# Patient Record
Sex: FEMALE | Race: ASIAN | NOT HISPANIC OR LATINO | Employment: UNEMPLOYED | URBAN - METROPOLITAN AREA
[De-identification: names, ages, dates, MRNs, and addresses within clinical notes are randomized per-mention and may not be internally consistent; named-entity substitution may affect disease eponyms.]

---

## 2017-01-08 ENCOUNTER — MEDICAL CORRESPONDENCE (OUTPATIENT)
Dept: HEALTH INFORMATION MANAGEMENT | Facility: CLINIC | Age: 37
End: 2017-01-08

## 2017-04-17 ENCOUNTER — TELEPHONE (OUTPATIENT)
Dept: FAMILY MEDICINE | Facility: CLINIC | Age: 37
End: 2017-04-17

## 2017-04-20 ENCOUNTER — TELEPHONE (OUTPATIENT)
Dept: FAMILY MEDICINE | Facility: CLINIC | Age: 37
End: 2017-04-20

## 2017-04-20 NOTE — TELEPHONE ENCOUNTER
Called pt and completed OB Intake over the phone. Pt is a primigravida.   Age 37.  [place] from [facility] 2017. Hmong speaking and have very limited education back in [facility].   is age 56, already with children from previous marriage. Pt without a significant medical history. Reported this pregnancy is planned and wanted by both.  is supportive.  Pt reports no significant family history however pt also xplained that as they do not have proper medical care in [facility], history and risks is really unknown.  No known genetic history.     Sure LMP 17. [facility]. Nausea and vomiting complicating pregnancy.  Pt was prescribed a limited quantity of an antinausea medication from that visit. Per pt medication is helping. Pt voiced only concern is the well being of her baby as she has nausea and vomiting. Reassured and educated pt with eating tips to manage nausea/vomiting. Emphasized importance of hydration and calling clinic with any concern.    Pt will be seeing Dr. Jenkins for OB Care.     Average Risk Category  No significant risk factors: No    At Risk Category (up to 3)  Teen pregnancy: No  Poor social situation: No  Domestic abuse: No  Financial difficulties: No  Smoker: No  H/O  deliver: No  H/O drug abuse: No  Non-English speaking: Yes  Advanced maternal age: Yes  GDM risks: No  Previous C/S: No  H/O PIH: No  H/O STIs: No  H/O mental health concerns: No  Onset care > 20 weeks: No  Other:      High Risk Category (4 or more At Risk or)  Diabetes/GDM: No  Multiple gestation: No  Chronic hypertension: No  Significant hx of asthma: No  Fetal demise > 20 weeks: No  Positive tox screen: No  Current mental health treatment: No  Other:      Risk: Average Risk   Date determined: 17

## 2017-04-24 ENCOUNTER — OFFICE VISIT (OUTPATIENT)
Dept: FAMILY MEDICINE | Facility: CLINIC | Age: 37
End: 2017-04-24

## 2017-04-24 VITALS
HEIGHT: 60 IN | HEART RATE: 87 BPM | SYSTOLIC BLOOD PRESSURE: 99 MMHG | RESPIRATION RATE: 18 BRPM | BODY MASS INDEX: 23.36 KG/M2 | TEMPERATURE: 97.9 F | OXYGEN SATURATION: 100 % | WEIGHT: 119 LBS | DIASTOLIC BLOOD PRESSURE: 64 MMHG

## 2017-04-24 DIAGNOSIS — O21.9 NAUSEA/VOMITING IN PREGNANCY: ICD-10-CM

## 2017-04-24 DIAGNOSIS — Z34.01 SUPERVISION OF NORMAL FIRST PREGNANCY IN FIRST TRIMESTER: Primary | ICD-10-CM

## 2017-04-24 LAB
ALBUMIN SERPL-MCNC: 2.7 G/DL (ref 3.3–4.6)
ALP SERPL-CCNC: 102 U/L (ref 40–150)
ALT SERPL-CCNC: 25 U/L (ref 0–45)
AST SERPL-CCNC: 24 U/L (ref 0–45)
BILIRUB SERPL-MCNC: 0.4 MG/DL (ref 0.2–1.3)
BILIRUBIN UR: NEGATIVE
BLOOD UR: ABNORMAL
BUN SERPL-MCNC: 7 MG/DL (ref 5–24)
CALCIUM SERPL-MCNC: 9.2 MG/DL (ref 8.5–10.4)
CHLORIDE SERPLBLD-SCNC: 99 MMOL/L (ref 94–109)
CO2 SERPL-SCNC: 26 MMOL/L (ref 20–32)
CREAT SERPL-MCNC: 0.6 MG/DL (ref 0.6–1.3)
EGFR CALCULATED (BLACK REFERENCE): >90 ML/MIN
EGFR CALCULATED (NON BLACK REFERENCE): >90 ML/MIN
ERYTHROCYTE [DISTWIDTH] IN BLOOD BY AUTOMATED COUNT: 16.4 % (ref 11–14.5)
GLUCOSE SERPL-MCNC: 84 MG/DL (ref 60–109)
GLUCOSE URINE: NEGATIVE
HCT VFR BLD AUTO: 34.4 % (ref 35–47)
HGB BLD-MCNC: 10.4 G/DL (ref 12–16)
HIV 1+2 AB+HIV1 P24 AG SERPL QL IA: NEGATIVE
KETONES UR QL: NEGATIVE
LEUKOCYTE ESTERASE UR: NEGATIVE
MCH RBC QN AUTO: 25.5 PG (ref 27–34)
MCHC RBC AUTO-ENTMCNC: 30.2 G/DL (ref 32–36)
MCV RBC AUTO: 84 FL (ref 80–100)
NITRITE UR QL STRIP: NEGATIVE
PH UR STRIP: 6 [PH] (ref 5–7)
PLATELET # BLD AUTO: 401 THOU/UL (ref 140–440)
PMV BLD AUTO: 11.2 FL (ref 8.5–12.5)
POTASSIUM SERPL-SCNC: 3.6 MMOL/L (ref 3.4–5.3)
PROT SERPL-MCNC: 7.8 G/DL (ref 6.6–8.8)
PROTEIN UR: NEGATIVE
RBC # BLD AUTO: 4.08 MILL/UL (ref 3.8–5.4)
SODIUM SERPL-SCNC: 134 MMOL/L (ref 133–144)
SP GR UR STRIP: 1.02
UROBILINOGEN UR STRIP-ACNC: ABNORMAL
WBC # BLD AUTO: 10.9 THOU/UL (ref 4–11)

## 2017-04-24 RX ORDER — ONDANSETRON 4 MG/1
4 TABLET, FILM COATED ORAL EVERY 8 HOURS PRN
Qty: 42 TABLET | Refills: 0 | Status: SHIPPED | OUTPATIENT
Start: 2017-04-24 | End: 2017-06-06

## 2017-04-24 RX ORDER — PRENATAL VIT/IRON FUM/FOLIC AC 27MG-0.8MG
1 TABLET ORAL DAILY
Qty: 100 TABLET | Refills: 3 | Status: SHIPPED | OUTPATIENT
Start: 2017-04-24 | End: 2017-07-03

## 2017-04-24 NOTE — PROGRESS NOTES
"Dain Tejeda is a 37 year old  female who presents to clinic for a new OB visit.  Her last menstrual period was 17 with h/o regular monthly menses. [facility]     Estimated Date of Delivery: 2017 via sure LMP.    She has not had bleeding since her LMP. She has not had any abdominal pain or cramping since her LMP.  Does have breast tenderness, N/V. This was a planned pregnancy.     OTHER CONCERNS: Still having some nausea and vomiting- 4 times daily now. Doesn't think she lost weight. Newly immigrated and the food here is not as appetizing, tries to get three small meals daily but still vomits. Has been taking a medication \"vitamin\" for 14 days. Not currently taking a prenatal vitamin.  [facility] signed.     She recently immigrated from [facility]- adjusting well so far.  for 2 years- 55yo  has children, youngest is 16yo.     Pre Term Labor Risk Assessment  Is the patient's age <18 or >40? No  Patint's BMI is Body mass index is 23.63 kg/(m^2).. Does patient have a BMI < 18.5? No  If previous pregnancy, was delivery within previous 6 months? No  Have you ever delivered a baby prior to 37 weeks gestation? No  Did conception for this pregnancy occur via In Vitro Fertilization? No  Summary: Patient is not high risk for  Labor for  labor.    Patient Active Problem List   Diagnosis     Nausea/vomiting in pregnancy       Obstetric History       T0      TAB0   SAB0   E0   M0   L0       # Outcome Date GA Lbr Dontae/2nd Weight Sex Delivery Anes PTL Lv   1 Current                   History reviewed. No pertinent past medical history.    History reviewed. No pertinent surgical history.    Current Outpatient Prescriptions   Medication Sig Dispense Refill     ondansetron (ZOFRAN) 4 MG tablet Take 1 tablet (4 mg) by mouth every 8 hours as needed for nausea or vomiting 42 tablet 0     Prenatal Vit-Fe Fumarate-FA (PRENATAL MULTIVITAMIN  PLUS " IRON) 27-0.8 MG TABS per tablet Take 1 tablet by mouth daily 100 tablet 3       Do you have a history of any of the following medical conditions?  Condition Yes/No   Hypertension No   Heart disease, mitral valve prolapse, rheumatic fever No   Asthma or another chronic lung disease No   An autoimmune disorder No   Kidney disease No   Frequent urinary tract infections No   Migraine headaches No   Stroke, loss of sensation/function, seizures, or other neuro problem No   Diabetes No   Thyroid problems or have you taken thyroid medication No   Hepatitis, liver disease, jaundice No   Blood clots, phlebitis, pulmonary embolism or varicose veins No   Excessive bleeding after surgery or dental work No   Heavy menstrual periods requiring treatment No   Anemia No   Blood transfusions No        Would you refuse a blood transfusion? No   Breast problems No   Abnormalities of the uterus No   Abnormal pap smear No   Have you been treated for an emotional disturbance? No   Have you been physically, sexually, or emotionally hurt by someone? No   Have you been in a major accident or suffered serious trauma? No   Have you had surgical procedures? No        Have you ever had any complications from anesthesia? No   Have you ever been hospitalized for a nonsurgical reason? No   Notes for positives: pt has limited medical care in the past while in [facility], no medical records. No known genetic conditions.    Prenatal Genetic Screening  Do you have a history of any of the following Yes/No        A metabolic disorder (e.g. Insulin-dependent DM, PKU) No        Recurrent pregnancy loss No     Do you, the baby's father, or anyone in your families have Yes/No        Thalassemia AND MCV <80 No        Hemophilia No        Neural tube defect No        Congenital heart defect No        Sickle cell disease or trait No        Muscular dystrophy No        Cystic fibrosis No        Mental retardation or autism No        Down's syndrome No        Dandre-Sach's  "disease No        Kevin's chorea No        Any other inherited genetic or chromosomal disorder No        A child with birth defects not listed above No     Infection History  At high risk for coming in contact with HIV No   Ever treated for tuberculosis No   Ever received the BCG vaccine for tuberculosis No   Ever had genital herpes (or has your partner) No   Had a rash or viral illness since LMP No   Ever had a sexually transmitted infection No   Ever had chicken pox or the vaccine Yes   Ever had any other serious infectious disease No   Up to date with immunizations Yes   STI History no      PERSONAL/SOCIAL HISTORY  Social History     Social History     Marital status:      Spouse name: Dmitriy Camargo     Number of children: 0     Years of education: 0     Occupational History     None      Social History Main Topics     Smoking status: Never Smoker     Smokeless tobacco: None     Alcohol use No     Drug use: No     Sexual activity: Yes     Partners: Male     Other Topics Concern     None     Social History Narrative     Have you used any tobacco products, alcohol or any other drugs during this pregnancy before or after your knew you were pregnant? no    REVIEW OF SYSTEMS  C: NEGATIVE for fever, chills, change in weight  E: NEGATIVE for vision changes or irritation  ENT: NEGATIVE for ear, mouth and throat problems  R: NEGATIVE for significant cough or SOB  B: NEGATIVE for masses, tenderness or discharge  CV: NEGATIVE for chest pain, palpitations or peripheral edema  GI: NEGATIVE for  abdominal pain, heartburn, or change in bowel habits  : NEGATIVE for unusual urinary or vaginal symptoms.   M: NEGATIVE for significant arthralgias or myalgia  N: NEGATIVE for weakness, dizziness or paresthesias  P: NEGATIVE for changes in mood or affect    PHYSICAL EXAM:  BP 99/64  Pulse 87  Temp 97.9  F (36.6  C) (Oral)  Resp 18  Ht 4' 11.5\" (151.1 cm)  Wt 119 lb (54 kg)  LMP 02/05/2017  SpO2 100%  BMI 23.63 kg/m2 "   GENERAL:  Pleasant pregnant female, alert, well groomed.  SKIN:  Warm and dry, without lesions or rashes  EYES:  PERRLA, EOM intact  MOUTH:  Buccal mucosa pink, moist without lesions.    LUNGS:  Clear to auscultation.  BREAST:  Symmetrical. No masses noted. No skin or nipple changes or axillary nodes.   HEART:  RRR without murmur.  ABDOMEN: Soft without masses , tenderness or organomegaly.  No CVA tenderness. No scars noted.. Fundus palpable at symphysis pubis. .  MUSCULOSKELETAL:  Full range of motion.  EXTREMITIES:  No edema. No significant varicosities.   GENITALIA:  Normal appearing anatomy, no lesions noted.  VAGINA:  Pink, normal rugae and discharge normal and physiologic  CERVIX:  smooth, without discharge and parous os; firm, closed and long on bimanual exam.  UTERUS: Anteverted, nontender 11 weeks in size.  ADNEXA: Without masses or tenderness    ASSESSMENT/PLAN    11w1d    Patient Active Problem List   Diagnosis     Nausea/vomiting in pregnancy       Routine prenatal labs today. CBC, type and screen, rubella, HIV, gonorrhea/chlamydia, RPR, hepatitis B, urinalysis with culture. Pap smear due, done today. Early ultrasound for dating not needed given sure LMP.   Also checking CMP, Hep A nd C status given recent immigrant with n/v.    Referral(s): none    Discussed:  -recommended weight gain of 25-35 lbs. for BMI of Body mass index is 23.63 kg/(m^2)..  -Influenza vaccine not available at this time.  -genetic screening options, including false positive rate of 5% with screening tests and diagnostic options (chorionic villus sampling, amniocentesis), and patient declines at this time..  -safe medications during pregnancy. Will start taking prenatal vitamin daily.   -healthy habits including not using tobacco or alcohol, exercising regularly and maintaining healthy diet  -information given on tips for dealing with nausea, healthy habits, exposures, safety, prenatal appointment schedule, and when to  call the doctor.  -recommendations for breastfeeding.    Will follow up in 4 weeks for routine pre- care.    Cyndie Jenkins MD  [facility] Medicine Resident  Pager# [phone]    Precepted with: Dr. LUCIANO Jenkins

## 2017-04-24 NOTE — PROGRESS NOTES
Preceptor Attestation:  Patient's case reviewed and discussed with Cyndie Jenkins MD Patient seen and discussed with the resident.. I agree with assessment and plan of care.  Supervising Physician:  Cole Jenkins MD  [facility]

## 2017-04-24 NOTE — PATIENT INSTRUCTIONS
[facility] Information     your prescriptions at [facility]: Zofan for nausea (every 8 hours as needed), and Prenatal vitamin (take once a day)    Your resident OB Doctor is Dr. Cyndie Jenkins.    If you have any further concerns or wish to schedule another appointment, please call our office at [phone] during normal business hours (8-5, M-F).     For uncomplicated pregnancies, you will be seeing your doctor once a month until you are 28 weeks, then you will see your doctor twice a month. You will begin weekly visits at 36 weeks until you deliver.     If you have urgent medical questions that cannot wait, you may call [phone] at any time of day. Call your doctor if you experience any bleeding or abdominal cramping early in pregnancy.     If you have a medical emergency, please call [phone].  Tips to Relieve Nausea  Although nausea can occur at any time of the day, it may be worse in the morning. To help prevent nausea:  Eat small, light meals at frequent intervals.  Get up slowly. Eat a few unsalted crackers before you get out of bed.  Drink water with lemon slices or 7-up to soothe your stomach.  Eat an ice pop in your favorite flavor.  Ask your health care provider about taking ginger or vitamin B6 for nausea and vomiting.  Talk with your health care provider if you take vitamins that upset your stomach.  Safe Medications  Take one prenatal vitamin with at least 400 mcg of folic acid daily.  Use acetaminophen (Tylenol) for pain.   Try Maalox or Tums for heartburn. If these are not working, talk to your doctor about trying a different medication.  Diphenhydramine (Benadryl) can also be used safely in pregnancy.  Talk to your doctor about any other medications or vitamins you are taking!  Start Healthy Habits Now  What matters most is protecting your baby from this moment on. If you smoke, drink alcohol, or use drugs, now is the time to stop. If you need help, talk with your health care  provider.  Smoking increases the risk of miscarriage or having a low-birth-weight baby. If you smoke, quit now.  Alcohol and drugs have been linked with miscarriage, birth defects, intellectual disability, and low birth weight. Do not drink alcohol or take drugs.  Continue your current exercise routine, or start one with walking, swimming, and other low impact exercises. Yoga specifically designed for pregnant moms is a great stress and pain reliever. Keep your self well hydrated and avoid overheating with all activity. If bleeding, fluid leakage, or cramping/contractions occur, stop the exercise and call your doctor.   Wear your seatbelt every time you are in the car. Fasten the lap part underneath your growing belly and the shoulder part as you normally would.   Weight Gain  Add an additional 300 to 400 calories a day into your diet.  Ideal weight gain is 25 to 35 pounds.  If your BMI is over 30, your ideal weight gain is 15 pounds.  If your BMI is under 20, your ideal weight gain is 40 pounds.  Talk to your doctor if you are concerned about weight gain.   Keep Your Environment Save  You can still clean house and use scented products. Just take some simple precautions:  Wear gloves when using cleaning fluids.  Open windows to let in fresh air. Use a fan if you paint.  Avoid secondhand smoke.  Don t breathe fumes from nail polish, hair spray, cleansers, or other chemicals.  Work Concerns  The end of the first trimester is a good time to discuss working during pregnancy with your employer. Follow your health care provider s advice if your job requires you to stand for a long time, work with hazardous tools, or even sit at a desk all day. Your workspace, workload, or scheduled hours may need to be adjusted - perhaps you can change body postures more often or take an extra break.  Intimacy  Unless your health care provider tells you to, there is no reason to stop having sex while you re pregnant. You or your partner  may notice changes in desire. Desire may be less in the first trimester, due to nausea and fatigue. In the second trimester, sex may be very enjoyable. The third trimester can be a challenge comfort-wise. Try different positions and see what s best for you both. As always, let your doctor know if you experience any bleeding, leakage of fluids, or cramping/contractions.  Other Pregnancy Concerns  Limit the amount of radiation you are exposed to. Always tell the radiology technologist that you are pregnant if having an xray or CT scan done.   Practice good hand washing to prevent infection.  Avoid cat litter.   Wash all fruits and vegetabes. Always cook meat thoroughly and do not eat raw fish. Do not eat more than 6 to 12 ounces of other fish sources.   Limit caffeine to less than 300 milligrams a days. The average cup of coffee as approximately 120 milligrams of caffeine.      Breastfeeding: a Healthy Option for You and Your Baby    The choice of how you will feed your baby is important. Before your baby s birth, you ll want to learn about the benefits of breastfeeding. [facility] have been designated Baby Friendly; an initiative that was created by the World Health Organization and UNICEF. This helps give you and your baby the best start in feeding their baby.    Why should I breastfeed my baby?    Babies are less likely to develop childhood obesity or diabetes    Babies are less likely to suffer from recurrent ear infections    Babies are less likely to be hospitalized for respiratory conditions    Breast milk is rich in nutrients and antibodies-it is easy to digest    How does it benefit me?    Lowers the risk for diabetes, breast and ovarian cancer and postpartum depression    Moms can lose  baby weight  more quickly    Cost savings - formula can cost well over $1,500 per year    Convenient - no bottles and nipples to sterilize, no measuring and mixing formula    The physical contact with breastfeeding  can make babies feel secure, warm and comforted     What about formula?  While you and your baby are staying with us at [facility], we will support whatever feeding choice you make for your baby.      Some important considerations:      The American Academy of Pediatrics, the World Health Organization, and many more organizations recommend exclusive breastfeeding for 6 months and continued breastfeeding while adding other foods for the first 1-2 years.      Any amount of breastmilk has benefits to both baby and mother.    Giving formula in replacement of breastfeeding can affect mother s milk supply.  If formula is needed, hospital staff will work with you on a plan to help develop your milk supply.    Formula alters the natural growth of good bacteria in the  stomach.     Research has found that first time mothers who offer formula in the hospital have a shorter duration of breastfeeding.    How can I start to prepare?     Start by having a conversation with your medical provider.     Talk with your partner, family and friends.     Attend a prenatal class that includes breastfeeding preparation. Birth and breastfeeding classes are offered by [facility]. Visit [employer] for class information.     After your baby s birth, hospital staff and lactation consultants will help you and your baby get off to a great start with breastfeeding.    Resources:      [facility] clinic and hospital staff will support you throughout your pregnancy, delivery and beyond. Visit [email or web address] for more details.       A free weekly pregnancy and parenting email is offered by [facility]. Emails include week by week information about your pregnancy, baby s development, breastfeeding and beyond.  Sign up at [email or web address].      [facility] [phone].  Consultants are available for assessment of your breastfeeding concerns, support and  education.      [employer] helps mothers worldwide to breastfeed through mother-to-mother support, encouragement, information and education. [employer] promotes breastfeeding as an important element in the healthy development of baby and mother. Monthly classes, support groups and telephone help are offered in your community. To learn more visit li.org.      [facility] Connection: [phone]-[facility] ([phone])

## 2017-04-24 NOTE — MR AVS SNAPSHOT
After Visit Summary   4/24/2017    Dain Tejeda    MRN: [id number]           Patient Information     Date Of Birth          1980        Visit Information        Provider Department      4/24/2017 9:00 AM Cyndie Jenkins MD [facility]        Today's Diagnoses     Supervision of normal first pregnancy in first trimester    -  1    Nausea/vomiting in pregnancy          Care Instructions    [facility] Information     your prescriptions at [facility]: Zofan for nausea (every 8 hours as needed), and Prenatal vitamin (take once a day)    Your resident OB Doctor is Dr. Cyndie Jenkins.    If you have any further concerns or wish to schedule another appointment, please call our office at [phone] during normal business hours (8-5, M-F).     For uncomplicated pregnancies, you will be seeing your doctor once a month until you are 28 weeks, then you will see your doctor twice a month. You will begin weekly visits at 36 weeks until you deliver.     If you have urgent medical questions that cannot wait, you may call [phone] at any time of day. Call your doctor if you experience any bleeding or abdominal cramping early in pregnancy.     If you have a medical emergency, please call [phone].  Tips to Relieve Nausea  Although nausea can occur at any time of the day, it may be worse in the morning. To help prevent nausea:  Eat small, light meals at frequent intervals.  Get up slowly. Eat a few unsalted crackers before you get out of bed.  Drink water with lemon slices or 7-up to soothe your stomach.  Eat an ice pop in your favorite flavor.  Ask your health care provider about taking ginger or vitamin B6 for nausea and vomiting.  Talk with your health care provider if you take vitamins that upset your stomach.  Safe Medications  Take one prenatal vitamin with at least 400 mcg of folic acid daily.  Use acetaminophen (Tylenol) for pain.   Try Maalox or Tums for heartburn. If these  are not working, talk to your doctor about trying a different medication.  Diphenhydramine (Benadryl) can also be used safely in pregnancy.  Talk to your doctor about any other medications or vitamins you are taking!  Start Healthy Habits Now  What matters most is protecting your baby from this moment on. If you smoke, drink alcohol, or use drugs, now is the time to stop. If you need help, talk with your health care provider.  Smoking increases the risk of miscarriage or having a low-birth-weight baby. If you smoke, quit now.  Alcohol and drugs have been linked with miscarriage, birth defects, intellectual disability, and low birth weight. Do not drink alcohol or take drugs.  Continue your current exercise routine, or start one with walking, swimming, and other low impact exercises. Yoga specifically designed for pregnant moms is a great stress and pain reliever. Keep your self well hydrated and avoid overheating with all activity. If bleeding, fluid leakage, or cramping/contractions occur, stop the exercise and call your doctor.   Wear your seatbelt every time you are in the car. Fasten the lap part underneath your growing belly and the shoulder part as you normally would.   Weight Gain  Add an additional 300 to 400 calories a day into your diet.  Ideal weight gain is 25 to 35 pounds.  If your BMI is over 30, your ideal weight gain is 15 pounds.  If your BMI is under 20, your ideal weight gain is 40 pounds.  Talk to your doctor if you are concerned about weight gain.   Keep Your Environment Save  You can still clean house and use scented products. Just take some simple precautions:  Wear gloves when using cleaning fluids.  Open windows to let in fresh air. Use a fan if you paint.  Avoid secondhand smoke.  Don t breathe fumes from nail polish, hair spray, cleansers, or other chemicals.  Work Concerns  The end of the first trimester is a good time to discuss working during pregnancy with your employer. Follow your  health care provider s advice if your job requires you to stand for a long time, work with hazardous tools, or even sit at a desk all day. Your workspace, workload, or scheduled hours may need to be adjusted - perhaps you can change body postures more often or take an extra break.  Intimacy  Unless your health care provider tells you to, there is no reason to stop having sex while you re pregnant. You or your partner may notice changes in desire. Desire may be less in the first trimester, due to nausea and fatigue. In the second trimester, sex may be very enjoyable. The third trimester can be a challenge comfort-wise. Try different positions and see what s best for you both. As always, let your doctor know if you experience any bleeding, leakage of fluids, or cramping/contractions.  Other Pregnancy Concerns  Limit the amount of radiation you are exposed to. Always tell the radiology technologist that you are pregnant if having an xray or CT scan done.   Practice good hand washing to prevent infection.  Avoid cat litter.   Wash all fruits and vegetabes. Always cook meat thoroughly and do not eat raw fish. Do not eat more than 6 to 12 ounces of other fish sources.   Limit caffeine to less than 300 milligrams a days. The average cup of coffee as approximately 120 milligrams of caffeine.      Breastfeeding: a Healthy Option for You and Your Baby    The choice of how you will feed your baby is important. Before your baby s birth, you ll want to learn about the benefits of breastfeeding. [facility] have been designated Baby Friendly; an initiative that was created by the World Health Organization and UNICEF. This helps give you and your baby the best start in feeding their baby.    Why should I breastfeed my baby?    Babies are less likely to develop childhood obesity or diabetes    Babies are less likely to suffer from recurrent ear infections    Babies are less likely to be hospitalized for respiratory  conditions    Breast milk is rich in nutrients and antibodies-it is easy to digest    How does it benefit me?    Lowers the risk for diabetes, breast and ovarian cancer and postpartum depression    Moms can lose  baby weight  more quickly    Cost savings - formula can cost well over $1,500 per year    Convenient - no bottles and nipples to sterilize, no measuring and mixing formula    The physical contact with breastfeeding can make babies feel secure, warm and comforted     What about formula?  While you and your baby are staying with us at [facility], we will support whatever feeding choice you make for your baby.      Some important considerations:      The American Academy of Pediatrics, the World Health Organization, and many more organizations recommend exclusive breastfeeding for 6 months and continued breastfeeding while adding other foods for the first 1-2 years.      Any amount of breastmilk has benefits to both baby and mother.    Giving formula in replacement of breastfeeding can affect mother s milk supply.  If formula is needed, hospital staff will work with you on a plan to help develop your milk supply.    Formula alters the natural growth of good bacteria in the  stomach.     Research has found that first time mothers who offer formula in the hospital have a shorter duration of breastfeeding.    How can I start to prepare?     Start by having a conversation with your medical provider.     Talk with your partner, family and friends.     Attend a prenatal class that includes breastfeeding preparation. Birth and breastfeeding classes are offered by [facility] [employer]. Visit [employer] for class information.     After your baby s birth, hospital staff and lactation consultants will help you and your baby get off to a great start with breastfeeding.    Resources:      [facility] clinic and hospital staff will support you throughout your pregnancy, delivery and beyond.  Visit [email or web address] for more details.       A free weekly pregnancy and parenting email is offered by [facility]. Emails include week by week information about your pregnancy, baby s development, breastfeeding and beyond.  Sign up at [email or web address].      [facility] [phone].  Consultants are available for assessment of your breastfeeding concerns, support and education.      La Leche League helps mothers worldwide to breastfeed through mother-to-mother support, encouragement, information and education. [employer] promotes breastfeeding as an important element in the healthy development of baby and mother. Monthly classes, support groups and telephone help are offered in your community. To learn more visit [facility].      [facility] Care Connection: [phone] ([phone])          Follow-ups after your visit        Who to contact     Please call your clinic at [phone] to:    Ask questions about your health    Make or cancel appointments    Discuss your medicines    Learn about your test results    Speak to your doctor   If you have compliments or concerns about an experience at your clinic, or if you wish to file a complaint, please contact [facility] Patient Relations at [phone] or email us at [email or web address]         Additional Information About Your Visit        hoccerhart Information     PowerFile is an electronic gateway that provides easy, online access to your medical records. With PowerFile, you can request a clinic appointment, read your test results, renew a prescription or communicate with your care team.     To sign up for [employer]iot visit the website at [email or web address]   You will be asked to enter the access code listed below, as well as some personal information. Please follow the directions to create your username and password.     Your access code is: [email or web address]  Expires: 7/23/2017 10:04 AM    "  Your access code will  in 90 days. If you need help or a new code, please contact your [facility] or call [phone] for assistance.        Care EveryWhere ID     This is your Care EveryWhere ID. This could be used by other organizations to access your [facility] medical records  [id number]        Your Vitals Were     Pulse Temperature Respirations Height Last Period Pulse Oximetry    87 97.9  F (36.6  C) (Oral) 18 4' 11.5\" (151.1 cm) 2017 100%    BMI (Body Mass Index)                   23.63 kg/m2            Blood Pressure from Last 3 Encounters:   17 99/64    Weight from Last 3 Encounters:   17 119 lb (54 kg)              We Performed the Following     ABO/Rh Type-HML ([employer])     Antibody Screen ([facility])     CBC w/ Plt. ([facility])     Chlamydia/Gono Amplified ([facility])     Comprehensive Metabolic Panel ([facility]) - results <1hr Protocol     Culture Urine ([facility])     GYN Cytology ([facility])     Hepatitis A Immune Status ([facility])     Hepatitis B Surface Ag ([facility])     Hepatitis C Antibody ([facility])     HIV Ag/Ab Screen Edgar ([facility])     Rubella  IgG ([facility])     Syphilis Screen Edgar ([facility])     Urinalysis([facility])          Today's Medication Changes          These changes are accurate as of: 17 10:04 AM.  If you have any questions, ask your nurse or doctor.               Start taking these medicines.        Dose/Directions    ondansetron 4 MG tablet   Commonly known as:  ZOFRAN   Used for:  Nausea/vomiting in pregnancy, Supervision of normal first pregnancy in first trimester   Started by:  Cyndie Jenkins MD        Dose:  4 mg   Take 1 tablet (4 mg) by mouth every 8 hours as needed for nausea or vomiting   Quantity:  42 tablet   Refills:  0       prenatal multivitamin  plus iron 27-0.8 MG Tabs per tablet   Used for:  Supervision of normal first pregnancy in first trimester   Started by:  " Cyndie Jenkins MD        Dose:  1 tablet   Take 1 tablet by mouth daily   Quantity:  100 tablet   Refills:  3            Where to get your medicines      These medications were sent to [id number] - [place], [place] - [address] AT [facility]  [address], [place] [address]     Phone:  [phone]     ondansetron 4 MG tablet    prenatal multivitamin  plus iron 27-0.8 MG Tabs per tablet                Primary Care Provider Office Phone # Fax #    Cyndie Jenkins [phone]       [facility] [address] [facility] [address]        Thank you!     Thank you for choosing [facility]  for your care. Our goal is always to provide you with excellent care. Hearing back from our patients is one way we can continue to improve our services. Please take a few minutes to complete the written survey that you may receive in the mail after your visit with us. Thank you!             Your Updated Medication List - Protect others around you: Learn how to safely use, store and throw away your medicines at www.disposemymeds.org.          This list is accurate as of: 4/24/17 10:04 AM.  Always use your most recent med list.                   Brand Name Dispense Instructions for use    ondansetron 4 MG tablet    ZOFRAN    42 tablet    Take 1 tablet (4 mg) by mouth every 8 hours as needed for nausea or vomiting       prenatal multivitamin  plus iron 27-0.8 MG Tabs per tablet     100 tablet    Take 1 tablet by mouth daily

## 2017-04-25 LAB
ABO + RH BLD: NORMAL
BLD GP AB SCN SERPL QL: NEGATIVE
C TRACH RRNA CVX QL NAA+PROBE: NEGATIVE
CULTURE: NORMAL
HBV SURFACE AG SERPL QL IA: NEGATIVE
HCV AB SER QL: NEGATIVE
N GONORRHOEA RRNA SPEC QL NAA+PROBE: NEGATIVE
REPEAT ABO/RH TYPING (HML): NORMAL
RPR SER QL: NORMAL
RUBV IGG SERPL QL IA: NORMAL

## 2017-04-25 NOTE — NURSING NOTE
unable to obtain JULES for patient clinic where confirmation of pregnancy was obtained. Patient informed it was a womens clinic and I have attempted to contact 5 local womens care clinic and no facility had patients information by both insurance birthday and patients license birthday. Informed Dr. Jenkins regarding matter.

## 2017-04-26 LAB — HAV IGG SER QL IA: POSITIVE

## 2017-04-28 LAB
INTERPRETATION: ABNORMAL
INTERPRETER: ABNORMAL

## 2017-05-01 ENCOUNTER — RECORDS - HEALTHEAST (OUTPATIENT)
Dept: ADMINISTRATIVE | Facility: OTHER | Age: 37
End: 2017-05-01

## 2017-05-01 LAB
HIGH RISK?: NO
HPV REFLEX?: ABNORMAL
LAB AP ABNORMAL BLEEDING: NO
LAB AP BIRTH CONTROL/HORMONES: ABNORMAL
LAB AP CASE REPORT: ABNORMAL
LAB AP CERVICAL APPEARANCE: NORMAL
LAB AP GYN INTERPRETATION: ABNORMAL
LAB AP MALIGNANT?: ABNORMAL
LAB AP PATIENT STATUS: ABNORMAL
LAB AP PREVIOUS ABNL: NO
LAB AP PREVIOUS NORMAL: ABNORMAL
LAST MENS PERIOD: ABNORMAL
SPECIMEN ADEQUACY:: ABNORMAL

## 2017-05-04 ENCOUNTER — OFFICE VISIT (OUTPATIENT)
Dept: FAMILY MEDICINE | Facility: CLINIC | Age: 37
End: 2017-05-04

## 2017-05-04 VITALS
BODY MASS INDEX: 24.11 KG/M2 | HEIGHT: 59 IN | SYSTOLIC BLOOD PRESSURE: 99 MMHG | WEIGHT: 119.6 LBS | TEMPERATURE: 98.1 F | RESPIRATION RATE: 16 BRPM | OXYGEN SATURATION: 99 % | HEART RATE: 81 BPM | DIASTOLIC BLOOD PRESSURE: 65 MMHG

## 2017-05-04 DIAGNOSIS — O21.9 NAUSEA/VOMITING IN PREGNANCY: ICD-10-CM

## 2017-05-04 DIAGNOSIS — O09.529 SUPERVISION OF HIGH-RISK PREGNANCY OF ELDERLY MULTIGRAVIDA: Primary | ICD-10-CM

## 2017-05-04 DIAGNOSIS — O99.011 ANEMIA OF PREGNANCY IN FIRST TRIMESTER: ICD-10-CM

## 2017-05-04 DIAGNOSIS — Z12.4 SCREENING FOR CERVICAL CANCER: ICD-10-CM

## 2017-05-04 NOTE — MR AVS SNAPSHOT
After Visit Summary   5/4/2017    Dain Tejeda    MRN: [id number]           Patient Information     Date Of Birth          1980        Visit Information        Provider Department      5/4/2017 9:20 AM Cyndie Jenkins MD [facility]        Today's Diagnoses     Nausea/vomiting in pregnancy          Care Instructions    High-iron foods include:    Oatmeal  Cream of wheat or cream of rice  Cereal (Raisin Bran, Grape Nuts)  Meats, especially pork, liver, beef   Prunes, prune juice  Spinach  Almonds  Beans  Peas  Dried peaches  Raisins  Sunflower seeds  Using iron skillet/frying pans      [email or web address]    [facility] Information  Your resident OB Doctor is Dr. Cyndie Jenkins.    If you have any further concerns or wish to schedule another appointment, please call our office at [phone] during normal business hours (8-5, M-F).     For uncomplicated pregnancies, you will be seeing your doctor once a month until you are 28 weeks, then you will see your doctor twice a month. You will begin weekly visits at 36 weeks until you deliver.     If you have urgent medical questions that cannot wait, you may call [phone] at any time of day. Call your doctor if you experience any bleeding or abdominal cramping early in pregnancy.     If you have a medical emergency, please call [phone].    When to call or come in to [facility] ([phone] for Labor & Delivery unit)  If you have any bleeding or leakage of fluids.   If you have uterine cramping that does not resolve with rest and fluids.  If you have a headache not resolved with tylenol, right upper abdominal pain, or sudden onset of swelling.  You know your body best. Never hesitate to call or go to the hospital if something doesn't feel right!  Genetic Screening  Sampling of your blood to screen for genetic abnormalities, like Down's syndrome, in your  baby  Done at 16-18 weeks gestation  Screening test only - further testing, like advanced ultrasound or amniocentesis, would be needed to confirm positive test!  Recommended for mothers over age 35, history of genetic abnormalities,   Talk to your doctor if you have further questions, or are interested in this screening test.   Breastfeeding  Breast feeding is best, for you and baby!   Recommendations are for exclusive breastfeeding for babies first 6 months of life.  Talk to your doctor if you have more questions about breastfeeding your baby.  Other Pregnancy Concerns  Continue to take a prenatal vitamin daily  Maintain an active, healthy lifestyle.   If this is your first baby, you can expect to start feeling movement at 20-24 weeks gestation. If this is your second or more pregnancy, you will generally start feeling movement at 18-22 weeks gestation.           Follow-ups after your visit        Who to contact     Please call your clinic at [phone] to:    Ask questions about your health    Make or cancel appointments    Discuss your medicines    Learn about your test results    Speak to your doctor   If you have compliments or concerns about an experience at your clinic, or if you wish to file a complaint, please contact [facility] Patient Relations at [phone] or email us at [email or web address]         Additional Information About Your Visit        Data Driven Delivery SystemharClearleap Information     TrademarkNowt is an electronic gateway that provides easy, online access to your medical records. With [employer], you can request a clinic appointment, read your test results, renew a prescription or communicate with your care team.     To sign up for Tr[employer]ark[employer]t visit the website at [email or web address]   You will be asked to enter the access code listed below, as well as some personal information. Please follow the directions to create your username and password.     Your access code is:  "[email or web address]  Expires: 2017 10:04 AM     Your access code will  in 90 days. If you need help or a new code, please contact your [facility] or call [phone] for assistance.        Care EveryWhere ID     This is your Care EveryWhere ID. This could be used by other organizations to access your [place] medical records  [id number]        Your Vitals Were     Pulse Temperature Respirations Height Last Period Pulse Oximetry    81 98.1  F (36.7  C) (Oral) 16 4' 11.45\" (151 cm) 2017 99%    BMI (Body Mass Index)                   23.79 kg/m2            Blood Pressure from Last 3 Encounters:   17 99/65   17 99/64    Weight from Last 3 Encounters:   17 119 lb 9.6 oz (54.3 kg)   17 119 lb (54 kg)              Today, you had the following     No orders found for display       Primary Care Provider Office Phone # Fax #    Cyndie Jenkins [phone]       [facility] [address] [place] [address]        Thank you!     Thank you for choosing [facility]  for your care. Our goal is always to provide you with excellent care. Hearing back from our patients is one way we can continue to improve our services. Please take a few minutes to complete the written survey that you may receive in the mail after your visit with us. Thank you!             Your Updated Medication List - Protect others around you: Learn how to safely use, store and throw away your medicines at www.disposemymeds.org.          This list is accurate as of: 17 10:04 AM.  Always use your most recent med list.                   Brand Name Dispense Instructions for use    ondansetron 4 MG tablet    ZOFRAN    42 tablet    Take 1 tablet (4 mg) by mouth every 8 hours as needed for nausea or vomiting       prenatal multivitamin  plus iron 27-0.8 MG Tabs per tablet     100 tablet    Take 1 tablet by mouth daily         "

## 2017-05-04 NOTE — PATIENT INSTRUCTIONS
High-iron foods include:    Oatmeal  Cream of wheat or cream of rice  Cereal (Raisin Bran, Grape Nuts)  Meats, especially pork, liver, beef   Prunes, prune juice  Spinach  Almonds  Beans  Peas  Dried peaches  Raisins  Sunflower seeds  Using iron skillet/frying pans      [email or web address]    [facility] Information  Your resident OB Doctor is Dr. Cyndie Jenkins.    If you have any further concerns or wish to schedule another appointment, please call our office at [phone] during normal business hours (8-5, M-F).     For uncomplicated pregnancies, you will be seeing your doctor once a month until you are 28 weeks, then you will see your doctor twice a month. You will begin weekly visits at 36 weeks until you deliver.     If you have urgent medical questions that cannot wait, you may call [phone] at any time of day. Call your doctor if you experience any bleeding or abdominal cramping early in pregnancy.     If you have a medical emergency, please call 111.    When to call or come in to [facility] ([phone] for Labor & Delivery unit)  If you have any bleeding or leakage of fluids.   If you have uterine cramping that does not resolve with rest and fluids.  If you have a headache not resolved with tylenol, right upper abdominal pain, or sudden onset of swelling.  You know your body best. Never hesitate to call or go to the hospital if something doesn't feel right!  Genetic Screening  Sampling of your blood to screen for genetic abnormalities, like Down's syndrome, in your baby  Done at 16-18 weeks gestation  Screening test only - further testing, like advanced ultrasound or amniocentesis, would be needed to confirm positive test!  Recommended for mothers over age 35, history of genetic abnormalities,   Talk to your doctor if you have further questions, or are interested in this screening test.   Breastfeeding  Breast  feeding is best, for you and baby!   Recommendations are for exclusive breastfeeding for babies first 6 months of life.  Talk to your doctor if you have more questions about breastfeeding your baby.  Other Pregnancy Concerns  Continue to take a prenatal vitamin daily  Maintain an active, healthy lifestyle.   If this is your first baby, you can expect to start feeling movement at 20-24 weeks gestation. If this is your second or more pregnancy, you will generally start feeling movement at 18-22 weeks gestation.

## 2017-05-04 NOTE — PROGRESS NOTES
Preceptor Attestation:  Patient's case reviewed and discussed with Cyndie Jenkins MD Patient seen and discussed with the resident.. I agree with assessment and plan of care.  Supervising Physician:  Shannan Ulloa MD  [facility]

## 2017-05-05 PROBLEM — O99.011 ANEMIA OF PREGNANCY IN FIRST TRIMESTER: Status: ACTIVE | Noted: 2017-05-05

## 2017-05-05 PROBLEM — O09.529 SUPERVISION OF HIGH-RISK PREGNANCY OF ELDERLY MULTIGRAVIDA: Status: ACTIVE | Noted: 2017-05-05

## 2017-05-05 PROBLEM — O09.511 ELDERLY PRIMIGRAVIDA IN FIRST TRIMESTER: Status: ACTIVE | Noted: 2017-05-05

## 2017-05-05 PROBLEM — Z34.01 ENCOUNTER FOR SUPERVISION OF NORMAL FIRST PREGNANCY IN FIRST TRIMESTER: Status: ACTIVE | Noted: 2017-05-05

## 2017-05-05 NOTE — PROGRESS NOTES
Reviewed results. Discussed everything with patient at her f/u OB appt on 5/4/17. See note for plan/details.

## 2017-05-18 ENCOUNTER — OFFICE VISIT (OUTPATIENT)
Dept: FAMILY MEDICINE | Facility: CLINIC | Age: 37
End: 2017-05-18

## 2017-05-18 VITALS
RESPIRATION RATE: 18 BRPM | HEIGHT: 59 IN | HEART RATE: 87 BPM | DIASTOLIC BLOOD PRESSURE: 65 MMHG | OXYGEN SATURATION: 100 % | TEMPERATURE: 98.3 F | SYSTOLIC BLOOD PRESSURE: 99 MMHG | BODY MASS INDEX: 23.83 KG/M2 | WEIGHT: 118.2 LBS

## 2017-05-18 DIAGNOSIS — O21.9 NAUSEA/VOMITING IN PREGNANCY: ICD-10-CM

## 2017-05-18 DIAGNOSIS — O99.011 ANEMIA OF PREGNANCY IN FIRST TRIMESTER: Primary | ICD-10-CM

## 2017-05-18 DIAGNOSIS — O09.529 SUPERVISION OF HIGH-RISK PREGNANCY OF ELDERLY MULTIGRAVIDA: ICD-10-CM

## 2017-05-18 NOTE — MR AVS SNAPSHOT
After Visit Summary   5/18/2017    Dain Tejeda    MRN: [id number]           Patient Information     Date Of Birth          1980        Visit Information        Provider Department      5/18/2017 9:00 AM Cyndie Jenkins MD [facility]        Today's Diagnoses     Anemia of pregnancy in first trimester    -  1    Nausea/vomiting in pregnancy        Supervision of high-risk pregnancy of elderly primigravida          Care Instructions    [facility] Information  Your resident OB Doctor is Dr. Cyndie Jenkins.    High-iron foods include:    Oatmeal  Cream of wheat or cream of rice  Cereal (Raisin Bran, Grape Nuts)  Meats, especially pork, liver, beef   Prunes, prune juice  Spinach  Almonds  Beans  Peas  Dried peaches  Raisins  Sunflower seeds  Using iron skillet/frying pans      [email or web address]      If you have any further concerns or wish to schedule another appointment, please call our office at [phone] during normal business hours (8-5, M-F).     For uncomplicated pregnancies, you will be seeing your doctor once a month until you are 28 weeks, then you will see your doctor twice a month. You will begin weekly visits at 36 weeks until you deliver.     If you have urgent medical questions that cannot wait, you may call [phone] at any time of day. Call your doctor if you experience any bleeding or abdominal cramping early in pregnancy.     If you have a medical emergency, please call [phone].    When to call or come in to [facility] ([phone] for Labor & Delivery unit)  If you have any bleeding or leakage of fluids.   If you have uterine cramping that does not resolve with rest and fluids.  If you have a headache not resolved with tylenol, right upper abdominal pain, or sudden onset of swelling.  You know your body best. Never hesitate to call or go to the hospital if something doesn't  feel right!  Genetic Screening  Sampling of your blood to screen for genetic abnormalities, like Down's syndrome, in your baby  Done at 16-18 weeks gestation  Screening test only - further testing, like advanced ultrasound or amniocentesis, would be needed to confirm positive test!  Recommended for mothers over age 35, history of genetic abnormalities,   Talk to your doctor if you have further questions, or are interested in this screening test.   Breastfeeding  Breast feeding is best, for you and baby!   Recommendations are for exclusive breastfeeding for babies first 6 months of life.  Talk to your doctor if you have more questions about breastfeeding your baby.  Other Pregnancy Concerns  Continue to take a prenatal vitamin daily  Maintain an active, healthy lifestyle.   If this is your first baby, you can expect to start feeling movement at 20-24 weeks gestation. If this is your second or more pregnancy, you will generally start feeling movement at 18-22 weeks gestation.           Follow-ups after your visit        Who to contact     Please call your clinic at [phone] to:    Ask questions about your health    Make or cancel appointments    Discuss your medicines    Learn about your test results    Speak to your doctor   If you have compliments or concerns about an experience at your clinic, or if you wish to file a complaint, please contact [facility] Patient Relations at [phone] or email us at [email or web address]         Additional Information About Your Visit        LottayharHaulerDeals Information     SafeTacMag is an electronic gateway that provides easy, online access to your medical records. With SafeTacMag, you can request a clinic appointment, read your test results, renew a prescription or communicate with your care team.     To sign up for SafeTacMag visit the website at [email or web address]   You will be asked to enter the access code listed below, as well as some  "personal information. Please follow the directions to create your username and password.     Your access code is: [email or web address]  Expires: 2017 10:04 AM     Your access code will  in 90 days. If you need help or a new code, please contact your [facility] or call [phone] for assistance.        Care EveryWhere ID     This is your Care EveryWhere ID. This could be used by other organizations to access your [place] medical records  [id number]        Your Vitals Were     Pulse Temperature Respirations Height Last Period Pulse Oximetry    87 98.3  F (36.8  C) 18 4' 11.25\" (150.5 cm) 2017 100%    BMI (Body Mass Index)                   23.67 kg/m2            Blood Pressure from Last 3 Encounters:   17 99/65   17 99/65   17 99/64    Weight from Last 3 Encounters:   17 118 lb 3.2 oz (53.6 kg)   17 119 lb 9.6 oz (54.3 kg)   17 119 lb (54 kg)              Today, you had the following     No orders found for display       Primary Care Provider Office Phone # Fax #    Cyndie Jenkins [phone]       [facility] [address] [place] [address]        Thank you!     Thank you for choosing [facility]  for your care. Our goal is always to provide you with excellent care. Hearing back from our patients is one way we can continue to improve our services. Please take a few minutes to complete the written survey that you may receive in the mail after your visit with us. Thank you!             Your Updated Medication List - Protect others around you: Learn how to safely use, store and throw away your medicines at www.disposemymeds.org.          This list is accurate as of: 17  9:50 AM.  Always use your most recent med list.                   Brand Name Dispense Instructions for use    ondansetron 4 MG tablet    ZOFRAN    42 tablet    Take 1 tablet (4 mg) by mouth every 8 hours as needed " for nausea or vomiting       prenatal multivitamin  plus iron 27-0.8 MG Tabs per tablet     100 tablet    Take 1 tablet by mouth daily

## 2017-05-18 NOTE — PROGRESS NOTES
"OB Visit    Dain Tejeda is a 37 year old  female who presents to clinic for a follow up OB visit. She is currently 14w4d with an estimated date of delivery of 2017 via sure LMP.   She denies headache, chest pain, shortness of breath, abdominal pain/contractions, vaginal bleeding, or abnormal discharge.     She has not felt baby move.     New concerns today: N/V is much much improved. Still has zofran left. Needing it less frequently- 1 or 2x/day.  Has lost 1 lb from 2 weeks ago. States her appetite is improved in the past week and feels it continues to improve.    Obstetric History       T0      TAB0   SAB0   E0   M0   L0       # Outcome Date GA Lbr Dontae/2nd Weight Sex Delivery Anes PTL Lv   1 Current                   Physical exam:  BP 99/65  Pulse 87  Temp 98.3  F (36.8  C)  Resp 18  Ht 4' 11.25\" (150.5 cm)  Wt 118 lb 3.2 oz (53.6 kg)  LMP 2017  SpO2 100%  BMI 23.67 kg/m2    General: alert, female in no acute distress  Abdomen: gravid uterus appropriate for gestation age above pubic symphysis, non-tender, FHTs 160  Extremities: no edema    Assessment/Plan:    14w4d    Supervision of high-risk pregnancy of elderly primigravida  - Reviewed pre-jesús labs again.  - Reviewed genetic screening options, including false positive rate of 5% with screening tests and diagnostic options (chorionic villus sampling, amniocentesis), and patient declines.  - Continued recommendation for breastfeeding.  - Discussed warning signs to watch for and expectations for second trimester.     Anemia of pregnancy in first trimester  - Discussed continuing high iron foods until weight/nausea improves    Nausea/vomiting in pregnancy  - Ok to continue zofran  - Encouraged frequent small meals, protein intake, fluid intake  - Will see her back in 2 weeks to reassess weight    Follow up in 2 weeks for routine prenatal visit.     Cyndie Jenkins MD  [facility] Medicine Resident  Pager# " [phone]    Precepted with: Cyndie Rivera, DO

## 2017-05-18 NOTE — PATIENT INSTRUCTIONS
[facility] Information  Your resident OB Doctor is Dr. Cyndie Jenkins.    High-iron foods include:    Oatmeal  Cream of wheat or cream of rice  Cereal (Raisin Bran, Grape Nuts)  Meats, especially pork, liver, beef   Prunes, prune juice  Spinach  Almonds  Beans  Peas  Dried peaches  Raisins  Sunflower seeds  Using iron skillet/frying pans      [email or web address]      If you have any further concerns or wish to schedule another appointment, please call our office at [phone] during normal business hours (8-5, M-F).     For uncomplicated pregnancies, you will be seeing your doctor once a month until you are 28 weeks, then you will see your doctor twice a month. You will begin weekly visits at 36 weeks until you deliver.     If you have urgent medical questions that cannot wait, you may call [phone] at any time of day. Call your doctor if you experience any bleeding or abdominal cramping early in pregnancy.     If you have a medical emergency, please call [phone].    When to call or come in to [facility] ([phone] for Labor & Delivery unit)  If you have any bleeding or leakage of fluids.   If you have uterine cramping that does not resolve with rest and fluids.  If you have a headache not resolved with tylenol, right upper abdominal pain, or sudden onset of swelling.  You know your body best. Never hesitate to call or go to the hospital if something doesn't feel right!  Genetic Screening  Sampling of your blood to screen for genetic abnormalities, like Down's syndrome, in your baby  Done at 16-18 weeks gestation  Screening test only - further testing, like advanced ultrasound or amniocentesis, would be needed to confirm positive test!  Recommended for mothers over age 35, history of genetic abnormalities,   Talk to your doctor if you have further questions, or are interested in this screening test.   Breastfeeding  Breast  feeding is best, for you and baby!   Recommendations are for exclusive breastfeeding for babies first 6 months of life.  Talk to your doctor if you have more questions about breastfeeding your baby.  Other Pregnancy Concerns  Continue to take a prenatal vitamin daily  Maintain an active, healthy lifestyle.   If this is your first baby, you can expect to start feeling movement at 20-24 weeks gestation. If this is your second or more pregnancy, you will generally start feeling movement at 18-22 weeks gestation.

## 2017-05-22 NOTE — PROGRESS NOTES
Preceptor Attestation:  Patient's case reviewed and discussed with Cyndie Jenkins MD Patient seen and discussed with the resident.. I agree with assessment and plan of care.  Supervising Physician:  Cyndie Rivera DO  [facility]

## 2017-06-01 ENCOUNTER — TRANSFERRED RECORDS (OUTPATIENT)
Dept: HEALTH INFORMATION MANAGEMENT | Facility: CLINIC | Age: 37
End: 2017-06-01

## 2017-06-06 ENCOUNTER — OFFICE VISIT (OUTPATIENT)
Dept: FAMILY MEDICINE | Facility: CLINIC | Age: 37
End: 2017-06-06

## 2017-06-06 VITALS
WEIGHT: 121.2 LBS | HEART RATE: 90 BPM | SYSTOLIC BLOOD PRESSURE: 99 MMHG | DIASTOLIC BLOOD PRESSURE: 67 MMHG | HEIGHT: 59 IN | BODY MASS INDEX: 24.44 KG/M2 | TEMPERATURE: 98 F | OXYGEN SATURATION: 100 %

## 2017-06-06 DIAGNOSIS — O21.9 NAUSEA/VOMITING IN PREGNANCY: ICD-10-CM

## 2017-06-06 DIAGNOSIS — O09.529 SUPERVISION OF HIGH-RISK PREGNANCY OF ELDERLY MULTIGRAVIDA: Primary | ICD-10-CM

## 2017-06-06 RX ORDER — ONDANSETRON 4 MG/1
4 TABLET, FILM COATED ORAL EVERY 8 HOURS PRN
Qty: 30 TABLET | Refills: 0 | Status: SHIPPED | OUTPATIENT
Start: 2017-06-06 | End: 2017-11-12

## 2017-06-06 RX ORDER — PYRIDOXINE HCL (VITAMIN B6) 25 MG
25 TABLET ORAL 3 TIMES DAILY PRN
Qty: 30 TABLET | Refills: 1 | Status: SHIPPED | OUTPATIENT
Start: 2017-06-06 | End: 2017-11-12

## 2017-06-06 NOTE — MR AVS SNAPSHOT
After Visit Summary   2017    Dain Tejeda    MRN: [id number]           Patient Information     Date Of Birth          1980        Visit Information        Provider Department      2017 9:20 AM Justin Steve MD [facility]        Today's Diagnoses     Supervision of high-risk pregnancy of elderly primigravida    -  1    Nausea/vomiting in pregnancy           Follow-ups after your visit        Follow-up notes from your care team     Return in about 1 week (around 2017).      Your next 10 appointments already scheduled     2017  9:20 AM CDT   RETURN OB with Justin Steve MD   [facility] ([facility])    [address] [place] [address]   [phone]              Who to contact     Please call your clinic at [phone] to:    Ask questions about your health    Make or cancel appointments    Discuss your medicines    Learn about your test results    Speak to your doctor   If you have compliments or concerns about an experience at your clinic, or if you wish to file a complaint, please contact [facility] Patient Relations at [phone] or email us at [email or web address]         Additional Information About Your Visit        MyChart Information     Kareohart is an electronic gateway that provides easy, online access to your medical records. With Graph Story, you can request a clinic appointment, read your test results, renew a prescription or communicate with your care team.     To sign up for Right Skillst visit the website at [email or web address]   You will be asked to enter the access code listed below, as well as some personal information. Please follow the directions to create your username and password.     Your access code is: [email or web address]  Expires: 2017 10:04 AM     Your access code will  in 90 days. If you need help or a new code, please contact your [facility]  "Physicians Clinic or call [phone] for assistance.        Care EveryWhere ID     This is your Care EveryWhere ID. This could be used by other organizations to access your [place] medical records  [id number]        Your Vitals Were     Pulse Temperature Height Last Period Pulse Oximetry BMI (Body Mass Index)    90 98  F (36.7  C) (Oral) 4' 11.25\" (150.5 cm) 02/05/2017 100% 24.27 kg/m2       Blood Pressure from Last 3 Encounters:   06/06/17 99/67   05/18/17 99/65   05/04/17 99/65    Weight from Last 3 Encounters:   06/06/17 121 lb 3.2 oz (55 kg)   05/18/17 118 lb 3.2 oz (53.6 kg)   05/04/17 119 lb 9.6 oz (54.3 kg)              Today, you had the following     No orders found for display         Today's Medication Changes          These changes are accurate as of: 6/6/17 11:59 PM.  If you have any questions, ask your nurse or doctor.               Start taking these medicines.        Dose/Directions    doxylamine 25 MG Tabs tablet   Commonly known as:  UNISOM   Used for:  Nausea/vomiting in pregnancy   Started by:  Justin Steve MD        Dose:  12.5 mg   Take 0.5 tablets (12.5 mg) by mouth 3 times daily as needed For nausea   Quantity:  20 each   Refills:  0       pyridOXINE 25 MG tablet   Commonly known as:  vitamin B-6   Used for:  Nausea/vomiting in pregnancy   Started by:  Justin Steve MD        Dose:  25 mg   Take 1 tablet (25 mg) by mouth 3 times daily as needed   Quantity:  30 tablet   Refills:  1            Where to get your medicines      These medications were sent to [id number] - [place], [place] - [address] [place] [address] AT [facility]  [address], [place] [address]     Phone:  [phone]     doxylamine 25 MG Tabs tablet    ondansetron 4 MG tablet    pyridOXINE 25 MG tablet                Primary Care Provider Office Phone # Fax #    Cyndie Jenkins [phone]       [facility] [address] [place]  [facility] [address]        Thank you!     Thank you for choosing [facility]  for your care. Our goal is always to provide you with excellent care. Hearing back from our patients is one way we can continue to improve our services. Please take a few minutes to complete the written survey that you may receive in the mail after your visit with us. Thank you!             Your Updated Medication List - Protect others around you: Learn how to safely use, store and throw away your medicines at www.disposemymeds.org.          This list is accurate as of: 6/6/17 11:59 PM.  Always use your most recent med list.                   Brand Name Dispense Instructions for use    doxylamine 25 MG Tabs tablet    UNISOM    20 each    Take 0.5 tablets (12.5 mg) by mouth 3 times daily as needed For nausea       ondansetron 4 MG tablet    ZOFRAN    30 tablet    Take 1 tablet (4 mg) by mouth every 8 hours as needed for nausea or vomiting       prenatal multivitamin  plus iron 27-0.8 MG Tabs per tablet     100 tablet    Take 1 tablet by mouth daily       pyridOXINE 25 MG tablet    vitamin B-6    30 tablet    Take 1 tablet (25 mg) by mouth 3 times daily as needed

## 2017-06-06 NOTE — PROGRESS NOTES
"[facility] Progress note: 2017       HPI:       Dain Tejeda is a 37 year old  at 17w2d presenting for:     Nausea and vomiting follow-up:   - had this earlier in the pregnancy but then improved around 14 weeks, but now has been worse for the past 1 week  - went to the ED last week because of this - had normal BMP, CBC, UA; was given normal saline bolus and discharged with promethazine  - she reports she tried promethazine the first 2 days after going to the ED and this did help with her nausea but made her feel very drowsy so she has not taken it since  - earlier in the pregnancy was using zofran to control nausea  - nausea is present throughout most of the day, had 4 episodes of vomiting yesterday after intake  - no abdominal pain, cramping, diarrhea or vaginal bleeding  - has tried small frequent meals  - can keep down fluids  - symptoms worse after eating meat  - weight up 3 lbs from     Pregnancy:   - feeling infant moving for about the past week  - no headache or SOB         PMHX:     Patient Active Problem List   Diagnosis     Nausea/vomiting in pregnancy     Supervision of high-risk pregnancy of elderly primigravida     Screening for cervical cancer- ASCUS, HPV+ 2017     Anemia of pregnancy in first trimester       Current Outpatient Prescriptions   Medication Sig Dispense Refill     ondansetron (ZOFRAN) 4 MG tablet Take 1 tablet (4 mg) by mouth every 8 hours as needed for nausea or vomiting 42 tablet 0     Prenatal Vit-Fe Fumarate-FA (PRENATAL MULTIVITAMIN  PLUS IRON) 27-0.8 MG TABS per tablet Take 1 tablet by mouth daily 100 tablet 3          Allergies   Allergen Reactions     No Known Allergies             Review of Systems:   Complete ROS negative other than above          Physical Exam:     Vitals:    17 0924   BP: 99/67   Pulse: 90   Temp: 98  F (36.7  C)   TempSrc: Oral   SpO2: 100%   Weight: 121 lb 3.2 oz (55 kg)   Height: 4' 11.25\" (150.5 cm)     Body mass index is " 24.27 kg/(m^2).    General: alert, female in no acute distress  Abdomen: gravid uterus appropriate for gestation age slightly below umbilicus, non-tender, FHTs 150s  Extremities: no edema    Assessment and Plan     Nausea/vomiting in pregnancy: initially improved but now worse the last week. No abdominal pain or other associated symptoms. Weight is up 3 lbs from last clinic visit.   - recommend using doxylamine 12.5 mg and pyridoxine 25 mg TID PRN for nausea  - can use Zofran 4 mg QHS PRN for breakthrough nausea  - Encouraged frequent small meals, protein intake, fluid intake    Supervision of high-risk pregnancy of elderly primigravida  - continue prenatal vitamin  - Ultrasound at 20 weeks     Anemia of pregnancy in first trimester  - Discussed continuing high iron foods until weight/nausea improves, plan to eventually start PO iron supplement       Follow up in 1 week to check in on nausea      Justin Steve MD PGY3  [facility] Medicine Residency      Precepted today with: Dr. Dunlap

## 2017-06-06 NOTE — PROGRESS NOTES
Preceptor Attestation:  Patient's case reviewed and discussed with  Patient seen and discussed with the resident..  I agree with written assessment and plan of care.  Supervising Physician:  Jacquelin Dunlap MD  [facility]

## 2017-06-16 ENCOUNTER — OFFICE VISIT (OUTPATIENT)
Dept: FAMILY MEDICINE | Facility: CLINIC | Age: 37
End: 2017-06-16

## 2017-06-16 VITALS
SYSTOLIC BLOOD PRESSURE: 97 MMHG | BODY MASS INDEX: 24.35 KG/M2 | DIASTOLIC BLOOD PRESSURE: 64 MMHG | OXYGEN SATURATION: 100 % | HEART RATE: 95 BPM | WEIGHT: 121.6 LBS | RESPIRATION RATE: 16 BRPM | TEMPERATURE: 97.9 F

## 2017-06-16 DIAGNOSIS — O09.529 SUPERVISION OF HIGH-RISK PREGNANCY OF ELDERLY MULTIGRAVIDA: ICD-10-CM

## 2017-06-16 DIAGNOSIS — O21.9 NAUSEA/VOMITING IN PREGNANCY: Primary | ICD-10-CM

## 2017-06-16 NOTE — MR AVS SNAPSHOT
After Visit Summary   2017    Dain Tejeda    MRN: [id number]           Patient Information     Date Of Birth          1980        Visit Information        Provider Department      2017 10:20 AM Justin Steve MD [facility]        Today's Diagnoses     Nausea/vomiting in pregnancy    -  1    Supervision of high-risk pregnancy of elderly primigravida           Follow-ups after your visit        Follow-up notes from your care team     Return in about 2 weeks (around 2017).      Your next 10 appointments already scheduled     2017  9:00 AM CDT   RETURN OB with Cyndie Jenkins MD   [facility] ([facility])    [address] [place] [address]   [phone]              Who to contact     Please call your clinic at [phone] to:    Ask questions about your health    Make or cancel appointments    Discuss your medicines    Learn about your test results    Speak to your doctor   If you have compliments or concerns about an experience at your clinic, or if you wish to file a complaint, please contact [facility] Patient Relations at [phone] or email us at [email or web address]         Additional Information About Your Visit        MyChart Information     iWitnesshart is an electronic gateway that provides easy, online access to your medical records. With [employer], you can request a clinic appointment, read your test results, renew a prescription or communicate with your care team.     To sign up for [employer] visit the website at [email or web address]   You will be asked to enter the access code listed below, as well as some personal information. Please follow the directions to create your username and password.     Your access code is: [email or web address]  Expires: 2017 10:04 AM     Your access code will  in 90 days. If you need help or a new code, please contact your [facility]  [place] [facility] or call [phone] for assistance.        Care EveryWhere ID     This is your Care EveryWhere ID. This could be used by other organizations to access your [facility] medical records  [id number]        Your Vitals Were     Pulse Temperature Respirations Last Period Pulse Oximetry BMI (Body Mass Index)    95 97.9  F (36.6  C) (Oral) 16 02/05/2017 100% 24.35 kg/m2       Blood Pressure from Last 3 Encounters:   06/16/17 97/64   06/06/17 99/67   05/18/17 99/65    Weight from Last 3 Encounters:   06/16/17 121 lb 9.6 oz (55.2 kg)   06/06/17 121 lb 3.2 oz (55 kg)   05/18/17 118 lb 3.2 oz (53.6 kg)                 Today's Medication Changes          These changes are accurate as of: 6/16/17 11:59 PM.  If you have any questions, ask your nurse or doctor.               Start taking these medicines.        Dose/Directions    ranitidine 75 MG tablet   Commonly known as:  ZANTAC   Started by:  Justin Steve MD        Dose:  75 mg   Take 1 tablet (75 mg) by mouth 2 times daily as needed for heartburn   Quantity:  30 tablet   Refills:  1            Where to get your medicines      These medications were sent to [id number] - [place], [place] - [address] AT [facility] & [address], [place] [address]     Phone:  [phone]     ranitidine 75 MG tablet                Primary Care Provider Office Phone # Fax #    Cyndie Jenkins [phone]       [facility] [address] [place] [address]        Equal Access to Services     DIANA FONTENOT AH: Hadii margaux schuler Sovarun, waaxda luqadaha, qaybta kaalmada adeegyada, yuan calhoun. So [facility] [phone].    ATENCIÓN: Si habla español, tiene a manzano disposición servicios gratuitos de asistencia lingüística. Llame al [phone].    We comply with applicable federal civil rights laws and [place] laws. We do not  discriminate on the basis of race, color, national origin, age, disability sex, sexual orientation or gender identity.            Thank you!     Thank you for choosing [facility]  for your care. Our goal is always to provide you with excellent care. Hearing back from our patients is one way we can continue to improve our services. Please take a few minutes to complete the written survey that you may receive in the mail after your visit with us. Thank you!             Your Updated Medication List - Protect others around you: Learn how to safely use, store and throw away your medicines at www.disposemymeds.org.          This list is accurate as of: 6/16/17 11:59 PM.  Always use your most recent med list.                   Brand Name Dispense Instructions for use Diagnosis    doxylamine 25 MG Tabs tablet    UNISOM    20 each    Take 0.5 tablets (12.5 mg) by mouth 3 times daily as needed For nausea    Nausea/vomiting in pregnancy       ondansetron 4 MG tablet    ZOFRAN    30 tablet    Take 1 tablet (4 mg) by mouth every 8 hours as needed for nausea or vomiting    Nausea/vomiting in pregnancy       prenatal multivitamin  plus iron 27-0.8 MG Tabs per tablet     100 tablet    Take 1 tablet by mouth daily    Supervision of normal first pregnancy in first trimester       pyridOXINE 25 MG tablet    vitamin B-6    30 tablet    Take 1 tablet (25 mg) by mouth 3 times daily as needed    Nausea/vomiting in pregnancy       ranitidine 75 MG tablet    ZANTAC    30 tablet    Take 1 tablet (75 mg) by mouth 2 times daily as needed for heartburn

## 2017-06-16 NOTE — PROGRESS NOTES
"[facility] Progress note: 2017       HPI:       Dain Tejeda is a 37 year old  at 18w5d who presents for OB F/U:     Nausea:  - improved with B6 and doxalamine, but appetite still low, trying to eat small meals throughout the day, drinking fluids frequently   - no recent vomiting  - Zofran makes her sleepy, so not using much  - reports hx of \"stomach ulcer\", has been having abdominal discomfort at night time    Pregnancy:   No abdominal pain or uterine cramping  No vaginal bleeding, abnormal discharge, amniotic leaking  No dyspnea, edema  Fetal movement present           PMHX:     Patient Active Problem List   Diagnosis     Nausea/vomiting in pregnancy     Supervision of high-risk pregnancy of elderly primigravida     Screening for cervical cancer- ASCUS, HPV+ 2017     Anemia of pregnancy in first trimester       Current Outpatient Prescriptions   Medication Sig Dispense Refill     ranitidine (ZANTAC) 75 MG tablet Take 1 tablet (75 mg) by mouth 2 times daily as needed for heartburn 30 tablet 1     ondansetron (ZOFRAN) 4 MG tablet Take 1 tablet (4 mg) by mouth every 8 hours as needed for nausea or vomiting 30 tablet 0     doxylamine (UNISOM) 25 MG TABS tablet Take 0.5 tablets (12.5 mg) by mouth 3 times daily as needed For nausea 20 each 0     pyridOXINE (VITAMIN B-6) 25 MG tablet Take 1 tablet (25 mg) by mouth 3 times daily as needed 30 tablet 1     Prenatal Vit-Fe Fumarate-FA (PRENATAL MULTIVITAMIN  PLUS IRON) 27-0.8 MG TABS per tablet Take 1 tablet by mouth daily 100 tablet 3          Allergies   Allergen Reactions     No Known Allergies             Review of Systems:   Complete ROS negative other than above          Physical Exam:     Vitals:    17 1053   BP: 97/64   BP Location: Right arm   Pulse: 95   Resp: 16   Temp: 97.9  F (36.6  C)   TempSrc: Oral   SpO2: 100%   Weight: 121 lb 9.6 oz (55.2 kg)     Body mass index is 24.35 kg/(m^2).    General: alert, female in no acute " distress  Abdomen: gravid uterus appropriate for gestation age slightly below umbilicus, non-tender, FHTs 150s  Extremities: no edema    Assessment and Plan     Nausea/vomiting in pregnancy: improving with doxylamine/B6. Appetite still low. Weight trending up which is reassuring, but has only gained 2.5 lbs this pregnancy.   - Continue using doxylamine 12.5 mg and pyridoxine 25 mg TID PRN for nausea  - can use Zofran 4 mg QHS PRN for breakthrough nausea  - Encouraged frequent small meals, protein intake, fluid intake  - will have her trial Zantac 75 mg BID PRN to see if underlying GERD could be contributing      Supervision of high-risk pregnancy of elderly primigravida  - continue prenatal vitamin  - fetal survey Ultrasound at 20 weeks      Anemia of pregnancy in first trimester  - Discussed continuing high iron foods until weight/nausea improves, plan to eventually start PO iron supplement    Return in two weeks to see Dr. Jenkins    History and exam performed with assistance from Martha Camargo, MS3.      Justin Steve MD PGY3  [facility] Medicine Residency      Precepted today with: Izabela Gates MD

## 2017-06-29 DIAGNOSIS — O09.529 SUPERVISION OF HIGH-RISK PREGNANCY OF ELDERLY MULTIGRAVIDA: ICD-10-CM

## 2017-06-30 NOTE — PROGRESS NOTES
Preceptor Attestation:  Patient's case reviewed and discussed with Justin Steve MD Patient seen and discussed with the resident.. I agree with assessment and plan of care.  Supervising Physician:  Izabela Gates MD  [facility]

## 2017-07-03 ENCOUNTER — OFFICE VISIT (OUTPATIENT)
Dept: FAMILY MEDICINE | Facility: CLINIC | Age: 37
End: 2017-07-03

## 2017-07-03 VITALS
RESPIRATION RATE: 20 BRPM | HEART RATE: 73 BPM | HEIGHT: 59 IN | TEMPERATURE: 97.8 F | OXYGEN SATURATION: 99 % | SYSTOLIC BLOOD PRESSURE: 99 MMHG | DIASTOLIC BLOOD PRESSURE: 65 MMHG | WEIGHT: 127.38 LBS | BODY MASS INDEX: 25.68 KG/M2

## 2017-07-03 DIAGNOSIS — O21.9 NAUSEA/VOMITING IN PREGNANCY: ICD-10-CM

## 2017-07-03 DIAGNOSIS — O09.529 SUPERVISION OF HIGH-RISK PREGNANCY OF ELDERLY MULTIGRAVIDA: Primary | ICD-10-CM

## 2017-07-03 DIAGNOSIS — O99.011 ANEMIA OF PREGNANCY IN FIRST TRIMESTER: ICD-10-CM

## 2017-07-03 RX ORDER — FERROUS SULFATE 325(65) MG
325 TABLET ORAL 3 TIMES DAILY
Qty: 90 TABLET | Refills: 3 | Status: SHIPPED | OUTPATIENT
Start: 2017-07-03 | End: 2017-07-03

## 2017-07-03 RX ORDER — PRENATAL VIT/IRON FUM/FOLIC AC 27MG-0.8MG
1 TABLET ORAL DAILY
Qty: 100 TABLET | Refills: 3 | Status: SHIPPED | OUTPATIENT
Start: 2017-07-03 | End: 2017-11-12

## 2017-07-03 RX ORDER — FERROUS SULFATE 325(65) MG
325 TABLET ORAL 3 TIMES DAILY
Qty: 90 TABLET | Refills: 3 | Status: SHIPPED | OUTPATIENT
Start: 2017-07-03 | End: 2017-11-12

## 2017-07-03 NOTE — PATIENT INSTRUCTIONS
[facility] Information  Your resident OB Doctor is Dr. Cyndie Jenkins.    If you have any further concerns or wish to schedule another appointment, please call our office at [phone] during normal business hours (8-5, M-F).     For uncomplicated pregnancies, you will be seeing your doctor once a month until you are 28 weeks, then you will see your doctor twice a month. You will begin weekly visits at 36 weeks until you deliver.     If you have urgent medical questions that cannot wait, you may call [phone] at any time of day.     If you have a medical emergency, please call [phone].     When to call or come in to [facility] ([phone] for Labor & Delivery unit)  If you notice a decrease in your baby's movement.   If your begin to experience contractions that are 5 minutes or less apart and lasting for over 45 seconds, or if contractions are becoming more painful.  If you have any bleeding or leakage of fluids.   If you have a headache not resolved with tylenol, right upper abdominal pain, or sudden onset of swelling.  You know your body best. Never hesitate to call or go to the hospital if something doesn't feel right!  Gestational Diabetes Screen  At your next visit, you will be screened for gestational diabetes. You will drink a sugary drink and then have your blood drawn to see how your body responds to a sugar load. This test takes about an hour to complete - please plan your schedule accordingly!  The Benefits of Breastfeeding   Breastfeeding gives your new baby the very best start. It supplies nutrition, comfort, and love. Experts agree: Breastfeeding is the healthiest choice for babies during the first year of life and beyond. It s healthy for Mom, too. Breastfeeding may be challenging at first. But with support, you and your baby can succeed together.   Healthiest for Baby   Breastmilk is the ideal food for babies. It has all the nutrients your little one needs to grow healthy  and strong. Here are some of the many benefits for your baby:   Breastfeeding provides contact that babies love. Frequent skin-to-skin time with Mom is calming and comforting.   Breastmilk is full of antibodies. These are substances that help your baby fight infection. Breastmilk reduces your baby's risk of respiratory illnesses, ear infections, and diarrhea.   Breastfeeding reduces your baby s risk of allergies, colds, and many other diseases.   Breastmilk changes as your baby grows, meeting her changing needs.   Breastmilk is easy for your baby to digest.   Breastmilk contains DHA, a fat that is good for your baby s developing brain and eyes.   Breastmilk decreases the risk of sudden infant death syndrome (SIDS).  Healthiest for Mom   For many women, breastfeeding is an amazing experience. It creates a strong bond between mother and baby. Other benefits for Mom include:   You can feel proud knowing that your baby is growing healthy and strong because of your milk.   Breastmilk is convenient. It s free, clean, and always the right temperature.   Breastfeeding burns calories. This can help you lose pregnancy weight faster.   Breastfeeding releases hormones that contract the uterus. This helps the uterus return to its normal size after childbirth.   Mothers who breastfeed have a decreased risk of ovarian and breast cancers.  There are many people who can help you learn to breastfeed. A lactation consultant is a healthcare provider specially trained to help breastfeeding moms. A lactation consultant will visit you after delivery and is available after your baby is born - if you'd like to meet with lactation prior to delivery, let your doctor know!

## 2017-07-03 NOTE — PROGRESS NOTES
Preceptor Attestation:  Patient's case reviewed and discussed with Cyndie Jenkins MD Patient seen and discussed with the resident.. I agree with assessment and plan of care.  Supervising Physician:  Claudio Rivera MD  [facility]

## 2017-07-03 NOTE — MR AVS SNAPSHOT
After Visit Summary   7/3/2017    Dain Tejeda    MRN: [id number]           Patient Information     Date Of Birth          1980        Visit Information        Provider Department      7/3/2017 9:00 AM Cyndie Jenkins MD [facility]        Today's Diagnoses     Supervision of high-risk pregnancy of elderly primigravida    -  1    Nausea/vomiting in pregnancy        Anemia of pregnancy in first trimester          Care Instructions    [facility] Information  Your resident OB Doctor is Dr. Cyndie Jenkins.    If you have any further concerns or wish to schedule another appointment, please call our office at [phone] during normal business hours (8-5, M-F).     For uncomplicated pregnancies, you will be seeing your doctor once a month until you are 28 weeks, then you will see your doctor twice a month. You will begin weekly visits at 36 weeks until you deliver.     If you have urgent medical questions that cannot wait, you may call [phone] at any time of day.     If you have a medical emergency, please call [phone].     When to call or come in to [facility] ([phone] for Labor & Delivery unit)  If you notice a decrease in your baby's movement.   If your begin to experience contractions that are 5 minutes or less apart and lasting for over 45 seconds, or if contractions are becoming more painful.  If you have any bleeding or leakage of fluids.   If you have a headache not resolved with tylenol, right upper abdominal pain, or sudden onset of swelling.  You know your body best. Never hesitate to call or go to the hospital if something doesn't feel right!  Gestational Diabetes Screen  At your next visit, you will be screened for gestational diabetes. You will drink a sugary drink and then have your blood drawn to see how your body responds to a sugar load. This test takes about an hour to complete - please plan your schedule accordingly!  The Benefits of  Breastfeeding   Breastfeeding gives your new baby the very best start. It supplies nutrition, comfort, and love. Experts agree: Breastfeeding is the healthiest choice for babies during the first year of life and beyond. It s healthy for Mom, too. Breastfeeding may be challenging at first. But with support, you and your baby can succeed together.   Healthiest for Baby   Breastmilk is the ideal food for babies. It has all the nutrients your little one needs to grow healthy and strong. Here are some of the many benefits for your baby:   Breastfeeding provides contact that babies love. Frequent skin-to-skin time with Mom is calming and comforting.   Breastmilk is full of antibodies. These are substances that help your baby fight infection. Breastmilk reduces your baby's risk of respiratory illnesses, ear infections, and diarrhea.   Breastfeeding reduces your baby s risk of allergies, colds, and many other diseases.   Breastmilk changes as your baby grows, meeting her changing needs.   Breastmilk is easy for your baby to digest.   Breastmilk contains DHA, a fat that is good for your baby s developing brain and eyes.   Breastmilk decreases the risk of sudden infant death syndrome (SIDS).  Healthiest for Mom   For many women, breastfeeding is an amazing experience. It creates a strong bond between mother and baby. Other benefits for Mom include:   You can feel proud knowing that your baby is growing healthy and strong because of your milk.   Breastmilk is convenient. It s free, clean, and always the right temperature.   Breastfeeding burns calories. This can help you lose pregnancy weight faster.   Breastfeeding releases hormones that contract the uterus. This helps the uterus return to its normal size after childbirth.   Mothers who breastfeed have a decreased risk of ovarian and breast cancers.  There are many people who can help you learn to breastfeed. A lactation consultant is a healthcare provider specially trained  to help breastfeeding moms. A lactation consultant will visit you after delivery and is available after your baby is born - if you'd like to meet with lactation prior to delivery, let your doctor know!               Follow-ups after your visit        Your next 10 appointments already scheduled     2017 10:20 AM CDT   RETURN OB with Cyndie Jenkins MD   [facility] ([facility])    [address] [place] [address]   [phone]              Who to contact     Please call your clinic at [phone] to:    Ask questions about your health    Make or cancel appointments    Discuss your medicines    Learn about your test results    Speak to your doctor   If you have compliments or concerns about an experience at your clinic, or if you wish to file a complaint, please contact [facility] Patient Relations at [phone] or email us at [email or web address]         Additional Information About Your Visit        b5mediahart Information     Domo is an electronic gateway that provides easy, online access to your medical records. With Domo, you can request a clinic appointment, read your test results, renew a prescription or communicate with your care team.     To sign up for Domo visit the website at [email or web address]   You will be asked to enter the access code listed below, as well as some personal information. Please follow the directions to create your username and password.     Your access code is: [email or web address]  Expires: 2017 10:04 AM     Your access code will  in 90 days. If you need help or a new code, please contact your [facility] or call [phone] for assistance.        Care EveryWhere ID     This is your Care EveryWhere ID. This could be used by other organizations to access your [place] medical records  [id number]        Your Vitals Were     Pulse Temperature Respirations  "Height Last Period Pulse Oximetry    73 97.8  F (36.6  C) (Oral) 20 4' 11\" (149.9 cm) 02/05/2017 99%    BMI (Body Mass Index)                   25.73 kg/m2            Blood Pressure from Last 3 Encounters:   07/03/17 99/65   06/16/17 97/64   06/06/17 99/67    Weight from Last 3 Encounters:   07/03/17 127 lb 6 oz (57.8 kg)   06/16/17 121 lb 9.6 oz (55.2 kg)   06/06/17 121 lb 3.2 oz (55 kg)              Today, you had the following     No orders found for display         Today's Medication Changes          These changes are accurate as of: 7/3/17 10:29 AM.  If you have any questions, ask your nurse or doctor.               Start taking these medicines.        Dose/Directions    ferrous sulfate 325 (65 FE) MG tablet   Commonly known as:  IRON   Used for:  Anemia of pregnancy in first trimester, Supervision of high-risk pregnancy of elderly multigravida   Started by:  Cyndie Jenkins MD        Dose:  325 mg   Take 1 tablet (325 mg) by mouth 3 times daily Take with 1/2 glass of orange juice or with Vitamin C containing foods to increase absorption.   Quantity:  90 tablet   Refills:  3            Where to get your medicines      These medications were sent to [id number] - [place], [place] - [address] AT [facility] & [address], [place] [address]     Phone:  [phone]     ferrous sulfate 325 (65 FE) MG tablet    prenatal multivitamin  plus iron 27-0.8 MG Tabs per tablet                Primary Care Provider Office Phone # Fax #    Cyndie Jenkins [phone]       [facility] [address] [place] [address]        Equal Access to Services     NEAL FONTENOT AH: Elissa Meza, wadeloresda lualex, qaybta kaalmada keaton, yuan calhoun. So [facility] [phone].    ATENCIÓN: Si habla español, tiene a manzano disposición servicios gratuitos de asistencia lingüística. Llame al " [phone].    We comply with applicable federal civil rights laws and [place] laws. We do not discriminate on the basis of race, color, national origin, age, disability sex, sexual orientation or gender identity.            Thank you!     Thank you for choosing [facility]  for your care. Our goal is always to provide you with excellent care. Hearing back from our patients is one way we can continue to improve our services. Please take a few minutes to complete the written survey that you may receive in the mail after your visit with us. Thank you!             Your Updated Medication List - Protect others around you: Learn how to safely use, store and throw away your medicines at www.disposemymeds.org.          This list is accurate as of: 7/3/17 10:29 AM.  Always use your most recent med list.                   Brand Name Dispense Instructions for use Diagnosis    doxylamine 25 MG Tabs tablet    UNISOM    20 each    Take 0.5 tablets (12.5 mg) by mouth 3 times daily as needed For nausea    Nausea/vomiting in pregnancy       ferrous sulfate 325 (65 FE) MG tablet    IRON    90 tablet    Take 1 tablet (325 mg) by mouth 3 times daily Take with 1/2 glass of orange juice or with Vitamin C containing foods to increase absorption.    Anemia of pregnancy in first trimester, Supervision of high-risk pregnancy of elderly multigravida       ondansetron 4 MG tablet    ZOFRAN    30 tablet    Take 1 tablet (4 mg) by mouth every 8 hours as needed for nausea or vomiting    Nausea/vomiting in pregnancy       prenatal multivitamin  plus iron 27-0.8 MG Tabs per tablet     100 tablet    Take 1 tablet by mouth daily        pyridOXINE 25 MG tablet    vitamin B-6    30 tablet    Take 1 tablet (25 mg) by mouth 3 times daily as needed    Nausea/vomiting in pregnancy       ranitidine 75 MG tablet    ZANTAC    30 tablet    Take 1 tablet (75 mg) by mouth 2 times daily as needed for heartburn

## 2017-07-03 NOTE — PROGRESS NOTES
"OB Visit    Dain Tejeda is a 37 year old  female who presents to clinic for a follow up OB visit. She is currently 21w1d with an estimated date of delivery of 2017 via sure LMP.   She denies headache, chest pain, shortness of breath, vaginal bleeding, or abnormal discharge.     She has felt baby move.     She has not felt abdominal contractions.    New concerns today: Feeling MUCH better from N/V standpoint. Not needing any anti-emetic meds for past week. Not using the zantac often. Appetite picked up- more meat. Up several pounds since last visit.    She had her fetal survey on Thursday; I do not have results back yet. Expecting baby girl    Obstetric History       T0      L0     SAB0   TAB0   Ectopic0   Multiple0   Live Births0       # Outcome Date GA Lbr Dontae/2nd Weight Sex Delivery Anes PTL Lv   1 Current                   Physical exam:  BP 99/65  Pulse 73  Temp 97.8  F (36.6  C) (Oral)  Resp 20  Ht 4' 11\" (149.9 cm)  Wt 127 lb 6 oz (57.8 kg)  LMP 2017  SpO2 99%  BMI 25.73 kg/m2    General: alert, female in no acute distress  Abdomen: gravid uterus appropriate for gestation age at 21 cm above pubic symphysis, non-tender, FHTs 156  Extremities: no edema    Assessment/Plan:    21w1d    Supervision of high-risk pregnancy of elderly primigravida  Gaining weight well at this point. N/V has resolved.   - Fetal survey ultrasound done and will discuss results next visit unless urgent.  - Patient is not planning to complete childbirth education classes.   - Discussed pre-term labor signs and symptoms and when to call/come in.   - Continued encouragement of breastfeeding.  - Discussed expectations for gestational diabetes screen to be completed at next visit.     Nausea/vomiting in pregnancy- resolved    Anemia of pregnancy in first trimester  Now that she is no longer nauseated, she will resume iron supplements. We will plan to recheck her CBC at next visit with Glucose testing  - " ferrous sulfate (IRON) 325 (65 FE) MG tablet; Take 1 tablet (325 mg) by mouth 3 times daily Take with 1/2 glass of orange juice or with Vitamin C containing foods to increase absorption.  Dispense: 90 tablet; Refill: 3    Follow up in 4 weeks for routine prenatal visit with glucose testing.     Cyndie Jenkins MD  [facility] Resident  Pager# [phone]      Precepted with: Claudio Rivera MD    Addendum: Adding incident to bill for High Risk OB education by RN:    7/23/2017  Plan Documentation  Service ordered Education by RN to be done in clinic.  topic High Risk OB.   Plan and order should be renewed one year from 7/23/2017 .  Ordered by Cyndie Jenkins.

## 2017-07-31 ENCOUNTER — ALLIED HEALTH/NURSE VISIT (OUTPATIENT)
Dept: FAMILY MEDICINE | Facility: CLINIC | Age: 37
End: 2017-07-31

## 2017-07-31 ENCOUNTER — OFFICE VISIT (OUTPATIENT)
Dept: FAMILY MEDICINE | Facility: CLINIC | Age: 37
End: 2017-07-31

## 2017-07-31 DIAGNOSIS — R73.09 ABNORMAL GLUCOSE TOLERANCE TEST: ICD-10-CM

## 2017-07-31 DIAGNOSIS — D64.89 ANEMIA DUE TO OTHER CAUSE: ICD-10-CM

## 2017-07-31 DIAGNOSIS — O99.011 ANEMIA OF PREGNANCY IN FIRST TRIMESTER: ICD-10-CM

## 2017-07-31 DIAGNOSIS — O09.529 SUPERVISION OF HIGH-RISK PREGNANCY OF ELDERLY MULTIGRAVIDA: Primary | ICD-10-CM

## 2017-07-31 DIAGNOSIS — O09.529 SUPERVISION OF HIGH-RISK PREGNANCY OF ELDERLY MULTIGRAVIDA: ICD-10-CM

## 2017-07-31 DIAGNOSIS — O99.012 ANEMIA OF PREGNANCY IN SECOND TRIMESTER: ICD-10-CM

## 2017-07-31 DIAGNOSIS — O21.9 NAUSEA/VOMITING IN PREGNANCY: ICD-10-CM

## 2017-07-31 LAB
FERRITIN SERPL-MCNC: 8 NG/ML (ref 10–130)
GLU GEST SCREEN 1HR 50G: 151 (ref 0–129)
GLU GEST SCREEN 1HR 50G: 151 (ref 0–129)
HCT VFR BLD AUTO: 31.4 % (ref 35–47)
HEMOGLOBIN: 10 G/DL (ref 11.7–15.7)
MCH RBC QN AUTO: 27.5 PG (ref 26.5–35)
MCHC RBC AUTO-ENTMCNC: 31.8 G/DL (ref 32–36)
MCV RBC AUTO: 86.3 FL (ref 78–100)
PLATELET # BLD AUTO: 303 K/UL (ref 150–450)
RBC # BLD AUTO: 3.64 M/UL (ref 3.8–5.2)
WBC # BLD AUTO: 11.5 K/UL (ref 4–11)

## 2017-07-31 NOTE — MR AVS SNAPSHOT
After Visit Summary   2017    Dain Tejeda    MRN: [id number]           Patient Information     Date Of Birth          1980        Visit Information        Provider Department      2017 12:08 PM Martha Nichole RN [facility]        Today's Diagnoses     Supervision of high-risk pregnancy of elderly multigravida        Anemia of pregnancy in first trimester        Nausea/vomiting in pregnancy           Follow-ups after your visit        Who to contact     Please call your clinic at [phone] to:    Ask questions about your health    Make or cancel appointments    Discuss your medicines    Learn about your test results    Speak to your doctor   If you have compliments or concerns about an experience at your clinic, or if you wish to file a complaint, please contact [facility] Patient Relations at [phone] or email us at [email or web address]         Additional Information About Your Visit        MyChart Information     [employer] is an electronic gateway that provides easy, online access to your medical records. With [employer], you can request a clinic appointment, read your test results, renew a prescription or communicate with your care team.     To sign up for [employer] visit the website at [email or web address]   You will be asked to enter the access code listed below, as well as some personal information. Please follow the directions to create your username and password.     Your access code is: [email or web address]  Expires: 10/29/2017 12:26 PM     Your access code will  in 90 days. If you need help or a new code, please contact your [facility] or call [phone] for assistance.        Care EveryWhere ID     This is your Care EveryWhere ID. This could be used by other organizations to access your [facility] medical records  [id number]        Your Vitals Were     Last Period                   2017             Blood Pressure from Last 3 Encounters:   07/31/17 96/63   07/03/17 99/65   06/16/17 97/64    Weight from Last 3 Encounters:   07/31/17 130 lb (59 kg)   07/03/17 127 lb 6 oz (57.8 kg)   06/16/17 121 lb 9.6 oz (55.2 kg)              We Performed the Following     CARE COORDINATION     PRENATAL HEALTH EDUCATION I        Primary Care Provider Office Phone # Fax #    Cyndie Keara Jenkins [phone]       [facility] [address] [place] [address]        Equal Access to Services     [facility]: Hadii aad ku hadasho Soomaali, waaxda luqadaha, qaybta kaalmada adeegyada, waxay idiin hayaan adeeg miriam martin . So [facility] [phone].    ATENCIÓN: Si habla español, tiene a manzano disposición servicios gratuitos de asistencia lingüística. Llame al [phone].    We comply with applicable federal civil rights laws and [place] laws. We do not discriminate on the basis of race, color, national origin, age, disability sex, sexual orientation or gender identity.            Thank you!     Thank you for choosing [facility]  for your care. Our goal is always to provide you with excellent care. Hearing back from our patients is one way we can continue to improve our services. Please take a few minutes to complete the written survey that you may receive in the mail after your visit with us. Thank you!             Your Updated Medication List - Protect others around you: Learn how to safely use, store and throw away your medicines at www.disposemymeds.org.          This list is accurate as of: 7/31/17 12:26 PM.  Always use your most recent med list.                   Brand Name Dispense Instructions for use Diagnosis    doxylamine 25 MG Tabs tablet    UNISOM    20 each    Take 0.5 tablets (12.5 mg) by mouth 3 times daily as needed For nausea    Nausea/vomiting in pregnancy       ferrous sulfate 325 (65 FE) MG tablet    IRON    90 tablet    Take 1 tablet (325 mg) by mouth 3  times daily Take with 1/2 glass of orange juice or with Vitamin C containing foods to increase absorption.    Anemia of pregnancy in first trimester, Supervision of high-risk pregnancy of elderly multigravida       ondansetron 4 MG tablet    ZOFRAN    30 tablet    Take 1 tablet (4 mg) by mouth every 8 hours as needed for nausea or vomiting    Nausea/vomiting in pregnancy       prenatal multivitamin  plus iron 27-0.8 MG Tabs per tablet     100 tablet    Take 1 tablet by mouth daily        pyridOXINE 25 MG tablet    vitamin B-6    30 tablet    Take 1 tablet (25 mg) by mouth 3 times daily as needed    Nausea/vomiting in pregnancy       ranitidine 75 MG tablet    ZANTAC    30 tablet    Take 1 tablet (75 mg) by mouth 2 times daily as needed for heartburn

## 2017-07-31 NOTE — PROGRESS NOTES
Preceptor Attestation:  Patient's case reviewed and discussed with Cyndie Jenkins MD resident and I evaluated the patient. I agree with written assessment and plan of care.  Supervising Physician:Nick Elaine MD    [facility]

## 2017-07-31 NOTE — PROGRESS NOTES
OB Visit    Dain Tejeda is a 37 year old  female who presents to clinic for a follow up OB visit. She is currently 25w1d with an estimated date of delivery of 2017 via sure LMP c/w normal fetal survey.     She denies headache, chest pain, shortness of breath, vaginal bleeding, or abnormal discharge.     She has felt baby move.   She has not felt abdominal contractions.    New concerns today:  Appetite continues to improve. No further nausea. Has not been taking her PNV recently because she felt her appetite improved. Has not been able to start iron pills due to N/V earlier in pregnancy. Reviewed normal anatomy scan with her today.   GTT today. No famhx of GDM or DM.     Discussed breastfeeding. She has concerns about baby not gaining enough weight on BM.     Obstetric History       T0      L0     SAB0   TAB0   Ectopic0   Multiple0   Live Births0       # Outcome Date GA Lbr Dontae/2nd Weight Sex Delivery Anes PTL Lv   1 Current                   Physical exam:  LMP 2017    General: alert, female in no acute distress  Abdomen: gravid uterus appropriate for gestation age at 24 cm above pubic symphysis, non-tender, FHTs 161  Extremities: no edema    Assessment/Plan:    25w1d    Supervision of high-risk pregnancy of elderly primigravida  Anemia of pregnancy in second trimester  Doing well. Good weight gain.    - Glucose Challenge  1 Hr  ([facility])--> did not pass, BS of 151. Pt will need 3hr GTT asap, order placed. No h/o GDM or DM in her family.  - CBC with Plt ([facility])-- Hb 10.0 (down from 10.4 in first trimester but would expect karly at this time in 2nd trimester). Will have staff call pt to recommend restarting iron supplement now that her appetite has improved and no longer having N/V.    - Ferritin ([facility])-- low at 8  - She plans to restart her PNVs  - TDap next visit  - Discussed normal fetal survey; EFW 15%ile  - Blood type A+, antibody negative. Rhogam not indicated.  - Discussed  signs and symptoms of  labor.   - Continue to encourage breastfeeding; consider lactation specialist visit prenatally?  - High risk OB education by RN today    Follow up asap for lab only visit for 3hr GTT and JESSICA visit in 4 weeks- with Tdap at that time.    Cyndie Jenkins MD  [facility] Medicine Resident  Pager# [phone]    Precepted with: Nick Elaine MD

## 2017-07-31 NOTE — NURSING NOTE
Family Medicine OB Education    I provided the following OB education to Dain Tejeda.    Met with patient in clinic today. Spent about 20 minutes with patient. Given prenatal information packet. Went over numbers to call. Discussed s/s of  labor and what to do. Went over warning s/s of pregnancy. Discussed importance of coming to all prenatal visit, stressing on risk for GDM and high BP. Discussed anemia of pregnancy and consuming food high in iron. Pt stated no concern and no question. Feeling well now and will call if any question or concern. Will continue to monitor and coordinate for patient to come to all her routine prenatal visit.       Name of provider who requested the OB education: Dr. Cyndie Jenkins.  Name of provider on site (faculty or community preceptor) at the time of performing the OB education: Dr. Nick Nichole, RN

## 2017-08-02 VITALS
HEIGHT: 59 IN | OXYGEN SATURATION: 97 % | HEART RATE: 97 BPM | TEMPERATURE: 97.2 F | WEIGHT: 130 LBS | SYSTOLIC BLOOD PRESSURE: 96 MMHG | BODY MASS INDEX: 26.21 KG/M2 | DIASTOLIC BLOOD PRESSURE: 63 MMHG

## 2017-08-02 PROBLEM — O99.012 ANEMIA OF PREGNANCY IN SECOND TRIMESTER: Status: ACTIVE | Noted: 2017-05-05

## 2017-08-02 RX ORDER — FERROUS SULFATE 325(65) MG
325 TABLET ORAL 3 TIMES DAILY
Qty: 90 TABLET | Refills: 3 | Status: SHIPPED | OUTPATIENT
Start: 2017-08-02 | End: 2017-11-12

## 2017-08-03 NOTE — PROGRESS NOTES
Shade Thomas, can you please call pt to inform her that her hemoglobin was still low (at 10 now, from 10.4 3 months ago) and her iron level was very low at 8. I recommend she start iron pills three times a day with meals now that her appetite has improved. I sent this to her pharmacy, and we can recheck labs at her next visit. Also, can you please help her to schedule a lab only visit (if not already done) for the 3hr GTT testing; this should be done as early as possible. Thank you!  Jayesh

## 2017-08-15 ENCOUNTER — TELEPHONE (OUTPATIENT)
Dept: FAMILY MEDICINE | Facility: CLINIC | Age: 37
End: 2017-08-15

## 2017-08-15 NOTE — TELEPHONE ENCOUNTER
No lab appointment for 3hr GTT yet. Called. Stated  scheduled for 8/28/17. Informed her she still need to come in ASAP for lab to do her 3 hr GTT.Emphasized importance of not waiting to complete her glucose test. Pt agreed to schedule and advised on fasting at least 8 hours prior to test. Stated understanding and transferred to .

## 2017-08-17 ENCOUNTER — TELEPHONE (OUTPATIENT)
Dept: FAMILY MEDICINE | Facility: CLINIC | Age: 37
End: 2017-08-17

## 2017-08-17 DIAGNOSIS — R73.09 ABNORMAL GLUCOSE TOLERANCE TEST: ICD-10-CM

## 2017-08-17 LAB
GLU, 1 HOUR, 100 G: 192 MG/DL
GLU, 2 HOUR, 100 G: 184 MG/DL
GLU, 3 HOUR, 100 G: 62 MG/DL
GLUCOSE P FAST SERPL-MCNC: 86 MG/DL

## 2017-08-18 NOTE — TELEPHONE ENCOUNTER
Pt failed her 3 hour GTT and met with patient after lab. Given Gestational Diabetes handbook and education provided on what it is,  its risks and plan for management. Pt have no question. Also demonstrated with patient on how to test blood sugar. Instructed to log fasting and 1-2 hours after meals. To bring log for PCP to review. Pt without question at this time.     Will route to PCP for blood sugar testing supplies orders (for pharmacy to dispense what insurance will cover).  Pt instructed to come back to see nurse if needing assistance with testing instruction.

## 2017-08-21 ENCOUNTER — TELEPHONE (OUTPATIENT)
Dept: FAMILY MEDICINE | Facility: CLINIC | Age: 37
End: 2017-08-21

## 2017-08-21 DIAGNOSIS — O24.419 GESTATIONAL DIABETES MELLITUS (GDM) IN THIRD TRIMESTER, GESTATIONAL DIABETES METHOD OF CONTROL UNSPECIFIED: Primary | ICD-10-CM

## 2017-08-21 NOTE — TELEPHONE ENCOUNTER
Prior Authorization needed on:  8/21/17    Medication:  CONTOUR NEXT TEST STRIPS     SIG: USE TO TEST FOUR TIMES DAILY (pLEASE CJECK FASTING BLOOD SUGAR EVERY MORNING AND ALSO 1-2 HOURS AFTER EATING)    Pharmacy confirmed as   [id number] - [place], [place] - [address] AT [facility] & [address] [place] [address]  Phone: [phone] Fax: [phone]  : Yes    Insurance Name:  BLUE PLUS/ PRIME  Insurance Phone: [phone]  Insurance Patient ID: [id number]    Alternatives Suggested:  NONE PROVIDED.    MD NOTIFIED TO COMPLETE PRIOR AUTH FORM ON COVER MY MEDS.      Martha Camargo August 21, 2017 at 3:32 PM

## 2017-08-28 ENCOUNTER — TELEPHONE (OUTPATIENT)
Dept: FAMILY MEDICINE | Facility: CLINIC | Age: 37
End: 2017-08-28

## 2017-08-28 DIAGNOSIS — O24.419 GESTATIONAL DIABETES MELLITUS (GDM) IN THIRD TRIMESTER, GESTATIONAL DIABETES METHOD OF CONTROL UNSPECIFIED: Primary | ICD-10-CM

## 2017-08-28 NOTE — TELEPHONE ENCOUNTER
Called to f/u due to JESSICA cancellation today. Per patient she received paperwork from the state that she will need to complete for insurance due to wrong birthday  Will have family help with paperwork and reschedule at another time. Offered clinic social work assistance if needed.     Stated she was able to get the blood glucose meter. However no strips.    Called [facility] and was informed that insurance will only cover meter but not strips without prior auth. The meter kit does not come with strips and only with a couple lancets.     Martha Camargo has submitted prior auth.  staff checked insurance and still active at this time.  Called patient again to update and reschedule for JESSICA this week. No answer. Will attempt later.    Will route to FYI PCP for script for more lancets for use when strips are approved.

## 2017-08-28 NOTE — TELEPHONE ENCOUNTER
Called [employer] to request PA as urgent per MD. Per Alessia, [employer] rep, turn around time may differ and may not get a determination within 72 hours.

## 2017-08-29 NOTE — PROGRESS NOTES
Reviewed results on 8/17. Orders placed for GDM supplies, has meet with RN for education. She missed most recent JESSICA appt and has rescheduled for this week to discuss further.

## 2017-08-29 NOTE — TELEPHONE ENCOUNTER
Called patient again today. Informed patient PA being worked on to get her testing strips. Discussed importance of coming to every routine JESSICA visit for close monitoring of GDM. Pt agreed and scheduled for this week.

## 2017-08-29 NOTE — TELEPHONE ENCOUNTER
Was informed that PA got approved by Martha Camargo. Called pharmacy and informed. RX for both lancets and strips ran through and approved for dispense.     Called patient and inform that insurance approved strips and lancets. Advised to  for testing blood glucose as instructed. To log readings for review at visit.

## 2017-08-30 NOTE — TELEPHONE ENCOUNTER
Prior Authorization: Approved    Approved as of: 8/28/17, no other information provided on Cover My Meds.    Martha HERRERA informed of this yesterday. She will call patient and pharmacy.    Martha Cmaargo August 30, 2017 at 9:19 AM

## 2017-09-01 ENCOUNTER — OFFICE VISIT (OUTPATIENT)
Dept: FAMILY MEDICINE | Facility: CLINIC | Age: 37
End: 2017-09-01

## 2017-09-01 VITALS
HEIGHT: 60 IN | SYSTOLIC BLOOD PRESSURE: 111 MMHG | HEART RATE: 85 BPM | TEMPERATURE: 98.8 F | OXYGEN SATURATION: 99 % | WEIGHT: 136.6 LBS | RESPIRATION RATE: 20 BRPM | DIASTOLIC BLOOD PRESSURE: 71 MMHG | BODY MASS INDEX: 26.82 KG/M2

## 2017-09-01 DIAGNOSIS — O99.012 ANEMIA OF PREGNANCY IN SECOND TRIMESTER: ICD-10-CM

## 2017-09-01 DIAGNOSIS — Z23 IMMUNIZATION DUE: ICD-10-CM

## 2017-09-01 DIAGNOSIS — O24.410 DIET CONTROLLED GESTATIONAL DIABETES MELLITUS (GDM) IN THIRD TRIMESTER: ICD-10-CM

## 2017-09-01 DIAGNOSIS — O09.529 SUPERVISION OF HIGH-RISK PREGNANCY OF ELDERLY MULTIGRAVIDA: Primary | ICD-10-CM

## 2017-09-01 NOTE — MR AVS SNAPSHOT
After Visit Summary   2017    Dain Tejeda    MRN: [id number]           Patient Information     Date Of Birth          1980        Visit Information        Provider Department      2017 9:00 AM Cyndie Jenkins MD [facility]        Today's Diagnoses     Immunization due    -  1       Follow-ups after your visit        Your next 10 appointments already scheduled     Sep 11, 2017  4:00 PM CDT   RETURN OB with Cyndie Jenkins MD   [facility] ([facility])    [address] [place] [id number]   [phone]              Who to contact     Please call your clinic at [phone] to:    Ask questions about your health    Make or cancel appointments    Discuss your medicines    Learn about your test results    Speak to your doctor   If you have compliments or concerns about an experience at your clinic, or if you wish to file a complaint, please contact [facility] Patient Relations at [phone] or email us at [email or web address]         Additional Information About Your Visit        MyChart Information     Re[employer] is an electronic gateway that provides easy, online access to your medical records. With Re[employer], you can request a clinic appointment, read your test results, renew a prescription or communicate with your care team.     To sign up for [employer] visit the website at [email or web address]   You will be asked to enter the access code listed below, as well as some personal information. Please follow the directions to create your username and password.     Your access code is: [email or web address]  Expires: 10/29/2017 12:26 PM     Your access code will  in 90 days. If you need help or a new code, please contact your [facility] or call [phone] for assistance.        Care EveryWhere ID     This is your Care EveryWhere ID. This could be used by other organizations to  "access your [facility] medical records  [id number]        Your Vitals Were     Pulse Temperature Respirations Height Last Period Pulse Oximetry    85 98.8  F (37.1  C) 20 4' 11.5\" (151.1 cm) 02/05/2017 99%    BMI (Body Mass Index)                   27.13 kg/m2            Blood Pressure from Last 3 Encounters:   09/01/17 111/71   07/31/17 96/63   07/03/17 99/65    Weight from Last 3 Encounters:   09/01/17 136 lb 9.6 oz (62 kg)   07/31/17 130 lb (59 kg)   07/03/17 127 lb 6 oz (57.8 kg)              We Performed the Following     ADMIN VACCINE, INITIAL     TDAP VACCINE (BOOSTRIX)        Primary Care Provider Office Phone # Fax #    Cyndie Jenkins [phone]       [facility] [address] [place] [address]        Equal Access to Services     NEAL FONTENOT AH: Hadii aad ku hadasho Soomaali, waaxda luqadaha, qaybta kaalmada adeegyada, waxay idiin hayaan wendy kharatonia minorn . So [facility] [phone].    ATENCIÓN: Si stephaniela espleodan, tiene a manzano disposición servicios gratuitos de asistencia lingüística. Llame al [phone].    We comply with applicable federal civil rights laws and [place] laws. We do not discriminate on the basis of race, color, national origin, age, disability sex, sexual orientation or gender identity.            Thank you!     Thank you for choosing [facility]  for your care. Our goal is always to provide you with excellent care. Hearing back from our patients is one way we can continue to improve our services. Please take a few minutes to complete the written survey that you may receive in the mail after your visit with us. Thank you!             Your Updated Medication List - Protect others around you: Learn how to safely use, store and throw away your medicines at www.disposemymeds.org.          This list is accurate as of: 9/1/17 11:59 PM.  Always use your most recent med list.                   Brand Name Dispense Instructions for use " Diagnosis    blood glucose lancets standard    no brand specified    100 each    Use to test blood sugar 4 times daily or as directed.    Gestational diabetes mellitus (GDM) in third trimester, gestational diabetes method of control unspecified       blood glucose monitoring meter device kit    no brand specified    1 kit    Use to test blood sugar 4 times daily or as directed.    Gestational diabetes mellitus (GDM) in third trimester, gestational diabetes method of control unspecified       blood glucose monitoring test strip    no brand specified    100 strip    Use to test blood sugars 4 times daily:Please check fasting blood sugar in the morning and also check blood sugar 1-2 hours after eating.    Gestational diabetes mellitus (GDM) in third trimester, gestational diabetes method of control unspecified       doxylamine 25 MG Tabs tablet    UNISOM    20 each    Take 0.5 tablets (12.5 mg) by mouth 3 times daily as needed For nausea    Nausea/vomiting in pregnancy       * ferrous sulfate 325 (65 FE) MG tablet    IRON    90 tablet    Take 1 tablet (325 mg) by mouth 3 times daily Take with 1/2 glass of orange juice or with Vitamin C containing foods to increase absorption.    Anemia of pregnancy in first trimester, Supervision of high-risk pregnancy of elderly multigravida       * ferrous sulfate 325 (65 FE) MG tablet    IRON    90 tablet    Take 1 tablet (325 mg) by mouth 3 times daily Take with 1/2 glass of orange juice or with Vitamin C foods (tomatoes) to increase absorption.    Anemia of pregnancy in second trimester       ondansetron 4 MG tablet    ZOFRAN    30 tablet    Take 1 tablet (4 mg) by mouth every 8 hours as needed for nausea or vomiting    Nausea/vomiting in pregnancy       prenatal multivitamin plus iron 27-0.8 MG Tabs per tablet     100 tablet    Take 1 tablet by mouth daily        pyridOXINE 25 MG tablet    vitamin B-6    30 tablet    Take 1 tablet (25 mg) by mouth 3 times daily as needed     Nausea/vomiting in pregnancy       ranitidine 75 MG tablet    ZANTAC    30 tablet    Take 1 tablet (75 mg) by mouth 2 times daily as needed for heartburn        * Notice:  This list has 2 medication(s) that are the same as other medications prescribed for you. Read the directions carefully, and ask your doctor or other care provider to review them with you.

## 2017-09-01 NOTE — PROGRESS NOTES
"OB Visit    Dain Tejeda is a 37 year old  female who presents to clinic for a follow up OB visit. She is currently 29w5d with an estimated date of delivery of 2017 via sure LMP c/w fetal survey. Expecting baby girl.       She denies headache, chest pain, shortness of breath, vaginal bleeding, or abnormal discharge.     She has felt baby move.     She has not felt contractions.    New concerns today: Did not pass her 3hr GTT on . No personal h/o GDM. She has family members (2 sisters) with h/o GDM and is aware that it can cause babies to grow bigger. Has meet with RN to discuss GDM education. Eating more protein/meat, adding more veggies. She loves sticky rice, not having daily anymore though due to new dx. Using regular rice now in it's place, 1 cup with every meal.     Just received her glucometer and supplies yesterday. Has checked a few BS, did not bring in meter today. Yesterday she had: 102 fasting, and one number was 219 but checked it right after finishing lunch, and 146 was \"probably before dinner\" because it was 4 hours after lunch. Fasting this AM is 102 again. She understands to check 2hr after meals and AM fasting when I asked her when she should be checking BS, but she was just \"trying it out for the first day\" at the times she did.        Obstetric History       T0      L0     SAB0   TAB0   Ectopic0   Multiple0   Live Births0       # Outcome Date GA Lbr Dontae/2nd Weight Sex Delivery Anes PTL Lv   1 Current                   Physical exam:  /71  Pulse 85  Temp 98.8  F (37.1  C)  Resp 20  Ht 4' 11.5\" (151.1 cm)  Wt 136 lb 9.6 oz (62 kg)  LMP 2017  SpO2 99%  BMI 27.13 kg/m2    General: alert, female in no acute distress  Abdomen: gravid uterus appropriate for gestation age at 28.5 cm above pubic symphysis, non-tender, FHTs 145  Extremities: trace edema    Assessment/Plan:    29w5d    Supervision of pregnancy, elderly, third trimester  - TDAP VACCINE " (BOOSTRIX) given today; we spoke to her  via phone as well  - Patient plans to breastfeed for at least 1 month  - Discussed signs/sx of  labor    Gestational diabetes, third trimester  Pt did not pass 3hr GTT on , and received RN education that day on dietary changes to make until glucometer and testing strips finally were available (required PA). She just received these yesterday and thus limited data on her BS control at this time.   - Discussed diagnosis in further today, emphasized goal for checking BS fasting and 2hr post prandial  - Pt agrees to check fasting and after lunch/dinner each day. She will bring in BS log/glucometer at next visit  - Emphasized dietary goals for maintaining low BS; pt will work on dietary changes this week as she learns to use glucometer  - Educated pt to not share glucometer with her , who has DM2  -  Next visit: review BS log; discuss her goal BS levels & emphasize exercise.      ADA and ACOG glucose targets are:  ?Fasting blood glucose concentration ?95 mg/dL (5.3 mmol/L)  ?One-hour postprandial blood glucose concentration ?140 mg/dL (7.8 mmol/L)  ?Two-hour postprandial glucose concentration ?120 mg/dL (6.7 mmol/L)        Follow up in 1 weeks for f/u GDM and routine prenatal visit, discuss pp contraception, check Hemoglobin     Cyndie Jenkins MD  [facility] Resident  Pager# [phone]    Precepted with: Nick Elaine MD

## 2017-09-04 PROBLEM — O21.9 NAUSEA/VOMITING IN PREGNANCY: Status: RESOLVED | Noted: 2017-04-24 | Resolved: 2017-09-04

## 2017-09-11 ENCOUNTER — TELEPHONE (OUTPATIENT)
Dept: FAMILY MEDICINE | Facility: CLINIC | Age: 37
End: 2017-09-11

## 2017-09-11 NOTE — TELEPHONE ENCOUNTER
I called, left a message for Dain to call clinic back. Would like to assist in scheduling bebe appt for this week, either with Dr Jenkins or another MD. Also following up on home bs readings, need to obtain readings to see how she is doing. Matilde HERRERA

## 2017-09-18 ENCOUNTER — OFFICE VISIT (OUTPATIENT)
Dept: FAMILY MEDICINE | Facility: CLINIC | Age: 37
End: 2017-09-18

## 2017-09-18 VITALS
DIASTOLIC BLOOD PRESSURE: 60 MMHG | HEIGHT: 59 IN | WEIGHT: 140 LBS | TEMPERATURE: 97.4 F | BODY MASS INDEX: 28.22 KG/M2 | HEART RATE: 92 BPM | OXYGEN SATURATION: 100 % | SYSTOLIC BLOOD PRESSURE: 100 MMHG

## 2017-09-18 DIAGNOSIS — O99.013 ANEMIA OF PREGNANCY IN THIRD TRIMESTER: ICD-10-CM

## 2017-09-18 DIAGNOSIS — O09.529 SUPERVISION OF HIGH-RISK PREGNANCY OF ELDERLY MULTIGRAVIDA: Primary | ICD-10-CM

## 2017-09-18 DIAGNOSIS — O24.419 GESTATIONAL DIABETES MELLITUS (GDM) IN THIRD TRIMESTER, GESTATIONAL DIABETES METHOD OF CONTROL UNSPECIFIED: ICD-10-CM

## 2017-09-18 LAB — HEMOGLOBIN: 9.8 G/DL (ref 11.7–15.7)

## 2017-09-18 NOTE — MR AVS SNAPSHOT
After Visit Summary   2017    Dain Tejeda    MRN: [id number]           Patient Information     Date Of Birth          1980        Visit Information        Provider Department      2017 4:20 PM Cyndie Jenkins MD [facility]        Today's Diagnoses     Supervision of high-risk pregnancy of elderly primigravida    -  1    Gestational diabetes mellitus (GDM) in third trimester, gestational diabetes method of control unspecified        Anemia of pregnancy in third trimester           Follow-ups after your visit        Your next 10 appointments already scheduled     Sep 25, 2017  4:20 PM CDT   RETURN OB with Cyndie Jenkins MD   [facility] ([facility])    [address] [place] [address]   [phone]              Who to contact     Please call your clinic at [phone] to:    Ask questions about your health    Make or cancel appointments    Discuss your medicines    Learn about your test results    Speak to your doctor   If you have compliments or concerns about an experience at your clinic, or if you wish to file a complaint, please contact [facility] Patient Relations at [phone] or email us at [email or web address]         Additional Information About Your Visit        MyChart Information     Xolvehart is an electronic gateway that provides easy, online access to your medical records. With TabletKiosk, you can request a clinic appointment, read your test results, renew a prescription or communicate with your care team.     To sign up for streamOncet visit the website at [email or web address]   You will be asked to enter the access code listed below, as well as some personal information. Please follow the directions to create your username and password.     Your access code is: [email or web address]  Expires: 10/29/2017 12:26 PM     Your access code will  in 90 days. If you need help or a new code,  "please contact your [facility] or call [phone] for assistance.        Care EveryWhere ID     This is your Care EveryWhere ID. This could be used by other organizations to access your [facility] medical records  [id number]        Your Vitals Were     Pulse Temperature Height Last Period Pulse Oximetry BMI (Body Mass Index)    92 97.4  F (36.3  C) (Oral) 4' 11.25\" (150.5 cm) 02/05/2017 100% 28.04 kg/m2       Blood Pressure from Last 3 Encounters:   09/18/17 100/60   09/01/17 111/71   07/31/17 96/63    Weight from Last 3 Encounters:   09/18/17 140 lb (63.5 kg)   09/01/17 136 lb 9.6 oz (62 kg)   07/31/17 130 lb (59 kg)              We Performed the Following     Hemoglobin (HGB) ([facility])     Syphilis Screen [place] (RPR/VDRL) ([facility])        Primary Care Provider Office Phone # Fax #    Cyndie Jenkins [phone]       [facility] [address] [place] [address]        Equal Access to Services     DIANA FONTENOT AH: Hadii aad ku hadasho Soomaali, waaxda luqadaha, qaybta kaalmada adeegyada, waxay kermitin haynewn wendy pineda laAmadoaan ah. So [facility] [phone].    ATENCIÓN: Si habla español, tiene a manzano disposición servicios gratuitos de asistencia lingüística. Llame al [phone].    We comply with applicable federal civil rights laws and [place] laws. We do not discriminate on the basis of race, color, national origin, age, disability sex, sexual orientation or gender identity.            Thank you!     Thank you for choosing [facility]  for your care. Our goal is always to provide you with excellent care. Hearing back from our patients is one way we can continue to improve our services. Please take a few minutes to complete the written survey that you may receive in the mail after your visit with us. Thank you!             Your Updated Medication List - Protect others around you: Learn how to safely use, store and throw away your " medicines at www.disposemymeds.org.          This list is accurate as of: 9/18/17 11:59 PM.  Always use your most recent med list.                   Brand Name Dispense Instructions for use Diagnosis    blood glucose lancets standard    no brand specified    100 each    Use to test blood sugar 4 times daily or as directed.    Gestational diabetes mellitus (GDM) in third trimester, gestational diabetes method of control unspecified       blood glucose monitoring meter device kit    no brand specified    1 kit    Use to test blood sugar 4 times daily or as directed.    Gestational diabetes mellitus (GDM) in third trimester, gestational diabetes method of control unspecified       blood glucose monitoring test strip    no brand specified    100 strip    Use to test blood sugars 4 times daily:Please check fasting blood sugar in the morning and also check blood sugar 1-2 hours after eating.    Gestational diabetes mellitus (GDM) in third trimester, gestational diabetes method of control unspecified       doxylamine 25 MG Tabs tablet    UNISOM    20 each    Take 0.5 tablets (12.5 mg) by mouth 3 times daily as needed For nausea    Nausea/vomiting in pregnancy       * ferrous sulfate 325 (65 FE) MG tablet    IRON    90 tablet    Take 1 tablet (325 mg) by mouth 3 times daily Take with 1/2 glass of orange juice or with Vitamin C containing foods to increase absorption.    Anemia of pregnancy in first trimester, Supervision of high-risk pregnancy of elderly multigravida       * ferrous sulfate 325 (65 FE) MG tablet    IRON    90 tablet    Take 1 tablet (325 mg) by mouth 3 times daily Take with 1/2 glass of orange juice or with Vitamin C foods (tomatoes) to increase absorption.    Anemia of pregnancy in second trimester       ondansetron 4 MG tablet    ZOFRAN    30 tablet    Take 1 tablet (4 mg) by mouth every 8 hours as needed for nausea or vomiting    Nausea/vomiting in pregnancy       prenatal multivitamin plus iron 27-0.8  MG Tabs per tablet     100 tablet    Take 1 tablet by mouth daily        pyridOXINE 25 MG tablet    vitamin B-6    30 tablet    Take 1 tablet (25 mg) by mouth 3 times daily as needed    Nausea/vomiting in pregnancy       ranitidine 75 MG tablet    ZANTAC    30 tablet    Take 1 tablet (75 mg) by mouth 2 times daily as needed for heartburn        * Notice:  This list has 2 medication(s) that are the same as other medications prescribed for you. Read the directions carefully, and ask your doctor or other care provider to review them with you.

## 2017-09-18 NOTE — PROGRESS NOTES
"OB Visit    Dain Tejeda is a 37 year old  female who presents to clinic for a follow up OB visit. She is currently 32w1d with an estimated date of delivery of 2017 via sure LMP c/w fetal survey. Expecting baby girl.     She denies headache, chest pain, shortness of breath, vaginal bleeding, or abnormal discharge.     She has felt baby move.     She has not felt contractions.    New concerns today: given recent dx of GDM and education from myself and RN, she has significantly reduced her carbohydrate intake (ian rice). For both lunch/dinner: she eats one handful size of rice with blanched veggies & fish or pork. (breakfast is corn with rice and veggies). She feels hungry.     Brings in BS log: fasting check is at 6am, eats a small amount at breakfast (doesn't check after this), eats lunch at 11-12, post lunch check is ~2hr after (or 1.5hr after), post dinner check is 6pm (also ~2hr post meal). 2 weeks of data, stopped  though today  due to stressful life events. Her nephew committed suicide. She is tearful about this and says they just came from the burial today. She also says that she knows she needs to take care of herself which is why she left the mourning services early for this appointment today.     F---postL--postD  102, 219, 146  89, 208, 105  87, 95, 198  86, 89, 116  85, 148, 135  89, 160, 120  90, 174, 142  90, 174, 140  102, 111, 137  90, 145, 112  85, 133, 147      Obstetric History       T0      L0     SAB0   TAB0   Ectopic0   Multiple0   Live Births0       # Outcome Date GA Lbr Dontae/2nd Weight Sex Delivery Anes PTL Lv   1 Current                   Physical exam:  /60  Pulse 92  Temp 97.4  F (36.3  C) (Oral)  Ht 4' 11.25\" (150.5 cm)  Wt 140 lb (63.5 kg)  LMP 2017  SpO2 100%  BMI 28.04 kg/m2    General: alert, female in no acute distress  Abdomen: gravid uterus appropriate for gestation age at 31 cm above pubic symphysis, non-tender, FHTs 156  Extremities: " "trace edema    Assessment/Plan:    32w1d    Supervision of high-risk pregnancy of elderly primigravida  - Hemoglobin (HGB) ([facility])  - Syphilis Screen [place] (RPR/VDRL) ([facility])    GDM, not controlled. Has trialed ~2 weeks of significant dietary changes; several BS remain above goal though her time post meal check can vary between 1.5-2hr. Lengthy discussion today on diet, exercise and medication management of GDM. We also reviewed again maternal and fetal risks of GDM (gestational hypertensive disorders, risk for c/s, LGA, macrosomia, shoulder dystocia,  hypoglycemia) She strongly prefers one additional week at attempting to reduce carbohydrate intake further before starting medications. She has declined seeing a diabetic educator, primarily due to current family social situation and lack of transportation. Her  has DM and she is familiar with the changes needed to reduce blood sugar levels.  - emphasized tight BS control, and provided her with goal BS levels: Fasting<95, 1hr post prandial <140, 2hr post prandial <120.  - recommended to increase protein content to offset hunger  - she will try to document times of each meal and post meal BS check to improve interpretation of her data, as well as bring a food log   - Would consider insulin ideally, and started the conversation today about what this would entail  - If she does not agree with insulin (0.7-1.0 u/kg/d), could consider glyburide 2.5-20mg in divided doses per day    Per 2013 ACOG guidelines for GDM, there is Level A evidence to use either insulin or oral medications when initiation of medication is indicated:   \"Current evidence from randomized trials and several  observational studies of oral antidiabetic agents show  that maternal glucose levels do not differ substantially  between women treated with insulin versus those treated  with oral agents, and a meta-analysis suggests that there  is no consistent evidence of an increase " "in any acute or  short-term adverse maternal or  outcomes with  the use of glyburide or metformin compared with the use  of insulin (34). Therefore, both can be considered for  glycemic control in women with GDM.\"     Also states glyburide has been shown to require addition of insulin less often than if using metformin.    Mild Anemia of pregnancy. Asymptomatic.  - Hb returned at 9.8.   - Requested that pt increase her iron from 1x to 3x/day as tolerated  - recheck in 4 weeks (~36wk JESSICA appt)    Follow up in 1 weeks for routine prenatal visit to discuss GDM further, pp contraception plans, discuss  monitoring    Cyndie Jenkins MD  [facility] Resident  Pager# [phone]    Precepted with: Charlie Reeves MD          Preceptor Attestation:  I saw and evaluated the patient.  I reviewed the resident physician's history, exam, and treatment plan; and I agree with the documentation by the resident physician.  Supervising Physician:  Charlie Reeves MD    "

## 2017-09-19 LAB — RPR SER QL: NORMAL

## 2017-09-25 ENCOUNTER — OFFICE VISIT (OUTPATIENT)
Dept: FAMILY MEDICINE | Facility: CLINIC | Age: 37
End: 2017-09-25

## 2017-09-25 VITALS
WEIGHT: 141.8 LBS | DIASTOLIC BLOOD PRESSURE: 75 MMHG | SYSTOLIC BLOOD PRESSURE: 106 MMHG | TEMPERATURE: 98 F | BODY MASS INDEX: 27.84 KG/M2 | HEIGHT: 60 IN | HEART RATE: 101 BPM | OXYGEN SATURATION: 98 %

## 2017-09-25 DIAGNOSIS — O24.410 DIET CONTROLLED GESTATIONAL DIABETES MELLITUS (GDM) IN THIRD TRIMESTER: ICD-10-CM

## 2017-09-25 DIAGNOSIS — O09.529 SUPERVISION OF HIGH-RISK PREGNANCY OF ELDERLY MULTIGRAVIDA: Primary | ICD-10-CM

## 2017-09-25 NOTE — MR AVS SNAPSHOT
After Visit Summary   9/25/2017    Dain Tejeda    MRN: [id number]           Patient Information     Date Of Birth          1980        Visit Information        Provider Department      9/25/2017 4:20 PM Cyndie Jenkins MD [facility]        Today's Diagnoses     Supervision of high-risk pregnancy of elderly primigravida    -  1    Diet controlled gestational diabetes mellitus (GDM) in third trimester           Follow-ups after your visit        Your next 10 appointments already scheduled     Oct 04, 2017  4:00 PM CDT   RETURN OB with Cyndie Jenkins MD   [facility] ([facility])    [address] [place] [address]   [phone]              Future tests that were ordered for you today     Open Future Orders        Priority Expected Expires Ordered    [facility] Comprehensive Single Routine  8/2/2018 10/2/2017            Who to contact     Please call your clinic at [phone] to:    Ask questions about your health    Make or cancel appointments    Discuss your medicines    Learn about your test results    Speak to your doctor   If you have compliments or concerns about an experience at your clinic, or if you wish to file a complaint, please contact [facility] Patient Relations at [phone] or email us at [email or web address]         Additional Information About Your Visit        MyChart Information     GoodBelly is an electronic gateway that provides easy, online access to your medical records. With GoodBelly, you can request a clinic appointment, read your test results, renew a prescription or communicate with your care team.     To sign up for Lazy Angelt visit the website at [email or web address]   You will be asked to enter the access code listed below, as well as some personal information. Please follow the directions to create your username and password.     Your access code is: [email or web address]  Expires:  "10/29/2017 12:26 PM     Your access code will  in 90 days. If you need help or a new code, please contact your [facility] or call [phone] for assistance.        Care EveryWhere ID     This is your Care EveryWhere ID. This could be used by other organizations to access your [facility] medical records  [id number]        Your Vitals Were     Pulse Temperature Height Last Period Pulse Oximetry BMI (Body Mass Index)    101 98  F (36.7  C) (Oral) 4' 11.5\" (151.1 cm) 2017 98% 28.16 kg/m2       Blood Pressure from Last 3 Encounters:   17 106/75   17 100/60   17 111/71    Weight from Last 3 Encounters:   17 141 lb 12.8 oz (64.3 kg)   17 140 lb (63.5 kg)   17 136 lb 9.6 oz (62 kg)              Today, you had the following     No orders found for display       Primary Care Provider Office Phone # Fax #    Cyndie Jenkins [phone]       [facility] [address] [place] [address]        Equal Access to Services     NEAL FONTENOT AH: Hadii aad ku hadasho Soomaali, waaxda luqadaha, qaybta kaalmada adeegyada, waxay idiin hayaan angieeg kharatonia la'newn ah. So [facility] [phone].    ATENCIÓN: Si habla español, tiene a manzano disposición servicios gratuitos de asistencia lingüística. Llame al [phone].    We comply with applicable federal civil rights laws and [place] laws. We do not discriminate on the basis of race, color, national origin, age, disability, sex, sexual orientation, or gender identity.            Thank you!     Thank you for choosing [facility]  for your care. Our goal is always to provide you with excellent care. Hearing back from our patients is one way we can continue to improve our services. Please take a few minutes to complete the written survey that you may receive in the mail after your visit with us. Thank you!             Your Updated Medication List - Protect others " around you: Learn how to safely use, store and throw away your medicines at www.disposemymeds.org.          This list is accurate as of: 9/25/17 11:59 PM.  Always use your most recent med list.                   Brand Name Dispense Instructions for use Diagnosis    blood glucose lancets standard    no brand specified    100 each    Use to test blood sugar 4 times daily or as directed.    Gestational diabetes mellitus (GDM) in third trimester, gestational diabetes method of control unspecified       blood glucose monitoring meter device kit    no brand specified    1 kit    Use to test blood sugar 4 times daily or as directed.    Gestational diabetes mellitus (GDM) in third trimester, gestational diabetes method of control unspecified       blood glucose monitoring test strip    no brand specified    100 strip    Use to test blood sugars 4 times daily:Please check fasting blood sugar in the morning and also check blood sugar 1-2 hours after eating.    Gestational diabetes mellitus (GDM) in third trimester, gestational diabetes method of control unspecified       doxylamine 25 MG Tabs tablet    UNISOM    20 each    Take 0.5 tablets (12.5 mg) by mouth 3 times daily as needed For nausea    Nausea/vomiting in pregnancy       * ferrous sulfate 325 (65 FE) MG tablet    IRON    90 tablet    Take 1 tablet (325 mg) by mouth 3 times daily Take with 1/2 glass of orange juice or with Vitamin C containing foods to increase absorption.    Anemia of pregnancy in first trimester, Supervision of high-risk pregnancy of elderly multigravida       * ferrous sulfate 325 (65 FE) MG tablet    IRON    90 tablet    Take 1 tablet (325 mg) by mouth 3 times daily Take with 1/2 glass of orange juice or with Vitamin C foods (tomatoes) to increase absorption.    Anemia of pregnancy in second trimester       ondansetron 4 MG tablet    ZOFRAN    30 tablet    Take 1 tablet (4 mg) by mouth every 8 hours as needed for nausea or vomiting     Nausea/vomiting in pregnancy       prenatal multivitamin plus iron 27-0.8 MG Tabs per tablet     100 tablet    Take 1 tablet by mouth daily        pyridOXINE 25 MG tablet    vitamin B-6    30 tablet    Take 1 tablet (25 mg) by mouth 3 times daily as needed    Nausea/vomiting in pregnancy       ranitidine 75 MG tablet    ZANTAC    30 tablet    Take 1 tablet (75 mg) by mouth 2 times daily as needed for heartburn        * Notice:  This list has 2 medication(s) that are the same as other medications prescribed for you. Read the directions carefully, and ask your doctor or other care provider to review them with you.

## 2017-09-25 NOTE — NURSING NOTE
name: Joby Skelton  Language: Jennyong  Agency: [employer]  Phone number: [phone]    Offer flu vaccine--pt would like to discuss with Md first before getting vaccine.

## 2017-09-25 NOTE — PROGRESS NOTES
"OB Visit    Dain Tejeda is a 37 year old  female who presents to clinic for a follow up OB visit. She is currently 33w1d with an estimated date of delivery of 2017 via sure LMP c/w normal fetal survey. Expecting baby girl.     She denies headache, chest pain, shortness of breath, vaginal bleeding, or abnormal discharge.     She has felt baby move.     She has not felt contractions.    New concerns today:   No concerns today. She was concerned about the gestational diabetes, but was glad that the blood sugars were down compared to last week. We reviewed her BS log in detail today:    6 am morning check is fasting range is 85-90  11 am check is 2 hours post-prandial   8 pm dinner check is 2 hours post-prandial     Diet:  Breakfast 9am - chicken, fish, rice - 1 tablespoon, blanched vegetables  Supper 6 pm - may add bamboo or tofu in addition to morning foods  Usually eats 2 meals a day, some fruit during day      Obstetric History       T0      L0     SAB0   TAB0   Ectopic0   Multiple0   Live Births0       # Outcome Date GA Lbr Dontae/2nd Weight Sex Delivery Anes PTL Lv   1 Current                   Physical exam:  /75  Pulse 101  Temp 98  F (36.7  C) (Oral)  Ht 4' 11.5\" (151.1 cm)  Wt 141 lb 12.8 oz (64.3 kg)  LMP 2017  SpO2 98%  BMI 28.16 kg/m2    General: alert, female in no acute distress  Abdomen: gravid uterus appropriate for gestation age at 31 cm above pubic symphysis, non-tender, FHTs 140s  Extremities: trace edema    Assessment/Plan:    33w1d    Supervision of high-risk pregnancy of elderly primigravida. Fundal height checks these past 3 weeks have been +/-   1-2 cm. With her GDM, reasonable to pursue interval growth u/s at this time.  - US OB Single Follow Up Repeat  - Encouraged breastfeeding    Diet controlled gestational diabetes mellitus (GDM) in third trimester. Very well controlled with dietary changes now.   - Discussed monitoring in pregnancy, " will start with interval growth u/s as above + BPP. Depending on her control will need BPP vs NST interval monitoring.  - Congratulated her on tight BS control at this time  - Continue dietary measures to manage BS, she will continue checking 2hr post prandials as she has been.    Anemia of pregnancy. On iron. Hb in third trimester at 9.8.    Follow up in 2 weeks for routine prenatal care.    Cyndie Jenkins MD  [facility] Medicine Resident  Pager# [phone]    Precepted with: Cole Jenkins MD

## 2017-09-26 ENCOUNTER — TELEPHONE (OUTPATIENT)
Dept: FAMILY MEDICINE | Facility: CLINIC | Age: 37
End: 2017-09-26

## 2017-09-26 NOTE — TELEPHONE ENCOUNTER
Chart reviewed. Need U/S for growth assessment with EFW and U/S for BPP.     Called patient to coordinate scheduling for U/S. Stated does not want to go to [facility] for U/S.    Scheduled for this Thursday at [facility].

## 2017-09-28 DIAGNOSIS — O09.523 AMA (ADVANCED MATERNAL AGE) MULTIGRAVIDA 35+, THIRD TRIMESTER: Primary | ICD-10-CM

## 2017-09-28 DIAGNOSIS — O36.5930 SMALL-FOR-DATES FETUS, THIRD TRIMESTER: ICD-10-CM

## 2017-09-29 DIAGNOSIS — O24.410 DIET CONTROLLED GESTATIONAL DIABETES MELLITUS (GDM) IN THIRD TRIMESTER: ICD-10-CM

## 2017-09-29 DIAGNOSIS — O99.012 ANEMIA OF PREGNANCY IN SECOND TRIMESTER: ICD-10-CM

## 2017-09-29 DIAGNOSIS — O09.529 SUPERVISION OF HIGH-RISK PREGNANCY OF ELDERLY MULTIGRAVIDA: ICD-10-CM

## 2017-09-29 NOTE — PROGRESS NOTES
Pt had growth u/s yesterday which showed EFW of <3%ile. Baby looked good on biophysical profile with 8/8. Recent fundal heights less than dates. Has diet controlled GDM.     Would like to refer to M for evaluation of baby's growth and likely level II ultrasound for further evaluation of baby. I placed this as a future order, but would like to see pt back in clinic on Monday or early next week to discuss results in person and then we can have RN to assist with scheduling the [facility] appt.    Cyndie Jenkins MD

## 2017-09-29 NOTE — Clinical Note
JAKE Thomas  , can you please call pt to schedule JESSICA appt as soon as able to discuss u/s results with me next week? (you can double book anytime, I think she prefers 4:00).

## 2017-10-04 ENCOUNTER — OFFICE VISIT (OUTPATIENT)
Dept: FAMILY MEDICINE | Facility: CLINIC | Age: 37
End: 2017-10-04

## 2017-10-04 VITALS
SYSTOLIC BLOOD PRESSURE: 103 MMHG | HEIGHT: 59 IN | BODY MASS INDEX: 29.03 KG/M2 | HEART RATE: 89 BPM | DIASTOLIC BLOOD PRESSURE: 69 MMHG | OXYGEN SATURATION: 99 % | TEMPERATURE: 97.8 F | WEIGHT: 144 LBS

## 2017-10-04 DIAGNOSIS — O26.843 FUNDAL HEIGHT LOW FOR DATES IN THIRD TRIMESTER: ICD-10-CM

## 2017-10-04 DIAGNOSIS — O24.410 DIET CONTROLLED GESTATIONAL DIABETES MELLITUS (GDM) IN THIRD TRIMESTER: ICD-10-CM

## 2017-10-04 DIAGNOSIS — O09.529 SUPERVISION OF HIGH-RISK PREGNANCY OF ELDERLY MULTIGRAVIDA: Primary | ICD-10-CM

## 2017-10-04 DIAGNOSIS — O99.012 ANEMIA OF PREGNANCY IN SECOND TRIMESTER: ICD-10-CM

## 2017-10-04 NOTE — PATIENT INSTRUCTIONS
[facility] Information  Your resident OB Doctor is Dr. Cyndie Jenkins.  We will refer you to Maternal Fetal Medicine    If you have any further concerns or wish to schedule another appointment, please call our office at [phone] during normal business hours (8-5, M-F).     For uncomplicated pregnancies, you will have weekly visits from now until you deliver.     If you have urgent medical questions that cannot wait, you may call [phone] at any time of day.     If you have a medical emergency, please call 911.     When to call or come in to [facility] ([phone] for Labor & Delivery unit)  If you notice a decrease in your baby's movement.   If your begin to experience contractions that are 5 minutes or less apart and lasting for over 45 seconds, or if contractions are becoming more painful.  If you have any bleeding or leakage of fluids.   If you have a headache not resolved with tylenol, right upper abdominal pain, or sudden onset of swelling.  You know your body best. Never hesitate to call or go to the hospital if something doesn't feel right!    Preparing for the hospital  You re likely feeling anxious as your child s birth approaches. This is normal. To give yourself some peace of mind, pack a bag 3-4 weeks before your due date. Here is a list of things to remember:  Personal care items like toothbrush, hair brush, lip balm, lotion, shampoo, glasses, contacts  Nightgown, bathrobe, slippers  Several pairs of underwear  Comfortable clothes for you to wear home  Camera with new batteries  Cell phone and   Insurance information and any other paperwork needed for your hospital stay  Clothes for baby to wear home  An infant, rear-facing car seat for bringing home your baby (this is required by law)

## 2017-10-04 NOTE — PROGRESS NOTES
"OB Visit    Dain Tejeda is a 37 year old  female who presents to clinic for a follow up OB visit. She is currently 34w3d with an estimated date of delivery of 2017 via sure LMP c/w fetal survey.   She denies headache, chest pain, shortness of breath,  vaginal bleeding, or abnormal discharge.     She has felt baby move.   She denies abdominal pain or contractions.    New concerns today:   Discussd recent BPP/growth u/s showing <3%ile with JIGAR 2 weeks later than current dating by sure LMP/fetal survey. She did have n/v in 1st trimester, but no significant weight loss in pregnancy. Has gained 25lb in total. Good access to food. No h/o infections, no travel before or during pregnancy. 1st pregnancy, AMA as only risk factor for placental insufficiency. No other RFs stand out with her adequate weight gain.   Discussed her BS log: still eating similar amounts as previously, excellent numbers  Fastin-90  LUNCH 2HR post prandial:   DINNER: 2HR post prandial: 100-110    Obstetric History       T0      L0     SAB0   TAB0   Ectopic0   Multiple0   Live Births0       # Outcome Date GA Lbr Dontae/2nd Weight Sex Delivery Anes PTL Lv   1 Current                   Physical exam:  /69  Pulse 89  Temp 97.8  F (36.6  C) (Oral)  Ht 4' 11\" (149.9 cm)  Wt 144 lb (65.3 kg)  LMP 2017  SpO2 99%  BMI 29.08 kg/m2    General: alert, female in no acute distress  Abdomen: gravid uterus appropriate for gestation age at 30 cm above pubic symphysis, non-tender, FHTs 145, Leopold's maneuver reveals cephalic positioning  Extremities: trace edema    Assessment/Plan:    34w3d    1. Supervision of high-risk pregnancy of elderly primigravida  - Reviewed signs/symptoms of labor and when to present to the hospital.    2. Diet controlled gestational diabetes mellitus (GDM) in third trimester  - Doing very well, all numbers are within goal of <120 2hr post prandial and <90 fasting.   - continue good dietary " changes to reduce carbohydrate load, increase protein    3. Fundal height low for dates in third trimester. JIGAR 11/12/17 by sure LMP c/w fetal survey, at which time EFW was 15%ile. Last week 9/28 had BPP for GDM monitoring done with EFW <3%ile. Now measuring 4cm less than dates. (was 2 cm less than dates last week, and before that was fairly consistently measuring 1cm less than dates for every check). She did have n/v in 1st trimester, but no significant weight loss in pregnancy. Has gained 25lb in total and followed her curve nicely. Good access to food. No h/o infections, no travel before or during pregnancy. 1st pregnancy, AMA as only risk factor for placental insufficiency. No other RFs stand out with her adequate weight gain.   - MFM referral placed, will see if they can get her in this week  - if not, will place order for BPP/NST/EFW at [facility]      Follow up in 1 week for routine prenatal visit, sooner if labor symptoms.     Cyndie Jenkins MD  [facility] Family Medicine Resident  Pager# [phone]      Precepted with: Claudio Rivera MD

## 2017-10-05 ENCOUNTER — TELEPHONE (OUTPATIENT)
Dept: FAMILY MEDICINE | Facility: CLINIC | Age: 37
End: 2017-10-05

## 2017-10-05 ENCOUNTER — HOSPITAL ENCOUNTER (OUTPATIENT)
Dept: ULTRASOUND IMAGING | Facility: CLINIC | Age: 37
Discharge: HOME | End: 2017-10-05
Attending: FAMILY MEDICINE | Admitting: FAMILY MEDICINE
Payer: COMMERCIAL

## 2017-10-05 ENCOUNTER — PRE VISIT (OUTPATIENT)
Dept: MATERNAL FETAL MEDICINE | Facility: CLINIC | Age: 37
End: 2017-10-05

## 2017-10-05 ENCOUNTER — OFFICE VISIT (OUTPATIENT)
Dept: MATERNAL FETAL MEDICINE | Facility: CLINIC | Age: 37
End: 2017-10-05
Attending: FAMILY MEDICINE
Payer: COMMERCIAL

## 2017-10-05 DIAGNOSIS — O36.5990 FETAL GROWTH RETARDATION, ANTEPARTUM: Primary | ICD-10-CM

## 2017-10-05 DIAGNOSIS — O99.810 ABNORMAL MATERNAL GLUCOSE TOLERANCE, ANTEPARTUM: ICD-10-CM

## 2017-10-05 DIAGNOSIS — O26.90 PREGNANCY RELATED CONDITION, UNSPECIFIED TRIMESTER: ICD-10-CM

## 2017-10-05 PROCEDURE — 76811 OB US DETAILED SNGL FETUS: CPT

## 2017-10-05 PROCEDURE — 76819 FETAL BIOPHYS PROFIL W/O NST: CPT | Performed by: OBSTETRICS & GYNECOLOGY

## 2017-10-05 NOTE — TELEPHONE ENCOUNTER
Pt called back and informed me she will be going to appointment. Called Kermit and confirmed pt will be going to 2:15 appointment today.

## 2017-10-05 NOTE — TELEPHONE ENCOUNTER
Called [facility] [phone] and spoke to Kermit. Per Kermit they are holding a spot for her at 2:15 today. No need to fax over any patient record or referral.     Called patient to relay information on appointment today at [facility]. No answer. Left message to call me back regarding information for today.

## 2017-10-05 NOTE — PROGRESS NOTES
Please refer to ultrasound report under 'Imaging' Studies of 'Chart Review' tabs.    Ever Peñaloza M.D.

## 2017-10-05 NOTE — MR AVS SNAPSHOT
After Visit Summary   10/5/2017    Dain Tejeda    MRN: [id number]           Patient Information     Date Of Birth          1980        Visit Information        Provider Department      10/5/2017 2:45 PM Ever Peñaloza MD [facility] Maternal Fetal Medicine [facility]        Today's Diagnoses     Fetal growth retardation, antepartum    -  1    Abnormal maternal glucose tolerance, antepartum           Follow-ups after your visit        Your next 10 appointments already scheduled     Oct 26, 2017 10:00 AM CDT   MFM US COMPRE SINGLE F/U with [id number]   [facility] Maternal Fetal Medicine Ultrasound - [facility])    [address]  [facility] [address]   [phone]           Wear comfortable clothes and leave your valuables at home.            Oct 26, 2017 10:30 AM CDT   Radiology MD with UR MIMI TALAVERA   [facility] Maternal Fetal Medicine - [facility])    [address]  [facility] [address]   [phone]           Please arrive at the time given for your first appointment. This visit is used internally to schedule the physician's time during your ultrasound.              Future tests that were ordered for you today     Open Future Orders        Priority Expected Expires Ordered    MFM US Comprehensive Single F/U Routine 10/26/2017 10/5/2018 10/5/2017            Who to contact     If you have questions or need follow up information about today's clinic visit or your schedule please contact [facility] MATERNAL FETAL MEDICINE [facility] directly at [phone].  Normal or non-critical lab and imaging results will be communicated to you by MyChart, letter or phone within 4 business days after the clinic has received the results. If you do not hear from us within 7 days, please contact the clinic through MyChart or phone. If you have a critical or abnormal lab result, we will notify you by phone as soon as  "possible.  Submit refill requests through [employer] or call your pharmacy and they will forward the refill request to us. Please allow 3 business days for your refill to be completed.          Additional Information About Your Visit        "[employer] Information     [employer] lets you send messages to your doctor, view your test results, renew your prescriptions, schedule appointments and more. To sign up, go to [email or web address] . Click on \"Log in\" on the left side of the screen, which will take you to the Welcome page. Then click on \"Sign up Now\" on the right side of the page.     You will be asked to enter the access code listed below, as well as some personal information. Please follow the directions to create your username and password.     Your access code is: [email or web address]  Expires: 10/29/2017 12:26 PM     Your access code will  in 90 days. If you need help or a new code, please call your [facility] clinic or [phone].        Care EveryWhere ID     This is your Care EveryWhere ID. This could be used by other organizations to access your [facility] medical records  [id number]        Your Vitals Were     Last Period                   2017            Blood Pressure from Last 3 Encounters:   10/04/17 103/69   17 106/75   17 100/60    Weight from Last 3 Encounters:   10/04/17 65.3 kg (144 lb)   17 64.3 kg (141 lb 12.8 oz)   17 63.5 kg (140 lb)               Primary Care Provider Office Phone # Fax #    Cyndie Jenkins [phone]       [facility] [address] [place] [address]        Equal Access to Services     [facility] HospitalRENE AH: Hadii margaux Meza, waaxda luqadaha, qaybta kaalmada keaton, yuan calhoun. So [facility] [phone].    ATENCIÓN: Si habla español, tiene a manzano disposición servicios gratuitos de asistencia lingüística. Llame al [phone].    We comply with applicable federal civil " rights laws and [place] laws. We do not discriminate on the basis of race, color, national origin, age, disability, sex, sexual orientation, or gender identity.            Thank you!     Thank you for choosing [facility]  for your care. Our goal is always to provide you with excellent care. Hearing back from our patients is one way we can continue to improve our services. Please take a few minutes to complete the written survey that you may receive in the mail after your visit with us. Thank you!             Your Updated Medication List - Protect others around you: Learn how to safely use, store and throw away your medicines at www.disposemymeds.org.          This list is accurate as of: 10/5/17  4:01 PM.  Always use your most recent med list.                   Brand Name Dispense Instructions for use Diagnosis    blood glucose lancets standard    no brand specified    100 each    Use to test blood sugar 4 times daily or as directed.    Gestational diabetes mellitus (GDM) in third trimester, gestational diabetes method of control unspecified       blood glucose monitoring meter device kit    no brand specified    1 kit    Use to test blood sugar 4 times daily or as directed.    Gestational diabetes mellitus (GDM) in third trimester, gestational diabetes method of control unspecified       blood glucose monitoring test strip    no brand specified    100 strip    Use to test blood sugars 4 times daily:Please check fasting blood sugar in the morning and also check blood sugar 1-2 hours after eating.    Gestational diabetes mellitus (GDM) in third trimester, gestational diabetes method of control unspecified       doxylamine 25 MG Tabs tablet    UNISOM    20 each    Take 0.5 tablets (12.5 mg) by mouth 3 times daily as needed For nausea    Nausea/vomiting in pregnancy       * ferrous sulfate 325 (65 FE) MG tablet    IRON    90 tablet    Take 1 tablet (325 mg) by mouth 3 times daily Take  with 1/2 glass of orange juice or with Vitamin C containing foods to increase absorption.    Anemia of pregnancy in first trimester, Supervision of high-risk pregnancy of elderly multigravida       * ferrous sulfate 325 (65 FE) MG tablet    IRON    90 tablet    Take 1 tablet (325 mg) by mouth 3 times daily Take with 1/2 glass of orange juice or with Vitamin C foods (tomatoes) to increase absorption.    Anemia of pregnancy in second trimester       ondansetron 4 MG tablet    ZOFRAN    30 tablet    Take 1 tablet (4 mg) by mouth every 8 hours as needed for nausea or vomiting    Nausea/vomiting in pregnancy       prenatal multivitamin plus iron 27-0.8 MG Tabs per tablet     100 tablet    Take 1 tablet by mouth daily        pyridOXINE 25 MG tablet    vitamin B-6    30 tablet    Take 1 tablet (25 mg) by mouth 3 times daily as needed    Nausea/vomiting in pregnancy       ranitidine 75 MG tablet    ZANTAC    30 tablet    Take 1 tablet (75 mg) by mouth 2 times daily as needed for heartburn        * Notice:  This list has 2 medication(s) that are the same as other medications prescribed for you. Read the directions carefully, and ask your doctor or other care provider to review them with you.

## 2017-10-05 NOTE — TELEPHONE ENCOUNTER
ISSUE: creatinine = 1.49, elevated    LPN task:  Check if patient feels sick?  Having UTI / URTI symptoms?  Diarrhea? Dehydration?  Started on new medication?  If on diuretics, was it increased?    PLAN:  Instruct to improve hydration and recheck BMP.  If with symptoms, recheck once symptoms are better.           Made contact with patient and discussed today's appointment with MFM. Pt will give me a call back after confirming ride.

## 2017-10-09 NOTE — PROGRESS NOTES
Reviewed results.  Shade Thomas,  Can you please call Dain and assist her with scheduling weekly appointments with me until her pregnancy is done? Her u/s from [facility] was actually reassuring if you want to let her know! Baby is growing appropriately, just on the smaller side (EFW 16%ile, but abdomen and femur are 5%ile). She was last seen 10/4, so ideally if she can see me tomorrow 10/9 or 10/10. Also, she will need a BPP weekly on Thursdays (if this day works for her), starting this week 10/12. If Thania orders them great, otherwise let me know if you need to order :)

## 2017-10-10 ENCOUNTER — TELEPHONE (OUTPATIENT)
Dept: FAMILY MEDICINE | Facility: CLINIC | Age: 37
End: 2017-10-10

## 2017-10-10 NOTE — TELEPHONE ENCOUNTER
Informed patient as below per Dr. Jenkins:   Her u/s from [facility] was actually reassuring if you want to let her know! Baby is growing appropriately, just on the smaller side (EFW 16%ile, but abdomen and femur are 5%ile). She was last seen 10/4, so ideally if she can see me tomorrow 10/9 or 10/10. Also she will need a BPP weekly on Thursdays (if this day works for her), starting this week 10/12.     Pt refused to go to [facility] for U/S. No U/S available this week at [facility]. Pt stated she is okay to wait next week to do BPP stating she feel reassured from last week [facility] U/S. Discussed with her what is the purpose of BPP. Pt instructed to call if any changes in fetal movement. Scheduled for BPP next week and JESSICA this week.

## 2017-10-11 ENCOUNTER — OFFICE VISIT (OUTPATIENT)
Dept: FAMILY MEDICINE | Facility: CLINIC | Age: 37
End: 2017-10-11

## 2017-10-11 ENCOUNTER — TELEPHONE (OUTPATIENT)
Dept: FAMILY MEDICINE | Facility: CLINIC | Age: 37
End: 2017-10-11

## 2017-10-11 VITALS
OXYGEN SATURATION: 98 % | DIASTOLIC BLOOD PRESSURE: 75 MMHG | WEIGHT: 145 LBS | TEMPERATURE: 98 F | HEART RATE: 82 BPM | HEIGHT: 59 IN | RESPIRATION RATE: 16 BRPM | SYSTOLIC BLOOD PRESSURE: 112 MMHG | BODY MASS INDEX: 29.23 KG/M2

## 2017-10-11 DIAGNOSIS — O09.529 SUPERVISION OF HIGH-RISK PREGNANCY OF ELDERLY MULTIGRAVIDA: Primary | ICD-10-CM

## 2017-10-11 DIAGNOSIS — O24.410 DIET CONTROLLED GESTATIONAL DIABETES MELLITUS (GDM) IN THIRD TRIMESTER: ICD-10-CM

## 2017-10-11 DIAGNOSIS — Z23 FLU VACCINE NEED: ICD-10-CM

## 2017-10-11 DIAGNOSIS — O99.012 ANEMIA OF PREGNANCY IN SECOND TRIMESTER: ICD-10-CM

## 2017-10-11 NOTE — Clinical Note
Shade Thomas, pt always leaves after 5pm and difficult to arrange scheduling of visits without an . She asked if you could maybe help her to schedule 5 more weekly JESSICA visits with me. Thanks! (so a 36wk, 37w, 38 wk, 39wk, 40wk check)

## 2017-10-11 NOTE — NURSING NOTE
"DUE FOR:  35 weeks 4 days  Offer flu shot patient agrees  GBS screen???    :  Madelaine Camargo  Language:  Junito  Agency:  [employer]  Phone:  [phone]    Injectable Influenza Immunization Documentation    1.  Has the patient received the information for the injectable influenza vaccine? YES     2. Is the patient 6 months of age or older? YES     3. Does the patient have any of the following contraindications?         Severe allergy to eggs? No     Severe allergic reaction to previous influenza vaccines? No   Severe allergy to latex? No       History of Guillain-[place] syndrome? No     Currently have a temperature greater than 100.4F? No        4.  Severely egg allergic patients should have flu vaccine eligibility assessed by an MD, RN, or pharmacist, and those who received flu vaccine should be observed for 15 min by an MD, RN, Pharmacist, Medical Technician, or member of clinic staff.\": N/A    5. Latex-allergic patients should be given latex-free influenza vaccine N/A. Please reference the Vaccine latex table to determine if your clinic s product is latex-containing.       Vaccination given by Digna John CMA    I administered the flu vaccine to patient Dain Tejeda and she tolerated it well.              "

## 2017-10-11 NOTE — MR AVS SNAPSHOT
After Visit Summary   10/11/2017    Dain Tejeda    MRN: [id number]           Patient Information     Date Of Birth          1980        Visit Information        Provider Department      10/11/2017 4:20 PM Cyndie Jenkins MD [facility]        Today's Diagnoses     Supervision of high-risk pregnancy of elderly primigravida    -  1    Diet controlled gestational diabetes mellitus (GDM) in third trimester        Anemia of pregnancy in second trimester        Flu vaccine need           Follow-ups after your visit        Follow-up notes from your care team     Return in about 1 week (around 10/18/2017) for Lab Work.      Your next 10 appointments already scheduled     Nov 01, 2017 10:00 AM CDT   RETURN OB with Cyndie Jenkins MD   [facility] ([facility])    [address] [place] [address]   [phone]            Nov 03, 2017 11:45 AM CDT   MIMI GARCIA SINGLE with NONAMFMUSPRANAV   [facility])    [address]  [facility] [address] [phone]            Nov 03, 2017 12:15 PM CDT   Radiology MD with NONA LE MD   [facility]    [address]  [facility] [address]   [phone]           Please arrive at the time given for your first appointment. This visit is used internally to schedule the physician's time during your ultrasound.            Nov 07, 2017  4:00 PM CST   RETURN OB with Cyndie Jenkins MD   [facility] ([facility])    [address] [facility] [address]   [phone]            Nov 16, 2017  9:00 AM CST   RETURN OB with Cyndie Jenkins MD   [facility] ([facility])    [address] [place] [address]   [phone]              Who to contact     Please call your clinic at [phone]  "to:    Ask questions about your health    Make or cancel appointments    Discuss your medicines    Learn about your test results    Speak to your doctor   If you have compliments or concerns about an experience at your clinic, or if you wish to file a complaint, please contact [facility] Patient Relations at [phone] or email us at [email or web address]         Additional Information About Your Visit        ConcernTrakharCellca Information     ViFlux is an electronic gateway that provides easy, online access to your medical records. With ViFlux, you can request a clinic appointment, read your test results, renew a prescription or communicate with your care team.     To sign up for ViFlux visit the website at [email or web address]   You will be asked to enter the access code listed below, as well as some personal information. Please follow the directions to create your username and password.     Your access code is: [email or web address]  Expires: 10/29/2017 12:26 PM     Your access code will  in 90 days. If you need help or a new code, please contact your [facility] or call [phone] for assistance.        Care EveryWhere ID     This is your Care EveryWhere ID. This could be used by other organizations to access your [facility] medical records  [id number]        Your Vitals Were     Pulse Temperature Respirations Height Last Period Pulse Oximetry    82 98  F (36.7  C) (Oral) 16 4' 11.25\" (150.5 cm) 2017 98%    BMI (Body Mass Index)                   29.04 kg/m2            Blood Pressure from Last 3 Encounters:   10/18/17 115/79   10/11/17 112/75   10/04/17 103/69    Weight from Last 3 Encounters:   10/18/17 148 lb 6.4 oz (67.3 kg)   10/11/17 145 lb (65.8 kg)   10/04/17 144 lb (65.3 kg)              We Performed the Following     ADMIN VACCINE, INITIAL     Clt. Grp B Strp Screen ([facility])     FLU VAC PRESRV FREE QUAD SPLIT VIR IM, 0.5 mL " [facility]        Primary Care Provider Office Phone # Fax #    Cyndie Keara Jenkins [phone]       [facility] [address] [place] [address]        Equal Access to Services     NEAL FONTENOT : Hadii aad ku hadasho Soomaali, waaxda luqadaha, qaybta kaalmada adeegyada, waxay idiin haynewn adematteo pineda laneftaligertrude calhoun. So [facility] [phone].    ATENCIÓN: Si habla español, tiene a manzano disposición servicios gratuitos de asistencia lingüística. Llame al [phone].    We comply with applicable federal civil rights laws and [place] laws. We do not discriminate on the basis of race, color, national origin, age, disability, sex, sexual orientation, or gender identity.            Thank you!     Thank you for choosing [facility]  for your care. Our goal is always to provide you with excellent care. Hearing back from our patients is one way we can continue to improve our services. Please take a few minutes to complete the written survey that you may receive in the mail after your visit with us. Thank you!             Your Updated Medication List - Protect others around you: Learn how to safely use, store and throw away your medicines at www.disposemymeds.org.          This list is accurate as of: 10/11/17 11:59 PM.  Always use your most recent med list.                   Brand Name Dispense Instructions for use Diagnosis    blood glucose lancets standard    no brand specified    100 each    Use to test blood sugar 4 times daily or as directed.    Gestational diabetes mellitus (GDM) in third trimester, gestational diabetes method of control unspecified       blood glucose monitoring meter device kit    no brand specified    1 kit    Use to test blood sugar 4 times daily or as directed.    Gestational diabetes mellitus (GDM) in third trimester, gestational diabetes method of control unspecified       blood glucose monitoring test strip    no brand specified    100 strip    Use to  test blood sugars 4 times daily:Please check fasting blood sugar in the morning and also check blood sugar 1-2 hours after eating.    Gestational diabetes mellitus (GDM) in third trimester, gestational diabetes method of control unspecified       doxylamine 25 MG Tabs tablet    UNISOM    20 each    Take 0.5 tablets (12.5 mg) by mouth 3 times daily as needed For nausea    Nausea/vomiting in pregnancy       * ferrous sulfate 325 (65 FE) MG tablet    IRON    90 tablet    Take 1 tablet (325 mg) by mouth 3 times daily Take with 1/2 glass of orange juice or with Vitamin C containing foods to increase absorption.    Anemia of pregnancy in first trimester, Supervision of high-risk pregnancy of elderly multigravida       * ferrous sulfate 325 (65 FE) MG tablet    IRON    90 tablet    Take 1 tablet (325 mg) by mouth 3 times daily Take with 1/2 glass of orange juice or with Vitamin C foods (tomatoes) to increase absorption.    Anemia of pregnancy in second trimester       ondansetron 4 MG tablet    ZOFRAN    30 tablet    Take 1 tablet (4 mg) by mouth every 8 hours as needed for nausea or vomiting    Nausea/vomiting in pregnancy       prenatal multivitamin plus iron 27-0.8 MG Tabs per tablet     100 tablet    Take 1 tablet by mouth daily        pyridOXINE 25 MG tablet    vitamin B-6    30 tablet    Take 1 tablet (25 mg) by mouth 3 times daily as needed    Nausea/vomiting in pregnancy       ranitidine 75 MG tablet    ZANTAC    30 tablet    Take 1 tablet (75 mg) by mouth 2 times daily as needed for heartburn        * Notice:  This list has 2 medication(s) that are the same as other medications prescribed for you. Read the directions carefully, and ask your doctor or other care provider to review them with you.

## 2017-10-11 NOTE — PROGRESS NOTES
"OB Visit    Dain Tejeda is a 37 year old  female who presents to clinic for a follow up OB visit. She is currently 35w3d with an estimated date of delivery of 2017 via sure LMP c/w normal fetal survey.     She denies headache, chest pain, shortness of breath,  vaginal bleeding, or abnormal discharge.     She has felt baby move.   She denies abdominal pain or contractions.    New concerns today:  She went to the M appointment to eval for baby's growth. She was told by the specialist baby is growing well- \"just petite\", EFW by their u/s was 15%ile, has a growth u/s in 3 weeks again. BPP here  is scheduled for next week 10/19.    GDM: BS checks  all within goal!  Fasting range 80-90, 2hr post meal lunch and dinner all <111    Obstetric History       T0      L0     SAB0   TAB0   Ectopic0   Multiple0   Live Births0       # Outcome Date GA Lbr Dontae/2nd Weight Sex Delivery Anes PTL Lv   1 Current                   Physical exam:  /75 (BP Location: Right arm, Patient Position: Chair, Cuff Size: Adult Regular)  Pulse 82  Temp 98  F (36.7  C) (Oral)  Resp 16  Ht 4' 11.25\" (150.5 cm)  Wt 145 lb (65.8 kg)  LMP 2017  SpO2 98%  BMI 29.04 kg/m2    General: alert, female in no acute distress  Abdomen: gravid uterus appropriate for gestation age at 32 cm above pubic symphysis, non-tender, FHTs 145, Leopold's maneuver reveals cephalic positioning  Gyn: 0cm/0%/midposition, -3, physiologic discharge  Extremities: trace edema    Assessment/Plan:    35w3d    Supervision of high-risk pregnancy of elderly primigravida. Initial concern for IUGR given size < dates, with EFW at an u/s here at clinic of <3%ile. She was referred to [facility] and u/s there on 10/5 shows normal interval growth with EFW 15%ile with normal BPP at that time as well. She is unable to go a BPP until next week, but we will do these weekly. She does have excellent GDM control with diet only.   - Reviewed expectations for final " month of pregnancy and when to call/come in.  - Group B strep discussed and culture collected.   - Growth u/s scheduled by MFM in 3 weeks to monitor    Diet controlled gestational diabetes mellitus (GDM) in third trimester  Excellent control. Continue with dietary changes.     Anemia of pregnancy in second trimester  Continue iron.     Flu vaccine need  - FLU VAC PRESRV FREE QUAD SPLIT VIR IM, 0.5 mL dosage    Follow up in 1 week for routine prenatal visit. Also BPP next week.    Cyndie Jenkins MD  [facility] Medicine Resident  Pager# [phone]    Precepted with: Nick Elaine MD

## 2017-10-11 NOTE — TELEPHONE ENCOUNTER
Discussed with patient weekly BPP and scheduling conflict. Pt is okay with me assisting for a one way transportation for  to get to clinic. Family will be able to pick her up after visit.     Called Yves Morales [phone] and set up with [employer] [phone] for transportation to get to clinic for U/S on 10/19/17 and 10/26/17 3pm appointment.

## 2017-10-12 LAB
ALLERGIC TO PENICILLIN: NO
GP B STREP AG SPEC QL LA: POSITIVE

## 2017-10-16 ENCOUNTER — TELEPHONE (OUTPATIENT)
Dept: FAMILY MEDICINE | Facility: CLINIC | Age: 37
End: 2017-10-16

## 2017-10-18 ENCOUNTER — OFFICE VISIT (OUTPATIENT)
Dept: FAMILY MEDICINE | Facility: CLINIC | Age: 37
End: 2017-10-18

## 2017-10-18 VITALS
BODY MASS INDEX: 29.13 KG/M2 | HEART RATE: 99 BPM | OXYGEN SATURATION: 98 % | WEIGHT: 148.4 LBS | HEIGHT: 60 IN | DIASTOLIC BLOOD PRESSURE: 79 MMHG | TEMPERATURE: 97.8 F | SYSTOLIC BLOOD PRESSURE: 115 MMHG

## 2017-10-18 DIAGNOSIS — O24.410 DIET CONTROLLED GESTATIONAL DIABETES MELLITUS (GDM) IN THIRD TRIMESTER: ICD-10-CM

## 2017-10-18 DIAGNOSIS — B95.1 POSITIVE GBS TEST: ICD-10-CM

## 2017-10-18 DIAGNOSIS — O09.529 SUPERVISION OF HIGH-RISK PREGNANCY OF ELDERLY MULTIGRAVIDA: Primary | ICD-10-CM

## 2017-10-18 NOTE — PROGRESS NOTES
"OB Visit    Dain Tejeda is a 37 year old  female who presents to clinic for a follow up OB visit. She is currently 36w3d with an estimated date of delivery of 2017 via sure LMP c/w fetal survey.     She denies headache, chest pain, shortness of breath,  vaginal bleeding, or abnormal discharge.     She has felt baby move.   She denies abdominal pain or contractions.    New concerns today: none    GDM: Diet controlled. Brings book again for BS- all within goals! Checks TID (fasting and 2hr post prandial for 2 meals).   80s fasting, 92-95 after lunch, 100-101 after dinner. Consistent meals. No sx of hypoglycemia.     Has appt with MFM on 10/26 next for u/s.       Obstetric History       T0      L0     SAB0   TAB0   Ectopic0   Multiple0   Live Births0       # Outcome Date GA Lbr Dontae/2nd Weight Sex Delivery Anes PTL Lv   1 Current                   Physical exam:  /79  Pulse 99  Temp 97.8  F (36.6  C) (Oral)  Ht 4' 11.75\" (151.8 cm)  Wt 148 lb 6.4 oz (67.3 kg)  LMP 2017  SpO2 98%  BMI 29.23 kg/m2    General: alert, female in no acute distress  Abdomen: gravid uterus appropriate for gestation age at 32 cm above pubic symphysis (though feels larger than last week), non-tender, FHTs 155, Leopold's maneuver reveals cephalic vs possible transverse positioning  Extremities: trace edema    Assessment/Plan:    36w3d    Supervision of high-risk pregnancy of elderly primigravida  Doing well. Has BPP scheduled in clinic tomorrow- can assess position better on u/s a that time. Still with size < dates but not losing ground with recent level II u/s showing adequate interval growth with EFW 15%ile  - Has MFM appt & u/s on 10/26, we will cancel my appt with her on 10/15 and the BPP on 10/26 as these will not be necessary given the specialty appts that week.  - Discussed + GBS result, need for IV abx and 48hr stay recommended post delivery    Diet controlled gestational diabetes mellitus (GDM) " in third trimester  Continues to have excellent control. Anticipate expectant management, and appreciate recs from [facility] regarding timing for induction if necessary.       Cyndie Jenkins MD  [facility] Resident  Pager# [phone]    Precepted with: Rosamaria Bhandari MD

## 2017-10-18 NOTE — MR AVS SNAPSHOT
After Visit Summary   10/18/2017    Dain Tejeda    MRN: [id number]           Patient Information     Date Of Birth          1980        Visit Information        Provider Department      10/18/2017 4:00 PM Cyndie Jenkins MD [facility]        Today's Diagnoses     Supervision of high-risk pregnancy of elderly primigravida    -  1    Diet controlled gestational diabetes mellitus (GDM) in third trimester        Positive GBS test           Follow-ups after your visit        Your next 10 appointments already scheduled     Nov 01, 2017 10:00 AM CDT   RETURN OB with Cyndie Jenkins MD   [facility] ([facility])    [facility] [address]   [phone]            Nov 03, 2017 11:45 AM CDT   MFLINDA PEREZP SINGLE with [id number]   [facility])    [address]  [facility] [address]   [phone]            Nov 03, 2017 12:15 PM CDT   Radiology MD with UR MIMI TALAVERA   [facility]    [address]  [facility] [address]   [phone]           Please arrive at the time given for your first appointment. This visit is used internally to schedule the physician's time during your ultrasound.            Nov 07, 2017  4:00 PM CST   RETURN OB with Cyndie Jenkins MD   [facility])    [address] [facility] [address]   [phone]            Nov 16, 2017  9:00 AM CST   RETURN OB with Cyndie Jenkins MD   [facility] ([facility])    [facility] [address]   [phone]              Who to contact     Please call your clinic at [phone] to:    Ask questions about your health    Make or cancel appointments    Discuss your medicines    Learn about your test results    Speak to your  "doctor   If you have compliments or concerns about an experience at your clinic, or if you wish to file a complaint, please contact [facility] Patient Relations at [phone] or email us at [email or web address]         Additional Information About Your Visit        Lendahart Information     Sleep Solutionst is an electronic gateway that provides easy, online access to your medical records. With Instapio, you can request a clinic appointment, read your test results, renew a prescription or communicate with your care team.     To sign up for Instapio visit the website at [email or web address]   You will be asked to enter the access code listed below, as well as some personal information. Please follow the directions to create your username and password.     Your access code is: [email or web address]  Expires: 2018 11:18 PM     Your access code will  in 90 days. If you need help or a new code, please contact your [facility] or call [phone] for assistance.        Care EveryWhere ID     This is your Care EveryWhere ID. This could be used by other organizations to access your [place] medical records  [id number]        Your Vitals Were     Pulse Temperature Height Last Period Pulse Oximetry BMI (Body Mass Index)    99 97.8  F (36.6  C) (Oral) 4' 11.75\" (151.8 cm) 2017 98% 29.23 kg/m2       Blood Pressure from Last 3 Encounters:   10/18/17 115/79   10/11/17 112/75   10/04/17 103/69    Weight from Last 3 Encounters:   10/18/17 148 lb 6.4 oz (67.3 kg)   10/11/17 145 lb (65.8 kg)   10/04/17 144 lb (65.3 kg)              Today, you had the following     No orders found for display       Primary Care Provider Office Phone # Fax #    Cyndie Jenkins [phone]       [facility] [address] [place] [address]        Equal Access to Services     NEAL FONTENOT AH: violeta Abarca " rena montanoruelchon adamsyuan brown. So [facility] [phone].    ATENCIÓN: Si mariela rodríguez, tiene a manzano disposición servicios gratuitos de asistencia lingüística. Llame al [phone].    We comply with applicable federal civil rights laws and [place] laws. We do not discriminate on the basis of race, color, national origin, age, disability, sex, sexual orientation, or gender identity.            Thank you!     Thank you for choosing [facility]  for your care. Our goal is always to provide you with excellent care. Hearing back from our patients is one way we can continue to improve our services. Please take a few minutes to complete the written survey that you may receive in the mail after your visit with us. Thank you!             Your Updated Medication List - Protect others around you: Learn how to safely use, store and throw away your medicines at www.disposemymeds.org.          This list is accurate as of: 10/18/17 11:59 PM.  Always use your most recent med list.                   Brand Name Dispense Instructions for use Diagnosis    blood glucose lancets standard    no brand specified    100 each    Use to test blood sugar 4 times daily or as directed.    Gestational diabetes mellitus (GDM) in third trimester, gestational diabetes method of control unspecified       blood glucose monitoring meter device kit    no brand specified    1 kit    Use to test blood sugar 4 times daily or as directed.    Gestational diabetes mellitus (GDM) in third trimester, gestational diabetes method of control unspecified       blood glucose monitoring test strip    no brand specified    100 strip    Use to test blood sugars 4 times daily:Please check fasting blood sugar in the morning and also check blood sugar 1-2 hours after eating.    Gestational diabetes mellitus (GDM) in third trimester, gestational diabetes method of control unspecified       doxylamine 25 MG Tabs tablet     UNISOM    20 each    Take 0.5 tablets (12.5 mg) by mouth 3 times daily as needed For nausea    Nausea/vomiting in pregnancy       * ferrous sulfate 325 (65 FE) MG tablet    IRON    90 tablet    Take 1 tablet (325 mg) by mouth 3 times daily Take with 1/2 glass of orange juice or with Vitamin C containing foods to increase absorption.    Anemia of pregnancy in first trimester, Supervision of high-risk pregnancy of elderly multigravida       * ferrous sulfate 325 (65 FE) MG tablet    IRON    90 tablet    Take 1 tablet (325 mg) by mouth 3 times daily Take with 1/2 glass of orange juice or with Vitamin C foods (tomatoes) to increase absorption.    Anemia of pregnancy in second trimester       ondansetron 4 MG tablet    ZOFRAN    30 tablet    Take 1 tablet (4 mg) by mouth every 8 hours as needed for nausea or vomiting    Nausea/vomiting in pregnancy       prenatal multivitamin plus iron 27-0.8 MG Tabs per tablet     100 tablet    Take 1 tablet by mouth daily        pyridOXINE 25 MG tablet    vitamin B-6    30 tablet    Take 1 tablet (25 mg) by mouth 3 times daily as needed    Nausea/vomiting in pregnancy       ranitidine 75 MG tablet    ZANTAC    30 tablet    Take 1 tablet (75 mg) by mouth 2 times daily as needed for heartburn        * Notice:  This list has 2 medication(s) that are the same as other medications prescribed for you. Read the directions carefully, and ask your doctor or other care provider to review them with you.

## 2017-10-19 ENCOUNTER — ALLIED HEALTH/NURSE VISIT (OUTPATIENT)
Dept: FAMILY MEDICINE | Facility: CLINIC | Age: 37
End: 2017-10-19

## 2017-10-19 DIAGNOSIS — O09.513 SUPERVISION OF HIGH-RISK PREGNANCY OF ELDERLY PRIMIGRAVIDA, THIRD TRIMESTER: Primary | ICD-10-CM

## 2017-10-19 DIAGNOSIS — O09.93 SUPERVISION OF HIGH RISK PREGNANCY IN THIRD TRIMESTER: Primary | ICD-10-CM

## 2017-10-19 NOTE — PROCEDURES
Dain's BPP was 6/8, -2 points for lack of tone.     NST performed, reactive.     Total 8/10 with normal MYRANDA. Therefore, reassuring.     Discussed with Dr. Rivera. Will notify Dr. Jenkins.     Christin Garcia, DO

## 2017-10-20 NOTE — PROGRESS NOTES
Reviewed results. BPP 6/8 (0 for fetal tone). Had reactive NST after this, therefore reassuring 8/10. Will see pt as previously discussed at last visit note.

## 2017-10-26 ENCOUNTER — HOSPITAL ENCOUNTER (OUTPATIENT)
Dept: ULTRASOUND IMAGING | Facility: CLINIC | Age: 37
Discharge: HOME | End: 2017-10-26
Attending: OBSTETRICS & GYNECOLOGY | Admitting: OBSTETRICS & GYNECOLOGY
Payer: COMMERCIAL

## 2017-10-26 ENCOUNTER — OFFICE VISIT (OUTPATIENT)
Dept: MATERNAL FETAL MEDICINE | Facility: CLINIC | Age: 37
End: 2017-10-26
Attending: OBSTETRICS & GYNECOLOGY
Payer: COMMERCIAL

## 2017-10-26 DIAGNOSIS — O36.5990 POOR FETAL GROWTH AFFECTING MANAGEMENT OF MOTHER, ANTEPARTUM, SINGLE OR UNSPECIFIED FETUS: Primary | ICD-10-CM

## 2017-10-26 DIAGNOSIS — O99.810 ABNORMAL MATERNAL GLUCOSE TOLERANCE, ANTEPARTUM: ICD-10-CM

## 2017-10-26 PROCEDURE — 76820 UMBILICAL ARTERY ECHO: CPT

## 2017-10-26 PROCEDURE — 76816 OB US FOLLOW-UP PER FETUS: CPT

## 2017-10-26 PROCEDURE — 76819 FETAL BIOPHYS PROFIL W/O NST: CPT

## 2017-10-26 NOTE — PROGRESS NOTES
"Thanks Dr. Bernstein for looking at this. Martha, I have placed orders for the BPP + umbilical artery dopplers weekly x 3 occurences, also with a separate order for NST monitoring. I am concerned these might not be the correct orders because I no longer have access to all the saved \"favorites\" orders and the orders I selected look different from when I have ordered these before. Can you take a look to see if these are visible on your end to help set up the BPP w/ doppler at [facility]? I will see pt on 11/1 to talk about results via  also.   Thanks! JS"

## 2017-10-26 NOTE — MR AVS SNAPSHOT
After Visit Summary   10/26/2017    Dain Tejeda    MRN: [id number]           Patient Information     Date Of Birth          1980        Visit Information        Provider Department      10/26/2017 10:30 AM Jodee Ervin MD [facility] Maternal Fetal Medicine [facility]        Today's Diagnoses     Poor fetal growth affecting management of mother, antepartum, single or unspecified fetus    -  1       Follow-ups after your visit        Your next 10 appointments already scheduled     Nov 01, 2017 10:00 AM CDT   RETURN OB with Cyndie Jenkins MD   [facility] ([facility])    [facility] [address]   [phone]            Nov 03, 2017 11:45 AM CDT   MFM BPP SINGLE with [id number]   [facility] Maternal Fetal Medicine Ultrasound [facility])    [address]  [facility] [address]   [phone]            Nov 03, 2017 12:15 PM CDT   Radiology MD with UR MIMI TALAVERA   [facility] Maternal Fetal Medicine - [facility])    [address]  [facility] [address]   [phone]           Please arrive at the time given for your first appointment. This visit is used internally to schedule the physician's time during your ultrasound.            Nov 07, 2017  4:00 PM CST   RETURN OB with Cyndie Jenkins MD   [facility])    [address] [facility] [address]   [phone]            Nov 16, 2017  9:00 AM CST   RETURN OB with Cyndie Jenkins MD   [facility] ([facility])    [address] [place] [address]   [phone]              Future tests that were ordered for you today     Open Future Orders        Priority Expected Expires Ordered    MFM BPP Single Routine 11/2/2017 8/26/2018 10/26/2017            Who to contact     If you have questions or need follow up information about  "today's clinic visit or your schedule please contact [facility] MATERNAL FETAL MEDICINE [facility] directly at [phone].  Normal or non-critical lab and imaging results will be communicated to you by [employer], letter or phone within 4 business days after the clinic has received the results. If you do not hear from us within 7 days, please contact the clinic through [employer] or phone. If you have a critical or abnormal lab result, we will notify you by phone as soon as possible.  Submit refill requests through Joroto or call your pharmacy and they will forward the refill request to us. Please allow 3 business days for your refill to be completed.          Additional Information About Your Visit        [employer] Information     Joroto lets you send messages to your doctor, view your test results, renew your prescriptions, schedule appointments and more. To sign up, go to [email or web address] . Click on \"Log in\" on the left side of the screen, which will take you to the Welcome page. Then click on \"Sign up Now\" on the right side of the page.     You will be asked to enter the access code listed below, as well as some personal information. Please follow the directions to create your username and password.     Your access code is: [email or web address]  Expires: 10/29/2017 12:26 PM     Your access code will  in 90 days. If you need help or a new code, please call your [facility] clinic or [phone].        Care EveryWhere ID     This is your Care EveryWhere ID. This could be used by other organizations to access your [facility] medical records  [id number]        Your Vitals Were     Last Period                   2017            Blood Pressure from Last 3 Encounters:   10/18/17 115/79   10/11/17 112/75   10/04/17 103/69    Weight from Last 3 Encounters:   10/18/17 67.3 kg (148 lb 6.4 oz)   10/11/17 65.8 kg (145 lb)   10/04/17 65.3 kg (144 lb)               Primary Care Provider Office Phone # Fax #    Cyndie " Keara Jenkins [phone]       [facility] [address] [place] [address]        Equal Access to Services     AIDADIANA CORIE : Hadjalen medina irinao Somoisesali, waaxda luqadaha, qaybta kaalmada adematteoyada, yuan pineda laneftaligertrude calhoun. So [facility] [place] [phone].    ATENCIÓN: Si habla español, tiene a manzano disposición servicios gratuitos de asistencia lingüística. Llame al [phone].    We comply with applicable federal civil rights laws and [place] laws. We do not discriminate on the basis of race, color, national origin, age, disability, sex, sexual orientation, or gender identity.            Thank you!     Thank you for choosing [facility] MATERNAL FETAL MEDIC[facility]  for your care. Our goal is always to provide you with excellent care. Hearing back from our patients is one way we can continue to improve our services. Please take a few minutes to complete the written survey that you may receive in the mail after your visit with us. Thank you!             Your Updated Medication List - Protect others around you: Learn how to safely use, store and throw away your medicines at www.disposemymeds.org.          This list is accurate as of: 10/26/17 11:57 AM.  Always use your most recent med list.                   Brand Name Dispense Instructions for use Diagnosis    blood glucose lancets standard    no brand specified    100 each    Use to test blood sugar 4 times daily or as directed.    Gestational diabetes mellitus (GDM) in third trimester, gestational diabetes method of control unspecified       blood glucose monitoring meter device kit    no brand specified    1 kit    Use to test blood sugar 4 times daily or as directed.    Gestational diabetes mellitus (GDM) in third trimester, gestational diabetes method of control unspecified       blood glucose monitoring test strip    no brand specified    100 strip    Use to test blood sugars 4 times daily:Please check  fasting blood sugar in the morning and also check blood sugar 1-2 hours after eating.    Gestational diabetes mellitus (GDM) in third trimester, gestational diabetes method of control unspecified       doxylamine 25 MG Tabs tablet    UNISOM    20 each    Take 0.5 tablets (12.5 mg) by mouth 3 times daily as needed For nausea    Nausea/vomiting in pregnancy       * ferrous sulfate 325 (65 FE) MG tablet    IRON    90 tablet    Take 1 tablet (325 mg) by mouth 3 times daily Take with 1/2 glass of orange juice or with Vitamin C containing foods to increase absorption.    Anemia of pregnancy in first trimester, Supervision of high-risk pregnancy of elderly multigravida       * ferrous sulfate 325 (65 FE) MG tablet    IRON    90 tablet    Take 1 tablet (325 mg) by mouth 3 times daily Take with 1/2 glass of orange juice or with Vitamin C foods (tomatoes) to increase absorption.    Anemia of pregnancy in second trimester       ondansetron 4 MG tablet    ZOFRAN    30 tablet    Take 1 tablet (4 mg) by mouth every 8 hours as needed for nausea or vomiting    Nausea/vomiting in pregnancy       prenatal multivitamin plus iron 27-0.8 MG Tabs per tablet     100 tablet    Take 1 tablet by mouth daily        pyridOXINE 25 MG tablet    vitamin B-6    30 tablet    Take 1 tablet (25 mg) by mouth 3 times daily as needed    Nausea/vomiting in pregnancy       ranitidine 75 MG tablet    ZANTAC    30 tablet    Take 1 tablet (75 mg) by mouth 2 times daily as needed for heartburn        * Notice:  This list has 2 medication(s) that are the same as other medications prescribed for you. Read the directions carefully, and ask your doctor or other care provider to review them with you.

## 2017-10-26 NOTE — PROGRESS NOTES
Please see full imaging report from ViewPoint program under imaging tab.    IUGR by AC < 5%ile. BPP and UA dopplers within normal limits.     Jodee Ervin MD  Maternal Fetal Medicine

## 2017-10-27 ENCOUNTER — TELEPHONE (OUTPATIENT)
Dept: FAMILY MEDICINE | Facility: CLINIC | Age: 37
End: 2017-10-27

## 2017-10-27 DIAGNOSIS — O36.5930 POOR FETAL GROWTH AFFECTING MANAGEMENT OF MOTHER IN THIRD TRIMESTER, SINGLE OR UNSPECIFIED FETUS: ICD-10-CM

## 2017-10-27 DIAGNOSIS — O09.529 SUPERVISION OF HIGH-RISK PREGNANCY OF ELDERLY MULTIGRAVIDA: Primary | ICD-10-CM

## 2017-10-27 NOTE — TELEPHONE ENCOUNTER
Called Dain and discussed with her about scheduling her U/S at [facility] next week.  Informed her Dr. Jenkins will go over result from [facility] with her at JESSICA visit. In the meantime I would like to help her set up an appointment for next week U/S with [facility]. Clarified with her that if she goes to [facility] next week, she will not need to go to [facility]'s U/S appointment next Friday.  She is okay to go to [facility] on next Wednesday before or after JESSICA visit. She will need assistance with scheduling and transportation scheduling. Informed her I will call her back with details of appointment.     Will route to [facility] Dr. Jenkins. Able to view BPP order with doppler and NST order, however orders appear to be cancelled.

## 2017-10-30 NOTE — PROGRESS NOTES
Preceptor Attestation:  Patient's case reviewed and discussed with Cyndie Jenkins MD.  Patient seen and discussed with the resident.  I agree with assessment and plan of care.  Supervising Physician:  Rosamaria Bhandari MD  [facility]

## 2017-10-31 ENCOUNTER — AMBULATORY - HEALTHEAST (OUTPATIENT)
Dept: SCHEDULING | Facility: CLINIC | Age: 37
End: 2017-10-31

## 2017-10-31 DIAGNOSIS — O09.529 SUPERVISION OF HIGH-RISK PREGNANCY OF ELDERLY MULTIGRAVIDA: ICD-10-CM

## 2017-10-31 DIAGNOSIS — O36.5930 POOR FETAL GROWTH AFFECTING MANAGEMENT OF MOTHER, THIRD TRIMESTER, NOT APPLICABLE OR UNSPECIFIED FETUS: ICD-10-CM

## 2017-10-31 NOTE — TELEPHONE ENCOUNTER
Order faxed to [facility]. Called [facility] and scheduled an appointment at [facility] with a hold and call standing order for weekly: U/S OB BPP with EFW, MYRANDA, NST and arterial doppler. PCP will be paged or clinic can be called.  First appointment is this week on 11/1/17 at [facility] for 1pm. Called [employer] [phone] and scheduled for  from clinic at 12pm using [employer] with phone number [phone]. [facility]  and  requested. Patient informed.     Called patient and informed of scheduled appointment and transportation. Will continue to work with patient to further coordinate next week scheduling.

## 2017-10-31 NOTE — TELEPHONE ENCOUNTER
Next week BPP U/S scheduled with Dustin for November 9th 2017 3:30 with arrival 3:15. Transportation scheduled with [employer] using [employer]. ong  and  requested.   Pt informed.

## 2017-11-01 ENCOUNTER — OFFICE VISIT (OUTPATIENT)
Dept: FAMILY MEDICINE | Facility: CLINIC | Age: 37
End: 2017-11-01

## 2017-11-01 ENCOUNTER — HOSPITAL ENCOUNTER (OUTPATIENT)
Dept: OBGYN | Facility: HOSPITAL | Age: 37
Discharge: HOME | End: 2017-11-01
Attending: FAMILY MEDICINE | Admitting: FAMILY MEDICINE

## 2017-11-01 ENCOUNTER — HOSPITAL ENCOUNTER (OUTPATIENT)
Dept: ULTRASOUND IMAGING | Facility: HOSPITAL | Age: 37
Discharge: HOME | End: 2017-11-01

## 2017-11-01 VITALS
HEIGHT: 59 IN | WEIGHT: 151.4 LBS | TEMPERATURE: 97.9 F | HEART RATE: 80 BPM | OXYGEN SATURATION: 98 % | RESPIRATION RATE: 20 BRPM | SYSTOLIC BLOOD PRESSURE: 104 MMHG | BODY MASS INDEX: 30.52 KG/M2 | DIASTOLIC BLOOD PRESSURE: 72 MMHG

## 2017-11-01 DIAGNOSIS — B95.1 POSITIVE GBS TEST: ICD-10-CM

## 2017-11-01 DIAGNOSIS — O09.529 SUPERVISION OF HIGH-RISK PREGNANCY OF ELDERLY MULTIGRAVIDA: ICD-10-CM

## 2017-11-01 DIAGNOSIS — O24.410 DIET CONTROLLED GESTATIONAL DIABETES MELLITUS (GDM) IN THIRD TRIMESTER: ICD-10-CM

## 2017-11-01 DIAGNOSIS — O09.529 SUPERVISION OF HIGH-RISK PREGNANCY OF ELDERLY MULTIGRAVIDA: Primary | ICD-10-CM

## 2017-11-01 DIAGNOSIS — O36.5930 POOR FETAL GROWTH AFFECTING MANAGEMENT OF MOTHER IN THIRD TRIMESTER, SINGLE OR UNSPECIFIED FETUS: ICD-10-CM

## 2017-11-01 DIAGNOSIS — O99.012 ANEMIA OF PREGNANCY IN SECOND TRIMESTER: ICD-10-CM

## 2017-11-01 DIAGNOSIS — O36.5930 POOR FETAL GROWTH AFFECTING MANAGEMENT OF MOTHER, THIRD TRIMESTER, NOT APPLICABLE OR UNSPECIFIED FETUS: ICD-10-CM

## 2017-11-01 ASSESSMENT — MIFFLIN-ST. JEOR: SCORE: 1245.61

## 2017-11-01 NOTE — PROGRESS NOTES
"OB Visit    Dain Tejeda is a 37 year old  female who presents to clinic for a follow up OB visit. She is currently 38w3d with an estimated date of delivery of 2017 via sure LMP c/w fetal survey.   She denies headache, chest pain, shortness of breath,  vaginal bleeding, or abnormal discharge.     She has felt baby move.   She denies abdominal pain or contractions.    New concerns today:    Now meeting criteria for IUGR based on MF ultrasound recently. She is hoping baby will be ok. Feels baby move around all the time, but that at u/s last week with [facility] baby \"didn't move enough they told me\". Had reassuring BPP however. She doesn't recall a discussion about early induction when meeting with the [facility] doctor last week. She would really prefer to wait until 40 wks/due date. Feels like baby grew a lot this time since last check up.     GDM: Still checking BS as prior- reviewed BS log in detail today, checking 3x/day and all within goal ranges (fasting <90, 2hr post meals <110)    Obstetric History       T0      L0     SAB0   TAB0   Ectopic0   Multiple0   Live Births0       # Outcome Date GA Lbr Dontae/2nd Weight Sex Delivery Anes PTL Lv   1 Current                   Physical exam:  /72  Pulse 80  Temp 97.9  F (36.6  C) (Oral)  Resp 20  Ht 4' 11.25\" (150.5 cm)  Wt 151 lb 6.4 oz (68.7 kg)  LMP 2017  SpO2 98%  BMI 30.32 kg/m2    General: alert, female in no acute distress  Abdomen: gravid uterus appropriate for gestation age at 35 cm above pubic symphysis (visibally does appear to have increased abdominal growth compared to prior exams), non-tender, FHTs 141, Leopold's maneuver reveals cephalic positioning  Gyn: pt declined, will do next week  Extremities: trace edema    Assessment/Plan:    38w3d based on sure LMP of 17    Supervision of high-risk pregnancy of elderly primigravida, meeting criteria for IUGR per MF. Adequate weight gain this pregnancy. Fundal height today is " finally increasing c/w her verbal report (now at 35cm, c/w her BPP u/s today which put her at 35w1d) AC is <5%ile per u/s on 10/26. Recommendation from MFM was possible induction of labor between 39w and 39w6d, though pt strongly declining at this time despite education on risks of fetal death or poor outcomes. She has BPP with NST and umbilical artery dopplers weekly until delivery. Had this done at 1pm today, and per radiologist report to me: Today's BPP was 8/8 and reactive NST; with MYRANDA of 16 and umbilical artery doppler is in normal range at 2.8 (<3 is normal). Measuring globally at 35w1d, within range for her estimated due date based on LMP he feels.Did not appear to meet criteria for IUGR per his report as EFW was 15%ile and the abdominal circumference was measuring at 34w2d, still within 2 standard deviations of the head circumference and other measurements. RN at [facility] informed pt of normal results after speaking with me.   - At this time plan is to f/u in 1 week with me on 11/7 to reassess her growth  - Reviewed signs/symptoms of labor and when to present to the hospital.    Anemia of pregnancy in second trimester. Hb 9.8, taking oral iron.  - recheck in 1 week    Positive GBS test  Will need abx in labor and 48hr stay recommended    Diet controlled gestational diabetes mellitus (GDM) in third trimester  Doing very well, continues with diet control    Follow up in 1 week for routine prenatal visit with repeat CBC, sooner if labor symptoms.     Cyndie Jenkins MD  [facility] Family Medicine Resident  Pager# [phone]      Precepted with: Nick Elaine MD

## 2017-11-01 NOTE — MR AVS SNAPSHOT
After Visit Summary   11/1/2017    Dain Tejeda    MRN: [id number]           Patient Information     Date Of Birth          1980        Visit Information        Provider Department      11/1/2017 10:00 AM Cyndie Jenkins MD [facility]        Today's Diagnoses     Supervision of high-risk pregnancy of elderly primigravida    -  1    Anemia of pregnancy in second trimester        Positive GBS test        Diet controlled gestational diabetes mellitus (GDM) in third trimester        Poor fetal growth affecting management of mother in third trimester, single or unspecified fetus           Follow-ups after your visit        Your next 10 appointments already scheduled     Nov 07, 2017  4:00 PM CST   RETURN OB with Cyndie Jenkins MD   [facility] ([facility])    [address] [place] [address]   [phone]            Nov 16, 2017  9:00 AM CST   RETURN OB with Cyndie Jenkins MD   [facility] ([facility])    [address] [place] [address]   [phone]              Who to contact     Please call your clinic at [phone] to:    Ask questions about your health    Make or cancel appointments    Discuss your medicines    Learn about your test results    Speak to your doctor   If you have compliments or concerns about an experience at your clinic, or if you wish to file a complaint, please contact [facility] Patient Relations at [phone] or email us at [email or web address]         Additional Information About Your Visit        MyChart Information     Anipipot is an electronic gateway that provides easy, online access to your medical records. With [employer] you can request a clinic appointment, read your test results, renew a prescription or communicate with your care team.     To sign up for Anipipot visit the website at [email or web address]   You will be asked to enter  "the access code listed below, as well as some personal information. Please follow the directions to create your username and password.     Your access code is: [email or web address]  Expires: 2018 11:18 PM     Your access code will  in 90 days. If you need help or a new code, please contact your [facility] or call [phone] for assistance.        Care EveryWhere ID     This is your Care EveryWhere ID. This could be used by other organizations to access your [facility] medical records  [id number]        Your Vitals Were     Pulse Temperature Respirations Height Last Period Pulse Oximetry    80 97.9  F (36.6  C) (Oral) 20 4' 11.25\" (150.5 cm) 2017 98%    BMI (Body Mass Index)                   30.32 kg/m2            Blood Pressure from Last 3 Encounters:   17 104/72   10/18/17 115/79   10/11/17 112/75    Weight from Last 3 Encounters:   17 151 lb 6.4 oz (68.7 kg)   10/18/17 148 lb 6.4 oz (67.3 kg)   10/11/17 145 lb (65.8 kg)              We Performed the Following     US OB biophys prof with or without ([facility])        Primary Care Provider Office Phone # Fax #    Cyndie Jenkins [phone]       [facility] [address] [place] [address]        Equal Access to Services     NEAL FONTENOT AH: Hadii margaux ku hadasho Soomaali, waaxda luqadaha, qaybta kaalmada adeegyada, yuan calhoun. So [facility] [phone].    ATENCIÓN: Si habla español, tiene a manzano disposición servicios gratuitos de asistencia lingüística. Llame al [phone].    We comply with applicable federal civil rights laws and [place] laws. We do not discriminate on the basis of race, color, national origin, age, disability, sex, sexual orientation, or gender identity.            Thank you!     Thank you for choosing [facility]  for your care. Our goal is always to provide you with excellent care. Hearing back from our " patients is one way we can continue to improve our services. Please take a few minutes to complete the written survey that you may receive in the mail after your visit with us. Thank you!             Your Updated Medication List - Protect others around you: Learn how to safely use, store and throw away your medicines at www.disposemymeds.org.          This list is accurate as of: 11/1/17 11:59 PM.  Always use your most recent med list.                   Brand Name Dispense Instructions for use Diagnosis    blood glucose lancets standard    no brand specified    100 each    Use to test blood sugar 4 times daily or as directed.    Gestational diabetes mellitus (GDM) in third trimester, gestational diabetes method of control unspecified       blood glucose monitoring meter device kit    no brand specified    1 kit    Use to test blood sugar 4 times daily or as directed.    Gestational diabetes mellitus (GDM) in third trimester, gestational diabetes method of control unspecified       blood glucose monitoring test strip    no brand specified    100 strip    Use to test blood sugars 4 times daily:Please check fasting blood sugar in the morning and also check blood sugar 1-2 hours after eating.    Gestational diabetes mellitus (GDM) in third trimester, gestational diabetes method of control unspecified       doxylamine 25 MG Tabs tablet    UNISOM    20 each    Take 0.5 tablets (12.5 mg) by mouth 3 times daily as needed For nausea    Nausea/vomiting in pregnancy       * ferrous sulfate 325 (65 FE) MG tablet    IRON    90 tablet    Take 1 tablet (325 mg) by mouth 3 times daily Take with 1/2 glass of orange juice or with Vitamin C containing foods to increase absorption.    Anemia of pregnancy in first trimester, Supervision of high-risk pregnancy of elderly multigravida       * ferrous sulfate 325 (65 FE) MG tablet    IRON    90 tablet    Take 1 tablet (325 mg) by mouth 3 times daily Take with 1/2 glass of orange juice or  with Vitamin C foods (tomatoes) to increase absorption.    Anemia of pregnancy in second trimester       ondansetron 4 MG tablet    ZOFRAN    30 tablet    Take 1 tablet (4 mg) by mouth every 8 hours as needed for nausea or vomiting    Nausea/vomiting in pregnancy       prenatal multivitamin plus iron 27-0.8 MG Tabs per tablet     100 tablet    Take 1 tablet by mouth daily        pyridOXINE 25 MG tablet    vitamin B-6    30 tablet    Take 1 tablet (25 mg) by mouth 3 times daily as needed    Nausea/vomiting in pregnancy       ranitidine 75 MG tablet    ZANTAC    30 tablet    Take 1 tablet (75 mg) by mouth 2 times daily as needed for heartburn        * Notice:  This list has 2 medication(s) that are the same as other medications prescribed for you. Read the directions carefully, and ask your doctor or other care provider to review them with you.

## 2017-11-08 ENCOUNTER — OFFICE VISIT (OUTPATIENT)
Dept: FAMILY MEDICINE | Facility: CLINIC | Age: 37
End: 2017-11-08

## 2017-11-08 VITALS
HEIGHT: 59 IN | HEART RATE: 83 BPM | OXYGEN SATURATION: 98 % | WEIGHT: 150 LBS | BODY MASS INDEX: 30.24 KG/M2 | TEMPERATURE: 98.1 F | DIASTOLIC BLOOD PRESSURE: 73 MMHG | SYSTOLIC BLOOD PRESSURE: 109 MMHG

## 2017-11-08 DIAGNOSIS — O24.410 DIET CONTROLLED GESTATIONAL DIABETES MELLITUS (GDM) IN THIRD TRIMESTER: ICD-10-CM

## 2017-11-08 DIAGNOSIS — O36.5930 POOR FETAL GROWTH AFFECTING MANAGEMENT OF MOTHER IN THIRD TRIMESTER, SINGLE OR UNSPECIFIED FETUS: ICD-10-CM

## 2017-11-08 DIAGNOSIS — O99.012 ANEMIA OF PREGNANCY IN SECOND TRIMESTER: ICD-10-CM

## 2017-11-08 DIAGNOSIS — O09.529 SUPERVISION OF HIGH-RISK PREGNANCY OF ELDERLY MULTIGRAVIDA: Primary | ICD-10-CM

## 2017-11-08 DIAGNOSIS — B95.1 POSITIVE GBS TEST: ICD-10-CM

## 2017-11-08 LAB
HCT VFR BLD AUTO: 33.8 % (ref 35–47)
HEMOGLOBIN: 10.3 G/DL (ref 11.7–15.7)
MCH RBC QN AUTO: 23.7 PG (ref 26.5–35)
MCHC RBC AUTO-ENTMCNC: 30.5 G/DL (ref 32–36)
MCV RBC AUTO: 77.9 FL (ref 78–100)
PLATELET # BLD AUTO: 255 K/UL (ref 150–450)
RBC # BLD AUTO: 4.34 M/UL (ref 3.8–5.2)
WBC # BLD AUTO: 11.1 K/UL (ref 4–11)

## 2017-11-08 NOTE — PROGRESS NOTES
"OB Visit    Dain Tejeda is a 37 year old  female who presents to clinic for a follow up OB visit. She is currently 39w3d with an estimated date of delivery of 2017 via sure LMP c/w fetal survey.   She denies headache, chest pain, shortness of breath,  vaginal bleeding, or abnormal discharge.     She has felt baby move.   She denies abdominal pain or contractions.    New concerns today: We reviewed together her BPP result from last Thursday (1 week ago). Verbal report from radiologist on day of the exam indicated no IUGR, but this report was addended after I spoke with radiologist due to my concern that the clinical gestational age documented after my review of the report on Friday was incorrect at initial review. She does still meet criteria for IUGR with EFW of 10%ile globally with AC <5%ile. Normal BPP/NST on . Stable from prior MFM u/s on 10/26.     Says that her  is refusing induction, despite being educated on the risks as Dain tells them.    Says baby moves 5-6 times per day,and moves more at night, that this is how it always has been. Easily gets 10kicks per hour at night.  No contractions.    GDM: BS still beautiful. All within goal ranges as discussed in detail in earlier notes. No sx of hypoglycemia. Fasting ~90, 2hr pp .      Anemia: Not taking prenatals nor iron pills anymore- says it causes her blood sugars to be higher. No LH/dizziness    Obstetric History       T0      L0     SAB0   TAB0   Ectopic0   Multiple0   Live Births0       # Outcome Date GA Lbr Dontae/2nd Weight Sex Delivery Anes PTL Lv   1 Current                   Physical exam:  /73  Pulse 83  Temp 98.1  F (36.7  C) (Oral)  Ht 4' 11.06\" (150 cm)  Wt 150 lb (68 kg)  LMP 2017  SpO2 98%  BMI 30.24 kg/m2    General: alert, female in no acute distress  Abdomen: gravid uterus appropriate for gestation age at 36 cm above pubic symphysis, non-tender, FHTs 143, Leopold's maneuver reveals cephalic " positioning  Gyn: 0cm/0%/-3, physiologic discharge  Extremities: trace edema    Imaging/Labs:   Addendum by Danielito Wong MD on 11/03/2017  3:38 PM  The clinical gestational age has been modified. It is 38 weeks 3 days with   a clinical EDC of 11/12/2017.    This changes the estimated fetal weight percentile to less than 10%,   consistent with IUGR. This combined with the low abdominal circumference   is consistent with an asymmetric IUGR.    Note that the composite gestational age by this ultrasound was modified to   35 weeks 4 days.   Result Impression   CONCLUSION:    1.  Single intrauterine gestation 35 weeks 1 day.  2.  This ultrasound EDC 12/2/2017.  3.  Normal MYRANDA.  4.  Normal 8/8 biophysical profile.   Result Narrative   [facility]  US BIOPHYSICAL PROFILE W EST FETAL WEIGHT UMB ART DOPPLER  11/1/2017 1:36 PM    INDICATION: Supervision of elderly multigravida, unspecified trimester   TECHNIQUE: 2D gray-scale imaging, Doppler interrogation with color flow and spectral waveform analysis of Umbilical artery.   COMPARISON: Clinical gestational age 37 weeks 6 days    FINDINGS: Single intrauterine gestation, cephalic presentation. Placenta is located fundal. Amniotic fluid is a normal 16.4 cm. Maternal ovaries not visualized.    BIOPHYSICAL PROFILE:  2/2 fetal breathing  2/2 fetal movements  2/2 fetal tone  2/2 amniotic fluid     Total biophysical profile 8/8    FETAL ANOMALY SCREEN: Survey of the fetal anatomy is not repeated today.    BIOMETRY:  Biparietal Diameter: 8.9 cm, 36 weeks 1 day  Head Circumference: 32 cm, 36 weeks 1 day  Abdominal Circumference: 30.2 cm, 34 weeks 2 days  Femur Length: 6.9 cm, 35 weeks 4 days    Estimated Fetal Weight: 2560 +/- 374 g  EFW Percentile: 15%    Fetal Heart Rate: 158 bpm  Cervical Length: Obscured    EDC by This US exam: 12/2/2017    Composite Age by This US: 35 weeks 1 day    Umbilical Arterial Doppler: 2.8 (normal is less than 3.5)       Recent Results  "(from the past 168 hour(s))   CBC with Plt ([facility])    Collection Time: 17 10:52 AM   Result Value Ref Range    WBC 11.1 (H) 4.0 - 11.0 K/uL    RBC 4.34 3.80 - 5.20 M/uL    Hemoglobin 10.3 (L) 11.7 - 15.7 g/dL    Hematocrit 33.8 (L) 35.0 - 47.0 %    MCV 77.9 (L) 78.0 - 100.0 fL    MCH 23.7 (L) 26.5 - 35.0 pg    MCHC 30.5 (L) 32.0 - 36.0 g/dL    Platelets 255.0 150.0 - 450.0 K/uL       Assessment/Plan:    39w3d    Supervision of high-risk pregnancy of elderly primigravida    With Asymmetric IUGR    Long discussion today with pt regarding desire for induction of labor prior to her due date, Saturday is 39w6d, latest recommendation per M for induction. Pt is very aware of the risks for poor fetal outcomes and indicates her  is as well, however he strongly adament that she will not be induced early, and maybe even at all. I have spent over an hour again today in discussion to clarify what IOL looks like, that it can be a several day process and waiting a few says post 40wks then puts baby closer to 41wks by time of delivery given she is a G1 at 0cm. Despite all this, she wishes to \"obey\" her  and will come in for monitoring tomorrow at [facility] with BPP/cord doppler/NST and will see me on Monday (she will be 40w1d at that time) and will hopefully have her  there as well. Extensive counseling on fetal movements, fetal kick counts, etc was had. She is aware of signs or sx to prompt urgent evaluation at L&D. She was offered NST today but declined and would like this done tomorrow with BPP due to time constraints.  Addendum: - ~4pm; [facility] radiologist called with results:  BPP, normal MYRANDA, normal interval growth (EFW ~45%, vertex position). RN @ L&D updated pt at my request with these reassuring results and pt will see me on Monday as was her preference at conclusion of visit    Anemia of pregnancy in second trimester  Hb checked today- 10.3, stable and not taking oral iron " currently.     Diet controlled gestational diabetes mellitus (GDM) in third trimester  Excellent control still    Positive GBS test  Will need abx in labor, recommendation for 48hr stay given    Follow up on Monday as long as BPP/cord doppler/NST are reassuring. I    Cyndie Jenkins MD  [facility]  Pager# [phone]      Precepted with: MD Dr. Day Aponte was also updated today.

## 2017-11-08 NOTE — MR AVS SNAPSHOT
After Visit Summary   11/8/2017    Dain Tejeda    MRN: [id number]           Patient Information     Date Of Birth          1980        Visit Information        Provider Department      11/8/2017 10:00 AM Cyndie Jenkins MD [facility]        Today's Diagnoses     Supervision of high-risk pregnancy of elderly primigravida    -  1    Anemia of pregnancy in second trimester        Diet controlled gestational diabetes mellitus (GDM) in third trimester        Positive GBS test        Poor fetal growth affecting management of mother in third trimester, single or unspecified fetus          Care Instructions    BPP w/NST and Cord doppler scheduled to be done at [facility] Radiology:  Thursday, 11/9/2017 at 330pm. [facility] [employer] to  pt at 215pm from [address] [place] [address].  An  has also been scheduled for appt. Patient informed of all information verbally. Matilde RN            Follow-ups after your visit        Who to contact     Please call your clinic at [phone] to:    Ask questions about your health    Make or cancel appointments    Discuss your medicines    Learn about your test results    Speak to your doctor   If you have compliments or concerns about an experience at your clinic, or if you wish to file a complaint, please contact [facility] Patient Relations at [phone] or email us at [email or web address]         Additional Information About Your Visit        MyChart Information     [employer] is an electronic gateway that provides easy, online access to your medical records. With [employer], you can request a clinic appointment, read your test results, renew a prescription or communicate with your care team.     To sign up for [employer] visit the website at [email or web address]   You will be asked to enter the access code listed below, as well as some personal information. Please follow the directions to create your  "username and password.     Your access code is: [email or web address]  Expires: 2018 10:18 PM     Your access code will  in 90 days. If you need help or a new code, please contact your [facility] or call [phone] for assistance.        Care EveryWhere ID     This is your Care EveryWhere ID. This could be used by other organizations to access your [place] medical records  [id number]        Your Vitals Were     Pulse Temperature Height Last Period Pulse Oximetry BMI (Body Mass Index)    83 98.1  F (36.7  C) (Oral) 4' 11.06\" (150 cm) 2017 98% 30.24 kg/m2       Blood Pressure from Last 3 Encounters:   17 129/82   17 109/73   17 104/72    Weight from Last 3 Encounters:   17 147 lb 6.4 oz (66.9 kg)   17 150 lb (68 kg)   17 151 lb 6.4 oz (68.7 kg)              We Performed the Following     CBC with Plt ([facility])        Primary Care Provider Office Phone # Fax #    Cyndie Jenkins [phone]       [facility] [address] [place] [address]        Equal Access to Services     NEAL FONTENOT : Hadii aad ku hadasho Soomaali, waaxda luqadaha, qaybta kaalmada adeegyada, waxay jae hayleobardo calhoun. So [facility] [phone].    ATENCIÓN: Si habla español, tiene a manzano disposición servicios gratuitos de asistencia lingüística. Llame al [phone].    We comply with applicable federal civil rights laws and [place] laws. We do not discriminate on the basis of race, color, national origin, age, disability, sex, sexual orientation, or gender identity.            Thank you!     Thank you for choosing [facility]  for your care. Our goal is always to provide you with excellent care. Hearing back from our patients is one way we can continue to improve our services. Please take a few minutes to complete the written survey that you may receive in the mail after your visit with us. Thank you!   "           Your Updated Medication List - Protect others around you: Learn how to safely use, store and throw away your medicines at www.disposemymeds.org.          This list is accurate as of: 11/8/17 11:59 PM.  Always use your most recent med list.                   Brand Name Dispense Instructions for use Diagnosis    blood glucose lancets standard    no brand specified    100 each    Use to test blood sugar 4 times daily or as directed.    Gestational diabetes mellitus (GDM) in third trimester, gestational diabetes method of control unspecified       blood glucose monitoring meter device kit    no brand specified    1 kit    Use to test blood sugar 4 times daily or as directed.    Gestational diabetes mellitus (GDM) in third trimester, gestational diabetes method of control unspecified       blood glucose monitoring test strip    no brand specified    100 strip    Use to test blood sugars 4 times daily:Please check fasting blood sugar in the morning and also check blood sugar 1-2 hours after eating.    Gestational diabetes mellitus (GDM) in third trimester, gestational diabetes method of control unspecified       doxylamine 25 MG Tabs tablet    UNISOM    20 each    Take 0.5 tablets (12.5 mg) by mouth 3 times daily as needed For nausea    Nausea/vomiting in pregnancy

## 2017-11-08 NOTE — PATIENT INSTRUCTIONS
BPP w/NST and Cord doppler scheduled to be done at [facility] Radiology:  Thursday, 11/9/2017 at 330pm. [facility] [employer] to  pt at 215pm from [address] [facility] [address].  An  has also been scheduled for appt. Patient informed of all information verbally. Matilde HERRERA

## 2017-11-09 ENCOUNTER — HOSPITAL ENCOUNTER (OUTPATIENT)
Dept: ULTRASOUND IMAGING | Facility: HOSPITAL | Age: 37
Discharge: HOME | End: 2017-11-09
Attending: INTERPRETER

## 2017-11-09 ENCOUNTER — HOSPITAL ENCOUNTER (OUTPATIENT)
Dept: OBGYN | Facility: HOSPITAL | Age: 37
Discharge: HOME | End: 2017-11-09
Attending: FAMILY MEDICINE | Admitting: FAMILY MEDICINE

## 2017-11-09 DIAGNOSIS — O09.529 SUPERVISION OF HIGH-RISK PREGNANCY OF ELDERLY MULTIGRAVIDA: ICD-10-CM

## 2017-11-09 DIAGNOSIS — O36.5930 POOR FETAL GROWTH AFFECTING MANAGEMENT OF MOTHER, THIRD TRIMESTER, NOT APPLICABLE OR UNSPECIFIED FETUS: ICD-10-CM

## 2017-11-13 ENCOUNTER — OFFICE VISIT (OUTPATIENT)
Dept: FAMILY MEDICINE | Facility: CLINIC | Age: 37
End: 2017-11-13

## 2017-11-13 VITALS
SYSTOLIC BLOOD PRESSURE: 129 MMHG | TEMPERATURE: 97.6 F | HEART RATE: 85 BPM | OXYGEN SATURATION: 100 % | HEIGHT: 60 IN | BODY MASS INDEX: 28.94 KG/M2 | DIASTOLIC BLOOD PRESSURE: 82 MMHG | WEIGHT: 147.4 LBS

## 2017-11-13 DIAGNOSIS — B95.1 POSITIVE GBS TEST: ICD-10-CM

## 2017-11-13 DIAGNOSIS — O09.529 SUPERVISION OF HIGH-RISK PREGNANCY OF ELDERLY MULTIGRAVIDA: Primary | ICD-10-CM

## 2017-11-13 DIAGNOSIS — O24.410 DIET CONTROLLED GESTATIONAL DIABETES MELLITUS (GDM) IN THIRD TRIMESTER: ICD-10-CM

## 2017-11-13 NOTE — PROGRESS NOTES
"OB Visit    Dain Tejeda is a 37 year old  female who presents to clinic for a follow up OB visit. She is currently 40w1d with an estimated date of delivery of 2017 via sure LMP c/w fetal survey.   She denies headache, chest pain, shortness of breath,  vaginal bleeding, or abnormal discharge.     She has felt baby move.   She denies abdominal pain or contractions.    New concerns today: none. Reviewed her BS log- all within goal range, doing well. No hypoglycemic sx.   Her  is present today and I reviewed ALL the data we had and extended our concerns with IUGR and need for induction last week and certainly the need for more monitoring until delivery. He expressed concern that \"this is just a training facility, why are you getting so much monitoring\", and this was after I explained everything we were doing was at the recommendation of [facility] and is standard of care. He additionally states they are never doing an induction \"because I have 8 kids with another wife before and all of those pregnancies went just fine\".   There was a time when I became concerned that Dain was stuck making the decision at her 's insistence, and she was intermittently tearful being in a place to choose monitoring vs induction. I indicated we would certainly respect their decision to continue the pregnancy without induction (which carries its own risk with an unfavorable cervix).     Obstetric History       T0      L0     SAB0   TAB0   Ectopic0   Multiple0   Live Births0       # Outcome Date GA Lbr Dontae/2nd Weight Sex Delivery Anes PTL Lv   1 Current                   Physical exam:  /82  Pulse 85  Temp 97.6  F (36.4  C) (Oral)  Ht 4' 11.5\" (151.1 cm)  Wt 147 lb 6.4 oz (66.9 kg)  LMP 2017  SpO2 100%  BMI 29.27 kg/m2    General: alert, female in no acute distress  Heart: RRR, no murmurs  Psych: tearful at times, no SI  Abdomen: gravid uterus appropriate for gestation age at 34 cm above pubic " symphysis, non-tender, FHTs 140, Leopold's maneuver reveals cephalic positioning  Gyn: 0cm/0%/-3, no discharge  Extremities: trace edema    Labs reviewed:  Reviewed her BS log. All fasting <92, all 2hr post prandial were <101. These are all within goal range.    Reviewed her normal BPP from Thursday, see careeverywhere.    Procedures:  Non stress test performed in clinic today. Reviewed strip which showed baseline of 135bpm with several nice accelerations and no decels. Frequent fetal movement present. Reactive NST.     Assessment/Plan:    40w1d    Supervision of high-risk pregnancy of elderly primigravida. Reactive NST in clinic today. Reviewed BPP last week, reassuring.   - see HPI above, >50min spent face to face in discussion of risks & benefits of IOL vs continued monitoring. Pt and  wish to proceed with BPP/NST/cord doppler on Thursday and see me next week in clinic if still undelivered.  - GBS positive   - Reviewed signs/symptoms of labor and when to present to the hospital.    Diet controlled gestational diabetes mellitus (GDM) in third trimester  Excellent control.        Follow up in 1 week for routine prenatal visit, sooner if labor symptoms.     Cyndie Jenkins MD  [facility] Medicine Resident  Pager# [phone]      Precepted with: Cole Jenkins MD

## 2017-11-13 NOTE — MR AVS SNAPSHOT
After Visit Summary   11/13/2017    Dain Tejeda    MRN: [id number]           Patient Information     Date Of Birth          1980        Visit Information        Provider Department      11/13/2017 9:40 AM Cyndie Jenkins MD [facility]        Care Instructions    BPP w/NST and Cord doppler scheduled to be done at [facility] Radiology:  Thursday, 11/16/2017 at 3:30pm. [facility] [employer] to  pt at 215pm from [address] [place] [address].  An  has also been scheduled for appt. Patient informed of all information verbally and written. RLee RN          Follow-ups after your visit        Your next 10 appointments already scheduled     Nov 22, 2017  8:20 AM CST   RETURN OB with Cyndie Jenkins MD   [facility] ([facility])    [address] [place] [address]   [phone]              Who to contact     Please call your clinic at [phone] to:    Ask questions about your health    Make or cancel appointments    Discuss your medicines    Learn about your test results    Speak to your doctor   If you have compliments or concerns about an experience at your clinic, or if you wish to file a complaint, please contact [facility] Patient Relations at [phone] or email us at [email or web address]         Additional Information About Your Visit        MyChart Information     [employer] is an electronic gateway that provides easy, online access to your medical records. With [employer], you can request a clinic appointment, read your test results, renew a prescription or communicate with your care team.     To sign up for [employer] visit the website at [email or web address]   You will be asked to enter the access code listed below, as well as some personal information. Please follow the directions to create your username and password.     Your access code is: [email or web address]  Expires: 1/27/2018 10:18 PM     Your  "access code will  in 90 days. If you need help or a new code, please contact your [facility] or call [phone] for assistance.        Care EveryWhere ID     This is your Care EveryWhere ID. This could be used by other organizations to access your [place] medical records  [id number]        Your Vitals Were     Pulse Temperature Height Last Period Pulse Oximetry BMI (Body Mass Index)    85 97.6  F (36.4  C) (Oral) 4' 11.5\" (151.1 cm) 2017 100% 29.27 kg/m2       Blood Pressure from Last 3 Encounters:   17 129/82   17 109/73   17 104/72    Weight from Last 3 Encounters:   17 147 lb 6.4 oz (66.9 kg)   17 150 lb (68 kg)   17 151 lb 6.4 oz (68.7 kg)              Today, you had the following     No orders found for display       Primary Care Provider Office Phone # Fax #    Cyndie Jenkins [phone]       [facility] [address] [place] [address]        Equal Access to Services     NEAL FONTENOT AH: Hadii aad ku hadasho Soomaali, waaxda luqadaha, qaybta kaalmada adeegyada, waxrey dos santosn wendy pineda laneftalin ah. So [facility] [phone].    ATENCIÓN: Si habla español, tiene a manzano disposición servicios gratuitos de asistencia lingüística. Llame al [phone].    We comply with applicable federal civil rights laws and [place] laws. We do not discriminate on the basis of race, color, national origin, age, disability, sex, sexual orientation, or gender identity.            Thank you!     Thank you for choosing [facility]  for your care. Our goal is always to provide you with excellent care. Hearing back from our patients is one way we can continue to improve our services. Please take a few minutes to complete the written survey that you may receive in the mail after your visit with us. Thank you!             Your Updated Medication List - Protect others around you: Learn how to safely " use, store and throw away your medicines at www.disposemymeds.org.          This list is accurate as of: 11/13/17 10:49 AM.  Always use your most recent med list.                   Brand Name Dispense Instructions for use Diagnosis    blood glucose lancets standard    no brand specified    100 each    Use to test blood sugar 4 times daily or as directed.    Gestational diabetes mellitus (GDM) in third trimester, gestational diabetes method of control unspecified       blood glucose monitoring meter device kit    no brand specified    1 kit    Use to test blood sugar 4 times daily or as directed.    Gestational diabetes mellitus (GDM) in third trimester, gestational diabetes method of control unspecified       blood glucose monitoring test strip    no brand specified    100 strip    Use to test blood sugars 4 times daily:Please check fasting blood sugar in the morning and also check blood sugar 1-2 hours after eating.    Gestational diabetes mellitus (GDM) in third trimester, gestational diabetes method of control unspecified       doxylamine 25 MG Tabs tablet    UNISOM    20 each    Take 0.5 tablets (12.5 mg) by mouth 3 times daily as needed For nausea    Nausea/vomiting in pregnancy

## 2017-11-13 NOTE — PATIENT INSTRUCTIONS
BPP w/NST and Cord doppler scheduled to be done at [facility] Radiology:  Thursday, 11/16/2017 at 3:30pm. [facility] [employer] to  pt at 215pm from [address] [facility] [address]. Noted requesting a Hmong  as patient is Non-English speaking.   An  has also been scheduled for Radiology appt. Patient informed of all information verbally, left before could be giving written info.  [employer] phone number- [phone].  Matilde HERRERA    11/16/2017 at 810am called and cancelled [employer] ride as well as BPP appt with [facility] scheduling. Dain valdovinosvered 11/15/2017 around 3am. Matilde HERRERA

## 2017-11-13 NOTE — Clinical Note
Dr. Jenkins, I billed this as a level 5 given the seriousness and high risk nature of the visit, the exam, and the NST & GDM blood sugar log which were data points I reviewed with her. Do you agree?

## 2017-11-13 NOTE — NURSING NOTE
name: Delia Guzman  Language: Jennyong  Agency: [facility] [place]  Phone number: [phone]

## 2017-11-13 NOTE — Clinical Note
Dain Kapoor delivered today (about 3am 11/15, , with 3rd degree lac :/ . Can you help cancel the BPP for Thursday, and we can make her next appt a  appt instead with me. I anticipate she will discharge on Friday for 48hr stay with untx GBS. Thanks!

## 2017-11-17 ENCOUNTER — HOME CARE/HOSPICE - HEALTHEAST (OUTPATIENT)
Dept: ADMINISTRATIVE | Facility: OTHER | Age: 37
End: 2017-11-17

## 2017-11-17 ENCOUNTER — HOME CARE/HOSPICE - HEALTHEAST (OUTPATIENT)
Dept: HOME HEALTH SERVICES | Facility: HOME HEALTH | Age: 37
End: 2017-11-17

## 2017-11-21 ENCOUNTER — CARE COORDINATION (OUTPATIENT)
Dept: FAMILY MEDICINE | Facility: CLINIC | Age: 37
End: 2017-11-21

## 2017-11-21 DIAGNOSIS — O24.410 DIET CONTROLLED GESTATIONAL DIABETES MELLITUS (GDM) IN THIRD TRIMESTER: ICD-10-CM

## 2017-11-21 DIAGNOSIS — B95.1 POSITIVE GBS TEST: ICD-10-CM

## 2017-11-21 DIAGNOSIS — O09.529 SUPERVISION OF HIGH-RISK PREGNANCY OF ELDERLY MULTIGRAVIDA: ICD-10-CM

## 2017-11-21 DIAGNOSIS — O99.012 ANEMIA OF PREGNANCY IN SECOND TRIMESTER: ICD-10-CM

## 2017-11-21 PROBLEM — O24.419 GESTATIONAL DIABETES MELLITUS (GDM) IN THIRD TRIMESTER: Status: RESOLVED | Noted: 2017-09-04 | Resolved: 2017-11-21

## 2017-11-21 NOTE — PROGRESS NOTES
Conditions complicating Pregnancy: High Risk Pregnancy:  AMA, IUGR, GDM. Post dates, normal,spontaneous, vaginal delivery at 40w 3 days on 11/15/2017. Female , birth weight- 5 lbs 3.8oz (low birth weight), length- 19 inches and head- 33cm. Perineal laceration- 3rd degree, EBL 550ml. One and five mins apgar were 8 and 9. Pregnancy episode resolved. Matilde HERRERA

## 2017-11-22 DIAGNOSIS — K59.00 CONSTIPATION, UNSPECIFIED CONSTIPATION TYPE: Primary | ICD-10-CM

## 2017-11-22 NOTE — PROGRESS NOTES
Preceptor Attestation:  Patient's case reviewed and discussed with Cyndie Jenkins MD resident and I evaluated the patient. I agree with written assessment and plan of care.  Supervising Physician:  Gerardo Cotton MD MD  [facility]

## 2018-01-08 ENCOUNTER — OFFICE VISIT (OUTPATIENT)
Dept: FAMILY MEDICINE | Facility: CLINIC | Age: 38
End: 2018-01-08
Payer: COMMERCIAL

## 2018-01-08 VITALS
DIASTOLIC BLOOD PRESSURE: 80 MMHG | HEIGHT: 60 IN | RESPIRATION RATE: 18 BRPM | SYSTOLIC BLOOD PRESSURE: 119 MMHG | HEART RATE: 69 BPM | WEIGHT: 131 LBS | BODY MASS INDEX: 25.72 KG/M2 | OXYGEN SATURATION: 98 % | TEMPERATURE: 97.9 F

## 2018-01-08 DIAGNOSIS — O24.410 DIET CONTROLLED GESTATIONAL DIABETES MELLITUS (GDM), ANTEPARTUM: ICD-10-CM

## 2018-01-08 ASSESSMENT — PATIENT HEALTH QUESTIONNAIRE - PHQ9: SUM OF ALL RESPONSES TO PHQ QUESTIONS 1-9: 0

## 2018-01-08 NOTE — PROGRESS NOTES
Post Partum Visit    HPI:    Dain Tejeda is a 37 year old  female presenting for routine postpartum follow up.  Date of Delivery was 11/15/17 at 40w3d.  Route of delivery was . Complications noted during the pregnancy & delivery include + GBS with inadequate abx ppx, GDM, IUGR followed by maternal fetal medicine, had declined IOL. Complications at the time of delivery include 3rd degree laceration due to precipitous delivery, post partum hemorrhage with 1 unit pRBC & SGA baby.    The patient notes no worrisome bleeding since since the time of delivery. Period resumed already- finished last week.  She has no continued pain.  Patient is not breast feeding.  Patient denies concerns with postpartum blues/depression.  Patient has resumed sexual activity- with no pain and is not interested in contraception at this time.      She has been checking her BS since delivery, but not as often. Fasting <100.     She is not taking the iron for a week because she wants to see what her blood counts look like now.      PHQ9 score of 0.      Other concerns today include 2 days of sore throat, coughing. No fevers. No sick contacts.     ROS:  General: No fevers  Cardiac: No chest pain or palpitations.  Breasts: no redness, warmth, pain  Pulmonary: No shortness of breath or respiratory distress  GI: No abdominal pain.  Normal bowel habits, no incontinence  : No dysuria, hematuria, no incontinence  Extremities: No edema       Allergies   Allergen Reactions     Soybean Allergy Itching and Blisters     Tofu- causes itching and sores in the mouth       Current Outpatient Prescriptions   Medication Sig Dispense Refill     psyllium (NATURAL FIBER LAXATIVE) 58.6 % POWD Take 18 g (1 Tablespoonful) by mouth daily 174 g 1     blood glucose (NO BRAND SPECIFIED) lancets standard Use to test blood sugar 4 times daily or as directed. 100 each 11     blood glucose monitoring (NO BRAND SPECIFIED) meter device kit Use to test blood sugar 4 times  "daily or as directed. 1 kit 0     blood glucose monitoring (NO BRAND SPECIFIED) test strip Use to test blood sugars 4 times daily:Please check fasting blood sugar in the morning and also check blood sugar 1-2 hours after eating. 100 strip 1     doxylamine (UNISOM) 25 MG TABS tablet Take 0.5 tablets (12.5 mg) by mouth 3 times daily as needed For nausea (Patient not taking: Reported on 2017) 20 each 0       Past Medical History:   Diagnosis Date     Nausea/vomiting in pregnancy 2017       Physical Exam  Vitals: /80  Pulse 69  Temp 97.9  F (36.6  C) (Oral)  Resp 18  Ht 4' 11.5\" (151.1 cm)  Wt 131 lb (59.4 kg)  SpO2 98%  BMI 26.02 kg/m2   General: No apparent Distress   HENT: Normocephalic atraumatic. TMs clear. Throat with mild erythema of posterior oropharynx. No LAD. No nasal discharge. No cough noted.    Neck: No thyromegaly or cervical adenopathy.  Cardiovascular: RRR no murmurs, rubs, or gallops  Pulmonary: Clear to auscultation bilaterally.  No rhonchi, crackles, or wheezes.  No distress.  Abdomen: Soft, non-tender.  No rebound or guarding.   GYN: The uterus is normal non-gravid size, nontender, normal external genitalia well healed.   Lower extremity: No significant swelling or erythema.      Assessment/Plan  1) Routine Post Partum Care-  Overall Patient doing well.  Has no acute concerns today.  Post partum depression has not been a concern.  Appears to be transitioning well with new child. Recommended to continue pre-jesús vitamin with iron. All patients questions were answered.      - Labs future orders for this week: CBC, needs 2hr 75g GTT  to f/u GDM (she will do this when she brings her daughter in for visit too), did not have time today  - Of note, after visit I received a form from [facility] indicating her Hb was 9.9 but this was on 17.  - Contraception - declines, discussed condoms and fertile mucus pattern, provided condoms in clinic  - Needs post partum Colposcopy for HPV + and " ASCUS on pap during pregnancy but pt is going to discuss with her  first and then decide. If she declines, will need repeat pap in April 2018 with Cotesting    Cyndie Jenkins MD  [facility] Resident  Pager# [phone]      Precepted with: Rosamaria Bhandari MD

## 2018-01-08 NOTE — MR AVS SNAPSHOT
After Visit Summary   2018    Dain Tejeda    MRN: [id number]           Patient Information     Date Of Birth          1980        Visit Information        Provider Department      2018 8:20 AM Cyndie Jenkins MD [facility]        Today's Diagnoses     Routine postpartum follow-up    -  1    Third-stage postpartum hemorrhage        Diet controlled gestational diabetes mellitus (GDM), antepartum           Follow-ups after your visit        Who to contact     Please call your clinic at [phone] to:    Ask questions about your health    Make or cancel appointments    Discuss your medicines    Learn about your test results    Speak to your doctor   If you have compliments or concerns about an experience at your clinic, or if you wish to file a complaint, please contact [facility] Patient Relations at [phone] or email us at [email or web address]         Additional Information About Your Visit        MyChart Information     [employer] is an electronic gateway that provides easy, online access to your medical records. With [employer], you can request a clinic appointment, read your test results, renew a prescription or communicate with your care team.     To sign up for [employer] visit the website at [email or web address]   You will be asked to enter the access code listed below, as well as some personal information. Please follow the directions to create your username and password.     Your access code is: [email or web address]  Expires: 2018 10:18 PM     Your access code will  in 90 days. If you need help or a new code, please contact your [facility] or call [phone] for assistance.        Care EveryWhere ID     This is your Care EveryWhere ID. This could be used by other organizations to access your [place] medical records  [id number]        Your Vitals Were     Pulse Temperature Respirations  "Height Pulse Oximetry BMI (Body Mass Index)    69 97.9  F (36.6  C) (Oral) 18 4' 11.5\" (151.1 cm) 98% 26.02 kg/m2       Blood Pressure from Last 3 Encounters:   01/08/18 119/80   11/13/17 129/82   11/08/17 109/73    Weight from Last 3 Encounters:   01/08/18 131 lb (59.4 kg)   11/13/17 147 lb 6.4 oz (66.9 kg)   11/08/17 150 lb (68 kg)               Primary Care Provider Office Phone # Fax #    Cyndie Jenkins [phone]       [facility] [address] [place] [address]        Equal Access to Services     NEAL FONTENOT AH: Hadii margaux medina hadasho Soomaali, waaxda luqadaha, qaybta kaalmada adeegyada, waxay idiin hayaan adematteo khphilip martin . So [facility] [phone].    ATENCIÓN: Si habla español, tiene a manzano disposición servicios gratuitos de asistencia lingüística. Llame al [phone].    We comply with applicable federal civil rights laws and [place] laws. We do not discriminate on the basis of race, color, national origin, age, disability, sex, sexual orientation, or gender identity.            Thank you!     Thank you for choosing [facility]  for your care. Our goal is always to provide you with excellent care. Hearing back from our patients is one way we can continue to improve our services. Please take a few minutes to complete the written survey that you may receive in the mail after your visit with us. Thank you!             Your Updated Medication List - Protect others around you: Learn how to safely use, store and throw away your medicines at www.disposemymeds.org.          This list is accurate as of: 1/8/18 11:59 PM.  Always use your most recent med list.                   Brand Name Dispense Instructions for use Diagnosis    blood glucose lancets standard    no brand specified    100 each    Use to test blood sugar 4 times daily or as directed.    Gestational diabetes mellitus (GDM) in third trimester, gestational diabetes method of control unspecified "       blood glucose monitoring meter device kit    no brand specified    1 kit    Use to test blood sugar 4 times daily or as directed.    Gestational diabetes mellitus (GDM) in third trimester, gestational diabetes method of control unspecified       blood glucose monitoring test strip    no brand specified    100 strip    Use to test blood sugars 4 times daily:Please check fasting blood sugar in the morning and also check blood sugar 1-2 hours after eating.    Gestational diabetes mellitus (GDM) in third trimester, gestational diabetes method of control unspecified       doxylamine 25 MG Tabs tablet    UNISOM    20 each    Take 0.5 tablets (12.5 mg) by mouth 3 times daily as needed For nausea    Nausea/vomiting in pregnancy       psyllium 58.6 % Powd    NATURAL FIBER LAXATIVE    174 g    Take 18 g (1 Tablespoonful) by mouth daily    Constipation, unspecified constipation type

## 2018-01-10 DIAGNOSIS — O24.410 DIET CONTROLLED GESTATIONAL DIABETES MELLITUS (GDM), ANTEPARTUM: ICD-10-CM

## 2018-01-10 LAB
GLUCOSE P FAST SERPL-MCNC: 91 MG/DL (ref 51–110)
HCT VFR BLD AUTO: 35.9 % (ref 35–47)
HEMOGLOBIN: 10.8 G/DL (ref 11.7–15.7)
Lab: 101 MG/DL (ref 51–140)
MCH RBC QN AUTO: 25 PG (ref 26.5–35)
MCHC RBC AUTO-ENTMCNC: 30.1 G/DL (ref 32–36)
MCV RBC AUTO: 83.1 FL (ref 78–100)
PLATELET # BLD AUTO: 312 K/UL (ref 150–450)
RBC # BLD AUTO: 4.32 M/UL (ref 3.8–5.2)
WBC # BLD AUTO: 5.9 K/UL (ref 4–11)

## 2018-01-17 NOTE — PROGRESS NOTES
Reviewed results. Discussed results with Dain at her baby's visit with me the day after these labs returned. Normal 2hr GTT and Hb of 10.8.

## 2018-01-24 ENCOUNTER — OFFICE VISIT (OUTPATIENT)
Dept: FAMILY MEDICINE | Facility: CLINIC | Age: 38
End: 2018-01-24
Payer: COMMERCIAL

## 2018-01-24 VITALS
DIASTOLIC BLOOD PRESSURE: 76 MMHG | SYSTOLIC BLOOD PRESSURE: 114 MMHG | TEMPERATURE: 97.9 F | BODY MASS INDEX: 26.33 KG/M2 | OXYGEN SATURATION: 100 % | HEART RATE: 68 BPM | WEIGHT: 132.6 LBS

## 2018-01-24 DIAGNOSIS — R87.610 PAPANICOLAOU SMEAR OF CERVIX WITH ATYPICAL SQUAMOUS CELLS OF UNDETERMINED SIGNIFICANCE (ASC-US): Primary | ICD-10-CM

## 2018-01-24 LAB — HCG UR QL: NEGATIVE

## 2018-01-24 RX ORDER — IBUPROFEN 800 MG/1
800 TABLET, FILM COATED ORAL ONCE
Qty: 1 TABLET | Refills: 0
Start: 2018-01-24 | End: 2018-01-24

## 2018-01-24 NOTE — PROGRESS NOTES
Dain Tejeda is a 37 year old female who presents for initial colposcopy.   Pap smear 9 months ago showed: ASC-US with high risk HPV + on cotesting. No prior paps to this (as recent immigrant).     Past Medical History:   Diagnosis Date     Diet controlled gestational diabetes mellitus (GDM), antepartum 9/4/2017    Attempting diet control first     Nausea/vomiting in pregnancy 4/24/2017     Postpartum hemorrhage 11/16/2017     Third degree laceration of perineum during delivery, postpartum 11/15/2017     Family History   Problem Relation Age of Onset     Gestational Diabetes Sister      Gestational Diabetes Sister      DIABETES No family hx of      Coronary Artery Disease No family hx of      Hypertension No family hx of      Breast Cancer No family hx of      Colon Cancer No family hx of      Prostate Cancer No family hx of      Other Cancer No family hx of        Previous history of abnormal paps?: None prior  History of cryotherapy (freezing)?: : No  History of veneral diseases: : No  Do you desire testing for any of these diseases? : No  History of genital warts:  No  Visible warts now?:  No  Previously treated? If so, how?:  No     Patient's last menstrual period was 01/01/2018 (approximate).  Type of contraception: condoms    History   Smoking Status     Never Smoker   Smokeless Tobacco     Never Used     History of sexual abuse:  No  Allergies as of 01/24/2018 - Michel as Reviewed 01/24/2018   Allergen Reaction Noted     Soybean allergy Itching and Blisters 07/31/2017      Patient's last menstrual period was 01/01/2018 (approximate).    PROCEDURE:  Before the procedure, it was ensured that the patient was educated regarding the nature of her findings to date, the implications of them, and what was to be done. She has been made aware of the role of HPV, the natural history of infection, ways to minimize her future risk, the effect of HPV on the cervix, and treatment options available should they be indicated. The  pathophysiology of the cervix, including a discussion of squamous vs. endometrial cells, and squamous metaplasia have all been reviewed. The details of the colposcopic procedure were reviewed, as well as the risks of missed diagnoses, pain, infection and bleeding. All questions were answered before proceeding, and informed consent was therefore obtained.    Bimanual examination: was not done  Unenhanced examination of the cervix was normal without lesions.    Pap smear OBTAINED.  Endocervical sampling not obtained due to: large area of columnar epithelium c/w post partum changes, see Findings section    Pap repeated?:  Yes with HPV cotesting  SCJ seen?:  yes    Endocervical speculum needed?:  No  ECC done?:  No  Lugol's solution used?:  Yes and normal visualization  Satisfactory examination?:  yes    Vaginal vault: normal to cursory inspection   Urethra normal?:  yes  Labia normal?:  yes  Perineum normal?:  yes  Rectum normal?:  yes    FINDINGS:  Please see images on computer as needed for normal appearing cervix with everted squamocolumnar junction c/w ectropion of the post partum hormonal state    Cervix: no visible lesions  Procedure: no biopsies taken    Procedure summary: Patient tolerated procedure well     Assessment: Normal exam without visible pathology  Ryan index: 0    Plan: Will call pt directly at her cell phone (pt preference) to base further treatment on Pap/HPV cotesting results which were sent today.  No bx or ECC sent due to absence of lesions in pt with ASCUS and notable ectropion.     Cyndie Jenkins MD  Precepted with Dr. Dunlap   Principal Discharge DX:	Toe abrasion, right, initial encounter  Secondary Diagnosis:	Toe pain, right

## 2018-01-24 NOTE — MR AVS SNAPSHOT
After Visit Summary   2018    Dain Tejeda    MRN: [id number]           Patient Information     Date Of Birth          1980        Visit Information        Provider Department      2018 9:40 AM Cyndie Jenkins MD; [facility] PROCEDURE ROOM [facility]        Today's Diagnoses     Papanicolaou smear of cervix with atypical squamous cells of undetermined significance (ASC-US)    -  1       Follow-ups after your visit        Who to contact     Please call your clinic at [phone] to:    Ask questions about your health    Make or cancel appointments    Discuss your medicines    Learn about your test results    Speak to your doctor   If you have compliments or concerns about an experience at your clinic, or if you wish to file a complaint, please contact [facility] Patient Relations at [phone] or email us at [email or web address]         Additional Information About Your Visit        MyChart Information     [employer] is an electronic gateway that provides easy, online access to your medical records. With [employer], you can request a clinic appointment, read your test results, renew a prescription or communicate with your care team.     To sign up for [employer] visit the website at [email or web address]   You will be asked to enter the access code listed below, as well as some personal information. Please follow the directions to create your username and password.     Your access code is: [email or web address]  Expires: 2018  9:11 AM     Your access code will  in 90 days. If you need help or a new code, please contact your [facility] or call [phone] for assistance.        Care EveryWhere ID     This is your Care EveryWhere ID. This could be used by other organizations to access your [place] medical records  [id number]        Your Vitals Were     Pulse Temperature Last Period Pulse Oximetry BMI (Body  Mass Index)       68 97.9  F (36.6  C) (Oral) 01/01/2018 (Approximate) 100% 26.33 kg/m2        Blood Pressure from Last 3 Encounters:   01/24/18 114/76   01/08/18 119/80   11/13/17 129/82    Weight from Last 3 Encounters:   01/24/18 132 lb 9.6 oz (60.1 kg)   01/08/18 131 lb (59.4 kg)   11/13/17 147 lb 6.4 oz (66.9 kg)              We Performed the Following     COLP CERVIX/UPPER VAGINA     GYN Cytology ([facility])     HCG Qualitative Urine (UPT)  ([facility])     HPV [place] PCR ([facility])          Today's Medication Changes          These changes are accurate as of 1/24/18 11:59 PM.  If you have any questions, ask your nurse or doctor.               Start taking these medicines.        Dose/Directions    ibuprofen 800 MG tablet   Commonly known as:  ADVIL/MOTRIN   Used for:  Papanicolaou smear of cervix with atypical squamous cells of undetermined significance (ASC-US)   Started by:  Cyndie Jenkins MD        Dose:  800 mg   Take 1 tablet (800 mg) by mouth once for 1 dose   Quantity:  1 tablet   Refills:  0            Where to get your medicines      Some of these will need a paper prescription and others can be bought over the counter.  Ask your nurse if you have questions.     You don't need a prescription for these medications     ibuprofen 800 MG tablet                Primary Care Provider Office Phone # Fax #    Cyndie Jenkins [phone]       [facility] [address] [place] [address]        Equal Access to Services     DIANA FONTENOT AH: Hadii margaux ku hadasho Soomaali, waaxda luqadaha, qaybta kaalmada adeegyada, yuan idikell calhoun. So [facility] [phone].    ATENCIÓN: Si habla español, tiene a manzano disposición servicios gratuitos de asistencia lingüística. Llame al [phone].    We comply with applicable federal civil rights laws and [place] laws. We do not discriminate on the basis of race, color, national origin, age, disability,  sex, sexual orientation, or gender identity.            Thank you!     Thank you for choosing [facility]  for your care. Our goal is always to provide you with excellent care. Hearing back from our patients is one way we can continue to improve our services. Please take a few minutes to complete the written survey that you may receive in the mail after your visit with us. Thank you!             Your Updated Medication List - Protect others around you: Learn how to safely use, store and throw away your medicines at www.disposemymeds.org.          This list is accurate as of 1/24/18 11:59 PM.  Always use your most recent med list.                   Brand Name Dispense Instructions for use Diagnosis    blood glucose lancets standard    no brand specified    100 each    Use to test blood sugar 4 times daily or as directed.    Gestational diabetes mellitus (GDM) in third trimester, gestational diabetes method of control unspecified       blood glucose monitoring meter device kit    no brand specified    1 kit    Use to test blood sugar 4 times daily or as directed.    Gestational diabetes mellitus (GDM) in third trimester, gestational diabetes method of control unspecified       blood glucose monitoring test strip    no brand specified    100 strip    Use to test blood sugars 4 times daily:Please check fasting blood sugar in the morning and also check blood sugar 1-2 hours after eating.    Gestational diabetes mellitus (GDM) in third trimester, gestational diabetes method of control unspecified       doxylamine 25 MG Tabs tablet    UNISOM    20 each    Take 0.5 tablets (12.5 mg) by mouth 3 times daily as needed For nausea    Nausea/vomiting in pregnancy       ibuprofen 800 MG tablet    ADVIL/MOTRIN    1 tablet    Take 1 tablet (800 mg) by mouth once for 1 dose    Papanicolaou smear of cervix with atypical squamous cells of undetermined significance (ASC-US)       psyllium 58.6 % Powd    NATURAL FIBER LAXATIVE     174 g    Take 18 g (1 Tablespoonful) by mouth daily    Constipation, unspecified constipation type

## 2018-01-24 NOTE — NURSING NOTE
The following medication was given to Dain Ileana    MEDICATION: Ibuprofen 200 mg  ROUTE: PO  SITE: mouth  DOSE: 800 mg  LOT #: [id number]  :  LNK  EXPIRATION DATE:  11/2018  NDC: 5321-0564-19  Dr. Dunlap was on site at time of administration.   [facility]

## 2018-01-25 PROBLEM — O24.410 DIET CONTROLLED GESTATIONAL DIABETES MELLITUS (GDM), ANTEPARTUM: Status: ACTIVE | Noted: 2017-09-04

## 2018-01-25 PROBLEM — O24.410 DIET CONTROLLED GESTATIONAL DIABETES MELLITUS (GDM), ANTEPARTUM: Status: RESOLVED | Noted: 2017-09-04 | Resolved: 2018-01-25

## 2018-01-25 LAB
FINAL DIAGNOSIS: ABNORMAL
HPV 16 DNA: NEGATIVE
HPV 18 DNA: NEGATIVE
HPV SOURCE: ABNORMAL
OTHER HR HPV: POSITIVE
SPECIMEN DESCRIPTION: ABNORMAL

## 2018-01-29 ENCOUNTER — RECORDS - HEALTHEAST (OUTPATIENT)
Dept: ADMINISTRATIVE | Facility: OTHER | Age: 38
End: 2018-01-29

## 2018-01-29 LAB
CYTOLOGY CVX/VAG DOC THIN PREP: NORMAL
HIGH RISK?: NO
HPV REFLEX?: NORMAL
LAB AP ABNORMAL BLEEDING: NO
LAB AP BIRTH CONTROL/HORMONES: NORMAL
LAB AP CASE REPORT: NORMAL
LAB AP CERVICAL APPEARANCE: NORMAL
LAB AP MALIGNANT?: NORMAL
LAB AP PATIENT STATUS: NORMAL
LAB AP PREVIOUS ABNL: NORMAL
LAB AP PREVIOUS NORMAL: NORMAL
LAST MENS PERIOD: NORMAL
SPECIMEN ADEQUACY:: NORMAL

## 2018-01-29 NOTE — PROGRESS NOTES
Preceptor Attestation:  I was present for and supervised entirety of uncomplicated procedure as documented by resident physician.  Please see note for details.  Supervising Physician:  Jacquelin Dunlap MD  [facility]

## 2018-02-01 PROBLEM — Z12.4 SCREENING FOR CERVICAL CANCER: Status: ACTIVE | Noted: 2017-05-05

## 2018-02-01 NOTE — PROGRESS NOTES
"Medardo, can you please call pt (she requested to only contact her at the [phone] cell number to avoid having her  involved- she is concerned about his blood pressure. If you do not reach her, we can have her come in for a visit to discuss results.     \"Dain, your pap smear came back completely normal which is good news. The second test looks at the HPV virus which can cause cervical cancer, and this was still positive (like last year). The recommendation at this point would be to repeat a pap smear with HPV cotesting in 1 year.\"    1. Pap smear result from Jan 24, 2018  is: Normal with + HPV cotesting (other high risk HPV detected)  2. Date next due for routine pap: 1 Year with HPV cotesting  3. Follow up colposcopy is: Not indicated at this time      "

## 2018-06-25 ENCOUNTER — OFFICE VISIT (OUTPATIENT)
Dept: FAMILY MEDICINE | Facility: CLINIC | Age: 38
End: 2018-06-25
Payer: COMMERCIAL

## 2018-06-25 VITALS
SYSTOLIC BLOOD PRESSURE: 109 MMHG | HEIGHT: 59 IN | HEART RATE: 78 BPM | OXYGEN SATURATION: 98 % | DIASTOLIC BLOOD PRESSURE: 73 MMHG | TEMPERATURE: 98.1 F | WEIGHT: 134 LBS | BODY MASS INDEX: 27.01 KG/M2 | RESPIRATION RATE: 16 BRPM

## 2018-06-25 DIAGNOSIS — M79.604 PAIN OF BACK AND RIGHT LOWER EXTREMITY: Primary | ICD-10-CM

## 2018-06-25 DIAGNOSIS — M54.9 PAIN OF BACK AND RIGHT LOWER EXTREMITY: Primary | ICD-10-CM

## 2018-06-25 RX ORDER — IBUPROFEN 800 MG/1
800 TABLET, FILM COATED ORAL EVERY 8 HOURS PRN
Qty: 30 TABLET | Refills: 1 | Status: SHIPPED | OUTPATIENT
Start: 2018-06-25 | End: 2022-05-24

## 2018-06-25 RX ORDER — CYCLOBENZAPRINE HCL 10 MG
10 TABLET ORAL
COMMUNITY
Start: 2018-06-20 | End: 2022-05-24

## 2018-06-25 RX ORDER — ACETAMINOPHEN 500 MG
1000 TABLET ORAL EVERY 6 HOURS PRN
Qty: 100 TABLET | Refills: 0 | Status: SHIPPED | OUTPATIENT
Start: 2018-06-25 | End: 2022-06-20

## 2018-06-25 RX ORDER — LIDOCAINE 50 MG/G
PATCH TOPICAL
COMMUNITY
Start: 2018-06-20 | End: 2022-05-24

## 2018-06-25 NOTE — PROGRESS NOTES
"[facility]        Subjective     HPI:  Dain Tejeda is a 37 year old female who presents for the following concerns:    CC:   Hospital F/U (pt was seen in the er at [facility] on 6/20 for right back spasm. pt complains of still experiencing pain)      Low back and right sided leg pain:    1 week ago describes having head to toe pain on the right side; headache and neck pain. Constant low back pain on the right side and right leg discomfort since that day when all her sx started 1 week ago, the other symptoms of HA and neck pain have resolved. She was helping a relative cooking with heavy pots and some twisting motions and lifting/reaching when this started last week. Went to the ER for this 5 days ago on 6/20 (this was 2 days after pain started up). They diagnosed her with possible muscle spasm and low back pain with possible sciatica on the right side. Has tried the flexeril and lidocaine patch they prescribed. Tylenol 325mg & ibuprofen 200mg as well but taking only 1 tablet of each once or twice daily.     Describes further that she has had mild right sided low back pain since the delivery of her child 9 months ago, but has not brought to our attention formally. She did have low back pain during the pregnancy, less intense compared to now. The current pain is worse- a dull constant achy sensation. Able to ambulate without assistance.     No numbness, tingling or shocklike sensations in her right leg.  No bowel or bladder incontinence.   No weakness of her LE  No saddle anesthesia.  No fevers/chills.   Having regular menses; no issues with vaginal bleeding     ROS: constitutional, cardiovascular, respiratory, GI, , MSK systems reviewed and negative except as noted above.    Social: nonsmoker, no drug use          Objective   /73  Pulse 78  Temp 98.1  F (36.7  C) (Oral)  Resp 16  Ht 4' 11.45\" (151 cm)  Wt 134 lb (60.8 kg)  SpO2 98%  BMI 26.66 kg/m2 Body mass index is 26.66 " kg/(m^2).    Wt Readings from Last 3 Encounters:   06/25/18 134 lb (60.8 kg)   01/24/18 132 lb 9.6 oz (60.1 kg)   01/08/18 131 lb (59.4 kg)       Gen: healthy, alert and no distress  HEENT: No asymmetry, MMM, no erythema of oropharynx.  Neck: there is right sided tightness of the strap muscles, tender to palpation  CV: RRR, no murmurs. 2+ peripheral pulses  Lungs: CTAB, no wheezing or crackles, normal effort  Abd: soft, ND/NT  Back: no CVA tenderness  Extremities: warm, no edema  MSK:  Good range of motion in all major joints. No major deformities noted. No midline spinal tenderness. There is mild tenderness to palpation over the right upper back and right trapezius muscle, as well as at the right SI joint and paraspinal lumbar muscles. Lower extremity strength 5/5 bilaterally and sensation intact with 2+ peripheral pulses. DTRs 2+ and symmetric. Negative straight leg raise bilaterally.   Psych: mood neutral, affect appropriate  Neuro: CN II- XII grossly intact, no focal deficits. Normal strength and tone; mentation appears intact and speech normal    Labs/Imaging this visit:  No results found for this or any previous visit (from the past 100 hour(s)).         Assessment & Plan     37 year old female with:    Pain of right lower back and right lower extremity-   DDx includes muscle spasm or strain during recent endeavors cooking and meal prepping with family last week vs SI joint pain on the right vs slipped vertebral disk. I do not suspect epidural abscess (no fevers, chills, drug use), nor cauda equina syndrome (see HPI), nor fracture (no h/o trauma). Unlikely to be pyelonephritis (no CVA/fevers/urinary sx). No weakness or neurologic deficit to indicate urgent need for imaging. Discussed that her acute on chronic (though first to evaluation was on 6/20/18 for this) low back pain on the right side would most benefit from physical therapy and further assessment of her posture. I suspect she has been compensating for  her right sided low back pain and as such has developed some tendency for muscle fatigue and spasm more easily. Recommended 4 weeks of PT, and can proceed with MRI if no improvement. She agrees with this plan.   - Reviewed proper dosing instructions for tylenol & ibuprofen- changed that to 800mg TID per pt request as that was helpful to her as postpartum period  - Ok to Use flexeril 10 mg every 8 hours as needed for pain and muscle spasms.  - Ok to continue the lidoderm patches   - PHYSICAL THERAPY REFERRAL    Follow up: 1 month after PT  Return to care as needed if symptoms worsen or fail to improve.    Cyndie Jenkins MD    Precepted today with: Lyn Dunlap MD        -------  PMH:  Patient Active Problem List   Diagnosis     Screening for cervical cancer- ASCUS, HPV+ 4/2017      Current Outpatient Prescriptions   Medication     blood glucose (NO BRAND SPECIFIED) lancets standard     blood glucose monitoring (NO BRAND SPECIFIED) meter device kit     blood glucose monitoring (NO BRAND SPECIFIED) test strip     cyclobenzaprine (FLEXERIL) 10 MG tablet     doxylamine (UNISOM) 25 MG TABS tablet     lidocaine (LIDODERM) 5 % Patch     psyllium (NATURAL FIBER LAXATIVE) 58.6 % POWD     No current facility-administered medications for this visit.       ALLERGIES: Soybean allergy    Options for treatment and follow-up care were reviewed with the patient. Dain Tejeda engaged in the decision making process and verbalized understanding of the options discussed and agreed with the final plan.

## 2018-06-25 NOTE — NURSING NOTE
Due to patient being non-English speaking/uses sign language, an  was used for this visit. Only for face-to-face interpretation by an external agency, date and length of interpretation can be found on the scanned worksheet.     name: Va  Agency: [employer]  Language: Junito   Telephone number: [phone]  Type of interpretation: Face-to-face, spoken

## 2018-06-25 NOTE — MR AVS SNAPSHOT
"              After Visit Summary   6/25/2018    Dain Tejeda    MRN: [id number]           Patient Information     Date Of Birth          1980        Visit Information        Provider Department      6/25/2018 11:20 AM Cyndie Jenkins MD [facility]        Today's Diagnoses     Pain of back and right lower extremity    -  1      Care Instructions    Referral for Physical Therapy faxed to [facility]  [phone]  [phone]  Patient will be contacted to schedule appointment          Follow-ups after your visit        Additional Services     PHYSICAL THERAPY REFERRAL       PT/OT REFERRAL  Dain Tjeeda  1980  Phone #: [phone] (home) - please call her with time (she prefers morning appointments)   needed: Yes  Language: Junito    PT/OT  Facility:   [facility], P: [phone], F: [phone]    History: right lower back pain x 1 week    Precautions/Contraindications: None    Imaging Studies: none    Surgical Procedure/Test Results: None    Treatment Goals:   Increase: Flexibility  Decrease: Edema, Pain and Spasm    Prognosis: good    Visits: Up to 12    Evaluate: Evaluate and treat    Plan: Manual Therapy, Flexibility Exercise and Strength Exercise                  Who to contact     Please call your clinic at [phone] to:    Ask questions about your health    Make or cancel appointments    Discuss your medicines    Learn about your test results    Speak to your doctor            Additional Information About Your Visit        Care EveryWhere ID     This is your Care EveryWhere ID. This could be used by other organizations to access your [facility] medical records  [id number]        Your Vitals Were     Pulse Temperature Respirations Height Pulse Oximetry BMI (Body Mass Index)    78 98.1  F (36.7  C) (Oral) 16 4' 11.45\" (151 cm) 98% 26.66 kg/m2       Blood Pressure from Last 3 Encounters:   06/25/18 109/73   01/24/18 114/76   01/08/18 119/80    " Weight from Last 3 Encounters:   06/25/18 134 lb (60.8 kg)   01/24/18 132 lb 9.6 oz (60.1 kg)   01/08/18 131 lb (59.4 kg)              We Performed the Following      : Sign Language or Oral - 53-67 minutes     PHYSICAL THERAPY REFERRAL          Today's Medication Changes          These changes are accurate as of 6/25/18 11:59 PM.  If you have any questions, ask your nurse or doctor.               Start taking these medicines.        Dose/Directions    acetaminophen 500 MG tablet   Commonly known as:  TYLENOL   Used for:  Pain of back and right lower extremity   Started by:  Cyndie Jenkins MD        Dose:  1000 mg   Take 2 tablets (1,000 mg) by mouth every 6 hours as needed for mild pain   Quantity:  100 tablet   Refills:  0       ibuprofen 800 MG tablet   Commonly known as:  ADVIL/MOTRIN   Used for:  Pain of back and right lower extremity   Started by:  Cyndie Jenkins MD        Dose:  800 mg   Take 1 tablet (800 mg) by mouth every 8 hours as needed for moderate pain   Quantity:  30 tablet   Refills:  1            Where to get your medicines      These medications were sent to [id number] - [place], [place] - [address] AT [facility] & [address], [place] [address]     Phone:  [phone]     acetaminophen 500 MG tablet    ibuprofen 800 MG tablet                Primary Care Provider Office Phone # Fax #    Cyndie Jenkins [phone]       [facility] [address] [place] [address]        Equal Access to Services     NEAL FONTENOT AH: Hadii aad ku hadasho Soomaali, waaxda luqadaha, qaybta kaalmada adeegyada, waxay idiin haynewn wendy calhoun. So [facility] [phone].    ATENCIÓN: Si habla español, tiene a manzano disposición servicios gratuitos de asistencia lingüística. Llame al [phone].    We comply with applicable federal civil rights laws and [place] laws. We do not  discriminate on the basis of race, color, national origin, age, disability, sex, sexual orientation, or gender identity.            Thank you!     Thank you for choosing [facility]  for your care. Our goal is always to provide you with excellent care. Hearing back from our patients is one way we can continue to improve our services. Please take a few minutes to complete the written survey that you may receive in the mail after your visit with us. Thank you!             Your Updated Medication List - Protect others around you: Learn how to safely use, store and throw away your medicines at www.disposemymeds.org.          This list is accurate as of 6/25/18 11:59 PM.  Always use your most recent med list.                   Brand Name Dispense Instructions for use Diagnosis    acetaminophen 500 MG tablet    TYLENOL    100 tablet    Take 2 tablets (1,000 mg) by mouth every 6 hours as needed for mild pain    Pain of back and right lower extremity       cyclobenzaprine 10 MG tablet    FLEXERIL     Take 10 mg by mouth    Pain of back and right lower extremity       ibuprofen 800 MG tablet    ADVIL/MOTRIN    30 tablet    Take 1 tablet (800 mg) by mouth every 8 hours as needed for moderate pain    Pain of back and right lower extremity       lidocaine 5 % Patch    LIDODERM     Remove & Discard patch within 12 hours or as directed by MD    Pain of back and right lower extremity       psyllium 58.6 % Powd    NATURAL FIBER LAXATIVE    174 g    Take 18 g (1 Tablespoonful) by mouth daily    Constipation, unspecified constipation type

## 2018-06-26 ENCOUNTER — AMBULATORY - HEALTHEAST (OUTPATIENT)
Dept: ADMINISTRATIVE | Facility: REHABILITATION | Age: 38
End: 2018-06-26

## 2018-06-26 DIAGNOSIS — M79.604 PAIN OF BACK AND RIGHT LOWER EXTREMITY: ICD-10-CM

## 2018-06-26 DIAGNOSIS — M54.9 PAIN OF BACK AND RIGHT LOWER EXTREMITY: ICD-10-CM

## 2018-06-26 NOTE — PATIENT INSTRUCTIONS
Referral for Physical Therapy faxed to [facility]  [phone]  [phone]  Patient will be contacted to schedule appointment

## 2018-06-27 PROBLEM — M79.604 PAIN OF BACK AND RIGHT LOWER EXTREMITY: Status: ACTIVE | Noted: 2018-06-27

## 2018-06-27 PROBLEM — M54.9 PAIN OF BACK AND RIGHT LOWER EXTREMITY: Status: ACTIVE | Noted: 2018-06-27

## 2018-06-28 NOTE — PROGRESS NOTES
.Preceptor Attestation:  Patient's case reviewed and discussed with  Patient seen and discussed with the resident..  I agree with written assessment and plan of care.  Supervising Physician:  Jacquelin Dunlap MD  [facility]

## 2019-01-04 ENCOUNTER — TELEPHONE (OUTPATIENT)
Dept: FAMILY MEDICINE | Facility: CLINIC | Age: 39
End: 2019-01-04

## 2019-02-07 ENCOUNTER — TELEPHONE (OUTPATIENT)
Dept: FAMILY MEDICINE | Facility: CLINIC | Age: 39
End: 2019-02-07

## 2019-02-07 NOTE — TELEPHONE ENCOUNTER
Spoke to pt and she stated that she will not schedule for a f/u pap as of right now. She does not have health insurance. She will call to schedule this appointment when she has been approved for insurance. Letter of f/u pap test is sent as a reminder.  MARGIE Hernandez

## 2020-11-13 ENCOUNTER — NURSE TRIAGE (OUTPATIENT)
Dept: FAMILY MEDICINE | Facility: CLINIC | Age: 40
End: 2020-11-13

## 2020-11-13 NOTE — TELEPHONE ENCOUNTER
An  was used for this call. Patient unable to get a ride to urgent care but stated she will talk to her  and ask if he can leave work. Provided red flags of shortness of breath, cough, dizziness, weakness, palpitations and to call 911 immediately. Patient verbalized understanding    Additional Information    Negative: Severe difficulty breathing (e.g., struggling for each breath, speaks in single words)    Negative: Passed out (i.e., fainted, collapsed and was not responding)    Negative: Chest pain lasting longer than 5 minutes and ANY of the following:* Over 50 years old* Over 30 years old and at least one cardiac risk factor (i.e., high blood pressure, diabetes, high cholesterol, obesity, smoker or strong family history of heart disease)* Pain is crushing, pressure-like, or heavy * Took nitroglycerin and chest pain was not relieved* History of heart disease (i.e., angina, heart attack, bypass surgery, angioplasty, CHF)    Negative: Sounds like a life-threatening emergency to the triager    Negative: Visible sweat on face or sweat dripping down face    Negative: Pain also present in shoulder(s) or arm(s) or jaw    Negative: Difficulty breathing    Negative: Cocaine use within last 3 days    Negative: History of prior 'blood clot' in leg or lungs (i.e., deep vein thrombosis, pulmonary embolism)    Negative: Recent illness requiring prolonged bed rest (i.e., immobilization)    Negative: Hip or leg fracture in past 2 months (e.g, or had cast on leg or ankle)    Negative: Major surgery in the past month    Negative: Recent long-distance travel with prolonged time in car, bus, plane, or train (i.e., within past 2 weeks; 6 or more hours duration)    Negative: Heart beating irregularly or very rapidly    Chest pain lasting longer than 5 minutes     Chest pain comes and goes but lasts longer than five minutes at onset. Stated chest pain is moderate, feels like stabbing in the chest and difficulty  "standing up straight when walking.    Negative: SEVERE chest pain    Answer Assessment - Initial Assessment Questions  1. LOCATION: \"Where does it hurt?\"        Center of chest under rib cage,   2. RADIATION: \"Does the pain go anywhere else?\" (e.g., into neck, jaw, arms, back)      Radiates to back when applying icy hot  3. ONSET: \"When did the chest pain begin?\" (Minutes, hours or days)       Yesterday 11AM  4. PATTERN \"Does the pain come and go, or has it been constant since it started?\"  \"Does it get worse with exertion?\"       Comes and goes, worse today and unable to stand up straight   5. DURATION: \"How long does it last\" (e.g., seconds, minutes, hours)      hours  6. SEVERITY: \"How bad is the pain?\"  (e.g., Scale 1-10; mild, moderate, or severe)  Stabbing pain in center of chest, pain moderate as patient able to do activities but unable to stand up straight when walking from pain.        - MILD (1-3): doesn't interfere with normal activities      - MODERATE (4-7): interferes with normal activities or awakens from sleep     - SEVERE (8-10): excruciating pain, unable to do any normal activities          7. CARDIAC RISK FACTORS: \"Do you have any history of heart problems or risk factors for heart disease?\" (e.g., prior heart attack, angina; high blood pressure, diabetes, being overweight, high cholesterol, smoking, or strong family history of heart disease)      Denies  8. PULMONARY RISK FACTORS: \"Do you have any history of lung disease?\"  (e.g., blood clots in lung, asthma, emphysema, birth control pills)      Denies  9. CAUSE: \"What do you think is causing the chest pain?\"      Depression per patient  10. OTHER SYMPTOMS: \"Do you have any other symptoms?\" (e.g., dizziness, nausea, vomiting, sweating, fever, difficulty breathing, cough)        Denies  11. PREGNANCY: \"Is there any chance you are pregnant?\" \"When was your last menstrual period?\"        Denies    Protocols used: CHEST PAIN-A-OH    "

## 2020-11-13 NOTE — TELEPHONE ENCOUNTER
[facility] phone call message-patient reporting a symptom:     Symptom: Chest pain     Same Day Visit Offered: Yes, accepted and schedule for 11/16/2020    Additional comments: Daughter states patient has been having chest pain for a few days. Appt scheduled for Monday, Daughter would like for nurse to reach out to patient, hmong  will be needed. Patient phone number is [phone]. Please call and advise.     OK to leave message on voice mail? Yes    Primary language: Hmong      needed? Yes    Call taken on November 13, 2020 at 10:09 AM by Yancy Joaquin

## 2020-12-04 ENCOUNTER — TELEPHONE (OUTPATIENT)
Dept: FAMILY MEDICINE | Facility: CLINIC | Age: 40
End: 2020-12-04

## 2021-05-31 VITALS — HEIGHT: 60 IN | WEIGHT: 142 LBS | BODY MASS INDEX: 27.88 KG/M2

## 2021-06-13 NOTE — PROGRESS NOTES
Dr. Jenkins notified of reactive category1 nst and bpp 8/8 with sade 16.4, and fetal umbilical cord doppler wnl. Pt discharged to home

## 2021-07-14 PROBLEM — O21.9 NAUSEA AND VOMITING DURING PREGNANCY: Status: RESOLVED | Noted: 2017-06-01 | Resolved: 2017-11-16

## 2022-05-24 ENCOUNTER — OFFICE VISIT (OUTPATIENT)
Dept: FAMILY MEDICINE | Facility: CLINIC | Age: 42
End: 2022-05-24
Payer: COMMERCIAL

## 2022-05-24 VITALS
DIASTOLIC BLOOD PRESSURE: 77 MMHG | RESPIRATION RATE: 20 BRPM | SYSTOLIC BLOOD PRESSURE: 116 MMHG | BODY MASS INDEX: 25.66 KG/M2 | HEART RATE: 98 BPM | TEMPERATURE: 99.5 F | OXYGEN SATURATION: 99 % | WEIGHT: 129 LBS

## 2022-05-24 DIAGNOSIS — K50.00 TERMINAL ILEITIS WITHOUT COMPLICATION (H): Primary | ICD-10-CM

## 2022-05-24 LAB
C REACTIVE PROTEIN LHE: 6.1 MG/DL (ref 0–0.8)
ERYTHROCYTE [DISTWIDTH] IN BLOOD BY AUTOMATED COUNT: 13 % (ref 10–15)
ERYTHROCYTE [SEDIMENTATION RATE] IN BLOOD BY WESTERGREN METHOD: 90 MM/HR (ref 0–20)
HCT VFR BLD AUTO: 34.3 % (ref 35–47)
HGB BLD-MCNC: 10.8 G/DL (ref 11.7–15.7)
MCH RBC QN AUTO: 27.8 PG (ref 26.5–33)
MCHC RBC AUTO-ENTMCNC: 31.5 G/DL (ref 31.5–36.5)
MCV RBC AUTO: 88 FL (ref 78–100)
PLATELET # BLD AUTO: 380 10E3/UL (ref 150–450)
RBC # BLD AUTO: 3.89 10E6/UL (ref 3.8–5.2)
WBC # BLD AUTO: 9.4 10E3/UL (ref 4–11)

## 2022-05-24 PROCEDURE — 86140 C-REACTIVE PROTEIN: CPT

## 2022-05-24 PROCEDURE — 85027 COMPLETE CBC AUTOMATED: CPT

## 2022-05-24 PROCEDURE — 85652 RBC SED RATE AUTOMATED: CPT

## 2022-05-24 PROCEDURE — T1013 SIGN LANG/ORAL INTERPRETER: HCPCS

## 2022-05-24 PROCEDURE — 36415 COLL VENOUS BLD VENIPUNCTURE: CPT

## 2022-05-24 PROCEDURE — 99204 OFFICE O/P NEW MOD 45 MIN: CPT | Mod: 25

## 2022-05-24 RX ORDER — PREDNISONE 10 MG/1
10 TABLET ORAL DAILY
Qty: 30 TABLET | Refills: 0 | Status: ON HOLD | OUTPATIENT
Start: 2022-05-24 | End: 2022-06-03

## 2022-05-24 RX ORDER — OMEPRAZOLE 40 MG/1
40 CAPSULE, DELAYED RELEASE ORAL DAILY
Qty: 30 CAPSULE | Refills: 3 | Status: SHIPPED | OUTPATIENT
Start: 2022-05-24 | End: 2022-11-22

## 2022-05-24 NOTE — PROGRESS NOTES
Preceptor Attestation:   Patient seen, evaluated and discussed with the resident. I have verified the content of the note, which accurately reflects my assessment of the patient and the plan of care.  Supervising Physician:Izabela Gates MD  [facility]

## 2022-05-24 NOTE — NURSING NOTE
Due to patient being non-English speaking/uses sign language, an  was used for this visit. Only for face-to-face interpretation by an external agency, date and length of interpretation can be found on the scanned worksheet.     name: Corey  Agency: [employer]  Language: Junito   Telephone number:   Type of interpretation: Face-to-face, spoken

## 2022-05-24 NOTE — PROGRESS NOTES
Assessment & Plan     Terminal ileitis without complication (H)  5 weeks of abdominal pain with nausea/vomitting, bloody diarrhea, weight loss.  CT abdomen from urgency room one month ago suggestive of infectious or inflammatory ileitis. Bloody diarrhea with inflammatory changes concerning for inflammatory bowel disease such as Crohn's.  Less likely infectious with prolonged course/ no systemic symptoms.  Low suspicion for appendicitis or cholecystitis with grossly benign abdominal exam.  With ongoing bloody stool, will check a CBC.  Also obtain CRP and sed rate.  We will start on a short prednisone taper and PPI. Referral to GI for further evaluation.  Will likely need endoscopy for definitive diagnosis.  Follow-up precautions provided.  - CBC with platelets  - C-Reactive Protein  - Erythrocyte Sed Rate  - omeprazole (PRILOSEC) 40 MG DR capsule  Dispense: 30 capsule; Refill: 3  - predniSONE (DELTASONE) 10 MG tablet  Dispense: 30 tablet; Refill: 0 (taper)  - Adult Gastro Ref - Consult Only      No follow-ups on file.    Jefe Martin MD  [facility]    Carmel Gautam is a 41 year old who presents for the following health issues  accompanied by her .    HPI     Patient presents with 5 weeks of abdominal pain.  Describes as a swelling sensation in her epigastric region that has now radiated to her lower abdomen.  Rates it 10 out of 10 in severity, however has been feeling a little better over the past few weeks.  Symptoms do not change with food intake.  She is having loose stools every 2-3 hours, mixed with small amount of blood.  Bleeding was more significant early on in the illness.  Associated nausea and vomiting.  Vomit is nonbloody non bilious and occurs following bowel movements.     Patient has been feeling more dizzy and lightheaded recently.  Having difficulty at work.  No falls.  No vision changes.  Also describes feeling weak and losing weight.  She is on  sure how much weight she has lost (10 lbs over the past 5 weeks per chart review). She denies any dysuria, hematuria, urinary frequency or urgency.  No history of abdominal surgery.    She was seen in the urgency room on 4/14.  CT abdomen at that visit showing ileitis.  Also noted to have a white count of 12.4, hemoglobin 13.3.  UA was unremarkable.  It was recommended she follow-up with her PCP the following day for further evaluation, however was unable to due to her work schedule.    Review of Systems   Constitutional, HEENT, cardiovascular, pulmonary, gi and gu systems are negative, except as otherwise noted.      Objective    /77   Pulse 98   Temp 99.5  F (37.5  C) (Oral)   Resp 20   Wt 58.5 kg (129 lb)   SpO2 99%   BMI 25.66 kg/m    Body mass index is 25.66 kg/m .  Physical Exam   GENERAL: healthy, alert, appears uncomfortable   HEENT: Moist mucus membranes.  NECK: no adenopathy, no asymmetry, masses, or scars and thyroid normal to palpation  RESP: lungs clear to auscultation - no rales, rhonchi or wheezes  CV: regular rate and rhythm, normal S1 S2, no S3 or S4, Soft systolic murmur. No click or rub, no peripheral edema  ABDOMEN: soft, non distended. Bowel sounds present. No hepatosplenomegaly. Diffuse mild tenderness. No guarding. No rebound tenderness.   MS: no gross musculoskeletal defects noted, no edema

## 2022-05-24 NOTE — PATIENT INSTRUCTIONS
Prednisone taper   Take 40mg (4 pills) for 3 days 5/24-5/26      Take 30mg (3 pills) for 3 days 5/27- 5/29    Take 20mg (2 pills) for 3 days  5/30 - 6/01  Take 10mg (1 pill) for 3 days  6/02 - 6/04

## 2022-05-27 ENCOUNTER — TELEPHONE (OUTPATIENT)
Dept: GASTROENTEROLOGY | Facility: CLINIC | Age: 42
End: 2022-05-27
Payer: COMMERCIAL

## 2022-05-27 NOTE — TELEPHONE ENCOUNTER
[facility]    Phone Message    May a detailed message be left on voicemail: yes     Reason for Call: Appointment Intake    Referring Provider Name: Izabela Gates MD  Diagnosis and/or Symptoms: Terminal ileitis without complication (H) [K50.00]    Patient is being referred for Terminal ileitis and dx is not in guidelines. Please review for further evaluation. Thanks!     Action Taken: Message routed to:  [facility] GI    Travel Screening: Not Applicable

## 2022-06-01 ENCOUNTER — HOSPITAL ENCOUNTER (OUTPATIENT)
Facility: HOSPITAL | Age: 42
Setting detail: OBSERVATION
Discharge: HOME | End: 2022-06-03
Attending: EMERGENCY MEDICINE | Admitting: FAMILY MEDICINE
Payer: COMMERCIAL

## 2022-06-01 DIAGNOSIS — R19.7 DIARRHEA, UNSPECIFIED TYPE: ICD-10-CM

## 2022-06-01 DIAGNOSIS — N30.01 ACUTE CYSTITIS WITH HEMATURIA: ICD-10-CM

## 2022-06-01 DIAGNOSIS — R10.84 ABDOMINAL PAIN, GENERALIZED: ICD-10-CM

## 2022-06-01 DIAGNOSIS — R11.0 NAUSEA: ICD-10-CM

## 2022-06-01 DIAGNOSIS — R50.9 FEVER, UNSPECIFIED FEVER CAUSE: ICD-10-CM

## 2022-06-01 DIAGNOSIS — R10.84 GENERALIZED ABDOMINAL PAIN: Primary | ICD-10-CM

## 2022-06-01 DIAGNOSIS — K52.9 INFLAMMATION OF SMALL INTESTINE: ICD-10-CM

## 2022-06-01 LAB
ALBUMIN SERPL-MCNC: 2.8 G/DL (ref 3.5–5)
ALBUMIN UR-MCNC: 50 MG/DL
ALP SERPL-CCNC: 100 U/L (ref 45–120)
ALT SERPL W P-5'-P-CCNC: 13 U/L (ref 0–45)
AMORPH CRY #/AREA URNS HPF: ABNORMAL /HPF
ANION GAP SERPL CALCULATED.3IONS-SCNC: 9 MMOL/L (ref 5–18)
APPEARANCE UR: ABNORMAL
AST SERPL W P-5'-P-CCNC: 14 U/L (ref 0–40)
BASOPHILS # BLD AUTO: 0.1 10E3/UL (ref 0–0.2)
BASOPHILS NFR BLD AUTO: 0 %
BILIRUB DIRECT SERPL-MCNC: 0.2 MG/DL
BILIRUB SERPL-MCNC: 0.4 MG/DL (ref 0–1)
BILIRUB UR QL STRIP: NEGATIVE
BUN SERPL-MCNC: 6 MG/DL (ref 8–22)
CALCIUM SERPL-MCNC: 8.6 MG/DL (ref 8.5–10.5)
CHLORIDE BLD-SCNC: 106 MMOL/L (ref 98–107)
CO2 SERPL-SCNC: 25 MMOL/L (ref 22–31)
COLOR UR AUTO: YELLOW
CREAT SERPL-MCNC: 0.66 MG/DL (ref 0.6–1.1)
EOSINOPHIL # BLD AUTO: 0.2 10E3/UL (ref 0–0.7)
EOSINOPHIL NFR BLD AUTO: 2 %
ERYTHROCYTE [DISTWIDTH] IN BLOOD BY AUTOMATED COUNT: 12.9 % (ref 10–15)
GFR SERPL CREATININE-BSD FRML MDRD: >90 ML/MIN/1.73M2
GLUCOSE BLD-MCNC: 104 MG/DL (ref 70–125)
GLUCOSE UR STRIP-MCNC: NEGATIVE MG/DL
HCG UR QL: NEGATIVE
HCT VFR BLD AUTO: 35.9 % (ref 35–47)
HGB BLD-MCNC: 11.2 G/DL (ref 11.7–15.7)
HGB UR QL STRIP: ABNORMAL
IMM GRANULOCYTES # BLD: 0.1 10E3/UL
IMM GRANULOCYTES NFR BLD: 1 %
KETONES UR STRIP-MCNC: NEGATIVE MG/DL
LEUKOCYTE ESTERASE UR QL STRIP: ABNORMAL
LIPASE SERPL-CCNC: 33 U/L (ref 0–52)
LYMPHOCYTES # BLD AUTO: 1.6 10E3/UL (ref 0.8–5.3)
LYMPHOCYTES NFR BLD AUTO: 14 %
MCH RBC QN AUTO: 26.9 PG (ref 26.5–33)
MCHC RBC AUTO-ENTMCNC: 31.2 G/DL (ref 31.5–36.5)
MCV RBC AUTO: 86 FL (ref 78–100)
MONOCYTES # BLD AUTO: 0.6 10E3/UL (ref 0–1.3)
MONOCYTES NFR BLD AUTO: 5 %
MUCOUS THREADS #/AREA URNS LPF: PRESENT /LPF
NEUTROPHILS # BLD AUTO: 8.8 10E3/UL (ref 1.6–8.3)
NEUTROPHILS NFR BLD AUTO: 78 %
NITRATE UR QL: NEGATIVE
NRBC # BLD AUTO: 0 10E3/UL
NRBC BLD AUTO-RTO: 0 /100
PH UR STRIP: 6 [PH] (ref 5–7)
PLATELET # BLD AUTO: 439 10E3/UL (ref 150–450)
POTASSIUM BLD-SCNC: 3.6 MMOL/L (ref 3.5–5)
PROT SERPL-MCNC: 8.1 G/DL (ref 6–8)
RBC # BLD AUTO: 4.16 10E6/UL (ref 3.8–5.2)
RBC URINE: 16 /HPF
SODIUM SERPL-SCNC: 140 MMOL/L (ref 136–145)
SP GR UR STRIP: 1.02 (ref 1–1.03)
SQUAMOUS EPITHELIAL: 46 /HPF
UROBILINOGEN UR STRIP-MCNC: <2 MG/DL
WBC # BLD AUTO: 11.2 10E3/UL (ref 4–11)
WBC CLUMPS #/AREA URNS HPF: PRESENT /HPF
WBC URINE: 71 /HPF

## 2022-06-01 PROCEDURE — 83690 ASSAY OF LIPASE: CPT | Performed by: EMERGENCY MEDICINE

## 2022-06-01 PROCEDURE — 96375 TX/PRO/DX INJ NEW DRUG ADDON: CPT

## 2022-06-01 PROCEDURE — 258N000003 HC RX IP 258 OP 636: Performed by: EMERGENCY MEDICINE

## 2022-06-01 PROCEDURE — 96361 HYDRATE IV INFUSION ADD-ON: CPT

## 2022-06-01 PROCEDURE — 250N000013 HC RX MED GY IP 250 OP 250 PS 637: Performed by: EMERGENCY MEDICINE

## 2022-06-01 PROCEDURE — 82248 BILIRUBIN DIRECT: CPT | Performed by: STUDENT IN AN ORGANIZED HEALTH CARE EDUCATION/TRAINING PROGRAM

## 2022-06-01 PROCEDURE — 87086 URINE CULTURE/COLONY COUNT: CPT | Performed by: EMERGENCY MEDICINE

## 2022-06-01 PROCEDURE — 99285 EMERGENCY DEPT VISIT HI MDM: CPT | Mod: 25

## 2022-06-01 PROCEDURE — 81025 URINE PREGNANCY TEST: CPT | Performed by: EMERGENCY MEDICINE

## 2022-06-01 PROCEDURE — 81001 URINALYSIS AUTO W/SCOPE: CPT | Performed by: EMERGENCY MEDICINE

## 2022-06-01 PROCEDURE — 36415 COLL VENOUS BLD VENIPUNCTURE: CPT | Performed by: STUDENT IN AN ORGANIZED HEALTH CARE EDUCATION/TRAINING PROGRAM

## 2022-06-01 PROCEDURE — 96365 THER/PROPH/DIAG IV INF INIT: CPT

## 2022-06-01 PROCEDURE — 250N000011 HC RX IP 250 OP 636: Performed by: EMERGENCY MEDICINE

## 2022-06-01 PROCEDURE — 82248 BILIRUBIN DIRECT: CPT | Performed by: EMERGENCY MEDICINE

## 2022-06-01 PROCEDURE — 85025 COMPLETE CBC W/AUTO DIFF WBC: CPT | Performed by: STUDENT IN AN ORGANIZED HEALTH CARE EDUCATION/TRAINING PROGRAM

## 2022-06-01 PROCEDURE — 81025 URINE PREGNANCY TEST: CPT | Performed by: STUDENT IN AN ORGANIZED HEALTH CARE EDUCATION/TRAINING PROGRAM

## 2022-06-01 PROCEDURE — C9803 HOPD COVID-19 SPEC COLLECT: HCPCS

## 2022-06-01 PROCEDURE — 83690 ASSAY OF LIPASE: CPT | Performed by: STUDENT IN AN ORGANIZED HEALTH CARE EDUCATION/TRAINING PROGRAM

## 2022-06-01 PROCEDURE — 81001 URINALYSIS AUTO W/SCOPE: CPT | Performed by: STUDENT IN AN ORGANIZED HEALTH CARE EDUCATION/TRAINING PROGRAM

## 2022-06-01 PROCEDURE — 85025 COMPLETE CBC W/AUTO DIFF WBC: CPT | Performed by: EMERGENCY MEDICINE

## 2022-06-01 RX ORDER — CEFTRIAXONE 1 G/1
1 INJECTION, POWDER, FOR SOLUTION INTRAMUSCULAR; INTRAVENOUS ONCE
Status: COMPLETED | OUTPATIENT
Start: 2022-06-01 | End: 2022-06-02

## 2022-06-01 RX ORDER — MORPHINE SULFATE 4 MG/ML
4 INJECTION, SOLUTION INTRAMUSCULAR; INTRAVENOUS
Status: COMPLETED | OUTPATIENT
Start: 2022-06-01 | End: 2022-06-02

## 2022-06-01 RX ORDER — ONDANSETRON 2 MG/ML
4 INJECTION INTRAMUSCULAR; INTRAVENOUS EVERY 30 MIN PRN
Status: COMPLETED | OUTPATIENT
Start: 2022-06-01 | End: 2022-06-02

## 2022-06-01 RX ADMIN — SODIUM CHLORIDE 500 ML: 9 INJECTION, SOLUTION INTRAVENOUS at 22:01

## 2022-06-01 RX ADMIN — SODIUM CHLORIDE 1000 ML: 9 INJECTION, SOLUTION INTRAVENOUS at 23:32

## 2022-06-01 RX ADMIN — ONDANSETRON 4 MG: 2 INJECTION INTRAMUSCULAR; INTRAVENOUS at 21:57

## 2022-06-01 RX ADMIN — CEFTRIAXONE SODIUM 1 G: 1 INJECTION, POWDER, FOR SOLUTION INTRAMUSCULAR; INTRAVENOUS at 23:32

## 2022-06-01 RX ADMIN — ACETAMINOPHEN 650 MG: 325 SOLUTION ORAL at 21:56

## 2022-06-01 ASSESSMENT — ENCOUNTER SYMPTOMS
NAUSEA: 1
FEVER: 1
BACK PAIN: 0
ABDOMINAL PAIN: 1

## 2022-06-01 NOTE — LETTER
WORK/SCHOOL FORM    6/3/2022    Re: Dain Tejeda  1980      To Whom It May Concern:     Dain Tejeda was hospitalized 6/1-6/3/2022. She may return to work without restrictions on 6/4/22. She will require follow up clinic appointments and should be excused for these as well            Kem Dejesus MD, PGY1   [facility]   Pgr: [phone]    6/3/2022 12:32 PM

## 2022-06-02 ENCOUNTER — APPOINTMENT (OUTPATIENT)
Dept: CT IMAGING | Facility: HOSPITAL | Age: 42
End: 2022-06-02
Attending: EMERGENCY MEDICINE
Payer: COMMERCIAL

## 2022-06-02 PROBLEM — N30.01 ACUTE CYSTITIS WITH HEMATURIA: Status: ACTIVE | Noted: 2022-06-02

## 2022-06-02 PROBLEM — R10.84 ABDOMINAL PAIN, GENERALIZED: Status: ACTIVE | Noted: 2022-06-02

## 2022-06-02 PROBLEM — R10.9 ABDOMINAL PAIN: Status: ACTIVE | Noted: 2022-06-02

## 2022-06-02 PROBLEM — K52.9 INFLAMMATION OF SMALL INTESTINE: Status: ACTIVE | Noted: 2022-06-02

## 2022-06-02 LAB
ALBUMIN SERPL-MCNC: 2.3 G/DL (ref 3.5–5)
ALP SERPL-CCNC: 83 U/L (ref 45–120)
ALT SERPL W P-5'-P-CCNC: 11 U/L (ref 0–45)
ANION GAP SERPL CALCULATED.3IONS-SCNC: 8 MMOL/L (ref 5–18)
AST SERPL W P-5'-P-CCNC: 13 U/L (ref 0–40)
BILIRUB SERPL-MCNC: 0.4 MG/DL (ref 0–1)
BUN SERPL-MCNC: 4 MG/DL (ref 8–22)
CALCIUM SERPL-MCNC: 7.7 MG/DL (ref 8.5–10.5)
CHLORIDE BLD-SCNC: 110 MMOL/L (ref 98–107)
CO2 SERPL-SCNC: 22 MMOL/L (ref 22–31)
CREAT SERPL-MCNC: 0.61 MG/DL (ref 0.6–1.1)
ERYTHROCYTE [DISTWIDTH] IN BLOOD BY AUTOMATED COUNT: 13 % (ref 10–15)
GFR SERPL CREATININE-BSD FRML MDRD: >90 ML/MIN/1.73M2
GLUCOSE BLD-MCNC: 95 MG/DL (ref 70–125)
HCT VFR BLD AUTO: 31.2 % (ref 35–47)
HGB BLD-MCNC: 9.9 G/DL (ref 11.7–15.7)
LACTATE SERPL-SCNC: 0.5 MMOL/L (ref 0.7–2)
MCH RBC QN AUTO: 27.6 PG (ref 26.5–33)
MCHC RBC AUTO-ENTMCNC: 31.7 G/DL (ref 31.5–36.5)
MCV RBC AUTO: 87 FL (ref 78–100)
PLATELET # BLD AUTO: 362 10E3/UL (ref 150–450)
POTASSIUM BLD-SCNC: 3.4 MMOL/L (ref 3.5–5)
PROT SERPL-MCNC: 6.8 G/DL (ref 6–8)
RBC # BLD AUTO: 3.59 10E6/UL (ref 3.8–5.2)
SARS-COV-2 RNA RESP QL NAA+PROBE: NEGATIVE
SODIUM SERPL-SCNC: 140 MMOL/L (ref 136–145)
WBC # BLD AUTO: 11.5 10E3/UL (ref 4–11)

## 2022-06-02 PROCEDURE — 83605 ASSAY OF LACTIC ACID: CPT | Performed by: STUDENT IN AN ORGANIZED HEALTH CARE EDUCATION/TRAINING PROGRAM

## 2022-06-02 PROCEDURE — 87493 C DIFF AMPLIFIED PROBE: CPT | Mod: XU | Performed by: STUDENT IN AN ORGANIZED HEALTH CARE EDUCATION/TRAINING PROGRAM

## 2022-06-02 PROCEDURE — 74176 CT ABD & PELVIS W/O CONTRAST: CPT

## 2022-06-02 PROCEDURE — 96361 HYDRATE IV INFUSION ADD-ON: CPT

## 2022-06-02 PROCEDURE — 85027 COMPLETE CBC AUTOMATED: CPT | Performed by: STUDENT IN AN ORGANIZED HEALTH CARE EDUCATION/TRAINING PROGRAM

## 2022-06-02 PROCEDURE — 80053 COMPREHEN METABOLIC PANEL: CPT | Performed by: STUDENT IN AN ORGANIZED HEALTH CARE EDUCATION/TRAINING PROGRAM

## 2022-06-02 PROCEDURE — G0378 HOSPITAL OBSERVATION PER HR: HCPCS

## 2022-06-02 PROCEDURE — 250N000011 HC RX IP 250 OP 636: Performed by: EMERGENCY MEDICINE

## 2022-06-02 PROCEDURE — 258N000003 HC RX IP 258 OP 636: Performed by: STUDENT IN AN ORGANIZED HEALTH CARE EDUCATION/TRAINING PROGRAM

## 2022-06-02 PROCEDURE — 96375 TX/PRO/DX INJ NEW DRUG ADDON: CPT

## 2022-06-02 PROCEDURE — 96376 TX/PRO/DX INJ SAME DRUG ADON: CPT

## 2022-06-02 PROCEDURE — 87635 SARS-COV-2 COVID-19 AMP PRB: CPT | Performed by: STUDENT IN AN ORGANIZED HEALTH CARE EDUCATION/TRAINING PROGRAM

## 2022-06-02 PROCEDURE — 250N000013 HC RX MED GY IP 250 OP 250 PS 637: Performed by: MASSAGE THERAPIST

## 2022-06-02 PROCEDURE — 99223 1ST HOSP IP/OBS HIGH 75: CPT | Mod: AI | Performed by: STUDENT IN AN ORGANIZED HEALTH CARE EDUCATION/TRAINING PROGRAM

## 2022-06-02 PROCEDURE — 250N000011 HC RX IP 250 OP 636: Performed by: STUDENT IN AN ORGANIZED HEALTH CARE EDUCATION/TRAINING PROGRAM

## 2022-06-02 PROCEDURE — 96366 THER/PROPH/DIAG IV INF ADDON: CPT

## 2022-06-02 PROCEDURE — 210N000001 HC R&B IMCU HEART CARE

## 2022-06-02 PROCEDURE — 87506 IADNA-DNA/RNA PROBE TQ 6-11: CPT | Performed by: STUDENT IN AN ORGANIZED HEALTH CARE EDUCATION/TRAINING PROGRAM

## 2022-06-02 PROCEDURE — 36415 COLL VENOUS BLD VENIPUNCTURE: CPT | Performed by: STUDENT IN AN ORGANIZED HEALTH CARE EDUCATION/TRAINING PROGRAM

## 2022-06-02 RX ORDER — ONDANSETRON 2 MG/ML
4 INJECTION INTRAMUSCULAR; INTRAVENOUS EVERY 6 HOURS PRN
Status: DISCONTINUED | OUTPATIENT
Start: 2022-06-02 | End: 2022-06-03 | Stop reason: HOSPADM

## 2022-06-02 RX ORDER — ACETAMINOPHEN 325 MG/1
650 TABLET ORAL EVERY 4 HOURS PRN
Status: DISCONTINUED | OUTPATIENT
Start: 2022-06-02 | End: 2022-06-03 | Stop reason: HOSPADM

## 2022-06-02 RX ORDER — PANTOPRAZOLE SODIUM 40 MG/1
40 TABLET, DELAYED RELEASE ORAL
Status: DISCONTINUED | OUTPATIENT
Start: 2022-06-02 | End: 2022-06-03 | Stop reason: HOSPADM

## 2022-06-02 RX ORDER — LOPERAMIDE HCL 2 MG
2 CAPSULE ORAL 4 TIMES DAILY PRN
Status: DISCONTINUED | OUTPATIENT
Start: 2022-06-02 | End: 2022-06-03 | Stop reason: HOSPADM

## 2022-06-02 RX ORDER — CEFTRIAXONE 1 G/1
1 INJECTION, POWDER, FOR SOLUTION INTRAMUSCULAR; INTRAVENOUS EVERY 24 HOURS
Status: DISCONTINUED | OUTPATIENT
Start: 2022-06-02 | End: 2022-06-03 | Stop reason: HOSPADM

## 2022-06-02 RX ORDER — LIDOCAINE 40 MG/G
CREAM TOPICAL
Status: DISCONTINUED | OUTPATIENT
Start: 2022-06-02 | End: 2022-06-03 | Stop reason: HOSPADM

## 2022-06-02 RX ORDER — MORPHINE SULFATE 2 MG/ML
2 INJECTION, SOLUTION INTRAMUSCULAR; INTRAVENOUS EVERY 4 HOURS PRN
Status: DISCONTINUED | OUTPATIENT
Start: 2022-06-02 | End: 2022-06-03 | Stop reason: HOSPADM

## 2022-06-02 RX ORDER — SODIUM CHLORIDE 9 MG/ML
INJECTION, SOLUTION INTRAVENOUS CONTINUOUS
Status: DISCONTINUED | OUTPATIENT
Start: 2022-06-02 | End: 2022-06-03

## 2022-06-02 RX ADMIN — LOPERAMIDE HYDROCHLORIDE 2 MG: 2 CAPSULE ORAL at 21:28

## 2022-06-02 RX ADMIN — ONDANSETRON 4 MG: 2 INJECTION INTRAMUSCULAR; INTRAVENOUS at 05:15

## 2022-06-02 RX ADMIN — SODIUM CHLORIDE: 9 INJECTION, SOLUTION INTRAVENOUS at 15:17

## 2022-06-02 RX ADMIN — SODIUM CHLORIDE: 9 INJECTION, SOLUTION INTRAVENOUS at 04:43

## 2022-06-02 RX ADMIN — MORPHINE SULFATE 4 MG: 4 INJECTION, SOLUTION INTRAMUSCULAR; INTRAVENOUS at 00:13

## 2022-06-02 RX ADMIN — ONDANSETRON 4 MG: 2 INJECTION INTRAMUSCULAR; INTRAVENOUS at 00:13

## 2022-06-02 RX ADMIN — MORPHINE SULFATE 2 MG: 2 INJECTION, SOLUTION INTRAMUSCULAR; INTRAVENOUS at 05:17

## 2022-06-02 RX ADMIN — CEFTRIAXONE SODIUM 1 G: 1 INJECTION, POWDER, FOR SOLUTION INTRAMUSCULAR; INTRAVENOUS at 21:10

## 2022-06-02 RX ADMIN — LOPERAMIDE HYDROCHLORIDE 2 MG: 2 CAPSULE ORAL at 22:38

## 2022-06-02 RX ADMIN — PANTOPRAZOLE SODIUM 40 MG: 40 TABLET, DELAYED RELEASE ORAL at 19:08

## 2022-06-02 RX ADMIN — ONDANSETRON 4 MG: 2 INJECTION INTRAMUSCULAR; INTRAVENOUS at 05:16

## 2022-06-02 ASSESSMENT — ACTIVITIES OF DAILY LIVING (ADL)
ADLS_ACUITY_SCORE: 35
DIFFICULTY_COMMUNICATING: NO
FALL_HISTORY_WITHIN_LAST_SIX_MONTHS: NO
TOILETING: 0-->INDEPENDENT
DIFFICULTY_EATING/SWALLOWING: NO
ADLS_ACUITY_SCORE: 35
TOILETING: 0-->INDEPENDENT
DRESSING/BATHING_DIFFICULTY: NO
ADLS_ACUITY_SCORE: 35
CONCENTRATING,_REMEMBERING_OR_MAKING_DECISIONS_DIFFICULTY: NO
HEARING_DIFFICULTY_OR_DEAF: NO
ADLS_ACUITY_SCORE: 35
DOING_ERRANDS_INDEPENDENTLY_DIFFICULTY: NO
WEAR_GLASSES_OR_BLIND: NO
WALKING_OR_CLIMBING_STAIRS_DIFFICULTY: NO
ADLS_ACUITY_SCORE: 35
TOILETING_ISSUES: YES;NO
DEPENDENT_IADLS:: INDEPENDENT
ADLS_ACUITY_SCORE: 35
CHANGE_IN_FUNCTIONAL_STATUS_SINCE_ONSET_OF_CURRENT_ILLNESS/INJURY: NO
ADLS_ACUITY_SCORE: 35

## 2022-06-02 NOTE — H&P
[facility]    History and Physical - Hospitalist Service       Date of Admission:  6/1/2022    Assessment & Plan      Dain Tejeda is a 41 year old female admitted on 6/1/2022. She has no pertinent PMH history and is admitted for abdominal pain.    Abdominal pain  N/V  Bloody diarrhea  Reports roughly 6 weeks of the above symptoms.  Roughly 10 stools per day, with bright red blood mixed with watery stool.  No family history of IBD, no sick contacts, no fevers/chills. CT abdomen from 4/14 demonstrating terminal ileitis with surrounding mesenteric lymphadenopathy.  Saw PCP, Dr. Martin on 5/24, found to have elevated CRP to 6.1, ESR to 90.  Sent with a prednisone taper, which she thinks slightly improved her symptoms on day 1, though gave her some chest tightness.  Since then, she does not feel like she has been able to digest it before she vomits.  Was referred to GI, but they are booking out to July.  On admission, vitally well aside from fever to 101.3.  Diffuse moderate TTP.  Bowel sounds diminished but normal pitch.  Passing gas.  Labs unremarkable.  CT on admission demonstrating twisting in the mesentery which is new compared to CT on 4/14; could represent an internal hernia, per radiologist.  Pain much improved with morphine in the ER; does not seem to fit with her symptoms.  Suspect IBD given constellation of symptoms; likely Crohn's, given CT findings.  -Lactic acid to rule out incarcerated hernia  -Consult GI  -N.p.o. in case procedure in a.m.  -Zofran, morphine, Tylenol    UTI  Reports increased urinary frequency since ER visit in mid April.  No dysuria or subjective fevers/chills, though was febrile to 101.3 on admission.  No CVA tenderness.  UA concerning for infection, with leukocyte esterase, WBC clumps, and small amount of blood, though many squames present so likely a contaminated sample.  WBC slightly elevated to 11.2.  No CT evidence of pyelo.  Received 1 dose of ceftriaxone  in the ER.  -Continue ceftriaxone 1 g every 24 hours    Elevated gamma gap  Unclear origin.  Admission total protein 8.1, albumin 2.8, making gamma gap 5.3.  Hep B and C, as well as HIV negative in 2017.  Likely no utility in working up for multiple myeloma or MGUS in the absence of typical CRAB lab findings.  -Monitor    Incidental findings  4.5 cm stable, benign-appearing left adnexal cyst identified on CT 4/15 and 6/1.  No focal adnexal TTP on exam.  -Radiologist recommends considering follow-up ultrasound in 10 to 12 weeks          Diet: NPO for Medical/Clinical Reasons Except for: No Exceptions    DVT Prophylaxis: Pneumatic Compression Devices  Chambers Catheter: Not present  Fluids: NS @ 100mL/hr  Central Lines: None  Cardiac Monitoring: None  Code Status:   full    Clinically Significant Risk Factors Present on Admission             # Hypoalbuminemia: Albumin = 2.8 g/dL (Ref range: 3.5 - 5.0 g/dL) on admission, will monitor as appropriate      # Overweight: Estimated body mass index is 25.19 kg/m  as calculated from the following:    Height as of this encounter: 1.524 m (5').    Weight as of this encounter: 58.5 kg (129 lb).      Disposition Plan   Expected Discharge: 06/05/2022   Anticipated discharge location:  Awaiting care coordination huddle  Delays:            The patient's care was discussed with the Attending Physician, Dr. Tamayo.    Steve Fish MD  Hospitalist Service  [facility]  Securely message with the Vocera Web Console (learn more here)  Text page via [facility] Paging/Directory       ______________________________________________________________________    Chief Complaint   Abdominal pain    History is obtained from the patient    History of Present Illness   Dain Tejeda is a 41 year old female who has no significant past medical history and is admitted for abdominal pain, nausea, vomiting, bloody diarrhea.  States this is been going on for about 6 weeks.  Was seen in  the ER here on 4/14 and had CT imaging demonstrating terminal ileitis.  Saw PCP, Dr. Martin on 5/24 and was referred to GI, though her appointment is not scheduled until July.  She states that she was feeling so much pain in her belly in the last 2 days that she knew she could not wait that long and had to come in.    States she is going to the bathroom about 10 times per day, and describes the stool as loose watery with bright red blood mixed in.  States she is getting up in the middle the night roughly every 2 hours to go to the bathroom.    No fevers, chills at home.  No cough, shortness of breath.    Does not think she is ever been tested for hep C, hep B, HIV    Reports increased urinary frequency since ER visit 4/14, though denies dysuria.  Feels like she is going to pee every 10 minutes.    Review of Systems    The 10 point Review of Systems is negative other than noted in the HPI or here.     Past Medical History    I have reviewed this patient's medical history and updated it with pertinent information if needed.   Past Medical History:   Diagnosis Date     Diet controlled gestational diabetes mellitus (GDM), antepartum 9/4/2017    Attempting diet control first     Nausea/vomiting in pregnancy 4/24/2017     Postpartum hemorrhage 11/16/2017     Third degree laceration of perineum during delivery, postpartum 11/15/2017        Past Surgical History   I have reviewed this patient's surgical history and updated it with pertinent information if needed.  No past surgical history on file.     Social History   I have reviewed this patient's social history and updated it with pertinent information if needed. Dain Tejeda  reports that she has never smoked. She has never used smokeless tobacco. She reports that she does not drink alcohol and does not use drugs.    Family History   I have reviewed this patient's family history and updated it with pertinent information if needed.  Family History   Problem Relation Age of  Onset     Gestational Diabetes Sister      Gestational Diabetes Sister      Diabetes No family hx of      Coronary Artery Disease No family hx of      Hypertension No family hx of      Breast Cancer No family hx of      Colon Cancer No family hx of      Prostate Cancer No family hx of      Other Cancer No family hx of        Prior to Admission Medications   Prior to Admission Medications   Prescriptions Last Dose Informant Patient Reported? Taking?   acetaminophen (TYLENOL) 500 MG tablet   No No   Sig: Take 2 tablets (1,000 mg) by mouth every 6 hours as needed for mild pain   omeprazole (PRILOSEC) 40 MG DR capsule   No No   Sig: Take 1 capsule (40 mg) by mouth daily   predniSONE (DELTASONE) 10 MG tablet   No No   Sig: Take 1 tablet (10 mg) by mouth daily Take 40mg (4 pills) for 3 days 5/24-5/26    Take 30mg (3 pills) for 3 days 5/27- 5/29  Take 20mg (2 pills) for 3 days  5/30 - 6/01 Take 10mg (1 pill) for 3 days  6/02 - 6/04      Facility-Administered Medications: None     Allergies   Allergies   Allergen Reactions     Soybean Allergy Itching and Blisters     Tofu- causes itching and sores in the mouth       Physical Exam   Vital Signs: Temp: 99.1  F (37.3  C) Temp src: Oral BP: 108/69 Pulse: 98   Resp: 14 SpO2: 99 % O2 Device: None (Room air)    Weight: 129 lbs 0 oz    Constitutional: awake, alert, cooperative, no apparent distress, and appears stated age  Eyes: Lids and lashes normal, pupils equal, round and reactive to light, extra ocular muscles intact, sclera clear, conjunctiva normal  Respiratory: No increased work of breathing, good air exchange, clear to auscultation bilaterally, no crackles or wheezing  Cardiovascular: Normal apical impulse, regular rate and rhythm, normal S1 and S2, no S3 or S4, and no murmur noted  GI: No scars, normal bowel sounds, soft, non-distended, no masses palpated, no hepatosplenomegally.  Moderate diffuse TTP throughout, nothing focal.  No adnexal tenderness bilaterally.  No  rebound or guarding.  Skin: normal skin color, texture, turgor  Musculoskeletal: There is no redness, warmth, or swelling of the joints.  Full range of motion noted.  Motor strength is 5 out of 5 all extremities bilaterally.  Tone is normal.  Neurologic: Awake, alert, oriented to name, place and time.  Cranial nerves II-XII are grossly intact.    Data   Data reviewed today: I reviewed all medications, new labs and imaging results over the last 24 hours. I personally reviewed no images or EKG's today.    Recent Results (from the past 24 hour(s))   CT Abdomen Pelvis w/o Contrast    Narrative    EXAM: CT ABDOMEN PELVIS W/O CONTRAST  LOCATION: [facility]  DATE/TIME: 6/2/2022 12:24 AM    INDICATION: Abdominal pain, acute, nonlocalized.  COMPARISON: 4/14/2022.  TECHNIQUE: CT scan of the abdomen and pelvis was performed without IV contrast. Multiplanar reformats were obtained. Dose reduction techniques were used.  CONTRAST: None.    FINDINGS:   LOWER CHEST: Dependent atelectasis in both lower lungs.    HEPATOBILIARY: Small benign calcification in the right hepatic lobe should be of doubtful significance. Liver otherwise negative. No calcified gallstones or biliary dilatation.    PANCREAS: Normal.    SPLEEN: Normal.    ADRENAL GLANDS: Normal.    KIDNEYS/BLADDER: Normal.    BOWEL: Bowel is normal in caliber with no evidence for obstruction. There is some twisting of the mesentery of the lower abdomen which has a different configuration than on the prior study. A redundant sigmoid colon also takes a different course than on   the prior study. Findings could be seen with an internal hernia, however, this is of uncertain significance in the absence of any evidence for bowel obstruction. Clinical correlation. Negative for appendicitis. Moderate amount of stool in the colon.    LYMPH NODES: Normal.    VASCULATURE: Unremarkable.    PELVIC ORGANS: Nonspecific 4.5 cm left adnexal cyst.    MUSCULOSKELETAL:  Normal.      Impression    IMPRESSION:   1.  There is some twisting seen in the mesentery of the lower abdomen which represents a change compared with the prior study. A redundant sigmoid colon also takes a different course than on the previous exam. Findings could be seen with a process such   as internal hernia and could account for the patient's symptoms. However, there is no evidence for bowel obstruction and therefore these findings are of uncertain clinical significance. If the patient develops obstructive-type symptoms or if symptoms   worsen or progress, repeat imaging with oral contrast may be helpful. Alternatively, follow-up small bowel follow-through or CT enterography could be considered in further evaluation as well.    2.  Nonspecific 4.5 cm left adnexal cyst is not significantly changed compared to 4/14/2022. In a menstruating female, this would most often be physiologic in nature but given its persistence, consider follow-up ultrasound in 10-12 weeks for   reevaluation. Follow up sooner if symptoms indicate.

## 2022-06-02 NOTE — PHARMACY-ADMISSION MEDICATION HISTORY
Pharmacy Note - Admission Medication History    Pertinent Provider Information: None     ______________________________________________________________________    Prior To Admission (PTA) med list completed and updated in EMR.       PTA Med List   Medication Sig Last Dose     acetaminophen (TYLENOL) 500 MG tablet Take 2 tablets (1,000 mg) by mouth every 6 hours as needed for mild pain  at PRN     omeprazole (PRILOSEC) 40 MG DR capsule Take 1 capsule (40 mg) by mouth daily Past Week at Unknown time     predniSONE (DELTASONE) 10 MG tablet Take 1 tablet (10 mg) by mouth daily Take 40mg (4 pills) for 3 days 5/24-5/26    Take 30mg (3 pills) for 3 days 5/27- 5/29  Take 20mg (2 pills) for 3 days  5/30 - 6/01 Take 10mg (1 pill) for 3 days  6/02 - 6/04 Past Week at Unknown time       Information source(s): [facility]  Method of interview communication: in-person    Summary of Changes to PTA Med List  New: N/A  Discontinued: N/A  Changed: N/A    Patient was asked about OTC/herbal products specifically.  PTA med list reflects this.    In the past week, patient estimated taking medication this percent of the time:  Unable to assess    Allergies were reviewed, assessed, and updated with the patient.      Patient does not use any multi-dose medications prior to admission.    The information provided in this note is only as accurate as the sources available at the time of the update(s).    Thank you for the opportunity to participate in the care of this patient.    Divina Pacheco [facility]  6/2/2022 8:38 AM

## 2022-06-02 NOTE — ED NOTES
Patient reports she has not had a BM since yesterday.  States she thought he needed to have one earlier but did not produce anything when she went to the bathroom.

## 2022-06-02 NOTE — UTILIZATION REVIEW
Admission Status; Secondary Review Determination   Under the authority of the Utilization Management Committee, the utilization review process indicated a secondary review on Dain Tejeda. The review outcome is based on review of the medical records, discussions with staff, and applying clinical experience noted on the date of the review.   (x) Inpatient Status Appropriate - This patient's medical care is consistent with medical management for inpatient care and reasonable inpatient medical practice.     RATIONALE FOR DETERMINATION   41 yr old female with no prior hx presented with abdominal pain with N/V and reports of bloody diarrhea to the ED on 6/1/22.  In April had elevated CRP and ESR and was to be on prednisone.  In ED CT with twisting in mesentery which is new from April as well as temp 101.3 and UTI.  Unclear if CT findings significant.  Reports 6-10 episodes of diarrhea a day and stool studies in progress.  Replacing electroltyes and trending HGB which has dropped since admission but also was given IVF.  WBC mild elevated despite IV abx given for UTI.   Still needing IV antiemetics and IV pain medications.  GI following.  Might need surgery evaluation pending progress with abdominal pain.    At the time of admission with the information available to the attending physician more than 2 nights Hospital complex care was anticipated, based on patient risk of adverse outcome if treated as outpatient and complex care required. Inpatient admission is appropriate based on the Medicare guidelines.   The information on this document is developed by the utilization review team in order for the business office to ensure compliance. This only denotes the appropriateness of proper admission status and does not reflect the quality of care rendered.   The definitions of Inpatient Status and Observation Status used in making the determination above are those provided in the CMS Coverage Manual, Chapter 1 and Chapter 6, section  70.4.   Sincerely,   Ene Dexter MD  Utilization Review  Physician Advisor  [facility]

## 2022-06-02 NOTE — CONSULTS
[facility]    Dain Tejeda   [address] [place] [place] [address]  41 year old female  Admission Date/Time: 6/1/2022 11:17 PM    Primary Care Provider:  Jefe Martin    Requesting Physician: Bryant Tamayo MD      CHIEF COMPLAINT:   ABD pain ,rectal bleeding  REASON FOR THE CONSULT:  Abd pain, rectal bleeding    Hmong  used for interview    HPI:     This is a nice 42 yo woman who presents for evaluation of above  She indicates that she has been ill for 6 weeks.  She can note up to 10 stools per day.  On CT in April she had terminal ileum inflammation with lymph nodes.  As she had elevated CRP and ESR, she was given prednisone.  She tells me this made her nauseated and that she really didn't take this.      Here she has been noted to have a fever.  She claims lower abd pain.  She has been noted to have a UTI.      CT shows that there was a ? Of twisting of the mesentery and sigmoid in a different position from April  Also adrenal cyst    She tells me that she has lost about 9 lbs over the two months.  She denies nausea or vomiting.  She denies black stools  She has noted fresh blood.      She denies use of NSAIDs, ill people at home,     Pain is present but has improved   She would rate her pain 8/10 on admit, now 2-3/10    At this time, she denies heartburn.  She does get some nausea, early satiety.  She denies constipation.      CT as above  Lactic acid normal        REVIEW OF SYSTEMS: 13 point review of systems is negative except that noted above.    PAST MEDICAL HISTORY:   Past Medical History:   Diagnosis Date     Diet controlled gestational diabetes mellitus (GDM), antepartum 9/4/2017    Attempting diet control first     Nausea/vomiting in pregnancy 4/24/2017     Postpartum hemorrhage 11/16/2017     Third degree laceration of perineum during delivery, postpartum 11/15/2017       PAST SURGICAL HISTORY: No past surgical history on file.    FAMILY HISTORY:   Family  History   Problem Relation Age of Onset     Gestational Diabetes Sister      Gestational Diabetes Sister      Diabetes No family hx of      Coronary Artery Disease No family hx of      Hypertension No family hx of      Breast Cancer No family hx of      Colon Cancer No family hx of      Prostate Cancer No family hx of      Other Cancer No family hx of        SOCIAL HISTORY:   Social History     Tobacco Use     Smoking status: Never Smoker     Smokeless tobacco: Never Used   Substance Use Topics     Alcohol use: No        MEDS:  (Not in a hospital admission)      ALLERGIES/SENSITIVITIES: Soybean allergy    MEDICATIONS:  Current Outpatient Medications   Medication Sig Dispense Refill     acetaminophen (TYLENOL) 500 MG tablet Take 2 tablets (1,000 mg) by mouth every 6 hours as needed for mild pain 100 tablet 0     omeprazole (PRILOSEC) 40 MG DR capsule Take 1 capsule (40 mg) by mouth daily 30 capsule 3     predniSONE (DELTASONE) 10 MG tablet Take 1 tablet (10 mg) by mouth daily Take 40mg (4 pills) for 3 days 5/24-5/26    Take 30mg (3 pills) for 3 days 5/27- 5/29  Take 20mg (2 pills) for 3 days  5/30 - 6/01 Take 10mg (1 pill) for 3 days  6/02 - 6/04 30 tablet 0       PHYSICAL EXAM:  Temp:  [98  F (36.7  C)-101.3  F (38.5  C)] 98.8  F (37.1  C)  Pulse:  [68-98] 86  Resp:  [14-19] 16  BP: (108-140)/(69-86) 119/75  SpO2:  [97 %-100 %] 97 %  Body mass index is 25.19 kg/m .  GEN: Well developed, well nourished 41 year old in no acute distress.  HEENT: sclera anicteric, moist mucous membranes.   LYMPH: No cervical lymphadenopathy  PULM: Nonlabored breathing. Breath sounds equal.   CARDIO: Regular rate  GI: Non-distended. Soft.  Mildly tender to palpation.  No guarding. No rebound tenderness.  EXT: warm, no lower extremity edema  NEURO: Alert. No focal defects.   PSYCH: Mental status appropriate, mood and affect normal.    SKIN: No rashes  MSK: No joint abnormalities    LABORATORY DATA:  CBC RESULTS:   Recent Labs   Lab Test  06/02/22  0442   WBC 11.5*   RBC 3.59*   HGB 9.9*   HCT 31.2*   MCV 87   MCH 27.6   MCHC 31.7   RDW 13.0           CMP Results:   Recent Labs   Lab Test 06/02/22 0442      POTASSIUM 3.4*   CHLORIDE 110*   CO2 22   ANIONGAP 8   GLC 95   BUN 4*   CR 0.61   BILITOTAL 0.4   ALKPHOS 83   ALT 11   AST 13        INR Results: No results for input(s): INR in the last 75546 hours.       RELEVANT IMAGING:    As above    ASSESSMENT:     Abd pain  Rectal bleed  Abnormal CT    Etiology remains unclear  Initial CT might indicate Crohns disease.  Received some steroid but not much    Has had nausea and the CT scan looks like she could have had a torsion of the sigmoid or bowel  Lactate normal.  But if significant acute pain, would need surgical eval    She clinically looks ok now, and does have a UTI with elevation of her WBC  Infectious colitis is still very possible, ian with the hx of lymphadenopathy.  Also current CT really doesn't comment on terminal ileum    She does have anemia. But there is not active bleed at present      PLAN:    Will await stool studies,  I would add routine as well   Continue to treat UTI  Advance diet as able  Will schedule outpt colon as able  If significant pain, worsening n, v  Consider eval of the mesenteric twisting    PPI   Will follow     Approximately 45 minutes of total time was spent providing patient care, including patient evaluation, reviewing documentation/test results and .           Rosa Lenz MD  Thank you for the opportunity to participate in the care of this patient.   Please feel free to call me with any questions or concerns.  Phone number [phone].            CC: [facility], Jefe Martin

## 2022-06-02 NOTE — ED NOTES
"Patient is tolerating clear liquids without worsening symptoms; rates pain 1/10 and states she is \"feeling better\" and \"getting hungry now\".  "

## 2022-06-02 NOTE — CONSULTS
"Care Management Initial Consult    General Information  Assessment completed with: Patient, Dain  Type of CM/SW Visit: Initial Assessment    Primary Care Provider verified and updated as needed: Yes   Readmission within the last 30 days: no previous admission in last 30 days      Reason for Consult: discharge planning  Advance Care Planning: Advance Care Planning Reviewed: no concerns identified          Communication Assessment  Patient's communication style: spoken language (non-English) (speaks Hmong)                   Living Environment:   People in home: spouse, child(sisi), dependent     Current living Arrangements: house      Able to return to prior arrangements: yes       Family/Social Support:  Care provided by: self  Provides care for: child(sisi)  Marital Status:     Dmitriy       Description of Support System: Supportive, Involved    Support Assessment: Adequate family and caregiver support, Adequate social supports, Patient communicates needs well met    Current Resources:   Patient receiving home care services: No     Community Resources: None  Equipment currently used at home: glucometer (\"checks sugars during pregnancy\")  Supplies currently used at home:      Employment/Financial:  Employment Status:          Financial Concerns:     Referral to Financial Worker: No       Lifestyle & Psychosocial Needs:  Social Determinants of Health     Tobacco Use: Low Risk      Smoking Tobacco Use: Never Smoker     Smokeless Tobacco Use: Never Used   Alcohol Use: Not on file   Financial Resource Strain: Not on file   Food Insecurity: Not on file   Transportation Needs: Not on file   Physical Activity: Not on file   Stress: Not on file   Social Connections: Not on file   Intimate Partner Violence: Not on file   Depression: Not on file   Housing Stability: Not on file       Functional Status:  Prior to admission patient needed assistance:   Dependent ADLs:: Independent, Ambulation-no assistive " device  Dependent IADLs:: Independent  Assesssment of Functional Status: At functional baseline    Mental Health Status:          Chemical Dependency Status:                Values/Beliefs:  Spiritual, Cultural Beliefs, Anabaptism Practices, Values that affect care:                 Additional Information:  Dain lives in a house with her  and children.     She is independent with ADLs at baseline.    Likely no discharge needs at this time.    Family to transport at discharge.    Alayna Parkinson RN

## 2022-06-02 NOTE — ED NOTES
Patient walked to restroom, states she had a small BM, that was soft,but formed. Pt states she forgot to collect sample.

## 2022-06-02 NOTE — PROGRESS NOTES
[facility]    Progress Note - Hospitalist Service       Date of Admission:  6/1/2022    Assessment & Plan            Dain Tejeda is a 41 year old female admitted on 6/1/2022. She has no pertinent PMH history and is admitted for abdominal pain.     Abdominal pain  N/V  Bloody diarrhea  Reports roughly 6 weeks of 10 stools per day, with bright red blood mixed with watery stool.  No family history of IBD, no sick contacts, no fevers/chills. CT abdomen from 4/14 demonstrating terminal ileitis with surrounding mesenteric lymphadenopathy.  Elevated CRP to 6.1, ESR to 90 in clinic and started on a prednisone taper.  However, she has been vomiting this up. Was referred to GI, but they are booking out to July.  Febrile on admission to 101.3.  Labs unremarkable. CT on admission demonstrating twisting in the mesentery which is new compared to CT on 4/14; could represent an internal hernia, per radiologist.  Pain much improved with morphine in the ER;  so have lower suspicion for ischemic bowel.  Given duration of symptoms also have low suspicion for infection.. Suspect IBD given constellation of symptoms; likely Crohn's, given CT findings.  -Consult GI, appreciate recs  -Stool panel  -Clears, ADAT or per GI pending possible procedures  -Zofran, morphine, Tylenol     UTI  Reports increased urinary frequency since ER visit in mid April.  No dysuria or subjective fevers/chills, though was febrile to 101.3 on admission.  No CVA tenderness.  UA concerning for infection, with leukocyte esterase, WBC clumps, and small amount of blood, though many squames present so likely a contaminated sample.  WBC slightly elevated to 11.2.  No CT evidence of pyelo.  Received 1 dose of ceftriaxone in the ER.  -Continue ceftriaxone 1 g every 24 hours     Elevated gamma gap  Unclear origin.  Admission total protein 8.1, albumin 2.8, making gamma gap 5.3.  Hep B and C, as well as HIV negative in 2017.  Likely no utility in  working up for multiple myeloma or MGUS in the absence of typical CRAB lab findings.  -Monitor     Incidental findings  4.5 cm stable, benign-appearing left adnexal cyst identified on CT 4/15 and 6/1.  No focal adnexal TTP on exam.  -Radiologist recommends considering follow-up ultrasound in 10 to 12 weeks        Diet: NPO for Medical/Clinical Reasons Except for: No Exceptions    DVT Prophylaxis: Low Risk/Ambulatory with no VTE prophylaxis indicated  Chambers Catheter: Not present  Fluids:  ml/hr  Central Lines: None  Cardiac Monitoring: None  Code Status: Full Code      Disposition Plan   Expected Discharge: 06/05/2022     Anticipated discharge location:  Awaiting care coordination huddle  Delays: TBD       The patient's care was discussed with the Attending Physician, Dr. Tamayo and Patient.    Kem Dejesus MD  Hospitalist Service  [facility]  Securely message with the Vocera Web Console (learn more here)  Text page via [facility] Paging/Directory         Clinically Significant Risk Factors Present on Admission             # Hypoalbuminemia: Albumin = 2.3 g/dL (Ref range: 3.5 - 5.0 g/dL) on admission, will monitor as appropriate      # Overweight: Estimated body mass index is 25.19 kg/m  as calculated from the following:    Height as of this encounter: 1.524 m (5').    Weight as of this encounter: 58.5 kg (129 lb).      ______________________________________________________________________    Interval History   Reports pain is much better this morning, 3/10.  Nausea is also improved from when she arrived.  Has not had a bowel movement since yesterday morning.    Data reviewed today: I reviewed all medications, new labs and imaging results over the last 24 hours. I personally reviewed the abdominal CT image(s) showing Results as above.    Physical Exam   Vital Signs: Temp: 98  F (36.7  C) Temp src: Oral BP: 116/70 Pulse: 85   Resp: 14 SpO2: 100 % O2 Device: None (Room air)    Weight:  129 lbs 0 oz  EXAM  General: Alert, no acute distress  Head: Nontraumatic   Eyes:  EOM intact   Oropharynx: moist oral mucus membranes  Pulm: CTA BL, no tachypnea, speaking in full sentences  CV: RRR, no murmur  Abd: soft, nondistended, mild tenderness in periumbilical and suprapubic areas.  Ext: Warm, well perfused  Skin: No rash on limited skin exam  Neuro: CN II-XII intact, moves all 4 extremities  Psych: Mood appropriate to visit content, full affect, rational thought content and process      Data   Recent Labs   Lab 06/02/22  0442 06/01/22 2050   WBC 11.5* 11.2*   HGB 9.9* 11.2*   MCV 87 86    439    140   POTASSIUM 3.4* 3.6   CHLORIDE 110* 106   CO2 22 25   BUN 4* 6*   CR 0.61 0.66   ANIONGAP 8 9   ALEX 7.7* 8.6   GLC 95 104   ALBUMIN 2.3* 2.8*   PROTTOTAL 6.8 8.1*   BILITOTAL 0.4 0.4   ALKPHOS 83 100   ALT 11 13   AST 13 14   LIPASE  --  33

## 2022-06-02 NOTE — ED PROVIDER NOTES
NAME: Dain Tejeda  AGE: 41 year old female  YOB: 1980  MRN: [id number]  EVALUATION DATE & TIME: 2022 11:17 PM    PCP: Jefe Martin    ED PROVIDER: Jefe Pantoja M.D.      Chief Complaint   Patient presents with     Abdominal Pain     FINAL IMPRESSION:  1. Fever, unspecified fever cause    2. Abdominal pain, generalized    3. Acute cystitis with hematuria    4. Inflammation of small intestine      MEDICAL DECISION MAKIN:19 PM I met with the patient, obtained history, performed an initial exam, and discussed options and plan for diagnostics and treatment here in the ED.   1:08 AM I went to update the patient on results.   Pertinent Labs & Imaging studies reviewed. (See chart for details)       Patient was clinically assessed and consented to treatment. After assessment, medical decision making and workup were discussed with the patient. The patient was agreeable to plan for testing, workup, and treatment.    Dain Tejeda is a 41 year old female who presents with nominal pain, nausea and vomiting.   Differential diagnosis includes but not limited to pyelonephritis, urinary tract infection, kidney stone, enteritis, ileitis.  Patient with abdominal pain and nausea and vomiting.  Patient has had ongoing abdominal pain for some time and does have prior work-up including findings of small bowel inflammation and enteritis/ileitis findings on CT scan.  Patient had referred to GI for this chronic inflammation and likely contributing to her chronic abdominal pain however now with 2 days of nausea vomiting and worsening pain.  Patient's findings from triage labs showed likely urinary tract infection and possibly pyelonephritis given the fever.  Fever was controlled with Tylenol, IV fluids were given, antiemetics.  Patient was also given small dose of pain medication and further IV fluids.  She was started on ceftriaxone for treatment of Ketan however given the possibility of infected kidney stone as  she does have some hematuria we will check CT scan.  As well will be able to reevaluate the small bowel inflammation in comparison to prior.  CT scan returned showing abnormal rotation of bowel with possible internal hernia but no signs of obstruction.  Pains are concerning as patient's previous CT showed inflammation which could be consistent with Crohn's and lesions however these could contribute to partial small bowel obstruction or this rotation and possible internal hernia especially if adhesions have developed.  Given the continued abdominal pain with this abnormal finding would recommend admission for further evaluation and also possible surgical consultation if this needs to be corrected for concern of alternating obstruction/not obstruction.  Patient comfortable with plan and discussed with resident service for admission.    0 minutes of critical care time    MEDICATIONS GIVEN IN THE EMERGENCY:  Medications   ondansetron (ZOFRAN) injection 4 mg (4 mg Intravenous Given 6/2/22 0013)   lidocaine 1 % 0.1-1 mL (has no administration in time range)   lidocaine (LMX4) cream (has no administration in time range)   sodium chloride (PF) 0.9% PF flush 3 mL (has no administration in time range)   sodium chloride (PF) 0.9% PF flush 3 mL (has no administration in time range)   melatonin tablet 1 mg (has no administration in time range)   sodium chloride 0.9% infusion ( Intravenous New Bag 6/2/22 1713)   acetaminophen (TYLENOL) tablet 650 mg (has no administration in time range)   morphine (PF) injection 2 mg (has no administration in time range)   ondansetron (ZOFRAN) injection 4 mg (has no administration in time range)   cefTRIAXone (ROCEPHIN) 1 g vial to attach to  mL bag for ADULTS or NS 50 mL bag for PEDS (has no administration in time range)   acetaminophen (TYLENOL) solution 650 mg (650 mg Oral Given 6/1/22 2156)   0.9% sodium chloride BOLUS (0 mLs Intravenous Stopped 6/1/22 2250)   cefTRIAXone (ROCEPHIN) 1 g  vial to attach to  mL bag for ADULTS or NS 50 mL bag for PEDS (0 g Intravenous Stopped 6/2/22 0047)   0.9% sodium chloride BOLUS (0 mLs Intravenous Stopped 6/2/22 0047)   morphine (PF) injection 4 mg (4 mg Intravenous Given 6/2/22 0013)       NEW PRESCRIPTIONS STARTED AT TODAY'S ER VISIT:  New Prescriptions    No medications on file          =================================================================    HPI    Patient information was obtained from: Patient    Use of : N/A       Dain Tejeda is a 41 year old female who presents with abdominal pain.    The patient reports two months of diffuse abdominal pain, worse the last two days. The last two days she reports nausea and then a fever today.  She reportedly went to urgent care in the past for the abdominal pain, and she is reporting the CT was negative.  She denies any back pain or other complaints at this time.     Per chart review, the patient was seen at Urgent Care on 4/14/22 for abdominal pain.  She had a CT scan done which showed:   1. No appendicitis. 2. Wall thickening and mucosal hyperenhancement involving the terminal ileum. This suggests either an infectious or inflammatory ileitis. 3. Moderate amount of stool within the ascending and transverse colon. 4. Mild mesenteric lymphadenopathy, likely reactive. 5. 4.3 cm benign-appearing left adnexal cyst. This is benign in appearance and no further follow-up is necessary.    She was then seen at her PCP's office on 5/24/22 for continuing pain.  She was also having bloody stools at that time.  She was started on Prilosec and short taper of Prednisone.      REVIEW OF SYSTEMS   Review of Systems   Constitutional: Positive for fever.   Gastrointestinal: Positive for abdominal pain (diffuse) and nausea.   Musculoskeletal: Negative for back pain.   All other systems reviewed and are negative.    PAST MEDICAL HISTORY:  Past Medical History:   Diagnosis Date     Diet controlled gestational diabetes  mellitus (GDM), antepartum 9/4/2017    Attempting diet control first     Nausea/vomiting in pregnancy 4/24/2017     Postpartum hemorrhage 11/16/2017     Third degree laceration of perineum during delivery, postpartum 11/15/2017       PAST SURGICAL HISTORY:  No past surgical history on file.    CURRENT MEDICATIONS:      Current Facility-Administered Medications:      acetaminophen (TYLENOL) tablet 650 mg, 650 mg, Oral, Q4H PRN, Steve Fish MD     cefTRIAXone (ROCEPHIN) 1 g vial to attach to  mL bag for ADULTS or NS 50 mL bag for PEDS, 1 g, Intravenous, Q24H, Steve Fish MD     lidocaine (LMX4) cream, , Topical, Q1H PRN, Steve Fish MD     lidocaine 1 % 0.1-1 mL, 0.1-1 mL, Other, Q1H PRN, Steve Fish MD     melatonin tablet 1 mg, 1 mg, Oral, At Bedtime PRN, Steve Fish MD     morphine (PF) injection 2 mg, 2 mg, Intravenous, Q4H PRN, Steve Fish MD     ondansetron (ZOFRAN) injection 4 mg, 4 mg, Intravenous, Q6H PRN, Steve Fish MD     ondansetron (ZOFRAN) injection 4 mg, 4 mg, Intravenous, Q30 Min PRN, Steve Fish MD, 4 mg at 06/02/22 0013     sodium chloride (PF) 0.9% PF flush 3 mL, 3 mL, Intracatheter, Q8H, Steve Fish MD     sodium chloride (PF) 0.9% PF flush 3 mL, 3 mL, Intracatheter, q1 min prn, Steve Fish MD     sodium chloride 0.9% infusion, , Intravenous, Continuous, Steve Fish MD, Last Rate: 100 mL/hr at 06/02/22 0443, New Bag at 06/02/22 0443    Current Outpatient Medications:      acetaminophen (TYLENOL) 500 MG tablet, Take 2 tablets (1,000 mg) by mouth every 6 hours as needed for mild pain, Disp: 100 tablet, Rfl: 0     omeprazole (PRILOSEC) 40 MG DR capsule, Take 1 capsule (40 mg) by mouth daily, Disp: 30 capsule, Rfl: 3     predniSONE (DELTASONE) 10 MG tablet, Take 1 tablet (10 mg) by mouth daily Take 40mg (4 pills) for 3 days 5/24-5/26    Take 30mg (3 pills) for 3 days 5/27- 5/29  Take 20mg (2 pills)  for 3 days  5/30 - 6/01 Take 10mg (1 pill) for 3 days  6/02 - 6/04, Disp: 30 tablet, Rfl: 0    ALLERGIES:  Allergies   Allergen Reactions     Soybean Allergy Itching and Blisters     Tofu- causes itching and sores in the mouth       FAMILY HISTORY:  Family History   Problem Relation Age of Onset     Gestational Diabetes Sister      Gestational Diabetes Sister      Diabetes No family hx of      Coronary Artery Disease No family hx of      Hypertension No family hx of      Breast Cancer No family hx of      Colon Cancer No family hx of      Prostate Cancer No family hx of      Other Cancer No family hx of        SOCIAL HISTORY:   Social History     Socioeconomic History     Marital status:      Spouse name: Dmitriy Camargo     Number of children: 0     Years of education: 0   Occupational History     Occupation: None   Tobacco Use     Smoking status: Never Smoker     Smokeless tobacco: Never Used   Substance and Sexual Activity     Alcohol use: No     Drug use: No     Sexual activity: Yes     Partners: Male   Social History Narrative    Born in [facility], arrived to [facility] 02/2017. No education.        PHYSICAL EXAM:    Vitals: /69   Pulse 98   Temp 99.1  F (37.3  C) (Oral)   Resp 14   Ht 1.524 m (5')   Wt 58.5 kg (129 lb)   SpO2 99%   BMI 25.19 kg/m     Physical Exam  Vitals and nursing note reviewed.   Constitutional:       General: She is not in acute distress.     Appearance: She is well-developed and normal weight. She is not ill-appearing or toxic-appearing.   HENT:      Head: Normocephalic.   Eyes:      General: No scleral icterus.  Cardiovascular:      Rate and Rhythm: Normal rate and regular rhythm.      Heart sounds: Normal heart sounds.   Pulmonary:      Effort: Pulmonary effort is normal. No respiratory distress.      Breath sounds: Normal breath sounds.   Abdominal:      General: Abdomen is flat.      Palpations: Abdomen is soft.      Tenderness: There is abdominal tenderness in the periumbilical  area. There is no right CVA tenderness, left CVA tenderness, guarding or rebound.      Hernia: No hernia is present.   Skin:     General: Skin is warm and dry.      Coloration: Skin is not jaundiced.   Neurological:      General: No focal deficit present.      Mental Status: She is alert.   Psychiatric:         Behavior: Behavior normal.           LAB:  All pertinent labs reviewed and interpreted.  Labs Ordered and Resulted from Time of ED Arrival to Time of ED Departure   BASIC METABOLIC PANEL - Abnormal       Result Value    Sodium 140      Potassium 3.6      Chloride 106      Carbon Dioxide (CO2) 25      Anion Gap 9      Urea Nitrogen 6 (*)     Creatinine 0.66      Calcium 8.6      Glucose 104      GFR Estimate >90     HEPATIC FUNCTION PANEL - Abnormal    Bilirubin Total 0.4      Bilirubin Direct 0.2      Protein Total 8.1 (*)     Albumin 2.8 (*)     Alkaline Phosphatase 100      AST 14      ALT 13     ROUTINE UA WITH MICROSCOPIC REFLEX TO CULTURE - Abnormal    Color Urine Yellow      Appearance Urine Cloudy (*)     Glucose Urine Negative      Bilirubin Urine Negative      Ketones Urine Negative      Specific Gravity Urine 1.023      Blood Urine 0.2 mg/dL (*)     pH Urine 6.0      Protein Albumin Urine 50  (*)     Urobilinogen Urine <2.0      Nitrite Urine Negative      Leukocyte Esterase Urine 500 Shilpa/uL (*)     WBC Clumps Urine Present (*)     Mucus Urine Present (*)     Amorphous Crystals Urine Few (*)     RBC Urine 16 (*)     WBC Urine 71 (*)     Squamous Epithelials Urine 46 (*)    CBC WITH PLATELETS AND DIFFERENTIAL - Abnormal    WBC Count 11.2 (*)     RBC Count 4.16      Hemoglobin 11.2 (*)     Hematocrit 35.9      MCV 86      MCH 26.9      MCHC 31.2 (*)     RDW 12.9      Platelet Count 439      % Neutrophils 78      % Lymphocytes 14      % Monocytes 5      % Eosinophils 2      % Basophils 0      % Immature Granulocytes 1      NRBCs per 100 WBC 0      Absolute Neutrophils 8.8 (*)     Absolute Lymphocytes  1.6      Absolute Monocytes 0.6      Absolute Eosinophils 0.2      Absolute Basophils 0.1      Absolute Immature Granulocytes 0.1      Absolute NRBCs 0.0     LIPASE - Normal    Lipase 33     HCG QUALITATIVE URINE - Normal    hCG Urine Qualitative Negative     LACTIC ACID WHOLE BLOOD   COVID-19 VIRUS (CORONAVIRUS) BY PCR   COMPREHENSIVE METABOLIC PANEL   CBC WITH PLATELETS   URINE CULTURE       RADIOLOGY:  CT Abdomen Pelvis w/o Contrast   Final Result   IMPRESSION:    1.  There is some twisting seen in the mesentery of the lower abdomen which represents a change compared with the prior study. A redundant sigmoid colon also takes a different course than on the previous exam. Findings could be seen with a process such    as internal hernia and could account for the patient's symptoms. However, there is no evidence for bowel obstruction and therefore these findings are of uncertain clinical significance. If the patient develops obstructive-type symptoms or if symptoms    worsen or progress, repeat imaging with oral contrast may be helpful. Alternatively, follow-up small bowel follow-through or CT enterography could be considered in further evaluation as well.      2.  Nonspecific 4.5 cm left adnexal cyst is not significantly changed compared to 4/14/2022. In a menstruating female, this would most often be physiologic in nature but given its persistence, consider follow-up ultrasound in 10-12 weeks for    reevaluation. Follow up sooner if symptoms indicate.           PROCEDURES:   Procedures       I, Contreras Lopez, am serving as a scribe to document services personally performed by Dr. Jefe Pantoja  based on my observation and the provider's statements to me. I, Jefe Pantoja MD attest that Contreras Lopez is acting in a scribe capacity, has observed my performance of the services and has documented them in accordance with my direction.      Jefe Pantoja M.D.  Emergency Medicine  [facility]  [facility] EMERGENCY DEPARTMENT  [facility] [facility] [address]  [phone]  Dept: [phone]      Jefe Pantoja MD  06/02/22 0177

## 2022-06-03 ENCOUNTER — APPOINTMENT (OUTPATIENT)
Dept: INTERPRETER SERVICES | Facility: CLINIC | Age: 42
End: 2022-06-03
Payer: COMMERCIAL

## 2022-06-03 VITALS
RESPIRATION RATE: 16 BRPM | TEMPERATURE: 98.1 F | HEIGHT: 60 IN | WEIGHT: 125.7 LBS | HEART RATE: 68 BPM | OXYGEN SATURATION: 98 % | SYSTOLIC BLOOD PRESSURE: 108 MMHG | DIASTOLIC BLOOD PRESSURE: 69 MMHG | BODY MASS INDEX: 24.68 KG/M2

## 2022-06-03 LAB
ALBUMIN SERPL-MCNC: 2.3 G/DL (ref 3.5–5)
ALP SERPL-CCNC: 90 U/L (ref 45–120)
ALT SERPL W P-5'-P-CCNC: 10 U/L (ref 0–45)
ANION GAP SERPL CALCULATED.3IONS-SCNC: 8 MMOL/L (ref 5–18)
AST SERPL W P-5'-P-CCNC: 11 U/L (ref 0–40)
BACTERIA UR CULT: NORMAL
BILIRUB SERPL-MCNC: 0.5 MG/DL (ref 0–1)
BUN SERPL-MCNC: 3 MG/DL (ref 8–22)
C COLI+JEJUNI+LARI FUSA STL QL NAA+PROBE: NOT DETECTED
C DIFF TOX B STL QL: NEGATIVE
CALCIUM SERPL-MCNC: 8.1 MG/DL (ref 8.5–10.5)
CHLORIDE BLD-SCNC: 107 MMOL/L (ref 98–107)
CO2 SERPL-SCNC: 23 MMOL/L (ref 22–31)
CREAT SERPL-MCNC: 0.61 MG/DL (ref 0.6–1.1)
EC STX1 GENE STL QL NAA+PROBE: NOT DETECTED
EC STX2 GENE STL QL NAA+PROBE: NOT DETECTED
GFR SERPL CREATININE-BSD FRML MDRD: >90 ML/MIN/1.73M2
GLUCOSE BLD-MCNC: 86 MG/DL (ref 70–125)
HOLD SPECIMEN: NORMAL
NOROV GI+II ORF1-ORF2 JNC STL QL NAA+PR: NOT DETECTED
POTASSIUM BLD-SCNC: 3.2 MMOL/L (ref 3.5–5)
POTASSIUM BLD-SCNC: 3.3 MMOL/L (ref 3.5–5)
POTASSIUM BLD-SCNC: 3.5 MMOL/L (ref 3.5–5)
PROT SERPL-MCNC: 6.8 G/DL (ref 6–8)
RVA NSP5 STL QL NAA+PROBE: NOT DETECTED
SALMONELLA SP RPOD STL QL NAA+PROBE: NOT DETECTED
SHIGELLA SP+EIEC IPAH STL QL NAA+PROBE: NOT DETECTED
SODIUM SERPL-SCNC: 138 MMOL/L (ref 136–145)
V CHOL+PARA RFBL+TRKH+TNAA STL QL NAA+PR: NOT DETECTED
Y ENTERO RECN STL QL NAA+PROBE: NOT DETECTED

## 2022-06-03 PROCEDURE — 36415 COLL VENOUS BLD VENIPUNCTURE: CPT | Performed by: FAMILY MEDICINE

## 2022-06-03 PROCEDURE — 250N000013 HC RX MED GY IP 250 OP 250 PS 637: Performed by: STUDENT IN AN ORGANIZED HEALTH CARE EDUCATION/TRAINING PROGRAM

## 2022-06-03 PROCEDURE — 99217 PR OBSERVATION CARE DISCHARGE: CPT | Mod: GC | Performed by: MASSAGE THERAPIST

## 2022-06-03 PROCEDURE — 96361 HYDRATE IV INFUSION ADD-ON: CPT

## 2022-06-03 PROCEDURE — 250N000013 HC RX MED GY IP 250 OP 250 PS 637: Performed by: MASSAGE THERAPIST

## 2022-06-03 PROCEDURE — 36415 COLL VENOUS BLD VENIPUNCTURE: CPT | Performed by: STUDENT IN AN ORGANIZED HEALTH CARE EDUCATION/TRAINING PROGRAM

## 2022-06-03 PROCEDURE — G0378 HOSPITAL OBSERVATION PER HR: HCPCS

## 2022-06-03 PROCEDURE — 84132 ASSAY OF SERUM POTASSIUM: CPT | Mod: 91 | Performed by: FAMILY MEDICINE

## 2022-06-03 PROCEDURE — 250N000011 HC RX IP 250 OP 636: Performed by: STUDENT IN AN ORGANIZED HEALTH CARE EDUCATION/TRAINING PROGRAM

## 2022-06-03 PROCEDURE — 96376 TX/PRO/DX INJ SAME DRUG ADON: CPT

## 2022-06-03 PROCEDURE — 80053 COMPREHEN METABOLIC PANEL: CPT | Performed by: STUDENT IN AN ORGANIZED HEALTH CARE EDUCATION/TRAINING PROGRAM

## 2022-06-03 PROCEDURE — 84132 ASSAY OF SERUM POTASSIUM: CPT | Performed by: STUDENT IN AN ORGANIZED HEALTH CARE EDUCATION/TRAINING PROGRAM

## 2022-06-03 PROCEDURE — 258N000003 HC RX IP 258 OP 636: Performed by: STUDENT IN AN ORGANIZED HEALTH CARE EDUCATION/TRAINING PROGRAM

## 2022-06-03 RX ORDER — ONDANSETRON 4 MG/1
4 TABLET, FILM COATED ORAL EVERY 8 HOURS PRN
Qty: 20 TABLET | Refills: 0 | Status: SHIPPED | OUTPATIENT
Start: 2022-06-03 | End: 2022-06-13

## 2022-06-03 RX ORDER — POTASSIUM CHLORIDE 1500 MG/1
20 TABLET, EXTENDED RELEASE ORAL ONCE
Status: COMPLETED | OUTPATIENT
Start: 2022-06-03 | End: 2022-06-03

## 2022-06-03 RX ORDER — LOPERAMIDE HCL 2 MG
2 CAPSULE ORAL 4 TIMES DAILY PRN
Qty: 60 CAPSULE | Refills: 0 | Status: SHIPPED | OUTPATIENT
Start: 2022-06-03 | End: 2022-06-13

## 2022-06-03 RX ORDER — LOPERAMIDE HCL 2 MG
2 CAPSULE ORAL 4 TIMES DAILY PRN
Qty: 60 CAPSULE | Refills: 0 | Status: SHIPPED | OUTPATIENT
Start: 2022-06-03 | End: 2022-06-03

## 2022-06-03 RX ORDER — ONDANSETRON 4 MG/1
4 TABLET, FILM COATED ORAL EVERY 8 HOURS PRN
Qty: 20 TABLET | Refills: 0 | Status: SHIPPED | OUTPATIENT
Start: 2022-06-03 | End: 2022-06-03

## 2022-06-03 RX ORDER — OXYCODONE HYDROCHLORIDE 5 MG/1
5 TABLET ORAL EVERY 6 HOURS PRN
Qty: 6 TABLET | Refills: 0 | Status: SHIPPED | OUTPATIENT
Start: 2022-06-03 | End: 2022-06-06

## 2022-06-03 RX ADMIN — MORPHINE SULFATE 2 MG: 2 INJECTION, SOLUTION INTRAMUSCULAR; INTRAVENOUS at 06:12

## 2022-06-03 RX ADMIN — LOPERAMIDE HYDROCHLORIDE 2 MG: 2 CAPSULE ORAL at 06:07

## 2022-06-03 RX ADMIN — POTASSIUM CHLORIDE 20 MEQ: 1500 TABLET, EXTENDED RELEASE ORAL at 06:07

## 2022-06-03 RX ADMIN — PANTOPRAZOLE SODIUM 40 MG: 40 TABLET, DELAYED RELEASE ORAL at 06:08

## 2022-06-03 RX ADMIN — ACETAMINOPHEN 650 MG: 325 TABLET ORAL at 04:16

## 2022-06-03 RX ADMIN — SODIUM CHLORIDE: 9 INJECTION, SOLUTION INTRAVENOUS at 08:58

## 2022-06-03 RX ADMIN — ONDANSETRON 4 MG: 2 INJECTION INTRAMUSCULAR; INTRAVENOUS at 06:06

## 2022-06-03 ASSESSMENT — ACTIVITIES OF DAILY LIVING (ADL)
ADLS_ACUITY_SCORE: 18

## 2022-06-03 NOTE — SUMMARY OF CARE
No BM overnight. IV fluids running. Pt independent in room. Pt able to keep water and meds down. IV morphine for pain. Imodium given x1.

## 2022-06-03 NOTE — PLAN OF CARE
Goal Outcome Evaluation:    Plan of Care Reviewed With: patient        Problem: Pain Acute  Goal: Acceptable Pain Control and Functional Ability  Outcome: Ongoing, Progressing   Pt. Denied pain and nausea. Pt. Wanted to eat food that her  is bringing in this evening for dinner.   Pt. Had a couple bites of food that her  brought in and had to go to the bathroom. Pt. Stated that she had a large watery bowel movement.     Bety Barrios RN

## 2022-06-03 NOTE — PROGRESS NOTES
GI PROGRESS NOTE  6/3/2022  Dain Tejeda  1980  /-50    Subjective:   Patient denies any current abdominal pain, vomiting, nausea today. Had soft stool yesterday evening and none today, denies any further hematochezia or diarrhea. Denies melena.     Tolerating a regular diet.     Visit completed with [facility] interpretor through telephone modality.     Objective:     Blood pressure 108/69, pulse 68, temperature 98.1  F (36.7  C), temperature source Oral, resp. rate 16, height 1.524 m (5'), weight 57 kg (125 lb 11.2 oz), SpO2 98 %, unknown if currently breastfeeding.     Body mass index is 24.55 kg/m .  Gen: NAD  Cardio: RRR  GI: Non-distended, BS positive, soft, non-tender    Laboratory:  BMP  Recent Labs   Lab 06/03/22  1010 06/03/22  0551 06/03/22 0443 06/02/22 0442 06/01/22 2050   NA  --   --  138 140 140   POTASSIUM 3.5 3.2* 3.3* 3.4* 3.6   CHLORIDE  --   --  107 110* 106   ALEX  --   --  8.1* 7.7* 8.6   CO2  --   --  23 22 25   BUN  --   --  3* 4* 6*   CR  --   --  0.61 0.61 0.66   GLC  --   --  86 95 104     CBC  Recent Labs   Lab 06/02/22 0442 06/01/22 2050   WBC 11.5* 11.2*   RBC 3.59* 4.16   HGB 9.9* 11.2*   HCT 31.2* 35.9   MCV 87 86   MCH 27.6 26.9   MCHC 31.7 31.2*   RDW 13.0 12.9    439     INRNo lab results found in last 7 days.   LFTs  Recent Labs   Lab Test 06/03/22 0443 06/02/22 0442 06/01/22 2102 06/01/22 2050   ALBUMIN 2.3* 2.3*  --  2.8*   BILITOTAL 0.5 0.4  --  0.4   ALT 10 11  --  13   AST 11 13  --  14   PROTEIN  --   --  50 *  --    LIPASE  --   --   --  33     Imaging:  EXAM: CT ABDOMEN PELVIS W/O CONTRAST  LOCATION: [facility]  DATE/TIME: 6/2/2022 12:24 AM     INDICATION: Abdominal pain, acute, nonlocalized.  COMPARISON: 4/14/2022.  TECHNIQUE: CT scan of the abdomen and pelvis was performed without IV contrast. Multiplanar reformats were obtained. Dose reduction techniques were used.  CONTRAST: None.     FINDINGS:   LOWER CHEST: Dependent  atelectasis in both lower lungs.     HEPATOBILIARY: Small benign calcification in the right hepatic lobe should be of doubtful significance. Liver otherwise negative. No calcified gallstones or biliary dilatation.     PANCREAS: Normal.     SPLEEN: Normal.     ADRENAL GLANDS: Normal.     KIDNEYS/BLADDER: Normal.     BOWEL: Bowel is normal in caliber with no evidence for obstruction. There is some twisting of the mesentery of the lower abdomen which has a different configuration than on the prior study. A redundant sigmoid colon also takes a different course than on   the prior study. Findings could be seen with an internal hernia, however, this is of uncertain significance in the absence of any evidence for bowel obstruction. Clinical correlation. Negative for appendicitis. Moderate amount of stool in the colon.     LYMPH NODES: Normal.     VASCULATURE: Unremarkable.     PELVIC ORGANS: Nonspecific 4.5 cm left adnexal cyst.     MUSCULOSKELETAL: Normal.                                                                    IMPRESSION:   1.  There is some twisting seen in the mesentery of the lower abdomen which represents a change compared with the prior study. A redundant sigmoid colon also takes a different course than on the previous exam. Findings could be seen with a process such as internal hernia and could account for the patient's symptoms. However, there is no evidence for bowel obstruction and therefore these findings are of uncertain clinical significance. If the patient develops obstructive-type symptoms or if symptoms worsen or progress, repeat imaging with oral contrast may be helpful. Alternatively, follow-up small bowel follow-through or CT enterography could be considered in further evaluation as well.   2.  Nonspecific 4.5 cm left adnexal cyst is not significantly changed compared to 4/14/2022. In a menstruating female, this would most often be physiologic in nature but given its persistence, consider  follow-up ultrasound in 10-12 weeks for reevaluation. Follow up sooner if symptoms indicate.     Assessment:   1. Abdominal pain  2. Rectal Bleeding  3. Abnormal CT study  41 yr old female with no pertinent PMH presented on 6/1/22 to the ER with six weeks of abdominal pain, rectal bleeding, and loose stools. On CT in April she had terminal ileum inflammation with lymph nodes also had elevated CRP and ESR, she was given prednisone but she only took for one day and then stopped. CT on admit indicated possible twisting of the mesentery and sigmoid in a different position from April and adrenal cyst, no obstruction noted.    - No overt signs of bleeding today  - Pain improved and tolerating a regular diet with no difficulty.   - Cdiff negative  - Other stool studies still pending  - Has UTI infection, currently being treated     Plan:   1. Outpatient follow up in GI clinic and oupatient Colonoscopy  2. Present back to the ER with alarm symptoms/sereve pain   3. Stool studies still pending but patient aware  4. GI will sign off at this time, please call with any questions and concerns.     Discussed with Dr. Delfin Cohen, CNP  Thank you for the opportunity to participate in the care of this patient.   Please feel free to call me with any questions or concerns.  Phone number [phone].

## 2022-06-03 NOTE — PROGRESS NOTES
Care Management Follow Up    Length of Stay (days): 1    Expected Discharge Date: 06/05/2022       Concerns to be Addressed:   Workup for abdominal pain in progress:  IV fluids at 100 ml/hour; IV analgesia; GI consult; Rocephin IV.  Patient plan of care discussed at interdisciplinary rounds: Yes    Anticipated Discharge Disposition:  Discharge goal is home.     Anticipated Discharge Services:  None  Anticipated Discharge DME:  None    Patient/family educated on Medicare website which has current facility and service quality ratings:  NA  Education Provided on the Discharge Plan:  Per team  Patient/Family in Agreement with the Plan:  yes    Referrals Placed by CM/SW:  None  Private pay costs discussed: Not applicable     Additional Information:  Patient is  and independent with activities of daily living at baseline.  She lives in a house with her spouse and children. No care management needs identified at this time but CM will continue to monitor progression of care, review team recommendations and provide discharge planning assist as needed.      Kirstin Guillen RN

## 2022-06-03 NOTE — DISCHARGE SUMMARY
[facility]  Discharge Summary - Medicine & Pediatrics       Date of Admission:  6/1/2022  Date of Discharge:  6/3/2022  Discharging Provider: Dr. Rosenstein  Discharge Service: Hospitalist Service    Discharge Diagnoses   Abdominal pain  Bloody diarrhea  Abnormal CT    Follow-ups Needed After Discharge   Follow-up Appointments     Follow-up and recommended labs and tests       Follow up with primary care provider, Jefe Martin, within 7 days   for hospital follow- up.  No follow up labs or test are needed.  Follow up with GI for colonoscopy and further workup             Unresulted Labs Ordered in the Past 30 Days of this Admission     Date and Time Order Name Status Description    6/2/2022  8:15 AM Enteric Bacteria and Virus Panel by MARILYNN Stool In process       These results will be followed up by [facility]    Discharge Disposition   Discharged to home  Condition at discharge: Stable    Hospital Course   Dain Tejeda was admitted on 6/1/2022 for abdominal pain, bloody diarrhea.  The following problems were addressed during her hospitalization:       Abdominal pain  N/V  Bloody diarrhea  Reports roughly 6 weeks of 10 stools per day, with bright red blood mixed with watery stool. CT abdomen from 4/14 demonstrating terminal ileitis with surrounding mesenteric lymphadenopathy.    Repeat CT 6/1/2022 with redundant sigmoid colon, distended mesentery, possible internal hernia.  Unclear etiology infectious versus IBD versus ischemia.  GI was consulted and plan for outpatient colonoscopy.  Symptoms improved with IVF, Zofran, Imodium, she can continue these until GI follow-up.  Stool panel pending     UTI  Reports increased urinary frequency since ER visit in mid April.  No dysuria or subjective fevers/chills, though was febrile to 101.3 on admission.  No CVA tenderness.  UA concerning for infection, with leukocyte esterase, WBC clumps.  However, UC only with mixed urogenital zulma.  Did receive  ceftriaxone while admitted, but will stop antibiotics on discharge.     Elevated gamma gap  Unclear origin.  Admission total protein 8.1, albumin 2.8, making gamma gap 5.3.  Hep B and C, as well as HIV negative in 2017.  Likely no utility in working up for multiple myeloma or MGUS in the absence of typical CRAB lab findings.     Incidental findings  4.5 cm stable, benign-appearing left adnexal cyst identified on CT 4/15 and 6/1.  No focal adnexal TTP on exam.  -Radiologist recommends considering follow-up ultrasound in 10 to 12 weeks    Consultations This Hospital Stay   GASTROENTEROLOGY IP CONSULT  CARE MANAGEMENT / SOCIAL WORK IP CONSULT    Code Status   Full Code       The patient was discussed with Dr. Rosenstein Nirmal Lumpkin, MD  [address] [address]  Phone: [phone]  Fax: [phone]  ______________________________________________________________________    Physical Exam   Vital Signs: Temp: 98.1  F (36.7  C) Temp src: Oral BP: 108/69 Pulse: 68   Resp: 16 SpO2: 98 % O2 Device: None (Room air)    Weight: 125 lbs 11.2 oz  GENERAL: healthy, alert and no distress, eating food that family brought in  RESP: CTA, speaking in full sentences, normal work of breathing   CV: RRR, extremities well perfused  ABDOMEN: soft, nontender  MS: no gross musculoskeletal defects noted, no edema  PSYCH: mentation appears normal, affect normal/bright        Primary Care Physician   Jefe Martin    Discharge Orders      Reason for your hospital stay    You were hospitalized for belly pain and diarrhea.  We were concerned for an infection and are still waiting on some stool tests for infection.  We are also worried about a more chronic inflammatory bowel disease or a twisting in your bowels.  The GI doctors are going to schedule a colonoscopy after you leave the hospital to get a final diagnosis.     Follow-up and recommended labs and tests      Follow up with primary care provider, Jefe Martin, within 7 days for hospital follow- up.  No follow up labs or test are needed.  Follow up with GI for colonoscopy and further workup     Activity    Your activity upon discharge: activity as tolerated     Diet    Follow this diet upon discharge: Orders Placed This Encounter      Regular Diet Adult       Significant Results and Procedures   Most Recent 3 CBC's:Recent Labs   Lab Test 06/02/22 0442 06/01/22 2050 05/24/22  1620   WBC 11.5* 11.2* 9.4   HGB 9.9* 11.2* 10.8*   MCV 87 86 88    439 380     Most Recent 3 BMP's:Recent Labs   Lab Test 06/03/22  1010 06/03/22  0551 06/03/22 0443 06/02/22 0442 06/01/22 2050   NA  --   --  138 140 140   POTASSIUM 3.5 3.2* 3.3* 3.4* 3.6   CHLORIDE  --   --  107 110* 106   CO2  --   --  23 22 25   BUN  --   --  3* 4* 6*   CR  --   --  0.61 0.61 0.66   ANIONGAP  --   --  8 8 9   ALEX  --   --  8.1* 7.7* 8.6   GLC  --   --  86 95 104     Most Recent 2 LFT's:Recent Labs   Lab Test 06/03/22 0443 06/02/22 0442   AST 11 13   ALT 10 11   ALKPHOS 90 83   BILITOTAL 0.5 0.4       Discharge Medications   Current Discharge Medication List      START taking these medications    Details   loperamide (IMODIUM) 2 MG capsule Take 1 capsule (2 mg) by mouth 4 times daily as needed for diarrhea  Qty: 60 capsule, Refills: 0    Associated Diagnoses: Diarrhea, unspecified type      ondansetron (ZOFRAN) 4 MG tablet Take 1 tablet (4 mg) by mouth every 8 hours as needed for nausea  Qty: 20 tablet, Refills: 0    Associated Diagnoses: Nausea      oxyCODONE (ROXICODONE) 5 MG tablet Take 1 tablet (5 mg) by mouth every 6 hours as needed for pain  Qty: 6 tablet, Refills: 0    Associated Diagnoses: Generalized abdominal pain         CONTINUE these medications which have NOT CHANGED    Details   acetaminophen (TYLENOL) 500 MG tablet Take 2 tablets (1,000 mg) by mouth every 6 hours as needed for mild pain  Qty: 100 tablet, Refills: 0    Associated  Diagnoses: Pain of back and right lower extremity      omeprazole (PRILOSEC) 40 MG DR capsule Take 1 capsule (40 mg) by mouth daily  Qty: 30 capsule, Refills: 3    Associated Diagnoses: Terminal ileitis without complication (H)         STOP taking these medications       predniSONE (DELTASONE) 10 MG tablet Comments:   Reason for Stopping:             Allergies   Allergies   Allergen Reactions     Soybean Allergy Itching and Blisters     Tofu- causes itching and sores in the mouth

## 2022-06-03 NOTE — PROGRESS NOTES
Pt brought up from ED at 2250, settled in bed, VSS and gave report to night shift nurse. Abdominal pain 3/10, refused any intervention.

## 2022-06-06 ENCOUNTER — PATIENT OUTREACH (OUTPATIENT)
Dept: FAMILY MEDICINE | Facility: CLINIC | Age: 42
End: 2022-06-06
Payer: COMMERCIAL

## 2022-06-06 NOTE — PROGRESS NOTES
Clinic Care Coordination Contact    Clinic Care Coordination Contact  OUTREACH    Referral Information:            Chief Complaint   Patient presents with     Clinic Care Coordination - Post Hospital     TCM-Hospital Follow up        Universal Utilization:      Utilization    Hospital Admissions  1             ED Visits  1             No Show Count (past year)  0                Current as of: 6/3/2022  4:39 PM              Clinical Concerns:  Current Medical Concerns:  NA    Current Behavioral Concerns: NA    Education Provided to patient: NA      Health Maintenance Reviewed:    Clinical Pathway:     Medication Management:  Medication review status:        Functional Status:       Living Situation:       Lifestyle & Psychosocial Needs:    Social Determinants of Health     Tobacco Use: Low Risk      Smoking Tobacco Use: Never Smoker     Smokeless Tobacco Use: Never Used   Alcohol Use: Not on file   Financial Resource Strain: Not on file   Food Insecurity: Not on file   Transportation Needs: Not on file   Physical Activity: Not on file   Stress: Not on file   Social Connections: Not on file   Intimate Partner Violence: Not on file   Depression: Not on file   Housing Stability: Not on file            The pt was recently in the hospital, I called to check up on the pt and help the pt setup a hospital follow up. The pt was at [facility] for abdominal pain. I called the pt, but he did not answer, and his vm was not setup.                Resources and Interventions:  Current Resources:                                        Goals:       Patient/Caregiver understanding:            Plan:

## 2022-06-07 ENCOUNTER — PATIENT OUTREACH (OUTPATIENT)
Dept: FAMILY MEDICINE | Facility: CLINIC | Age: 42
End: 2022-06-07
Payer: COMMERCIAL

## 2022-06-07 NOTE — PROGRESS NOTES
Clinic Care Coordination Contact    Clinic Care Coordination Contact  OUTREACH    Referral Information:            Chief Complaint   Patient presents with     Clinic Care Coordination - Post Hospital     TCM-Hospital Follow up        Universal Utilization:      Utilization    Hospital Admissions  1             ED Visits  1             No Show Count (past year)  0                Current as of: 6/6/2022  3:05 PM              Clinical Concerns:  Current Medical Concerns:  NA    Current Behavioral Concerns: NA    Education Provided to patient: NA      Health Maintenance Reviewed:    Clinical Pathway:     Medication Management:  Medication review status:        Functional Status:       Living Situation:       Lifestyle & Psychosocial Needs:    Social Determinants of Health     Tobacco Use: Low Risk      Smoking Tobacco Use: Never Smoker     Smokeless Tobacco Use: Never Used   Alcohol Use: Not on file   Financial Resource Strain: Not on file   Food Insecurity: Not on file   Transportation Needs: Not on file   Physical Activity: Not on file   Stress: Not on file   Social Connections: Not on file   Intimate Partner Violence: Not on file   Depression: Not on file   Housing Stability: Not on file             The pt was recently in the hospital, I called to check up on the pt, and help the pt setup a hospital follow up. The pt was at [facility] for abdominal pain. I called the pt, but there was no answer, and her vm was not setup.             Resources and Interventions:  Current Resources:                                        Goals:       Patient/Caregiver understanding:            Plan:

## 2022-06-08 ENCOUNTER — PATIENT OUTREACH (OUTPATIENT)
Dept: FAMILY MEDICINE | Facility: CLINIC | Age: 42
End: 2022-06-08
Payer: COMMERCIAL

## 2022-06-08 ASSESSMENT — ACTIVITIES OF DAILY LIVING (ADL): DEPENDENT_IADLS:: INDEPENDENT

## 2022-06-08 NOTE — TELEPHONE ENCOUNTER
Patient chart will be reviewed by GI clinic management. Appointment urgency will be determined based on chart review.  Patient will be contacted if an appointment change is necessary after chart review

## 2022-06-08 NOTE — PROGRESS NOTES
Assessment & Plan     Hospital discharge follow-up  Abdominal Pain, Generalized  Admitted at [facility] 6/1-6/3 with abdominal pain, nausea, vomiting, hematochezia. Work-up showing redundant sigmoid colon with possible internal hernia, believed to be etiology of symptoms.  GI evaluated, plan for outpatient colonoscopy. Symptoms improving, still with loose stools/nausea. Unable to pickup discharge medications, will resend. Follow-up precautions provided.   - loperamide (IMODIUM A-D) 2 MG tablet  Dispense: 60 tablet; Refill: 0  - ondansetron (ZOFRAN ODT) 4 MG ODT tab  Dispense: 30 tablet; Refill: 0  - Follow-up with GI on July 18th.   - Note for work provided    Adnexal cyst  Noted on CT abdomen. Recommended follow-up US in 10-12 weeks.  - US Transvaginal Non OB      No follow-ups on file.    Jefe Martin MD  [facility] [place] Subjective Kao is a 41 year old who presents for the following health issues  accompanied by her .          HPI     Post Discharge Outreach 6/8/2022   Admission Date 6/1/2022   Reason for Admission abdominal pain   Discharge Date 6/3/2022   Discharge Diagnosis abdominal   How are you doing now that you are home? Pt is doing ok   How are your symptoms? (Red Flag symptoms escalate to triage hotline per guidelines) Improved   Do you feel your condition is stable enough to be safe at home until your provider visit? Yes   Does the patient have their discharge instructions?  Yes   Does the patient have questions regarding their discharge instructions?  No   Were you started on any new medications or were there changes to any of your previous medications?  Yes   Does the patient have all of their medications? Yes   Do you have questions regarding any of your medications?  No   Do you have all of your needed medical supplies or equipment (DME)?  (i.e. oxygen tank, CPAP, cane, etc.) No - What equipment or supplies are needed?   Discharge follow-up appointment  scheduled within 14 calendar days?  Yes   Discharge Follow Up Appointment Date 6/9/2022   Discharge Follow Up Appointment Scheduled with? Primary Care Provider     Hospital Follow-up Visit:    Hospital/Nursing Home/IP Rehab Facility: [facility]  Date of Admission: 6/1/2022  Date of Discharge: 6/3/2022  Reason(s) for Admission: Abdominal pain/hematochezia      Was your hospitalization related to COVID-19? No   Problems taking medications regularly:  Was unable to pick-up medications (issue with paperwork at pharmacy)   Medication changes since discharge: None  Problems adhering to non-medication therapy:  Has not been taking prescribed medications    Summary of hospitalization:    Dain Tejeda is a 41-year-old female admitted to [facility] on 6/1/2022 for abdominal pain, hematochezia.    Presented with 6 weeks of recurrent loose stools, mixed with bright red blood.  CT abdomen on 4/14/2022 showing terminal ileitis with surrounding mesenteric lymphadenopathy.  Repeat CT in the hospital showing redundant sigmoid colon with possible internal hernia.  GI consulted, plan for outpatient colonoscopy.  Stool panel was negative.      Diagnostic Tests/Treatments reviewed.  Follow up needed: 4.5 cm stable benign-appearing left adnexal cyst noted on CT.  Recommended follow-up ultrasound in 10 to 12 weeks    Other Healthcare Providers Involved in Patient s Care:         Specialist appointment - GI in July 18th.     Update since discharge: Has been doing well since discharge. Vomiting has since resolved. Having loose stools 3-4 times daily. Non bloody.  Limited abdominal pain. Tolerating oral intake    Post Discharge Medication Reconciliation: discharge medications reconciled, continue medications without change.  Plan of care communicated with patient            Review of Systems   Constitutional, HEENT, cardiovascular, pulmonary, gi and gu systems are negative, except as otherwise noted.      Objective   "  /74   Pulse 90   Temp 98.3  F (36.8  C)   Resp 20   Ht 1.5 m (4' 11.06\")   Wt 55.8 kg (123 lb)   SpO2 98%   BMI 24.80 kg/m    Body mass index is 24.8 kg/m .  Physical Exam   GENERAL: healthy, alert and no distress  RESP: lungs clear to auscultation - no rales, rhonchi or wheezes  CV: regular rate and rhythm, normal S1 S2, no S3 or S4, no murmur, click or rub, no peripheral edema and peripheral pulses strong  ABDOMEN: soft, mild diffuse tenderness. no hepatosplenomegaly, no masses and bowel sounds normal  MS: no gross musculoskeletal defects noted, no edema        "

## 2022-06-08 NOTE — PROGRESS NOTES
Clinic Care Coordination Contact  [facility]: Post-Discharge Note  SITUATION                                                      Admission:    Admission Date: 06/01/22   Reason for Admission: abdominal pain  Discharge:   Discharge Date: 06/03/22  Discharge Diagnosis: abdominal    BACKGROUND                                                      Per hospital discharge summary and inpatient provider notes.      ASSESSMENT      Enrollment  Primary Care Care Coordination Status: Declined    Discharge Assessment  How are you doing now that you are home?: Pt is doing ok  How are your symptoms? (Red Flag symptoms escalate to triage hotline per guidelines): Improved  Do you feel your condition is stable enough to be safe at home until your provider visit?: Yes  Does the patient have their discharge instructions? : Yes  Does the patient have questions regarding their discharge instructions? : No  Were you started on any new medications or were there changes to any of your previous medications? : Yes  Does the patient have all of their medications?: Yes  Do you have questions regarding any of your medications? : No  Do you have all of your needed medical supplies or equipment (DME)?  (i.e. oxygen tank, CPAP, cane, etc.): No - What equipment or supplies are needed?  Discharge follow-up appointment scheduled within 14 calendar days? : Yes  Discharge Follow Up Appointment Date: 06/09/22  Discharge Follow Up Appointment Scheduled with?: Primary Care Provider                  PLAN                                                      Outpatient Plan:      Future Appointments   Date Time Provider Department Center   6/9/2022  3:40 PM Jefe Martin MD [facility] [place]         For any urgent concerns, please contact our 24 hour clinic line:   [facility]: [phone]       JANA Burgos

## 2022-06-09 ENCOUNTER — OFFICE VISIT (OUTPATIENT)
Dept: FAMILY MEDICINE | Facility: CLINIC | Age: 42
End: 2022-06-09

## 2022-06-09 VITALS
HEIGHT: 59 IN | SYSTOLIC BLOOD PRESSURE: 114 MMHG | RESPIRATION RATE: 20 BRPM | DIASTOLIC BLOOD PRESSURE: 74 MMHG | TEMPERATURE: 98.3 F | WEIGHT: 123 LBS | BODY MASS INDEX: 24.8 KG/M2 | HEART RATE: 90 BPM | OXYGEN SATURATION: 98 %

## 2022-06-09 DIAGNOSIS — R10.84 GENERALIZED ABDOMINAL PAIN: ICD-10-CM

## 2022-06-09 DIAGNOSIS — N94.9 ADNEXAL CYST: ICD-10-CM

## 2022-06-09 DIAGNOSIS — Z09 HOSPITAL DISCHARGE FOLLOW-UP: Primary | ICD-10-CM

## 2022-06-09 PROCEDURE — 99214 OFFICE O/P EST MOD 30 MIN: CPT | Mod: GC

## 2022-06-09 RX ORDER — LOPERAMIDE HYDROCHLORIDE 2 MG/1
2 TABLET ORAL 4 TIMES DAILY PRN
Qty: 60 TABLET | Refills: 0 | Status: SHIPPED | OUTPATIENT
Start: 2022-06-09 | End: 2022-06-20

## 2022-06-09 RX ORDER — ONDANSETRON 4 MG/1
4 TABLET, ORALLY DISINTEGRATING ORAL EVERY 8 HOURS PRN
Qty: 30 TABLET | Refills: 0 | Status: SHIPPED | OUTPATIENT
Start: 2022-06-09 | End: 2022-06-20

## 2022-06-09 NOTE — NURSING NOTE
Due to patient being non-English speaking/uses sign language, an  was used for this visit. Only for face-to-face interpretation by an external agency, date and length of interpretation can be found on the scanned worksheet.     name: Jenn Pereira  Agency: [employer]  Language: Junito   Telephone number: [phone]  Type of interpretation: Face-to-face, spoken

## 2022-06-09 NOTE — PROGRESS NOTES
Preceptor Attestation:   Patient seen, evaluated and discussed with the resident. I have verified the content of the note, which accurately reflects my assessment of the patient and the plan of care.    Supervising Physician:Eri Salinas MD    [facility]

## 2022-06-09 NOTE — LETTER
[facility] [place] [address] [place] [address]  Phone: [phone]  Fax: [phone]    Ariane 10, 2022        Dain Tejeda  [address]  [facility] [address]          To whom it may concern:    RE: Dain Tejeda    Patient was seen and treated at our clinic on 6/9//2022 and missed work 6/6/2022-6/10/2022 due to medical illness.    Please contact me for questions or concerns.      Sincerely,        Jefe Martin MD

## 2022-06-17 NOTE — ADDENDUM NOTE
Addended by: MONICA DUMONT on: 6/17/2022 11:01 AM     Modules accepted: Mindi    
I will SWITCH the dose or number of times a day I take the medications listed below when I get home from the hospital:  None

## 2022-06-20 ENCOUNTER — OFFICE VISIT (OUTPATIENT)
Dept: FAMILY MEDICINE | Facility: CLINIC | Age: 42
End: 2022-06-20
Payer: COMMERCIAL

## 2022-06-20 VITALS
RESPIRATION RATE: 24 BRPM | BODY MASS INDEX: 23.99 KG/M2 | DIASTOLIC BLOOD PRESSURE: 62 MMHG | OXYGEN SATURATION: 98 % | TEMPERATURE: 98.4 F | HEART RATE: 80 BPM | SYSTOLIC BLOOD PRESSURE: 95 MMHG | WEIGHT: 119 LBS | HEIGHT: 59 IN

## 2022-06-20 DIAGNOSIS — F33.1 MODERATE RECURRENT MAJOR DEPRESSION (H): ICD-10-CM

## 2022-06-20 DIAGNOSIS — R10.84 GENERALIZED ABDOMINAL PAIN: Primary | ICD-10-CM

## 2022-06-20 PROCEDURE — T1013 SIGN LANG/ORAL INTERPRETER: HCPCS | Performed by: STUDENT IN AN ORGANIZED HEALTH CARE EDUCATION/TRAINING PROGRAM

## 2022-06-20 PROCEDURE — 99214 OFFICE O/P EST MOD 30 MIN: CPT | Mod: GC | Performed by: STUDENT IN AN ORGANIZED HEALTH CARE EDUCATION/TRAINING PROGRAM

## 2022-06-20 RX ORDER — ACETAMINOPHEN 500 MG
1000 TABLET ORAL EVERY 6 HOURS PRN
Qty: 100 TABLET | Refills: 0 | Status: ON HOLD | OUTPATIENT
Start: 2022-06-20 | End: 2022-11-14

## 2022-06-20 RX ORDER — LOPERAMIDE HYDROCHLORIDE 2 MG/1
2 TABLET ORAL 4 TIMES DAILY PRN
Qty: 60 TABLET | Refills: 0 | Status: SHIPPED | OUTPATIENT
Start: 2022-06-20 | End: 2022-11-22

## 2022-06-20 RX ORDER — ONDANSETRON 4 MG/1
4 TABLET, ORALLY DISINTEGRATING ORAL EVERY 8 HOURS PRN
Qty: 30 TABLET | Refills: 0 | Status: SHIPPED | OUTPATIENT
Start: 2022-06-20 | End: 2022-11-22

## 2022-06-20 RX ORDER — FAMOTIDINE 40 MG/1
40 TABLET, FILM COATED ORAL DAILY
Qty: 60 TABLET | Refills: 0 | Status: SHIPPED | OUTPATIENT
Start: 2022-06-20 | End: 2022-08-19

## 2022-06-20 RX ORDER — CALCIUM CARBONATE 500 MG/1
1 TABLET, CHEWABLE ORAL 2 TIMES DAILY
Qty: 90 TABLET | Refills: 1 | Status: SHIPPED | OUTPATIENT
Start: 2022-06-20 | End: 2022-11-21

## 2022-06-20 ASSESSMENT — PATIENT HEALTH QUESTIONNAIRE - PHQ9: SUM OF ALL RESPONSES TO PHQ QUESTIONS 1-9: 15

## 2022-06-20 NOTE — NURSING NOTE
Due to patient being non-English speaking/uses sign language, an  was used for this visit. Only for face-to-face interpretation by an external agency, date and length of interpretation can be found on the scanned worksheet.     name: Deloris Pereira  Agency: [employer]  Language: Junito   Telephone number:   Type of interpretation: Face-to-face, spoken

## 2022-06-20 NOTE — COMMUNITY RESOURCES LIST (ENGLISH)
06/20/2022   [facility]  Hanna Skelton  For questions about this resource list or additional care needs, please contact your primary care clinic or care manager.  Phone: [phone]   Email: N/A   Address: [address] [place] [address]   Hours: N/A        Hotlines and Helplines       Hotline - Crisis help  1  [facility] - Adult Protection - 24-Hour Crisis Response Distance: 7.23 miles      COVID-19 Status: Phone/Virtual   [address] [place] [address]  Language: English  Hours: Mon - Sun Open 24 Hours   Phone: (268) 390-4878 Email: MemberConnection.services@[facility]. Website: https://www.[facility]/HealthFamily/AdultProtection/Pages/default.aspx     2  [facility] Crisis Response Unit - Suicide and Crisis Response Hotline Distance: 7.26 miles      COVID-19 Status: Phone/Virtual   [address] [facility] [address] [place] [address]  Language: English  Hours: Mon - Sun Open 24 Hours   Phone: (675) 340-6107 Email: minnie@[facility] Website: https://www.[facility]/HealthFamily/HandlingEmergencies/Help          Mental Health       Individual counseling  3  [facility] Distance: 1.63 miles      COVID-19 Status: Regular Operations, COVID-19 Status: Phone/Virtual   [address] [place] [address]  Language: English, Citizen of [place]  Hours: Mon - Fri 8:00 AM - 4:30 PM  Fees: Insurance, Self Pay, Sliding Fee   Phone: (804) 842-7418 Email: info@[employer] Website: http://www.[employer].org/location/[facility]/     4  [facility] Distance: 4.39 miles      COVID-19 Status: Service Unavailable   [address] [place] [address]  Language: English  Hours: Mon - Fri 8:00 AM - 5:00 PM Appt. Only  Fees: Insurance, Self Pay   Phone: (702) 828-5841 Email: info@[address] Website:  http://www.mentalhealthinc.com/[facility]-family-clinics-[facility]/     Mental health crisis care  5  [employer] - Crisis Stabilization Services ([facility]) Distance: 5.89 miles      COVID-19 Status: Regular Operations   [address] [place] [address]  Language: English, Romanian  Hours: Mon - Sun Open 24 Hours  Fees: Insurance   Phone: (374) 578-6455 Email: info@[employer] Website: https://[employer].org/services-programs/residential-services/ofelia-kody-house/     6  [facility] [employer] - 24-Hour Mental Health Practitioner Distance: 8.65 miles      COVID-19 Status: Regular Operations, COVID-19 Status: Phone/Virtual   [address] [place] [address]  Language: English, Hmong  Hours: Mon - Fri 8:00 AM - 4:30 PM  Fees: Insurance, Self Pay   Phone: (516) 952-6276 Email: info@[employer] Website: http://www.[employer]/     Mental health support group  7  [facility] Distance: 6.88 miles      COVID-19 Status: Phone/Virtual   [address] [place] [address]  Language: English  Hours: Mon - Thu 7:00 AM - 8:00 PM , Fri 7:00 AM - 5:00 PM  Fees: Insurance, Self Pay   Phone: (472) 405-5450 Email: ericka@[employer] Website: https://www.[employer]/our-locations/[place]/[facility]/     8  [facility] Distance: 9.91 miles      COVID-19 Status: Phone/Virtual   [address] [address] [place] [address]  Language: English  Hours: Mon - Fri 9:00 AM - 5:00 PM Appt. Only  Fees: Free   Phone: (588) 620-6050 Email: patricia@[email or web address].org Website: http://www.[employer].org/          Important Numbers & Websites       Emergency Services   911  City Services   311  Poison Control   (580) 768-3101  Suicide Prevention Lifeline   (222) 446-9776 (TALK)  Child Abuse Hotline   (409) 172-1691 (4-A-Child)  Sexual Assault Hotline   (540) 444-7673 (HOPE)  [facility]  Safeline   (636) 159-8961 (RUNAWAY)  All-Options Talkline   (166) 661-3550  Substance Abuse Referral   (519) 552-5227 (HELP)

## 2022-06-22 DIAGNOSIS — K62.89 RECTAL PAIN: Primary | ICD-10-CM

## 2022-06-22 PROBLEM — O24.419 GESTATIONAL DIABETES MELLITUS (GDM) IN THIRD TRIMESTER: Status: ACTIVE | Noted: 2017-09-04

## 2022-06-22 PROBLEM — F33.1 MODERATE RECURRENT MAJOR DEPRESSION (H): Status: ACTIVE | Noted: 2022-06-22

## 2022-06-22 RX ORDER — FOLIC ACID, .BETA.-CAROTENE, ASCORBIC ACID, CHOLECALCIFEROL, .ALPHA.-TOCOPHEROL ACETATE, DL-, THIAMINE MONONITRATE, RIBOFLAVIN, NIACINAMIDE, PYRIDOXINE HYDROCHLORIDE, CYANOCOBALAMIN, CALCIUM PANTOTHENATE, CALCIUM CARBONATE, FERROUS FUMARATE, AND ZINC OXIDE 1; 1000; 100; 400; 30; 3; 3; 15; 20; 12; 7; 200; 29; 20 MG/1; [IU]/1; MG/1; [IU]/1; [IU]/1; MG/1; MG/1; MG/1; MG/1; UG/1; MG/1; MG/1; MG/1; MG/1
1 TABLET, CHEWABLE ORAL
COMMUNITY
End: 2022-11-22

## 2022-06-22 NOTE — PROGRESS NOTES
"06/22/22    Saw patient a few days ago   Request for work letter and \"rectum medicine\"     Provided work letter as requested   Sent staff message to follow up, appreciate     She did not want a rectal exam the other day  Hard to know what the issue is in that area but she does have a known hemorrhoid   Will provide preparation H and suggest rectal exam at follow up     1. Rectal pain  - phenylephrine-cocoa butter (PREPARATION H) 0.25-88.44 % suppository; Place 1 suppository rectally as needed for hemorrhoids or itching  Dispense: 12 suppository; Refill: 0    Mode Matos DO   [facility] Medicine Resident   Pager#[phone]          " normal... Well appearing, awake, alert, oriented to person, place, time/situation and in no apparent distress.

## 2022-06-22 NOTE — LETTER
RETURN TO WORK/SCHOOL FORM    6/20/2022    Re: Dain Tejeda  1980      To Whom It May Concern:     Dain Tejeda was seen in clinic today.   She is having bad abdominal pain.    We are sending tests to see what is causing it    Also treating her with medications    She needs to rest at this time. Is unfit for her current work duties.    I feel it is medically indicated for her to rest away from work from 6/20-6/24/22.    I recommend she be seen next week 6/27 or after to re-evaluate progress and follow up on testing.     Restrictions: unable to work      Emilia Matos, DO  6/22/2022 5:33 PM

## 2022-06-22 NOTE — PATIENT INSTRUCTIONS
Below are resources in case you experience an increase in symptoms or feel you are in crisis:     Mental Health Crisis Lines    [facility]:     Adult Crisis Line ([phone])    Children's Crisis Line(505-045-7985)  [facility]:  Crisis Connection  ([phone])    Hmong Crisis Line   ([phone])       [facility] Multilingual Crisis Line  ([phone])        Urgent Care  ([phone])   [address] [place] [address]     Provides mental health crisis assessments and Rule 25 assessments   Walk-in appointments available        Walk-in Counseling - [facility]   [facility]     ([phone])   [facility]  ([phone])       Acute Psychiatric Emergency / Crisis  If you are having an acute psychiatric emergency, please call 911 or have someone drive you directly to a hospital for further evaluation.    [facility]  [phone]    [facility]   [phone]       Other Resources    Crisis Housing [facility]:  [facility]      ([phone])    Poison Control  ([phone])

## 2022-06-23 ENCOUNTER — LAB (OUTPATIENT)
Dept: LAB | Facility: CLINIC | Age: 42
End: 2022-06-23

## 2022-06-23 ENCOUNTER — ANCILLARY PROCEDURE (OUTPATIENT)
Dept: ULTRASOUND IMAGING | Facility: CLINIC | Age: 42
End: 2022-06-23
Attending: STUDENT IN AN ORGANIZED HEALTH CARE EDUCATION/TRAINING PROGRAM
Payer: COMMERCIAL

## 2022-06-23 DIAGNOSIS — R10.84 GENERALIZED ABDOMINAL PAIN: ICD-10-CM

## 2022-06-23 LAB
ALBUMIN SERPL-MCNC: 2.3 G/DL (ref 3.5–5)
ALP SERPL-CCNC: 131 U/L (ref 45–120)
ALT SERPL W P-5'-P-CCNC: 21 U/L (ref 0–45)
ANION GAP SERPL CALCULATED.3IONS-SCNC: 10 MMOL/L (ref 5–18)
AST SERPL W P-5'-P-CCNC: 20 U/L (ref 0–40)
BILIRUB SERPL-MCNC: 0.3 MG/DL (ref 0–1)
BUN SERPL-MCNC: 6 MG/DL (ref 8–22)
CALCIUM SERPL-MCNC: 8.6 MG/DL (ref 8.5–10.5)
CHLORIDE BLD-SCNC: 104 MMOL/L (ref 98–107)
CO2 SERPL-SCNC: 25 MMOL/L (ref 22–31)
CREAT SERPL-MCNC: 0.72 MG/DL (ref 0.6–1.1)
GFR SERPL CREATININE-BSD FRML MDRD: >90 ML/MIN/1.73M2
GLUCOSE BLD-MCNC: 90 MG/DL (ref 70–125)
POTASSIUM BLD-SCNC: 3.6 MMOL/L (ref 3.5–5)
PROT SERPL-MCNC: 7.4 G/DL (ref 6–8)
SODIUM SERPL-SCNC: 139 MMOL/L (ref 136–145)

## 2022-06-23 PROCEDURE — 76705 ECHO EXAM OF ABDOMEN: CPT | Mod: TC | Performed by: RADIOLOGY

## 2022-06-23 PROCEDURE — 36415 COLL VENOUS BLD VENIPUNCTURE: CPT

## 2022-06-23 PROCEDURE — 80053 COMPREHEN METABOLIC PANEL: CPT

## 2022-06-23 NOTE — NURSING NOTE
Due to patient being non-English speaking/uses sign language, an  was used for this visit. Only for face-to-face interpretation by an external agency, date and length of interpretation can be found on the scanned worksheet.     name: Madelaine Camargo  Agency: [employer]  Language: Hmong   Telephone number: .  Type of interpretation: Face-to-face, spoken

## 2022-06-28 ENCOUNTER — VIRTUAL VISIT (OUTPATIENT)
Dept: FAMILY MEDICINE | Facility: CLINIC | Age: 42
End: 2022-06-28
Payer: COMMERCIAL

## 2022-06-28 DIAGNOSIS — R11.2 INTRACTABLE VOMITING WITH NAUSEA, UNSPECIFIED VOMITING TYPE: Primary | ICD-10-CM

## 2022-06-28 DIAGNOSIS — R10.84 GENERALIZED ABDOMINAL PAIN: ICD-10-CM

## 2022-06-28 PROCEDURE — 99443 PR PHYSICIAN TELEPHONE EVALUATION 21-30 MIN: CPT | Mod: GC | Performed by: STUDENT IN AN ORGANIZED HEALTH CARE EDUCATION/TRAINING PROGRAM

## 2022-06-28 PROCEDURE — 87338 HPYLORI STOOL AG IA: CPT

## 2022-06-28 RX ORDER — PROCHLORPERAZINE MALEATE 10 MG
10 TABLET ORAL EVERY 6 HOURS PRN
Qty: 230 TABLET | Refills: 0 | Status: SHIPPED | OUTPATIENT
Start: 2022-06-28 | End: 2022-11-22

## 2022-06-28 NOTE — NURSING NOTE
Due to patient being non-English speaking/uses sign language, an  was used for this visit. Only for face-to-face interpretation by an external agency, date and length of interpretation can be found on the scanned worksheet.     name: Yadira Chairez  Agency: [employer]  Language: Junito   Telephone number: [phone]  Type of interpretation: Telephone, spoken

## 2022-06-28 NOTE — PROGRESS NOTES
Preceptor Attestation:   Patient seen, evaluated and discussed with the resident. I have verified the content of the note, which accurately reflects my assessment of the patient and the plan of care.  Supervising Physician:Oralia Cristina MD  [facility]

## 2022-06-28 NOTE — LETTER
RETURN TO WORK/SCHOOL FORM    6/28/2022    Re: Dain Tejeda  1980      To Whom It May Concern:     Dain Tejeda was seen in clinic today.    She continues to feel unwell despite medicines.    She is not ready to return to work at this time.    We have a new plan in place and will re-evaluate progress / next steps the week of July 4th.     Restrictions: unable to work    Emilia Matos DO  6/28/2022 5:14 PM

## 2022-06-28 NOTE — PROGRESS NOTES
Dain is a 41 year old who is being evaluated via a billable telephone visit.       How would you like to obtain your AVS? Mail a copy    Assessment & Plan     Phone call duration: 29 minutes    (R11.2) Intractable vomiting with nausea, unspecified vomiting type  (primary encounter diagnosis)  Comment: Sx ongoing. Etiology likely related to below. Functionally, she is struggling at home with frequent vomiting despite oral meds (and low PO intake). Concern for considerable GI loss and possible dehydration; need to rule out obstruction. I feel she would warrant either in-person clinic evaluation tomorrow vs ED evaluation and possible IV fluids. Her desire is to avoid the ED / hospital if possible.   Plan:   Recommended ED presentation    Given desire to avoid, recommend she call tomorrow for in-person appointment   Continue anti-emetics    Already prescribed Zofran    Adding: prochlorperazine (COMPAZINE) 10 MG tablet  Push at least fluids to extent possible   Soft / tolerable foods as tolerated -- avoid if triggering vomiting   Did write another work note     (R10.84) Generalized abdominal pain  Comment: Uncontrolled. Similar considerations to last visit. H pylori now pending. Concern for degree of GI luminal narrowing / obstruction, given prior CT report. Prior workup reassuring for lower chance of hepatobiliary process / obstruction; could continue workup as able. Further considering: malabsorption vs gastroparesis-type-picture. Considering functional conditions such as IBS, but would like to rule out other possible etiologies first.   - Counseling / meds as abve   - Would like to continue step-wise workup as able. Considerations:    Repeat CT abd as recommended (given prior CT read and ongoing sx)    Screen for malabsorption? ttg / IgA    If getting blood, TSH    Consider fecal calprotectin    ?HIDA scan    ?gastric emptying study   - Do encourage GI follow up as scheduled     *Regarding complexity of MDM:  Telephone  "visit 21-30 min ([id number])   If not, recommend [id number] for exacerbation of current sx plus med management     Emilia Matos DO  [facility]    Precepted with Dr. 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 Mostly staying in bed. Stools are the same.   Diet: \"can't eat\" -- Throwing up \"every time she eats\". 10 minutes after eating. Looks like the food she ate. No bilious component.   Substances (tobacco, alcohol, etc): none    Meds make any difference? Just started them today   Any new sx?   Bloating no    Dysphagia / odynophagia? no    Has lost 10 lbs in ~1 month -- thinks its related to not eating much    Vaginal sx (discharge, bleeding) no    Mild lightheadedness but no presyncope or syncope    Fever or chills - no      Did she get her stool test done? Submitted today   Status of GI referral? Set up for July 18th   Family hx of colon cancer? no   Requesting work note: Yes     From my visit 1 week ago   Rectal / abdominal pain   Since hospital: getting worse   Was improved 1.5 weeks ago but now worse   Constant   Abdomen - midline diffuse   When it's very bad feels it in her rectum   Description: pressure   Intensity: 7-8/10   Exacerbating factors: food (ian fatty foods)   Remitting factors:               Short period of time the meds help    Symptoms associated/denied (as below):       Fever - no   Chills - no   Nausea - constant   Vomiting - once or twice per day. No hematemesis.    Stools               Pain with this               Feeling some urge               [place] stool chart 7 - watery               Green               Blood - there used to be. But not now               Blisters around the rectum                           " Cant sit on it   Urinary symptoms -  No   No flank pain      (R10.84) Generalized abdominal pain  Comment: Sx now bordering on chronic. Description today more consistent with epigastric / RUQ type abdominal pain (may differ from more recent descriptions). Highest considerations: dyspepsia (H pyrlori?) vs biliary colic (cholelithiasis vs other). Had previously been thought sx were secondary to redundant sigmoid colon vs internal hernia - still considerations. Depression could be confounding the diagnosis as well. Non-surgical abdomen on exam today and no sign of needing higher level of care.   Plan:   - Would still follow up with GI as scheduled   - US ABDOMEN LIMITED; Future  - Helicobacter pylori Antigen Stool; Future              Suggest waiting until 1-2 weeks post-PPI to send this in   - Comprehensive metabolic panel; Future  - Discussed she would like refill of meds               Will trial tums/H2 rather than PPI to facilitate H pylori screen   - ondansetron (ZOFRAN ODT) 4 MG ODT tab; Take 1 tablet (4 mg) by mouth every 8 hours as needed for nausea  Dispense: 30 tablet; Refill: 0  - loperamide (IMODIUM A-D) 2 MG tablet; Take 1 tablet (2 mg) by mouth 4 times daily as needed for diarrhea  Dispense: 60 tablet; Refill: 0              Recent stool panel negative               Doubt bacterial dysentery   - acetaminophen (TYLENOL) 500 MG tablet; Take 2 tablets (1,000 mg) by mouth every 6 hours as needed for mild pain  Dispense: 100 tablet; Refill: 0  - famotidine (PEPCID) 40 MG tablet; Take 1 tablet (40 mg) by mouth daily  Dispense: 60 tablet; Refill: 0  - calcium carbonate (TUMS) 500 MG chewable tablet; Take 1 tablet (500 mg) by mouth 2 times daily  Dispense: 90 tablet; Refill: 1  - Discussed reasons to call clinic vs present to ED     Addendum 6/24   Called to discuss with interpretor   She is feeling about the same -- has not picked up the meds yet   Discussed reassuring results  CMP and RUQ US -- nothing overt to  suggest alternate management   She will drop off her stool sample for H pylori next week   If pain is not improving in 1-2 weeks (Despite meds), recommend reassessment in clinic   Could consider HIDA scan   She requests CT scan -- would advise caution for radiation dose (with unclear utility)   Rest as in original A/P    Review of Systems   Constitutional, HEENT, cardiovascular, pulmonary, GI, , musculoskeletal, neuro, skin, endocrine and psych systems are negative, except as otherwise noted.      Objective           Vitals:  No vitals were obtained today due to virtual visit.    Physical Exam   Does not sound to be in distress over the phone     Wt Readings from Last 4 Encounters:   06/20/22 54 kg (119 lb)   06/09/22 55.8 kg (123 lb)   06/02/22 57 kg (125 lb 11.2 oz)   05/24/22 58.5 kg (129 lb)       PSYCH: Alert and oriented times 3; coherent speech, normal   rate and volume, able to articulate logical thoughts, able   to abstract reason, no tangential thoughts, no hallucinations   or delusions  Her affect is normal  RESP: No cough, no audible wheezing, able to talk in full sentences  Remainder of exam unable to be completed due to telephone visits    Lab on 06/23/2022   Component Date Value Ref Range Status     Sodium 06/23/2022 139  136 - 145 mmol/L Final     Potassium 06/23/2022 3.6  3.5 - 5.0 mmol/L Final     Chloride 06/23/2022 104  98 - 107 mmol/L Final     Carbon Dioxide (CO2) 06/23/2022 25  22 - 31 mmol/L Final     Anion Gap 06/23/2022 10  5 - 18 mmol/L Final     Urea Nitrogen 06/23/2022 6 (A) 8 - 22 mg/dL Final     Creatinine 06/23/2022 0.72  0.60 - 1.10 mg/dL Final     Calcium 06/23/2022 8.6  8.5 - 10.5 mg/dL Final     Glucose 06/23/2022 90  70 - 125 mg/dL Final     Alkaline Phosphatase 06/23/2022 131 (A) 45 - 120 U/L Final     AST 06/23/2022 20  0 - 40 U/L Final     ALT 06/23/2022 21  0 - 45 U/L Final     Protein Total 06/23/2022 7.4  6.0 - 8.0 g/dL Final     Albumin 06/23/2022 2.3 (A) 3.5 - 5.0 g/dL  Final     Bilirubin Total 06/23/2022 0.3  0.0 - 1.0 mg/dL Final     GFR Estimate 06/23/2022 >90  >60 mL/min/1.73m2 Final    Effective December 21, 2021 eGFRcr in adults is calculated using the 2021 CKD-EPI creatinine equation which includes age and gender (Radha et al., NEJM, DOI: 10.1056/INRCay9206344)     EXAM: US ABDOMEN LIMITED  LOCATION: [facility]  DATE/TIME: 6/23/2022 11:28 AM     INDICATION:  Generalized abdominal pain  COMPARISON: CT 06/02/2022  TECHNIQUE: Limited abdominal ultrasound.     FINDINGS:     GALLBLADDER: Normal. No gallstones, wall thickening, or pericholecystic fluid. Negative sonographic Cardona's sign.     BILE DUCTS: No biliary dilatation. The common duct measures 5 mm.     LIVER: Normal parenchyma with smooth contour. No focal mass.     RIGHT KIDNEY: No hydronephrosis.     PANCREAS: The visualized portions are normal.     No ascites.                                                                      IMPRESSION:  1.  Normal limited abdominal ultrasound.    Phone call duration: 29 minutes    .  ..

## 2022-06-28 NOTE — PROGRESS NOTES
Preceptor Attestation:   Patient evaluated and discussed with the resident. I have verified the content of the note, which accurately reflects my assessment of the patient and the plan of care.  Supervising Physician:Oralia Cristina MD  [facility]

## 2022-06-29 ENCOUNTER — TELEPHONE (OUTPATIENT)
Dept: FAMILY MEDICINE | Facility: CLINIC | Age: 42
End: 2022-06-29

## 2022-06-29 LAB — H PYLORI AG STL QL IA: NEGATIVE

## 2022-06-29 NOTE — TELEPHONE ENCOUNTER
Attempt make to reach patient to see if she want to  letter, rather have this writer mail it or fax it to her employer. No  and pt's voice mailbox is not set up yet, unable to leave message. Will try again later. Hanna Skelton, CMA

## 2022-07-07 NOTE — PROGRESS NOTES
Assessment & Plan   41-year-old female with recent hospitalization for abdominal pain presents to clinic with continued abdominal pain, loose stools, and nausea.    Generalized abdominal pain  Uncontrolled abdominal pain, worsening over the past week with persistent and frequent loose stools. Concerned for bowel obstruction given prior CT findings. Hepatobiliary process vs inflammatory bowel syndrome also on the differential. Concerned for electrolyte/kidney abnormalities with poor oral intake/diarrhea.  Presentation warrants a higher level of care.  Recommended presenting to emergency department for urgent evaluation. She is hesitant to do this today. Discussed risks of delaying care, including permanent damage to internal organs.  She will plan to visit ED in the next couple of days. Has GI appointment on July 18th, recommended against delaying care until that time.    - Strongly recommended ED visit  - Continue Zofran/compazine   - Continue loperamide   - Encourage oral intake  - Further ED follow-up precautions provided  - Clinic follow next week      No follow-ups on file.    Jefe Martin MD  [facility] [place] Subjective Kao is a 41 year old accompanied by her , presenting for the following health issues:      HPI     Abdominal pain  Recent admission for abdominal pain.  Imaging noted mesenteric twisting and redundant sigmoid colon with possible internal hernia, thought to be etiology of symptoms.  Has had continued abdominal pain since that time.  Recently negative H. Pylori. Abdominal US on 6/23 unremarkable.  Has GI follow-up on July 18.      Telephone visit on 6/28 for continued vomiting.  Compazine was added to Zofran.  Also on loperamide.  Was recommended she be evaluated in the ED.    Since that visit, decreased nausea and vomiting, however abdominal pain has worsened.  Pain is constant.  Describes it as a cramping sensation originates in the epigastric  "region with radiating throughout her abdomen.  Symptoms are exacerbated intermittently.  Not linked to food.  Describes the pain as \"labor pain\".  She gets dizzy and lightheaded following these episodes.  Approximately 20 episodes per day.    Having 8-10 loose stools per day.  Previously mixed with blood, however only having drops of blood in the toilet following bowel movement.  No melena or overt hematochezia.  Has been using Preparation H for suspected hemorrhoids with good result.     Has lost approximately 20 pounds over the past month.  Is able to tolerate small snacks and fluid intake without vomiting.    Review of Systems   Constitutional, HEENT, cardiovascular, pulmonary      Objective    BP 95/64   Pulse 64   Temp 97.9  F (36.6  C) (Oral)   Resp 20   Ht 1.49 m (4' 10.66\")   Wt 51.3 kg (113 lb)   SpO2 99%   BMI 23.09 kg/m    Body mass index is 23.09 kg/m .  Physical Exam   General Appearance: Uncomfortable.   Respiratory: Comfortable respiratory effort.  Cardiovascular: Regular rate and rhythm.  No murmurs rubs or gallops.  GI: Nondistended.  Bowel sounds present.  Soft.  Diffuse tenderness to palpation.  No guarding or rebound tenderness.        "

## 2022-07-08 ENCOUNTER — OFFICE VISIT (OUTPATIENT)
Dept: FAMILY MEDICINE | Facility: CLINIC | Age: 42
End: 2022-07-08
Payer: COMMERCIAL

## 2022-07-08 VITALS
WEIGHT: 113 LBS | SYSTOLIC BLOOD PRESSURE: 95 MMHG | RESPIRATION RATE: 20 BRPM | HEART RATE: 64 BPM | OXYGEN SATURATION: 99 % | BODY MASS INDEX: 22.78 KG/M2 | DIASTOLIC BLOOD PRESSURE: 64 MMHG | HEIGHT: 59 IN | TEMPERATURE: 97.9 F

## 2022-07-08 DIAGNOSIS — R10.84 GENERALIZED ABDOMINAL PAIN: Primary | ICD-10-CM

## 2022-07-08 PROCEDURE — 99215 OFFICE O/P EST HI 40 MIN: CPT | Mod: GC

## 2022-07-08 NOTE — PROGRESS NOTES
Due to patient being non-English speaking/uses sign language, an  was used for this visit. Only for face-to-face interpretation by an external agency, date and length of interpretation can be found on the scanned worksheet.     name: Live  Agency: [employer]  Language: Junito   Telephone number: [phone]  Type of interpretation: Face-to-face, spoken

## 2022-07-08 NOTE — PATIENT INSTRUCTIONS
Please go to the emergency department as soon as possible. You do not need an appointment to go to the emergency room.     We are concerned that your pain may be something serious. We would like you to have a scan of your stomach as soon as possible. With your weight loss and diarrhea, we are concerned you are very dehydrated.

## 2022-07-11 NOTE — PROGRESS NOTES
Preceptor Attestation:  Patient's case reviewed and discussed with the resident, Jefe Martin MD, and I personally evaluated the patient. I agree with written assessment and plan of care.    Supervising Physician:  Carito Thronton MD   [facility]

## 2022-07-12 ENCOUNTER — OFFICE VISIT (OUTPATIENT)
Dept: FAMILY MEDICINE | Facility: CLINIC | Age: 42
End: 2022-07-12
Payer: COMMERCIAL

## 2022-07-12 VITALS
HEIGHT: 59 IN | OXYGEN SATURATION: 99 % | DIASTOLIC BLOOD PRESSURE: 63 MMHG | WEIGHT: 112.12 LBS | TEMPERATURE: 98.3 F | SYSTOLIC BLOOD PRESSURE: 94 MMHG | RESPIRATION RATE: 16 BRPM | BODY MASS INDEX: 22.6 KG/M2 | HEART RATE: 92 BPM

## 2022-07-12 DIAGNOSIS — R19.7 DIARRHEA, UNSPECIFIED TYPE: Primary | ICD-10-CM

## 2022-07-12 DIAGNOSIS — R10.84 GENERALIZED ABDOMINAL PAIN: ICD-10-CM

## 2022-07-12 DIAGNOSIS — N30.00 ACUTE CYSTITIS WITHOUT HEMATURIA: ICD-10-CM

## 2022-07-12 PROCEDURE — 99214 OFFICE O/P EST MOD 30 MIN: CPT | Mod: 25

## 2022-07-12 PROCEDURE — 87493 C DIFF AMPLIFIED PROBE: CPT

## 2022-07-12 ASSESSMENT — ANXIETY QUESTIONNAIRES
GAD7 TOTAL SCORE: 1
6. BECOMING EASILY ANNOYED OR IRRITABLE: NOT AT ALL
5. BEING SO RESTLESS THAT IT IS HARD TO SIT STILL: NOT AT ALL
3. WORRYING TOO MUCH ABOUT DIFFERENT THINGS: NOT AT ALL
1. FEELING NERVOUS, ANXIOUS, OR ON EDGE: SEVERAL DAYS
GAD7 TOTAL SCORE: 1
2. NOT BEING ABLE TO STOP OR CONTROL WORRYING: NOT AT ALL
IF YOU CHECKED OFF ANY PROBLEMS ON THIS QUESTIONNAIRE, HOW DIFFICULT HAVE THESE PROBLEMS MADE IT FOR YOU TO DO YOUR WORK, TAKE CARE OF THINGS AT HOME, OR GET ALONG WITH OTHER PEOPLE: NOT DIFFICULT AT ALL
7. FEELING AFRAID AS IF SOMETHING AWFUL MIGHT HAPPEN: NOT AT ALL

## 2022-07-12 ASSESSMENT — PATIENT HEALTH QUESTIONNAIRE - PHQ9
SUM OF ALL RESPONSES TO PHQ QUESTIONS 1-9: 10
5. POOR APPETITE OR OVEREATING: NOT AT ALL

## 2022-07-12 NOTE — NURSING NOTE
.Due to patient being non-English speaking/uses sign language, an  was used for this visit. Only for face-to-face interpretation by an external agency, date and length of interpretation can be found on the scanned worksheet.     name: Deloris Pereira  Agency: [employer]  Language: Junito   Telephone number:   Type of interpretation: Face-to-face, spoken

## 2022-07-12 NOTE — LETTER
July 20, 2022      Dain Ileana  [address] [facility] [address]        Dear ,    We are writing to inform you of your test results.    The results from your stool test are back.  The test was negative for a bacterial infection called C. Diff. There was also no evidence of a parasitic infection either.  It is unlikely that the infections we were testing you for are causing your diarrhea.       Please follow-up at your previously scheduled appointment with the GI specialists next week.       Resulted Orders   Clostridium difficile toxin B PCR   Result Value Ref Range    C Difficile Toxin B by PCR Negative Negative      Comment:      A negative result does not exclude actual disease due to C. difficile and may be due to improper collection, handling and storage of the specimen or the number of organisms in the specimen is below the detection limit of the assay.    Narrative    The TASS Xpert C. difficile Assay, performed on the [employer]  Instrument Systems, is a qualitative in vitro diagnostic test for rapid detection of toxin B gene sequences from unformed (liquid or soft) stool specimens collected from patients suspected of having Clostridioides difficile infection (CDI). The test utilizes automated real-time polymerase chain reaction (PCR) to detect toxin gene sequences associated with toxin producing C. difficile. The Xpert C. difficile Assay is intended as an aid in the diagnosis of CDI.   Routine parasitology exam   Result Value Ref Range    OVA AND PARASITE EXAM Negative Negative      Comment:      A single negative specimen does not rule out parasitic infection.    WBC'S, O&P 4+ PMNs     Narrative    Cryptosporidium, Cyclospora and Microsporidia are not readily detected by this method.       If you have any questions or concerns, please call the clinic at the number listed above.       Sincerely,      Carito Thornton MD

## 2022-07-12 NOTE — PATIENT INSTRUCTIONS
Referral for :     Gastroenterology    LOCATION/PLACE/Provider :    [facility] [address] [place], [place]  DATE & TIME :    Wednesday, July 20th at 10:10 am   PHONE :     [phone]  FAX :    [phone]

## 2022-07-12 NOTE — COMMUNITY RESOURCES LIST (ENGLISH)
07/12/2022   [facility] - Outpatient Clinics  N/A  For questions about this resource list or additional care needs, please contact your primary care clinic or care manager.  Phone: [phone]   Email: N/A   Address: [address] [place] [address]   Hours: N/A        Hotlines and Helplines       Hotline - Crisis help  1  [facility] - Adult Protection - 24-Hour Crisis Response Distance: 7.23 miles      COVID-19 Status: Phone/Virtual   [address] [place] [address]  Language: English  Hours: Mon - Sun Open 24 Hours   Phone: (640) 788-9321 Email: Tapdaq.services@[facility]. Website: https://www.[facility]/HealthFamily/AdultProtection/Pages/default.aspx     2  [facility] Crisis Response Unit - Suicide and Crisis Response Hotline Distance: 7.26 miles      COVID-19 Status: Phone/Virtual   [address] [facility] [address] [place] [address]  Language: English  Hours: Mon - Sun Open 24 Hours   Phone: (443) 260-2003 Email: minnie@[facility] Website: https://www.[facility]/HealthFamily/HandlingEmergencies/Help          Mental Health       Individual counseling  3  [facility] Distance: 1.63 miles      COVID-19 Status: Regular Operations, COVID-19 Status: Phone/Virtual   [address] [place] [address]  Language: English, Greenlandic  Hours: Mon - Fri 8:00 AM - 4:30 PM  Fees: Insurance, Self Pay, Sliding Fee   Phone: (642) 753-8353 Email: info@[employer].org Website: http://www.[employer] [facility].org/location/[facility]/     4  [facility] Distance: 4.39 miles      COVID-19 Status: Service Unavailable   [address] [place] [address]  Language: English  Hours: Mon - Fri 8:00 AM - 5:00 PM Appt. Only  Fees: Insurance, Self Pay   Phone: (252) 726-2293 Email: info@[employer].com Website:  http://www.mentalhealthinc.com/[facility]-family-clinics-[facility]/     Mental health crisis care  5  [employer] - Crisis Stabilization Services ([facility]) Distance: 5.89 miles      COVID-19 Status: Regular Operations   [address] [place] [address]  Language: English, Malawian  Hours: Mon - Sun Open 24 Hours  Fees: Insurance   Phone: (404) 996-5320 Email: info@[employer] Website: https://[employer].org/services-programs/residential-services/ofelia-kody-house/     6  [employer] - 24-Hour Mental Health Practitioner Distance: 8.65 miles      COVID-19 Status: Regular Operations, COVID-19 Status: Phone/Virtual   [address] [place] [address]  Language: English, Hmong  Hours: Mon - Fri 8:00 AM - 4:30 PM  Fees: Insurance, Self Pay   Phone: (752) 640-7206 Email: info@[employer] Website: http://www.[employer]/     Mental health support group  7  [facility] Distance: 6.88 miles      COVID-19 Status: Phone/Virtual   [address] [place] [address]  Language: English  Hours: Mon - Thu 7:00 AM - 8:00 PM , Fri 7:00 AM - 5:00 PM  Fees: Insurance, Self Pay   Phone: (311) 291-8099 Email: ericka@[employer] Website: https://www.[employer]/our-locations/[place]/[facility]/     8  [facility] Distance: 9.91 miles      COVID-19 Status: Phone/Virtual   [address] [address] [place] [address]  Language: English  Hours: Mon - Fri 9:00 AM - 5:00 PM Appt. Only  Fees: Free   Phone: (830) 975-2718 Email: patricia@[facility].org Website: http://www.[employer].org/          Important Numbers & Websites       Emergency Services   911  City Services   311  Poison Control   (137) 103-4862  Suicide Prevention Lifeline   (395) 579-3685 (TALK)  Child Abuse Hotline   (330) 410-5107 (4-A-Child)  Sexual Assault Hotline   (449) 675-9276 (HOPE)  [facility]  Safeline   (518) 464-5329 (RUNAWAY)  All-Options Talkline   (605) 189-8278  Substance Abuse Referral   (894) 276-7543 (HELP)

## 2022-07-12 NOTE — PROGRESS NOTES
Assessment & Plan   41-year-old with recent hospitalization for abdominal pain.  Presents with continued abdominal pain and loose stools.    Diarrhea, unspecified type  Generalized abdominal pain  Unclear etiology of the diarrhea.  Abdominal pain has slightly improved.  Previously evaluated for enteric infections, which was negative.  With persistent diarrhea, would like to retest a stool sample to rule out infectious colitis. Inflammatory bowel disease also on the differential with possible ileitis previously on imaging from 4/14/2022.  CT abdomen from 7/11/2022 is reassuring.  No evidence of obstruction or other acute intra-abdominal process.  Lab work was also relatively unremarkable.  Recent negative H. pylori test.  Does have GI follow-up on 7/20/2022, provided information regarding this appointment in AVS.  Encouraged oral intake as tolerated.  Follow-up precautions provided.  - Clostridium difficile toxin B PCR  - Routine parasitology exam  - GI appointment 7/20  - Follow-up 2 weeks    - Continue antiemetics and loperamide    Acute cystitis  UA from 7/11/2022 with nitrates, leukocyte esterase, and bacteria.  Is having some dysuria.  No hematuria.  No fever no chills.  Hesitant to trial antibiotic with ongoing abdominal pain and diarrhea.  Encouraged fluid intake.  Follow-up precautions provided.    No follow-ups on file.    Jefe Martin MD  [facility]    Carmel Gautam is a 41 year old accompanied by her , presenting for the following health issues:  RECHECK (Follow up abdominal pian, pain still same, 8/10, if sitting 2/10, imaging done urgent care yesterday in [place])      HPI   Continues to have up to 10 loose stools per day.  Associated with food.  Is having a small amount of blood with bowel movements.  Has worsening pain in the left lower quadrant following a bowel movement.  Denies any vomiting, occasional nausea.  No fever no chills.  Only  "able to tolerate small snacks.  Continues to lose weight.  Does note occasional dizziness when standing.  Taking loperamide with minimal improvement.    Did visit to urgent care on 7/11/2022.  Work-up there was reassuring.  CT abdomen without any acute findings.  CBC and BMP were relatively stable.  Lipase was negative.  UA was suggestive of infection.  Was asymptomatic at that time. Today notes some dysuria.  No hematuria or urinary frequency.  She is hesitant to start an antibiotic with      From prior visit:  Recent admission for abdominal pain.  Imaging noted mesenteric twisting and redundant sigmoid colon with possible internal hernia, thought to be etiology of symptoms.  Has had continued abdominal pain since that time.  Recently negative H. Pylori. Abdominal US on 6/23 unremarkable.    Review of Systems   Constitutional, HEENT, cardiovascular, pulmonary, gi and gu systems are negative, except as otherwise noted.      Objective    BP 94/63   Pulse 92   Temp 98.3  F (36.8  C) (Oral)   Resp 16   Ht 1.498 m (4' 10.98\")   Wt 50.9 kg (112 lb 1.9 oz)   SpO2 99%   BMI 22.66 kg/m    Body mass index is 22.66 kg/m .  Physical Exam   GENERAL: Uncomfortable.  RESP: lungs clear to auscultation - no rales, rhonchi or wheezes  CV: regular rate and rhythm, normal S1 S2, no S3 or S4, no murmur, click or rub, no peripheral edema and peripheral pulses strong  ABDOMEN: Nondistended.  Soft.  Bowel sounds present.  Diffuse tenderness to palpation.  No rebound or guarding.  Negative Cardona sign.  No costovertebral angle tenderness  MS: no gross musculoskeletal defects noted, no edema    "

## 2022-07-14 LAB — C DIFF TOX B STL QL: NEGATIVE

## 2022-07-14 PROCEDURE — 87177 OVA AND PARASITES SMEARS: CPT

## 2022-07-14 PROCEDURE — 87209 SMEAR COMPLEX STAIN: CPT

## 2022-07-15 LAB
O+P STL MICRO: NEGATIVE
TRI STN SPEC: NORMAL

## 2022-07-18 NOTE — PROGRESS NOTES
Preceptor Attestation:  Patient's case reviewed and discussed with the resident, Jefe Martin MD, and I personally evaluated the patient. I agree with written assessment and plan of care.    Supervising Physician:  Carito Thornton MD   [facility]

## 2022-07-20 ENCOUNTER — TRANSFERRED RECORDS (OUTPATIENT)
Dept: HEALTH INFORMATION MANAGEMENT | Facility: CLINIC | Age: 42
End: 2022-07-20

## 2022-08-18 ENCOUNTER — ANCILLARY PROCEDURE (OUTPATIENT)
Dept: ULTRASOUND IMAGING | Facility: CLINIC | Age: 42
End: 2022-08-18
Attending: FAMILY MEDICINE
Payer: COMMERCIAL

## 2022-08-18 DIAGNOSIS — N94.9 ADNEXAL CYST: ICD-10-CM

## 2022-08-18 PROCEDURE — 76856 US EXAM PELVIC COMPLETE: CPT | Mod: TC | Performed by: RADIOLOGY

## 2022-08-18 PROCEDURE — 76830 TRANSVAGINAL US NON-OB: CPT | Mod: TC | Performed by: RADIOLOGY

## 2022-08-18 NOTE — NURSING NOTE
Due to patient being non-English speaking/uses sign language, an  was used for this visit. Only for face-to-face interpretation by an external agency, date and length of interpretation can be found on the scanned worksheet.     name: Rachel Leiva  Agency: [employer]  Language: Hmong   Telephone number: .  Type of interpretation: Face-to-face, spoken

## 2022-08-19 DIAGNOSIS — R10.84 GENERALIZED ABDOMINAL PAIN: ICD-10-CM

## 2022-08-21 RX ORDER — FAMOTIDINE 40 MG/1
40 TABLET, FILM COATED ORAL DAILY
Qty: 60 TABLET | Refills: 0 | Status: SHIPPED | OUTPATIENT
Start: 2022-08-21 | End: 2022-11-21

## 2022-09-06 ENCOUNTER — TRANSFERRED RECORDS (OUTPATIENT)
Dept: HEALTH INFORMATION MANAGEMENT | Facility: CLINIC | Age: 42
End: 2022-09-06

## 2022-10-13 NOTE — MR AVS SNAPSHOT
After Visit Summary   2017    Dain Tejeda    MRN: [id number]           Patient Information     Date Of Birth          1980        Visit Information        Provider Department      2017 10:20 AM Cyndie Jenkins MD [facility]        Today's Diagnoses     Supervision of high-risk pregnancy of elderly primigravida    -  1    Anemia due to other cause        Anemia of pregnancy in second trimester        Abnormal glucose tolerance test           Follow-ups after your visit        Who to contact     Please call your clinic at [phone] to:    Ask questions about your health    Make or cancel appointments    Discuss your medicines    Learn about your test results    Speak to your doctor   If you have compliments or concerns about an experience at your clinic, or if you wish to file a complaint, please contact [facility] Patient Relations at [phone] or email us at [email or web address]         Additional Information About Your Visit        MyChart Information     All in One [employer] is an electronic gateway that provides easy, online access to your medical records. With [employer], you can request a clinic appointment, read your test results, renew a prescription or communicate with your care team.     To sign up for All in One [employer] visit the website at [email or web address]   You will be asked to enter the access code listed below, as well as some personal information. Please follow the directions to create your username and password.     Your access code is: [email or web address]  Expires: 10/29/2017 12:26 PM     Your access code will  in 90 days. If you need help or a new code, please contact your [facility] or call [phone] for assistance.        Care EveryWhere ID     This is your Care EveryWhere ID. This could be used by other organizations to access your [facility] medical records  [id number]        Your Vitals  "Were     Pulse Temperature Height Last Period Pulse Oximetry BMI (Body Mass Index)    97 97.2  F (36.2  C) (Oral) 4' 11.45\" (151 cm) 02/05/2017 97% 25.86 kg/m2       Blood Pressure from Last 3 Encounters:   07/31/17 96/63   07/03/17 99/65   06/16/17 97/64    Weight from Last 3 Encounters:   07/31/17 130 lb (59 kg)   07/03/17 127 lb 6 oz (57.8 kg)   06/16/17 121 lb 9.6 oz (55.2 kg)              We Performed the Following     CBC with Plt ([facility])     Ferritin ([facility])     Glucose Challenge  1 Hr  ([facility])          Today's Medication Changes          These changes are accurate as of: 7/31/17 11:59 PM.  If you have any questions, ask your nurse or doctor.               These medicines have changed or have updated prescriptions.        Dose/Directions    * ferrous sulfate 325 (65 FE) MG tablet   Commonly known as:  IRON   This may have changed:  Another medication with the same name was added. Make sure you understand how and when to take each.   Used for:  Anemia of pregnancy in first trimester, Supervision of high-risk pregnancy of elderly multigravida   Changed by:  Cyndie Jenkins MD        Dose:  325 mg   Take 1 tablet (325 mg) by mouth 3 times daily Take with 1/2 glass of orange juice or with Vitamin C containing foods to increase absorption.   Quantity:  90 tablet   Refills:  3       * ferrous sulfate 325 (65 FE) MG tablet   Commonly known as:  IRON   This may have changed:  You were already taking a medication with the same name, and this prescription was added. Make sure you understand how and when to take each.   Used for:  Anemia of pregnancy in second trimester   Changed by:  Cyndie Jenkins MD        Dose:  325 mg   Take 1 tablet (325 mg) by mouth 3 times daily Take with 1/2 glass of orange juice or with Vitamin C foods (tomatoes) to increase absorption.   Quantity:  90 tablet   Refills:  3       * Notice:  This list has 2 medication(s) that are the same as other medications " prescribed for you. Read the directions carefully, and ask your doctor or other care provider to review them with you.         Where to get your medicines      These medications were sent to [id number] - [place], [place] - [address] AT [facility] & [facility]  [address], [place] [address]     Phone:  [phone]     ferrous sulfate 325 (65 FE) MG tablet                Primary Care Provider Office Phone # Fax #    Cyndie Jenkins [phone]       [facility] [address] [facility] [address]        Equal Access to Services     [facility] : Hadii aad ku hadasho Soomaali, waaxda luqadaha, qaybta kaalmada adeegyada, waxay kermitin haynewn wendy martin . So [facility] [phone].    ATENCIÓN: Si habla español, tiene a manzano disposición servicios gratuitos de asistencia lingüística. [facility] [phone].    We comply with applicable federal civil rights laws and [place] laws. We do not discriminate on the basis of race, color, national origin, age, disability sex, sexual orientation or gender identity.            Thank you!     Thank you for choosing [facility]  for your care. Our goal is always to provide you with excellent care. Hearing back from our patients is one way we can continue to improve our services. Please take a few minutes to complete the written survey that you may receive in the mail after your visit with us. Thank you!             Your Updated Medication List - Protect others around you: Learn how to safely use, store and throw away your medicines at www.disposemymeds.org.          This list is accurate as of: 7/31/17 11:59 PM.  Always use your most recent med list.                   Brand Name Dispense Instructions for use Diagnosis    doxylamine 25 MG Tabs tablet    UNISOM    20 each    Take 0.5 tablets (12.5 mg) by mouth 3 times daily as needed For nausea    Nausea/vomiting in pregnancy        * ferrous sulfate 325 (65 FE) MG tablet    IRON    90 tablet    Take 1 tablet (325 mg) by mouth 3 times daily Take with 1/2 glass of orange juice or with Vitamin C containing foods to increase absorption.    Anemia of pregnancy in first trimester, Supervision of high-risk pregnancy of elderly multigravida       * ferrous sulfate 325 (65 FE) MG tablet    IRON    90 tablet    Take 1 tablet (325 mg) by mouth 3 times daily Take with 1/2 glass of orange juice or with Vitamin C foods (tomatoes) to increase absorption.    Anemia of pregnancy in second trimester       ondansetron 4 MG tablet    ZOFRAN    30 tablet    Take 1 tablet (4 mg) by mouth every 8 hours as needed for nausea or vomiting    Nausea/vomiting in pregnancy       prenatal multivitamin  plus iron 27-0.8 MG Tabs per tablet     100 tablet    Take 1 tablet by mouth daily        pyridOXINE 25 MG tablet    vitamin B-6    30 tablet    Take 1 tablet (25 mg) by mouth 3 times daily as needed    Nausea/vomiting in pregnancy       ranitidine 75 MG tablet    ZANTAC    30 tablet    Take 1 tablet (75 mg) by mouth 2 times daily as needed for heartburn        * Notice:  This list has 2 medication(s) that are the same as other medications prescribed for you. Read the directions carefully, and ask your doctor or other care provider to review them with you.       Propranolol Counseling:  I discussed with the patient the risks of propranolol including but not limited to low heart rate, low blood pressure, low blood sugar, restlessness and increased cold sensitivity. They should call the office if they experience any of these side effects.

## 2022-10-29 ENCOUNTER — HOSPITAL ENCOUNTER (INPATIENT)
Facility: HOSPITAL | Age: 42
LOS: 16 days | Discharge: HOME HEALTH | End: 2022-11-14
Attending: EMERGENCY MEDICINE | Admitting: FAMILY MEDICINE
Payer: COMMERCIAL

## 2022-10-29 ENCOUNTER — APPOINTMENT (OUTPATIENT)
Dept: CT IMAGING | Facility: HOSPITAL | Age: 42
End: 2022-10-29
Attending: EMERGENCY MEDICINE
Payer: COMMERCIAL

## 2022-10-29 DIAGNOSIS — K52.9 CHRONIC DIARRHEA: Primary | ICD-10-CM

## 2022-10-29 DIAGNOSIS — K65.1 INTRA-ABDOMINAL ABSCESS (H): ICD-10-CM

## 2022-10-29 DIAGNOSIS — K50.918 CROHN'S DISEASE WITH OTHER COMPLICATION, UNSPECIFIED GASTROINTESTINAL TRACT LOCATION (H): ICD-10-CM

## 2022-10-29 DIAGNOSIS — K52.9 COLITIS: ICD-10-CM

## 2022-10-29 DIAGNOSIS — E87.6 HYPOKALEMIA: ICD-10-CM

## 2022-10-29 LAB
ALBUMIN SERPL BCG-MCNC: 2.5 G/DL (ref 3.5–5.2)
ALBUMIN UR-MCNC: 20 MG/DL
ALP SERPL-CCNC: 127 U/L (ref 35–104)
ALT SERPL W P-5'-P-CCNC: 8 U/L (ref 10–35)
ANION GAP SERPL CALCULATED.3IONS-SCNC: 12 MMOL/L (ref 7–15)
APPEARANCE UR: ABNORMAL
AST SERPL W P-5'-P-CCNC: 14 U/L (ref 10–35)
BACTERIA #/AREA URNS HPF: ABNORMAL /HPF
BASOPHILS # BLD AUTO: 0 10E3/UL (ref 0–0.2)
BASOPHILS NFR BLD AUTO: 0 %
BILIRUB SERPL-MCNC: 0.4 MG/DL
BILIRUB UR QL STRIP: NEGATIVE
BUN SERPL-MCNC: 7.2 MG/DL (ref 6–20)
CALCIUM SERPL-MCNC: 7.8 MG/DL (ref 8.6–10)
CHLORIDE SERPL-SCNC: 98 MMOL/L (ref 98–107)
COLOR UR AUTO: YELLOW
CREAT SERPL-MCNC: 0.59 MG/DL (ref 0.51–0.95)
DEPRECATED HCO3 PLAS-SCNC: 26 MMOL/L (ref 22–29)
EOSINOPHIL # BLD AUTO: 0.1 10E3/UL (ref 0–0.7)
EOSINOPHIL NFR BLD AUTO: 1 %
ERYTHROCYTE [DISTWIDTH] IN BLOOD BY AUTOMATED COUNT: 13.3 % (ref 10–15)
GFR SERPL CREATININE-BSD FRML MDRD: >90 ML/MIN/1.73M2
GLUCOSE SERPL-MCNC: 102 MG/DL (ref 70–99)
GLUCOSE UR STRIP-MCNC: NEGATIVE MG/DL
HCG UR QL: NEGATIVE
HCT VFR BLD AUTO: 32.5 % (ref 35–47)
HGB BLD-MCNC: 10.3 G/DL (ref 11.7–15.7)
HGB UR QL STRIP: ABNORMAL
IMM GRANULOCYTES # BLD: 0 10E3/UL
IMM GRANULOCYTES NFR BLD: 0 %
KETONES UR STRIP-MCNC: NEGATIVE MG/DL
LEUKOCYTE ESTERASE UR QL STRIP: ABNORMAL
LIPASE SERPL-CCNC: 17 U/L (ref 13–60)
LYMPHOCYTES # BLD AUTO: 2.3 10E3/UL (ref 0.8–5.3)
LYMPHOCYTES NFR BLD AUTO: 23 %
MAGNESIUM SERPL-MCNC: 1.9 MG/DL (ref 1.7–2.3)
MCH RBC QN AUTO: 26.9 PG (ref 26.5–33)
MCHC RBC AUTO-ENTMCNC: 31.7 G/DL (ref 31.5–36.5)
MCV RBC AUTO: 85 FL (ref 78–100)
MONOCYTES # BLD AUTO: 0.9 10E3/UL (ref 0–1.3)
MONOCYTES NFR BLD AUTO: 9 %
MUCOUS THREADS #/AREA URNS LPF: PRESENT /LPF
NEUTROPHILS # BLD AUTO: 6.5 10E3/UL (ref 1.6–8.3)
NEUTROPHILS NFR BLD AUTO: 67 %
NITRATE UR QL: NEGATIVE
NRBC # BLD AUTO: 0 10E3/UL
NRBC BLD AUTO-RTO: 0 /100
PH UR STRIP: 6.5 [PH] (ref 5–7)
PLATELET # BLD AUTO: 600 10E3/UL (ref 150–450)
POTASSIUM SERPL-SCNC: 2.9 MMOL/L (ref 3.4–5.3)
PROT SERPL-MCNC: 7.4 G/DL (ref 6.4–8.3)
RBC # BLD AUTO: 3.83 10E6/UL (ref 3.8–5.2)
RBC URINE: 4 /HPF
SODIUM SERPL-SCNC: 136 MMOL/L (ref 136–145)
SP GR UR STRIP: 1.02 (ref 1–1.03)
SQUAMOUS EPITHELIAL: 21 /HPF
UROBILINOGEN UR STRIP-MCNC: <2 MG/DL
WBC # BLD AUTO: 9.9 10E3/UL (ref 4–11)
WBC URINE: 0 /HPF

## 2022-10-29 PROCEDURE — 96361 HYDRATE IV INFUSION ADD-ON: CPT

## 2022-10-29 PROCEDURE — 250N000011 HC RX IP 250 OP 636: Performed by: STUDENT IN AN ORGANIZED HEALTH CARE EDUCATION/TRAINING PROGRAM

## 2022-10-29 PROCEDURE — 83605 ASSAY OF LACTIC ACID: CPT | Performed by: EMERGENCY MEDICINE

## 2022-10-29 PROCEDURE — 85025 COMPLETE CBC W/AUTO DIFF WBC: CPT | Performed by: EMERGENCY MEDICINE

## 2022-10-29 PROCEDURE — 83735 ASSAY OF MAGNESIUM: CPT | Performed by: EMERGENCY MEDICINE

## 2022-10-29 PROCEDURE — 250N000011 HC RX IP 250 OP 636: Performed by: EMERGENCY MEDICINE

## 2022-10-29 PROCEDURE — 80053 COMPREHEN METABOLIC PANEL: CPT | Performed by: EMERGENCY MEDICINE

## 2022-10-29 PROCEDURE — 80053 COMPREHEN METABOLIC PANEL: CPT | Performed by: STUDENT IN AN ORGANIZED HEALTH CARE EDUCATION/TRAINING PROGRAM

## 2022-10-29 PROCEDURE — C9803 HOPD COVID-19 SPEC COLLECT: HCPCS

## 2022-10-29 PROCEDURE — 96376 TX/PRO/DX INJ SAME DRUG ADON: CPT

## 2022-10-29 PROCEDURE — 87086 URINE CULTURE/COLONY COUNT: CPT | Performed by: EMERGENCY MEDICINE

## 2022-10-29 PROCEDURE — 258N000003 HC RX IP 258 OP 636: Performed by: EMERGENCY MEDICINE

## 2022-10-29 PROCEDURE — 36415 COLL VENOUS BLD VENIPUNCTURE: CPT | Performed by: EMERGENCY MEDICINE

## 2022-10-29 PROCEDURE — 250N000013 HC RX MED GY IP 250 OP 250 PS 637: Performed by: EMERGENCY MEDICINE

## 2022-10-29 PROCEDURE — 120N000001 HC R&B MED SURG/OB

## 2022-10-29 PROCEDURE — 82040 ASSAY OF SERUM ALBUMIN: CPT | Performed by: STUDENT IN AN ORGANIZED HEALTH CARE EDUCATION/TRAINING PROGRAM

## 2022-10-29 PROCEDURE — 96365 THER/PROPH/DIAG IV INF INIT: CPT

## 2022-10-29 PROCEDURE — U0005 INFEC AGEN DETEC AMPLI PROBE: HCPCS | Performed by: EMERGENCY MEDICINE

## 2022-10-29 PROCEDURE — 81025 URINE PREGNANCY TEST: CPT | Performed by: EMERGENCY MEDICINE

## 2022-10-29 PROCEDURE — 85025 COMPLETE CBC W/AUTO DIFF WBC: CPT | Performed by: STUDENT IN AN ORGANIZED HEALTH CARE EDUCATION/TRAINING PROGRAM

## 2022-10-29 PROCEDURE — 83690 ASSAY OF LIPASE: CPT | Performed by: EMERGENCY MEDICINE

## 2022-10-29 PROCEDURE — 74177 CT ABD & PELVIS W/CONTRAST: CPT

## 2022-10-29 PROCEDURE — 99285 EMERGENCY DEPT VISIT HI MDM: CPT | Mod: CS,25

## 2022-10-29 PROCEDURE — 81001 URINALYSIS AUTO W/SCOPE: CPT | Performed by: EMERGENCY MEDICINE

## 2022-10-29 PROCEDURE — 36415 COLL VENOUS BLD VENIPUNCTURE: CPT | Performed by: STUDENT IN AN ORGANIZED HEALTH CARE EDUCATION/TRAINING PROGRAM

## 2022-10-29 PROCEDURE — 96375 TX/PRO/DX INJ NEW DRUG ADDON: CPT

## 2022-10-29 RX ORDER — PIPERACILLIN SODIUM, TAZOBACTAM SODIUM 3; .375 G/15ML; G/15ML
3.38 INJECTION, POWDER, LYOPHILIZED, FOR SOLUTION INTRAVENOUS ONCE
Status: COMPLETED | OUTPATIENT
Start: 2022-10-29 | End: 2022-10-30

## 2022-10-29 RX ORDER — ONDANSETRON 2 MG/ML
4 INJECTION INTRAMUSCULAR; INTRAVENOUS ONCE
Status: COMPLETED | OUTPATIENT
Start: 2022-10-29 | End: 2022-10-29

## 2022-10-29 RX ORDER — IOPAMIDOL 755 MG/ML
100 INJECTION, SOLUTION INTRAVASCULAR ONCE
Status: COMPLETED | OUTPATIENT
Start: 2022-10-29 | End: 2022-10-29

## 2022-10-29 RX ORDER — POTASSIUM CHLORIDE 1500 MG/1
40 TABLET, EXTENDED RELEASE ORAL ONCE
Status: COMPLETED | OUTPATIENT
Start: 2022-10-29 | End: 2022-10-29

## 2022-10-29 RX ORDER — SODIUM CHLORIDE 9 MG/ML
INJECTION, SOLUTION INTRAVENOUS ONCE
Status: COMPLETED | OUTPATIENT
Start: 2022-10-29 | End: 2022-10-30

## 2022-10-29 RX ORDER — MORPHINE SULFATE 2 MG/ML
2 INJECTION, SOLUTION INTRAMUSCULAR; INTRAVENOUS ONCE
Status: COMPLETED | OUTPATIENT
Start: 2022-10-29 | End: 2022-10-29

## 2022-10-29 RX ADMIN — SODIUM CHLORIDE 1000 ML: 9 INJECTION, SOLUTION INTRAVENOUS at 20:56

## 2022-10-29 RX ADMIN — MORPHINE SULFATE 2 MG: 2 INJECTION, SOLUTION INTRAMUSCULAR; INTRAVENOUS at 22:35

## 2022-10-29 RX ADMIN — POTASSIUM CHLORIDE 40 MEQ: 20 TABLET, EXTENDED RELEASE ORAL at 22:33

## 2022-10-29 RX ADMIN — IOPAMIDOL 100 ML: 755 INJECTION, SOLUTION INTRAVENOUS at 22:25

## 2022-10-29 RX ADMIN — PIPERACILLIN AND TAZOBACTAM 3.38 G: 3; .375 INJECTION, POWDER, LYOPHILIZED, FOR SOLUTION INTRAVENOUS at 23:56

## 2022-10-29 RX ADMIN — ONDANSETRON 4 MG: 2 INJECTION INTRAMUSCULAR; INTRAVENOUS at 20:28

## 2022-10-29 RX ADMIN — ONDANSETRON 4 MG: 2 INJECTION INTRAMUSCULAR; INTRAVENOUS at 22:33

## 2022-10-29 RX ADMIN — SODIUM CHLORIDE: 9 INJECTION, SOLUTION INTRAVENOUS at 22:44

## 2022-10-29 ASSESSMENT — ACTIVITIES OF DAILY LIVING (ADL): ADLS_ACUITY_SCORE: 35

## 2022-10-30 PROBLEM — K90.0 CELIAC DISEASE: Status: ACTIVE | Noted: 2022-10-30

## 2022-10-30 PROBLEM — R19.7 DIARRHEA: Status: ACTIVE | Noted: 2022-10-30

## 2022-10-30 PROBLEM — K52.9 CHRONIC DIARRHEA: Status: ACTIVE | Noted: 2022-10-30

## 2022-10-30 PROBLEM — O24.419 GESTATIONAL DIABETES MELLITUS (GDM) IN THIRD TRIMESTER: Status: RESOLVED | Noted: 2017-09-04 | Resolved: 2022-10-30

## 2022-10-30 PROBLEM — K29.50 CHRONIC GASTRITIS: Status: ACTIVE | Noted: 2022-09-08

## 2022-10-30 PROBLEM — N30.01 ACUTE CYSTITIS WITH HEMATURIA: Status: RESOLVED | Noted: 2022-06-02 | Resolved: 2022-10-30

## 2022-10-30 LAB
ANION GAP SERPL CALCULATED.3IONS-SCNC: 11 MMOL/L (ref 7–15)
BUN SERPL-MCNC: 7.8 MG/DL (ref 6–20)
CALCIUM SERPL-MCNC: 7.6 MG/DL (ref 8.6–10)
CHLORIDE SERPL-SCNC: 103 MMOL/L (ref 98–107)
CREAT SERPL-MCNC: 0.5 MG/DL (ref 0.51–0.95)
DEPRECATED HCO3 PLAS-SCNC: 26 MMOL/L (ref 22–29)
ERYTHROCYTE [DISTWIDTH] IN BLOOD BY AUTOMATED COUNT: 13.4 % (ref 10–15)
GFR SERPL CREATININE-BSD FRML MDRD: >90 ML/MIN/1.73M2
GLUCOSE SERPL-MCNC: 95 MG/DL (ref 70–99)
HCT VFR BLD AUTO: 29.7 % (ref 35–47)
HGB BLD-MCNC: 9 G/DL (ref 11.7–15.7)
LACTATE SERPL-SCNC: 0.5 MMOL/L (ref 0.7–2)
MCH RBC QN AUTO: 26.9 PG (ref 26.5–33)
MCHC RBC AUTO-ENTMCNC: 30.3 G/DL (ref 31.5–36.5)
MCV RBC AUTO: 89 FL (ref 78–100)
PLATELET # BLD AUTO: 429 10E3/UL (ref 150–450)
POTASSIUM SERPL-SCNC: 3.9 MMOL/L (ref 3.4–5.3)
RBC # BLD AUTO: 3.35 10E6/UL (ref 3.8–5.2)
SARS-COV-2 RNA RESP QL NAA+PROBE: NEGATIVE
SODIUM SERPL-SCNC: 140 MMOL/L (ref 136–145)
WBC # BLD AUTO: 7.5 10E3/UL (ref 4–11)

## 2022-10-30 PROCEDURE — 87493 C DIFF AMPLIFIED PROBE: CPT | Performed by: EMERGENCY MEDICINE

## 2022-10-30 PROCEDURE — 36415 COLL VENOUS BLD VENIPUNCTURE: CPT

## 2022-10-30 PROCEDURE — 96361 HYDRATE IV INFUSION ADD-ON: CPT

## 2022-10-30 PROCEDURE — 250N000011 HC RX IP 250 OP 636

## 2022-10-30 PROCEDURE — 86364 TISS TRNSGLTMNASE EA IG CLAS: CPT | Performed by: INTERNAL MEDICINE

## 2022-10-30 PROCEDURE — 87506 IADNA-DNA/RNA PROBE TQ 6-11: CPT | Performed by: EMERGENCY MEDICINE

## 2022-10-30 PROCEDURE — 85027 COMPLETE CBC AUTOMATED: CPT

## 2022-10-30 PROCEDURE — 250N000013 HC RX MED GY IP 250 OP 250 PS 637: Performed by: INTERNAL MEDICINE

## 2022-10-30 PROCEDURE — 96376 TX/PRO/DX INJ SAME DRUG ADON: CPT

## 2022-10-30 PROCEDURE — 99223 1ST HOSP IP/OBS HIGH 75: CPT | Mod: AI

## 2022-10-30 PROCEDURE — 99222 1ST HOSP IP/OBS MODERATE 55: CPT | Performed by: SPECIALIST

## 2022-10-30 PROCEDURE — 120N000001 HC R&B MED SURG/OB

## 2022-10-30 PROCEDURE — 250N000013 HC RX MED GY IP 250 OP 250 PS 637

## 2022-10-30 PROCEDURE — 258N000003 HC RX IP 258 OP 636: Performed by: SPECIALIST

## 2022-10-30 PROCEDURE — 80048 BASIC METABOLIC PNL TOTAL CA: CPT

## 2022-10-30 PROCEDURE — 96375 TX/PRO/DX INJ NEW DRUG ADDON: CPT

## 2022-10-30 PROCEDURE — 82784 ASSAY IGA/IGD/IGG/IGM EACH: CPT | Performed by: INTERNAL MEDICINE

## 2022-10-30 PROCEDURE — 250N000011 HC RX IP 250 OP 636: Performed by: FAMILY MEDICINE

## 2022-10-30 RX ORDER — ENOXAPARIN SODIUM 100 MG/ML
40 INJECTION SUBCUTANEOUS AT BEDTIME
Status: DISCONTINUED | OUTPATIENT
Start: 2022-10-30 | End: 2022-11-06

## 2022-10-30 RX ORDER — PROCHLORPERAZINE MALEATE 10 MG
10 TABLET ORAL EVERY 6 HOURS PRN
Status: DISCONTINUED | OUTPATIENT
Start: 2022-10-30 | End: 2022-11-14 | Stop reason: HOSPADM

## 2022-10-30 RX ORDER — LIDOCAINE 40 MG/G
CREAM TOPICAL
Status: DISCONTINUED | OUTPATIENT
Start: 2022-10-30 | End: 2022-11-14 | Stop reason: HOSPADM

## 2022-10-30 RX ORDER — POLYETHYLENE GLYCOL 3350, SODIUM SULFATE ANHYDROUS, SODIUM BICARBONATE, SODIUM CHLORIDE, POTASSIUM CHLORIDE 236; 22.74; 6.74; 5.86; 2.97 G/4L; G/4L; G/4L; G/4L; G/4L
4000 POWDER, FOR SOLUTION ORAL ONCE
Status: COMPLETED | OUTPATIENT
Start: 2022-10-30 | End: 2022-10-30

## 2022-10-30 RX ORDER — PIPERACILLIN SODIUM, TAZOBACTAM SODIUM 3; .375 G/15ML; G/15ML
3.38 INJECTION, POWDER, LYOPHILIZED, FOR SOLUTION INTRAVENOUS EVERY 8 HOURS
Status: DISCONTINUED | OUTPATIENT
Start: 2022-10-30 | End: 2022-11-01

## 2022-10-30 RX ORDER — ACETAMINOPHEN 650 MG/1
650 SUPPOSITORY RECTAL EVERY 6 HOURS PRN
Status: DISCONTINUED | OUTPATIENT
Start: 2022-10-30 | End: 2022-11-02

## 2022-10-30 RX ORDER — ONDANSETRON 4 MG/1
4 TABLET, ORALLY DISINTEGRATING ORAL EVERY 6 HOURS PRN
Status: DISCONTINUED | OUTPATIENT
Start: 2022-10-30 | End: 2022-11-14 | Stop reason: HOSPADM

## 2022-10-30 RX ORDER — PROCHLORPERAZINE 25 MG
25 SUPPOSITORY, RECTAL RECTAL EVERY 12 HOURS PRN
Status: DISCONTINUED | OUTPATIENT
Start: 2022-10-30 | End: 2022-11-14 | Stop reason: HOSPADM

## 2022-10-30 RX ORDER — FAMOTIDINE 20 MG/1
40 TABLET, FILM COATED ORAL DAILY
Status: DISCONTINUED | OUTPATIENT
Start: 2022-10-31 | End: 2022-10-30

## 2022-10-30 RX ORDER — FAMOTIDINE 20 MG/1
20 TABLET, FILM COATED ORAL 2 TIMES DAILY
Status: DISCONTINUED | OUTPATIENT
Start: 2022-10-30 | End: 2022-10-31

## 2022-10-30 RX ORDER — ENOXAPARIN SODIUM 100 MG/ML
30 INJECTION SUBCUTANEOUS AT BEDTIME
Status: DISCONTINUED | OUTPATIENT
Start: 2022-10-30 | End: 2022-10-30

## 2022-10-30 RX ORDER — PIPERACILLIN SODIUM, TAZOBACTAM SODIUM 3; .375 G/15ML; G/15ML
3.38 INJECTION, POWDER, LYOPHILIZED, FOR SOLUTION INTRAVENOUS EVERY 6 HOURS
Status: DISCONTINUED | OUTPATIENT
Start: 2022-10-30 | End: 2022-10-30 | Stop reason: DRUGHIGH

## 2022-10-30 RX ORDER — ACETAMINOPHEN 325 MG/1
650 TABLET ORAL EVERY 6 HOURS PRN
Status: DISCONTINUED | OUTPATIENT
Start: 2022-10-30 | End: 2022-11-02

## 2022-10-30 RX ORDER — SODIUM CHLORIDE 9 MG/ML
INJECTION, SOLUTION INTRAVENOUS CONTINUOUS
Status: DISCONTINUED | OUTPATIENT
Start: 2022-10-30 | End: 2022-11-08

## 2022-10-30 RX ORDER — ONDANSETRON 2 MG/ML
4 INJECTION INTRAMUSCULAR; INTRAVENOUS EVERY 6 HOURS PRN
Status: DISCONTINUED | OUTPATIENT
Start: 2022-10-30 | End: 2022-11-14 | Stop reason: HOSPADM

## 2022-10-30 RX ORDER — PANTOPRAZOLE SODIUM 20 MG/1
40 TABLET, DELAYED RELEASE ORAL 2 TIMES DAILY
Status: DISCONTINUED | OUTPATIENT
Start: 2022-10-31 | End: 2022-11-14 | Stop reason: HOSPADM

## 2022-10-30 RX ORDER — OXYCODONE HYDROCHLORIDE 5 MG/1
5 TABLET ORAL EVERY 4 HOURS PRN
Status: DISCONTINUED | OUTPATIENT
Start: 2022-10-30 | End: 2022-11-04

## 2022-10-30 RX ADMIN — OXYCODONE HYDROCHLORIDE 5 MG: 5 TABLET ORAL at 08:49

## 2022-10-30 RX ADMIN — OXYCODONE HYDROCHLORIDE 5 MG: 5 TABLET ORAL at 16:43

## 2022-10-30 RX ADMIN — PIPERACILLIN AND TAZOBACTAM 3.38 G: 3; .375 INJECTION, POWDER, LYOPHILIZED, FOR SOLUTION INTRAVENOUS at 22:11

## 2022-10-30 RX ADMIN — OXYCODONE HYDROCHLORIDE 5 MG: 5 TABLET ORAL at 22:24

## 2022-10-30 RX ADMIN — ENOXAPARIN SODIUM 40 MG: 40 INJECTION SUBCUTANEOUS at 22:13

## 2022-10-30 RX ADMIN — FAMOTIDINE 20 MG: 20 TABLET ORAL at 08:49

## 2022-10-30 RX ADMIN — SODIUM CHLORIDE: 9 INJECTION, SOLUTION INTRAVENOUS at 16:21

## 2022-10-30 RX ADMIN — PROCHLORPERAZINE EDISYLATE 10 MG: 5 INJECTION INTRAMUSCULAR; INTRAVENOUS at 23:41

## 2022-10-30 RX ADMIN — ENOXAPARIN SODIUM 30 MG: 30 INJECTION SUBCUTANEOUS at 02:00

## 2022-10-30 RX ADMIN — ACETAMINOPHEN 650 MG: 325 TABLET, FILM COATED ORAL at 08:49

## 2022-10-30 RX ADMIN — PIPERACILLIN AND TAZOBACTAM 3.38 G: 3; .375 INJECTION, POWDER, LYOPHILIZED, FOR SOLUTION INTRAVENOUS at 06:11

## 2022-10-30 RX ADMIN — POLYETHYLENE GLYCOL 3350, SODIUM SULFATE ANHYDROUS, SODIUM BICARBONATE, SODIUM CHLORIDE, POTASSIUM CHLORIDE 4000 ML: 236; 22.74; 6.74; 5.86; 2.97 POWDER, FOR SOLUTION ORAL at 20:16

## 2022-10-30 RX ADMIN — PIPERACILLIN AND TAZOBACTAM 3.38 G: 3; .375 INJECTION, POWDER, LYOPHILIZED, FOR SOLUTION INTRAVENOUS at 15:23

## 2022-10-30 RX ADMIN — ONDANSETRON 4 MG: 2 INJECTION INTRAMUSCULAR; INTRAVENOUS at 22:23

## 2022-10-30 ASSESSMENT — ACTIVITIES OF DAILY LIVING (ADL)
ADLS_ACUITY_SCORE: 35
ADLS_ACUITY_SCORE: 35
CHANGE_IN_FUNCTIONAL_STATUS_SINCE_ONSET_OF_CURRENT_ILLNESS/INJURY: NO
ADLS_ACUITY_SCORE: 35
WEAR_GLASSES_OR_BLIND: NO
HEARING_DIFFICULTY_OR_DEAF: NO
WALKING_OR_CLIMBING_STAIRS_DIFFICULTY: NO
CONCENTRATING,_REMEMBERING_OR_MAKING_DECISIONS_DIFFICULTY: NO
DOING_ERRANDS_INDEPENDENTLY_DIFFICULTY: NO
ADLS_ACUITY_SCORE: 35
DIFFICULTY_COMMUNICATING: NO
ADLS_ACUITY_SCORE: 35
ADLS_ACUITY_SCORE: 18
ADLS_ACUITY_SCORE: 35
TOILETING_ISSUES: NO
DRESSING/BATHING_DIFFICULTY: NO
ADLS_ACUITY_SCORE: 35
ADLS_ACUITY_SCORE: 35
DIFFICULTY_EATING/SWALLOWING: NO
ADLS_ACUITY_SCORE: 35
FALL_HISTORY_WITHIN_LAST_SIX_MONTHS: NO

## 2022-10-30 ASSESSMENT — ENCOUNTER SYMPTOMS
CARDIOVASCULAR NEGATIVE: 1
FEVER: 0
EYES NEGATIVE: 1
MUSCULOSKELETAL NEGATIVE: 1
NEUROLOGICAL NEGATIVE: 1
DIARRHEA: 1
RESPIRATORY NEGATIVE: 1
CHILLS: 0
WEIGHT LOSS: 1
ABDOMINAL PAIN: 1
BLOOD IN STOOL: 1

## 2022-10-30 NOTE — H&P
[facility]    History and Physical - Hospitalist Service       Date of Admission:  10/29/2022    Assessment & Plan      Dain Tejeda is a 42 year old female w/ PMH of chronic gastritis, inflammation of small intestine, and celiac disease admitted on 10/29/2022. Here w/ diffuse colitis w/ severe involvement of the mid and distal descending colon.    Abdominal pain  Chronic diarrhea  Gastric biopsy suggestive of Celiac disease  CT confirmed diffuse colitis with severe involvement of the mid and distal descending colon  Pt has been having abdominal pain and bloody diarrhea for some time now, w/ extensive outpatient workup. Has had negative enteric infectious workup, possible ileitis on imaging 4/14/2022 w/ interval improvement seen on CT 7/11/22. Saw GI outpatient 7/22/22 and noted to have elevated inflammatory markers and abnormal CT findings concerning for IBD. EGD was performed w/ biopsy suggestive of Celiac disease and negative for H pylori, though no colonoscopy has been done at this point. CT abdomen pelvis 10/29 showing diffuse colitis with severe involvement of the mid and distal descending colon. Although there is no phoenix free air, the colitis is complicated by the presence of multiple small, intramural abscesses. Lipase and hepatic panel w/o significant abnormality. Gen surgery recommending initial management w/ abx on curbside consult as any surgical procedure would likely entail significant colectomy and colostomy placement in an otherwise healthy 42 year old. She did receive her first dose of Zosyn in the ED.  - Cont IV Zosyn  - PO Pepcid BID  - Oxyocodone 5 mg q4 h PRN for pain  - PRN Zofran and Compazine ordered  - GI consult placed, appreciate their recs    Hypokalemia  Hypomagnesemia  - Replete per nursing protocol    Abnormal UA  UA on admission positive for leuk esterase, protein, small amount of blood, and few bacteria seen. Also w/ moderate squamous cells noted. Pt is afebrile,  w/o leukocytosis, and not complaining of urinary symptoms so unclear if this truly represents a UTI.  - Cont IV abx per above  - UC pending    Thrombocythemia  PLTs have been around the upper limits of normal during previous evaluation, found to be 600k on admission. Likely elevated 2/2 acute phase reactant and significant GI tract inflammation.  - Monitor    Med Rec has not been completed at this time and thus PTA meds have not been ordered.       Diet: Combination Diet Regular Diet Adult  DVT Prophylaxis: Enoxaparin (Lovenox) SQ  Chambers Catheter: Not present  Central Lines: None  Cardiac Monitoring: None  Code Status: Full Code    Clinically Significant Risk Factors Present on Admission        # Hypokalemia: Lowest K = 2.9 mmol/L (Ref range: 3.4-5.3) in last 2 days, will replace as needed       # Hypoalbuminemia: Lowest albumin = 2.5 g/dL (Ref range: 3.5-5.2) at 10/29/2022  8:27 PM, will monitor as appropriate                 Disposition Plan      Expected Discharge Date: 10/31/2022                The patient's care was discussed with the Attending Physician, Dr. Bernstein.    Andrey Reyes MD  Hospitalist Service  [facility]  Securely message with the [employer] Physical Web Console (learn more here)  Text page via [employer] Paging/Directory   ______________________________________________________________________    Chief Complaint   Abdominal pain    History is obtained from the patient    History of Present Illness   Dain Tejeda is a 42 year old female w/ PMH of chronic gastritis, inflammation of small intestine, and celiac disease who presents w/ 1 week of abdominal pain. She has been experiencing ongoing abdominal pain w/ chronic diarrhea for many months now that has been worked up in the outpatient setting. She states that she has been having 5-10 loose, sometimes bloody, BMs a day for several months now. Has been seen by GI outpatient and had an EGD performed w/ biopsies negative for H pylori and  findings suggesting a diagnosis of Celiac disease. No Colonoscopy has been done up until this time. She has now been experiencing approximately 1 week of worsened diffuse abdominal pain and loose watery stools. At this time she is denying fevers, chills, cough, chest pain, shortness of breath, or other new symptoms outside of her GI concerns.     Review of Systems    The 10 point Review of Systems is negative other than noted in the HPI or here.     Past Medical History    I have reviewed this patient's medical history and updated it with pertinent information if needed.   Past Medical History:   Diagnosis Date     Diet controlled gestational diabetes mellitus (GDM), antepartum 9/4/2017    Attempting diet control first     Nausea/vomiting in pregnancy 4/24/2017     Postpartum hemorrhage 11/16/2017     Third degree laceration of perineum during delivery, postpartum 11/15/2017     Past Surgical History   I have reviewed this patient's surgical history and updated it with pertinent information if needed.  History reviewed. No pertinent surgical history.    Social History   I have reviewed this patient's social history and updated it with pertinent information if needed.  Social History     Tobacco Use     Smoking status: Never     Smokeless tobacco: Never   Substance Use Topics     Alcohol use: No     Drug use: No     Family History   I have reviewed this patient's family history and updated it with pertinent information if needed.  Family History   Problem Relation Age of Onset     Gestational Diabetes Sister      Gestational Diabetes Sister      Diabetes No family hx of      Coronary Artery Disease No family hx of      Hypertension No family hx of      Breast Cancer No family hx of      Colon Cancer No family hx of      Prostate Cancer No family hx of      Other Cancer No family hx of      Prior to Admission Medications   Prior to Admission Medications   Prescriptions Last Dose Informant Patient Reported? Taking?    Prenatal Vit-Fe Fumarate-FA (PRENATAL 19) 29-1 MG CHEW   Yes No   Sig: Take 1 tablet by mouth   Patient not taking: Reported on 7/12/2022   acetaminophen (TYLENOL) 500 MG tablet   No No   Sig: Take 2 tablets (1,000 mg) by mouth every 6 hours as needed for mild pain   calcium carbonate (TUMS) 500 MG chewable tablet   No No   Sig: Take 1 tablet (500 mg) by mouth 2 times daily   famotidine (PEPCID) 40 MG tablet   No No   Sig: Take 1 tablet (40 mg) by mouth daily   loperamide (IMODIUM A-D) 2 MG tablet   No No   Sig: Take 1 tablet (2 mg) by mouth 4 times daily as needed for diarrhea   omeprazole (PRILOSEC) 40 MG DR capsule   No No   Sig: Take 1 capsule (40 mg) by mouth daily   ondansetron (ZOFRAN ODT) 4 MG ODT tab   No No   Sig: Take 1 tablet (4 mg) by mouth every 8 hours as needed for nausea   phenylephrine-cocoa butter (PREPARATION H) 0.25-88.44 % suppository   No No   Sig: Place 1 suppository rectally as needed for hemorrhoids or itching   prochlorperazine (COMPAZINE) 10 MG tablet   No No   Sig: Take 1 tablet (10 mg) by mouth every 6 hours as needed for nausea or vomiting      Facility-Administered Medications: None     Allergies   Allergies   Allergen Reactions     Soybean Allergy Itching and Blisters     Tofu- causes itching and sores in the mouth     Physical Exam   Vital Signs: Temp: 97.3  F (36.3  C) Temp src: Temporal BP: 107/73 Pulse: 96   Resp: 25 SpO2: 96 %      Weight: 0 lbs 0 oz     Constitutional: awake, alert, cooperative, no apparent distress, and appears stated age  Eyes: Lids and lashes normal, pupils equal, sclera clear, conjunctiva normal  ENT: Normocephalic, without obvious abnormality, oral pharynx with moist mucous membranes, tonsils without erythema or exudates, gums normal and good dentition.  Respiratory: No increased work of breathing, good air exchange, clear to auscultation bilaterally, no crackles or wheezing  Cardiovascular: Normal apical impulse, regular rate and rhythm, normal S1 and  S2, no S3 or S4, and no murmur noted  GI: No scars, normal bowel sounds, soft, non-distended, mildly TTP diffusely, no masses palpated, no hepatosplenomegally  Musculoskeletal: There is no redness, warmth, or swelling of the joints.  Full range of motion noted.    Neurologic: Awake, alert, oriented to name, place and time.  Neuropsychiatric: General: normal, calm and normal eye contact  Level of consciousness: alert / normal  Affect: normal    Data   Data reviewed today: I reviewed all medications, new labs and imaging results over the last 24 hours. I pe rsonally reviewed the abdominal CT image(s) showing diffuse colitis w/ intramural abscesses.    Recent Labs   Lab 10/29/22  2027   WBC 9.9   HGB 10.3*   MCV 85   *      POTASSIUM 2.9*   CHLORIDE 98   CO2 26   BUN 7.2   CR 0.59   ANIONGAP 12   ALEX 7.8*   *   ALBUMIN 2.5*   PROTTOTAL 7.4   BILITOTAL 0.4   ALKPHOS 127*   ALT 8*   AST 14   LIPASE 17     Recent Results (from the past 24 hour(s))   CT Abdomen Pelvis w Contrast    Narrative    EXAM: CT ABDOMEN PELVIS W CONTRAST  LOCATION: [facility]  DATE/TIME: 10/29/2022 10:27 PM    INDICATION: abd pain assoc with N V D  COMPARISON: Ultrasound of the pelvis from 08/18/2022. CT abdomen pelvis from 07/11/2022  TECHNIQUE: CT scan of the abdomen and pelvis was performed following injection of IV contrast. Multiplanar reformats were obtained. Dose reduction techniques were used.  CONTRAST: Fywiqw180 100ml    FINDINGS:   LOWER CHEST: Normal.    HEPATOBILIARY: Focal fat along the falciform ligament, liver otherwise normal. No calcified gallstones. No biliary ductal dilatation.    PANCREAS: Normal.    SPLEEN: Normal.    ADRENAL GLANDS: Normal.    KIDNEYS/BLADDER: Normal.    BOWEL: Normal caliber small bowel. There is wall thickening of the large bowel from the proximal transverse colon through the mid sigmoid colon. Wall thickening is most severe in the mid and distal descending  colon, where there are multiple intramural   fluid and gas collections consistent with abscess. Anteriorly there is a 1.5 cm collection on image 98 of series 2. Posteriorly there is a 1.3 cm collection on image 106. More inferiorly there is a thin crescentic collection posteriorly measuring 1.7 cm   in length. There is extensive pericolonic edema, but no free air.    LYMPH NODES: Multiple presumably reactive pericolonic lymph nodes.    VASCULATURE: Unremarkable.    PELVIC ORGANS: Cyst in the left pelvis measures 5 cm in length, likely corresponding to the paraovarian cyst seen on the recent pelvic ultrasound.    MUSCULOSKELETAL: Transitional lumbosacral anatomy, normal variant      Impression    IMPRESSION:   1.  Findings consistent with a fairly diffuse colitis with severe involvement of the mid and distal descending colon. Although there is no phoenix free air, the colitis is complicated by the presence of multiple small, intramural abscesses as described   above.

## 2022-10-30 NOTE — ED TRIAGE NOTES
Pt to triage via SPF d/t severe low ABD pain for the past week. Per EMS report, Pt was seen for ovarian cyst a month ago and for kidney stones 2mo ago. . VSS. Pt reports unable to keep any intake down d/t N/V/D. Has been taking tylenol with a little relief. Last dose 1200 today     Triage Assessment     Row Name 10/29/22 2016       Triage Assessment (Adult)    Airway WDL WDL

## 2022-10-30 NOTE — PROGRESS NOTES
Surgery:  I briefly saw the patient but unfortunately they were unable to get an  even through [facility].  I have reviewed her labs and her CT scan.  I will see her later today but this may be much later.  Until then keep her n.p.o. and continue IV antibiotics.  Based on what I see so far I do not think surgery is imminently needed but do not let her eat until we have made that decision.

## 2022-10-30 NOTE — ED PROVIDER NOTES
Emergency Department Encounter     Evaluation Date & Time:   10/29/2022  9:05 PM    CHIEF COMPLAINT:  Abdominal Pain      Triage Note:  Pt to triage via SPF d/t severe low ABD pain for the past week. Per EMS report, Pt was seen for ovarian cyst a month ago and for kidney stones 2mo ago. . VSS. Pt reports unable to keep any intake down d/t N/V/D. Has been taking tylenol with a little relief. Last dose 1200 today     Triage Assessment     Row Name 10/29/22 2016       Triage Assessment (Adult)    Airway WDL WDL                    Impression and Plan       FINAL IMPRESSION:    ICD-10-CM    1. Colitis  K52.9       2. Intra-abdominal abscess (H)  K65.1     multiple small, associated with colitis      3. Hypokalemia  E87.6             ED COURSE & MEDICAL DECISION MAKIN:25 PM I met with the patient, obtained history, performed an initial exam, and discussed options and plan for diagnostics and treatment here in the ED. PPE worn: surgical mask, gloves     42 year old female, history of chronic gastritis and celiac disease, who presents for evaluation of generalized cramping abdominal pain x one week associated with nausea / vomiting and diarrhea. No fevers.     On exam, abdomen soft with moderate to severe tenderness to palpation, most prominently to the left mid-abdomen and LLQ; no peritoneal signs.    Patient placed on cardiac monitor, IV access established, blood sent for labs and IV fluids initiated.    Labs remarkable for no leukocytosis (WBC 9.9) with mild anemia (Hb 10.3) and thrombocytosis (platelet count 600 - consider acute phase reactant).  She has hypokalemia (K+ 2.9 - replaced orally with normal renal function); likely secondary to GI losses from vomiting and diarrhea.  No laboratory evidence of hepatitis, elevated bilirubin or pancreatitis.  UA contaminated without definitive infection.    CT abdomen / pelvis performed and consistent with a fairly diffuse colitis with severe involvement of the mid  and distal descending colon. No phoenix free air, however the colitis is complicated by the presence of multiple small, intramural abscesses.    Lactate not elevated (0.5).    Patient given IV Zosyn and general surgery consulted.  Surgery recommends IV antibiotics and close monitoring.    Patient updated with assistance from the [facility]  and was admitted to the resident service.  Asymptomatic COVID test negative.  Patient hemodynamically stable throughout ED course.      At the conclusion of the encounter I discussed the results of all the tests and the disposition. The questions were answered. The patient acknowledged understanding and was agreeable with the care plan.      MEDICATIONS GIVEN IN THE EMERGENCY DEPARTMENT:  Medications   lidocaine 1 % 0.1-1 mL (has no administration in time range)   lidocaine (LMX4) cream (has no administration in time range)   sodium chloride (PF) 0.9% PF flush 3 mL (3 mLs Intracatheter Given 10/30/22 0849)   sodium chloride (PF) 0.9% PF flush 3 mL (has no administration in time range)   enoxaparin ANTICOAGULANT (LOVENOX) injection 30 mg (30 mg Subcutaneous Given 10/30/22 0200)   acetaminophen (TYLENOL) tablet 650 mg (650 mg Oral Given 10/30/22 0849)     Or   acetaminophen (TYLENOL) Suppository 650 mg ( Rectal See Alternative 10/30/22 0849)   oxyCODONE (ROXICODONE) tablet 5 mg (5 mg Oral Given 10/30/22 0849)   ondansetron (ZOFRAN ODT) ODT tab 4 mg (has no administration in time range)     Or   ondansetron (ZOFRAN) injection 4 mg (has no administration in time range)   prochlorperazine (COMPAZINE) injection 10 mg (has no administration in time range)     Or   prochlorperazine (COMPAZINE) tablet 10 mg (has no administration in time range)     Or   prochlorperazine (COMPAZINE) suppository 25 mg (has no administration in time range)   famotidine (PEPCID) tablet 20 mg (20 mg Oral Given 10/30/22 0849)   piperacillin-tazobactam (ZOSYN) 3.375 g vial to attach to  mL bag (3.375 g  Intravenous Given 10/30/22 0611)   ondansetron (ZOFRAN) injection 4 mg (4 mg Intravenous Given 10/29/22 2028)   0.9% sodium chloride BOLUS (0 mLs Intravenous Stopped 10/29/22 2336)   potassium chloride ER (KLOR-CON M) CR tablet 40 mEq (40 mEq Oral Given 10/29/22 2233)   sodium chloride 0.9% infusion (0 mLs Intravenous Stopped 10/30/22 0433)   morphine (PF) injection 2 mg (2 mg Intravenous Given 10/29/22 2235)   ondansetron (ZOFRAN) injection 4 mg (4 mg Intravenous Given 10/29/22 2233)   iopamidol (ISOVUE-370) solution 100 mL (100 mLs Intravenous Given 10/29/22 2225)   piperacillin-tazobactam (ZOSYN) 3.375 g vial to attach to  mL bag (0 g Intravenous Stopped 10/30/22 0030)       NEW PRESCRIPTIONS STARTED AT TODAY'S ED VISIT:  New Prescriptions    No medications on file       HPI     HPI     Secondary to language barrier, history was obtained with assistance from the professional [employer]  via the language line on [employer]    Dain Tejeda is a 42 year old female, history of chronic gastritis and celiac disease, who presents to this ED by ambulance for evaluation of abdominal pain.    Patient reports onset of abdominal pain ~one week ago, which has been constant and worsening since. The pain is generalized in location. The pain is squeezing and cramping in nature. She denies any provoking factors. She endorses associated nausea with 1-2 episodes of vomiting daily and 3-4 episodes of diarrhea daily. She has been unable to tolerate po as when she takes anything by mouth it causes diarrhea. She denies fevers.     No past abdominal surgeries.     The patient does not take daily medications and denies any chronic illnesses. The patient states that she only takes Tylenol PRN for pain.     She has otherwise been in her normal state of health and denies chest pain, shortness of breath, URI symptoms or other concerns.     REVIEW OF SYSTEMS:  All other systems reviewed and are negative.      Medical History     Past  Medical History:   Diagnosis Date     Diet controlled gestational diabetes mellitus (GDM), antepartum 9/4/2017     Nausea/vomiting in pregnancy 4/24/2017     Postpartum hemorrhage 11/16/2017     Third degree laceration of perineum during delivery, postpartum 11/15/2017       History reviewed. No pertinent surgical history.    Family History   Problem Relation Age of Onset     Gestational Diabetes Sister      Gestational Diabetes Sister      Diabetes No family hx of      Coronary Artery Disease No family hx of      Hypertension No family hx of      Breast Cancer No family hx of      Colon Cancer No family hx of      Prostate Cancer No family hx of      Other Cancer No family hx of        Social History     Tobacco Use     Smoking status: Never     Smokeless tobacco: Never   Substance Use Topics     Alcohol use: No     Drug use: No       acetaminophen (TYLENOL) 500 MG tablet  calcium carbonate (TUMS) 500 MG chewable tablet  famotidine (PEPCID) 40 MG tablet  loperamide (IMODIUM A-D) 2 MG tablet  omeprazole (PRILOSEC) 40 MG DR capsule  ondansetron (ZOFRAN ODT) 4 MG ODT tab  prochlorperazine (COMPAZINE) 10 MG tablet  phenylephrine-cocoa butter (PREPARATION H) 0.25-88.44 % suppository  Prenatal Vit-Fe Fumarate-FA (PRENATAL 19) 29-1 MG CHEW        Physical Exam     First Vitals:  Patient Vitals for the past 24 hrs:   BP Temp Temp src Pulse Resp SpO2   10/30/22 0839 104/68 -- -- 91 -- 97 %   10/30/22 0500 -- -- -- 87 23 97 %   10/30/22 0400 94/67 98.1  F (36.7  C) Oral 85 23 98 %   10/29/22 2303 107/73 -- -- 96 25 96 %   10/29/22 2248 107/74 -- -- 96 25 97 %   10/29/22 2233 106/73 -- -- 100 -- 98 %   10/29/22 2218 112/76 -- -- 98 30 97 %   10/29/22 2203 103/73 -- -- 101 25 99 %   10/29/22 2148 105/74 -- -- 98 29 99 %   10/29/22 2133 102/70 -- -- 100 27 99 %   10/29/22 2115 109/69 -- -- 103 29 100 %   10/29/22 2014 113/78 97.3  F (36.3  C) Temporal 113 18 98 %       PHYSICAL EXAM:   Physical Exam    GENERAL: Awake, alert.   In mild acute distress.   HEENT: Normocephalic, atraumatic. Pupils equal, round and reactive. Conjunctiva normal.  NECK: No stridor.  PULMONARY: Symmetrical breath sounds without distress.  Lungs clear to auscultation bilaterally without wheezes, rhonchi or rales.  CARDIO: Tachycardic rate with regular rhythm. No significant murmur, rub or gallop.  Radial pulses strong and symmetrical.  ABDOMINAL: Abdomen soft, non-distended with moderate to severe tenderness to palpation, most prominently to the left mid-abdomen and LLQ; no rebound tenderness or guarding.    EXTREMITIES: No lower extremity swelling or edema.      NEURO: Alert and oriented to person, place and time.  Cranial nerves grossly intact.  No focal motor deficit.  PSYCH: Normal mood and affect.  SKIN: No rashes.     Results     LAB:  All pertinent labs reviewed and interpreted  Labs Ordered and Resulted from Time of ED Arrival to Time of ED Departure   ROUTINE UA WITH MICROSCOPIC REFLEX TO CULTURE - Abnormal       Result Value    Color Urine Yellow      Appearance Urine Turbid (*)     Glucose Urine Negative      Bilirubin Urine Negative      Ketones Urine Negative      Specific Gravity Urine 1.016      Blood Urine 0.03 mg/dL (*)     pH Urine 6.5      Protein Albumin Urine 20 (*)     Urobilinogen Urine <2.0      Nitrite Urine Negative      Leukocyte Esterase Urine 500 Shilpa/uL (*)     Bacteria Urine Few (*)     Mucus Urine Present (*)     RBC Urine 4 (*)     WBC Urine 0      Squamous Epithelials Urine 21 (*)    COMPREHENSIVE METABOLIC PANEL - Abnormal    Sodium 136      Potassium 2.9 (*)     Chloride 98      Carbon Dioxide (CO2) 26      Anion Gap 12      Urea Nitrogen 7.2      Creatinine 0.59      Calcium 7.8 (*)     Glucose 102 (*)     Alkaline Phosphatase 127 (*)     AST 14      ALT 8 (*)     Protein Total 7.4      Albumin 2.5 (*)     Bilirubin Total 0.4      GFR Estimate >90     CBC WITH PLATELETS AND DIFFERENTIAL - Abnormal    WBC Count 9.9      RBC Count  3.83      Hemoglobin 10.3 (*)     Hematocrit 32.5 (*)     MCV 85      MCH 26.9      MCHC 31.7      RDW 13.3      Platelet Count 600 (*)     % Neutrophils 67      % Lymphocytes 23      % Monocytes 9      % Eosinophils 1      % Basophils 0      % Immature Granulocytes 0      NRBCs per 100 WBC 0      Absolute Neutrophils 6.5      Absolute Lymphocytes 2.3      Absolute Monocytes 0.9      Absolute Eosinophils 0.1      Absolute Basophils 0.0      Absolute Immature Granulocytes 0.0      Absolute NRBCs 0.0     LACTIC ACID WHOLE BLOOD - Abnormal    Lactic Acid 0.5 (*)    LIPASE - Normal    Lipase 17     HCG QUALITATIVE URINE - Normal    hCG Urine Qualitative Negative     MAGNESIUM - Normal    Magnesium 1.9     COVID-19 VIRUS (CORONAVIRUS) BY PCR - Normal    SARS CoV2 PCR Negative     URINE CULTURE   ENTERIC BACTERIA AND VIRUS PANEL BY MARILYNN STOOL   C. DIFFICILE TOXIN B PCR WITH REFLEX TO C. DIFFICILE ANTIGEN AND TOXINS A/B EIA       RADIOLOGY:  CT Abdomen Pelvis w Contrast   Final Result   IMPRESSION:    1.  Findings consistent with a fairly diffuse colitis with severe involvement of the mid and distal descending colon. Although there is no phoenix free air, the colitis is complicated by the presence of multiple small, intramural abscesses as described    above.          I, Michael Pereira, am serving as a scribe to document services personally performed by Janine Harper MD based on my observation and the provider's statements to me. I, Janine Harper MD attest that Michael Pereira is acting in a scribe capacity, has observed my performance of the services and has documented them in accordance with my direction.    Janine Harper MD  Emergency Medicine  [facility] EMERGENCY DEPARTMENT         Janine Harper MD  10/30/22 0977

## 2022-10-30 NOTE — CONSULTS
Consultation - Surgery  Dain Tejeda,  1980, MRN [id number]    Admitting Dx: Hypokalemia [E87.6]    PCP: Jefe Martin, [phone]   Code status:  Full Code       Extended Emergency Contact Information  Primary Emergency Contact: STEPHON DORAN  Address: [address] [place]           [place] [address] [place]  Home Phone: [phone]  Relation: Spouse  Preferred language: Hmong   needed? Yes       Assessment and Plan   Impression:  Severe colitis with multifocal small intramural abscesses.  This is very concerning for inflammatory bowel disease, most notably Crohn's.  Her symptoms have been going on for about 7 months off and on.  At times there has been blood in her stool.  She was actually here in  with pain and at that point was scheduled to have an outpatient colonoscopy but its not clear to me if that was done.  The patient describes having an ultrasound done but does not describe a colonoscopy to me.  All of these abscesses are tiny and should resolve with IV antibiotics and right now clinically she does not appear toxic.  So I think it is worth a trial of IV antibiotics but I think she needs to be seen by GI again to consider colonoscopy again.  I am very concerned that she may have Crohn's disease.  If she ends up in surgery, she will end up needing an extended hemicolectomy if not a subtotal colectomy.      Plan:  IV hydration.  Keep n.p.o. until seen by GI.  Continue IV antibiotics.  GI consult.           Chief Complaint Hypokalemia       HPI    We have been requested by Dr. Allen to evaluate Dain Tejeda for abdominal pain associated with intra-abdominal abscesses.  This is a 42 year old year old female with a 7-month history of on and off abdominal pain associated with diarrhea.  At times there has been blood in the stool.  She is actually been in the hospital once with similar symptoms about 4 months ago.  It sounds like she was supposed to be set up for colonoscopy as an  outpatient but its not clear to me that that ever happened.  The patient states that she is already feeling better now after night of IV hydration and IV antibiotics.       Medical History  Patient Active Problem List   Diagnosis     Screening for cervical cancer- ASCUS, HPV+ 4/2017     Gestational diabetes mellitus (GDM) in third trimester     Pain of back and right lower extremity     Abdominal pain, generalized     Abdominal pain     Inflammation of small intestine     Acute cystitis with hematuria     Moderate recurrent major depression (H)     Hypokalemia     Colitis     Intra-abdominal abscess (H)       [unfilled] Surgical History  History reviewed. No pertinent surgical history.     Social History  Social History     Tobacco Use     Smoking status: Never     Smokeless tobacco: Never   Substance Use Topics     Alcohol use: No     Drug use: No       Allergies  Allergies   Allergen Reactions     Soybean Allergy Itching and Blisters     Tofu- causes itching and sores in the mouth    Family History  Reviewed, and family history includes Gestational Diabetes in her sister and sister.  The Family history is not pertinent to the patients chief complaint. Psychosocial Needs  Social History     Social History Narrative    [place] arrived to [facility] 02/2017. No education.      Additional psychosocial needs reviewed per nursing assessment.     Prior to Admission Medications   Current Outpatient Medications   Medication Instructions     acetaminophen (TYLENOL) 1,000 mg, Oral, EVERY 6 HOURS PRN     calcium carbonate (TUMS) 500 mg, Oral, 2 TIMES DAILY     famotidine (PEPCID) 40 mg, Oral, DAILY     loperamide (IMODIUM A-D) 2 mg, Oral, 4 TIMES DAILY PRN     omeprazole (PRILOSEC) 40 mg, Oral, DAILY     ondansetron (ZOFRAN ODT) 4 mg, Oral, EVERY 8 HOURS PRN     phenylephrine-cocoa butter (PREPARATION H) 0.25-88.44 % suppository 1 suppository, Rectal, PRN     Prenatal Vit-Fe Fumarate-FA (PRENATAL 19) 29-1 MG CHEW 1 tablet      prochlorperazine (COMPAZINE) 10 mg, Oral, EVERY 6 HOURS PRN           Review of Systems:  Pertinent items are noted in HPI. Physical Exam:  Temp:  [97.3  F (36.3  C)-98.1  F (36.7  C)] 98.1  F (36.7  C)  Pulse:  [] 91  Resp:  [18-30] 23  BP: ()/(67-78) 104/68  SpO2:  [96 %-100 %] 97 %    General appearance: alert, appears stated age, cooperative and no distress  Eyes: No scleral icterus  Lungs: Breathing comfortably.  No shortness of breath  Heart: regular rate and rhythm  Abdomen: Abdomen is soft, no apparent tenderness.  She has no guarding or rebound.  She is a little bit tympanitic with tapping but no peritoneal signs at all.  Extremities: extremities, peripheral pulses and reflexes normal  Pulses: 2+ and symmetric  Skin: Skin color, texture, turgor normal. No rashes or lesions  Neurologic: Grossly normal       Pertinent Labs  Lab Results: personally reviewed.   Lab Results   Component Value Date     10/29/2022    .0 04/24/2017    CO2 26 10/29/2022    CO2 25 06/23/2022    CO2 26.0 04/24/2017    BUN 7.2 10/29/2022    BUN 6 06/23/2022    BUN 7.0 04/24/2017     Lab Results   Component Value Date    WBC 9.9 10/29/2022    HGB 10.3 10/29/2022    HGB 10.8 01/10/2018    HCT 32.5 10/29/2022    HCT 35.9 01/10/2018    MCV 85 10/29/2022    MCV 83.1 01/10/2018     10/29/2022        Pertinent Radiology  Radiology Results: Personally reviewed image/s and Personally reviewed impression/s  EKG Results: not reviewed.

## 2022-10-30 NOTE — PROGRESS NOTES
[facility]    Progress Note - Hospitalist Service       Date of Admission:  10/29/2022    Assessment & Plan   Dain Tejeda is a 42 year old female w/ PMH of chronic gastritis, inflammation of small intestine, and celiac disease admitted on 10/29/2022. Here w/ diffuse colitis w/ severe involvement of the mid and distal descending colon c/f IBD. She remains hospitalized for IV antibiotics, continued monitoring, and evaluation by GI.     Severe colitis w/ multifocal small intramural abscesses c/f Crohn's  Pt has been having abdominal pain and bloody diarrhea for some time now, w/ extensive outpatient workup. Has had negative enteric infectious workup, possible ileitis on imaging 4/14/2022 w/ interval improvement seen on CT 7/11/22. Saw GI outpatient 7/22/22 and noted to have elevated inflammatory markers and abnormal CT findings concerning for IBD. EGD was performed w/ biopsy suggestive of celiac disease and negative for H pylori, though no colonoscopy has been done at this point. CT abdomen pelvis 10/29 showing diffuse colitis with severe involvement of the mid and distal descending colon. Although there is no phoenix free air, the colitis is complicated by the presence of multiple small, intramural abscesses.   - GI consult placed, to see today  - General surgery consult   - continue IV abx   - keep NPO for now (until GI sees)   - agree with GI consult   - if ends up needing surgery, would need extended hemicolectomy or subtotal colectomy   - Cont IV Zosyn  - PO Pepcid BID  - Oxyocodone 5 mg q4 h PRN for pain  - PRN Zofran and Compazine ordered    Hypokalemia  Hypomagnesemia  - Replete per nursing protocol     Abnormal UA  UA on admission positive for leuk esterase, protein, small amount of blood, and few bacteria seen. Also w/ moderate squamous cells noted. Pt is afebrile, w/o leukocytosis, and not complaining of urinary symptoms so unclear if this truly represents a UTI.  - Cont IV abx per  above  - UC pending     Thrombocythemia  PLTs have been around the upper limits of normal during previous evaluation, found to be 600k on admission. Likely elevated 2/2 acute phase reactant and significant GI tract inflammation.  - Monitor     Diet: Combination Diet Regular Diet Adult    DVT Prophylaxis: Enoxaparin (Lovenox) SQ  Chambers Catheter: Not present  Fluids: 75ml/hr NS  Central Lines: None  Cardiac Monitoring: None  Code Status: Full Code      Disposition Plan      Expected Discharge Date: 10/31/2022                The patient's care was discussed with the Attending Physician, Dr. Bernstein.    Jackeline Hooper MD  Hospitalist Service  [facility]  Securely message with the Vocera Web Console (learn more here)  Text page via [employer] Paging/Directory         Clinically Significant Risk Factors Present on Admission        # Hypokalemia: Lowest K = 2.9 mmol/L (Ref range: 3.4-5.3) in last 2 days, will replace as needed       # Hypoalbuminemia: Lowest albumin = 2.5 g/dL (Ref range: 3.5-5.2) at 10/29/2022  8:27 PM, will monitor as appropriate                 ______________________________________________________________________    Interval History   Patient states that she has mild abdominal pain. Her biggest concern is that she is hungry and would like to eat. She has had a bowel movement this afternoon and reports loose stools, non-bloody. She understands that she is to see both general surgery and GI before she can eat.    Data reviewed today: I reviewed all medications, new labs and imaging results over the last 24 hours.    Physical Exam   Vital Signs: Temp: 98.1  F (36.7  C) Temp src: Oral BP: 104/68 Pulse: 91   Resp: 23 SpO2: 97 %      Weight: 0 lbs 0 oz  Gen: Pleasant. No distress.    HEENT: MMM.   Neck: No overt asymmetry.   CV: Appears well-perfused. RRR. No murmur.   Resp: Breathing comfortably on room air. Lungs clear to auscultation bilaterally without wheeze or crackle.   Abd:  Non-distended, no overt tenderness to palpation, no rebound or guarding, bowel sounds present  Ext: No edema or overt asymmetry/deformity.   Skin: No overt rash on easily visualized skin.   Neuro: Non-focal.   Psych: Calm.     Data   Recent Labs   Lab 10/29/22  2027   WBC 9.9   HGB 10.3*   MCV 85   *      POTASSIUM 2.9*   CHLORIDE 98   CO2 26   BUN 7.2   CR 0.59   ANIONGAP 12   ALEX 7.8*   *   ALBUMIN 2.5*   PROTTOTAL 7.4   BILITOTAL 0.4   ALKPHOS 127*   ALT 8*   AST 14   LIPASE 17     Recent Results (from the past 24 hour(s))   CT Abdomen Pelvis w Contrast    Narrative    EXAM: CT ABDOMEN PELVIS W CONTRAST  LOCATION: [facility]  DATE/TIME: 10/29/2022 10:27 PM    INDICATION: abd pain assoc with N V D  COMPARISON: Ultrasound of the pelvis from 08/18/2022. CT abdomen pelvis from 07/11/2022  TECHNIQUE: CT scan of the abdomen and pelvis was performed following injection of IV contrast. Multiplanar reformats were obtained. Dose reduction techniques were used.  CONTRAST: Voicot080 100ml    FINDINGS:   LOWER CHEST: Normal.    HEPATOBILIARY: Focal fat along the falciform ligament, liver otherwise normal. No calcified gallstones. No biliary ductal dilatation.    PANCREAS: Normal.    SPLEEN: Normal.    ADRENAL GLANDS: Normal.    KIDNEYS/BLADDER: Normal.    BOWEL: Normal caliber small bowel. There is wall thickening of the large bowel from the proximal transverse colon through the mid sigmoid colon. Wall thickening is most severe in the mid and distal descending colon, where there are multiple intramural   fluid and gas collections consistent with abscess. Anteriorly there is a 1.5 cm collection on image 98 of series 2. Posteriorly there is a 1.3 cm collection on image 106. More inferiorly there is a thin crescentic collection posteriorly measuring 1.7 cm   in length. There is extensive pericolonic edema, but no free air.    LYMPH NODES: Multiple presumably reactive pericolonic lymph  nodes.    VASCULATURE: Unremarkable.    PELVIC ORGANS: Cyst in the left pelvis measures 5 cm in length, likely corresponding to the paraovarian cyst seen on the recent pelvic ultrasound.    MUSCULOSKELETAL: Transitional lumbosacral anatomy, normal variant      Impression    IMPRESSION:   1.  Findings consistent with a fairly diffuse colitis with severe involvement of the mid and distal descending colon. Although there is no phoenix free air, the colitis is complicated by the presence of multiple small, intramural abscesses as described   above.

## 2022-10-30 NOTE — PHARMACY-ADMISSION MEDICATION HISTORY
Pharmacy Note - Admission Medication History    Pertinent Provider Information: Patient has inconsistent fill history. It is difficult to assess compliance.      ______________________________________________________________________    Prior To Admission (PTA) med list completed and updated in EMR.       PTA Med List   Medication Sig Last Dose     acetaminophen (TYLENOL) 500 MG tablet Take 2 tablets (1,000 mg) by mouth every 6 hours as needed for mild pain Unknown     calcium carbonate (TUMS) 500 MG chewable tablet Take 1 tablet (500 mg) by mouth 2 times daily Past Week     famotidine (PEPCID) 40 MG tablet Take 1 tablet (40 mg) by mouth daily Past Week     loperamide (IMODIUM A-D) 2 MG tablet Take 1 tablet (2 mg) by mouth 4 times daily as needed for diarrhea Unknown     omeprazole (PRILOSEC) 40 MG DR capsule Take 1 capsule (40 mg) by mouth daily Past Month     ondansetron (ZOFRAN ODT) 4 MG ODT tab Take 1 tablet (4 mg) by mouth every 8 hours as needed for nausea Past Month     prochlorperazine (COMPAZINE) 10 MG tablet Take 1 tablet (10 mg) by mouth every 6 hours as needed for nausea or vomiting Unknown       Information source(s): Patient and CareEverywhere/SureScripts  Method of interview communication: in-person    Summary of Changes to PTA Med List  New: none  Discontinued: Preparation H suppository, Prenatal vitamin  Changed: none    Patient was asked about OTC/herbal products specifically.  PTA med list reflects this.    In the past week, patient estimated taking medication this percent of the time:  50-90% due to running out and illness.    Allergies were reviewed, assessed, and updated with the patient.      Patient did not bring any medications to the hospital and can't retrieve from home. No multi-dose medications are available for use during hospital stay.     The information provided in this note is only as accurate as the sources available at the time of the update(s).    Thank you for the opportunity to  participate in the care of this patient.    JACOB ROSEN [facility]  10/30/2022 8:24 AM

## 2022-10-30 NOTE — CONSULTS
[facility] DIGESTIVE HEALTH CONSULTATION    Dain Tejeda   [address] [facility] [address]  42 year old female    Admission Date/Time: 10/29/2022  9:05 PM    Primary Care Provider:  Jefe Martin    Requesting Physician: Birdie Bernstein MD    ASSESSMENT AND RECOMMENDATIONS:   Dain Tejeda is a 42 year old female with:  1. Acute on chronic abdominal pain.   2. Severe colitis on abdominal imaging; complicated by the presence of multiple small, intramural abscesses.  3. Celiac disease, still on regular diet.     Recommendations:  Obtain stool testing for infection: enteric panel and C Diff.   Check TTG IgA and Total IgA.  Clear liquid diet until [place].  Prep patient for colonoscopy for tomorrow, may hold off if infectious work up is positive.  Counseled about risks of colonoscopy including sedation related risks, bleeding and perforation.   Consult dietician in regards to discuss gluten free diet.   Needs to follow up in the celiac disease clinic (we will contact patient).       Approximately 45 minutes of total time was spent providing patient care including patient evaluation, reviewing documentation/test results, and .            Tariq Silverio MD  Thank you for the opportunity to participate in the care of this patient.   Please feel free to call me with any questions or concerns.  Phone number [phone] or if after 5PM [phone].         CHIEF COMPLAINT:   Abdominal pain    REASON FOR THE CONSULT:  colitis    HPI:   Nurse helped with translation and translation service for Hmong language was not available in the hospital nor through language line services.    Dain Tejeda is a 42 year old female Hmong speaking female who presents with abdominal pain and rectal bleeding. She was diagnosed with celiac disease on 9/6/2022 based on duodenal biopsies that showed intra-epithelial lymphocytosis and villous blunting. Patient is not aware of this diagnosis as she reports that she did not receive results and  "when she called to ask of EGD results she was told that every thing was \"normal\". Per chart review, it was attempted to contact patient twice over the phone with [employer]  but was not able to reach her and a letter was sent out to her.     Patient reports generalized abdominal pain that started in April along with hematochezia, however, this became significantly more severe over the past week. No nausea or vomiting. No fevers, chills or night sweats. Reports that she lost about 25 pounds since April.     CT scan of the abdomen and pelvis 10/29/2022:  Findings consistent with a fairly diffuse colitis with severe involvement of the mid and distal descending colon. Although there is no phoenix free air, the colitis is complicated by the presence of multiple small, intramural abscesses as described  Above.    Stool testing for infection has not been collected yet. Patient did have one BM today.     REVIEW OF SYSTEMS:  Review of Systems   Constitutional: Positive for malaise/fatigue and weight loss. Negative for chills and fever.   HENT: Negative.    Eyes: Negative.    Respiratory: Negative.    Cardiovascular: Negative.    Gastrointestinal: Positive for abdominal pain, blood in stool and diarrhea.   Genitourinary: Negative.    Musculoskeletal: Negative.    Skin: Negative.    Neurological: Negative.    All other systems reviewed and are negative.      PAST MEDICAL HISTORY:  Past Medical History:   Diagnosis Date     Diet controlled gestational diabetes mellitus (GDM), antepartum 9/4/2017    Attempting diet control first     Gestational diabetes mellitus (GDM) in third trimester 9/4/2017    Attempting diet control first  Formatting of this note might be different from the original. Overview:  Attempting diet control first     Nausea/vomiting in pregnancy 4/24/2017     Postpartum hemorrhage 11/16/2017     Third degree laceration of perineum during delivery, postpartum 11/15/2017       PAST SURGICAL HISTORY:  History " reviewed. No pertinent surgical history.    FAMILY HISTORY:  Family History   Problem Relation Age of Onset     Gestational Diabetes Sister      Gestational Diabetes Sister      Diabetes No family hx of      Coronary Artery Disease No family hx of      Hypertension No family hx of      Breast Cancer No family hx of      Colon Cancer No family hx of      Prostate Cancer No family hx of      Other Cancer No family hx of        SOCIAL HISTORY:  Social History     Tobacco Use     Smoking status: Never     Smokeless tobacco: Never   Substance Use Topics     Alcohol use: No       ALLERGIES/SENSITIVITIES:  Soybean allergy    MEDICATIONS:  Current Outpatient Medications   Medication Sig Dispense Refill     acetaminophen (TYLENOL) 500 MG tablet Take 2 tablets (1,000 mg) by mouth every 6 hours as needed for mild pain 100 tablet 0     calcium carbonate (TUMS) 500 MG chewable tablet Take 1 tablet (500 mg) by mouth 2 times daily 90 tablet 1     famotidine (PEPCID) 40 MG tablet Take 1 tablet (40 mg) by mouth daily 60 tablet 0     loperamide (IMODIUM A-D) 2 MG tablet Take 1 tablet (2 mg) by mouth 4 times daily as needed for diarrhea 60 tablet 0     omeprazole (PRILOSEC) 40 MG DR capsule Take 1 capsule (40 mg) by mouth daily 30 capsule 3     ondansetron (ZOFRAN ODT) 4 MG ODT tab Take 1 tablet (4 mg) by mouth every 8 hours as needed for nausea 30 tablet 0     prochlorperazine (COMPAZINE) 10 MG tablet Take 1 tablet (10 mg) by mouth every 6 hours as needed for nausea or vomiting 230 tablet 0     phenylephrine-cocoa butter (PREPARATION H) 0.25-88.44 % suppository Place 1 suppository rectally as needed for hemorrhoids or itching (Patient not taking: Reported on 10/30/2022) 12 suppository 0     Prenatal Vit-Fe Fumarate-FA (PRENATAL 19) 29-1 MG CHEW Take 1 tablet by mouth (Patient not taking: Reported on 7/12/2022)           PHYSICAL EXAM:  /72   Pulse 69   Temp 98.1  F (36.7  C) (Oral)   Resp 23   SpO2 99%   There is no height  or weight on file to calculate BMI.  General: A&O, NAD, non-toxic appearing  Eyes: no icterus or conjunctivitis  ENT: oropharynx clear without ulcerations  Neck/Thyroid: supple, no masses  Pulmonary: CTA B  Cardiovascular: RR, S1, S2  Gastrointestinal: Soft, NTTP, no masses  Skin: no jaundice/petechiae/rashes  Lymph nodes: No cervical or supraclavicular lymphadenopathy  Extremities: No edema      LABORATORY DATA:  CBC:  Recent Labs   Lab Test 10/29/22  2027   WBC 9.9   RBC 3.83   HGB 10.3*   HCT 32.5*   MCV 85   MCH 26.9   MCHC 31.7   RDW 13.3   *        BMP:  Recent Labs   Lab 10/30/22  1707 10/29/22  2027    136   POTASSIUM 3.9 2.9*   CHLORIDE 103 98   CO2 26 26   GLC 95 102*   CR 0.50* 0.59   BUN 7.8 7.2       INR:  No results for input(s): INR in the last 20542 hours.    Liver and Pancreas panel:  Recent Labs   Lab 10/29/22  2027   AST 14   ALT 8*   ALKPHOS 127*   BILITOTAL 0.4   LIPASE 17         IMAGING:    CT Abdomen Pelvis w Contrast    Result Date: 10/29/2022  EXAM: CT ABDOMEN PELVIS W CONTRAST LOCATION: [facility] DATE/TIME: 10/29/2022 10:27 PM INDICATION: abd pain assoc with N V D COMPARISON: Ultrasound of the pelvis from 08/18/2022. CT abdomen pelvis from 07/11/2022 TECHNIQUE: CT scan of the abdomen and pelvis was performed following injection of IV contrast. Multiplanar reformats were obtained. Dose reduction techniques were used. CONTRAST: [id number] 100ml FINDINGS: LOWER CHEST: Normal. HEPATOBILIARY: Focal fat along the falciform ligament, liver otherwise normal. No calcified gallstones. No biliary ductal dilatation. PANCREAS: Normal. SPLEEN: Normal. ADRENAL GLANDS: Normal. KIDNEYS/BLADDER: Normal. BOWEL: Normal caliber small bowel. There is wall thickening of the large bowel from the proximal transverse colon through the mid sigmoid colon. Wall thickening is most severe in the mid and distal descending colon, where there are multiple intramural fluid and gas  collections consistent with abscess. Anteriorly there is a 1.5 cm collection on image 98 of series 2. Posteriorly there is a 1.3 cm collection on image 106. More inferiorly there is a thin crescentic collection posteriorly measuring 1.7 cm in length. There is extensive pericolonic edema, but no free air. LYMPH NODES: Multiple presumably reactive pericolonic lymph nodes. VASCULATURE: Unremarkable. PELVIC ORGANS: Cyst in the left pelvis measures 5 cm in length, likely corresponding to the paraovarian cyst seen on the recent pelvic ultrasound. MUSCULOSKELETAL: Transitional lumbosacral anatomy, normal variant     IMPRESSION: 1.  Findings consistent with a fairly diffuse colitis with severe involvement of the mid and distal descending colon. Although there is no phoenix free air, the colitis is complicated by the presence of multiple small, intramural abscesses as described above.      CC: [facility], Jefe Martin

## 2022-10-31 LAB
ANION GAP SERPL CALCULATED.3IONS-SCNC: 15 MMOL/L (ref 7–15)
BACTERIA UR CULT: ABNORMAL
BACTERIA UR CULT: ABNORMAL
BUN SERPL-MCNC: 5.2 MG/DL (ref 6–20)
C COLI+JEJUNI+LARI FUSA STL QL NAA+PROBE: NOT DETECTED
C DIFF TOX B STL QL: NEGATIVE
CALCIUM SERPL-MCNC: 7.2 MG/DL (ref 8.6–10)
CHLORIDE SERPL-SCNC: 101 MMOL/L (ref 98–107)
COLONOSCOPY: NORMAL
CREAT SERPL-MCNC: 0.53 MG/DL (ref 0.51–0.95)
DEPRECATED HCO3 PLAS-SCNC: 22 MMOL/L (ref 22–29)
EC STX1 GENE STL QL NAA+PROBE: NOT DETECTED
EC STX2 GENE STL QL NAA+PROBE: NOT DETECTED
ERYTHROCYTE [DISTWIDTH] IN BLOOD BY AUTOMATED COUNT: 13.2 % (ref 10–15)
GFR SERPL CREATININE-BSD FRML MDRD: >90 ML/MIN/1.73M2
GLUCOSE SERPL-MCNC: 70 MG/DL (ref 70–99)
HCT VFR BLD AUTO: 27.6 % (ref 35–47)
HGB BLD-MCNC: 8.5 G/DL (ref 11.7–15.7)
IGA SERPL-MCNC: 459 MG/DL (ref 84–499)
MAGNESIUM SERPL-MCNC: 1.8 MG/DL (ref 1.7–2.3)
MCH RBC QN AUTO: 27 PG (ref 26.5–33)
MCHC RBC AUTO-ENTMCNC: 30.8 G/DL (ref 31.5–36.5)
MCV RBC AUTO: 88 FL (ref 78–100)
NOROV GI+II ORF1-ORF2 JNC STL QL NAA+PR: NOT DETECTED
PLATELET # BLD AUTO: 458 10E3/UL (ref 150–450)
POTASSIUM SERPL-SCNC: 3.1 MMOL/L (ref 3.4–5.3)
POTASSIUM SERPL-SCNC: 4.1 MMOL/L (ref 3.4–5.3)
RBC # BLD AUTO: 3.15 10E6/UL (ref 3.8–5.2)
RVA NSP5 STL QL NAA+PROBE: NOT DETECTED
SALMONELLA SP RPOD STL QL NAA+PROBE: NOT DETECTED
SHIGELLA SP+EIEC IPAH STL QL NAA+PROBE: NOT DETECTED
SODIUM SERPL-SCNC: 138 MMOL/L (ref 136–145)
TTG IGA SER-ACNC: 1.9 U/ML
V CHOL+PARA RFBL+TRKH+TNAA STL QL NAA+PR: NOT DETECTED
WBC # BLD AUTO: 7 10E3/UL (ref 4–11)
Y ENTERO RECN STL QL NAA+PROBE: NOT DETECTED

## 2022-10-31 PROCEDURE — G0500 MOD SEDAT ENDO SERVICE >5YRS: HCPCS | Performed by: INTERNAL MEDICINE

## 2022-10-31 PROCEDURE — 88305 TISSUE EXAM BY PATHOLOGIST: CPT | Mod: TC | Performed by: INTERNAL MEDICINE

## 2022-10-31 PROCEDURE — 99231 SBSQ HOSP IP/OBS SF/LOW 25: CPT | Performed by: PHYSICIAN ASSISTANT

## 2022-10-31 PROCEDURE — 36415 COLL VENOUS BLD VENIPUNCTURE: CPT

## 2022-10-31 PROCEDURE — 250N000011 HC RX IP 250 OP 636

## 2022-10-31 PROCEDURE — 250N000013 HC RX MED GY IP 250 OP 250 PS 637

## 2022-10-31 PROCEDURE — 99233 SBSQ HOSP IP/OBS HIGH 50: CPT | Mod: GC

## 2022-10-31 PROCEDURE — 84132 ASSAY OF SERUM POTASSIUM: CPT

## 2022-10-31 PROCEDURE — 258N000003 HC RX IP 258 OP 636: Performed by: SPECIALIST

## 2022-10-31 PROCEDURE — 45380 COLONOSCOPY AND BIOPSY: CPT | Performed by: INTERNAL MEDICINE

## 2022-10-31 PROCEDURE — 83735 ASSAY OF MAGNESIUM: CPT | Performed by: SPECIALIST

## 2022-10-31 PROCEDURE — 250N000013 HC RX MED GY IP 250 OP 250 PS 637: Performed by: FAMILY MEDICINE

## 2022-10-31 PROCEDURE — 999N000127 HC STATISTIC PERIPHERAL IV START W US GUIDANCE

## 2022-10-31 PROCEDURE — 0DBM8ZX EXCISION OF DESCENDING COLON, VIA NATURAL OR ARTIFICIAL OPENING ENDOSCOPIC, DIAGNOSTIC: ICD-10-PCS | Performed by: INTERNAL MEDICINE

## 2022-10-31 PROCEDURE — 250N000011 HC RX IP 250 OP 636: Performed by: FAMILY MEDICINE

## 2022-10-31 PROCEDURE — 250N000011 HC RX IP 250 OP 636: Performed by: INTERNAL MEDICINE

## 2022-10-31 PROCEDURE — 82310 ASSAY OF CALCIUM: CPT

## 2022-10-31 PROCEDURE — 85027 COMPLETE CBC AUTOMATED: CPT

## 2022-10-31 PROCEDURE — 0DBP8ZX EXCISION OF RECTUM, VIA NATURAL OR ARTIFICIAL OPENING ENDOSCOPIC, DIAGNOSTIC: ICD-10-PCS | Performed by: INTERNAL MEDICINE

## 2022-10-31 PROCEDURE — 120N000001 HC R&B MED SURG/OB

## 2022-10-31 RX ORDER — NALOXONE HYDROCHLORIDE 0.4 MG/ML
0.4 INJECTION, SOLUTION INTRAMUSCULAR; INTRAVENOUS; SUBCUTANEOUS
Status: DISCONTINUED | OUTPATIENT
Start: 2022-10-31 | End: 2022-11-14 | Stop reason: HOSPADM

## 2022-10-31 RX ORDER — FLUMAZENIL 0.1 MG/ML
0.2 INJECTION, SOLUTION INTRAVENOUS
Status: DISCONTINUED | OUTPATIENT
Start: 2022-10-31 | End: 2022-10-31 | Stop reason: HOSPADM

## 2022-10-31 RX ORDER — MULTIVITAMIN,THERAPEUTIC
1 TABLET ORAL DAILY
Status: DISCONTINUED | OUTPATIENT
Start: 2022-10-31 | End: 2022-11-14 | Stop reason: HOSPADM

## 2022-10-31 RX ORDER — DIPHENHYDRAMINE HYDROCHLORIDE 50 MG/ML
25-50 INJECTION INTRAMUSCULAR; INTRAVENOUS
Status: DISCONTINUED | OUTPATIENT
Start: 2022-10-31 | End: 2022-10-31 | Stop reason: HOSPADM

## 2022-10-31 RX ORDER — NALOXONE HYDROCHLORIDE 0.4 MG/ML
0.2 INJECTION, SOLUTION INTRAMUSCULAR; INTRAVENOUS; SUBCUTANEOUS
Status: DISCONTINUED | OUTPATIENT
Start: 2022-10-31 | End: 2022-11-14 | Stop reason: HOSPADM

## 2022-10-31 RX ORDER — NALOXONE HYDROCHLORIDE 0.4 MG/ML
0.4 INJECTION, SOLUTION INTRAMUSCULAR; INTRAVENOUS; SUBCUTANEOUS
Status: DISCONTINUED | OUTPATIENT
Start: 2022-10-31 | End: 2022-10-31 | Stop reason: HOSPADM

## 2022-10-31 RX ORDER — EPINEPHRINE 1 MG/ML
0.1 INJECTION, SOLUTION INTRAMUSCULAR; SUBCUTANEOUS
Status: DISCONTINUED | OUTPATIENT
Start: 2022-10-31 | End: 2022-10-31 | Stop reason: HOSPADM

## 2022-10-31 RX ORDER — FENTANYL CITRATE 50 UG/ML
INJECTION, SOLUTION INTRAMUSCULAR; INTRAVENOUS PRN
Status: DISCONTINUED | OUTPATIENT
Start: 2022-10-31 | End: 2022-10-31 | Stop reason: HOSPADM

## 2022-10-31 RX ORDER — NALOXONE HYDROCHLORIDE 0.4 MG/ML
0.2 INJECTION, SOLUTION INTRAMUSCULAR; INTRAVENOUS; SUBCUTANEOUS
Status: DISCONTINUED | OUTPATIENT
Start: 2022-10-31 | End: 2022-10-31 | Stop reason: HOSPADM

## 2022-10-31 RX ORDER — SIMETHICONE 40MG/0.6ML
133 SUSPENSION, DROPS(FINAL DOSAGE FORM)(ML) ORAL
Status: DISCONTINUED | OUTPATIENT
Start: 2022-10-31 | End: 2022-10-31 | Stop reason: HOSPADM

## 2022-10-31 RX ORDER — FLUMAZENIL 0.1 MG/ML
0.2 INJECTION, SOLUTION INTRAVENOUS
Status: ACTIVE | OUTPATIENT
Start: 2022-10-31 | End: 2022-10-31

## 2022-10-31 RX ORDER — ATROPINE SULFATE 0.1 MG/ML
1 INJECTION INTRAVENOUS
Status: DISCONTINUED | OUTPATIENT
Start: 2022-10-31 | End: 2022-10-31 | Stop reason: HOSPADM

## 2022-10-31 RX ORDER — POTASSIUM CHLORIDE 7.45 MG/ML
10 INJECTION INTRAVENOUS
Status: DISCONTINUED | OUTPATIENT
Start: 2022-10-31 | End: 2022-10-31

## 2022-10-31 RX ORDER — POTASSIUM CHLORIDE 1500 MG/1
40 TABLET, EXTENDED RELEASE ORAL ONCE
Status: COMPLETED | OUTPATIENT
Start: 2022-10-31 | End: 2022-10-31

## 2022-10-31 RX ORDER — FENTANYL CITRATE 50 UG/ML
50-100 INJECTION, SOLUTION INTRAMUSCULAR; INTRAVENOUS EVERY 5 MIN PRN
Status: DISCONTINUED | OUTPATIENT
Start: 2022-10-31 | End: 2022-10-31 | Stop reason: HOSPADM

## 2022-10-31 RX ADMIN — THERA TABS 1 TABLET: TAB at 17:14

## 2022-10-31 RX ADMIN — OXYCODONE HYDROCHLORIDE 5 MG: 5 TABLET ORAL at 15:57

## 2022-10-31 RX ADMIN — PIPERACILLIN AND TAZOBACTAM 3.38 G: 3; .375 INJECTION, POWDER, LYOPHILIZED, FOR SOLUTION INTRAVENOUS at 05:15

## 2022-10-31 RX ADMIN — PANTOPRAZOLE SODIUM 40 MG: 20 TABLET, DELAYED RELEASE ORAL at 21:32

## 2022-10-31 RX ADMIN — POTASSIUM CHLORIDE 10 MEQ: 7.46 INJECTION, SOLUTION INTRAVENOUS at 09:20

## 2022-10-31 RX ADMIN — POTASSIUM CHLORIDE 40 MEQ: 1500 TABLET, EXTENDED RELEASE ORAL at 15:05

## 2022-10-31 RX ADMIN — OXYCODONE HYDROCHLORIDE 5 MG: 5 TABLET ORAL at 04:38

## 2022-10-31 RX ADMIN — PIPERACILLIN AND TAZOBACTAM 3.38 G: 3; .375 INJECTION, POWDER, LYOPHILIZED, FOR SOLUTION INTRAVENOUS at 21:32

## 2022-10-31 RX ADMIN — PIPERACILLIN AND TAZOBACTAM 3.38 G: 3; .375 INJECTION, POWDER, LYOPHILIZED, FOR SOLUTION INTRAVENOUS at 17:11

## 2022-10-31 RX ADMIN — ENOXAPARIN SODIUM 40 MG: 40 INJECTION SUBCUTANEOUS at 21:31

## 2022-10-31 RX ADMIN — SODIUM CHLORIDE: 9 INJECTION, SOLUTION INTRAVENOUS at 05:15

## 2022-10-31 ASSESSMENT — ACTIVITIES OF DAILY LIVING (ADL)
ADLS_ACUITY_SCORE: 18
ADLS_ACUITY_SCORE: 20
ADLS_ACUITY_SCORE: 18
ADLS_ACUITY_SCORE: 18
ADLS_ACUITY_SCORE: 20
ADLS_ACUITY_SCORE: 18

## 2022-10-31 NOTE — CONSULTS
"CLINICAL NUTRITION SERVICES - ASSESSMENT NOTE     Nutrition Prescription    RECOMMENDATIONS FOR MDs/PROVIDERS TO ORDER:  None    Malnutrition Status:    Severe in acute illness    Recommendations already ordered by Registered Dietitian (RD):  Mvi with minerals  Thiamine 100 mg daily  Trial Ensure clear and Gel plus daily     Future/Additional Recommendations:  Adjust supplements pending intake, tolerance, acceptance, weight  Continue with diet ed counseling and support     REASON FOR ASSESSMENT  Dain Tejeda is a/an 42 year old female assessed by the dietitian for Admission Nutrition Risk Screen for positive with 24-33 lb weight loss and eating poorly d/t decreased appetite and RN Consult - Celiac diet ed    Pt presents with severe colitis  Hx chronic gastritis, celiac disease (new dx - pt had not been informed)    NUTRITION HISTORY  Pt with abdominal pain since April with severe worsening past week.     Pt reports eating WNL at home up until 1 week PTA d/t N/V      CURRENT NUTRITION ORDERS  Diet: NPO since admit  Intake/Tolerance: Only intake documented is golytely     Receiving NS ivf at 75 ml/hr    LABS  Labs reviewed  K+ 3.1 (L), decreased    MEDICATIONS  Medications reviewed  K+ replacement today, iv abx    ANTHROPOMETRICS  Height: 149.8 cm (4' 10.98\")  Most Recent Weight: 50.8 kg (111 lb 14.4 oz)  10/30 - current weight up from 3 months ago  IBW: 44.3 kg  BMI: Normal BMI  Weight History:   Wt Readings from Last 10 Encounters:   10/30/22  07/20/22 50.8 kg (111 lb 14.4 oz)  49.3 kg (108 lb) - office   07/12/22 50.9 kg (112 lb 1.9 oz)   07/08/22 51.3 kg (113 lb)   06/20/22 54 kg (119 lb)   06/09/22 55.8 kg (123 lb)   06/02/22 57 kg (125 lb 11.2 oz)   05/24/22 58.5 kg (129 lb)   06/25/18 60.8 kg (134 lb)   16.2% weight loss x  2 months    Dosing Weight: 50.8 kg    ASSESSED NUTRITION NEEDS  Estimated Energy Needs: 1987-9483 kcals/day (25 - 30 kcals/kg)  Justification: Maintenance  Estimated Protein Needs: 61-76 grams " protein/day (1.2 - 1.5 grams of pro/kg)  Justification: Repletion  Estimated Fluid Needs: 2140-0453 mL/day (1 mL/kcal)   Justification: Maintenance    PHYSICAL FINDINGS  See malnutrition section below.    MALNUTRITION:  % Weight Loss:  > 7.5% in 3 months (severe malnutrition)  % Intake:  </= 50% for >/= 5 days (severe malnutrition)  Subcutaneous Fat Loss:  None observed  Muscle Loss:  None observed  Fluid Retention:  None noted    Malnutrition Diagnosis: Severe malnutrition  In Context of:  Acute illness or injury    NUTRITION DIAGNOSIS  Malnutrition related to severe colitis, celiac disease as evidenced by intake < 50% x 1 week, 16.2% weight loss in 2 months      INTERVENTIONS  Implementation  -Nutrition Education: Provided education on Gluten Free diet with pt and  via . Of note many  foods do not have adequate nutrition ingredient labeling.    Pt and  with good understanding and acceptance. Celiac Disease Nutrition therapy and Celiac disease nutrition label reading tips provided in english. Pt reports her children speak english and can translate it.   Also advised pt to eat low fiber during colits flare  -Will add Ensure clear and gel plus daily   - mvi with minerals, thiamine 100 mg    Goals  Meet nutrition needs  Normalize bowels     Monitoring/Evaluation  Progress toward goals will be monitored and evaluated per protocol.

## 2022-10-31 NOTE — PLAN OF CARE
Problem: Plan of Care - These are the overarching goals to be used throughout the patient stay.    Goal: Plan of Care Review  Description: The Plan of Care Review/Shift note should be completed every shift.  The Outcome Evaluation is a brief statement about your assessment that the patient is improving, declining, or no change.  This information will be displayed automatically on your shift note.  Outcome: Progressing   Goal Outcome Evaluation:         Pt just arrived to the floor before 2200.  Pt drinking go-lytely prep for possible colonoscopy tomorrow.  Passing brown flecks of stool still.  Emesis x1.  Pt given oxycodone and zofran.  Cyndie Giraldo, RN

## 2022-10-31 NOTE — PROGRESS NOTES
General Surgery Progress Note:    Hospital Day # 2    ASSESSMENT:     Dain Tejeda is a 42 year old female with severe colitis and multifocal small intramural abscesses concerning for Crohn's.  Currently having improvement of pain and is afebrile.  PLAN:   -Colonoscopy per GI.  -Diet per GI  -Currently no surgical intervention planned but we will continue to follow along.      SUBJECTIVE:   Dain Tejeda is feeling better.  Overnight she did have increase in pain but now she is feeling much better since she received pain medications.  She is having multiple watery stools from GoLytely.  Afebrile.    Patient Vitals for the past 24 hrs:   BP Temp Temp src Pulse Resp SpO2 Weight   10/31/22 0819 97/65 98.4  F (36.9  C) Oral 89 18 97 % --   10/31/22 0427 101/68 98.6  F (37  C) Oral 97 18 97 % --   10/30/22 2338 111/71 98  F (36.7  C) Oral 89 16 100 % --   10/30/22 2147 123/80 98.1  F (36.7  C) Oral 86 16 100 % 50.8 kg (111 lb 14.4 oz)   10/30/22 1933 -- -- -- -- -- -- 49.3 kg (108 lb 11.2 oz)   10/30/22 1526 106/72 -- -- 69 -- 99 % --       Physical Exam:  General: NAD, pleasant    ABD: Soft and nontender currently      Admission on 10/29/2022   Component Date Value     Color Urine 10/29/2022 Yellow      Appearance Urine 10/29/2022 Turbid (A)      Glucose Urine 10/29/2022 Negative      Bilirubin Urine 10/29/2022 Negative      Ketones Urine 10/29/2022 Negative      Specific Gravity Urine 10/29/2022 1.016      Blood Urine 10/29/2022 0.03 mg/dL (A)      pH Urine 10/29/2022 6.5      Protein Albumin Urine 10/29/2022 20 (A)      Urobilinogen Urine 10/29/2022 <2.0      Nitrite Urine 10/29/2022 Negative      Leukocyte Esterase Urine 10/29/2022 500 Shilpa/uL (A)      Bacteria Urine 10/29/2022 Few (A)      Mucus Urine 10/29/2022 Present (A)      RBC Urine 10/29/2022 4 (H)      WBC Urine 10/29/2022 0      Squamous Epithelials Uri* 10/29/2022 21 (H)      Sodium 10/29/2022 136      Potassium 10/29/2022 2.9 (L)      Chloride 10/29/2022 98       Carbon Dioxide (CO2) 10/29/2022 26      Anion Gap 10/29/2022 12      Urea Nitrogen 10/29/2022 7.2      Creatinine 10/29/2022 0.59      Calcium 10/29/2022 7.8 (L)      Glucose 10/29/2022 102 (H)      Alkaline Phosphatase 10/29/2022 127 (H)      AST 10/29/2022 14      ALT 10/29/2022 8 (L)      Protein Total 10/29/2022 7.4      Albumin 10/29/2022 2.5 (L)      Bilirubin Total 10/29/2022 0.4      GFR Estimate 10/29/2022 >90      WBC Count 10/29/2022 9.9      RBC Count 10/29/2022 3.83      Hemoglobin 10/29/2022 10.3 (L)      Hematocrit 10/29/2022 32.5 (L)      MCV 10/29/2022 85      MCH 10/29/2022 26.9      MCHC 10/29/2022 31.7      RDW 10/29/2022 13.3      Platelet Count 10/29/2022 600 (H)      % Neutrophils 10/29/2022 67      % Lymphocytes 10/29/2022 23      % Monocytes 10/29/2022 9      % Eosinophils 10/29/2022 1      % Basophils 10/29/2022 0      % Immature Granulocytes 10/29/2022 0      NRBCs per 100 WBC 10/29/2022 0      Absolute Neutrophils 10/29/2022 6.5      Absolute Lymphocytes 10/29/2022 2.3      Absolute Monocytes 10/29/2022 0.9      Absolute Eosinophils 10/29/2022 0.1      Absolute Basophils 10/29/2022 0.0      Absolute Immature Granul* 10/29/2022 0.0      Absolute NRBCs 10/29/2022 0.0      Lipase 10/29/2022 17      hCG Urine Qualitative 10/29/2022 Negative      Magnesium 10/29/2022 1.9      Culture 10/29/2022 Culture in progress      Culture 10/29/2022 50,000-100,000 CFU/mL Lactose fermenting gram negative bacilli (A)      Culture 10/29/2022 <10,000 CFU/mL Urogenital zulma      Lactic Acid 10/29/2022 0.5 (L)      C Difficile Toxin B by P* 10/30/2022 Negative      SARS CoV2 PCR 10/29/2022 Negative      WBC Count 10/30/2022 7.5      RBC Count 10/30/2022 3.35 (L)      Hemoglobin 10/30/2022 9.0 (L)      Hematocrit 10/30/2022 29.7 (L)      MCV 10/30/2022 89      MCH 10/30/2022 26.9      MCHC 10/30/2022 30.3 (L)      RDW 10/30/2022 13.4      Platelet Count 10/30/2022 429      Sodium 10/30/2022 140      Potassium  10/30/2022 3.9      Chloride 10/30/2022 103      Carbon Dioxide (CO2) 10/30/2022 26      Anion Gap 10/30/2022 11      Urea Nitrogen 10/30/2022 7.8      Creatinine 10/30/2022 0.50 (L)      Calcium 10/30/2022 7.6 (L)      Glucose 10/30/2022 95      GFR Estimate 10/30/2022 >90      Sodium 10/31/2022 138      Potassium 10/31/2022 3.1 (L)      Chloride 10/31/2022 101      Carbon Dioxide (CO2) 10/31/2022 22      Anion Gap 10/31/2022 15      Urea Nitrogen 10/31/2022 5.2 (L)      Creatinine 10/31/2022 0.53      Calcium 10/31/2022 7.2 (L)      Glucose 10/31/2022 70      GFR Estimate 10/31/2022 >90      WBC Count 10/31/2022 7.0      RBC Count 10/31/2022 3.15 (L)      Hemoglobin 10/31/2022 8.5 (L)      Hematocrit 10/31/2022 27.6 (L)      MCV 10/31/2022 88      MCH 10/31/2022 27.0      MCHC 10/31/2022 30.8 (L)      RDW 10/31/2022 13.2      Platelet Count 10/31/2022 458 (H)      Magnesium 10/31/2022 1.8         JOSE E Blanco  [facility]  [address] [place] [address]  Phone- [phone]  Fax- [phone]

## 2022-10-31 NOTE — PROGRESS NOTES
10/29/2022  9:05 PM    Pre-procedure Note    Reason for procedure: abnormal CT    History and Physical Reviewed: Reviewed, no changes.    Pre-sedation assessment:    General: alert, appears stated age and cooperative  Airway: normal  Heart: S1, S2 normal, no murmur, click, rub or gallop, regular rate and rhythm, chest is clear without rales or wheezing, no pedal edema, no JVD, no hepatosplenomegaly  Lungs: clear to auscultation bilaterally    Sedation Plan based on assessment: Moderate    Mallampati score: Class II (visualization of the soft palate, fauces, and uvula)          ASA Classification: ASA 2 - Patient with mild systemic disease with no functional limitations    Impression: Patient deemed adequate candidate for conscious sedation    Risks, benefits and alternatives were discussed with the patient and informed consent was obtained.    Plan: colonoscopy                                                    Luis Miguel Traore MD  Thank you for the opportunity to participate in the care of this patient.   Please feel free to call me with any questions or concerns.  Phone number [phone].

## 2022-10-31 NOTE — PLAN OF CARE
Problem: Pain Acute  Goal: Optimal Pain Control and Function  Intervention: Prevent or Manage Pain  Recent Flowsheet Documentation  Taken 10/30/2022 7682 by Cornelius Dickey, RN  Medication Review/Management: medications reviewed     Gave oxycodone x1 for abd pain. Nausea resolved after compazine. Completed go-lytely and had several yellow watery stool. Continues on zosyn, afebrile. Pt NPO. K and Mg protocol, recheck labs ordered for 0600.

## 2022-10-31 NOTE — PROGRESS NOTES
[facility]    Progress Note - Hospitalist Service       Date of Admission:  10/29/2022    Assessment & Plan   Dain Tejeda is a 42 year old female w/ PMH of chronic gastritis, inflammation of small intestine, and celiac disease admitted on 10/29/2022. Here w/ diffuse colitis w/ severe involvement of the mid and distal descending colon c/f IBD. She remains hospitalized for IV antibiotics, continued monitoring, and evaluation by GI.     Severe colitis w/ multifocal small intramural abscesses c/f Crohn's  Pt has been having abdominal pain and bloody diarrhea for some time now, w/ extensive outpatient workup. Has had negative enteric infectious workup, possible ileitis on imaging 4/14/2022 w/ interval improvement seen on CT 7/11/22. Saw GI outpatient 7/22/22 and noted to have elevated inflammatory markers and abnormal CT findings concerning for IBD. EGD was performed w/ biopsy suggestive of celiac disease and negative for H pylori. CT abdomen pelvis 10/29 showing diffuse colitis with severe involvement of the mid and distal descending colon. Although there is no [place] free air, the colitis is complicated by the presence of multiple small, intramural abscesses.   - GI consult placed   - Colonoscopy today 10/31  - General surgery consult   - continue IV abx   - if ends up needing surgery, would need extended hemicolectomy or subtotal colectomy   - Cont IV Zosyn  - PO Pepcid BID  - Oxyocodone 5 mg q4 h PRN for pain  - PRN Zofran and Compazine ordered    Hypokalemia  Hypomagnesemia  - Replete per nursing protocol     Abnormal UA  UA on admission positive for leuk esterase, protein, small amount of blood, and few bacteria seen. Also w/ moderate squamous cells noted. Pt is afebrile, w/o leukocytosis, and not complaining of urinary symptoms so unclear if this truly represents a UTI.  - Cont IV abx per above  - UC pending     Thrombocythemia  PLTs have been around the upper limits of normal during  previous evaluation, found to be 600k on admission. Likely elevated 2/2 acute phase reactant and significant GI tract inflammation.  - Monitor     Diet: NPO per Anesthesia Guidelines for Procedure/Surgery Except for: Meds    DVT Prophylaxis: Enoxaparin (Lovenox) SQ  Chambers Catheter: Not present  Fluids: 75ml/hr NS  Central Lines: None  Cardiac Monitoring: None  Code Status: Full Code      Disposition Plan      Expected Discharge Date: 11/01/2022    Discharge Delays: IV Medication - consider oral or Home Infusion  Procedure Pending (enter procedure & time in comments)  Destination: home with family  Discharge Comments: GI recs: colonoscopy        The patient's care was discussed with the Attending physician, Dr Tamayo.    John Kelley  Hospitalist Service  [facility]  Securely message with the Vocera Web Console (learn more here)  Text page via [employer] Paging/Directory     I was present with the medical student who participated in the service and in the documentation of this note. I have verified the history and personally performed the physical exam and medical decision making, and have verified the content of the note, which accurately reflects my assessment of the patient and the plan of care    - Andrey Reyes MD    Clinically Significant Risk Factors        # Hypokalemia: Lowest K = 2.9 mmol/L (Ref range: 3.4-5.3) in last 2 days, will replace as needed       # Hypoalbuminemia: Lowest albumin = 2.5 g/dL (Ref range: 3.5-5.2) at 10/29/2022  8:27 PM, will monitor as appropriate                 _______________________________________________________    Interval History   Pt still having some diffuse abdominal pain, no other acute concerns. Going for colonoscopy this AM.    Data reviewed today: I reviewed all medications, new labs and imaging results over the last 24 hours.    Physical Exam   Vital Signs: Temp: 98.1  F (36.7  C) Temp src: Oral BP: 103/71 Pulse: 83   Resp: 16 SpO2: 99 %  O2 Device: None (Room air)    Weight: 111 lbs 14.4 oz  Gen: Pleasant. No distress.    HEENT: MMM.   Neck: No overt asymmetry.   CV: Appears well-perfused. RRR. No murmur.   Resp: Breathing comfortably on room air. Lungs clear to auscultation bilaterally without wheeze or crackle.   Abd: Non-distended, no overt tenderness to palpation, no rebound or guarding, bowel sounds present  Ext: No edema or overt asymmetry/deformity.   Skin: No overt rash on easily visualized skin.   Neuro: Non-focal.   Psych: Calm.     Data   Recent Labs   Lab 10/31/22  0637 10/30/22  1707 10/29/22  2027   WBC 7.0 7.5 9.9   HGB 8.5* 9.0* 10.3*   MCV 88 89 85   * 429 600*    140 136   POTASSIUM 3.1* 3.9 2.9*   CHLORIDE 101 103 98   CO2 22 26 26   BUN 5.2* 7.8 7.2   CR 0.53 0.50* 0.59   ANIONGAP 15 11 12   ALEX 7.2* 7.6* 7.8*   GLC 70 95 102*   ALBUMIN  --   --  2.5*   PROTTOTAL  --   --  7.4   BILITOTAL  --   --  0.4   ALKPHOS  --   --  127*   ALT  --   --  8*   AST  --   --  14   LIPASE  --   --  17     No results found for this or any previous visit (from the past 24 hour(s)).

## 2022-11-01 ENCOUNTER — HOME INFUSION (PRE-WILLOW HOME INFUSION) (OUTPATIENT)
Dept: PHARMACY | Facility: CLINIC | Age: 42
End: 2022-11-01

## 2022-11-01 LAB
CREAT SERPL-MCNC: 0.63 MG/DL (ref 0.51–0.95)
ERYTHROCYTE [DISTWIDTH] IN BLOOD BY AUTOMATED COUNT: 13.5 % (ref 10–15)
GFR SERPL CREATININE-BSD FRML MDRD: >90 ML/MIN/1.73M2
HCT VFR BLD AUTO: 28 % (ref 35–47)
HGB BLD-MCNC: 8.6 G/DL (ref 11.7–15.7)
MAGNESIUM SERPL-MCNC: 1.8 MG/DL (ref 1.7–2.3)
MCH RBC QN AUTO: 26.6 PG (ref 26.5–33)
MCHC RBC AUTO-ENTMCNC: 30.7 G/DL (ref 31.5–36.5)
MCV RBC AUTO: 87 FL (ref 78–100)
PLATELET # BLD AUTO: 441 10E3/UL (ref 150–450)
POTASSIUM SERPL-SCNC: 2.8 MMOL/L (ref 3.4–5.3)
POTASSIUM SERPL-SCNC: 3.8 MMOL/L (ref 3.4–5.3)
RBC # BLD AUTO: 3.23 10E6/UL (ref 3.8–5.2)
WBC # BLD AUTO: 5.7 10E3/UL (ref 4–11)

## 2022-11-01 PROCEDURE — 250N000011 HC RX IP 250 OP 636

## 2022-11-01 PROCEDURE — 99231 SBSQ HOSP IP/OBS SF/LOW 25: CPT | Performed by: PHYSICIAN ASSISTANT

## 2022-11-01 PROCEDURE — 83735 ASSAY OF MAGNESIUM: CPT

## 2022-11-01 PROCEDURE — 85027 COMPLETE CBC AUTOMATED: CPT

## 2022-11-01 PROCEDURE — 250N000013 HC RX MED GY IP 250 OP 250 PS 637: Performed by: FAMILY MEDICINE

## 2022-11-01 PROCEDURE — 99223 1ST HOSP IP/OBS HIGH 75: CPT | Performed by: INTERNAL MEDICINE

## 2022-11-01 PROCEDURE — 86481 TB AG RESPONSE T-CELL SUSP: CPT | Performed by: INTERNAL MEDICINE

## 2022-11-01 PROCEDURE — 99233 SBSQ HOSP IP/OBS HIGH 50: CPT | Mod: GC

## 2022-11-01 PROCEDURE — 86803 HEPATITIS C AB TEST: CPT | Performed by: INTERNAL MEDICINE

## 2022-11-01 PROCEDURE — 86231 EMA EACH IG CLASS: CPT | Performed by: INTERNAL MEDICINE

## 2022-11-01 PROCEDURE — 87329 GIARDIA AG IA: CPT | Performed by: INTERNAL MEDICINE

## 2022-11-01 PROCEDURE — 36415 COLL VENOUS BLD VENIPUNCTURE: CPT | Performed by: STUDENT IN AN ORGANIZED HEALTH CARE EDUCATION/TRAINING PROGRAM

## 2022-11-01 PROCEDURE — 250N000013 HC RX MED GY IP 250 OP 250 PS 637

## 2022-11-01 PROCEDURE — 86258 DGP ANTIBODY EACH IG CLASS: CPT | Performed by: INTERNAL MEDICINE

## 2022-11-01 PROCEDURE — 250N000011 HC RX IP 250 OP 636: Performed by: INTERNAL MEDICINE

## 2022-11-01 PROCEDURE — 250N000011 HC RX IP 250 OP 636: Performed by: FAMILY MEDICINE

## 2022-11-01 PROCEDURE — 87040 BLOOD CULTURE FOR BACTERIA: CPT | Performed by: STUDENT IN AN ORGANIZED HEALTH CARE EDUCATION/TRAINING PROGRAM

## 2022-11-01 PROCEDURE — 258N000003 HC RX IP 258 OP 636: Performed by: SPECIALIST

## 2022-11-01 PROCEDURE — 87328 CRYPTOSPORIDIUM AG IA: CPT | Performed by: INTERNAL MEDICINE

## 2022-11-01 PROCEDURE — 82657 ENZYME CELL ACTIVITY: CPT | Performed by: INTERNAL MEDICINE

## 2022-11-01 PROCEDURE — 82784 ASSAY IGA/IGD/IGG/IGM EACH: CPT | Performed by: INTERNAL MEDICINE

## 2022-11-01 PROCEDURE — 82565 ASSAY OF CREATININE: CPT

## 2022-11-01 PROCEDURE — 120N000001 HC R&B MED SURG/OB

## 2022-11-01 PROCEDURE — 86708 HEPATITIS A ANTIBODY: CPT | Performed by: INTERNAL MEDICINE

## 2022-11-01 PROCEDURE — 84132 ASSAY OF SERUM POTASSIUM: CPT | Performed by: PHYSICIAN ASSISTANT

## 2022-11-01 PROCEDURE — 86706 HEP B SURFACE ANTIBODY: CPT | Performed by: INTERNAL MEDICINE

## 2022-11-01 PROCEDURE — 83516 IMMUNOASSAY NONANTIBODY: CPT | Performed by: INTERNAL MEDICINE

## 2022-11-01 PROCEDURE — 84132 ASSAY OF SERUM POTASSIUM: CPT | Performed by: INTERNAL MEDICINE

## 2022-11-01 PROCEDURE — 87340 HEPATITIS B SURFACE AG IA: CPT | Performed by: INTERNAL MEDICINE

## 2022-11-01 PROCEDURE — 36415 COLL VENOUS BLD VENIPUNCTURE: CPT | Performed by: INTERNAL MEDICINE

## 2022-11-01 PROCEDURE — 87209 SMEAR COMPLEX STAIN: CPT | Performed by: PHYSICIAN ASSISTANT

## 2022-11-01 PROCEDURE — 87389 HIV-1 AG W/HIV-1&-2 AB AG IA: CPT | Performed by: INTERNAL MEDICINE

## 2022-11-01 RX ORDER — METRONIDAZOLE 500 MG/100ML
500 INJECTION, SOLUTION INTRAVENOUS EVERY 12 HOURS
Status: DISCONTINUED | OUTPATIENT
Start: 2022-11-01 | End: 2022-11-08

## 2022-11-01 RX ORDER — LEVOFLOXACIN 5 MG/ML
500 INJECTION, SOLUTION INTRAVENOUS EVERY 24 HOURS
Status: DISCONTINUED | OUTPATIENT
Start: 2022-11-01 | End: 2022-11-08

## 2022-11-01 RX ORDER — POTASSIUM CHLORIDE 1500 MG/1
40 TABLET, EXTENDED RELEASE ORAL ONCE
Status: COMPLETED | OUTPATIENT
Start: 2022-11-01 | End: 2022-11-01

## 2022-11-01 RX ORDER — POTASSIUM CHLORIDE 1500 MG/1
20 TABLET, EXTENDED RELEASE ORAL ONCE
Status: COMPLETED | OUTPATIENT
Start: 2022-11-01 | End: 2022-11-01

## 2022-11-01 RX ADMIN — PIPERACILLIN AND TAZOBACTAM 3.38 G: 3; .375 INJECTION, POWDER, LYOPHILIZED, FOR SOLUTION INTRAVENOUS at 05:23

## 2022-11-01 RX ADMIN — POTASSIUM CHLORIDE 20 MEQ: 1500 TABLET, EXTENDED RELEASE ORAL at 11:56

## 2022-11-01 RX ADMIN — OXYCODONE HYDROCHLORIDE 5 MG: 5 TABLET ORAL at 17:51

## 2022-11-01 RX ADMIN — PANTOPRAZOLE SODIUM 40 MG: 20 TABLET, DELAYED RELEASE ORAL at 19:11

## 2022-11-01 RX ADMIN — THERA TABS 1 TABLET: TAB at 09:51

## 2022-11-01 RX ADMIN — ACETAMINOPHEN 650 MG: 325 TABLET, FILM COATED ORAL at 02:43

## 2022-11-01 RX ADMIN — PANTOPRAZOLE SODIUM 40 MG: 20 TABLET, DELAYED RELEASE ORAL at 09:51

## 2022-11-01 RX ADMIN — ACETAMINOPHEN 650 MG: 325 TABLET, FILM COATED ORAL at 11:56

## 2022-11-01 RX ADMIN — ONDANSETRON 4 MG: 2 INJECTION INTRAMUSCULAR; INTRAVENOUS at 00:07

## 2022-11-01 RX ADMIN — ENOXAPARIN SODIUM 40 MG: 40 INJECTION SUBCUTANEOUS at 22:00

## 2022-11-01 RX ADMIN — LEVOFLOXACIN 500 MG: 5 INJECTION, SOLUTION INTRAVENOUS at 17:56

## 2022-11-01 RX ADMIN — POTASSIUM CHLORIDE 40 MEQ: 1500 TABLET, EXTENDED RELEASE ORAL at 09:51

## 2022-11-01 RX ADMIN — OXYCODONE HYDROCHLORIDE 5 MG: 5 TABLET ORAL at 00:31

## 2022-11-01 RX ADMIN — METRONIDAZOLE 500 MG: 500 INJECTION, SOLUTION INTRAVENOUS at 19:10

## 2022-11-01 RX ADMIN — SODIUM CHLORIDE: 9 INJECTION, SOLUTION INTRAVENOUS at 05:23

## 2022-11-01 RX ADMIN — PIPERACILLIN AND TAZOBACTAM 3.38 G: 3; .375 INJECTION, POWDER, LYOPHILIZED, FOR SOLUTION INTRAVENOUS at 15:07

## 2022-11-01 ASSESSMENT — ACTIVITIES OF DAILY LIVING (ADL)
ADLS_ACUITY_SCORE: 20

## 2022-11-01 NOTE — PROGRESS NOTES
Call from [facility], pt has 100% coverage from insurance for IV antibiotics.    NATHALIE Kaur

## 2022-11-01 NOTE — SIGNIFICANT EVENT
Significant Event Note    Time of event: 2:33 AM November 1, 2022    Description of event:  Notified of fever. On Zosyn.     Plan:  Nursing staff to give tylenol, will obtain blood cultures x2.    Discussed with: bedside nurse    Christin Santoyo MD

## 2022-11-01 NOTE — PROGRESS NOTES
[facility]    Referral received  from VENESSA Carmona, for IV antibiotics.    Benefits verified. Pt has 100% coverage for IV antibiotics under her [employer] P[employer] plan.    Should pt require home IV antibiotics, writer will speak with pt/family to review benefits, home infusion and to offer choice of agency/home infusion provider.    Thank you for the referral.    Babita Martinez RN, BSN  [facility] Liaison  [phone] (Mon through Fri, 8:00 am-5:00 pm)  [phone] (Office)

## 2022-11-01 NOTE — PLAN OF CARE
"  Problem: Plan of Care - These are the overarching goals to be used throughout the patient stay.    Goal: Plan of Care Review  Description: The Plan of Care Review/Shift note should be completed every shift.  The Outcome Evaluation is a brief statement about your assessment that the patient is improving, declining, or no change.  This information will be displayed automatically on your shift note.  Outcome: Progressing  Goal: Patient-Specific Goal (Individualized)  Description: You can add care plan individualizations to a care plan. Examples of Individualization might be:  \"Parent requests to be called daily at 9am for status\", \"I have a hard time hearing out of my right ear\", or \"Do not touch me to wake me up as it startles me\".  Outcome: Progressing  Goal: Absence of Hospital-Acquired Illness or Injury  Outcome: Progressing  Intervention: Identify and Manage Fall Risk  Recent Flowsheet Documentation  Taken 11/1/2022 0400 by Talia Palmer RN  Safety Promotion/Fall Prevention:   nonskid shoes/slippers when out of bed   clutter free environment maintained  Taken 11/1/2022 0000 by Talia Palmer RN  Safety Promotion/Fall Prevention:   nonskid shoes/slippers when out of bed   clutter free environment maintained  Taken 10/31/2022 2000 by Talia Palmer RN  Safety Promotion/Fall Prevention:   nonskid shoes/slippers when out of bed   clutter free environment maintained  Intervention: Prevent Skin Injury  Recent Flowsheet Documentation  Taken 11/1/2022 0400 by Talia Palmer RN  Body Position: position changed independently  Taken 11/1/2022 0000 by Talia Palmer RN  Body Position: position changed independently  Taken 10/31/2022 2000 by Talia Palmer RN  Body Position: position changed independently  Intervention: Prevent and Manage VTE (Venous Thromboembolism) Risk  Recent Flowsheet Documentation  Taken 11/1/2022 0400 by Talia Palmer RN  VTE Prevention/Management: patient refused intervention  Taken " 11/1/2022 0000 by Talia Palmer RN  VTE Prevention/Management: patient refused intervention  Taken 10/31/2022 2000 by Talia Palmer RN  VTE Prevention/Management: patient refused intervention  Goal: Optimal Comfort and Wellbeing  Outcome: Progressing  Intervention: Monitor Pain and Promote Comfort  Recent Flowsheet Documentation  Taken 11/1/2022 0031 by Talia Palmer RN  Pain Management Interventions: medication (see MAR)  Goal: Readiness for Transition of Care  Outcome: Progressing   Goal Outcome Evaluation:

## 2022-11-01 NOTE — PROGRESS NOTES
[facility]    Progress Note - Hospitalist Service       Date of Admission:  10/29/2022    Assessment & Plan   Dain Tejeda is a 42 year old female w/ PMH of chronic gastritis, inflammation of small intestine, and celiac disease admitted on 10/29/2022. Here w/ diffuse colitis w/ severe involvement of the mid and distal descending colon c/f IBD. She remains hospitalized for IV antibiotics, continued monitoring, and evaluation by GI.     Severe colitis w/ multifocal small intramural abscesses c/f Crohn's  Pt has been having abdominal pain and bloody diarrhea for some time now, w/ extensive outpatient workup. Has had negative enteric infectious workup, possible ileitis on imaging 4/14/2022 w/ interval improvement seen on CT 7/11/22. Saw GI outpatient 7/22/22 and noted to have elevated inflammatory markers and abnormal CT findings concerning for IBD. EGD was performed w/ biopsy suggestive of celiac disease and negative for H pylori. Ttg obtained during this hospitalization returned negative. CT abdomen pelvis 10/29 showing diffuse colitis with severe involvement of the mid and distal descending colon. Although there is no phoenix free air, the colitis is complicated by the presence of multiple small, intramural abscesses. New fever this AM 11/1, likely related to ongoing abdominal process, also may be secondary to bacterial translocation from colonoscopy yesterday. Given patient's history as a [place] refugee, also considering colonic TB. [place] immig[place] from [facility] in [place], tested negative for TB upon leaving the country and upon arrival to [place]. No exposure to TB at home or work. No hx of immunosuppression, IV drug use, or incarceration.  - GI consult placed   - Colonoscopy 10/31 with atypical findings, biopsies pending   - General surgery consult   - continue IV abx   - if ends up needing surgery, would need extended hemicolectomy or subtotal colectomy   - Nutrition consult placed   >  Dietician did meet w/ the pt 10/31 and educated on gluten free diet give unclear diagnosis of possible Celiac disease.  - Cont IV Zosyn  - PO Pepcid BID  - Oxyocodone 5 mg q4 h PRN for pain  - PRN Zofran and Compazine ordered  - Tylenol PRN for fevers    Hypokalemia  Hypomagnesemia  - Replete per nursing protocol     Abnormal UA  UA on admission positive for leuk esterase, protein, small amount of blood, and few bacteria seen. Also w/ moderate squamous cells noted. Pt is afebrile, w/o leukocytosis, and not complaining of urinary symptoms so unclear if this truly represents a UTI.  - Cont IV abx per above  - UC pending     Thrombocythemia  PLTs have been around the upper limits of normal during previous evaluation, found to be 600k on admission. Likely elevated 2/2 acute phase reactant and significant GI tract inflammation.  - Monitor     Diet: Gluten Free Diet  Snacks/Supplements Adult: Ensure Clear; Between Meals  Snacks/Supplements Adult: Gelatein Plus; Between Meals    DVT Prophylaxis: Enoxaparin (Lovenox) SQ  Chambers Catheter: Not present  Fluids: 75ml/hr NS  Central Lines: None  Cardiac Monitoring: None  Code Status: Full Code      Disposition Plan      Expected Discharge Date: 11/02/2022    Discharge Delays: IV Medication - consider oral or Home Infusion  Procedure Pending (enter procedure & time in comments)  Destination: home with family  Discharge Comments: GI recs: colonoscopy  fever over night on 11/1        The patient's care was discussed with the Attending physician, Dr Tamayo.    John Kelley MS4    Hospitalist Service  [facility]  Securely message with the Vocera Web Console (learn more here)  Text page via [employer] Paging/Directory     I was present with the medical student who participated in the service and in the documentation of this note. I have verified the history and personally performed the physical exam and medical decision making, and have verified the content  of the note, which accurately reflects my assessment of the patient and the plan of care    - Andrey Reyes MD    Clinically Significant Risk Factors        # Hypokalemia: Lowest K = 2.8 mmol/L (Ref range: 3.4-5.3) in last 2 days, will replace as needed       # Hypoalbuminemia: Lowest albumin = 2.5 g/dL (Ref range: 3.5-5.2) at 10/29/2022  8:27 PM, will monitor as appropriate            # Severe Malnutrition: based on nutrition assessment, PRESENT ON ADMISSION     _______________________________________________________    Interval History   Pt still having some diffuse abdominal pain. She reports she could tell she had a fever this morning and the tylenol helped. She does think she's had a fever frequently over the last few months, especially in the evenings/at night. She met with the dietitian yesterday and it was helpful. She's not sure she'll remember all of the information but they printed her some information and her daughter who speaks/reads English will be able to help her with it.     Additional history - came [place] from [facility] in 2017, has not returned since then. Tested negative for TB when she left and arrived here. Has never been in [facility]. No household contacts with TB.     Data reviewed today: I reviewed all medications, new labs and imaging results over the last 24 hours.    Physical Exam   Vital Signs: Temp: 97.6  F (36.4  C) Temp src: Oral BP: 102/71 Pulse: 87   Resp: 18 SpO2: 99 % O2 Device: None (Room air) Oxygen Delivery: 2 LPM  Weight: 111 lbs 14.4 oz  Gen: Pleasant. No distress.    HEENT: MMM.   Neck: No overt asymmetry.   CV: Appears well-perfused. RRR. No murmur.   Resp: Breathing comfortably on room air. Lungs clear to auscultation bilaterally without wheeze or crackle.   Abd: Non-distended, some diffuse tenderness to palpation, no rebound or guarding, bowel sounds present  Ext: No edema or overt asymmetry/deformity.   Skin: No overt rash on easily visualized skin.   Neuro: Non-focal.    Psych: Calm.     Data   Recent Labs   Lab 11/01/22  0434 10/31/22  1916 10/31/22  0637 10/30/22  1707 10/29/22  2027   WBC  --   --  7.0 7.5 9.9   HGB  --   --  8.5* 9.0* 10.3*   MCV  --   --  88 89 85   PLT  --   --  458* 429 600*   NA  --   --  138 140 136   POTASSIUM 2.8* 4.1 3.1* 3.9 2.9*   CHLORIDE  --   --  101 103 98   CO2  --   --  22 26 26   BUN  --   --  5.2* 7.8 7.2   CR  --   --  0.53 0.50* 0.59   ANIONGAP  --   --  15 11 12   ALEX  --   --  7.2* 7.6* 7.8*   GLC  --   --  70 95 102*   ALBUMIN  --   --   --   --  2.5*   PROTTOTAL  --   --   --   --  7.4   BILITOTAL  --   --   --   --  0.4   ALKPHOS  --   --   --   --  127*   ALT  --   --   --   --  8*   AST  --   --   --   --  14   LIPASE  --   --   --   --  17     No results found for this or any previous visit (from the past 24 hour(s)).

## 2022-11-01 NOTE — PROGRESS NOTES
"General Surgery Progress Note:    Hospital Day # 3    ASSESSMENT:     Dain Tejeda is a 42 year old female  with severe colitis and multifocal small intramural abscesses concerning for Crohn's.    Fever overnight and currently afebrile without any tachycardia.  No elevated white count or increase in abdominal pain but continues to have pain.    PLAN:   -Diet per GI  -Follow-up and cares per HMS and GI  -No surgical indication at this time and patient would have to become significantly more ill or decompensated before surgery is recommended as if she would need surgery she would probably need a total colectomy for subtotal colectomy.  Due to the fact that she is  we will continue to follow along the patient.      SUBJECTIVE:   Dain Tejeda is having abdominal pain.  She states about the same as yesterday.  She did have a fever last night of 103.  She also had tachycardia at 122.   in room was able to interpret for me.  Was having a difficult time connecting to  over the phone.    Patient Vitals for the past 24 hrs:   BP Temp Temp src Pulse Resp SpO2 Height   11/01/22 0843 102/71 97.6  F (36.4  C) Oral 87 18 99 % --   11/01/22 0504 92/57 97.5  F (36.4  C) Oral 80 18 99 % --   11/01/22 0122 113/64 (!) 103  F (39.4  C) Oral 117 18 94 % --   11/01/22 0043 111/72 (!) 102.6  F (39.2  C) Oral (!) 122 18 -- --   11/01/22 0012 116/67 100.2  F (37.9  C) Oral 118 18 97 % --   10/31/22 2351 111/64 (!) 103.1  F (39.5  C) Oral 119 18 97 % --   10/31/22 1639 113/74 98.5  F (36.9  C) Oral 94 18 99 % --   10/31/22 1430 113/78 97.8  F (36.6  C) Oral 91 16 95 % --   10/31/22 1359 -- -- -- -- -- -- 1.498 m (4' 10.98\")   10/31/22 1333 99/65 -- -- 95 16 96 % --   10/31/22 1233 101/68 97.5  F (36.4  C) Oral 84 16 -- --   10/31/22 1157 103/67 -- -- 87 16 97 % --   10/31/22 1120 -- 98.7  F (37.1  C) Oral 86 16 98 % --   10/31/22 1105 -- -- -- -- 16 -- --   10/31/22 1100 -- -- -- -- 16 -- --   10/31/22 1046 106/72 -- -- 85 " 16 100 % --   10/31/22 1004 103/71 98.1  F (36.7  C) Oral 83 16 99 % --       Physical Exam:  General: NAD, pleasant    ABD: Soft tenderness mid periumbilical.  No rebound peritoneal signs present.  Some guarding due to pain.      Admission on 10/29/2022   Component Date Value     Color Urine 10/29/2022 Yellow      Appearance Urine 10/29/2022 Turbid (A)      Glucose Urine 10/29/2022 Negative      Bilirubin Urine 10/29/2022 Negative      Ketones Urine 10/29/2022 Negative      Specific Gravity Urine 10/29/2022 1.016      Blood Urine 10/29/2022 0.03 mg/dL (A)      pH Urine 10/29/2022 6.5      Protein Albumin Urine 10/29/2022 20 (A)      Urobilinogen Urine 10/29/2022 <2.0      Nitrite Urine 10/29/2022 Negative      Leukocyte Esterase Urine 10/29/2022 500 Shilpa/uL (A)      Bacteria Urine 10/29/2022 Few (A)      Mucus Urine 10/29/2022 Present (A)      RBC Urine 10/29/2022 4 (H)      WBC Urine 10/29/2022 0      Squamous Epithelials Uri* 10/29/2022 21 (H)      Sodium 10/29/2022 136      Potassium 10/29/2022 2.9 (L)      Chloride 10/29/2022 98      Carbon Dioxide (CO2) 10/29/2022 26      Anion Gap 10/29/2022 12      Urea Nitrogen 10/29/2022 7.2      Creatinine 10/29/2022 0.59      Calcium 10/29/2022 7.8 (L)      Glucose 10/29/2022 102 (H)      Alkaline Phosphatase 10/29/2022 127 (H)      AST 10/29/2022 14      ALT 10/29/2022 8 (L)      Protein Total 10/29/2022 7.4      Albumin 10/29/2022 2.5 (L)      Bilirubin Total 10/29/2022 0.4      GFR Estimate 10/29/2022 >90      WBC Count 10/29/2022 9.9      RBC Count 10/29/2022 3.83      Hemoglobin 10/29/2022 10.3 (L)      Hematocrit 10/29/2022 32.5 (L)      MCV 10/29/2022 85      MCH 10/29/2022 26.9      MCHC 10/29/2022 31.7      RDW 10/29/2022 13.3      Platelet Count 10/29/2022 600 (H)      % Neutrophils 10/29/2022 67      % Lymphocytes 10/29/2022 23      % Monocytes 10/29/2022 9      % Eosinophils 10/29/2022 1      % Basophils 10/29/2022 0      % Immature Granulocytes 10/29/2022 0       NRBCs per 100 WBC 10/29/2022 0      Absolute Neutrophils 10/29/2022 6.5      Absolute Lymphocytes 10/29/2022 2.3      Absolute Monocytes 10/29/2022 0.9      Absolute Eosinophils 10/29/2022 0.1      Absolute Basophils 10/29/2022 0.0      Absolute Immature Granul* 10/29/2022 0.0      Absolute NRBCs 10/29/2022 0.0      Lipase 10/29/2022 17      hCG Urine Qualitative 10/29/2022 Negative      Magnesium 10/29/2022 1.9      Culture 10/29/2022 50,000-100,000 CFU/mL Klebsiella pneumoniae (A)      Culture 10/29/2022 <10,000 CFU/mL Urogenital zulma      Campylobacter group 10/30/2022 Not Detected      Salmonella species 10/30/2022 Not Detected      Shigella species 10/30/2022 Not Detected      Vibrio group 10/30/2022 Not Detected      Rotavirus 10/30/2022 Not Detected      Shiga toxin 1 gene 10/30/2022 Not Detected      Shiga toxin 2 gene 10/30/2022 Not Detected      Norovirus I and II 10/30/2022 Not Detected      Yersinia enterocolitica 10/30/2022 Not Detected      Lactic Acid 10/29/2022 0.5 (L)      C Difficile Toxin B by P* 10/30/2022 Negative      SARS CoV2 PCR 10/29/2022 Negative      WBC Count 10/30/2022 7.5      RBC Count 10/30/2022 3.35 (L)      Hemoglobin 10/30/2022 9.0 (L)      Hematocrit 10/30/2022 29.7 (L)      MCV 10/30/2022 89      MCH 10/30/2022 26.9      MCHC 10/30/2022 30.3 (L)      RDW 10/30/2022 13.4      Platelet Count 10/30/2022 429      Sodium 10/30/2022 140      Potassium 10/30/2022 3.9      Chloride 10/30/2022 103      Carbon Dioxide (CO2) 10/30/2022 26      Anion Gap 10/30/2022 11      Urea Nitrogen 10/30/2022 7.8      Creatinine 10/30/2022 0.50 (L)      Calcium 10/30/2022 7.6 (L)      Glucose 10/30/2022 95      GFR Estimate 10/30/2022 >90      Tissue Transglutaminase * 10/30/2022 1.9      Immunoglobulin A 10/30/2022 459      Sodium 10/31/2022 138      Potassium 10/31/2022 3.1 (L)      Chloride 10/31/2022 101      Carbon Dioxide (CO2) 10/31/2022 22      Anion Gap 10/31/2022 15      Urea Nitrogen  10/31/2022 5.2 (L)      Creatinine 10/31/2022 0.53      Calcium 10/31/2022 7.2 (L)      Glucose 10/31/2022 70      GFR Estimate 10/31/2022 >90      WBC Count 10/31/2022 7.0      RBC Count 10/31/2022 3.15 (L)      Hemoglobin 10/31/2022 8.5 (L)      Hematocrit 10/31/2022 27.6 (L)      MCV 10/31/2022 88      MCH 10/31/2022 27.0      MCHC 10/31/2022 30.8 (L)      RDW 10/31/2022 13.2      Platelet Count 10/31/2022 458 (H)      Magnesium 10/31/2022 1.8      COLONOSCOPY 10/31/2022                      Value:[facility]  [address] [place] [address]  _______________________________________________________________________________  Patient Name: Dain Ileana                 Procedure Date: 10/31/2022 10:47 AM  MRN: [id number]                       Account Number: [id number]  YOB: 1980               Admit Type: Inpatient  Age: 42                               Room: [place] [address]  Note Status: Finalized                Attending MD: CLAUDE RODRIGUEZ MD  Instrument Name: Pediatric Colonoscope 617   _______________________________________________________________________________     Procedure:             Colonoscopy  Indications:           Clinically significant diarrhea of unexplained origin,                          Abnormal CT of the GI tract  Providers:             CLAUDE RODRIGUEZ MD  Referring MD:            Medicines:             Midazolam 2 mg IV, Fentanyl 100 micrograms IV  Complications:         No immediate complications.  __________________________                          _____________________________________________________  Procedure:             Pre-Anesthesia Assessment:                         - This assessment was completed prior to the                          administration of sedation.                         After obtaining informed consent, the colonoscope was                          passed under direct vision. Throughout the procedure,                          the  patient's blood pressure, pulse, and oxygen                          saturations were monitored continuously. The pediatric                          colonoscope was introduced through the anus and                          advanced to the descending colon. The colonoscopy was                          somewhat difficult due to bowel stenosis. The patient                          tolerated the procedure. The quality of the bowel                          preparation was excellent. The colonoscopy was aborted                          due to the difficulty of the procedure. The                                                    colonoscopy was somewhat difficult due to bowel                          stenosis. The patient tolerated the procedure well.                          The quality of the bowel preparation was excellent.                                                                                   Findings:       A polypoid lesion was found in the descending colon. The lesion was        sessile. No bleeding was present. This was biopsied with a cold forceps.       Ulcerated mucosa were present in the descending colon.       A localized area of erythematous mucosa was found in the distal rectum.        Biopsies were taken with a cold forceps for histology.                                                                                   Moderate Sedation:       Moderate (conscious) sedation was administered by the endoscopy nurse        and supervised by the endoscopist. The patient's oxygen saturation,        heart rate, blood pressure and response to care were monitored.  Impression                          :            - The procedure was aborted within descending.                         - Descending colon with narrowed area with diffuse                          polyposis (look like pseudopolyps) and areas of                          purlulent discharge; however, surrounding mucosa does                           not appear inflamed (no edema or erythema). Polypoid                          lesions and purulent areas biopsied                         - Normal sigmoid                         - Distal rectum with erythematous mucosa and localized                          purulent drainage. Biopsied.                         - External anal appeared normal                         - Unable to examine proximal colon or terminal ileum  Recommendation:        - Return patient to hospital [facility].                         - Advance diet as tolerated. Should be gluten free                         - Already started on antibiotics                         - Await stool study report                         -                           Await path report                         - May start steroids soon                                                                                     Claude Traore MD  ___________________  CLAUDE TRAORE MD  10/31/2022 11:31:43 AM  I was physically present for the entire viewing portion of the exam.  __________________________  Signature of teaching physician  Kristofer/Sandra TRAORE MD  Number of Addenda: 0    Note Initiated On: 10/31/2022 10:47 AM  Scope In: 10:53:40 AM  Scope Out: 11:05:41 AM       Potassium 10/31/2022 4.1      Potassium 11/01/2022 2.8 (L)      Magnesium 11/01/2022 1.8         [facility]  [address] [place] [address]  Phone- [phone]  Fax- [phone]

## 2022-11-01 NOTE — CONSULTS
[facility]    Infectious Disease Consultation     Date of Admission:  10/29/2022  Date of Consult (When I saw the patient): 11/01/22    Assessment & Plan   Dain Tejeda is a 42 year old female who was admitted on 10/29/2022.     Impression:    1. Severe colitis with multifocal focal small intramural abscesses.  2. 7 months of symptoms of abdominal pain, nausea, diarrhea, occasional blood in stools  3. Colitis- Colonoscopy performed and biopsy in process Ulcerated mucosa were present in the descending colon. A localized area of erythematous mucosa was found in the distal rectum  4. Celiac disease was diagnosed on 9/6/2022 based on duodenal biopsy that showed intraepithelial lymphocytosis and villous bluntingOriginally from [place]  5. Previous work-up for infectious disease has been negative.  C. difficile negative, enteric stool panel negative in the past, ova and parasites negative in the past  6. Several month history of weight loss nausea vomiting diarrhea and fatigue  7. Fever spike to 103 on 10/31/2022, total white count is normal    Recommendations    1. Awaiting results of colonic biopsy  2. Empiric levofloxacin and metronidazole  3. Additional work-up added, HIV screen, Cryptosporidium/Giardia, Whipple's, QFT  4. Discussed with patient and patient's family members with patient's permission.  5. Antibiotic plan to be determined by final biopsy results and definitive diagnosis.  Anticipate antibiotics for 7 to 10 days depending on biopsy results  6. General surgery, GI notes reviewed  7. Thank you for consulting infectious disease.      Karen Rosales MD  [facility]  [phone]        Reason for Consult   Reason for consult: I was asked to evaluate this patient for ?infectious colitis,     Primary Care Physician   Jefe Martin    Chief Complaint   Worsening abdominal pain    History is obtained from the patient and medical records    History of Present  Nancy Tejeda is a 42 year old female who presents with about 7-month history of intermittent abdominal pain, weight loss, nausea, diarrhea, with work-up done as an outpatient setting including EGD with biopsies.  There was diagnosis of celiac disease.  Colonoscopy has not been done at this time  Patient is originally from [place] has been in the [place] for about 6 years.  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  Patient stated that symptoms started about 6 7 months ago when at work the only option for food was pizza.  Patient started taking food from home however the symptoms of abdominal pain, nausea vomiting and diarrhea did not improve.  No other recent travel history except visited [place] over a year ago.  No unpasteurized milk or any other new food exposures that the patient can recall  Patient's relatives are in in the room.  Patient has lost weight, and GI is following closely, colonoscopy has been performed and biopsy results are pending  General surgery saw the patient this hospitalization due to findings of intramural abscesses, and antibiotics have been started empirically.  Patient has been on Zosyn    Past Medical History   I have reviewed this patient's medical history and updated it with pertinent information if needed.   Past Medical History:   Diagnosis Date     Diet controlled gestational diabetes mellitus (GDM), antepartum 9/4/2017    Attempting diet control first     Gestational diabetes mellitus (GDM) in third trimester 9/4/2017    Attempting diet control first  Formatting of this note might be different from the original. Overview:  Attempting diet control first     Nausea/vomiting in pregnancy 4/24/2017     Postpartum hemorrhage 11/16/2017     Third degree laceration of perineum during delivery, postpartum 11/15/2017       Past Surgical History   I have reviewed this patient's surgical history and updated it with pertinent information if needed.  Past Surgical History:    Procedure Laterality Date     COLONOSCOPY N/A 10/31/2022    Procedure: COLONOSCOPY with biopsies;  Surgeon: Luis Miguel Traore MD;  Location: [facility]       Prior to Admission Medications   Prior to Admission Medications   Prescriptions Last Dose Informant Patient Reported? Taking?   Prenatal Vit-Fe Fumarate-FA (PRENATAL 19) 29-1 MG CHEW   Yes No   Sig: Take 1 tablet by mouth   Patient not taking: Reported on 7/12/2022   acetaminophen (TYLENOL) 500 MG tablet Unknown  No Yes   Sig: Take 2 tablets (1,000 mg) by mouth every 6 hours as needed for mild pain   calcium carbonate (TUMS) 500 MG chewable tablet Past Week  No Yes   Sig: Take 1 tablet (500 mg) by mouth 2 times daily   famotidine (PEPCID) 40 MG tablet Past Week  No Yes   Sig: Take 1 tablet (40 mg) by mouth daily   loperamide (IMODIUM A-D) 2 MG tablet Unknown  No Yes   Sig: Take 1 tablet (2 mg) by mouth 4 times daily as needed for diarrhea   omeprazole (PRILOSEC) 40 MG DR capsule Past Month  No Yes   Sig: Take 1 capsule (40 mg) by mouth daily   ondansetron (ZOFRAN ODT) 4 MG ODT tab Past Month  No Yes   Sig: Take 1 tablet (4 mg) by mouth every 8 hours as needed for nausea   phenylephrine-cocoa butter (PREPARATION H) 0.25-88.44 % suppository Not Taking  No No   Sig: Place 1 suppository rectally as needed for hemorrhoids or itching   Patient not taking: Reported on 10/30/2022   prochlorperazine (COMPAZINE) 10 MG tablet Unknown  No Yes   Sig: Take 1 tablet (10 mg) by mouth every 6 hours as needed for nausea or vomiting      Facility-Administered Medications: None     Allergies   Allergies   Allergen Reactions     Soybean Allergy Itching and Blisters     Tofu- causes itching and sores in the mouth       Immunization History   Immunization History   Administered Date(s) Administered     Influenza Vaccine IM > 6 months Valent IIV4 (Alfuria,Fluzone) 10/11/2017     TDAP Vaccine (Boostrix) 09/01/2017       Social History   I have reviewed this patient's social history  and updated it with pertinent information if needed. Dain Tejeda  reports that she has never smoked. She has never used smokeless tobacco. She reports that she does not drink alcohol and does not use drugs.    Family History   I have reviewed this patient's family history and updated it with pertinent information if needed.   Family History   Problem Relation Age of Onset     Gestational Diabetes Sister      Gestational Diabetes Sister      Diabetes No family hx of      Coronary Artery Disease No family hx of      Hypertension No family hx of      Breast Cancer No family hx of      Colon Cancer No family hx of      Prostate Cancer No family hx of      Other Cancer No family hx of    Mother passed away at age 53 of cancer which patient thinks was uterine  Father passed away in his 50s, etiology not known    Review of Systems   The 10 point Review of Systems is negative other than noted in the HPI or here.     Physical Exam   Temp: 99.4  F (37.4  C) Temp src: Oral BP: 116/75 Pulse: 104   Resp: 20 SpO2: 97 % O2 Device: None (Room air)    Vital Signs with Ranges  Temp:  [97.5  F (36.4  C)-103.1  F (39.5  C)] 99.4  F (37.4  C)  Pulse:  [] 104  Resp:  [18-20] 20  BP: ()/(57-75) 116/75  SpO2:  [94 %-99 %] 97 %  111 lbs 14.4 oz  Body mass index is 22.62 kg/m .    GENERAL APPEARANCE:  awake  EYES: Eyes grossly normal to inspection,  and conjunctivae and sclerae normal  HENT:moist mucosa  NECK: supple  RESP:no rales, rhonchi or wheezes  CV: no edema  LYMPHATICS: normal ant/post cervical and supraclavicular nodes  ABDOMEN: tenderness  MS: extremities normal- no gross deformities noted  SKIN: no suspicious lesions or rashes  NEURO: AOx 3      Data    Reviewed results    Lab Results   Component Value Date    WBC 5.7 11/01/2022    WBC 7.0 10/31/2022    WBC 7.5 10/30/2022    WBC 9.9 10/29/2022    WBC 11.5 (H) 06/02/2022    HGB 8.6 (L) 11/01/2022    HGB 8.5 (L) 10/31/2022    HGB 9.0 (L) 10/30/2022    HGB 10.3 (L) 10/29/2022     HGB 9.9 (L) 06/02/2022    HCT 28.0 (L) 11/01/2022    HCT 27.6 (L) 10/31/2022    HCT 29.7 (L) 10/30/2022    HCT 32.5 (L) 10/29/2022    HCT 31.2 (L) 06/02/2022     11/01/2022     (H) 10/31/2022     10/30/2022     (H) 10/29/2022     06/02/2022     10/31/2022     10/30/2022     10/29/2022     06/23/2022     06/03/2022    POTASSIUM 3.8 11/01/2022    POTASSIUM 2.8 (L) 11/01/2022    POTASSIUM 4.1 10/31/2022    POTASSIUM 3.1 (L) 10/31/2022    POTASSIUM 3.9 10/30/2022    CHLORIDE 101 10/31/2022    CHLORIDE 103 10/30/2022    CHLORIDE 98 10/29/2022    CHLORIDE 104 06/23/2022    CHLORIDE 107 06/03/2022    CO2 22 10/31/2022    CO2 26 10/30/2022    CO2 26 10/29/2022    CO2 25 06/23/2022    CO2 23 06/03/2022    BUN 5.2 (L) 10/31/2022    BUN 7.8 10/30/2022    BUN 7.2 10/29/2022    BUN 6 (L) 06/23/2022    BUN 3 (L) 06/03/2022    CR 0.53 10/31/2022    CR 0.50 (L) 10/30/2022    CR 0.59 10/29/2022    CR 0.72 06/23/2022    CR 0.61 06/03/2022    GLC 70 10/31/2022    GLC 95 10/30/2022     (H) 10/29/2022    GLC 90 06/23/2022    GLC 86 06/03/2022    SED 90 (H) 05/24/2022    AST 14 10/29/2022    AST 20 06/23/2022    AST 11 06/03/2022    AST 13 06/02/2022    AST 14 06/01/2022    ALT 8 (L) 10/29/2022    ALT 21 06/23/2022    ALT 10 06/03/2022    ALT 11 06/02/2022    ALT 13 06/01/2022    ALKPHOS 127 (H) 10/29/2022    ALKPHOS 131 (H) 06/23/2022    ALKPHOS 90 06/03/2022    ALKPHOS 83 06/02/2022    ALKPHOS 100 06/01/2022    BILITOTAL 0.4 10/29/2022    BILITOTAL 0.3 06/23/2022    BILITOTAL 0.5 06/03/2022    BILITOTAL 0.4 06/02/2022    BILITOTAL 0.4 06/01/2022     No results for input(s): CULT in the last 168 hours.  No lab results found.    Invalid input(s): UC      10/29/2022 2038 10/31/2022 2243 Urine Culture [[id number]]    (Abnormal)   Urine, Midstream    Final result Component Value   Culture 50,000-100,000 CFU/mL Klebsiella pneumoniae Abnormal     <10,000 CFU/mL  Urogenital zulma       Susceptibility     Klebsiella pneumoniae     KENISHA     Ampicillin >=32 ug/mL Resistant 1     Ampicillin/ Sulbactam 4 ug/mL Susceptible     Cefazolin <=4 ug/mL Susceptible 2     Cefepime <=1 ug/mL Susceptible     Cefoxitin <=4 ug/mL Susceptible     Ceftazidime <=1 ug/mL Susceptible     Ceftriaxone <=1 ug/mL Susceptible     Ciprofloxacin <=0.25 ug/mL Susceptible     Gentamicin <=1 ug/mL Susceptible     Levofloxacin <=0.12 ug/mL Susceptible     Nitrofurantoin 64 ug/mL Intermediate     Piperacillin/Tazobactam <=4 ug/mL Susceptible     Tobramycin <=1 ug/mL Susceptible     Trimethoprim/Sulfamethoxazole >16/304 ug/mL Resistant              1 Intrinsically Resistant   2 Cefazolin KENISHA breakpoints are for the treatment of uncomplicated urinary tract infections. For the treatment of systemic infections, please contact the laboratory for additional testing.           11/01/2022 0434 11/01/2022 0449 Blood Culture Peripheral Blood [[id number]]   Peripheral Blood    In process Component Value   No component results             10/30/2022 1848 10/31/2022 1713 Enteric Bacteria and Virus Panel by MARILYNN Stool [[id number]]    Stool from Per Rectum    Final result Component Value   Campylobacter group Not Detected   Salmonella species Not Detected   Shigella species Not Detected   Vibrio group Not Detected   Rotavirus Not Detected   Shiga toxin 1 gene Not Detected   Shiga toxin 2 gene Not Detected   Norovirus I and II Not Detected   Yersinia enterocolitica Not Detected          10/30/2022 1847 10/31/2022 0047 C. difficile Toxin B PCR with reflex to C. difficile Antigen and Toxins A/B EIA [[id number]]    Stool from Per Rectum    Final result Component Value   C Difficile Toxin B by PCR Negative   A negative result does not exclude actual disease due to C. difficile and may be due to improper collection, handling and storage of the specimen or the number of organisms         RADIOLOGY:    Reviewed    CT Abdomen  Pelvis w Contrast    Result Date: 10/29/2022  EXAM: CT ABDOMEN PELVIS W CONTRAST LOCATION: [facility] DATE/TIME: 10/29/2022 10:27 PM INDICATION: abd pain assoc with N V D COMPARISON: Ultrasound of the pelvis from 08/18/2022. CT abdomen pelvis from 07/11/2022 TECHNIQUE: CT scan of the abdomen and pelvis was performed following injection of IV contrast. Multiplanar reformats were obtained. Dose reduction techniques were used. CONTRAST: [id number] 100ml FINDINGS: LOWER CHEST: Normal. HEPATOBILIARY: Focal fat along the falciform ligament, liver otherwise normal. No calcified gallstones. No biliary ductal dilatation. PANCREAS: Normal. SPLEEN: Normal. ADRENAL GLANDS: Normal. KIDNEYS/BLADDER: Normal. BOWEL: Normal caliber small bowel. There is wall thickening of the large bowel from the proximal transverse colon through the mid sigmoid colon. Wall thickening is most severe in the mid and distal descending colon, where there are multiple intramural fluid and gas collections consistent with abscess. Anteriorly there is a 1.5 cm collection on image 98 of series 2. Posteriorly there is a 1.3 cm collection on image 106. More inferiorly there is a thin crescentic collection posteriorly measuring 1.7 cm in length. There is extensive pericolonic edema, but no free air. LYMPH NODES: Multiple presumably reactive pericolonic lymph nodes. VASCULATURE: Unremarkable. PELVIC ORGANS: Cyst in the left pelvis measures 5 cm in length, likely corresponding to the paraovarian cyst seen on the recent pelvic ultrasound. MUSCULOSKELETAL: Transitional lumbosacral anatomy, normal variant     IMPRESSION: 1.  Findings consistent with a fairly diffuse colitis with severe involvement of the mid and distal descending colon. Although there is no [place] free air, the colitis is complicated by the presence of multiple small, intramural abscesses as described above.

## 2022-11-01 NOTE — PLAN OF CARE
Problem: Plan of Care - These are the overarching goals to be used throughout the patient stay.    Goal: Plan of Care Review  Description: The Plan of Care Review/Shift note should be completed every shift.  The Outcome Evaluation is a brief statement about your assessment that the patient is improving, declining, or no change.  This information will be displayed automatically on your shift note.  Outcome: Progressing  Goal: Absence of Hospital-Acquired Illness or Injury  Outcome: Progressing  Intervention: Identify and Manage Fall Risk  Recent Flowsheet Documentation  Taken 11/1/2022 0956 by Precious Serna RN  Safety Promotion/Fall Prevention:    room organization consistent    safety round/check completed  Intervention: Prevent and Manage VTE (Venous Thromboembolism) Risk  Recent Flowsheet Documentation  Taken 11/1/2022 0956 by Precious Serna RN  VTE Prevention/Management: patient refused intervention  Goal: Optimal Comfort and Wellbeing  Outcome: Progressing     Problem: Pain Acute  Goal: Optimal Pain Control and Function  Intervention: Prevent or Manage Pain  Recent Flowsheet Documentation  Taken 11/1/2022 0956 by Precious Serna RN  Medication Review/Management: medications reviewed   Goal Outcome Evaluation:

## 2022-11-01 NOTE — PROGRESS NOTES
Therapy: IV abx  Insurance: [facility] PMAP    100% coverage for IV abx     In reference to admission on 10/29/22 to check IV abx coverage    Please contact Intake with any questions, [phone] or In Basket pool, [facility] ([phone]).

## 2022-11-01 NOTE — PLAN OF CARE
Problem: Plan of Care - These are the overarching goals to be used throughout the patient stay.    Goal: Plan of Care Review  Description: The Plan of Care Review/Shift note should be completed every shift.  The Outcome Evaluation is a brief statement about your assessment that the patient is improving, declining, or no change.  This information will be displayed automatically on your shift note.  Outcome: Progressing   Goal Outcome Evaluation:       Pt NPO this am for colonoscopy.  Colonoscopy performed and was told that pt could resume a regular gluten free diet. I was informed by the RN taking care of the pt in IR that they found a lot of inflammation and took biopsies. Pt returned to the floor around 1300 and VS'S throughout the rest of this shift.    Pt's  was in and brought her food, porridge. Pt ate a few bites, but that is all she ate this shift. Encouraged PO intake.  Called dietician to see the pt and educate her on a gluten free diet and food choices with this.    Pt is Hmong speaking and  was used for all education and assessments this shift.    Pt c/o pain once this shift and rated her pain 6/10 and was given 5 mg PO Oxycodone. She stated her pain went down to a 1/10 within several hours of getting the pain med.    Mg prot-1.8-recheck am  K prot- 3.1 attempted to give IV bumps, but the IV infiltrated and pt stated that she felt some burning, it was stopped immediately and was noted to be leaking. PO bump was given of 40 meq since pt was now able to take PO and recheck of this at 1916 was 4.1, will recheck in the am.    Attempted to replace the PIV x 2 without success and PICC team was called in to do this.They were successful and a new PIV was placed in the left ant forearm. Zosyn was started as soon as IV access was established.    Pt is independent to the bathroom and used the bathroom x 3 to void    Will cont to monitor this pt and alert the MD of any status changes.

## 2022-11-01 NOTE — PROGRESS NOTES
"  GASTROENTEROLOGY PROGRESS NOTE     SUBJECTIVE   The patient complains of generalized abdominal pain-- does not worsen with eating. Reports passing three stools today. Not bloody.   Tmax 102F yesterday evening. Blood cultures drawn. On Zosyn.      OBJECTIVE     Vitals Blood pressure 116/75, pulse 104, temperature 99.4  F (37.4  C), temperature source Oral, resp. rate 20, height 1.498 m (4' 10.98\"), weight 50.8 kg (111 lb 14.4 oz), SpO2 97 %, unknown if currently breastfeeding.          Physical Exam   General: awake, alert, responds appropriately    Cardiovascular: S1S2, no edema    Chest: lungs are clear     Abdomen: +bs, soft, mild generalized tenderenss    Neurologic: grossly intact        LABORATORY    ELECTROLYTE PANEL   Recent Labs   Lab 11/01/22  0434 10/31/22  1916 10/31/22  0637 10/30/22  1707 10/29/22  2027   NA  --   --  138 140 136   POTASSIUM 2.8* 4.1 3.1* 3.9 2.9*   CHLORIDE  --   --  101 103 98   CO2  --   --  22 26 26   GLC  --   --  70 95 102*   CR  --   --  0.53 0.50* 0.59   BUN  --   --  5.2* 7.8 7.2      HEMATOLOGY PANEL   Recent Labs   Lab 10/31/22  0637 10/30/22  1707 10/29/22  2027   HGB 8.5* 9.0* 10.3*   MCV 88 89 85   WBC 7.0 7.5 9.9   * 429 600*      LIVER AND PANCREAS PANEL   Recent Labs   Lab 10/29/22  2027   AST 14   ALT 8*   ALKPHOS 127*   BILITOTAL 0.4   LIPASE 17   Stool for enteric pathogens negative  Stool for c difficile negative  IMAGING STUDIES    EXAM: CT ABDOMEN PELVIS W CONTRAST  LOCATION: [facility]  DATE/TIME: 10/29/2022 10:27 PM     INDICATION: abd pain assoc with N V D  COMPARISON: Ultrasound of the pelvis from 08/18/2022. CT abdomen pelvis from 07/11/2022  TECHNIQUE: CT scan of the abdomen and pelvis was performed following injection of IV contrast. Multiplanar reformats were obtained. Dose reduction techniques were used.  CONTRAST: [id number] 100ml     FINDINGS:   LOWER CHEST: Normal.     HEPATOBILIARY: Focal fat along the falciform " ligament, liver otherwise normal. No calcified gallstones. No biliary ductal dilatation.     PANCREAS: Normal.     SPLEEN: Normal.     ADRENAL GLANDS: Normal.     KIDNEYS/BLADDER: Normal.     BOWEL: Normal caliber small bowel. There is wall thickening of the large bowel from the proximal transverse colon through the mid sigmoid colon. Wall thickening is most severe in the mid and distal descending colon, where there are multiple intramural   fluid and gas collections consistent with abscess. Anteriorly there is a 1.5 cm collection on image 98 of series 2. Posteriorly there is a 1.3 cm collection on image 106. More inferiorly there is a thin crescentic collection posteriorly measuring 1.7 cm   in length. There is extensive pericolonic edema, but no free air.     LYMPH NODES: Multiple presumably reactive pericolonic lymph nodes.     VASCULATURE: Unremarkable.     PELVIC ORGANS: Cyst in the left pelvis measures 5 cm in length, likely corresponding to the paraovarian cyst seen on the recent pelvic ultrasound.     MUSCULOSKELETAL: Transitional lumbosacral anatomy, normal variant                                                                      IMPRESSION:   1.  Findings consistent with a fairly diffuse colitis with severe involvement of the mid and distal descending colon. Although there is no phoenix free air, the colitis is complicated by the presence of multiple small, intramural abscesses as described   above.    I have reviewed the current diagnostic and laboratory tests.            Colonoscopy 10/31/22  Findings:        A polypoid lesion was found in the descending colon. The lesion was        sessile. No bleeding was present. This was biopsied with a cold forceps.        Ulcerated mucosa were present in the descending colon.        A localized area of erythematous mucosa was found in the distal rectum.        Biopsies were taken with a cold forceps for histology.                                                                                      Moderate Sedation:        Moderate (conscious) sedation was administered by the endoscopy nurse        and supervised by the endoscopist. The patient's oxygen saturation,        heart rate, blood pressure and response to care were monitored.   Impression:            - The procedure was aborted within descending.                          - Descending colon with narrowed area with diffuse                          polyposis (look like pseudopolyps) and areas of                          purlulent discharge; however, surrounding mucosa does                          not appear inflamed (no edema or erythema). Polypoid                          lesions and purulent areas biopsied                          - Normal sigmoid                          - Distal rectum with erythematous mucosa and localized                          purulent drainage. Biopsied.                          - External anal appeared normal                          - Unable to examine proximal colon or terminal ileum   Recommendation:        - Return patient to hospital [facility].                          - Advance diet as tolerated. Should be gluten free                          - Already started on antibiotics                          - Await stool study report                          - Await path report                          - May start steroids soon                                                                                       Claude Traore MD   ___________________   CLAUDE TRAORE MD     EGD 9/6/22  Endoscopically unremarkable    A: DUODENUM, BIOPSY:           1. Duodenal mucosa with changes suspicious for celiac disease, characterized by:               a. Increased intraepithelial lymphocytes               b. Villous atrophy and crypt hyperplasia (Marsh 3a)           2. See comment      B: STOMACH, BIOPSY:           1. Mild non-specific chronic inflammation (see comment)               a. Sampling: Antrum and  body               b. Distribution: Antrum and body           2. Negative for atrophic gastritis           3. Negative for Helicobacter (immunohistochemistry  negative)       IMPRESSION   Colitis-- This 41 yo female presented with acute on chronic abdominal pain and CT abdomen and pelvis with severe colitis. Stool for c difficile and enteric pathogens negative. Colonoscopy 10/31/22 with narrowed area with diffuse polyposis and areas of purulent discharge of unclear etiology--?possible Crohn's disease, but must also consider TB, CMV. Will add on stool for ova and parasites. Await path  Fever- Infectious eval underway. Agree w/ empiric antibiotics  Abnormal duodenal biopsy 9/2022 with villous atrophy and crypt hyperplasia (Marsh 3a). TTG IgA is not elevated. EGD findings are fairly nonspecific but could be related to medications/NSAIDs or even possibly IBD.          PLAN   Add on stool for ova and parasites X3  Colon path pending-- Called path dept and requested acid fast stain and stain for CMV.   Follow blood cultures. Empiric Zosyn for now.   Pain control. Supportive cares. Soft low fiber diet.  TPMT, Quantiferon and hepatities serologies have been ordered  I will consult infectious disease--?infectious colitis        Perla Fofana PA-C  Thank you for the opportunity to participate in the care of this patient.   Please feel free to call me with any questions or concerns.  Phone number [phone].

## 2022-11-02 ENCOUNTER — APPOINTMENT (OUTPATIENT)
Dept: CT IMAGING | Facility: HOSPITAL | Age: 42
End: 2022-11-02
Attending: INTERNAL MEDICINE
Payer: COMMERCIAL

## 2022-11-02 LAB
C PARVUM AG STL QL IA: NEGATIVE
CMV DNA SPEC NAA+PROBE-ACNC: NOT DETECTED IU/ML
ERYTHROCYTE [DISTWIDTH] IN BLOOD BY AUTOMATED COUNT: 13.8 % (ref 10–15)
G LAMBLIA AG STL QL IA: NEGATIVE
HAV IGG SER QL IA: REACTIVE
HBV SURFACE AB SERPL IA-ACNC: 0.61 M[IU]/ML
HBV SURFACE AB SERPL IA-ACNC: NONREACTIVE M[IU]/ML
HBV SURFACE AG SERPL QL IA: NONREACTIVE
HCT VFR BLD AUTO: 31 % (ref 35–47)
HCV AB SERPL QL IA: NONREACTIVE
HGB BLD-MCNC: 9.6 G/DL (ref 11.7–15.7)
HIV 1+2 AB+HIV1 P24 AG SERPL QL IA: NONREACTIVE
IGA SERPL-MCNC: 448 MG/DL (ref 84–499)
MAGNESIUM SERPL-MCNC: 1.9 MG/DL (ref 1.7–2.3)
MCH RBC QN AUTO: 27.6 PG (ref 26.5–33)
MCHC RBC AUTO-ENTMCNC: 31 G/DL (ref 31.5–36.5)
MCV RBC AUTO: 89 FL (ref 78–100)
O+P STL MICRO: NEGATIVE
PATH REPORT.ADDENDUM SPEC: NORMAL
PATH REPORT.COMMENTS IMP SPEC: NORMAL
PATH REPORT.FINAL DX SPEC: NORMAL
PATH REPORT.GROSS SPEC: NORMAL
PATH REPORT.MICROSCOPIC SPEC OTHER STN: NORMAL
PATH REPORT.RELEVANT HX SPEC: NORMAL
PHOTO IMAGE: NORMAL
PLATELET # BLD AUTO: 415 10E3/UL (ref 150–450)
PLATELET # BLD AUTO: 415 10E3/UL (ref 150–450)
POTASSIUM SERPL-SCNC: 3.9 MMOL/L (ref 3.4–5.3)
RBC # BLD AUTO: 3.48 10E6/UL (ref 3.8–5.2)
TRI STN SPEC: NORMAL
WBC # BLD AUTO: 7 10E3/UL (ref 4–11)

## 2022-11-02 PROCEDURE — 250N000011 HC RX IP 250 OP 636: Performed by: FAMILY MEDICINE

## 2022-11-02 PROCEDURE — 85049 AUTOMATED PLATELET COUNT: CPT

## 2022-11-02 PROCEDURE — 250N000011 HC RX IP 250 OP 636: Performed by: INTERNAL MEDICINE

## 2022-11-02 PROCEDURE — 85027 COMPLETE CBC AUTOMATED: CPT | Performed by: PHYSICIAN ASSISTANT

## 2022-11-02 PROCEDURE — 83735 ASSAY OF MAGNESIUM: CPT | Performed by: FAMILY MEDICINE

## 2022-11-02 PROCEDURE — 88312 SPECIAL STAINS GROUP 1: CPT | Mod: 26 | Performed by: PATHOLOGY

## 2022-11-02 PROCEDURE — 250N000013 HC RX MED GY IP 250 OP 250 PS 637

## 2022-11-02 PROCEDURE — 36415 COLL VENOUS BLD VENIPUNCTURE: CPT | Performed by: INTERNAL MEDICINE

## 2022-11-02 PROCEDURE — 250N000013 HC RX MED GY IP 250 OP 250 PS 637: Performed by: FAMILY MEDICINE

## 2022-11-02 PROCEDURE — 99233 SBSQ HOSP IP/OBS HIGH 50: CPT | Performed by: INTERNAL MEDICINE

## 2022-11-02 PROCEDURE — 250N000013 HC RX MED GY IP 250 OP 250 PS 637: Performed by: INTERNAL MEDICINE

## 2022-11-02 PROCEDURE — 84132 ASSAY OF SERUM POTASSIUM: CPT

## 2022-11-02 PROCEDURE — 120N000001 HC R&B MED SURG/OB

## 2022-11-02 PROCEDURE — 258N000003 HC RX IP 258 OP 636: Performed by: SPECIALIST

## 2022-11-02 PROCEDURE — 87116 MYCOBACTERIA CULTURE: CPT | Performed by: INTERNAL MEDICINE

## 2022-11-02 PROCEDURE — 88305 TISSUE EXAM BY PATHOLOGIST: CPT | Mod: 26 | Performed by: PATHOLOGY

## 2022-11-02 PROCEDURE — 87798 DETECT AGENT NOS DNA AMP: CPT | Performed by: INTERNAL MEDICINE

## 2022-11-02 PROCEDURE — 71250 CT THORAX DX C-: CPT

## 2022-11-02 PROCEDURE — 88342 IMHCHEM/IMCYTCHM 1ST ANTB: CPT | Mod: 26 | Performed by: PATHOLOGY

## 2022-11-02 PROCEDURE — 99233 SBSQ HOSP IP/OBS HIGH 50: CPT | Mod: GC

## 2022-11-02 RX ORDER — ISONIAZID 100 MG/1
300 TABLET ORAL DAILY
Status: DISCONTINUED | OUTPATIENT
Start: 2022-11-02 | End: 2022-11-14 | Stop reason: HOSPADM

## 2022-11-02 RX ORDER — PYRIDOXINE HCL (VITAMIN B6) 25 MG
50 TABLET ORAL DAILY
Status: DISCONTINUED | OUTPATIENT
Start: 2022-11-02 | End: 2022-11-14 | Stop reason: HOSPADM

## 2022-11-02 RX ORDER — ACETAMINOPHEN 650 MG/1
650 SUPPOSITORY RECTAL EVERY 6 HOURS
Status: DISCONTINUED | OUTPATIENT
Start: 2022-11-02 | End: 2022-11-14 | Stop reason: HOSPADM

## 2022-11-02 RX ORDER — MEROPENEM 1 G/1
1 INJECTION, POWDER, FOR SOLUTION INTRAVENOUS EVERY 8 HOURS
Status: DISCONTINUED | OUTPATIENT
Start: 2022-11-02 | End: 2022-11-08

## 2022-11-02 RX ORDER — FLUCONAZOLE 2 MG/ML
200 INJECTION, SOLUTION INTRAVENOUS EVERY 24 HOURS
Status: DISCONTINUED | OUTPATIENT
Start: 2022-11-02 | End: 2022-11-02

## 2022-11-02 RX ORDER — SODIUM CHLORIDE FOR INHALATION 3 %
3 VIAL, NEBULIZER (ML) INHALATION EVERY 12 HOURS PRN
Status: DISCONTINUED | OUTPATIENT
Start: 2022-11-02 | End: 2022-11-14 | Stop reason: HOSPADM

## 2022-11-02 RX ORDER — RIFAMPIN 300 MG/1
600 CAPSULE ORAL DAILY
Status: DISCONTINUED | OUTPATIENT
Start: 2022-11-02 | End: 2022-11-14 | Stop reason: HOSPADM

## 2022-11-02 RX ORDER — ACETAMINOPHEN 325 MG/1
650 TABLET ORAL EVERY 6 HOURS
Status: DISCONTINUED | OUTPATIENT
Start: 2022-11-02 | End: 2022-11-14 | Stop reason: HOSPADM

## 2022-11-02 RX ORDER — PYRAZINAMIDE TABLET 500 MG/1
1000 TABLET ORAL DAILY
Status: DISCONTINUED | OUTPATIENT
Start: 2022-11-02 | End: 2022-11-14 | Stop reason: HOSPADM

## 2022-11-02 RX ORDER — ETHAMBUTOL HYDROCHLORIDE 400 MG/1
800 TABLET, FILM COATED ORAL DAILY
Status: DISCONTINUED | OUTPATIENT
Start: 2022-11-02 | End: 2022-11-14 | Stop reason: HOSPADM

## 2022-11-02 RX ADMIN — MEROPENEM 1 G: 1 INJECTION INTRAVENOUS at 16:47

## 2022-11-02 RX ADMIN — FLUCONAZOLE 200 MG: 2 INJECTION, SOLUTION INTRAVENOUS at 11:04

## 2022-11-02 RX ADMIN — RIFAMPIN 600 MG: 300 CAPSULE ORAL at 18:22

## 2022-11-02 RX ADMIN — ACETAMINOPHEN 650 MG: 325 TABLET, FILM COATED ORAL at 11:04

## 2022-11-02 RX ADMIN — OXYCODONE HYDROCHLORIDE 5 MG: 5 TABLET ORAL at 22:32

## 2022-11-02 RX ADMIN — Medication 50 MG: at 18:22

## 2022-11-02 RX ADMIN — MEROPENEM 1 G: 1 INJECTION INTRAVENOUS at 10:17

## 2022-11-02 RX ADMIN — ETHAMBUTOL HYDROCHLORIDE 800 MG: 400 TABLET, FILM COATED ORAL at 18:22

## 2022-11-02 RX ADMIN — ACETAMINOPHEN 650 MG: 325 TABLET, FILM COATED ORAL at 00:30

## 2022-11-02 RX ADMIN — ISONIAZID 300 MG: 100 TABLET ORAL at 18:22

## 2022-11-02 RX ADMIN — OXYCODONE HYDROCHLORIDE 5 MG: 5 TABLET ORAL at 08:48

## 2022-11-02 RX ADMIN — METRONIDAZOLE 500 MG: 500 INJECTION, SOLUTION INTRAVENOUS at 19:54

## 2022-11-02 RX ADMIN — SODIUM CHLORIDE: 9 INJECTION, SOLUTION INTRAVENOUS at 22:30

## 2022-11-02 RX ADMIN — OXYCODONE HYDROCHLORIDE 5 MG: 5 TABLET ORAL at 14:32

## 2022-11-02 RX ADMIN — ACETAMINOPHEN 650 MG: 325 TABLET, FILM COATED ORAL at 22:28

## 2022-11-02 RX ADMIN — LEVOFLOXACIN 500 MG: 5 INJECTION, SOLUTION INTRAVENOUS at 18:22

## 2022-11-02 RX ADMIN — PYRAZINAMIDE 1000 MG: 500 TABLET ORAL at 18:22

## 2022-11-02 RX ADMIN — OXYCODONE HYDROCHLORIDE 5 MG: 5 TABLET ORAL at 00:30

## 2022-11-02 RX ADMIN — THERA TABS 1 TABLET: TAB at 08:49

## 2022-11-02 RX ADMIN — OXYCODONE HYDROCHLORIDE 5 MG: 5 TABLET ORAL at 18:32

## 2022-11-02 RX ADMIN — ACETAMINOPHEN 650 MG: 325 TABLET, FILM COATED ORAL at 16:46

## 2022-11-02 RX ADMIN — METRONIDAZOLE 500 MG: 500 INJECTION, SOLUTION INTRAVENOUS at 06:41

## 2022-11-02 RX ADMIN — ENOXAPARIN SODIUM 40 MG: 40 INJECTION SUBCUTANEOUS at 22:28

## 2022-11-02 RX ADMIN — PANTOPRAZOLE SODIUM 40 MG: 20 TABLET, DELAYED RELEASE ORAL at 08:48

## 2022-11-02 RX ADMIN — SODIUM CHLORIDE: 9 INJECTION, SOLUTION INTRAVENOUS at 02:56

## 2022-11-02 RX ADMIN — PANTOPRAZOLE SODIUM 40 MG: 20 TABLET, DELAYED RELEASE ORAL at 19:54

## 2022-11-02 ASSESSMENT — ACTIVITIES OF DAILY LIVING (ADL)
ADLS_ACUITY_SCORE: 21
ADLS_ACUITY_SCORE: 20

## 2022-11-02 NOTE — PROGRESS NOTES
"  GASTROENTEROLOGY PROGRESS NOTE     SUBJECTIVE   The patient continues to have abdominal cramping but improved with apap. Passed three loose green nonbloody stools this AM. Tmax of 103 yesterday.      OBJECTIVE     Vitals Blood pressure 97/61, pulse 109, temperature (!) 100.5  F (38.1  C), temperature source Oral, resp. rate 18, height 1.498 m (4' 10.98\"), weight 50.8 kg (111 lb 14.4 oz), SpO2 99 %, unknown if currently breastfeeding.          Physical Exam   General: awake, alert, responds appropriately via     Cardiovascular: S1S2    Chest: lungs are clear     Abdomen: +bs, soft, mild gen tenderness    Neurologic: grossly intact        LABORATORY    ELECTROLYTE PANEL   Recent Labs   Lab 11/02/22  0737 11/01/22  1619 11/01/22  0434 10/31/22  1916 10/31/22  0637 10/30/22  1707 10/29/22  2027   NA  --   --   --   --  138 140 136   POTASSIUM 3.9 3.8 2.8*   < > 3.1* 3.9 2.9*   CHLORIDE  --   --   --   --  101 103 98   CO2  --   --   --   --  22 26 26   GLC  --   --   --   --  70 95 102*   CR  --  0.63  --   --  0.53 0.50* 0.59   BUN  --   --   --   --  5.2* 7.8 7.2    < > = values in this interval not displayed.      HEMATOLOGY PANEL   Recent Labs   Lab 11/02/22  0737 11/01/22  1619 10/31/22  0637   HGB 9.6* 8.6* 8.5*   MCV 89 87 88   WBC 7.0 5.7 7.0     415 441 458*      LIVER AND PANCREAS PANEL   Recent Labs   Lab 10/29/22  2027   AST 14   ALT 8*   ALKPHOS 127*   BILITOTAL 0.4   LIPASE 17   C difficile and enteric pathogens negative  Crypto and giardia negative  O and P X3 PENDING  HIV, Quantiferon, hep serologies, cmv    IMAGING STUDIES      EXAM: CT ABDOMEN PELVIS W CONTRAST  LOCATION: [facility]  DATE/TIME: 10/29/2022 10:27 PM     INDICATION: abd pain assoc with N V D  COMPARISON: Ultrasound of the pelvis from 08/18/2022. CT abdomen pelvis from 07/11/2022  TECHNIQUE: CT scan of the abdomen and pelvis was performed following injection of IV contrast. Multiplanar " reformats were obtained. Dose reduction techniques were used.  CONTRAST: [id number] 100ml     FINDINGS:   LOWER CHEST: Normal.     HEPATOBILIARY: Focal fat along the falciform ligament, liver otherwise normal. No calcified gallstones. No biliary ductal dilatation.     PANCREAS: Normal.     SPLEEN: Normal.     ADRENAL GLANDS: Normal.     KIDNEYS/BLADDER: Normal.     BOWEL: Normal caliber small bowel. There is wall thickening of the large bowel from the proximal transverse colon through the mid sigmoid colon. Wall thickening is most severe in the mid and distal descending colon, where there are multiple intramural   fluid and gas collections consistent with abscess. Anteriorly there is a 1.5 cm collection on image 98 of series 2. Posteriorly there is a 1.3 cm collection on image 106. More inferiorly there is a thin crescentic collection posteriorly measuring 1.7 cm   in length. There is extensive pericolonic edema, but no free air.     LYMPH NODES: Multiple presumably reactive pericolonic lymph nodes.     VASCULATURE: Unremarkable.     PELVIC ORGANS: Cyst in the left pelvis measures 5 cm in length, likely corresponding to the paraovarian cyst seen on the recent pelvic ultrasound.     MUSCULOSKELETAL: Transitional lumbosacral anatomy, normal variant                                                                      IMPRESSION:   1.  Findings consistent with a fairly diffuse colitis with severe involvement of the mid and distal descending colon. Although there is no phoenix free air, the colitis is complicated by the presence of multiple small, intramural abscesses as described   above.  I have reviewed the current diagnostic and laboratory tests.              EGD 9/6/22  Endoscopically unremarkable     A: DUODENUM, BIOPSY:           1. Duodenal mucosa with changes suspicious for celiac disease, characterized by:               a. Increased intraepithelial lymphocytes               b. Villous atrophy and crypt hyperplasia  (Garcia 3a)           2. See comment       B: STOMACH, BIOPSY:           1. Mild non-specific chronic inflammation (see comment)               a. Sampling: Antrum and body               b. Distribution: Antrum and body           2. Negative for atrophic gastritis           3. Negative for Helicobacter (immunohistochemistry  negative)         Colonoscopy 10/31/22  Findings:        A polypoid lesion was found in the descending colon. The lesion was        sessile. No bleeding was present. This was biopsied with a cold forceps.        Ulcerated mucosa were present in the descending colon.        A localized area of erythematous mucosa was found in the distal rectum.        Biopsies were taken with a cold forceps for histology.                                                                                     Moderate Sedation:        Moderate (conscious) sedation was administered by the endoscopy nurse        and supervised by the endoscopist. The patient's oxygen saturation,        heart rate, blood pressure and response to care were monitored.   Impression:            - The procedure was aborted within descending.                          - Descending colon with narrowed area with diffuse                          polyposis (look like pseudopolyps) and areas of                          purlulent discharge; however, surrounding mucosa does                          not appear inflamed (no edema or erythema). Polypoid                          lesions and purulent areas biopsied                          - Normal sigmoid                          - Distal rectum with erythematous mucosa and localized                          purulent drainage. Biopsied.                          - External anal appeared normal                          - Unable to examine proximal colon or terminal ileum   Recommendation:        - Return patient to hospital [facility].                          - Advance diet as tolerated. Should be gluten free                           - Already started on antibiotics                          - Await stool study report                          - Await path report                          - May start steroids soon                                                                                       Luis Miguel Traore MD   A) DESCENDING COLON, BIOPSY:        1.  PATCHY ACUTE COLITIS WITH REACTIVE CHANGES        2.  FOCAL NONCASEATING GRANULOMA              A.  SPECIAL STAINS NEGATIVE FOR ACID-FAST BACILLI, YEAST, FUNGI AND CMV         3.  NEGATIVE FOR CHRONIC MUCOSAL CHANGES, DYSPLASIA AND MALIGNANCY    B) DISTAL RECTUM, BIOPSIES:         1.  DIFFUSE MILD ACUTE COLITIS WITH REACTIVE CHANGES         2.  CMV IMMUNOSTAIN: NEGATIVE FOR CMV INCLUSIONS         3.  NEGATIVE FOR CHRONIC MUCOSAL CHANGES, DYSPLASIA AND MALIGNANCY  IMPRESSION   Colitis-- This 41 yo female presented with acute on chronic abdominal pain and CT abdomen and pelvis with severe colitis. Stool for c difficile, crypto, giardia and enteric pathogens negative. O and P pending. Colonoscopy 10/31/22 with narrowed area with diffuse polyposis and areas of purulent discharge of unclear etiology--biopsies favor infectious etiology with patchy acute colitis of unclear etiology. Meanwhile, the patient is still having fevers (Tmax 103 11/1/22) despite Fluconazole, Levaquin, and Flagyl as recommended by infectious disease.     Abnormal duodenal biopsy 9/2022 with villous atrophy and crypt hyperplasia (Marsh 3a). TTG IgA is not elevated. EGD findings are fairly nonspecific but could be related to medications/NSAIDs or even possibly Crohn's         PLAN   Appreciate input from infectious disease! Continue antibiotics/antifungal as recommended by Dr Calabrese (Fluconazole, Lvq, Flagyl).  Will ask [facility] path to review colonoscopy path for second opinion.  Follow stool ova and parasite, quantiferon, hiv, CMV.   Supportive cares. Hopefully stool testing and additional path review will  further guide treatment.     ADDENDUM: Requested additional review by [facility] pathology. Input re: colonoscopy biopsies pending. Further review of gastric biopsies from 9/6/22 show granulomas raising question of Crohn's, but will still need to rule out infection.        Perla Fofana PA-C  Thank you for the opportunity to participate in the care of this patient.   Please feel free to call me with any questions or concerns.  Phone number [phone].

## 2022-11-02 NOTE — PROGRESS NOTES
[facility]    Infectious Disease Progress Note    Date of Service : 11/02/2022     Assessment & Plan   Dain Tejeda is a 42 year old female who was admitted on 10/29/2022.     ASSESSMENT:  1. Clinical presentation mimicking Crohn's disease.  History of celiac disease.  6 months history of diarrhea weight loss abdominal pain  2. Non caseating granulomas may be seen in inflammatory bowel disease  3. 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  Less likely though cannot be ruled out at this point  4. Klebsiella UTI- on effective antibiotic  5. Biopsy results below  6. Over 6 months history of weight loss, now with fevers.  Diarrhea abdominal pain  Colonoscopy report:  Descending colon with narrowed area with diffuse polyposis (look like pseudopolyps) and areas of purlulent discharge; however, surrounding mucosa does not appear inflamed (no edema or erythema). Polypoid  lesions and purulent areas biopsied     - Normal sigmoid     - Distal rectum with erythematous mucosa and localized purulent drainage. Biopsied.   DESCENDING COLON, BIOPSY:        1.  PATCHY ACUTE COLITIS WITH REACTIVE CHANGES        2.  FOCAL NONCASEATING GRANULOMA              A.  SPECIAL STAINS NEGATIVE FOR ACID-FAST BACILLI, YEAST, FUNGI AND CMV         3.  NEGATIVE FOR CHRONIC MUCOSAL CHANGES, DYSPLASIA AND MALIGNANCY    B) DISTAL RECTUM, BIOPSIES:         1.  DIFFUSE MILD ACUTE COLITIS WITH REACTIVE CHANGES         2.  CMV IMMUNOSTAIN: NEGATIVE FOR CMV INCLUSIONS         3.  NEGATIVE FOR CHRONIC MUCOSAL CHANGES, DYSPLASIA AND MALIGNANCY      Literature Review    Tuberculous colitis  Tuberculous colitis - PMC (nih.gov)      Colon Tuberculosis: Endoscopic Features and Prospective Endoscopic Follow-Up After Anti-Tuberculosis Treatment Clin Transl Gastroenterol. 2012 Oct; 3(10): e24. Published online 2012 Oct 11. doi: 10.1038/ctg.2012.19      RECOMMENDATIONS:    1. Antibiotics continue broad-spectrum  antibiotics: Meropenem levofloxacin, metronidazole  2. AFB blood, AFB stool, AFB sputum.  Ordered  3. Empiric TB treatment based in case reports of TB colitis, and concern by GI  4. Follow culture results   5. Focus/de-escalate antibiotics based on final culture results  6. Monitor CBC, CMP, check CD4 count  7. If TB work-up is negative, would treat for inflammatory bowel disease, defer to GI  8. Regional infectious disease work-up as ordered previously in process  9. Discussed with patient   10. Discussed with nurse and charge nurse regarding airborne infection isolation room while waiting for test results  11. ID will follow  12. Patient updated with the help of  over Jose Rosales MD  [facility]  [phone]    Total Time Spent 45 minutes with >50% of the time spent in counseling, education and care coordination.     Interval History   Ongoing fever, abdominal pain  Patient stated that she has not ever had contact with known tuberculosis case.  Updated patient regarding GI concerns next    Physical Exam   Temp: 98  F (36.7  C) Temp src: Oral BP: 112/74 Pulse: 99   Resp: 18 SpO2: 99 % O2 Device: None (Room air)    Vitals:    10/30/22 1933 10/30/22 2147   Weight: 49.3 kg (108 lb 11.2 oz) 50.8 kg (111 lb 14.4 oz)     Vital Signs with Ranges  Temp:  [97.4  F (36.3  C)-103.2  F (39.6  C)] 98  F (36.7  C)  Pulse:  [] 99  Resp:  [18-20] 18  BP: (111-121)/(65-77) 112/74  SpO2:  [96 %-99 %] 99 %    Constitutional: Awake, no apparent distress  Lungs: normal breathing pattern, no crackles or wheezing  Cardiovascular: Regular rate and rhythm  Abdomen: Tenderness  Skin: warm  Neuro: deconditioned  Psych: able to answer questions    Medications     sodium chloride 75 mL/hr at 11/02/22 0256       enoxaparin ANTICOAGULANT  40 mg Subcutaneous At Bedtime     levofloxacin  500 mg Intravenous Q24H     metroNIDAZOLE  500 mg Intravenous Q12H     multivitamin, therapeutic  1  tablet Oral Daily     pantoprazole  40 mg Oral BID     sodium chloride (PF)  3 mL Intracatheter Q8H       Data   All microbiology laboratory data reviewed.  Recent Labs   Lab Test 11/02/22  0737 11/01/22  1619 10/31/22  0637 10/30/22  1707   WBC  --  5.7 7.0 7.5   HGB  --  8.6* 8.5* 9.0*   HCT  --  28.0* 27.6* 29.7*   MCV  --  87 88 89    441 458* 429     Recent Labs   Lab Test 11/01/22  1619 10/31/22  0637 10/30/22  1707   CR 0.63 0.53 0.50*     Recent Labs   Lab Test 05/24/22  1620   SED 90*     MICRO:  11/01/2022 2133 11/02/2022 1532 Routine parasitology exam [[id number]]    Stool from Per Rectum    Final result Component Value   OVA AND PARASITE EXAM Negative   A single negative specimen does not rule out parasitic infection.   WBC'S, O&P 4+ PMNs          11/01/2022 2133 11/02/2022 1123 Cryptosporidium and Giardia antigens [[id number]]   Stool from Per Rectum    Final result Component Value   Cryptosporidium parvum antigen Negative   Giardia lamblia antigen Negative          11/01/2022 1619 11/02/2022 0932 Blood Culture Arm, Right [[id number]]   Blood from Arm, Right    Preliminary result Component Value   Culture No growth after 12 hours P             11/01/2022 1619 11/02/2022 1343 CMV Quantitative, PCR [[id number]]    Blood from Arm, Right    Final result Component Value Units   CMV DNA IU/mL Not Detected IU/mL          11/01/2022 1619 11/01/2022 1644 Quantiferon TB Gold Plus Grey Tube [[id number]]   Blood from Arm, Right    In process Component Value   No component results          11/01/2022 1619 11/01/2022 1644 Quantiferon TB Gold Plus Green Tube [[id number]]   Blood from Arm, Right    In process Component Value   No component results          11/01/2022 1619 11/01/2022 1644 Quantiferon TB Gold Plus Yellow Tube [[id number]]   Blood from Arm, Right    In process Component Value   No component results          11/01/2022 1619 11/01/2022 1644 Quantiferon TB Gold Plus Purple Tube  [[id number]]   Blood from Arm, Right    In process Component Value   No component results          11/01/2022 0434 11/02/2022 0932 Blood Culture Peripheral Blood [[id number]]    Peripheral Blood    Preliminary result Component Value   Culture No growth after 1 day P             10/30/2022 1848 10/31/2022 1713 Enteric Bacteria and Virus Panel by MARILYNN Stool [[id number]]    Stool from Per Rectum    Final result Component Value   Campylobacter group Not Detected   Salmonella species Not Detected   Shigella species Not Detected   Vibrio group Not Detected   Rotavirus Not Detected   Shiga toxin 1 gene Not Detected   Shiga toxin 2 gene Not Detected   Norovirus I and II Not Detected   Yersinia enterocolitica Not Detected          10/30/2022 1847 10/31/2022 0047 C. difficile Toxin B PCR with reflex to C. difficile Antigen and Toxins A/B EIA [[id number]]    Stool from Per Rectum    Final result Component Value   C Difficile Toxin B by PCR Negative   A negative result does not exclude actual disease due to C. difficile and may be due to improper collection, handling and storage of the specimen or the number of organisms in the specimen is below the detection limit of the assay.          10/29/2022 2344 10/30/2022 0049 Asymptomatic COVID-19 Virus (Coronavirus) by PCR Nasopharyngeal [[id number]]    Swab from Nasopharyngeal    Final result Component Value   SARS CoV2 PCR Negative   NEGATIVE: SARS-CoV-2 (COVID-19) RNA not detected, presumed negative.          10/29/2022 2038 10/31/2022 2243 Urine Culture [[id number]]    (Abnormal)   Urine, Midstream    Final result Component Value   Culture 50,000-100,000 CFU/mL Klebsiella pneumoniae Abnormal     <10,000 CFU/mL Urogenital zulma       Susceptibility     Klebsiella pneumoniae     KENISHA     Ampicillin >=32 ug/mL Resistant 1     Ampicillin/ Sulbactam 4 ug/mL Susceptible     Cefazolin <=4 ug/mL Susceptible 2     Cefepime <=1 ug/mL Susceptible     Cefoxitin <=4 ug/mL  Susceptible     Ceftazidime <=1 ug/mL Susceptible     Ceftriaxone <=1 ug/mL Susceptible     Ciprofloxacin <=0.25 ug/mL Susceptible     Gentamicin <=1 ug/mL Susceptible     Levofloxacin <=0.12 ug/mL Susceptible     Nitrofurantoin 64 ug/mL Intermediate     Piperacillin/Tazobactam <=4 ug/mL Susceptible     Tobramycin <=1 ug/mL Susceptible     Trimethoprim/Sulfamethoxazole >16/304 ug/mL Resistant              1 Intrinsically Resistant   2 Cefazolin KENISHA breakpoints are for the treatment of uncomplicated urinary tract infections. For the treatment of systemic infections, please contact the laboratory for additional testing.             RADIOLOGY:  Reviewed  CT Abdomen Pelvis w Contrast    Result Date: 10/29/2022  EXAM: CT ABDOMEN PELVIS W CONTRAST LOCATION: [facility] DATE/TIME: 10/29/2022 10:27 PM INDICATION: abd pain assoc with N V D COMPARISON: Ultrasound of the pelvis from 08/18/2022. CT abdomen pelvis from 07/11/2022 TECHNIQUE: CT scan of the abdomen and pelvis was performed following injection of IV contrast. Multiplanar reformats were obtained. Dose reduction techniques were used. CONTRAST: Ubfltd140 100ml FINDINGS: LOWER CHEST: Normal. HEPATOBILIARY: Focal fat along the falciform ligament, liver otherwise normal. No calcified gallstones. No biliary ductal dilatation. PANCREAS: Normal. SPLEEN: Normal. ADRENAL GLANDS: Normal. KIDNEYS/BLADDER: Normal. BOWEL: Normal caliber small bowel. There is wall thickening of the large bowel from the proximal transverse colon through the mid sigmoid colon. Wall thickening is most severe in the mid and distal descending colon, where there are multiple intramural fluid and gas collections consistent with abscess. Anteriorly there is a 1.5 cm collection on image 98 of series 2. Posteriorly there is a 1.3 cm collection on image 106. More inferiorly there is a thin crescentic collection posteriorly measuring 1.7 cm in length. There is extensive pericolonic  edema, but no free air. LYMPH NODES: Multiple presumably reactive pericolonic lymph nodes. VASCULATURE: Unremarkable. PELVIC ORGANS: Cyst in the left pelvis measures 5 cm in length, likely corresponding to the paraovarian cyst seen on the recent pelvic ultrasound. MUSCULOSKELETAL: Transitional lumbosacral anatomy, normal variant     IMPRESSION: 1.  Findings consistent with a fairly diffuse colitis with severe involvement of the mid and distal descending colon. Although there is no phoenix free air, the colitis is complicated by the presence of multiple small, intramural abscesses as described above.

## 2022-11-02 NOTE — PLAN OF CARE
Problem: Pain Acute  Goal: Optimal Pain Control and Function  Outcome: Progressing     Problem: Infection  Goal: Absence of Infection Signs and Symptoms  Outcome: Progressing   Goal Outcome Evaluation:       Pt spiked a fever at midnight 103.0, 103.2. Given prn Tylenol, rechecked at 0245 temp found to be 98.0. Also, given prn Oxycodone 5 mg for abdominal pain rated 5/10 at 0030. Later at 4 am T 97.4. MD updated about overnight fever spikes. On iv abxs. On IVF normal saline 75 ml/hr.

## 2022-11-02 NOTE — PLAN OF CARE
Problem: Plan of Care - These are the overarching goals to be used throughout the patient stay.    Goal: Absence of Hospital-Acquired Illness or Injury  Outcome: Progressing  Intervention: Identify and Manage Fall Risk  Recent Flowsheet Documentation  Taken 11/1/2022 1700 by Rina Noe RN  Safety Promotion/Fall Prevention:   safety round/check completed   room organization consistent   nonskid shoes/slippers when out of bed   lighting adjusted  Intervention: Prevent Skin Injury  Recent Flowsheet Documentation  Taken 11/1/2022 1751 by Rina Noe RN  Body Position: position changed independently  Goal: Optimal Comfort and Wellbeing  Outcome: Progressing  Intervention: Monitor Pain and Promote Comfort  Recent Flowsheet Documentation  Taken 11/1/2022 1849 by Rina Noe RN  Pain Management Interventions: declines  Taken 11/1/2022 1751 by Rina Noe RN  Pain Management Interventions:   medication (see MAR)   rest  Goal: Readiness for Transition of Care  Outcome: Progressing     Problem: Pain Acute  Goal: Optimal Pain Control and Function  Outcome: Progressing  Intervention: Develop Pain Management Plan  Recent Flowsheet Documentation  Taken 11/1/2022 1849 by Rina Noe RN  Pain Management Interventions: declines  Taken 11/1/2022 1751 by Rina Noe RN  Pain Management Interventions:   medication (see MAR)   rest   Goal Outcome Evaluation:

## 2022-11-02 NOTE — PROGRESS NOTES
[facility]    Progress Note - Hospitalist Service       Date of Admission:  10/29/2022    Assessment & Plan   Dain Tejeda is a 42 year old female w/ PMH of chronic gastritis, inflammation of small intestine, and celiac disease admitted on 10/29/2022. Here w/ diffuse colitis w/ severe involvement of the mid and distal descending colon c/f IBD. She remains hospitalized for IV antibiotics, continued monitoring, and evaluation by GI.     Severe colitis w/ multifocal small intramural abscesses c/f Crohn's  Pt has been having abdominal pain and bloody diarrhea for some time now, w/ extensive outpatient workup. Has had negative enteric infectious workup, possible ileitis on imaging 4/14/2022 w/ interval improvement seen on CT 7/11/22. Saw GI outpatient 7/22/22 and noted to have elevated inflammatory markers and abnormal CT findings concerning for IBD. EGD was performed w/ biopsy suggestive of celiac disease and negative for H pylori. Ttg obtained during this hospitalization returned negative. CT abdomen pelvis 10/29 showing diffuse colitis with severe involvement of the mid and distal descending colon. Although there is no phoenix free air, the colitis is complicated by the presence of multiple small, intramural abscesses. Colonoscopy 10/31 with atypical findings, biopsy consistent with colitis with reactive changes. Given patient's history as a [place] refugee, also considering colonic TB. [place] immig[place] to [place] from [facility] in 2017, tested negative for TB upon leaving the country and upon arrival to [place]. No exposure to TB at home or work. No hx of immunosuppression, IV drug use, or incarceration.  - GI consult placed   >  Asking [facility] to review colonoscopy path for second opinion, follow stool O+P, quantiferon, HIV, CMV testing.  - General surgery consult   > continue IV abx   > if ends up needing surgery, would need extended hemicolectomy or subtotal colectomy   - Nutrition consult placed   >  Dietician did meet w/ the pt 10/31 and educated on gluten free diet give unclear diagnosis of possible Celiac disease.  - ID consult   - Add empiric levofloxacin and metronidazole, continue Zosyn    - Added additional labs: HIV, giardia/crypto, whipple   - PO Pepcid BID  - Oxyocodone 5 mg q4 h PRN for pain  - Scheduled Tylenol for pain   - PRN Zofran and Compazine ordered    Hypokalemia  Hypomagnesemia  - Replete per nursing protocol     Abnormal UA  UA on admission positive for leuk esterase, protein, small amount of blood, and few bacteria seen. Also w/ moderate squamous cells noted. Pt is afebrile, w/o leukocytosis, and not complaining of urinary symptoms so unclear if this truly represents a UTI.  - Cont IV abx per above  - UC grew Klebsiella      Thrombocythemia  PLTs have been around the upper limits of normal during previous evaluation, found to be 600k on admission. Likely elevated 2/2 acute phase reactant and significant GI tract inflammation.  - Monitor     Diet: Gluten Free Diet  Snacks/Supplements Adult: Ensure Clear; Between Meals  Snacks/Supplements Adult: Gelatein Plus; Between Meals    DVT Prophylaxis: Enoxaparin (Lovenox) SQ  Chambers Catheter: Not present  Fluids: 75ml/hr NS  Central Lines: None  Cardiac Monitoring: None  Code Status: Full Code      Disposition Plan      Expected Discharge Date: 11/03/2022    Discharge Delays: IV Medication - consider oral or Home Infusion  Procedure Pending (enter procedure & time in comments)  Destination: home with family  Discharge Comments: GI recs: colonoscopy  fever over night on 11/1        The patient's care was discussed with the Attending physician, Dr Tamayo.    John Kelley, MS4    I was present with the medical student who participated in the service and in the documentation of this note. I have verified the history and personally performed the physical exam and medical decision making, and have verified the content of the note, which accurately  reflects my assessment of the patient and the plan of care    - Andrey Reyes MD  Hospitalist Service  [facility]  Securely message with the [employer] Web Console (learn more here)  Text page via [employer] Paging/Directory     Clinically Significant Risk Factors        # Hypokalemia: Lowest K = 2.8 mmol/L (Ref range: 3.4-5.3) in last 2 days, will replace as needed       # Hypoalbuminemia: Lowest albumin = 2.5 g/dL (Ref range: 3.5-5.2) at 10/29/2022  8:27 PM, will monitor as appropriate            # Severe Malnutrition: based on nutrition assessment, PRESENT ON ADMISSION     _______________________________________________________    Interval History   Feels about the same today. Notes she frequently has abdominal pain which the medication helps with, but wondering if something could be scheduled to prevent pain. Also not sleeping well. Doesn't need us to call any family because they work long hours and probably won't be available, but her aunt and uncle do come visit her sometimes.     Data reviewed today: I reviewed all medications, new labs and imaging results over the last 24 hours.    Physical Exam   Vital Signs: Temp: 98  F (36.7  C) Temp src: Oral BP: 112/74 Pulse: 99   Resp: 18 SpO2: 99 % O2 Device: None (Room air)    Weight: 111 lbs 14.4 oz  Gen: Pleasant. No distress.    HEENT: MMM.   Neck: No overt asymmetry.   CV: Appears well-perfused. RRR. No murmur.   Resp: Breathing comfortably on room air. Lungs clear to auscultation bilaterally without wheeze or crackle.   Abd: Non-distended, some diffuse tenderness to palpation, no rebound or guarding, bowel sounds present  Ext: No edema or overt asymmetry/deformity.   Skin: No overt rash on easily visualized skin.   Neuro: Non-focal.   Psych: Calm.     Data   Recent Labs   Lab 11/02/22  0737 11/01/22  1619 11/01/22  0434 10/31/22  1916 10/31/22  0637 10/30/22  1707 10/29/22  2027   WBC  --  5.7  --   --  7.0 7.5 9.9   HGB  --  8.6*  --   --   8.5* 9.0* 10.3*   MCV  --  87  --   --  88 89 85    441  --   --  458* 429 600*   NA  --   --   --   --  138 140 136   POTASSIUM 3.9 3.8 2.8*   < > 3.1* 3.9 2.9*   CHLORIDE  --   --   --   --  101 103 98   CO2  --   --   --   --  22 26 26   BUN  --   --   --   --  5.2* 7.8 7.2   CR  --  0.63  --   --  0.53 0.50* 0.59   ANIONGAP  --   --   --   --  15 11 12   ALEX  --   --   --   --  7.2* 7.6* 7.8*   GLC  --   --   --   --  70 95 102*   ALBUMIN  --   --   --   --   --   --  2.5*   PROTTOTAL  --   --   --   --   --   --  7.4   BILITOTAL  --   --   --   --   --   --  0.4   ALKPHOS  --   --   --   --   --   --  127*   ALT  --   --   --   --   --   --  8*   AST  --   --   --   --   --   --  14   LIPASE  --   --   --   --   --   --  17    < > = values in this interval not displayed.     No results found for this or any previous visit (from the past 24 hour(s)).

## 2022-11-02 NOTE — PLAN OF CARE
"  Problem: Plan of Care - These are the overarching goals to be used throughout the patient stay.    Description: The Care Plan Review/Shift Note, Individualized Goals, Hospital-Acquired Illness or Injury, Comfort and Wellbeing, and Transition Planning are the \"Overarching Goals\" and should be updated throughout the hospitalization.  Please hover over the (i) for specific information on each goal topic.  Goal: Plan of Care Review  Description: The Plan of Care Review/Shift note should be completed every shift.  The Outcome Evaluation is a brief statement about your assessment that the patient is improving, declining, or no change.  This information will be displayed automatically on your shift note.  Outcome: Progressing  Flowsheets (Taken 11/2/2022 1333)  Outcome Evaluation: Intake remains less than baseline but eating more than she had been, per pt. Mostly eating rice porridge from home. Orders breakfast from the kitchen wtih good intake. She likes the ensure clear but the gel plus is too sweet. she is willing to continue taking the clear. K+ 3.9, improved. Ongoign abdomina pain but improving.  Plan of Care Reviewed With: patient  Overall Patient Progress: improving     Problem: Malnutrition  Goal: Improved Nutritional Intake  Outcome: Progressing   Goal Outcome Evaluation:      Plan of Care Reviewed With: patient    Overall Patient Progress: improvingOverall Patient Progress: improving    Outcome Evaluation: Intake remains less than baseline but eating more than she had been, per pt. Mostly eating rice porridge from home. Orders breakfast from the kitchen wtih good intake. She likes the ensure clear but the gel plus is too sweet. she is willing to continue taking the clear. K+ 3.9, improved. Ongoign abdomina pain but improving.      "

## 2022-11-02 NOTE — PLAN OF CARE
Problem: Plan of Care - These are the overarching goals to be used throughout the patient stay.    Goal: Absence of Hospital-Acquired Illness or Injury  Intervention: Identify and Manage Fall Risk  Recent Flowsheet Documentation  Taken 11/2/2022 0813 by Christine Shipley  Safety Promotion/Fall Prevention:   safety round/check completed   room organization consistent   lighting adjusted  Intervention: Prevent Skin Injury  Recent Flowsheet Documentation  Taken 11/2/2022 0813 by Christine Shipley  Body Position: position changed independently     Problem: Pain Acute  Goal: Optimal Pain Control and Function  Intervention: Prevent or Manage Pain  Recent Flowsheet Documentation  Taken 11/2/2022 0813 by Christine Shipley  Medication Review/Management: medications reviewed   Goal Outcome Evaluation:       Pt can ambulate independently however stated feeling dizzy upon standing. Monitor pt for dizziness and encourage to slowly sit up and stand when ambulating. Pt's pain was at a 5 at the beginning of the shift and had chills. Pt has a fever that has been monitored and brought down with acetaminophen. Pt's pain controlled with oxycodone. Pt is gluten free and needs assistance with ordering meals.

## 2022-11-02 NOTE — PROGRESS NOTES
General Surgery Progress Note:    Hospital Day # 4    ASSESSMENT:     Dain Tejeda is a 42 year old female  with severe colitis and multifocal small intramural abscesses, maybe infectious in nature    PLAN:   -Diet per GI  -Follow-up and cares per HMS and GI  -Having fevers now, but exam seems unchanged, no surgical indication at this time  -Appreciate GI and ID recs      SUBJECTIVE:   Dain Tejeda is having abdominal pain, about the same, spiking fevers still    Patient Vitals for the past 24 hrs:   BP Temp Temp src Pulse Resp SpO2   11/02/22 1100 -- (!) 102.2  F (39  C) Oral -- -- --   11/02/22 0836 112/74 98  F (36.7  C) Oral 99 18 99 %   11/02/22 0427 -- 97.4  F (36.3  C) Oral -- -- --   11/02/22 0247 -- 98  F (36.7  C) Oral -- -- --   11/02/22 0026 -- (!) 103.2  F (39.6  C) Oral -- -- --   11/01/22 2354 111/65 (!) 103  F (39.4  C) Oral 112 18 98 %   11/01/22 1523 121/77 98.2  F (36.8  C) Oral 95 18 96 %   11/01/22 1134 116/75 99.4  F (37.4  C) Oral 104 20 97 %       Physical Exam:  General: NAD, pleasant    ABD: Soft, nondistended, mild generalized ttp, no rebound or guarding       Admission on 10/29/2022   Component Date Value     Color Urine 10/29/2022 Yellow      Appearance Urine 10/29/2022 Turbid (A)      Glucose Urine 10/29/2022 Negative      Bilirubin Urine 10/29/2022 Negative      Ketones Urine 10/29/2022 Negative      Specific Gravity Urine 10/29/2022 1.016      Blood Urine 10/29/2022 0.03 mg/dL (A)      pH Urine 10/29/2022 6.5      Protein Albumin Urine 10/29/2022 20 (A)      Urobilinogen Urine 10/29/2022 <2.0      Nitrite Urine 10/29/2022 Negative      Leukocyte Esterase Urine 10/29/2022 500 Shilpa/uL (A)      Bacteria Urine 10/29/2022 Few (A)      Mucus Urine 10/29/2022 Present (A)      RBC Urine 10/29/2022 4 (H)      WBC Urine 10/29/2022 0      Squamous Epithelials Uri* 10/29/2022 21 (H)      Sodium 10/29/2022 136      Potassium 10/29/2022 2.9 (L)      Chloride 10/29/2022 98      Carbon Dioxide (CO2) 10/29/2022  26      Anion Gap 10/29/2022 12      Urea Nitrogen 10/29/2022 7.2      Creatinine 10/29/2022 0.59      Calcium 10/29/2022 7.8 (L)      Glucose 10/29/2022 102 (H)      Alkaline Phosphatase 10/29/2022 127 (H)      AST 10/29/2022 14      ALT 10/29/2022 8 (L)      Protein Total 10/29/2022 7.4      Albumin 10/29/2022 2.5 (L)      Bilirubin Total 10/29/2022 0.4      GFR Estimate 10/29/2022 >90      WBC Count 10/29/2022 9.9      RBC Count 10/29/2022 3.83      Hemoglobin 10/29/2022 10.3 (L)      Hematocrit 10/29/2022 32.5 (L)      MCV 10/29/2022 85      MCH 10/29/2022 26.9      MCHC 10/29/2022 31.7      RDW 10/29/2022 13.3      Platelet Count 10/29/2022 600 (H)      % Neutrophils 10/29/2022 67      % Lymphocytes 10/29/2022 23      % Monocytes 10/29/2022 9      % Eosinophils 10/29/2022 1      % Basophils 10/29/2022 0      % Immature Granulocytes 10/29/2022 0      NRBCs per 100 WBC 10/29/2022 0      Absolute Neutrophils 10/29/2022 6.5      Absolute Lymphocytes 10/29/2022 2.3      Absolute Monocytes 10/29/2022 0.9      Absolute Eosinophils 10/29/2022 0.1      Absolute Basophils 10/29/2022 0.0      Absolute Immature Granul* 10/29/2022 0.0      Absolute NRBCs 10/29/2022 0.0      Lipase 10/29/2022 17      hCG Urine Qualitative 10/29/2022 Negative      Magnesium 10/29/2022 1.9      Culture 10/29/2022 50,000-100,000 CFU/mL Klebsiella pneumoniae (A)      Culture 10/29/2022 <10,000 CFU/mL Urogenital zulma      Campylobacter group 10/30/2022 Not Detected      Salmonella species 10/30/2022 Not Detected      Shigella species 10/30/2022 Not Detected      Vibrio group 10/30/2022 Not Detected      Rotavirus 10/30/2022 Not Detected      Shiga toxin 1 gene 10/30/2022 Not Detected      Shiga toxin 2 gene 10/30/2022 Not Detected      Norovirus I and II 10/30/2022 Not Detected      Yersinia enterocolitica 10/30/2022 Not Detected      Lactic Acid 10/29/2022 0.5 (L)      C Difficile Toxin B by P* 10/30/2022 Negative      SARS CoV2 PCR 10/29/2022  Negative      WBC Count 10/30/2022 7.5      RBC Count 10/30/2022 3.35 (L)      Hemoglobin 10/30/2022 9.0 (L)      Hematocrit 10/30/2022 29.7 (L)      MCV 10/30/2022 89      MCH 10/30/2022 26.9      MCHC 10/30/2022 30.3 (L)      RDW 10/30/2022 13.4      Platelet Count 10/30/2022 429      Sodium 10/30/2022 140      Potassium 10/30/2022 3.9      Chloride 10/30/2022 103      Carbon Dioxide (CO2) 10/30/2022 26      Anion Gap 10/30/2022 11      Urea Nitrogen 10/30/2022 7.8      Creatinine 10/30/2022 0.50 (L)      Calcium 10/30/2022 7.6 (L)      Glucose 10/30/2022 95      GFR Estimate 10/30/2022 >90      Tissue Transglutaminase * 10/30/2022 1.9      Immunoglobulin A 10/30/2022 459      Sodium 10/31/2022 138      Potassium 10/31/2022 3.1 (L)      Chloride 10/31/2022 101      Carbon Dioxide (CO2) 10/31/2022 22      Anion Gap 10/31/2022 15      Urea Nitrogen 10/31/2022 5.2 (L)      Creatinine 10/31/2022 0.53      Calcium 10/31/2022 7.2 (L)      Glucose 10/31/2022 70      GFR Estimate 10/31/2022 >90      WBC Count 10/31/2022 7.0      RBC Count 10/31/2022 3.15 (L)      Hemoglobin 10/31/2022 8.5 (L)      Hematocrit 10/31/2022 27.6 (L)      MCV 10/31/2022 88      MCH 10/31/2022 27.0      MCHC 10/31/2022 30.8 (L)      RDW 10/31/2022 13.2      Platelet Count 10/31/2022 458 (H)      Magnesium 10/31/2022 1.8      COLONOSCOPY 10/31/2022                      Value:[facility]  [address] [place] [address]  _______________________________________________________________________________  Patient Name: Dain Ileana                 Procedure Date: 10/31/2022 10:47 AM  MRN: [id number]                       Account Number: [id number]  YOB: 1980               Admit Type: Inpatient  Age: 42                               Room: [place] [address]  Note Status: Finalized                Attending MD: CLAUDE RODRIGUEZ MD  Instrument Name: Pediatric Colonoscope 617    _______________________________________________________________________________     Procedure:             Colonoscopy  Indications:           Clinically significant diarrhea of unexplained origin,                          Abnormal CT of the GI tract  Providers:             CLAUDE RODRIGUEZ MD  Referring MD:            Medicines:             Midazolam 2 mg IV, Fentanyl 100 micrograms IV  Complications:         No immediate complications.  __________________________                          _____________________________________________________  Procedure:             Pre-Anesthesia Assessment:                         - This assessment was completed prior to the                          administration of sedation.                         After obtaining informed consent, the colonoscope was                          passed under direct vision. Throughout the procedure,                          the patient's blood pressure, pulse, and oxygen                          saturations were monitored continuously. The pediatric                          colonoscope was introduced through the anus and                          advanced to the descending colon. The colonoscopy was                          somewhat difficult due to bowel stenosis. The patient                          tolerated the procedure. The quality of the bowel                          preparation was excellent. The colonoscopy was aborted                          due to the difficulty of the procedure. The                                                    colonoscopy was somewhat difficult due to bowel                          stenosis. The patient tolerated the procedure well.                          The quality of the bowel preparation was excellent.                                                                                   Findings:       A polypoid lesion was found in the descending colon. The lesion was        sessile. No bleeding was present.  This was biopsied with a cold forceps.       Ulcerated mucosa were present in the descending colon.       A localized area of erythematous mucosa was found in the distal rectum.        Biopsies were taken with a cold forceps for histology.                                                                                   Moderate Sedation:       Moderate (conscious) sedation was administered by the endoscopy nurse        and supervised by the endoscopist. The patient's oxygen saturation,        heart rate, blood pressure and response to care were monitored.  Impression                          :            - The procedure was aborted within descending.                         - Descending colon with narrowed area with diffuse                          polyposis (look like pseudopolyps) and areas of                          purlulent discharge; however, surrounding mucosa does                          not appear inflamed (no edema or erythema). Polypoid                          lesions and purulent areas biopsied                         - Normal sigmoid                         - Distal rectum with erythematous mucosa and localized                          purulent drainage. Biopsied.                         - External anal appeared normal                         - Unable to examine proximal colon or terminal ileum  Recommendation:        - Return patient to hospital [facility].                         - Advance diet as tolerated. Should be gluten free                         - Already started on antibiotics                         - Await stool study report                         -                           Await path report                         - May start steroids soon                                                                                     Claude Traore MD  ___________________  CLAUDE TRAORE MD  10/31/2022 11:31:43 AM  I was physically present for the entire viewing portion of the  exam.  __________________________  Signature of teaching physician  B4c/Sandra RODRIGUEZ MD  Number of Addenda: 0    Note Initiated On: 10/31/2022 10:47 AM  Scope In: 10:53:40 AM  Scope Out: 11:05:41 AM       Potassium 10/31/2022 4.1      Potassium 11/01/2022 2.8 (L)      Magnesium 11/01/2022 1.8

## 2022-11-03 LAB
CD3 CELLS # BLD: 930 CELLS/UL (ref 603–2990)
CD3 CELLS NFR BLD: 67 % (ref 49–84)
CD3+CD4+ CELLS # BLD: 462 CELLS/UL (ref 441–2156)
CD3+CD4+ CELLS NFR BLD: 34 % (ref 28–63)
CD3+CD4+ CELLS/CD3+CD8+ CLL BLD: 1.05 % (ref 1.4–2.6)
CD3+CD8+ CELLS # BLD: 441 CELLS/UL (ref 125–1312)
CD3+CD8+ CELLS NFR BLD: 32 % (ref 10–40)
GAMMA INTERFERON BACKGROUND BLD IA-ACNC: 1.34 IU/ML
M TB IFN-G BLD-IMP: ABNORMAL
M TB IFN-G CD4+ BCKGRND COR BLD-ACNC: 0.32 IU/ML
MAGNESIUM SERPL-MCNC: 1.7 MG/DL (ref 1.7–2.3)
MITOGEN IGNF BCKGRD COR BLD-ACNC: 0.04 IU/ML
MITOGEN IGNF BCKGRD COR BLD-ACNC: 0.21 IU/ML
POTASSIUM SERPL-SCNC: 3.6 MMOL/L (ref 3.4–5.3)
QUANTIFERON MITOGEN: 1.66 IU/ML
QUANTIFERON NIL TUBE: 1.34 IU/ML
QUANTIFERON TB1 TUBE: 1.55 IU/ML
QUANTIFERON TB2 TUBE: 1.38
T CELL COMMENT: ABNORMAL

## 2022-11-03 PROCEDURE — 87449 NOS EACH ORGANISM AG IA: CPT | Performed by: INTERNAL MEDICINE

## 2022-11-03 PROCEDURE — 86698 HISTOPLASMA ANTIBODY: CPT | Performed by: INTERNAL MEDICINE

## 2022-11-03 PROCEDURE — 250N000011 HC RX IP 250 OP 636

## 2022-11-03 PROCEDURE — 86622 BRUCELLA ANTIBODY: CPT | Performed by: INTERNAL MEDICINE

## 2022-11-03 PROCEDURE — 36415 COLL VENOUS BLD VENIPUNCTURE: CPT | Performed by: INTERNAL MEDICINE

## 2022-11-03 PROCEDURE — 99233 SBSQ HOSP IP/OBS HIGH 50: CPT | Mod: GC

## 2022-11-03 PROCEDURE — 84132 ASSAY OF SERUM POTASSIUM: CPT | Performed by: FAMILY MEDICINE

## 2022-11-03 PROCEDURE — 250N000013 HC RX MED GY IP 250 OP 250 PS 637

## 2022-11-03 PROCEDURE — 87116 MYCOBACTERIA CULTURE: CPT | Performed by: INTERNAL MEDICINE

## 2022-11-03 PROCEDURE — 86612 BLASTOMYCES ANTIBODY: CPT | Performed by: INTERNAL MEDICINE

## 2022-11-03 PROCEDURE — 99231 SBSQ HOSP IP/OBS SF/LOW 25: CPT | Performed by: PHYSICIAN ASSISTANT

## 2022-11-03 PROCEDURE — 87206 SMEAR FLUORESCENT/ACID STAI: CPT | Performed by: INTERNAL MEDICINE

## 2022-11-03 PROCEDURE — 120N000001 HC R&B MED SURG/OB

## 2022-11-03 PROCEDURE — 86611 BARTONELLA ANTIBODY: CPT | Performed by: INTERNAL MEDICINE

## 2022-11-03 PROCEDURE — 99233 SBSQ HOSP IP/OBS HIGH 50: CPT | Performed by: INTERNAL MEDICINE

## 2022-11-03 PROCEDURE — 87385 HISTOPLASMA CAPSUL AG IA: CPT | Performed by: INTERNAL MEDICINE

## 2022-11-03 PROCEDURE — 83735 ASSAY OF MAGNESIUM: CPT | Performed by: FAMILY MEDICINE

## 2022-11-03 PROCEDURE — 86360 T CELL ABSOLUTE COUNT/RATIO: CPT | Performed by: INTERNAL MEDICINE

## 2022-11-03 PROCEDURE — 258N000003 HC RX IP 258 OP 636: Performed by: SPECIALIST

## 2022-11-03 PROCEDURE — 250N000011 HC RX IP 250 OP 636: Performed by: INTERNAL MEDICINE

## 2022-11-03 PROCEDURE — 250N000013 HC RX MED GY IP 250 OP 250 PS 637: Performed by: INTERNAL MEDICINE

## 2022-11-03 PROCEDURE — 250N000013 HC RX MED GY IP 250 OP 250 PS 637: Performed by: FAMILY MEDICINE

## 2022-11-03 RX ORDER — ONDANSETRON 4 MG/1
4 TABLET, FILM COATED ORAL DAILY PRN
Status: DISCONTINUED | OUTPATIENT
Start: 2022-11-03 | End: 2022-11-04

## 2022-11-03 RX ADMIN — RIFAMPIN 600 MG: 300 CAPSULE ORAL at 10:15

## 2022-11-03 RX ADMIN — METRONIDAZOLE 500 MG: 500 INJECTION, SOLUTION INTRAVENOUS at 18:40

## 2022-11-03 RX ADMIN — ONDANSETRON 4 MG: 2 INJECTION INTRAMUSCULAR; INTRAVENOUS at 17:13

## 2022-11-03 RX ADMIN — MEROPENEM 1 G: 1 INJECTION INTRAVENOUS at 08:45

## 2022-11-03 RX ADMIN — ETHAMBUTOL HYDROCHLORIDE 800 MG: 400 TABLET, FILM COATED ORAL at 10:17

## 2022-11-03 RX ADMIN — ONDANSETRON 4 MG: 2 INJECTION INTRAMUSCULAR; INTRAVENOUS at 22:37

## 2022-11-03 RX ADMIN — SODIUM CHLORIDE: 9 INJECTION, SOLUTION INTRAVENOUS at 13:34

## 2022-11-03 RX ADMIN — ACETAMINOPHEN 650 MG: 325 TABLET, FILM COATED ORAL at 17:08

## 2022-11-03 RX ADMIN — THERA TABS 1 TABLET: TAB at 08:44

## 2022-11-03 RX ADMIN — MEROPENEM 1 G: 1 INJECTION INTRAVENOUS at 17:10

## 2022-11-03 RX ADMIN — ISONIAZID 300 MG: 100 TABLET ORAL at 10:15

## 2022-11-03 RX ADMIN — MEROPENEM 1 G: 1 INJECTION INTRAVENOUS at 01:30

## 2022-11-03 RX ADMIN — PANTOPRAZOLE SODIUM 40 MG: 20 TABLET, DELAYED RELEASE ORAL at 08:44

## 2022-11-03 RX ADMIN — LEVOFLOXACIN 500 MG: 5 INJECTION, SOLUTION INTRAVENOUS at 17:10

## 2022-11-03 RX ADMIN — ACETAMINOPHEN 650 MG: 325 TABLET, FILM COATED ORAL at 11:29

## 2022-11-03 RX ADMIN — PYRAZINAMIDE 1000 MG: 500 TABLET ORAL at 10:16

## 2022-11-03 RX ADMIN — METRONIDAZOLE 500 MG: 500 INJECTION, SOLUTION INTRAVENOUS at 06:36

## 2022-11-03 RX ADMIN — OXYCODONE HYDROCHLORIDE 5 MG: 5 TABLET ORAL at 17:34

## 2022-11-03 RX ADMIN — Medication 50 MG: at 10:16

## 2022-11-03 RX ADMIN — PANTOPRAZOLE SODIUM 40 MG: 20 TABLET, DELAYED RELEASE ORAL at 19:48

## 2022-11-03 RX ADMIN — OXYCODONE HYDROCHLORIDE 5 MG: 5 TABLET ORAL at 08:44

## 2022-11-03 ASSESSMENT — ACTIVITIES OF DAILY LIVING (ADL)
ADLS_ACUITY_SCORE: 21

## 2022-11-03 NOTE — PLAN OF CARE
Problem: Plan of Care - These are the overarching goals to be used throughout the patient stay.    Goal: Optimal Comfort and Wellbeing  Outcome: Progressing  Intervention: Monitor Pain and Promote Comfort  Recent Flowsheet Documentation  Taken 11/3/2022 8029 by Jewels Zavaleta RN  Pain Management Interventions: medication (see MAR)   Goal Outcome Evaluation:             Pt rating abdominal pain around a 2 now. Gave PRN pain meds this am.   Up independently in room.     Mg 1.7 and K 3.6. Both rechecks in am.      KAYLEE TALAVERA saw pt and ordered PRN Zofran prior to be meds.    Also she clarified the AFB rectum lab. It's a stool sample to be sent when pt has a BM. Pt is aware of this and a cup is in the room.

## 2022-11-03 NOTE — PLAN OF CARE
Pain continues, PRN oxy given x2. Monitoring temp, scheduled tylenol given. Per ID, placed pt on airborne precautions for colonic TB. Labs drawn and nursing staff to collect samples as well. Educated pt on isolation precautions, new medications, and the room change for negative airflow. Cont IVF running and IV abx infusing. Pt up in room independently. Able to make needs known. Discharge plan TBD.

## 2022-11-03 NOTE — PLAN OF CARE
Problem: Pain Acute  Goal: Optimal Pain Control and Function  Intervention: Develop Pain Management Plan  Recent Flowsheet Documentation  Taken 11/3/2022 0030 by Cielo Jha RN  Pain Management Interventions:    declines    emotional support    pillow support provided    repositioned    rest   Goal Outcome Evaluation:    Patient alert, oriented x 3. Co-operative with cares. Reported abdominal pain rating 3-5/10, administered Tylenol scheduled, reported relief. Denied nausea/vomiting/coughing. Slept well during the night. Sent urine sample for acid fast bacilli test. Will continue to monitor the patient.

## 2022-11-03 NOTE — PROGRESS NOTES
[facility]    Infectious Disease Progress Note    Date of Service : 11/03/2022     Assessment & Plan   Dain Tejeda is a 42 year old female who was admitted on 10/29/2022.     ASSESSMENT:  1. Clinical presentation mimicking Crohn's disease.  History of celiac disease.  6 months history of diarrhea weight loss abdominal pain- active issue  2. Non caseating granulomas may be seen in inflammatory bowel disease.   3. 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  Less likely though cannot be ruled out at this point/ Patient had negative AFB stain on colon and rectal lesion biopsy  4. QFT indeterminate- repeat test ordered. CT chest no radiographic evidence of tuberculosis, active or healed  5. Severe nausea and vomiting with RIPE (Rifampin, INH, PZA and Ethambutol)  6. Klebsiella UTI- on effective antibiotic  7. Biopsy results below  8. Over 6 months history of weight loss, now with fevers.  Diarrhea abdominal pain  Colonoscopy report:  Descending colon with narrowed area with diffuse polyposis (look like pseudopolyps) and areas of purlulent discharge; however, surrounding mucosa does not appear inflamed (no edema or erythema). Polypoid  lesions and purulent areas biopsied     - Normal sigmoid     - Distal rectum with erythematous mucosa and localized purulent drainage. Biopsied.   DESCENDING COLON, BIOPSY:        1.  PATCHY ACUTE COLITIS WITH REACTIVE CHANGES        2.  FOCAL NONCASEATING GRANULOMA              A.  SPECIAL STAINS NEGATIVE FOR ACID-FAST BACILLI, YEAST, FUNGI AND CMV         3.  NEGATIVE FOR CHRONIC MUCOSAL CHANGES, DYSPLASIA AND MALIGNANCY    B) DISTAL RECTUM, BIOPSIES:         1.  DIFFUSE MILD ACUTE COLITIS WITH REACTIVE CHANGES         2.  CMV IMMUNOSTAIN: NEGATIVE FOR CMV INCLUSIONS         3.  NEGATIVE FOR CHRONIC MUCOSAL CHANGES, DYSPLASIA AND MALIGNANCY      Literature Review    Tuberculous colitis  Tuberculous colitis - PMC (nih.gov)      Colon  Tuberculosis: Endoscopic Features and Prospective Endoscopic Follow-Up After Anti-Tuberculosis Treatment Clin Transl Gastroenterol. 2012 Oct; 3(10): e24. Published online 2012 Oct 11. doi: 10.1038/ctg.2012.19      RECOMMENDATIONS:    1. Antibiotics continue broad-spectrum antibiotics: Meropenem levofloxacin, metronidazole  2. AFB blood, AFB stool, AFB sputum.  Ordered  3. Empiric TB treatment based in case reports of TB colitis, and concern by GI. Due to severe nausea, vomiting, holding Ethambutol and PZA  4. Zofran premed before INH/Rifampin  5. Follow culture results   6. Focus/de-escalate antibiotics based on final culture results  7. Monitor CBC, CMP, check CD4 count  8. If TB work-up is negative, would treat for inflammatory bowel disease, defer to GI  9. Regional infectious disease work-up as ordered previously in process  10. Discussed with patient   11. Discussed with nurse and charge nurse regarding airborne infection isolation room while waiting for test results  12. ID will follow  13. Patient updated with the help of  over ipad  14. Discussed with nurse at bedside.  15. GI input appreciated      Karen Rosales MD  [facility]  [phone]    Total Time Spent 35 minutes with >50% of the time spent in counseling, education and care coordination. Discussed with nurse and patient. Interpretor on ipad      Interval History   Fever better, thougabdominal pain  Nausea and vomiting with TB meds      Patient stated that she has not ever had contact with known tuberculosis case.  Updated patient regarding GI concerns next    Physical Exam   Temp: 97.9  F (36.6  C) Temp src: Oral BP: 111/75 Pulse: 90   Resp: 18 SpO2: 100 % O2 Device: None (Room air)    Vitals:    10/30/22 1933 10/30/22 2147   Weight: 49.3 kg (108 lb 11.2 oz) 50.8 kg (111 lb 14.4 oz)     Vital Signs with Ranges  Temp:  [97.7  F (36.5  C)-102.2  F (39  C)] 97.9  F (36.6  C)  Pulse:  [] 90  Resp:  [18-20]  18  BP: ()/(61-75) 111/75  SpO2:  [96 %-100 %] 100 %    Constitutional: moderate distress, nauseated  Lungs: normal breathing pattern, no crackles or wheezing  Cardiovascular: Regular rate and rhythm  Abdomen: Tenderness  Skin: warm  Neuro: deconditioned  Psych: able to answer questions    Medications     sodium chloride 75 mL/hr at 11/02/22 2230       acetaminophen  650 mg Oral Q6H    Or     acetaminophen  650 mg Rectal Q6H     enoxaparin ANTICOAGULANT  40 mg Subcutaneous At Bedtime     ethambutol  800 mg Oral Daily     isoniazid  300 mg Oral Daily     levofloxacin  500 mg Intravenous Q24H     meropenem  1 g Intravenous Q8H     metroNIDAZOLE  500 mg Intravenous Q12H     multivitamin, therapeutic  1 tablet Oral Daily     pantoprazole  40 mg Oral BID     pyrazinamide  1,000 mg Oral Daily     pyridOXINE  50 mg Oral Daily     rifampin  600 mg Oral Daily     sodium chloride (PF)  3 mL Intracatheter Q8H       Data   All microbiology laboratory data reviewed.  Recent Labs   Lab Test 11/02/22  0737 11/01/22  1619 10/31/22  0637   WBC 7.0 5.7 7.0   HGB 9.6* 8.6* 8.5*   HCT 31.0* 28.0* 27.6*   MCV 89 87 88     415 441 458*     Recent Labs   Lab Test 11/01/22  1619 10/31/22  0637 10/30/22  1707   CR 0.63 0.53 0.50*     Recent Labs   Lab Test 05/24/22  1620   SED 90*     MICRO:  11/01/2022 2133 11/02/2022 1532 Routine parasitology exam [[id number]]    Stool from Per Rectum    Final result Component Value   OVA AND PARASITE EXAM Negative   A single negative specimen does not rule out parasitic infection.   WBC'S, O&P 4+ PMNs          11/01/2022 2133 11/02/2022 1123 Cryptosporidium and Giardia antigens [[id number]]   Stool from Per Rectum    Final result Component Value   Cryptosporidium parvum antigen Negative   Giardia lamblia antigen Negative          11/01/2022 1619 11/02/2022 0932 Blood Culture Arm, Right [[id number]]   Blood from Arm, Right    Preliminary result Component Value   Culture No growth after  12 hours P             11/01/2022 1619 11/02/2022 1343 CMV Quantitative, PCR [[id number]]    Blood from Arm, Right    Final result Component Value Units   CMV DNA IU/mL Not Detected IU/mL          11/01/2022 1619 11/01/2022 1644 Quantiferon TB Gold Plus Grey Tube [[id number]]   Blood from Arm, Right    In process Component Value   No component results          11/01/2022 1619 11/01/2022 1644 Quantiferon TB Gold Plus Green Tube [[id number]]   Blood from Arm, Right    In process Component Value   No component results          11/01/2022 1619 11/01/2022 1644 Quantiferon TB Gold Plus Yellow Tube [[id number]]   Blood from Arm, Right    In process Component Value   No component results          11/01/2022 1619 11/01/2022 1644 Quantiferon TB Gold Plus Purple Tube [[id number]]   Blood from Arm, Right    In process Component Value   No component results          11/01/2022 0434 11/02/2022 0932 Blood Culture Peripheral Blood [[id number]]    Peripheral Blood    Preliminary result Component Value   Culture No growth after 1 day P             10/30/2022 1848 10/31/2022 1713 Enteric Bacteria and Virus Panel by MARILYNN Stool [[id number]]    Stool from Per Rectum    Final result Component Value   Campylobacter group Not Detected   Salmonella species Not Detected   Shigella species Not Detected   Vibrio group Not Detected   Rotavirus Not Detected   Shiga toxin 1 gene Not Detected   Shiga toxin 2 gene Not Detected   Norovirus I and II Not Detected   Yersinia enterocolitica Not Detected          10/30/2022 1847 10/31/2022 0047 C. difficile Toxin B PCR with reflex to C. difficile Antigen and Toxins A/B EIA [[id number]]    Stool from Per Rectum    Final result Component Value   C Difficile Toxin B by PCR Negative   A negative result does not exclude actual disease due to C. difficile and may be due to improper collection, handling and storage of the specimen or the number of organisms in the specimen is below the detection  limit of the assay.          10/29/2022 2344 10/30/2022 0049 Asymptomatic COVID-19 Virus (Coronavirus) by PCR Nasopharyngeal [[id number]]    Swab from Nasopharyngeal    Final result Component Value   SARS CoV2 PCR Negative   NEGATIVE: SARS-CoV-2 (COVID-19) RNA not detected, presumed negative.          10/29/2022 2038 10/31/2022 2243 Urine Culture [[id number]]    (Abnormal)   Urine, Midstream    Final result Component Value   Culture 50,000-100,000 CFU/mL Klebsiella pneumoniae Abnormal     <10,000 CFU/mL Urogenital zulma       Susceptibility     Klebsiella pneumoniae     KENISHA     Ampicillin >=32 ug/mL Resistant 1     Ampicillin/ Sulbactam 4 ug/mL Susceptible     Cefazolin <=4 ug/mL Susceptible 2     Cefepime <=1 ug/mL Susceptible     Cefoxitin <=4 ug/mL Susceptible     Ceftazidime <=1 ug/mL Susceptible     Ceftriaxone <=1 ug/mL Susceptible     Ciprofloxacin <=0.25 ug/mL Susceptible     Gentamicin <=1 ug/mL Susceptible     Levofloxacin <=0.12 ug/mL Susceptible     Nitrofurantoin 64 ug/mL Intermediate     Piperacillin/Tazobactam <=4 ug/mL Susceptible     Tobramycin <=1 ug/mL Susceptible     Trimethoprim/Sulfamethoxazole >16/304 ug/mL Resistant              1 Intrinsically Resistant   2 Cefazolin KENISHA breakpoints are for the treatment of uncomplicated urinary tract infections. For the treatment of systemic infections, please contact the laboratory for additional testing.             RADIOLOGY:  Reviewed  CT Abdomen Pelvis w Contrast    Result Date: 10/29/2022  EXAM: CT ABDOMEN PELVIS W CONTRAST LOCATION: [facility] DATE/TIME: 10/29/2022 10:27 PM INDICATION: abd pain assoc with N V D COMPARISON: Ultrasound of the pelvis from 08/18/2022. CT abdomen pelvis from 07/11/2022 TECHNIQUE: CT scan of the abdomen and pelvis was performed following injection of IV contrast. Multiplanar reformats were obtained. Dose reduction techniques were used. CONTRAST: [id number] 100ml FINDINGS: LOWER CHEST: Normal.  HEPATOBILIARY: Focal fat along the falciform ligament, liver otherwise normal. No calcified gallstones. No biliary ductal dilatation. PANCREAS: Normal. SPLEEN: Normal. ADRENAL GLANDS: Normal. KIDNEYS/BLADDER: Normal. BOWEL: Normal caliber small bowel. There is wall thickening of the large bowel from the proximal transverse colon through the mid sigmoid colon. Wall thickening is most severe in the mid and distal descending colon, where there are multiple intramural fluid and gas collections consistent with abscess. Anteriorly there is a 1.5 cm collection on image 98 of series 2. Posteriorly there is a 1.3 cm collection on image 106. More inferiorly there is a thin crescentic collection posteriorly measuring 1.7 cm in length. There is extensive pericolonic edema, but no free air. LYMPH NODES: Multiple presumably reactive pericolonic lymph nodes. VASCULATURE: Unremarkable. PELVIC ORGANS: Cyst in the left pelvis measures 5 cm in length, likely corresponding to the paraovarian cyst seen on the recent pelvic ultrasound. MUSCULOSKELETAL: Transitional lumbosacral anatomy, normal variant     IMPRESSION: 1.  Findings consistent with a fairly diffuse colitis with severe involvement of the mid and distal descending colon. Although there is no [place] free air, the colitis is complicated by the presence of multiple small, intramural abscesses as described above.

## 2022-11-03 NOTE — PROGRESS NOTES
Care Management Follow Up    Length of Stay (days): 5    Expected Discharge Date: 11/03/2022       Concerns to be Addressed:   In isolation to rule out TB. IV Levaquin, IV Meropenem, IV Flagyl (ID following). GI following. Awaiting results of colonic biopsy.   Patient plan of care discussed at interdisciplinary rounds: Yes    Anticipated Discharge Disposition:  Discharge goal is home pending response to treatment, medical needs and mobility closer to discharge.      Anticipated Discharge Services:  Possible home infusion.  Anticipated Discharge DME:  To be determined.     Patient/family educated on Medicare website which has current facility and service quality ratings:  Yes  Education Provided on the Discharge Plan:  Per team  Patient/Family in Agreement with the Plan:  yes    Referrals Placed by CM/SW:  [facility]  Private pay costs discussed: Not applicable at this time.     Additional Information:  Patient is  and independent at baseline, as children at home. Per [facility] CM notes, patient has 100% coverage for home infusion if needed. [facility] liaison is following along.     Kirstin Guillen RN

## 2022-11-03 NOTE — PROGRESS NOTES
Patient has been slowly improving and after seeing her yesterday with improvement of pain and soft abdomen and fevers are trending down.  At this time we will peripherally follow.  Please call us if there is any change in her exam or any questions or concerns  Spent 15 min reviewing chart, labs and vitals.     Minna STYLES  [facility]  [address] [place] [id number]  Phone- [phone]  Fax- [phone]

## 2022-11-03 NOTE — PROGRESS NOTES
[facility]    Progress Note - Hospitalist Service       Date of Admission:  10/29/2022    Assessment & Plan   Dain Tejeda is a 42 year old female w/ PMH of chronic gastritis, inflammation of small intestine, and celiac disease admitted on 10/29/2022. Here w/ diffuse colitis w/ severe involvement of the mid and distal descending colon c/f IBD. She remains hospitalized for IV antibiotics, continued monitoring, and evaluation by GI.     Severe colitis w/ multifocal small intramural abscesses c/f Crohn's  Pt has been having abdominal pain and bloody diarrhea for some time now, w/ extensive outpatient workup. Has had negative enteric infectious workup, possible ileitis on imaging 4/14/2022 w/ interval improvement seen on CT 7/11/22. Saw GI outpatient 7/22/22 and noted to have elevated inflammatory markers and abnormal CT findings concerning for IBD. EGD was performed w/ biopsy suggestive of celiac disease and negative for H pylori. Ttg obtained during this hospitalization returned negative. CT abdomen pelvis 10/29 showing diffuse colitis with severe involvement of the mid and distal descending colon. Although there is no phoenix free air, the colitis is complicated by the presence of multiple small, intramural abscesses. Colonoscopy 10/31 with atypical findings, biopsy consistent with colitis with reactive changes. Given patient's history as a [place] refugee, also considering colonic TB. Pt [place] immig[place] to [place] from [facility] in 2017, tested negative for TB upon leaving the country and upon arrival to [place]. No exposure to TB at home or work. No hx of immunosuppression, IV drug use, or incarceration. Currently on airborne isolation for c/f TB, pending results of broad infectious workup per ID's rec at this time.  - ID consult   - Continue levofloxacin and metronidazole, Meropenem   - Pending lab studies: HIV, giardia/crypto, whipple, celiac panel, quantiferon gold, AFB smear from rectal mucosa,  histoplasmosis, blasto, brucella,    - AFB smears from blood, sputum, stool today   - Now on empiric RIPE therapy for possible Colonic TB  - GI consult placed   >  Asking [facility] to review colonoscopy path for second opinion, follow stool O+P, quantiferon, HIV, CMV testing.  - General surgery consult   > continue IV abx   > if ends up needing surgery, would need extended hemicolectomy or subtotal colectomy   - Nutrition consult placed   > Dietician did meet w/ the pt 10/31 and educated on gluten free diet give unclear diagnosis of possible Celiac disease.  - PO Pepcid BID  - Oxyocodone 5 mg q4 h PRN for pain  - Scheduled Tylenol for pain   - PRN Zofran and Compazine ordered    Hypokalemia  Hypomagnesemia  - Replete per nursing protocol     Abnormal UA  UA on admission positive for leuk esterase, protein, small amount of blood, and few bacteria seen. Also w/ moderate squamous cells noted. Pt is afebrile, w/o leukocytosis, and not complaining of urinary symptoms so unclear if this truly represents a UTI.  - Cont IV abx per above  - UC grew Klebsiella      Thrombocythemia  PLTs have been around the upper limits of normal during previous evaluation, found to be 600k on admission. Likely elevated 2/2 acute phase reactant and significant GI tract inflammation.  - Monitor     Diet: Gluten Free Diet  Snacks/Supplements Adult: Ensure Clear; Between Meals    DVT Prophylaxis: Enoxaparin (Lovenox) SQ  Chambers Catheter: Not present  Fluids: 75ml/hr NS  Central Lines: None  Cardiac Monitoring: None  Code Status: Full Code      Disposition Plan     Expected Discharge Date: 11/03/2022    Discharge Delays: IV Medication - consider oral or Home Infusion  Procedure Pending (enter procedure & time in comments)  Destination: home with family  Discharge Comments: GI recs: colonoscopy  fever over night on 11/1  IV ABX        The patient's care was discussed with the Attending physician, Dr Tamayo.    Andrey Reyes MD  Hospitalist  [facility]  Securely message with the [employer] Web Console (learn more here)  Text page via [employer] Paging/Directory     Clinically Significant Risk Factors              # Hypoalbuminemia: Lowest albumin = 2.5 g/dL (Ref range: 3.5-5.2) at 10/29/2022  8:27 PM, will monitor as appropriate            # Severe Malnutrition: based on nutrition assessment      _______________________________________________________    Interval History   Pt feeling slightly better this AM, fevers have not returned overnight.    Data reviewed today: I reviewed all medications, new labs and imaging results over the last 24 hours.    Physical Exam   Vital Signs: Temp: 97.9  F (36.6  C) Temp src: Oral BP: 111/75 Pulse: 90   Resp: 18 SpO2: 100 % O2 Device: None (Room air)    Weight: 111 lbs 14.4 oz  Gen: Pleasant. No distress.    HEENT: MMM.   Neck: No overt asymmetry.   CV: Appears well-perfused. RRR. No murmur.   Resp: Breathing comfortably on room air. Lungs clear to auscultation bilaterally without wheeze or crackle.   Abd: Non-distended, some diffuse tenderness to palpation, no rebound or guarding, bowel sounds present  Ext: No edema or overt asymmetry/deformity.   Skin: No overt rash on easily visualized skin.   Neuro: Non-focal.   Psych: Calm.     Data   Recent Labs   Lab 11/03/22  0733 11/02/22  0737 11/01/22  1619 10/31/22  1916 10/31/22  0637 10/30/22  1707 10/29/22  2027   WBC  --  7.0 5.7  --  7.0 7.5 9.9   HGB  --  9.6* 8.6*  --  8.5* 9.0* 10.3*   MCV  --  89 87  --  88 89 85   PLT  --  415  415 441  --  458* 429 600*   NA  --   --   --   --  138 140 136   POTASSIUM 3.6 3.9 3.8   < > 3.1* 3.9 2.9*   CHLORIDE  --   --   --   --  101 103 98   CO2  --   --   --   --  22 26 26   BUN  --   --   --   --  5.2* 7.8 7.2   CR  --   --  0.63  --  0.53 0.50* 0.59   ANIONGAP  --   --   --   --  15 11 12   ALEX  --   --   --   --  7.2* 7.6* 7.8*   GLC  --   --   --   --  70 95 102*   ALBUMIN  --   --   --   --   --    --  2.5*   PROTTOTAL  --   --   --   --   --   --  7.4   BILITOTAL  --   --   --   --   --   --  0.4   ALKPHOS  --   --   --   --   --   --  127*   ALT  --   --   --   --   --   --  8*   AST  --   --   --   --   --   --  14   LIPASE  --   --   --   --   --   --  17    < > = values in this interval not displayed.     Recent Results (from the past 24 hour(s))   CT Chest w/o Contrast    Narrative    EXAM: CT CHEST W/O CONTRAST  LOCATION: [facility]  DATE/TIME: 11/2/2022 5:29 PM    INDICATION: Pulmonary nodule evaluation; Fever; No known automatically detected potential contraindications to iodinated contrast  COMPARISON: CT abdomen/pelvis 10/29/2022.  TECHNIQUE: CT chest without IV contrast. Multiplanar reformats were obtained. Dose reduction techniques were used.  CONTRAST: None.    FINDINGS:   LUNGS AND PLEURA: Lungs are clear. No pleural effusion. Minimal bibasilar hypoventilatory changes. Triangular nodule abutting the minor fissure on the right, likely an intrapulmonary lymph node, no specific follow-up recommended.    MEDIASTINUM/AXILLAE: No lymphadenopathy. No thoracic aortic aneurysm.    CORONARY ARTERY CALCIFICATION: None.    UPPER ABDOMEN: Findings of colitis with possible interval decrease in surrounding inflammatory stranding since prior abdominal CT.    MUSCULOSKELETAL: No acute bony abnormality.      Impression    IMPRESSION:   1.  No evidence of acute abnormality in the chest. No radiographic evidence of tuberculosis, active or healed.

## 2022-11-03 NOTE — PROGRESS NOTES
"  GASTROENTEROLOGY PROGRESS NOTE     SUBJECTIVE   The patient reports feeling about the \"same.\" Passed 3 very loose stools thus far today. Stool is not bloody. Abd pain persists, but improves with pain medicine.   Now on airborne precautions and on empiric treatment for TB.      OBJECTIVE     Vitals Blood pressure 111/75, pulse 90, temperature 97.9  F (36.6  C), temperature source Oral, resp. rate 18, height 1.498 m (4' 10.98\"), weight 50.8 kg (111 lb 14.4 oz), SpO2 100 %, unknown if currently breastfeeding.          Physical Exam   General: awake, alert, responds appropriately via language line     Cardiovascular: S1S2    Chest: lungs are clear     Abdomen: +bs, soft, mild generalized tenderness    Neurologic: grossly intact        LABORATORY    ELECTROLYTE PANEL   Recent Labs   Lab 11/03/22 0733 11/02/22 0737 11/01/22  1619 10/31/22  1916 10/31/22  0637 10/30/22  1707 10/29/22  2027   NA  --   --   --   --  138 140 136   POTASSIUM 3.6 3.9 3.8   < > 3.1* 3.9 2.9*   CHLORIDE  --   --   --   --  101 103 98   CO2  --   --   --   --  22 26 26   GLC  --   --   --   --  70 95 102*   CR  --   --  0.63  --  0.53 0.50* 0.59   BUN  --   --   --   --  5.2* 7.8 7.2    < > = values in this interval not displayed.      HEMATOLOGY PANEL   Recent Labs   Lab 11/02/22  0737 11/01/22  1619 10/31/22  0637   HGB 9.6* 8.6* 8.5*   MCV 89 87 88   WBC 7.0 5.7 7.0     415 441 458*      LIVER AND PANCREAS PANEL   Recent Labs   Lab 10/29/22  2027   AST 14   ALT 8*   ALKPHOS 127*   BILITOTAL 0.4   LIPASE 17   C difficile and enteric pathogens negative  Crypto and giardia negative  O and P X3, one negative others PENDING  HIV negative, Quantiferon Indeterminate 11/1/22**,   hep A IgG reactive, HCV negative, Nonreactive hep BsAg and HepBsAb  cmv negative      IMAGING STUDIES    EXAM: CT ABDOMEN PELVIS W CONTRAST  LOCATION: [facility]  DATE/TIME: 10/29/2022 10:27 PM     INDICATION: abd pain assoc with " N V D  COMPARISON: Ultrasound of the pelvis from 08/18/2022. CT abdomen pelvis from 07/11/2022  TECHNIQUE: CT scan of the abdomen and pelvis was performed following injection of IV contrast. Multiplanar reformats were obtained. Dose reduction techniques were used.  CONTRAST: Fruzly125 100ml     FINDINGS:   LOWER CHEST: Normal.     HEPATOBILIARY: Focal fat along the falciform ligament, liver otherwise normal. No calcified gallstones. No biliary ductal dilatation.     PANCREAS: Normal.     SPLEEN: Normal.     ADRENAL GLANDS: Normal.     KIDNEYS/BLADDER: Normal.     BOWEL: Normal caliber small bowel. There is wall thickening of the large bowel from the proximal transverse colon through the mid sigmoid colon. Wall thickening is most severe in the mid and distal descending colon, where there are multiple intramural   fluid and gas collections consistent with abscess. Anteriorly there is a 1.5 cm collection on image 98 of series 2. Posteriorly there is a 1.3 cm collection on image 106. More inferiorly there is a thin crescentic collection posteriorly measuring 1.7 cm   in length. There is extensive pericolonic edema, but no free air.     LYMPH NODES: Multiple presumably reactive pericolonic lymph nodes.     VASCULATURE: Unremarkable.     PELVIC ORGANS: Cyst in the left pelvis measures 5 cm in length, likely corresponding to the paraovarian cyst seen on the recent pelvic ultrasound.     MUSCULOSKELETAL: Transitional lumbosacral anatomy, normal variant                                                                      IMPRESSION:   1.  Findings consistent with a fairly diffuse colitis with severe involvement of the mid and distal descending colon. Although there is no phoenix free air, the colitis is complicated by the presence of multiple small, intramural abscesses as described   above.    I have reviewed the current diagnostic and laboratory tests.              EGD 9/6/22  Endoscopically unremarkable     A: DUODENUM,  BIOPSY:           1. Duodenal mucosa with changes suspicious for celiac disease, characterized by:               a. Increased intraepithelial lymphocytes               b. Villous atrophy and crypt hyperplasia (Marsh 3a)           2. See comment       B: STOMACH, BIOPSY:           1. Mild non-specific chronic inflammation (see comment)               a. Sampling: Antrum and body               b. Distribution: Antrum and body           2. Negative for atrophic gastritis           3. Negative for Helicobacter (immunohistochemistry  negative)   [facility] path did additional review of gastric biopsy based on current clinical information. Gastric biopsies do show granulomas raising question of Crohn's.         Colonoscopy 10/31/22  Findings:        A polypoid lesion was found in the descending colon. The lesion was        sessile. No bleeding was present. This was biopsied with a cold forceps.        Ulcerated mucosa were present in the descending colon.        A localized area of erythematous mucosa was found in the distal rectum.        Biopsies were taken with a cold forceps for histology.                                                                                     Moderate Sedation:        Moderate (conscious) sedation was administered by the endoscopy nurse        and supervised by the endoscopist. The patient's oxygen saturation,        heart rate, blood pressure and response to care were monitored.   Impression:            - The procedure was aborted within descending.                          - Descending colon with narrowed area with diffuse                          polyposis (look like pseudopolyps) and areas of                          purlulent discharge; however, surrounding mucosa does                          not appear inflamed (no edema or erythema). Polypoid                          lesions and purulent areas biopsied                          - Normal sigmoid                          - Distal rectum  with erythematous mucosa and localized                          purulent drainage. Biopsied.                          - External anal appeared normal                          - Unable to examine proximal colon or terminal ileum   Recommendation:        - Return patient to hospital [facility].                          - Advance diet as tolerated. Should be gluten free                          - Already started on antibiotics                          - Await stool study report                          - Await path report                          - May start steroids soon                                                                                       Luis Miguel Traore MD   A) DESCENDING COLON, BIOPSY:        1.  PATCHY ACUTE COLITIS WITH REACTIVE CHANGES        2.  FOCAL NONCASEATING GRANULOMA              A.  SPECIAL STAINS NEGATIVE FOR ACID-FAST BACILLI, YEAST, FUNGI AND CMV         3.  NEGATIVE FOR CHRONIC MUCOSAL CHANGES, DYSPLASIA AND MALIGNANCY    B) DISTAL RECTUM, BIOPSIES:         1.  DIFFUSE MILD ACUTE COLITIS WITH REACTIVE CHANGES         2.  CMV IMMUNOSTAIN: NEGATIVE FOR CMV INCLUSIONS         3.  NEGATIVE FOR CHRONIC MUCOSAL CHANGES, DYSPLASIA AND MALIGNANCY        IMPRESSION   Colitis-- This 43 yo female presented with acute on chronic abdominal pain and CT abdomen and pelvis with severe colitis. Stool for c difficile, crypto, giardia and enteric pathogens negative. O and P pending. Colonoscopy 10/31/22 with narrowed area with diffuse polyposis and areas of purulent discharge of unclear etiology--biopsies favor infectious etiology with patchy acute colitis of unclear etiology (awaiting [facility] path review). No fever X24 hours-- Rifampin and Isoniazid started 11/2/22 as well as on Meropenem, LVQ, Flagyl, infectious disease.      Abnormal duodenal biopsy 9/2022 with villous atrophy and crypt hyperplasia (Marsh 3a). TTG IgA is not elevated. EGD findings are fairly nonspecific but could be related to  medications/NSAIDs or even possibly Crohn's. [facility] path did additional review of gastric biopsy based on current clinical information. Gastric biopsies do show granulomas raising question of Crohn's.         PLAN   Appreciate input from infectious disease. Continue current treatment and await results of many pending tests. Initial quantiferon is indeterminate*  Will await input from second opinion path review from [facility] path re: colonoscopy biopsies.   Supportive cares. Hopefully stool testing/infectious eval and additional path review will further guide treatment.         Perla Fofana PA-C  Thank you for the opportunity to participate in the care of this patient.   Please feel free to call me with any questions or concerns.  Phone number [phone].

## 2022-11-04 LAB
GLIADIN IGA SER-ACNC: 4 U/ML
GLIADIN IGG SER-ACNC: 1.1 U/ML
HOLD SPECIMEN: NORMAL
IGA SERPL-MCNC: 448 MG/DL (ref 84–499)
MAGNESIUM SERPL-MCNC: 1.8 MG/DL (ref 1.7–2.3)
POTASSIUM SERPL-SCNC: 2.4 MMOL/L (ref 3.4–5.3)
POTASSIUM SERPL-SCNC: 3.3 MMOL/L (ref 3.4–5.3)
TTG IGA SER-ACNC: 1.3 U/ML
TTG IGG SER-ACNC: 1.2 U/ML
WBC # BLD AUTO: 7.6 10E3/UL (ref 4–11)

## 2022-11-04 PROCEDURE — 36415 COLL VENOUS BLD VENIPUNCTURE: CPT | Performed by: INTERNAL MEDICINE

## 2022-11-04 PROCEDURE — 84132 ASSAY OF SERUM POTASSIUM: CPT

## 2022-11-04 PROCEDURE — 250N000011 HC RX IP 250 OP 636: Performed by: FAMILY MEDICINE

## 2022-11-04 PROCEDURE — 36415 COLL VENOUS BLD VENIPUNCTURE: CPT

## 2022-11-04 PROCEDURE — 250N000013 HC RX MED GY IP 250 OP 250 PS 637

## 2022-11-04 PROCEDURE — 250N000011 HC RX IP 250 OP 636: Performed by: INTERNAL MEDICINE

## 2022-11-04 PROCEDURE — 120N000001 HC R&B MED SURG/OB

## 2022-11-04 PROCEDURE — 99233 SBSQ HOSP IP/OBS HIGH 50: CPT | Mod: GC

## 2022-11-04 PROCEDURE — 258N000003 HC RX IP 258 OP 636: Performed by: SPECIALIST

## 2022-11-04 PROCEDURE — 85048 AUTOMATED LEUKOCYTE COUNT: CPT | Performed by: PHYSICIAN ASSISTANT

## 2022-11-04 PROCEDURE — 99233 SBSQ HOSP IP/OBS HIGH 50: CPT | Performed by: INTERNAL MEDICINE

## 2022-11-04 PROCEDURE — 250N000013 HC RX MED GY IP 250 OP 250 PS 637: Performed by: INTERNAL MEDICINE

## 2022-11-04 PROCEDURE — 250N000013 HC RX MED GY IP 250 OP 250 PS 637: Performed by: FAMILY MEDICINE

## 2022-11-04 PROCEDURE — 83735 ASSAY OF MAGNESIUM: CPT

## 2022-11-04 PROCEDURE — 86481 TB AG RESPONSE T-CELL SUSP: CPT | Performed by: INTERNAL MEDICINE

## 2022-11-04 PROCEDURE — 250N000011 HC RX IP 250 OP 636

## 2022-11-04 RX ORDER — POTASSIUM CHLORIDE 1500 MG/1
20 TABLET, EXTENDED RELEASE ORAL ONCE
Status: COMPLETED | OUTPATIENT
Start: 2022-11-04 | End: 2022-11-04

## 2022-11-04 RX ORDER — POTASSIUM CHLORIDE 1500 MG/1
40 TABLET, EXTENDED RELEASE ORAL ONCE
Status: COMPLETED | OUTPATIENT
Start: 2022-11-04 | End: 2022-11-04

## 2022-11-04 RX ORDER — OXYCODONE HYDROCHLORIDE 5 MG/1
5 TABLET ORAL
Status: DISCONTINUED | OUTPATIENT
Start: 2022-11-04 | End: 2022-11-11

## 2022-11-04 RX ADMIN — ONDANSETRON 4 MG: 2 INJECTION INTRAMUSCULAR; INTRAVENOUS at 17:05

## 2022-11-04 RX ADMIN — PROCHLORPERAZINE EDISYLATE 10 MG: 5 INJECTION INTRAMUSCULAR; INTRAVENOUS at 01:35

## 2022-11-04 RX ADMIN — Medication 50 MG: at 08:51

## 2022-11-04 RX ADMIN — PROCHLORPERAZINE EDISYLATE 10 MG: 5 INJECTION INTRAMUSCULAR; INTRAVENOUS at 10:07

## 2022-11-04 RX ADMIN — PANTOPRAZOLE SODIUM 40 MG: 20 TABLET, DELAYED RELEASE ORAL at 08:55

## 2022-11-04 RX ADMIN — POTASSIUM CHLORIDE 40 MEQ: 1500 TABLET, EXTENDED RELEASE ORAL at 12:51

## 2022-11-04 RX ADMIN — ACETAMINOPHEN 650 MG: 325 TABLET, FILM COATED ORAL at 17:07

## 2022-11-04 RX ADMIN — OXYCODONE HYDROCHLORIDE 5 MG: 5 TABLET ORAL at 17:13

## 2022-11-04 RX ADMIN — ENOXAPARIN SODIUM 40 MG: 40 INJECTION SUBCUTANEOUS at 21:32

## 2022-11-04 RX ADMIN — THERA TABS 1 TABLET: TAB at 08:55

## 2022-11-04 RX ADMIN — RIFAMPIN 600 MG: 300 CAPSULE ORAL at 08:56

## 2022-11-04 RX ADMIN — ONDANSETRON 4 MG: 2 INJECTION INTRAMUSCULAR; INTRAVENOUS at 08:54

## 2022-11-04 RX ADMIN — SODIUM CHLORIDE: 9 INJECTION, SOLUTION INTRAVENOUS at 05:48

## 2022-11-04 RX ADMIN — METRONIDAZOLE 500 MG: 500 INJECTION, SOLUTION INTRAVENOUS at 19:24

## 2022-11-04 RX ADMIN — POTASSIUM CHLORIDE 20 MEQ: 1500 TABLET, EXTENDED RELEASE ORAL at 14:01

## 2022-11-04 RX ADMIN — MEROPENEM 1 G: 1 INJECTION INTRAVENOUS at 17:08

## 2022-11-04 RX ADMIN — ISONIAZID 300 MG: 100 TABLET ORAL at 08:56

## 2022-11-04 RX ADMIN — OXYCODONE HYDROCHLORIDE 5 MG: 5 TABLET ORAL at 03:00

## 2022-11-04 RX ADMIN — MEROPENEM 1 G: 1 INJECTION INTRAVENOUS at 01:21

## 2022-11-04 RX ADMIN — ACETAMINOPHEN 650 MG: 325 TABLET, FILM COATED ORAL at 22:43

## 2022-11-04 RX ADMIN — ACETAMINOPHEN 650 MG: 325 TABLET, FILM COATED ORAL at 12:51

## 2022-11-04 RX ADMIN — OXYCODONE HYDROCHLORIDE 5 MG: 5 TABLET ORAL at 06:53

## 2022-11-04 RX ADMIN — LEVOFLOXACIN 500 MG: 5 INJECTION, SOLUTION INTRAVENOUS at 17:08

## 2022-11-04 RX ADMIN — PANTOPRAZOLE SODIUM 40 MG: 20 TABLET, DELAYED RELEASE ORAL at 21:31

## 2022-11-04 RX ADMIN — MEROPENEM 1 G: 1 INJECTION INTRAVENOUS at 09:05

## 2022-11-04 RX ADMIN — METRONIDAZOLE 500 MG: 500 INJECTION, SOLUTION INTRAVENOUS at 06:53

## 2022-11-04 ASSESSMENT — ACTIVITIES OF DAILY LIVING (ADL)
ADLS_ACUITY_SCORE: 21

## 2022-11-04 NOTE — PLAN OF CARE
Problem: Pain Acute  Goal: Optimal Pain Control and Function  Outcome: Progressing  Intervention: Develop Pain Management Plan  Recent Flowsheet Documentation  Taken 11/4/2022 0130 by Christine Kamara RN  Pain Management Interventions: rest     Problem: Nausea and Vomiting  Goal: Nausea and Vomiting Relief  Outcome: Not Progressing   Goal Outcome Evaluation:     Pt vomiting on first assessment. Via language line pt reports zofran was ineffective for her nausea. Compazine given. Attempted two more times to contact  this shift, but language line was unable to connect writer with one. With the little bit of English the pt does speak and hand gestures pt was able and writer were able to communicate that compazine was effective. Then oxycodone was given for abd and back pain. Pt refused tylenol.

## 2022-11-04 NOTE — PLAN OF CARE
Goal Outcome Evaluation:    Problem: Pain Acute  Goal: Optimal Pain Control and Function  Outcome: Progressing  Intervention: Develop Pain Management Plan  Recent Flowsheet Documentation  Taken 11/4/2022 1251 by Jewels Zavaleta RN  Pain Management Interventions: rest  Taken 11/4/2022 0800 by Jewels Zavaleta RN  Pain Management Interventions: rest  Intervention: Prevent or Manage Pain  Recent Flowsheet Documentation  Taken 11/4/2022 0800 by Jewels Zavaleta RN  Medication Review/Management: medications reviewed        Pt reports abdominal pain today around 2-4 range. Declined pain meds. Zofran and compazine given for nausea.     Mg and K protocol. K was critical at 2.4. MD was updated. K oral was given. Recheck this evening.    Pt is up independently.

## 2022-11-04 NOTE — PROGRESS NOTES
Care Management Follow Up    Length of Stay (days): 6    Expected Discharge Date: 11/06/2022    Concerns to be Addressed:   In isolation to rule out TB. IV Levaquin, IV Meropenem, IV Flagyl (ID following). GI following. Await input from second opinion path review from [facility] path re: colonoscopy biopsies. Nausea and vomiting requiring IV antiemetics.   Patient plan of care discussed at interdisciplinary rounds: Yes    Anticipated Discharge Disposition:  Discharge goal is home pending response to treatment, medical needs and mobility closer to discharge.      Anticipated Discharge Services:  Possible home infusion.  Anticipated Discharge DME:  To be determined.     Patient/family educated on Medicare website which has current facility and service quality ratings:  Yes  Education Provided on the Discharge Plan:  Per team  Patient/Family in Agreement with the Plan:  yes    Referrals Placed by CM/SW:  [facility]  Private pay costs discussed: Not applicable at this time.     Additional Information:  Patient is  and independent at baseline, as children at home. She would need a [employer]  for discharge planning and education. Patient has 100% coverage for home infusion if needed. [facility] liaison is following along.     Kirstin Guillen RN

## 2022-11-04 NOTE — PLAN OF CARE
Goal Outcome Evaluation:      Problem: Pain Acute  Goal: Optimal Pain Control and Function  Outcome: Progressing  Intervention: Prevent or Manage Pain  Recent Flowsheet Documentation  Taken 11/3/2022 1948 by Radha Marroquin RN  Medication Review/Management: medications reviewed     Problem: Infection  Goal: Absence of Infection Signs and Symptoms  Outcome: Progressing  Intervention: Prevent or Manage Infection  Recent Flowsheet Documentation  Taken 11/3/2022 1948 by Radha Marroquin RN  Isolation Precautions: airborne precautions maintained       Pt remains A&Ox4, complains of feeling very tired. Uses call light appropriately. Up to bathroom independently. Pt called about 2240 and was vomiting when staff entered room, PRN zofran given.     Radha Marroquin RN

## 2022-11-04 NOTE — PROGRESS NOTES
"  GASTROENTEROLOGY PROGRESS NOTE     SUBJECTIVE   The patient reports feeling about the same-- intermittent sweats and chills. Passed three stools thus far today and reports stool is becoming more formed (not bloody). Received 2 oxycodone 5mg since 3AM for generalized abdominal pain.  Tmax 99.7F (last fever late 11/2/22 100.9F)     OBJECTIVE     Vitals Blood pressure 115/82, pulse 119, temperature 99.7  F (37.6  C), temperature source Oral, resp. rate 18, height 1.498 m (4' 10.98\"), weight 50.8 kg (111 lb 14.4 oz), SpO2 100 %, unknown if currently breastfeeding.          Physical Exam   General: awake, alert, responds appropriately via language line     Cardiovascular: S1S2, no edema    Chest: lungs are clear     Abdomen: +bs, soft, mild distention, mild generalized tenderness to deep palpation.     Neurologic: grossly intact        LABORATORY    ELECTROLYTE PANEL   Recent Labs   Lab 11/04/22  1046 11/03/22  0733 11/02/22  0737 11/01/22  1619 10/31/22  1916 10/31/22  0637 10/30/22  1707 10/29/22  2027   NA  --   --   --   --   --  138 140 136   POTASSIUM 2.4* 3.6 3.9 3.8   < > 3.1* 3.9 2.9*   CHLORIDE  --   --   --   --   --  101 103 98   CO2  --   --   --   --   --  22 26 26   GLC  --   --   --   --   --  70 95 102*   CR  --   --   --  0.63  --  0.53 0.50* 0.59   BUN  --   --   --   --   --  5.2* 7.8 7.2    < > = values in this interval not displayed.      HEMATOLOGY PANEL   Recent Labs   Lab 11/02/22  0737 11/01/22  1619 10/31/22  0637   HGB 9.6* 8.6* 8.5*   MCV 89 87 88   WBC 7.0 5.7 7.0     415 441 458*      LIVER AND PANCREAS PANEL   Recent Labs   Lab 10/29/22  2027   AST 14   ALT 8*   ALKPHOS 127*   BILITOTAL 0.4   LIPASE 17     C difficile and enteric pathogens negative  Crypto and giardia negative  O and P X3, one negative others PENDING  HIV negative, Quantiferon Indeterminate 11/1/22**,   hep A IgG reactive, HCV negative, Nonreactive hep BsAg and HepBsAb  cmv negative       IMAGING " STUDIES    EXAM: CT ABDOMEN PELVIS W CONTRAST  LOCATION: [facility]  DATE/TIME: 10/29/2022 10:27 PM     INDICATION: abd pain assoc with N V D  COMPARISON: Ultrasound of the pelvis from 08/18/2022. CT abdomen pelvis from 07/11/2022  TECHNIQUE: CT scan of the abdomen and pelvis was performed following injection of IV contrast. Multiplanar reformats were obtained. Dose reduction techniques were used.  CONTRAST: [id number] 100ml     FINDINGS:   LOWER CHEST: Normal.     HEPATOBILIARY: Focal fat along the falciform ligament, liver otherwise normal. No calcified gallstones. No biliary ductal dilatation.     PANCREAS: Normal.     SPLEEN: Normal.     ADRENAL GLANDS: Normal.     KIDNEYS/BLADDER: Normal.     BOWEL: Normal caliber small bowel. There is wall thickening of the large bowel from the proximal transverse colon through the mid sigmoid colon. Wall thickening is most severe in the mid and distal descending colon, where there are multiple intramural   fluid and gas collections consistent with abscess. Anteriorly there is a 1.5 cm collection on image 98 of series 2. Posteriorly there is a 1.3 cm collection on image 106. More inferiorly there is a thin crescentic collection posteriorly measuring 1.7 cm   in length. There is extensive pericolonic edema, but no free air.     LYMPH NODES: Multiple presumably reactive pericolonic lymph nodes.     VASCULATURE: Unremarkable.     PELVIC ORGANS: Cyst in the left pelvis measures 5 cm in length, likely corresponding to the paraovarian cyst seen on the recent pelvic ultrasound.     MUSCULOSKELETAL: Transitional lumbosacral anatomy, normal variant                                                                      IMPRESSION:   1.  Findings consistent with a fairly diffuse colitis with severe involvement of the mid and distal descending colon. Although there is no phoenix free air, the colitis is complicated by the presence of multiple small, intramural  abscesses as described   above.    I have reviewed the current diagnostic and laboratory tests.            EGD 9/6/22  Endoscopically unremarkable     A: DUODENUM, BIOPSY:           1. Duodenal mucosa with changes suspicious for celiac disease, characterized by:               a. Increased intraepithelial lymphocytes               b. Villous atrophy and crypt hyperplasia (Marsh 3a)           2. See comment       B: STOMACH, BIOPSY:           1. Mild non-specific chronic inflammation (see comment)               a. Sampling: Antrum and body               b. Distribution: Antrum and body           2. Negative for atrophic gastritis           3. Negative for Helicobacter (immunohistochemistry  negative)   [facility] path did additional review of gastric biopsy based on current clinical information. Gastric biopsies do show granulomas raising question of Crohn's.         Colonoscopy 10/31/22  Findings:        A polypoid lesion was found in the descending colon. The lesion was        sessile. No bleeding was present. This was biopsied with a cold forceps.        Ulcerated mucosa were present in the descending colon.        A localized area of erythematous mucosa was found in the distal rectum.        Biopsies were taken with a cold forceps for histology.                                                                                     Moderate Sedation:        Moderate (conscious) sedation was administered by the endoscopy nurse        and supervised by the endoscopist. The patient's oxygen saturation,        heart rate, blood pressure and response to care were monitored.   Impression:            - The procedure was aborted within descending.                          - Descending colon with narrowed area with diffuse                          polyposis (look like pseudopolyps) and areas of                          purlulent discharge; however, surrounding mucosa does                          not appear inflamed (no edema or  erythema). Polypoid                          lesions and purulent areas biopsied                          - Normal sigmoid                          - Distal rectum with erythematous mucosa and localized                          purulent drainage. Biopsied.                          - External anal appeared normal                          - Unable to examine proximal colon or terminal ileum   Recommendation:        - Return patient to hospital [facility].                          - Advance diet as tolerated. Should be gluten free                          - Already started on antibiotics                          - Await stool study report                          - Await path report                          - May start steroids soon                                                                                       Luis Miguel Traore MD   A) DESCENDING COLON, BIOPSY:        1.  PATCHY ACUTE COLITIS WITH REACTIVE CHANGES        2.  FOCAL NONCASEATING GRANULOMA              A.  SPECIAL STAINS NEGATIVE FOR ACID-FAST BACILLI, YEAST, FUNGI AND CMV         3.  NEGATIVE FOR CHRONIC MUCOSAL CHANGES, DYSPLASIA AND MALIGNANCY    B) DISTAL RECTUM, BIOPSIES:         1.  DIFFUSE MILD ACUTE COLITIS WITH REACTIVE CHANGES         2.  CMV IMMUNOSTAIN: NEGATIVE FOR CMV INCLUSIONS         3.  NEGATIVE FOR CHRONIC MUCOSAL CHANGES, DYSPLASIA AND MALIGNANCY      **[facility] path review has been requested and is pending    IMPRESSION   Colitis-- This 43 yo female presented with acute on chronic abdominal pain and CT abdomen and pelvis with severe colitis. Stool for c difficile, crypto, giardia and enteric pathogens negative. O and P pending. Colonoscopy 10/31/22 with narrowed area with diffuse polyposis and areas of purulent discharge of unclear etiology--biopsies favor infectious etiology with patchy acute colitis of unclear etiology (awaiting [facility] path review). No fever X24 hours-- empiric TB treatment started 11/2/22 as well as on Meropenem, LVQ,  Flagyl, per infectious disease.      Abnormal duodenal biopsy 9/2022 with villous atrophy and crypt hyperplasia (Marsh 3a). TTG IgA is not elevated. EGD findings are fairly nonspecific but could be related to medications/NSAIDs or even possibly Crohn's. [facility] path did additional review of gastric biopsy based on current clinical information. Gastric biopsies do show granulomas raising question of Crohn's.         PLAN   Appreciate input from infectious disease. Continue current treatment and await results of many pending tests. Initial quantiferon is indeterminate* repeat pending.    Will await input from second opinion path review from [facility] path re: colonoscopy biopsies.   Supportive cares. Hopefully stool testing/infectious eval and additional path review will further guide treatment.   Addendum: Discussed with the primary team and Dr Traore. I suspect a lot of the patient's current symptoms are related to TB meds. I understand TB meds have been put on hold by ID due to symptoms (nausea, vomiting etc.). On the other hand, the patient seems to be improving with absence of fever for nearly 36 hours and describes stool as more formed. Will hold off on starting steroids for now. Need to make sure infectious eval is complete prior to starting steroids.   JOSE E Vance PA-C  Thank you for the opportunity to participate in the care of this patient.   Please feel free to call me with any questions or concerns.  Phone number [phone].

## 2022-11-04 NOTE — PROGRESS NOTES
Faculty Supervision of Residents    I have examined Dain Tejeda, : 1980, on 22 and the medical care has been evaluated and discussed with the resident.   I agree with the medical care provided, confirm the findings after personally reviewing the images and labs, and agree with the plan documented in the note by Dr. Andrey Reyes.    Td aPdilla III, MD, FAAFP  22 8:49 AM         [facility]    Progress Note - Hospitalist Service       Date of Admission:  10/29/2022    Assessment & Plan   Dain Tejeda is a 42 year old female w/ PMH of chronic gastritis, inflammation of small intestine, and celiac disease admitted on 10/29/2022. Here w/ diffuse colitis w/ severe involvement of the mid and distal descending colon c/f IBD. She remains hospitalized for IV antibiotics, continued monitoring, and evaluation by GI.     Severe colitis w/ multifocal small intramural abscesses c/f Crohn's  Pt has been having abdominal pain and bloody diarrhea for some time now, w/ extensive outpatient workup. Has had negative enteric infectious workup, possible ileitis on imaging 2022 w/ interval improvement seen on CT 22. Saw GI outpatient 22 and noted to have elevated inflammatory markers and abnormal CT findings concerning for IBD. EGD was performed w/ biopsy suggestive of celiac disease and negative for H pylori. Ttg obtained during this hospitalization returned negative. CT abdomen pelvis 10/29 showing diffuse colitis with severe involvement of the mid and distal descending colon. Although there is no phoenix free air, the colitis is complicated by the presence of multiple small, intramural abscesses. Colonoscopy 10/31 with atypical findings, biopsy consistent with colitis with reactive changes. Given patient's history as a [place] refugee, also considering colonic TB. [place] reportedly immig[place] to [place] from [place] in [place], tested negative for TB upon leaving the country and upon arrival to [place].  No exposure to TB at home or work. No hx of immunosuppression, IV drug use, or incarceration. Currently on airborne isolation for c/f TB, pending results of broad infectious workup per ID's rec at this time. No infectious source of symptoms yet identified, quantiferon indeterminate, negative giardia/crypto   - ID consult   - Continue levofloxacin and metronidazole, Meropenem   - Pending lab studies: HIV, whipple, celiac panel, quantiferon gold, AFB smear from rectal mucosa, histoplasmosis, blasto, brucella   - AFB smears from blood, sputum, stool   - RIPE therapy for possible Colonic TB discontinued due to severe nausea and vomiting  - GI consult placed   >  Asking [facility] to review colonoscopy path for second opinion, follow stool O+P, quantiferon, HIV, CMV testing.   > If infectious workup is negative, will likely start immune modulators for IBD   > Consider starting steroids empirically, await opinion from GI, OK from ID perspective  - General surgery consult   > continue IV abx   > if ends up needing surgery, would need extended hemicolectomy or subtotal colectomy   - Nutrition consult placed   > Dietician did meet w/ the pt 10/31 and educated on gluten free diet give unclear diagnosis of possible Celiac disease.  - PO Pepcid BID  - Oxyocodone 5 mg q4 h PRN for pain  - Scheduled Tylenol for pain   - PRN Zofran and Compazine ordered    Hypokalemia  Hypomagnesemia  - Replete per nursing protocol     Abnormal UA  UA on admission positive for leuk esterase, protein, small amount of blood, and few bacteria seen. Also w/ moderate squamous cells noted. Pt is afebrile, w/o leukocytosis, and not complaining of urinary symptoms so unclear if this truly represents a UTI.  - Cont IV abx per above  - UC grew Klebsiella      Thrombocythemia  PLTs have been around the upper limits of normal during previous evaluation, found to be 600k on admission. Likely elevated 2/2 acute phase reactant and significant GI tract inflammation.  -  Monitor     Diet: Gluten Free Diet  Snacks/Supplements Adult: Ensure Clear; Between Meals    DVT Prophylaxis: Enoxaparin (Lovenox) SQ  Chambers Catheter: Not present  Fluids: 75ml/hr NS  Central Lines: None  Cardiac Monitoring: None  Code Status: Full Code      Disposition Plan      Expected Discharge Date: 11/06/2022    Discharge Delays: IV Medication - consider oral or Home Infusion  Procedure Pending (enter procedure & time in comments)  Destination: home with family  Discharge Comments: GI recs: colonoscopy  fever over night on 11/1  IV ABX        The patient's care was discussed with the Attending physician, Dr Padilla.    Andrey Reyes MD  Hospitalist Service  [facility]  Securely message with the Vocera Web Console (learn more here)  Text page via [employer] Paging/Directory     Clinically Significant Risk Factors              # Hypoalbuminemia: Lowest albumin = 2.5 g/dL (Ref range: 3.5-5.2) at 10/29/2022  8:27 PM, will monitor as appropriate            # Severe Malnutrition: based on nutrition assessment      _______________________________________________________    Interval History   RN notes reviewed. Patient has been vomiting from RIPE therapy and also feels pain is poorly controlled. Did not sleep at all last night due to vomiting, is trying to sleep now.     Data reviewed today: I reviewed all medications, new labs and imaging results over the last 24 hours.    Physical Exam   Vital Signs: Temp: 99.7  F (37.6  C) Temp src: Oral BP: 115/82 Pulse: 119   Resp: 18 SpO2: 100 % O2 Device: None (Room air)    Weight: 111 lbs 14.4 oz  Gen: Pleasant but sleepy  HEENT: MMM.   Neck: No overt asymmetry.   CV: Appears well-perfused. RRR. No murmur.   Resp: Breathing comfortably on room air. Lungs clear to auscultation bilaterally without wheeze or crackle.   Abd: Non-distended, some diffuse tenderness to palpation, no rebound or guarding, bowel sounds present  Ext: No edema or overt  asymmetry/deformity.   Skin: No overt rash on easily visualized skin.   Neuro: Non-focal.   Psych: Calm.     Data   Recent Labs   Lab 11/03/22  0733 11/02/22  0737 11/01/22  1619 10/31/22  1916 10/31/22  0637 10/30/22  1707 10/29/22  2027   WBC  --  7.0 5.7  --  7.0 7.5 9.9   HGB  --  9.6* 8.6*  --  8.5* 9.0* 10.3*   MCV  --  89 87  --  88 89 85   PLT  --  415  415 441  --  458* 429 600*   NA  --   --   --   --  138 140 136   POTASSIUM 3.6 3.9 3.8   < > 3.1* 3.9 2.9*   CHLORIDE  --   --   --   --  101 103 98   CO2  --   --   --   --  22 26 26   BUN  --   --   --   --  5.2* 7.8 7.2   CR  --   --  0.63  --  0.53 0.50* 0.59   ANIONGAP  --   --   --   --  15 11 12   ALEX  --   --   --   --  7.2* 7.6* 7.8*   GLC  --   --   --   --  70 95 102*   ALBUMIN  --   --   --   --   --   --  2.5*   PROTTOTAL  --   --   --   --   --   --  7.4   BILITOTAL  --   --   --   --   --   --  0.4   ALKPHOS  --   --   --   --   --   --  127*   ALT  --   --   --   --   --   --  8*   AST  --   --   --   --   --   --  14   LIPASE  --   --   --   --   --   --  17    < > = values in this interval not displayed.     No results found for this or any previous visit (from the past 24 hour(s)).

## 2022-11-04 NOTE — PROGRESS NOTES
[facility]    Infectious Disease Progress Note    Date of Service : 11/04/2022     Assessment & Plan   Dain Tejeda is a 42 year old female who was admitted on 10/29/2022.     ASSESSMENT:  1. Clinical presentation mimicking Crohn's disease.  History of celiac disease.  6 months history of diarrhea weight loss abdominal pain- active issue  2. Non caseating granulomas may be seen in inflammatory bowel disease.   3. 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  Less likely though cannot be ruled out at this point/ Patient had negative AFB stain on colon and rectal lesion biopsy  4. QFT indeterminate- repeat test ordered. CT chest no radiographic evidence of tuberculosis, active or healed  5. Severe nausea and vomiting with RIPE (Rifampin, INH, PZA and Ethambutol)--held   6. Klebsiella UTI- on effective antibiotic  7. Biopsy results below  8. Over 6 months history of weight loss, now with fevers.  Diarrhea abdominal pain  Colonoscopy report:  Descending colon with narrowed area with diffuse polyposis (look like pseudopolyps) and areas of purlulent discharge; however, surrounding mucosa does not appear inflamed (no edema or erythema). Polypoid  lesions and purulent areas biopsied     - Normal sigmoid     - Distal rectum with erythematous mucosa and localized purulent drainage. Biopsied.   DESCENDING COLON, BIOPSY:        1.  PATCHY ACUTE COLITIS WITH REACTIVE CHANGES        2.  FOCAL NONCASEATING GRANULOMA              A.  SPECIAL STAINS NEGATIVE FOR ACID-FAST BACILLI, YEAST, FUNGI AND CMV         3.  NEGATIVE FOR CHRONIC MUCOSAL CHANGES, DYSPLASIA AND MALIGNANCY    B) DISTAL RECTUM, BIOPSIES:         1.  DIFFUSE MILD ACUTE COLITIS WITH REACTIVE CHANGES         2.  CMV IMMUNOSTAIN: NEGATIVE FOR CMV INCLUSIONS         3.  NEGATIVE FOR CHRONIC MUCOSAL CHANGES, DYSPLASIA AND MALIGNANCY      Literature Review    Tuberculous colitis  Tuberculous colitis - PMC (nih.gov)      Colon  Tuberculosis: Endoscopic Features and Prospective Endoscopic Follow-Up After Anti-Tuberculosis Treatment Clin Transl Gastroenterol. 2012 Oct; 3(10): e24. Published online 2012 Oct 11. doi: 10.1038/ctg.2012.19      RECOMMENDATIONS:    1. Antibiotics continue broad-spectrum antibiotics: Meropenem levofloxacin, metronidazole  2. AFB blood, AFB stool, AFB sputum.  Ordered and in process  3. Empiric TB treatment based in case reports of TB colitis, and concern by GI. Started on 11/2/2022. Due to severe nausea, vomiting, holding Ethambutol, PZA, INH and Rifmapin  4. Zofran   5. Follow culture results   6. Focus/de-escalate antibiotics based on final culture results  7. Monitor CBC, CMP, check CD4 count  8. If TB work-up is negative, would treat for inflammatory bowel disease, defer to GI  9. Regional infectious disease work-up as ordered previously in process  10. Discussed with patient   11. Discussed with nurse and charge nurse regarding airborne infection isolation room while waiting for test results  12. ID will follow  13. Patient updated with the help of  over phone   14. Discussed with primary team.   15. ID will follow      Karen Rosales MD  [facility]  [phone]    Total Time Spent 40 minutes with >50% of the time spent in counseling, education and care coordination. Discussed with nurse and patient. Interpretor over phone. Discussed with primary team       Interval History   Fever better, thougabdominal pain  Nausea and vomiting with TB meds      Patient stated that she has not ever had contact with known tuberculosis case.  Updated patient regarding GI concerns next    Physical Exam   Temp: 99.7  F (37.6  C) Temp src: Oral BP: 115/82 Pulse: 119   Resp: 18 SpO2: 100 % O2 Device: None (Room air)    Vitals:    10/30/22 1933 10/30/22 2147   Weight: 49.3 kg (108 lb 11.2 oz) 50.8 kg (111 lb 14.4 oz)     Vital Signs with Ranges  Temp:  [98  F (36.7  C)-99.7  F (37.6  C)] 99.7   F (37.6  C)  Pulse:  [] 119  Resp:  [18] 18  BP: (105-115)/(59-82) 115/82  SpO2:  [98 %-100 %] 100 %    Constitutional: moderate distress, nauseated  Lungs: normal breathing pattern, no crackles or wheezing  Cardiovascular: Regular rate and rhythm  Abdomen: Tenderness  Skin: warm  Neuro: deconditioned  Psych: able to answer questions    Medications     sodium chloride 75 mL/hr at 11/04/22 0548       acetaminophen  650 mg Oral Q6H    Or     acetaminophen  650 mg Rectal Q6H     enoxaparin ANTICOAGULANT  40 mg Subcutaneous At Bedtime     [Held by provider] ethambutol  800 mg Oral Daily     isoniazid  300 mg Oral Daily     levofloxacin  500 mg Intravenous Q24H     meropenem  1 g Intravenous Q8H     metroNIDAZOLE  500 mg Intravenous Q12H     multivitamin, therapeutic  1 tablet Oral Daily     pantoprazole  40 mg Oral BID     potassium chloride  20 mEq Oral Once     [Held by provider] pyrazinamide  1,000 mg Oral Daily     pyridOXINE  50 mg Oral Daily     rifampin  600 mg Oral Daily     sodium chloride (PF)  3 mL Intracatheter Q8H       Data   All microbiology laboratory data reviewed.  Recent Labs   Lab Test 11/02/22  0737 11/01/22  1619 10/31/22  0637   WBC 7.0 5.7 7.0   HGB 9.6* 8.6* 8.5*   HCT 31.0* 28.0* 27.6*   MCV 89 87 88     415 441 458*     Recent Labs   Lab Test 11/01/22  1619 10/31/22  0637 10/30/22  1707   CR 0.63 0.53 0.50*     Recent Labs   Lab Test 05/24/22  1620   SED 90*     MICRO:  11/01/2022 2133 11/02/2022 1532 Routine parasitology exam [[id number]]    Stool from Per Rectum    Final result Component Value   OVA AND PARASITE EXAM Negative   A single negative specimen does not rule out parasitic infection.   WBC'S, O&P 4+ PMNs          11/01/2022 2133 11/02/2022 1123 Cryptosporidium and Giardia antigens [[id number]]   Stool from Per Rectum    Final result Component Value   Cryptosporidium parvum antigen Negative   Giardia lamblia antigen Negative          11/01/2022 1619 11/02/2022 0932  Blood Culture Arm, Right [[id number]]   Blood from Arm, Right    Preliminary result Component Value   Culture No growth after 12 hours P             11/01/2022 1619 11/02/2022 1343 CMV Quantitative, PCR [[id number]]    Blood from Arm, Right    Final result Component Value Units   CMV DNA IU/mL Not Detected IU/mL          11/01/2022 1619 11/01/2022 1644 Quantiferon TB Gold Plus Grey Tube [[id number]]   Blood from Arm, Right    In process Component Value   No component results          11/01/2022 1619 11/01/2022 1644 Quantiferon TB Gold Plus Green Tube [[id number]]   Blood from Arm, Right    In process Component Value   No component results          11/01/2022 1619 11/01/2022 1644 Quantiferon TB Gold Plus Yellow Tube [[id number]]   Blood from Arm, Right    In process Component Value   No component results          11/01/2022 1619 11/01/2022 1644 Quantiferon TB Gold Plus Purple Tube [[id number]]   Blood from Arm, Right    In process Component Value   No component results          11/01/2022 0434 11/02/2022 0932 Blood Culture Peripheral Blood [[id number]]    Peripheral Blood    Preliminary result Component Value   Culture No growth after 1 day P             10/30/2022 1848 10/31/2022 1713 Enteric Bacteria and Virus Panel by MARILYNN Stool [[id number]]    Stool from Per Rectum    Final result Component Value   Campylobacter group Not Detected   Salmonella species Not Detected   Shigella species Not Detected   Vibrio group Not Detected   Rotavirus Not Detected   Shiga toxin 1 gene Not Detected   Shiga toxin 2 gene Not Detected   Norovirus I and II Not Detected   Yersinia enterocolitica Not Detected          10/30/2022 1847 10/31/2022 0047 C. difficile Toxin B PCR with reflex to C. difficile Antigen and Toxins A/B EIA [[id number]]    Stool from Per Rectum    Final result Component Value   C Difficile Toxin B by PCR Negative   A negative result does not exclude actual disease due to C. difficile and may be due  to improper collection, handling and storage of the specimen or the number of organisms in the specimen is below the detection limit of the assay.          10/29/2022 2344 10/30/2022 0049 Asymptomatic COVID-19 Virus (Coronavirus) by PCR Nasopharyngeal [[id number]]    Swab from Nasopharyngeal    Final result Component Value   SARS CoV2 PCR Negative   NEGATIVE: SARS-CoV-2 (COVID-19) RNA not detected, presumed negative.          10/29/2022 2038 10/31/2022 2243 Urine Culture [[id number]]    (Abnormal)   Urine, Midstream    Final result Component Value   Culture 50,000-100,000 CFU/mL Klebsiella pneumoniae Abnormal     <10,000 CFU/mL Urogenital zulma       Susceptibility     Klebsiella pneumoniae     KENISHA     Ampicillin >=32 ug/mL Resistant 1     Ampicillin/ Sulbactam 4 ug/mL Susceptible     Cefazolin <=4 ug/mL Susceptible 2     Cefepime <=1 ug/mL Susceptible     Cefoxitin <=4 ug/mL Susceptible     Ceftazidime <=1 ug/mL Susceptible     Ceftriaxone <=1 ug/mL Susceptible     Ciprofloxacin <=0.25 ug/mL Susceptible     Gentamicin <=1 ug/mL Susceptible     Levofloxacin <=0.12 ug/mL Susceptible     Nitrofurantoin 64 ug/mL Intermediate     Piperacillin/Tazobactam <=4 ug/mL Susceptible     Tobramycin <=1 ug/mL Susceptible     Trimethoprim/Sulfamethoxazole >16/304 ug/mL Resistant              1 Intrinsically Resistant   2 Cefazolin KENISHA breakpoints are for the treatment of uncomplicated urinary tract infections. For the treatment of systemic infections, please contact the laboratory for additional testing.             RADIOLOGY:  Reviewed  CT Abdomen Pelvis w Contrast    Result Date: 10/29/2022  EXAM: CT ABDOMEN PELVIS W CONTRAST LOCATION: [facility] DATE/TIME: 10/29/2022 10:27 PM INDICATION: abd pain assoc with N V D COMPARISON: Ultrasound of the pelvis from 08/18/2022. CT abdomen pelvis from 07/11/2022 TECHNIQUE: CT scan of the abdomen and pelvis was performed following injection of IV contrast.  Multiplanar reformats were obtained. Dose reduction techniques were used. CONTRAST: [id number] 100ml FINDINGS: LOWER CHEST: Normal. HEPATOBILIARY: Focal fat along the falciform ligament, liver otherwise normal. No calcified gallstones. No biliary ductal dilatation. PANCREAS: Normal. SPLEEN: Normal. ADRENAL GLANDS: Normal. KIDNEYS/BLADDER: Normal. BOWEL: Normal caliber small bowel. There is wall thickening of the large bowel from the proximal transverse colon through the mid sigmoid colon. Wall thickening is most severe in the mid and distal descending colon, where there are multiple intramural fluid and gas collections consistent with abscess. Anteriorly there is a 1.5 cm collection on image 98 of series 2. Posteriorly there is a 1.3 cm collection on image 106. More inferiorly there is a thin crescentic collection posteriorly measuring 1.7 cm in length. There is extensive pericolonic edema, but no free air. LYMPH NODES: Multiple presumably reactive pericolonic lymph nodes. VASCULATURE: Unremarkable. PELVIC ORGANS: Cyst in the left pelvis measures 5 cm in length, likely corresponding to the paraovarian cyst seen on the recent pelvic ultrasound. MUSCULOSKELETAL: Transitional lumbosacral anatomy, normal variant     IMPRESSION: 1.  Findings consistent with a fairly diffuse colitis with severe involvement of the mid and distal descending colon. Although there is no phoenix free air, the colitis is complicated by the presence of multiple small, intramural abscesses as described above.    Total Time Spent 35 minutes with >50% of the time spent in counseling, education and care coordination.

## 2022-11-05 ENCOUNTER — APPOINTMENT (OUTPATIENT)
Dept: CT IMAGING | Facility: HOSPITAL | Age: 42
End: 2022-11-05
Payer: COMMERCIAL

## 2022-11-05 LAB
ALBUMIN SERPL BCG-MCNC: 1.5 G/DL (ref 3.5–5.2)
ALP SERPL-CCNC: 144 U/L (ref 35–104)
ALT SERPL W P-5'-P-CCNC: 10 U/L (ref 10–35)
ANION GAP SERPL CALCULATED.3IONS-SCNC: 5 MMOL/L (ref 7–15)
AST SERPL W P-5'-P-CCNC: 20 U/L (ref 10–35)
BASOPHILS # BLD AUTO: 0 10E3/UL (ref 0–0.2)
BASOPHILS NFR BLD AUTO: 0 %
BILIRUB SERPL-MCNC: 0.4 MG/DL
BUN SERPL-MCNC: 4.8 MG/DL (ref 6–20)
CALCIUM SERPL-MCNC: 7.3 MG/DL (ref 8.6–10)
CHLORIDE SERPL-SCNC: 102 MMOL/L (ref 98–107)
CREAT SERPL-MCNC: 0.58 MG/DL (ref 0.51–0.95)
DEPRECATED HCO3 PLAS-SCNC: 28 MMOL/L (ref 22–29)
ENDOMYSIUM IGA TITR SER IF: NORMAL {TITER}
EOSINOPHIL # BLD AUTO: 0 10E3/UL (ref 0–0.7)
EOSINOPHIL NFR BLD AUTO: 0 %
ERYTHROCYTE [DISTWIDTH] IN BLOOD BY AUTOMATED COUNT: 14.3 % (ref 10–15)
ERYTHROCYTE [DISTWIDTH] IN BLOOD BY AUTOMATED COUNT: 14.3 % (ref 10–15)
GFR SERPL CREATININE-BSD FRML MDRD: >90 ML/MIN/1.73M2
GLUCOSE SERPL-MCNC: 141 MG/DL (ref 70–99)
HCT VFR BLD AUTO: 27.3 % (ref 35–47)
HCT VFR BLD AUTO: 28.9 % (ref 35–47)
HGB BLD-MCNC: 8.5 G/DL (ref 11.7–15.7)
HGB BLD-MCNC: 8.9 G/DL (ref 11.7–15.7)
IMM GRANULOCYTES # BLD: 0 10E3/UL
IMM GRANULOCYTES NFR BLD: 1 %
LACTATE SERPL-SCNC: 2 MMOL/L (ref 0.7–2)
LYMPHOCYTES # BLD AUTO: 1.2 10E3/UL (ref 0.8–5.3)
LYMPHOCYTES NFR BLD AUTO: 17 %
MAGNESIUM SERPL-MCNC: 1.7 MG/DL (ref 1.7–2.3)
MCH RBC QN AUTO: 26.5 PG (ref 26.5–33)
MCH RBC QN AUTO: 26.8 PG (ref 26.5–33)
MCHC RBC AUTO-ENTMCNC: 30.8 G/DL (ref 31.5–36.5)
MCHC RBC AUTO-ENTMCNC: 31.1 G/DL (ref 31.5–36.5)
MCV RBC AUTO: 86 FL (ref 78–100)
MCV RBC AUTO: 86 FL (ref 78–100)
MONOCYTES # BLD AUTO: 0.5 10E3/UL (ref 0–1.3)
MONOCYTES NFR BLD AUTO: 7 %
NEUTROPHILS # BLD AUTO: 5.6 10E3/UL (ref 1.6–8.3)
NEUTROPHILS NFR BLD AUTO: 75 %
NRBC # BLD AUTO: 0 10E3/UL
NRBC BLD AUTO-RTO: 0 /100
PLATELET # BLD AUTO: 388 10E3/UL (ref 150–450)
PLATELET # BLD AUTO: 429 10E3/UL (ref 150–450)
POTASSIUM SERPL-SCNC: 3.2 MMOL/L (ref 3.4–5.3)
POTASSIUM SERPL-SCNC: 3.4 MMOL/L (ref 3.4–5.3)
PROT SERPL-MCNC: 5.6 G/DL (ref 6.4–8.3)
RBC # BLD AUTO: 3.17 10E6/UL (ref 3.8–5.2)
RBC # BLD AUTO: 3.36 10E6/UL (ref 3.8–5.2)
SODIUM SERPL-SCNC: 135 MMOL/L (ref 136–145)
TPMT BLD-CCNC: 28.1 U/ML
WBC # BLD AUTO: 5.8 10E3/UL (ref 4–11)
WBC # BLD AUTO: 7.4 10E3/UL (ref 4–11)

## 2022-11-05 PROCEDURE — 36415 COLL VENOUS BLD VENIPUNCTURE: CPT

## 2022-11-05 PROCEDURE — 83605 ASSAY OF LACTIC ACID: CPT

## 2022-11-05 PROCEDURE — 250N000013 HC RX MED GY IP 250 OP 250 PS 637

## 2022-11-05 PROCEDURE — 250N000011 HC RX IP 250 OP 636

## 2022-11-05 PROCEDURE — 74177 CT ABD & PELVIS W/CONTRAST: CPT

## 2022-11-05 PROCEDURE — 250N000011 HC RX IP 250 OP 636: Performed by: INTERNAL MEDICINE

## 2022-11-05 PROCEDURE — 99233 SBSQ HOSP IP/OBS HIGH 50: CPT | Mod: GC

## 2022-11-05 PROCEDURE — 84132 ASSAY OF SERUM POTASSIUM: CPT

## 2022-11-05 PROCEDURE — 85025 COMPLETE CBC W/AUTO DIFF WBC: CPT | Performed by: INTERNAL MEDICINE

## 2022-11-05 PROCEDURE — 36415 COLL VENOUS BLD VENIPUNCTURE: CPT | Performed by: INTERNAL MEDICINE

## 2022-11-05 PROCEDURE — 83735 ASSAY OF MAGNESIUM: CPT

## 2022-11-05 PROCEDURE — 85027 COMPLETE CBC AUTOMATED: CPT

## 2022-11-05 PROCEDURE — 250N000013 HC RX MED GY IP 250 OP 250 PS 637: Performed by: FAMILY MEDICINE

## 2022-11-05 PROCEDURE — 258N000003 HC RX IP 258 OP 636

## 2022-11-05 PROCEDURE — 120N000001 HC R&B MED SURG/OB

## 2022-11-05 PROCEDURE — 99207 PR NO CHARGE LOS: CPT | Performed by: INTERNAL MEDICINE

## 2022-11-05 PROCEDURE — 80053 COMPREHEN METABOLIC PANEL: CPT | Performed by: INTERNAL MEDICINE

## 2022-11-05 RX ORDER — AMOXICILLIN 250 MG
1 CAPSULE ORAL
Status: DISCONTINUED | OUTPATIENT
Start: 2022-11-05 | End: 2022-11-14 | Stop reason: HOSPADM

## 2022-11-05 RX ORDER — POTASSIUM CHLORIDE 1500 MG/1
40 TABLET, EXTENDED RELEASE ORAL ONCE
Status: COMPLETED | OUTPATIENT
Start: 2022-11-05 | End: 2022-11-05

## 2022-11-05 RX ORDER — IOPAMIDOL 755 MG/ML
100 INJECTION, SOLUTION INTRAVASCULAR ONCE
Status: COMPLETED | OUTPATIENT
Start: 2022-11-05 | End: 2022-11-05

## 2022-11-05 RX ADMIN — OXYCODONE HYDROCHLORIDE 5 MG: 5 TABLET ORAL at 09:11

## 2022-11-05 RX ADMIN — OXYCODONE HYDROCHLORIDE 5 MG: 5 TABLET ORAL at 02:39

## 2022-11-05 RX ADMIN — OXYCODONE HYDROCHLORIDE 5 MG: 5 TABLET ORAL at 18:49

## 2022-11-05 RX ADMIN — OXYCODONE HYDROCHLORIDE 5 MG: 5 TABLET ORAL at 14:02

## 2022-11-05 RX ADMIN — PROCHLORPERAZINE EDISYLATE 10 MG: 5 INJECTION INTRAMUSCULAR; INTRAVENOUS at 17:31

## 2022-11-05 RX ADMIN — PANTOPRAZOLE SODIUM 40 MG: 20 TABLET, DELAYED RELEASE ORAL at 20:44

## 2022-11-05 RX ADMIN — ACETAMINOPHEN 650 MG: 325 TABLET, FILM COATED ORAL at 06:26

## 2022-11-05 RX ADMIN — POTASSIUM CHLORIDE 40 MEQ: 1500 TABLET, EXTENDED RELEASE ORAL at 18:49

## 2022-11-05 RX ADMIN — MEROPENEM 1 G: 1 INJECTION INTRAVENOUS at 02:40

## 2022-11-05 RX ADMIN — METRONIDAZOLE 500 MG: 500 INJECTION, SOLUTION INTRAVENOUS at 18:49

## 2022-11-05 RX ADMIN — ACETAMINOPHEN 650 MG: 325 TABLET, FILM COATED ORAL at 10:40

## 2022-11-05 RX ADMIN — THERA TABS 1 TABLET: TAB at 09:11

## 2022-11-05 RX ADMIN — ACETAMINOPHEN 650 MG: 325 TABLET, FILM COATED ORAL at 16:20

## 2022-11-05 RX ADMIN — POTASSIUM CHLORIDE 40 MEQ: 1500 TABLET, EXTENDED RELEASE ORAL at 10:41

## 2022-11-05 RX ADMIN — SODIUM CHLORIDE 1000 ML: 9 INJECTION, SOLUTION INTRAVENOUS at 20:43

## 2022-11-05 RX ADMIN — ONDANSETRON 4 MG: 2 INJECTION INTRAMUSCULAR; INTRAVENOUS at 16:21

## 2022-11-05 RX ADMIN — PROCHLORPERAZINE EDISYLATE 10 MG: 5 INJECTION INTRAMUSCULAR; INTRAVENOUS at 10:58

## 2022-11-05 RX ADMIN — ONDANSETRON 4 MG: 2 INJECTION INTRAMUSCULAR; INTRAVENOUS at 09:10

## 2022-11-05 RX ADMIN — METRONIDAZOLE 500 MG: 500 INJECTION, SOLUTION INTRAVENOUS at 06:27

## 2022-11-05 RX ADMIN — MEROPENEM 1 G: 1 INJECTION INTRAVENOUS at 16:31

## 2022-11-05 RX ADMIN — PANTOPRAZOLE SODIUM 40 MG: 20 TABLET, DELAYED RELEASE ORAL at 09:11

## 2022-11-05 RX ADMIN — IOPAMIDOL 100 ML: 755 INJECTION, SOLUTION INTRAVENOUS at 20:20

## 2022-11-05 RX ADMIN — LEVOFLOXACIN 500 MG: 5 INJECTION, SOLUTION INTRAVENOUS at 17:31

## 2022-11-05 RX ADMIN — MEROPENEM 1 G: 1 INJECTION INTRAVENOUS at 09:11

## 2022-11-05 ASSESSMENT — ACTIVITIES OF DAILY LIVING (ADL)
ADLS_ACUITY_SCORE: 21
ADLS_ACUITY_SCORE: 20
ADLS_ACUITY_SCORE: 21
ADLS_ACUITY_SCORE: 21
ADLS_ACUITY_SCORE: 20
ADLS_ACUITY_SCORE: 21
ADLS_ACUITY_SCORE: 20

## 2022-11-05 NOTE — PROGRESS NOTES
[facility] Infectious Disease  Defervesced,  No new cx,  Continue current plans.    Allen Mcginnis M.D.

## 2022-11-05 NOTE — PROGRESS NOTES
[facility]    Progress Note - Hospitalist Service       Date of Admission:  10/29/2022    Assessment & Plan   Dain Tejeda is a 42 year old female w/ PMH of chronic gastritis, inflammation of small intestine, and celiac disease admitted on 10/29/2022. Here w/ diffuse colitis w/ severe involvement of the mid and distal descending colon c/f IBD. She remains hospitalized for IV antibiotics, continued monitoring, and evaluation by GI.     Severe colitis w/ multifocal small intramural abscesses c/f Crohn's  Pt has been having abdominal pain and bloody diarrhea for some time now, w/ extensive outpatient workup. Has had negative enteric infectious workup, possible ileitis on imaging 4/14/2022 w/ interval improvement seen on CT 7/11/22. Saw GI outpatient 7/22/22 and noted to have elevated inflammatory markers and abnormal CT findings concerning for IBD. EGD was performed w/ biopsy suggestive of celiac disease and negative for H pylori. Ttg obtained during this hospitalization returned negative. CT abdomen pelvis 10/29 showing diffuse colitis with severe involvement of the mid and distal descending colon. Although there is no phoenix free air, the colitis is complicated by the presence of multiple small, intramural abscesses. Colonoscopy 10/31 with atypical findings, biopsy consistent with colitis with reactive changes. Given patient's history as a [place] refugee, also considering colonic TB. [place] immig[place] from [facility] in [place], tested negative for TB upon leaving the country and upon arrival to [place]. No exposure to TB at home or work. No hx of immunosuppression, IV drug use, or incarceration. Currently on airborne isolation for c/f TB, pending results of broad infectious workup per ID's rec at this time. No infectious source of symptoms yet identified, quantiferon indeterminate, negative giardia/crypto   - ID consult              - Continue levofloxacin and metronidazole, Meropenem               - Pending lab studies: HIV, whipple, celiac panel, quantiferon gold, AFB smear from rectal mucosa, histoplasmosis, blasto, brucella              - AFB smears from blood, sputum, stool              - RIPE therapy held due to GI side effects   - GI consult placed              >  Asking [facility] to review colonoscopy path for second opinion, follow stool O+P, quantiferon, HIV, CMV testing.              > If infectious workup is negative, will likely start immune modulators for IBD  - General surgery consult              > continue IV abx              > if ends up needing surgery, would need extended hemicolectomy or subtotal colectomy   - Nutrition consult placed              > Dietician did meet w/ the pt 10/31 and educated on gluten free diet give unclear diagnosis of possible Celiac disease.  - PO Pepcid BID  - Oxyocodone 5 mg q4 h PRN for pain  - Scheduled Tylenol for pain   - PRN Zofran and Compazine ordered     Hypokalemia  Hypomagnesemia, resolved   Potassium remains low (3.2) today.   - Replete per nursing protocol     Abnormal UA  UA on admission positive for leuk esterase, protein, small amount of blood, and few bacteria seen. Also w/ moderate squamous cells noted. Pt is afebrile, w/o leukocytosis, and not complaining of urinary symptoms so unclear if this truly represents a UTI.  - Cont IV abx per above  - UC grew Klebsiella      Thrombocythemia  PLTs have been around the upper limits of normal during previous evaluation, found to be 600k on admission. Likely elevated 2/2 acute phase reactant and significant GI tract inflammation. Has been stable at the upper limit of normal.   - Monitor        Diet: Gluten Free Diet  Snacks/Supplements Adult: Ensure Clear; Between Meals    DVT Prophylaxis: Enoxaparin (Lovenox) SQ  Chambers Catheter: Not present  Fluids: 75mL/hr NS  Central Lines: None  Cardiac Monitoring: None  Code Status: Full Code      Disposition Plan      Expected Discharge Date: 11/06/2022    Discharge  Delays: IV Medication - consider oral or Home Infusion  Procedure Pending (enter procedure & time in comments)  Destination: home with family  Discharge Comments: Or 11/7; In isolation to rule out TB. IV Levaquin, IV Meropenem, IV Flagyl (ID following). GI following. Await input from second opinion path review from [facility] path re: colonoscopy biopsies. Nausea and vomiting requiring IV antiemetics.        The patient's care was discussed with Dr Randy Martin MD  Hospitalist Service  [facility]  Securely message with the Vocera Web Console (learn more here)  Text page via [employer] Paging/Directory         Clinically Significant Risk Factors        # Hypokalemia: Lowest K = 2.4 mmol/L (Ref range: 3.4-5.3) in last 2 days, will replace as needed       # Hypoalbuminemia: Lowest albumin = 2.5 g/dL (Ref range: 3.5-5.2) at 10/29/2022  8:27 PM, will monitor as appropriate           # Severe Malnutrition: based on nutrition assessment        ______________________________________________________________________    Interval History   No acute events overnight.  Has been used oxycodone for pain.  Has been tolerating more oral intake.  Family brought some food from home.  No nausea or vomiting.  Does endorse some abdominal discomfort still.  No diarrhea, constipation, melena, hematochezia.    Data reviewed today: I reviewed all medications, new labs and imaging results over the last 24 hours.     Physical Exam   Vital Signs: Temp: 98.3  F (36.8  C) Temp src: Oral BP: 108/69 Pulse: 110   Resp: 18 SpO2: 98 % O2 Device: None (Room air)    Weight: 111 lbs 14.4 oz  General Appearance: Comfortable.  No acute distress.  Respiratory: Comfortable respiratory effort.  Clear to auscultation bilaterally.  No crackles or wheezing  Cardiovascular: Regular rate and rhythm.  No murmurs rubs or gallops  GI: Nondistended.  Bowel sounds present.  Fuhs mild tenderness.  No guarding.  Skin: No visible rashes or  bruising   Neuro: Neutral affect.  Fluent speech.        Data

## 2022-11-05 NOTE — PLAN OF CARE
Problem: Pain Acute  Goal: Optimal Pain Control and Function  Intervention: Develop Pain Management Plan  Recent Flowsheet Documentation  Taken 11/5/2022 0134 by Diego Caraballo RN  Pain Management Interventions: medication (see MAR)  Taken 11/4/2022 2115 by Diego Caraballo RN  Pain Management Interventions: medication (see MAR)  Intervention: Prevent or Manage Pain  Recent Flowsheet Documentation  Taken 11/5/2022 0139 by Diego Caraballo RN  Medication Review/Management: medications reviewed  Taken 11/4/2022 2000 by Diego Caraballo RN  Medication Review/Management: medications reviewed     Problem: Infection  Goal: Absence of Infection Signs and Symptoms  Intervention: Prevent or Manage Infection  Recent Flowsheet Documentation  Taken 11/5/2022 0139 by Diego Caraballo RN  Isolation Precautions: airborne precautions maintained  Taken 11/4/2022 2000 by Diego Caraballo RN  Isolation Precautions: airborne precautions maintained   Goal Outcome Evaluation:       Patient appears alert and oriented, able to communicate with staff via . Her K level showed noticeable improvement. Patient continues on IV abx. No adverse reaction noted in this shift. Afebrile. Patient requested and received prn oxycodone 5 mg at 0239. Pt slept well. Patient continues on airborne precaution due current Positive TB. Pt denies S/Sx of TB symptoms. continues on IVF (0.9% sodium chloride at 75 ml/hr). LS clear in all field. Bowel sounds present and active x4 quadrants.

## 2022-11-05 NOTE — PLAN OF CARE
Problem: Nausea and Vomiting  Goal: Nausea and Vomiting Relief  Outcome: Progressing   Goal Outcome Evaluation:                    Pt c/o nausea and had an emesis. Zofran was given then awhile later then Compazine was given.  On mag and K protocol. Mag recheck in am. K dose was given, recheck done.    Family here, supportive.   NS running at 75cc/hr.

## 2022-11-06 ENCOUNTER — ANESTHESIA EVENT (OUTPATIENT)
Dept: SURGERY | Facility: HOSPITAL | Age: 42
End: 2022-11-06
Payer: COMMERCIAL

## 2022-11-06 ENCOUNTER — ANESTHESIA (OUTPATIENT)
Dept: SURGERY | Facility: HOSPITAL | Age: 42
End: 2022-11-06
Payer: COMMERCIAL

## 2022-11-06 ENCOUNTER — APPOINTMENT (OUTPATIENT)
Dept: RADIOLOGY | Facility: HOSPITAL | Age: 42
End: 2022-11-06
Payer: COMMERCIAL

## 2022-11-06 LAB
B HENSELAE IGG TITR SER IF: NORMAL {TITER}
B HENSELAE IGM TITR SER IF: NORMAL {TITER}
BACTERIA BLD CULT: NO GROWTH
BACTERIA BLD CULT: NO GROWTH
BRUCELLA AB TITR SER AGGL: NORMAL {TITER}
CREAT SERPL-MCNC: 0.34 MG/DL (ref 0.51–0.95)
GAMMA INTERFERON BACKGROUND BLD IA-ACNC: 0.57 IU/ML
GFR SERPL CREATININE-BSD FRML MDRD: >90 ML/MIN/1.73M2
GLIADIN IGA SER-ACNC: 4.7 U/ML
GLIADIN IGG SER-ACNC: 0.6 U/ML
GLUCOSE BLDC GLUCOMTR-MCNC: 130 MG/DL (ref 70–99)
M TB IFN-G BLD-IMP: ABNORMAL
M TB IFN-G CD4+ BCKGRND COR BLD-ACNC: 0.1 IU/ML
MAGNESIUM SERPL-MCNC: 1.7 MG/DL (ref 1.7–2.3)
MITOGEN IGNF BCKGRD COR BLD-ACNC: 0.01 IU/ML
MITOGEN IGNF BCKGRD COR BLD-ACNC: 0.02 IU/ML
POTASSIUM SERPL-SCNC: 3.6 MMOL/L (ref 3.4–5.3)
QUANTIFERON MITOGEN: 0.67 IU/ML
QUANTIFERON NIL TUBE: 0.57 IU/ML
QUANTIFERON TB1 TUBE: 0.58 IU/ML
QUANTIFERON TB2 TUBE: 0.59
SARS-COV-2 RNA RESP QL NAA+PROBE: NEGATIVE
T WHIPPLEI DNA SPEC QL NAA+PROBE: NOT DETECTED

## 2022-11-06 PROCEDURE — 87070 CULTURE OTHR SPECIMN AEROBIC: CPT | Performed by: SURGERY

## 2022-11-06 PROCEDURE — 710N000010 HC RECOVERY PHASE 1, LEVEL 2, PER MIN: Performed by: SURGERY

## 2022-11-06 PROCEDURE — 0DTM0ZZ RESECTION OF DESCENDING COLON, OPEN APPROACH: ICD-10-PCS | Performed by: SURGERY

## 2022-11-06 PROCEDURE — 258N000003 HC RX IP 258 OP 636: Performed by: NURSE ANESTHETIST, CERTIFIED REGISTERED

## 2022-11-06 PROCEDURE — 250N000011 HC RX IP 250 OP 636

## 2022-11-06 PROCEDURE — 999N000141 HC STATISTIC PRE-PROCEDURE NURSING ASSESSMENT: Performed by: SURGERY

## 2022-11-06 PROCEDURE — 250N000011 HC RX IP 250 OP 636: Performed by: ANESTHESIOLOGY

## 2022-11-06 PROCEDURE — 0D1L0Z4 BYPASS TRANSVERSE COLON TO CUTANEOUS, OPEN APPROACH: ICD-10-PCS | Performed by: SURGERY

## 2022-11-06 PROCEDURE — 44143 PARTIAL REMOVAL OF COLON: CPT | Performed by: SURGERY

## 2022-11-06 PROCEDURE — 74018 RADEX ABDOMEN 1 VIEW: CPT

## 2022-11-06 PROCEDURE — 250N000009 HC RX 250: Performed by: NURSE ANESTHETIST, CERTIFIED REGISTERED

## 2022-11-06 PROCEDURE — 87075 CULTR BACTERIA EXCEPT BLOOD: CPT | Performed by: SURGERY

## 2022-11-06 PROCEDURE — 82565 ASSAY OF CREATININE: CPT | Performed by: SURGERY

## 2022-11-06 PROCEDURE — 83735 ASSAY OF MAGNESIUM: CPT

## 2022-11-06 PROCEDURE — 99231 SBSQ HOSP IP/OBS SF/LOW 25: CPT | Mod: 57 | Performed by: SURGERY

## 2022-11-06 PROCEDURE — 0DBN0ZZ EXCISION OF SIGMOID COLON, OPEN APPROACH: ICD-10-PCS | Performed by: SURGERY

## 2022-11-06 PROCEDURE — 44139 MOBILIZATION OF COLON: CPT | Mod: 51 | Performed by: SURGERY

## 2022-11-06 PROCEDURE — 120N000001 HC R&B MED SURG/OB

## 2022-11-06 PROCEDURE — U0005 INFEC AGEN DETEC AMPLI PROBE: HCPCS | Performed by: STUDENT IN AN ORGANIZED HEALTH CARE EDUCATION/TRAINING PROGRAM

## 2022-11-06 PROCEDURE — 258N000003 HC RX IP 258 OP 636: Performed by: ANESTHESIOLOGY

## 2022-11-06 PROCEDURE — 250N000011 HC RX IP 250 OP 636: Performed by: SURGERY

## 2022-11-06 PROCEDURE — 250N000011 HC RX IP 250 OP 636: Performed by: NURSE ANESTHETIST, CERTIFIED REGISTERED

## 2022-11-06 PROCEDURE — 370N000017 HC ANESTHESIA TECHNICAL FEE, PER MIN: Performed by: SURGERY

## 2022-11-06 PROCEDURE — 99233 SBSQ HOSP IP/OBS HIGH 50: CPT | Mod: GC | Performed by: STUDENT IN AN ORGANIZED HEALTH CARE EDUCATION/TRAINING PROGRAM

## 2022-11-06 PROCEDURE — C9113 INJ PANTOPRAZOLE SODIUM, VIA: HCPCS

## 2022-11-06 PROCEDURE — 360N000077 HC SURGERY LEVEL 4, PER MIN: Performed by: SURGERY

## 2022-11-06 PROCEDURE — 250N000011 HC RX IP 250 OP 636: Performed by: INTERNAL MEDICINE

## 2022-11-06 PROCEDURE — 87798 DETECT AGENT NOS DNA AMP: CPT | Performed by: SURGERY

## 2022-11-06 PROCEDURE — 87206 SMEAR FLUORESCENT/ACID STAI: CPT | Performed by: SURGERY

## 2022-11-06 PROCEDURE — 250N000009 HC RX 250: Performed by: SURGERY

## 2022-11-06 PROCEDURE — 88307 TISSUE EXAM BY PATHOLOGIST: CPT | Mod: TC | Performed by: SURGERY

## 2022-11-06 PROCEDURE — 250N000011 HC RX IP 250 OP 636: Performed by: STUDENT IN AN ORGANIZED HEALTH CARE EDUCATION/TRAINING PROGRAM

## 2022-11-06 PROCEDURE — 36415 COLL VENOUS BLD VENIPUNCTURE: CPT | Performed by: SURGERY

## 2022-11-06 PROCEDURE — 258N000003 HC RX IP 258 OP 636

## 2022-11-06 PROCEDURE — 99233 SBSQ HOSP IP/OBS HIGH 50: CPT | Performed by: INTERNAL MEDICINE

## 2022-11-06 PROCEDURE — 272N000001 HC OR GENERAL SUPPLY STERILE: Performed by: SURGERY

## 2022-11-06 PROCEDURE — 36415 COLL VENOUS BLD VENIPUNCTURE: CPT

## 2022-11-06 RX ORDER — HYDROMORPHONE HYDROCHLORIDE 1 MG/ML
0.5 INJECTION, SOLUTION INTRAMUSCULAR; INTRAVENOUS; SUBCUTANEOUS EVERY 4 HOURS PRN
Status: DISCONTINUED | OUTPATIENT
Start: 2022-11-06 | End: 2022-11-06

## 2022-11-06 RX ORDER — SODIUM CHLORIDE, SODIUM LACTATE, POTASSIUM CHLORIDE, CALCIUM CHLORIDE 600; 310; 30; 20 MG/100ML; MG/100ML; MG/100ML; MG/100ML
INJECTION, SOLUTION INTRAVENOUS CONTINUOUS
Status: DISCONTINUED | OUTPATIENT
Start: 2022-11-06 | End: 2022-11-08

## 2022-11-06 RX ORDER — PROPOFOL 10 MG/ML
INJECTION, EMULSION INTRAVENOUS PRN
Status: DISCONTINUED | OUTPATIENT
Start: 2022-11-06 | End: 2022-11-06

## 2022-11-06 RX ORDER — OXYCODONE HYDROCHLORIDE 5 MG/1
5 TABLET ORAL EVERY 4 HOURS PRN
Status: DISCONTINUED | OUTPATIENT
Start: 2022-11-06 | End: 2022-11-14 | Stop reason: HOSPADM

## 2022-11-06 RX ORDER — ONDANSETRON 2 MG/ML
4 INJECTION INTRAMUSCULAR; INTRAVENOUS EVERY 30 MIN PRN
Status: DISCONTINUED | OUTPATIENT
Start: 2022-11-06 | End: 2022-11-06 | Stop reason: CLARIF

## 2022-11-06 RX ORDER — KETAMINE HYDROCHLORIDE 10 MG/ML
INJECTION INTRAMUSCULAR; INTRAVENOUS PRN
Status: DISCONTINUED | OUTPATIENT
Start: 2022-11-06 | End: 2022-11-06

## 2022-11-06 RX ORDER — AMOXICILLIN 250 MG
1 CAPSULE ORAL 2 TIMES DAILY
Status: DISCONTINUED | OUTPATIENT
Start: 2022-11-06 | End: 2022-11-14 | Stop reason: HOSPADM

## 2022-11-06 RX ORDER — OXYCODONE HYDROCHLORIDE 5 MG/1
5 TABLET ORAL EVERY 4 HOURS PRN
Status: DISCONTINUED | OUTPATIENT
Start: 2022-11-06 | End: 2022-11-06 | Stop reason: CLARIF

## 2022-11-06 RX ORDER — LIDOCAINE HYDROCHLORIDE 10 MG/ML
INJECTION, SOLUTION INFILTRATION; PERINEURAL PRN
Status: DISCONTINUED | OUTPATIENT
Start: 2022-11-06 | End: 2022-11-06

## 2022-11-06 RX ORDER — ONDANSETRON 2 MG/ML
4 INJECTION INTRAMUSCULAR; INTRAVENOUS EVERY 6 HOURS PRN
Status: DISCONTINUED | OUTPATIENT
Start: 2022-11-06 | End: 2022-11-06

## 2022-11-06 RX ORDER — HYDROMORPHONE HCL IN WATER/PF 6 MG/30 ML
.2-.4 PATIENT CONTROLLED ANALGESIA SYRINGE INTRAVENOUS EVERY 4 HOURS PRN
Status: DISCONTINUED | OUTPATIENT
Start: 2022-11-06 | End: 2022-11-06

## 2022-11-06 RX ORDER — HYDROMORPHONE HCL IN WATER/PF 6 MG/30 ML
0.4 PATIENT CONTROLLED ANALGESIA SYRINGE INTRAVENOUS
Status: DISCONTINUED | OUTPATIENT
Start: 2022-11-06 | End: 2022-11-06

## 2022-11-06 RX ORDER — ONDANSETRON 2 MG/ML
INJECTION INTRAMUSCULAR; INTRAVENOUS PRN
Status: DISCONTINUED | OUTPATIENT
Start: 2022-11-06 | End: 2022-11-06

## 2022-11-06 RX ORDER — HYDROMORPHONE HCL IN WATER/PF 6 MG/30 ML
0.2 PATIENT CONTROLLED ANALGESIA SYRINGE INTRAVENOUS
Status: DISCONTINUED | OUTPATIENT
Start: 2022-11-06 | End: 2022-11-06

## 2022-11-06 RX ORDER — ACETAMINOPHEN 325 MG/1
975 TABLET ORAL EVERY 8 HOURS
Status: DISCONTINUED | OUTPATIENT
Start: 2022-11-06 | End: 2022-11-06

## 2022-11-06 RX ORDER — HYDROMORPHONE HCL IN WATER/PF 6 MG/30 ML
.2-.5 PATIENT CONTROLLED ANALGESIA SYRINGE INTRAVENOUS EVERY 4 HOURS PRN
Status: DISCONTINUED | OUTPATIENT
Start: 2022-11-06 | End: 2022-11-06

## 2022-11-06 RX ORDER — LIDOCAINE 40 MG/G
CREAM TOPICAL
Status: DISCONTINUED | OUTPATIENT
Start: 2022-11-06 | End: 2022-11-14 | Stop reason: HOSPADM

## 2022-11-06 RX ORDER — POLYETHYLENE GLYCOL 3350 17 G/17G
17 POWDER, FOR SOLUTION ORAL DAILY
Status: DISCONTINUED | OUTPATIENT
Start: 2022-11-07 | End: 2022-11-14 | Stop reason: HOSPADM

## 2022-11-06 RX ORDER — PROPOFOL 10 MG/ML
INJECTION, EMULSION INTRAVENOUS CONTINUOUS PRN
Status: DISCONTINUED | OUTPATIENT
Start: 2022-11-06 | End: 2022-11-06

## 2022-11-06 RX ORDER — MAGNESIUM HYDROXIDE 1200 MG/15ML
LIQUID ORAL PRN
Status: DISCONTINUED | OUTPATIENT
Start: 2022-11-06 | End: 2022-11-06 | Stop reason: HOSPADM

## 2022-11-06 RX ORDER — BISACODYL 10 MG
10 SUPPOSITORY, RECTAL RECTAL DAILY PRN
Status: DISCONTINUED | OUTPATIENT
Start: 2022-11-06 | End: 2022-11-14 | Stop reason: HOSPADM

## 2022-11-06 RX ORDER — FENTANYL CITRATE 50 UG/ML
25 INJECTION, SOLUTION INTRAMUSCULAR; INTRAVENOUS EVERY 5 MIN PRN
Status: DISCONTINUED | OUTPATIENT
Start: 2022-11-06 | End: 2022-11-06 | Stop reason: HOSPADM

## 2022-11-06 RX ORDER — HYDROMORPHONE HYDROCHLORIDE 1 MG/ML
0.2 INJECTION, SOLUTION INTRAMUSCULAR; INTRAVENOUS; SUBCUTANEOUS EVERY 5 MIN PRN
Status: DISCONTINUED | OUTPATIENT
Start: 2022-11-06 | End: 2022-11-06 | Stop reason: HOSPADM

## 2022-11-06 RX ORDER — ONDANSETRON 4 MG/1
4 TABLET, ORALLY DISINTEGRATING ORAL EVERY 30 MIN PRN
Status: DISCONTINUED | OUTPATIENT
Start: 2022-11-06 | End: 2022-11-06 | Stop reason: CLARIF

## 2022-11-06 RX ORDER — DEXAMETHASONE SODIUM PHOSPHATE 10 MG/ML
INJECTION, SOLUTION INTRAMUSCULAR; INTRAVENOUS PRN
Status: DISCONTINUED | OUTPATIENT
Start: 2022-11-06 | End: 2022-11-06

## 2022-11-06 RX ORDER — CEFAZOLIN SODIUM/WATER 2 G/20 ML
2 SYRINGE (ML) INTRAVENOUS SEE ADMIN INSTRUCTIONS
Status: DISCONTINUED | OUTPATIENT
Start: 2022-11-06 | End: 2022-11-06 | Stop reason: HOSPADM

## 2022-11-06 RX ORDER — FENTANYL CITRATE 50 UG/ML
25 INJECTION, SOLUTION INTRAMUSCULAR; INTRAVENOUS
Status: DISCONTINUED | OUTPATIENT
Start: 2022-11-06 | End: 2022-11-06 | Stop reason: CLARIF

## 2022-11-06 RX ORDER — SODIUM CHLORIDE, SODIUM LACTATE, POTASSIUM CHLORIDE, CALCIUM CHLORIDE 600; 310; 30; 20 MG/100ML; MG/100ML; MG/100ML; MG/100ML
INJECTION, SOLUTION INTRAVENOUS CONTINUOUS
Status: DISCONTINUED | OUTPATIENT
Start: 2022-11-06 | End: 2022-11-06 | Stop reason: HOSPADM

## 2022-11-06 RX ORDER — HYDROMORPHONE HYDROCHLORIDE 1 MG/ML
.5-1 INJECTION, SOLUTION INTRAMUSCULAR; INTRAVENOUS; SUBCUTANEOUS
Status: DISCONTINUED | OUTPATIENT
Start: 2022-11-06 | End: 2022-11-11

## 2022-11-06 RX ORDER — OXYCODONE HYDROCHLORIDE 5 MG/1
10 TABLET ORAL EVERY 4 HOURS PRN
Status: DISCONTINUED | OUTPATIENT
Start: 2022-11-06 | End: 2022-11-14 | Stop reason: HOSPADM

## 2022-11-06 RX ORDER — MAGNESIUM SULFATE 4 G/50ML
4 INJECTION INTRAVENOUS ONCE
Status: DISCONTINUED | OUTPATIENT
Start: 2022-11-06 | End: 2022-11-06 | Stop reason: HOSPADM

## 2022-11-06 RX ORDER — MEPERIDINE HYDROCHLORIDE 25 MG/ML
12.5 INJECTION INTRAMUSCULAR; INTRAVENOUS; SUBCUTANEOUS
Status: DISCONTINUED | OUTPATIENT
Start: 2022-11-06 | End: 2022-11-06 | Stop reason: CLARIF

## 2022-11-06 RX ORDER — FLUCONAZOLE 2 MG/ML
400 INJECTION, SOLUTION INTRAVENOUS EVERY 24 HOURS
Status: DISCONTINUED | OUTPATIENT
Start: 2022-11-06 | End: 2022-11-08

## 2022-11-06 RX ORDER — LIDOCAINE 40 MG/G
CREAM TOPICAL
Status: DISCONTINUED | OUTPATIENT
Start: 2022-11-06 | End: 2022-11-06 | Stop reason: HOSPADM

## 2022-11-06 RX ORDER — ENOXAPARIN SODIUM 100 MG/ML
40 INJECTION SUBCUTANEOUS EVERY 24 HOURS
Status: DISCONTINUED | OUTPATIENT
Start: 2022-11-07 | End: 2022-11-14 | Stop reason: HOSPADM

## 2022-11-06 RX ORDER — CEFAZOLIN SODIUM/WATER 2 G/20 ML
2 SYRINGE (ML) INTRAVENOUS
Status: DISCONTINUED | OUTPATIENT
Start: 2022-11-06 | End: 2022-11-06 | Stop reason: HOSPADM

## 2022-11-06 RX ORDER — ACETAMINOPHEN 325 MG/1
650 TABLET ORAL EVERY 4 HOURS PRN
Status: DISCONTINUED | OUTPATIENT
Start: 2022-11-09 | End: 2022-11-14 | Stop reason: HOSPADM

## 2022-11-06 RX ORDER — PROCHLORPERAZINE MALEATE 10 MG
10 TABLET ORAL EVERY 6 HOURS PRN
Status: DISCONTINUED | OUTPATIENT
Start: 2022-11-06 | End: 2022-11-06

## 2022-11-06 RX ORDER — GABAPENTIN 100 MG/1
100 CAPSULE ORAL 3 TIMES DAILY
Status: DISCONTINUED | OUTPATIENT
Start: 2022-11-06 | End: 2022-11-14 | Stop reason: HOSPADM

## 2022-11-06 RX ORDER — BUPIVACAINE HYDROCHLORIDE 2.5 MG/ML
INJECTION, SOLUTION EPIDURAL; INFILTRATION; INTRACAUDAL
Status: COMPLETED | OUTPATIENT
Start: 2022-11-06 | End: 2022-11-06

## 2022-11-06 RX ORDER — ONDANSETRON 4 MG/1
4 TABLET, ORALLY DISINTEGRATING ORAL EVERY 6 HOURS PRN
Status: DISCONTINUED | OUTPATIENT
Start: 2022-11-06 | End: 2022-11-06

## 2022-11-06 RX ORDER — FENTANYL CITRATE 50 UG/ML
INJECTION, SOLUTION INTRAMUSCULAR; INTRAVENOUS PRN
Status: DISCONTINUED | OUTPATIENT
Start: 2022-11-06 | End: 2022-11-06

## 2022-11-06 RX ADMIN — MIDAZOLAM 2 MG: 1 INJECTION INTRAMUSCULAR; INTRAVENOUS at 15:37

## 2022-11-06 RX ADMIN — SODIUM CHLORIDE, POTASSIUM CHLORIDE, SODIUM LACTATE AND CALCIUM CHLORIDE: 600; 310; 30; 20 INJECTION, SOLUTION INTRAVENOUS at 20:06

## 2022-11-06 RX ADMIN — ONDANSETRON 4 MG: 2 INJECTION INTRAMUSCULAR; INTRAVENOUS at 15:38

## 2022-11-06 RX ADMIN — HYDROMORPHONE HYDROCHLORIDE 0.5 MG: 1 INJECTION, SOLUTION INTRAMUSCULAR; INTRAVENOUS; SUBCUTANEOUS at 17:45

## 2022-11-06 RX ADMIN — ROCURONIUM BROMIDE 10 MG: 50 INJECTION, SOLUTION INTRAVENOUS at 16:16

## 2022-11-06 RX ADMIN — METRONIDAZOLE 500 MG: 500 INJECTION, SOLUTION INTRAVENOUS at 06:11

## 2022-11-06 RX ADMIN — FLUCONAZOLE IN SODIUM CHLORIDE 400 MG: 2 INJECTION, SOLUTION INTRAVENOUS at 14:17

## 2022-11-06 RX ADMIN — KETAMINE HYDROCHLORIDE 25 MG: 10 INJECTION, SOLUTION INTRAMUSCULAR; INTRAVENOUS at 15:41

## 2022-11-06 RX ADMIN — KETAMINE HYDROCHLORIDE 25 MG: 10 INJECTION, SOLUTION INTRAMUSCULAR; INTRAVENOUS at 16:02

## 2022-11-06 RX ADMIN — PANTOPRAZOLE SODIUM 40 MG: 40 INJECTION, POWDER, FOR SOLUTION INTRAVENOUS at 08:35

## 2022-11-06 RX ADMIN — FENTANYL CITRATE 25 MCG: 50 INJECTION, SOLUTION INTRAMUSCULAR; INTRAVENOUS at 19:11

## 2022-11-06 RX ADMIN — HYDROMORPHONE HYDROCHLORIDE 0.4 MG: 0.2 INJECTION, SOLUTION INTRAMUSCULAR; INTRAVENOUS; SUBCUTANEOUS at 20:25

## 2022-11-06 RX ADMIN — ONDANSETRON 4 MG: 2 INJECTION INTRAMUSCULAR; INTRAVENOUS at 00:39

## 2022-11-06 RX ADMIN — PROCHLORPERAZINE EDISYLATE 10 MG: 5 INJECTION INTRAMUSCULAR; INTRAVENOUS at 13:38

## 2022-11-06 RX ADMIN — PROPOFOL 80 MG: 10 INJECTION, EMULSION INTRAVENOUS at 15:41

## 2022-11-06 RX ADMIN — PHENYLEPHRINE HYDROCHLORIDE 50 MCG: 10 INJECTION INTRAVENOUS at 17:06

## 2022-11-06 RX ADMIN — BUPIVACAINE HYDROCHLORIDE 30 ML: 2.5 INJECTION, SOLUTION EPIDURAL; INFILTRATION; INTRACAUDAL at 15:45

## 2022-11-06 RX ADMIN — SODIUM CHLORIDE, POTASSIUM CHLORIDE, SODIUM LACTATE AND CALCIUM CHLORIDE: 600; 310; 30; 20 INJECTION, SOLUTION INTRAVENOUS at 17:27

## 2022-11-06 RX ADMIN — HYDROMORPHONE HYDROCHLORIDE 0.5 MG: 1 INJECTION, SOLUTION INTRAMUSCULAR; INTRAVENOUS; SUBCUTANEOUS at 17:38

## 2022-11-06 RX ADMIN — HYDROMORPHONE HYDROCHLORIDE 0.5 MG: 1 INJECTION, SOLUTION INTRAMUSCULAR; INTRAVENOUS; SUBCUTANEOUS at 10:07

## 2022-11-06 RX ADMIN — DEXAMETHASONE SODIUM PHOSPHATE 10 MG: 10 INJECTION, SOLUTION INTRAMUSCULAR; INTRAVENOUS at 15:43

## 2022-11-06 RX ADMIN — ROCURONIUM BROMIDE 50 MG: 50 INJECTION, SOLUTION INTRAVENOUS at 15:41

## 2022-11-06 RX ADMIN — PHENYLEPHRINE HYDROCHLORIDE 50 MCG: 10 INJECTION INTRAVENOUS at 17:11

## 2022-11-06 RX ADMIN — FENTANYL CITRATE 25 MCG: 50 INJECTION, SOLUTION INTRAMUSCULAR; INTRAVENOUS at 18:46

## 2022-11-06 RX ADMIN — MEROPENEM 1 G: 1 INJECTION INTRAVENOUS at 08:35

## 2022-11-06 RX ADMIN — FENTANYL CITRATE 75 MCG: 50 INJECTION, SOLUTION INTRAMUSCULAR; INTRAVENOUS at 16:02

## 2022-11-06 RX ADMIN — HYDROMORPHONE HYDROCHLORIDE 0.5 MG: 1 INJECTION, SOLUTION INTRAMUSCULAR; INTRAVENOUS; SUBCUTANEOUS at 22:32

## 2022-11-06 RX ADMIN — PROPOFOL 200 MCG/KG/MIN: 10 INJECTION, EMULSION INTRAVENOUS at 15:41

## 2022-11-06 RX ADMIN — SODIUM CHLORIDE, POTASSIUM CHLORIDE, SODIUM LACTATE AND CALCIUM CHLORIDE: 600; 310; 30; 20 INJECTION, SOLUTION INTRAVENOUS at 15:28

## 2022-11-06 RX ADMIN — PANTOPRAZOLE SODIUM 40 MG: 40 INJECTION, POWDER, FOR SOLUTION INTRAVENOUS at 22:12

## 2022-11-06 RX ADMIN — PROCHLORPERAZINE EDISYLATE 10 MG: 5 INJECTION INTRAMUSCULAR; INTRAVENOUS at 03:56

## 2022-11-06 RX ADMIN — ROCURONIUM BROMIDE 10 MG: 50 INJECTION, SOLUTION INTRAVENOUS at 16:39

## 2022-11-06 RX ADMIN — ONDANSETRON 4 MG: 2 INJECTION INTRAMUSCULAR; INTRAVENOUS at 08:35

## 2022-11-06 RX ADMIN — SUGAMMADEX 110 MG: 100 INJECTION, SOLUTION INTRAVENOUS at 18:09

## 2022-11-06 RX ADMIN — ROCURONIUM BROMIDE 10 MG: 50 INJECTION, SOLUTION INTRAVENOUS at 17:10

## 2022-11-06 RX ADMIN — FENTANYL CITRATE 25 MCG: 50 INJECTION, SOLUTION INTRAMUSCULAR; INTRAVENOUS at 18:58

## 2022-11-06 RX ADMIN — FENTANYL CITRATE 25 MCG: 50 INJECTION, SOLUTION INTRAMUSCULAR; INTRAVENOUS at 15:41

## 2022-11-06 RX ADMIN — LIDOCAINE HYDROCHLORIDE 30 MG: 10 INJECTION, SOLUTION INFILTRATION; PERINEURAL at 15:41

## 2022-11-06 RX ADMIN — MEROPENEM 1 G: 1 INJECTION INTRAVENOUS at 00:33

## 2022-11-06 RX ADMIN — HYDROMORPHONE HYDROCHLORIDE 0.5 MG: 1 INJECTION, SOLUTION INTRAMUSCULAR; INTRAVENOUS; SUBCUTANEOUS at 06:10

## 2022-11-06 RX ADMIN — SODIUM CHLORIDE: 9 INJECTION, SOLUTION INTRAVENOUS at 08:33

## 2022-11-06 ASSESSMENT — ACTIVITIES OF DAILY LIVING (ADL)
ADLS_ACUITY_SCORE: 24
ADLS_ACUITY_SCORE: 20
ADLS_ACUITY_SCORE: 20
ADLS_ACUITY_SCORE: 24
ADLS_ACUITY_SCORE: 20
ADLS_ACUITY_SCORE: 20
ADLS_ACUITY_SCORE: 24
ADLS_ACUITY_SCORE: 24
ADLS_ACUITY_SCORE: 20
ADLS_ACUITY_SCORE: 24
ADLS_ACUITY_SCORE: 20
ADLS_ACUITY_SCORE: 24

## 2022-11-06 NOTE — SIGNIFICANT EVENT
"Significant Event Note    Time of event: 7:23 PM November 5, 2022    Description of event:  Notified of new fever, worsening nausea vomiting.    Briefly, Dain is a 42-year-old admitted with severe colitis with multifocal small intramural abscesses.  Etiology unclear at this point, being treated for infectious source.    Worsening nausea and vomiting this evening.  Now with worsening abdominal pain.  Describes it as sharp pain in her abdomen radiating to her back.  Started suddenly about 2 hours ago.  Has been having pain like this in the past, however not this severe.    /78   Pulse (!) 138   Temp (!) 101  F (38.3  C) (Oral)   Resp 30   Ht 1.498 m (4' 10.98\")   Wt 50.8 kg (111 lb 14.4 oz)   SpO2 97%   BMI 22.62 kg/m    Exam  General -appears uncomfortable.  In pain.  HEENT.  Extraocular muscle intact.  Lungs -slightly tachypneic.  Good air movement bilaterally.  No crackles or wheezing.  Heart -tachycardic.  Regular rhythm.  Abdomen -distended.  Minimal bowel sounds.  Diffuse tenderness to palpation with some guarding, worse in the right lower quadrant.    Assessment plan.  Worsening abdominal pain with new fever tachycardia.  With severe colitis and multiple intramural abscesses noted on prior imaging, concern for new acute intra-abdominal process.  Will check a CBC and lactate.  We will also get stat repeat abdominal imaging.  -CT abdomen pelvis with contrast.  -1 L bolus normal saline  -CBC lactate  -Current antibiotic regimen.      8:28 PM  CBC showing normal WBC.  Hemoglobin 8.5, down from 8.9 earlier today.  Lactic acid within normal is.    CT abdomen completed, results pending.        9:38 PM  CT showing new high grade stricture/obstruction at the level of the descending colon near previously described intramural abscesses. New mesenteric edema.  Patient feeling a little better, fever resolved. Tachycardia improving.    Discussed with General Surgery, will place NG tube tonight for symptomatic " management. Likely plan for surgical intervention in the morning, or earlier if symptoms worsening or there is evidence of hemodynamic instability.   - NG tube placement   - NPO  - Maintenance fluid   - Hold anjana Martin MD PGY-2  [facility]

## 2022-11-06 NOTE — ANESTHESIA PROCEDURE NOTES
"TAP Procedure Note    Pre-Procedure   Staff -        Anesthesiologist:  Jessie Nice MD       Performed By: anesthesiologist       Location: OR       Procedure Start/Stop Times: 11/6/2022 3:45 PM and 11/6/2022 3:48 PM       Pre-Anesthestic Checklist: patient identified, IV checked, site marked, risks and benefits discussed, informed consent, monitors and equipment checked, pre-op evaluation, at physician/surgeon's request and post-op pain management  Timeout:       Correct Patient: Yes        Correct Procedure: Yes        Correct Site: Yes        Correct Position: Yes        Correct Laterality: Yes        Site Marked: Yes  Procedure Documentation  Procedure: TAP       Laterality: bilateral       Patient Position: supine       Patient Prep/Sterile Barriers: sterile gloves, mask, x2       Skin prep: Chloraprep       Needle Type: insulated       Needle Gauge: 20.        Needle Length (Inches): 4        Ultrasound guided       1. Ultrasound was used to identify targeted nerve, plexus, vascular marker, or fascial plane and place a needle adjacent to it in real-time.       2. Ultrasound was used to visualize the spread of anesthetic in close proximity to the above referenced structure.       3. A permanent image is entered into the patient's record.    Assessment/Narrative         The placement was negative for: blood aspirated and site bleeding       Bolus given via needle..        Secured via.        Insertion/Infusion Method: Single Shot       Complications: none    Medication(s) Administered   Bupivacaine 0.25% PF (Infiltration) - Infiltration   30 mL - 11/6/2022 3:45:00 PM  Medication Administration Time: 11/6/2022 3:45 PM      FOR [facility] [place] [place]:   Pain Team Contact information: please page the Pain Team Via [employer]. Search \"Pain\". During daytime hours, please page the attending first. At night please page the resident first.    "

## 2022-11-06 NOTE — PLAN OF CARE
Goal Outcome Evaluation:    Problem: Plan of Care - These are the overarching goals to be used throughout the patient stay.    Goal: Optimal Comfort and Wellbeing  11/5/2022 2249 by Jewels Zavaleta RN  Outcome: Progressing  11/5/2022 1835 by Jewels Zavaleta RN  Outcome: Progressing  11/5/2022 1814 by Jewels Zavaleta RN  Outcome: Progressing  Intervention: Monitor Pain and Promote Comfort  Recent Flowsheet Documentation  Taken 11/5/2022 0911 by Jewels Zavaleta RN  Pain Management Interventions: medication (see MAR)     Pt c/o nausea despite giving Zofran and Compazine. Pt also c/o of abdominal pain, gave Oxycodone for this. Called HO. He ordered a CT, labs and a fluid bolus.    CT showed a blockage. Pt to have a NG placed and possible surgery in the am. Pt to be NPO.    Pt up independently in the room. NS running at 100 ml/hr currently.

## 2022-11-06 NOTE — ANESTHESIA PREPROCEDURE EVALUATION
Anesthesia Pre-Procedure Evaluation    Patient: Dain Tejeda   MRN: [id number] : 1980        Procedure : Procedure(s):  LAPAROTOMY          Past Medical History:   Diagnosis Date     Diet controlled gestational diabetes mellitus (GDM), antepartum 2017    Attempting diet control first     Gestational diabetes mellitus (GDM) in third trimester 2017    Attempting diet control first  Formatting of this note might be different from the original. Overview:  Attempting diet control first     Nausea/vomiting in pregnancy 2017     Postpartum hemorrhage 2017     Third degree laceration of perineum during delivery, postpartum 11/15/2017      Past Surgical History:   Procedure Laterality Date     COLONOSCOPY N/A 10/31/2022    Procedure: COLONOSCOPY with biopsies;  Surgeon: Luis Miguel Traore MD;  Location: [facility]      Allergies   Allergen Reactions     Soybean Allergy Itching and Blisters     Tofu- causes itching and sores in the mouth      Social History     Tobacco Use     Smoking status: Never     Smokeless tobacco: Never   Substance Use Topics     Alcohol use: No      Wt Readings from Last 1 Encounters:   22 53.3 kg (117 lb 6.4 oz)        Anesthesia Evaluation   Pt has not had prior anesthetic         ROS/MED HX  ENT/Pulmonary: Comment: R/o Tb: airborne precautions   (-) sleep apnea   Neurologic:  - neg neurologic ROS     Cardiovascular:  - neg cardiovascular ROS  (-) hypertension, CAD and dyslipidemia   METS/Exercise Tolerance:     Hematologic:  - neg hematologic  ROS     Musculoskeletal:  - neg musculoskeletal ROS     GI/Hepatic: Comment: IBD/Celiac  Bowel obstruction   (-) GERD and esophageal disease   Renal/Genitourinary:  - neg Renal ROS     Endo:  - neg endo ROS     Psychiatric/Substance Use:  - neg psychiatric ROS     Infectious Disease:  - neg infectious disease ROS     Malignancy:       Other:            Physical Exam    Airway  airway exam normal      Mallampati: II    Neck ROM: full    Mouth opening: > 3 cm    Respiratory Devices and Support         Dental     Comment: Poor dentition      B=Bridge, C=Chipped, L=Loose, M=Missing    Cardiovascular   cardiovascular exam normal       Rhythm and rate: regular and normal     Pulmonary   pulmonary exam normal        breath sounds clear to auscultation           OUTSIDE LABS:  CBC:   Lab Results   Component Value Date    WBC 5.8 11/05/2022    WBC 7.4 11/05/2022    HGB 8.5 (L) 11/05/2022    HGB 8.9 (L) 11/05/2022    HCT 27.3 (L) 11/05/2022    HCT 28.9 (L) 11/05/2022     11/05/2022     11/05/2022     BMP:   Lab Results   Component Value Date     (L) 11/05/2022     10/31/2022    POTASSIUM 3.6 11/05/2022    POTASSIUM 3.4 11/05/2022    CHLORIDE 102 11/05/2022    CHLORIDE 101 10/31/2022    CO2 28 11/05/2022    CO2 22 10/31/2022    BUN 4.8 (L) 11/05/2022    BUN 5.2 (L) 10/31/2022    CR 0.58 11/05/2022    CR 0.63 11/01/2022     (H) 11/05/2022    GLC 70 10/31/2022     COAGS: No results found for: PTT, INR, FIBR  POC:   Lab Results   Component Value Date    HCG Negative 10/29/2022     HEPATIC:   Lab Results   Component Value Date    ALBUMIN 1.5 (L) 11/05/2022    PROTTOTAL 5.6 (L) 11/05/2022    ALT 10 11/05/2022    AST 20 11/05/2022    ALKPHOS 144 (H) 11/05/2022    BILITOTAL 0.4 11/05/2022     OTHER:   Lab Results   Component Value Date    LACT 2.0 11/05/2022    ALEX 7.3 (L) 11/05/2022    MAG 1.7 11/06/2022    LIPASE 17 10/29/2022    CRP 6.1 (H) 05/24/2022    SED 90 (H) 05/24/2022       Anesthesia Plan    ASA Status:  3   NPO Status:  NPO Appropriate    Anesthesia Type: General.     - Airway: ETT   Induction: Intravenous, RSI.   Maintenance: TIVA.        Consents    Anesthesia Plan(s) and associated risks, benefits, and realistic alternatives discussed. Questions answered and patient/representative(s) expressed understanding.    - Discussed:     - Discussed with:  Patient      - Extended Intubation/Ventilatory Support Discussed: No.       - Patient is DNR/DNI Status: No    Use of blood products discussed: No .     Postoperative Care    Pain management: IV analgesics, Oral pain medications, Peripheral nerve block (Single Shot).   PONV prophylaxis: Ondansetron (or other 5HT-3), Dexamethasone or Solumedrol     Comments:    Other Comments:   RSI + GA  Airborne precautions  TIVA  Mg2+  Consented for TAP blocks  Risk of aspiration discussed at length including prolonged mechanical ventilation, pneumonia, and rarely death.     Interview occurred via language line .     Patient history and physical exam reviewed. NPO status appropriate. Focused physical and history performed, pre-op evaluation updated. RBA discussed re: anesthesia and patients questions answered.             Jessie Nice MD

## 2022-11-06 NOTE — PROGRESS NOTES
[facility]    Progress Note - Hospitalist Service       Date of Admission:  10/29/2022    Assessment & Plan   Dain Tejeda is a 42 year old female w/ PMH of chronic gastritis, inflammation of small intestine, and celiac disease admitted on 10/29/2022. Here w/ diffuse colitis w/ severe involvement of the mid and distal descending colon c/f IBD. She remains hospitalized for IV antibiotics, now plan for ex lap and probably colectomy.    Severe colitis w/ multifocal small intramural abscesses c/f Crohn's  Colonic stricture  Has had negative enteric infectious workup, possible ileitis on imaging 4/14/2022 w/ interval improvement seen on CT 7/11/22. Saw GI outpatient 7/22/22 and noted to have elevated inflammatory markers and abnormal CT findings concerning for IBD. EGD was performed w/ biopsy suggestive of celiac disease and negative for H pylori. Ttg obtained during this hospitalization returned negative.     CT abdomen pelvis 10/29 showing diffuse colitis with severe involvement of the mid and distal descending colon. Although there is no phoenix free air, the colitis is complicated by the presence of multiple small, intramural abscesses.     Colonoscopy 10/31 with atypical findings, biopsy consistent with colitis with reactive changes. Given patient's history as a [place] refugee, also considering colonic TB. Pt reportedly immig[place] to [place] from [facility] in 2017, tested negative for TB upon leaving the country and upon arrival to [place]. No exposure to TB at home or work. No hx of immunosuppression, IV drug use, or incarceration. Currently on airborne isolation for c/f TB, pending results of broad infectious workup per ID's rec at this time. No infectious source of symptoms yet identified, quantiferon indeterminate, negative giardia/crypto     Now with stricture on CT noted 11/5, plan for ex lap 11/6.    - ID consult              - Continue levofloxacin and metronidazole, Meropenem              - Pending  lab studies: HIV, whipple, celiac panel, quantiferon gold, AFB smear from rectal mucosa, histoplasmosis, blasto, brucella              - AFB smears from blood, sputum, stool              - RIPE therapy held due to GI side effects   - GI consult placed              >  Asking [facility] to review colonoscopy path for second opinion, follow stool O+P, quantiferon, HIV, CMV testing.              > If infectious workup is negative, will likely start immune modulators for IBD  - General surgery consult              > continue IV abx              >planning for hemicolectomy or subtotal colectomy today  - Nutrition consult placed              > Dietician did meet w/ the pt 10/31 and educated on gluten free diet give unclear diagnosis of possible Celiac disease.  - PO Pepcid BID  - Oxyocodone 5 mg q4 h PRN for pain  - Scheduled Tylenol for pain   - PRN Zofran and Compazine ordered     Hypokalemia, resolved  Hypomagnesemia, resolved   - Replete per nursing protocol     Abnormal UA  - Cont IV abx per above  - UC grew Klebsiella      Thrombocythemia  PLTs have been around the upper limits of normal during previous evaluation, found to be 600k on admission. Likely elevated 2/2 acute phase reactant and significant GI tract inflammation. Has been stable at the upper limit of normal.   - Monitor         Diet: Snacks/Supplements Adult: Ensure Clear; Between Meals  NPO for Medical/Clinical Reasons Except for: Ice Chips    DVT Prophylaxis: Enoxaparin (Lovenox) subcutaneous held due to procedure today, post op will resume for DVT ppx  Chambers Catheter: Not present  Fluids: 100 mL/hr NS  Central Lines: None  Cardiac Monitoring: None  Code Status: Full Code      Disposition Plan      Expected Discharge Date: 11/07/2022    Discharge Delays: IV Medication - consider oral or Home Infusion  Procedure Pending (enter procedure & time in comments)  Destination: home with family  Discharge Comments: Or 11/7; In isolation to rule out TB. IV Levaquin, IV  Meropenem, IV Flagyl (ID following). GI following. Await input from second opinion path review from [facility] path re: colonoscopy biopsies. Nausea and vomiting requiring IV antiemetics.       Discharge will be delayed by ex lap today.     The patient's care was discussed with the Attending Physician, Dr. Padilla.    Christin Santoyo MD  Hospitalist Service  [facility]  Securely message with the Vocera Web Console (learn more here)  Text page via [employer] Paging/Directory       Clinically Significant Risk Factors        # Hypokalemia: Lowest K = 3.2 mmol/L (Ref range: 3.4-5.3) in last 2 days, will replace as needed  # Hyponatremia: Lowest Na = 135 mmol/L (Ref range: 136-145) in last 2 days, will monitor as appropriate      # Hypoalbuminemia: Lowest albumin = 1.5 g/dL (Ref range: 3.5-5.2) at 11/5/2022  6:47 AM, will monitor as appropriate           # Severe Malnutrition: based on nutrition assessment        ______________________________________________________________________    Interval History   Overnight, worsening abdominal pain, CT findings of stricture. Plan for ex lap today. NG was placed last evening, has been NPO with mIVF. Resting fairly uncomfortably today, NG in place. Family at bedside, awaiting further discussion with surgery about plan.    Data reviewed today: I reviewed all medications, new labs and imaging results over the last 24 hours. I personally reviewed     Physical Exam   Vital Signs: Temp: 98.6  F (37  C) Temp src: Oral BP: (!) 117/90 Pulse: (!) 121   Resp: 20 SpO2: 97 % O2 Device: None (Room air)    Weight: 117 lbs 6.4 oz  General Appearance:  Resting in bed, mildly uncomfortably, NG in place..  No acute distress.  Respiratory: Comfortable respiratory effort.  Clear to auscultation bilaterally.  No crackles or wheezing  Cardiovascular: Regular rate and rhythm.  No murmurs rubs or gallops  GI: Nondistended.  Bowel sounds present.  mild tenderness diffusely.  No guarding. NG  in place.  Skin: No visible rashes or bruising   Neuro: Neutral affect.  Fluent speech.         Data    Recent Results (from the past 24 hour(s))   Potassium    Collection Time: 11/05/22  3:28 PM   Result Value Ref Range    Potassium 3.4 3.4 - 5.3 mmol/L   CBC with platelets    Collection Time: 11/05/22  7:37 PM   Result Value Ref Range    WBC Count 5.8 4.0 - 11.0 10e3/uL    RBC Count 3.17 (L) 3.80 - 5.20 10e6/uL    Hemoglobin 8.5 (L) 11.7 - 15.7 g/dL    Hematocrit 27.3 (L) 35.0 - 47.0 %    MCV 86 78 - 100 fL    MCH 26.8 26.5 - 33.0 pg    MCHC 31.1 (L) 31.5 - 36.5 g/dL    RDW 14.3 10.0 - 15.0 %    Platelet Count 388 150 - 450 10e3/uL   Lactic acid whole blood    Collection Time: 11/05/22  7:37 PM   Result Value Ref Range    Lactic Acid 2.0 0.7 - 2.0 mmol/L   Potassium    Collection Time: 11/05/22 11:46 PM   Result Value Ref Range    Potassium 3.6 3.4 - 5.3 mmol/L   Magnesium    Collection Time: 11/06/22  7:05 AM   Result Value Ref Range    Magnesium 1.7 1.7 - 2.3 mg/dL   Asymptomatic COVID-19 Virus (Coronavirus) by PCR Nasopharyngeal    Collection Time: 11/06/22 10:34 AM    Specimen: Nasopharyngeal; Swab   Result Value Ref Range    SARS CoV2 PCR Negative Negative         Recent Results (from the past 24 hour(s))   CT Abdomen Pelvis w Contrast    Narrative    EXAM: CT ABDOMEN PELVIS W CONTRAST  LOCATION: [facility]  DATE/TIME: 11/5/2022 8:13 PM    INDICATION: New onset severe abdominal pain, fever, tachycardia. Admitted with diffuse colitis  COMPARISON: CT 10/29/2022.  TECHNIQUE: CT scan of the abdomen and pelvis was performed following injection of IV contrast. Multiplanar reformats were obtained. Dose reduction techniques were used.  CONTRAST: isovue 370  100ml    FINDINGS:   LOWER CHEST: Small bilateral pleural effusions and bibasilar atelectasis.    HEPATOBILIARY: Significant gallbladder distention without wall thickening. No evidence of biliary obstruction.    PANCREAS:  Normal.    SPLEEN: Normal.    ADRENAL GLANDS: Normal.    KIDNEYS/BLADDER: No hydronephrosis. Urinary bladder is unremarkable.    BOWEL: Findings of diffuse colitis again noted with new colonic stricture/obstruction at the level of the descending colon in the area of previously described intramural abscesses. There is significant mesenteric edema without evidence of perforation or   drainable fluid collection.    LYMPH NODES: Stable size of subcentimeter mesenteric lymph nodes, likely reactive to adjacent inflammation.    VASCULATURE: Unremarkable.    PELVIC ORGANS: Unchanged size of left paraovarian cyst. Fluid around the right adnexa is likely related to peritoneal fluid described above.    MUSCULOSKELETAL: No acute bony abnormality. Sequela of injections in the anterior abdominal wall.        Impression    IMPRESSION:   1.  Persistent findings of diffuse colitis with new high-grade stricture/obstruction at the level of the descending colon near previously described intramural abscesses. New mesenteric edema with small volume ascites. No evidence of phoenix perforation or   drainable fluid collection.  2.  Distended gallbladder without additional CT evidence of acute cholecystitis.  3.  Small bilateral pleural effusions.   XR Abdomen Port 1 View    Narrative    EXAM: XR ABDOMEN PORT 1 VIEW  LOCATION: [facility]  DATE/TIME: 11/6/2022 1:04 AM    INDICATION: NGT check   placement verification  COMPARISON: None.      Impression    IMPRESSION: Nasogastric tube terminates over the stomach. Side-port is at the level of the GE junction and advancement by 2 cm is suggested. Dilated bowel loops in the mid abdomen are redemonstrated.

## 2022-11-06 NOTE — ANESTHESIA PROCEDURE NOTES
Airway       Patient location during procedure: OR       Procedure Start/Stop Times: 11/6/2022 3:43 PM  Staff -        CRNA: Alayna Santiago APRN CRNA       Performed By: CRNA  Consent for Airway        Urgency: elective  Indications and Patient Condition       Indications for airway management: pilo-procedural       Induction type:RSI       Mask difficulty assessment: 0 - not attempted    Final Airway Details       Final airway type: endotracheal airway       Successful airway: ETT - single  Endotracheal Airway Details        ETT size (mm): 7.0       Cuffed: yes       Successful intubation technique: direct laryngoscopy       DL Blade Type: Sue 2       Grade View of Cords: 1       Adjucts: stylet       Position: Right       Measured from: lips       Secured at (cm): 22       Bite block used: None    Post intubation assessment        Placement verified by: capnometry, equal breath sounds and chest rise        Number of attempts at approach: 1       Number of other approaches attempted: 0       Secured with: silk tape       Ease of procedure: easy       Dentition: Intact       Dental guard used and removed. Dental Guard Type: Proguard Red.    Medication(s) Administered   Medication Administration Time: 11/6/2022 3:43 PM

## 2022-11-06 NOTE — PROGRESS NOTES
[facility]    Infectious Disease Progress Note    Date of Service : 11/06/2022         Assessment & Plan   Dain Tejeda is a 42 year old female who was admitted on 10/29/2022.     ASSESSMENT:  1. Clinical presentation mimicking Crohn's disease.  History of celiac disease.  6 months history of diarrhea weight loss abdominal pain- active issue  2. Non caseating granulomas may be seen in inflammatory bowel disease.   3. 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  Less likely though cannot be ruled out at this point/ Patient had negative AFB stain on colon and rectal lesion biopsy  4. QFT indeterminate- repeat test ordered. CT chest no radiographic evidence of tuberculosis, active or healed  5. Severe nausea and vomiting with RIPE (Rifampin, INH, PZA and Ethambutol)--held   6. Klebsiella UTI- on effective antibiotic  7. Biopsy results below  8. Over 6 months history of weight loss, now with fevers.  Diarrhea abdominal pain  Colonoscopy report:  Descending colon with narrowed area with diffuse polyposis (look like pseudopolyps) and areas of purlulent discharge; however, surrounding mucosa does not appear inflamed (no edema or erythema). Polypoid  lesions and purulent areas biopsied     - Normal sigmoid     - Distal rectum with erythematous mucosa and localized purulent drainage. Biopsied.   DESCENDING COLON, BIOPSY:        1.  PATCHY ACUTE COLITIS WITH REACTIVE CHANGES        2.  FOCAL NONCASEATING GRANULOMA              A.  SPECIAL STAINS NEGATIVE FOR ACID-FAST BACILLI, YEAST, FUNGI AND CMV         3.  NEGATIVE FOR CHRONIC MUCOSAL CHANGES, DYSPLASIA AND MALIGNANCY    B) DISTAL RECTUM, BIOPSIES:         1.  DIFFUSE MILD ACUTE COLITIS WITH REACTIVE CHANGES         2.  CMV IMMUNOSTAIN: NEGATIVE FOR CMV INCLUSIONS         3.  NEGATIVE FOR CHRONIC MUCOSAL CHANGES, DYSPLASIA AND MALIGNANCY    RECOMMENDATIONS:  Add fluconazole    See prior workup dr Rosales     1. Antibiotics continue  broad-spectrum antibiotics: Meropenem levofloxacin, metronidazole  2. AFB blood, AFB stool, AFB sputum.  Ordered and in process  3. Off empirical rx for tb  4. Zofran   5. Follow culture results   6. Focus/de-escalate antibiotics based on final culture results  7. Monitor CBC, CMP, check CD4 count  8. If TB work-up is negative, would treat for inflammatory bowel disease, defer to GI  9. Regional infectious disease work-up as ordered previously in process          Interval History   Fever better, thougabdominal pain      Dr Rosales  Patient stated that she has not ever had contact with known tuberculosis case.  Updated patient regarding GI concerns next    Physical Exam   Temp: 98.6  F (37  C) Temp src: Oral BP: (!) 117/90 Pulse: (!) 121   Resp: 20 SpO2: 97 % O2 Device: None (Room air)    Vitals:    10/30/22 1933 10/30/22 2147 11/06/22 0832   Weight: 49.3 kg (108 lb 11.2 oz) 50.8 kg (111 lb 14.4 oz) 53.3 kg (117 lb 6.4 oz)     Vital Signs with Ranges  Temp:  [98.3  F (36.8  C)-101  F (38.3  C)] 98.6  F (37  C)  Pulse:  [114-138] 121  Resp:  [18-30] 20  BP: (104-130)/(56-90) 117/90  SpO2:  [96 %-98 %] 97 %    Constitutional: moderate distress, nauseated  Lungs: normal breathing pattern, no crackles or wheezing  Cardiovascular: Regular rate and rhythm  Abdomen: Tenderness  Skin: warm  Neuro: deconditioned  Psych: able to answer questions    Medications     sodium chloride 100 mL/hr at 11/06/22 0833       acetaminophen  650 mg Oral Q6H    Or     acetaminophen  650 mg Rectal Q6H     [Held by provider] enoxaparin ANTICOAGULANT  40 mg Subcutaneous At Bedtime     [Held by provider] ethambutol  800 mg Oral Daily     fluconazole  400 mg Intravenous Q24H     [Held by provider] isoniazid  300 mg Oral Daily     levofloxacin  500 mg Intravenous Q24H     meropenem  1 g Intravenous Q8H     metroNIDAZOLE  500 mg Intravenous Q12H     multivitamin, therapeutic  1 tablet Oral Daily     [Held by provider] pantoprazole  40 mg Oral BID      pantoprazole  40 mg Intravenous BID     [Held by provider] pyrazinamide  1,000 mg Oral Daily     [Held by provider] pyridOXINE  50 mg Oral Daily     [Held by provider] rifampin  600 mg Oral Daily     sodium chloride (PF)  3 mL Intracatheter Q8H       Data   All microbiology laboratory data reviewed.  Recent Labs   Lab Test 11/05/22  1937 11/05/22  0647 11/04/22  1046 11/02/22  0737   WBC 5.8 7.4 7.6 7.0   HGB 8.5* 8.9*  --  9.6*   HCT 27.3* 28.9*  --  31.0*   MCV 86 86  --  89    429  --  415  415     Recent Labs   Lab Test 11/05/22  0647 11/01/22  1619 10/31/22  0637   CR 0.58 0.63 0.53     Recent Labs   Lab Test 05/24/22  1620   SED 90*     MICRO:  11/01/2022 2133 11/02/2022 1532 Routine parasitology exam [[id number]]    Stool from Per Rectum    Final result Component Value   OVA AND PARASITE EXAM Negative   A single negative specimen does not rule out parasitic infection.   WBC'S, O&P 4+ PMNs          11/01/2022 2133 11/02/2022 1123 Cryptosporidium and Giardia antigens [[id number]]   Stool from Per Rectum    Final result Component Value   Cryptosporidium parvum antigen Negative   Giardia lamblia antigen Negative          11/01/2022 1619 11/02/2022 0932 Blood Culture Arm, Right [[id number]]   Blood from Arm, Right    Preliminary result Component Value   Culture No growth after 12 hours P             11/01/2022 1619 11/02/2022 1343 CMV Quantitative, PCR [[id number]]    Blood from Arm, Right    Final result Component Value Units   CMV DNA IU/mL Not Detected IU/mL          11/01/2022 1619 11/01/2022 1644 Quantiferon TB Gold Plus Grey Tube [[id number]]   Blood from Arm, Right    In process Component Value   No component results          11/01/2022 1619 11/01/2022 1644 Quantiferon TB Gold Plus Green Tube [[id number]]   Blood from Arm, Right    In process Component Value   No component results          11/01/2022 1619 11/01/2022 1644 Quantiferon TB Gold Plus Yellow Tube [[id number]]   Blood from  Arm, Right    In process Component Value   No component results          11/01/2022 1619 11/01/2022 1644 Quantiferon TB Gold Plus Purple Tube [[id number]]   Blood from Arm, Right    In process Component Value   No component results          11/01/2022 0434 11/02/2022 0932 Blood Culture Peripheral Blood [[id number]]    Peripheral Blood    Preliminary result Component Value   Culture No growth after 1 day P             10/30/2022 1848 10/31/2022 1713 Enteric Bacteria and Virus Panel by MARILYNN Stool [[id number]]    Stool from Per Rectum    Final result Component Value   Campylobacter group Not Detected   Salmonella species Not Detected   Shigella species Not Detected   Vibrio group Not Detected   Rotavirus Not Detected   Shiga toxin 1 gene Not Detected   Shiga toxin 2 gene Not Detected   Norovirus I and II Not Detected   Yersinia enterocolitica Not Detected          10/30/2022 1847 10/31/2022 0047 C. difficile Toxin B PCR with reflex to C. difficile Antigen and Toxins A/B EIA [[id number]]    Stool from Per Rectum    Final result Component Value   C Difficile Toxin B by PCR Negative   A negative result does not exclude actual disease due to C. difficile and may be due to improper collection, handling and storage of the specimen or the number of organisms in the specimen is below the detection limit of the assay.          10/29/2022 2344 10/30/2022 0049 Asymptomatic COVID-19 Virus (Coronavirus) by PCR Nasopharyngeal [[id number]]    Swab from Nasopharyngeal    Final result Component Value   SARS CoV2 PCR Negative   NEGATIVE: SARS-CoV-2 (COVID-19) RNA not detected, presumed negative.          10/29/2022 2038 10/31/2022 2243 Urine Culture [[id number]]    (Abnormal)   Urine, Midstream    Final result Component Value   Culture 50,000-100,000 CFU/mL Klebsiella pneumoniae Abnormal     <10,000 CFU/mL Urogenital zulma       Susceptibility     Klebsiella pneumoniae     KENISHA     Ampicillin >=32 ug/mL Resistant 1      Ampicillin/ Sulbactam 4 ug/mL Susceptible     Cefazolin <=4 ug/mL Susceptible 2     Cefepime <=1 ug/mL Susceptible     Cefoxitin <=4 ug/mL Susceptible     Ceftazidime <=1 ug/mL Susceptible     Ceftriaxone <=1 ug/mL Susceptible     Ciprofloxacin <=0.25 ug/mL Susceptible     Gentamicin <=1 ug/mL Susceptible     Levofloxacin <=0.12 ug/mL Susceptible     Nitrofurantoin 64 ug/mL Intermediate     Piperacillin/Tazobactam <=4 ug/mL Susceptible     Tobramycin <=1 ug/mL Susceptible     Trimethoprim/Sulfamethoxazole >16/304 ug/mL Resistant              1 Intrinsically Resistant   2 Cefazolin KENISHA breakpoints are for the treatment of uncomplicated urinary tract infections. For the treatment of systemic infections, please contact the laboratory for additional testing.             RADIOLOGY:  Reviewed  CT Abdomen Pelvis w Contrast    Result Date: 10/29/2022  EXAM: CT ABDOMEN PELVIS W CONTRAST LOCATION: [facility] DATE/TIME: 10/29/2022 10:27 PM INDICATION: abd pain assoc with N V D COMPARISON: Ultrasound of the pelvis from 08/18/2022. CT abdomen pelvis from 07/11/2022 TECHNIQUE: CT scan of the abdomen and pelvis was performed following injection of IV contrast. Multiplanar reformats were obtained. Dose reduction techniques were used. CONTRAST: Qocvos753 100ml FINDINGS: LOWER CHEST: Normal. HEPATOBILIARY: Focal fat along the falciform ligament, liver otherwise normal. No calcified gallstones. No biliary ductal dilatation. PANCREAS: Normal. SPLEEN: Normal. ADRENAL GLANDS: Normal. KIDNEYS/BLADDER: Normal. BOWEL: Normal caliber small bowel. There is wall thickening of the large bowel from the proximal transverse colon through the mid sigmoid colon. Wall thickening is most severe in the mid and distal descending colon, where there are multiple intramural fluid and gas collections consistent with abscess. Anteriorly there is a 1.5 cm collection on image 98 of series 2. Posteriorly there is a 1.3 cm collection on  image 106. More inferiorly there is a thin crescentic collection posteriorly measuring 1.7 cm in length. There is extensive pericolonic edema, but no free air. LYMPH NODES: Multiple presumably reactive pericolonic lymph nodes. VASCULATURE: Unremarkable. PELVIC ORGANS: Cyst in the left pelvis measures 5 cm in length, likely corresponding to the paraovarian cyst seen on the recent pelvic ultrasound. MUSCULOSKELETAL: Transitional lumbosacral anatomy, normal variant     IMPRESSION: 1.  Findings consistent with a fairly diffuse colitis with severe involvement of the mid and distal descending colon. Although there is no phoenix free air, the colitis is complicated by the presence of multiple small, intramural abscesses as described above.    Total Time Spent 35 minutes with >50% of the time spent in counseling, education and care coordination.

## 2022-11-06 NOTE — PLAN OF CARE
Goal Outcome Evaluation:    Pt is A/O x4. C/O pain in throat and mouth after NG insertion. IV dilaudid given. Pt also c/o nausea and vomiting. PRN antiemetics given with little relief. Vomit was clear in color. NG originally placed at 47 cm, chest x-ray suggested advancement 2-3 cm, landmark now at 50, house notified with no repeat chest x-ray. Pt states they really want the NG tube out, nurse educated the importance of the tube and why it is necessary, pt was accepting. Pt has been NPO since midnight.  mL/hr. Last potassium 3.6, AM recheck with mag. Phalen following. Potential surgery today. Will continue to monitor.

## 2022-11-06 NOTE — PROGRESS NOTES
[facility] Progress Note    Assessment and recommendations:  42-year-old woman with colitis who is now developed colonic obstruction being treated by nasogastric decompression.     Pathology review still in process from colonoscopy 10/31.  We do not have a definitive diagnosis at this point for acute colitis with noncaseating granulomas.  Hesitant to use steroids due to possibility of infection and question of efficacy in setting of complete obstruction with dilated cecum.  Appreciate surgical management as planned.                                               Wilton Garcia MD  Thank you for the opportunity to participate in the care of this patient.   Please feel free to call me with any questions or concerns.  Phone number [phone] or if after 5PM [phone].              ---      Subjective:  Nausea and vomiting overnight.  Imaging suggestive of complete obstruction with upstream dilation of the colon.  NG placed to low intermittent suction although patient does not feel much better.  Still having abdominal pain and nausea.  Fever up to 101.  Four-point ROS otherwise negative.    History obtained with the help of professional Lingt  over the language line.    Objective:  Vital signs in last 24 hours  Temp:  [98.3  F (36.8  C)-101  F (38.3  C)] 98.6  F (37  C)  Pulse:  [114-138] 121  Resp:  [18-30] 20  BP: (104-130)/(56-90) 117/90  SpO2:  [96 %-98 %] 97 % O2 Device: None (Room air)      Gen: Appears ill  Eyes: No icterus  Pulmonary: Nonlabored  Cardiovascular: Tachycardic  Gastrointestinal: Distended, tympanic, tender to palpation diffusely  Extrem: PPI, no c/c/e      LABORATORY    ELECTROLYTE PANEL   Recent Labs   Lab 11/05/22  2346 11/05/22  1528 11/05/22  0647 11/02/22  0737 11/01/22  1619 10/31/22  1916 10/31/22  0637 10/30/22  1707   NA  --   --  135*  --   --   --  138 140   POTASSIUM 3.6 3.4 3.2*   < > 3.8   < > 3.1* 3.9   CHLORIDE  --   --  102  --   --   --  101 103   CO2   --   --  28  --   --   --  22 26   GLC  --   --  141*  --   --   --  70 95   CR  --   --  0.58  --  0.63  --  0.53 0.50*   BUN  --   --  4.8*  --   --   --  5.2* 7.8    < > = values in this interval not displayed.      HEMATOLOGY PANEL   Recent Labs   Lab 11/05/22  1937 11/05/22  0647 11/04/22  1046 11/02/22  0737   HGB 8.5* 8.9*  --  9.6*   MCV 86 86  --  89   WBC 5.8 7.4 7.6 7.0    429  --  415  415      LIVER AND PANCREAS PANEL   Recent Labs   Lab 11/05/22  0647   AST 20   ALT 10   ALKPHOS 144*   BILITOTAL 0.4     IMAGING STUDIES    IMPRESSION:   1.  Persistent findings of diffuse colitis with new high-grade stricture/obstruction at the level of the descending colon near previously described intramural abscesses. New mesenteric edema with small volume ascites. No evidence of phoenix perforation or   drainable fluid collection.  2.  Distended gallbladder without additional CT evidence of acute cholecystitis.  3.  Small bilateral pleural effusions.    I have reviewed the current diagnostic and laboratory tests.

## 2022-11-06 NOTE — PROGRESS NOTES
General Surgery Progress Note:    Hospital Day # 8    ASSESSMENT:   1. Colitis    2. Intra-abdominal abscess (H)    3. Hypokalemia        Dain Tejeda is a 42 year old female with colonic obstruction. Assessment limited due to no  available once again. One will need to be available when patient speaks to surgeon before going to the OR.    PLAN:   To OR this afternoon  Pavel SALCIDO D.O. have seen and evaluated Dain Tejeda today as part of a shared APRN/PA visit. I reviewed the Advance Practice Practitioner's history and physical exam as well as the diagnosis and plan.     My key exam findings include continued abdominal distention with nausea and mild discomfort.  CT scan demonstrates worsening inflammatory reaction of an unclear etiology with obstruction of the descending colon  Abdomen mildly distended with mild tenderness palpation    Key management decisions made by me and carried out under my direction include:  CT images reviewed  Colonoscopy report reviewed  Biopsies reviewed  At this point it does not appear as though things are improving despite nonoperative management strategies.  It does appear as though there is a complete obstruction with a dilated cecum which is enlarging.  If this reaches its perforation point this could prove disastrous.  Given the equivocal results of the entire work-up to this point the plan will be for laparotomy with resection of the involved area with diverting colostomy.  I had extensive discussion with the patient and she is agreeable to proceed.    I spent 20 minutes face-to-face and/or coordinating care. Over 50% of our time on the unit was spent counseling the patient and/or coordinating care regarding plans moving forward in terms of surgery..    Pavel Carlos DO [facility]  [phone]        SUBJECTIVE:   Dain Tejeda is comfortable and in no acute distress. Patient indicates pain in her throat but no other complaints at this time.     Patient Vitals  Goal Outcome Evaluation:  Plan of Care Reviewed With: patient  Progress: improving  Outcome Summary: vss. no complaints of pain. pt's oxygen remains on room air. pt resting in between care. will continue to monitor.   for the past 24 hrs:   BP Temp Temp src Pulse Resp SpO2 Weight   11/06/22 0832 -- -- -- -- -- -- 53.3 kg (117 lb 6.4 oz)   11/06/22 0759 130/87 99.4  F (37.4  C) Oral (!) 125 20 98 % --   11/06/22 0356 122/85 100.4  F (38  C) Oral (!) 128 18 96 % --   11/05/22 2328 128/84 98.3  F (36.8  C) Oral 117 18 98 % --   11/05/22 2052 104/56 98.4  F (36.9  C) Oral 114 18 97 % --   11/05/22 1857 117/78 (!) 101  F (38.3  C) Oral (!) 138 30 97 % --       Physical Exam:  General: NAD, pleasant  CV:RRR  LUNGS:CTA bilaterally  ABD: soft, mild to moderate distention; nontender  EXT:no CCE    No results displayed because visit has over 200 results.           HEATHER Hess CNP

## 2022-11-06 NOTE — PLAN OF CARE
Goal Outcome Evaluation:    Problem: Plan of Care - These are the overarching goals to be used throughout the patient stay.    Goal: Optimal Comfort and Wellbeing  Outcome: Progressing  Intervention: Monitor Pain and Promote Comfort  Recent Flowsheet Documentation  Taken 11/6/2022 1300 by Jewels Zavaleta, RN  Pain Management Interventions: rest  Taken 11/6/2022 1007 by Jewels Zavaleta, RN  Pain Management Interventions: medication (see MAR)   Pt given Dilaudid IV X 1 for throat/abdominal pain. Rated it a 7 and a 6 on reassessment.    Zofran and compazine also utilized for nausea. Pt has a NG to LIS.     NS running at 100cc/hr. Pt now getting Diflucan IV, new order from ID.    Pt to go to surgery around 1430 with Dr. Carlos.

## 2022-11-07 LAB
ANION GAP SERPL CALCULATED.3IONS-SCNC: 6 MMOL/L (ref 7–15)
ASPERGILLUS AB TITR SER CF: ABNORMAL {TITER}
B DERMAT AB SER QL ID: NOT DETECTED
B DERMAT AB SER-ACNC: 0.5 IV
BUN SERPL-MCNC: 3.9 MG/DL (ref 6–20)
CALCIUM SERPL-MCNC: 7.6 MG/DL (ref 8.6–10)
CHLORIDE SERPL-SCNC: 101 MMOL/L (ref 98–107)
COCCIDIOIDES AB TITR SER CF: ABNORMAL {TITER}
CREAT SERPL-MCNC: 0.42 MG/DL (ref 0.51–0.95)
DEPRECATED HCO3 PLAS-SCNC: 28 MMOL/L (ref 22–29)
ERYTHROCYTE [DISTWIDTH] IN BLOOD BY AUTOMATED COUNT: 14.5 % (ref 10–15)
GFR SERPL CREATININE-BSD FRML MDRD: >90 ML/MIN/1.73M2
GLUCOSE BLDC GLUCOMTR-MCNC: 86 MG/DL (ref 70–99)
GLUCOSE BLDC GLUCOMTR-MCNC: 90 MG/DL (ref 70–99)
GLUCOSE SERPL-MCNC: 97 MG/DL (ref 70–99)
H CAPSUL AG SER IA-ACNC: NOT DETECTED
H CAPSUL AG SER QL IA: NOT DETECTED
H CAPSUL MYC AB TITR SER CF: ABNORMAL {TITER}
H CAPSUL YST AB TITR SER CF: ABNORMAL {TITER}
HCT VFR BLD AUTO: 28 % (ref 35–47)
HGB BLD-MCNC: 8.7 G/DL (ref 11.7–15.7)
HOLD SPECIMEN: NORMAL
MAGNESIUM SERPL-MCNC: 2.4 MG/DL (ref 1.7–2.3)
MCH RBC QN AUTO: 26.5 PG (ref 26.5–33)
MCHC RBC AUTO-ENTMCNC: 31.1 G/DL (ref 31.5–36.5)
MCV RBC AUTO: 85 FL (ref 78–100)
PLATELET # BLD AUTO: 472 10E3/UL (ref 150–450)
POTASSIUM SERPL-SCNC: 4.4 MMOL/L (ref 3.4–5.3)
RBC # BLD AUTO: 3.28 10E6/UL (ref 3.8–5.2)
SCANNED LAB RESULT: NORMAL
SODIUM SERPL-SCNC: 135 MMOL/L (ref 136–145)
WBC # BLD AUTO: 13.3 10E3/UL (ref 4–11)

## 2022-11-07 PROCEDURE — 250N000011 HC RX IP 250 OP 636: Performed by: INTERNAL MEDICINE

## 2022-11-07 PROCEDURE — 99233 SBSQ HOSP IP/OBS HIGH 50: CPT

## 2022-11-07 PROCEDURE — 99233 SBSQ HOSP IP/OBS HIGH 50: CPT | Mod: GC

## 2022-11-07 PROCEDURE — 120N000001 HC R&B MED SURG/OB

## 2022-11-07 PROCEDURE — 250N000011 HC RX IP 250 OP 636: Performed by: SURGERY

## 2022-11-07 PROCEDURE — 250N000013 HC RX MED GY IP 250 OP 250 PS 637

## 2022-11-07 PROCEDURE — 250N000013 HC RX MED GY IP 250 OP 250 PS 637: Performed by: SURGERY

## 2022-11-07 PROCEDURE — 250N000011 HC RX IP 250 OP 636

## 2022-11-07 PROCEDURE — 36415 COLL VENOUS BLD VENIPUNCTURE: CPT | Performed by: STUDENT IN AN ORGANIZED HEALTH CARE EDUCATION/TRAINING PROGRAM

## 2022-11-07 PROCEDURE — C9113 INJ PANTOPRAZOLE SODIUM, VIA: HCPCS

## 2022-11-07 PROCEDURE — 250N000011 HC RX IP 250 OP 636: Performed by: STUDENT IN AN ORGANIZED HEALTH CARE EDUCATION/TRAINING PROGRAM

## 2022-11-07 PROCEDURE — 83735 ASSAY OF MAGNESIUM: CPT | Performed by: STUDENT IN AN ORGANIZED HEALTH CARE EDUCATION/TRAINING PROGRAM

## 2022-11-07 PROCEDURE — 85027 COMPLETE CBC AUTOMATED: CPT | Performed by: STUDENT IN AN ORGANIZED HEALTH CARE EDUCATION/TRAINING PROGRAM

## 2022-11-07 PROCEDURE — 250N000013 HC RX MED GY IP 250 OP 250 PS 637: Performed by: FAMILY MEDICINE

## 2022-11-07 PROCEDURE — 82310 ASSAY OF CALCIUM: CPT | Performed by: STUDENT IN AN ORGANIZED HEALTH CARE EDUCATION/TRAINING PROGRAM

## 2022-11-07 PROCEDURE — 258N000003 HC RX IP 258 OP 636: Performed by: ANESTHESIOLOGY

## 2022-11-07 RX ORDER — SODIUM CHLORIDE FOR INHALATION 3 %
3 VIAL, NEBULIZER (ML) INHALATION DAILY
Status: COMPLETED | OUTPATIENT
Start: 2022-11-08 | End: 2022-11-08

## 2022-11-07 RX ADMIN — HYDROMORPHONE HYDROCHLORIDE 0.5 MG: 1 INJECTION, SOLUTION INTRAMUSCULAR; INTRAVENOUS; SUBCUTANEOUS at 00:46

## 2022-11-07 RX ADMIN — HYDROMORPHONE HYDROCHLORIDE 0.5 MG: 1 INJECTION, SOLUTION INTRAMUSCULAR; INTRAVENOUS; SUBCUTANEOUS at 13:41

## 2022-11-07 RX ADMIN — HYDROMORPHONE HYDROCHLORIDE 0.5 MG: 1 INJECTION, SOLUTION INTRAMUSCULAR; INTRAVENOUS; SUBCUTANEOUS at 20:40

## 2022-11-07 RX ADMIN — ACETAMINOPHEN 650 MG: 325 TABLET, FILM COATED ORAL at 16:45

## 2022-11-07 RX ADMIN — LEVOFLOXACIN 500 MG: 5 INJECTION, SOLUTION INTRAVENOUS at 18:15

## 2022-11-07 RX ADMIN — GABAPENTIN 100 MG: 100 CAPSULE ORAL at 13:41

## 2022-11-07 RX ADMIN — PANTOPRAZOLE SODIUM 40 MG: 40 INJECTION, POWDER, FOR SOLUTION INTRAVENOUS at 08:47

## 2022-11-07 RX ADMIN — MEROPENEM 1 G: 1 INJECTION INTRAVENOUS at 17:08

## 2022-11-07 RX ADMIN — MEROPENEM 1 G: 1 INJECTION INTRAVENOUS at 00:47

## 2022-11-07 RX ADMIN — FLUCONAZOLE IN SODIUM CHLORIDE 400 MG: 2 INJECTION, SOLUTION INTRAVENOUS at 13:41

## 2022-11-07 RX ADMIN — GABAPENTIN 100 MG: 100 CAPSULE ORAL at 08:47

## 2022-11-07 RX ADMIN — PANTOPRAZOLE SODIUM 40 MG: 40 INJECTION, POWDER, FOR SOLUTION INTRAVENOUS at 20:40

## 2022-11-07 RX ADMIN — GABAPENTIN 100 MG: 100 CAPSULE ORAL at 20:38

## 2022-11-07 RX ADMIN — HYDROMORPHONE HYDROCHLORIDE 0.5 MG: 1 INJECTION, SOLUTION INTRAMUSCULAR; INTRAVENOUS; SUBCUTANEOUS at 02:45

## 2022-11-07 RX ADMIN — ENOXAPARIN SODIUM 40 MG: 40 INJECTION SUBCUTANEOUS at 10:50

## 2022-11-07 RX ADMIN — METRONIDAZOLE 500 MG: 500 INJECTION, SOLUTION INTRAVENOUS at 06:41

## 2022-11-07 RX ADMIN — SODIUM CHLORIDE, POTASSIUM CHLORIDE, SODIUM LACTATE AND CALCIUM CHLORIDE: 600; 310; 30; 20 INJECTION, SOLUTION INTRAVENOUS at 05:12

## 2022-11-07 RX ADMIN — HYDROMORPHONE HYDROCHLORIDE 0.5 MG: 1 INJECTION, SOLUTION INTRAMUSCULAR; INTRAVENOUS; SUBCUTANEOUS at 07:48

## 2022-11-07 RX ADMIN — METRONIDAZOLE 500 MG: 500 INJECTION, SOLUTION INTRAVENOUS at 19:21

## 2022-11-07 RX ADMIN — THERA TABS 1 TABLET: TAB at 08:47

## 2022-11-07 RX ADMIN — HYDROMORPHONE HYDROCHLORIDE 1 MG: 1 INJECTION, SOLUTION INTRAMUSCULAR; INTRAVENOUS; SUBCUTANEOUS at 04:50

## 2022-11-07 RX ADMIN — HYDROMORPHONE HYDROCHLORIDE 0.5 MG: 1 INJECTION, SOLUTION INTRAMUSCULAR; INTRAVENOUS; SUBCUTANEOUS at 10:50

## 2022-11-07 RX ADMIN — MEROPENEM 1 G: 1 INJECTION INTRAVENOUS at 08:47

## 2022-11-07 RX ADMIN — HYDROMORPHONE HYDROCHLORIDE 0.5 MG: 1 INJECTION, SOLUTION INTRAMUSCULAR; INTRAVENOUS; SUBCUTANEOUS at 16:46

## 2022-11-07 RX ADMIN — POLYETHYLENE GLYCOL 3350 17 G: 17 POWDER, FOR SOLUTION ORAL at 08:47

## 2022-11-07 RX ADMIN — ACETAMINOPHEN 650 MG: 325 TABLET, FILM COATED ORAL at 10:50

## 2022-11-07 RX ADMIN — SENNOSIDES AND DOCUSATE SODIUM 1 TABLET: 50; 8.6 TABLET ORAL at 20:38

## 2022-11-07 RX ADMIN — SENNOSIDES AND DOCUSATE SODIUM 1 TABLET: 50; 8.6 TABLET ORAL at 08:47

## 2022-11-07 RX ADMIN — SODIUM CHLORIDE, POTASSIUM CHLORIDE, SODIUM LACTATE AND CALCIUM CHLORIDE: 600; 310; 30; 20 INJECTION, SOLUTION INTRAVENOUS at 15:20

## 2022-11-07 ASSESSMENT — ACTIVITIES OF DAILY LIVING (ADL)
ADLS_ACUITY_SCORE: 20
ADLS_ACUITY_SCORE: 20
ADLS_ACUITY_SCORE: 26
ADLS_ACUITY_SCORE: 20
ADLS_ACUITY_SCORE: 26

## 2022-11-07 NOTE — PROGRESS NOTES
Care Management Follow Up    Length of Stay (days): 9    Expected Discharge Date: 11/11/2022    Concerns to be Addressed:   In isolation to rule out TB. IV Levaquin, IV Meropenem, IV Flagyl (ID following). IV fluid support. Care post laparotomy with resection of obstructing descending colon inflammatory mass, due to Intra-abdominal abscess, on 11/6/2022.   Patient plan of care discussed at interdisciplinary rounds: Yes  Follow up from rounds/notes:   Per surgery, discontinue NG, clear liquids as tolerated.     Anticipated Discharge Disposition:  Discharge goal is home pending response to treatment, medical needs and mobility closer to discharge.      Anticipated Discharge Services:  Possible home infusion.  Anticipated Discharge DME:  To be determined.     Patient/family educated on Medicare website which has current facility and service quality ratings:  Yes  Education Provided on the Discharge Plan:  Per team  Patient/Family in Agreement with the Plan:  yes    Referrals Placed by CM/SW:  [facility]  Private pay costs discussed: Not applicable at this time.     Additional Information:  Patient is  and independent at baseline, as children at home. She would need a [facility]  for discharge planning and education. Patient has 100% coverage for home infusion if needed. [facility] liaison is following along.     Kirstin Guillen RN

## 2022-11-07 NOTE — ANESTHESIA CARE TRANSFER NOTE
Patient: Dain Tejeda    Procedure: Procedure(s):  EXPLORATORY LAPAROTOMY SPLENIC FLEXURE MOBILIZATION  CREATION, COLOSTOMY  RESECTION OF DESCENDING COLON       Diagnosis: Intra-abdominal abscess (H) [K65.1]  Colitis [K52.9]  Diagnosis Additional Information: No value filed.    Anesthesia Type:   General     Note:    Oropharynx: oropharynx clear of all foreign objects  Level of Consciousness: drowsy  Oxygen Supplementation: face mask  Level of Supplemental Oxygen (L/min / FiO2): 6  Independent Airway: airway patency satisfactory and stable  Dentition: dentition unchanged  Vital Signs Stable: post-procedure vital signs reviewed and stable  Report to RN Given: handoff report given  Patient transferred to: PACU    Handoff Report: Identifed the Patient, Identified the Reponsible Provider, Reviewed the pertinent medical history, Discussed the surgical course, Reviewed Intra-OP anesthesia mangement and issues during anesthesia, Set expectations for post-procedure period and Allowed opportunity for questions and acknowledgement of understanding      Vitals:  Vitals Value Taken Time   /66 11/06/22 1822   Temp 36.7  C (98.1  F) 11/06/22 1822   Pulse 118 11/06/22 1822   Resp 18 11/06/22 1822   SpO2 100 % 11/06/22 1822       Electronically Signed By: HEATHER Bocanegra CRNA  November 6, 2022  6:23 PM

## 2022-11-07 NOTE — PLAN OF CARE
Goal Outcome Evaluation:    Pt is A/O x4. Pt pain is 6-7/10 in abdomen, PRN dilaudid given, available q 2hrs. NG is patent, marked at 48, drainage is brown. Chambers is patent and draining clear, kimberly urine. DOUGLAS drain is draining serosanguineous, output 70 mL. Colostomy had small semi-formed stool with serosanguineous drainage. Bowel sounds are audible but hypoactive all 4 quadrants. K and mag protocol, recheck this AM. Blood sugar 86. Will continue to monitor.

## 2022-11-07 NOTE — PLAN OF CARE
"  Problem: Plan of Care - These are the overarching goals to be used throughout the patient stay.    Description: The Care Plan Review/Shift Note, Individualized Goals, Hospital-Acquired Illness or Injury, Comfort and Wellbeing, and Transition Planning are the \"Overarching Goals\" and should be updated throughout the hospitalization.  Please hover over the (i) for specific information on each goal topic.  Goal: Plan of Care Review  Description: The Plan of Care Review/Shift note should be completed every shift.  The Outcome Evaluation is a brief statement about your assessment that the patient is improving, declining, or no change.  This information will be displayed automatically on your shift note.  Outcome: Progressing  Flowsheets (Taken 11/7/2022 1016)  Outcome Evaluation: PT was NPO yesterday d/t colon resection, new colostomy. Clear liquid diet ordered by but has not been released. To be released this am by RN. Last intake 11/5 100% of supper. Mainly eating food from home with improving intake prior  Overall Patient Progress: improving     Problem: Malnutrition  Goal: Improved Nutritional Intake  Outcome: Progressing   Goal Outcome Evaluation:           Overall Patient Progress: improvingOverall Patient Progress: improving    Outcome Evaluation: PT was NPO yesterday d/t colon resection, new colostomy. Clear liquid diet ordered by but has not been released. To be released this am by RN. Last intake 11/5 100% of supper. Mainly eating food from home with improving intake prior      "

## 2022-11-07 NOTE — PROGRESS NOTES
Dain Tejeda is s/p laparotomy with resection of obstructing descending colon inflammatory mass.  She is doing relatively well, pain controlled, laying in bed        Vitals:    11/06/22 2027 11/06/22 2110 11/06/22 2232 11/07/22 0237   BP: 115/75 114/77 120/74 118/85   BP Location: Right arm Right arm Right arm Right arm   Patient Position:       Cuff Size:       Pulse: 115 114 117 99   Resp: 14 16 14 16   Temp: 98.5  F (36.9  C) 97.8  F (36.6  C) 97.9  F (36.6  C) 97.9  F (36.6  C)   TempSrc: Oral Oral Oral Oral   SpO2: 97% 97% 98% 98%   Weight:       Height:           PHYSICAL EXAM:  GEN: No acute distress, comfortable  ABD: Stoma patent with stool in bag, dressings intact  EXT:No cyanosis, edema or obvious abnormalities        Recent Labs   Lab 11/07/22  0713   WBC 13.3*   HGB 8.7*   HCT 28.0*   *       Recent Labs   Lab 11/05/22  0647   *   CO2 28   BUN 4.8*   ALBUMIN 1.5*   ALKPHOS 144*   ALT 10   AST 20         A/P:  Dain Tejeda is s/p laparotomy with descending colon resection and diverting colostomy  -DC NG tube  -Continue clear liquid diet  -Have encouraged patient to sit in the chair today  -Pain control  -Await pathology results  -Antibiotics per infectious disease    Chandana Carlos DO FACS  [phone]  [facility] Department of Surgery

## 2022-11-07 NOTE — PLAN OF CARE
Goal Outcome Evaluation:       Patient's pain is managed with IV Dilaudid every 2-3 hours.  NG and Chambers removed.  No void yet.  Tolerating small amounts of clear liquids.  DOUGLAS drain intact, dressing changed.  Abd incision is covered with an abd pad with old drainage.  Hmong  needed.

## 2022-11-07 NOTE — OP NOTE
Name:  Dain Tejeda  PCP:  Jefe Martin  Procedure Date:  10/29/2022 - 11/6/2022      Procedure(s):  1-EXPLORATORY LAPAROTOMY   2-SPLENIC FLEXURE MOBILIZATION  3-CREATION, COLOSTOMY  4-RESECTION OF DESCENDING COLON    Pre-Procedure Diagnosis:  Intra-abdominal abscess (H) [K65.1]  Colitis [K52.9]     Post-Procedure Diagnosis:    Descending colonic obstruction    Surgeon(s):  Chandana Carlos DO    Circulator: Kristin Munoz RN; Alexa Blanco, RN  Scrub Person: Jewels Del Toro; Nuha Zaragoza    Anesthesia Type:  GET      Findings:  4 cm segment of significantly inflamed and strictured descending colon anterior to the renal pelvis  -Ascites    Operative Report:    Patient taken the operating room placed in a supine position.  Endotracheal intubation was performed.  A Chambers catheter was placed and antibiotics were given prior skin incision.  The abdomen was prepped and draped sterile fashion once a tap block was performed by the anesthesia team.  I made a generous midline incision and gained entry into the abdomen with sharp dissection electrocautery.  Once I was in the abdomen I immediately encountered several 100 cc of clear appearing ascites.  This was removed with suction irrigation.  A portion of this was sent off the field as specimen.  Once the ascites was removed I then evaluated the bowel.  The small bowel appeared grossly normal.  There was evidence of a small serosal injury which was oversewn with 3-0 silk sutures.  I then evaluated the area of concern which was the descending colon.  There was firm indurated segment approximately 4 to 5 cm in length in the descending colon just anterior to the kidney that was the clear site of the obstruction.  There was no evidence of any other abnormalities in the distal descending colon and sigmoid colon.  The proximal colon appeared significantly dilated and boggy indicative of significant edema and obstruction.  I proceeded to medially mobilized the  descending colon.  This proved fairly difficult given the fact that the ureter was adherent to the inflammatory changes of the descending colon.  At this point after using electrocautery to try and medially mobilize the colon I realize that there was high risk for ureteral injury.  I then elected to transect the colon just distal to the obstruction with a 75 mm blue load BRENDA stapler.  I then came across some of the colonic mesentery with the LigaSure sealing device.  I then turned my attention proximally and performed a splenic flexure mobilization by taking down the proximal peritoneal reflection of the descending colon around the splenic flexure to the transverse colon.  I entered the lesser sac of the stomach and  the gastrocolic ligament with the LigaSure sealing device as well.  Eventually I had my splenic flexure completely mobilized.  I then transected the distal transverse colon at the site of minimal edema.  The distal part of the transverse colon as well as a splenic flexure were significantly edematous and boggy.  The colon was transected at this point with a 75 mm blue load BRENDA stapler.  I then continue with my dissection from a top-down fashion taken down the colonic mesentery.  The fourth portion of the duodenum was evaluated and preserved during this dissection.  Eventually I encountered the portion of the significant fibrosis/inflammation.  This was almost adherent to the renal pelvis but I was able to carefully dissect this off of the kidney preserving the ureter at the same time.  Eventually I had the mass removed from surrounding adhesions and all its mesenteric attachments.  The colon was then liberated out of the abdomen and the distal staple line was sutured with a silk suture to beny distal margin.  This was sent off the field as specimen.    I then irrigated the abdomen with warm normal saline.  The remaining abdomen appeared grossly normal.  I then elected to place a 15 Tima drain  into the pelvis via a right lower quadrant incision.  This was sutured to the skin with a drain stitch.  I then made a circular incision in the left abdominal wall and transected the fascia in a cruciate fashion with Bovie electrocautery.  I then brought the transected portion of the transverse colon out through this hole for the future colostomy.  I tacked to the staple line of the proximal sigmoid colon with 0 Prolene suture for future reanastomosis.  Next I closed the abdominal wall with a 0 looped PDS suture in a running fashion.  Skin edges were then reapproximated staples over a blue vessel loop.  The wound was then protected and I turned my attention to the colostomy.  I matured the colostomy in standard fashion using 3-0 Vicryl sutures in an interrupted fashion.  Once the colostomy was fashioned the wound was then covered with dry sterile dressing and the colostomy appliance was placed over the colostomy.  All lap counts and needle counts were correct at the end of the procedure.  The patient tolerated the procedure well.  She was subsequently extubated sent to the PACU to undergo recovery.    Estimated Blood Loss:   100cc    Specimens:    ID Type Source Tests Collected by Time Destination   1 : DESCENDING SIGMOID COLON- silk beny is distal Tissue Large Intestine, Colon, Descending/Sigmoid SURGICAL PATHOLOGY EXAM Chandana Carlos,  11/6/2022  5:22 PM    A : PERITONEAL FLUID FOR TB, AFB, CULTURES Peritoneal Fluid Peritoneum AFB CULTURE AND STAIN NON BLOOD, ANAEROBIC BACTERIAL CULTURE ROUTINE, AEROBIC BACTERIAL CULTURE ROUTINE, MTB COMPLEX AND RESISTANCE Chandana Carlos, DO 11/6/2022  4:13 PM           Drains:   Closed/Suction Drain 1 Anterior RLQ Bulb 15 Croatian (Active)       Open Drain Medial;Superior Abdomen  (Active)       NG/OG/NJ Tube Nasogastric (Active)   Site Description WDL 11/06/22 1200   Status Suction-low intermittent 11/06/22 1200   Drainage Appearance Other (comment) 11/06/22 1462    Placement Confirmation [place] unchanged 11/06/22 1200   [place] (cm marking) at nare/mouth 50 cm 11/06/22 0215   Output (ml) 0 ml 11/06/22 0023       Colostomy (Active)       Urethral Catheter 11/06/22 Double-lumen;Latex 16 fr (Active)       Complications:    None    Chandana Carlos DO

## 2022-11-07 NOTE — PROGRESS NOTES
[facility]    Progress Note - Hospitalist Service       Date of Admission:  10/29/2022    Assessment & Plan   Dain Tejeda is a 42 year old female w/ PMH of chronic gastritis, inflammation of small intestine, and celiac disease admitted on 10/29/2022. Here w/ diffuse colitis w/ severe involvement of the mid and distal descending colon c/f IBD vs infectious process. Taken for partial colectomy of descending colon w/ colostomy 11/6.    Severe colitis w/ multifocal small intramural abscesses c/f Crohn's  Colonic stricture   Has had negative enteric infectious workup, possible ileitis on imaging 4/14/2022 w/ interval improvement seen on CT 7/11/22. Saw GI outpatient 7/22/22 and noted to have elevated inflammatory markers and abnormal CT findings concerning for IBD. EGD was performed w/ biopsy suggestive of celiac disease and negative for H pylori. Ttg obtained during this hospitalization returned negative.    CT abdomen pelvis 10/29 showing diffuse colitis with severe involvement of the mid and distal descending colon. Although there is no [place] free air, the colitis is complicated by the presence of multiple small, intramural abscesses.    Colonoscopy 10/31 with atypical findings, biopsy consistent with colitis with reactive changes. Given patient's history as a [place] refugee, also considering colonic TB. Pt reportedly immigrated to [place] from [place] in [place], tested negative for TB upon leaving the country and upon arrival to [place]. No exposure to TB at home or work. No hx of immunosuppression, IV drug use, or incarceration. Currently on airborne isolation for c/f TB, pending results of broad infectious workup per ID's rec at this time. No infectious source of symptoms yet identified, quantiferon indeterminate, negative giardia/crypto.   Now with stricture on CT noted 11/5, partial colectomy of descending colon w/ colostomy set up performed 11/6 by gen surg.   - ID consult              - Continue  levofloxacin and metronidazole, Meropenem              - Pending lab studies: AFB smear from rectal mucosa, histoplasmosis, blasto, brucella              - AFB smears from blood, sputum, stool              - RIPE therapy held due to GI side effects   - GI consult placed              >  Asking [facility] to review colonoscopy path for second opinion, follow stool O+P, quantiferon, HIV, CMV testing.              > If infectious workup is negative, will likely start immune modulators for IBD  - General surgery consult              > continue IV abx              > Clear liquid diet today  - Nutrition consult placed              > Dietician did meet w/ the pt 10/31 and educated on gluten free diet give unclear diagnosis of possible Celiac disease.  - PO Pepcid BID  - Oxyocodone 5 mg q3 h PRN for pain  - Scheduled Tylenol for pain   - PRN Zofran and Compazine ordered     Hypokalemia, resolved  Hypomagnesemia, resolved   - Replete per nursing protocol     Abnormal UA  - Cont IV abx per above  - UC grew Klebsiella      Thrombocythemia  PLTs have been around the upper limits of normal during previous evaluation, found to be 600k on admission. Likely elevated 2/2 acute phase reactant and significant GI tract inflammation. Has been stable at the upper limit of normal.   - Monitor         Diet: Snacks/Supplements Adult: Ensure Clear; Between Meals  Clear Liquid Diet    DVT Prophylaxis: Enoxaparin (Lovenox) subcutaneous held due to procedure today, post op will resume for DVT ppx  Chambers Catheter: Not present  Fluids: 100 mL/hr NS  Central Lines: None  Cardiac Monitoring: None  Code Status: Full Code      Disposition Plan      Expected Discharge Date: 11/11/2022    Discharge Delays: IV Medication - consider oral or Home Infusion  Procedure Pending (enter procedure & time in comments)  Destination: home with family  Discharge Comments: Post surgery 11/6.       Discharge will be delayed by ex lap today.     The patient's care was  discussed with the attending physician Dr Tamayo.    Andrey Reyes MD  Hospitalist Service  [facility]  Securely message with the [employer] Web Console (learn more here)  Text page via [employer] Paging/Directory     Clinically Significant Risk Factors         # Hyponatremia: Lowest Na = 135 mmol/L (Ref range: 136-145) in last 2 days, will monitor as appropriate      # Hypoalbuminemia: Lowest albumin = 1.5 g/dL (Ref range: 3.5-5.2) at 11/5/2022  6:47 AM, will monitor as appropriate           # Severe Malnutrition: based on nutrition assessment      ______________________________________________________________________    Interval History   Pt still with moderate pain towards the end of intervals between opioid doses, asking for more freq or stronger pain meds. Otherwise doing well. Has many questions about yesterday's surgery and plan moving forward w/ colostomy bag.     Data reviewed today: I reviewed all medications, new labs and imaging results over the last 24 hours. I personally reviewed     Physical Exam   Vital Signs: Temp: 98.1  F (36.7  C) Temp src: Oral BP: 120/80 Pulse: 110   Resp: 17 SpO2: 97 % O2 Device: None (Room air) Oxygen Delivery: 6 LPM  Weight: 117 lbs 6.4 oz  General Appearance:  Resting in bed, mildly uncomfortably.  No acute distress.  Respiratory: Comfortable respiratory effort.  Clear to auscultation bilaterally.  No crackles or wheezing  Cardiovascular: Regular rate and rhythm.  No murmurs rubs or gallops  GI: Nondistended.  Bowel sounds present. mild tenderness diffusely. No guarding. Colostomy bag in place in LUQ, draining dark brown stool streaks of blood, no redness or purulence surrounding this site.   Skin: No visible rashes or bruising   Neuro: Neutral affect.  Fluent speech.       Data    Recent Results (from the past 24 hour(s))   Creatinine    Collection Time: 11/06/22  8:23 PM   Result Value Ref Range    Creatinine 0.34 (L) 0.51 - 0.95 mg/dL    GFR  "Estimate >90 >60 mL/min/1.73m2   Glucose by meter    Collection Time: 11/06/22  9:18 PM   Result Value Ref Range    GLUCOSE BY METER POCT 130 (H) 70 - 99 mg/dL   Glucose by meter    Collection Time: 11/07/22  6:46 AM   Result Value Ref Range    GLUCOSE BY METER POCT 86 70 - 99 mg/dL   CBC with platelets    Collection Time: 11/07/22  7:13 AM   Result Value Ref Range    WBC Count 13.3 (H) 4.0 - 11.0 10e3/uL    RBC Count 3.28 (L) 3.80 - 5.20 10e6/uL    Hemoglobin 8.7 (L) 11.7 - 15.7 g/dL    Hematocrit 28.0 (L) 35.0 - 47.0 %    MCV 85 78 - 100 fL    MCH 26.5 26.5 - 33.0 pg    MCHC 31.1 (L) 31.5 - 36.5 g/dL    RDW 14.5 10.0 - 15.0 %    Platelet Count 472 (H) 150 - 450 10e3/uL   Basic metabolic panel    Collection Time: 11/07/22  7:13 AM   Result Value Ref Range    Sodium 135 (L) 136 - 145 mmol/L    Potassium 4.4 3.4 - 5.3 mmol/L    Chloride 101 98 - 107 mmol/L    Carbon Dioxide (CO2) 28 22 - 29 mmol/L    Anion Gap 6 (L) 7 - 15 mmol/L    Urea Nitrogen 3.9 (L) 6.0 - 20.0 mg/dL    Creatinine 0.42 (L) 0.51 - 0.95 mg/dL    Calcium 7.6 (L) 8.6 - 10.0 mg/dL    Glucose 97 70 - 99 mg/dL    GFR Estimate >90 >60 mL/min/1.73m2   Magnesium    Collection Time: 11/07/22  7:13 AM   Result Value Ref Range    Magnesium 2.4 (H) 1.7 - 2.3 mg/dL   Extra Red Top Tube    Collection Time: 11/07/22  7:13 AM   Result Value Ref Range    Hold Specimen JIC    Glucose by meter    Collection Time: 11/07/22  9:39 AM   Result Value Ref Range    GLUCOSE BY METER POCT 90 70 - 99 mg/dL     Recent Results (from the past 24 hour(s))   POC US Guided Vascular Access    Narrative    Ultrasound was performed as guidance to an anesthesia procedure.  Click   \"PACS images\" hyperlink below to view any stored images.  For specific   procedure details, view procedure note authored by anesthesia.   POC US Guidance Needle Placement    Narrative    Ultrasound was performed as guidance to an anesthesia procedure.  Click   \"PACS images\" hyperlink below to view any stored " "images.  For specific   procedure details, view procedure note authored by anesthesia.   POC US Guided Vascular Access    Narrative    Ultrasound was performed as guidance to an anesthesia procedure.  Click   \"PACS images\" hyperlink below to view any stored images.  For specific   procedure details, view procedure note authored by anesthesia.     "

## 2022-11-07 NOTE — PROGRESS NOTES
"CLINICAL NUTRITION SERVICES - REASSESSMENT NOTE     Nutrition Prescription    RECOMMENDATIONS FOR MDs/PROVIDERS TO ORDER:  None    Malnutrition Status:    Severe in acute illness 10/31    Recommendations already ordered by Registered Dietitian (RD):  RN is releasing clear liquid diet order now as pt is alert    Future/Additional Recommendations:  Adjust supplements pending intake, tolerance, acceptance, weight  Continue with diet ed counseling and support  New colostomy diet needed pending airborne isolation, discharge date and pt condition     EVALUATION OF PROGRESS TOWARDS GOALS    CURRENT NUTRITION ORDERS  Diet: No current diet order  ADAT: Clear liquids with condition to be released when alert ordered. Currently unreleased    Intake/Tolerance: Last known p.o intake 11/5 supper at 100%.   RN reports she gave pt Clear liquids from unit now    Receiving NS ivf at 100 ml/hr    NEW FINDINGS  -Colon resection yesterday with formation of colostomy d/t colon obstruction at site of abscess  - colon pathology pending  -NG for decompression removed today    LABS  Labs reviewed  Na 135 (L), decreased  Mag 2.4  (H), increased  WBC increased    MEDICATIONS  Medications reviewed  iv abx, iv antifungal, mvi with minerals, iv protonix, miralax daily, pericoalce bid  B6, TB meds on hold    ANTHROPOMETRICS  Height: 149.8 cm (4' 10.98\")  Most Recent Weight: 53.2 kg (117 lb 6.4 oz) 11/6, up 6 lb from week prior  50.8 kg (111 lb 14.4 oz)  10/30  IBW: 44.3 kg  BMI: Normal BMI  Weight History:   Wt Readings from Last 10 Encounters:   10/30/22  07/20/22 50.8 kg (111 lb 14.4 oz)  49.3 kg (108 lb) - office   07/12/22 50.9 kg (112 lb 1.9 oz)   07/08/22 51.3 kg (113 lb)   06/20/22 54 kg (119 lb)   06/09/22 55.8 kg (123 lb)   06/02/22 57 kg (125 lb 11.2 oz)   05/24/22 58.5 kg (129 lb)   06/25/18 60.8 kg (134 lb)   16.2% weight loss x  2 months    Dosing Weight: 50.8 kg    ASSESSED NUTRITION NEEDS  Estimated Energy Needs: 9657-3499 kcals/day " (25 - 30 kcals/kg)  Justification: Maintenance  Estimated Protein Needs: 61-76 grams protein/day (1.2 - 1.5 grams of pro/kg)  Justification: Repletion  Estimated Fluid Needs: 5529-8477 mL/day (1 mL/kcal)   Justification: Maintenance    PHYSICAL FINDINGS  See malnutrition section below.    MALNUTRITION: 10/31  % Weight Loss:  > 7.5% in 3 months (severe malnutrition)  % Intake:  </= 50% for >/= 5 days (severe malnutrition)  Subcutaneous Fat Loss:  None observed  Muscle Loss:  None observed  Fluid Retention:  None noted    Malnutrition Diagnosis: Severe malnutrition  In Context of:  Acute illness or injury    NUTRITION DIAGNOSIS  Malnutrition related to severe colitis, celiac disease, colonic obstruction as evidenced by intake < 50% x 1 week, 16.2% weight loss in 2 months  -was improving. Now set back    INTERVENTIONS  Implementation  Requested RN release Clear liquid diet order as pt is alert    Goals  Meet nutrition needs- not progressing d/t new colonic obstruction  Normalize bowels- not progressing     Monitoring/Evaluation  Progress toward goals will be monitored and evaluated per protocol.

## 2022-11-07 NOTE — ANESTHESIA POSTPROCEDURE EVALUATION
Patient: Dain Tejeda    Procedure: Procedure(s):  EXPLORATORY LAPAROTOMY SPLENIC FLEXURE MOBILIZATION  CREATION, COLOSTOMY  RESECTION OF DESCENDING COLON       Anesthesia Type:  General    Note:  Disposition: Inpatient   Postop Pain Control: Uneventful            Sign Out: Well controlled pain   PONV: No   Neuro/Psych: Uneventful            Sign Out: Acceptable/Baseline neuro status   Airway/Respiratory: Uneventful            Sign Out: Acceptable/Baseline resp. status   CV/Hemodynamics: Uneventful            Sign Out: Acceptable CV status; No obvious hypovolemia; No obvious fluid overload   Other NRE:    DID A NON-ROUTINE EVENT OCCUR? No           Last vitals:  Vitals Value Taken Time   /85 11/06/22 1930   Temp 36.4  C (97.6  F) 11/06/22 1915   Pulse 120 11/06/22 1930   Resp 14 11/06/22 1930   SpO2 96 % 11/06/22 1930       Electronically Signed By: Kenney Chávez MD  November 6, 2022  8:00 PM

## 2022-11-07 NOTE — PLAN OF CARE
Problem: Pain Acute  Goal: Optimal Pain Control and Function  Outcome: Progressing   Goal Outcome Evaluation:                      Pt arrived to unit at 2000. Pt drowsy but answers questions appropriately.   LSC.  NO BS noted. Held PO HS meds. At end of shift, very faint and hypo BS heard in RUQ.   Colostomy has small bloody output. Stoma pink. DOUGLAS moderate serosang. NJ has small output. Ice chips offered.   Chambers patent. Adequate clear yellow output.   SCD's applied. Pt did not stand and dangle at bedside.

## 2022-11-07 NOTE — PROGRESS NOTES
[facility]    Infectious Disease Progress Note    Date of Service : 11/07/2022     Assessment & Plan   Dain Tejeda is a 42 year old female who was admitted on 10/29/2022.     ASSESSMENT:  1. Clinical presentation mimicking Crohn's disease.  History of celiac disease.  6 months history of diarrhea weight loss abdominal pain- active issue  2. Non caseating granulomas may be seen in inflammatory bowel disease.   3. 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  Less likely though cannot be ruled out at this point/ Patient had negative AFB stain on colon and rectal lesion biopsy  4. QFT indeterminate- repeat test also indeterminate. CT chest no radiographic evidence of tuberculosis, active or healed  5. Severe nausea and vomiting with RIPE (Rifampin, INH, PZA and Ethambutol)--held   6. Klebsiella UTI- on effective antibiotic  7. S/p 11/6/22 Procedure(s):  1-EXPLORATORY LAPAROTOMY   2-SPLENIC FLEXURE MOBILIZATION  3-CREATION, COLOSTOMY  4-RESECTION OF DESCENDING COLON  7. Biopsy results below  8. Over 6 months history of weight loss, now with fevers.  Diarrhea abdominal pain  Colonoscopy report:  Descending colon with narrowed area with diffuse polyposis (look like pseudopolyps) and areas of purlulent discharge; however, surrounding mucosa does not appear inflamed (no edema or erythema). Polypoid  lesions and purulent areas biopsied     - Normal sigmoid     - Distal rectum with erythematous mucosa and localized purulent drainage. Biopsied.   DESCENDING COLON, BIOPSY:        1.  PATCHY ACUTE COLITIS WITH REACTIVE CHANGES        2.  FOCAL NONCASEATING GRANULOMA              A.  SPECIAL STAINS NEGATIVE FOR ACID-FAST BACILLI, YEAST, FUNGI AND CMV         3.  NEGATIVE FOR CHRONIC MUCOSAL CHANGES, DYSPLASIA AND MALIGNANCY    B) DISTAL RECTUM, BIOPSIES:         1.  DIFFUSE MILD ACUTE COLITIS WITH REACTIVE CHANGES         2.  CMV IMMUNOSTAIN: NEGATIVE FOR CMV INCLUSIONS         3.   NEGATIVE FOR CHRONIC MUCOSAL CHANGES, DYSPLASIA AND MALIGNANCY      Literature Review    Tuberculous colitis  Tuberculous colitis - PMC (nih.gov)      Colon Tuberculosis: Endoscopic Features and Prospective Endoscopic Follow-Up After Anti-Tuberculosis Treatment Clin Transl Gastroenterol. 2012 Oct; 3(10): e24. Published online 2012 Oct 11. doi: 10.1038/ctg.2012.19      RECOMMENDATIONS:    1. Antibiotics continue broad-spectrum antibiotics: Meropenem levofloxacin, metronidazole  2. AFB blood, AFB stool, AFB sputum.  Ordered and in process--try to induce with hypertonic saline.   3. Empiric TB treatment based in case reports of TB colitis, and concern by GI. Started on 11/2/2022. Due to severe nausea, vomiting, holding Ethambutol, PZA, INH and Rifmapin  4. Zofran   5. Follow culture results   6. Focus/de-escalate antibiotics based on final culture results  7. Monitor CBC, CMP, check CD4 count  8. If TB work-up is negative, would treat for inflammatory bowel disease, defer to GI  9. Regional infectious disease work-up as ordered previously in process  10. Discussed with patient   11. Discussed with nurse and charge nurse regarding airborne infection isolation room while waiting for test results  12. ID will follow  13. Patient updated with the help of  over phone   14. Discussed with primary team.   15. ID will follow      FRANCISCO ROLAND MD  [facility]  [phone]    Total Time Spent 40 minutes with >50% of the time spent in counseling, education and care coordination. Discussed with nurse and patient. Interpretor over phone. Discussed with primary team       Interval History   Complains of difficulty passing urine.     Patient stated that she has not ever had contact with known tuberculosis case.  Updated patient regarding GI concerns next    Physical Exam   Temp: 98.1  F (36.7  C) Temp src: Oral BP: 120/80 Pulse: 110   Resp: 17 SpO2: 97 % O2 Device: None (Room air) Oxygen  Delivery: 6 LPM  Vitals:    10/30/22 1933 10/30/22 2147 11/06/22 0832   Weight: 49.3 kg (108 lb 11.2 oz) 50.8 kg (111 lb 14.4 oz) 53.3 kg (117 lb 6.4 oz)     Vital Signs with Ranges  Temp:  [97.6  F (36.4  C)-98.5  F (36.9  C)] 98.1  F (36.7  C)  Pulse:  [] 110  Resp:  [14-18] 17  BP: (109-127)/(66-85) 120/80  SpO2:  [96 %-100 %] 97 %    Constitutional: moderate distress, nauseated  Lungs: normal breathing pattern, no crackles or wheezing  Cardiovascular: Regular rate and rhythm  Abdomen: less Tenderness--new colostomy  Skin: warm  Neuro: deconditioned  Psych: able to answer questions    Medications     lactated ringers 100 mL/hr at 11/07/22 0512     sodium chloride 100 mL/hr at 11/06/22 0833       acetaminophen  650 mg Oral Q6H    Or     acetaminophen  650 mg Rectal Q6H     enoxaparin ANTICOAGULANT  40 mg Subcutaneous Q24H     [Held by provider] ethambutol  800 mg Oral Daily     fluconazole  400 mg Intravenous Q24H     gabapentin  100 mg Oral TID     [Held by provider] isoniazid  300 mg Oral Daily     levofloxacin  500 mg Intravenous Q24H     meropenem  1 g Intravenous Q8H     metroNIDAZOLE  500 mg Intravenous Q12H     multivitamin, therapeutic  1 tablet Oral Daily     [Held by provider] pantoprazole  40 mg Oral BID     pantoprazole  40 mg Intravenous BID     polyethylene glycol  17 g Oral Daily     [Held by provider] pyrazinamide  1,000 mg Oral Daily     [Held by provider] pyridOXINE  50 mg Oral Daily     [Held by provider] rifampin  600 mg Oral Daily     senna-docusate  1 tablet Oral BID     [START ON 11/8/2022] sodium chloride  3 mL Nebulization Daily     sodium chloride (PF)  3 mL Intracatheter Q8H     sodium chloride (PF)  3 mL Intracatheter Q8H       Data   All microbiology laboratory data reviewed.  Recent Labs   Lab Test 11/07/22 0713 11/05/22 1937 11/05/22  0647   WBC 13.3* 5.8 7.4   HGB 8.7* 8.5* 8.9*   HCT 28.0* 27.3* 28.9*   MCV 85 86 86   * 388 429     Recent Labs   Lab Test  11/07/22  0713 11/06/22 2023 11/05/22  0647   CR 0.42* 0.34* 0.58     Recent Labs   Lab Test 05/24/22  1620   SED 90*     MICRO:  11/01/2022 2133 11/02/2022 1532 Routine parasitology exam [[id number]]    Stool from Per Rectum    Final result Component Value   OVA AND PARASITE EXAM Negative   A single negative specimen does not rule out parasitic infection.   WBC'S, O&P 4+ PMNs          11/01/2022 2133 11/02/2022 1123 Cryptosporidium and Giardia antigens [[id number]]   Stool from Per Rectum    Final result Component Value   Cryptosporidium parvum antigen Negative   Giardia lamblia antigen Negative          11/01/2022 1619 11/02/2022 0932 Blood Culture Arm, Right [[id number]]   Blood from Arm, Right    Preliminary result Component Value   Culture No growth after 12 hours P             11/01/2022 1619 11/02/2022 1343 CMV Quantitative, PCR [[id number]]    Blood from Arm, Right    Final result Component Value Units   CMV DNA IU/mL Not Detected IU/mL          11/01/2022 1619 11/01/2022 1644 Quantiferon TB Gold Plus Grey Tube [[id number]]   Blood from Arm, Right    In process Component Value   No component results          11/01/2022 1619 11/01/2022 1644 Quantiferon TB Gold Plus Green Tube [[id number]]   Blood from Arm, Right    In process Component Value   No component results          11/01/2022 1619 11/01/2022 1644 Quantiferon TB Gold Plus Yellow Tube [[id number]]   Blood from Arm, Right    In process Component Value   No component results          11/01/2022 1619 11/01/2022 1644 Quantiferon TB Gold Plus Purple Tube [[id number]]   Blood from Arm, Right    In process Component Value   No component results          11/01/2022 0434 11/02/2022 0932 Blood Culture Peripheral Blood [[id number]]    Peripheral Blood    Preliminary result Component Value   Culture No growth after 1 day P             10/30/2022 1848 10/31/2022 1713 Enteric Bacteria and Virus Panel by MARILYNN Stool [[id number]]    Stool from Per  Rectum    Final result Component Value   Campylobacter group Not Detected   Salmonella species Not Detected   Shigella species Not Detected   Vibrio group Not Detected   Rotavirus Not Detected   Shiga toxin 1 gene Not Detected   Shiga toxin 2 gene Not Detected   Norovirus I and II Not Detected   Yersinia enterocolitica Not Detected          10/30/2022 1847 10/31/2022 0047 C. difficile Toxin B PCR with reflex to C. difficile Antigen and Toxins A/B EIA [[id number]]    Stool from Per Rectum    Final result Component Value   C Difficile Toxin B by PCR Negative   A negative result does not exclude actual disease due to C. difficile and may be due to improper collection, handling and storage of the specimen or the number of organisms in the specimen is below the detection limit of the assay.          10/29/2022 2344 10/30/2022 0049 Asymptomatic COVID-19 Virus (Coronavirus) by PCR Nasopharyngeal [[id number]]    Swab from Nasopharyngeal    Final result Component Value   SARS CoV2 PCR Negative   NEGATIVE: SARS-CoV-2 (COVID-19) RNA not detected, presumed negative.          10/29/2022 2038 10/31/2022 2243 Urine Culture [[id number]]    (Abnormal)   Urine, Midstream    Final result Component Value   Culture 50,000-100,000 CFU/mL Klebsiella pneumoniae Abnormal     <10,000 CFU/mL Urogenital zulma       Susceptibility     Klebsiella pneumoniae     KENISHA     Ampicillin >=32 ug/mL Resistant 1     Ampicillin/ Sulbactam 4 ug/mL Susceptible     Cefazolin <=4 ug/mL Susceptible 2     Cefepime <=1 ug/mL Susceptible     Cefoxitin <=4 ug/mL Susceptible     Ceftazidime <=1 ug/mL Susceptible     Ceftriaxone <=1 ug/mL Susceptible     Ciprofloxacin <=0.25 ug/mL Susceptible     Gentamicin <=1 ug/mL Susceptible     Levofloxacin <=0.12 ug/mL Susceptible     Nitrofurantoin 64 ug/mL Intermediate     Piperacillin/Tazobactam <=4 ug/mL Susceptible     Tobramycin <=1 ug/mL Susceptible     Trimethoprim/Sulfamethoxazole >16/304 ug/mL Resistant               1 Intrinsically Resistant   2 Cefazolin KENISHA breakpoints are for the treatment of uncomplicated urinary tract infections. For the treatment of systemic infections, please contact the laboratory for additional testing.             RADIOLOGY:  Reviewed  XR Abdomen Port 1 View    Result Date: 11/6/2022  EXAM: XR ABDOMEN PORT 1 VIEW LOCATION: [facility] DATE/TIME: 11/6/2022 1:04 AM INDICATION: NGT check   placement verification COMPARISON: None.     IMPRESSION: Nasogastric tube terminates over the stomach. Side-port is at the level of the GE junction and advancement by 2 cm is suggested. Dilated bowel loops in the mid abdomen are redemonstrated.    CT Abdomen Pelvis w Contrast    Result Date: 11/5/2022  EXAM: CT ABDOMEN PELVIS W CONTRAST LOCATION: [facility] DATE/TIME: 11/5/2022 8:13 PM INDICATION: New onset severe abdominal pain, fever, tachycardia. Admitted with diffuse colitis COMPARISON: CT 10/29/2022. TECHNIQUE: CT scan of the abdomen and pelvis was performed following injection of IV contrast. Multiplanar reformats were obtained. Dose reduction techniques were used. CONTRAST: isovue 370  100ml FINDINGS: LOWER CHEST: Small bilateral pleural effusions and bibasilar atelectasis. HEPATOBILIARY: Significant gallbladder distention without wall thickening. No evidence of biliary obstruction. PANCREAS: Normal. SPLEEN: Normal. ADRENAL GLANDS: Normal. KIDNEYS/BLADDER: No hydronephrosis. Urinary bladder is unremarkable. BOWEL: Findings of diffuse colitis again noted with new colonic stricture/obstruction at the level of the descending colon in the area of previously described intramural abscesses. There is significant mesenteric edema without evidence of perforation or drainable fluid collection. LYMPH NODES: Stable size of subcentimeter mesenteric lymph nodes, likely reactive to adjacent inflammation. VASCULATURE: Unremarkable. PELVIC ORGANS: Unchanged size of left  paraovarian cyst. Fluid around the right adnexa is likely related to peritoneal fluid described above. MUSCULOSKELETAL: No acute bony abnormality. Sequela of injections in the anterior abdominal wall.     IMPRESSION: 1.  Persistent findings of diffuse colitis with new high-grade stricture/obstruction at the level of the descending colon near previously described intramural abscesses. New mesenteric edema with small volume ascites. No evidence of phoenix perforation or drainable fluid collection. 2.  Distended gallbladder without additional CT evidence of acute cholecystitis. 3.  Small bilateral pleural effusions.    CT Abdomen Pelvis w Contrast    Result Date: 10/29/2022  EXAM: CT ABDOMEN PELVIS W CONTRAST LOCATION: [facility] DATE/TIME: 10/29/2022 10:27 PM INDICATION: abd pain assoc with N V D COMPARISON: Ultrasound of the pelvis from 08/18/2022. CT abdomen pelvis from 07/11/2022 TECHNIQUE: CT scan of the abdomen and pelvis was performed following injection of IV contrast. Multiplanar reformats were obtained. Dose reduction techniques were used. CONTRAST: [id number] 100ml FINDINGS: LOWER CHEST: Normal. HEPATOBILIARY: Focal fat along the falciform ligament, liver otherwise normal. No calcified gallstones. No biliary ductal dilatation. PANCREAS: Normal. SPLEEN: Normal. ADRENAL GLANDS: Normal. KIDNEYS/BLADDER: Normal. BOWEL: Normal caliber small bowel. There is wall thickening of the large bowel from the proximal transverse colon through the mid sigmoid colon. Wall thickening is most severe in the mid and distal descending colon, where there are multiple intramural fluid and gas collections consistent with abscess. Anteriorly there is a 1.5 cm collection on image 98 of series 2. Posteriorly there is a 1.3 cm collection on image 106. More inferiorly there is a thin crescentic collection posteriorly measuring 1.7 cm in length. There is extensive pericolonic edema, but no free air. LYMPH NODES:  "Multiple presumably reactive pericolonic lymph nodes. VASCULATURE: Unremarkable. PELVIC ORGANS: Cyst in the left pelvis measures 5 cm in length, likely corresponding to the paraovarian cyst seen on the recent pelvic ultrasound. MUSCULOSKELETAL: Transitional lumbosacral anatomy, normal variant     IMPRESSION: 1.  Findings consistent with a fairly diffuse colitis with severe involvement of the mid and distal descending colon. Although there is no phoenix free air, the colitis is complicated by the presence of multiple small, intramural abscesses as described above.    CT Chest w/o Contrast    Result Date: 11/2/2022  EXAM: CT CHEST W/O CONTRAST LOCATION: [facility] DATE/TIME: 11/2/2022 5:29 PM INDICATION: Pulmonary nodule evaluation; Fever; No known automatically detected potential contraindications to iodinated contrast COMPARISON: CT abdomen/pelvis 10/29/2022. TECHNIQUE: CT chest without IV contrast. Multiplanar reformats were obtained. Dose reduction techniques were used. CONTRAST: None. FINDINGS: LUNGS AND PLEURA: Lungs are clear. No pleural effusion. Minimal bibasilar hypoventilatory changes. Triangular nodule abutting the minor fissure on the right, likely an intrapulmonary lymph node, no specific follow-up recommended. MEDIASTINUM/AXILLAE: No lymphadenopathy. No thoracic aortic aneurysm. CORONARY ARTERY CALCIFICATION: None. UPPER ABDOMEN: Findings of colitis with possible interval decrease in surrounding inflammatory stranding since prior abdominal CT. MUSCULOSKELETAL: No acute bony abnormality.     IMPRESSION: 1.  No evidence of acute abnormality in the chest. No radiographic evidence of tuberculosis, active or healed.     POC US Guidance Needle Placement    Result Date: 11/6/2022  Ultrasound was performed as guidance to an anesthesia procedure.  Click \"PACS images\" hyperlink below to view any stored images.  For specific procedure details, view procedure note authored by " "anesthesia.    POC US Guided Vascular Access    Result Date: 11/6/2022  Ultrasound was performed as guidance to an anesthesia procedure.  Click \"PACS images\" hyperlink below to view any stored images.  For specific procedure details, view procedure note authored by anesthesia.    POC US Guided Vascular Access    Result Date: 11/6/2022  Ultrasound was performed as guidance to an anesthesia procedure.  Click \"PACS images\" hyperlink below to view any stored images.  For specific procedure details, view procedure note authored by anesthesia.      "

## 2022-11-08 LAB
ANION GAP SERPL CALCULATED.3IONS-SCNC: 4 MMOL/L (ref 7–15)
BUN SERPL-MCNC: 5.1 MG/DL (ref 6–20)
CALCIUM SERPL-MCNC: 7.4 MG/DL (ref 8.6–10)
CHLORIDE SERPL-SCNC: 101 MMOL/L (ref 98–107)
CREAT SERPL-MCNC: 0.41 MG/DL (ref 0.51–0.95)
DEPRECATED HCO3 PLAS-SCNC: 31 MMOL/L (ref 22–29)
ERYTHROCYTE [DISTWIDTH] IN BLOOD BY AUTOMATED COUNT: 14.5 % (ref 10–15)
GFR SERPL CREATININE-BSD FRML MDRD: >90 ML/MIN/1.73M2
GLUCOSE BLDC GLUCOMTR-MCNC: 83 MG/DL (ref 70–99)
GLUCOSE SERPL-MCNC: 89 MG/DL (ref 70–99)
HCT VFR BLD AUTO: 26.1 % (ref 35–47)
HGB BLD-MCNC: 8.1 G/DL (ref 11.7–15.7)
MAGNESIUM SERPL-MCNC: 2 MG/DL (ref 1.7–2.3)
MCH RBC QN AUTO: 26.6 PG (ref 26.5–33)
MCHC RBC AUTO-ENTMCNC: 31 G/DL (ref 31.5–36.5)
MCV RBC AUTO: 86 FL (ref 78–100)
PLATELET # BLD AUTO: 438 10E3/UL (ref 150–450)
POTASSIUM SERPL-SCNC: 3.7 MMOL/L (ref 3.4–5.3)
RBC # BLD AUTO: 3.05 10E6/UL (ref 3.8–5.2)
SCANNED LAB RESULT: NORMAL
SCANNED LAB RESULT: NORMAL
SODIUM SERPL-SCNC: 136 MMOL/L (ref 136–145)
WBC # BLD AUTO: 6 10E3/UL (ref 4–11)

## 2022-11-08 PROCEDURE — 250N000013 HC RX MED GY IP 250 OP 250 PS 637

## 2022-11-08 PROCEDURE — 250N000011 HC RX IP 250 OP 636: Performed by: STUDENT IN AN ORGANIZED HEALTH CARE EDUCATION/TRAINING PROGRAM

## 2022-11-08 PROCEDURE — 250N000011 HC RX IP 250 OP 636: Performed by: INTERNAL MEDICINE

## 2022-11-08 PROCEDURE — 250N000011 HC RX IP 250 OP 636

## 2022-11-08 PROCEDURE — 99233 SBSQ HOSP IP/OBS HIGH 50: CPT | Mod: GC | Performed by: STUDENT IN AN ORGANIZED HEALTH CARE EDUCATION/TRAINING PROGRAM

## 2022-11-08 PROCEDURE — 258N000003 HC RX IP 258 OP 636: Performed by: ANESTHESIOLOGY

## 2022-11-08 PROCEDURE — 250N000013 HC RX MED GY IP 250 OP 250 PS 637: Performed by: FAMILY MEDICINE

## 2022-11-08 PROCEDURE — 250N000011 HC RX IP 250 OP 636: Performed by: SURGERY

## 2022-11-08 PROCEDURE — C9113 INJ PANTOPRAZOLE SODIUM, VIA: HCPCS

## 2022-11-08 PROCEDURE — 94640 AIRWAY INHALATION TREATMENT: CPT

## 2022-11-08 PROCEDURE — 120N000001 HC R&B MED SURG/OB

## 2022-11-08 PROCEDURE — 80048 BASIC METABOLIC PNL TOTAL CA: CPT | Performed by: FAMILY MEDICINE

## 2022-11-08 PROCEDURE — 83735 ASSAY OF MAGNESIUM: CPT | Performed by: STUDENT IN AN ORGANIZED HEALTH CARE EDUCATION/TRAINING PROGRAM

## 2022-11-08 PROCEDURE — 85027 COMPLETE CBC AUTOMATED: CPT | Performed by: STUDENT IN AN ORGANIZED HEALTH CARE EDUCATION/TRAINING PROGRAM

## 2022-11-08 PROCEDURE — 36415 COLL VENOUS BLD VENIPUNCTURE: CPT | Performed by: STUDENT IN AN ORGANIZED HEALTH CARE EDUCATION/TRAINING PROGRAM

## 2022-11-08 PROCEDURE — 250N000013 HC RX MED GY IP 250 OP 250 PS 637: Performed by: SURGERY

## 2022-11-08 PROCEDURE — 99232 SBSQ HOSP IP/OBS MODERATE 35: CPT

## 2022-11-08 PROCEDURE — 250N000009 HC RX 250

## 2022-11-08 PROCEDURE — 999N000157 HC STATISTIC RCP TIME EA 10 MIN

## 2022-11-08 RX ORDER — FLUCONAZOLE 40 MG/ML
200 POWDER, FOR SUSPENSION ORAL DAILY
Status: DISCONTINUED | OUTPATIENT
Start: 2022-11-08 | End: 2022-11-08 | Stop reason: RX

## 2022-11-08 RX ORDER — AMOXICILLIN AND CLAVULANATE POTASSIUM 400; 57 MG/5ML; MG/5ML
875 POWDER, FOR SUSPENSION ORAL 2 TIMES DAILY
Status: DISCONTINUED | OUTPATIENT
Start: 2022-11-08 | End: 2022-11-14 | Stop reason: HOSPADM

## 2022-11-08 RX ORDER — FLUCONAZOLE 100 MG/1
200 TABLET ORAL DAILY
Status: COMPLETED | OUTPATIENT
Start: 2022-11-08 | End: 2022-11-14

## 2022-11-08 RX ADMIN — ENOXAPARIN SODIUM 40 MG: 40 INJECTION SUBCUTANEOUS at 11:42

## 2022-11-08 RX ADMIN — THERA TABS 1 TABLET: TAB at 08:17

## 2022-11-08 RX ADMIN — SENNOSIDES AND DOCUSATE SODIUM 1 TABLET: 50; 8.6 TABLET ORAL at 08:17

## 2022-11-08 RX ADMIN — PANTOPRAZOLE SODIUM 40 MG: 40 INJECTION, POWDER, FOR SOLUTION INTRAVENOUS at 21:23

## 2022-11-08 RX ADMIN — OXYCODONE HYDROCHLORIDE 5 MG: 5 TABLET ORAL at 21:22

## 2022-11-08 RX ADMIN — GABAPENTIN 100 MG: 100 CAPSULE ORAL at 20:51

## 2022-11-08 RX ADMIN — ACETAMINOPHEN 650 MG: 325 TABLET, FILM COATED ORAL at 11:42

## 2022-11-08 RX ADMIN — SENNOSIDES AND DOCUSATE SODIUM 1 TABLET: 50; 8.6 TABLET ORAL at 20:51

## 2022-11-08 RX ADMIN — ACETAMINOPHEN 650 MG: 325 TABLET, FILM COATED ORAL at 17:25

## 2022-11-08 RX ADMIN — METRONIDAZOLE 500 MG: 500 INJECTION, SOLUTION INTRAVENOUS at 06:44

## 2022-11-08 RX ADMIN — HYDROMORPHONE HYDROCHLORIDE 1 MG: 1 INJECTION, SOLUTION INTRAMUSCULAR; INTRAVENOUS; SUBCUTANEOUS at 02:36

## 2022-11-08 RX ADMIN — MEROPENEM 1 G: 1 INJECTION INTRAVENOUS at 01:43

## 2022-11-08 RX ADMIN — GABAPENTIN 100 MG: 100 CAPSULE ORAL at 15:26

## 2022-11-08 RX ADMIN — HYDROMORPHONE HYDROCHLORIDE 1 MG: 1 INJECTION, SOLUTION INTRAMUSCULAR; INTRAVENOUS; SUBCUTANEOUS at 05:31

## 2022-11-08 RX ADMIN — ACETAMINOPHEN 650 MG: 325 TABLET, FILM COATED ORAL at 23:49

## 2022-11-08 RX ADMIN — SODIUM CHLORIDE SOLN NEBU 3% 3 ML: 3 NEBU SOLN at 08:18

## 2022-11-08 RX ADMIN — SODIUM CHLORIDE SOLN NEBU 3% 3 ML: 3 NEBU SOLN at 06:14

## 2022-11-08 RX ADMIN — HYDROMORPHONE HYDROCHLORIDE 0.5 MG: 1 INJECTION, SOLUTION INTRAMUSCULAR; INTRAVENOUS; SUBCUTANEOUS at 10:50

## 2022-11-08 RX ADMIN — HYDROMORPHONE HYDROCHLORIDE 0.5 MG: 1 INJECTION, SOLUTION INTRAMUSCULAR; INTRAVENOUS; SUBCUTANEOUS at 15:27

## 2022-11-08 RX ADMIN — HYDROMORPHONE HYDROCHLORIDE 0.5 MG: 1 INJECTION, SOLUTION INTRAMUSCULAR; INTRAVENOUS; SUBCUTANEOUS at 08:17

## 2022-11-08 RX ADMIN — MEROPENEM 1 G: 1 INJECTION INTRAVENOUS at 10:50

## 2022-11-08 RX ADMIN — ACETAMINOPHEN 650 MG: 325 TABLET, FILM COATED ORAL at 05:34

## 2022-11-08 RX ADMIN — GABAPENTIN 100 MG: 100 CAPSULE ORAL at 08:17

## 2022-11-08 RX ADMIN — FLUCONAZOLE 200 MG: 100 TABLET ORAL at 16:04

## 2022-11-08 RX ADMIN — SODIUM CHLORIDE, POTASSIUM CHLORIDE, SODIUM LACTATE AND CALCIUM CHLORIDE: 600; 310; 30; 20 INJECTION, SOLUTION INTRAVENOUS at 00:25

## 2022-11-08 RX ADMIN — PANTOPRAZOLE SODIUM 40 MG: 40 INJECTION, POWDER, FOR SOLUTION INTRAVENOUS at 08:16

## 2022-11-08 RX ADMIN — ACETAMINOPHEN 650 MG: 325 TABLET, FILM COATED ORAL at 00:23

## 2022-11-08 RX ADMIN — HYDROMORPHONE HYDROCHLORIDE 0.5 MG: 1 INJECTION, SOLUTION INTRAMUSCULAR; INTRAVENOUS; SUBCUTANEOUS at 00:24

## 2022-11-08 RX ADMIN — POLYETHYLENE GLYCOL 3350 17 G: 17 POWDER, FOR SOLUTION ORAL at 08:17

## 2022-11-08 RX ADMIN — AMOXICILLIN AND CLAVULANATE POTASSIUM 875 MG: 400; 57 POWDER, FOR SUSPENSION ORAL at 20:51

## 2022-11-08 ASSESSMENT — ACTIVITIES OF DAILY LIVING (ADL)
ADLS_ACUITY_SCORE: 22
ADLS_ACUITY_SCORE: 26
ADLS_ACUITY_SCORE: 22
ADLS_ACUITY_SCORE: 26
ADLS_ACUITY_SCORE: 26
ADLS_ACUITY_SCORE: 20
ADLS_ACUITY_SCORE: 20
ADLS_ACUITY_SCORE: 22

## 2022-11-08 NOTE — PLAN OF CARE
Problem: Plan of Care - These are the overarching goals to be used throughout the patient stay.    Goal: Optimal Comfort and Wellbeing  Outcome: Progressing  Pt up to chair. IV antibiotics given per order. 50cc serosanguinous drainage from DOUGLAS. Colostomy emptied at beginning of shift.      Problem: Pain Acute  Goal: Optimal Pain Control and Function  Outcome: Progressing  Reporting pain 7/10 in abdomen, PRN dilaudid given, effective per pt. Scheduled tylenol given.      Goal Outcome Evaluation:

## 2022-11-08 NOTE — PROGRESS NOTES
[facility] Progress Note     IMPRESSION:  Dain Tejeda is a 42 year old female w/ PMH of chronic gastritis, inflammation of small intestine, and celiac disease admitted on 10/29/2022. Here w/ diffuse colitis w/ severe involvement of the mid and distal descending colon c/f IBD vs infectious process. Taken for partial colectomy of descending colon w/ colostomy 11/6.    1. Colitis w/ multifocal small intramural abscesses  2. Colonic stricture  - CT abdomen pelvis 10/29 showing diffuse colitis with severe involvement of the mid and distal descending colon. Although there is no phoenix free air, the colitis is complicated by the presence of multiple small, intramural abscesses. CT abd/pelvis 11/5 noted colon stricture.  - Colonoscopy 10/31 with atypical findings, biopsy consistent with colitis with reactive changes. Negative AFB stain on colon and rectal lesion biopsy. CMV negative.   - Given patient's history as a [place] refugee, also considering colonic TB. ID consulted with broad infectious workup. No infectious source of symptoms yet identified, quantiferon indeterminate, XR chest negative. Negative giardia/crypto.  - Partial colectomy of descending colon w/ colostomy performed 11/6. - Surgical pathology pending.  - On abx: levofloxacin and metronidazole, Meropenem  - Consider treating for IBD if infectious workup is negative. Currently holding steroids given infectious workup.    RECOMMENDATIONS:  - Await pathology and ID workup  - Supportive cares  - GI will continue to follow     Approximately 15 minutes of total time was spent providing patient care, including patient evaluation, reviewing documentation/test results, and     Contreras Pereira PA-C  [facility]  [phone]  ________________________________________________________________________      SUBJECTIVE:    Pt feels ok, no new abdominal pains, nausea, or vomiting. Tolerating diet. States she had difficulty urinating and saw scant  "blood in her urine.      (Dmitriy) and patient are somewhat frustrated not knowing what her diagnosis is but understand the medical team is waiting on diagnostic labs and pathology to get a better idea of her condition.      OBJECTIVE:  /83 (BP Location: Right arm)   Pulse 102   Temp 98.1  F (36.7  C) (Oral)   Resp 18   Ht 1.498 m (4' 10.98\")   Wt 53.3 kg (117 lb 6.4 oz)   SpO2 96%   BMI 23.73 kg/m    Temp (24hrs), Av.1  F (36.7  C), Min:98  F (36.7  C), Max:98.1  F (36.7  C)    Patient Vitals for the past 72 hrs:   Weight   22 0832 53.3 kg (117 lb 6.4 oz)       Intake/Output Summary (Last 24 hours) at 2022 0810  Last data filed at 2022 0630  Gross per 24 hour   Intake 1344 ml   Output 1920 ml   Net -576 ml        PHYSICAL EXAM  GEN: Alert, oriented x3, communicative and in NAD.    LYMPH: No LAD noted.  HRT: RRR  LUNGS: CTA  ABD:  ND, +BS, Mild pain to palpation, no rebound, no HSM.  SKIN: No rash, jaundice or spider angiomata      LABS:  I have reviewed the patient's new clinical lab results:     Recent Labs   Lab Test 22  0701 22  0713 22  1937   WBC 6.0 13.3* 5.8   HGB 8.1* 8.7* 8.5*   MCV 86 85 86    472* 388     Recent Labs   Lab Test 22  0701 22  0713 22  2023 22  2346 22  1528 22  0647   POTASSIUM 3.7 4.4  --  3.6   < > 3.2*   CHLORIDE 101 101  --   --   --  102   CO2 31* 28  --   --   --  28   BUN 5.1* 3.9*  --   --   --  4.8*   CR 0.41* 0.42* 0.34*  --   --  0.58   ANIONGAP 4* 6*  --   --   --  5*    < > = values in this interval not displayed.     Recent Labs   Lab Test 22  0647 10/29/22  2038 10/29/22  2027 06/23/22  1019 22  0442 22   ALBUMIN 1.5*  --  2.5* 2.3*   < >  --  2.8*   BILITOTAL 0.4  --  0.4 0.3   < >  --  0.4   ALT 10  --  8* 21   < >  --  13   AST 20  --  14 20   < >  --  14   PROTEIN  --  20*  --   --   --  50*  --    LIPASE  --   --  17  --   --   --  33 "    < > = values in this interval not displayed.         IMAGING:  reviewed

## 2022-11-08 NOTE — PLAN OF CARE
Goal Outcome Evaluation:    Pt did not sleep well. Slightly tachycardic otherwise vitals WNL.     Endorsing increased RLQ and generalized abdominal pain overnight up to a 10/10 IV dilaudid given Q 2-3 hrs with little effect. Scheduled tylenol.     DOUGALS drain did not appear to be holding suction when seen at midnight- placed to suction and within the hour was filled 100 ml serosanguinous output. Pt ambulated to bathroom and when returned to bed already had another 60 ml in drain. Total output for nights was 445 ml. Did speak with resident, Dr. Marie at 0230 about increased abdominal pain and increased DOUGLAS output. As ostomy still having output, VSS, and DOUGLAS drain output continues to be serosanguinous resident says she will communicate this to day team for direction.     - Final three hours of shift output did slow back down.     Denies nausea. Tolerating clears. IVF and IV abx infusing.     Bloodsugar POD #2 83.     Surgery and GI following.

## 2022-11-08 NOTE — PROGRESS NOTES
Attempted to collect AFB sample. Patient unable to expectorate even after nebulizer given. Sputum cup left in room. Instructed patient to spit in cup if coughs up sputum.

## 2022-11-08 NOTE — PROGRESS NOTES
[facility]    Progress Note - Hospitalist Service       Date of Admission:  10/29/2022    Assessment & Plan   Dain Tejeda is a 42 year old female w/ PMH of chronic gastritis, inflammation of small intestine, and celiac disease admitted on 10/29/2022. Here w/ diffuse colitis w/ severe involvement of the mid and distal descending colon c/f IBD vs infectious process. Taken for partial colectomy of descending colon w/ colostomy 11/6.    Severe colitis w/ multifocal small intramural abscesses   Colonic stricture s/p partial colectomy 11/6   *Has had negative enteric infectious workup, possible ileitis on imaging 4/14/2022 w/ interval improvement seen on CT 7/11/22. Saw GI outpatient 7/22/22 and noted to have elevated inflammatory markers and abnormal CT findings concerning for IBD. EGD was performed w/ biopsy suggestive of celiac disease and negative for H pylori. Ttg obtained during this hospitalization returned negative.   *CT abdomen pelvis 10/29 showing diffuse colitis with severe involvement of the mid and distal descending colon. Although there is no phoenix free air, the colitis is complicated by the presence of multiple small, intramural abscesses.   *Colonoscopy 10/31 with atypical findings, biopsy consistent with colitis with reactive changes. Given patient's history as a [place] refugee, also considering colonic TB. [place] immig[place] to [place] from [facility] in 2017, tested negative for TB upon leaving the country and upon arrival to [place]. No exposure to TB at home or work. No hx of immunosuppression, IV drug use, or incarceration. Currently on airborne isolation for c/f TB, pending results of broad infectious workup per ID's rec at this time. No infectious source of symptoms yet identified, quantiferon indeterminate, negative giardia/crypto.  *Now with stricture on CT noted 11/5, partial colectomy of descending colon w/ colostomy set up performed 11/6 by gen surg.   PLAN:   - ID consult,  appreciate               - Continue IV levofloxacin and metronidazole, Meropenem    Ultimate plan pending results of labs               - Follow lab studies: AFB smear from rectal mucosa, histoplasmosis, blasto, brucella              - Follow AFB smears from blood, sputum, stool              - RIPE therapy held due to GI side effects   - GI consult placed, appreciate               >  Asking [facility] to review colonoscopy path for second opinion, follow stool O+P, quantiferon, HIV, CMV testing.              > If infectious workup is negative, will likely start immune modulators for IBD  - General surgery consult, appreciate               > continue IV abx              > Clear liquid diet today  - Nutrition consult placed              > Dietician did meet w/ the pt 10/31 and educated on gluten free diet give unclear diagnosis of possible Celiac disease.  - PO Pepcid BID  - Oxyocodone 5 mg q3 h PRN for pain  - Scheduled Tylenol for pain   - PRN Zofran and Compazine ordered     Hypokalemia, resolved  Hypomagnesemia, resolved   - Replete per nursing protocol     Abnormal UA  - Cont IV abx per above  - UC grew Klebsiella      Thrombocytosis, resolved   PLTs have been around the upper limits of normal during previous evaluation, found to be 600k on admission. Likely elevated 2/2 acute phase reactant and significant GI tract inflammation. Has been stable at the upper limit of normal -- improved to nml range 11/8.   - Monitor    Leukocytosis, resolved   WBC 13 --> 6 on 11/8.  -Follow CBC          Diet: Snacks/Supplements Adult: Ensure Clear; Between Meals  Regular Diet Adult    DVT Prophylaxis: Enoxaparin (Lovenox) subcutaneous held due to procedure today, post op will resume for DVT ppx  Chambers Catheter: Not present  Fluids: 100 mL/hr NS  Central Lines: None  Cardiac Monitoring: None  Code Status: Full Code      Disposition Plan     Expected Discharge Date: 11/11/2022    Discharge Delays: IV Medication - consider oral or Home  "Infusion  Procedure Pending (enter procedure & time in comments)  Destination: home with family  Discharge Comments: Post surgery 11/6.  R/O TB pending       Discharge will be delayed by ex lap today.     The patient's care was discussed with the attending physician Dr Tamayo.    Emilia Matos MD  Hospitalist Service  [facility]  Securely message with the Vocera Web Console (learn more here)  Text page via [employer] Paging/Directory     Clinically Significant Risk Factors         # Hyponatremia: Lowest Na = 135 mmol/L (Ref range: 136-145) in last 2 days, will monitor as appropriate      # Hypoalbuminemia: Lowest albumin = 1.5 g/dL (Ref range: 3.5-5.2) at 11/5/2022  6:47 AM, will monitor as appropriate           # Severe Malnutrition: based on nutrition assessment      ______________________________________________________________________    Interval History   Staff notes reviewed. No acute events.   Reviewed following documentation:  \"DOUGLAS drain did not appear to be holding suction when seen at midnight- placed to suction and within the hour was filled 100 ml serosanguinous output. Pt ambulated to bathroom and when returned to bed already had another 60 ml in drain. Total output for nights was 445 ml. Did speak with resident, Dr. Marie at 0230 about increased abdominal pain and increased DOUGLAS output. As ostomy still having output, VSS, and DOUGLAS drain output continues to be serosanguinous resident says she will communicate this to day team for direction.\"     Feeling okay this morning.   Ongoing abdominal pain but improving   No nausea   Didn't sleep well      Data reviewed today: I reviewed all medications, new labs and imaging results over the last 24 hours. I personally reviewed     Physical Exam   Vital Signs: Temp: 98.4  F (36.9  C) Temp src: Oral BP: 119/86 Pulse: 100   Resp: 18 SpO2: 98 % O2 Device: None (Room air)    Weight: 117 lbs 6.4 oz    Gen: Pleasant. No distress.  Sitting " upright in the chair.   HEENT: MMM.   Neck: No overt asymmetry.   CV: Appears well-perfused.   Resp: Breathing comfortably on room air.   Abd: Non-distended. Dressings in place. Colostomy bag in place. DOUGLAS drain with serosanguinous fluid.   Ext: No edema or overt asymmetry/deformity.   Neuro: Non-focal.   Psych: Calm.      Data    Recent Results (from the past 24 hour(s))   Glucose by meter    Collection Time: 11/08/22  5:36 AM   Result Value Ref Range    GLUCOSE BY METER POCT 83 70 - 99 mg/dL   CBC with platelets    Collection Time: 11/08/22  7:01 AM   Result Value Ref Range    WBC Count 6.0 4.0 - 11.0 10e3/uL    RBC Count 3.05 (L) 3.80 - 5.20 10e6/uL    Hemoglobin 8.1 (L) 11.7 - 15.7 g/dL    Hematocrit 26.1 (L) 35.0 - 47.0 %    MCV 86 78 - 100 fL    MCH 26.6 26.5 - 33.0 pg    MCHC 31.0 (L) 31.5 - 36.5 g/dL    RDW 14.5 10.0 - 15.0 %    Platelet Count 438 150 - 450 10e3/uL   Magnesium    Collection Time: 11/08/22  7:01 AM   Result Value Ref Range    Magnesium 2.0 1.7 - 2.3 mg/dL   Basic metabolic panel    Collection Time: 11/08/22  7:01 AM   Result Value Ref Range    Sodium 136 136 - 145 mmol/L    Potassium 3.7 3.4 - 5.3 mmol/L    Chloride 101 98 - 107 mmol/L    Carbon Dioxide (CO2) 31 (H) 22 - 29 mmol/L    Anion Gap 4 (L) 7 - 15 mmol/L    Urea Nitrogen 5.1 (L) 6.0 - 20.0 mg/dL    Creatinine 0.41 (L) 0.51 - 0.95 mg/dL    Calcium 7.4 (L) 8.6 - 10.0 mg/dL    Glucose 89 70 - 99 mg/dL    GFR Estimate >90 >60 mL/min/1.73m2     No results found for this or any previous visit (from the past 24 hour(s)).

## 2022-11-08 NOTE — PROGRESS NOTES
"Care Management Follow Up    Length of Stay (days): 10    Expected Discharge Date: 11/11/2022    Concerns to be Addressed:   In isolation to rule out TB. IV Levaquin, IV Meropenem, IV Flagyl (ID following). Care post laparotomy with resection of obstructing descending colon inflammatory mass, due to Intra-abdominal abscess, on 11/6/2022. DOUGLAS drain in place.   Patient plan of care discussed at interdisciplinary rounds: Yes  Follow up from rounds/notes:   Per ID notes of 11/7/22, \"AFB blood, AFB stool, AFB sputum.  Ordered and in process--try to induce with hypertonic saline.\" Per ID notes of 11/8/22, \"De-escalate antibiotics to po augmentin suspension x 7 days\"    Anticipated Discharge Disposition:  Discharge goal is home pending response to treatment, medical needs and mobility closer to discharge.      Anticipated Discharge Services:  Possible home infusion.  Anticipated Discharge DME:  To be determined.     Patient/family educated on Medicare website which has current facility and service quality ratings:  Yes  Education Provided on the Discharge Plan:  Per team  Patient/Family in Agreement with the Plan:  yes    Referrals Placed by CM/SW:  [facility]  Private pay costs discussed: Not applicable at this time.     Additional Information:  Patient is  and independent at baseline, as children at home. She would need a [employer]  for discharge planning and education. Patient has 100% coverage for home infusion if needed. [facility] liaison is following along.   1:23 PM: Note ID recommends a change to oral antibiotics at this time.     Kirstin Guillen RN      "

## 2022-11-08 NOTE — PLAN OF CARE
Goal Outcome Evaluation:       Patient's pain is managed with IV Dilaudid.  Antibiotics changed to oral.  DOUGLAS draining serosanguinous drainage.  Colostomy putting out good amounts.  See flowsheet.  SBA, call light within reach.

## 2022-11-08 NOTE — PROGRESS NOTES
[facility]    Infectious Disease Progress Note    Date of Service : 11/08/2022     Assessment & Plan   Dain Tejeda is a 42 year old female who was admitted on 10/29/2022.     ASSESSMENT:  1. Clinical presentation mimicking Crohn's disease.  History of celiac disease.  6 months history of diarrhea weight loss abdominal pain- active issue  2. Non caseating granulomas may be seen in inflammatory bowel disease.   3. 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  Less likely though cannot be ruled out at this point/ Patient had negative AFB stain on colon and rectal lesion biopsy  4. QFT indeterminate- repeat test also indeterminate. CT chest no radiographic evidence of tuberculosis, active or healed  5. Severe nausea and vomiting with RIPE (Rifampin, INH, PZA and Ethambutol)--held   6. Klebsiella UTI- on effective antibiotic  7. S/p 11/6/22 Procedure(s):  1-EXPLORATORY LAPAROTOMY   2-SPLENIC FLEXURE MOBILIZATION  3-CREATION, COLOSTOMY  4-RESECTION OF DESCENDING COLON  7. Biopsy results below  8. Over 6 months history of weight loss, now with fevers.  Diarrhea abdominal pain  Colonoscopy report:  Descending colon with narrowed area with diffuse polyposis (look like pseudopolyps) and areas of purlulent discharge; however, surrounding mucosa does not appear inflamed (no edema or erythema). Polypoid  lesions and purulent areas biopsied     - Normal sigmoid     - Distal rectum with erythematous mucosa and localized purulent drainage. Biopsied.   DESCENDING COLON, BIOPSY:        1.  PATCHY ACUTE COLITIS WITH REACTIVE CHANGES        2.  FOCAL NONCASEATING GRANULOMA              A.  SPECIAL STAINS NEGATIVE FOR ACID-FAST BACILLI, YEAST, FUNGI AND CMV         3.  NEGATIVE FOR CHRONIC MUCOSAL CHANGES, DYSPLASIA AND MALIGNANCY    B) DISTAL RECTUM, BIOPSIES:         1.  DIFFUSE MILD ACUTE COLITIS WITH REACTIVE CHANGES         2.  CMV IMMUNOSTAIN: NEGATIVE FOR CMV INCLUSIONS         3.   NEGATIVE FOR CHRONIC MUCOSAL CHANGES, DYSPLASIA AND MALIGNANCY      Literature Review    Tuberculous colitis  Tuberculous colitis - [facility] (nih.gov)      Colon Tuberculosis: Endoscopic Features and Prospective Endoscopic Follow-Up After Anti-Tuberculosis Treatment Clin Transl Gastroenterol. 2012 Oct; 3(10): e24. Published online 2012 Oct 11. doi: 10.1038/ctg.2012.19      RECOMMENDATIONS:    1. Deescalate antibiotics to po augmentin suspension x 7 days  2. AFB blood, AFB stool, AFB sputum.  Ordered and in process--try to induce with hypertonic saline.   3. Empiric TB treatment based in case reports of TB colitis, and concern by GI. Started on 11/2/2022. Due to severe nausea, vomiting, holding Ethambutol, PZA, INH and Rifmapin  4. Zofran   5. Follow culture results   6. Focus/de-escalate antibiotics based on final culture results  7. Monitor CBC, CMP, check CD4 count  8. If TB work-up is negative, would treat for inflammatory bowel disease, defer to GI  9. Regional infectious disease work-up as ordered previously in process  10. Discussed with patient   11. Discussed with nurse and charge nurse regarding airborne infection isolation room while waiting for test results  12. ID will follow  13. Patient updated with the help of  over phone   14. Discussed with primary team.   15. ID will follow      FRANCISCO ROLAND MD  [facility]  [phone]      Interval History    eating well.  Complains of weakness and urinary retention.    Physical Exam   Temp: 98.4  F (36.9  C) Temp src: Oral BP: 119/86 Pulse: 100   Resp: 18 SpO2: 98 % O2 Device: None (Room air)    Vitals:    10/30/22 1933 10/30/22 2147 11/06/22 0832   Weight: 49.3 kg (108 lb 11.2 oz) 50.8 kg (111 lb 14.4 oz) 53.3 kg (117 lb 6.4 oz)     Vital Signs with Ranges  Temp:  [98  F (36.7  C)-98.4  F (36.9  C)] 98.4  F (36.9  C)  Pulse:  [] 100  Resp:  [16-18] 18  BP: (112-119)/(75-86) 119/86  SpO2:  [96 %-98 %] 98  %    Constitutional: appears comfortable, sitting in chair.   HEENT:  Eomi, no icterus  Lungs: normal breathing pattern, no crackles or wheezing  Cardiovascular: Regular rate and rhythm  Abdomen: mild Tenderness--new colostomy  Skin: warm  Neuro: deconditioned  Psych: able to answer questions    Medications       acetaminophen  650 mg Oral Q6H    Or     acetaminophen  650 mg Rectal Q6H     enoxaparin ANTICOAGULANT  40 mg Subcutaneous Q24H     [Held by provider] ethambutol  800 mg Oral Daily     fluconazole  400 mg Intravenous Q24H     gabapentin  100 mg Oral TID     [Held by provider] isoniazid  300 mg Oral Daily     levofloxacin  500 mg Intravenous Q24H     meropenem  1 g Intravenous Q8H     metroNIDAZOLE  500 mg Intravenous Q12H     multivitamin, therapeutic  1 tablet Oral Daily     [Held by provider] pantoprazole  40 mg Oral BID     pantoprazole  40 mg Intravenous BID     polyethylene glycol  17 g Oral Daily     [Held by provider] pyrazinamide  1,000 mg Oral Daily     [Held by provider] pyridOXINE  50 mg Oral Daily     [Held by provider] rifampin  600 mg Oral Daily     senna-docusate  1 tablet Oral BID     sodium chloride (PF)  3 mL Intracatheter Q8H     sodium chloride (PF)  3 mL Intracatheter Q8H       Data   All microbiology laboratory data reviewed.  Recent Labs   Lab Test 11/08/22  0701 11/07/22  0713 11/05/22  1937   WBC 6.0 13.3* 5.8   HGB 8.1* 8.7* 8.5*   HCT 26.1* 28.0* 27.3*   MCV 86 85 86    472* 388     Recent Labs   Lab Test 11/08/22  0701 11/07/22  0713 11/06/22 2023   CR 0.41* 0.42* 0.34*     Recent Labs   Lab Test 05/24/22  1620   SED 90*     MICRO:  11/01/2022 2133 11/02/2022 1532 Routine parasitology exam [[id number]]    Stool from Per Rectum    Final result Component Value   OVA AND PARASITE EXAM Negative   A single negative specimen does not rule out parasitic infection.   WBC'S, O&P 4+ PMNs          11/01/2022 2133 11/02/2022 1123 Cryptosporidium and Giardia antigens [[id number]]    Stool from Per Rectum    Final result Component Value   Cryptosporidium parvum antigen Negative   Giardia lamblia antigen Negative          11/01/2022 1619 11/02/2022 0932 Blood Culture Arm, Right [[id number]]   Blood from Arm, Right    Preliminary result Component Value   Culture No growth after 12 hours P             11/01/2022 1619 11/02/2022 1343 CMV Quantitative, PCR [[id number]]    Blood from Arm, Right    Final result Component Value Units   CMV DNA IU/mL Not Detected IU/mL          11/01/2022 1619 11/01/2022 1644 Quantiferon TB Gold Plus Grey Tube [[id number]]   Blood from Arm, Right    In process Component Value   No component results          11/01/2022 1619 11/01/2022 1644 Quantiferon TB Gold Plus Green Tube [[id number]]   Blood from Arm, Right    In process Component Value   No component results          11/01/2022 1619 11/01/2022 1644 Quantiferon TB Gold Plus Yellow Tube [[id number]]   Blood from Arm, Right    In process Component Value   No component results          11/01/2022 1619 11/01/2022 1644 Quantiferon TB Gold Plus Purple Tube [[id number]]   Blood from Arm, Right    In process Component Value   No component results          11/01/2022 0434 11/02/2022 0932 Blood Culture Peripheral Blood [[id number]]    Peripheral Blood    Preliminary result Component Value   Culture No growth after 1 day P             10/30/2022 1848 10/31/2022 1713 Enteric Bacteria and Virus Panel by MARILYNN Stool [[id number]]    Stool from Per Rectum    Final result Component Value   Campylobacter group Not Detected   Salmonella species Not Detected   Shigella species Not Detected   Vibrio group Not Detected   Rotavirus Not Detected   Shiga toxin 1 gene Not Detected   Shiga toxin 2 gene Not Detected   Norovirus I and II Not Detected   Yersinia enterocolitica Not Detected          10/30/2022 1847 10/31/2022 0047 C. difficile Toxin B PCR with reflex to C. difficile Antigen and Toxins A/B EIA [[id number]]    Stool  from Per Rectum    Final result Component Value   C Difficile Toxin B by PCR Negative   A negative result does not exclude actual disease due to C. difficile and may be due to improper collection, handling and storage of the specimen or the number of organisms in the specimen is below the detection limit of the assay.          10/29/2022 2344 10/30/2022 0049 Asymptomatic COVID-19 Virus (Coronavirus) by PCR Nasopharyngeal [[id number]]    Swab from Nasopharyngeal    Final result Component Value   SARS CoV2 PCR Negative   NEGATIVE: SARS-CoV-2 (COVID-19) RNA not detected, presumed negative.          10/29/2022 2038 10/31/2022 2243 Urine Culture [[id number]]    (Abnormal)   Urine, Midstream    Final result Component Value   Culture 50,000-100,000 CFU/mL Klebsiella pneumoniae Abnormal     <10,000 CFU/mL Urogenital zulma       Susceptibility     Klebsiella pneumoniae     KENISHA     Ampicillin >=32 ug/mL Resistant 1     Ampicillin/ Sulbactam 4 ug/mL Susceptible     Cefazolin <=4 ug/mL Susceptible 2     Cefepime <=1 ug/mL Susceptible     Cefoxitin <=4 ug/mL Susceptible     Ceftazidime <=1 ug/mL Susceptible     Ceftriaxone <=1 ug/mL Susceptible     Ciprofloxacin <=0.25 ug/mL Susceptible     Gentamicin <=1 ug/mL Susceptible     Levofloxacin <=0.12 ug/mL Susceptible     Nitrofurantoin 64 ug/mL Intermediate     Piperacillin/Tazobactam <=4 ug/mL Susceptible     Tobramycin <=1 ug/mL Susceptible     Trimethoprim/Sulfamethoxazole >16/304 ug/mL Resistant              1 Intrinsically Resistant   2 Cefazolin KENISHA breakpoints are for the treatment of uncomplicated urinary tract infections. For the treatment of systemic infections, please contact the laboratory for additional testing.             RADIOLOGY:  Reviewed  XR Abdomen Port 1 View    Result Date: 11/6/2022  EXAM: XR ABDOMEN PORT 1 VIEW LOCATION: [facility] DATE/TIME: 11/6/2022 1:04 AM INDICATION: NGT check   placement verification COMPARISON: None.      IMPRESSION: Nasogastric tube terminates over the stomach. Side-port is at the level of the GE junction and advancement by 2 cm is suggested. Dilated bowel loops in the mid abdomen are redemonstrated.    CT Abdomen Pelvis w Contrast    Result Date: 11/5/2022  EXAM: CT ABDOMEN PELVIS W CONTRAST LOCATION: [facility] DATE/TIME: 11/5/2022 8:13 PM INDICATION: New onset severe abdominal pain, fever, tachycardia. Admitted with diffuse colitis COMPARISON: CT 10/29/2022. TECHNIQUE: CT scan of the abdomen and pelvis was performed following injection of IV contrast. Multiplanar reformats were obtained. Dose reduction techniques were used. CONTRAST: isovue 370  100ml FINDINGS: LOWER CHEST: Small bilateral pleural effusions and bibasilar atelectasis. HEPATOBILIARY: Significant gallbladder distention without wall thickening. No evidence of biliary obstruction. PANCREAS: Normal. SPLEEN: Normal. ADRENAL GLANDS: Normal. KIDNEYS/BLADDER: No hydronephrosis. Urinary bladder is unremarkable. BOWEL: Findings of diffuse colitis again noted with new colonic stricture/obstruction at the level of the descending colon in the area of previously described intramural abscesses. There is significant mesenteric edema without evidence of perforation or drainable fluid collection. LYMPH NODES: Stable size of subcentimeter mesenteric lymph nodes, likely reactive to adjacent inflammation. VASCULATURE: Unremarkable. PELVIC ORGANS: Unchanged size of left paraovarian cyst. Fluid around the right adnexa is likely related to peritoneal fluid described above. MUSCULOSKELETAL: No acute bony abnormality. Sequela of injections in the anterior abdominal wall.     IMPRESSION: 1.  Persistent findings of diffuse colitis with new high-grade stricture/obstruction at the level of the descending colon near previously described intramural abscesses. New mesenteric edema with small volume ascites. No evidence of phoenix perforation or drainable  fluid collection. 2.  Distended gallbladder without additional CT evidence of acute cholecystitis. 3.  Small bilateral pleural effusions.    CT Abdomen Pelvis w Contrast    Result Date: 10/29/2022  EXAM: CT ABDOMEN PELVIS W CONTRAST LOCATION: [facility] DATE/TIME: 10/29/2022 10:27 PM INDICATION: abd pain assoc with N V D COMPARISON: Ultrasound of the pelvis from 08/18/2022. CT abdomen pelvis from 07/11/2022 TECHNIQUE: CT scan of the abdomen and pelvis was performed following injection of IV contrast. Multiplanar reformats were obtained. Dose reduction techniques were used. CONTRAST: [id number] 100ml FINDINGS: LOWER CHEST: Normal. HEPATOBILIARY: Focal fat along the falciform ligament, liver otherwise normal. No calcified gallstones. No biliary ductal dilatation. PANCREAS: Normal. SPLEEN: Normal. ADRENAL GLANDS: Normal. KIDNEYS/BLADDER: Normal. BOWEL: Normal caliber small bowel. There is wall thickening of the large bowel from the proximal transverse colon through the mid sigmoid colon. Wall thickening is most severe in the mid and distal descending colon, where there are multiple intramural fluid and gas collections consistent with abscess. Anteriorly there is a 1.5 cm collection on image 98 of series 2. Posteriorly there is a 1.3 cm collection on image 106. More inferiorly there is a thin crescentic collection posteriorly measuring 1.7 cm in length. There is extensive pericolonic edema, but no free air. LYMPH NODES: Multiple presumably reactive pericolonic lymph nodes. VASCULATURE: Unremarkable. PELVIC ORGANS: Cyst in the left pelvis measures 5 cm in length, likely corresponding to the paraovarian cyst seen on the recent pelvic ultrasound. MUSCULOSKELETAL: Transitional lumbosacral anatomy, normal variant     IMPRESSION: 1.  Findings consistent with a fairly diffuse colitis with severe involvement of the mid and distal descending colon. Although there is no phoenix free air, the colitis is  "complicated by the presence of multiple small, intramural abscesses as described above.    CT Chest w/o Contrast    Result Date: 11/2/2022  EXAM: CT CHEST W/O CONTRAST LOCATION: [facility] DATE/TIME: 11/2/2022 5:29 PM INDICATION: Pulmonary nodule evaluation; Fever; No known automatically detected potential contraindications to iodinated contrast COMPARISON: CT abdomen/pelvis 10/29/2022. TECHNIQUE: CT chest without IV contrast. Multiplanar reformats were obtained. Dose reduction techniques were used. CONTRAST: None. FINDINGS: LUNGS AND PLEURA: Lungs are clear. No pleural effusion. Minimal bibasilar hypoventilatory changes. Triangular nodule abutting the minor fissure on the right, likely an intrapulmonary lymph node, no specific follow-up recommended. MEDIASTINUM/AXILLAE: No lymphadenopathy. No thoracic aortic aneurysm. CORONARY ARTERY CALCIFICATION: None. UPPER ABDOMEN: Findings of colitis with possible interval decrease in surrounding inflammatory stranding since prior abdominal CT. MUSCULOSKELETAL: No acute bony abnormality.     IMPRESSION: 1.  No evidence of acute abnormality in the chest. No radiographic evidence of tuberculosis, active or healed.     POC US Guidance Needle Placement    Result Date: 11/6/2022  Ultrasound was performed as guidance to an anesthesia procedure.  Click \"PACS images\" hyperlink below to view any stored images.  For specific procedure details, view procedure note authored by anesthesia.    POC US Guided Vascular Access    Result Date: 11/6/2022  Ultrasound was performed as guidance to an anesthesia procedure.  Click \"PACS images\" hyperlink below to view any stored images.  For specific procedure details, view procedure note authored by anesthesia.    POC US Guided Vascular Access    Result Date: 11/6/2022  Ultrasound was performed as guidance to an anesthesia procedure.  Click \"PACS images\" hyperlink below to view any stored images.  For specific procedure " details, view procedure note authored by anesthesia.

## 2022-11-08 NOTE — PROGRESS NOTES
Dain Tejeda is slowly improving, hungry, pain stable        Vitals:    11/07/22 0237 11/07/22 0947 11/07/22 1533 11/08/22 0013   BP: 118/85 120/80 118/75 112/83   BP Location: Right arm Left arm Right arm Right arm   Pulse: 99 110 89 102   Resp: 16 17 16 18   Temp: 97.9  F (36.6  C) 98.1  F (36.7  C) 98  F (36.7  C) 98.1  F (36.7  C)   TempSrc: Oral Oral Oral Oral   SpO2: 98% 97% 98% 96%   Weight:       Height:           PHYSICAL EXAM:  GEN: No acute distress, comfortable  ABD:soft, stoma pink and patent with stool in bag, alejandrina drain with serosanguinous output  EXT:No cyanosis, edema or obvious abnormalities        Recent Labs   Lab 11/08/22  0701   WBC 6.0   HGB 8.1*   HCT 26.1*          Recent Labs   Lab 11/08/22  0701 11/07/22  0713 11/05/22  0647      < > 135*   CO2 31*   < > 28   BUN 5.1*   < > 4.8*   ALBUMIN  --   --  1.5*   ALKPHOS  --   --  144*   ALT  --   --  10   AST  --   --  20    < > = values in this interval not displayed.         A/P:  Dain Ileana is s/p descending colon resection  -regular diet  -heplock IV  Ambulate in room  -pain control  -alejandrina to remain in  -pathology pending    Chandana Carlos DO  [facility]  [phone]  [facility] Department of Surgery

## 2022-11-08 NOTE — PROVIDER NOTIFICATION
MD notified about patient's report of being unable to void despite sitting on toilet. Bladder scan showed 379 ml. Patient wants to wait before doing straight cath per orders. MD also notified about patient's  concern regarding BLE swelling, 2+. Pt helped in chair; legs elevated. Lung sounds clear; denies coughing.

## 2022-11-09 LAB
ANNOTATION COMMENT IMP: NOT DETECTED
DEPRECATED M TB RPOB XXX QL NAA+PROBE: NORMAL
ERYTHROCYTE [DISTWIDTH] IN BLOOD BY AUTOMATED COUNT: 14.6 % (ref 10–15)
HCT VFR BLD AUTO: 25.1 % (ref 35–47)
HGB BLD-MCNC: 7.5 G/DL (ref 11.7–15.7)
M TB DNA SPEC QL NAA+PROBE: NOT DETECTED
MAGNESIUM SERPL-MCNC: 1.9 MG/DL (ref 1.7–2.3)
MCH RBC QN AUTO: 26.3 PG (ref 26.5–33)
MCHC RBC AUTO-ENTMCNC: 29.9 G/DL (ref 31.5–36.5)
MCV RBC AUTO: 88 FL (ref 78–100)
PATH REPORT.COMMENTS IMP SPEC: NORMAL
PATH REPORT.COMMENTS IMP SPEC: NORMAL
PATH REPORT.FINAL DX SPEC: NORMAL
PATH REPORT.GROSS SPEC: NORMAL
PATH REPORT.MICROSCOPIC SPEC OTHER STN: NORMAL
PATH REPORT.RELEVANT HX SPEC: NORMAL
PHOTO IMAGE: NORMAL
PLATELET # BLD AUTO: 525 10E3/UL (ref 150–450)
POTASSIUM SERPL-SCNC: 3.4 MMOL/L (ref 3.4–5.3)
POTASSIUM SERPL-SCNC: 4.2 MMOL/L (ref 3.4–5.3)
RBC # BLD AUTO: 2.85 10E6/UL (ref 3.8–5.2)
WBC # BLD AUTO: 6 10E3/UL (ref 4–11)

## 2022-11-09 PROCEDURE — 250N000013 HC RX MED GY IP 250 OP 250 PS 637: Performed by: SURGERY

## 2022-11-09 PROCEDURE — 250N000013 HC RX MED GY IP 250 OP 250 PS 637: Performed by: STUDENT IN AN ORGANIZED HEALTH CARE EDUCATION/TRAINING PROGRAM

## 2022-11-09 PROCEDURE — 250N000013 HC RX MED GY IP 250 OP 250 PS 637

## 2022-11-09 PROCEDURE — 99233 SBSQ HOSP IP/OBS HIGH 50: CPT | Mod: GC | Performed by: STUDENT IN AN ORGANIZED HEALTH CARE EDUCATION/TRAINING PROGRAM

## 2022-11-09 PROCEDURE — 250N000013 HC RX MED GY IP 250 OP 250 PS 637: Performed by: FAMILY MEDICINE

## 2022-11-09 PROCEDURE — 250N000011 HC RX IP 250 OP 636: Performed by: STUDENT IN AN ORGANIZED HEALTH CARE EDUCATION/TRAINING PROGRAM

## 2022-11-09 PROCEDURE — 83735 ASSAY OF MAGNESIUM: CPT | Performed by: STUDENT IN AN ORGANIZED HEALTH CARE EDUCATION/TRAINING PROGRAM

## 2022-11-09 PROCEDURE — 85027 COMPLETE CBC AUTOMATED: CPT | Performed by: STUDENT IN AN ORGANIZED HEALTH CARE EDUCATION/TRAINING PROGRAM

## 2022-11-09 PROCEDURE — 36415 COLL VENOUS BLD VENIPUNCTURE: CPT | Performed by: STUDENT IN AN ORGANIZED HEALTH CARE EDUCATION/TRAINING PROGRAM

## 2022-11-09 PROCEDURE — 88307 TISSUE EXAM BY PATHOLOGIST: CPT | Mod: 26 | Performed by: PATHOLOGY

## 2022-11-09 PROCEDURE — 84132 ASSAY OF SERUM POTASSIUM: CPT | Performed by: STUDENT IN AN ORGANIZED HEALTH CARE EDUCATION/TRAINING PROGRAM

## 2022-11-09 PROCEDURE — 82728 ASSAY OF FERRITIN: CPT | Performed by: STUDENT IN AN ORGANIZED HEALTH CARE EDUCATION/TRAINING PROGRAM

## 2022-11-09 PROCEDURE — 120N000001 HC R&B MED SURG/OB

## 2022-11-09 PROCEDURE — 250N000011 HC RX IP 250 OP 636: Performed by: SURGERY

## 2022-11-09 RX ORDER — POTASSIUM CHLORIDE 1500 MG/1
40 TABLET, EXTENDED RELEASE ORAL ONCE
Status: COMPLETED | OUTPATIENT
Start: 2022-11-09 | End: 2022-11-09

## 2022-11-09 RX ADMIN — AMOXICILLIN AND CLAVULANATE POTASSIUM 875 MG: 400; 57 POWDER, FOR SUSPENSION ORAL at 20:34

## 2022-11-09 RX ADMIN — PANTOPRAZOLE SODIUM 40 MG: 20 TABLET, DELAYED RELEASE ORAL at 20:35

## 2022-11-09 RX ADMIN — OXYCODONE HYDROCHLORIDE 5 MG: 5 TABLET ORAL at 16:46

## 2022-11-09 RX ADMIN — ACETAMINOPHEN 650 MG: 325 TABLET, FILM COATED ORAL at 14:34

## 2022-11-09 RX ADMIN — AMOXICILLIN AND CLAVULANATE POTASSIUM 875 MG: 400; 57 POWDER, FOR SUSPENSION ORAL at 10:30

## 2022-11-09 RX ADMIN — POTASSIUM CHLORIDE 40 MEQ: 1500 TABLET, EXTENDED RELEASE ORAL at 10:20

## 2022-11-09 RX ADMIN — GABAPENTIN 100 MG: 100 CAPSULE ORAL at 14:34

## 2022-11-09 RX ADMIN — ACETAMINOPHEN 650 MG: 325 TABLET, FILM COATED ORAL at 06:36

## 2022-11-09 RX ADMIN — FLUCONAZOLE 200 MG: 100 TABLET ORAL at 10:20

## 2022-11-09 RX ADMIN — GABAPENTIN 100 MG: 100 CAPSULE ORAL at 20:35

## 2022-11-09 RX ADMIN — HYDROMORPHONE HYDROCHLORIDE 1 MG: 1 INJECTION, SOLUTION INTRAMUSCULAR; INTRAVENOUS; SUBCUTANEOUS at 10:30

## 2022-11-09 RX ADMIN — THERA TABS 1 TABLET: TAB at 10:19

## 2022-11-09 RX ADMIN — ENOXAPARIN SODIUM 40 MG: 40 INJECTION SUBCUTANEOUS at 14:34

## 2022-11-09 RX ADMIN — ACETAMINOPHEN 650 MG: 325 TABLET, FILM COATED ORAL at 17:51

## 2022-11-09 RX ADMIN — HYDROMORPHONE HYDROCHLORIDE 1 MG: 1 INJECTION, SOLUTION INTRAMUSCULAR; INTRAVENOUS; SUBCUTANEOUS at 02:29

## 2022-11-09 RX ADMIN — PANTOPRAZOLE SODIUM 40 MG: 20 TABLET, DELAYED RELEASE ORAL at 10:12

## 2022-11-09 RX ADMIN — SENNOSIDES AND DOCUSATE SODIUM 1 TABLET: 50; 8.6 TABLET ORAL at 20:35

## 2022-11-09 RX ADMIN — HYDROMORPHONE HYDROCHLORIDE 0.5 MG: 1 INJECTION, SOLUTION INTRAMUSCULAR; INTRAVENOUS; SUBCUTANEOUS at 17:51

## 2022-11-09 RX ADMIN — OXYCODONE HYDROCHLORIDE 5 MG: 5 TABLET ORAL at 20:48

## 2022-11-09 RX ADMIN — HYDROMORPHONE HYDROCHLORIDE 1 MG: 1 INJECTION, SOLUTION INTRAMUSCULAR; INTRAVENOUS; SUBCUTANEOUS at 06:37

## 2022-11-09 RX ADMIN — GABAPENTIN 100 MG: 100 CAPSULE ORAL at 10:19

## 2022-11-09 ASSESSMENT — ACTIVITIES OF DAILY LIVING (ADL)
ADLS_ACUITY_SCORE: 23
ADLS_ACUITY_SCORE: 25
ADLS_ACUITY_SCORE: 24
ADLS_ACUITY_SCORE: 23
ADLS_ACUITY_SCORE: 26
ADLS_ACUITY_SCORE: 24
ADLS_ACUITY_SCORE: 25
ADLS_ACUITY_SCORE: 24
ADLS_ACUITY_SCORE: 26
ADLS_ACUITY_SCORE: 25
ADLS_ACUITY_SCORE: 25
ADLS_ACUITY_SCORE: 24

## 2022-11-09 NOTE — PLAN OF CARE
"  Problem: Pain Acute  Goal: Optimal Pain Control and Function  Outcome: Progressing     Patient c/o pain abdominal. Medication given, and effective.    Problem: Nausea and Vomiting  Goal: Nausea and Vomiting Relief  Outcome: Progressing    Denied nausea.     Problem: Plan of Care - These are the overarching goals to be used throughout the patient stay.    Goal: Optimal Comfort and Wellbeing  Outcome: Progressing    /63 (BP Location: Right arm)   Pulse 84   Temp 97.8  F (36.6  C) (Oral)   Resp 18   Ht 1.498 m (4' 10.98\")   Wt 53.3 kg (117 lb 6.4 oz)   SpO2 98%   BMI 23.73 kg/m       Patient up with assist of 1. Mg and K+ protocols. DOUGLAS drain intact with serosanguinous. Colostomy and stoma education given.      Incision CDI, BRENNAN with staples     "

## 2022-11-09 NOTE — PROGRESS NOTES
[facility]    Progress Note - Hospitalist Service       Date of Admission:  10/29/2022    Assessment & Plan   Dain Tejeda is a 42 year old female w/ PMH of chronic gastritis, inflammation of small intestine, and celiac disease admitted on 10/29/2022. Here w/ diffuse colitis w/ severe involvement of the mid and distal descending colon c/f IBD vs infectious process. Taken for partial colectomy of descending colon w/ colostomy 11/6.    Severe colitis w/ multifocal small intramural abscesses   Colonic stricture s/p partial colectomy 11/6   *Has had negative enteric infectious workup, possible ileitis on imaging 4/14/2022 w/ interval improvement seen on CT 7/11/22. Saw GI outpatient 7/22/22 and noted to have elevated inflammatory markers and abnormal CT findings concerning for IBD. EGD was performed w/ biopsy suggestive of celiac disease and negative for H pylori. Ttg obtained during this hospitalization returned negative.   *CT abdomen pelvis 10/29 showing diffuse colitis with severe involvement of the mid and distal descending colon. Although there is no phoenix free air, the colitis is complicated by the presence of multiple small, intramural abscesses.   *Colonoscopy 10/31 with atypical findings, biopsy consistent with colitis with reactive changes. Given patient's history as a [place] refugee, also considering colonic TB. [place] from [place] in [place], tested negative for TB upon leaving the country and upon arrival to [place]. No exposure to TB at home or work. No hx of immunosuppression, IV drug use, or incarceration. Currently on airborne isolation for c/f TB, pending results of broad infectious workup per ID's rec at this time. No infectious source of symptoms yet identified, quantiferon indeterminate, negative giardia/crypto.  *Now with stricture on CT noted 11/5, partial colectomy of descending colon w/ colostomy set up performed 11/6 by gen surg.   PLAN:   - ID consult,  appreciate               - IV abx discontinued    - Started PO Augmentin               - Follow lab studies: AFB smear from rectal mucosa, histoplasmosis, blasto, brucella              - Follow AFB smears from blood, sputum, stool              - RIPE therapy held due to GI side effects   - GI consult placed, appreciate               >  Asking [facility] to review colonoscopy path for second opinion, follow stool O+P, quantiferon, HIV, CMV testing.              > If infectious workup is negative, will likely start immune modulators for IBD  - General surgery consult, appreciate               > continue IV abx              > Clear liquid diet today  - Nutrition consult placed              > Dietician did meet w/ the pt 10/31 and educated on gluten free diet give unclear diagnosis of possible Celiac disease.  - PO Pepcid BID  - Oxyocodone 5 mg q3 h PRN for pain  - Scheduled Tylenol for pain   - PRN Zofran and Compazine ordered    Normocytic anemia, more pronounced    Hgb in 7s as of 11/9. Had been in 8's prior. Unclear etiology. Has prior low ferritin several years ago. ddx remains broad.   -Ferritin   -Trend CBC  -Transfuse Hgb <7    Consented for transfusion in event she drops     Thrombocytosis, recurrent   PLTs have been around the upper limits of normal during previous evaluation, found to be 600k on admission. Likely elevated 2/2 acute phase reactant and significant GI tract inflammation. Has been stable at the upper limit of normal -- improved to nml range 11/8.   - Monitor     Hypokalemia, resolved  Hypomagnesemia, resolved   - Replete per nursing protocol     Abnormal UA  - Cont IV abx per above  - UC grew Klebsiella     Leukocytosis, resolved   WBC 13 --> 6 on 11/8.  -Follow CBC          Diet: Snacks/Supplements Adult: Ensure Clear; Between Meals  Gluten Free Diet    DVT Prophylaxis: Enoxaparin (Lovenox) subcutaneous held due to procedure today, post op will resume for DVT ppx  Chambers Catheter: Not present  Fluids:  100 mL/hr NS  Central Lines: None  Cardiac Monitoring: None  Code Status: Full Code      Disposition Plan     Expected Discharge Date: 11/11/2022    Discharge Delays: IV Medication - consider oral or Home Infusion  Procedure Pending (enter procedure & time in comments)  Destination: home with family  Discharge Comments: Post surgery 11/6.  R/O TB pending  Per ID, Deescalate antibiotics to po augmentin suspension x 7 days            The patient's care was discussed with the attending physician Dr Roni Matos MD  Hospitalist Service  [facility]  Securely message with the Vocera Web Console (learn more here)  Text page via [employer] Paging/Directory     Clinically Significant Risk Factors              # Hypoalbuminemia: Lowest albumin = 1.5 g/dL (Ref range: 3.5-5.2) at 11/5/2022  6:47 AM, will monitor as appropriate           # Severe Malnutrition: based on nutrition assessment      ______________________________________________________________________    Interval History    Staff notes reviewed. No acute events.     Feeling okay this morning   A little fatigued   Abdominal pain continues -- was 10/10 overnight. Better this morning   No nausea nor vomiting     Wondering about the path results     Data reviewed today: I reviewed all medications, new labs and imaging results over the last 24 hours. I personally reviewed     Physical Exam   Vital Signs: Temp: 97.8  F (36.6  C) Temp src: Oral BP: 101/63 Pulse: 84   Resp: 18 SpO2: 98 % O2 Device: None (Room air)    Weight: 117 lbs 6.4 oz    Gen:  Pleasant. Sitting upright in chair.   HEENT: MMM  Neck: no overt asymmetry   CV: appears well perfused   Resp: breathing comfortably on room air   Abd: non distended. Dressing in place. Staples intact. Colostomy bag in place with normal appearin stool. DOUGLAS drain with serosanguinous fluid.   Ext: no edema   Neuro: non focal   Psych: calm      Data    Recent Results (from the past 24 hour(s))    CBC with platelets    Collection Time: 11/09/22  7:47 AM   Result Value Ref Range    WBC Count 6.0 4.0 - 11.0 10e3/uL    RBC Count 2.85 (L) 3.80 - 5.20 10e6/uL    Hemoglobin 7.5 (L) 11.7 - 15.7 g/dL    Hematocrit 25.1 (L) 35.0 - 47.0 %    MCV 88 78 - 100 fL    MCH 26.3 (L) 26.5 - 33.0 pg    MCHC 29.9 (L) 31.5 - 36.5 g/dL    RDW 14.6 10.0 - 15.0 %    Platelet Count 525 (H) 150 - 450 10e3/uL   Potassium    Collection Time: 11/09/22  7:47 AM   Result Value Ref Range    Potassium 3.4 3.4 - 5.3 mmol/L   Magnesium    Collection Time: 11/09/22  7:47 AM   Result Value Ref Range    Magnesium 1.9 1.7 - 2.3 mg/dL     No results found for this or any previous visit (from the past 24 hour(s)).

## 2022-11-09 NOTE — PLAN OF CARE
"  Problem: Plan of Care - These are the overarching goals to be used throughout the patient stay.    Description: The Care Plan Review/Shift Note, Individualized Goals, Hospital-Acquired Illness or Injury, Comfort and Wellbeing, and Transition Planning are the \"Overarching Goals\" and should be updated throughout the hospitalization.  Please hover over the (i) for specific information on each goal topic.  Goal: Plan of Care Review  Description: The Plan of Care Review/Shift note should be completed every shift.  The Outcome Evaluation is a brief statement about your assessment that the patient is improving, declining, or no change.  This information will be displayed automatically on your shift note.  Outcome: Progressing  Flowsheets (Taken 11/9/2022 4046)  Outcome Evaluation: Unable to get ahold of pt on room phone or mobile phone. Intake is fair, 10-50% of patient meals/home food.  Overall Patient Progress: improving     Problem: Malnutrition  Goal: Improved Nutritional Intake  Outcome: Progressing   Goal Outcome Evaluation:           Overall Patient Progress: improvingOverall Patient Progress: improving    Outcome Evaluation: Unable to get ahold of pt on room phone or mobile phone. Intake is fair, 10-50% of patient meals/home food.  Pt is tolerating regular diet post colostomy. Per GI pathology consistent with Crohn's. Awaiting TB path.     Ongoing intermittent abd pain  Receiving Ensure clear bid  DOUGLAS Op 322 ml yesterday. 615 ml day prior  Colostomy 750 ml loose op yesterday. 1250 ml day prior    Provided nutrition therapy for colostomy handout in english for Nsg to bring to pt. Children will need to translate for her.       "

## 2022-11-09 NOTE — PROGRESS NOTES
Dain Tejeda is doing ok, pain roughly the same, tolerating diet        Vitals:    11/08/22 1000 11/08/22 1532 11/08/22 2354 11/09/22 0800   BP: 119/86 124/88 138/72 101/63   BP Location: Right arm Right arm Right arm Right arm   Patient Position:  Sitting Semi-Rudd's    Cuff Size:  Adult Regular Adult Regular    Pulse: 100 99 102 84   Resp: 18 19 18 18   Temp: 98.4  F (36.9  C) 98  F (36.7  C) 98.3  F (36.8  C) 97.8  F (36.6  C)   TempSrc: Oral Oral Oral Oral   SpO2: 98% 97% 98% 98%   Weight:       Height:           PHYSICAL EXAM:  GEN: No acute distress, comfortable, in chair  ABD:soft, stoma pink and patent with stool in bag, alejandrina drain with serous output  EXT:No cyanosis, edema or obvious abnormalities        Recent Labs   Lab 11/09/22  0747   WBC 6.0   HGB 7.5*   HCT 25.1*   *       Recent Labs   Lab 11/08/22  0701 11/07/22  0713 11/05/22  0647      < > 135*   CO2 31*   < > 28   BUN 5.1*   < > 4.8*   ALBUMIN  --   --  1.5*   ALKPHOS  --   --  144*   ALT  --   --  10   AST  --   --  20    < > = values in this interval not displayed.         A/P:  Dain Tejeda is s/p descending colon resection    -ADAT  -Activity as able  -pain control  -continue ALEJANDRINA  -Pathology pending    Jovany Dinh PA-C  [facility]  Surgery [address]  [address] [place] [id number]?  Office: [phone]

## 2022-11-09 NOTE — PLAN OF CARE
Problem: Plan of Care - These are the overarching goals to be used throughout the patient stay.    Goal: Optimal Comfort and Wellbeing  Outcome: Progressing  Intervention: Monitor Pain and Promote Comfort  Recent Flowsheet Documentation  Taken 11/8/2022 2122 by Elyse Mccray RN  Pain Management Interventions:   medication (see MAR)   distraction   repositioned   rest  Taken 11/8/2022 1911 by Elyse Mccray RN  Pain Management Interventions:   emotional support   rest  Taken 11/8/2022 1725 by Elyse Mccray RN  Pain Management Interventions:   medication (see MAR)   emotional support     Problem: Pain Acute  Goal: Optimal Pain Control and Function  Outcome: Progressing  Intervention: Develop Pain Management Plan  Recent Flowsheet Documentation  Taken 11/8/2022 2122 by Elyse Mccray RN  Pain Management Interventions:   medication (see MAR)   distraction   repositioned   rest  Taken 11/8/2022 1911 by Elyse Mccray RN  Pain Management Interventions:   emotional support   rest  Taken 11/8/2022 1725 by Elyse Mccray RN  Pain Management Interventions:   medication (see MAR)   emotional support  Intervention: Prevent or Manage Pain  Recent Flowsheet Documentation  Taken 11/8/2022 1602 by Elyse Mccray RN  Medication Review/Management: medications reviewed     Problem: Nausea and Vomiting  Goal: Nausea and Vomiting Relief  Outcome: Progressing     Problem: Malnutrition  Goal: Improved Nutritional Intake  Outcome: Not Progressing   Goal Outcome Evaluation:       Patient rating abdominal pain 5-7/10. PRN and scheduled pain medications given with relief reported. Patient denied nausea. Voided x 2 after reporting not being able to void. Colostomy with good output. DOUGLAS drain with 82 mL serosanguineous output. Prefers to be up in chair. Assist of 1 to the bathroom.

## 2022-11-09 NOTE — PLAN OF CARE
Goal Outcome Evaluation:    Slept better tonight. VSS- mildly tachycardic which has been baseline for pt.     Endorses abdominal pain 10/10 twice overnight- managed with scheduled tylenol and PRN IV dilaudid x2. Overall reports pain slightly improving.     Up to bathroom x3 overnight with no issues voiding. 250 ml brown output emptied from ostomy. 90 ml serosanguinous output from DOUGLAS.     SCD's in place. Pt preferred to sleep in chair overnight. Bowel sounds active. BLE swollen- 2+ edema feet, trace edema legs and ankles.

## 2022-11-09 NOTE — PROGRESS NOTES
Care Management Follow Up    Length of Stay (days): 11    Expected Discharge Date: 11/11/2022    Concerns to be Addressed:   In isolation to rule out TB. Care post laparotomy with resection of obstructing descending colon inflammatory mass, due to Intra-abdominal abscess, on 11/6/2022. DOUGLAS drain in place. IV analgesia.   Patient plan of care discussed at interdisciplinary rounds: Yes    Anticipated Discharge Disposition:  Discharge goal is home pending response to treatment, medical needs and mobility closer to discharge.      Anticipated Discharge Services:  None anticipated.   Anticipated Discharge DME:  To be determined.     Patient/family educated on Medicare website which has current facility and service quality ratings:  Yes  Education Provided on the Discharge Plan:  Per team  Patient/Family in Agreement with the Plan:  yes    Referrals Placed by CM/SW:  [facility]  Private pay costs discussed: Not applicable at this time.     Additional Information:  Patient is  and independent at baseline, as children at home. She would need a [employer]  for discharge planning and education. Patient has 100% coverage for home infusion if needed. [facility] liaison is following along. Note ID recommended a change to oral antibiotics at this time.     Kirstin Guillen RN

## 2022-11-09 NOTE — PROGRESS NOTES
[facility] - Digestive [facility] Progress Note     IMPRESSION:  Dain Tejeda is a 42 year old female w/ PMH of chronic gastritis, inflammation of small intestine, and celiac disease admitted on 10/29/2022. Here w/ diffuse colitis w/ severe involvement of the mid and distal descending colon c/f IBD vs infectious process. Taken for partial colectomy of descending colon w/ colostomy 11/6.    1. Colitis w/ multifocal small intramural abscesses  2. Colonic stricture  - CT abdomen pelvis 10/29 showing diffuse colitis with severe involvement of the mid and distal descending colon. Although there is no phoenix free air, the colitis is complicated by the presence of multiple small, intramural abscesses. CT abd/pelvis 11/5 noted colon stricture.  - Colonoscopy 10/31 with atypical findings, biopsy consistent with colitis with reactive changes. Negative AFB stain on colon and rectal lesion biopsy. CMV negative.   - Given patient's history as a [place] refugee, also considering colonic TB. ID consulted with broad infectious workup. No infectious source of symptoms yet identified, quantiferon indeterminate, XR chest negative. Negative giardia/crypto.  - Partial colectomy of descending colon w/ colostomy performed 11/6. - Surgical pathology concerning for Crohn's disease (see full path report below). TPMT normal. Hep B ag negative, Hep B ab nonreactive (not immune).   - On abx: levofloxacin and metronidazole, Meropenem      RECOMMENDATIONS:  - Surgical pathology consistent with Crohn's disease. Pt will need to meet with IBD clinic to discuss ongoing management. Treatment plan may be complicated with indeterminate TB status; await AFB cultures and MTB complex.  [facility] will help coordinate IBD follow up.     - It does not appear she is immune to hepatitis B, she may need the Hep B vaccination series.    - Await ID workup; Hold steroids for now given ongoing ID workup.    - Continue with supportive cares for pain control.       Approximately 15 minutes  "of total time was spent providing patient care, including patient evaluation, reviewing documentation/test results, and     Contreras Pereira PA-C  [facility]  [phone]  ________________________________________________________________________      SUBJECTIVE:    Some increased discomfort at surgical sites but otherwise feels well. No new complaints.      OBJECTIVE:  /63 (BP Location: Right arm)   Pulse 84   Temp 97.8  F (36.6  C) (Oral)   Resp 18   Ht 1.498 m (4' 10.98\")   Wt 53.3 kg (117 lb 6.4 oz)   SpO2 98%   BMI 23.73 kg/m    Temp (24hrs), Av.1  F (36.7  C), Min:98  F (36.7  C), Max:98.1  F (36.7  C)    No data found.    Intake/Output Summary (Last 24 hours) at 2022 0810  Last data filed at 2022 0630  Gross per 24 hour   Intake 1344 ml   Output 1920 ml   Net -576 ml        PHYSICAL EXAM  GEN: Alert, oriented x3, communicative and in NAD.    LYMPH: No LAD noted.  HRT: RRR  LUNGS: CTA  ABD:  ND, +BS, Mild pain to palpation, no rebound, no HSM.  SKIN: No rash, jaundice or spider angiomata      LABS:  I have reviewed the patient's new clinical lab results:     Recent Labs   Lab Test 22  0747 22  0701 22  0713   WBC 6.0 6.0 13.3*   HGB 7.5* 8.1* 8.7*   MCV 88 86 85   * 438 472*     Recent Labs   Lab Test 22  0747 22  0701 22  0713 22  1528 22  0647   POTASSIUM 3.4 3.7 4.4  --    < > 3.2*   CHLORIDE  --  101 101  --   --  102   CO2  --  31* 28  --   --  28   BUN  --  5.1* 3.9*  --   --  4.8*   CR  --  0.41* 0.42* 0.34*  --  0.58   ANIONGAP  --  4* 6*  --   --  5*    < > = values in this interval not displayed.     Recent Labs   Lab Test 22  0647 10/29/22  2038 10/29/22  2027 06/23/22  1019 22  0442 22   ALBUMIN 1.5*  --  2.5* 2.3*   < >  --  2.8*   BILITOTAL 0.4  --  0.4 0.3   < >  --  0.4   ALT 10  --  8* 21   < >  --  13   AST 20  --  14 20   < >  --  14 "   PROTEIN  --  20*  --   --   --  50*  --    LIPASE  --   --  17  --   --   --  33    < > = values in this interval not displayed.         IMAGING:  reviewed    DESCENDING AND SIGMOID COLON, RESECTION:        1.  DIFFUSE MODERATE ACTIVE INFLAMMATORY BOWEL DISEASE, CONSISTENT WITH CROHN'S DISEASE              A.  MULTIFOCAL ULCERATION, ACUTE CRYPTITIS, CRYPT ARCHITECTURAL DISTORTION, INTRAMURAL AND                   MESENTERIC ABSCESS FORMATION, INFLAMMATORY PSEUDOPOLYPS, AND ACUTE AND ORGANIZING PERITONITIS              B.  MODERATE ACTIVE INFLAMMATORY BOWEL DISEASE PRESENT AT PROXIMAL AND DISTAL SURGICAL MARGINS        2.  ASSOCIATED LUMINAL STENOSIS WITH PROXIMAL DILATATION, CONSISTENT WITH OBSTRUCTION        3.  NO EVIDENCE OF DYSPLASIA OR MALIGNANCY        4.  BENIGN REACTIVE LYMPH NODES WITH FOCAL FOREIGN BODY GIANT CELLS AND NO EVIDENCE OF MALIGNANCY             (0 OF 5)

## 2022-11-10 LAB
ERYTHROCYTE [DISTWIDTH] IN BLOOD BY AUTOMATED COUNT: 14.8 % (ref 10–15)
FERRITIN SERPL-MCNC: 207 NG/ML (ref 6–175)
HCT VFR BLD AUTO: 26.1 % (ref 35–47)
HGB BLD-MCNC: 8 G/DL (ref 11.7–15.7)
MAGNESIUM SERPL-MCNC: 1.9 MG/DL (ref 1.7–2.3)
MCH RBC QN AUTO: 26.6 PG (ref 26.5–33)
MCHC RBC AUTO-ENTMCNC: 30.7 G/DL (ref 31.5–36.5)
MCV RBC AUTO: 87 FL (ref 78–100)
PLATELET # BLD AUTO: 558 10E3/UL (ref 150–450)
POTASSIUM SERPL-SCNC: 4.2 MMOL/L (ref 3.4–5.3)
RBC # BLD AUTO: 3.01 10E6/UL (ref 3.8–5.2)
WBC # BLD AUTO: 7.6 10E3/UL (ref 4–11)

## 2022-11-10 PROCEDURE — 83735 ASSAY OF MAGNESIUM: CPT | Performed by: STUDENT IN AN ORGANIZED HEALTH CARE EDUCATION/TRAINING PROGRAM

## 2022-11-10 PROCEDURE — 36415 COLL VENOUS BLD VENIPUNCTURE: CPT | Performed by: STUDENT IN AN ORGANIZED HEALTH CARE EDUCATION/TRAINING PROGRAM

## 2022-11-10 PROCEDURE — 99233 SBSQ HOSP IP/OBS HIGH 50: CPT | Mod: GC | Performed by: STUDENT IN AN ORGANIZED HEALTH CARE EDUCATION/TRAINING PROGRAM

## 2022-11-10 PROCEDURE — 250N000013 HC RX MED GY IP 250 OP 250 PS 637: Performed by: SURGERY

## 2022-11-10 PROCEDURE — 250N000013 HC RX MED GY IP 250 OP 250 PS 637: Performed by: FAMILY MEDICINE

## 2022-11-10 PROCEDURE — 250N000013 HC RX MED GY IP 250 OP 250 PS 637

## 2022-11-10 PROCEDURE — 250N000011 HC RX IP 250 OP 636: Performed by: STUDENT IN AN ORGANIZED HEALTH CARE EDUCATION/TRAINING PROGRAM

## 2022-11-10 PROCEDURE — 85027 COMPLETE CBC AUTOMATED: CPT | Performed by: STUDENT IN AN ORGANIZED HEALTH CARE EDUCATION/TRAINING PROGRAM

## 2022-11-10 PROCEDURE — 120N000001 HC R&B MED SURG/OB

## 2022-11-10 PROCEDURE — 250N000011 HC RX IP 250 OP 636: Performed by: SURGERY

## 2022-11-10 PROCEDURE — 84132 ASSAY OF SERUM POTASSIUM: CPT | Performed by: STUDENT IN AN ORGANIZED HEALTH CARE EDUCATION/TRAINING PROGRAM

## 2022-11-10 PROCEDURE — 99232 SBSQ HOSP IP/OBS MODERATE 35: CPT

## 2022-11-10 RX ADMIN — SENNOSIDES AND DOCUSATE SODIUM 1 TABLET: 50; 8.6 TABLET ORAL at 10:39

## 2022-11-10 RX ADMIN — PANTOPRAZOLE SODIUM 40 MG: 20 TABLET, DELAYED RELEASE ORAL at 10:39

## 2022-11-10 RX ADMIN — PANTOPRAZOLE SODIUM 40 MG: 20 TABLET, DELAYED RELEASE ORAL at 20:47

## 2022-11-10 RX ADMIN — ACETAMINOPHEN 650 MG: 325 TABLET, FILM COATED ORAL at 06:31

## 2022-11-10 RX ADMIN — GABAPENTIN 100 MG: 100 CAPSULE ORAL at 10:39

## 2022-11-10 RX ADMIN — ACETAMINOPHEN 650 MG: 325 TABLET, FILM COATED ORAL at 00:48

## 2022-11-10 RX ADMIN — HYDROMORPHONE HYDROCHLORIDE 1 MG: 1 INJECTION, SOLUTION INTRAMUSCULAR; INTRAVENOUS; SUBCUTANEOUS at 23:48

## 2022-11-10 RX ADMIN — AMOXICILLIN AND CLAVULANATE POTASSIUM 875 MG: 400; 57 POWDER, FOR SUSPENSION ORAL at 20:47

## 2022-11-10 RX ADMIN — POLYETHYLENE GLYCOL 3350 17 G: 17 POWDER, FOR SOLUTION ORAL at 10:43

## 2022-11-10 RX ADMIN — OXYCODONE HYDROCHLORIDE 5 MG: 5 TABLET ORAL at 04:38

## 2022-11-10 RX ADMIN — GABAPENTIN 100 MG: 100 CAPSULE ORAL at 20:50

## 2022-11-10 RX ADMIN — HYDROMORPHONE HYDROCHLORIDE 0.5 MG: 1 INJECTION, SOLUTION INTRAMUSCULAR; INTRAVENOUS; SUBCUTANEOUS at 06:48

## 2022-11-10 RX ADMIN — ACETAMINOPHEN 650 MG: 325 TABLET, FILM COATED ORAL at 23:48

## 2022-11-10 RX ADMIN — ACETAMINOPHEN 650 MG: 325 TABLET, FILM COATED ORAL at 18:05

## 2022-11-10 RX ADMIN — ENOXAPARIN SODIUM 40 MG: 40 INJECTION SUBCUTANEOUS at 14:12

## 2022-11-10 RX ADMIN — HYDROMORPHONE HYDROCHLORIDE 1 MG: 1 INJECTION, SOLUTION INTRAMUSCULAR; INTRAVENOUS; SUBCUTANEOUS at 18:05

## 2022-11-10 RX ADMIN — GABAPENTIN 100 MG: 100 CAPSULE ORAL at 14:12

## 2022-11-10 RX ADMIN — ACETAMINOPHEN 650 MG: 325 TABLET, FILM COATED ORAL at 14:11

## 2022-11-10 RX ADMIN — THERA TABS 1 TABLET: TAB at 10:39

## 2022-11-10 RX ADMIN — HYDROMORPHONE HYDROCHLORIDE 1 MG: 1 INJECTION, SOLUTION INTRAMUSCULAR; INTRAVENOUS; SUBCUTANEOUS at 14:11

## 2022-11-10 RX ADMIN — HYDROMORPHONE HYDROCHLORIDE 1 MG: 1 INJECTION, SOLUTION INTRAMUSCULAR; INTRAVENOUS; SUBCUTANEOUS at 10:37

## 2022-11-10 RX ADMIN — FLUCONAZOLE 200 MG: 100 TABLET ORAL at 10:38

## 2022-11-10 RX ADMIN — AMOXICILLIN AND CLAVULANATE POTASSIUM 875 MG: 400; 57 POWDER, FOR SUSPENSION ORAL at 10:40

## 2022-11-10 ASSESSMENT — ACTIVITIES OF DAILY LIVING (ADL)
ADLS_ACUITY_SCORE: 23
ADLS_ACUITY_SCORE: 26
ADLS_ACUITY_SCORE: 23
ADLS_ACUITY_SCORE: 26
ADLS_ACUITY_SCORE: 26
ADLS_ACUITY_SCORE: 23
ADLS_ACUITY_SCORE: 23

## 2022-11-10 NOTE — PROGRESS NOTES
[facility]    Infectious Disease Progress Note    Date of Service : 11/10/2022     Assessment & Plan   Dain Tejeda is a 42 year old female who was admitted on 10/29/2022.     ASSESSMENT:  1. Clinical presentation mimicking Crohn's disease.  History of celiac disease.  6 months history of diarrhea weight loss abdominal pain- active issue  2. Non caseating granulomas may be seen in inflammatory bowel disease.   3. 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  Less likely though cannot be ruled out at this point/ Patient had negative AFB stain on colon and rectal lesion biopsy.  Bowel bx pos for Crohns disease.  4. QFT indeterminate- repeat test also indeterminate. CT chest no radiographic evidence of tuberculosis, active or healed.   5. Severe nausea and vomiting with RIPE (Rifampin, INH, PZA and Ethambutol)--held   6. Klebsiella UTI- on effective antibiotic  7. S/p 11/6/22 Procedure(s):  1-EXPLORATORY LAPAROTOMY   2-SPLENIC FLEXURE MOBILIZATION  3-CREATION, COLOSTOMY  4-RESECTION OF DESCENDING COLON  7. Biopsy results below  8. Over 6 months history of weight loss, now with fevers.  Diarrhea abdominal pain  Colonoscopy report:  Descending colon with narrowed area with diffuse polyposis (look like pseudopolyps) and areas of purlulent discharge; however, surrounding mucosa does not appear inflamed (no edema or erythema). Polypoid  lesions and purulent areas biopsied     - Normal sigmoid     - Distal rectum with erythematous mucosa and localized purulent drainage. Biopsied.   DESCENDING COLON, BIOPSY:        1.  PATCHY ACUTE COLITIS WITH REACTIVE CHANGES        2.  FOCAL NONCASEATING GRANULOMA              A.  SPECIAL STAINS NEGATIVE FOR ACID-FAST BACILLI, YEAST, FUNGI AND CMV         3.  NEGATIVE FOR CHRONIC MUCOSAL CHANGES, DYSPLASIA AND MALIGNANCY    B) DISTAL RECTUM, BIOPSIES:         1.  DIFFUSE MILD ACUTE COLITIS WITH REACTIVE CHANGES         2.  CMV IMMUNOSTAIN: NEGATIVE  FOR CMV INCLUSIONS         3.  NEGATIVE FOR CHRONIC MUCOSAL CHANGES, DYSPLASIA AND MALIGNANCY      Literature Review    Tuberculous colitis  Tuberculous colitis - [facility] (nih.gov)      Colon Tuberculosis: Endoscopic Features and Prospective Endoscopic Follow-Up After Anti-Tuberculosis Treatment Clin Transl Gastroenterol. 2012 Oct; 3(10): e24. Published online 2012 Oct 11. doi: 10.1038/ctg.2012.19      RECOMMENDATIONS:    1. Deescalate antibiotics to po augmentin suspension x 7 days  2. AFB blood, AFB stool, AFB sputum.  Ordered and in process--try to induce with hypertonic saline.     With dx of Crohns, and multiple negative AFB smears, and negative CT chest for active disease, it is reasonable to discontinue airborne precautions.    I cannot rule out extrapulmonary TB with findings of granulomas and Indeterminate QFT.    FRANCISCO ROLAND MD  [facility]  [phone]      Interval History    eating ok. Complains of pain at colostomy.    Physical Exam   Temp: 97.8  F (36.6  C) Temp src: Oral BP: 110/74 Pulse: 78   Resp: 16 SpO2: 97 % O2 Device: None (Room air)    Vitals:    10/30/22 1933 10/30/22 2147 11/06/22 0832   Weight: 49.3 kg (108 lb 11.2 oz) 50.8 kg (111 lb 14.4 oz) 53.3 kg (117 lb 6.4 oz)     Vital Signs with Ranges  Temp:  [97.8  F (36.6  C)-98.3  F (36.8  C)] 97.8  F (36.6  C)  Pulse:  [78-95] 78  Resp:  [16] 16  BP: (110-112)/(74-80) 110/74  SpO2:  [97 %-99 %] 97 %    Constitutional: appears comfortable, sitting in chair.   HEENT:  Eomi, no icterus  Lungs: normal breathing pattern, no crackles or wheezing  Cardiovascular: Regular rate and rhythm  Abdomen: mild Tenderness--she indicates near colostomy  Skin: warm  Neuro: deconditioned  Psych: able to answer questions    Medications       acetaminophen  650 mg Oral Q6H    Or     acetaminophen  650 mg Rectal Q6H     amoxicillin-clavulanate  875 mg Oral BID     enoxaparin ANTICOAGULANT  40 mg Subcutaneous Q24H     [Held by provider]  ethambutol  800 mg Oral Daily     fluconazole  200 mg Oral Daily     gabapentin  100 mg Oral TID     [Held by provider] isoniazid  300 mg Oral Daily     multivitamin, therapeutic  1 tablet Oral Daily     pantoprazole  40 mg Oral BID     polyethylene glycol  17 g Oral Daily     [Held by provider] pyrazinamide  1,000 mg Oral Daily     [Held by provider] pyridOXINE  50 mg Oral Daily     [Held by provider] rifampin  600 mg Oral Daily     senna-docusate  1 tablet Oral BID     sodium chloride (PF)  3 mL Intracatheter Q8H     sodium chloride (PF)  3 mL Intracatheter Q8H       Data   All microbiology laboratory data reviewed.  Recent Labs   Lab Test 11/10/22  0659 11/09/22  0747 11/08/22  0701   WBC 7.6 6.0 6.0   HGB 8.0* 7.5* 8.1*   HCT 26.1* 25.1* 26.1*   MCV 87 88 86   * 525* 438     Recent Labs   Lab Test 11/08/22  0701 11/07/22  0713 11/06/22 2023   CR 0.41* 0.42* 0.34*     Recent Labs   Lab Test 05/24/22  1620   SED 90*     MICRO:  11/01/2022 2133 11/02/2022 1532 Routine parasitology exam [[id number]]    Stool from Per Rectum    Final result Component Value   OVA AND PARASITE EXAM Negative   A single negative specimen does not rule out parasitic infection.   WBC'S, O&P 4+ PMNs          11/01/2022 2133 11/02/2022 1123 Cryptosporidium and Giardia antigens [[id number]]   Stool from Per Rectum    Final result Component Value   Cryptosporidium parvum antigen Negative   Giardia lamblia antigen Negative          11/01/2022 1619 11/02/2022 0932 Blood Culture Arm, Right [[id number]]   Blood from Arm, Right    Preliminary result Component Value   Culture No growth after 12 hours P             11/01/2022 1619 11/02/2022 1343 CMV Quantitative, PCR [[id number]]    Blood from Arm, Right    Final result Component Value Units   CMV DNA IU/mL Not Detected IU/mL          11/01/2022 1619 11/01/2022 1644 Quantiferon TB Gold Plus Grey Tube [[id number]]   Blood from Arm, Right    In process Component Value   No component  results          11/01/2022 1619 11/01/2022 1644 Quantiferon TB Gold Plus Green Tube [[id number]]   Blood from Arm, Right    In process Component Value   No component results          11/01/2022 1619 11/01/2022 1644 Quantiferon TB Gold Plus Yellow Tube [[id number]]   Blood from Arm, Right    In process Component Value   No component results          11/01/2022 1619 11/01/2022 1644 Quantiferon TB Gold Plus Purple Tube [[id number]]   Blood from Arm, Right    In process Component Value   No component results          11/01/2022 0434 11/02/2022 0932 Blood Culture Peripheral Blood [[id number]]    Peripheral Blood    Preliminary result Component Value   Culture No growth after 1 day P             10/30/2022 1848 10/31/2022 1713 Enteric Bacteria and Virus Panel by MARILYNN Stool [[id number]]    Stool from Per Rectum    Final result Component Value   Campylobacter group Not Detected   Salmonella species Not Detected   Shigella species Not Detected   Vibrio group Not Detected   Rotavirus Not Detected   Shiga toxin 1 gene Not Detected   Shiga toxin 2 gene Not Detected   Norovirus I and II Not Detected   Yersinia enterocolitica Not Detected          10/30/2022 1847 10/31/2022 0047 C. difficile Toxin B PCR with reflex to C. difficile Antigen and Toxins A/B EIA [[id number]]    Stool from Per Rectum    Final result Component Value   C Difficile Toxin B by PCR Negative   A negative result does not exclude actual disease due to C. difficile and may be due to improper collection, handling and storage of the specimen or the number of organisms in the specimen is below the detection limit of the assay.          10/29/2022 2344 10/30/2022 0049 Asymptomatic COVID-19 Virus (Coronavirus) by PCR Nasopharyngeal [[id number]]    Swab from Nasopharyngeal    Final result Component Value   SARS CoV2 PCR Negative   NEGATIVE: SARS-CoV-2 (COVID-19) RNA not detected, presumed negative.          10/29/2022 2038 10/31/2022 2243 Urine Culture  [[id number]]    (Abnormal)   Urine, Midstream    Final result Component Value   Culture 50,000-100,000 CFU/mL Klebsiella pneumoniae Abnormal     <10,000 CFU/mL Urogenital zulma       Susceptibility     Klebsiella pneumoniae     KENISHA     Ampicillin >=32 ug/mL Resistant 1     Ampicillin/ Sulbactam 4 ug/mL Susceptible     Cefazolin <=4 ug/mL Susceptible 2     Cefepime <=1 ug/mL Susceptible     Cefoxitin <=4 ug/mL Susceptible     Ceftazidime <=1 ug/mL Susceptible     Ceftriaxone <=1 ug/mL Susceptible     Ciprofloxacin <=0.25 ug/mL Susceptible     Gentamicin <=1 ug/mL Susceptible     Levofloxacin <=0.12 ug/mL Susceptible     Nitrofurantoin 64 ug/mL Intermediate     Piperacillin/Tazobactam <=4 ug/mL Susceptible     Tobramycin <=1 ug/mL Susceptible     Trimethoprim/Sulfamethoxazole >16/304 ug/mL Resistant              1 Intrinsically Resistant   2 Cefazolin KENISHA breakpoints are for the treatment of uncomplicated urinary tract infections. For the treatment of systemic infections, please contact the laboratory for additional testing.             RADIOLOGY:  Reviewed  XR Abdomen Port 1 View    Result Date: 11/6/2022  EXAM: XR ABDOMEN PORT 1 VIEW LOCATION: [facility] DATE/TIME: 11/6/2022 1:04 AM INDICATION: NGT check   placement verification COMPARISON: None.     IMPRESSION: Nasogastric tube terminates over the stomach. Side-port is at the level of the GE junction and advancement by 2 cm is suggested. Dilated bowel loops in the mid abdomen are redemonstrated.    CT Abdomen Pelvis w Contrast    Result Date: 11/5/2022  EXAM: CT ABDOMEN PELVIS W CONTRAST LOCATION: [facility] DATE/TIME: 11/5/2022 8:13 PM INDICATION: New onset severe abdominal pain, fever, tachycardia. Admitted with diffuse colitis COMPARISON: CT 10/29/2022. TECHNIQUE: CT scan of the abdomen and pelvis was performed following injection of IV contrast. Multiplanar reformats were obtained. Dose reduction techniques  were used. CONTRAST: isovue 370  100ml FINDINGS: LOWER CHEST: Small bilateral pleural effusions and bibasilar atelectasis. HEPATOBILIARY: Significant gallbladder distention without wall thickening. No evidence of biliary obstruction. PANCREAS: Normal. SPLEEN: Normal. ADRENAL GLANDS: Normal. KIDNEYS/BLADDER: No hydronephrosis. Urinary bladder is unremarkable. BOWEL: Findings of diffuse colitis again noted with new colonic stricture/obstruction at the level of the descending colon in the area of previously described intramural abscesses. There is significant mesenteric edema without evidence of perforation or drainable fluid collection. LYMPH NODES: Stable size of subcentimeter mesenteric lymph nodes, likely reactive to adjacent inflammation. VASCULATURE: Unremarkable. PELVIC ORGANS: Unchanged size of left paraovarian cyst. Fluid around the right adnexa is likely related to peritoneal fluid described above. MUSCULOSKELETAL: No acute bony abnormality. Sequela of injections in the anterior abdominal wall.     IMPRESSION: 1.  Persistent findings of diffuse colitis with new high-grade stricture/obstruction at the level of the descending colon near previously described intramural abscesses. New mesenteric edema with small volume ascites. No evidence of phoenix perforation or drainable fluid collection. 2.  Distended gallbladder without additional CT evidence of acute cholecystitis. 3.  Small bilateral pleural effusions.    CT Abdomen Pelvis w Contrast    Result Date: 10/29/2022  EXAM: CT ABDOMEN PELVIS W CONTRAST LOCATION: [facility] DATE/TIME: 10/29/2022 10:27 PM INDICATION: abd pain assoc with N V D COMPARISON: Ultrasound of the pelvis from 08/18/2022. CT abdomen pelvis from 07/11/2022 TECHNIQUE: CT scan of the abdomen and pelvis was performed following injection of IV contrast. Multiplanar reformats were obtained. Dose reduction techniques were used. CONTRAST: [id number] 100ml FINDINGS: LOWER  CHEST: Normal. HEPATOBILIARY: Focal fat along the falciform ligament, liver otherwise normal. No calcified gallstones. No biliary ductal dilatation. PANCREAS: Normal. SPLEEN: Normal. ADRENAL GLANDS: Normal. KIDNEYS/BLADDER: Normal. BOWEL: Normal caliber small bowel. There is wall thickening of the large bowel from the proximal transverse colon through the mid sigmoid colon. Wall thickening is most severe in the mid and distal descending colon, where there are multiple intramural fluid and gas collections consistent with abscess. Anteriorly there is a 1.5 cm collection on image 98 of series 2. Posteriorly there is a 1.3 cm collection on image 106. More inferiorly there is a thin crescentic collection posteriorly measuring 1.7 cm in length. There is extensive pericolonic edema, but no free air. LYMPH NODES: Multiple presumably reactive pericolonic lymph nodes. VASCULATURE: Unremarkable. PELVIC ORGANS: Cyst in the left pelvis measures 5 cm in length, likely corresponding to the paraovarian cyst seen on the recent pelvic ultrasound. MUSCULOSKELETAL: Transitional lumbosacral anatomy, normal variant     IMPRESSION: 1.  Findings consistent with a fairly diffuse colitis with severe involvement of the mid and distal descending colon. Although there is no phoenix free air, the colitis is complicated by the presence of multiple small, intramural abscesses as described above.    CT Chest w/o Contrast    Result Date: 11/2/2022  EXAM: CT CHEST W/O CONTRAST LOCATION: [facility] DATE/TIME: 11/2/2022 5:29 PM INDICATION: Pulmonary nodule evaluation; Fever; No known automatically detected potential contraindications to iodinated contrast COMPARISON: CT abdomen/pelvis 10/29/2022. TECHNIQUE: CT chest without IV contrast. Multiplanar reformats were obtained. Dose reduction techniques were used. CONTRAST: None. FINDINGS: LUNGS AND PLEURA: Lungs are clear. No pleural effusion. Minimal bibasilar hypoventilatory  "changes. Triangular nodule abutting the minor fissure on the right, likely an intrapulmonary lymph node, no specific follow-up recommended. MEDIASTINUM/AXILLAE: No lymphadenopathy. No thoracic aortic aneurysm. CORONARY ARTERY CALCIFICATION: None. UPPER ABDOMEN: Findings of colitis with possible interval decrease in surrounding inflammatory stranding since prior abdominal CT. MUSCULOSKELETAL: No acute bony abnormality.     IMPRESSION: 1.  No evidence of acute abnormality in the chest. No radiographic evidence of tuberculosis, active or healed.     POC US Guidance Needle Placement    Result Date: 11/6/2022  Ultrasound was performed as guidance to an anesthesia procedure.  Click \"PACS images\" hyperlink below to view any stored images.  For specific procedure details, view procedure note authored by anesthesia.    POC US Guided Vascular Access    Result Date: 11/6/2022  Ultrasound was performed as guidance to an anesthesia procedure.  Click \"PACS images\" hyperlink below to view any stored images.  For specific procedure details, view procedure note authored by anesthesia.    POC US Guided Vascular Access    Result Date: 11/6/2022  Ultrasound was performed as guidance to an anesthesia procedure.  Click \"PACS images\" hyperlink below to view any stored images.  For specific procedure details, view procedure note authored by anesthesia.      "

## 2022-11-10 NOTE — PLAN OF CARE
Problem: Pain Acute  Goal: Optimal Pain Control and Function  Outcome: Progressing     Problem: Infection  Goal: Absence of Infection Signs and Symptoms  Outcome: Progressing   Goal Outcome Evaluation:      Plan of Care Reviewed With: patient    Overall Patient Progress: improvingOverall Patient Progress: improving    Outcome Evaluation: Pt reclined up in chair during the shift. Up with assist of 1. DOUGLAS drain and colostomy intact. Rates abdominal pain at 6 to 7/10. prn oxycodone and scheduled tylenol given for pain. On oral antibiotics. iv dilaudid also given once per request.Up with assist of 1 to the bathroom. Patient got up x3 during the shift. More pain with movement.

## 2022-11-10 NOTE — PROGRESS NOTES
[facility]    Progress Note - Hospitalist Service       Date of Admission:  10/29/2022    Assessment & Plan   Dain Tejeda is a 42 year old female w/ PMH of chronic gastritis, inflammation of small intestine, and celiac disease admitted on 10/29/2022. Here w/ diffuse colitis w/ severe involvement of the mid and distal descending colon c/f IBD vs infectious process. Taken for partial colectomy of descending colon w/ colostomy 11/6.    Crohns - with severe colitis   Colonic stricture s/p partial colectomy 11/6   *Has had negative enteric infectious workup, possible ileitis on imaging 4/14/2022 w/ interval improvement seen on CT 7/11/22. Saw GI outpatient 7/22/22 and noted to have elevated inflammatory markers and abnormal CT findings concerning for IBD. EGD was performed w/ biopsy suggestive of celiac disease and negative for H pylori. Ttg obtained during this hospitalization returned negative.   *CT abdomen pelvis 10/29 showing diffuse colitis with severe involvement of the mid and distal descending colon. Although there is no phoenix free air, the colitis is complicated by the presence of multiple small, intramural abscesses.   *Colonoscopy 10/31 with atypical findings, biopsy consistent with colitis with reactive changes. Given patient's history as a [place] refugee, also considering colonic TB. Pt reportedly immigrated to [place] from [place] in [place], tested negative for TB upon leaving the country and upon arrival to [place]. No exposure to TB at home or work. No hx of immunosuppression, IV drug use, or incarceration. Currently on airborne isolation for c/f TB, pending results of broad infectious workup per ID's rec at this time. No infectious source of symptoms yet identified, quantiferon indeterminate, negative giardia/crypto.  *Now with stricture on CT noted 11/5, partial colectomy of descending colon w/ colostomy set up performed 11/6 by gen surg.   *Path returns showing evidence of Crohn's as of  11/10.   PLAN:   - ID consult, appreciate               - On PO Augmentin -- x 7 days per ID                - Follow lab studies: AFB smear from rectal mucosa, histoplasmosis, blasto, brucella              - Follow AFB smears from blood, sputum, stool               - RIPE therapy held due to GI side effects   - GI consult placed, appreciate            - Given Crohns, likely to initiate immunomodulator's    - GI holding on meds for now given indeterminate TB result   - General surgery consult, appreciate    - Doing reasonably well from a post op standpoint   - Nutrition consult placed              > Dietician did meet w/ the pt 10/31 and educated on gluten free diet give unclear diagnosis of possible Celiac disease.  - PO Pepcid BID  - Oxyocodone 5 mg q3 h PRN for pain  - Scheduled Tylenol for pain   - PRN Zofran and Compazine ordered    Normocytic anemia, stable   Hgb in 7s as of 11/9. Had been in 8's prior. Unclear etiology. Has prior low ferritin several years ago. ddx remains broad.   -Ferritin   -Trend CBC  -Transfuse Hgb <7    Consented for transfusion in event she drops     Thrombocytosis, recurrent   PLTs have been around the upper limits of normal during previous evaluation, found to be 600k on admission. Likely elevated 2/2 acute phase reactant and significant GI tract inflammation. Has been stable at the upper limit of normal -- improved to nml range 11/8.   - Monitor     Hypokalemia, resolved  Hypomagnesemia, resolved   - Replete per nursing protocol     Abnormal UA  - Cont IV abx per above  - UC grew Klebsiella     Leukocytosis, resolved   WBC 13 --> 6 on 11/8.  -Follow CBC     Severe malnutrition   Based on nutrition assessment  -Encourage PO          Diet: Snacks/Supplements Adult: Ensure Clear; Between Meals  Gluten Free Diet    DVT Prophylaxis: Enoxaparin (Lovenox) subcutaneous held due to procedure today, post op will resume for DVT ppx  Chambers Catheter: Not present  Fluids: 100 mL/hr NS  Central  Lines: None  Cardiac Monitoring: None  Code Status: Full Code      Disposition Plan     Expected Discharge Date: 11/11/2022    Discharge Delays: IV Medication - consider oral or Home Infusion  Destination: home with family  Discharge Comments: Post surgery 11/6.  R/O TB pending  Per ID, Deescalate antibiotics to po augmentin suspension x 7 days            The patient's care was discussed with the attending physician Dr Roni Matos MD  Hospitalist Service  [facility]  Securely message with the Vocera Web Console (learn more here)  Text page via [employer] Paging/Directory     Clinically Significant Risk Factors              # Hypoalbuminemia: Lowest albumin = 1.5 g/dL (Ref range: 3.5-5.2) at 11/5/2022  6:47 AM, will monitor as appropriate           # Severe Malnutrition: based on nutrition assessment      ______________________________________________________________________    Interval History    Staff notes reviewed. No acute events.     Feeling fine.   Still with abdominal pain. Somewhat improved at 6-7/10, down from 10/10.   No vomiting.     Data reviewed today: I reviewed all medications, new labs and imaging results over the last 24 hours. I personally reviewed     Physical Exam   Vital Signs: Temp: 97.8  F (36.6  C) Temp src: Oral BP: 110/74 Pulse: 78   Resp: 16 SpO2: 97 % O2 Device: None (Room air)    Weight: 117 lbs 6.4 oz    Gen:  Pleasant.   HEENT: MMM  Neck: no JVD   CV: appears well perfused    Resp: breathing comfortably on room air   Abd: non distended. Staples intact. Colostomy bag in place with stool; no overt blood. DOUGLAS drain with serosanguinous fluid.     Ext: no edema  Neuro: non focal   Psych: calm      Data    Recent Results (from the past 24 hour(s))   Potassium    Collection Time: 11/09/22  2:18 PM   Result Value Ref Range    Potassium 4.2 3.4 - 5.3 mmol/L   CBC with platelets    Collection Time: 11/10/22  6:59 AM   Result Value Ref Range    WBC Count  7.6 4.0 - 11.0 10e3/uL    RBC Count 3.01 (L) 3.80 - 5.20 10e6/uL    Hemoglobin 8.0 (L) 11.7 - 15.7 g/dL    Hematocrit 26.1 (L) 35.0 - 47.0 %    MCV 87 78 - 100 fL    MCH 26.6 26.5 - 33.0 pg    MCHC 30.7 (L) 31.5 - 36.5 g/dL    RDW 14.8 10.0 - 15.0 %    Platelet Count 558 (H) 150 - 450 10e3/uL   Magnesium    Collection Time: 11/10/22  6:59 AM   Result Value Ref Range    Magnesium 1.9 1.7 - 2.3 mg/dL     No results found for this or any previous visit (from the past 24 hour(s)).

## 2022-11-10 NOTE — PROGRESS NOTES
Dain Tejeda is stable, path back as crohn's.  Eating. Walking in room, remains in isolation        Vitals:    11/09/22 0800 11/09/22 1630 11/10/22 0045 11/10/22 0850   BP: 101/63 112/80 111/74 110/74   BP Location: Right arm Left arm Left arm Left arm   Patient Position:  Sitting Semi-Rudd's    Cuff Size:   Adult Regular    Pulse: 84 95 84 78   Resp: 18 16 16 16   Temp: 97.8  F (36.6  C) 98.3  F (36.8  C) 97.9  F (36.6  C) 97.8  F (36.6  C)   TempSrc: Oral Oral Oral Oral   SpO2: 98% 99% 99% 97%   Weight:       Height:           PHYSICAL EXAM:  GEN: No acute distress, comfortable  ABD:soft, stoma pink and patent, alejandrina drain with clear serous fluid  EXT:No cyanosis, edema or obvious abnormalities        Recent Labs   Lab 11/10/22  0659   WBC 7.6   HGB 8.0*   HCT 26.1*   *       Recent Labs   Lab 11/08/22  0701 11/07/22  0713 11/05/22  0647      < > 135*   CO2 31*   < > 28   BUN 5.1*   < > 4.8*   ALBUMIN  --   --  1.5*   ALKPHOS  --   --  144*   ALT  --   --  10   AST  --   --  20    < > = values in this interval not displayed.         A/P:  Dain Tejeda is s/p descending colon resection for Crohn's obstruction  -IBD to be managed by GI  -regular diet  -alejandrina to be removed prior to discharge  -ID to manage isolation precautions at this point.     Chandana Carlos DO  FACS  [phone]  [facility] Department of Surgery

## 2022-11-10 NOTE — PROGRESS NOTES
[facility] Progress Note     IMPRESSION:  Dain Tejeda is a 42 year old female w/ PMH of chronic gastritis, inflammation of small intestine, and celiac disease admitted on 10/29/2022. Here w/ diffuse colitis w/ severe involvement of the mid and distal descending colon c/f IBD vs infectious process. Taken for partial colectomy of descending colon w/ colostomy 11/6.    1. Colitis w/ multifocal small intramural abscesses  2. Colonic stricture  - CT abdomen pelvis 10/29 showing diffuse colitis with severe involvement of the mid and distal descending colon. Although there is no [place] free air, the colitis is complicated by the presence of multiple small, intramural abscesses. CT abd/pelvis 11/5 noted colon stricture.  - Colonoscopy 10/31 with atypical findings, biopsy consistent with colitis with reactive changes. Negative AFB stain on colon and rectal lesion biopsy. CMV negative.   - Given patient's history as a [place] refugee, also considering colonic TB. ID consulted with broad infectious workup. No infectious source of symptoms yet identified, quantiferon indeterminate but MTB complex negative. XR chest negative. Negative giardia/crypto. AFB preliminary cultures are negative.   - Partial colectomy of descending colon w/ colostomy performed 11/6. - Surgical pathology concerning for Crohn's disease (see full path report below). TPMT normal. Hep B ag negative, Hep B ab nonreactive (not immune).         RECOMMENDATIONS:  - Surgical pathology consistent with Crohn's disease. Pt will need to meet with IBD clinic to discuss ongoing management.  [facility] will help coordinate IBD follow up.     - It does not appear she is immune to hepatitis B, she may need the Hep B vaccination series.    - Await ID workup; Hold steroids for now given ongoing ID workup.   Addendum: Reviewed CT and colonoscopy images with Dr. Garcia, only mild inflammation in the remaining colon. Likely can get by without additional steroid therapy at  "this time. Eventually will need to consider maintenance therapy for IBD pending infectious workup. Should have a repeat colonoscopy in 6+ months to reassess colon.    - Continue with supportive cares for pain control.       Approximately 15 minutes of total time was spent providing patient care, including patient evaluation, reviewing documentation/test results, and     Contreras Pereira PA-C  [facility]  [phone]  ________________________________________________________________________      SUBJECTIVE:    Continues with pains at surgical sites. Improves with pain medications. No fevers.   Anxious about her condition, worries about her child at home.      OBJECTIVE:  /74 (BP Location: Left arm)   Pulse 78   Temp 97.8  F (36.6  C) (Oral)   Resp 16   Ht 1.498 m (4' 10.98\")   Wt 53.3 kg (117 lb 6.4 oz)   SpO2 97%   BMI 23.73 kg/m    Temp (24hrs), Av.1  F (36.7  C), Min:98  F (36.7  C), Max:98.1  F (36.7  C)    No data found.    Intake/Output Summary (Last 24 hours) at 2022 0810  Last data filed at 2022 0630  Gross per 24 hour   Intake 1344 ml   Output 1920 ml   Net -576 ml        PHYSICAL EXAM  GEN: Alert, oriented x3, communicative and in NAD.    LYMPH: No LAD noted.  HRT: RRR  LUNGS: CTA  ABD:  ND, +BS, Mild pain to palpation, no rebound, no HSM.  SKIN: No rash, jaundice or spider angiomata      LABS:  I have reviewed the patient's new clinical lab results:     Recent Labs   Lab Test 11/10/22  0659 22  0747 22  0701   WBC 7.6 6.0 6.0   HGB 8.0* 7.5* 8.1*   MCV 87 88 86   * 525* 438     Recent Labs   Lab Test 22  1418 22  0747 22  0701 22  0713 22  1528 2247   POTASSIUM 4.2 3.4 3.7 4.4  --    < > 3.2*   CHLORIDE  --   --  101 101  --   --  102   CO2  --   --  31* 28  --   --  28   BUN  --   --  5.1* 3.9*  --   --  4.8*   CR  --   --  0.41* 0.42* 0.34*  --  0.58   ANIONGAP  --   --  4* 6*  --   " --  5*    < > = values in this interval not displayed.     Recent Labs   Lab Test 11/05/22  0647 10/29/22  2038 10/29/22  2027 06/23/22  1019 06/02/22  0442 06/01/22 2102 06/01/22 2050   ALBUMIN 1.5*  --  2.5* 2.3*   < >  --  2.8*   BILITOTAL 0.4  --  0.4 0.3   < >  --  0.4   ALT 10  --  8* 21   < >  --  13   AST 20  --  14 20   < >  --  14   PROTEIN  --  20*  --   --   --  50*  --    LIPASE  --   --  17  --   --   --  33    < > = values in this interval not displayed.         IMAGING:  reviewed    DESCENDING AND SIGMOID COLON, RESECTION:        1.  DIFFUSE MODERATE ACTIVE INFLAMMATORY BOWEL DISEASE, CONSISTENT WITH CROHN'S DISEASE              A.  MULTIFOCAL ULCERATION, ACUTE CRYPTITIS, CRYPT ARCHITECTURAL DISTORTION, INTRAMURAL AND                   MESENTERIC ABSCESS FORMATION, INFLAMMATORY PSEUDOPOLYPS, AND ACUTE AND ORGANIZING PERITONITIS              B.  MODERATE ACTIVE INFLAMMATORY BOWEL DISEASE PRESENT AT PROXIMAL AND DISTAL SURGICAL MARGINS        2.  ASSOCIATED LUMINAL STENOSIS WITH PROXIMAL DILATATION, CONSISTENT WITH OBSTRUCTION        3.  NO EVIDENCE OF DYSPLASIA OR MALIGNANCY        4.  BENIGN REACTIVE LYMPH NODES WITH FOCAL FOREIGN BODY GIANT CELLS AND NO EVIDENCE OF MALIGNANCY             (0 OF 5)

## 2022-11-10 NOTE — PLAN OF CARE
Problem: Pain Acute  Goal: Optimal Pain Control and Function  Outcome: Not Progressing  Intervention: Develop Pain Management Plan  Recent Flowsheet Documentation  Taken 11/9/2022 1646 by Elyse Mccray, RN  Pain Management Interventions:   medication (see MAR)   cold applied   Goal Outcome Evaluation:       Patient rating abdominal pain 5-8/10. PRN pain medications given; pain at 4-5/10 upon reassessment. Colostomy with 100 mL watery output. DOUGLAS drain with 70 mL serosanguineous output. Voiding well. Very poor appetite noted and not drinking little of the supplements. Up to the toilet with assist of 1.

## 2022-11-10 NOTE — PLAN OF CARE
"  Problem: Plan of Care - These are the overarching goals to be used throughout the patient stay.    Goal: Patient-Specific Goal (Individualized)  Description: You can add care plan individualizations to a care plan. Examples of Individualization might be:  \"Parent requests to be called daily at 9am for status\", \"I have a hard time hearing out of my right ear\", or \"Do not touch me to wake me up as it startles me\".  Outcome: Progressing     Problem: Plan of Care - These are the overarching goals to be used throughout the patient stay.    Goal: Optimal Comfort and Wellbeing  Outcome: Progressing     Problem: Pain Acute  Goal: Optimal Pain Control and Function  Outcome: Progressing     Problem: Infection  Goal: Absence of Infection Signs and Symptoms  Outcome: Progressing    A/O x4. VSS. PRN IV Dilaudid given x2 abdominal pain which was effective per pt. Colostomy output 150 ml and DOUGLAS output 55 ml serosanguinous in color. Poor appetite. Used bathroom x2 with staff assist.                             "

## 2022-11-11 LAB
ANION GAP SERPL CALCULATED.3IONS-SCNC: 4 MMOL/L (ref 7–15)
BACTERIA PRT CULT: NO GROWTH
BUN SERPL-MCNC: 5.5 MG/DL (ref 6–20)
CALCIUM SERPL-MCNC: 7.7 MG/DL (ref 8.6–10)
CHLORIDE SERPL-SCNC: 99 MMOL/L (ref 98–107)
CREAT SERPL-MCNC: 0.38 MG/DL (ref 0.51–0.95)
DEPRECATED HCO3 PLAS-SCNC: 32 MMOL/L (ref 22–29)
ERYTHROCYTE [DISTWIDTH] IN BLOOD BY AUTOMATED COUNT: 14.6 % (ref 10–15)
GFR SERPL CREATININE-BSD FRML MDRD: >90 ML/MIN/1.73M2
GLUCOSE SERPL-MCNC: 99 MG/DL (ref 70–99)
HCT VFR BLD AUTO: 23.7 % (ref 35–47)
HGB BLD-MCNC: 7.3 G/DL (ref 11.7–15.7)
MAGNESIUM SERPL-MCNC: 1.8 MG/DL (ref 1.7–2.3)
MCH RBC QN AUTO: 26.9 PG (ref 26.5–33)
MCHC RBC AUTO-ENTMCNC: 30.8 G/DL (ref 31.5–36.5)
MCV RBC AUTO: 88 FL (ref 78–100)
PLATELET # BLD AUTO: 537 10E3/UL (ref 150–450)
POTASSIUM SERPL-SCNC: 3.9 MMOL/L (ref 3.4–5.3)
RBC # BLD AUTO: 2.71 10E6/UL (ref 3.8–5.2)
SODIUM SERPL-SCNC: 135 MMOL/L (ref 136–145)
WBC # BLD AUTO: 6.9 10E3/UL (ref 4–11)

## 2022-11-11 PROCEDURE — 87449 NOS EACH ORGANISM AG IA: CPT

## 2022-11-11 PROCEDURE — 250N000011 HC RX IP 250 OP 636: Performed by: SURGERY

## 2022-11-11 PROCEDURE — 250N000013 HC RX MED GY IP 250 OP 250 PS 637: Performed by: STUDENT IN AN ORGANIZED HEALTH CARE EDUCATION/TRAINING PROGRAM

## 2022-11-11 PROCEDURE — 80048 BASIC METABOLIC PNL TOTAL CA: CPT | Performed by: STUDENT IN AN ORGANIZED HEALTH CARE EDUCATION/TRAINING PROGRAM

## 2022-11-11 PROCEDURE — 250N000011 HC RX IP 250 OP 636: Performed by: STUDENT IN AN ORGANIZED HEALTH CARE EDUCATION/TRAINING PROGRAM

## 2022-11-11 PROCEDURE — 85027 COMPLETE CBC AUTOMATED: CPT | Performed by: STUDENT IN AN ORGANIZED HEALTH CARE EDUCATION/TRAINING PROGRAM

## 2022-11-11 PROCEDURE — 99233 SBSQ HOSP IP/OBS HIGH 50: CPT | Mod: GC | Performed by: STUDENT IN AN ORGANIZED HEALTH CARE EDUCATION/TRAINING PROGRAM

## 2022-11-11 PROCEDURE — 250N000013 HC RX MED GY IP 250 OP 250 PS 637: Performed by: SURGERY

## 2022-11-11 PROCEDURE — 250N000013 HC RX MED GY IP 250 OP 250 PS 637

## 2022-11-11 PROCEDURE — 250N000013 HC RX MED GY IP 250 OP 250 PS 637: Performed by: FAMILY MEDICINE

## 2022-11-11 PROCEDURE — 83735 ASSAY OF MAGNESIUM: CPT | Performed by: STUDENT IN AN ORGANIZED HEALTH CARE EDUCATION/TRAINING PROGRAM

## 2022-11-11 PROCEDURE — 120N000001 HC R&B MED SURG/OB

## 2022-11-11 PROCEDURE — 36415 COLL VENOUS BLD VENIPUNCTURE: CPT | Performed by: STUDENT IN AN ORGANIZED HEALTH CARE EDUCATION/TRAINING PROGRAM

## 2022-11-11 PROCEDURE — 99207 PR NO CHARGE LOS: CPT

## 2022-11-11 RX ORDER — HYDROMORPHONE HYDROCHLORIDE 1 MG/ML
.5-1 INJECTION, SOLUTION INTRAMUSCULAR; INTRAVENOUS; SUBCUTANEOUS EVERY 6 HOURS PRN
Status: DISCONTINUED | OUTPATIENT
Start: 2022-11-11 | End: 2022-11-12

## 2022-11-11 RX ADMIN — PANTOPRAZOLE SODIUM 40 MG: 20 TABLET, DELAYED RELEASE ORAL at 08:32

## 2022-11-11 RX ADMIN — ACETAMINOPHEN 650 MG: 325 TABLET, FILM COATED ORAL at 06:18

## 2022-11-11 RX ADMIN — AMOXICILLIN AND CLAVULANATE POTASSIUM 875 MG: 400; 57 POWDER, FOR SUSPENSION ORAL at 20:05

## 2022-11-11 RX ADMIN — OXYCODONE HYDROCHLORIDE 10 MG: 5 TABLET ORAL at 20:04

## 2022-11-11 RX ADMIN — ACETAMINOPHEN 650 MG: 325 TABLET, FILM COATED ORAL at 18:57

## 2022-11-11 RX ADMIN — AMOXICILLIN AND CLAVULANATE POTASSIUM 875 MG: 400; 57 POWDER, FOR SUSPENSION ORAL at 08:31

## 2022-11-11 RX ADMIN — HYDROMORPHONE HYDROCHLORIDE 0.5 MG: 1 INJECTION, SOLUTION INTRAMUSCULAR; INTRAVENOUS; SUBCUTANEOUS at 04:20

## 2022-11-11 RX ADMIN — PANTOPRAZOLE SODIUM 40 MG: 20 TABLET, DELAYED RELEASE ORAL at 20:04

## 2022-11-11 RX ADMIN — GABAPENTIN 100 MG: 100 CAPSULE ORAL at 20:05

## 2022-11-11 RX ADMIN — HYDROMORPHONE HYDROCHLORIDE 1 MG: 1 INJECTION, SOLUTION INTRAMUSCULAR; INTRAVENOUS; SUBCUTANEOUS at 06:23

## 2022-11-11 RX ADMIN — HYDROMORPHONE HYDROCHLORIDE 1 MG: 1 INJECTION, SOLUTION INTRAMUSCULAR; INTRAVENOUS; SUBCUTANEOUS at 22:10

## 2022-11-11 RX ADMIN — SENNOSIDES AND DOCUSATE SODIUM 1 TABLET: 50; 8.6 TABLET ORAL at 20:04

## 2022-11-11 RX ADMIN — GABAPENTIN 100 MG: 100 CAPSULE ORAL at 14:08

## 2022-11-11 RX ADMIN — OXYCODONE HYDROCHLORIDE 5 MG: 5 TABLET ORAL at 10:40

## 2022-11-11 RX ADMIN — FLUCONAZOLE 200 MG: 100 TABLET ORAL at 08:32

## 2022-11-11 RX ADMIN — POLYETHYLENE GLYCOL 3350 17 G: 17 POWDER, FOR SOLUTION ORAL at 08:31

## 2022-11-11 RX ADMIN — OXYCODONE HYDROCHLORIDE 10 MG: 5 TABLET ORAL at 15:51

## 2022-11-11 RX ADMIN — GABAPENTIN 100 MG: 100 CAPSULE ORAL at 08:32

## 2022-11-11 RX ADMIN — ENOXAPARIN SODIUM 40 MG: 40 INJECTION SUBCUTANEOUS at 12:01

## 2022-11-11 RX ADMIN — THERA TABS 1 TABLET: TAB at 08:32

## 2022-11-11 RX ADMIN — ACETAMINOPHEN 650 MG: 325 TABLET, FILM COATED ORAL at 12:01

## 2022-11-11 RX ADMIN — SENNOSIDES AND DOCUSATE SODIUM 1 TABLET: 50; 8.6 TABLET ORAL at 08:32

## 2022-11-11 ASSESSMENT — ACTIVITIES OF DAILY LIVING (ADL)
ADLS_ACUITY_SCORE: 23
ADLS_ACUITY_SCORE: 26
ADLS_ACUITY_SCORE: 23
ADLS_ACUITY_SCORE: 26
ADLS_ACUITY_SCORE: 23

## 2022-11-11 NOTE — PROGRESS NOTES
ASSESSMENT:  1. Colitis    2. Intra-abdominal abscess (H)    3. Hypokalemia        Dain Tejeda is a 42 year old female who is s/p resection of descending colon due to Crohn's stricture and significant inflammation and small intramural abscesses on 11/6/2022 with Dr. Carlos.  Postop day #5 complaining of pain around the stoma and clinically she looks good    PLAN:  -Continue daily MiraLAX and senna.  I would not recommend adding another stool softener.  You could encourage her to drink some prune juice daily if she would like something on top of the 2 stool softener she is already on  -Appreciate GI input and cares  -Regular diet as tolerated  -Discharge when medically ready and at this time she looks good from a surgical standpoint to be discharged.  -We will make sure our discharge recommendations are in place and follow-up appointment is in place.  -Appreciate ID input as well.  -DOUGLAS can be removed and orders done    SUBJECTIVE:   She is mainly concerned about pain along the left lower abdomen around her stoma.  She states that it hurts if she does not take medications for pain as well as right before having a bowel movement she feels that things hurt more.  Her stools have been soft but she is hoping that she can have something in liquid to help her have softer stools.  She currently is on MiraLAX and senna daily.      Patient Vitals for the past 24 hrs:   BP Temp Temp src Pulse Resp SpO2   11/11/22 0759 110/72 98  F (36.7  C) Oral 78 16 97 %   11/11/22 0033 113/77 98  F (36.7  C) Oral 86 18 99 %   11/10/22 1543 108/72 98  F (36.7  C) Oral 89 18 97 %         PHYSICAL EXAM:  GEN: No acute distress, comfortable    ABD: Soft tenderness left lower quadrant without rebound or peritoneal signs present.  Her wound looks good without any stigma of infection and vessel loop in place.  The stoma is nice and pink and has thicker liquid stool in her ostomy bag with lots of gas  Drains: Serosanguineous  Output by Drain (mL)  11/09/22 0700 - 11/09/22 1459 11/09/22 1500 - 11/09/22 2259 11/09/22 2300 - 11/10/22 0659 11/10/22 0700 - 11/10/22 1459 11/10/22 1500 - 11/10/22 2259 11/10/22 2300 - 11/11/22 0659 11/11/22 0700 - 11/11/22 1007   Closed/Suction Drain 1 Anterior RLQ Bulb 15 Somali 90 70 110 55 45 60    Open Drain Medial;Superior Abdomen              EXT:No cyanosis, edema or obvious abnormalities    11/10 0700 - 11/11 0659  In: 520 [P.O.:520]  Out: 510 [Drains:160]    No results displayed because visit has over 200 results.   Recent Results (from the past 24 hour(s))   CBC with platelets    Collection Time: 11/11/22  7:43 AM   Result Value Ref Range    WBC Count 6.9 4.0 - 11.0 10e3/uL    RBC Count 2.71 (L) 3.80 - 5.20 10e6/uL    Hemoglobin 7.3 (L) 11.7 - 15.7 g/dL    Hematocrit 23.7 (L) 35.0 - 47.0 %    MCV 88 78 - 100 fL    MCH 26.9 26.5 - 33.0 pg    MCHC 30.8 (L) 31.5 - 36.5 g/dL    RDW 14.6 10.0 - 15.0 %    Platelet Count 537 (H) 150 - 450 10e3/uL   Magnesium    Collection Time: 11/11/22  7:43 AM   Result Value Ref Range    Magnesium 1.8 1.7 - 2.3 mg/dL   Basic metabolic panel    Collection Time: 11/11/22  7:43 AM   Result Value Ref Range    Sodium 135 (L) 136 - 145 mmol/L    Potassium 3.9 3.4 - 5.3 mmol/L    Chloride 99 98 - 107 mmol/L    Carbon Dioxide (CO2) 32 (H) 22 - 29 mmol/L    Anion Gap 4 (L) 7 - 15 mmol/L    Urea Nitrogen 5.5 (L) 6.0 - 20.0 mg/dL    Creatinine 0.38 (L) 0.51 - 0.95 mg/dL    Calcium 7.7 (L) 8.6 - 10.0 mg/dL    Glucose 99 70 - 99 mg/dL    GFR Estimate >90 >60 mL/min/1.73m2           DESCENDING AND SIGMOID COLON, RESECTION:        1.  DIFFUSE MODERATE ACTIVE INFLAMMATORY BOWEL DISEASE, CONSISTENT WITH CROHN'S DISEASE              A.  MULTIFOCAL ULCERATION, ACUTE CRYPTITIS, CRYPT ARCHITECTURAL DISTORTION, INTRAMURAL AND                   MESENTERIC ABSCESS FORMATION, INFLAMMATORY PSEUDOPOLYPS, AND ACUTE AND ORGANIZING PERITONITIS              B.  MODERATE ACTIVE INFLAMMATORY BOWEL DISEASE PRESENT AT PROXIMAL  AND DISTAL SURGICAL MARGINS        2.  ASSOCIATED LUMINAL STENOSIS WITH PROXIMAL DILATATION, CONSISTENT WITH OBSTRUCTION        3.  NO EVIDENCE OF DYSPLASIA OR MALIGNANCY        4.  BENIGN REACTIVE LYMPH NODES WITH FOCAL FOREIGN BODY GIANT CELLS AND NO EVIDENCE OF MALIGNANCY             (0 OF 5)            Minna Salazar [facility]  [address] [place] [id number]  Phone- [phone]  Fax- [phone]

## 2022-11-11 NOTE — PLAN OF CARE
Problem: Plan of Care - These are the overarching goals to be used throughout the patient stay.    Goal: Absence of Hospital-Acquired Illness or Injury  Intervention: Identify and Manage Fall Risk  Recent Flowsheet Documentation  Taken 11/10/2022 1543 by Contreras Nathan RN  Safety Promotion/Fall Prevention:    nonskid shoes/slippers when out of bed    patient and family education    safety round/check completed    clutter free environment maintained    fall prevention program maintained    activity supervised    assistive device/personal items within reach    bed alarm on    chair alarm on     Problem: Pain Acute  Goal: Optimal Pain Control and Function  Outcome: Progressing  Intervention: Prevent or Manage Pain  Recent Flowsheet Documentation  Taken 11/10/2022 1543 by Contreras Nathan RN  Medication Review/Management: medications reviewed   Goal Outcome Evaluation:         Pain medications given per MAR and were effective at controlling pain. Fall precautions are in place.

## 2022-11-11 NOTE — PROGRESS NOTES
"CLINICAL NUTRITION SERVICES - REASSESSMENT NOTE     Nutrition Prescription    RECOMMENDATIONS FOR MDs/PROVIDERS TO ORDER:  None    Malnutrition Status:    Severe in acute illness 10/31    Recommendations already ordered by Registered Dietitian (RD):  Discontinue sending ensure clear but encouraged pt to continue taking from stock in room    Future/Additional Recommendations:  Adjust supplements pending intake, tolerance, acceptance, weight  Continue with diet ed counseling and support       EVALUATION OF PROGRESS TOWARDS GOALS    CURRENT NUTRITION ORDERS  Diet: Gluten Free  Supplement: Ensure clear bid  Intake/Tolerance: Intake being documented as \"poor\" by nsg, however pt reports she is eating food from home and eating baseline amounts.   She is able to take ensure clear only a little at time d/t burning sensation in her stomach when she takes it. 6-7 unopened ensure clear in her room  Pt is not ordering any food from the kitchen    Pt c/o abdominal pain around stoma site and some abdominal pain when she eats but feels it is becoming more  Manageable  Provided handouts and discussed nutrition therapy for colostomy and Crohns. Both in english - put in her folder to go home with her where her children can translate for her. Discussed likely with some GI inflammation d/t recent colostomy and should improve. Encouraged intake of ensure clear to optimize recovery  Adequacy of intake difficult to determine d/t eating food from home    NEW FINDINGS  - TB colitis ruled out  - PT to follow up with GI clinic for management of new dx Crohns  - colostomy -350 ml watery, decreased  - DOUGLAS -270 ml/day    LABS  Labs reviewed  Na 135 (L), decreased  WBC increased    MEDICATIONS  Medications reviewed  Oral abx, mvi with minerals, protonix, miralax daily, pericolace bid    ANTHROPOMETRICS  Height: 149.8 cm (4' 10.98\")  Most Recent Weight: 51.1 kg (112 lb 11.2 oz) 11/11, down 5 lb from 4 days prior. Had been " increasing  53.2 kg (117 lb 6.4 oz) 11/6  50.8 kg (111 lb 14.4 oz)  10/30  IBW: 44.3 kg  BMI: Normal BMI  Weight History:   Wt Readings from Last 10 Encounters:   10/30/22  07/20/22 50.8 kg (111 lb 14.4 oz)  49.3 kg (108 lb) - office   07/12/22 50.9 kg (112 lb 1.9 oz)   07/08/22 51.3 kg (113 lb)   06/20/22 54 kg (119 lb)   06/09/22 55.8 kg (123 lb)   06/02/22 57 kg (125 lb 11.2 oz)   05/24/22 58.5 kg (129 lb)   06/25/18 60.8 kg (134 lb)   16.2% weight loss x  2 months    Dosing Weight: 50.8 kg    ASSESSED NUTRITION NEEDS  Estimated Energy Needs: 2070-4934 kcals/day (25 - 30 kcals/kg)  Justification: Maintenance  Estimated Protein Needs: 61-76 grams protein/day (1.2 - 1.5 grams of pro/kg)  Justification: Repletion  Estimated Fluid Needs: 4200-0468 mL/day (1 mL/kcal)   Justification: Maintenance    PHYSICAL FINDINGS  See malnutrition section below.    MALNUTRITION: 10/31  % Weight Loss:  > 7.5% in 3 months (severe malnutrition)  % Intake:  </= 50% for >/= 5 days (severe malnutrition)  Subcutaneous Fat Loss:  None observed  Muscle Loss:  None observed  Fluid Retention:  None noted    Malnutrition Diagnosis: Severe malnutrition  In Context of:  Acute illness or injury    NUTRITION DIAGNOSIS  Malnutrition related to severe colitis, celiac disease, colonic obstruction as evidenced by intake < 50% x 1 week, 16.2% weight loss in 2 months  -improving    INTERVENTIONS  Implementation  Stop sending ensure clear d/t stock in room but encouraged her to continue taking when able    Goals  Meet nutrition needs-subjectively progressing per pt report  Normalize bowels-progressing     Monitoring/Evaluation  Progress toward goals will be monitored and evaluated per protocol.

## 2022-11-11 NOTE — PLAN OF CARE
"  Problem: Plan of Care - These are the overarching goals to be used throughout the patient stay.    Goal: Plan of Care Review  Description: The Plan of Care Review/Shift note should be completed every shift.  The Outcome Evaluation is a brief statement about your assessment that the patient is improving, declining, or no change.  This information will be displayed automatically on your shift note.  Outcome: Progressing     Problem: Plan of Care - These are the overarching goals to be used throughout the patient stay.    Goal: Patient-Specific Goal (Individualized)  Description: You can add care plan individualizations to a care plan. Examples of Individualization might be:  \"Parent requests to be called daily at 9am for status\", \"I have a hard time hearing out of my right ear\", or \"Do not touch me to wake me up as it startles me\".  Outcome: Progressing     Problem: Plan of Care - These are the overarching goals to be used throughout the patient stay.    Goal: Absence of Hospital-Acquired Illness or Injury  Intervention: Identify and Manage Fall Risk  Recent Flowsheet Documentation  Taken 11/11/2022 1022 by Cecil Ruvalcaba RN  Safety Promotion/Fall Prevention:   activity supervised   assistive device/personal items within reach   nonskid shoes/slippers when out of bed   mobility aid in reach     Problem: Plan of Care - These are the overarching goals to be used throughout the patient stay.    Goal: Optimal Comfort and Wellbeing  Outcome: Progressing  Intervention: Monitor Pain and Promote Comfort  Recent Flowsheet Documentation  Taken 11/11/2022 1040 by Cecil Ruvalcaba, RN  Pain Management Interventions: medication (see MAR)     Problem: Plan of Care - These are the overarching goals to be used throughout the patient stay.    Goal: Absence of Hospital-Acquired Illness or Injury  Intervention: Prevent Skin Injury  Recent Flowsheet Documentation  Taken 11/11/2022 1022 by Cecil Ruvalcaba, RN  Body Position: position changed " independently     Problem: Pain Acute  Goal: Optimal Pain Control and Function  Outcome: Progressing  Intervention: Develop Pain Management Plan  Recent Flowsheet Documentation  Taken 11/11/2022 1040 by Cecil Ruvalcaba RN  Pain Management Interventions: medication (see MAR)     Problem: Infection  Goal: Absence of Infection Signs and Symptoms  Outcome: Progressing     Problem: Malnutrition  Goal: Improved Nutritional Intake  Outcome: Progressing

## 2022-11-11 NOTE — PROGRESS NOTES
[facility] Progress Note     IMPRESSION:  Dain Tejeda is a 42 year old female w/ PMH of chronic gastritis, inflammation of small intestine, and celiac disease admitted on 10/29/2022. Here w/ diffuse colitis w/ severe involvement of the mid and distal descending colon c/f IBD vs infectious process. Taken for partial colectomy of descending colon w/ colostomy 11/6.    1. Colitis w/ multifocal small intramural abscesses  2. Colonic stricture  - CT abdomen pelvis 10/29 showing diffuse colitis with severe involvement of the mid and distal descending colon. Although there is no [place] free air, the colitis is complicated by the presence of multiple small, intramural abscesses. CT abd/pelvis 11/5 noted colon stricture.  - Colonoscopy 10/31 with atypical findings, biopsy consistent with colitis with reactive changes. Negative AFB stain on colon and rectal lesion biopsy. CMV negative.   - Given patient's history as a [place] refugee, also considering colonic TB. ID consulted with broad infectious workup. No infectious source of symptoms yet identified, quantiferon indeterminate but MTB complex negative. XR/CT chest negative. Negative giardia/crypto. AFB blood, stool, and sputum pending - preliminary cultures are negative.   - Partial colectomy of descending colon w/ colostomy performed 11/6. - Surgical pathology concerning for Crohn's disease (see full path report below). TPMT normal. Hep B ag negative, Hep B ab nonreactive (not immune).         RECOMMENDATIONS:  - Follow up in IBD clinic. [facility] will help coordinate this.     - It does not appear she is immune to hepatitis B, she may need the Hep B vaccination series.    - No plans for steroids at this time.     - Abx per ID    - Diet: Gluten-free given possible celiac disease (endoscopic findings of Garcia 3a)    **Of note, patient and  have a hard time with grasping her diagnosis and why she needed surgery in the first place. They asked me to contact her uncle,  "Apple Lo, to relay our findings and plans moving forward. He lives in [place] and plans to visit the patient next week. After discussion, Apple seemed to understand the above plan and will help talk with the pt and family to explain further.     - Disposition: Clear for discharge from GI standpoint but will likely require a few more days for pain management from recent colectomy. We will get her scheduled in IBD clinic to discuss Crohn's treatment further. No acute treatments necessary at this time.     GI will sign off at this time. Thank you for consulting us on this pleasant patient. Please call if questions arise or the patient's status changes.         Approximately 25 minutes of total time was spent providing patient care, including patient evaluation, reviewing documentation/test results, and     Contreras Pereira PA-C  [facility]  [phone]  ________________________________________________________________________      SUBJECTIVE:    Continues with pains at surgical sites and ostomy site especially when passing stools. Tolerating diet but is cautious due to the pains. No nausea or vomiting. No bloody output.      OBJECTIVE:  /72 (BP Location: Left arm)   Pulse 78   Temp 98  F (36.7  C) (Oral)   Resp 16   Ht 1.498 m (4' 10.98\")   Wt 53.3 kg (117 lb 6.4 oz)   SpO2 97%   BMI 23.73 kg/m    Temp (24hrs), Av.1  F (36.7  C), Min:98  F (36.7  C), Max:98.1  F (36.7  C)    No data found.    Intake/Output Summary (Last 24 hours) at 2022 0810  Last data filed at 2022 0630  Gross per 24 hour   Intake 1344 ml   Output 1920 ml   Net -576 ml        PHYSICAL EXAM  GEN: Alert, oriented x3, communicative and in NAD.    LYMPH: No LAD noted.  HRT: RRR  LUNGS: CTA  ABD:  ND, +BS, Mild pain to palpation, no rebound, no HSM.  SKIN: No rash, jaundice or spider angiomata      LABS:  I have reviewed the patient's new clinical lab results:     Recent Labs   Lab Test 22  0743 " 11/10/22  0659 11/09/22  0747   WBC 6.9 7.6 6.0   HGB 7.3* 8.0* 7.5*   MCV 88 87 88   * 558* 525*     Recent Labs   Lab Test 11/11/22  0743 11/10/22  0659 11/09/22  1418 11/09/22  0747 11/08/22  0701 11/07/22  0713   POTASSIUM 3.9 4.2 4.2   < > 3.7 4.4   CHLORIDE 99  --   --   --  101 101   CO2 32*  --   --   --  31* 28   BUN 5.5*  --   --   --  5.1* 3.9*   CR 0.38*  --   --   --  0.41* 0.42*   ANIONGAP 4*  --   --   --  4* 6*    < > = values in this interval not displayed.     Recent Labs   Lab Test 11/05/22  0647 10/29/22  2038 10/29/22  2027 06/23/22  1019 06/02/22  0442 06/01/22 2102 06/01/22 2050   ALBUMIN 1.5*  --  2.5* 2.3*   < >  --  2.8*   BILITOTAL 0.4  --  0.4 0.3   < >  --  0.4   ALT 10  --  8* 21   < >  --  13   AST 20  --  14 20   < >  --  14   PROTEIN  --  20*  --   --   --  50*  --    LIPASE  --   --  17  --   --   --  33    < > = values in this interval not displayed.         IMAGING:  reviewed    DESCENDING AND SIGMOID COLON, RESECTION:        1.  DIFFUSE MODERATE ACTIVE INFLAMMATORY BOWEL DISEASE, CONSISTENT WITH CROHN'S DISEASE              A.  MULTIFOCAL ULCERATION, ACUTE CRYPTITIS, CRYPT ARCHITECTURAL DISTORTION, INTRAMURAL AND                   MESENTERIC ABSCESS FORMATION, INFLAMMATORY PSEUDOPOLYPS, AND ACUTE AND ORGANIZING PERITONITIS              B.  MODERATE ACTIVE INFLAMMATORY BOWEL DISEASE PRESENT AT PROXIMAL AND DISTAL SURGICAL MARGINS        2.  ASSOCIATED LUMINAL STENOSIS WITH PROXIMAL DILATATION, CONSISTENT WITH OBSTRUCTION        3.  NO EVIDENCE OF DYSPLASIA OR MALIGNANCY        4.  BENIGN REACTIVE LYMPH NODES WITH FOCAL FOREIGN BODY GIANT CELLS AND NO EVIDENCE OF MALIGNANCY             (0 OF 5)

## 2022-11-11 NOTE — PROGRESS NOTES
Care Management Discharge Planning Assist    Length of Stay (days): 13    Expected Discharge Date: 11/13/2022 Pending pain control.    Concerns to be Addressed:   In isolation to rule out TB. Care post laparotomy with resection of obstructing descending colon inflammatory mass, due to Intra-abdominal abscess, on 11/6/2022. DOUGLAS drain in place. IV analgesia.   Patient plan of care discussed at interdisciplinary rounds: Yes    Anticipated Discharge Disposition:  Discharge goal is home pending response to treatment, medical needs and mobility closer to discharge.      Anticipated Discharge Services:  None anticipated.   Anticipated Discharge DME:  To be determined.     Patient/family educated on Medicare website which has current facility and service quality ratings:  Yes  Education Provided on the Discharge Plan:  Per team  Patient/Family in Agreement with the Plan:  yes    Referrals Placed by CM/SW:  [facility]  Private pay costs discussed: Not applicable at this time.     Additional Information:  Patient is  and independent at baseline, as children at home. She would need a [employer]  for discharge planning and education. Patient has 100% coverage for home infusion if needed. [facility] liaison is following along. Note ID recommended a change to oral antibiotics at this time.     Kirstin Guillen RN

## 2022-11-11 NOTE — PROGRESS NOTES
Infectious Disease Chart Check    Afebrile. WBC normal.    Note cocci titer is indeterminate.  LIkely not reflecting infection.  Could repeat in a month. Will add  Cocci antigens from urine and blood, but would not delay discharge, or start treatment, pending these results, which can take a while to come back.     We'll sign off.  Please call if questions or problems.     Bob Cunha MD

## 2022-11-11 NOTE — PROGRESS NOTES
[facility]    Progress Note - Hospitalist Service       Date of Admission:  10/29/2022    Assessment & Plan   Dain Tejeda is a 42 year old female w/ PMH of chronic gastritis, inflammation of small intestine, and celiac disease admitted on 10/29/2022. Here w/ diffuse colitis w/ severe involvement of the mid and distal descending colon c/f IBD vs infectious process. Taken for partial colectomy of descending colon w/ colostomy 11/6.    Crohns - with severe colitis   Colonic stricture s/p partial colectomy 11/6   *Has had negative enteric infectious workup, possible ileitis on imaging 4/14/2022 w/ interval improvement seen on CT 7/11/22. Saw GI outpatient 7/22/22 and noted to have elevated inflammatory markers and abnormal CT findings concerning for IBD. EGD was performed w/ biopsy suggestive of celiac disease and negative for H pylori. Ttg obtained during this hospitalization returned negative.   *CT abdomen pelvis 10/29 showing diffuse colitis with severe involvement of the mid and distal descending colon. Although there is no phoenix free air, the colitis is complicated by the presence of multiple small, intramural abscesses.   *Colonoscopy 10/31 with atypical findings, biopsy consistent with colitis with reactive changes. Given patient's history as a [place] refugee, also considering colonic TB. Pt [place] immigrated to [place] from [place] in [place], tested negative for TB upon leaving the country and upon arrival to [place]. No exposure to TB at home or work. No hx of immunosuppression, IV drug use, or incarceration. Currently on airborne isolation for c/f TB, pending results of broad infectious workup per ID's rec at this time. No infectious source of symptoms yet identified, quantiferon indeterminate, negative giardia/crypto.  *Now with stricture on CT noted 11/5, partial colectomy of descending colon w/ colostomy set up performed 11/6 by gen surg.   *Path returns showing evidence of Crohn's as of  11/10.   *Continues on IV pain control -- needs to wean in order to be medically ready to return home   PLAN:   - ID consult, appreciate               - On PO Augmentin -- x 7 days per ID (end date 11/15)               - Follow lab studies: AFB smear from rectal mucosa, histoplasmosis, blasto, brucella              - Follow AFB smears from blood, sputum, stool               - RIPE therapy held due to GI side effects   - GI consult placed, appreciate            - Given Crohns, likely to initiate immunomodulator's    - GI holding on meds for now given indeterminate TB result   - General surgery consult, appreciate    - Doing reasonably well from a post op standpoint    - Gen surg signed off 11/11   - Nutrition consult placed              > Dietician did meet w/ the pt 10/31 and educated on gluten free diet give unclear diagnosis of possible Celiac disease.  - Pain control:   Overall goal: Transition from IV to oral    Ice/heat   Scheduled tylenol    Gabapentin    Oxy -- for moderate and severe    A few IV doses for breakthrough beyond above   - PRN Zofran and Compazine ordered    Normocytic anemia, stable   Hgb in 7s as of 11/9. Had been in 8's prior. Unclear etiology. Has prior low ferritin several years ago. ddx remains broad.   -Ferritin   -Trend CBC  -Transfuse Hgb <7    Consented for transfusion in event she drops     Thrombocytosis, recurrent   PLTs have been around the upper limits of normal during previous evaluation, found to be 600k on admission. Likely elevated 2/2 acute phase reactant and significant GI tract inflammation. Has been stable at the upper limit of normal -- improved to nml range 11/8.   - Monitor     Hypokalemia, resolved  Hypomagnesemia, resolved   - Replete per nursing protocol     Abnormal UA  - Cont IV abx per above  - UC grew Klebsiella     Leukocytosis, resolved   WBC 13 --> 6 on 11/8.  -Follow CBC     Severe malnutrition   Based on nutrition assessment  -Encourage PO          Diet:  Gluten Free Diet  Room Service    DVT Prophylaxis: Enoxaparin (Lovenox) subcutaneous held due to procedure today, post op will resume for DVT ppx  Chambers Catheter: Not present  Fluids: 100 mL/hr NS  Central Lines: None  Cardiac Monitoring: None  Code Status: Full Code      Disposition Plan     Expected Discharge Date: 11/13/2022    Discharge Delays: IV Medication - consider oral or Home Infusion  Destination: home with family  Discharge Comments: Post surgery 11/6.  R/O TB pending  Per ID, Deescalate antibiotics to po augmentin suspension x 7 days            The patient's care was discussed with the attending physician Dr Rosenstein Torbjorn Morkeberg, MD  Hospitalist Service  [facility]  Securely message with the Vocera Web Console (learn more here)  Text page via [employer] Paging/Directory     Clinically Significant Risk Factors         # Hyponatremia: Lowest Na = 135 mmol/L (Ref range: 136-145) in last 2 days, will monitor as appropriate      # Hypoalbuminemia: Lowest albumin = 1.5 g/dL (Ref range: 3.5-5.2) at 11/5/2022  6:47 AM, will monitor as appropriate           # Severe Malnutrition: based on nutrition assessment      ______________________________________________________________________    Interval History    Staff notes reviewed. No acute events.     Feeling okay  Pain continues -- continuing to use IV pain control   Several specialists signing off       Data reviewed today: I reviewed all medications, new labs and imaging results over the last 24 hours. I personally reviewed     Physical Exam   Vital Signs: Temp: 98  F (36.7  C) Temp src: Oral BP: 110/72 Pulse: 78   Resp: 16 SpO2: 97 % O2 Device: None (Room air)    Weight: 112 lbs 11.2 oz    Gen:  Pleasant.  HEENT: MMM  Neck: no JVD   CV: appears well perfused    Resp: breathing comfortably on room air   Abd: non distended. Staples intact. Colostomy bag in place with stool; no overt blood. DOUGLAS drain with serosanguinous fluid.      Ext: no edema  Neuro: non focal   Psych: calm     Data    Recent Results (from the past 24 hour(s))   CBC with platelets    Collection Time: 11/11/22  7:43 AM   Result Value Ref Range    WBC Count 6.9 4.0 - 11.0 10e3/uL    RBC Count 2.71 (L) 3.80 - 5.20 10e6/uL    Hemoglobin 7.3 (L) 11.7 - 15.7 g/dL    Hematocrit 23.7 (L) 35.0 - 47.0 %    MCV 88 78 - 100 fL    MCH 26.9 26.5 - 33.0 pg    MCHC 30.8 (L) 31.5 - 36.5 g/dL    RDW 14.6 10.0 - 15.0 %    Platelet Count 537 (H) 150 - 450 10e3/uL   Magnesium    Collection Time: 11/11/22  7:43 AM   Result Value Ref Range    Magnesium 1.8 1.7 - 2.3 mg/dL   Basic metabolic panel    Collection Time: 11/11/22  7:43 AM   Result Value Ref Range    Sodium 135 (L) 136 - 145 mmol/L    Potassium 3.9 3.4 - 5.3 mmol/L    Chloride 99 98 - 107 mmol/L    Carbon Dioxide (CO2) 32 (H) 22 - 29 mmol/L    Anion Gap 4 (L) 7 - 15 mmol/L    Urea Nitrogen 5.5 (L) 6.0 - 20.0 mg/dL    Creatinine 0.38 (L) 0.51 - 0.95 mg/dL    Calcium 7.7 (L) 8.6 - 10.0 mg/dL    Glucose 99 70 - 99 mg/dL    GFR Estimate >90 >60 mL/min/1.73m2     No results found for this or any previous visit (from the past 24 hour(s)).

## 2022-11-12 LAB
ERYTHROCYTE [DISTWIDTH] IN BLOOD BY AUTOMATED COUNT: 14.6 % (ref 10–15)
HCT VFR BLD AUTO: 27 % (ref 35–47)
HGB BLD-MCNC: 8.1 G/DL (ref 11.7–15.7)
MAGNESIUM SERPL-MCNC: 1.9 MG/DL (ref 1.7–2.3)
MCH RBC QN AUTO: 26.7 PG (ref 26.5–33)
MCHC RBC AUTO-ENTMCNC: 30 G/DL (ref 31.5–36.5)
MCV RBC AUTO: 89 FL (ref 78–100)
PLATELET # BLD AUTO: 531 10E3/UL (ref 150–450)
POTASSIUM SERPL-SCNC: 3.7 MMOL/L (ref 3.4–5.3)
RBC # BLD AUTO: 3.03 10E6/UL (ref 3.8–5.2)
WBC # BLD AUTO: 6.2 10E3/UL (ref 4–11)

## 2022-11-12 PROCEDURE — 250N000011 HC RX IP 250 OP 636: Performed by: STUDENT IN AN ORGANIZED HEALTH CARE EDUCATION/TRAINING PROGRAM

## 2022-11-12 PROCEDURE — 250N000013 HC RX MED GY IP 250 OP 250 PS 637

## 2022-11-12 PROCEDURE — 250N000013 HC RX MED GY IP 250 OP 250 PS 637: Performed by: FAMILY MEDICINE

## 2022-11-12 PROCEDURE — 120N000001 HC R&B MED SURG/OB

## 2022-11-12 PROCEDURE — 250N000013 HC RX MED GY IP 250 OP 250 PS 637: Performed by: SURGERY

## 2022-11-12 PROCEDURE — 83735 ASSAY OF MAGNESIUM: CPT | Performed by: STUDENT IN AN ORGANIZED HEALTH CARE EDUCATION/TRAINING PROGRAM

## 2022-11-12 PROCEDURE — 36415 COLL VENOUS BLD VENIPUNCTURE: CPT | Performed by: STUDENT IN AN ORGANIZED HEALTH CARE EDUCATION/TRAINING PROGRAM

## 2022-11-12 PROCEDURE — 250N000011 HC RX IP 250 OP 636: Performed by: SURGERY

## 2022-11-12 PROCEDURE — 85027 COMPLETE CBC AUTOMATED: CPT | Performed by: STUDENT IN AN ORGANIZED HEALTH CARE EDUCATION/TRAINING PROGRAM

## 2022-11-12 PROCEDURE — 250N000013 HC RX MED GY IP 250 OP 250 PS 637: Performed by: STUDENT IN AN ORGANIZED HEALTH CARE EDUCATION/TRAINING PROGRAM

## 2022-11-12 PROCEDURE — 84132 ASSAY OF SERUM POTASSIUM: CPT | Performed by: STUDENT IN AN ORGANIZED HEALTH CARE EDUCATION/TRAINING PROGRAM

## 2022-11-12 PROCEDURE — 87449 NOS EACH ORGANISM AG IA: CPT

## 2022-11-12 PROCEDURE — 99233 SBSQ HOSP IP/OBS HIGH 50: CPT | Mod: GC | Performed by: STUDENT IN AN ORGANIZED HEALTH CARE EDUCATION/TRAINING PROGRAM

## 2022-11-12 RX ORDER — HYDROMORPHONE HYDROCHLORIDE 1 MG/ML
.5-1 INJECTION, SOLUTION INTRAMUSCULAR; INTRAVENOUS; SUBCUTANEOUS 2 TIMES DAILY PRN
Status: DISCONTINUED | OUTPATIENT
Start: 2022-11-12 | End: 2022-11-13

## 2022-11-12 RX ADMIN — AMOXICILLIN AND CLAVULANATE POTASSIUM 875 MG: 400; 57 POWDER, FOR SUSPENSION ORAL at 20:06

## 2022-11-12 RX ADMIN — GABAPENTIN 100 MG: 100 CAPSULE ORAL at 13:00

## 2022-11-12 RX ADMIN — ACETAMINOPHEN 650 MG: 325 TABLET, FILM COATED ORAL at 12:58

## 2022-11-12 RX ADMIN — ACETAMINOPHEN 650 MG: 325 TABLET, FILM COATED ORAL at 00:18

## 2022-11-12 RX ADMIN — HYDROMORPHONE HYDROCHLORIDE 1 MG: 1 INJECTION, SOLUTION INTRAMUSCULAR; INTRAVENOUS; SUBCUTANEOUS at 03:47

## 2022-11-12 RX ADMIN — OXYCODONE HYDROCHLORIDE 10 MG: 5 TABLET ORAL at 20:06

## 2022-11-12 RX ADMIN — OXYCODONE HYDROCHLORIDE 10 MG: 5 TABLET ORAL at 00:19

## 2022-11-12 RX ADMIN — OXYCODONE HYDROCHLORIDE 10 MG: 5 TABLET ORAL at 15:40

## 2022-11-12 RX ADMIN — ACETAMINOPHEN 650 MG: 325 TABLET, FILM COATED ORAL at 06:21

## 2022-11-12 RX ADMIN — PANTOPRAZOLE SODIUM 40 MG: 20 TABLET, DELAYED RELEASE ORAL at 09:43

## 2022-11-12 RX ADMIN — OXYCODONE HYDROCHLORIDE 10 MG: 5 TABLET ORAL at 09:59

## 2022-11-12 RX ADMIN — SENNOSIDES AND DOCUSATE SODIUM 1 TABLET: 50; 8.6 TABLET ORAL at 09:44

## 2022-11-12 RX ADMIN — ACETAMINOPHEN 650 MG: 325 TABLET, FILM COATED ORAL at 17:30

## 2022-11-12 RX ADMIN — PANTOPRAZOLE SODIUM 40 MG: 20 TABLET, DELAYED RELEASE ORAL at 20:07

## 2022-11-12 RX ADMIN — GABAPENTIN 100 MG: 100 CAPSULE ORAL at 20:06

## 2022-11-12 RX ADMIN — ENOXAPARIN SODIUM 40 MG: 40 INJECTION SUBCUTANEOUS at 12:58

## 2022-11-12 RX ADMIN — SENNOSIDES AND DOCUSATE SODIUM 1 TABLET: 50; 8.6 TABLET ORAL at 20:06

## 2022-11-12 RX ADMIN — GABAPENTIN 100 MG: 100 CAPSULE ORAL at 09:43

## 2022-11-12 RX ADMIN — AMOXICILLIN AND CLAVULANATE POTASSIUM 875 MG: 400; 57 POWDER, FOR SUSPENSION ORAL at 09:59

## 2022-11-12 RX ADMIN — FLUCONAZOLE 200 MG: 100 TABLET ORAL at 09:43

## 2022-11-12 RX ADMIN — POLYETHYLENE GLYCOL 3350 17 G: 17 POWDER, FOR SOLUTION ORAL at 09:44

## 2022-11-12 RX ADMIN — THERA TABS 1 TABLET: TAB at 09:44

## 2022-11-12 ASSESSMENT — ACTIVITIES OF DAILY LIVING (ADL)
ADLS_ACUITY_SCORE: 23
ADLS_ACUITY_SCORE: 23
ADLS_ACUITY_SCORE: 22
ADLS_ACUITY_SCORE: 23

## 2022-11-12 NOTE — PLAN OF CARE
Problem: Plan of Care - These are the overarching goals to be used throughout the patient stay.    Goal: Optimal Comfort and Wellbeing  Intervention: Monitor Pain and Promote Comfort  Recent Flowsheet Documentation  Taken 11/11/2022 2210 by Cornelius Dickey RN  Pain Management Interventions: medication (see MAR)  Taken 11/11/2022 2004 by Cornelius Dickey RN  Pain Management Interventions: medication (see MAR)  Taken 11/11/2022 1857 by Cornelius Dickey RN  Pain Management Interventions: medication (see MAR)  Taken 11/11/2022 1551 by Cornelius Dickey RN  Pain Management Interventions: medication (see MAR)     Gave po oxycodone x2 & IV dilaudid x1, with relief. Colostomy intact. Dressing to old DOUGLAS site, clean, dry and intact. Incision dry and intact. Left message for MD if needing to change ostomy bag and for WOC to see.  Pt will need teaching regarding care for ostomy at discharge?     Ambulated in hallway x1. Need only assist to get out of and into chair, once on feet, steady. Voiding.  Urine sample sent to lab.    K and Mg protocol, labs ordered already for AM.

## 2022-11-12 NOTE — PROGRESS NOTES
General Surgery Progress Note:    Hospital Day # 14    ASSESSMENT:   1. Chronic diarrhea    2. Colitis    3. Intra-abdominal abscess (H)    4. Hypokalemia        Dain Tejeda is a 42 year old female status post laparotomy resection of descending colon secondary to an inflammation from Crohn's disease and end colostomy.    The patient is doing well from the surgical standpoint at this time.  She still complains some mild pain from the colostomy site.  Otherwise the patient is tolerating diet and having output from the colostomy.    PLAN:   -Patient can be discharged when medically stable.  -She is to follow-up with the general surgery team in clinic for removal of the staples.  -Continue diet as tolerated  -Thank you for allowing us to participate in the patient medical care.      SUBJECTIVE:   Dain Tejeda seen on a.m. rounds.  Patient states that she is doing well.  She has some mild pain around the colostomy site when there is stool output.  Aside from that patient has no nausea no vomiting.  She has been tolerating her p.o. diet.  Patient has no further complaints at this time.    Patient Vitals for the past 24 hrs:   BP Temp Temp src Pulse Resp SpO2 Weight   11/12/22 0713 115/74 97.8  F (36.6  C) Oral 89 16 99 % --   11/12/22 0024 107/74 97.8  F (36.6  C) Oral 85 16 100 % --   11/11/22 1555 122/81 98.3  F (36.8  C) Oral 88 20 99 % --   11/11/22 1040 -- -- -- -- -- -- 51.1 kg (112 lb 11.2 oz)       Physical Exam:  General: NAD, pleasant  CV:RRR  LUNGS:Normal respiratory effort, no accessory muscle use  ABD: Soft, nondistended.  Surgical incision is well approximated with staples in position.  The colostomy site has stool output and gas output.  Appropriate tenderness to palpation.  EXT:no CCE    No results displayed because visit has over 200 results.           DO Miguel Wilkerson DO  General Surgeon  [facility]  Surgery [facility] - [address]  [address]  [place]  [address]?  Office: [phone]  Employed by [employer]  Pager: [phone]

## 2022-11-12 NOTE — PROGRESS NOTES
[facility]    Progress Note - Hospitalist Service       Date of Admission:  10/29/2022    Assessment & Plan   Dain Tejeda is a 42 year old female w/ PMH of chronic gastritis, inflammation of small intestine, and celiac disease admitted on 10/29/2022. Here w/ diffuse colitis w/ severe involvement of the mid and distal descending colon c/f IBD vs infectious process. Taken for partial colectomy of descending colon w/ colostomy 11/6.    Crohns - with severe colitis   Colonic stricture s/p partial colectomy 11/6   *Has had negative enteric infectious workup, possible ileitis on imaging 4/14/2022 w/ interval improvement seen on CT 7/11/22. Saw GI outpatient 7/22/22 and noted to have elevated inflammatory markers and abnormal CT findings concerning for IBD. EGD was performed w/ biopsy suggestive of celiac disease and negative for H pylori. Ttg obtained during this hospitalization returned negative.   *CT abdomen pelvis 10/29 showing diffuse colitis with severe involvement of the mid and distal descending colon. Although there is no phoenix free air, the colitis is complicated by the presence of multiple small, intramural abscesses.   *Colonoscopy 10/31 with atypical findings, biopsy consistent with colitis with reactive changes. Given patient's history as a [place] refugee, also considering colonic TB. [place] to [place] from [place], tested negative for TB upon leaving the country and upon arrival to [place]. No exposure to TB at home or work. No hx of immunosuppression, IV drug use, or incarceration. Currently on airborne isolation for c/f TB, pending results of broad infectious workup per ID's rec at this time. No infectious source of symptoms yet identified, quantiferon indeterminate, negative giardia/crypto.  *Now with stricture on CT noted 11/5, partial colectomy of descending colon w/ colostomy set up performed 11/6 by gen surg.   *Path returns showing evidence of Crohn's as of  11/10.   *Continues on IV pain control -- needs to wean in order to be medically ready to return home   PLAN:   - ID consult -- signed off 11/11              - On PO Augmentin -- x 7 days per ID (end date 11/15)               - Follow lab studies: AFB smear from rectal mucosa, histoplasmosis, blasto, brucella              - Follow AFB smears from blood, sputum, stool               - RIPE therapy held due to GI side effects   - GI consult placed -- signed off 11/11            - Given Crohns, likely to initiate immunomodulator's    - GI holding on meds for now given indeterminate TB result   - General surgery consult -- signed off 11/11    - Doing reasonably well from a post op standpoint    - WOC consult   - Nutrition consult placed              > Dietician did meet w/ the pt 10/31 and educated on gluten free diet give unclear diagnosis of possible Celiac disease.  - Pain control:   Overall goal: Transition from IV to oral    Re-emphasized goal to patient as of 11/12 -- she is on board with transitioning slowly    Ice/heat   Scheduled tylenol    Gabapentin    Oxy -- for moderate and severe    Decreased freq IV dil -- only for breakthrough   - PRN Zofran and Compazine ordered    Normocytic anemia, stable   Hgb in 7s as of 11/9. Stable in 8s since then. Unclear etiology. Has prior low ferritin several years ago. Ferritin high this admission in 200s -- could be in setting of ACD vs acutely high with inflammation.   -Trend CBC -- decrease frequency of draws   -Transfuse Hgb <7    Consented for transfusion in event she drops     Thrombocytosis, persistent   PLTs have been around the upper limits of normal during previous evaluation, found to be 600k on admission. Likely elevated 2/2 acute phase reactant and significant GI tract inflammation. Has been stable at the upper limit of normal -- improved to nml range 11/8.   -Follow CBC -- decrease frequency of draws      Hypokalemia, resolved  Hypomagnesemia, resolved   -  Replete per nursing protocol     Abnormal UA  - Cont IV abx per above  - UC grew Klebsiella     Leukocytosis, resolved   WBC 13 --> 6 on 11/8.  -Follow CBC -- decrease frequency of draws     Severe malnutrition   Based on nutrition assessment  -Encourage PO          Diet: Gluten Free Diet  Room Service    DVT Prophylaxis: Enoxaparin (Lovenox) subcutaneous held due to procedure today, post op will resume for DVT ppx  Chambers Catheter: Not present  Fluids: 100 mL/hr NS  Central Lines: None  Cardiac Monitoring: None  Code Status: Full Code      Disposition Plan     Expected Discharge Date: 11/13/2022    Discharge Delays: IV Medication - consider oral or Home Infusion  Destination: home with family  Discharge Comments: Post surgery 11/6.  Per ID, Deescalate antibiotics to po augmentin suspension x 7 days  Pain control            The patient's care was discussed with the attending physician Dr Rosenstein Torbjorn Morkeberg, MD  Hospitalist Service  [facility]  Securely message with the Vocera Web Console (learn more here)  Text page via [employer] Paging/Directory     Clinically Significant Risk Factors         # Hyponatremia: Lowest Na = 135 mmol/L (Ref range: 136-145) in last 2 days, will monitor as appropriate      # Hypoalbuminemia: Lowest albumin = 1.5 g/dL (Ref range: 3.5-5.2) at 11/5/2022  6:47 AM, will monitor as appropriate           # Severe Malnutrition: based on nutrition assessment      ______________________________________________________________________    Interval History    Staff notes reviewed. No acute events. 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 As long as she gets her pain meds, she is okay.   Tolerating diet. Lots of foods from home.   No nausea nor vomiting.   Stool in the ostomy.     Data reviewed today: I reviewed all medications, new labs and imaging results over the last 24 hours. I personally reviewed     Physical Exam    Vital Signs: Temp: 97.8  F (36.6  C) Temp src: Oral BP: 115/74 Pulse: 89   Resp: 16 SpO2: 99 % O2 Device: None (Room air)    Weight: 112 lbs 11.2 oz    Gen:  Pleasant   HEENT: MMM  Neck: no JVD    CV: appears well perfused   Resp: breathing comfortably on room air   Abd: non distended. Staples intact. No surrounding erythema nor drainage. Ostomy bag in place with stool. No overt blood.   Ext: no edema   Neuro: non focal   Psych: calm      Data    Recent Results (from the past 24 hour(s))   CBC with platelets    Collection Time: 11/12/22  6:55 AM   Result Value Ref Range    WBC Count 6.2 4.0 - 11.0 10e3/uL    RBC Count 3.03 (L) 3.80 - 5.20 10e6/uL    Hemoglobin 8.1 (L) 11.7 - 15.7 g/dL    Hematocrit 27.0 (L) 35.0 - 47.0 %    MCV 89 78 - 100 fL    MCH 26.7 26.5 - 33.0 pg    MCHC 30.0 (L) 31.5 - 36.5 g/dL    RDW 14.6 10.0 - 15.0 %    Platelet Count 531 (H) 150 - 450 10e3/uL   Potassium    Collection Time: 11/12/22  6:55 AM   Result Value Ref Range    Potassium 3.7 3.4 - 5.3 mmol/L   Magnesium    Collection Time: 11/12/22  6:55 AM   Result Value Ref Range    Magnesium 1.9 1.7 - 2.3 mg/dL     No results found for this or any previous visit (from the past 24 hour(s)).

## 2022-11-12 NOTE — PLAN OF CARE
Problem: Plan of Care - These are the overarching goals to be used throughout the patient stay.    Goal: Absence of Hospital-Acquired Illness or Injury  Outcome: Progressing  Intervention: Identify and Manage Fall Risk  Recent Flowsheet Documentation  Taken 11/12/2022 1000 by Jackelyn Downing, RN  Safety Promotion/Fall Prevention:   activity supervised   assistive device/personal items within reach   clutter free environment maintained   fall prevention program maintained   nonskid shoes/slippers when out of bed   safety round/check completed   chair alarm on  Intervention: Prevent Skin Injury  Recent Flowsheet Documentation  Taken 11/12/2022 1000 by Jackelyn Downing, RN  Body Position: (up in chair) legs elevated   Goal Outcome Evaluation:    URINE: clear yellow, 700+ mL this shift.  BM: scant output this shift, gas released from pouch.  PAIN: Oxycodone 10mg and scheduled meds given.  AMBULATION: pt strongly encouraged to ambulate this am. She has not done so as of now. When she walks she is bent forward. I encouraged her to stand more upright every time she walks to the bathroom.  APPETITE: pt has a poor appetite

## 2022-11-12 NOTE — PLAN OF CARE
Problem: Plan of Care - These are the overarching goals to be used throughout the patient stay.    Goal: Optimal Comfort and Wellbeing  Outcome: Progressing     Problem: Pain Acute  Goal: Optimal Pain Control and Function  Outcome: Progressing     Problem: Infection  Goal: Absence of Infection Signs and Symptoms  Outcome: Progressing   Goal Outcome Evaluation:      Pt received 1 dose each of oxycodone and dilaudid this shift. Up to BR with assist of 1, voiding without difficulty. Small amount of brown loose stool output from colostomy.

## 2022-11-12 NOTE — PROGRESS NOTES
Pt states, via , that she has not had her flu vaccination this season and that she gets it every year and has never had a reaction except fever. She states that she wants to get this vaccination.

## 2022-11-13 LAB
ANION GAP SERPL CALCULATED.3IONS-SCNC: 4 MMOL/L (ref 7–15)
BACTERIA PRT CULT: NORMAL
BUN SERPL-MCNC: 5 MG/DL (ref 6–20)
CALCIUM SERPL-MCNC: 8.2 MG/DL (ref 8.6–10)
CHLORIDE SERPL-SCNC: 102 MMOL/L (ref 98–107)
CREAT SERPL-MCNC: 0.44 MG/DL (ref 0.51–0.95)
DEPRECATED HCO3 PLAS-SCNC: 32 MMOL/L (ref 22–29)
ERYTHROCYTE [DISTWIDTH] IN BLOOD BY AUTOMATED COUNT: 14.7 % (ref 10–15)
GFR SERPL CREATININE-BSD FRML MDRD: >90 ML/MIN/1.73M2
GLUCOSE SERPL-MCNC: 95 MG/DL (ref 70–99)
HCT VFR BLD AUTO: 25.6 % (ref 35–47)
HGB BLD-MCNC: 7.8 G/DL (ref 11.7–15.7)
MAGNESIUM SERPL-MCNC: 1.7 MG/DL (ref 1.7–2.3)
MCH RBC QN AUTO: 26.7 PG (ref 26.5–33)
MCHC RBC AUTO-ENTMCNC: 30.5 G/DL (ref 31.5–36.5)
MCV RBC AUTO: 88 FL (ref 78–100)
PLATELET # BLD AUTO: 634 10E3/UL (ref 150–450)
POTASSIUM SERPL-SCNC: 4.1 MMOL/L (ref 3.4–5.3)
RBC # BLD AUTO: 2.92 10E6/UL (ref 3.8–5.2)
SODIUM SERPL-SCNC: 138 MMOL/L (ref 136–145)
WBC # BLD AUTO: 6.9 10E3/UL (ref 4–11)

## 2022-11-13 PROCEDURE — 85027 COMPLETE CBC AUTOMATED: CPT | Performed by: STUDENT IN AN ORGANIZED HEALTH CARE EDUCATION/TRAINING PROGRAM

## 2022-11-13 PROCEDURE — 120N000001 HC R&B MED SURG/OB

## 2022-11-13 PROCEDURE — 36415 COLL VENOUS BLD VENIPUNCTURE: CPT | Performed by: STUDENT IN AN ORGANIZED HEALTH CARE EDUCATION/TRAINING PROGRAM

## 2022-11-13 PROCEDURE — 80048 BASIC METABOLIC PNL TOTAL CA: CPT | Performed by: STUDENT IN AN ORGANIZED HEALTH CARE EDUCATION/TRAINING PROGRAM

## 2022-11-13 PROCEDURE — 250N000013 HC RX MED GY IP 250 OP 250 PS 637: Performed by: STUDENT IN AN ORGANIZED HEALTH CARE EDUCATION/TRAINING PROGRAM

## 2022-11-13 PROCEDURE — G0008 ADMIN INFLUENZA VIRUS VAC: HCPCS | Performed by: STUDENT IN AN ORGANIZED HEALTH CARE EDUCATION/TRAINING PROGRAM

## 2022-11-13 PROCEDURE — 250N000013 HC RX MED GY IP 250 OP 250 PS 637

## 2022-11-13 PROCEDURE — 90686 IIV4 VACC NO PRSV 0.5 ML IM: CPT | Performed by: STUDENT IN AN ORGANIZED HEALTH CARE EDUCATION/TRAINING PROGRAM

## 2022-11-13 PROCEDURE — 99232 SBSQ HOSP IP/OBS MODERATE 35: CPT | Mod: GC | Performed by: STUDENT IN AN ORGANIZED HEALTH CARE EDUCATION/TRAINING PROGRAM

## 2022-11-13 PROCEDURE — 250N000013 HC RX MED GY IP 250 OP 250 PS 637: Performed by: FAMILY MEDICINE

## 2022-11-13 PROCEDURE — 250N000011 HC RX IP 250 OP 636: Performed by: STUDENT IN AN ORGANIZED HEALTH CARE EDUCATION/TRAINING PROGRAM

## 2022-11-13 PROCEDURE — 250N000013 HC RX MED GY IP 250 OP 250 PS 637: Performed by: SURGERY

## 2022-11-13 PROCEDURE — 83735 ASSAY OF MAGNESIUM: CPT | Performed by: FAMILY MEDICINE

## 2022-11-13 PROCEDURE — 250N000011 HC RX IP 250 OP 636: Performed by: SURGERY

## 2022-11-13 RX ADMIN — SENNOSIDES AND DOCUSATE SODIUM 1 TABLET: 50; 8.6 TABLET ORAL at 20:38

## 2022-11-13 RX ADMIN — GABAPENTIN 100 MG: 100 CAPSULE ORAL at 08:43

## 2022-11-13 RX ADMIN — POLYETHYLENE GLYCOL 3350 17 G: 17 POWDER, FOR SOLUTION ORAL at 08:42

## 2022-11-13 RX ADMIN — PANTOPRAZOLE SODIUM 40 MG: 20 TABLET, DELAYED RELEASE ORAL at 20:38

## 2022-11-13 RX ADMIN — OXYCODONE HYDROCHLORIDE 10 MG: 5 TABLET ORAL at 00:38

## 2022-11-13 RX ADMIN — PANTOPRAZOLE SODIUM 40 MG: 20 TABLET, DELAYED RELEASE ORAL at 08:43

## 2022-11-13 RX ADMIN — ACETAMINOPHEN 650 MG: 325 TABLET, FILM COATED ORAL at 05:52

## 2022-11-13 RX ADMIN — GABAPENTIN 100 MG: 100 CAPSULE ORAL at 20:38

## 2022-11-13 RX ADMIN — GABAPENTIN 100 MG: 100 CAPSULE ORAL at 14:58

## 2022-11-13 RX ADMIN — FLUCONAZOLE 200 MG: 100 TABLET ORAL at 08:43

## 2022-11-13 RX ADMIN — AMOXICILLIN AND CLAVULANATE POTASSIUM 875 MG: 400; 57 POWDER, FOR SUSPENSION ORAL at 20:38

## 2022-11-13 RX ADMIN — AMOXICILLIN AND CLAVULANATE POTASSIUM 875 MG: 400; 57 POWDER, FOR SUSPENSION ORAL at 10:36

## 2022-11-13 RX ADMIN — OXYCODONE HYDROCHLORIDE 5 MG: 5 TABLET ORAL at 09:55

## 2022-11-13 RX ADMIN — SENNOSIDES AND DOCUSATE SODIUM 1 TABLET: 50; 8.6 TABLET ORAL at 08:43

## 2022-11-13 RX ADMIN — OXYCODONE HYDROCHLORIDE 10 MG: 5 TABLET ORAL at 16:57

## 2022-11-13 RX ADMIN — ACETAMINOPHEN 650 MG: 325 TABLET, FILM COATED ORAL at 00:38

## 2022-11-13 RX ADMIN — ACETAMINOPHEN 650 MG: 325 TABLET, FILM COATED ORAL at 11:48

## 2022-11-13 RX ADMIN — ENOXAPARIN SODIUM 40 MG: 40 INJECTION SUBCUTANEOUS at 11:48

## 2022-11-13 RX ADMIN — INFLUENZA A VIRUS A/VICTORIA/2570/2019 IVR-215 (H1N1) ANTIGEN (FORMALDEHYDE INACTIVATED), INFLUENZA A VIRUS A/DARWIN/9/2021 SAN-010 (H3N2) ANTIGEN (FORMALDEHYDE INACTIVATED), INFLUENZA B VIRUS B/PHUKET/3073/2013 ANTIGEN (FORMALDEHYDE INACTIVATED), AND INFLUENZA B VIRUS B/MICHIGAN/01/2021 ANTIGEN (FORMALDEHYDE INACTIVATED) 0.5 ML: 15; 15; 15; 15 INJECTION, SUSPENSION INTRAMUSCULAR at 11:50

## 2022-11-13 RX ADMIN — ACETAMINOPHEN 650 MG: 325 TABLET, FILM COATED ORAL at 18:45

## 2022-11-13 RX ADMIN — THERA TABS 1 TABLET: TAB at 08:43

## 2022-11-13 RX ADMIN — OXYCODONE HYDROCHLORIDE 10 MG: 5 TABLET ORAL at 04:36

## 2022-11-13 ASSESSMENT — ACTIVITIES OF DAILY LIVING (ADL)
ADLS_ACUITY_SCORE: 20

## 2022-11-13 NOTE — PLAN OF CARE
Problem: Plan of Care - These are the overarching goals to be used throughout the patient stay.    Goal: Optimal Comfort and Wellbeing  Intervention: Monitor Pain and Promote Comfort  Recent Flowsheet Documentation  Taken 11/12/2022 2006 by Cornelius Dickey RN  Pain Management Interventions: medication (see MAR)  Taken 11/12/2022 1730 by Cornelius Dickey RN  Pain Management Interventions: medication (see MAR)  Taken 11/12/2022 1540 by Cornelius Dickey RN  Pain Management Interventions:   medication (see MAR)   ambulation/increased activity   Given oxycodone x2, effective. Ambulated in hallway x1 up to cafeteria and back to room. Steady on feet once assisted from chair. Ostomy intact. Has been eating food from home, stool soft/loose today. Encourage drinking more fluids. Incision dry and intact. K and Mg protocol, labs already ordered for tomorrow AM.

## 2022-11-13 NOTE — PROGRESS NOTES
General Surgery Progress Note:    Hospital Day # 15    ASSESSMENT:   1. Chronic diarrhea    2. Colitis    3. Intra-abdominal abscess (H)    4. Hypokalemia        Dain Tejeda is a 42 year old female status post laparotomy resection of descending colon secondary to an inflammation from Crohn's disease and end colostomy.    PLAN:   -Patient can be discharged when medically stable.  -She is to follow-up with the general surgery team in clinic for removal of the staples.  -Continue diet as tolerated  -activity as tolerated.  -Thank you for allowing us to participate in the patient medical care.      SUBJECTIVE:   Dain Tejeda seen on a.m. rounds.  States that the abdominal pain is improved from yesterday.  She also states that she continues to have stool output and gas output from the colostomy.  Patient has no nausea or vomiting.  She is tolerating p.o. diet.  No further complaints at this time.    Patient Vitals for the past 24 hrs:   BP Temp Temp src Pulse Resp SpO2 Weight   11/13/22 0755 103/70 97.8  F (36.6  C) Oral 75 16 97 % --   11/13/22 0019 122/80 98.5  F (36.9  C) Oral 96 16 99 % --   11/12/22 2006 -- -- -- -- -- -- 50.1 kg (110 lb 8 oz)   11/12/22 1520 113/74 99.1  F (37.3  C) Oral 92 14 99 % --       Physical Exam:  General: NAD, pleasant  CV:RRR  LUNGS:Normal respiratory effort, no accessory muscle use  ABD: Soft, nondistended.  Colostomy with stool output and gas output.  Incisions well approximated.  EXT:no CCE    No results displayed because visit has over 200 results.           DO Miguel Wilkerson DO  General Surgeon  [facility]  Surgery [facility] - [address] [address]?  Office: [phone]  Employed by - [facility] [employer] [facility]  Pager: [phone]

## 2022-11-13 NOTE — PLAN OF CARE
Problem: Pain Acute  Goal: Optimal Pain Control and Function  Outcome: Progressing  Intervention: Develop Pain Management Plan  Recent Flowsheet Documentation  Taken 11/13/2022 0552 by Lelia Zavala RN  Pain Management Interventions:   medication (see MAR)   emotional support  Taken 11/13/2022 0038 by Lelia Zavala RN  Pain Management Interventions:   medication (see MAR)   emotional support   repositioned   rest  Intervention: Prevent or Manage Pain  Recent Flowsheet Documentation  Taken 11/13/2022 0038 by Lelia Zavala RN  Medication Review/Management: medications reviewed   Goal Outcome Evaluation:      Pain controlled with  scheduled tylenol and PRN oxycodone given twice. Pt sleeping in her recliner, voiding without difficulty, colostomy putting out a moderate amount of soft loose stool.

## 2022-11-13 NOTE — PROGRESS NOTES
[facility]    Progress Note - Hospitalist Service       Date of Admission:  10/29/2022    Assessment & Plan   Dain Tejeda is a 42 year old female w/ PMH of chronic gastritis, inflammation of small intestine, and celiac disease admitted on 10/29/2022. Here w/ diffuse colitis w/ severe involvement of the mid and distal descending colon c/f IBD vs infectious process. Taken for partial colectomy of descending colon w/ colostomy 11/6.    Crohns - with severe colitis   Colonic stricture s/p partial colectomy 11/6   *Has had negative enteric infectious workup, possible ileitis on imaging 4/14/2022 w/ interval improvement seen on CT 7/11/22. Saw GI outpatient 7/22/22 and noted to have elevated inflammatory markers and abnormal CT findings concerning for IBD. EGD was performed w/ biopsy suggestive of celiac disease and negative for H pylori. Ttg obtained during this hospitalization returned negative.   *CT abdomen pelvis 10/29 showing diffuse colitis with severe involvement of the mid and distal descending colon. Although there is no [place] free air, the colitis is complicated by the presence of multiple small, intramural abscesses.   *Colonoscopy 10/31 with atypical findings, biopsy consistent with colitis with reactive changes. Given patient's history as a [place] refugee, also considering colonic TB. Pt reportedly immigrated to [place] from [place] in [place], tested negative for TB upon leaving the country and upon arrival to [place]. No exposure to TB at home or work. No hx of immunosuppression, IV drug use, or incarceration. Currently on airborne isolation for c/f TB, pending results of broad infectious workup per ID's rec at this time. No infectious source of symptoms yet identified, quantiferon indeterminate, negative giardia/crypto.  *Now with stricture on CT noted 11/5, partial colectomy of descending colon w/ colostomy set up performed 11/6 by gen surg.   *Path returns showing evidence of Crohn's as of  11/10.   *Continues on IV pain control -- needs to wean in order to be medically ready to return home -- as of 11/13, she is weaning well   PLAN:   - ID consult -- signed off 11/11              - On PO Augmentin -- x 7 days per ID (end date 11/15)               - Follow lab studies: AFB smear from rectal mucosa, histoplasmosis, blasto, brucella              - Follow AFB smears from blood, sputum, stool               - RIPE therapy held due to GI side effects   - GI consult placed -- signed off 11/11            - Given Crohns, likely to initiate immunomodulator's    - GI holding on meds for now given indeterminate TB result   - General surgery consult -- signed off 11/11    - Doing reasonably well from a post op standpoint    - WOC consult   - Nutrition consult placed              > Dietician did meet w/ the pt 10/31 and educated on gluten free diet give unclear diagnosis of possible Celiac disease.  - Pain control:   Overall goal: Transition from IV to oral    Re-emphasized goal to patient as of 11/12 -- she is on board with transitioning slowly    Ice/heat   Scheduled tylenol    Gabapentin    Oxy -- for moderate and severe    DISCONTINUED IV dilaudid -- would minimize to extent possible    - PRN Zofran and Compazine ordered    Normocytic anemia, stable   Hgb in 7s as of 11/9. Stable in 8s since then. Unclear etiology. Has prior low ferritin several years ago. Ferritin high this admission in 200s -- could be in setting of ACD vs acutely high with inflammation.   -Trend CBC -- decrease frequency of draws   -Transfuse Hgb <7    Consented for transfusion in event she drops     Thrombocytosis, persistent   PLTs have been around the upper limits of normal during previous evaluation, found to be 600k on admission. Likely elevated 2/2 acute phase reactant and significant GI tract inflammation. Has been stable at the upper limit of normal -- improved to nml range 11/8.   -Follow CBC -- decrease frequency of draws       Hypokalemia, resolved  Hypomagnesemia, resolved   - Replete per nursing protocol     Abnormal UA  - Cont IV abx per above  - UC grew Klebsiella     Leukocytosis, resolved   WBC 13 --> 6 on 11/8.  -Follow CBC -- decrease frequency of draws     Severe malnutrition   Based on nutrition assessment  -Encourage PO          Diet: Gluten Free Diet  Room Service    DVT Prophylaxis: Enoxaparin (Lovenox) subcutaneous held due to procedure today, post op will resume for DVT ppx  Chambers Catheter: Not present  Fluids: 100 mL/hr NS  Central Lines: None  Cardiac Monitoring: None  Code Status: Full Code      Disposition Plan      Expected Discharge Date: 11/14/2022    Discharge Delays: IV Medication - consider oral or Home Infusion  Destination: home with family  Discharge Comments: Post surgery 11/6.  Per ID, Deescalate antibiotics to po augmentin suspension x 7 days  Pain control  WOC needs to see            The patient's care was discussed with the attending physician Dr Rosenstein Torbjorn Morkeberg, MD  Hospitalist Service  [facility]  Securely message with the Vocera Web Console (learn more here)  Text page via [employer] Paging/Directory     Clinically Significant Risk Factors              # Hypoalbuminemia: Lowest albumin = 1.5 g/dL (Ref range: 3.5-5.2) at 11/5/2022  6:47 AM, will monitor as appropriate           # Severe Malnutrition: based on nutrition assessment      ______________________________________________________________________    Interval History    Staff notes reviewed. No acute events. Transitioned well from IV to almost exclusively oral pain control.     [facility] [facility] [place] employed     Feeling good.   She is up and walking more. Went to caf last night. Up around the room today.   Eating some but appetite is low     Data reviewed today: I reviewed all medications, new labs and imaging results over the last 24 hours. I personally reviewed     Physical Exam   Vital Signs: Temp: 97.8  F  (36.6  C) Temp src: Oral BP: 103/70 Pulse: 75   Resp: 16 SpO2: 97 % O2 Device: None (Room air)    Weight: 110 lbs 8 oz    Gen:  Pleasant. Ambulating about the room.   HEENT: MMM  Neck: no JVD     CV: appears well perfused   Resp: breathing comfortably on room air   Abd: non-distended. Staples intact and ostomy in place without surrounding erythema nor drainage. No strong TTP.   Ext: no edema   Neuro: non focal   Psych: calm      Data    Recent Results (from the past 24 hour(s))   Magnesium    Collection Time: 11/13/22  6:55 AM   Result Value Ref Range    Magnesium 1.7 1.7 - 2.3 mg/dL   Basic metabolic panel    Collection Time: 11/13/22  6:55 AM   Result Value Ref Range    Sodium 138 136 - 145 mmol/L    Potassium 4.1 3.4 - 5.3 mmol/L    Chloride 102 98 - 107 mmol/L    Carbon Dioxide (CO2) 32 (H) 22 - 29 mmol/L    Anion Gap 4 (L) 7 - 15 mmol/L    Urea Nitrogen 5.0 (L) 6.0 - 20.0 mg/dL    Creatinine 0.44 (L) 0.51 - 0.95 mg/dL    Calcium 8.2 (L) 8.6 - 10.0 mg/dL    Glucose 95 70 - 99 mg/dL    GFR Estimate >90 >60 mL/min/1.73m2   CBC with platelets    Collection Time: 11/13/22  6:55 AM   Result Value Ref Range    WBC Count 6.9 4.0 - 11.0 10e3/uL    RBC Count 2.92 (L) 3.80 - 5.20 10e6/uL    Hemoglobin 7.8 (L) 11.7 - 15.7 g/dL    Hematocrit 25.6 (L) 35.0 - 47.0 %    MCV 88 78 - 100 fL    MCH 26.7 26.5 - 33.0 pg    MCHC 30.5 (L) 31.5 - 36.5 g/dL    RDW 14.7 10.0 - 15.0 %    Platelet Count 634 (H) 150 - 450 10e3/uL     No results found for this or any previous visit (from the past 24 hour(s)).

## 2022-11-14 VITALS
OXYGEN SATURATION: 99 % | DIASTOLIC BLOOD PRESSURE: 75 MMHG | HEART RATE: 78 BPM | RESPIRATION RATE: 16 BRPM | TEMPERATURE: 98.2 F | BODY MASS INDEX: 21.83 KG/M2 | SYSTOLIC BLOOD PRESSURE: 108 MMHG | HEIGHT: 59 IN | WEIGHT: 108.3 LBS

## 2022-11-14 LAB
CREAT SERPL-MCNC: 0.43 MG/DL (ref 0.51–0.95)
GFR SERPL CREATININE-BSD FRML MDRD: >90 ML/MIN/1.73M2
MAGNESIUM SERPL-MCNC: 1.9 MG/DL (ref 1.7–2.3)
PLATELET # BLD AUTO: 550 10E3/UL (ref 150–450)
POTASSIUM SERPL-SCNC: 3.8 MMOL/L (ref 3.4–5.3)

## 2022-11-14 PROCEDURE — 250N000013 HC RX MED GY IP 250 OP 250 PS 637: Performed by: STUDENT IN AN ORGANIZED HEALTH CARE EDUCATION/TRAINING PROGRAM

## 2022-11-14 PROCEDURE — 250N000013 HC RX MED GY IP 250 OP 250 PS 637

## 2022-11-14 PROCEDURE — 83735 ASSAY OF MAGNESIUM: CPT | Performed by: STUDENT IN AN ORGANIZED HEALTH CARE EDUCATION/TRAINING PROGRAM

## 2022-11-14 PROCEDURE — 250N000011 HC RX IP 250 OP 636: Performed by: SURGERY

## 2022-11-14 PROCEDURE — 250N000013 HC RX MED GY IP 250 OP 250 PS 637: Performed by: SURGERY

## 2022-11-14 PROCEDURE — 99238 HOSP IP/OBS DSCHRG MGMT 30/<: CPT | Mod: GC

## 2022-11-14 PROCEDURE — 250N000013 HC RX MED GY IP 250 OP 250 PS 637: Performed by: FAMILY MEDICINE

## 2022-11-14 PROCEDURE — 82565 ASSAY OF CREATININE: CPT

## 2022-11-14 PROCEDURE — 36415 COLL VENOUS BLD VENIPUNCTURE: CPT

## 2022-11-14 PROCEDURE — 85049 AUTOMATED PLATELET COUNT: CPT

## 2022-11-14 PROCEDURE — 84132 ASSAY OF SERUM POTASSIUM: CPT | Performed by: STUDENT IN AN ORGANIZED HEALTH CARE EDUCATION/TRAINING PROGRAM

## 2022-11-14 PROCEDURE — G0463 HOSPITAL OUTPT CLINIC VISIT: HCPCS

## 2022-11-14 RX ORDER — GABAPENTIN 100 MG/1
100 CAPSULE ORAL 3 TIMES DAILY
Qty: 30 CAPSULE | Refills: 0 | Status: SHIPPED | OUTPATIENT
Start: 2022-11-14 | End: 2022-11-22

## 2022-11-14 RX ORDER — OXYCODONE HYDROCHLORIDE 10 MG/1
10 TABLET ORAL EVERY 4 HOURS PRN
Refills: 0 | Status: CANCELLED | OUTPATIENT
Start: 2022-11-14

## 2022-11-14 RX ORDER — AMOXICILLIN AND CLAVULANATE POTASSIUM 400; 57 MG/5ML; MG/5ML
875 POWDER, FOR SUSPENSION ORAL 2 TIMES DAILY
Qty: 50 ML | Refills: 0 | Status: SHIPPED | OUTPATIENT
Start: 2022-11-14 | End: 2022-11-22

## 2022-11-14 RX ORDER — OXYCODONE HYDROCHLORIDE 5 MG/1
5 TABLET ORAL EVERY 4 HOURS PRN
Qty: 20 TABLET | Refills: 0 | Status: SHIPPED | OUTPATIENT
Start: 2022-11-14 | End: 2022-11-23

## 2022-11-14 RX ORDER — ACETAMINOPHEN 325 MG/1
650 TABLET ORAL EVERY 6 HOURS
Qty: 60 TABLET | Refills: 0 | Status: SHIPPED | OUTPATIENT
Start: 2022-11-14 | End: 2022-11-22

## 2022-11-14 RX ORDER — POLYETHYLENE GLYCOL 3350 17 G/17G
17 POWDER, FOR SOLUTION ORAL DAILY
Qty: 510 G | Refills: 0 | Status: SHIPPED | OUTPATIENT
Start: 2022-11-14 | End: 2022-11-23

## 2022-11-14 RX ADMIN — OXYCODONE HYDROCHLORIDE 10 MG: 5 TABLET ORAL at 08:36

## 2022-11-14 RX ADMIN — GABAPENTIN 100 MG: 100 CAPSULE ORAL at 13:24

## 2022-11-14 RX ADMIN — ACETAMINOPHEN 650 MG: 325 TABLET, FILM COATED ORAL at 11:17

## 2022-11-14 RX ADMIN — ACETAMINOPHEN 650 MG: 325 TABLET, FILM COATED ORAL at 00:07

## 2022-11-14 RX ADMIN — AMOXICILLIN AND CLAVULANATE POTASSIUM 875 MG: 400; 57 POWDER, FOR SUSPENSION ORAL at 09:03

## 2022-11-14 RX ADMIN — OXYCODONE HYDROCHLORIDE 10 MG: 5 TABLET ORAL at 17:56

## 2022-11-14 RX ADMIN — SENNOSIDES AND DOCUSATE SODIUM 1 TABLET: 50; 8.6 TABLET ORAL at 08:36

## 2022-11-14 RX ADMIN — OXYCODONE HYDROCHLORIDE 10 MG: 5 TABLET ORAL at 04:27

## 2022-11-14 RX ADMIN — OXYCODONE HYDROCHLORIDE 5 MG: 5 TABLET ORAL at 13:28

## 2022-11-14 RX ADMIN — PANTOPRAZOLE SODIUM 40 MG: 20 TABLET, DELAYED RELEASE ORAL at 08:36

## 2022-11-14 RX ADMIN — GABAPENTIN 100 MG: 100 CAPSULE ORAL at 08:36

## 2022-11-14 RX ADMIN — POLYETHYLENE GLYCOL 3350 17 G: 17 POWDER, FOR SOLUTION ORAL at 08:37

## 2022-11-14 RX ADMIN — THERA TABS 1 TABLET: TAB at 08:37

## 2022-11-14 RX ADMIN — ACETAMINOPHEN 650 MG: 325 TABLET, FILM COATED ORAL at 06:21

## 2022-11-14 RX ADMIN — OXYCODONE HYDROCHLORIDE 10 MG: 5 TABLET ORAL at 00:07

## 2022-11-14 RX ADMIN — ENOXAPARIN SODIUM 40 MG: 40 INJECTION SUBCUTANEOUS at 11:31

## 2022-11-14 RX ADMIN — FLUCONAZOLE 200 MG: 100 TABLET ORAL at 08:36

## 2022-11-14 RX ADMIN — ACETAMINOPHEN 650 MG: 325 TABLET, FILM COATED ORAL at 17:56

## 2022-11-14 ASSESSMENT — ACTIVITIES OF DAILY LIVING (ADL)
ADLS_ACUITY_SCORE: 20

## 2022-11-14 NOTE — PLAN OF CARE
Problem: Pain Acute  Goal: Optimal Pain Control and Function  Outcome: Progressing  Intervention: Develop Pain Management Plan  Recent Flowsheet Documentation  Taken 11/14/2022 0621 by Lelia Zavala RN  Pain Management Interventions:   medication (see MAR)   emotional support  Taken 11/14/2022 0427 by Lelia Zavala RN  Pain Management Interventions:   medication (see MAR)   care clustered  Taken 11/14/2022 0007 by Lelia Zavala RN  Pain Management Interventions:   medication (see MAR)   emotional support   heat applied   pillow support provided   repositioned   rest  Intervention: Prevent or Manage Pain  Recent Flowsheet Documentation  Taken 11/14/2022 0007 by Lelia Zavala RN  Medication Review/Management: medications reviewed     Problem: Malnutrition  Goal: Improved Nutritional Intake  Outcome: Progressing   Goal Outcome Evaluation:     Pt had significant pain overnight, rating it 9/10. Received scheduled tylenoland PRN oxycodone,  with her pain decreasing to 4-5/10 which she stated was tolerable. Assist of 1 up to bathroom x2, emptied her own colostomy bag, 150ml soft, mushy brown stool. WOC to see pt today re: ostomy. Abdominal incision CDI, open to air. K/Mg protocols to be rechecked this AM.

## 2022-11-14 NOTE — PLAN OF CARE
Problem: Plan of Care - These are the overarching goals to be used throughout the patient stay.    Goal: Optimal Comfort and Wellbeing  Intervention: Monitor Pain and Promote Comfort  Recent Flowsheet Documentation  Taken 11/13/2022 1657 by Cornelius Dickey RN  Pain Management Interventions: medication (see MAR)     Gave oxycodone 10mg x1 for abd pain of 7/10. Pt sleeping on reassessment.   Offered ambulating this evening, but pt declined stating she feels weaker this evening than usual. Just had influenza vaccine this AM.       Problem: Malnutrition  Goal: Improved Nutritional Intake  Outcome: Progressing     Pt eating food from home, reports eating 50% of what was brought. Pt emptied her ostomy by herself, reports moderate amount of soft stool. Encourage pt to drink more fluids to help soften stool. Took BM meds this evening.

## 2022-11-14 NOTE — PROGRESS NOTES
Care Management Discharge Note    Discharge Date: 11/15/2022       Discharge Disposition:  Home    Discharge Services:  Skilled nursing    Discharge DME:  None    Discharge Transportation: family or friend will provide    Private pay costs discussed: Not applicable    Education Provided on the Discharge Plan:    Persons Notified of Discharge Plans: MD, RN, SW, patient  Patient/Family in Agreement with the Plan:      Handoff Referral Completed: Yes    Additional Information:  Patient will discharge home today, family to transport. Patient accepted with [facility] for RN.         Yenni De

## 2022-11-14 NOTE — PLAN OF CARE
Goal Outcome Evaluation:      Problem: Plan of Care - These are the overarching goals to be used throughout the patient stay.    Goal: Absence of Hospital-Acquired Illness or Injury  11/14/2022 1707 by Jewels Zavaleta RN  Outcome: Progressing  11/14/2022 1410 by Jewels Zavaleta RN  Outcome: Progressing  Intervention: Identify and Manage Fall Risk  Recent Flowsheet Documentation  Taken 11/14/2022 1558 by Jewels Zavaleta RN  Safety Promotion/Fall Prevention: activity supervised  Taken 11/14/2022 0800 by Jewels Zavaleta RN  Safety Promotion/Fall Prevention: activity supervised  Intervention: Prevent Skin Injury  Recent Flowsheet Documentation  Taken 11/14/2022 1558 by Jewels Zavaleta RN  Body Position: position changed independently  Taken 11/14/2022 0800 by Jewels Zavaleta RN  Body Position: position changed independently  Intervention: Prevent and Manage VTE (Venous Thromboembolism) Risk  Recent Flowsheet Documentation  Taken 11/14/2022 1558 by Jewels Zavaleta RN  VTE Prevention/Management: SCDs (sequential compression devices) off  Taken 11/14/2022 0800 by Jewels Zavaleta RN  VTE Prevention/Management: SCDs (sequential compression devices) off   Pt to discontinue to home this evening. Will have home care tomorrow to assist with colostomy.   Family here and will go over discharge paperwork with .  Up with SBA. Pt is emptying colostomy independently.  Writer made a Follow-up appt with Dr. Martin for 11/22 at 2 PM.

## 2022-11-14 NOTE — DISCHARGE INSTRUCTIONS
[facility]   WO Nurse Discharge Instructions for New Ileostomy or New Colostomy      When you get home    - Upon arrival home, call the WO Nurses to schedule an outpatient follow up appoint in a few weeks after discharge from hospital.  The appointment is to assess pouching plan, organize supply ordering, etc.   Call the WO office at   [facility]     [phone]    - Supplies- Upon discharge from the hospital you will be sent home with ostomy supplies.      If you have been set up for home care visits the home care nurse will help you coordinate ordering supplies.    If you have not been set up for home care, then make sure to make a follow up appointment with the WOCN nurse to reassess your abdomen and ostomy for changes and determine the correct plan.  At that time ordering supplies will be discussed.       Pouching    1. Change appliance 2x / wk and as needed for any leakage.  Notify the [facility] Nurse if needing to change pouch more than daily or if skin is itchy.   2. Empty pouch when no more than 1/3 to half full (solid, liquid, gas)  3. May shower with pouch on or off, removing pouch/ barrier down to the skin is ok. Just note that you cannot control output so there may occasionally be clean up if you choose to shower / bathe without a pouch on.     The pouching procedure:   1.  Use the  Pouch Change Instruction  sheet to gather and organize supplies  2.  Trace old pattern onto new pouch and cut out new opening on new barrier.  3.  Remove old pouch, observe for any leakage  4.  Clean skin with water and paper towel   5.  Apply Ring around stoma  6.   Apply prepared barrier to the clean skin and press into place.  7.  Hold hand on pouch/ over stoma to warm pouch into place for 2-5 minutes      Your Pouching Plan / Supplies                      NOTE:  Once your supply company has been determined call your supply company with supply issues    POUCH / BARRIER   BRAND of  ostomy supplies:  [employer]  Pouch: 8531   Rin     Follow up    1. Physician - follow instructions from MD for follow ups with them  Contact your physician if you have the following signs or symptoms:  Pain in your incision that is not relieved by a short rest or ordered pain medications  Chills or temperature of 101 or higher  Redness or swelling in your incision    2. WOC Nurses (ostomy nurses)        When you get home make an outpatient appointment with the WOCN nurses to assure your pouching plan is still a good plan, etc  [facility] [phone]     Please call your surgeon, Dr. Carlos, at [phone] to schedule a follow-up appointment in one weekregarding follow-up discussions and or/visits relating to surgery.  You may also call if you have any questions or concerns regarding her hospital stay and any other surgical issue you may have.     ACTIVITIES:  No heavy lifting over 20pounds or strenuous activities for a total of 6 weeks      WOUND CARE:  May shower.  Please change your ostomy bag as instructed.        Home care services have been arranged for the patient.  Home Care Agency: [facility]  Home Care Phone Number: [phone]   Services: Skilled Nursing  Instructions: [facility] will come out to home and begin services on 22.

## 2022-11-14 NOTE — CONSULTS
"[facility]  WOC Nurse Inpatient Assessment     Consulted for: Colostomy    Patient History (according to provider note(s):          Areas Assessed:      INTAKE  Type of Stoma: Permanent v. Temporary Colostomy  Anticipated date of takedown: Unknown at this time    Diagnosis Pertinent to Stoma: Crohn's Disease   Type of Surgery: colon resection   Surgery Date: 11/6/22  Surgeon:Breanna     Hospital: [facility]    Purpose of this visit: First pouch change/teaching session (no WOC consult until 11/11/22)    Pertinent Information: Nursing has been teaching patient to empty her own pouch.    Present for Teaching Session: Patient   Present: Yes - Hmong via phone      Current Equipment:    Product # Brand Description   Pouch [address] [place] 1 pc CTF flat   Ring/Paste [address] [employer]             Pouch Change Frequency: 2x/week  (every 3 - 4 days)  Provider of Care: Emptying: Self Pouch Change: Learning - open to home care at discharge    ASSESSMENT  Stoma Size: Oval 1 5/8\" x 1 7/8\" inches  Protrusion: 0.5 cm  Stoma Appearance: Pink and Edematous  Mucocutaneous Juncture: Intact   Peristomal Skin: Intact  Abdominal Assessment: Surgical Incision (location: Midline) Approximated and Staples    INSTRUCTIONS GIVEN  WOC Role, Activity, Bathing: Ostomy supplies are waterproof, Clothing, Diet, Exercise, Fluids: Drink 6-8 glasses of fluids daily , Home Care: recommend patient have a few times to help with home set up and Pouching Products: Showed pouching system     PLAN  Continue same Pouching System      Treatment Plan:   Current Equipment:    Product # Brand Description   Pouch [address] [place] 1 pc CTF flat   Ring/Paste [address] [place] [employer]             Cut pouch to opening to 1 3/4\" x 2\" oval prior to placement. Pattern is in box of new pouches in patient's room.       DATA:     Current support surface: Standard  Foam mattress  Containment of urine/stool: Continent of bladder and " Colostomy pouch  BMI: Body mass index is 21.89 kg/m .   Active diet order: Orders Placed This Encounter      Gluten Free Diet     Output: I/O last 3 completed shifts:  In: 240 [P.O.:240]  Out: 3650 [Urine:3350; Stool:300]     Labs: Recent Labs   Lab 11/13/22  0655   HGB 7.8*   WBC 6.9     Pressure injury risk assessment:   Sensory Perception: 4-->no impairment  Moisture: 4-->rarely moist  Activity: 3-->walks occasionally  Mobility: 3-->slightly limited  Nutrition: 3-->adequate  Friction and Shear: 3-->no apparent problem  Saravanan Score: 20    Milly Babcock RN CWOCN

## 2022-11-14 NOTE — DISCHARGE SUMMARY
[facility]  Discharge Summary - Medicine & Pediatrics       Date of Admission:  10/29/2022  Date of Discharge:  11/14/2022  Discharging Provider: Dr. Sandhu  Discharge Service: Hospitalist Service    Discharge Diagnoses   Chronic diarrhea   Colitis   Intra-abdominal abscess (H)  Hypokalemia  Crohn's disease with other complication, unspecified gastrointestinal tract location (H)    Follow-ups Needed After Discharge   Please follow up AFB smear from rectal mucosa. Also recommend CBC and BMP at hospital follow-up appointment. Patient needs follow-up with [facility], which they should be helping coordinate. Also requires follow-up with surgery next week for staple removal.     Unresulted Labs Ordered in the Past 30 Days of this Admission     Date and Time Order Name Status Description    11/12/2022 12:01 AM Coccidioides Agn Quant EIA Blood In process     11/11/2022  3:26 PM Coccidioides Agn Quant EIA Non Blood In process     11/6/2022  4:22 PM Acid-Fast Bacilli Culture and Stain In process     11/3/2022 12:02 AM Acid-fast Bacilli (AFB) Blood Culture Preliminary     11/3/2022 12:02 AM Acid-Fast Bacilli Culture and Stain with AFB Stain In process     11/2/2022  4:35 PM Acid-Fast Bacilli Culture and Stain In process     11/2/2022  4:35 PM Acid-fast Bacilli (AFB) Blood Culture Preliminary       The results will be followed up ideally by PCP or facility rounder.  If not, [facility]    Discharge Disposition   Discharged to home  Condition at discharge: Stable    Hospital Course   Dain Tejeda is a 42 year old female w/ PMH of chronic gastritis, inflammation of small intestine, and celiac disease admitted on 10/29/2022. Admitted w/ diffuse colitis w/ severe involvement of the mid and distal descending colon with concern for IBD vs infectious process.      Severe colitis 2/2 to Crohn s   Colonic stricture s/p partial colectomy 11/6   -Presented with ongoing abdominal, chronic diarrhea x  months  -GI consulted early in course   -PTA had negative enteric infectious workup, possible ileitis on imaging 4/14/2022 w/ interval improvement seen on CT 7/11/22. Saw GI outpatient 7/22/22 and noted to have elevated inflammatory markers and abnormal CT findings concerning for IBD. EGD was performed w/ biopsy suggestive of celiac disease and negative for H pylori.   -Ttg obtained during this hospitalization returned negative.   -CT abdomen pelvis 10/29 showing diffuse colitis with severe involvement of the mid and distal descending colon. Although there is no phoenix free air, the colitis is complicated by the presence of multiple small, intramural abscesses.   -Colonoscopy 10/31 with atypical findings, biopsy consistent with colitis with reactive changes. Given patient's history as a [place] refugee, also considering colonic TB. Pt reportedly immig[place] to [place] from [facility] in [place], tested negative for TB upon leaving the country and upon arrival to [place]. No exposure to TB at home or work. No hx of immunosuppression, IV drug use, or incarceration.   -ID consulted and placed on airborne isolation precautions with consideration of TB  -Broad infectious workup done including quantiferon (indeterminate), MTB complex negative, CMV negative, negative giardia/crypto, among others.  -Was empirically treated for TB with RIPE therapy -- did not tolerate due to severe nausea/vomiting. Therapy stopped.  -Also place on over 1 week course of IV abx including IV levaquin, metronidazole, meropenem  -Clinically worsened with severe pain, tachycardia, new fever on 11/5  -Then surgery consulted and had partial colectomy of descending colon w/ colostomy performed 11/6 by gen surg   -Path from that surgery returned showing evidence of Crohn's as of 11/10  -GI noted Crohn s but did not start steroid / immunotherapy, given: pending TB result (did not want to worsen TB if there),   -When intra-op cultures did not reveal significant infection, IV  abx discontinued  -Abx de-escalated to 7 day course of Augmentin   -Continued to require IV pain control x several days following this -- prolonging her stay  -Eventually able to wean to all oral pain control and was stable, ready for discharge   PLAN FOLLOWING DISCHARGE:  -Follow up AFB smear from rectal mucosa (will determine if TB tx ultimately needed / if safe to start steroids)  -Follow up in IBD clinic. [facility] will help coordinate this   -It does not appear she is immune to hepatitis B, she may need the Hep B vaccination series  -No plans for steroids at this time  -Finish Course Augmentin (end date 11/15)   -Diet: Gluten-free given possible celiac disease (endoscopic findings of Garcia 3a)  -Follow up with ID / gen sure as requested by their services   -Pain control:              Ice/heat              Scheduled tylenol               Gabapentin               Oxy as prescribed -- wean as able, caution high doses as outpatient. Discharged with three days worth, patient understands that she needs to wean from oral pain meds and only take for severe pain.    PRN Zofran and Compazine      Normocytic anemia   Hgb in 7s as of 11/9. Stable in 8s since then. Unclear etiology. Has prior low ferritin several years ago. Ferritin high this admission in 200s -- could be in setting of ACD vs acutely high with inflammation.   - Repeat CBC at [facility]     Thrombocytosis   PLTs have been around the upper limits of normal during previous evaluation, found to be 600k on admission. Likely elevated 2/2 acute phase reactant and significant GI tract inflammation. Had been stable near the upper limit of normal.  - Follow CBC      Hypokalemia  Hypomagnesemia    - Repleted per nursing protocol     Abnormal UA  -UC grew Klebsiella   -S/p abx course as above     Leukocytosis, resolved   WBC 13 --> 6 on 11/8 with abx.      Severe malnutrition   Based on nutrition assessment  -Encourage PO   -Diet as above     Consultations This Hospital Stay  "  GASTROENTEROLOGY IP CONSULT  NUTRITION SERVICES ADULT IP CONSULT  INFECTIOUS DISEASES IP CONSULT  WOUND OSTOMY CONTINENCE NURSE  IP CONSULT    Code Status   Full Code     The patient was discussed with Dr. Roni Sandhu MD  [address] [address]  Phone: [phone]  Fax: [phone]  ________________________________________________________  Physical Exam   Vital Signs: Temp: 98.2  F (36.8  C) Temp src: Oral BP: 108/75 Pulse: 78   Resp: 16 SpO2: 99 % O2 Device: None (Room air)    Weight: 108 lbs 4.8 oz  Gen:  Pleasant. Ambulating about the room.   HEENT: MMM  Neck: no JVD     CV: appears well perfused   Resp: breathing comfortably on room air   Abd: non-distended. Staples intact and ostomy in place without surrounding erythema nor drainage. No strong TTP.   Ext: no edema   Neuro: non focal   Psych: calm       Primary Care Physician   Jefe Martin    Discharge Orders      Coccidioides antibody    Parallel testing is preferred and convalescent specimens must be received within 30 days from receipt of the acute specimens. Michel specimens as \"acute\" or \"convalescent.\"     Home Care Referral      Reason for your hospital stay    You came in with belly pain and diarrhea  You had abscess and other findings on your colonoscopy   You developed a narrowing of your colon that required surgery  The tissue from your surgery confirmed that you have Crohn's disease  We do not know if you have tuberculosis in your belly at this point   You had pain following your surgery which gradually improved     Follow-up and recommended labs and tests     Follow up at [facility] within 7 days  Follow up with [facility] as directed by them in their inflammatory bowel disease clinic   Follow up with surgery within a few weeks to remove your staples   Follow up with infectious disease specialist as requested by them     Activity    Your activity upon discharge: activity " as tolerated -- with assist of family     Monitor and record    Nothing to monitor     When to contact your care team    Call your primary doctor if you have any of the following: fever, unmanageable.     Wound care and dressings    Instructions to care for your wound at home: as directed. Will place referral for wound care nursing to help with ostomy at home.     Discharge Instructions     Reason for your hospital stay    Patient was admitted to the hospital with chronic diarrhea and was diagnosed with Crohn's disease. She had a colectomy during this hospital stay. She will follow with GI and surgery after her hospital stay for Crohn's and colectomy management.     Follow-up and recommended labs and tests     Follow up with primary care provider, Jefe Martin, within a week, for hospital follow- up. The following labs/tests are recommended: CBC and BMP. Patient has TB labs pending. Patient should follow up in GI clinic for her Crohn's; [facility] should help facilitate this follow-up. Patient also requires follow-up with surgery team for staple removal.     Activity    Your activity upon discharge: activity as tolerated     Diet    Follow this diet upon discharge: Orders Placed This Encounter      Room Service      Gluten Free Diet     Diet    Follow this diet upon discharge: Orders Placed This Encounter - Gluten Free Diet       Significant Results and Procedures   Most Recent 3 CBC's:  Recent Labs   Lab Test 11/14/22  0640 11/13/22  0655 11/12/22  0655 11/11/22  0743   WBC  --  6.9 6.2 6.9   HGB  --  7.8* 8.1* 7.3*   MCV  --  88 89 88   * 634* 531* 537*     Most Recent 3 BMP's:  Recent Labs   Lab Test 11/14/22  0640 11/13/22  0655 11/12/22  0655 11/11/22  0743 11/09/22  0747 11/08/22  0701   NA  --  138  --  135*  --  136   POTASSIUM 3.8 4.1 3.7 3.9   < > 3.7   CHLORIDE  --  102  --  99  --  101   CO2  --  32*  --  32*  --  31*   BUN  --  5.0*  --  5.5*  --  5.1*   CR 0.43* 0.44*  --  0.38*  --  0.41*    ANIONGAP  --  4*  --  4*  --  4*   ALEX  --  8.2*  --  7.7*  --  7.4*   GLC  --  95  --  99  --  89    < > = values in this interval not displayed.     Most Recent 2 LFT's:  Recent Labs   Lab Test 11/05/22  0647 10/29/22  2027   AST 20 14   ALT 10 8*   ALKPHOS 144* 127*   BILITOTAL 0.4 0.4     Most Recent 3 Hemoglobins:  Recent Labs   Lab Test 11/13/22  0655 11/12/22  0655 11/11/22  0743   HGB 7.8* 8.1* 7.3*   ,   Results for orders placed or performed during the hospital encounter of 10/29/22   CT Abdomen Pelvis w Contrast    Narrative    EXAM: CT ABDOMEN PELVIS W CONTRAST  LOCATION: [facility]  DATE/TIME: 10/29/2022 10:27 PM    INDICATION: abd pain assoc with N V D  COMPARISON: Ultrasound of the pelvis from 08/18/2022. CT abdomen pelvis from 07/11/2022  TECHNIQUE: CT scan of the abdomen and pelvis was performed following injection of IV contrast. Multiplanar reformats were obtained. Dose reduction techniques were used.  CONTRAST: [id number] 100ml    FINDINGS:   LOWER CHEST: Normal.    HEPATOBILIARY: Focal fat along the falciform ligament, liver otherwise normal. No calcified gallstones. No biliary ductal dilatation.    PANCREAS: Normal.    SPLEEN: Normal.    ADRENAL GLANDS: Normal.    KIDNEYS/BLADDER: Normal.    BOWEL: Normal caliber small bowel. There is wall thickening of the large bowel from the proximal transverse colon through the mid sigmoid colon. Wall thickening is most severe in the mid and distal descending colon, where there are multiple intramural   fluid and gas collections consistent with abscess. Anteriorly there is a 1.5 cm collection on image 98 of series 2. Posteriorly there is a 1.3 cm collection on image 106. More inferiorly there is a thin crescentic collection posteriorly measuring 1.7 cm   in length. There is extensive pericolonic edema, but no free air.    LYMPH NODES: Multiple presumably reactive pericolonic lymph nodes.    VASCULATURE: Unremarkable.    PELVIC  ORGANS: Cyst in the left pelvis measures 5 cm in length, likely corresponding to the paraovarian cyst seen on the recent pelvic ultrasound.    MUSCULOSKELETAL: Transitional lumbosacral anatomy, normal variant      Impression    IMPRESSION:   1.  Findings consistent with a fairly diffuse colitis with severe involvement of the mid and distal descending colon. Although there is no phoenix free air, the colitis is complicated by the presence of multiple small, intramural abscesses as described   above.   CT Chest w/o Contrast    Narrative    EXAM: CT CHEST W/O CONTRAST  LOCATION: [facility]  DATE/TIME: 11/2/2022 5:29 PM    INDICATION: Pulmonary nodule evaluation; Fever; No known automatically detected potential contraindications to iodinated contrast  COMPARISON: CT abdomen/pelvis 10/29/2022.  TECHNIQUE: CT chest without IV contrast. Multiplanar reformats were obtained. Dose reduction techniques were used.  CONTRAST: None.    FINDINGS:   LUNGS AND PLEURA: Lungs are clear. No pleural effusion. Minimal bibasilar hypoventilatory changes. Triangular nodule abutting the minor fissure on the right, likely an intrapulmonary lymph node, no specific follow-up recommended.    MEDIASTINUM/AXILLAE: No lymphadenopathy. No thoracic aortic aneurysm.    CORONARY ARTERY CALCIFICATION: None.    UPPER ABDOMEN: Findings of colitis with possible interval decrease in surrounding inflammatory stranding since prior abdominal CT.    MUSCULOSKELETAL: No acute bony abnormality.      Impression    IMPRESSION:   1.  No evidence of acute abnormality in the chest. No radiographic evidence of tuberculosis, active or healed.     CT Abdomen Pelvis w Contrast    Narrative    EXAM: CT ABDOMEN PELVIS W CONTRAST  LOCATION: [facility]  DATE/TIME: 11/5/2022 8:13 PM    INDICATION: New onset severe abdominal pain, fever, tachycardia. Admitted with diffuse colitis  COMPARISON: CT 10/29/2022.  TECHNIQUE: CT scan of  the abdomen and pelvis was performed following injection of IV contrast. Multiplanar reformats were obtained. Dose reduction techniques were used.  CONTRAST: isovue 370  100ml    FINDINGS:   LOWER CHEST: Small bilateral pleural effusions and bibasilar atelectasis.    HEPATOBILIARY: Significant gallbladder distention without wall thickening. No evidence of biliary obstruction.    PANCREAS: Normal.    SPLEEN: Normal.    ADRENAL GLANDS: Normal.    KIDNEYS/BLADDER: No hydronephrosis. Urinary bladder is unremarkable.    BOWEL: Findings of diffuse colitis again noted with new colonic stricture/obstruction at the level of the descending colon in the area of previously described intramural abscesses. There is significant mesenteric edema without evidence of perforation or   drainable fluid collection.    LYMPH NODES: Stable size of subcentimeter mesenteric lymph nodes, likely reactive to adjacent inflammation.    VASCULATURE: Unremarkable.    PELVIC ORGANS: Unchanged size of left paraovarian cyst. Fluid around the right adnexa is likely related to peritoneal fluid described above.    MUSCULOSKELETAL: No acute bony abnormality. Sequela of injections in the anterior abdominal wall.        Impression    IMPRESSION:   1.  Persistent findings of diffuse colitis with new high-grade stricture/obstruction at the level of the descending colon near previously described intramural abscesses. New mesenteric edema with small volume ascites. No evidence of phoenix perforation or   drainable fluid collection.  2.  Distended gallbladder without additional CT evidence of acute cholecystitis.  3.  Small bilateral pleural effusions.   XR Abdomen Port 1 View    Narrative    EXAM: XR ABDOMEN PORT 1 VIEW  LOCATION: [facility]  DATE/TIME: 11/6/2022 1:04 AM    INDICATION: NGT check   placement verification  COMPARISON: None.      Impression    IMPRESSION: Nasogastric tube terminates over the stomach. Side-port is at the  "level of the GE junction and advancement by 2 cm is suggested. Dilated bowel loops in the mid abdomen are redemonstrated.   POC US Guided Vascular Access    Narrative    Ultrasound was performed as guidance to an anesthesia procedure.  Click   \"PACS images\" hyperlink below to view any stored images.  For specific   procedure details, view procedure note authored by anesthesia.   POC US Guidance Needle Placement    Narrative    Ultrasound was performed as guidance to an anesthesia procedure.  Click   \"PACS images\" hyperlink below to view any stored images.  For specific   procedure details, view procedure note authored by anesthesia.   POC US Guided Vascular Access    Narrative    Ultrasound was performed as guidance to an anesthesia procedure.  Click   \"PACS images\" hyperlink below to view any stored images.  For specific   procedure details, view procedure note authored by anesthesia.       Discharge Medications      Current Discharge Medication List      START taking these medications    Details   amoxicillin-clavulanate (AUGMENTIN) 400-57 MG/5ML suspension Take 10.9 mLs (875 mg) by mouth 2 times daily Take one dose on 11/14, and two doses on 11/15 to finish antibiotic course.  Qty: 50 mL, Refills: 0    Associated Diagnoses: Crohn's disease with other complication, unspecified gastrointestinal tract location (H)      gabapentin (NEURONTIN) 100 MG capsule Take 1 capsule (100 mg) by mouth 3 times daily  Qty: 30 capsule, Refills: 0    Associated Diagnoses: Crohn's disease with other complication, unspecified gastrointestinal tract location (H)      oxyCODONE (ROXICODONE) 5 MG tablet Take 1 tablet (5 mg) by mouth every 4 hours as needed for moderate to severe pain  Qty: 20 tablet, Refills: 0    Associated Diagnoses: Crohn's disease with other complication, unspecified gastrointestinal tract location (H)      polyethylene glycol (MIRALAX) 17 GM/Dose powder Take 17 g by mouth daily  Qty: 510 g, Refills: 0    Associated " Diagnoses: Crohn's disease with other complication, unspecified gastrointestinal tract location (H)         CONTINUE these medications which have CHANGED    Details   acetaminophen (TYLENOL) 325 MG tablet Take 2 tablets (650 mg) by mouth every 6 hours  Qty: 60 tablet, Refills: 0    Associated Diagnoses: Crohn's disease with other complication, unspecified gastrointestinal tract location (H)         CONTINUE these medications which have NOT CHANGED    Details   calcium carbonate (TUMS) 500 MG chewable tablet Take 1 tablet (500 mg) by mouth 2 times daily  Qty: 90 tablet, Refills: 1    Associated Diagnoses: Generalized abdominal pain      famotidine (PEPCID) 40 MG tablet Take 1 tablet (40 mg) by mouth daily  Qty: 60 tablet, Refills: 0    Associated Diagnoses: Generalized abdominal pain      loperamide (IMODIUM A-D) 2 MG tablet Take 1 tablet (2 mg) by mouth 4 times daily as needed for diarrhea  Qty: 60 tablet, Refills: 0    Associated Diagnoses: Generalized abdominal pain      omeprazole (PRILOSEC) 40 MG DR capsule Take 1 capsule (40 mg) by mouth daily  Qty: 30 capsule, Refills: 3    Associated Diagnoses: Terminal ileitis without complication (H)      ondansetron (ZOFRAN ODT) 4 MG ODT tab Take 1 tablet (4 mg) by mouth every 8 hours as needed for nausea  Qty: 30 tablet, Refills: 0    Associated Diagnoses: Generalized abdominal pain      prochlorperazine (COMPAZINE) 10 MG tablet Take 1 tablet (10 mg) by mouth every 6 hours as needed for nausea or vomiting  Qty: 230 tablet, Refills: 0    Associated Diagnoses: Intractable vomiting with nausea, unspecified vomiting type      phenylephrine-cocoa butter (PREPARATION H) 0.25-88.44 % suppository Place 1 suppository rectally as needed for hemorrhoids or itching  Qty: 12 suppository, Refills: 0    Associated Diagnoses: Rectal pain      Prenatal Vit-Fe Fumarate-FA (PRENATAL 19) 29-1 MG CHEW Take 1 tablet by mouth           Allergies   Allergies   Allergen Reactions     Soybean  Allergy Itching and Blisters     Tofu- causes itching and sores in the mouth

## 2022-11-14 NOTE — PLAN OF CARE
Problem: Plan of Care - These are the overarching goals to be used throughout the patient stay.    Goal: Plan of Care Review  Description: The Plan of Care Review/Shift note should be completed every shift.  The Outcome Evaluation is a brief statement about your assessment that the patient is improving, declining, or no change.  This information will be displayed automatically on your shift note.  Outcome: Progressing   Goal Outcome Evaluation:               Pt to discontinue home with colostomy bag. JUANIS came by today and did teaching. She recommended home care to help pt get familiar with bag. Notified MD to order this for MD.  Pt tolerating PO pain meds. Oxycodone PRN along with Tylenol for abdominal pain.

## 2022-11-14 NOTE — PROGRESS NOTES
Surgery note:  Patient to discharge today. Will follow up in surgery clinic next week.     Pavel Carlos DO [facility]  [phone]

## 2022-11-15 ENCOUNTER — PATIENT OUTREACH (OUTPATIENT)
Dept: CARE COORDINATION | Facility: CLINIC | Age: 42
End: 2022-11-15

## 2022-11-15 ASSESSMENT — ACTIVITIES OF DAILY LIVING (ADL): DEPENDENT_IADLS:: INDEPENDENT

## 2022-11-15 NOTE — PROGRESS NOTES
Clinic Care Coordination Contact  [facility]: Post-Discharge Note  SITUATION                                                      Admission:    Admission Date: 10/29/22   Reason for Admission: Chronic diarrhea   Colitis   Intra-abdominal abscess (H)  Hypokalemia  Crohn's disease with other complication, unspecified gastrointestinal tract location (H)  Discharge:   Discharge Date: 11/14/22  Discharge Diagnosis: Chronic diarrhea   Colitis   Intra-abdominal abscess (H)  Hypokalemia  Crohn's disease with other complication, unspecified gastrointestinal tract location (H)    BACKGROUND                                                      Per hospital discharge summary and inpatient provider notes.      ASSESSMENT           Discharge Assessment  How are you doing now that you are home?: Pt is doing better  How are your symptoms? (Red Flag symptoms escalate to triage hotline per guidelines): Improved  Do you feel your condition is stable enough to be safe at home until your provider visit?: Yes  Does the patient have their discharge instructions? : Yes  Does the patient have questions regarding their discharge instructions? : No  Were you started on any new medications or were there changes to any of your previous medications? : Yes  Does the patient have all of their medications?: Yes  Do you have questions regarding any of your medications? : No  Do you have all of your needed medical supplies or equipment (DME)?  (i.e. oxygen tank, CPAP, cane, etc.): No - What equipment or supplies are needed?  Discharge follow-up appointment scheduled within 14 calendar days? : Yes  Discharge Follow Up Appointment Date: 11/22/22  Discharge Follow Up Appointment Scheduled with?: Primary Care Provider                  PLAN                                                      Outpatient Plan:      Future Appointments   Date Time Provider Department Center   11/22/2022  2:00 PM Jefe Martin MD [facility]   11/23/2022   1:15 PM Day, JOSE E Leonardo [facility]         For any urgent concerns, please contact our 24 hour clinic line:   [facility]: [phone]       CHASITY BurgosW

## 2022-11-15 NOTE — PROGRESS NOTES
[facility]    Background: Transitional Care Management program identified per system criteria and reviewed by [facility] [place] team for possible outreach.    Assessment: Upon chart review, CCR Team member will not proceed with patient outreach related to this episode of Transitional Care Management program due to reason below:    Patient has active communication with a nurse, provider or care team for reason of post-hospital follow up plan.  Outreach call by [facility] team not indicated to minimize duplicative efforts.     Plan: Transitional Care Management episode addressed appropriately per reason noted above.      HIRA Arreola  [facility]    *Connected Care Resource Team does NOT follow patient ongoing. Referrals are identified based on internal discharge reports and the outreach is to ensure patient has an understanding of their discharge instructions.

## 2022-11-16 ENCOUNTER — TELEPHONE (OUTPATIENT)
Dept: FAMILY MEDICINE | Facility: CLINIC | Age: 42
End: 2022-11-16

## 2022-11-16 ENCOUNTER — MEDICAL CORRESPONDENCE (OUTPATIENT)
Dept: HEALTH INFORMATION MANAGEMENT | Facility: CLINIC | Age: 42
End: 2022-11-16

## 2022-11-16 DIAGNOSIS — R10.84 GENERALIZED ABDOMINAL PAIN: ICD-10-CM

## 2022-11-16 LAB
SCANNED LAB RESULT: NORMAL
SCANNED LAB RESULT: NORMAL

## 2022-11-16 NOTE — TELEPHONE ENCOUNTER
[facility] phone call message- general phone call:    Reason for call:     Complete first home visit. Will be doing it 2 X for 9 weeks for Colpostenotomy care.     Caller advise patient missing medication on medication list. Would like to know if PCP can send medication to patient .      caluition, cardonate 500 MG   Pepcid 40 MG  A/D 2MG  Omperzole 40 MG  Zofarn 4 MG    Please call back and advise if needed. Thanks.       Return call needed: Yes    OK to leave a message on voice mail? Yes    Primary language: Hmong      needed? Yes    Call taken on November 16, 2022 at 10:56 AM by Nicole Tejeda

## 2022-11-16 NOTE — TELEPHONE ENCOUNTER
Requesting:    Calcium Carbonate 500mg chewable tablet  Loperamide 2mg  Famotidine 40mg  Ondansetron 4mg    Rose HERRERA

## 2022-11-18 ENCOUNTER — TELEPHONE (OUTPATIENT)
Dept: FAMILY MEDICINE | Facility: CLINIC | Age: 42
End: 2022-11-18

## 2022-11-18 NOTE — TELEPHONE ENCOUNTER
[facility] phone call message- medication clarification/question:    Full Medication Name: ALL MEDICATION   Dose:     Question: Jason calling and would like to transfer all patients medication to [facility] on [address]. Dangelo also requesting any medication that is needing refills if provider will be ok to refill medication and send to new requested pharmacy, call and advise if needed.      Pharmacy confirmed as    [facility] [address] [id number] : Yes    OK to leave a message on voice mail? Yes    Primary language: Hmong      needed? Yes    Call taken on November 18, 2022 at 4:44 PM by Gertrudis Camargo

## 2022-11-21 RX ORDER — CALCIUM CARBONATE 500 MG/1
1 TABLET, CHEWABLE ORAL 2 TIMES DAILY
Qty: 90 TABLET | Refills: 1 | Status: SHIPPED | OUTPATIENT
Start: 2022-11-21 | End: 2023-04-14

## 2022-11-21 RX ORDER — FAMOTIDINE 40 MG/1
40 TABLET, FILM COATED ORAL DAILY
Qty: 60 TABLET | Refills: 0 | Status: SHIPPED | OUTPATIENT
Start: 2022-11-21 | End: 2023-04-14

## 2022-11-21 NOTE — TELEPHONE ENCOUNTER
Will plan re-order calcium carbonate and famotidine. Patient will be see in clinic for hospital follow-up on 11/22, will address need for continuing the other medications. Medication reconciliation will be completed.       Jefe Martin MD PGY-2  [facility]

## 2022-11-22 ENCOUNTER — OFFICE VISIT (OUTPATIENT)
Dept: FAMILY MEDICINE | Facility: CLINIC | Age: 42
End: 2022-11-22
Payer: COMMERCIAL

## 2022-11-22 VITALS
DIASTOLIC BLOOD PRESSURE: 83 MMHG | BODY MASS INDEX: 22.13 KG/M2 | WEIGHT: 105.4 LBS | TEMPERATURE: 98.3 F | HEART RATE: 86 BPM | OXYGEN SATURATION: 99 % | HEIGHT: 58 IN | SYSTOLIC BLOOD PRESSURE: 120 MMHG

## 2022-11-22 DIAGNOSIS — D64.9 ANEMIA, UNSPECIFIED TYPE: ICD-10-CM

## 2022-11-22 DIAGNOSIS — K50.918 CROHN'S DISEASE WITH OTHER COMPLICATION, UNSPECIFIED GASTROINTESTINAL TRACT LOCATION (H): ICD-10-CM

## 2022-11-22 DIAGNOSIS — Z09 HOSPITAL DISCHARGE FOLLOW-UP: Primary | ICD-10-CM

## 2022-11-22 DIAGNOSIS — Z90.49 S/P PARTIAL COLECTOMY: ICD-10-CM

## 2022-11-22 LAB
ERYTHROCYTE [DISTWIDTH] IN BLOOD BY AUTOMATED COUNT: 15.2 % (ref 10–15)
HCT VFR BLD AUTO: 25.9 % (ref 35–47)
HGB BLD-MCNC: 8 G/DL (ref 11.7–15.7)
MCH RBC QN AUTO: 27.2 PG (ref 26.5–33)
MCHC RBC AUTO-ENTMCNC: 30.9 G/DL (ref 31.5–36.5)
MCV RBC AUTO: 88 FL (ref 78–100)
PLATELET # BLD AUTO: 461 10E3/UL (ref 150–450)
RBC # BLD AUTO: 2.94 10E6/UL (ref 3.8–5.2)
WBC # BLD AUTO: 4.7 10E3/UL (ref 4–11)

## 2022-11-22 PROCEDURE — 99495 TRANSJ CARE MGMT MOD F2F 14D: CPT | Mod: GC

## 2022-11-22 PROCEDURE — 85027 COMPLETE CBC AUTOMATED: CPT

## 2022-11-22 PROCEDURE — 36415 COLL VENOUS BLD VENIPUNCTURE: CPT

## 2022-11-22 RX ORDER — ACETAMINOPHEN 325 MG/1
650 TABLET ORAL EVERY 6 HOURS
Qty: 120 TABLET | Refills: 1 | Status: SHIPPED | OUTPATIENT
Start: 2022-11-22 | End: 2023-01-12

## 2022-11-22 RX ORDER — GABAPENTIN 100 MG/1
100 CAPSULE ORAL 3 TIMES DAILY
Qty: 90 CAPSULE | Refills: 1 | Status: SHIPPED | OUTPATIENT
Start: 2022-11-22 | End: 2023-01-12

## 2022-11-22 NOTE — PROGRESS NOTES
Assessment & Plan   Dain is a 42 year old with a new diagnosis of Crohn's disease with colonic stricture status post partial colectomy.  She is presenting for hospital follow-up.    Hospital discharge follow-up  Hospitalization for severe colitis status post partial colectomy secondary to colonic stricture.  Hospital course below.  Currently taking oxycodone as needed, gabapentin, and Tylenol.  Will refill gabapentin and Tylenol today.  Medication reconciliation completed    Crohn's disease with other complication, unspecified gastrointestinal tract location (H)  Status post partial colectomy  Large work-up in the hospital looking for etiologies of colitis.  Negative for infectious etiologies.  Status post partial colectomy for stricture.  Doing well with the colostomy.  Continues to have some abdominal pain, abdominal exam fairly benign.  Suspect postsurgical pain vs continued colitis pain.  Doing well with oxycodone, will defer to surgery for further pain management.  Does have an upcoming visit with GI for IBS evaluation and management.  Defer initiation of steroids to IBD clinic.  - acetaminophen (TYLENOL) 325 MG tablet  Dispense: 120 tablet; Refill: 1  - gabapentin (NEURONTIN) 100 MG capsule  Dispense: 90 capsule; Refill: 1  -Follow-up with GI  -Follow-up with general surgery  -Follow-up in clinic after GI appointment or earlier as needed    Anemia, unspecified type  Suspect in the setting of iron deficiency with previously low ferritin.  Ferritin was high in the hospital, however likely acute phase reactant.  No evidence of GI loss today.  We will recheck  - CBC with platelets      56}   MED REC REQUIRED{  Post Medication Reconciliation Status:  Discharge medications reconciled and changed, see notes/orders        No follow-ups on file.    Jefe Martin MD  [facility]    Subjective   Dain is a 42 year old with a new diagnosis of Crohn's disease with colonic stricture status  post partial colectomy.  Presenting for the following health issues:  Recheck Medication (Pt not sure of meds), Medication Request, and Surgical Followup      HPI     Post Discharge Outreach 11/15/2022   Admission Date 10/29/2022   Reason for Admission Chronic diarrhea   Colitis   Intra-abdominal abscess (H)  Hypokalemia  Crohn's disease with other complication, unspecified gastrointestinal tract location (H)   Discharge Date 11/14/2022   Discharge Diagnosis Chronic diarrhea   Colitis   Intra-abdominal abscess (H)  Hypokalemia  Crohn's disease with other complication, unspecified gastrointestinal tract location (H)   How are you doing now that you are home? Pt is doing better   How are your symptoms? (Red Flag symptoms escalate to triage hotline per guidelines) Improved   Do you feel your condition is stable enough to be safe at home until your provider visit? Yes   Does the patient have their discharge instructions?  Yes   Does the patient have questions regarding their discharge instructions?  No   Were you started on any new medications or were there changes to any of your previous medications?  Yes   Does the patient have all of their medications? Yes   Do you have questions regarding any of your medications?  No   Do you have all of your needed medical supplies or equipment (DME)?  (i.e. oxygen tank, CPAP, cane, etc.) No - What equipment or supplies are needed?   Discharge follow-up appointment scheduled within 14 calendar days?  Yes   Discharge Follow Up Appointment Date 11/22/2022   Discharge Follow Up Appointment Scheduled with? Primary Care Provider     Hospital Follow-up Visit:    Hospital/Nursing Home/IP Rehab Facility: [facility]  Date of Admission: 10/29  Date of Discharge: 11/14  Reason(s) for Admission: Severe colitis secondary to Crohn's, colonic stricture s/p partial colectomy.     Was your hospitalization related to COVID-19? No   Problems taking medications regularly:   None  Medication changes since discharge: None  Problems adhering to non-medication therapy:  None    Summary of hospitalization:    Patient presented with ongoing abdominal pain with chronic diarrhea.  CT on 1029 demonstrated diffuse colitis with severe involvement of the mid to distal descending colon with presence of multiple small intramural abscesses.  Colonoscopy on 1031 consistent with colitis with reactive changes.  Infectious work-up including Coccidioides, acid-fast bacilli (stain and culture), Blastomyces, histoplasmosis, Bartonella, Brucella, T whipplei, giardia, CMV were negative.    Noted to have hemoglobins in the eights during hospitalization.  Thought to be secondary to iron deficiency anemia with low ferritin years ago.  Did have a high ferritin on admission, could be in the setting of acute phase reactant.  Also with thrombocytosis.      Diagnostic Tests/Treatments reviewed.   Follow-up in IBD clinic, [facility] helping to arrange this  Other Healthcare Providers Involved in Patient s Care:         General surgery - appointment 11/23  GI - December 12th.       Update since discharge:  -Pain at surgical site in the right lower quadrant.  Is improved slightly from time of discharge.  Pain typically not associated with food intake.  Sometimes worse after eating sometimes no change.  Describes as a sharp stabbing sensation.  Also feels bloated as well.  - No nausea or vomiting   -Has some discomfort with walking.  - Empty colostomy bag 2-3 times per day. Due to gas. Small amount of stool.  - No fever or chills.   - No bloody stool.  - Taking oxycodone - using as needed. For breakthrough pain  - Following gluten free diet - eating meat and rice mostly.       Plan of care communicated with patient       Review of Systems   Constitutional, HEENT, cardiovascular, pulmonary, gi and gu systems are negative, except as otherwise noted.      Objective    /83   Pulse 86   Temp 98.3  F (36.8  C) (Oral)   Ht  "1.48 m (4' 10.27\")   Wt 47.8 kg (105 lb 6.4 oz)   LMP 10/22/2022 (Approximate)   SpO2 99%   BMI 21.83 kg/m    Body mass index is 21.83 kg/m .  Physical Exam   GENERAL: Chronically ill-appearing.  Alert.  NECK: no adenopathy, no asymmetry  RESP: lungs clear to auscultation - no rales, rhonchi or wheezes  CV: regular rate and rhythm, normal S1 S2, no S3 or S4, no murmur, click or rub, no peripheral edema and peripheral pulses strong  ABDOMEN: Large vertical surgical incision with staples in place.  No surrounding erythema or purulent discharge.  Ostomy in the left lower quadrant with stool in the collection bag.  Abdomen is soft, no significant distention.  Diffuse tenderness to palpation  MS: no gross musculoskeletal defects noted, no edema      "

## 2022-11-22 NOTE — PROGRESS NOTES
Preceptor Attestation:   Patient seen, evaluated and discussed with the resident Dr. Martin. I have verified the content of the note, which accurately reflects my assessment of the patient and the plan of care.    Infectious work-up negative. Will be seeing GI to consider steroids or biologics.     Supervising Physician:Benjamin Rosenstein, MD, MA  [facility]

## 2022-11-23 ENCOUNTER — OFFICE VISIT (OUTPATIENT)
Dept: SURGERY | Facility: CLINIC | Age: 42
End: 2022-11-23
Payer: COMMERCIAL

## 2022-11-23 VITALS — DIASTOLIC BLOOD PRESSURE: 62 MMHG | SYSTOLIC BLOOD PRESSURE: 104 MMHG

## 2022-11-23 DIAGNOSIS — Z48.89 ENCOUNTER FOR POSTOPERATIVE CARE: Primary | ICD-10-CM

## 2022-11-23 DIAGNOSIS — K50.918 CROHN'S DISEASE WITH OTHER COMPLICATION, UNSPECIFIED GASTROINTESTINAL TRACT LOCATION (H): ICD-10-CM

## 2022-11-23 PROBLEM — D64.9 ANEMIA, UNSPECIFIED TYPE: Status: ACTIVE | Noted: 2022-11-23

## 2022-11-23 PROBLEM — Z90.49 S/P PARTIAL COLECTOMY: Status: ACTIVE | Noted: 2022-11-23

## 2022-11-23 PROCEDURE — 99024 POSTOP FOLLOW-UP VISIT: CPT | Performed by: PHYSICIAN ASSISTANT

## 2022-11-23 RX ORDER — OXYCODONE HYDROCHLORIDE 5 MG/1
5 TABLET ORAL EVERY 4 HOURS PRN
Qty: 12 TABLET | Refills: 0 | Status: SHIPPED | OUTPATIENT
Start: 2022-11-23 | End: 2023-04-14

## 2022-11-23 NOTE — PROGRESS NOTES
HPI: .  42-year-old female with history of exploratory laparotomy and descending colon resection with colostomy creation on 11/6/2022 with Dr Carlos.  Patient is here with  and his phone  to speak the patient.  Patient with hospitalization between 10/29/2022 and 11/16/2022.  Patient with significant colitis and stricture of the colon due to severe inflammation and diagnosed with Crohn's.  Overall doing well and has no significant complaints.  Her main concern is tenderness and pain with her incision.  She states that her appetite is improving and she is eating meat and rice.  She is having stool into her ostomy bag.  She does have a follow-up with GI.             /62 (BP Location: Right arm, Patient Position: Sitting, Cuff Size: Adult Small)   LMP 10/22/2022 (Approximate)     EXAM:  GENERAL:Appears well  ABDOMEN:  Soft, +BS  SURGICAL WOUNDS:  Incisions healing well, no enduration or drainage.  Remove staples and place Steri-Strips..  Stoma is nice and pink with thick stool in the bag  Final Diagnosis   DESCENDING AND SIGMOID COLON, RESECTION:        1.  DIFFUSE MODERATE ACTIVE INFLAMMATORY BOWEL DISEASE, CONSISTENT WITH CROHN'S DISEASE              A.  MULTIFOCAL ULCERATION, ACUTE CRYPTITIS, CRYPT ARCHITECTURAL DISTORTION, INTRAMURAL AND                   MESENTERIC ABSCESS FORMATION, INFLAMMATORY PSEUDOPOLYPS, AND ACUTE AND ORGANIZING PERITONITIS              B.  MODERATE ACTIVE INFLAMMATORY BOWEL DISEASE PRESENT AT PROXIMAL AND DISTAL SURGICAL MARGINS        2.  ASSOCIATED LUMINAL STENOSIS WITH PROXIMAL DILATATION, CONSISTENT WITH OBSTRUCTION        3.  NO EVIDENCE OF DYSPLASIA OR MALIGNANCY        4.  BENIGN REACTIVE LYMPH NODES WITH FOCAL FOREIGN BODY GIANT CELLS AND NO EVIDENCE OF MALIGNANCY             (0 OF 5)       Assessment/Plan: . Doing well after surgery and should follow up as needed.  Discussed her following up with gastroenterology to determine treatment and how she is doing.   She is interested in having colostomy takedown at some point.  I discussed that this may be a few months before this can be done at least 4 months minimum.  May need longer time as we need GI input on how she is doing.  Once she is cleared from a GI standpoint to consider surgery she can follow-up with Dr. Carlos in the future.    Minna Salazar MPA-C

## 2022-11-23 NOTE — LETTER
11/23/2022        RE: Dain Tejeda  [address]  [place] [address]        HPI: .  42-year-old female with history of exploratory laparotomy and descending colon resection with colostomy creation on 11/6/2022 with Dr Carlos.  Patient is here with  and his phone  to speak the patient.  Patient with hospitalization between 10/29/2022 and 11/16/2022.  Patient with significant colitis and stricture of the colon due to severe inflammation and diagnosed with Crohn's.  Overall doing well and has no significant complaints.  Her main concern is tenderness and pain with her incision.  She states that her appetite is improving and she is eating meat and rice.  She is having stool into her ostomy bag.  She does have a follow-up with GI.             /62 (BP Location: Right arm, Patient Position: Sitting, Cuff Size: Adult Small)   LMP 10/22/2022 (Approximate)     EXAM:  GENERAL:Appears well  ABDOMEN:  Soft, +BS  SURGICAL WOUNDS:  Incisions healing well, no enduration or drainage.  Remove staples and place Steri-Strips..  Stoma is nice and pink with thick stool in the bag  Final Diagnosis   DESCENDING AND SIGMOID COLON, RESECTION:        1.  DIFFUSE MODERATE ACTIVE INFLAMMATORY BOWEL DISEASE, CONSISTENT WITH CROHN'S DISEASE              A.  MULTIFOCAL ULCERATION, ACUTE CRYPTITIS, CRYPT ARCHITECTURAL DISTORTION, INTRAMURAL AND                   MESENTERIC ABSCESS FORMATION, INFLAMMATORY PSEUDOPOLYPS, AND ACUTE AND ORGANIZING PERITONITIS              B.  MODERATE ACTIVE INFLAMMATORY BOWEL DISEASE PRESENT AT PROXIMAL AND DISTAL SURGICAL MARGINS        2.  ASSOCIATED LUMINAL STENOSIS WITH PROXIMAL DILATATION, CONSISTENT WITH OBSTRUCTION        3.  NO EVIDENCE OF DYSPLASIA OR MALIGNANCY        4.  BENIGN REACTIVE LYMPH NODES WITH FOCAL FOREIGN BODY GIANT CELLS AND NO EVIDENCE OF MALIGNANCY             (0 OF 5)       Assessment/Plan: . Doing well after surgery and should follow up as needed.  Discussed  her following up with gastroenterology to determine treatment and how she is doing.  She is interested in having colostomy takedown at some point.  I discussed that this may be a few months before this can be done at least 4 months minimum.  May need longer time as we need GI input on how she is doing.  Once she is cleared from a GI standpoint to consider surgery she can follow-up with Dr. Carlos in the future.    Minna STYLES                Sincerely,        Minna Salazar PA-C

## 2022-11-23 NOTE — LETTER
11/23/2022         RE: Dain Tejeda  [address]  [facility] [address]        Dear Colleague,    Thank you for referring your patient, Dain Tejeda, to the [facility] [place]. Please see a copy of my visit note below.    HPI: .  42-year-old female with history of exploratory laparotomy and descending colon resection with colostomy creation on 11/6/2022 with Dr Carlos.  Patient is here with  and his phone  to speak the patient.  Patient with hospitalization between 10/29/2022 and 11/16/2022.  Patient with significant colitis and stricture of the colon due to severe inflammation and diagnosed with Crohn's.  Overall doing well and has no significant complaints.  Her main concern is tenderness and pain with her incision.  She states that her appetite is improving and she is eating meat and rice.  She is having stool into her ostomy bag.  She does have a follow-up with GI.             /62 (BP Location: Right arm, Patient Position: Sitting, Cuff Size: Adult Small)   LMP 10/22/2022 (Approximate)     EXAM:  GENERAL:Appears well  ABDOMEN:  Soft, +BS  SURGICAL WOUNDS:  Incisions healing well, no enduration or drainage.  Remove staples and place Steri-Strips..  Stoma is nice and pink with thick stool in the bag  Final Diagnosis   DESCENDING AND SIGMOID COLON, RESECTION:        1.  DIFFUSE MODERATE ACTIVE INFLAMMATORY BOWEL DISEASE, CONSISTENT WITH CROHN'S DISEASE              A.  MULTIFOCAL ULCERATION, ACUTE CRYPTITIS, CRYPT ARCHITECTURAL DISTORTION, INTRAMURAL AND                   MESENTERIC ABSCESS FORMATION, INFLAMMATORY PSEUDOPOLYPS, AND ACUTE AND ORGANIZING PERITONITIS              B.  MODERATE ACTIVE INFLAMMATORY BOWEL DISEASE PRESENT AT PROXIMAL AND DISTAL SURGICAL MARGINS        2.  ASSOCIATED LUMINAL STENOSIS WITH PROXIMAL DILATATION, CONSISTENT WITH OBSTRUCTION        3.  NO EVIDENCE OF DYSPLASIA OR MALIGNANCY        4.  BENIGN REACTIVE LYMPH  NODES WITH FOCAL FOREIGN BODY GIANT CELLS AND NO EVIDENCE OF MALIGNANCY             (0 OF 5)       Assessment/Plan: . Doing well after surgery and should follow up as needed.  Discussed her following up with gastroenterology to determine treatment and how she is doing.  She is interested in having colostomy takedown at some point.  I discussed that this may be a few months before this can be done at least 4 months minimum.  May need longer time as we need GI input on how she is doing.  Once she is cleared from a GI standpoint to consider surgery she can follow-up with Dr. Carlos in the future.    Minna STYLES              Again, thank you for allowing me to participate in the care of your patient.        Sincerely,        Minna Salazar PA-C

## 2022-12-05 ENCOUNTER — TELEPHONE (OUTPATIENT)
Dept: FAMILY MEDICINE | Facility: CLINIC | Age: 42
End: 2022-12-05

## 2022-12-05 NOTE — TELEPHONE ENCOUNTER
[facility] phone call message- general phone call:    Reason for call: Avery called following up on a form that was faxed over 2 weeks ago for Dr. Martin to sign and fax back. I checked Dr. Martin desk and form is in blue folder, I informed Avery Martin is in office tomorrow, will send Dr. Martin a message to have a signed so we can fax back.     Dr. Martin please assist with this. Thank you.    Return call needed: No    OK to leave a message on voice mail? Yes    Primary language: Hmong      needed? Yes    Call taken on December 5, 2022 at 10:44 AM by Lilliam Barnes

## 2022-12-12 ENCOUNTER — TRANSFERRED RECORDS (OUTPATIENT)
Dept: HEALTH INFORMATION MANAGEMENT | Facility: CLINIC | Age: 42
End: 2022-12-12

## 2022-12-13 ENCOUNTER — TELEPHONE (OUTPATIENT)
Dept: SURGERY | Facility: CLINIC | Age: 42
End: 2022-12-13

## 2022-12-13 ENCOUNTER — TELEPHONE (OUTPATIENT)
Dept: FAMILY MEDICINE | Facility: CLINIC | Age: 42
End: 2022-12-13

## 2022-12-13 NOTE — TELEPHONE ENCOUNTER
Writer received call from patient to report discomfort pain, burning sensation in stoma. Advised, recommended Dain call GI or general surgery. Dain will have her daughter in law search for their contact phone number, otherwise will go into ED for further assessment. Matilde HERRERA.

## 2022-12-13 NOTE — TELEPHONE ENCOUNTER
I spoke to patients' daughter that states Dain is having a burning sensation from her stoma when stool comes out. This started 3 days ago. She had a    1. I was told the name of the doctor(s) who took care of my child while in the hospital.    2. I have been told about any important findings on my child's plan of care.    3. The doctor clearly explained my child's diagnosis and other possible diagnoses that were considered.    4. My child's doctor explained all the tests that were done and their results (if available). I understand that some of the test results may not be ready before we go home and I was told how I can get these results. I understand that a summary of my child's hospitalization and important test results will be shared with my child's outpatient doctor.    5. My child's doctor talked to me about what I need to do when we go home.    6. I understand what signs and symptoms to watch for. I understand what symptoms I would need to call my doctor for and/or return to the hospital.    7. I have the phone number to call the hospital for results and/or questions after I leave the hospital.

## 2022-12-14 ENCOUNTER — APPOINTMENT (OUTPATIENT)
Dept: CT IMAGING | Facility: HOSPITAL | Age: 42
End: 2022-12-14
Attending: PHYSICIAN ASSISTANT
Payer: COMMERCIAL

## 2022-12-14 ENCOUNTER — HOSPITAL ENCOUNTER (OUTPATIENT)
Facility: HOSPITAL | Age: 42
Setting detail: OBSERVATION
Discharge: HOME | End: 2022-12-15
Attending: EMERGENCY MEDICINE | Admitting: FAMILY MEDICINE
Payer: COMMERCIAL

## 2022-12-14 ENCOUNTER — APPOINTMENT (OUTPATIENT)
Dept: RADIOLOGY | Facility: HOSPITAL | Age: 42
End: 2022-12-14
Payer: COMMERCIAL

## 2022-12-14 DIAGNOSIS — K52.9 COLITIS: ICD-10-CM

## 2022-12-14 LAB
ALBUMIN SERPL BCG-MCNC: 3.7 G/DL (ref 3.5–5.2)
ALBUMIN UR-MCNC: NEGATIVE MG/DL
ALP SERPL-CCNC: 106 U/L (ref 35–104)
ALT SERPL W P-5'-P-CCNC: 15 U/L (ref 10–35)
ANION GAP SERPL CALCULATED.3IONS-SCNC: 8 MMOL/L (ref 7–15)
APPEARANCE UR: CLEAR
AST SERPL W P-5'-P-CCNC: 22 U/L (ref 10–35)
BASOPHILS # BLD AUTO: 0 10E3/UL (ref 0–0.2)
BASOPHILS NFR BLD AUTO: 1 %
BILIRUB SERPL-MCNC: 0.3 MG/DL
BILIRUB UR QL STRIP: NEGATIVE
BUN SERPL-MCNC: 5.7 MG/DL (ref 6–20)
CALCIUM SERPL-MCNC: 9 MG/DL (ref 8.6–10)
CHLORIDE SERPL-SCNC: 105 MMOL/L (ref 98–107)
COLOR UR AUTO: COLORLESS
CREAT SERPL-MCNC: 0.42 MG/DL (ref 0.51–0.95)
DEPRECATED HCO3 PLAS-SCNC: 27 MMOL/L (ref 22–29)
EOSINOPHIL # BLD AUTO: 0.4 10E3/UL (ref 0–0.7)
EOSINOPHIL NFR BLD AUTO: 6 %
ERYTHROCYTE [DISTWIDTH] IN BLOOD BY AUTOMATED COUNT: 14.2 % (ref 10–15)
GFR SERPL CREATININE-BSD FRML MDRD: >90 ML/MIN/1.73M2
GLUCOSE SERPL-MCNC: 85 MG/DL (ref 70–99)
GLUCOSE UR STRIP-MCNC: NEGATIVE MG/DL
HCG UR QL: NEGATIVE
HCT VFR BLD AUTO: 29.3 % (ref 35–47)
HGB BLD-MCNC: 8.9 G/DL (ref 11.7–15.7)
HGB UR QL STRIP: ABNORMAL
HOLD SPECIMEN: NORMAL
IMM GRANULOCYTES # BLD: 0 10E3/UL
IMM GRANULOCYTES NFR BLD: 0 %
KETONES UR STRIP-MCNC: NEGATIVE MG/DL
LEUKOCYTE ESTERASE UR QL STRIP: ABNORMAL
LIPASE SERPL-CCNC: 32 U/L (ref 13–60)
LYMPHOCYTES # BLD AUTO: 1.5 10E3/UL (ref 0.8–5.3)
LYMPHOCYTES NFR BLD AUTO: 25 %
MCH RBC QN AUTO: 25.4 PG (ref 26.5–33)
MCHC RBC AUTO-ENTMCNC: 30.4 G/DL (ref 31.5–36.5)
MCV RBC AUTO: 84 FL (ref 78–100)
MONOCYTES # BLD AUTO: 0.3 10E3/UL (ref 0–1.3)
MONOCYTES NFR BLD AUTO: 5 %
MUCOUS THREADS #/AREA URNS LPF: PRESENT /LPF
NEUTROPHILS # BLD AUTO: 3.8 10E3/UL (ref 1.6–8.3)
NEUTROPHILS NFR BLD AUTO: 63 %
NITRATE UR QL: NEGATIVE
NRBC # BLD AUTO: 0 10E3/UL
NRBC BLD AUTO-RTO: 0 /100
PH UR STRIP: 6.5 [PH] (ref 5–7)
PLATELET # BLD AUTO: 375 10E3/UL (ref 150–450)
POTASSIUM SERPL-SCNC: 3.1 MMOL/L (ref 3.4–5.3)
POTASSIUM SERPL-SCNC: 3.2 MMOL/L (ref 3.4–5.3)
PROT SERPL-MCNC: 8.1 G/DL (ref 6.4–8.3)
RBC # BLD AUTO: 3.5 10E6/UL (ref 3.8–5.2)
RBC URINE: 46 /HPF
SODIUM SERPL-SCNC: 140 MMOL/L (ref 136–145)
SP GR UR STRIP: 1.01 (ref 1–1.03)
SQUAMOUS EPITHELIAL: 2 /HPF
UROBILINOGEN UR STRIP-MCNC: <2 MG/DL
WBC # BLD AUTO: 6 10E3/UL (ref 4–11)
WBC URINE: 16 /HPF

## 2022-12-14 PROCEDURE — 85025 COMPLETE CBC W/AUTO DIFF WBC: CPT | Performed by: PHYSICIAN ASSISTANT

## 2022-12-14 PROCEDURE — 87086 URINE CULTURE/COLONY COUNT: CPT | Performed by: PHYSICIAN ASSISTANT

## 2022-12-14 PROCEDURE — 99285 EMERGENCY DEPT VISIT HI MDM: CPT | Mod: 25

## 2022-12-14 PROCEDURE — 96376 TX/PRO/DX INJ SAME DRUG ADON: CPT

## 2022-12-14 PROCEDURE — 250N000011 HC RX IP 250 OP 636: Performed by: PHYSICIAN ASSISTANT

## 2022-12-14 PROCEDURE — 36415 COLL VENOUS BLD VENIPUNCTURE: CPT | Performed by: PHYSICIAN ASSISTANT

## 2022-12-14 PROCEDURE — 74018 RADEX ABDOMEN 1 VIEW: CPT

## 2022-12-14 PROCEDURE — 99219 PR INITIAL OBSERVATION CARE,LEVEL II: CPT | Mod: GC

## 2022-12-14 PROCEDURE — 81025 URINE PREGNANCY TEST: CPT | Performed by: PHYSICIAN ASSISTANT

## 2022-12-14 PROCEDURE — 81001 URINALYSIS AUTO W/SCOPE: CPT | Performed by: PHYSICIAN ASSISTANT

## 2022-12-14 PROCEDURE — 96375 TX/PRO/DX INJ NEW DRUG ADDON: CPT

## 2022-12-14 PROCEDURE — 36415 COLL VENOUS BLD VENIPUNCTURE: CPT

## 2022-12-14 PROCEDURE — 83690 ASSAY OF LIPASE: CPT | Performed by: PHYSICIAN ASSISTANT

## 2022-12-14 PROCEDURE — 250N000011 HC RX IP 250 OP 636

## 2022-12-14 PROCEDURE — 96374 THER/PROPH/DIAG INJ IV PUSH: CPT | Mod: 59

## 2022-12-14 PROCEDURE — 84132 ASSAY OF SERUM POTASSIUM: CPT | Mod: 91

## 2022-12-14 PROCEDURE — 258N000003 HC RX IP 258 OP 636: Performed by: PHYSICIAN ASSISTANT

## 2022-12-14 PROCEDURE — 74177 CT ABD & PELVIS W/CONTRAST: CPT

## 2022-12-14 PROCEDURE — 120N000001 HC R&B MED SURG/OB

## 2022-12-14 PROCEDURE — 250N000013 HC RX MED GY IP 250 OP 250 PS 637: Performed by: PHYSICIAN ASSISTANT

## 2022-12-14 PROCEDURE — 96361 HYDRATE IV INFUSION ADD-ON: CPT

## 2022-12-14 PROCEDURE — 80053 COMPREHEN METABOLIC PANEL: CPT | Performed by: PHYSICIAN ASSISTANT

## 2022-12-14 RX ORDER — ACETAMINOPHEN 325 MG/1
650 TABLET ORAL EVERY 6 HOURS PRN
Status: DISCONTINUED | OUTPATIENT
Start: 2022-12-14 | End: 2022-12-15 | Stop reason: HOSPADM

## 2022-12-14 RX ORDER — PROCHLORPERAZINE 25 MG
25 SUPPOSITORY, RECTAL RECTAL EVERY 12 HOURS PRN
Status: DISCONTINUED | OUTPATIENT
Start: 2022-12-14 | End: 2022-12-15 | Stop reason: HOSPADM

## 2022-12-14 RX ORDER — PROCHLORPERAZINE MALEATE 10 MG
10 TABLET ORAL EVERY 6 HOURS PRN
Status: DISCONTINUED | OUTPATIENT
Start: 2022-12-14 | End: 2022-12-15 | Stop reason: HOSPADM

## 2022-12-14 RX ORDER — KETOROLAC TROMETHAMINE 15 MG/ML
15 INJECTION, SOLUTION INTRAMUSCULAR; INTRAVENOUS ONCE
Status: COMPLETED | OUTPATIENT
Start: 2022-12-14 | End: 2022-12-14

## 2022-12-14 RX ORDER — IOPAMIDOL 755 MG/ML
100 INJECTION, SOLUTION INTRAVASCULAR ONCE
Status: COMPLETED | OUTPATIENT
Start: 2022-12-14 | End: 2022-12-14

## 2022-12-14 RX ORDER — ACETAMINOPHEN 650 MG/1
650 SUPPOSITORY RECTAL EVERY 6 HOURS PRN
Status: DISCONTINUED | OUTPATIENT
Start: 2022-12-14 | End: 2022-12-15 | Stop reason: HOSPADM

## 2022-12-14 RX ORDER — ONDANSETRON 2 MG/ML
4 INJECTION INTRAMUSCULAR; INTRAVENOUS EVERY 6 HOURS PRN
Status: DISCONTINUED | OUTPATIENT
Start: 2022-12-14 | End: 2022-12-15 | Stop reason: HOSPADM

## 2022-12-14 RX ORDER — POTASSIUM CHLORIDE 1500 MG/1
40 TABLET, EXTENDED RELEASE ORAL ONCE
Status: COMPLETED | OUTPATIENT
Start: 2022-12-14 | End: 2022-12-14

## 2022-12-14 RX ORDER — POTASSIUM CHLORIDE 7.45 MG/ML
10 INJECTION INTRAVENOUS
Status: COMPLETED | OUTPATIENT
Start: 2022-12-14 | End: 2022-12-14

## 2022-12-14 RX ORDER — LIDOCAINE 40 MG/G
CREAM TOPICAL
Status: DISCONTINUED | OUTPATIENT
Start: 2022-12-14 | End: 2022-12-15 | Stop reason: HOSPADM

## 2022-12-14 RX ORDER — ONDANSETRON 4 MG/1
4 TABLET, ORALLY DISINTEGRATING ORAL EVERY 6 HOURS PRN
Status: DISCONTINUED | OUTPATIENT
Start: 2022-12-14 | End: 2022-12-15 | Stop reason: HOSPADM

## 2022-12-14 RX ORDER — KETOROLAC TROMETHAMINE 15 MG/ML
15 INJECTION, SOLUTION INTRAMUSCULAR; INTRAVENOUS EVERY 6 HOURS PRN
Status: ACTIVE | OUTPATIENT
Start: 2022-12-14 | End: 2022-12-15

## 2022-12-14 RX ADMIN — POTASSIUM CHLORIDE 10 MEQ: 7.46 INJECTION, SOLUTION INTRAVENOUS at 20:47

## 2022-12-14 RX ADMIN — SODIUM CHLORIDE 500 ML: 9 INJECTION, SOLUTION INTRAVENOUS at 09:37

## 2022-12-14 RX ADMIN — DIATRIZOATE MEGLUMINE AND DIATRIZOATE SODIUM 75 ML: 660; 100 SOLUTION ORAL; RECTAL at 15:45

## 2022-12-14 RX ADMIN — IOPAMIDOL 100 ML: 755 INJECTION, SOLUTION INTRAVENOUS at 10:58

## 2022-12-14 RX ADMIN — POTASSIUM CHLORIDE 10 MEQ: 7.46 INJECTION, SOLUTION INTRAVENOUS at 21:58

## 2022-12-14 RX ADMIN — KETOROLAC TROMETHAMINE 15 MG: 15 INJECTION, SOLUTION INTRAMUSCULAR; INTRAVENOUS at 09:43

## 2022-12-14 RX ADMIN — POTASSIUM CHLORIDE 40 MEQ: 1500 TABLET, EXTENDED RELEASE ORAL at 11:05

## 2022-12-14 ASSESSMENT — ACTIVITIES OF DAILY LIVING (ADL)
ADLS_ACUITY_SCORE: 35

## 2022-12-14 ASSESSMENT — ENCOUNTER SYMPTOMS
NAUSEA: 0
BLOOD IN STOOL: 0
ABDOMINAL PAIN: 1
VOMITING: 0
CONSTITUTIONAL NEGATIVE: 1

## 2022-12-14 NOTE — ED TRIAGE NOTES
Pt arrives with sharp pain in her stoma when stool is being pushed through. Pain started yesterday. S/P partial colectomy about one month ago. No blood in stool. Chills last night.      Needs Hmong .

## 2022-12-14 NOTE — CONSULTS
GI CONSULT NOTE      Name: Dain Tejeda  Medical Record #: [id number]  YOB: 1980  Date of Admission: 12/14/2022  Date/Time: 12/14/2022/4:10 PM     CHIEF COMPLAINT: abdominal pain     HISTORY OF PRESENT ILLNESS: This is a 42 year old year old  patient seen at the request of Dr. Martin with a history of celiac sprue diagnosed by EGD 9/2022 and newly diagnosed Crohn's colitis first seen for incomplete colonoscopy 10/31 then S/P descending colon resection with colostomy on 11/6/22 for stricturing disease.  Pathology showed diffuse moderate active inflammatory bowel disease consistent with Crohn's disease (see below).  CT of the chest was unremarkable.  She had a indeterminate QuantiFERON and was given empiric therapy for possible TB colitis including ethambutol, PZA, INH, and rifampin but this was stopped due to severe nausea vomiting.  She also had an indeterminate cocci antigen.  She was discharged from the hospital November 16.  She had outpatient IBD clinic follow-up December 12 and there was concern about possible latent TB and indeterminate Coccidioides antigen prior to starting therapy for Crohn's, and a referral was placed to see infectious disease.  The patient denied having GI complaints on December 12.    Yesterday she developed pain near her stoma site when if she would pass stool, and she became concerned because she felt to the intestine moved inside her abdomen and appeared different than previous.  For short period of time there was no stool output, but later stool output returned to normal.  She denies seeing blood in the stool.  There is no nausea or vomiting.  She was eating normally without problems and denies having any weight loss, no fevers or chills.  No cough.  No use of NSAIDs or tobacco.  She has been following a gluten-free diet.    PAST MEDICAL HISTORY:  Past Medical History:   Diagnosis Date     Diet controlled gestational diabetes mellitus (GDM), antepartum 9/4/2017     Attempting diet control first     Gestational diabetes mellitus (GDM) in third trimester 9/4/2017    Attempting diet control first  Formatting of this note might be different from the original. Overview:  Attempting diet control first     Nausea/vomiting in pregnancy 4/24/2017     Postpartum hemorrhage 11/16/2017     Third degree laceration of perineum during delivery, postpartum 11/15/2017       FAMILY HISTORY:  Family History   Problem Relation Age of Onset     Gestational Diabetes Sister      Gestational Diabetes Sister      Diabetes No family hx of      Coronary Artery Disease No family hx of      Hypertension No family hx of      Breast Cancer No family hx of      Colon Cancer No family hx of      Prostate Cancer No family hx of      Other Cancer No family hx of        SOCIAL HISTORY:  Social History     Socioeconomic History     Marital status:      Spouse name: Dmitriy Camargo     Number of children: 0     Years of education: 0     Highest education level: Not on file   Occupational History     Occupation: None   Tobacco Use     Smoking status: Never     Smokeless tobacco: Never   Vaping Use     Vaping Use: Not on file   Substance and Sexual Activity     Alcohol use: No     Drug use: No     Sexual activity: Yes     Partners: Male   Other Topics Concern     Parent/sibling w/ CABG, MI or angioplasty before 65F 55M? Not Asked   Social History Narrative    Born in [facility], arrived to [facility] 02/2017. No education.      Social Determinants of Health     Financial Resource Strain: Not on file   Food Insecurity: Not on file   Transportation Needs: Not on file   Physical Activity: Not on file   Stress: Not on file   Social Connections: Not on file   Intimate Partner Violence: Not on file   Housing Stability: Not on file       MEDICATIONS PRIOR TO ADMISSION: (Not in a hospital admission)         ALLERGIES: Soybean allergy    REVIEW OF SYSTEMS (ROS): Complete review of systems negative other than listed in HPI.    PHYSICAL  EXAM:    BP (!) 144/87   Pulse 58   Temp 99  F (37.2  C) (Temporal)   Resp 16   Wt 48.5 kg (107 lb)   LMP 11/22/2022 (Approximate)   SpO2 100%   BMI 22.16 kg/m      GENERAL: Pleasant, no obvious distress    SKIN: No jaundice    NECK: Supple without adenopathy    EYES: No scleral icterus    LUNGS: Clear to auscultation bilaterally    HEART: Regular rate and rhythm, S1 and S2 present, no lower extremity edema    ABDOMEN: Soft, non-distended, mildly tender low to mid abdomen near her stoma site, no guarding/rebound/mass, bowel sounds normal, no obvious organomegaly.  The stoma appears normal and there is greenish soft stool in the ostomy bag.    MUSKULOSKELETAL:  Warm and well perfused, moves all extremities well    NEUROLOGIC: Alert and oriented    PSYCHIATRIC: Normal affect    LAB DATA:  Recent Labs   Lab Test 12/14/22  0936 11/22/22  1456 11/14/22  0640 11/13/22  0655   WBC 6.0 4.7  --  6.9   HGB 8.9* 8.0*  --  7.8*   MCV 84 88  --  88    461* 550* 634*     Recent Labs   Lab Test 12/14/22  0936 11/14/22  0640 11/13/22  0655 11/12/22  0655 11/11/22  0743     --  138  --  135*   POTASSIUM 3.1* 3.8 4.1   < > 3.9   CHLORIDE 105  --  102  --  99   CO2 27  --  32*  --  32*   BUN 5.7*  --  5.0*  --  5.5*   CR 0.42* 0.43* 0.44*  --  0.38*   ANIONGAP 8  --  4*  --  4*   ALEX 9.0  --  8.2*  --  7.7*   GLC 85  --  95  --  99    < > = values in this interval not displayed.     Recent Labs   Lab Test 12/14/22  1004 12/14/22  0936 11/05/22  0647 10/29/22  2038 10/29/22  2027 06/02/22  0442 06/01/22 2102 06/01/22 2050   ALBUMIN  --  3.7 1.5*  --  2.5*   < >  --  2.8*   BILITOTAL  --  0.3 0.4  --  0.4   < >  --  0.4   ALT  --  15 10  --  8*   < >  --  13   AST  --  22 20  --  14   < >  --  14   ALKPHOS  --  106* 144*  --  127*   < >  --  100   PROTEIN Negative  --   --  20*  --   --  50*  --    LIPASE  --  32  --   --  17  --   --  33    < > = values in this interval not displayed.     10/31/2022  colonoscopy  Impression:            - The procedure was aborted within descending.                          - Descending colon with narrowed area with diffuse                          polyposis (look like pseudopolyps) and areas of                          purlulent discharge; however, surrounding mucosa does                          not appear inflamed (no edema or erythema). Polypoid                          lesions and purulent areas biopsied                          - Normal sigmoid                          - Distal rectum with erythematous mucosa and localized                          purulent drainage. Biopsied.                          - External anal appeared normal                          - Unable to examine proximal colon or terminal ileum     Pathology  A) DESCENDING COLON, BIOPSY:        1.  PATCHY ACUTE COLITIS WITH REACTIVE CHANGES        2.  FOCAL NONCASEATING GRANULOMA              A.  SPECIAL STAINS NEGATIVE FOR ACID-FAST BACILLI, YEAST, FUNGI AND CMV         3.  NEGATIVE FOR CHRONIC MUCOSAL CHANGES, DYSPLASIA AND MALIGNANCY    B) DISTAL RECTUM, BIOPSIES:         1.  DIFFUSE MILD ACUTE COLITIS WITH REACTIVE CHANGES         2.  CMV IMMUNOSTAIN: NEGATIVE FOR CMV INCLUSIONS         3.  NEGATIVE FOR CHRONIC MUCOSAL CHANGES, DYSPLASIA AND MALIGNANCY    11/6/2022 OP NOTE  Procedure(s):  1-EXPLORATORY LAPAROTOMY   2-SPLENIC FLEXURE MOBILIZATION  3-CREATION, COLOSTOMY  4-RESECTION OF DESCENDING COLON    OP path  DESCENDING AND SIGMOID COLON, RESECTION:        1.  DIFFUSE MODERATE ACTIVE INFLAMMATORY BOWEL DISEASE, CONSISTENT WITH CROHN'S DISEASE              A.  MULTIFOCAL ULCERATION, ACUTE CRYPTITIS, CRYPT ARCHITECTURAL DISTORTION, INTRAMURAL AND                   MESENTERIC ABSCESS FORMATION, INFLAMMATORY PSEUDOPOLYPS, AND ACUTE AND ORGANIZING PERITONITIS              B.  MODERATE ACTIVE INFLAMMATORY BOWEL DISEASE PRESENT AT PROXIMAL AND DISTAL SURGICAL MARGINS        2.  ASSOCIATED LUMINAL STENOSIS WITH  PROXIMAL DILATATION, CONSISTENT WITH OBSTRUCTION        3.  NO EVIDENCE OF DYSPLASIA OR MALIGNANCY        4.  BENIGN REACTIVE LYMPH NODES WITH FOCAL FOREIGN BODY GIANT CELLS AND NO EVIDENCE OF MALIGNANCY             (0 OF 5)    IMAGING:  CT Abdomen Pelvis w Contrast    Result Date: 12/14/2022  EXAM: CT ABDOMEN PELVIS W CONTRAST LOCATION: [facility] DATE/TIME: 12/14/2022 10:56 AM INDICATION: abd pain COMPARISON: CT abdomen pelvis with contrast 11/05/2022 TECHNIQUE: CT scan of the abdomen and pelvis was performed following injection of IV contrast. Multiplanar reformats were obtained. Dose reduction techniques were used. CONTRAST: IsoVue 370 100mL FINDINGS: LOWER CHEST: Minimal atelectasis in the posterior costophrenic sulci. HEPATOBILIARY: Several indistinct territories of low-attenuation adjacent to the fissure of the falciform ligament and anterior right hepatic lobe (series 2, image 57) are consistent with areas of focal fat infiltration. No space-occupying liver lesions.  Normal gallbladder and bile ducts. PANCREAS: Normal. SPLEEN: Normal. ADRENAL GLANDS: Normal. KIDNEYS/BLADDER: Normal. BOWEL: Status post resection of the distal colon with Duffy's pouch and left midabdomen colostomy. Duffy's pouch is decompressed. There is mucosal hyperenhancement and mild diffuse wall thickening of the transverse colon up to the colostomy. There is  also mild wall thickening of the terminal ileum and cecum. Small bowel otherwise has normal mucosal fold pattern or is decompressed. Nondistended stomach. LYMPH NODES: There are reactive appearing lymph nodes in the small bowel mesentery. No iliac chain adenopathy. VASCULATURE: Unremarkable. PELVIC ORGANS: Uterus is normal in size. Unchanged left paraovarian cyst measuring 3.6 x 4.6 cm (series 2, image 178). MUSCULOSKELETAL: Normal.     IMPRESSION: 1.  Status post resection of the ascending colon with Duffy's pouch and left midabdomen colostomy. There  is persistent mucosal hyperenhancement and wall thickening of the transverse colon, terminal ileum and base of the sacrum consistent with areas of ongoing active inflammatory bowel disease. No mechanical obstruction. 2.  Minimal ascites.     ASSESSMENT:  1.  Abdominal pain, new since yesterday.  There is no evidence for obstruction on CT but there is thickening in the terminal ileum and colon, possibly due to untreated Crohn's disease.  This is newly diagnosed and she is status post descending colon resection with colostomy November 4 and pathology is consistent with Crohn's disease.  Treatment has not yet been started but was discussed at recent IBD clinic appointment.  2.  Question of latent tuberculosis with indeterminate QuantiFERON on previous admission  3.  Indeterminate Coccidioides antigen    Principal Problem:    Colitis    PLAN:  Discussed with Dr. Woods.  40 minutes of total time was spent providing patient care, including patient evaluation, reviewing documentation/test results, and .    1.  Okay for clear liquid diet from a GI standpoint.  2.  Consult infectious disease regarding questionable latent TB and indeterminate Coccidioides antigen.  It would be helpful to clarify these issues before starting steroids or other biologic therapy for Crohn's disease.  3.  Outpatient GI follow-up will be arranged as well.  4.  GI following.                                                 Alayna Vaughn PA-C  Thank you for the opportunity to participate in the care of this patient.   Please feel free to call me with any questions or concerns.  Phone number [phone].                    CC: [facility], Jefe Martin

## 2022-12-14 NOTE — ED NOTES
Pt up independent. Reporting pain 1/10 without bowel movement. Gastrografin given, NPO until x-ray taken. Pt updated  on plan. Report called to SHARON Hollins on P2.

## 2022-12-14 NOTE — H&P
[facility]    History and Physical - [facility]     Date of Admission:  12/14/2022    Assessment & Plan      Dain Tejeda is a 42 year old female admitted on 12/14/2022.  Past medical history significant for diffuse colitis concerning for inflammatory bowel disease status post partial colectomy on 11/6/2022 for colonic stricture.  Presents for evaluation of abdominal pain.      Abdominal pain  About a day of abdominal/stoma pain with bowel movements.  CT abdomen showing evidence of ongoing mucosal hyperenhancement and wall thickening in the transverse colon/terminal ileum, consistent with ongoing inflammatory bowel disease.  No evidence of mechanical obstruction, also concern for ostomy stricture with limited output.  Discussed with surgery, will plan for Gastrografin small bowel follow-through to evaluate for stricture.  -Gastrografin small bowel follow-through  -GI consult  -General surgery consult  -Tylenol as needed for pain  -Toradol as needed  -Oxycodone as needed for breakthrough pain  - Clear liquid diet      Diffuse colitis suspected Crohn's.  Hospitalization in November for flare.  Extensive infectious work-up largely unremarkable, however indeterminant Coccidioides antigen, and indeterminant QuantiFERON gold.  However multiple intestinal biopsies without any evidence of TB.  CT abdomen as above again showing evidence of inflammatory bowel disease.  Was seen by GI as an outpatient recently, referred to infectious disease for clearance prior to treatment of inflammatory bowel disease.  We will have infectious disease come by while in the hospital as initiation of treatment may be needed sooner rather than later depending on clinical course.  -GI consult  -Infectious disease consult  - Hold off on treatment until after ID consult.         History of colonic stricture s/p partial colectomy  Recent hospitalization for diffuse colitis as above.  Resulting in  stricture which led to partial colectomy of descending colon on 11/6/2022.  Now concerned for ostomy stricture.  -Gastrografin small bowel follow-through  -We will have general surgery come by tomorrow       Normocytic anemia  Hemoglobin 8.9, up from 8.0 three weeks ago.  Suspect anemia in the setting of chronic disease vs iron deficiency anemia, she has had low ferritin's in the past.  Did have an elevated ferritin about a month ago, suspect that was acute phase reactant.  No evidence of bleed  -CBC in a.m.  -Likely repeat ferritin as an outpatient, as it may be falsely elevated with evidence of an acute colitis flare    Hypokalemia.   -Replace with potassium replacement protocol    Urinary tract infection  UA possibly suggest infection.  No urinary symptoms.  Cultures pending.  We will hold off on treatment for now she is asymptomatic         Diet: Clear Liquid Diet   DVT Prophylaxis: Pneumatic Compression Devices  Chambers Catheter: Not present  Fluids: Oral   Central Lines: None  Cardiac Monitoring: None  Code Status: Full Code    Clinically Significant Risk Factors Present on Admission        # Hypokalemia: Lowest K = 3.1 mmol/L in last 2 days, will replace as needed                        Disposition Plan      Expected Discharge Date: 12/15/2022                The patient's care was discussed with Dr Roni Martin MD  Hospitalist Service  [facility]  Securely message with the [employer] Web Console (learn more here)  Text page via [employer] Paging/Directory       ______________________________________________________________________    Chief Complaint   Abdominal pain.    History is obtained from the patient telephone  was used.    History of Present Illness   Dain Tejeda is a 42 year old female who is admitted for abdominal pain evaluation.    Patient presents with about a day and a half of abdominal pain.  Describes a pain at her stoma site, burning sensation, only  "occurs with bowel movements.  Around the time the pain started, she states her stoma inverted\" went inside her body.\"  She typically has pretty frequent stool output, but however has not had any output since around midnight.  Denies any bloody or dark tarry stools.  No nausea or vomiting.  No fever or chills.    Has otherwise been feeling well up to this point.  Had no abdominal pain.  Was tolerating normal oral intake.    Was seen by GI on 12/12/2022.  They recommended evaluation by infectious disease to rule out tuberculosis as well as Coccidioides in preparation for biologic treatment.  The plan was to do a colonoscopy to evaluate for evidence of ongoing inflammatory disease prior to treatment.    Review of Systems    The 10 point Review of Systems is negative other than noted in the HPI     Past Medical History    I have reviewed this patient's medical history and updated it with pertinent information if needed.   Past Medical History:   Diagnosis Date     Diet controlled gestational diabetes mellitus (GDM), antepartum 9/4/2017    Attempting diet control first     Gestational diabetes mellitus (GDM) in third trimester 9/4/2017    Attempting diet control first  Formatting of this note might be different from the original. Overview:  Attempting diet control first     Nausea/vomiting in pregnancy 4/24/2017     Postpartum hemorrhage 11/16/2017     Third degree laceration of perineum during delivery, postpartum 11/15/2017        Past Surgical History   I have reviewed this patient's surgical history and updated it with pertinent information if needed.  Past Surgical History:   Procedure Laterality Date     COLONOSCOPY N/A 10/31/2022    Procedure: COLONOSCOPY with biopsies;  Surgeon: Luis Miguel Traore MD;  Location: [facility] GI     COLOSTOMY N/A 11/6/2022    Procedure: CREATION, COLOSTOMY;  Surgeon: Chandana Carlos DO;  Location: [facility] OR     LAPAROTOMY EXPLORATORY N/A 11/6/2022    Procedure: EXPLORATORY " LAPAROTOMY SPLENIC FLEXURE MOBILIZATION;  Surgeon: Chandana Carlos DO;  Location: [facility] OR     RECTAL PROLAPSE REPAIR N/A 11/6/2022    Procedure: RESECTION OF DESCENDING COLON;  Surgeon: Chandana Carlos DO;  Location: [facility] OR        Social History   I have reviewed this patient's social history and updated it with pertinent information if needed. Dain Tejeda  reports that she has never smoked. She has never used smokeless tobacco. She reports that she does not drink alcohol and does not use drugs.    Family History   I have reviewed this patient's family history and updated it with pertinent information if needed.  Family History   Problem Relation Age of Onset     Gestational Diabetes Sister      Gestational Diabetes Sister      Diabetes No family hx of      Coronary Artery Disease No family hx of      Hypertension No family hx of      Breast Cancer No family hx of      Colon Cancer No family hx of      Prostate Cancer No family hx of      Other Cancer No family hx of        Prior to Admission Medications   Prior to Admission Medications   Prescriptions Last Dose Informant Patient Reported? Taking?   acetaminophen (TYLENOL) 325 MG tablet prn  No Yes   Sig: Take 2 tablets (650 mg) by mouth every 6 hours   Patient taking differently: Take 650 mg by mouth every 6 hours as needed   calcium carbonate (TUMS) 500 MG chewable tablet Unknown  No Yes   Sig: Take 1 tablet (500 mg) by mouth 2 times daily   famotidine (PEPCID) 40 MG tablet prn  No Yes   Sig: Take 1 tablet (40 mg) by mouth daily   Patient taking differently: Take 40 mg by mouth daily as needed   gabapentin (NEURONTIN) 100 MG capsule Unknown  No Yes   Sig: Take 1 capsule (100 mg) by mouth 3 times daily   oxyCODONE (ROXICODONE) 5 MG tablet prn  No Yes   Sig: Take 1 tablet (5 mg) by mouth every 4 hours as needed for moderate to severe pain      Facility-Administered Medications: None     Allergies   Allergies   Allergen Reactions      Soybean Allergy Itching and Blisters     Tofu- causes itching and sores in the mouth       Physical Exam   Vital Signs: Temp: 98.5  F (36.9  C) Temp src: Oral BP: 139/84 Pulse: 75   Resp: 16 SpO2: 100 % O2 Device: None (Room air)    Weight: 107 lbs 0 oz    General Appearance: Comfortable.  No acute distress.  Eyes: Pupils equal round reactive to light.  Clear sclera and conjunctiva.  HEENT: Moist mucous membranes.  Head atraumatic.  Respiratory: Comfort respiratory effort.  No crackles or wheezing on exam.  Cardiovascular: Regular rate and rhythm.  No murmurs rubs or gallops.  GI: Ostomy in place in left lower quadrant.  Appears inverted.  Minimal stool in the bag.  Nonbloody.  Well-healing vertical surgical scar.  Bowel sounds present.  Diffuse mild tenderness to palpation.  Skin: Warm well perfused.  No rash.  Musculoskeletal: All 4 extremities.  No lower extremity edema.  Neurologic: Oriented to person place and time.  Cranial nerves II through XII grossly intact.  Psychiatric: Affect is neutral.  Normal insight.    Data   Data reviewed today: I reviewed all medications, new labs and imaging results over the last 24 hours.     Recent Labs   Lab 12/14/22  1623 12/14/22  0936   WBC  --  6.0   HGB  --  8.9*   MCV  --  84   PLT  --  375   NA  --  140   POTASSIUM 3.2* 3.1*   CHLORIDE  --  105   CO2  --  27   BUN  --  5.7*   CR  --  0.42*   ANIONGAP  --  8   ALEX  --  9.0   GLC  --  85   ALBUMIN  --  3.7   PROTTOTAL  --  8.1   BILITOTAL  --  0.3   ALKPHOS  --  106*   ALT  --  15   AST  --  22   LIPASE  --  32

## 2022-12-14 NOTE — ED PROVIDER NOTES
Emergency Department Midlevel Supervisory Note     I personally saw the patient and performed a substantive portion of the visit including all aspects of the medical decision making.    ED Course:  12:17 PM Contreras Wallis PA-C staffed patient with me. I agree with their assessment and plan of management, and I will see the patient.  12:26 PM I met with the patient to introduce myself, gather additional history, perform my initial exam, and discuss the plan.     Brief HPI:     Dain Tejeda is a 42 year old female who presents for evaluation of abdominal pain.    Patient was interviewed using telephone interpretive services. She states that 3 days ago her symptoms started with pain when she has a bowel movement into her colostomy. She states it hurts around the area of the stoma. She denies any fever or chills. No reports of blood in stool and no bouts of vomiting. She denies any distention. No complaints of urinary symptoms. Because of persistent pain she called the nurse line and they were directing her here for assessment. No reports of respiratory symptoms.  She denies any urinary symptoms.    Please see LA note for further details.  I did discuss with the patient at the time my assessment she had CT scan and laboratory testing returned and she had received medical interventions for her discomfort.  Patient was feeling significantly improved.  Pain controlled at this time.      I, Segun Helton, am serving as a scribe to document services personally performed by Juan Byrne MD, based on my observations and the provider's statements to me.   I, Juan Byrne MD, attest that Segun Helton was acting in a scribe capacity, has observed my performance of the services and has documented them in accordance with my direction.    Brief Physical Exam:  Constitutional:  Alert, in no acute distress  EYES: Conjunctivae clear  HENT:  Atraumatic, normocephalic  Respiratory:  Respirations even, unlabored, in no acute  respiratory distress  Cardiovascular:  Regular rate and rhythm, good peripheral perfusion  GI:  Primarily right lower abdomen tenderness to palpation stoma appears appropriate  Musculoskeletal:  No edema. No cyanosis. Range of motion major extremities intact.    Integument: Warm, Dry, No erythema, No rash.   Neurologic:  Alert & oriented, no focal deficits noted  Psych: Normal mood and affect     MDM:  Patient presenting with acute on chronic colitis.  Status post partial colectomy with recent hospitalization.  Ongoing discomfort.  Colitis on imaging.  Case was reviewed with specialty services and plan of care is for admission to the hospital on IV steroids.  I updated the patient through the  on these test results and plan of care.       1. Colitis        Labs and Imaging:  Results for orders placed or performed during the hospital encounter of 12/14/22   CT Abdomen Pelvis w Contrast    Impression    IMPRESSION:     1.  Status post resection of the ascending colon with Duffy's pouch and left midabdomen colostomy. There is persistent mucosal hyperenhancement and wall thickening of the transverse colon, terminal ileum and base of the sacrum consistent with areas of   ongoing active inflammatory bowel disease. No mechanical obstruction.  2.  Minimal ascites.     Comprehensive metabolic panel   Result Value Ref Range    Sodium 140 136 - 145 mmol/L    Potassium 3.1 (L) 3.4 - 5.3 mmol/L    Chloride 105 98 - 107 mmol/L    Carbon Dioxide (CO2) 27 22 - 29 mmol/L    Anion Gap 8 7 - 15 mmol/L    Urea Nitrogen 5.7 (L) 6.0 - 20.0 mg/dL    Creatinine 0.42 (L) 0.51 - 0.95 mg/dL    Calcium 9.0 8.6 - 10.0 mg/dL    Glucose 85 70 - 99 mg/dL    Alkaline Phosphatase 106 (H) 35 - 104 U/L    AST 22 10 - 35 U/L    ALT 15 10 - 35 U/L    Protein Total 8.1 6.4 - 8.3 g/dL    Albumin 3.7 3.5 - 5.2 g/dL    Bilirubin Total 0.3 <=1.2 mg/dL    GFR Estimate >90 >60 mL/min/1.73m2   UA with Microscopic reflex to Culture    Specimen:  Urine, Midstream   Result Value Ref Range    Color Urine Colorless Colorless, Straw, Light Yellow, Yellow    Appearance Urine Clear Clear    Glucose Urine Negative Negative mg/dL    Bilirubin Urine Negative Negative    Ketones Urine Negative Negative mg/dL    Specific Gravity Urine 1.008 1.001 - 1.030    Blood Urine >1.0 mg/dL (A) Negative    pH Urine 6.5 5.0 - 7.0    Protein Albumin Urine Negative Negative mg/dL    Urobilinogen Urine <2.0 <2.0 mg/dL    Nitrite Urine Negative Negative    Leukocyte Esterase Urine 250 Shilpa/uL (A) Negative    Mucus Urine Present (A) None Seen /LPF    RBC Urine 46 (H) <=2 /HPF    WBC Urine 16 (H) <=5 /HPF    Squamous Epithelials Urine 2 (H) <=1 /HPF   HCG qualitative urine (UPT)   Result Value Ref Range    hCG Urine Qualitative Negative Negative   Result Value Ref Range    Lipase 32 13 - 60 U/L   CBC with platelets and differential   Result Value Ref Range    WBC Count 6.0 4.0 - 11.0 10e3/uL    RBC Count 3.50 (L) 3.80 - 5.20 10e6/uL    Hemoglobin 8.9 (L) 11.7 - 15.7 g/dL    Hematocrit 29.3 (L) 35.0 - 47.0 %    MCV 84 78 - 100 fL    MCH 25.4 (L) 26.5 - 33.0 pg    MCHC 30.4 (L) 31.5 - 36.5 g/dL    RDW 14.2 10.0 - 15.0 %    Platelet Count 375 150 - 450 10e3/uL    % Neutrophils 63 %    % Lymphocytes 25 %    % Monocytes 5 %    % Eosinophils 6 %    % Basophils 1 %    % Immature Granulocytes 0 %    NRBCs per 100 WBC 0 <1 /100    Absolute Neutrophils 3.8 1.6 - 8.3 10e3/uL    Absolute Lymphocytes 1.5 0.8 - 5.3 10e3/uL    Absolute Monocytes 0.3 0.0 - 1.3 10e3/uL    Absolute Eosinophils 0.4 0.0 - 0.7 10e3/uL    Absolute Basophils 0.0 0.0 - 0.2 10e3/uL    Absolute Immature Granulocytes 0.0 <=0.4 10e3/uL    Absolute NRBCs 0.0 10e3/uL     I have reviewed the relevant laboratory and radiology studies    Procedures:  I was present for the key portions of this procedure: none    Juan Byrne MD  [facility] EMERGENCY DEPARTMENT  [address] [place] [address]  [id number]  [phone]       Juan Byrne MD  12/14/22 7253

## 2022-12-14 NOTE — PHARMACY-ADMISSION MEDICATION HISTORY
Pharmacy Note - Admission Medication History    Pertinent Provider Information:      ______________________________________________________________________    Prior To Admission (PTA) med list completed and updated in EMR.       PTA Med List   Medication Sig Last Dose     acetaminophen (TYLENOL) 325 MG tablet Take 2 tablets (650 mg) by mouth every 6 hours (Patient taking differently: Take 650 mg by mouth every 6 hours as needed) prn     calcium carbonate (TUMS) 500 MG chewable tablet Take 1 tablet (500 mg) by mouth 2 times daily Unknown     famotidine (PEPCID) 40 MG tablet Take 1 tablet (40 mg) by mouth daily (Patient taking differently: Take 40 mg by mouth daily as needed) prn     gabapentin (NEURONTIN) 100 MG capsule Take 1 capsule (100 mg) by mouth 3 times daily Unknown     oxyCODONE (ROXICODONE) 5 MG tablet Take 1 tablet (5 mg) by mouth every 4 hours as needed for moderate to severe pain prn       Information source(s): Patient, Family member and [facility]  Method of interview communication: in-person and phone    Summary of Changes to PTA Med List  New: n/a  Discontinued: n/a  Changed: acetaminophen, famotidine    Patient was asked about OTC/herbal products specifically.  PTA med list reflects this.    In the past week, patient estimated taking medication this percent of the time: unable to assess    Allergies were reviewed, assessed, and updated with the patient.      Patient does not use any multi-dose medications prior to admission.    The information provided in this note is only as accurate as the sources available at the time of the update(s).    Thank you for the opportunity to participate in the care of this patient.    Vy Guilluame [facility]  12/14/2022 12:52 PM

## 2022-12-15 VITALS
BODY MASS INDEX: 22.16 KG/M2 | TEMPERATURE: 98.2 F | RESPIRATION RATE: 18 BRPM | HEART RATE: 69 BPM | OXYGEN SATURATION: 98 % | SYSTOLIC BLOOD PRESSURE: 123 MMHG | WEIGHT: 107 LBS | DIASTOLIC BLOOD PRESSURE: 79 MMHG

## 2022-12-15 LAB
ALBUMIN SERPL BCG-MCNC: 3.7 G/DL (ref 3.5–5.2)
ALP SERPL-CCNC: 101 U/L (ref 35–104)
ALT SERPL W P-5'-P-CCNC: 17 U/L (ref 10–35)
ANION GAP SERPL CALCULATED.3IONS-SCNC: 10 MMOL/L (ref 7–15)
AST SERPL W P-5'-P-CCNC: 23 U/L (ref 10–35)
BACTERIA BLD CULT: NO GROWTH
BACTERIA BLD CULT: NO GROWTH
BACTERIA UR CULT: NORMAL
BILIRUB SERPL-MCNC: 0.3 MG/DL
BUN SERPL-MCNC: 6.7 MG/DL (ref 6–20)
CALCIUM SERPL-MCNC: 9.1 MG/DL (ref 8.6–10)
CHLORIDE SERPL-SCNC: 105 MMOL/L (ref 98–107)
CREAT SERPL-MCNC: 0.46 MG/DL (ref 0.51–0.95)
DEPRECATED HCO3 PLAS-SCNC: 23 MMOL/L (ref 22–29)
ERYTHROCYTE [DISTWIDTH] IN BLOOD BY AUTOMATED COUNT: 14.1 % (ref 10–15)
GFR SERPL CREATININE-BSD FRML MDRD: >90 ML/MIN/1.73M2
GLUCOSE SERPL-MCNC: 94 MG/DL (ref 70–99)
HCT VFR BLD AUTO: 29.1 % (ref 35–47)
HGB BLD-MCNC: 8.6 G/DL (ref 11.7–15.7)
MCH RBC QN AUTO: 25.2 PG (ref 26.5–33)
MCHC RBC AUTO-ENTMCNC: 29.6 G/DL (ref 31.5–36.5)
MCV RBC AUTO: 85 FL (ref 78–100)
PLATELET # BLD AUTO: 349 10E3/UL (ref 150–450)
POTASSIUM SERPL-SCNC: 3.3 MMOL/L (ref 3.4–5.3)
POTASSIUM SERPL-SCNC: 3.6 MMOL/L (ref 3.4–5.3)
POTASSIUM SERPL-SCNC: 3.6 MMOL/L (ref 3.4–5.3)
PROT SERPL-MCNC: 8.2 G/DL (ref 6.4–8.3)
RBC # BLD AUTO: 3.41 10E6/UL (ref 3.8–5.2)
SODIUM SERPL-SCNC: 138 MMOL/L (ref 136–145)
WBC # BLD AUTO: 5.5 10E3/UL (ref 4–11)

## 2022-12-15 PROCEDURE — 87449 NOS EACH ORGANISM AG IA: CPT

## 2022-12-15 PROCEDURE — 99024 POSTOP FOLLOW-UP VISIT: CPT | Performed by: SURGERY

## 2022-12-15 PROCEDURE — 36415 COLL VENOUS BLD VENIPUNCTURE: CPT

## 2022-12-15 PROCEDURE — 86481 TB AG RESPONSE T-CELL SUSP: CPT

## 2022-12-15 PROCEDURE — 250N000013 HC RX MED GY IP 250 OP 250 PS 637

## 2022-12-15 PROCEDURE — 99214 OFFICE O/P EST MOD 30 MIN: CPT

## 2022-12-15 PROCEDURE — 250N000013 HC RX MED GY IP 250 OP 250 PS 637: Performed by: FAMILY MEDICINE

## 2022-12-15 PROCEDURE — 86635 COCCIDIOIDES ANTIBODY: CPT

## 2022-12-15 PROCEDURE — 85027 COMPLETE CBC AUTOMATED: CPT

## 2022-12-15 PROCEDURE — 84132 ASSAY OF SERUM POTASSIUM: CPT

## 2022-12-15 PROCEDURE — 99217 PR OBSERVATION CARE DISCHARGE: CPT | Mod: GC

## 2022-12-15 PROCEDURE — G0378 HOSPITAL OBSERVATION PER HR: HCPCS

## 2022-12-15 RX ORDER — POTASSIUM CHLORIDE 1500 MG/1
20 TABLET, EXTENDED RELEASE ORAL ONCE
Status: COMPLETED | OUTPATIENT
Start: 2022-12-15 | End: 2022-12-15

## 2022-12-15 RX ADMIN — ACETAMINOPHEN 650 MG: 325 TABLET ORAL at 01:59

## 2022-12-15 RX ADMIN — POTASSIUM CHLORIDE 20 MEQ: 1500 TABLET, EXTENDED RELEASE ORAL at 01:56

## 2022-12-15 ASSESSMENT — ACTIVITIES OF DAILY LIVING (ADL)
FALL_HISTORY_WITHIN_LAST_SIX_MONTHS: NO
ADLS_ACUITY_SCORE: 18
ADLS_ACUITY_SCORE: 20
ADLS_ACUITY_SCORE: 20
EQUIPMENT_CURRENTLY_USED_AT_HOME: COLOSTOMY/OSTOMY SUPPLIES
ADLS_ACUITY_SCORE: 20
ADLS_ACUITY_SCORE: 35
ADLS_ACUITY_SCORE: 18
CHANGE_IN_FUNCTIONAL_STATUS_SINCE_ONSET_OF_CURRENT_ILLNESS/INJURY: NO
DRESSING/BATHING_DIFFICULTY: NO
TOILETING_ISSUES: NO
WEAR_GLASSES_OR_BLIND: NO
WALKING_OR_CLIMBING_STAIRS_DIFFICULTY: NO
ADLS_ACUITY_SCORE: 20
CONCENTRATING,_REMEMBERING_OR_MAKING_DECISIONS_DIFFICULTY: NO
ADLS_ACUITY_SCORE: 18
DOING_ERRANDS_INDEPENDENTLY_DIFFICULTY: NO
DIFFICULTY_EATING/SWALLOWING: NO
ADLS_ACUITY_SCORE: 35

## 2022-12-15 NOTE — DISCHARGE SUMMARY
Pt was taken through discharge instructions and medication list and reminded of follow up appointments. Pt had supplies from the WOC RN for her colostomy. All concerns and questions were answered and addressed appropriately. Pt walked off the floor in the company of her family.

## 2022-12-15 NOTE — UTILIZATION REVIEW
"Inpatient to Observation note:    Admission Status; Secondary Review Determination     Under the authority of the Utilization Management Committee, the utilization review process indicated a secondary review on the above patient.  The review outcome is based on review of the medical records, discussions with staff, and applying clinical experience noted on the date of the review.        (x) Observation Status Appropriate - This patient does not meet hospital inpatient criteria and is placed in observation status. If this patient's primary payer is Medicare and was admitted as an inpatient, Condition Code 44 should be used and patient status changed to \"observation\".     RATIONALE FOR DETERMINATION   42-year-old female evaluated in the ED on 12/14/2022 for pain in the stoma when stool is being pushed through.  Past medical history of celiac sprue, Crohn's colitis, colonic stricture status post exploratory laparotomy, splenic flexure mobilization, colostomy creation, resection of descending colon on 11/6/2022, indeterminate QuantiFERON, positive Coccidioides antigen.  Vital signs stable upon ED arrival.  Laboratory work-up remarkable for potassium 3.1, hemoglobin 8.9.  CT abdomen pelvis showed postop changes and persistent mucosal hyperenhancement and wall thickening of the transverse colon, terminal ileum and base of the sacrum consistent with areas of ongoing active inflammatory bowel disease but no mechanical obstruction.  GI service consulted, recommended ID consult regarding questionable latent TB and indeterminate Coccidioides antigen before starting treatment for Crohn's disease.  General surgeon consulted, no evidence of obstruction, advance diet as tolerated.  ID team recommended that the test can be repeated and see if conclusive results are given.    The severity of illness, intensity of service provided, expected LOS and risk for adverse outcome make the care appropriate for further observation; however, " doesn't meet criteria for hospital inpatient admission. Dr Martin notified of this determination.    This document was produced using voice recognition software.      The information on this document is developed by the utilization review team in order for the business office to ensure compliance.  This only denotes the appropriateness of proper admission status and does not reflect the quality of care rendered.         The definitions of Inpatient Status and Observation Status used in making the determination above are those provided in the CMS Coverage Manual, Chapter 1 and Chapter 6, section 70.4.      Sincerely,     Dexter Marcus MD  [facility]  Utilization Review Physician Advisor  Pager: [phone]

## 2022-12-15 NOTE — CONSULTS
HPI  42 year old year old female who I have been consulted by No ref. provider found for evaluation of Other (Stoma Pain)  42-year-old female with a history of Crohn's colitis with stricturing status post ascending colon resection last month with diverting colostomy who presented to the hospital because she felt that her stoma was retracting.  She denies any nausea, vomiting fevers or chills.  She states she did have mild abdominal discomfort prior to presentation but this is resolved.  She has been eating regular diet and having adequate output out of her colostomy.  She has seen GI recently for Crohn's disease.  Her treatment for Crohn's was held secondary to equivocal diagnoses of latent TB.  Currently she is sitting in bed tolerating clear liquid diet.  She had a small bowel follow-through last night which demonstrated adequate movement of contrast through the colon and out of the colostomy bag.  She has no complaints of pain and is hungry.      Allergies:Soybean allergy    Past Medical History:   Diagnosis Date     Diet controlled gestational diabetes mellitus (GDM), antepartum 9/4/2017    Attempting diet control first     Gestational diabetes mellitus (GDM) in third trimester 9/4/2017    Attempting diet control first  Formatting of this note might be different from the original. Overview:  Attempting diet control first     Nausea/vomiting in pregnancy 4/24/2017     Postpartum hemorrhage 11/16/2017     Third degree laceration of perineum during delivery, postpartum 11/15/2017       Past Surgical History:   Procedure Laterality Date     COLONOSCOPY N/A 10/31/2022    Procedure: COLONOSCOPY with biopsies;  Surgeon: Luis Miguel Traore MD;  Location: [facility] GI     COLOSTOMY N/A 11/6/2022    Procedure: CREATION, COLOSTOMY;  Surgeon: Chandana Carlos DO;  Location: [facility] OR     LAPAROTOMY EXPLORATORY N/A 11/6/2022    Procedure: EXPLORATORY LAPAROTOMY SPLENIC FLEXURE MOBILIZATION;  Surgeon: Chandana Carlos  DO Td;  [place] [facility] [place]     RECTAL PROLAPSE REPAIR N/A 11/6/2022    Procedure: RESECTION OF DESCENDING COLON;  Surgeon: Chandana Carlos DO;  [place] [facility] [place]         CURRENT MEDS:    Current Facility-Administered Medications:      acetaminophen (TYLENOL) tablet 650 mg, 650 mg, Oral, Q6H PRN, 650 mg at 12/15/22 0159 **OR** acetaminophen (TYLENOL) Suppository 650 mg, 650 mg, Rectal, Q6H PRN, Jefe Martin MD     ketorolac (TORADOL) injection 15 mg, 15 mg, Intravenous, Q6H PRN, Jefe Martin MD     lidocaine (LMX4) cream, , Topical, Q1H PRN, Jefe Martin MD     lidocaine 1 % 0.1-1 mL, 0.1-1 mL, Other, Q1H PRN, Jefe Martin MD     melatonin tablet 1 mg, 1 mg, Oral, At Bedtime PRN, Jefe Martin MD     ondansetron (ZOFRAN ODT) ODT tab 4 mg, 4 mg, Oral, Q6H PRN **OR** ondansetron (ZOFRAN) injection 4 mg, 4 mg, Intravenous, Q6H PRN, Jefe Martin MD     oxyCODONE IR (ROXICODONE) half-tab 2.5 mg, 2.5 mg, Oral, Q4H PRN, Jefe Martin MD     prochlorperazine (COMPAZINE) injection 10 mg, 10 mg, Intravenous, Q6H PRN **OR** prochlorperazine (COMPAZINE) tablet 10 mg, 10 mg, Oral, Q6H PRN **OR** prochlorperazine (COMPAZINE) suppository 25 mg, 25 mg, Rectal, Q12H PRN, Jefe Martin MD     sodium chloride (PF) 0.9% PF flush 3 mL, 3 mL, Intracatheter, Q8H, Jefe Martin MD, 3 mL at 12/15/22 0932     sodium chloride (PF) 0.9% PF flush 3 mL, 3 mL, Intracatheter, q1 min prn, Jefe Martin MD    [facility]     reports that she has never smoked. She has never used smokeless tobacco. She reports that she does not drink alcohol and does not use drugs.    Review of Systems:  The 12 point review of systems  is within normal limits except for as mentioned above in the HPI.  General ROS: No complaints or constitutional symptoms  Ophthalmic ROS: No complaints of visual changes  Skin: No complaints or symptoms    Endocrine: No complaints or symptoms  Hematologic/Lymphatic: No symptoms or complaints  Psychiatric: No symptoms or complaints  Respiratory ROS: no cough, shortness of breath, or wheezing  Cardiovascular ROS: no chest pain or dyspnea on exertion  Gastrointestinal ROS: As per HPI  Genito-Urinary ROS: no dysuria, trouble voiding, or hematuria  Musculoskeletal ROS: no joint or muscle pain  Neurological ROS: no TIA or stroke symptoms      EXAM:  /79 (BP Location: Right arm)   Pulse 69   Temp 98.2  F (36.8  C) (Oral)   Resp 18   Wt 48.5 kg (107 lb)   LMP 11/22/2022 (Approximate)   SpO2 98%   BMI 22.16 kg/m    GENERAL: Well developed female, No acute distress, pleasant and conversant   EYES: Pupils equal, round and reactive, no scleral icterus  ABDOMEN: Stoma is patent and pink with stool in bag  SKIN: Pink, warm and dry, no obvious rashes or lesions   NEURO:No focal deficits, ambulatory  MUSCULOSKELETAL:No obvious deformities, no swelling, normal appearing      LABS:  Lab Results   Component Value Date    WBC 5.5 12/15/2022    HGB 8.6 12/15/2022    HGB 10.8 01/10/2018    HCT 29.1 12/15/2022    HCT 35.9 01/10/2018    MCV 85 12/15/2022    MCV 83.1 01/10/2018     12/15/2022     INR/Prothrombin Time  Recent Labs   Lab 12/15/22  0658      CO2 23   BUN 6.7     Lab Results   Component Value Date    ALT 17 12/15/2022    AST 23 12/15/2022    ALKPHOS 101 12/15/2022       IMAGES:   Relevant images were reviewed and discussed with the patient.  Notable findings were: CT images reviewed    Assessment/Plan:   Dain Tejeda is a 42 year old female with Crohn's colitis.  There is no evidence of any obstruction.  Her stoma is pink and patent.  She apparently came into the hospital out of concern for her stoma.  This is functioning properly with no evidence of obstruction or retraction.  I explained to her with the help of , that sometimes the stoma will approach out a little bit and then retract depending  on motility of the colon.  At this point she would like to eat which I think is reasonable.  We can advance her diet to regular diet.    Per the gastroenterology IBD note, there was question on whether or not infectious disease should be evaluating the patient for possible latent TB.  Not sure if this has to be done as an inpatient.    From a surgical standpoint she can start a regular diet and be discharged home.  Surgery will sign off for now.  Please call with any questions or concerns.  She will follow-up with me as an outpatient in clinic.      Pavel Carlos D.O. FACS  [phone]
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
## 2022-12-15 NOTE — CONSULTS
"Consultation - [facility]  Dain Tejeda,  1980, MRN [id number]    [facility]  Colitis [K52.9]    PCP: Jefe Martin, [phone]   Code status:  Full Code       Extended Emergency Contact Information  Primary Emergency Contact: STEPHON DORAN  Address: [address] [place] [address] [place]  Home Phone: [phone]  Relation: Spouse  Preferred language: Hmong   needed? Yes       Assessment and Plan   Principal Problem:    Colitis    Impression: Crohn's colitis.    Extensive work-up during previous hospitalization for possible infections.  At that time, no infections were identified.    1 month ago, the QuantiFERON was indeterminate, as was the Coccidioides antibody.    Recommendations: We can repeat the above 2 tests at this time and see if they will give conclusive results.    Consult request was to \"clear\" patient for use of Biologics.  We do not \"clear\" patients, but we can discuss risks of infection.    If test results are again inconclusive then patient and prescriber should be aware that there is increased risk of infection over baseline.    If tests are positive, then we can try and treat for latent TB or incipient coccidioidomycosis.    If tests are negative, then patient will have baseline risk.    Turnaround times for these tests is several days to a week.  I would not keep patient hospitalized just to wait for test results.  I would not start any treatments while waiting for test results.    Okay to dismiss home anytime from infectious disease standpoint.  Should the test be positive, would be happy to see her in our clinic in follow-up.    Thank you for consulting [facility]    Bob Cunha MD  [phone]     Chief Complaint Colitis       HPI   We have been requested by Dr. Jefe Martin and Alayna Vaughn PA-C to evaluate Dain Tejeda for \"Refered to infectious " "disease as an outpatient due to concerns for possible Coccidioides/latent tuberculosis.  Would like to have her cleared for biologic treatment if needed\" who is a 42 year old year old female.    Patient presented to emergency department yesterday complaining of abdominal pain related to her colostomy stoma.  The symptoms seem to resolve.  She has been assessed by surgery, and no surgical plans are forthcoming.    During her hospitalization last month, she was seen by her service and extensively evaluated for infectious disease concerns because of findings of granuloma on biopsy.  At 1 point, she was started on antituberculosis treatment empirically, which she did not tolerate.  So it was discontinued.    During that admission, QuantiFERON test was indeterminant due to lack of responsiveness to the control antigen.  Coccidioidomycosis antibody was also indeterminant due to interfering complement levels.    At this time, patient states she is feeling better.  She denies any fevers, chills, sweats.  She remains concerned about abdominal discomfort.    Her gastroenterologist are contemplating use of biologic agents for treatment of her Crohn's colitis.       Medical History  Patient Active Problem List   Diagnosis     Screening for cervical cancer- ASCUS, HPV+ 4/2017     Pain of back and right lower extremity     Abdominal pain, generalized     Abdominal pain     Inflammation of small intestine     Moderate recurrent major depression (H)     Hypokalemia     Colitis     Intra-abdominal abscess (H)     Celiac disease     Diarrhea     Chronic gastritis     Chronic diarrhea     Crohn's disease with other complication, unspecified gastrointestinal tract location (H)     S/P partial colectomy     Anemia, unspecified type     Past Medical History:   Diagnosis Date     Diet controlled gestational diabetes mellitus (GDM), antepartum 9/4/2017    Attempting diet control first     Gestational diabetes mellitus (GDM) in third " trimester 9/4/2017    Attempting diet control first  Formatting of this note might be different from the original. Overview:  Attempting diet control first     Nausea/vomiting in pregnancy 4/24/2017     Postpartum hemorrhage 11/16/2017     Third degree laceration of perineum during delivery, postpartum 11/15/2017    Surgical History  She  has a past surgical history that includes Colonoscopy (N/A, 10/31/2022); Laparotomy exploratory (N/A, 11/6/2022); Colostomy (N/A, 11/6/2022); and Rectal Prolapse Repair (N/A, 11/6/2022).   Social History  Reviewed, and she  reports that she has never smoked. She has never used smokeless tobacco. She reports that she does not drink alcohol and does not use drugs.   Allergies  Allergies   Allergen Reactions     Soybean Allergy Itching and Blisters     Tofu- causes itching and sores in the mouth    Family History  Noncontributory to current problem except as mentioned above    Psychosocial Needs  Social History     Social History Narrative    Born in [facility], arrived to [facility] 02/2017. No education.      Additional psychosocial needs reviewed per nursing assessment.     Prior to Admission Medications   Medications Prior to Admission   Medication Sig Dispense Refill Last Dose     acetaminophen (TYLENOL) 325 MG tablet Take 2 tablets (650 mg) by mouth every 6 hours (Patient taking differently: Take 650 mg by mouth every 6 hours as needed) 120 tablet 1 prn     calcium carbonate (TUMS) 500 MG chewable tablet Take 1 tablet (500 mg) by mouth 2 times daily 90 tablet 1 Unknown     famotidine (PEPCID) 40 MG tablet Take 1 tablet (40 mg) by mouth daily (Patient taking differently: Take 40 mg by mouth daily as needed) 60 tablet 0 prn     gabapentin (NEURONTIN) 100 MG capsule Take 1 capsule (100 mg) by mouth 3 times daily 90 capsule 1 Unknown     oxyCODONE (ROXICODONE) 5 MG tablet Take 1 tablet (5 mg) by mouth every 4 hours as needed for moderate to severe pain 12 tablet 0 prn          Review of  Systems:  Pertinent items are noted in HPI., otherwise all others negative. Physical Exam:  Temp:  [98  F (36.7  C)-98.7  F (37.1  C)] 98.2  F (36.8  C)  Pulse:  [69-76] 69  Resp:  [16-18] 18  BP: (123-144)/(79-87) 123/79  SpO2:  [98 %-100 %] 98 %    /79 (BP Location: Right arm)   Pulse 69   Temp 98.2  F (36.8  C) (Oral)   Resp 18   Wt 48.5 kg (107 lb)   LMP 11/22/2022 (Approximate)   SpO2 98%   BMI 22.16 kg/m    General appearance: alert, appears stated age and cooperative  Head: Normocephalic, without obvious abnormality, atraumatic  Eyes: EOMI, no icterus  Neck: no adenopathy and supple, symmetrical, trachea midline  Lungs: clear to auscultation bilaterally  Heart: RRR, no murmur  Abdomen: Soft abdomen with colostomy on the left side with some loose green stool  Extremities: Warm and dry  Skin: Skin color, texture, turgor normal. No rashes or lesions  Neurologic: Grossly normal       Pertinent Labs  Lab Results: personally reviewed.   WBC Count   Date/Time Value Ref Range Status   12/15/2022 06:58 AM 5.5 4.0 - 11.0 10e3/uL Final   12/14/2022 09:36 AM 6.0 4.0 - 11.0 10e3/uL Final   11/22/2022 02:56 PM 4.7 4.0 - 11.0 10e3/uL Final     Hemoglobin   Date/Time Value Ref Range Status   12/15/2022 06:58 AM 8.6 (L) 11.7 - 15.7 g/dL Final   12/14/2022 09:36 AM 8.9 (L) 11.7 - 15.7 g/dL Final   11/22/2022 02:56 PM 8.0 (L) 11.7 - 15.7 g/dL Final   01/10/2018 08:45 AM 10.8 (L) 11.7 - 15.7 g/dL Final   11/08/2017 10:52 AM 10.3 (L) 11.7 - 15.7 g/dL Final   09/18/2017 04:46 PM 9.8 (L) 11.7 - 15.7 g/dL Final     Hematocrit   Date/Time Value Ref Range Status   12/15/2022 06:58 AM 29.1 (L) 35.0 - 47.0 % Final   12/14/2022 09:36 AM 29.3 (L) 35.0 - 47.0 % Final   11/22/2022 02:56 PM 25.9 (L) 35.0 - 47.0 % Final   01/10/2018 08:45 AM 35.9 35.0 - 47.0 % Final   11/08/2017 10:52 AM 33.8 (L) 35.0 - 47.0 % Final   07/31/2017 11:27 AM 31.4 (L) 35.0 - 47.0 % Final     Platelet Count   Date/Time Value Ref Range Status    12/15/2022 06:58  150 - 450 10e3/uL Final   12/14/2022 09:36  150 - 450 10e3/uL Final   11/22/2022 02:56  (H) 150 - 450 10e3/uL Final        Sodium   Date/Time Value Ref Range Status   12/15/2022 06:58  136 - 145 mmol/L Final   12/14/2022 09:36  136 - 145 mmol/L Final   11/13/2022 06:55  136 - 145 mmol/L Final   04/24/2017 10:12 .0 133.0 - 144.0 mmol/L Final     Carbon Dioxide   Date/Time Value Ref Range Status   04/24/2017 10:12 AM 26.0 20.0 - 32.0 mmol/L Final     Carbon Dioxide (CO2)   Date/Time Value Ref Range Status   12/15/2022 06:58 AM 23 22 - 29 mmol/L Final   12/14/2022 09:36 AM 27 22 - 29 mmol/L Final   11/13/2022 06:55 AM 32 (H) 22 - 29 mmol/L Final   06/23/2022 10:19 AM 25 22 - 31 mmol/L Final   06/03/2022 04:43 AM 23 22 - 31 mmol/L Final   06/02/2022 04:42 AM 22 22 - 31 mmol/L Final     Urea Nitrogen   Date/Time Value Ref Range Status   12/15/2022 06:58 AM 6.7 6.0 - 20.0 mg/dL Final   12/14/2022 09:36 AM 5.7 (L) 6.0 - 20.0 mg/dL Final   11/13/2022 06:55 AM 5.0 (L) 6.0 - 20.0 mg/dL Final   06/23/2022 10:19 AM 6 (L) 8 - 22 mg/dL Final   06/03/2022 04:43 AM 3 (L) 8 - 22 mg/dL Final   06/02/2022 04:42 AM 4 (L) 8 - 22 mg/dL Final   04/24/2017 10:12 AM 7.0 5.0 - 24.0 mg/dL Final     12/15/2022 0949 12/15/2022 1001 Coccidioides Agn Quant EIA Blood [[id number]]   Blood from Arm, Right    In process Component Value   No component results          12/15/2022 0949 12/15/2022 0959 Quantiferon TB Gold Plus Grey Tube [[id number]]   Blood from Arm, Right    In process Component Value   No component results          12/15/2022 0949 12/15/2022 0959 Quantiferon TB Gold Plus Green Tube [[id number]]   Blood from Arm, Right    In process Component Value   No component results          12/15/2022 0949 12/15/2022 0959 Quantiferon TB Gold Plus Yellow Tube [[id number]]   Blood from Arm, Right    In process Component Value   No component results          12/15/2022 0949  12/15/2022 0959 Quantiferon TB Gold Plus Purple Tube [[id number]]   Blood from Arm, Right    In process Component Value   No component results          12/14/2022 1004 12/14/2022 1014 Urine Culture [[id number]]   Urine, Midstream    In process Component Value   No component results          11/12/2022 0655 11/16/2022 1136 Coccidioides Agn Quant EIA Blood [[id number]]   Blood from Arm, Right    Final result Component Value   See Scanned Result COCCIDIOIDES AGN QUANT EIA BLOOD-Scanned          11/11/2022 1845 11/16/2022 1248 Coccidioides Agn Quant EIA Non Blood [[id number]]   Urine, Midstream    Final result Component Value   See Scanned Result COCCIDIOIDES AGN QUANT EIA NON BLOOD-Scanned          11/06/2022 1613 11/06/2022 1846 Acid-Fast Bacilli Culture and Stain [[id number]]    Peritoneal Fluid from Peritoneum    In process Component Value   No component results          11/06/2022 1613 11/13/2022 0759 Anaerobic Bacterial Culture Routine [[id number]]   Peritoneal Fluid from Peritoneum    Final result Component Value   Culture No anaerobic organisms isolated          11/06/2022 1613 11/11/2022 0714 Peritoneal Fluid Aerobic Bacterial Culture Routine [[id number]]   Peritoneal Fluid from Peritoneum    Final result Component Value   Culture No Growth          11/06/2022 1613 11/09/2022 1744 Acid-Fast Bacilli Culture and Stain [[id number]]    Peritoneal Fluid from Peritoneum    Preliminary result Component Value   Acid Fast Stain No acid fast bacilli seen P   Less than 5 mL of specimen received. A minimum of 5 mL of sputum or fluid is recommended for recovery of acid fast bacilli (AFB). Volumes less than 5 mL are suboptimal and may compromise recovery of AFB from culture.   Acid Fast Stain No acid fast bacilli seen P   Less than 5 mL of specimen received. A minimum of 5 mL of sputum or fluid is recommended for recovery of acid fast bacilli (AFB). Volumes less than 5 mL are suboptimal and may compromise  recovery of AFB from culture.   This is an appended report. These results have been appended to a previously preliminary verified report.          11/06/2022 1034 11/06/2022 1116 Asymptomatic COVID-19 Virus (Coronavirus) by PCR Nasopharyngeal [[id number]]    Swab from Nasopharyngeal    Final result Component Value   SARS CoV2 PCR Negative   NEGATIVE: SARS-CoV-2 (COVID-19) RNA not detected, presumed negative.          11/04/2022 1046 11/06/2022 1201 Quantiferon TB Gold Plus Grey Tube [[id number]]   Blood from Arm, Right    Final result Component Value Units   Quantiferon Nil Tube 0.57 IU/mL          11/04/2022 1046 11/06/2022 1201 Quantiferon TB Gold Plus Green Tube [[id number]]   Blood from Arm, Right    Final result Component Value Units   Quantiferon TB1 Tube 0.58 IU/mL          11/04/2022 1046 11/06/2022 1202 Quantiferon TB Gold Plus Yellow Tube [[id number]]   Blood from Arm, Right    Final result Component Value   Quantiferon TB2 Tube 0.59          11/04/2022 1046 11/06/2022 1202 Quantiferon TB Gold Plus Purple Tube [[id number]]   Blood from Arm, Right    Final result Component Value Units   Quantiferon Mitogen 0.67 IU/mL          11/04/2022 1046 11/06/2022 1207 Quantiferon TB Gold Plus [[id number]]   (Abnormal)   Blood from Arm, Right    Final result Component Value Units   Quantiferon-TB Gold Plus Indeterminate Abnormal     Unable to evaluate interferon gamma response due to a low   mitogen value, which may be the result of insufficient lymphocytes, reduced   lymphocyte activity due to improper specimen handling, or inability of the   patient's lymphocytes to generate interferon gamma.   TB1 Ag minus Nil Value 0.01 IU/mL   TB2 Ag minus Nil Value 0.02 IU/mL   Mitogen minus Nil Result 0.10 IU/mL   Nil Result 0.57 IU/mL          11/03/2022 2026 11/03/2022 2035 Acid-Fast Bacilli Culture and Stain [[id number]]    Tissue from Rectum    In process Component Value   No component results           11/03/2022 2026 11/06/2022 0242 Acid-Fast Bacilli Culture and Stain [[id number]]    Tissue from Rectum    Preliminary result Component Value   Acid Fast Stain No acid fast bacilli seen P   Performed by [facility],[address] [facility], [place] [address] [phone][employer], Harry Shell MD, PHD, Lab. Director   Acid Fast Stain No acid fast bacilli seen P   Performed by [facility],[address] [facility], [place] [address] [phone][employer], Harry Shell MD, PHD, Lab. Director   This is an appended report. These results have been appended to a previously preliminary verified report.          11/03/2022 1735 11/03/2022 1755 Acid-Fast Bacilli Culture and Stain with AFB Stain [[id number]]    Sputum from Mouth    In process Component Value   No component results          11/03/2022 1735 11/06/2022 0240 Acid-Fast Bacilli Culture and Stain with AFB Stain [[id number]]    Sputum from Mouth    Preliminary result Component Value   Acid Fast Stain No acid fast bacilli seen P   Less than 5 mL of specimen received. A minimum of 5 mL of sputum or fluid is recommended for recovery of acid fast bacilli (AFB). Volumes less than 5 mL are suboptimal and may compromise recovery of AFB from culture.   Acid Fast Stain No acid fast bacilli seen P   Less than 5 mL of specimen received. A minimum of 5 mL of sputum or fluid is recommended for recovery of acid fast bacilli (AFB). Volumes less than 5 mL are suboptimal and may compromise recovery of AFB from culture.   This is an appended report. These results have been appended to a previously preliminary verified report.          11/03/2022 0733 11/08/2022 1152 Histoplasma Capsulatum Antigen [[id number]]   Blood from Arm, Right    Final result Component Value   See Scanned Result HISTOPLASMA CAPSULATUM ANTIGEN-Scanned          11/03/2022 0733 11/07/2022 1744 Histoplasma Antigen Quantitative by EIA [[id number]]   Blood from Arm, Right    Final result Component  Value   Histoplasma Antigen, Serum Not Detected   Performed By: [facility]   [address] [place] [address]   : Harry Shell MD, PhD   Histoplasma Antigen, Serum Interp Not Detected   INTERPRETIVE INFORMATION: Histoplasma Antigen Quantitative   by EIA, Serum     Less than 0.19 ng/mL = Not Detected   0.19-60.0 ng/mL = Detected   Greater than 60.0 ng/mL = Detected (above the limit of   quantification).     The quantitative range of this assay is 0.19-60.0 ng/mL.   Antigen concentrations greater than 60.0 ng/mL fall outside   the linear range of the assay and cannot be accurately   quantified.     This EIA test should be used in conjunction with other   diagnostic procedures, including microbiological culture,   histological examination of biopsy samples, and/or   radiographic evidence, to aid in the diagnosis of   histoplasmosis.     Crossreactivity with Blastomyces dermatiditis, Coccidioides   immitis and possibly Talaromyces marneffei have been   observed with this EIA.  Other clinically and   geographically relevant endemic mycoses should be   considered in the case of a positive test result.     This test was developed and its performance characteristics   determined by [facility]. It has not been cleared or   approved by the [place] Food and Drug Administration. This test   was performed in a CLIA certified laboratory and is   intended for clinical purposes.          11/03/2022 0733 12/15/2022 1104 Acid-fast Bacilli (AFB) Blood Culture [[id number]]   Blood from Arm, Right    Final result Component Value   Culture No Growth          11/03/2022 0733 11/08/2022 1252 Histoplasma Clarisa by Immunodiffusion [[id number]]   Blood from Arm, Right    Final result Component Value   See Scanned Result HISTOPLASMA ANTIBODY BY IMMUNODIFFUSION-Scanned          11/03/2022 0733 11/07/2022 2358 Blastomyces antibody ID [[id number]]   Blood from Arm, Right    Final result Component  Value   Blastomyces dermatitidis Abs, Precipitin Not Detected     No Blastomyces antibodies were detected. This result does   not exclude Blastomyces infection.   Performed by [facility],   [address] [place] [address] [phone]   [email or web address], Harry Shell MD, PHD, Lab. Director          11/03/2022 0733 11/07/2022 0227 Fungal Antibodies [[id number]]   (Abnormal)   Blood from Arm, Right    Final result Component Value Units   Blastomyces Clarisa by EIA 0.5 IV   INTERPRETIVE INFORMATION: Blastomyces Antibodies EIA, SER     0.9 IV or less.......Negative   1.0-1.4 IV...........Equivocal   1.5 IV or greater....Positive   Aspergillus Antibody by CF <1:8    INTERPRETIVE INFORMATION: Aspergillus Antibodies by CF     A titer of 1:8 or greater suggests Aspergillus infection or   allergy.  Cross-reactions with dimorphic fungi are not   unusual within the genus Aspergillus.   Performed By: [facility]   [address] [id number]   : Harry Shell MD, PhD   Coccidioides Antibody by CF Indeterminate High       Indeterminate due to anti-complement activity.   INTERPRETIVE INFORMATION: Coccidioides Ab by Complement   Fixation (CF)     Any titer suggests past or current infection. However,   greater than 30 percent of cases with chronic residual   pulmonary disease have negative Complement Fixation (CF)   tests. Titers of less than 1:32 (even as low as 1:2) may   indicate past infection or self-limited disease;   anticoccidiodal CF antibody titers in excess of 1:16 may   indicate disseminated infection. CF serology may be used to   follow therapy. Antibody in CSF is considered diagnostic   for coccidioidal meningitis, although 10 percent of   patients with coccidioidal meningitis will not have   antibody in CSF.   Histoplasma Mycelia, CF <1:8    INTREPRETIVE INFORMATION: Histoplasma Mycelia Antibodies                             by CF   A titer of 1:8 or greater is  generally considered   presumptive evidence of histoplasmosis. A titer of 1:32 or   greater or rising titers indicate strong presumptive   evidence of histoplasmosis. Cross reactions, usually at   lower titers, may occur with other fungal disease.   Histoplasma Yeast, CF <1:8    INTERPRETIVE INFORMATION: Histoplasma Yeast Antibodies                             by CF   A titer of 1:8 or greater is generally considered   presumptive evidence of histoplasmosis. A titer of 1:32 or   greater or rising titers indicate strong presumptive   evidence of histoplasmosis. Cross reactions, usually at   lower titers, may occur with other fungal disease.                 Pertinent Radiology  Radiology Results:     XR Abdomen Port 1 View    Result Date: 11/6/2022  EXAM: XR ABDOMEN PORT 1 VIEW LOCATION: [facility] DATE/TIME: 11/6/2022 1:04 AM INDICATION: NGT check   placement verification COMPARISON: None.     IMPRESSION: Nasogastric tube terminates over the stomach. Side-port is at the level of the GE junction and advancement by 2 cm is suggested. Dilated bowel loops in the mid abdomen are redemonstrated.    CT Abdomen Pelvis w Contrast    Result Date: 12/14/2022  EXAM: CT ABDOMEN PELVIS W CONTRAST LOCATION: [facility] DATE/TIME: 12/14/2022 10:56 AM INDICATION: abd pain COMPARISON: CT abdomen pelvis with contrast 11/05/2022 TECHNIQUE: CT scan of the abdomen and pelvis was performed following injection of IV contrast. Multiplanar reformats were obtained. Dose reduction techniques were used. CONTRAST: IsoVue 370 100mL FINDINGS: LOWER CHEST: Minimal atelectasis in the posterior costophrenic sulci. HEPATOBILIARY: Several indistinct territories of low-attenuation adjacent to the fissure of the falciform ligament and anterior right hepatic lobe (series 2, image 57) are consistent with areas of focal fat infiltration. No space-occupying liver lesions.  Normal gallbladder and bile ducts.  PANCREAS: Normal. SPLEEN: Normal. ADRENAL GLANDS: Normal. KIDNEYS/BLADDER: Normal. BOWEL: Status post resection of the distal colon with Duffy's pouch and left midabdomen colostomy. Duffy's pouch is decompressed. There is mucosal hyperenhancement and mild diffuse wall thickening of the transverse colon up to the colostomy. There is  also mild wall thickening of the terminal ileum and cecum. Small bowel otherwise has normal mucosal fold pattern or is decompressed. Nondistended stomach. LYMPH NODES: There are reactive appearing lymph nodes in the small bowel mesentery. No iliac chain adenopathy. VASCULATURE: Unremarkable. PELVIC ORGANS: Uterus is normal in size. Unchanged left paraovarian cyst measuring 3.6 x 4.6 cm (series 2, image 178). MUSCULOSKELETAL: Normal.     IMPRESSION: 1.  Status post resection of the ascending colon with Duffy's pouch and left midabdomen colostomy. There is persistent mucosal hyperenhancement and wall thickening of the transverse colon, terminal ileum and base of the sacrum consistent with areas of ongoing active inflammatory bowel disease. No mechanical obstruction. 2.  Minimal ascites.     CT Abdomen Pelvis w Contrast    Result Date: 11/5/2022  EXAM: CT ABDOMEN PELVIS W CONTRAST LOCATION: [facility] DATE/TIME: 11/5/2022 8:13 PM INDICATION: New onset severe abdominal pain, fever, tachycardia. Admitted with diffuse colitis COMPARISON: CT 10/29/2022. TECHNIQUE: CT scan of the abdomen and pelvis was performed following injection of IV contrast. Multiplanar reformats were obtained. Dose reduction techniques were used. CONTRAST: isovue 370  100ml FINDINGS: LOWER CHEST: Small bilateral pleural effusions and bibasilar atelectasis. HEPATOBILIARY: Significant gallbladder distention without wall thickening. No evidence of biliary obstruction. PANCREAS: Normal. SPLEEN: Normal. ADRENAL GLANDS: Normal. KIDNEYS/BLADDER: No hydronephrosis. Urinary bladder is unremarkable.  "BOWEL: Findings of diffuse colitis again noted with new colonic stricture/obstruction at the level of the descending colon in the area of previously described intramural abscesses. There is significant mesenteric edema without evidence of perforation or drainable fluid collection. LYMPH NODES: Stable size of subcentimeter mesenteric lymph nodes, likely reactive to adjacent inflammation. VASCULATURE: Unremarkable. PELVIC ORGANS: Unchanged size of left paraovarian cyst. Fluid around the right adnexa is likely related to peritoneal fluid described above. MUSCULOSKELETAL: No acute bony abnormality. Sequela of injections in the anterior abdominal wall.     IMPRESSION: 1.  Persistent findings of diffuse colitis with new high-grade stricture/obstruction at the level of the descending colon near previously described intramural abscesses. New mesenteric edema with small volume ascites. No evidence of phoenix perforation or drainable fluid collection. 2.  Distended gallbladder without additional CT evidence of acute cholecystitis. 3.  Small bilateral pleural effusions.    POC US Guidance Needle Placement    Result Date: 11/6/2022  Ultrasound was performed as guidance to an anesthesia procedure.  Click \"PACS images\" hyperlink below to view any stored images.  For specific procedure details, view procedure note authored by anesthesia.    POC US Guided Vascular Access    Result Date: 11/6/2022  Ultrasound was performed as guidance to an anesthesia procedure.  Click \"PACS images\" hyperlink below to view any stored images.  For specific procedure details, view procedure note authored by anesthesia.    POC US Guided Vascular Access    Result Date: 11/6/2022  Ultrasound was performed as guidance to an anesthesia procedure.  Click \"PACS images\" hyperlink below to view any stored images.  For specific procedure details, view procedure note authored by anesthesia.    XR Gastrografin  Challenge    Result Date: 12/15/2022  EXAM: XR " GASTROGRAFIN CHALLENGE LOCATION: [facility] DATE/TIME: 12/14/2022 11:52 PM INDICATION: Abdominal pain. Limited ostomy output. Concern for ostomy stricture. COMPARISON: CT abdomen earlier today. TECHNIQUE: Routine water soluble contrast follow-through challenge.     IMPRESSION: Previously administered oral contrast has reached the hepatic flexure of colon. There is no dilated bowel or other evidence for bowel obstruction. No pneumatosis or pneumoperitoneum.

## 2022-12-15 NOTE — PLAN OF CARE
Problem: Plan of Care - These are the overarching goals to be used throughout the patient stay.    Goal: Optimal Comfort and Wellbeing  Intervention: Monitor Pain and Promote Comfort  Recent Flowsheet Documentation  Taken 12/15/2022 0159 by Ronald Pereira, RN  Pain Management Interventions:   medication (see MAR)   emotional support   pillow support provided   repositioned   rest  Taken 12/15/2022 0022 by Ronald Pereira, RN  Pain Management Interventions:   emotional support   declines   pillow support provided   repositioned   rest     Problem: Nausea and Vomiting  Goal: Nausea and Vomiting Relief  Outcome: Progressing   Goal Outcome Evaluation:  Patient reported pain in stoma area. At 0200 PRN Tylenol given with relief. Denied pain this AM although she does grimace with ambulation to bathroom. 350 ml total Loose, yellow color stool in colostomy bag overnight. Patient stated that it looks normal/baseline.   Small bowel follow through test completed. ID and Surgery consult today.   Replacing Potassium per protocol.

## 2022-12-15 NOTE — CONSULTS
General Surgery H&P  Dain Tejeda MRN# [id number]   Age/Sex: 42 year old female YOB: 1980     Reason for visit: 1. Colitis            Referring physician:  Jefe Martin MD                   Assessment and Plan:   Assessment:  1.  Colostomy stricture -the colostomy site was evaluated, I did not identify any stricture at the colostomy site.  2.  Status post Duffy's procedure  3.  History of Crohn's colitis    Plan:  -No further acute surgical intervention from my standpoint given that the patient's colostomy site does not have a stricture.    -The patient can have diet as tolerated.   -The patient can be discharged to home when medically stable.  She can follow-up with the general surgery team as needed in the future.  -Continue with recommendations from GI team who is caring for her ongoing Crohn's colitis.  -Thank you for allowing us to participate this patient's care.  Please feel free to contact us with any further questions or concerns.          Chief Complaint:     Chief Complaint   Patient presents with     Other     Stoma Pain        History is obtained from the patient    HPI:   Dain Tejeda is a 42 year old female who presents to the general surgery team today for evaluation of stoma pain.  She had presented stating that she noticed that the stoma was starting to retract.  She continued to have bowel movements and tolerated diet.  No nausea no vomiting.  Patient states that she has no fevers no chills.  Patient did undergo a CT scan and a Gastrografin study which showed that there was no bowel obstruction.  General surgery team was consulted to evaluate this patient regarding her stoma pain.      Since being admitted, the patient states that her abdominal pain has since improved from the stoma site.  The stoma is no longer retracted at this time.  Patient has no further complaints.          Past Medical History:     Past Medical History:   Diagnosis Date     Diet controlled gestational diabetes  mellitus (GDM), antepartum 9/4/2017    Attempting diet control first     Gestational diabetes mellitus (GDM) in third trimester 9/4/2017    Attempting diet control first  Formatting of this note might be different from the original. Overview:  Attempting diet control first     Nausea/vomiting in pregnancy 4/24/2017     Postpartum hemorrhage 11/16/2017     Third degree laceration of perineum during delivery, postpartum 11/15/2017              Past Surgical History:     Past Surgical History:   Procedure Laterality Date     COLONOSCOPY N/A 10/31/2022    Procedure: COLONOSCOPY with biopsies;  Surgeon: Luis Miguel Traore MD;  Location: [facility] GI     COLOSTOMY N/A 11/6/2022    Procedure: CREATION, COLOSTOMY;  Surgeon: Chandana Carlos DO;  Location: [facility] OR     LAPAROTOMY EXPLORATORY N/A 11/6/2022    Procedure: EXPLORATORY LAPAROTOMY SPLENIC FLEXURE MOBILIZATION;  Surgeon: Chandana Carlos DO;  Location: [facility] OR     RECTAL PROLAPSE REPAIR N/A 11/6/2022    Procedure: RESECTION OF DESCENDING COLON;  Surgeon: Chandana Carlos DO;  Location: [facility] OR             Social History:    reports that she has never smoked. She has never used smokeless tobacco. She reports that she does not drink alcohol and does not use drugs.           Family History:     Family History   Problem Relation Age of Onset     Gestational Diabetes Sister      Gestational Diabetes Sister      Diabetes No family hx of      Coronary Artery Disease No family hx of      Hypertension No family hx of      Breast Cancer No family hx of      Colon Cancer No family hx of      Prostate Cancer No family hx of      Other Cancer No family hx of               Allergies:     Allergies   Allergen Reactions     Soybean Allergy Itching and Blisters     Tofu- causes itching and sores in the mouth              Medications:     Prior to Admission medications    Medication Sig Start Date End Date Taking? Authorizing Provider    acetaminophen (TYLENOL) 325 MG tablet Take 2 tablets (650 mg) by mouth every 6 hours  Patient taking differently: Take 650 mg by mouth every 6 hours as needed 11/22/22  Yes Rosenstein, Benjamin, MD   calcium carbonate (TUMS) 500 MG chewable tablet Take 1 tablet (500 mg) by mouth 2 times daily 11/21/22  Yes Jefe Martin MD   famotidine (PEPCID) 40 MG tablet Take 1 tablet (40 mg) by mouth daily  Patient taking differently: Take 40 mg by mouth daily as needed 11/21/22  Yes Jefe Martin MD   gabapentin (NEURONTIN) 100 MG capsule Take 1 capsule (100 mg) by mouth 3 times daily 11/22/22  Yes Rosenstein, Benjamin, MD   oxyCODONE (ROXICODONE) 5 MG tablet Take 1 tablet (5 mg) by mouth every 4 hours as needed for moderate to severe pain 11/23/22  Yes Minna Salazar PA-C              Review of Systems:   A 12 point Review of Systems is negative other than noted in the HPI            Physical Exam:     Patient Vitals for the past 24 hrs:   BP Temp Temp src Pulse Resp SpO2 Weight   12/15/22 0742 123/79 98.2  F (36.8  C) Oral 69 18 98 % --   12/15/22 0354 127/84 98  F (36.7  C) Oral 76 16 99 % --   12/14/22 2343 (!) 140/79 98.6  F (37  C) Oral 75 16 98 % --   12/14/22 1736 138/84 98.7  F (37.1  C) Oral 76 16 100 % --   12/14/22 1521 139/84 98.5  F (36.9  C) Oral 75 -- 100 % --   12/14/22 1154 (!) 144/87 -- -- -- -- -- --   12/14/22 1025 139/75 -- -- 58 -- 100 % --   12/14/22 0855 (!) 143/92 -- -- 70 -- 100 % --   12/14/22 0852 (!) 138/91 -- -- 70 -- 100 % --   12/14/22 0831 136/88 99  F (37.2  C) Temporal 84 16 100 % 48.5 kg (107 lb)          Intake/Output Summary (Last 24 hours) at 12/15/2022 0747  Last data filed at 12/15/2022 0537  Gross per 24 hour   Intake 620 ml   Output 350 ml   Net 270 ml      Constitutional:   awake, alert, cooperative, no apparent distress, and appears stated age       Eyes:   PERRL, conjunctiva/corneas clear, EOM's intact; no scleral edema or icterus noted        ENT:   Normocephalic,  without obvious abnormality, atraumatic, Lips, mucosa, and tongue normal        Hematologic / Lymphatic:   No lymphadenopathy       Lungs:   Normal respiratory effort, no accessory muscle use, breath sounds bilaterally on auscultation       Cardiovascular:   Regular rate and rhythm       Abdomen:   Soft, nondistended, nontender to palpation, stoma site appears to be intact.  A finger was inserted into the stoma to ensure that it was open.  There was no stenosis identified on exam.  There is gas and stool exiting the stoma.       Musculoskeletal:   No obvious swelling, bruising or deformity       Skin:   Skin color and texture normal for patient, no rashes or lesions              Data:         All imaging studies reviewed by me. I reviewed the images, and I agree findings of no acute bowel obstruction.  Continues to have some areas the colon that are inflamed from her ongoing Crohn's.      DO Miguel Wilkerson DO  General Surgeon  [facility]  Surgery [facility] - [address] [address]?  Office: [phone]  Employed by - [facility] [employer] [facility] [employer]  Pager: [phone]

## 2022-12-15 NOTE — PLAN OF CARE
Goal Outcome Evaluation:         Problem: Plan of Care - These are the overarching goals to be used throughout the patient stay.    Goal: Plan of Care Review  Description: The Plan of Care Review/Shift note should be completed every shift.  The Outcome Evaluation is a brief statement about your assessment that the patient is improving, declining, or no change.  This information will be displayed automatically on your shift note.  Outcome: Progressing     The patient was alert and oriented x4. The patient denied needing pain interventions during the shift. On recheck of potassium it was 3.2. Potassium protocol was followed and another dose was administered per orders. The patients colostomy site remained intact. Was unable to visualize site due to bag. The patient will remain NPO per surgery until x-ray is taken at 23:45 due to small bowel follow through.

## 2022-12-15 NOTE — PLAN OF CARE
Problem: Pain Acute  Goal: Optimal Pain Control and Function  Outcome: Progressing  Intervention: Prevent or Manage Pain  Recent Flowsheet Documentation  Taken 12/15/2022 3354 by Hetal Briscoe RN  Medication Review/Management: medications reviewed  Denies pain.     Problem: Nausea and Vomiting  Goal: Nausea and Vomiting Relief  Outcome: Progressing  Diet advanced from clears to regular diet. She had chicken, rice, and chicken broth for lunch.     Ostomy is putting loose/liquid yellow stool out.   WOCN to see today for a pouch change.  Up with standby assist due to weakness, but steady on her feet.

## 2022-12-15 NOTE — DISCHARGE INSTRUCTIONS
Please call your surgeon, Dr. Carlos, at [phone] to schedule a follow-up appointment regarding follow-up discussions and or/visits relating to surgery.  You may also call if you have any questions or concerns regarding her hospital stay and any other surgical issue you may have.     Diet:  As tolerated    Ostomy recommendations:  Recommend that she use an adapt ring #[id number] with current pouch until she receives her     [facility]   WO Nurse Discharge Instructions for New Ileostomy or New Colostomy      When you get home    - Upon arrival home, call the [facility] Nurses to schedule an outpatient follow up appoint in a few weeks after discharge from hospital.  The appointment is to assess pouching plan, organize supply ordering, etc.   Call the [facility] office at   [facility]'s     [phone]    - Supplies- Upon discharge from the hospital you will be sent home with ostomy supplies.      If you have been set up for home care visits the home care nurse will help you coordinate ordering supplies.    If you have not been set up for home care, then make sure to make a follow up appointment with the WO nurse to reassess your abdomen and ostomy for changes and determine the correct plan.  At that time ordering supplies will be discussed.       Pouching    1. Change appliance 2x / wk and as needed for any leakage.  Notify the WO Nurse if needing to change pouch more than daily or if skin is itchy.   2. Empty pouch when no more than 1/3 to half full (solid, liquid, gas)  3. May shower with pouch on or off, removing pouch/ barrier down to the skin is ok. Just note that you cannot control output so there may occasionally be clean up if you choose to shower / bathe without a pouch on.     The pouching procedure:   1.  Use the  Pouch Change Instruction  sheet to gather and organize supplies  2.  Trace old pattern onto new pouch and cut out new opening on new barrier.  3.  Remove old pouch,  observe for any leakage  4.  Clean skin with water and paper towel   5.  Apply Ring around stoma  6.   Apply prepared barrier to the clean skin and press into place.  7.  Hold hand on pouch/ over stoma to warm pouch into place for 2-5 minutes      Your Pouching Plan / Supplies                      NOTE:  Once your supply company has been determined call your supply company with supply issues    POUCH / BARRIER [id number]  BRAND of ostomy supplies:  [employer]  Pouch: 8531   Rin  Stoma powder 7906  Alcohol free skin prep 7917     Follow up    1. Physician - follow instructions from MD for follow ups with them  Contact your physician if you have the following signs or symptoms:  Pain in your incision that is not relieved by a short rest or ordered pain medications  Chills or temperature of 101 or higher  Redness or swelling in your incision    2. WOC Nurses (ostomy nurses)        When you get home make an outpatient appointment with the WOCN nurses to assure your pouching plan is still a good plan, etc  [facility] [phone]

## 2022-12-15 NOTE — PROGRESS NOTES
Care Management Follow Up    Length of Stay (days): 1    Expected Discharge Date: 12/16/2022      Concerns to be Addressed:   ID signed off,   Gi following  Patient plan of care discussed at interdisciplinary rounds: Yes    Anticipated Discharge Disposition:  Home with assist     Anticipated Discharge Services:  Home with assist    Education Provided on the Discharge Plan:  Per Care Team   Patient/Family in Agreement with the Plan:  Yes    Referrals Placed by CM/SW:    Private pay costs discussed: Not applicable    Additional Information:  Chart reviewed.    Pt lives at home with spouse, and children. Ind at baseline.     RNCM called [facility], they discharged patient from their care. Spouse Dmitriy has shown that they can properly take care of stoma care without help of RN. Pt feels that she and  can still manage.    GI still following the pt.    CM will continue to follow plan of care, review recommendations, and assist with any discharge needs anticipated.       Anna Zepeda RN

## 2022-12-15 NOTE — DISCHARGE SUMMARY
[facility]  Discharge Summary - Medicine & Pediatrics       Date of Admission:  12/14/2022  Date of Discharge:  12/15/2022  Discharging Provider: Jefe Martin MD  Discharge Service: Hospitalist Service    Discharge Diagnoses   Diffuse colitis  Hypokalemia  History of colonic stricture s/p partial colectomy      Follow-ups Needed After Discharge   -Follow-up with PCP in the next week or so for hospital follow-up.  Following lab work recommended.   -BMP   -CBC   -Consider ferritin to evaluate for anemia  - Follow-up with GI   - Follow-up with general surgery    Unresulted Labs Ordered in the Past 30 Days of this Admission     Date and Time Order Name Status Description    12/15/2022  9:46 AM Quantiferon TB Gold Plus Purple Tube In process     12/15/2022  9:46 AM Quantiferon TB Gold Plus Yellow Tube In process     12/15/2022  9:46 AM Quantiferon TB Gold Plus Green Tube In process     12/15/2022  9:46 AM Quantiferon TB Gold Plus Grey Tube In process     12/15/2022  9:11 AM Coccidioides Agn Quant EIA Non Blood In process     12/15/2022  9:11 AM Coccidioides Agn Quant EIA Blood In process     12/15/2022  9:11 AM Coccidioides antibody In process     11/6/2022  4:22 PM Acid-Fast Bacilli Culture and Stain In process     11/3/2022 12:02 AM Acid-Fast Bacilli Culture and Stain with AFB Stain In process     11/2/2022  4:35 PM Acid-Fast Bacilli Culture and Stain In process       These results will be followed up by PCP/GI    Discharge Disposition   Discharged to home  Condition at discharge: Stable      Hospital Course   Dain Tejeda was admitted on 12/14/2022 for stomal pain.  She is a past medical history significant for diffuse colitis, suspected Crohn's disease, history of colonic stricture status post partial colectomy November 2022.  The following problems were addressed during her hospitalization:    Abdominal pain  Patient presented with about 1 day of pain around her stoma site while having  bowel movements.  CT abdomen demonstrating ongoing mucosal hyperenhancement and bowel wall thickening the transverse colon/terminal ileus suggestive of ongoing inflammatory bowel disease.  There was not evidence of obstruction.  Underwent Gastrografin small bowel follow-through, which did not show any evidence of obstruction.  Evaluated by general surgery, there is no evidence of stoma obstruction or retraction.  Recommended following up as an outpatient.    Previous infection work-up for diffuse colitis have been largely unremarkable during prior hospitalization.  There was concern for latent TB as well as an indeterminant Coccidioides antigen.  Infectious disease was consulted to evaluate for risk of infection in preparation for biologic treatment for colitis with indeterminant Coccidioides and concerns for latent TB.  Lab work including repeat QuantiFERON gold, Coccidioides antigen/antibody were collected.  May take a couple weeks for results.  We will follow-up as an outpatient.    GI is planning to follow-up as an outpatient for continued management of colitis.      Normocytic anemia  Hemoglobin stable in the eights during this admission.  Concern for anemia of chronic disease versus iron deficiency anemia.  Recommend repeat ferritin as an outpatient.      Hypokalemia  Replaced.  Recheck as an outpatient    Consultations This Hospital Stay   INFECTIOUS DISEASES IP CONSULT  GASTROENTEROLOGY IP CONSULT  SURGERY GENERAL IP CONSULT  WOUND OSTOMY CONTINENCE NURSE  IP CONSULT  INFECTIOUS DISEASES IP CONSULT    Code Status   Full Code       The patient was discussed with Dr. Roni Martin MD  [facility] [address] [place] [address]  Phone: [phone]  Fax: [phone]  ______________________________________________________________________    Physical Exam   Vital Signs: Temp: 98.2  F (36.8  C) Temp src: Oral BP: 123/79  Pulse: 69   Resp: 18 SpO2: 98 % O2 Device: None (Room air)    Weight: 107 lbs 0 oz  General Appearance: Comfortable.  No acute distress.  Respiratory: Comfortable respiratory effort.  Clear to auscultation bilaterally.  No crackles or wheezing.  Cardiovascular: Regular rate and rhythm.  No murmurs rubs or gallops.  GI: Nondistended.  Bowel sounds present.  Soft nontender. Vertical surgical scar present. Stoma pink. Stool in ostomy back Nonbloody   Skin: No visible rashes or bruising   Neuro: Neutral affect.  Fluent speech.  Cranial nerves II through XII grossly intact         Primary Care Physician   Jefe Martin    Discharge Orders      Follow-up and recommended labs and tests     Follow up with primary care provider, Jefe Martin, within 7 days for hospital follow- up.  The following labs/tests are recommended: BMP, CBC, ferritin.    The GI team will reach out to you to schedule a follow-up appointment.     Activity    Your activity upon discharge: activity as tolerated     Reason for your hospital stay    Your admitted to the hospital for abdominal pain.  We believe the pain was coming from your stoma.  We did some imaging while you were here that showed you continue to have some inflammation in your intestines which may be contributing to some of the pain.  We also were able to confirm that your ostomy was working well.  All of your other lab work looked okay.    We had the infectious disease doctor come to see you.  They did some testing looking for tuberculosis as well as an infection called Coccidioides.  We want to rule out these infections so your GI doctor can help treat your inflammation in your intestines.     Diet    Follow this diet upon discharge: Orders Placed This Encounter      Advance Diet as Tolerated: Regular Diet Adult       Significant Results and Procedures   Most Recent 3 CBC's:  Recent Labs   Lab Test 12/15/22  0658 12/14/22  0936 11/22/22  1456   WBC 5.5 6.0 4.7   HGB 8.6* 8.9*  8.0*   MCV 85 84 88    375 461*     Most Recent 3 BMP's:  Recent Labs   Lab Test 12/15/22  0658 12/15/22  0042 12/14/22  1623 12/14/22  0936 11/14/22  0640 11/13/22  0655     --   --  140  --  138   POTASSIUM 3.6  3.6 3.3* 3.2* 3.1* 3.8 4.1   CHLORIDE 105  --   --  105  --  102   CO2 23  --   --  27  --  32*   BUN 6.7  --   --  5.7*  --  5.0*   CR 0.46*  --   --  0.42* 0.43* 0.44*   ANIONGAP 10  --   --  8  --  4*   ALEX 9.1  --   --  9.0  --  8.2*   GLC 94  --   --  85  --  95   ,   Results for orders placed or performed during the hospital encounter of 12/14/22   CT Abdomen Pelvis w Contrast    Narrative    EXAM: CT ABDOMEN PELVIS W CONTRAST  LOCATION: [facility]  DATE/TIME: 12/14/2022 10:56 AM    INDICATION: abd pain  COMPARISON: CT abdomen pelvis with contrast 11/05/2022  TECHNIQUE: CT scan of the abdomen and pelvis was performed following injection of IV contrast. Multiplanar reformats were obtained. Dose reduction techniques were used.  CONTRAST: IsoVue 370 100mL    FINDINGS:   LOWER CHEST: Minimal atelectasis in the posterior costophrenic sulci.    HEPATOBILIARY: Several indistinct territories of low-attenuation adjacent to the fissure of the falciform ligament and anterior right hepatic lobe (series 2, image 57) are consistent with areas of focal fat infiltration. No space-occupying liver lesions.   Normal gallbladder and bile ducts.    PANCREAS: Normal.    SPLEEN: Normal.    ADRENAL GLANDS: Normal.    KIDNEYS/BLADDER: Normal.    BOWEL: Status post resection of the distal colon with Duffy's pouch and left midabdomen colostomy. Duffy's pouch is decompressed. There is mucosal hyperenhancement and mild diffuse wall thickening of the transverse colon up to the colostomy. There is   also mild wall thickening of the terminal ileum and cecum. Small bowel otherwise has normal mucosal fold pattern or is decompressed. Nondistended stomach.    LYMPH NODES: There are reactive  appearing lymph nodes in the small bowel mesentery. No iliac chain adenopathy.    VASCULATURE: Unremarkable.    PELVIC ORGANS: Uterus is normal in size. Unchanged left paraovarian cyst measuring 3.6 x 4.6 cm (series 2, image 178).    MUSCULOSKELETAL: Normal.      Impression    IMPRESSION:     1.  Status post resection of the ascending colon with Duffy's pouch and left midabdomen colostomy. There is persistent mucosal hyperenhancement and wall thickening of the transverse colon, terminal ileum and base of the sacrum consistent with areas of   ongoing active inflammatory bowel disease. No mechanical obstruction.  2.  Minimal ascites.     XR Gastrografin  Challenge    Narrative    EXAM: XR GASTROGRAFIN CHALLENGE  LOCATION: [facility]  DATE/TIME: 12/14/2022 11:52 PM    INDICATION: Abdominal pain. Limited ostomy output. Concern for ostomy stricture.  COMPARISON: CT abdomen earlier today.  TECHNIQUE: Routine water soluble contrast follow-through challenge.      Impression    IMPRESSION: Previously administered oral contrast has reached the hepatic flexure of colon. There is no dilated bowel or other evidence for bowel obstruction. No pneumatosis or pneumoperitoneum.       Discharge Medications   Current Discharge Medication List      CONTINUE these medications which have NOT CHANGED    Details   acetaminophen (TYLENOL) 325 MG tablet Take 2 tablets (650 mg) by mouth every 6 hours  Qty: 120 tablet, Refills: 1    Associated Diagnoses: Crohn's disease with other complication, unspecified gastrointestinal tract location (H)      calcium carbonate (TUMS) 500 MG chewable tablet Take 1 tablet (500 mg) by mouth 2 times daily  Qty: 90 tablet, Refills: 1    Associated Diagnoses: Generalized abdominal pain      famotidine (PEPCID) 40 MG tablet Take 1 tablet (40 mg) by mouth daily  Qty: 60 tablet, Refills: 0    Associated Diagnoses: Generalized abdominal pain      gabapentin (NEURONTIN) 100 MG capsule Take 1  capsule (100 mg) by mouth 3 times daily  Qty: 90 capsule, Refills: 1    Associated Diagnoses: Crohn's disease with other complication, unspecified gastrointestinal tract location (H)      oxyCODONE (ROXICODONE) 5 MG tablet Take 1 tablet (5 mg) by mouth every 4 hours as needed for moderate to severe pain  Qty: 12 tablet, Refills: 0    Associated Diagnoses: Crohn's disease with other complication, unspecified gastrointestinal tract location (H)           Allergies   Allergies   Allergen Reactions     Soybean Allergy Itching and Blisters     Tofu- causes itching and sores in the mouth

## 2022-12-16 ENCOUNTER — PATIENT OUTREACH (OUTPATIENT)
Dept: CARE COORDINATION | Facility: CLINIC | Age: 42
End: 2022-12-16

## 2022-12-16 LAB
QUANTIFERON MITOGEN: 2.73 IU/ML
QUANTIFERON NIL TUBE: 0.1 IU/ML
QUANTIFERON TB1 TUBE: 0.29 IU/ML
QUANTIFERON TB2 TUBE: 0.1

## 2022-12-16 ASSESSMENT — ACTIVITIES OF DAILY LIVING (ADL): DEPENDENT_IADLS:: INDEPENDENT

## 2022-12-16 NOTE — PROGRESS NOTES
[facility]    Background: Transitional Care Management program identified per system criteria and reviewed by [facility] team for possible outreach.    Assessment: Upon chart review, CCR Team member will not proceed with patient outreach related to this episode of Transitional Care Management program due to reason below:    Patient has active communication with a nurse, provider or care team for reason of post-hospital follow up plan.  Outreach call by CCR[facility] team not indicated to minimize duplicative efforts.     Plan: Transitional Care Management episode addressed appropriately per reason noted above.      HARRY Mccray  [facility]    *Connected Care Resource Team does NOT follow patient ongoing. Referrals are identified based on internal discharge reports and the outreach is to ensure patient has an understanding of their discharge instructions.

## 2022-12-16 NOTE — PROGRESS NOTES
Clinic Care Coordination Contact  [facility]: Post-Discharge Note  SITUATION                                                      Admission:    Admission Date: 12/14/22   Reason for Admission: Diffuse colitis  Hypokalemia  History of colonic stricture s/p partial colectomy  Discharge:   Discharge Date: 12/15/22  Discharge Diagnosis: Diffuse colitis  Hypokalemia  History of colonic stricture s/p partial colectomy    BACKGROUND                                                      Per hospital discharge summary and inpatient provider notes.      ASSESSMENT           Discharge Assessment  How are you doing now that you are home?: Pt stated that she is doing better  How are your symptoms? (Red Flag symptoms escalate to triage hotline per guidelines): Improved  Do you feel your condition is stable enough to be safe at home until your provider visit?: Yes  Does the patient have their discharge instructions? : Yes  Does the patient have questions regarding their discharge instructions? : No  Were you started on any new medications or were there changes to any of your previous medications? : No  Does the patient have all of their medications?: Yes  Do you have questions regarding any of your medications? : No  Do you have all of your needed medical supplies or equipment (DME)?  (i.e. oxygen tank, CPAP, cane, etc.): No - What equipment or supplies are needed?  Discharge follow-up appointment scheduled within 14 calendar days? : Yes  Discharge Follow Up Appointment Date: 12/21/22  Discharge Follow Up Appointment Scheduled with?: Primary Care Provider                  PLAN                                                      Outpatient Plan:      Future Appointments   Date Time Provider Department Center   12/21/2022  9:20 AM Soderlund, Mary P, MD [facility]   12/27/2022  2:00 PM Little, Christopher, MD [facility]         For any urgent concerns, please contact our 24 hour clinic line:   [facility]  Clinic: [phone]       CHASITY BurgosW

## 2022-12-17 LAB
GAMMA INTERFERON BACKGROUND BLD IA-ACNC: 0.1 IU/ML
M TB IFN-G BLD-IMP: NEGATIVE
M TB IFN-G CD4+ BCKGRND COR BLD-ACNC: 2.63 IU/ML
MITOGEN IGNF BCKGRD COR BLD-ACNC: 0 IU/ML
MITOGEN IGNF BCKGRD COR BLD-ACNC: 0.19 IU/ML

## 2022-12-18 LAB — COCCIDIOIDES AB TITR SER CF: ABNORMAL {TITER}

## 2022-12-19 LAB — SCANNED LAB RESULT: NORMAL

## 2022-12-20 LAB — SCANNED LAB RESULT: NORMAL

## 2022-12-30 LAB
ACID FAST STAIN (ARUP): NORMAL

## 2023-01-02 LAB
ACID FAST STAIN (ARUP): NORMAL

## 2023-01-12 ENCOUNTER — OFFICE VISIT (OUTPATIENT)
Dept: FAMILY MEDICINE | Facility: CLINIC | Age: 43
End: 2023-01-12
Payer: COMMERCIAL

## 2023-01-12 VITALS
OXYGEN SATURATION: 99 % | HEART RATE: 123 BPM | WEIGHT: 107.12 LBS | BODY MASS INDEX: 21.6 KG/M2 | SYSTOLIC BLOOD PRESSURE: 104 MMHG | HEIGHT: 59 IN | DIASTOLIC BLOOD PRESSURE: 72 MMHG | TEMPERATURE: 96.8 F

## 2023-01-12 DIAGNOSIS — K50.918 CROHN'S DISEASE WITH OTHER COMPLICATION, UNSPECIFIED GASTROINTESTINAL TRACT LOCATION (H): ICD-10-CM

## 2023-01-12 DIAGNOSIS — L30.9 DERMATITIS: Primary | ICD-10-CM

## 2023-01-12 DIAGNOSIS — D50.9 IRON DEFICIENCY ANEMIA, UNSPECIFIED IRON DEFICIENCY ANEMIA TYPE: ICD-10-CM

## 2023-01-12 LAB
ERYTHROCYTE [DISTWIDTH] IN BLOOD BY AUTOMATED COUNT: 14.2 % (ref 10–15)
FERRITIN SERPL-MCNC: 11 NG/ML (ref 6–175)
HCT VFR BLD AUTO: 34.1 % (ref 35–47)
HGB BLD-MCNC: 11.7 G/DL (ref 11.7–15.7)
MCH RBC QN AUTO: 24.1 PG (ref 26.5–33)
MCHC RBC AUTO-ENTMCNC: 34.3 G/DL (ref 31.5–36.5)
MCV RBC AUTO: 70 FL (ref 78–100)
PLATELET # BLD AUTO: 427 10E3/UL (ref 150–450)
RBC # BLD AUTO: 4.85 10E6/UL (ref 3.8–5.2)
WBC # BLD AUTO: 7.9 10E3/UL (ref 4–11)

## 2023-01-12 PROCEDURE — 99214 OFFICE O/P EST MOD 30 MIN: CPT | Mod: GC

## 2023-01-12 PROCEDURE — 85027 COMPLETE CBC AUTOMATED: CPT

## 2023-01-12 PROCEDURE — 82728 ASSAY OF FERRITIN: CPT

## 2023-01-12 PROCEDURE — 36415 COLL VENOUS BLD VENIPUNCTURE: CPT

## 2023-01-12 RX ORDER — ACETAMINOPHEN 325 MG/1
650 TABLET ORAL EVERY 6 HOURS
Qty: 120 TABLET | Refills: 1 | Status: SHIPPED | OUTPATIENT
Start: 2023-01-12 | End: 2023-04-14

## 2023-01-12 RX ORDER — GABAPENTIN 100 MG/1
100 CAPSULE ORAL 3 TIMES DAILY
Qty: 90 CAPSULE | Refills: 1 | Status: SHIPPED | OUTPATIENT
Start: 2023-01-12 | End: 2023-04-14

## 2023-01-12 RX ORDER — TRIAMCINOLONE ACETONIDE 1 MG/G
CREAM TOPICAL 2 TIMES DAILY
Qty: 80 G | Refills: 0 | Status: SHIPPED | OUTPATIENT
Start: 2023-01-12 | End: 2024-04-30

## 2023-01-12 NOTE — PROGRESS NOTES
Assessment & Plan   Dain is a 42 year old with a past medical history significant for diffuse colitis, suspected Crohn's disease, history of colonic stricture status post partial colectomy November 2022, recent hospitalization for stomal pain.      Dermatitis  Erythematous macular papular rash underneath stoma dressing.  No other rashes present.  Suspect this is a contact dermatitis.  Recommended she reach out to surgery to see if they have any other adhesives available, as well as other things to try.  We will give a course of Kenalog cream to treat in the meantime.  - triamcinolone (KENALOG) 0.1 % external cream; Apply topically 2 times daily Apply to the irritated skin around the stoma site.    Crohn's disease with other complication, unspecified gastrointestinal tract location (H)  Multiple hospitalizations for colitis.  Suspect this is Crohn's.  Large infectious work-up has been unremarkable.  Does have follow-up with GI in February.  No signs or symptoms of worsening colitis at this point.  Recommended follow-up with GI to discuss treatment options.  - gabapentin (NEURONTIN) 100 MG capsule; Take 1 capsule (100 mg) by mouth 3 times daily  - acetaminophen (TYLENOL) 325 MG tablet; Take 2 tablets (650 mg) by mouth every 6 hours  -Follow-up with GI    Iron deficiency anemia, unspecified iron deficiency anemia type  Following up anemia during hospitalization.  Has had both elevated and low ferritin's in the past.  Suspect more of an iron deficiency anemia with elevated ferritin as an acute phase reactant in the setting of active colitis.  Anemia likely in the setting of Crohn's disease.  We will obtain CBC and ferritin today.  Would need to be cautious about supplementing with oral iron to avoid constipation with with fairly recent colostomy.  - CBC with platelets; Future  - Ferritin; Future      No follow-ups on file.    Jefe Martin MD  [facility]    Subjective   Dain is a 42  year old with a history significant for diffuse colitis, suspected Crohn's, history of colonic stricture status post partial colectomy in November 2022, recent hospitalization for stomal pain., presenting for the following health issues:  No chief complaint on file.      HPI     Dermatitis  Recent hospitalization for stomal pain.  Underwent Gastrografin study, no evidence of obstruction or stomal stricture.  Following up stoma pain. Has rash around stoma site.  Started 1 week ago.   Non-painful, but itchy.  Had been using sureprep protective wipe on her skin prior to placing colostomy dressing, has been helpful  Changes her colostomy dressing 2 times per week.  Does have colostomy bag lubricant that she has not been using.        Crohn's colitis  - Intermittent abdominal pain since surgery.  Pain near surgical site.  - Occasionl loose stool, non bloody   - Tolerating diet   -No nausea or vomiting.  -No fever or chills.  -Recent evaluation by infectious disease while hospitalized for stomal pain.  Work-up as below.  - Negative Quant gold  - Coccidioides antigen - negative  -  coccidioides antibody indeterminate  - afb cultures negative  -Has GI appointment in February.  -Has been using gabapentin and Tylenol for pain relief, working well.  Requesting refill today.    Review of Systems   Constitutional, HEENT, cardiovascular, pulmonary, gi and gu systems are negative, except as otherwise noted.      Objective    LMP 11/22/2022 (Approximate)   There is no height or weight on file to calculate BMI.  Physical Exam   GENERAL: healthy, alert and no distress  NECK: no adenopathy, no asymmetry, masses, or scars and thyroid normal to palpation  RESP: lungs clear to auscultation - no rales, rhonchi or wheezes  CV: regular rate and rhythm, normal S1 S2, no S3 or S4, no murmur, click or rub, no peripheral edema and peripheral pulses strong  ABDOMEN: soft, nontender large vertical surgical scar on the abdomen.  Well-healed.   Stoma at the left lower abdomen.  Appears pink.  Not retracted.  Erythematous papular rash underneath the colostomy adhesive.  No drainage.  Nontender to palpation.

## 2023-02-01 ENCOUNTER — TELEPHONE (OUTPATIENT)
Dept: FAMILY MEDICINE | Facility: CLINIC | Age: 43
End: 2023-02-01
Payer: COMMERCIAL

## 2023-02-01 DIAGNOSIS — K50.918 CROHN'S DISEASE WITH OTHER COMPLICATION, UNSPECIFIED GASTROINTESTINAL TRACT LOCATION (H): Primary | ICD-10-CM

## 2023-02-01 NOTE — TELEPHONE ENCOUNTER
[facility] phone call message - order or referral request for patient:     Order or referral being requested: DME order for colostomy bag and supply       Additional Comments: Patient called and wanting to know if pcp can send more of the colostomy bag and supply  due to a recent stomach surgery she had done, patient would like to know if these can be sent by Friday or Monday the latest as she will not have any left after. Patient states if an appointment is needed in order to place order will schedule an appointment. Please call and advise if needed or if ok call and inform patient.     OK to leave a message on voice mail? Yes    Primary language: Junito      needed? Yes    Call taken on February 1, 2023 at 3:10 PM by Gertrudis Camargo

## 2023-02-02 ENCOUNTER — TELEPHONE (OUTPATIENT)
Dept: FAMILY MEDICINE | Facility: CLINIC | Age: 43
End: 2023-02-02
Payer: COMMERCIAL

## 2023-02-02 ENCOUNTER — DOCUMENTATION ONLY (OUTPATIENT)
Dept: FAMILY MEDICINE | Facility: CLINIC | Age: 43
End: 2023-02-02
Payer: COMMERCIAL

## 2023-02-02 ENCOUNTER — APPOINTMENT (OUTPATIENT)
Dept: INTERPRETER SERVICES | Facility: CLINIC | Age: 43
End: 2023-02-02
Payer: COMMERCIAL

## 2023-02-02 NOTE — TELEPHONE ENCOUNTER
DME (Durable Medical Equipment) Orders and Documentation  Orders Placed This Encounter   Procedures     Miscellaneous DME Order     Miscellaneous DME Order     Miscellaneous DME Order      The patient was assessed and it was determined the patient is in need of the following listed DME Supplies/Equipment. Please complete supporting documentation below to demonstrate medical necessity.      DME All Other Item(s) Documentation    List reason for need and supporting documentation for medical necessity below for each DME item.     1.  Patient with colostomy following colonic stricture in the setting Crohn's disease.   Unknown when  Revision will take place.  Requires ostomy supplies to be refilled.    -  Wound Ostomy Pouches: Flat 1 pc. [facility]([address] 0664) PS# [id number], send 1 box    - Accessories: [employer] Ring (0036) PS# [id number], send up 1 box   - [employer] Powder (4743) PS# [id number], send 1        Agree with DME orders  Birdie Bernstein MD

## 2023-02-02 NOTE — TELEPHONE ENCOUNTER
[facility] phone call message- general phone call:    Reason for call: Riccardo called stating DME orders and progress notes needs to be signed by both resident Dr. Martin and over seeing provider Dr. Bernstein. If those can be signed and sent again in order for Riccardo to send through to insurance. Please call if any questions. Thank you    Return call needed: No    OK to leave a message on voice mail? Yes    Primary language: Hmong      needed? Yes    Call taken on February 2, 2023 at 3:51 PM by Lilliam Barnes

## 2023-02-02 NOTE — PROGRESS NOTES
Faxed 3 D[phone] per [facility]. I faxed the DME orders twice

## 2023-02-06 NOTE — TELEPHONE ENCOUNTER
Called and spoke with Rep at [facility], [place] [facility] to clarify what is being requested, per patient medical is not covering everything and patient will not be able to pay out of picket for bag pouches and only has 2 left, when calling [facility] to clarify they informed that the notes they received ae for for matching dressing but does not see anything about the bag pouches, rep states they are still waiting on clarification from  and signed and faxed over. Please call and advise .

## 2023-02-06 NOTE — TELEPHONE ENCOUNTER
Patient called and urgently needing bag pouches as she is running low, informed patient still waiting for a response from  and since it is close to the end of day can not guarantee patient will get call back today. Patient states she will check back tomorrow mooring. Please advise.

## 2023-02-08 ENCOUNTER — APPOINTMENT (OUTPATIENT)
Dept: INTERPRETER SERVICES | Facility: CLINIC | Age: 43
End: 2023-02-08
Payer: COMMERCIAL

## 2023-02-08 NOTE — TELEPHONE ENCOUNTER
Patient calling to follow up on bag pouches, needing a order to be sent to medical insurance can cover, cannot afford out of pocket. She stated she is needing them, she is out of bags and would like this figured out ASAP. Please call patient and advise once completed. Thank you.  Patient phone is off alternative # to call [phone] or [phone].

## 2023-02-08 NOTE — PROGRESS NOTES
Edited order for ostomy supplies was received.  This was signed by attending physician, Dr Padilla, and faxed to [facility] DME office today.    Called patient to relay information.  Is running low on ostomy bags.  I was able to call over to the general surgery clinic in [place], who did have a few extras available, that she can use in the meantime.  Patient is going to pick these up this afternoon.    Again, DME documentation from 2/2/2023 for ostomy supplies was addended and signed by attending physician, Dr. Bernstein.    Jefe Martin MD PGY-2  [facility]

## 2023-02-14 ENCOUNTER — APPOINTMENT (OUTPATIENT)
Dept: INTERPRETER SERVICES | Facility: CLINIC | Age: 43
End: 2023-02-14
Payer: COMMERCIAL

## 2023-02-15 ENCOUNTER — OFFICE VISIT (OUTPATIENT)
Dept: FAMILY MEDICINE | Facility: CLINIC | Age: 43
End: 2023-02-15
Payer: COMMERCIAL

## 2023-02-15 VITALS
DIASTOLIC BLOOD PRESSURE: 66 MMHG | SYSTOLIC BLOOD PRESSURE: 98 MMHG | HEART RATE: 88 BPM | WEIGHT: 109 LBS | OXYGEN SATURATION: 100 % | RESPIRATION RATE: 20 BRPM | TEMPERATURE: 98.4 F | BODY MASS INDEX: 22.03 KG/M2

## 2023-02-15 DIAGNOSIS — Z43.3 COLOSTOMY CARE (H): ICD-10-CM

## 2023-02-15 DIAGNOSIS — L24.B3 IRRITANT CONTACT DERMATITIS ASSOCIATED WITH FECAL STOMA: Primary | ICD-10-CM

## 2023-02-15 PROCEDURE — 99213 OFFICE O/P EST LOW 20 MIN: CPT | Mod: GC | Performed by: STUDENT IN AN ORGANIZED HEALTH CARE EDUCATION/TRAINING PROGRAM

## 2023-02-15 RX ORDER — DIPHENHYDRAMINE HCL 25 MG
25 TABLET ORAL EVERY 6 HOURS PRN
Qty: 30 TABLET | Refills: 0 | Status: SHIPPED | OUTPATIENT
Start: 2023-02-15 | End: 2023-04-14

## 2023-02-15 NOTE — PROGRESS NOTES
Assessment and Plan     (L24.B3) Irritant contact dermatitis associated with fecal stoma  (primary encounter diagnosis)  Comment: Ongoing dermatitis around colostomy bag, itchy and bothersome to patient, kenalog cream helping some. Given pattern of erythema fits [place] of colostomy bag, suspect contact dermatitis 2/2 colostomy bag. Patient has only every used one type of bag so uncertain if rash would resolve with a different brand of colostomy bag. DME supplies as below. Counseled on kenalog cream and will prescribe Benadryl to help with itchiness, counseled that it will make her tired and to try taking it before bed to help with itchiness.   Plan: Ostomy Care Referral, diphenhydrAMINE         (BENADRYL) 25 MG tablet        Continue with kenalog cream    (Z43.3) Colostomy care (H)  Comment: Patient states she still have not received her DME supplies for further ostomy bags. Per chart review Dr. Martin sent over order and it was signed by attending physician. Will re-order but also PCS to call DME to clarify if order was received, if they have supplies for patient ect. Patient also does not currently have someone to help with her ostomy care, and given ongoing issues as above and with supplies will put in care referral.   Plan: OSTOMY SUPPLY MISC, Ostomy Care Referral      Options for treatment and follow-up care were reviewed with the patient and/or guardian. Dain Tejeda and/or guardian engaged in the decision making process and verbalized understanding of the options discussed and agreed with the final plan.    Anna Dugan MD      Precepted today with: Gerardo Cotton MD           HPI:       Dain Tejeda is a 42 year old  female with a significant past medical history of diffuse colitis, suspected Crohn's disease and history of colonic stricture s/p partial colectomy and colostomy who presents for the following below:    Colostomy concern  - Area of previous incisions is itchy and bothersome to patient, she was  prescribed kenalog cream for this last month and her symptoms improved initially but now have been worsening  - Every 1-2 hours will need to reapply cream due to itchiness   - Area of itchiness is around her colostomy where the colostomy bag sits   - She also states she is running out of colostomy supplies  - Upon chart review, PCP Dr. Martin had been working to fill supplies, all DME orders per chart review appear to have been sent on 2/8     A [employer]  was used for this visit         PMHX:     Patient Active Problem List   Diagnosis     Screening for cervical cancer- ASCUS, HPV+ 4/2017     Pain of back and right lower extremity     Abdominal pain, generalized     Abdominal pain     Inflammation of small intestine     Moderate recurrent major depression (H)     Hypokalemia     Colitis     Intra-abdominal abscess (H)     Celiac disease     Diarrhea     Chronic gastritis     Chronic diarrhea     Crohn's disease with other complication, unspecified gastrointestinal tract location (H)     S/P partial colectomy     Anemia, unspecified type       Current Outpatient Medications   Medication Sig Dispense Refill     acetaminophen (TYLENOL) 325 MG tablet Take 2 tablets (650 mg) by mouth every 6 hours 120 tablet 1     calcium carbonate (TUMS) 500 MG chewable tablet Take 1 tablet (500 mg) by mouth 2 times daily 90 tablet 1     famotidine (PEPCID) 40 MG tablet Take 1 tablet (40 mg) by mouth daily 60 tablet 0     gabapentin (NEURONTIN) 100 MG capsule Take 1 capsule (100 mg) by mouth 3 times daily 90 capsule 1     oxyCODONE (ROXICODONE) 5 MG tablet Take 1 tablet (5 mg) by mouth every 4 hours as needed for moderate to severe pain (Patient not taking: Reported on 1/12/2023) 12 tablet 0     triamcinolone (KENALOG) 0.1 % external cream Apply topically 2 times daily Apply to the irritated skin around the stoma site. 80 g 0       Social History     Tobacco Use     Smoking status: Never     Smokeless tobacco: Never   Substance  Use Topics     Alcohol use: No     Drug use: No          Allergies   Allergen Reactions     Soybean Allergy Itching and Blisters     Tofu- causes itching and sores in the mouth       No results found for this or any previous visit (from the past 24 hour(s)).         Review of Systems:     10 point ROS negative except for what is noted in HPI          Physical Exam:     Vitals:    02/15/23 0952   BP: 98/66   Pulse: 88   Resp: 20   Temp: 98.4  F (36.9  C)   TempSrc: Oral   SpO2: 100%   Weight: 49.4 kg (109 lb)     Body mass index is 22.03 kg/m .      GENERAL APPEARANCE: healthy, alert and no distress,  EYES: Eyes grossly normal to inspection  MS: extremities normal- no gross deformities noted  SKIN: Colostomy stoma pink, bag in place, raised erythema underneath ostomy bag, around where it attaches to the skin, pattern of erythema similar to ring from colostomy bag  PSYCH: mood and affect normal/bright

## 2023-02-15 NOTE — PROGRESS NOTES
Preceptor Attestation:  Patient's case reviewed and discussed with Anna Dugan MD resident and I evaluated the patient. I agree with written assessment and plan of care.  Supervising Physician:  Gerardo Cotton MD, MD [facility]

## 2023-03-17 ENCOUNTER — TRANSFERRED RECORDS (OUTPATIENT)
Dept: HEALTH INFORMATION MANAGEMENT | Facility: CLINIC | Age: 43
End: 2023-03-17
Payer: COMMERCIAL

## 2023-03-28 ENCOUNTER — TELEPHONE (OUTPATIENT)
Dept: INFECTIOUS DISEASES | Facility: CLINIC | Age: 43
End: 2023-03-28
Payer: COMMERCIAL

## 2023-03-28 NOTE — TELEPHONE ENCOUNTER
Per  calling on behalf of the patient. [facility] would like for the patient to see ID for a follow up.   There is a document that was just scanned in Epic. Please review and advise on scheduling.

## 2023-03-29 ENCOUNTER — OFFICE VISIT (OUTPATIENT)
Dept: INFECTIOUS DISEASES | Facility: CLINIC | Age: 43
End: 2023-03-29
Payer: COMMERCIAL

## 2023-03-29 VITALS
WEIGHT: 106 LBS | DIASTOLIC BLOOD PRESSURE: 62 MMHG | OXYGEN SATURATION: 98 % | SYSTOLIC BLOOD PRESSURE: 100 MMHG | BODY MASS INDEX: 21.43 KG/M2 | HEART RATE: 105 BPM

## 2023-03-29 DIAGNOSIS — K50.918 CROHN'S DISEASE WITH OTHER COMPLICATION, UNSPECIFIED GASTROINTESTINAL TRACT LOCATION (H): Primary | ICD-10-CM

## 2023-03-29 PROCEDURE — 99214 OFFICE O/P EST MOD 30 MIN: CPT | Performed by: INTERNAL MEDICINE

## 2023-03-29 NOTE — PROGRESS NOTES
[facility] Infectious Disease  Followup Visit        Assessment:   1. CROHNS DISEASE.  History of celiac disease.  6 months history of diarrhea weight loss abdominal pain- active issue  2. Non caseating granulomas may be seen in inflammatory bowel disease.   3.   Patient had negative AFB stain on colon and rectal lesion biopsy  4.  CT chest negative for TB active or healed, and negative for fungal pneumonias  5.  Fungal serology negative (Coccidioides complement fixation indeterminate but antigen is negative)     7. S/p 11/6/22 Procedure(s):  1-EXPLORATORY LAPAROTOMY   2-SPLENIC FLEXURE MOBILIZATION  3-CREATION, COLOSTOMY  4-RESECTION OF DESCENDING COLON  7. Biopsy consistent with Crohn's disease  8. Peritoneal MTB PCR negative.  Peritoneal AFB culture negative.  9. Over 6 months history of weight loss, now with fevers.  Diarrhea abdominal pain  10. Language barrier  assisted           Plan:   This is a 6o yo patient immigrant from [facility] therefore at higher tree test probability of latent TB.  Testing for active TB came back negative while in the hospital.  She s had  indeterminate QuantiFERON and now negative QuantiFERON which is reassuring however I am still concerned given the high pretest probability therefore it would be ideal to have another test as a secondary modality  If the Mantoux test is negative , no further ID recommendations  If the plan to test is positive then she will need 4-month at least of anti-TB treatment likely with rifampin , then initiation of treatment for Crohn's if needed can be started after 1 months.  Since we do not carry this in the infectious disease clinic I have messaged her primary doctor little so we can assist us with performing a Mantoux test.    Regarding the Coccidioides serology we have no evidence that she is infected with that with a radiographically or with antigens.  CF is indeterminate and is not helpful.  She has not lived in the [facility] or  Coccidioides is endemic.  No evidence of fungal pneumonias on CT.    Allen Mcginnis M.D.          Present Illness:   Referred by GI    This is a 42 years old female who was last seen by Dr. Cunha as consult on 12/15/2022.  There was concern for latent TB.  Previously her QuantiFERON gold was indeterminate.  Now is negative 3 months ago.  There was concern about Coccidioides some not sure exactly why.  See above.  Her antibody by CF remains indeterminate, but both serum and urine Coccidioides antigen turned out negative.  she's accompanied by .    She [place] in [place] Feb 2017, from [place], no international travel.   She is not sure what tb is.  No hx of Tb in before [place] in the [place], denies being treated for long period of time with multiple meds.  No family hx of Tb.  Has NOT lived in the [facility].    shes not on any crohn's meds  No fever no abd pains  No coughing hemoptysis no shortness of breath      Review of Systems  Performed and all negative except as mentioned above.    Past medical, family, and social history reviewed, unchanged from before.        Physical Exam:     Gen. appearance nontoxic  Eyes no conjunctivitis or icterus  Neck no stiffness or neck vein distention, no LN  Heart  No edema  Lungs breathing comfortably  Abdomen soft not tender  Extremities no synovitis, trace edema  Skin  no rash or emboli  Neurologic alert oriented no focal deficits      DATA   No results found for any visits on 03/29/23.   3 mo ago  (12/15/22) 4 mo ago  (11/4/22) 4 mo ago  (11/1/22)      Quantiferon-TB Gold Plus Negative Negative  Indeterminate Abnormal  CM  Indeterminate Abnormal  CM          Allen Mcginnis MD

## 2023-03-30 ENCOUNTER — TELEPHONE (OUTPATIENT)
Dept: FAMILY MEDICINE | Facility: CLINIC | Age: 43
End: 2023-03-30
Payer: COMMERCIAL

## 2023-03-30 NOTE — TELEPHONE ENCOUNTER
ID requested patient get mantoux test completed. Refer to [facility] due to mantoux not administered here in clinic or at ID clinic.     Needing Mantoux completed and results sent to Infectious Disease physician located at [facility] [phone]     ID office note printed and faxed to [facility]. Rose HERRERA

## 2023-03-30 NOTE — TELEPHONE ENCOUNTER
Writer called [facility], awaiting further advisement from [facility] ID physician. Rose HERRERA

## 2023-03-30 NOTE — TELEPHONE ENCOUNTER
[facility] called back stating this patient is listed for [facility], the fax would need to go to [facility] and not [facility] thank you.

## 2023-03-31 NOTE — TELEPHONE ENCOUNTER
Writer received return call with clarfifying questions. Wanting to make sure aware of mantoux no longer gold standard due to variances in results, advised this is per ID and seems had indeterminate TB quant in past. Unsure exact reason for ordering but not ordered by PCP. Writer was informed by RN they will attempt to contact patient to get the mantoux testing done, writer provided ID number to call with results. Rose HERRERA

## 2023-04-10 ENCOUNTER — TELEPHONE (OUTPATIENT)
Dept: INFECTIOUS DISEASES | Facility: CLINIC | Age: 43
End: 2023-04-10
Payer: COMMERCIAL

## 2023-04-10 NOTE — TELEPHONE ENCOUNTER
Elda calling from [facility], she did a Mantoux on patient on Friday 4/7/2023. It is negative. She will also be faxing the results over.

## 2023-04-14 ENCOUNTER — OFFICE VISIT (OUTPATIENT)
Dept: FAMILY MEDICINE | Facility: CLINIC | Age: 43
End: 2023-04-14
Payer: COMMERCIAL

## 2023-04-14 VITALS
DIASTOLIC BLOOD PRESSURE: 67 MMHG | TEMPERATURE: 98.9 F | HEIGHT: 59 IN | SYSTOLIC BLOOD PRESSURE: 95 MMHG | BODY MASS INDEX: 20.24 KG/M2 | HEART RATE: 108 BPM | OXYGEN SATURATION: 98 % | WEIGHT: 100.4 LBS

## 2023-04-14 DIAGNOSIS — K50.918 CROHN'S DISEASE WITH OTHER COMPLICATION, UNSPECIFIED GASTROINTESTINAL TRACT LOCATION (H): ICD-10-CM

## 2023-04-14 DIAGNOSIS — Z93.3 COLOSTOMY PRESENT (H): Primary | ICD-10-CM

## 2023-04-14 PROCEDURE — 99214 OFFICE O/P EST MOD 30 MIN: CPT | Mod: GC

## 2023-04-14 RX ORDER — GABAPENTIN 100 MG/1
100 CAPSULE ORAL 3 TIMES DAILY PRN
Qty: 90 CAPSULE | Refills: 2 | Status: SHIPPED | OUTPATIENT
Start: 2023-04-14 | End: 2024-04-30

## 2023-04-14 RX ORDER — ACETAMINOPHEN 325 MG/1
650 TABLET ORAL EVERY 6 HOURS
Qty: 120 TABLET | Refills: 3 | Status: SHIPPED | OUTPATIENT
Start: 2023-04-14 | End: 2024-04-30

## 2023-04-14 NOTE — PROGRESS NOTES
Assessment & Plan   42-year-old female with history significant for Crohn's disease complicated by colonic stricture status post colostomy.  Here for medication refill/colostomy supplies refill.    Colostomy present (H)  Status post colostomy following colonic stricture in the setting of Crohn's disease.  Running low on ostomy supplies.  I was able to call the DME office request more supplies be sent to her home.  Continues to have some irritation around the stoma.  Doing well with the stoma powder and Kenalog cream as needed.  -Ostomy supplies reordered from DME     Crohn's disease with other complication, unspecified gastrointestinal tract location (H)  Confirmed on biopsy.  Symptoms fairly well controlled at this point, not on any biologics/immunosupressives.   Was referred to ID for concerns about latent tuberculosis, which would impact treatment options.  Recent negative QuantiFERON gold, negative Mantoux through the [facility] clinic.  Per chart review, plans for colonoscopy through the stoma in the near future.  They are considering ostomy referral depending on colonoscopy results. Will refill Tylenol and gabapentin, used for postsurgical pain.  - acetaminophen (TYLENOL) 325 MG tablet  Dispense: 120 tablet; Refill: 3  - gabapentin (NEURONTIN) 100 MG capsule  Dispense: 90 capsule; Refill: 2          No follow-ups on file.    Jefe Martin MD  [facility] Subjective Kao is a 42 year old, presenting for the following health issues:  Refill Request and Surgical Followup (Stomach medication)      HPI     Crohn's disease.  Follows with [facility].  History of colonic stricture requiring colostomy.  There were concerns about latent tuberculosis and Coccidioides infections during one of her most recent hospital stays.  GI referred to infectious disease to evaluate in preparation for possible biologic therapy.  Infectious disease concern for latent TB.  Has had recent negative  "QuantiFERON's.  Recommended Mantoux testing, which was completed and negative per documentation in the chart     Plans will be for a colonoscopy through the stoma to evaluate for active disease.  Considering reversal of ostomy pending Colonoscopy    Itchiness around stoma site - applying steroid cream/stoma powder with relief.   No nausea or vomiting  No blood in the stool     She is having a hard time emotionally with her ongoing medical concers  Has not been able to work due to the colostomy and need for frequent appointments.  She really wants to get back to working to help support her family.  Also wants to feel normal.  She is hopeful that they can do the colostomy referral in the near future.         Review of Systems   Constitutional, HEENT, cardiovascular, pulmonary, gi and gu systems are negative, except as otherwise noted.      Objective    BP 95/67   Pulse 108   Temp 98.9  F (37.2  C) (Oral)   Ht 1.495 m (4' 10.86\")   Wt 45.5 kg (100 lb 6.4 oz)   LMP 04/07/2023 (Exact Date)   SpO2 98%   BMI 20.38 kg/m    Body mass index is 20.38 kg/m .  Physical Exam   Constitutional: Conversant, well developed. No acute distress. Tearful at times.   Eyes: Anicteric sclerae, no lid lag  Respiratory: Normal respiratory effort  Cardiovascular: No peripheral edema  Skin: No rash, lesion, or ulcer  Musculoskeletal: No digital cyanosis, normal gait  Psych: Intact judgment and insight. Alert and oriented x3. Cordial affect               "

## 2023-05-02 NOTE — PROGRESS NOTES
Addendum 6/24   Called to discuss with interpretor   She is feeling about the same -- has not picked up the meds yet   Discussed reassuring results  CMP and RUQ US -- nothing overt to suggest alternate management   She will drop off her stool sample for H pylori next week   If pain is not improving in 1-2 weeks (Despite meds), recommend reassessment in clinic   Could consider HIDA scan   She requests CT scan -- would advise caution for radiation dose (with unclear utility)   Rest as in original A/P    Dr. Matos      Assessment & Plan     (R10.94) Generalized abdominal pain  Comment: Sx now bordering on chronic. Description today more consistent with epigastric / RUQ type abdominal pain (may differ from more recent descriptions). Highest considerations: dyspepsia (H pyrlori?) vs biliary colic (cholelithiasis vs other). Had previously been thought sx were secondary to redundant sigmoid colon vs internal hernia - still considerations. Depression could be confounding the diagnosis as well. Non-surgical abdomen on exam today and no sign of needing higher level of care.   Plan:   - Would still follow up with GI as scheduled   - US ABDOMEN LIMITED; Future  - Helicobacter pylori Antigen Stool; Future   Suggest waiting until 1-2 weeks post-PPI to send this in   - Comprehensive metabolic panel; Future  - Discussed she would like refill of meds    Will trial tums/H2 rather than PPI to facilitate H pylori screen   - ondansetron (ZOFRAN ODT) 4 MG ODT tab; Take 1 tablet (4 mg) by mouth every 8 hours as needed for nausea  Dispense: 30 tablet; Refill: 0  - loperamide (IMODIUM A-D) 2 MG tablet; Take 1 tablet (2 mg) by mouth 4 times daily as needed for diarrhea  Dispense: 60 tablet; Refill: 0   Recent stool panel negative    Doubt bacterial dysentery   - acetaminophen (TYLENOL) 500 MG tablet; Take 2 tablets (1,000 mg) by mouth every 6 hours as needed for mild pain  Dispense: 100 tablet; Refill: 0  - famotidine (PEPCID) 40 MG  tablet; Take 1 tablet (40 mg) by mouth daily  Dispense: 60 tablet; Refill: 0  - calcium carbonate (TUMS) 500 MG chewable tablet; Take 1 tablet (500 mg) by mouth 2 times daily  Dispense: 90 tablet; Refill: 1  - Discussed reasons to call clinic vs present to ED     (F33.1) Moderate recurrent major depression (H)  (primary encounter diagnosis)  Comment: PHQ in moderate range at 15. Did not have time to discuss today but confirmed no SI/HI and feel safe for discharge home at this time.   Plan:   Need to follow up in short-term   Offer counseling / meds at near future visit   Provided crisis lines at a minimum      Depression Screening Follow Up  PHQ 6/20/2022   PHQ-9 Total Score 15   Q9: Thoughts of better off dead/self-harm past 2 weeks Not at all     Follow Up Actions Taken  Crisis lines provided in AVS   Offered follow up visit for this soon     Future:  - Pap/HPV  - COVID vaccine??    Emilia Matos DO  [facility]    Precepted with Dr. Jonnie Burt   Dain is a 41 year old presenting for the following health issues:  Follow Up (Reoccur abdominal and rectum pain, request for scan and medication, unable to eat or drink) and Medication Reconciliation (Reviewed, currently not using any medication finish Zofran last night)    In person ong interpretor employed     HPI     Rectal / abdominal pain   Since hospital: getting worse   Was improved 1.5 weeks ago but now worse   Constant   Abdomen - midline diffuse   When it's very bad feels it in her rectum   Description: pressure   Intensity: 7-8/10   Exacerbating factors: food (ian fatty foods)   Remitting factors:    Short period of time the meds help    Symptoms associated/denied (as below):      Fever - no   Chills - no   Nausea - constant   Vomiting - once or twice per day. No hematemesis.    Stools    Pain with this    Feeling some urge    Ellis stool chart 7 - watery    Green    Blood - there used to be. But not now  "   Blisters around the rectum     Cant sit on it   Urinary symptoms -  No   No flank pain     No prior abdominal surgeries     Saw Dr. Martin last week   Update since discharge: Has been doing well since discharge. Vomiting has since resolved. Having loose stools 3-4 times daily. Non bloody.  Limited abdominal pain. Tolerating oral intake     Hospital discharge follow-up  Abdominal Pain, Generalized  Admitted at [facility] 6/1-6/3 with abdominal pain, nausea, vomiting, hematochezia. Work-up showing redundant sigmoid colon with possible internal hernia, believed to be etiology of symptoms.  GI evaluated, plan for outpatient colonoscopy. Symptoms improving, still with loose stools/nausea. Unable to pickup discharge medications, will resend. Follow-up precautions provided.   - loperamide (IMODIUM A-D) 2 MG tablet  Dispense: 60 tablet; Refill: 0  - ondansetron (ZOFRAN ODT) 4 MG ODT tab  Dispense: 30 tablet; Refill: 0  - Follow-up with GI on July 18th.   - Note for work provided    Review of Systems   Constitutional, HEENT, cardiovascular, pulmonary, GI, , musculoskeletal, neuro, skin, endocrine and psych systems are negative, except as otherwise noted.      Objective    BP 95/62 (BP Location: Right arm, Patient Position: Sitting, Cuff Size: Adult Regular)   Pulse 80   Temp 98.4  F (36.9  C) (Oral)   Resp 24   Ht 1.502 m (4' 11.15\")   Wt 54 kg (119 lb)   SpO2 98%   BMI 23.91 kg/m    Body mass index is 23.91 kg/m .  Physical Exam   Gen: Pleasant. Mild distress related to abdominal sx -- laying on exam table when I enter room.   HEENT: MMM. No oral lesions.   Neck: No overt asymmetry.   CV: Appears well-perfused. RRR. No murmur.   Resp: Breathing comfortably on room air. Lungs clear to auscultation bilaterally without wheeze or crackle.   Abd: Non-distended. Normal bowel sounds. Mild generalized abdominal TTP most prominent between umbilicus and epigastric region. Does have RUQ tenderness with positive kirby's " sign. No rebound tenderness. No flank tenderness.   Rectal: offered exam but patient declined due to patient preference   Ext: No edema or overt asymmetry/deformity.   Skin: No overt rash on easily visualized skin.   Neuro: Non-focal.   Psych: Calm.       PHQ 1/8/2018 6/20/2022   PHQ-9 Total Score 0 15   Q9: Thoughts of better off dead/self-harm past 2 weeks Not at all Not at all         .  ..   Include Pregnancy/Lactation Warning?: No

## 2023-06-05 ENCOUNTER — TELEPHONE (OUTPATIENT)
Dept: FAMILY MEDICINE | Facility: CLINIC | Age: 43
End: 2023-06-05

## 2023-06-05 NOTE — TELEPHONE ENCOUNTER
[facility] phone call message- medication clarification/question:    Full Medication Name: DME OSTOMY SUPPLY     Question: Patient came to , requesting refill on these.    Pharmacy confirmed as    [id number] - [place], [place] - [address] AT [facility] [place], [place] - [address] [place] [address]: Yes    OK to leave a message on voice mail? Yes    Primary language: Hmong      needed? Yes    Call taken on June 5, 2023 at 2:26 PM by Apple Barnes

## 2023-06-07 NOTE — TELEPHONE ENCOUNTER
I called DME supplies store - placed order for more supplies for patient and I called to let her know and provided phone number for when she needs more she can call them. And informed her to call a week ahead before supplies are out, it take 4-5 days to ship to her home.

## 2023-06-14 ENCOUNTER — TRANSFERRED RECORDS (OUTPATIENT)
Dept: HEALTH INFORMATION MANAGEMENT | Facility: CLINIC | Age: 43
End: 2023-06-14

## 2023-07-05 ENCOUNTER — OFFICE VISIT (OUTPATIENT)
Dept: FAMILY MEDICINE | Facility: CLINIC | Age: 43
End: 2023-07-05
Payer: COMMERCIAL

## 2023-07-05 VITALS — SYSTOLIC BLOOD PRESSURE: 95 MMHG | OXYGEN SATURATION: 100 % | DIASTOLIC BLOOD PRESSURE: 65 MMHG | HEART RATE: 94 BPM

## 2023-07-05 DIAGNOSIS — R20.2 NUMBNESS AND TINGLING OF LEFT SIDE OF FACE: ICD-10-CM

## 2023-07-05 DIAGNOSIS — K50.918 CROHN'S DISEASE WITH OTHER COMPLICATION, UNSPECIFIED GASTROINTESTINAL TRACT LOCATION (H): ICD-10-CM

## 2023-07-05 DIAGNOSIS — R20.0 NUMBNESS AND TINGLING OF LEFT SIDE OF FACE: ICD-10-CM

## 2023-07-05 DIAGNOSIS — R53.1 RIGHT SIDED WEAKNESS: Primary | ICD-10-CM

## 2023-07-05 PROCEDURE — 99214 OFFICE O/P EST MOD 30 MIN: CPT | Mod: GC

## 2023-07-05 NOTE — PROGRESS NOTES
Assessment & Plan     Right sided weakness  Numbness and tingling of left side of face  Patient has had intermittent episodes of left-sided headaches, difficulties with speech, and right-sided extremity weakness since around October 2022.  Had not disclosed the symptoms at prior visits.  Unclear etiology at this point.  Neurologic exam is normal.  With intermittent symptoms, do worry about a central nervous system demyelinating condition such as MS.  Could also consider other intracranial process.  Differential also includes complex migraine, however would be a abnormal presentation.  Could also consider B12 deficiency, however interesting that it is unilateral.  We will start with imaging as below to rule out insidious etiologies  - MR Brain w/o & w Contrast  - MR Cervical Spine w/o & w Contrast  - MR Thoracic Spine w/o & w Contrast  - MR Lumbar Spine w/o & w Contrast  -Follow-up after imaging    Crohn's disease with other complication, unspecified gastrointestinal tract location (H)  Crohn's disease diagnosis complicated by colonic stricture requiring colostomy.  Follows closely with [facility], supposed to have upcoming colonoscopy to evaluate for active disease, but she has been unable to contact [facility] to get this arranged.  We will have our assist with scheduling.  Also needing refills of some of her colostomy supplies, will get have our clinic call over to the DME office to assist.  -Follow-up with [facility]      No follow-ups on file.    Jefe Martin MD  [facility]    Carmel Gautam is a 42 year old with history significant for Crohn's disease, complicated by colonic stricture status post  colostomy.  Presenting for the following health issues:  RECHECK (Migraine and body weakness)      HPI     Crohn's disease  Complicated by colonic stricture requiring colostomy.  Follows closely with [facility], most recent visit 6/14/2023.  Planning to do a colonoscopy through the ostomy.  If no  evidence of disease, would discuss surgery to reconnect, if disease present, would plan to treat.  Had been concerned about latent tuberculosis, Mantoux recently done was negative.    Patient is waiting to hear about colonoscopy scheduling.  Has been a couple weeks since her appointment, and is wondering when they will reach out.  She did try calling a couple times, unable to get through.  She would like to get this arranged soon as possible, so her gastroenterologist can help develop a plan moving forward  She currently feels fairly asymptomatic from a Crohn's disease standpoint.  No stomach pain, no blood in her stool, no nausea or vomiting.  Eating a typical mung diet including rice, meat, vegetables.  Avoiding gluten as able.      Left sided headache/pain   Right sided body weakness   Since hospitalization for her colonic stricture in October 2022, patient has had intermittent episodes of left-sided facial numbness/pain, difficulty with speech, and accompanying right upper and lower extremity weakness.    She describes the left-sided facial pain as a burning sensation.  She was unable to put her hair up in a ponytail during these episodes, as it caused worsening pain.  No rashes during this time.  Symptoms last about 3 days.  Difficulty speaking during these episodes, with the left side of her tongue becoming numb..  Denies any facial droopiness, vision changes, changes to her hearing.    During the few days where she is experiencing these left-sided facial symptoms, she will have right upper and lower extremity weakness.  She will have difficult time going upstairs, as she has physically has to grab her right leg to move it up.  The first time this all happened, she said her right leg was completely numb.    Again, symptoms last for about 3 days, then completely resolved.    Her family encouraged her to seek medical care during these episodes, she was however hesitant.  She recently brought these concerns with  her GI doctor, who recommended following up with me.    Tylenol has been helpful Occasionally with the burning sensation in her head.  Has not tried any other medications.    She denies any back pain, no neck pain.  No recent injuries or trauma.    Review of Systems   Constitutional, HEENT, cardiovascular, pulmonary, gi and gu systems are negative, except as otherwise noted.      Objective    BP 95/65 (BP Location: Right arm, Patient Position: Sitting, Cuff Size: Adult Regular)   Pulse 94   SpO2 100%   There is no height or weight on file to calculate BMI.  Physical Exam   GENERAL: Alert.  No acute distress.  Appears tired  NECK: no adenopathy, no asymmetry  RESP: lungs clear to auscultation - no rales, rhonchi or wheezes  CV: regular rate and rhythm, normal S1 S2, no S3 or S4, no murmur, click or rub, no peripheral edema and peripheral pulses strong  ABDOMEN: soft, nontender, no hepatosplenomegaly, stoma appears pink.  Stool in the ostomy bag.  MS: no gross musculoskeletal defects noted, no edema    Neurological Exam  Mental status - Patient is awake and oriented to self, place and time. Attentionspan is normal. Language is fluent without dysarthria; follows commands appropriately  Cranial nerves - CN II-XII intact. Pupils are reactive and symmetric; EOMI  Motor - There is no pronator drift. Motorexam shows 5/5 strength in all extremities  Sensation - Normal sensation to light touch throughout  Tone - Tone is symmetric and normal throughout  Coordination - Finger to nose is without dyssymmetry   Gait and station -able to ambulate.

## 2023-07-06 ENCOUNTER — TELEPHONE (OUTPATIENT)
Dept: FAMILY MEDICINE | Facility: CLINIC | Age: 43
End: 2023-07-06

## 2023-07-06 ENCOUNTER — APPOINTMENT (OUTPATIENT)
Dept: INTERPRETER SERVICES | Facility: CLINIC | Age: 43
End: 2023-07-06

## 2023-07-06 NOTE — TELEPHONE ENCOUNTER
" attempted to call x 2 with same result \"not accepting calls at this time.\" Scheduled patient for MRI with [facility].   Appointments in Next Year        Jul 27, 2023 11:30 AM  (Arrive by 11:00 AM)  MR Brain Complete Spine WWO Contrast Combo with MICMR1  [facility] ([facility]) [phone]      Sent note to patient informing them of this.   Bety Giron [facility]            "

## 2023-07-13 NOTE — PROGRESS NOTES
Preceptor Attestation:   Patient seen, evaluated and discussed with the resident. I have verified the content of the note, which accurately reflects my assessment of the patient and the plan of care.    Supervising Physician:Td Padilla MD    [facility]

## 2023-07-19 ENCOUNTER — APPOINTMENT (OUTPATIENT)
Dept: INTERPRETER SERVICES | Facility: CLINIC | Age: 43
End: 2023-07-19

## 2023-07-27 ENCOUNTER — HOSPITAL ENCOUNTER (OUTPATIENT)
Dept: MRI IMAGING | Facility: HOSPITAL | Age: 43
Discharge: HOME | End: 2023-07-27
Attending: FAMILY MEDICINE | Admitting: FAMILY MEDICINE
Payer: COMMERCIAL

## 2023-07-27 DIAGNOSIS — R53.1 RIGHT SIDED WEAKNESS: ICD-10-CM

## 2023-07-27 DIAGNOSIS — R20.2 NUMBNESS AND TINGLING OF LEFT SIDE OF FACE: ICD-10-CM

## 2023-07-27 DIAGNOSIS — R20.0 NUMBNESS AND TINGLING OF LEFT SIDE OF FACE: ICD-10-CM

## 2023-07-27 PROCEDURE — 255N000002 HC RX 255 OP 636: Mod: JZ | Performed by: FAMILY MEDICINE

## 2023-07-27 PROCEDURE — 70553 MRI BRAIN STEM W/O & W/DYE: CPT

## 2023-07-27 PROCEDURE — 72156 MRI NECK SPINE W/O & W/DYE: CPT

## 2023-07-27 PROCEDURE — A9585 GADOBUTROL INJECTION: HCPCS | Mod: JZ | Performed by: FAMILY MEDICINE

## 2023-07-27 RX ORDER — GADOBUTROL 604.72 MG/ML
0.1 INJECTION INTRAVENOUS ONCE
Status: COMPLETED | OUTPATIENT
Start: 2023-07-27 | End: 2023-07-27

## 2023-07-27 RX ADMIN — GADOBUTROL 4 ML: 604.72 INJECTION INTRAVENOUS at 18:14

## 2023-08-03 ENCOUNTER — APPOINTMENT (OUTPATIENT)
Dept: INTERPRETER SERVICES | Facility: CLINIC | Age: 43
End: 2023-08-03

## 2023-08-09 ENCOUNTER — OFFICE VISIT (OUTPATIENT)
Dept: FAMILY MEDICINE | Facility: CLINIC | Age: 43
End: 2023-08-09
Payer: COMMERCIAL

## 2023-08-09 VITALS
WEIGHT: 95 LBS | DIASTOLIC BLOOD PRESSURE: 63 MMHG | HEART RATE: 100 BPM | TEMPERATURE: 98.8 F | SYSTOLIC BLOOD PRESSURE: 93 MMHG | OXYGEN SATURATION: 99 % | BODY MASS INDEX: 19.94 KG/M2 | HEIGHT: 58 IN

## 2023-08-09 DIAGNOSIS — F33.1 MODERATE RECURRENT MAJOR DEPRESSION (H): ICD-10-CM

## 2023-08-09 DIAGNOSIS — R20.2 NUMBNESS AND TINGLING OF LEFT SIDE OF FACE: Primary | ICD-10-CM

## 2023-08-09 DIAGNOSIS — R20.0 NUMBNESS AND TINGLING OF LEFT SIDE OF FACE: Primary | ICD-10-CM

## 2023-08-09 DIAGNOSIS — Z43.3 COLOSTOMY CARE (H): ICD-10-CM

## 2023-08-09 DIAGNOSIS — R53.1 RIGHT SIDED WEAKNESS: ICD-10-CM

## 2023-08-09 DIAGNOSIS — K50.918 CROHN'S DISEASE WITH OTHER COMPLICATION, UNSPECIFIED GASTROINTESTINAL TRACT LOCATION (H): ICD-10-CM

## 2023-08-09 LAB
FOLATE SERPL-MCNC: 5.4 NG/ML (ref 4.6–34.8)
TSH SERPL DL<=0.005 MIU/L-ACNC: 1.58 UIU/ML (ref 0.3–4.2)
VIT B12 SERPL-MCNC: 1200 PG/ML (ref 232–1245)

## 2023-08-09 PROCEDURE — 82746 ASSAY OF FOLIC ACID SERUM: CPT

## 2023-08-09 PROCEDURE — 82607 VITAMIN B-12: CPT

## 2023-08-09 PROCEDURE — 84443 ASSAY THYROID STIM HORMONE: CPT

## 2023-08-09 PROCEDURE — 36415 COLL VENOUS BLD VENIPUNCTURE: CPT

## 2023-08-09 PROCEDURE — 99214 OFFICE O/P EST MOD 30 MIN: CPT | Mod: GC

## 2023-08-09 ASSESSMENT — PATIENT HEALTH QUESTIONNAIRE - PHQ9
SUM OF ALL RESPONSES TO PHQ QUESTIONS 1-9: 5
SUM OF ALL RESPONSES TO PHQ QUESTIONS 1-9: 5
10. IF YOU CHECKED OFF ANY PROBLEMS, HOW DIFFICULT HAVE THESE PROBLEMS MADE IT FOR YOU TO DO YOUR WORK, TAKE CARE OF THINGS AT HOME, OR GET ALONG WITH OTHER PEOPLE: SOMEWHAT DIFFICULT

## 2023-08-09 NOTE — PROGRESS NOTES
Assessment & Plan     Numbness and tingling of left side of face  Right sided weakness  Has been ongoing since October 2022, intermittent episodes.  Obtained MRI at last visit, did not show any intracranial process or cervical spine pathology, which is reassuring.  Asymptomatic since last visit.  Unremarkable neurologic exam.  Unclear eitology at this point. If symptoms return, would recommend neurology referral.  Will obtain TSH, folate, B12 in the meantime.  -TSH  -Folate  -B12  -Follow-up precautions provided.      Crohn's disease with other complication, unspecified gastrointestinal tract location (H)  Colostomy care (H)  Status post partial resection and colectomy.  Asymptomatic at this point.  Has upcoming visit with GI to evaluate for active disease.  If no evidence of active disease, it sounds like they would plan for revision of ostomy.  -Follow-up with GI in September  -Will assist patient with reordering of colostomy supplies from DME office   -MA assisting      Moderate recurrent major depression  In the setting of ongoing health issues.  Symptoms much improved.  Has not been treated with medication.  PHQ-9 of 5 today.  Overall doing well.  - Follow-up as needed         No follow-ups on file.    Jefe Martin MD  [facility] Gautam is a 42 year old, presenting for the following health issues:  RECHECK (Recheck symptoms), Results (MRI results / discuss more about results with provider ), and Refill Request (Ostomy Supplies Dme refill )    HPI     Patient presented to clinic on 7/5/2023 with intermittent episodes of left-sided headache, difficulties with speech, and right-sided extremity weakness since October 2022.    MRI was completed of the head and cervical spine which was unremarkable.,  Does not appear the thoracic or lumbar spine was imaged.    Is been feeling well since her last visit.  No repeat symptoms.  No numbness or tingling in the face.  No  "difficulties with speech.  Normal strength in all 4 extremities.    Mood  Currently feels well.  Did have some stressors previously that she believes led to depressed mood.  Had some difficulties with getting insurance coverage of her GI appointments.  This has since been resolved.     Crohn's colitis  GI follow-up September 14.   No nausea or vomiting  No abdominal pain  No bloody stool.   Eating okay.   Requesting assistance getting refills of her ostomy supplies.    Review of Systems   Constitutional, HEENT, cardiovascular, pulmonary, gi and gu systems are negative, except as otherwise noted.      Objective    BP (!) 85/59   Pulse 100   Temp 98.8  F (37.1  C) (Oral)   Ht 1.473 m (4' 10\")   Wt 43.1 kg (95 lb)   LMP 07/20/2023 (Approximate)   SpO2 99%   Breastfeeding No   BMI 19.86 kg/m    Body mass index is 19.86 kg/m .  Physical Exam   GENERAL: healthy, alert and no distress  NECK: no adenopathy, no asymmetry  RESP: lungs clear to auscultation - no rales, rhonchi or wheezes  CV: regular rate and rhythm, normal S1 S2, no S3 or S4, no murmur, click or rub, no peripheral edema and peripheral pulses strong  ABDOMEN: soft, nontender, large midline scar. Ostomy in the left lower quadrant with stool present  MS: no gross musculoskeletal defects noted, no edema  Neuro - Pupils equal round and reactive. Cranial nerves grossly intact. 5/5 strength in all 4 extremities, no ataxia.           Answers submitted by the patient for this visit:  Patient Health Questionnaire (Submitted on 8/9/2023)  If you checked off any problems, how difficult have these problems made it for you to do your work, take care of things at home, or get along with other people?: Somewhat difficult  PHQ9 TOTAL SCORE: 5    "

## 2023-08-09 NOTE — LETTER
August 14, 2023      Dain Ileana  [address] [facility] [address]        Shade Gautam,     Thank you for coming in to see me the other day.  Your lab work is back and it all looks good.  The vitamin levels we checked including B12 and folate were both normal, which is great.  Your thyroid function testing was also normal, which is good news.     Nothing further needs to be done right now.  Please follow-up in clinic if her symptoms return.     Let us know if you have any questions or concerns.     Resulted Orders   Vitamin B12   Result Value Ref Range    Vitamin B12 1,200 232 - 1,245 pg/mL   TSH with free T4 reflex   Result Value Ref Range    TSH 1.58 0.30 - 4.20 uIU/mL   Folate   Result Value Ref Range    Folic Acid 5.4 4.6 - 34.8 ng/mL       If you have any questions or concerns, please call the clinic at the number listed above.       Sincerely,      Jefe Martin MD

## 2023-08-14 ENCOUNTER — APPOINTMENT (OUTPATIENT)
Dept: INTERPRETER SERVICES | Facility: CLINIC | Age: 43
End: 2023-08-14

## 2023-08-17 NOTE — PROGRESS NOTES
Faculty Supervision of Residents   I have examined this patient and the medical care has been evaluated and discussed with the resident. See resident note outlining our discussion.      Jacquelin Dunlap MD

## 2023-09-14 ENCOUNTER — TRANSFERRED RECORDS (OUTPATIENT)
Dept: HEALTH INFORMATION MANAGEMENT | Facility: CLINIC | Age: 43
End: 2023-09-14

## 2023-09-22 ENCOUNTER — TELEPHONE (OUTPATIENT)
Dept: FAMILY MEDICINE | Facility: CLINIC | Age: 43
End: 2023-09-22

## 2023-09-22 NOTE — TELEPHONE ENCOUNTER
"Patient was in with her daughter.  She needs her colostomy supplies:    Please call [facility] as this appears where she is getting her supplies from.     She needs:  Ostomy barrier rings, 2\", 10 units  1 piece drainage ostomy bag 10 units    Please let us know if she needs a new prescription to Medical supplier.      "

## 2023-10-03 ENCOUNTER — TRANSFERRED RECORDS (OUTPATIENT)
Dept: HEALTH INFORMATION MANAGEMENT | Facility: CLINIC | Age: 43
End: 2023-10-03

## 2023-10-03 NOTE — TELEPHONE ENCOUNTER
Patient came to  to ask about the DME supplies.. Is anyone able to further assist patient with this?

## 2023-10-06 ENCOUNTER — ALLIED HEALTH/NURSE VISIT (OUTPATIENT)
Dept: FAMILY MEDICINE | Facility: CLINIC | Age: 43
End: 2023-10-06
Payer: COMMERCIAL

## 2023-10-06 DIAGNOSIS — Z23 NEED FOR PROPHYLACTIC VACCINATION AND INOCULATION AGAINST INFLUENZA: Primary | ICD-10-CM

## 2023-10-06 PROCEDURE — 90686 IIV4 VACC NO PRSV 0.5 ML IM: CPT

## 2023-10-06 PROCEDURE — 99207 PR NO CHARGE NURSE ONLY: CPT

## 2023-10-06 PROCEDURE — 90472 IMMUNIZATION ADMIN EACH ADD: CPT

## 2023-10-06 PROCEDURE — 90471 IMMUNIZATION ADMIN: CPT

## 2023-10-06 PROCEDURE — 90750 HZV VACC RECOMBINANT IM: CPT

## 2023-10-06 PROCEDURE — 90677 PCV20 VACCINE IM: CPT

## 2023-10-27 ENCOUNTER — TRANSFERRED RECORDS (OUTPATIENT)
Dept: HEALTH INFORMATION MANAGEMENT | Facility: CLINIC | Age: 43
End: 2023-10-27

## 2023-11-14 ENCOUNTER — TRANSFERRED RECORDS (OUTPATIENT)
Dept: HEALTH INFORMATION MANAGEMENT | Facility: CLINIC | Age: 43
End: 2023-11-14

## 2023-11-15 ENCOUNTER — TELEPHONE (OUTPATIENT)
Dept: FAMILY MEDICINE | Facility: CLINIC | Age: 43
End: 2023-11-15

## 2023-11-15 NOTE — TELEPHONE ENCOUNTER
[facility] phone call message- medication clarification/question:    Full Medication Name: Azathioprine 50mg tablets     Question: Patient came to  requesting refill on medication above. Writer informed patient that the medication is not on current med list. Patient stated she was informed by the provider who prescribed the medication (Fausto Britt MD)that it was Ok for patient to ask her primary to fill. Please refill or else call and advise.      Pharmacy confirmed as    [id number] - [place], [place] - [address] AT [facility]  : Yes    OK to leave a message on voice mail? Yes    Primary language: Hmong      needed? Yes    Call taken on November 15, 2023 at 9:35 AM by Apple Barnes

## 2023-11-16 ENCOUNTER — APPOINTMENT (OUTPATIENT)
Dept: INTERPRETER SERVICES | Facility: CLINIC | Age: 43
End: 2023-11-16

## 2023-11-16 ENCOUNTER — TELEPHONE (OUTPATIENT)
Dept: FAMILY MEDICINE | Facility: CLINIC | Age: 43
End: 2023-11-16

## 2023-11-16 NOTE — TELEPHONE ENCOUNTER
Patient called requesting refill of azathioprine     Her GI physician recently initiated azathioprine.  She reports getting routine lab monitoring.  I called her pharmacy, they do have refills available.  Recommended she stop by the pharmacy to pick it up.    Jefe Martin MD PGY-3  [facility]

## 2023-11-28 ENCOUNTER — TRANSFERRED RECORDS (OUTPATIENT)
Dept: HEALTH INFORMATION MANAGEMENT | Facility: CLINIC | Age: 43
End: 2023-11-28

## 2023-12-12 ENCOUNTER — TRANSFERRED RECORDS (OUTPATIENT)
Dept: HEALTH INFORMATION MANAGEMENT | Facility: CLINIC | Age: 43
End: 2023-12-12

## 2024-01-08 ENCOUNTER — TRANSFERRED RECORDS (OUTPATIENT)
Dept: HEALTH INFORMATION MANAGEMENT | Facility: CLINIC | Age: 44
End: 2024-01-08

## 2024-01-09 ENCOUNTER — TRANSFERRED RECORDS (OUTPATIENT)
Dept: HEALTH INFORMATION MANAGEMENT | Facility: CLINIC | Age: 44
End: 2024-01-09

## 2024-01-11 ENCOUNTER — TRANSFERRED RECORDS (OUTPATIENT)
Dept: HEALTH INFORMATION MANAGEMENT | Facility: CLINIC | Age: 44
End: 2024-01-11

## 2024-01-15 ENCOUNTER — TRANSFERRED RECORDS (OUTPATIENT)
Dept: HEALTH INFORMATION MANAGEMENT | Facility: CLINIC | Age: 44
End: 2024-01-15

## 2024-01-16 ENCOUNTER — TRANSFERRED RECORDS (OUTPATIENT)
Dept: HEALTH INFORMATION MANAGEMENT | Facility: CLINIC | Age: 44
End: 2024-01-16

## 2024-01-17 ENCOUNTER — TRANSFERRED RECORDS (OUTPATIENT)
Dept: HEALTH INFORMATION MANAGEMENT | Facility: CLINIC | Age: 44
End: 2024-01-17

## 2024-02-16 ENCOUNTER — TRANSFERRED RECORDS (OUTPATIENT)
Dept: HEALTH INFORMATION MANAGEMENT | Facility: CLINIC | Age: 44
End: 2024-02-16
Payer: COMMERCIAL

## 2024-03-08 ENCOUNTER — OFFICE VISIT (OUTPATIENT)
Dept: FAMILY MEDICINE | Facility: CLINIC | Age: 44
End: 2024-03-08
Payer: COMMERCIAL

## 2024-03-08 VITALS
RESPIRATION RATE: 16 BRPM | SYSTOLIC BLOOD PRESSURE: 96 MMHG | OXYGEN SATURATION: 99 % | WEIGHT: 102.12 LBS | HEART RATE: 85 BPM | BODY MASS INDEX: 21.34 KG/M2 | DIASTOLIC BLOOD PRESSURE: 60 MMHG

## 2024-03-08 DIAGNOSIS — R10.31 RIGHT LOWER QUADRANT PAIN: Primary | ICD-10-CM

## 2024-03-08 DIAGNOSIS — K90.0 CELIAC DISEASE: ICD-10-CM

## 2024-03-08 DIAGNOSIS — Z12.4 CERVICAL CANCER SCREENING: ICD-10-CM

## 2024-03-08 PROCEDURE — G0124 SCREEN C/V THIN LAYER BY MD: HCPCS | Performed by: PATHOLOGY

## 2024-03-08 PROCEDURE — 87624 HPV HI-RISK TYP POOLED RSLT: CPT

## 2024-03-08 PROCEDURE — G0145 SCR C/V CYTO,THINLAYER,RESCR: HCPCS

## 2024-03-08 PROCEDURE — 99214 OFFICE O/P EST MOD 30 MIN: CPT | Mod: GC

## 2024-03-08 RX ORDER — AZATHIOPRINE 50 MG/1
2 TABLET ORAL DAILY
COMMUNITY
Start: 2023-12-12

## 2024-03-08 RX ORDER — IRON SUCROSE 20 MG/ML
INJECTION, SOLUTION INTRAVENOUS
COMMUNITY
Start: 2023-11-30 | End: 2024-04-30

## 2024-03-08 RX ORDER — AZATHIOPRINE 50 MG/1
100 TABLET ORAL DAILY
COMMUNITY
End: 2024-04-30

## 2024-03-08 RX ORDER — ADALIMUMAB 80MG/0.8ML
KIT SUBCUTANEOUS
COMMUNITY
Start: 2023-10-10 | End: 2024-04-30

## 2024-03-08 RX ORDER — ADALIMUMAB 40MG/0.4ML
0.4 KIT SUBCUTANEOUS
Status: ON HOLD | COMMUNITY
Start: 2023-10-17 | End: 2024-05-21

## 2024-03-08 ASSESSMENT — PATIENT HEALTH QUESTIONNAIRE - PHQ9: SUM OF ALL RESPONSES TO PHQ QUESTIONS 1-9: 19

## 2024-03-08 NOTE — PROGRESS NOTES
Preceptor Attestation:  Patient's case reviewed and discussed with Jefe Martin MD resident and I evaluated the patient. I agree with written assessment and plan of care.  Supervising Physician:  RUTH RUSSO MD  [facility]

## 2024-03-08 NOTE — PROGRESS NOTES
Assessment & Plan     Right lower quadrant pain  Ongoing right lower quadrant pain/bloating.  Follows closely with [facility].  Due wonder if this is related to Crohn's disease.  Would like to rule out ovarian/uterine pathology.  Prior CT abdomen without any obvious pathology in this area.  Will obtain transvaginal ultrasound to reevaluate  - US Transvaginal Pelvic Non-OB  -Follow-up with GI    Celiac disease  Could be contributing to some of the abdominal bloating, nausea vomiting.  Weight has been stable recently.  Looks like GI recently referred to nutritionist for ongoing assistance with celiac diet.  -Follow-up with GI    Cervical cancer screening  Looks like most recent Pap smear 2017 ASCUS HPV positive, normal cytology with positive HPV in 2018, recommended repeat in 1 year at that time. Currently on immunosuppressive therapy in the setting of Crohn's disease.  - Gynecologic Cytology (PAP)  -Follow-up pending results    Patient needs follow-up for annual physical to update additional health maintenance.    Patient brought in a medical opinion form, will work on completing.      No follow-ups on file.    Carmel Gautam is a 43 year old, presenting for the following health issues:  Gyn Exam (Pt stating requesting US. Transvaginal??/Patient agreed for PAP) and Forms (Medical statement, pt inform can take 14 days but they will get a copy. Forms to be faxed to  fax number [phone] Jimi Roach. Doc [employer].)    HPI     Crohn's disease  Celiacs  Abdominal pain  History of stricture with colostomy and rectal pouch placement on 11/6/2022  Started on Humira on 10/27/2023, on azathioprine 100 mg daily  Medications have been working well.  Feeling bloated. Comes and goes   Not following gluten free diet (was referred to nutritionist)   - Some nausea and vomiting   - non bloody non bilious  - Appetite has not been great.   - Night sweats.   - ongoing for 3 to 4 months    - Worse over the past few weeks   -  Feeling chilled during the day.   - No blood in stool   - Eating Rice/vegetable boil with pork  - No dysuria, no hematuria  - No vaginal pain/irritation  - LMP Feb 12th - Typically regular   - 1 pregnancy 2017   -Weight has been stable.          Objective    BP 96/60 (BP Location: Right arm, Patient Position: Sitting, Cuff Size: Adult Regular)   Pulse 85   Resp 16   Wt 46.3 kg (102 lb 1.9 oz)   LMP 02/12/2024 (Exact Date)   SpO2 99%   BMI 21.34 kg/m    Body mass index is 21.34 kg/m .  Physical Exam   GENERAL: alert and no distress  RESP: lungs clear to auscultation - no rales, rhonchi or wheezes  CV: regular rate and rhythm, normal S1 S2, no S3 or S4, no murmur, click or rub, no peripheral edema  ABDOMEN: soft, non distended.  Bowel sounds present.  Surgical scar present in the midline, well-healed.  Ostomy bag in the left lower abdomen, liquid stool present.  Nonbloody.  Does have mild tenderness in the right lower quadrant.  No right upper quadrant tenderness, some tenderness around the ostomy site.  No rebound.    Pelvic exam -external genitalia within normal limits.  Cervix did appear little more erythematous.  No significant discharge.      Signed Electronically by: Jefe Martin MD

## 2024-03-12 LAB
BKR LAB AP GYN ADEQUACY: ABNORMAL
BKR LAB AP GYN INTERPRETATION: ABNORMAL
BKR LAB AP HPV REFLEX: ABNORMAL
BKR LAB AP LMP: ABNORMAL
BKR LAB AP PREVIOUS ABNORMAL: ABNORMAL
PATH REPORT.COMMENTS IMP SPEC: ABNORMAL
PATH REPORT.COMMENTS IMP SPEC: ABNORMAL
PATH REPORT.RELEVANT HX SPEC: ABNORMAL

## 2024-03-13 LAB
HUMAN PAPILLOMA VIRUS 16 DNA: NEGATIVE
HUMAN PAPILLOMA VIRUS 18 DNA: NEGATIVE
HUMAN PAPILLOMA VIRUS FINAL DIAGNOSIS: ABNORMAL
HUMAN PAPILLOMA VIRUS OTHER HR: POSITIVE

## 2024-03-14 ENCOUNTER — OFFICE VISIT (OUTPATIENT)
Dept: FAMILY MEDICINE | Facility: CLINIC | Age: 44
End: 2024-03-14
Payer: COMMERCIAL

## 2024-03-14 ENCOUNTER — ANCILLARY PROCEDURE (OUTPATIENT)
Dept: ULTRASOUND IMAGING | Facility: CLINIC | Age: 44
End: 2024-03-14
Attending: FAMILY MEDICINE
Payer: COMMERCIAL

## 2024-03-14 VITALS
TEMPERATURE: 97.9 F | BODY MASS INDEX: 20.36 KG/M2 | HEART RATE: 79 BPM | HEIGHT: 59 IN | OXYGEN SATURATION: 100 % | SYSTOLIC BLOOD PRESSURE: 96 MMHG | DIASTOLIC BLOOD PRESSURE: 59 MMHG | WEIGHT: 101 LBS

## 2024-03-14 DIAGNOSIS — Z90.49 S/P PARTIAL COLECTOMY: ICD-10-CM

## 2024-03-14 DIAGNOSIS — R10.31 RIGHT LOWER QUADRANT PAIN: ICD-10-CM

## 2024-03-14 DIAGNOSIS — R87.811 ATYPICAL SQUAMOUS CELL CHANGES OF UNDETERMINED SIGNIFICANCE (ASCUS) ON VAGINAL CYTOLOGY WITH POSITIVE HIGH RISK HUMAN PAPILLOMA VIRUS (HPV): ICD-10-CM

## 2024-03-14 DIAGNOSIS — K50.918 CROHN'S DISEASE WITH OTHER COMPLICATION, UNSPECIFIED GASTROINTESTINAL TRACT LOCATION (H): Primary | ICD-10-CM

## 2024-03-14 DIAGNOSIS — R87.620 ATYPICAL SQUAMOUS CELL CHANGES OF UNDETERMINED SIGNIFICANCE (ASCUS) ON VAGINAL CYTOLOGY WITH POSITIVE HIGH RISK HUMAN PAPILLOMA VIRUS (HPV): ICD-10-CM

## 2024-03-14 PROCEDURE — 99214 OFFICE O/P EST MOD 30 MIN: CPT | Mod: GC

## 2024-03-14 PROCEDURE — 76830 TRANSVAGINAL US NON-OB: CPT | Mod: TC | Performed by: RADIOLOGY

## 2024-03-14 PROCEDURE — 76856 US EXAM PELVIC COMPLETE: CPT | Mod: TC | Performed by: RADIOLOGY

## 2024-03-14 NOTE — PROGRESS NOTES
"  Assessment & Plan     Atypical squamous cell changes of undetermined significance (ASCUS) on vaginal cytology with positive high risk human papilloma virus (HPV)  Pap demonstrating ASC-H, with other high risk HPV.  She has had previous Paps with ASCUS, other high risk HPV (in 2017/2018, but looks like was lost to follow-up around that time due to lack of insurance coverage.  We discussed need for colposcopy, she is in agreement.  Is on immunosuppressive agents for Crohn's disease, will need to readdress this following colposcopy.  - Schedule colposcopy.  -Follow-up afterwards to discuss results    Crohn's disease with other complication, unspecified gastrointestinal tract location (H)  S/P partial colectomy  -Need to refill her ostomy supplies.  Follows with [facility].  I called the DME office today to confirm which supplies she needed.  This was ordered.        No follow-ups on file.    Subjective   Dain is a 43 year old, presenting for the following health issues:  [facility] Care Coordination - DME/Supplies (Pt needing Ostomy bags.) and Form Request (Pt needing medical statement for ability to work. [facility] Made a copy and faxed to Lawyer Jimi Roach at [phone])    HPI     Visit to complete paper work   Patient has been unable to work due to ongoing complications from Crohn's disease.  Weight loss, generalized weakness.  Paperwork filled out and scanned into the chart.    ASC- H  High risk HPV  Patient a Pap smear done last week, results showing ASCUS H with other high risk HPV.  Discussed colposcopy, agreeable.    Patient needs DME order sent for ostomy supplies.      Objective    BP 96/59   Pulse 79   Temp 97.9  F (36.6  C) (Oral)   Ht 1.495 m (4' 10.86\")   Wt 45.8 kg (101 lb)   LMP 02/12/2024 (Exact Date)   SpO2 100%   BMI 20.50 kg/m    Body mass index is 20.5 kg/m .  Physical Exam   Constitutional: Conversant, no acute distress  Eyes: Anicteric sclerae, no lid lag  Respiratory: Normal respiratory " effort  Cardiovascular: No peripheral edema  Skin: No rash, lesion, or ulcer  Musculoskeletal: No digital cyanosis, normal gait  Psych: Intact judgment and insight. Alert and oriented x3. Cordial affect           Signed Electronically by: Jefe Martin MD  DME (Durable Medical Equipment) Orders and Documentation  Orders Placed This Encounter   Procedures    Ostomy Supplies Order        The patient was assessed and it was determined the patient is in need of the following listed DME Supplies/Equipment. Please complete supporting documentation below to demonstrate medical necessity.      Patient with a partial colectomy with colostomy following colonic stricture in the setting of Crohn's disease.  Currently has a colostomy.  Requires ostomy supplies

## 2024-03-14 NOTE — NURSING NOTE
Due to patient being non-English speaking/uses sign language, an  was used for this visit. Only for face-to-face interpretation by an external agency, date and length of interpretation can be found on the scanned worksheet.     name: Deloris Pereira  Agency: [employer]  Language: Junito   Telephone number: .  Type of interpretation: Face-to-face, spoken

## 2024-03-15 NOTE — RESULT ENCOUNTER NOTE
Call patient:    Ultrasound is reassuring normal for age.  Found two common changes to the uterus and ovaries that are not worrisome.  One is a fibroid on the uterus, the tissue of the uterus wall sometimes thickens, the second is a small cyst that develops on one ovary.  Also common as part of the normal function of the ovary releasing the egg.  I can review in detail at next visit which needs to be scheduled for a colposcopy.  Please schedule that.

## 2024-03-18 ENCOUNTER — DOCUMENTATION ONLY (OUTPATIENT)
Dept: FAMILY MEDICINE | Facility: CLINIC | Age: 44
End: 2024-03-18

## 2024-03-18 ENCOUNTER — TELEPHONE (OUTPATIENT)
Dept: FAMILY MEDICINE | Facility: CLINIC | Age: 44
End: 2024-03-18
Payer: COMMERCIAL

## 2024-03-18 NOTE — TELEPHONE ENCOUNTER
Called [facility] [phone] for DME order for Ostomy supplies put in by Dr Martin on 3/14 (order # [id number]). Left message for them to call back with status of order so I can tell Pa(patient's stepdaughter) Pa had stated that they told her they had faxed over an order for Dr Martin to sign-- I could not locate said fax.  Please either fax again attn to Estela or advise what we need to do to get these supplies provided to patient.    Also -- this patient's Colposcopy scheduled for 4/10 with Dr Martin needs to be rescheduled -- the preceptors for that date/time do not do Colposcopy-- I found 4/12 in afternoon with other preceptors if we can change to then.

## 2024-03-18 NOTE — PROGRESS NOTES
Forms Request:  Today's Date: March 18, 2024   Form Type: DME Supplies, Ostomy Supplies DME order  Where is the form from: [facility]  How was form received: Fax  JULES on file: NO n/a  How is form being returned: Fax  Fax Number: [phone]  PCP: Jefe Hoffman Team: Purple Team  Form Given to: Call Center

## 2024-03-20 ENCOUNTER — APPOINTMENT (OUTPATIENT)
Dept: INTERPRETER SERVICES | Facility: CLINIC | Age: 44
End: 2024-03-20
Payer: COMMERCIAL

## 2024-03-20 NOTE — TELEPHONE ENCOUNTER
Patient walked in at  to have PCP fax over the DME order, she only has 1 bag left as of 3/20. Please fax over DME order or else call and advise patient what the next step is.

## 2024-03-21 ENCOUNTER — MEDICAL CORRESPONDENCE (OUTPATIENT)
Dept: HEALTH INFORMATION MANAGEMENT | Facility: CLINIC | Age: 44
End: 2024-03-21

## 2024-03-22 ENCOUNTER — TELEPHONE (OUTPATIENT)
Dept: FAMILY MEDICINE | Facility: CLINIC | Age: 44
End: 2024-03-22
Payer: COMMERCIAL

## 2024-03-22 ENCOUNTER — APPOINTMENT (OUTPATIENT)
Dept: INTERPRETER SERVICES | Facility: CLINIC | Age: 44
End: 2024-03-22
Payer: COMMERCIAL

## 2024-03-22 NOTE — TELEPHONE ENCOUNTER
CMA called [facility] and asked about status of order, per rep the order has been shipped. CMA asked how long would shipping take, rep stated about 5 days. CMA asked patient on last ostomy bag and if patient was able to go in person to get ostomy bag. Per rep they do not care supplies it is usually shipped. CMA stated she would call pt.

## 2024-03-22 NOTE — TELEPHONE ENCOUNTER
CMA called pt with . Pt stated she is still feeling sick and needs ostomy bags. Pt stated on last one and has been using tape to keep bag in place. Pt asked if PCP can send one-3 bags for meanwhile she waits. CMA stated she will send urgent message to PCP. Pt stated if CMA could contact hospital per MD, CMA stated MD would need to call. Pt agreed.

## 2024-03-22 NOTE — TELEPHONE ENCOUNTER
Dicussed with purple team MA. Writer did not see previous messages. Called patient with milly negrete. Patient is aware now that her supplies were faxed to [facility]. Clarifying patient is out of supplies and using tape as of right now due to lack of supplies She is wondering if she can get it VIA [facility] until her supplies will arrive. Will follow up. Thank you.   Rina Beyer on 3/22/2024 at 9:54 AM

## 2024-03-22 NOTE — TELEPHONE ENCOUNTER
Hi Dr Martin pt wondering if you are able to get hospital to give pt 1-3 ostomy bags stated bag she has is drenched and she will have to sit on the toilet if she does not get a bag. Please assist. Thank you.

## 2024-03-22 NOTE — TELEPHONE ENCOUNTER
Patient step daughter called regarding pt bag care. She stated patient has difficulty with our phones so if we please could call back with a solution regarding her bags for her Ostomy supplies. She needs them sent to where she previously got them. Step daughter unaware of where they went before but she is not going to get them from the shipping company in time. Please assist when able. She does not have a lot of supplies left. Thank you.  Rina Beyer on 3/22/2024 at 9:08 AM

## 2024-03-28 NOTE — PROGRESS NOTES
Date form completed: 3/21/24  Form sent via: Fax  Date faxed or mailed: 3/21/24  Fax # or Address: [phone]  Completed by: Sumeet Lozada

## 2024-04-11 ENCOUNTER — APPOINTMENT (OUTPATIENT)
Dept: INTERPRETER SERVICES | Facility: CLINIC | Age: 44
End: 2024-04-11
Payer: COMMERCIAL

## 2024-04-22 ENCOUNTER — OFFICE VISIT (OUTPATIENT)
Dept: FAMILY MEDICINE | Facility: CLINIC | Age: 44
End: 2024-04-22
Payer: COMMERCIAL

## 2024-04-22 VITALS
DIASTOLIC BLOOD PRESSURE: 64 MMHG | TEMPERATURE: 97.8 F | HEART RATE: 108 BPM | OXYGEN SATURATION: 98 % | WEIGHT: 101 LBS | BODY MASS INDEX: 21.2 KG/M2 | SYSTOLIC BLOOD PRESSURE: 99 MMHG | RESPIRATION RATE: 20 BRPM | HEIGHT: 58 IN

## 2024-04-22 DIAGNOSIS — Z12.11 ENCOUNTER FOR SCREENING COLONOSCOPY: Primary | ICD-10-CM

## 2024-04-22 DIAGNOSIS — Z12.31 VISIT FOR SCREENING MAMMOGRAM: ICD-10-CM

## 2024-04-22 DIAGNOSIS — B97.7 HIGH RISK HPV INFECTION: ICD-10-CM

## 2024-04-22 LAB — HCG UR QL: NEGATIVE

## 2024-04-22 PROCEDURE — 88360 TUMOR IMMUNOHISTOCHEM/MANUAL: CPT | Performed by: PATHOLOGY

## 2024-04-22 PROCEDURE — 88305 TISSUE EXAM BY PATHOLOGIST: CPT | Performed by: PATHOLOGY

## 2024-04-22 PROCEDURE — 99207 PR NO BILLABLE SERVICE THIS VISIT: CPT | Performed by: STUDENT IN AN ORGANIZED HEALTH CARE EDUCATION/TRAINING PROGRAM

## 2024-04-22 PROCEDURE — 81025 URINE PREGNANCY TEST: CPT | Performed by: STUDENT IN AN ORGANIZED HEALTH CARE EDUCATION/TRAINING PROGRAM

## 2024-04-22 PROCEDURE — 88342 IMHCHEM/IMCYTCHM 1ST ANTB: CPT | Mod: XU | Performed by: PATHOLOGY

## 2024-04-22 ASSESSMENT — PATIENT HEALTH QUESTIONNAIRE - PHQ9
SUM OF ALL RESPONSES TO PHQ QUESTIONS 1-9: 15
SUM OF ALL RESPONSES TO PHQ QUESTIONS 1-9: 15
10. IF YOU CHECKED OFF ANY PROBLEMS, HOW DIFFICULT HAVE THESE PROBLEMS MADE IT FOR YOU TO DO YOUR WORK, TAKE CARE OF THINGS AT HOME, OR GET ALONG WITH OTHER PEOPLE: VERY DIFFICULT

## 2024-04-22 NOTE — LETTER
May 6, 2024      Dain Tejeda  [address]   [address]  [place] [address]        Dear ,    We are writing to inform you of your test results.    Hope you are doing well.  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  You do not need any additional procedures right now, but it is important to ensure that you get a pap smear again in one year to monitor this infection and watch for other changes in your cervical cells.       Resulted Orders   HCG qualitative urine   Result Value Ref Range    hCG Urine Qualitative Negative Negative      Comment:      This test is for screening purposes.  Results should be interpreted along with the clinical picture.  Confirmation testing is available if warranted by ordering [id number], HCG Quantitative Pregnancy.   Surgical Pathology Exam   Result Value Ref Range    Case Report       Surgical Pathology Report                         Case: [id number]                                  Authorizing Provider:  Jacquelin Dunlap,   Collected:           04/22/2024 03:48 PM                                 MD                                                                           Ordering Location:     [facility]   Received:            04/22/2024 04:04 PM                                 [place]                                                               Pathologist:           Jaya Mathis MD                                                        Specimens:   A) - Cervix, 11 oclock                                                                              B) - Uterus, Cervix, 6 oclock                                                                       C) - Cervix, ecc                                                                           Final Diagnosis       A(A). 11 oclock:  -Transformation zone involved by low-grade dysplasia (TREVOR-1)    B(A). 6  oclock:  -Transformation zone involved by low-grade dysplasia (TREVOR-1)    C(A). ecc:  -Unremarkable endocervical mucosa with benign squamous metaplasia        Comment       The clinical history has been reviewed.  No definitive high-grade dysplasia but both A and B specimens demonstrate a slightly elevated proliferation rate and dysplasia but not enough for TREVOR-2.    A(A). 11 oclock:  The proliferation rate by Ki-67 is somewhat elevated at 10-20% but generally limited to the lower third of the epithelium.  P16 is negative.    B(A). 6 oclock:  The proliferation rate by Ki-67 is somewhat elevated at 10-20% but generally limited to the lower third of the epithelium.  P16 is negative.    C(A). ecc:  Low proliferation rate by Ki-67 in the ectocervical mucosa and benign squamous metaplasia (10%).  P16 is negative in the squamous metaplasia.        Clinical Information       + HPV ascus-H      Gross Description       A(A). Cervix, 11 oclock:  Received in formalin, labeled with the patient's name and cervix, 11:00, is a 0.5 cm, wedge-shaped, tan-white, variegated and glistening mucosal tissue.  B and TE-1C  B(A). Uterus, Cervix, 6 oclock:  Received in formalin, labeled with the patient's name and cervix, 6:00 is a 0.4 cm, wedge-shaped, red-brown, variegated and glistening mucosal tissue.  B and TE-1C  C(A). Cervix, ecc:  Received in formalin, labeled with the patient's name and ECC is a 0.7 x 0.4 x 0.3 cm aggregate of pink-red, irregular tissue fragments, mucus and blood clot.  F and TE-1C   PERCY Garcia        Performing Labs       The technical component of this testing was completed at [facility]      Case Images     Surgical Pathology Exam   Result Value Ref Range    Case Report       Surgical Pathology Report                         Case: [id number]                                  Authorizing Provider:  Jacquelin Dunlap,   Collected:            04/22/2024 03:48 PM                                 MD                                                                           Ordering Location:     [facility]   Received:            04/22/2024 04:04 PM                                 [place]                                                               Pathologist:           Jaya Mathis MD                                                        Specimens:   A) - Cervix, 11 oclock                                                                              B) - Uterus, Cervix, 6 oclock                                                                       C) - Cervix, ecc                                                                           Final Diagnosis       A(A). 11 oclock:  -Transformation zone involved by low-grade dysplasia (TREVOR-1)    B(A). 6 oclock:  -Transformation zone involved by low-grade dysplasia (TREVOR-1)    C(A). ecc:  -Unremarkable endocervical mucosa with benign squamous metaplasia        Comment       The clinical history has been reviewed.  No definitive high-grade dysplasia but both A and B specimens demonstrate a slightly elevated proliferation rate and dysplasia but not enough for TREVOR-2.    A(A). 11 oclock:  The proliferation rate by Ki-67 is somewhat elevated at 10-20% but generally limited to the lower third of the epithelium.  P16 is negative.    B(A). 6 oclock:  The proliferation rate by Ki-67 is somewhat elevated at 10-20% but generally limited to the lower third of the epithelium.  P16 is negative.    C(A). ecc:  Low proliferation rate by Ki-67 in the ectocervical mucosa and benign squamous metaplasia (10%).  P16 is negative in the squamous metaplasia.        Clinical Information       + HPV ascus-H      Gross Description       A(A). Cervix, 11 oclock:  Received in formalin, labeled with the patient's name and cervix, 11:00, is a 0.5 cm, wedge-shaped, tan-white, variegated and glistening mucosal tissue.  B  and TE-1C  B(A). Uterus, Cervix, 6 oclock:  Received in formalin, labeled with the patient's name and cervix, 6:00 is a 0.4 cm, wedge-shaped, red-brown, variegated and glistening mucosal tissue.  B and TE-1C  C(A). Cervix, ecc:  Received in formalin, labeled with the patient's name and ECC is a 0.7 x 0.4 x 0.3 cm aggregate of pink-red, irregular tissue fragments, mucus and blood clot.  F and TE-1C   PERCY Garcia        Performing Labs       The technical component of this testing was completed at [facility]      Case Images     Surgical Pathology Exam   Result Value Ref Range    Case Report       Surgical Pathology Report                         Case: [id number]                                  Authorizing Provider:  Jacquelin Dunlap,   Collected:           04/22/2024 03:48 PM                                 MD                                                                           Ordering Location:     [facility]   Received:            04/22/2024 04:04 PM                                 [place]                                                               Pathologist:           Jaya Mathis MD                                                        Specimens:   A) - Cervix, 11 oclock                                                                              B) - Uterus, Cervix, 6 oclock                                                                       C) - Cervix, ecc                                                                           Final Diagnosis       A(A). 11 oclock:  -Transformation zone involved by low-grade dysplasia (TREVOR-1)    B(A). 6 oclock:  -Transformation zone involved by low-grade dysplasia (TREVOR-1)    C(A). ecc:  -Unremarkable endocervical mucosa with benign squamous metaplasia        Comment       The clinical history has been reviewed.  No definitive high-grade dysplasia but both A and  B specimens demonstrate a slightly elevated proliferation rate and dysplasia but not enough for TREVOR-2.    A(A). 11 oclock:  The proliferation rate by Ki-67 is somewhat elevated at 10-20% but generally limited to the lower third of the epithelium.  P16 is negative.    B(A). 6 oclock:  The proliferation rate by Ki-67 is somewhat elevated at 10-20% but generally limited to the lower third of the epithelium.  P16 is negative.    C(A). ecc:  Low proliferation rate by Ki-67 in the ectocervical mucosa and benign squamous metaplasia (10%).  P16 is negative in the squamous metaplasia.        Clinical Information       + HPV ascus-H      Gross Description       A(A). Cervix, 11 oclock:  Received in formalin, labeled with the patient's name and cervix, 11:00, is a 0.5 cm, wedge-shaped, tan-white, variegated and glistening mucosal tissue.  B and TE-1C  B(A). Uterus, Cervix, 6 oclock:  Received in formalin, labeled with the patient's name and cervix, 6:00 is a 0.4 cm, wedge-shaped, red-brown, variegated and glistening mucosal tissue.  B and TE-1C  C(A). Cervix, ecc:  Received in formalin, labeled with the patient's name and ECC is a 0.7 x 0.4 x 0.3 cm aggregate of pink-red, irregular tissue fragments, mucus and blood clot.  F and TE-1C   PERCY Garcia        Performing Labs       The technical component of this testing was completed at [facility]      Case Images         If you have any questions or concerns, please call the clinic at the number listed above.       Sincerely,      Jacquelin Dunlap MD

## 2024-04-22 NOTE — PROGRESS NOTES
"  Colposcopy Visit/Procedure Note:    Dain Tejeda is an 43 year old, , who comes in for diagnosis of abnormal pap screen.     Menstrual History:      2023     2:20 PM 3/8/2024     9:00 AM 2024     2:40 PM   Menstrual History   LAST MENSTRUAL PERIOD 2023       No results found for: \"PAP\"    Last   Lab Results   Component Value Date    HPV16 Negative 2024    HPV16 Negative 2018     Last   Lab Results   Component Value Date    HPV18 Negative 2024    HPV18 Negative 2018     Last   Lab Results   Component Value Date    HRHPV Positive 2024    HRHPV Positive 2018       Dates/results of previous cervical pathology: 3/2024        Dates of previous cervical pathology treatment: none    History   Smoking Status    Never   Smokeless Tobacco    Never       Allergies as of 2024 - Reviewed 2024   Allergen Reaction Noted    Soybean-containing drug products Itching and Blisters 2017        Colposcopy Procedure:    Consent:  Details of the colposcopic procedure were reviewed. Risks, benefits of treatment, and alternate forms of evaluation were discussed.  Patient's questions were elicited and answered.   Written consent was obtained and scanned into medical record.     Verification of Procedure  Just before the procedure begins, through verbal and active participation of team members, I verified:   Initials   Patient Name kb   Patient  kb   Procedure to be performed kb       OBJECTIVE: BP 99/64   Pulse 108   Temp 97.8  F (36.6  C)   Resp 20   Ht 1.473 m (4' 10\")   Wt 45.8 kg (101 lb)   LMP 2024 (Exact Date)   SpO2 98%   BMI 21.11 kg/m      Pelvic Exam:  EG/BUS: Normal genital architecture without lesions, erythema or abnormal secretions. Bartholin's, Urethra, [place]'s glands are normal.   Vagina: moist, pink, rugae with creamy, white, and odorlesssecretions  Cervix: friable with pap  Rectum:anus normal    PROCEDURE:      Acetic " acid and/or Lugol's solution applied to cervix.  Colposcopic exam of the cervix and apex of the vagina was conducted in the usual fashion.     Findings:  SCJ was seen entirely and the exam was satisfactory.    There were acetowhite area from 10:00 to 11:00    Biopsies were obtained at 11oclock and 6 oclock (random) and placed in labeled Formalin Jar.    ECC: was obtained and placed in labeled Formalin Jar.        Assessment: HPV related changes    Plan:     Biopsies sent to pathology.  Will contact patient with results and recommended follow-up plan.     Patient advised to contact clinic with heavy vaginal bleeding, fever over 101 degrees F, or any other concerns.    Advised that evaluation of sexual contacts is NOT warranted as she can not get the same virus again. Risk of exposure to a NEW virus is possible with partner change.    Verbalized understanding and agreement with plan.

## 2024-04-25 LAB
PATH REPORT.COMMENTS IMP SPEC: NORMAL
PATH REPORT.FINAL DX SPEC: NORMAL
PATH REPORT.GROSS SPEC: NORMAL
PATH REPORT.RELEVANT HX SPEC: NORMAL
PHOTO IMAGE: NORMAL

## 2024-04-30 ENCOUNTER — APPOINTMENT (OUTPATIENT)
Dept: CT IMAGING | Facility: HOSPITAL | Age: 44
End: 2024-04-30
Attending: EMERGENCY MEDICINE
Payer: COMMERCIAL

## 2024-04-30 ENCOUNTER — APPOINTMENT (OUTPATIENT)
Dept: INTERPRETER SERVICES | Facility: CLINIC | Age: 44
End: 2024-04-30
Payer: COMMERCIAL

## 2024-04-30 ENCOUNTER — HOSPITAL ENCOUNTER (INPATIENT)
Facility: HOSPITAL | Age: 44
LOS: 4 days | Discharge: HOME | End: 2024-05-04
Attending: EMERGENCY MEDICINE | Admitting: FAMILY MEDICINE
Payer: COMMERCIAL

## 2024-04-30 DIAGNOSIS — K52.9 COLITIS: ICD-10-CM

## 2024-04-30 DIAGNOSIS — K50.918 CROHN'S DISEASE WITH OTHER COMPLICATION, UNSPECIFIED GASTROINTESTINAL TRACT LOCATION (H): Primary | ICD-10-CM

## 2024-04-30 DIAGNOSIS — R55 SYNCOPE, UNSPECIFIED SYNCOPE TYPE: ICD-10-CM

## 2024-04-30 DIAGNOSIS — E87.6 HYPOKALEMIA: ICD-10-CM

## 2024-04-30 LAB
ALBUMIN SERPL BCG-MCNC: 2.6 G/DL (ref 3.5–5.2)
ALP SERPL-CCNC: 205 U/L (ref 40–150)
ALT SERPL W P-5'-P-CCNC: 13 U/L (ref 0–50)
ANION GAP SERPL CALCULATED.3IONS-SCNC: 11 MMOL/L (ref 7–15)
AST SERPL W P-5'-P-CCNC: 21 U/L (ref 0–45)
BASOPHILS # BLD AUTO: 0 10E3/UL (ref 0–0.2)
BASOPHILS NFR BLD AUTO: 0 %
BILIRUB DIRECT SERPL-MCNC: <0.2 MG/DL (ref 0–0.3)
BILIRUB SERPL-MCNC: 0.3 MG/DL
BUN SERPL-MCNC: 6.3 MG/DL (ref 6–20)
CALCIUM SERPL-MCNC: 8.7 MG/DL (ref 8.6–10)
CHLORIDE SERPL-SCNC: 100 MMOL/L (ref 98–107)
CREAT SERPL-MCNC: 0.64 MG/DL (ref 0.51–0.95)
DEPRECATED HCO3 PLAS-SCNC: 26 MMOL/L (ref 22–29)
EGFRCR SERPLBLD CKD-EPI 2021: >90 ML/MIN/1.73M2
EOSINOPHIL # BLD AUTO: 0 10E3/UL (ref 0–0.7)
EOSINOPHIL NFR BLD AUTO: 1 %
ERYTHROCYTE [DISTWIDTH] IN BLOOD BY AUTOMATED COUNT: 14.3 % (ref 10–15)
GLUCOSE BLDC GLUCOMTR-MCNC: 99 MG/DL (ref 70–99)
GLUCOSE SERPL-MCNC: 105 MG/DL (ref 70–99)
HCT VFR BLD AUTO: 31 % (ref 35–47)
HGB BLD-MCNC: 10.1 G/DL (ref 11.7–15.7)
IMM GRANULOCYTES # BLD: 0 10E3/UL
IMM GRANULOCYTES NFR BLD: 0 %
LIPASE SERPL-CCNC: 23 U/L (ref 13–60)
LYMPHOCYTES # BLD AUTO: 0.9 10E3/UL (ref 0.8–5.3)
LYMPHOCYTES NFR BLD AUTO: 14 %
MAGNESIUM SERPL-MCNC: 2 MG/DL (ref 1.7–2.3)
MCH RBC QN AUTO: 29 PG (ref 26.5–33)
MCHC RBC AUTO-ENTMCNC: 32.6 G/DL (ref 31.5–36.5)
MCV RBC AUTO: 89 FL (ref 78–100)
MONOCYTES # BLD AUTO: 0.6 10E3/UL (ref 0–1.3)
MONOCYTES NFR BLD AUTO: 9 %
NEUTROPHILS # BLD AUTO: 4.9 10E3/UL (ref 1.6–8.3)
NEUTROPHILS NFR BLD AUTO: 76 %
NRBC # BLD AUTO: 0 10E3/UL
NRBC BLD AUTO-RTO: 0 /100
PLATELET # BLD AUTO: 527 10E3/UL (ref 150–450)
POTASSIUM SERPL-SCNC: 2.7 MMOL/L (ref 3.4–5.3)
POTASSIUM SERPL-SCNC: 3.1 MMOL/L (ref 3.4–5.3)
PROT SERPL-MCNC: 8.4 G/DL (ref 6.4–8.3)
RBC # BLD AUTO: 3.48 10E6/UL (ref 3.8–5.2)
SODIUM SERPL-SCNC: 137 MMOL/L (ref 135–145)
TROPONIN T SERPL HS-MCNC: 11 NG/L
TROPONIN T SERPL HS-MCNC: 6 NG/L
WBC # BLD AUTO: 6.5 10E3/UL (ref 4–11)

## 2024-04-30 PROCEDURE — 83690 ASSAY OF LIPASE: CPT | Performed by: EMERGENCY MEDICINE

## 2024-04-30 PROCEDURE — 36415 COLL VENOUS BLD VENIPUNCTURE: CPT | Performed by: EMERGENCY MEDICINE

## 2024-04-30 PROCEDURE — 250N000011 HC RX IP 250 OP 636: Performed by: EMERGENCY MEDICINE

## 2024-04-30 PROCEDURE — 83735 ASSAY OF MAGNESIUM: CPT | Performed by: EMERGENCY MEDICINE

## 2024-04-30 PROCEDURE — 99285 EMERGENCY DEPT VISIT HI MDM: CPT | Mod: 25

## 2024-04-30 PROCEDURE — 36415 COLL VENOUS BLD VENIPUNCTURE: CPT | Performed by: STUDENT IN AN ORGANIZED HEALTH CARE EDUCATION/TRAINING PROGRAM

## 2024-04-30 PROCEDURE — 82962 GLUCOSE BLOOD TEST: CPT

## 2024-04-30 PROCEDURE — 80048 BASIC METABOLIC PNL TOTAL CA: CPT | Performed by: EMERGENCY MEDICINE

## 2024-04-30 PROCEDURE — 84484 ASSAY OF TROPONIN QUANT: CPT | Performed by: STUDENT IN AN ORGANIZED HEALTH CARE EDUCATION/TRAINING PROGRAM

## 2024-04-30 PROCEDURE — 80145 DRUG ASSAY ADALIMUMAB: CPT | Performed by: EMERGENCY MEDICINE

## 2024-04-30 PROCEDURE — 85048 AUTOMATED LEUKOCYTE COUNT: CPT | Performed by: STUDENT IN AN ORGANIZED HEALTH CARE EDUCATION/TRAINING PROGRAM

## 2024-04-30 PROCEDURE — 120N000001 HC R&B MED SURG/OB

## 2024-04-30 PROCEDURE — 70450 CT HEAD/BRAIN W/O DYE: CPT

## 2024-04-30 PROCEDURE — 93005 ELECTROCARDIOGRAM TRACING: CPT | Performed by: STUDENT IN AN ORGANIZED HEALTH CARE EDUCATION/TRAINING PROGRAM

## 2024-04-30 PROCEDURE — 93005 ELECTROCARDIOGRAM TRACING: CPT | Performed by: EMERGENCY MEDICINE

## 2024-04-30 PROCEDURE — 36415 COLL VENOUS BLD VENIPUNCTURE: CPT | Performed by: FAMILY MEDICINE

## 2024-04-30 PROCEDURE — 96366 THER/PROPH/DIAG IV INF ADDON: CPT

## 2024-04-30 PROCEDURE — 96365 THER/PROPH/DIAG IV INF INIT: CPT | Mod: 59

## 2024-04-30 PROCEDURE — 87493 C DIFF AMPLIFIED PROBE: CPT | Performed by: EMERGENCY MEDICINE

## 2024-04-30 PROCEDURE — 82248 BILIRUBIN DIRECT: CPT | Performed by: EMERGENCY MEDICINE

## 2024-04-30 PROCEDURE — 85025 COMPLETE CBC W/AUTO DIFF WBC: CPT | Performed by: EMERGENCY MEDICINE

## 2024-04-30 PROCEDURE — 82374 ASSAY BLOOD CARBON DIOXIDE: CPT | Performed by: STUDENT IN AN ORGANIZED HEALTH CARE EDUCATION/TRAINING PROGRAM

## 2024-04-30 PROCEDURE — 84484 ASSAY OF TROPONIN QUANT: CPT | Performed by: EMERGENCY MEDICINE

## 2024-04-30 PROCEDURE — 71275 CT ANGIOGRAPHY CHEST: CPT

## 2024-04-30 PROCEDURE — 96361 HYDRATE IV INFUSION ADD-ON: CPT

## 2024-04-30 PROCEDURE — 74177 CT ABD & PELVIS W/CONTRAST: CPT

## 2024-04-30 PROCEDURE — 84132 ASSAY OF SERUM POTASSIUM: CPT | Performed by: FAMILY MEDICINE

## 2024-04-30 PROCEDURE — 250N000013 HC RX MED GY IP 250 OP 250 PS 637: Performed by: EMERGENCY MEDICINE

## 2024-04-30 PROCEDURE — 258N000003 HC RX IP 258 OP 636: Performed by: EMERGENCY MEDICINE

## 2024-04-30 PROCEDURE — 258N000003 HC RX IP 258 OP 636

## 2024-04-30 PROCEDURE — 87507 IADNA-DNA/RNA PROBE TQ 12-25: CPT | Performed by: EMERGENCY MEDICINE

## 2024-04-30 RX ORDER — SODIUM CHLORIDE, SODIUM LACTATE, POTASSIUM CHLORIDE, CALCIUM CHLORIDE 600; 310; 30; 20 MG/100ML; MG/100ML; MG/100ML; MG/100ML
INJECTION, SOLUTION INTRAVENOUS CONTINUOUS
Status: DISCONTINUED | OUTPATIENT
Start: 2024-04-30 | End: 2024-05-02

## 2024-04-30 RX ORDER — POTASSIUM CHLORIDE 1500 MG/1
40 TABLET, EXTENDED RELEASE ORAL ONCE
Status: COMPLETED | OUTPATIENT
Start: 2024-04-30 | End: 2024-04-30

## 2024-04-30 RX ORDER — POTASSIUM CHLORIDE 1500 MG/1
20 TABLET, EXTENDED RELEASE ORAL ONCE
Status: COMPLETED | OUTPATIENT
Start: 2024-04-30 | End: 2024-05-01

## 2024-04-30 RX ORDER — ACETAMINOPHEN 325 MG/1
650 TABLET ORAL EVERY 4 HOURS PRN
Status: DISCONTINUED | OUTPATIENT
Start: 2024-04-30 | End: 2024-05-04 | Stop reason: HOSPADM

## 2024-04-30 RX ORDER — IOPAMIDOL 755 MG/ML
90 INJECTION, SOLUTION INTRAVASCULAR ONCE
Status: COMPLETED | OUTPATIENT
Start: 2024-04-30 | End: 2024-04-30

## 2024-04-30 RX ORDER — ACETAMINOPHEN 650 MG/1
650 SUPPOSITORY RECTAL EVERY 4 HOURS PRN
Status: DISCONTINUED | OUTPATIENT
Start: 2024-04-30 | End: 2024-05-04 | Stop reason: HOSPADM

## 2024-04-30 RX ORDER — POTASSIUM CHLORIDE 7.45 MG/ML
10 INJECTION INTRAVENOUS ONCE
Status: COMPLETED | OUTPATIENT
Start: 2024-04-30 | End: 2024-04-30

## 2024-04-30 RX ORDER — LIDOCAINE 40 MG/G
CREAM TOPICAL
Status: DISCONTINUED | OUTPATIENT
Start: 2024-04-30 | End: 2024-05-04 | Stop reason: HOSPADM

## 2024-04-30 RX ADMIN — POTASSIUM CHLORIDE 40 MEQ: 1500 TABLET, EXTENDED RELEASE ORAL at 18:58

## 2024-04-30 RX ADMIN — SODIUM CHLORIDE, POTASSIUM CHLORIDE, SODIUM LACTATE AND CALCIUM CHLORIDE: 600; 310; 30; 20 INJECTION, SOLUTION INTRAVENOUS at 22:06

## 2024-04-30 RX ADMIN — POTASSIUM CHLORIDE 10 MEQ: 7.46 INJECTION, SOLUTION INTRAVENOUS at 18:49

## 2024-04-30 RX ADMIN — SODIUM CHLORIDE 1000 ML: 9 INJECTION, SOLUTION INTRAVENOUS at 15:31

## 2024-04-30 RX ADMIN — IOPAMIDOL 90 ML: 755 INJECTION, SOLUTION INTRAVENOUS at 17:25

## 2024-04-30 ASSESSMENT — ACTIVITIES OF DAILY LIVING (ADL)
ADLS_ACUITY_SCORE: 38
ADLS_ACUITY_SCORE: 38
ADLS_ACUITY_SCORE: 36
ADLS_ACUITY_SCORE: 38

## 2024-04-30 ASSESSMENT — COLUMBIA-SUICIDE SEVERITY RATING SCALE - C-SSRS
6. HAVE YOU EVER DONE ANYTHING, STARTED TO DO ANYTHING, OR PREPARED TO DO ANYTHING TO END YOUR LIFE?: NO
2. HAVE YOU ACTUALLY HAD ANY THOUGHTS OF KILLING YOURSELF IN THE PAST MONTH?: NO
1. IN THE PAST MONTH, HAVE YOU WISHED YOU WERE DEAD OR WISHED YOU COULD GO TO SLEEP AND NOT WAKE UP?: NO

## 2024-04-30 NOTE — ED TRIAGE NOTES
Patient states she ha generalized weakness and decreased appetite x4 days. This morning, patient had syncopal episode and called friend. Friend is here translating for patient. Patient does have ostomy from previous stomach surgery.      Triage Assessment (Adult)       Row Name 04/30/24 0107          Triage Assessment    Airway WDL WDL        Respiratory WDL    Respiratory WDL WDL        Skin Circulation/Temperature WDL    Skin Circulation/Temperature WDL WDL        Cardiac WDL    Cardiac WDL WDL        Peripheral/Neurovascular WDL    Peripheral Neurovascular WDL WDL        Cognitive/Neuro/Behavioral WDL    Cognitive/Neuro/Behavioral WDL WDL

## 2024-04-30 NOTE — ED PROVIDER NOTES
EMERGENCY DEPARTMENT ENCOUNTER      NAME: Dain Tejdea  AGE: 43 year old female  YOB: 1980  MRN: [id number]  EVALUATION DATE & TIME: No admission date for patient encounter.    PCP: Jefe Martin    ED PROVIDER: Nick Draper MD      Chief Complaint   Patient presents with    Syncope    Generalized Weakness         FINAL IMPRESSION:  1. Syncope, unspecified syncope type    2. Hypokalemia    3. Colitis          ED COURSE & MEDICAL DECISION MAKING:    Pertinent Labs & Imaging studies reviewed. (See chart for details)  43 year old female presents to the Emergency Department for evaluation of weakness, headache, increased ostomy output, nausea, decreased appetite, and fainting    On exam well-appearing.  No abdominal tenderness    ED Course as of 04/30/24 1914 Tue Apr 30, 2024 1913 DDX includes seizure, subarachnoid hemorrhage, ruptured abdominal aortic aneurysm, aortic dissection, perforated gastric/duodenal ulcer, ruptured ectopic pregnancy, stroke/TIA, acute coronary syndrome, pulmonary embolism, arrhythmia (atrioventricular block/symptomatic bradycardia, Raquel-Parkinson-White syndrome, Brugada syndrome, hypertrophic cardiomyopathy, long QT syndrome, arrhythmogenic right ventricular dysplasia), aortic stenosis, hypoglycemia, vasovagal, or orthostatic hypotension.     1913 Plan for CT head with syncope and headache, CTA PE, CT abdomen pelvis, BMP, CBC, lipase, Paddock function panel, troponin, EKG, magnesium   1913 Potassium(!): 2.7  IV and oral potassium ordered   1913 Platelet Count(!): 527  Possibly due to active Crohn's possibly dehydrated.  Given IV fluids and feels better   1913 Patient's not able to explain real clearly what happened led to her faints.  CT shows active inflammation of Crohn's   1913 Spoke with GI he wants Humira antibody level, stool testing and hold off on steroids until those results come back.  Plan for admission   1914 Spoke with the hospital resident service agrees to  admit patient     3:20 PM Called out for patient in triage. No response.  6:26 PM I met with the patient, obtained history, performed an initial exam, and discussed options and plan for diagnostics and treatment here in the ED.  6:46 PM Spoke with MAYRA Marin. Recommends humira antibody level and stool test. Will hold of of steroids until stool results.  7:13 PM Spoke with Dr. Martin, resident from [place].    Medical Decision Making  Obtained supplemental history:Supplemental history obtained?: Family Member/Significant Other  Reviewed external records: External records reviewed?: Documented in chart  Care impacted by chronic illness:Other: crohn's disease  Care significantly affected by social determinants of health:Access to Medical Care  Did you consider but not order tests?: Work up considered but not performed and documented in chart, if applicable  Did you interpret images independently?: Independent interpretation of ECG and images noted in documentation, when applicable.  Consultation discussion with other provider:Did you involve another provider (consultant, MH, pharmacy, etc.)?: I discussed the care with another health care provider, see documentation for details.  Admit.    At the conclusion of the encounter I discussed the results of all of the tests and the disposition. The questions were answered. The patient or family acknowledged understanding and was agreeable with the care plan.         MEDICATIONS GIVEN IN THE EMERGENCY:  Medications   potassium chloride 10 mEq in 100 mL sterile water infusion (10 mEq Intravenous $New Bag 4/30/24 1849)   sodium chloride 0.9% BOLUS 1,000 mL (0 mLs Intravenous Stopped 4/30/24 1736)   iopamidol (ISOVUE-370) solution 90 mL (90 mLs Intravenous $Given 4/30/24 1725)   potassium chloride susan ER (KLOR-CON M20) CR tablet 40 mEq (40 mEq Oral $Given 4/30/24 1858)       NEW PRESCRIPTIONS STARTED AT TODAY'S ER VISIT  New Prescriptions    No medications on file  "         =================================================================    HPI    Patient information was obtained from: Patient    Use of : Declined professor . Family member translated in Hmong        Dain Ileana is a 43 year old female with a pertinent history of crohn's disease of small and large intestine, s/p colectomy, chronic gastritis, celiac disease, anemia,  who presents to this ED via walk-in for evaluation of syncope.    Patient reports generalized weakness and decreased appetite for 4 days. This morning, she felt weak and tired so she attempted to sit from a standing position and had a syncopal episode. Patient has a ostomy bag with less stool output in the last 2 days but it is \"watery\". She has a history of crohn's disease which she takes her medications as prescribed and does infusions, most recent infusion was on 4/22. Denies nausea, vomiting, abdominal pain, and any other symptoms.      REVIEW OF SYSTEMS   Review of Systems See HPI    PAST MEDICAL HISTORY:  Past Medical History:   Diagnosis Date    Diet controlled gestational diabetes mellitus (GDM), antepartum 9/4/2017    Attempting diet control first    Gestational diabetes mellitus (GDM) in third trimester 9/4/2017    Attempting diet control first  Formatting of this note might be different from the original. Overview:  Attempting diet control first    Nausea/vomiting in pregnancy 4/24/2017    Postpartum hemorrhage 11/16/2017    Third degree laceration of perineum during delivery, postpartum 11/15/2017       PAST SURGICAL HISTORY:  Past Surgical History:   Procedure Laterality Date    COLONOSCOPY N/A 10/31/2022    Procedure: COLONOSCOPY with biopsies;  Surgeon: Luis Miguel Traore MD;  Location: [facility] GI    COLOSTOMY N/A 11/6/2022    Procedure: CREATION, COLOSTOMY;  Surgeon: Chandana Carlos DO;  Location: [facility] OR    LAPAROTOMY EXPLORATORY N/A 11/6/2022    Procedure: EXPLORATORY LAPAROTOMY SPLENIC FLEXURE " MOBILIZATION;  Surgeon: Chandana Carlos DO;  Location: [facility] [place] [place] OR    RECTAL PROLAPSE REPAIR N/A 11/6/2022    Procedure: RESECTION OF DESCENDING COLON;  Surgeon: Chandana Carlos DO;  Location: [facility] [place] [place] OR           CURRENT MEDICATIONS:    acetaminophen (TYLENOL) 325 MG tablet  azaTHIOprine (IMURAN) 50 MG tablet  azaTHIOprine (IMURAN) 50 MG tablet  gabapentin (NEURONTIN) 100 MG capsule  HUMIRA *CF* PEN 40 MG/0.4ML pen kit  HUMIRA *CF* PEN-CD/UC/HS STARTER 80 MG/0.8ML pen kit  iron sucrose (VENOFER) 20 MG/ML injection  triamcinolone (KENALOG) 0.1 % external cream        ALLERGIES:  Allergies   Allergen Reactions    Soybean-Containing Drug Products Itching and Blisters     Tofu- causes itching and sores in the mouth       FAMILY HISTORY:  Family History   Problem Relation Age of Onset    Gestational Diabetes Sister     Gestational Diabetes Sister     Diabetes No family hx of     Coronary Artery Disease No family hx of     Hypertension No family hx of     Breast Cancer No family hx of     Colon Cancer No family hx of     Prostate Cancer No family hx of     Other Cancer No family hx of        SOCIAL HISTORY:   Social History     Socioeconomic History    Marital status:      Spouse name: Dmitriy Camargo    Number of children: 0    Years of education: 0   Occupational History    Occupation: None   Tobacco Use    Smoking status: Never    Smokeless tobacco: Never   Substance and Sexual Activity    Alcohol use: No    Drug use: No    Sexual activity: Yes     Partners: Male   Social History Narrative    Born in [facility]", arrived to [facility] 02/2017. No education.      Social Determinants of Health     Interpersonal Safety: Low Risk  (3/8/2024)    Interpersonal Safety     Do you feel physically and emotionally safe where you currently live?: Yes     Within the past 12 months, have you been hit, slapped, kicked or otherwise physically hurt by someone?: No     Within the past 12 months, have you been humiliated or  "emotionally abused in other ways by your partner or ex-partner?: No       VITALS:  /64   Pulse 107   Temp 98  F (36.7  C) (Temporal)   Resp 16   Ht 1.549 m (5' 1\")   Wt 45.8 kg (101 lb)   LMP 04/18/2024 (Exact Date)   SpO2 100%   BMI 19.08 kg/m      PHYSICAL EXAM      Vitals: /64   Pulse 107   Temp 98  F (36.7  C) (Temporal)   Resp 16   Ht 1.549 m (5' 1\")   Wt 45.8 kg (101 lb)   LMP 04/18/2024 (Exact Date)   SpO2 100%   BMI 19.08 kg/m    General: Appears in no acute distress, awake, alert, interactive.  Eyes: Conjunctivae non-injected. Sclera anicteric.  HENT: Atraumatic.  Neck: Supple.  Respiratory/Chest: Respiration unlabored.  Abdomen: non distended, no abdominal tenderness.  Unable to visualize abdomen since she is wearing a dress and in a hallway bed.   Musculoskeletal: Normal extremities. No edema or erythema.  Skin: Normal color. No rash or diaphoresis.  Neurologic: Face symmetric, moves all extremities spontaneously. Speech clear.  Psychiatric: Oriented to person, place, and time. Affect appropriate.    LAB:  All pertinent labs reviewed and interpreted.  Results for orders placed or performed during the hospital encounter of 04/30/24   CT Chest Pulmonary Embolism w Contrast    Impression    IMPRESSION:  1.  No pulmonary embolism or other acute cardiopulmonary abnormality.  2.  Abdomen reported separately.   CT Abdomen Pelvis w Contrast    Impression    IMPRESSION:   1.  Prior resection of the distal colon and colostomy left anterior abdominal wall.  2.  Wall thickening and mucosal enhancement involving the ascending and transverse colon as well as the distal and terminal ileum consistent with ongoing active inflammatory bowel disease.  3.  Large left adnexal cyst, unchanged. Please refer to prior pelvic ultrasound exam report for further discussion.   Head CT w/o contrast    Impression    IMPRESSION:  1.  Normal head CT.   Basic metabolic panel   Result Value Ref Range    Sodium " 137 135 - 145 mmol/L    Potassium 2.7 (L) 3.4 - 5.3 mmol/L    Chloride 100 98 - 107 mmol/L    Carbon Dioxide (CO2) 26 22 - 29 mmol/L    Anion Gap 11 7 - 15 mmol/L    Urea Nitrogen 6.3 6.0 - 20.0 mg/dL    Creatinine 0.64 0.51 - 0.95 mg/dL    GFR Estimate >90 >60 mL/min/1.73m2    Calcium 8.7 8.6 - 10.0 mg/dL    Glucose 105 (H) 70 - 99 mg/dL   Result Value Ref Range    Troponin T, High Sensitivity 11 <=14 ng/L   CBC with platelets and differential   Result Value Ref Range    WBC Count 6.5 4.0 - 11.0 10e3/uL    RBC Count 3.48 (L) 3.80 - 5.20 10e6/uL    Hemoglobin 10.1 (L) 11.7 - 15.7 g/dL    Hematocrit 31.0 (L) 35.0 - 47.0 %    MCV 89 78 - 100 fL    MCH 29.0 26.5 - 33.0 pg    MCHC 32.6 31.5 - 36.5 g/dL    RDW 14.3 10.0 - 15.0 %    Platelet Count 527 (H) 150 - 450 10e3/uL    % Neutrophils 76 %    % Lymphocytes 14 %    % Monocytes 9 %    % Eosinophils 1 %    % Basophils 0 %    % Immature Granulocytes 0 %    NRBCs per 100 WBC 0 <1 /100    Absolute Neutrophils 4.9 1.6 - 8.3 10e3/uL    Absolute Lymphocytes 0.9 0.8 - 5.3 10e3/uL    Absolute Monocytes 0.6 0.0 - 1.3 10e3/uL    Absolute Eosinophils 0.0 0.0 - 0.7 10e3/uL    Absolute Basophils 0.0 0.0 - 0.2 10e3/uL    Absolute Immature Granulocytes 0.0 <=0.4 10e3/uL    Absolute NRBCs 0.0 10e3/uL   Glucose by meter   Result Value Ref Range    GLUCOSE BY METER POCT 99 70 - 99 mg/dL   Result Value Ref Range    Magnesium 2.0 1.7 - 2.3 mg/dL   Result Value Ref Range    Troponin T, High Sensitivity 6 <=14 ng/L   Hepatic function panel   Result Value Ref Range    Protein Total 8.4 (H) 6.4 - 8.3 g/dL    Albumin 2.6 (L) 3.5 - 5.2 g/dL    Bilirubin Total 0.3 <=1.2 mg/dL    Alkaline Phosphatase 205 (H) 40 - 150 U/L    AST 21 0 - 45 U/L    ALT 13 0 - 50 U/L    Bilirubin Direct <0.20 0.00 - 0.30 mg/dL   Result Value Ref Range    Lipase 23 13 - 60 U/L       RADIOLOGY:  Reviewed all pertinent imaging. Please see official radiology report.  CT Abdomen Pelvis w Contrast   Final Result    IMPRESSION:    1.  Prior resection of the distal colon and colostomy left anterior abdominal wall.   2.  Wall thickening and mucosal enhancement involving the ascending and transverse colon as well as the distal and terminal ileum consistent with ongoing active inflammatory bowel disease.   3.  Large left adnexal cyst, unchanged. Please refer to prior pelvic ultrasound exam report for further discussion.      CT Chest Pulmonary Embolism w Contrast   Final Result   IMPRESSION:   1.  No pulmonary embolism or other acute cardiopulmonary abnormality.   2.  Abdomen reported separately.      Head CT w/o contrast   Final Result   IMPRESSION:   1.  Normal head CT.          EKG:    Performed at: 04/30/2024 1354    Impression: Sinus rhythm. Prolonged QT.    Rate: 96 bpm  Rhythm: Sinus rhythm  Axis: 67  ID Interval: 164 ms  QRS Interval: 72 ms  QTc Interval: 487 ms  ST Changes: No ST changes.  Comparison: None    I have independently reviewed and interpreted the EKG(s) documented above.        I, Kalli Skelton, am serving as a scribe to document services personally performed by Nick Draper MD based on my observation and the provider's statements to me. I, Nick Draper MD, attest that Kalli Skelton is acting in a scribe capacity, has observed my performance of the services and has documented them in accordance with my direction.    Nick Draper MD  [facility] EMERGENCY DEPARTMENT  [facility] [facility] [address] [phone]        Nick Drpaer MD  04/30/24 7138

## 2024-05-01 ENCOUNTER — VIRTUAL VISIT (OUTPATIENT)
Dept: INTERPRETER SERVICES | Facility: CLINIC | Age: 44
End: 2024-05-01
Payer: COMMERCIAL

## 2024-05-01 ENCOUNTER — APPOINTMENT (OUTPATIENT)
Dept: CT IMAGING | Facility: HOSPITAL | Age: 44
End: 2024-05-01
Payer: COMMERCIAL

## 2024-05-01 LAB
ADV 40+41 DNA STL QL NAA+NON-PROBE: NEGATIVE
ALBUMIN SERPL BCG-MCNC: 2.2 G/DL (ref 3.5–5.2)
ALP SERPL-CCNC: 152 U/L (ref 40–150)
ALT SERPL W P-5'-P-CCNC: 10 U/L (ref 0–50)
ANION GAP SERPL CALCULATED.3IONS-SCNC: 11 MMOL/L (ref 7–15)
ANION GAP SERPL CALCULATED.3IONS-SCNC: 12 MMOL/L (ref 7–15)
AST SERPL W P-5'-P-CCNC: 14 U/L (ref 0–45)
ASTRO TYP 1-8 RNA STL QL NAA+NON-PROBE: NEGATIVE
ATRIAL RATE - MUSE: 96 BPM
BASE EXCESS BLDV CALC-SCNC: 0.5 MMOL/L (ref -3–3)
BILIRUB SERPL-MCNC: 0.2 MG/DL
BUN SERPL-MCNC: 3 MG/DL (ref 6–20)
BUN SERPL-MCNC: 3.7 MG/DL (ref 6–20)
C CAYETANENSIS DNA STL QL NAA+NON-PROBE: NEGATIVE
C DIFF TOX B STL QL: NEGATIVE
CALCIUM SERPL-MCNC: 8.1 MG/DL (ref 8.6–10)
CALCIUM SERPL-MCNC: 8.2 MG/DL (ref 8.6–10)
CAMPYLOBACTER DNA SPEC NAA+PROBE: NEGATIVE
CHLORIDE SERPL-SCNC: 102 MMOL/L (ref 98–107)
CHLORIDE SERPL-SCNC: 107 MMOL/L (ref 98–107)
CREAT SERPL-MCNC: 0.56 MG/DL (ref 0.51–0.95)
CREAT SERPL-MCNC: 0.57 MG/DL (ref 0.51–0.95)
CRYPTOSP DNA STL QL NAA+NON-PROBE: NEGATIVE
DEPRECATED HCO3 PLAS-SCNC: 21 MMOL/L (ref 22–29)
DEPRECATED HCO3 PLAS-SCNC: 22 MMOL/L (ref 22–29)
DIASTOLIC BLOOD PRESSURE - MUSE: NORMAL MMHG
E COLI O157 DNA STL QL NAA+NON-PROBE: NORMAL
E HISTOLYT DNA STL QL NAA+NON-PROBE: NEGATIVE
EAEC ASTA GENE ISLT QL NAA+PROBE: NEGATIVE
EC STX1+STX2 GENES STL QL NAA+NON-PROBE: NEGATIVE
EGFRCR SERPLBLD CKD-EPI 2021: >90 ML/MIN/1.73M2
EGFRCR SERPLBLD CKD-EPI 2021: >90 ML/MIN/1.73M2
EPEC EAE GENE STL QL NAA+NON-PROBE: NEGATIVE
ERYTHROCYTE [DISTWIDTH] IN BLOOD BY AUTOMATED COUNT: 14.7 % (ref 10–15)
ERYTHROCYTE [DISTWIDTH] IN BLOOD BY AUTOMATED COUNT: 14.7 % (ref 10–15)
ETEC LTA+ST1A+ST1B TOX ST NAA+NON-PROBE: NEGATIVE
G LAMBLIA DNA STL QL NAA+NON-PROBE: NEGATIVE
GLUCOSE SERPL-MCNC: 95 MG/DL (ref 70–99)
GLUCOSE SERPL-MCNC: 96 MG/DL (ref 70–99)
HCO3 BLDV-SCNC: 25 MMOL/L (ref 21–28)
HCT VFR BLD AUTO: 29 % (ref 35–47)
HCT VFR BLD AUTO: 29.7 % (ref 35–47)
HGB BLD-MCNC: 9.2 G/DL (ref 11.7–15.7)
HGB BLD-MCNC: 9.2 G/DL (ref 11.7–15.7)
HOLD SPECIMEN: NORMAL
INTERPRETATION ECG - MUSE: NORMAL
MCH RBC QN AUTO: 28.6 PG (ref 26.5–33)
MCH RBC QN AUTO: 29.2 PG (ref 26.5–33)
MCHC RBC AUTO-ENTMCNC: 31 G/DL (ref 31.5–36.5)
MCHC RBC AUTO-ENTMCNC: 31.7 G/DL (ref 31.5–36.5)
MCV RBC AUTO: 92 FL (ref 78–100)
MCV RBC AUTO: 92 FL (ref 78–100)
NOROVIRUS GI+II RNA STL QL NAA+NON-PROBE: NEGATIVE
O2/TOTAL GAS SETTING VFR VENT: 21 %
OXYHGB MFR BLDV: 91 % (ref 70–75)
P AXIS - MUSE: 69 DEGREES
P SHIGELLOIDES DNA STL QL NAA+NON-PROBE: NEGATIVE
PCO2 BLDV: 36 MM HG (ref 40–50)
PH BLDV: 7.45 [PH] (ref 7.32–7.43)
PLATELET # BLD AUTO: 157 10E3/UL (ref 150–450)
PLATELET # BLD AUTO: 361 10E3/UL (ref 150–450)
PO2 BLDV: 64 MM HG (ref 25–47)
POTASSIUM SERPL-SCNC: 3.3 MMOL/L (ref 3.4–5.3)
POTASSIUM SERPL-SCNC: 3.4 MMOL/L (ref 3.4–5.3)
POTASSIUM SERPL-SCNC: 3.7 MMOL/L (ref 3.4–5.3)
POTASSIUM SERPL-SCNC: 3.8 MMOL/L (ref 3.4–5.3)
PR INTERVAL - MUSE: 164 MS
PROT SERPL-MCNC: 7 G/DL (ref 6.4–8.3)
QRS DURATION - MUSE: 72 MS
QT - MUSE: 386 MS
QTC - MUSE: 487 MS
R AXIS - MUSE: 67 DEGREES
RBC # BLD AUTO: 3.15 10E6/UL (ref 3.8–5.2)
RBC # BLD AUTO: 3.22 10E6/UL (ref 3.8–5.2)
RVA RNA STL QL NAA+NON-PROBE: NEGATIVE
SALMONELLA SP RPOD STL QL NAA+PROBE: NEGATIVE
SAO2 % BLDV: 90.4 % (ref 70–75)
SAPO I+II+IV+V RNA STL QL NAA+NON-PROBE: NEGATIVE
SHIGELLA SP+EIEC IPAH ST NAA+NON-PROBE: NEGATIVE
SODIUM SERPL-SCNC: 135 MMOL/L (ref 135–145)
SODIUM SERPL-SCNC: 140 MMOL/L (ref 135–145)
SYSTOLIC BLOOD PRESSURE - MUSE: NORMAL MMHG
T AXIS - MUSE: 60 DEGREES
V CHOLERAE DNA SPEC QL NAA+PROBE: NEGATIVE
VENTRICULAR RATE- MUSE: 96 BPM
VIBRIO DNA SPEC NAA+PROBE: NEGATIVE
WBC # BLD AUTO: 3.7 10E3/UL (ref 4–11)
WBC # BLD AUTO: 5.6 10E3/UL (ref 4–11)
Y ENTEROCOL DNA STL QL NAA+PROBE: NEGATIVE

## 2024-05-01 PROCEDURE — 36415 COLL VENOUS BLD VENIPUNCTURE: CPT

## 2024-05-01 PROCEDURE — 82040 ASSAY OF SERUM ALBUMIN: CPT

## 2024-05-01 PROCEDURE — 84132 ASSAY OF SERUM POTASSIUM: CPT

## 2024-05-01 PROCEDURE — 82805 BLOOD GASES W/O2 SATURATION: CPT

## 2024-05-01 PROCEDURE — 85027 COMPLETE CBC AUTOMATED: CPT

## 2024-05-01 PROCEDURE — 258N000003 HC RX IP 258 OP 636

## 2024-05-01 PROCEDURE — 250N000011 HC RX IP 250 OP 636

## 2024-05-01 PROCEDURE — 80048 BASIC METABOLIC PNL TOTAL CA: CPT

## 2024-05-01 PROCEDURE — 250N000013 HC RX MED GY IP 250 OP 250 PS 637: Performed by: INTERNAL MEDICINE

## 2024-05-01 PROCEDURE — 250N000012 HC RX MED GY IP 250 OP 636 PS 637

## 2024-05-01 PROCEDURE — 120N000001 HC R&B MED SURG/OB

## 2024-05-01 PROCEDURE — 70450 CT HEAD/BRAIN W/O DYE: CPT

## 2024-05-01 PROCEDURE — 250N000013 HC RX MED GY IP 250 OP 250 PS 637: Performed by: FAMILY MEDICINE

## 2024-05-01 PROCEDURE — 250N000013 HC RX MED GY IP 250 OP 250 PS 637

## 2024-05-01 PROCEDURE — 93005 ELECTROCARDIOGRAM TRACING: CPT

## 2024-05-01 PROCEDURE — 250N000011 HC RX IP 250 OP 636: Performed by: INTERNAL MEDICINE

## 2024-05-01 PROCEDURE — 99222 1ST HOSP IP/OBS MODERATE 55: CPT | Mod: AI

## 2024-05-01 RX ORDER — AZATHIOPRINE 50 MG/1
100 TABLET ORAL DAILY
Status: DISCONTINUED | OUTPATIENT
Start: 2024-05-01 | End: 2024-05-04 | Stop reason: HOSPADM

## 2024-05-01 RX ORDER — POTASSIUM CHLORIDE 7.45 MG/ML
10 INJECTION INTRAVENOUS
Status: COMPLETED | OUTPATIENT
Start: 2024-05-01 | End: 2024-05-01

## 2024-05-01 RX ORDER — METHYLPREDNISOLONE SODIUM SUCCINATE 40 MG/ML
20 INJECTION, POWDER, LYOPHILIZED, FOR SOLUTION INTRAMUSCULAR; INTRAVENOUS EVERY 8 HOURS SCHEDULED
Status: DISCONTINUED | OUTPATIENT
Start: 2024-05-01 | End: 2024-05-03

## 2024-05-01 RX ORDER — PANTOPRAZOLE SODIUM 40 MG/1
40 TABLET, DELAYED RELEASE ORAL
Status: DISCONTINUED | OUTPATIENT
Start: 2024-05-01 | End: 2024-05-04 | Stop reason: HOSPADM

## 2024-05-01 RX ORDER — POTASSIUM CHLORIDE 1500 MG/1
20 TABLET, EXTENDED RELEASE ORAL ONCE
Status: COMPLETED | OUTPATIENT
Start: 2024-05-01 | End: 2024-05-01

## 2024-05-01 RX ORDER — HYDROXYZINE HYDROCHLORIDE 10 MG/1
10 TABLET, FILM COATED ORAL ONCE
Status: COMPLETED | OUTPATIENT
Start: 2024-05-01 | End: 2024-05-01

## 2024-05-01 RX ADMIN — SODIUM CHLORIDE, POTASSIUM CHLORIDE, SODIUM LACTATE AND CALCIUM CHLORIDE: 600; 310; 30; 20 INJECTION, SOLUTION INTRAVENOUS at 16:50

## 2024-05-01 RX ADMIN — POTASSIUM CHLORIDE 20 MEQ: 1500 TABLET, EXTENDED RELEASE ORAL at 06:01

## 2024-05-01 RX ADMIN — SODIUM CHLORIDE, POTASSIUM CHLORIDE, SODIUM LACTATE AND CALCIUM CHLORIDE: 600; 310; 30; 20 INJECTION, SOLUTION INTRAVENOUS at 07:46

## 2024-05-01 RX ADMIN — HYDROXYZINE HYDROCHLORIDE 10 MG: 10 TABLET ORAL at 01:42

## 2024-05-01 RX ADMIN — PREDNISONE 25 MG: 20 TABLET ORAL at 22:50

## 2024-05-01 RX ADMIN — METHYLPREDNISOLONE SODIUM SUCCINATE 20 MG: 40 INJECTION, POWDER, FOR SOLUTION INTRAMUSCULAR; INTRAVENOUS at 14:56

## 2024-05-01 RX ADMIN — PANTOPRAZOLE SODIUM 40 MG: 40 TABLET, DELAYED RELEASE ORAL at 18:26

## 2024-05-01 RX ADMIN — POTASSIUM CHLORIDE 10 MEQ: 7.46 INJECTION, SOLUTION INTRAVENOUS at 14:40

## 2024-05-01 RX ADMIN — POTASSIUM CHLORIDE 20 MEQ: 1500 TABLET, EXTENDED RELEASE ORAL at 00:18

## 2024-05-01 RX ADMIN — POTASSIUM CHLORIDE 10 MEQ: 7.46 INJECTION, SOLUTION INTRAVENOUS at 13:35

## 2024-05-01 ASSESSMENT — ACTIVITIES OF DAILY LIVING (ADL)
ADLS_ACUITY_SCORE: 38
ADLS_ACUITY_SCORE: 37
ADLS_ACUITY_SCORE: 38
ADLS_ACUITY_SCORE: 37
ADLS_ACUITY_SCORE: 38
ADLS_ACUITY_SCORE: 38
ADLS_ACUITY_SCORE: 37
ADLS_ACUITY_SCORE: 38
ADLS_ACUITY_SCORE: 37
ADLS_ACUITY_SCORE: 38
ADLS_ACUITY_SCORE: 37
ADLS_ACUITY_SCORE: 38
ADLS_ACUITY_SCORE: 37
ADLS_ACUITY_SCORE: 37

## 2024-05-01 NOTE — PLAN OF CARE
Problem: Adult Inpatient Plan of Care  Goal: Absence of Hospital-Acquired Illness or Injury  Intervention: Identify and Manage Fall Risk  Recent Flowsheet Documentation  Taken 5/1/2024 0000 by Anna Rosas RN  Safety Promotion/Fall Prevention:   activity supervised   room near nurse's station  Intervention: Prevent Skin Injury  Recent Flowsheet Documentation  Taken 5/1/2024 0000 by Anna Rosas RN  Body Position: position changed independently   Goal Outcome Evaluation:  Pt is alert and oriented, family uses call light appropriately.  Pt is Hmong speaking, understands a little English, family present to help with interpreting.  Pt takes pills without incident.  Pt is on Potassium protocol, this mornings potassium was 3.4.  pt was given PO Potassium replacement times one dose, recheck at 1000.  Pt was dizzy with standing last evening, so pt should pt up with 1 assist for safety.  Pt's stool tests came back negative.  Reviewed results with provider, ok to discontinue Enteric precautions.  Pt's tele was sinus rhythm, but if pt moves at all, her HR becomes tachy.  Pt will continue on IVFs and Potassium protocol.

## 2024-05-01 NOTE — UTILIZATION REVIEW
Admission Status; Secondary Review Determination   Under the authority of the Utilization Management Committee, the utilization review process indicated a secondary review on the above patient. The review outcome is based on review of the medical records, discussions with staff, and applying clinical experience noted on the date of the review.     (x) Inpatient Status Appropriate - This patient's medical care is consistent with medical management for inpatient care and reasonable inpatient medical practice.     RATIONALE FOR DETERMINATION   Ms. Tejeda is a 44 yo female with a PMH of celiac disease and Chron's disease who presents to the ED with syncopal episode after several days of increase in watery, stool frequency.  CT imaging with wall thickening and mucosal enhancement involving the ascending and transverse colon as well as the distal and terminal ileum consistent with ongoing active inflammatory bowel disease.   Stool neg for enteric pathogens and c diff.  GI consulted; starting on IV steroids q8h today with trial of clear liquids this afternoon.  Remains on IVF; still significantly lightheaded with standing today and is being monitored closely.    At the time of admission with the information available to the attending physician more than 2 nights Hospital complex care was anticipated, based on patient risk of adverse outcome if treated as outpatient and complex care required. Inpatient admission is appropriate based on the Medicare guidelines.   The information on this document is developed by the utilization review team in order for the business office to ensure compliance. This only denotes the appropriateness of proper admission status and does not reflect the quality of care rendered.   The definitions of Inpatient Status and Observation Status used in making the determination above are those provided in the CMS Coverage Manual, Chapter 1 and Chapter 6, section 70.4.     Sincerely,     Denae  Harvey, DO  Utilization Review  Physician Advisor

## 2024-05-01 NOTE — PHARMACY-ADMISSION MEDICATION HISTORY
Pharmacist Admission Medication History    Admission medication history is complete. The information provided in this note is only as accurate as the sources available at the time of the update.    Information Source(s): Patient and CareEverywhere/SureScripts via in-person    Pertinent Information: communicated with patient using Hmong      Changes made to PTA medication list:  Added: None  Deleted: gabapentin, iron   Changed: None    Allergies reviewed with patient and updates made in EHR: yes    Medication History Completed By: CORNELIA ROSARIO RPH 4/30/2024 8:05 PM    PTA Med List   Medication Sig Last Dose    azaTHIOprine (IMURAN) 50 MG tablet Take 2 tablets by mouth daily 4/29/2024 at am    HUMIRA *CF* PEN 40 MG/0.4ML pen kit Inject 0.4 mLs Subcutaneous every 14 days 4/22/2024

## 2024-05-01 NOTE — H&P
[facility]    History and Physical - Hospitalist Service       Date of Admission:  4/30/2024    Assessment & Plan      Dain Tejeda is a 43 year old female admitted on 4/30/2024.  She has a past medical history of Crohn's disease complicated by colonic stricture status post partial colectomy with colostomy, celiac's, iron deficiency anemia.  Presenting after syncopal episode      Syncope  One-time episode of syncope this morning, believes she did fully lose consciousness.  Unclear etiology, but with some prodromal symptoms, suspicious for vasovagal, exacerbated by colitis/Crohn's flare and decreased oral intake.  Workup so far reassuring with sinus rhythm on EKG, CT PE run negative, head CT negative.  Plan for fluid resuscitation, electrolyte replacement as below with monitoring  -Telemetry  -LR maintenance fluid      Infectious collitis vs Crohns flare  History of Crohn's  Diagnosed with Crohn's late 2022, complicated by stricture status post partial colectomy with ostomy.  Currently follows with [facility], on Humira and azathioprine.  Noted stool output more watery over the past 3 days prior to admission, nonbloody.  Overall appears well on exam. No significant abdominal pain.  CT does show evidence of colitis, infectious versus inflammatory.  No systemic infectious symptoms.  May be at high risk for infection with azathioprine use, no neutropenia on CBC  - GI consult   - C diff enteric panel pending    -Plan to treat as Crohn's flare if negative    - Will defer to GI   - Maintenance Fluids  - Abdalimumab level and Ab to ensure medication compliance  -N.p.o. except for meds/ice chips tonight  - Hold azathioprine/Humira      Generalized weakness  Suspect in the setting of colitis (infectious source  vs inflammatory) and decreased oral intake.  Hypokalemia could be contributing.  No evidence of a systemic infection.  Symptoms improving following fluid bolus.  Continue to  monitor.  -Maintenance fluid      Hypokalemia  2.7 admission.  Normal magnesium.  Consider GI loss/poor oral intake as etiology.  -Replacement protocol  -Telemetry    Normocytic anemia  History of iron deficiency  History of iron deficiency anemia with recent iron infusions through [facility].  Hemoglobin of 10.1 on admission.  No evidence of GI bleeding.  -CBC in a.m.    Thrombocytosis  Likely reactive with colitis  -CBC in a.m.    QTc prolongation  QTc mildly prolonged at 487.  Consider hypokalemia with a contributing factor.  Azathioprine also known to cause QT prolongation  -Monitor for now.    Chronic conditions   - Celiac's disease   - Low-grade cervical dysplasia (TREVOR-1) -diagnosed with recent colposcopy.  Outpatient follow-up.            Diet: NPO for Medical/Clinical Reasons Except for: Meds, Ice Chips  DVT Prophylaxis: Pneumatic Compression Devices  Chambers Catheter: Not present  Fluids: LR 100mL/hr   Lines: None     Cardiac Monitoring: ACTIVE order. Indication: Electrolyte Imbalance (24 hours)- Magnesium <1.3 mg/ml; Potassium < =2.8 or > 5.5 mg/ml  Code Status: Full Code    Clinically Significant Risk Factors Present on Admission        # Hypokalemia: Lowest K = 2.7 mmol/L in last 2 days, will replace as needed       # Hypoalbuminemia: Lowest albumin = 2.6 g/dL at 4/30/2024  2:18 PM, will monitor as appropriate                     Disposition Plan      Expected Discharge Date: 05/02/2024                The patient's care will be discussed with the attending physician at morning report       Jefe Martin MD  Hospitalist Service  [facility]  Securely message with [employer] (more info)  Text page via for[MD] Paging/Directory   ______________________________________________________________________    Chief Complaint   Syncope      History is obtained from the patient    History of Present Illness   Dain Tejeda is a 43 year old female who presents with an episode of loss of  consciousness.    This morning, patient began to feel lightheaded.  She sat down on a chair, vision began to go dark, she believes she lost consciousness.  Unsure clear how long she was unconscious for.  Did not fall or hit her head.  No preceding chest pain, palpitations, shortness of breath.  No fevers or chills.    Has had a decreased appetite and feeling more weak over the past few days.  Has noticed her stool output has been more watery, nonbloody.  Denies any abdominal pain.  Did have 1 episode of nausea with vomiting after her episode of loss of consciousness.    She is otherwise feeling well.  No dysuria or hematuria.  Reports taking her medications as prescribed.    Denies any dizziness, lightheadedness.  No vision changes.      Past Medical History    Past Medical History:   Diagnosis Date    Diet controlled gestational diabetes mellitus (GDM), antepartum 9/4/2017    Attempting diet control first    Gestational diabetes mellitus (GDM) in third trimester 9/4/2017    Attempting diet control first  Formatting of this note might be different from the original. Overview:  Attempting diet control first    Nausea/vomiting in pregnancy 4/24/2017    Postpartum hemorrhage 11/16/2017    Third degree laceration of perineum during delivery, postpartum 11/15/2017       Past Surgical History   Past Surgical History:   Procedure Laterality Date    COLONOSCOPY N/A 10/31/2022    Procedure: COLONOSCOPY with biopsies;  Surgeon: Luis Miguel Traore MD;  Location: [facility] GI    COLOSTOMY N/A 11/6/2022    Procedure: CREATION, COLOSTOMY;  Surgeon: Chandana Carlos DO;  Location: [facility] [place] OR    LAPAROTOMY EXPLORATORY N/A 11/6/2022    Procedure: EXPLORATORY LAPAROTOMY SPLENIC FLEXURE MOBILIZATION;  Surgeon: Chandana Carlos DO;  Location: [facility] [place] OR    RECTAL PROLAPSE REPAIR N/A 11/6/2022    Procedure: RESECTION OF DESCENDING COLON;  Surgeon: Chandana Carlos DO;  Location: [facility] [place] OR       Prior to  Admission Medications   Prior to Admission Medications   Prescriptions Last Dose Informant Patient Reported? Taking?   HUMIRA *CF* PEN 40 MG/0.4ML pen kit 4/22/2024  Yes Yes   Sig: Inject 0.4 mLs Subcutaneous every 14 days   azaTHIOprine (IMURAN) 50 MG tablet 4/29/2024 at am  Yes Yes   Sig: Take 2 tablets by mouth daily      Facility-Administered Medications: None          Physical Exam   Vital Signs: Temp: 98.4  F (36.9  C) Temp src: Oral BP: 116/78 Pulse: 99   Resp: 14 SpO2: 99 % O2 Device: None (Room air)    Weight: 101 lbs 0 oz    General Appearance: Comfortable.  No acute distress.  Eyes: Extraocular muscles intact.  Clear sclera and conjunctiva.  HEENT: Moist mucous membranes.  No obvious oral lesions.  Head atraumatic.  Respiratory: Comfortable respiratory effort.  No crackles or wheezing.  Cardiovascular: Regular rate and rhythm.  No murmurs appreciated.  GI: Large vertical surgical scar present.  Ostomy bag in place with liquidy brown stool.  Bowel sounds present.  Abdomen nondistended.  Soft.  No tenderness.  Skin: Warm and well-perfused.  No visible rash on visualized skin  Musculoskeletal: Moving all 4 extremities.  No lower extremity edema.  Neurologic: Alert and oriented.  Face is symmetric.  Speech is fluent.  Psychiatric: Bright affect.  Logical thought process.  Good insight.        Data     I have personally reviewed the following data over the past 24 hrs:    5.6  \   9.2 (L)   / 361     140 107 3.0 (L) /  96   3.4 22 0.57 \     ALT: 13 AST: 21 AP: 205 (H) TBILI: 0.3   ALB: 2.6 (L) TOT PROTEIN: 8.4 (H) LIPASE: 23     Trop: 6 BNP: N/A

## 2024-05-01 NOTE — PROGRESS NOTES
[facility]    Progress Note - Hospitalist Service       Date of Admission:  4/30/2024    Assessment & Plan     Dain Tejeda is a 43 year old female with past medical history of Crohn's disease complicated by colonic stricture status post partial colectomy with colostomy, celiac's, iron deficiency anemia who presented after syncopal episode, likely vasovagal in the setting of Crohn's flare.      Crohn's flare  Diagnosed with Crohn's late 2022, complicated by stricture status post partial colectomy with ostomy. Currently follows with [facility], on Humira and azathioprine. Noted stool output more watery over the past 3 days prior to admission, nonbloody. CT does show evidence of colitis, infectious versus inflammatory.  Enteric panel and C. Diff returned negative, decreasing concern for infectious etiology. GI consulted, treating for Crohn's flare.   - GI consult    - Methylprednisolone 20 mg Q8H   - Clear liquid diet  - Maintenance fluids at 100 ml/hr  - Abdalimumab level and Ab to ensure medication compliance  - Hold azathioprine/Humira, resumption per GI      Syncope  One-time episode of syncope on the morning of admission with loss of consciousness. Unclear etiology, but with some prodromal symptoms, suspicious for vasovagal, exacerbated by colitis/Crohn's flare and decreased oral intake.  Workup so far reassuring with sinus rhythm on EKG, CT PE run negative, head CT negative.  Continues to have low appetite; will continue fluids through the day and overnight and reassess in the morning.   - Telemetry  - Continue LR maintenance fluid at 100 ml/hr  - Check orthostatic BP      Generalized weakness  Suspect in the setting of colitis and decreased oral intake.  Hypokalemia could be contributing.  No evidence of a systemic infection.  Symptoms improving following fluid bolus.  Continue to monitor.     Hypokalemia  2.7 admission.  Normal magnesium.  Consider GI loss/poor oral intake as  etiology.  - Replacement protocol  - Telemetry     Normocytic anemia  History of iron deficiency  History of iron deficiency anemia with recent iron infusions through [facility].  Hemoglobin of 10.1 on admission.  No evidence of GI bleeding.  - CBC in a.m.     Thrombocytosis  Likely reactive with colitis  - CBC in a.m.     QTc prolongation  QTc mildly prolonged at 487.  Consider hypokalemia with a contributing factor.  Azathioprine also known to cause QT prolongation  - Monitor for now.     Chronic conditions   - Celiac's disease   - Low-grade cervical dysplasia (TREVOR-1) -diagnosed with recent colposcopy.  Outpatient follow-up.        Diet: Advance Diet as Tolerated: Clear Liquid Diet    DVT Prophylaxis: Pneumatic Compression Devices  Chambers Catheter: Not present  Fluids: LR at 100 ml/hr  Lines: None     Cardiac Monitoring: ACTIVE order. Indication: Electrolyte Imbalance (24 hours)- Magnesium <1.3 mg/ml; Potassium < =2.8 or > 5.5 mg/ml  Code Status: Full Code      Clinically Significant Risk Factors Present on Admission        # Hypokalemia: Lowest K = 2.7 mmol/L in last 2 days, will replace as needed       # Hypoalbuminemia: Lowest albumin = 2.6 g/dL at 4/30/2024  2:18 PM, will monitor as appropriate                     Disposition Plan     Expected Discharge Date: 05/02/2024                The patient's care was discussed with the Attending Physician, Dr. Tamayo .    Phoebe Sandhu MD  Hospitalist Service  [facility]  ______________________________________________________________________    Interval History     Initial exam this morning was notable for patient appearing very somnolent. Vitals were all normal, but patient was not opening her eyes to touch or name. Would flinch from sternal rub. Returned a short time later and she repeated the behavior, but awoke and answered questions after being requested multiple times. Notes mild abdominal pain, continues to deny blood in stool.  Denies nausea/vomiting at this time.     Physical Exam   Vital Signs: Temp: 98.8  F (37.1  C) Temp src: Oral BP: 124/84 Pulse: 110   Resp: 20 SpO2: 100 % O2 Device: None (Room air)    Weight: 101 lbs 0 oz    General Appearance: Comfortable.  No acute distress.  Eyes: Clear sclera and conjunctiva.  Respiratory: Comfortable respiratory effort.  No crackles or wheezing.  Cardiovascular: Regular rate and rhythm.  No murmurs appreciated.  GI: Ostomy bag in place with liquidy brown stool.  Bowel sounds present.  Abdomen nondistended.  Soft.  No tenderness.  Skin: Warm and well-perfused.  No visible rash on visualized skin  Musculoskeletal: Moving all 4 extremities.  No lower extremity edema.  Neurologic: Alert and oriented.  Face is symmetric.  Speech is fluent.    Data     I have personally reviewed the following data over the past 24 hrs:    5.6  \   9.2 (L)   / 361     140 107 3.0 (L) /  96   3.3 (L) 22 0.57 \     ALT: N/A AST: N/A AP: N/A TBILI: N/A   ALB: N/A TOT PROTEIN: N/A LIPASE: N/A     Trop: 6 BNP: N/A       Imaging results reviewed over the past 24 hrs:   Recent Results (from the past 24 hour(s))   Head CT w/o contrast    Narrative    EXAM: CT HEAD W/O CONTRAST  LOCATION: [facility]  DATE: 4/30/2024    INDICATION: Headache, syncope.  COMPARISON: None.  TECHNIQUE: Routine CT Head without IV contrast. Multiplanar reformats. Dose reduction techniques were used.    FINDINGS:  INTRACRANIAL CONTENTS: No intracranial hemorrhage, extraaxial collection, or mass effect.  No CT evidence of acute infarct. Normal parenchymal attenuation. Normal ventricles and sulci.     VISUALIZED ORBITS/SINUSES/MASTOIDS: No intraorbital abnormality. No paranasal sinus mucosal disease. No middle ear or mastoid effusion.    BONES/SOFT TISSUES: No acute abnormality.      Impression    IMPRESSION:  1.  Normal head CT.   CT Chest Pulmonary Embolism w Contrast    Narrative    EXAM: CT CHEST PULMONARY EMBOLISM W  CONTRAST  LOCATION: [facility]  DATE: 4/30/2024    INDICATION: syncope,tachycardia  COMPARISON: CT chest 11/2/2022  TECHNIQUE: CT chest pulmonary angiogram during arterial phase injection of IV contrast. Multiplanar reformats and MIP reconstructions were performed. Dose reduction techniques were used.   CONTRAST: isovue 370 90ml    FINDINGS:  ANGIOGRAM CHEST: The pulmonary arteries are normal in caliber. No evidence of pulmonary embolism to the subsegmental level. No findings of right heart strain. The thoracic aorta is nonaneurysmal without acute abnormality.      LUNGS AND PLEURA: The central airways are patent. No areas of consolidation. No pleural effusion.    MEDIASTINUM/AXILLAE: Normal heart size. No pericardial effusion. No thoracic lymphadenopathy.    CORONARY ARTERY CALCIFICATION: Cannot evaluate.    UPPER ABDOMEN: Dictated separately, including the bowel wall thickening and inflammation in the visualized upper abdomen.    MUSCULOSKELETAL: No acute osseous abnormality.      Impression    IMPRESSION:  1.  No pulmonary embolism or other acute cardiopulmonary abnormality.  2.  Abdomen reported separately.   CT Abdomen Pelvis w Contrast    Narrative    EXAM: CT ABDOMEN PELVIS W CONTRAST  LOCATION: [facility]  DATE: 4/30/2024    INDICATION: syncope, ostomy, vomiting  COMPARISON: CT abdomen and pelvis 12/14/2022 and pelvic ultrasound 3/14/2024  TECHNIQUE: CT scan of the abdomen and pelvis was performed following injection of IV contrast. Multiplanar reformats were obtained. Dose reduction techniques were used.  CONTRAST: isovue 370 90ml    FINDINGS: Images degraded by motion artifact.    LOWER CHEST: Normal.    HEPATOBILIARY: Calcification right hepatic lobe is unchanged.    PANCREAS: Normal.    SPLEEN: Normal.    ADRENAL GLANDS: Normal.    KIDNEYS/BLADDER: Normal.    BOWEL: Prior resection of the distal colon, with Brenda's pouch and left anterior abdominal  wall colostomy. Wall thickening and mucosal enhancement with luminal narrowing involving the ascending and transverse colon. Additional wall thickening and   mucosal enhancement t involving the distal and terminal ileum. No evidence for bowel obstruction.    LYMPH NODES: Numerous nonenlarged mesenteric nodes.    VASCULATURE: Normal.    PELVIC ORGANS: Large left adnexal/pelvic cyst measuring 6.4 x 4.5 cm, minimally changed.    MUSCULOSKELETAL: Normal.      Impression    IMPRESSION:   1.  Prior resection of the distal colon and colostomy left anterior abdominal wall.  2.  Wall thickening and mucosal enhancement involving the ascending and transverse colon as well as the distal and terminal ileum consistent with ongoing active inflammatory bowel disease.  3.  Large left adnexal cyst, unchanged. Please refer to prior pelvic ultrasound exam report for further discussion.

## 2024-05-01 NOTE — ED NOTES
"[facility] ED Handoff Report    ED Chief Complaint: Syncope, weakness    ED Diagnosis:  (R55) Syncope, unspecified syncope type  Comment:   Plan:     (E87.6) Hypokalemia  Comment:   Plan:     (K52.9) Colitis  Comment:   Plan:        PMH:    Past Medical History:   Diagnosis Date    Diet controlled gestational diabetes mellitus (GDM), antepartum 9/4/2017    Attempting diet control first    Gestational diabetes mellitus (GDM) in third trimester 9/4/2017    Attempting diet control first  Formatting of this note might be different from the original. Overview:  Attempting diet control first    Nausea/vomiting in pregnancy 4/24/2017    Postpartum hemorrhage 11/16/2017    Third degree laceration of perineum during delivery, postpartum 11/15/2017        Code Status:  Prior     Falls Risk: No Band: Not applicable    Current Living Situation/Residence: lives in a house     Elimination Status: Continent: Yes     Activity Level: SBA    Patients Preferred Language:  Other: hmong     Needed: Yes    Vital Signs:  /64   Pulse 107   Temp 98  F (36.7  C) (Temporal)   Resp 16   Ht 1.549 m (5' 1\")   Wt 45.8 kg (101 lb)   LMP 04/18/2024 (Exact Date)   SpO2 100%   BMI 19.08 kg/m       Cardiac Rhythm: Sinus Rhythm    Pain Score: Patient is unable to quantify.    Is the Patient Confused:  No    Last Food or Drink: 04/30/24 at 1900, water with meds    Focused Assessment:  Patient complains of generalized weakness and syncope. Patient's potassium is being replaced orally and IV. Family at bedside.     Tests Performed: Done: Labs and Imaging    Treatments Provided:  IV, labs, imaging, meds    Family Dynamics/Concerns: No    Family Updated On Visitor Policy: Yes    Plan of Care Communicated to Family: Yes    Who Was Updated about Plan of Care: family at bedside      Medications sent with patient: n/a    Covid: asymptomatic     Additional Information: Patient on continuous cardiac monitoring, presented to ED " with generalized weakness and syncope.     RN: Cyndie Mendoza RN   4/30/2024 7:23 PM

## 2024-05-01 NOTE — CONSULTS
[facility] consult         Name: Dain Tejeda    Medical Record #: [id number]    YOB: 1980    Date/Time: 5/1/2024/8:52 AM    Reason for Consultation: Bryant Tamayo MD has asked me to evaluate Dain Tejeda regarding Crohn's disease.    HPI: The patient is a pleasant 43 years old female with history of Crohn's disease s/p surgery with colostomy and Duffy's pouch, celiac disease, currently on therapy with Humira and azathioprine.  She noticed increased stool output for past 3 days nonbloody.  No abdominal pain.  She felt weak.  Initial potassium was 2.7.  Stool studies negative for C. difficile and enteric bacteria  She feels better since she has been in the hospital.  Review of Systems (ROS): Complete ROS otherwise negative except for as above.    Past Medical History:  Past Medical History:   Diagnosis Date    Diet controlled gestational diabetes mellitus (GDM), antepartum 9/4/2017    Attempting diet control first    Gestational diabetes mellitus (GDM) in third trimester 9/4/2017    Attempting diet control first  Formatting of this note might be different from the original. Overview:  Attempting diet control first    Nausea/vomiting in pregnancy 4/24/2017    Postpartum hemorrhage 11/16/2017    Third degree laceration of perineum during delivery, postpartum 11/15/2017       Medications:   (Not in a hospital admission)      Current Facility-Administered Medications:     acetaminophen (TYLENOL) tablet 650 mg, 650 mg, Oral, Q4H PRN **OR** acetaminophen (TYLENOL) Suppository 650 mg, 650 mg, Rectal, Q4H PRN, Jefe Martin MD    lactated ringers infusion, , Intravenous, Continuous, Jefe Martin MD, Last Rate: 100 mL/hr at 05/01/24 0746, New Bag at 05/01/24 0746    lidocaine (LMX4) cream, , Topical, Q1H PRN, Jefe Martin MD    lidocaine 1 % 0.1-1 mL, 0.1-1 mL, Other, Q1H PRN, Jefe Martin MD    sodium chloride (PF) 0.9% PF flush 3 mL, 3 mL, Intracatheter, Q8H, Veronica  MD Jefe    sodium chloride (PF) 0.9% PF flush 3 mL, 3 mL, Intracatheter, q1 min prn, Jefe Martin MD    Current Outpatient Medications:     azaTHIOprine (IMURAN) 50 MG tablet, Take 2 tablets by mouth daily, Disp: , Rfl:     HUMIRA *CF* PEN 40 MG/0.4ML pen kit, Inject 0.4 mLs Subcutaneous every 14 days, Disp: , Rfl:        Allergies: Soybean-containing drug products    Family History:  Family History   Problem Relation Age of Onset    Gestational Diabetes Sister     Gestational Diabetes Sister     Diabetes No family hx of     Coronary Artery Disease No family hx of     Hypertension No family hx of     Breast Cancer No family hx of     Colon Cancer No family hx of     Prostate Cancer No family hx of     Other Cancer No family hx of        Social History:  Social History     Socioeconomic History    Marital status:      Spouse name: Dmitriy Camargo    Number of children: 0    Years of education: 0    Highest education level: Not on file   Occupational History    Occupation: None   Tobacco Use    Smoking status: Never    Smokeless tobacco: Never   Vaping Use    Vaping status: Not on file   Substance and Sexual Activity    Alcohol use: No    Drug use: No    Sexual activity: Yes     Partners: Male   Other Topics Concern    Parent/sibling w/ CABG, MI or angioplasty before 65F 55M? Not Asked   Social History Narrative    Born in [facility], arrived to [facility] 02/2017. No education.      Social Determinants of Health     Financial Resource Strain: Not on file   Food Insecurity: Not on file   Transportation Needs: Not on file   Physical Activity: Not on file   Stress: Not on file   Social Connections: Not on file   Interpersonal Safety: Low Risk  (3/8/2024)    Interpersonal Safety     Do you feel physically and emotionally safe where you currently live?: Yes     Within the past 12 months, have you been hit, slapped, kicked or otherwise physically hurt by someone?: No     Within the past 12 months, have you been  "humiliated or emotionally abused in other ways by your partner or ex-partner?: No   Housing Stability: Not on file       Physical Exam: /75   Pulse 94   Temp 98.4  F (36.9  C) (Oral)   Resp 13   Ht 1.549 m (5' 1\")   Wt 45.8 kg (101 lb)   LMP 04/18/2024 (Exact Date)   SpO2 99%   BMI 19.08 kg/m      General: No acute distress  Eyes: No scleral icterus or conjunctivitis  Oropharynx: Moist, no ulcers or lesions  Neck/Thyroid: No neck masses or thyromegaly  Pulmonary: Lungs are clear to auscultation bilaterally  Cardiovascular: Regular, rate, rhythm   Gastrointestinal: Soft, non-tedner, soft, non-tender, no rebound or guarding. No masses palpable.Positive bowel sounds, ostomy looks clean, greenish stool in the bag.  Skin: The patient is not jaundiced. No obvious rashes  Extremities: No pre-tibial edema, no clubbing.   Neurologic: Alert and oriented ×3 , non- focal, grossly intact.    Labs:    CBC RESULTS:   Recent Labs   Lab Test 05/01/24  0510   WBC 5.6   RBC 3.22*   HGB 9.2*   HCT 29.7*   MCV 92   MCH 28.6   MCHC 31.0*   RDW 14.7           CMP Results:   Recent Labs   Lab Test 05/01/24  0510 04/30/24  1426 04/30/24  1418     --  137   POTASSIUM 3.4   < > 2.7*   CHLORIDE 107  --  100   CO2 22  --  26   ANIONGAP 11  --  11   GLC 96   < > 105*   BUN 3.0*  --  6.3   CR 0.57  --  0.64   BILITOTAL  --   --  0.3   ALKPHOS  --   --  205*   ALT  --   --  13   AST  --   --  21    < > = values in this interval not displayed.        INR Results: No results for input(s): \"INR\" in the last 14811 hours.       Radiology: EXAM: CT ABDOMEN PELVIS W CONTRAST LOCATION: [facility] DATE: 4/30/2024 INDICATION: syncope, ostomy, vomiting COMPARISON: CT abdomen and pelvis 12/14/2022 and pelvic ultrasound 3/14/2024 TECHNIQUE: CT scan of the abdomen and pelvis was performed following injection of IV contrast. Multiplanar reformats were obtained. Dose reduction techniques were used. CONTRAST: " isovue 370 90ml FINDINGS: Images degraded by motion artifact. LOWER CHEST: Normal. HEPATOBILIARY: Calcification right hepatic lobe is unchanged. PANCREAS: Normal. SPLEEN: Normal. ADRENAL GLANDS: Normal. KIDNEYS/BLADDER: Normal. BOWEL: Prior resection of the distal colon, with Brenda's pouch and left anterior abdominal wall colostomy. Wall thickening and mucosal enhancement with luminal narrowing involving the ascending and transverse colon. Additional wall thickening and mucosal enhancement t involving the distal and terminal ileum. No evidence for bowel obstruction. LYMPH NODES: Numerous nonenlarged mesenteric nodes. VASCULATURE: Normal. PELVIC ORGANS: Large left adnexal/pelvic cyst measuring 6.4 x 4.5 cm, minimally changed. MUSCULOSKELETAL: Normal.     IMPRESSION: 1.  Prior resection of the distal colon and colostomy left anterior abdominal wall. 2.  Wall thickening and mucosal enhancement involving the ascending and transverse colon as well as the distal and terminal ileum consistent with ongoing active inflammatory bowel disease. 3.  Large left adnexal cyst, unchanged. Please refer to prior pelvic ultrasound exam report for further discussion.    CT Chest Pulmonary Embolism w Contrast    Result Date: 4/30/2024  EXAM: CT CHEST PULMONARY EMBOLISM W CONTRAST LOCATION: [facility] DATE: 4/30/2024 INDICATION: syncope,tachycardia COMPARISON: CT chest 11/2/2022 TECHNIQUE: CT chest pulmonary angiogram during arterial phase injection of IV contrast. Multiplanar reformats and MIP reconstructions were performed. Dose reduction techniques were used. CONTRAST: isovue 370 90ml FINDINGS: ANGIOGRAM CHEST: The pulmonary arteries are normal in caliber. No evidence of pulmonary embolism to the subsegmental level. No findings of right heart strain. The thoracic aorta is nonaneurysmal without acute abnormality.  LUNGS AND PLEURA: The central airways are patent. No areas of consolidation. No pleural  effusion. MEDIASTINUM/AXILLAE: Normal heart size. No pericardial effusion. No thoracic lymphadenopathy. CORONARY ARTERY CALCIFICATION: Cannot evaluate. UPPER ABDOMEN: Dictated separately, including the bowel wall thickening and inflammation in the visualized upper abdomen. MUSCULOSKELETAL: No acute osseous abnormality.     IMPRESSION: 1.  No pulmonary embolism or other acute cardiopulmonary abnormality. 2.  Abdomen reported separately.    Head CT w/o contrast    Result Date: 4/30/2024  EXAM: CT HEAD W/O CONTRAST LOCATION: [facility] DATE: 4/30/2024 INDICATION: Headache, syncope. COMPARISON: None. TECHNIQUE: Routine CT Head without IV contrast. Multiplanar reformats. Dose reduction techniques were used. FINDINGS: INTRACRANIAL CONTENTS: No intracranial hemorrhage, extraaxial collection, or mass effect.  No CT evidence of acute infarct. Normal parenchymal attenuation. Normal ventricles and sulci. VISUALIZED ORBITS/SINUSES/MASTOIDS: No intraorbital abnormality. No paranasal sinus mucosal disease. No middle ear or mastoid effusion. BONES/SOFT TISSUES: No acute abnormality.     IMPRESSION: 1.  Normal head CT.       Impression: 43 years old with Crohn's disease, active disease seen on CT scan, infectious etiology is negative.  Increased output is likely from active disease.  Celiac disease.  Hypokalemia and weakness, likely from fluid loss.    Recommendation:   Start steroids, correct electrolytes.  Agree with supportive care.  Will wait for Humira levels.  Oral PPI.  GI will follow.  Total time spent was 35 minutes .                                              Fausto Britt M.D.  Thank you for the opportunity to participate in the care of this patient.   Please feel free to call me with any questions or concerns.  Phone number [phone].

## 2024-05-01 NOTE — PROGRESS NOTES
Writer took over this pt at 1500. Hmong speaking pt, can understand few english, used interpretor services for assessment. Pt alert and oriented times 4. Denied any pain, dizziness, nausea or SOB. Vitals stable. Tele NSR. Gave clear liquid diet and tolerated well so far. Pt has IV fluid running at 100 ml /hr. Report given to nurse Raymond at 1800.

## 2024-05-01 NOTE — PLAN OF CARE
Goal Outcome Evaluation:       Pt.remains NPO, IV fluids running, K+ protocol, telemetry sinus tach, hmong speaking  used for communications, VSS, denies pain, colostomy patent liquid stool, uses bedside commode, will  cont to monitor.

## 2024-05-02 LAB
ALBUMIN UR-MCNC: NEGATIVE MG/DL
AMPHETAMINES UR QL SCN: NORMAL
ANION GAP SERPL CALCULATED.3IONS-SCNC: 16 MMOL/L (ref 7–15)
APPEARANCE UR: CLEAR
ATRIAL RATE - MUSE: 72 BPM
BARBITURATES UR QL SCN: NORMAL
BENZODIAZ UR QL SCN: NORMAL
BILIRUB UR QL STRIP: NEGATIVE
BUN SERPL-MCNC: 8.1 MG/DL (ref 6–20)
BZE UR QL SCN: NORMAL
CALCIUM SERPL-MCNC: 8.4 MG/DL (ref 8.6–10)
CANNABINOIDS UR QL SCN: NORMAL
CHLORIDE SERPL-SCNC: 101 MMOL/L (ref 98–107)
COLOR UR AUTO: COLORLESS
CREAT SERPL-MCNC: 0.59 MG/DL (ref 0.51–0.95)
DEPRECATED HCO3 PLAS-SCNC: 22 MMOL/L (ref 22–29)
DIASTOLIC BLOOD PRESSURE - MUSE: NORMAL MMHG
EGFRCR SERPLBLD CKD-EPI 2021: >90 ML/MIN/1.73M2
ERYTHROCYTE [DISTWIDTH] IN BLOOD BY AUTOMATED COUNT: 14.6 % (ref 10–15)
ETHANOL UR QL SCN: NORMAL
FENTANYL UR QL: NORMAL
GLUCOSE SERPL-MCNC: 120 MG/DL (ref 70–99)
GLUCOSE UR STRIP-MCNC: NEGATIVE MG/DL
HCT VFR BLD AUTO: 30.5 % (ref 35–47)
HGB BLD-MCNC: 10 G/DL (ref 11.7–15.7)
HGB UR QL STRIP: ABNORMAL
INTERPRETATION ECG - MUSE: NORMAL
KETONES UR STRIP-MCNC: 40 MG/DL
LEUKOCYTE ESTERASE UR QL STRIP: NEGATIVE
MCH RBC QN AUTO: 29.6 PG (ref 26.5–33)
MCHC RBC AUTO-ENTMCNC: 32.8 G/DL (ref 31.5–36.5)
MCV RBC AUTO: 90 FL (ref 78–100)
METHADONE UR QL SCN: NORMAL
MUCOUS THREADS #/AREA URNS LPF: PRESENT /LPF
NITRATE UR QL: NEGATIVE
OPIATES UR QL SCN: NORMAL
P AXIS - MUSE: 67 DEGREES
PCP QUAL URINE (ROCHE): NORMAL
PH UR STRIP: 7 [PH] (ref 5–7)
PLATELET # BLD AUTO: 458 10E3/UL (ref 150–450)
POTASSIUM SERPL-SCNC: 3.8 MMOL/L (ref 3.4–5.3)
PR INTERVAL - MUSE: 162 MS
QRS DURATION - MUSE: 72 MS
QT - MUSE: 436 MS
QTC - MUSE: 477 MS
R AXIS - MUSE: 71 DEGREES
RBC # BLD AUTO: 3.38 10E6/UL (ref 3.8–5.2)
RBC URINE: 1 /HPF
SODIUM SERPL-SCNC: 139 MMOL/L (ref 135–145)
SP GR UR STRIP: 1.01 (ref 1–1.03)
SQUAMOUS EPITHELIAL: 2 /HPF
SYSTOLIC BLOOD PRESSURE - MUSE: NORMAL MMHG
T AXIS - MUSE: 76 DEGREES
UROBILINOGEN UR STRIP-MCNC: <2 MG/DL
VENTRICULAR RATE- MUSE: 72 BPM
WBC # BLD AUTO: 5.1 10E3/UL (ref 4–11)
WBC URINE: 1 /HPF

## 2024-05-02 PROCEDURE — 120N000001 HC R&B MED SURG/OB

## 2024-05-02 PROCEDURE — 80307 DRUG TEST PRSMV CHEM ANLYZR: CPT | Performed by: REGISTERED NURSE

## 2024-05-02 PROCEDURE — 250N000013 HC RX MED GY IP 250 OP 250 PS 637: Performed by: INTERNAL MEDICINE

## 2024-05-02 PROCEDURE — 81001 URINALYSIS AUTO W/SCOPE: CPT

## 2024-05-02 PROCEDURE — 250N000013 HC RX MED GY IP 250 OP 250 PS 637: Performed by: REGISTERED NURSE

## 2024-05-02 PROCEDURE — 85027 COMPLETE CBC AUTOMATED: CPT

## 2024-05-02 PROCEDURE — 93005 ELECTROCARDIOGRAM TRACING: CPT

## 2024-05-02 PROCEDURE — 99232 SBSQ HOSP IP/OBS MODERATE 35: CPT | Mod: GC

## 2024-05-02 PROCEDURE — 250N000011 HC RX IP 250 OP 636

## 2024-05-02 PROCEDURE — 250N000012 HC RX MED GY IP 250 OP 636 PS 637

## 2024-05-02 PROCEDURE — 99207 PR NO CHARGE LOS: CPT | Performed by: REGISTERED NURSE

## 2024-05-02 PROCEDURE — 36415 COLL VENOUS BLD VENIPUNCTURE: CPT

## 2024-05-02 PROCEDURE — 80048 BASIC METABOLIC PNL TOTAL CA: CPT

## 2024-05-02 RX ADMIN — PREDNISONE 25 MG: 20 TABLET ORAL at 06:36

## 2024-05-02 RX ADMIN — METHYLPREDNISOLONE SODIUM SUCCINATE 20 MG: 40 INJECTION, POWDER, FOR SOLUTION INTRAMUSCULAR; INTRAVENOUS at 14:08

## 2024-05-02 RX ADMIN — PANTOPRAZOLE SODIUM 40 MG: 40 TABLET, DELAYED RELEASE ORAL at 06:36

## 2024-05-02 RX ADMIN — QUETIAPINE FUMARATE 12.5 MG: 25 TABLET ORAL at 21:48

## 2024-05-02 RX ADMIN — METHYLPREDNISOLONE SODIUM SUCCINATE 20 MG: 40 INJECTION, POWDER, FOR SOLUTION INTRAMUSCULAR; INTRAVENOUS at 21:48

## 2024-05-02 ASSESSMENT — ACTIVITIES OF DAILY LIVING (ADL)
ADLS_ACUITY_SCORE: 37
ADLS_ACUITY_SCORE: 22
ADLS_ACUITY_SCORE: 37
ADLS_ACUITY_SCORE: 39
ADLS_ACUITY_SCORE: 37
ADLS_ACUITY_SCORE: 22
ADLS_ACUITY_SCORE: 39
ADLS_ACUITY_SCORE: 39
ADLS_ACUITY_SCORE: 22
ADLS_ACUITY_SCORE: 37
ADLS_ACUITY_SCORE: 22
ADLS_ACUITY_SCORE: 22
ADLS_ACUITY_SCORE: 39
ADLS_ACUITY_SCORE: 37
ADLS_ACUITY_SCORE: 39

## 2024-05-02 NOTE — PROGRESS NOTES
[facility]    Progress Note - Hospitalist Service       Date of Admission:  4/30/2024    Assessment & Plan     Dain Tejeda is a 43 year old female with past medical history of Crohn's disease complicated by colonic stricture status post partial colectomy with colostomy, celiac's, iron deficiency anemia who presented after syncopal episode, likely vasovagal in the setting of Crohn's flare.      Crohn's flare  Diagnosed with Crohn's late 2022, complicated by stricture status post partial colectomy with ostomy. Currently follows with [facility], on Humira and azathioprine. Noted stool output more watery over the past 3 days prior to admission, nonbloody. CT does show evidence of colitis, infectious versus inflammatory.  Enteric panel and C. Diff returned negative, decreasing concern for infectious etiology. GI consulted, treating for Crohn's flare.   - GI consult    - Methylprednisolone 20 mg Q8H   - Advance diet as tolerated.   - Abdalimumab level and Ab to ensure medication compliance  - Hold azathioprine/Humira, resumption per GI     Disorganized behavior  On the morning of 5/1, patient appeared fatigued. Exhibited odd behavior of pretending to sleep to avoid speaking to provider, but upon insisting on conversation, responded to questions appropriately. Overnight on 5/2, started to exhibit more strange behavior and concern for hallucinations, see note from Dr. Hall on 5/1 for details. In the morning of 5/2, RN witnessed patient crying and hitting her legs while yelling. Later evaluation revealed her to be more calm, but laughing throughout the interview at inappropriate times and speaking very rapidly. Concern for possible psych cause of this abnormal behavior exacerbated by steroids, lower concern for delirium at this time considering her age and short hospital stay.   - Psych consulted and discussed with today; plans to visit patient tomorrow  - Start Seroquel 12.5 mg at bedtime.       Syncope  One-time episode of syncope on the morning of admission with loss of consciousness. Unclear etiology, but with some prodromal symptoms, suspicious for vasovagal, exacerbated by colitis/Crohn's flare and decreased oral intake.  Workup so far reassuring with sinus rhythm on EKG, CT PE run negative, head CT negative.  Orthostatics normal, but they were checked after she had fluid resuscitation.      Generalized weakness  Suspect in the setting of colitis and decreased oral intake.  Hypokalemia could be contributing.  No evidence of a systemic infection.  Symptoms improved following fluid bolus.  Continue to monitor.     Hypokalemia  2.7 admission.  Normal magnesium.  Consider GI loss/poor oral intake as etiology.  - Replacement protocol    Normocytic anemia  History of iron deficiency  History of iron deficiency anemia with recent iron infusions through [facility].  Hemoglobin of 10.1 on admission.  No evidence of GI bleeding.  - CBC in a.m.     Thrombocytosis  Likely reactive with colitis  - CBC in a.m.     QTc prolongation  QTc mildly prolonged at 487.  Consider hypokalemia with a contributing factor.  Azathioprine also known to cause QT prolongation  - Monitor for now.     Chronic conditions   - Celiac's disease   - Low-grade cervical dysplasia (TREVOR-1) -diagnosed with recent colposcopy.  Outpatient follow-up.        Diet: Advance Diet as Tolerated: Full Liquid Diet    DVT Prophylaxis: Pneumatic Compression Devices  Chambers Catheter: Not present  Fluids: LR at 100 ml/hr  Lines: None     Cardiac Monitoring: None  Code Status: Full Code      Clinically Significant Risk Factors        # Hypokalemia: Lowest K = 2.7 mmol/L in last 2 days, will replace as needed    # Hypercalcemia: corrected calcium is >10.1, will monitor as appropriate    # Hypoalbuminemia: Lowest albumin = 2.2 g/dL at 5/1/2024  5:32 PM, will monitor as appropriate                     Disposition Plan      Expected Discharge Date: 05/03/2024                 The patient's care was discussed with the Attending Physician, Dr. Tamayo .    Phoebe Sandhu MD  Hospitalist Service  [facility]  ______________________________________________________________________    Interval History     Overnight on 5/2, started to exhibit more strange behavior and concern for hallucinations, see note from Dr. Hall on 5/1 for details. In the morning of 5/2, RN witnessed patient crying and hitting her legs while yelling. Later evaluation revealed her to be more calm, but laughing throughout the interview at inappropriate times and speaking very rapidly. Denies formal diagnosis of any mental health problems, but notes that she has been feeling more depressed over the last few months due to her medical problems. Denies abdominal pain today, notes output is slowing.     Physical Exam   Vital Signs: Temp: 97.9  F (36.6  C) Temp src: Oral BP: 121/78 Pulse: 82   Resp: 18 SpO2: 96 % O2 Device: None (Room air)    Weight: 101 lbs 0 oz    General Appearance: Comfortable.  No acute distress.  Eyes: Clear sclera and conjunctiva.  Respiratory: Comfortable respiratory effort.  No crackles or wheezing.  Cardiovascular: Regular rate and rhythm.  No murmurs appreciated.  GI: Ostomy bag in place with liquidy brown stool.  Bowel sounds present.  Abdomen nondistended.  Soft.  No tenderness.  Skin: Warm and well-perfused.  No visible rash on visualized skin  Musculoskeletal: Moving all 4 extremities.  No lower extremity edema.  Neurologic: Alert and oriented.  Face is symmetric.  Speech is fluent. Engaged in interview, speaking rapidly, laughing throughout the interview.     Data     I have personally reviewed the following data over the past 24 hrs:    5.1  \   10.0 (L)   / 458 (H)     139 101 8.1 /  120 (H)   3.8 22 0.59 \     ALT: 10 AST: 14 AP: 152 (H) TBILI: 0.2   ALB: 2.2 (L) TOT PROTEIN: 7.0 LIPASE: N/A       Imaging results reviewed over the past 24 hrs:   Recent  Results (from the past 24 hour(s))   CT Head w/o Contrast    Narrative    EXAM: CT HEAD W/O CONTRAST  LOCATION: [facility]  DATE: 5/1/2024    INDICATION: AMS, possible fall w head trauma, generalized weakness  COMPARISON: 4/30/2024.  TECHNIQUE: Routine CT Head without IV contrast. Multiplanar reformats. Dose reduction techniques were used.    FINDINGS:  INTRACRANIAL CONTENTS: No intracranial hemorrhage, extraaxial collection, or mass effect.  No CT evidence of acute infarct. Normal parenchymal attenuation. Normal ventricles and sulci.     VISUALIZED ORBITS/SINUSES/MASTOIDS: No intraorbital abnormality. No paranasal sinus mucosal disease. No middle ear or mastoid effusion.    BONES/SOFT TISSUES: No acute abnormality.      Impression    IMPRESSION:  1.  No CT finding of a mass, hemorrhage or focal area suggestive of acute infarct.

## 2024-05-02 NOTE — PROGRESS NOTES
[facility] progress Note    Subjective: Patient feeling better, overnight events noted.  Psychiatry consult appreciated  The ostomy output has settled, no abdominal pain.  Objective:  Vital signs in last 24 hours  Temp:  [97.8  F (36.6  C)-98.6  F (37  C)] 98.1  F (36.7  C)  Pulse:  [] 110  Resp:  [16-22] 16  BP: (106-128)/(74-89) 128/89  SpO2:  [95 %-100 %] 99 %     Gen: Awake, no acute distress        Patient Active Problem List   Diagnosis    Screening for cervical cancer- ASCUS, HPV+ 4/2017    Pain of back and right lower extremity    Abdominal pain, generalized    Abdominal pain    Inflammation of small intestine    Moderate recurrent major depression (H)    Hypokalemia    Colitis    Intra-abdominal abscess (H)    Celiac disease    Diarrhea    Chronic gastritis    Chronic diarrhea    Crohn's disease with other complication, unspecified gastrointestinal tract location (H)    S/P partial colectomy    Anemia, unspecified type    Atypical squamous cell changes of undetermined significance (ASCUS) on vaginal cytology with positive high risk human papilloma virus (HPV)    Syncope, unspecified syncope type       Labs:  CMP Results:   Recent Labs   Lab Test 05/02/24  0901 05/01/24  1732    135   POTASSIUM 3.8 3.7  3.8   CHLORIDE 101 102   CO2 22 21*   ANIONGAP 16* 12   * 95   BUN 8.1 3.7*   CR 0.59 0.56   BILITOTAL  --  0.2   ALKPHOS  --  152*   ALT  --  10   AST  --  14      CBC  Recent Labs   Lab 05/02/24  0901 05/01/24  2111 05/01/24  0510 04/30/24  1418   WBC 5.1 3.7* 5.6 6.5   RBC 3.38* 3.15* 3.22* 3.48*   HGB 10.0* 9.2* 9.2* 10.1*   HCT 30.5* 29.0* 29.7* 31.0*   MCV 90 92 92 89   MCH 29.6 29.2 28.6 29.0   MCHC 32.8 31.7 31.0* 32.6   RDW 14.6 14.7 14.7 14.3   * 157 361 527*     INRNo lab results found in last 7 days.   Lipase   Date Value Ref Range Status   04/30/2024 23 13 - 60 U/L Final   12/14/2022 32 13 - 60 U/L Final   10/29/2022 17 13 - 60 U/L Final   06/01/2022 33 0 -  52 U/L Final     Recent Labs   Lab 05/01/24  1732 04/30/24  1418   AST 14 21   ALT 10 13   ALKPHOS 152* 205*   BILITOTAL 0.2 0.3   LIPASE  --  23        Assessment: 43 years old with Crohn's disease, active disease seen on CT scan, infectious etiology is negative.  Increased output is likely from active disease.  Symptoms improved with addition of IV steroids, change steroids to prednisone 40 mg p.o. tomorrow  Celiac disease.  Hypokalemia and weakness, likely from fluid los      Plan:  Advance diet as tolerated, gluten-free diet.  Follow clinically and follow labs.  Start prednisone 40 mg once daily tomorrow morning.  GI team will follow.  Total time spent was 25 minutes.                                             Fausto Britt MD  Thank you for the opportunity to participate in the care of this patient.   Please feel free to call me with any questions or concerns.  Phone number [phone].

## 2024-05-02 NOTE — CONSULTS
"      Initial Psychiatric Consult   Consult date: May 2, 2024         Reason for Consult, requesting source:    No established psych history, abnormal behaviors now. Fast speech, walking around naked, hallucinations    Requesting source: Bryant Tamayo    Labs and imaging reviewed. Discussed patient case with provider.        HPI:   Psychiatry consulted today regarding acutely disorganized behavior, including fast speech and hallucinations.       Dain Tejeda is a 43 year old female with a pertinent history of crohn's disease of small and large intestine, s/p colectomy, chronic gastritis, celiac disease, anemia,  who presents to this ED via walk-in for evaluation of syncope.     Patient reports generalized weakness and decreased appetite for 4 days. This morning, she felt weak and tired so she attempted to sit from a standing position and had a syncopal episode. Patient has a ostomy bag with less stool output in the last 2 days but it is \"watery\". She has a history of crohn's disease which she takes her medications as prescribed and does infusions, most recent infusion was on 4/22. Denies nausea, vomiting, abdominal pain, and any other symptoms.    Per RN notation:   Upon arrival of entering patient room to do initial assessment at 0730, patient crying, repeatedly hitting both legs with arms, and yelling in Hmong. While using interpretor, able to assess that patient is alert and oriented x4. Patient expressed that she was visually seeing Ken and God in hallways. Patient expressed wanting to see a \"Spiritual Director\". Stated hitting herself is part of her culture and Muslim. Writer verbalized that they were unaware and wanted to make sure patient stayed safe. Stated that her daughter and  were in a car accident and does not know the location of where they are or if they are alive currently. Patient talking rapidly and at times, interpretor had a difficult time understanding patient. Notified MD Hooper. " Patient placed back on 1:1 for safety. Spiritual care consulted.         Per provider, patient is suspected to be experiencing an acute exacerbation of Crohn's disease, and was subsequently started on methylprednisolone sodium succinate. Patient did not appear to sleep over night and has been consistently disorganized in the ED. Patient's disorganized speech and behaviors are acute since arriving to the ED. There is no indication of psychiatric diagnoses or treatments.           Past Psychiatric History:   No indication of psychiatric diagnoses or treatment.         Substance Use and History:     Tobacco Use    Smoking status: Never    Smokeless tobacco: Never   Substance Use Topics    Alcohol use: No           Past Medical History:   PAST MEDICAL HISTORY:   Past Medical History:   Diagnosis Date    Diet controlled gestational diabetes mellitus (GDM), antepartum 9/4/2017    Attempting diet control first    Gestational diabetes mellitus (GDM) in third trimester 9/4/2017    Attempting diet control first  Formatting of this note might be different from the original. Overview:  Attempting diet control first    Nausea/vomiting in pregnancy 4/24/2017    Postpartum hemorrhage 11/16/2017    Third degree laceration of perineum during delivery, postpartum 11/15/2017       PAST SURGICAL HISTORY:   Past Surgical History:   Procedure Laterality Date    COLONOSCOPY N/A 10/31/2022    Procedure: COLONOSCOPY with biopsies;  Surgeon: Luis Miguel Traore MD;  Location: [facility] GI    COLOSTOMY N/A 11/6/2022    Procedure: CREATION, COLOSTOMY;  Surgeon: Chandana Carlos DO;  Location: [facility] OR    LAPAROTOMY EXPLORATORY N/A 11/6/2022    Procedure: EXPLORATORY LAPAROTOMY SPLENIC FLEXURE MOBILIZATION;  Surgeon: Chandana Carlos DO;  Location: [facility] OR    RECTAL PROLAPSE REPAIR N/A 11/6/2022    Procedure: RESECTION OF DESCENDING COLON;  Surgeon: Chandana Carlos DO;  Location: [facility] OR             Family  History:   FAMILY HISTORY:   Family History   Problem Relation Age of Onset    Gestational Diabetes Sister     Gestational Diabetes Sister     Diabetes No family hx of     Coronary Artery Disease No family hx of     Hypertension No family hx of     Breast Cancer No family hx of     Colon Cancer No family hx of     Prostate Cancer No family hx of     Other Cancer No family hx of            Social History:   Patient  and lives independently.          Physical ROS:   The 10 point Review of Systems is negative other than noted in the HPI or here.           Medications:     Current Facility-Administered Medications   Medication Dose Route Frequency Provider Last Rate Last Admin    [Held by provider] adalimumab (HUMIRA *CF*) injection 40 mg  40 mg Subcutaneous Q14 Days Jefe Martin MD        [Held by provider] azaTHIOprine (IMURAN) tablet 100 mg  100 mg Oral Daily Jefe Martin MD        methylPREDNISolone sodium succinate (SOLU-MEDROL) injection 20 mg  20 mg Intravenous Q8H [facility] Phoebe Sandhu MD   20 mg at 05/01/24 1456    pantoprazole (PROTONIX) EC tablet 40 mg  40 mg Oral QAM AC Fausto Britt MD   40 mg at 05/02/24 0636    sodium chloride (PF) 0.9% PF flush 3 mL  3 mL Intracatheter Q8H Jefe Martin MD                  Allergies:     Allergies   Allergen Reactions    Soybean-Containing Drug Products Itching and Blisters     Tofu- causes itching and sores in the mouth          Labs:     Recent Results (from the past 48 hour(s))   ECG 12-Lead with MUSE    Collection Time: 04/30/24  1:54 PM   Result Value Ref Range    Systolic Blood Pressure  mmHg    Diastolic Blood Pressure  mmHg    Ventricular Rate 96 BPM    Atrial Rate 96 BPM    MS Interval 164 ms    QRS Duration 72 ms     ms    QTc 487 ms    P Axis 69 degrees    R AXIS 67 degrees    T Axis 60 degrees    Interpretation ECG       Sinus rhythm  Prolonged QT  Abnormal ECG  No previous ECGs available  Confirmed by SEE ED  PROVIDER NOTE FOR, ECG INTERPRETATION (4000),  ANNELIESE TIMMONS ([id number]) on 5/1/2024 2:51:35 AM     Basic metabolic panel    Collection Time: 04/30/24  2:18 PM   Result Value Ref Range    Sodium 137 135 - 145 mmol/L    Potassium 2.7 (L) 3.4 - 5.3 mmol/L    Chloride 100 98 - 107 mmol/L    Carbon Dioxide (CO2) 26 22 - 29 mmol/L    Anion Gap 11 7 - 15 mmol/L    Urea Nitrogen 6.3 6.0 - 20.0 mg/dL    Creatinine 0.64 0.51 - 0.95 mg/dL    GFR Estimate >90 >60 mL/min/1.73m2    Calcium 8.7 8.6 - 10.0 mg/dL    Glucose 105 (H) 70 - 99 mg/dL   Troponin T, High Sensitivity    Collection Time: 04/30/24  2:18 PM   Result Value Ref Range    Troponin T, High Sensitivity 11 <=14 ng/L   CBC with platelets and differential    Collection Time: 04/30/24  2:18 PM   Result Value Ref Range    WBC Count 6.5 4.0 - 11.0 10e3/uL    RBC Count 3.48 (L) 3.80 - 5.20 10e6/uL    Hemoglobin 10.1 (L) 11.7 - 15.7 g/dL    Hematocrit 31.0 (L) 35.0 - 47.0 %    MCV 89 78 - 100 fL    MCH 29.0 26.5 - 33.0 pg    MCHC 32.6 31.5 - 36.5 g/dL    RDW 14.3 10.0 - 15.0 %    Platelet Count 527 (H) 150 - 450 10e3/uL    % Neutrophils 76 %    % Lymphocytes 14 %    % Monocytes 9 %    % Eosinophils 1 %    % Basophils 0 %    % Immature Granulocytes 0 %    NRBCs per 100 WBC 0 <1 /100    Absolute Neutrophils 4.9 1.6 - 8.3 10e3/uL    Absolute Lymphocytes 0.9 0.8 - 5.3 10e3/uL    Absolute Monocytes 0.6 0.0 - 1.3 10e3/uL    Absolute Eosinophils 0.0 0.0 - 0.7 10e3/uL    Absolute Basophils 0.0 0.0 - 0.2 10e3/uL    Absolute Immature Granulocytes 0.0 <=0.4 10e3/uL    Absolute NRBCs 0.0 10e3/uL   Magnesium    Collection Time: 04/30/24  2:18 PM   Result Value Ref Range    Magnesium 2.0 1.7 - 2.3 mg/dL   Hepatic function panel    Collection Time: 04/30/24  2:18 PM   Result Value Ref Range    Protein Total 8.4 (H) 6.4 - 8.3 g/dL    Albumin 2.6 (L) 3.5 - 5.2 g/dL    Bilirubin Total 0.3 <=1.2 mg/dL    Alkaline Phosphatase 205 (H) 40 - 150 U/L    AST 21 0 - 45 U/L    ALT 13 0 - 50 U/L     Bilirubin Direct <0.20 0.00 - 0.30 mg/dL   Lipase    Collection Time: 04/30/24  2:18 PM   Result Value Ref Range    Lipase 23 13 - 60 U/L   Glucose by meter    Collection Time: 04/30/24  2:26 PM   Result Value Ref Range    GLUCOSE BY METER POCT 99 70 - 99 mg/dL   Troponin T, High Sensitivity    Collection Time: 04/30/24  4:03 PM   Result Value Ref Range    Troponin T, High Sensitivity 6 <=14 ng/L   Enteric Bacteria and Virus Panel PCR    Collection Time: 04/30/24 10:17 PM    Specimen: Ostomy Pouch; Stool   Result Value Ref Range    Campylobacter species Negative Negative    Salmonella species Negative Negative    Vibrio species Negative Negative    Vibrio cholerae Negative Negative    Yersinia enterocolitica Negative Negative    Enteropathogenic E. coli (EPEC) Negative Negative, NA    Shiga-like toxin-producing E. coli (STEC) Negative Negative    Shigella/Enteroinvasive E. coli (EIEC) Negative Negative    Cryptosporidium species Negative Negative    Giardia lamblia Negative Negative    Norovirus Gl/Gll Negative Negative    Rotavirus A Negative Negative    Plesiomonas shigelloides Negative Negative    Enteroaggregative E. coli (EAEC) Negative Negative    Enterotoxigenic E. coli (ETEC) Negative Negative    E. coli O157 NA Negative, NA    Cyclospora cayetanensis Negative Negative    Entamoeba histolytica Negative Negative    Adenovirus F40/41 Negative Negative    Astrovirus Negative Negative    Sapovirus Negative Negative   C. difficile Toxin B PCR with reflex to C. difficile Antigen and Toxins A/B EIA    Collection Time: 04/30/24 10:17 PM    Specimen: Ostomy Pouch; Stool   Result Value Ref Range    C Difficile Toxin B by PCR Negative Negative   Potassium    Collection Time: 04/30/24 10:27 PM   Result Value Ref Range    Potassium 3.1 (L) 3.4 - 5.3 mmol/L   Basic metabolic panel    Collection Time: 05/01/24  5:10 AM   Result Value Ref Range    Sodium 140 135 - 145 mmol/L    Potassium 3.4 3.4 - 5.3 mmol/L    Chloride  107 98 - 107 mmol/L    Carbon Dioxide (CO2) 22 22 - 29 mmol/L    Anion Gap 11 7 - 15 mmol/L    Urea Nitrogen 3.0 (L) 6.0 - 20.0 mg/dL    Creatinine 0.57 0.51 - 0.95 mg/dL    GFR Estimate >90 >60 mL/min/1.73m2    Calcium 8.2 (L) 8.6 - 10.0 mg/dL    Glucose 96 70 - 99 mg/dL   CBC with platelets    Collection Time: 05/01/24  5:10 AM   Result Value Ref Range    WBC Count 5.6 4.0 - 11.0 10e3/uL    RBC Count 3.22 (L) 3.80 - 5.20 10e6/uL    Hemoglobin 9.2 (L) 11.7 - 15.7 g/dL    Hematocrit 29.7 (L) 35.0 - 47.0 %    MCV 92 78 - 100 fL    MCH 28.6 26.5 - 33.0 pg    MCHC 31.0 (L) 31.5 - 36.5 g/dL    RDW 14.7 10.0 - 15.0 %    Platelet Count 361 150 - 450 10e3/uL   Potassium    Collection Time: 05/01/24 11:38 AM   Result Value Ref Range    Potassium 3.3 (L) 3.4 - 5.3 mmol/L   Potassium    Collection Time: 05/01/24  5:32 PM   Result Value Ref Range    Potassium 3.8 3.4 - 5.3 mmol/L   Comprehensive metabolic panel    Collection Time: 05/01/24  5:32 PM   Result Value Ref Range    Sodium 135 135 - 145 mmol/L    Potassium 3.7 3.4 - 5.3 mmol/L    Carbon Dioxide (CO2) 21 (L) 22 - 29 mmol/L    Anion Gap 12 7 - 15 mmol/L    Urea Nitrogen 3.7 (L) 6.0 - 20.0 mg/dL    Creatinine 0.56 0.51 - 0.95 mg/dL    GFR Estimate >90 >60 mL/min/1.73m2    Calcium 8.1 (L) 8.6 - 10.0 mg/dL    Chloride 102 98 - 107 mmol/L    Glucose 95 70 - 99 mg/dL    Alkaline Phosphatase 152 (H) 40 - 150 U/L    AST 14 0 - 45 U/L    ALT 10 0 - 50 U/L    Protein Total 7.0 6.4 - 8.3 g/dL    Albumin 2.2 (L) 3.5 - 5.2 g/dL    Bilirubin Total 0.2 <=1.2 mg/dL   Blood gas venous    Collection Time: 05/01/24  9:05 PM   Result Value Ref Range    pH Venous 7.45 (H) 7.32 - 7.43    pCO2 Venous 36 (L) 40 - 50 mm Hg    pO2 Venous 64 (H) 25 - 47 mm Hg    Bicarbonate Venous 25 21 - 28 mmol/L    Base Excess/Deficit Venous 0.5 -3.0 - 3.0 mmol/L    FIO2 21     Oxyhemoglobin Venous 91 (H) 70 - 75 %    O2 Sat, Venous 90.4 (H) 70.0 - 75.0 %   Extra Green Top (Lithium Heparin) Tube     Collection Time: 05/01/24  9:05 PM   Result Value Ref Range    Hold Specimen JIC    CBC with platelets    Collection Time: 05/01/24  9:11 PM   Result Value Ref Range    WBC Count 3.7 (L) 4.0 - 11.0 10e3/uL    RBC Count 3.15 (L) 3.80 - 5.20 10e6/uL    Hemoglobin 9.2 (L) 11.7 - 15.7 g/dL    Hematocrit 29.0 (L) 35.0 - 47.0 %    MCV 92 78 - 100 fL    MCH 29.2 26.5 - 33.0 pg    MCHC 31.7 31.5 - 36.5 g/dL    RDW 14.7 10.0 - 15.0 %    Platelet Count 157 150 - 450 10e3/uL   ECG 12-LEAD WITH MUSE (LHE)    Collection Time: 05/02/24 12:01 AM   Result Value Ref Range    Systolic Blood Pressure  mmHg    Diastolic Blood Pressure  mmHg    Ventricular Rate 72 BPM    Atrial Rate 72 BPM    OH Interval 162 ms    QRS Duration 72 ms     ms    QTc 477 ms    P Axis 67 degrees    R AXIS 71 degrees    T Axis 76 degrees    Interpretation ECG       Sinus rhythm with marked sinus arrhythmia  Otherwise normal ECG  When compared with ECG of 30-APR-2024 13:54,  No significant change was found  Confirmed by SEE ED PROVIDER NOTE FOR, ECG INTERPRETATION (4000),  ANNELIESE TIMMONS ([id number]) on 5/2/2024 12:29:49 AM     UA Macroscopic with reflex to Microscopic and Culture    Collection Time: 05/02/24  1:26 AM    Specimen: Urine, Clean Catch   Result Value Ref Range    Color Urine Colorless Colorless, Straw, Light Yellow, Yellow    Appearance Urine Clear Clear    Glucose Urine Negative Negative mg/dL    Bilirubin Urine Negative Negative    Ketones Urine 40 (A) Negative mg/dL    Specific Gravity Urine 1.007 1.001 - 1.030    Blood Urine 0.03 mg/dL (A) Negative    pH Urine 7.0 5.0 - 7.0    Protein Albumin Urine Negative Negative mg/dL    Urobilinogen Urine <2.0 <2.0 mg/dL    Nitrite Urine Negative Negative    Leukocyte Esterase Urine Negative Negative    Mucus Urine Present (A) None Seen /LPF    RBC Urine 1 <=2 /HPF    WBC Urine 1 <=5 /HPF    Squamous Epithelials Urine 2 (H) <=1 /HPF   Urine Drug Screen Panel    Collection Time: 05/02/24  1:26 AM  "  Result Value Ref Range    Amphetamines Urine Screen Negative Screen Negative    Barbituates Urine Screen Negative Screen Negative    Benzodiazepine Urine Screen Negative Screen Negative    Cannabinoids Urine Screen Negative Screen Negative    Cocaine Urine Screen Negative Screen Negative    Fentanyl Qual Urine Screen Negative Screen Negative    Opiates Urine Screen Negative Screen Negative    PCP Urine Screen Negative Screen Negative   Ethanol urine    Collection Time: 05/02/24  1:26 AM   Result Value Ref Range    Ethanol Urine Screen Negative Screen Negative   Basic metabolic panel    Collection Time: 05/02/24  9:01 AM   Result Value Ref Range    Sodium 139 135 - 145 mmol/L    Potassium 3.8 3.4 - 5.3 mmol/L    Chloride 101 98 - 107 mmol/L    Carbon Dioxide (CO2) 22 22 - 29 mmol/L    Anion Gap 16 (H) 7 - 15 mmol/L    Urea Nitrogen 8.1 6.0 - 20.0 mg/dL    Creatinine 0.59 0.51 - 0.95 mg/dL    GFR Estimate >90 >60 mL/min/1.73m2    Calcium 8.4 (L) 8.6 - 10.0 mg/dL    Glucose 120 (H) 70 - 99 mg/dL   CBC with platelets    Collection Time: 05/02/24  9:01 AM   Result Value Ref Range    WBC Count 5.1 4.0 - 11.0 10e3/uL    RBC Count 3.38 (L) 3.80 - 5.20 10e6/uL    Hemoglobin 10.0 (L) 11.7 - 15.7 g/dL    Hematocrit 30.5 (L) 35.0 - 47.0 %    MCV 90 78 - 100 fL    MCH 29.6 26.5 - 33.0 pg    MCHC 32.8 31.5 - 36.5 g/dL    RDW 14.6 10.0 - 15.0 %    Platelet Count 458 (H) 150 - 450 10e3/uL          Physical and Psychiatric Examination:     /74 (BP Location: Right arm, Patient Position: Semi-Rudd's, Cuff Size: Adult Regular)   Pulse (!) 124   Temp 98.2  F (36.8  C) (Oral)   Resp 22   Ht 1.549 m (5' 1\")   Wt 45.8 kg (101 lb)   LMP 04/18/2024 (Exact Date)   SpO2 97%   BMI 19.08 kg/m    Weight is 101 lbs 0 oz  Body mass index is 19.08 kg/m .    QTc: 477 on 5/2/2024    Physical Exam:  I have reviewed the physical exam as documented by by the medical team and agree with findings and assessment and have no additional " findings to add at this time.         MSE:   Acutely disorganized behavior, walking around ED without clothing. Appears unable to participate fully in conversation and assessment.          DSM-5 Diagnosis:   Unspecified neurocognitive disorder, r/o delirium          Assessment:   Psychiatry consulted regarding acutely abnormal behaviors, with no established psychiatric history.  Patient admitted to the emergency department following syncopal episode, with historical diagnosis of Crohn's disease.  Patient adequately started on steroids for acute Crohn's exacerbation.  Prior to the evening of 5/1, patient was interacting appropriately with no disorganized behavior.  However, chart review indicates that on the evening of 5/1, patient displayed behaviors that were increasingly disorganized.  Given that patient does not appear to have historical psychiatric diagnoses, has not been sleeping well over night, steroids initiated in the ED and behavior is acute in nature, this would be most consistent with delirium.                Summary of Recommendations:   1) Will start Seroquel 12.5 mg at HS to help target sleep, as well as 12.5 mg BID PRN to help target symptoms consistent with possible delirium    2) Delirium precautions:  Up during the day with lights on  Lights off at night, avoid interruptions during the night as much as possible  Family visits  Encourage wearing glasses  Reorientation  Avoid opioids, benzodiazepines, anticholinergics.    Continue to ensure proper nutrition, fluid and electrolyte balance. Monitor for infections, hypoxia, metabolic derangements, or other causes of delirium.   Delirium can occur at any age, and can occur quickly within a few hours      3) Psychiatry will assess patient in person again on 5/3/24            Page me or re-consult psychiatry as needed.       Molly Isaac, DINAP, APRN  Consult/Liaison Psychiatry  [facility]   Contact  information available via [facility] Paging/Directory.  If I am not available, please call [facility] intake ([phone])

## 2024-05-02 NOTE — PROGRESS NOTES
"Upon arrival of entering patient room to do initial assessment at 0730, patient crying, repeatedly hitting both legs with arms, and yelling in Hmong. While using interpretor, able to assess that patient is alert and oriented x4. Patient expressed that she was visually seeing Ken and God in hallways. Patient expressed wanting to see a \"Spiritual Director\". Stated hitting herself is part of her culture and Tenriism. Writer verbalized that they were unaware and wanted to make sure patient stayed safe. Stated that her daughter and  were in a car accident and does not know the location of where they are or if they are alive currently. Patient talking rapidly and at times, interpretor had a difficult time understanding patient. Notified MD Hooper. Patient placed back on 1:1 for safety. Spiritual care consulted.   "

## 2024-05-02 NOTE — SIGNIFICANT EVENT
"8:27 PM    S: House staff was called by the patient's nurse due to bizarre behavior.     Briefly, the patient was admitted for syncopal episode thought to be vasovagal in setting of Crohn's flare.     On evaluation, patient was laying naked in bed with sheets covering body. She was speaking nonsensically in Hmong and difficult to understand. Became tearful. She had thought I was her daughter who ran away/passed away. Then stated that her entire family had  and she was all alone, waiting to die as well, which was why no one came to visit her. No thoughts of self-harm. Said she she was going to get surgery and get \"thrown away\" which is why she took off her clothes.     Later reported that she was in the bathroom for a long time because she fell and/or passed out. Doesn't remember if she hit her head. Doesn't remember if she had LOC. RN did report that she was in the bathroom for a long time and her IV had been pulled out.     Denies HA/vision changes, dyspnea, abdominal pain, nausea/vomiting, diarrhea, and changes in urination. She does report that it feels like her heart is \"squeezing\" inside. Also says her whole body feels weak. Unclear if she is having AH/VH.       Exam: /77 (BP Location: Right arm, Patient Position: Semi-Rudd's)   Pulse 88   Temp 98.6  F (37  C) (Oral)   Resp 24   Ht 1.549 m (5' 1\")   Wt 45.8 kg (101 lb)   LMP 2024 (Exact Date)   SpO2 99%   BMI 19.08 kg/m    GENERAL: alert, speaking in Hmong but not necessarily coherently, tearful  RESP: no acute respiratory distress, lungs clear to auscultation   CV: Normal S1/S2, RRR, murmurs/gallops/clicks not appreciated  ABDOMEN: nontender, nondistended   MS: Finger squeeze intact, wiggling toes, unable to assess strength - not cooperating. No pain on palpation of major joints  NEURO: Alert, oriented to self and place, not oriented to time or person. No facial droop, no gross focal deficits  PSYCH: thought content scattered, labile " mood       A/P:   Patient with sudden onset of AMS and possible syncopal episode w/unwitnessed, patient reported fall. BP softer compared to previous, but otherwise afebrile and vitally stable. Unclear etiology at this time as it appears patient was doing well previously. She does have a hx of depression per chart rvw, but would not necessarily explain her change in behaviors. Could possibly be related to steroids. No obvious si/sx of infection.  - CT head to r/o ICH in setting of possible fall and AMS  - Repeat CMP  - Repeat CBC  - VBG  - UA  - EKG for chest pain r/o   - Pt pulled out IV that was mainly used for LR mIVF. Per RN she has been eating and drinking well. Okay to leave out. Discussed w/pharmacy converting methylpred to prednisone for PO.    Discussed w/bedside RN and senior resident, Dr. Hooper.      Franklin Hall MD PGY1  [facility]

## 2024-05-02 NOTE — PLAN OF CARE
Problem: Colostomy  Goal: Optimal Coping  Outcome: Progressing     Problem: Fall Injury Risk  Goal: Absence of Fall and Fall-Related Injury  Outcome: Progressing  Intervention: Promote Injury-Free Environment  Recent Flowsheet Documentation  Taken 5/2/2024 0000 by Eladio Herrera RN  Safety Promotion/Fall Prevention:   activity supervised   room door open   room near nurse's station   bedside attendant     Problem: Colostomy  Goal: Absence of Infection Signs and Symptoms  Outcome: Progressing   Goal Outcome Evaluation:        Pt A&O X 3, disoriented to situation. Hmong speaking,able to understand simple Eng command,  used for communications. Denies pain, colostomy patent liquid stool, pt independent  with ostomy care. K+ protocol and on tele, NSR with occasional bradycardic.Pt denies nausea and dizziness. At start of shift, pt appeared confused, and witnessed running in [place] naked. Per belinda nurse report, per pt, pt had an unwitnessed fall. 1:1 in place for safety, pt has been cooperative and resting in bed. Pt did not sleep majority of night, states she misses her family. Urine sample sent, result are in. Pt will be taken off 1:1 for AM shift as pt had no behaviors over night.

## 2024-05-02 NOTE — PROGRESS NOTES
Patient transferred to [facility] around 1245. Report given to SHRAON Chauhan. Patient tolerated transfer without issue.

## 2024-05-03 ENCOUNTER — APPOINTMENT (OUTPATIENT)
Dept: CT IMAGING | Facility: HOSPITAL | Age: 44
End: 2024-05-03
Payer: COMMERCIAL

## 2024-05-03 ENCOUNTER — VIRTUAL VISIT (OUTPATIENT)
Dept: INTERPRETER SERVICES | Facility: CLINIC | Age: 44
End: 2024-05-03
Payer: COMMERCIAL

## 2024-05-03 LAB
ALBUMIN SERPL BCG-MCNC: 2.5 G/DL (ref 3.5–5.2)
ALP SERPL-CCNC: 122 U/L (ref 40–150)
ALT SERPL W P-5'-P-CCNC: 11 U/L (ref 0–50)
ANION GAP SERPL CALCULATED.3IONS-SCNC: 13 MMOL/L (ref 7–15)
ANION GAP SERPL CALCULATED.3IONS-SCNC: 8 MMOL/L (ref 7–15)
APTT PPP: 29 SECONDS (ref 22–38)
AST SERPL W P-5'-P-CCNC: 17 U/L (ref 0–45)
BASE EXCESS BLDA CALC-SCNC: 1.2 MMOL/L (ref -3–3)
BILIRUB SERPL-MCNC: <0.2 MG/DL
BUN SERPL-MCNC: 14.1 MG/DL (ref 6–20)
BUN SERPL-MCNC: 14.6 MG/DL (ref 6–20)
BUPRENORPHINE UR QL: NORMAL
CALCIUM SERPL-MCNC: 8.3 MG/DL (ref 8.6–10)
CALCIUM SERPL-MCNC: 9.1 MG/DL (ref 8.6–10)
CHLORIDE SERPL-SCNC: 103 MMOL/L (ref 98–107)
CHLORIDE SERPL-SCNC: 105 MMOL/L (ref 98–107)
COHGB MFR BLD: 98.1 % (ref 96–97)
CREAT SERPL-MCNC: 0.64 MG/DL (ref 0.51–0.95)
CREAT SERPL-MCNC: 0.71 MG/DL (ref 0.51–0.95)
DEPRECATED HCO3 PLAS-SCNC: 25 MMOL/L (ref 22–29)
DEPRECATED HCO3 PLAS-SCNC: 26 MMOL/L (ref 22–29)
EGFRCR SERPLBLD CKD-EPI 2021: >90 ML/MIN/1.73M2
EGFRCR SERPLBLD CKD-EPI 2021: >90 ML/MIN/1.73M2
ERYTHROCYTE [DISTWIDTH] IN BLOOD BY AUTOMATED COUNT: 14.7 % (ref 10–15)
ERYTHROCYTE [DISTWIDTH] IN BLOOD BY AUTOMATED COUNT: 14.8 % (ref 10–15)
GLUCOSE BLDC GLUCOMTR-MCNC: 140 MG/DL (ref 70–99)
GLUCOSE SERPL-MCNC: 148 MG/DL (ref 70–99)
GLUCOSE SERPL-MCNC: 150 MG/DL (ref 70–99)
HCG SERPL QL: NEGATIVE
HCO3 BLD-SCNC: 26 MMOL/L (ref 21–28)
HCT VFR BLD AUTO: 29.6 % (ref 35–47)
HCT VFR BLD AUTO: 38.6 % (ref 35–47)
HGB BLD-MCNC: 12.3 G/DL (ref 11.7–15.7)
HGB BLD-MCNC: 9.5 G/DL (ref 11.7–15.7)
HOLD SPECIMEN: NORMAL
HOLD SPECIMEN: NORMAL
INR PPP: 1.04 (ref 0.85–1.15)
LACTATE SERPL-SCNC: 2.4 MMOL/L (ref 0.7–2)
MCH RBC QN AUTO: 28.9 PG (ref 26.5–33)
MCH RBC QN AUTO: 29.5 PG (ref 26.5–33)
MCHC RBC AUTO-ENTMCNC: 31.9 G/DL (ref 31.5–36.5)
MCHC RBC AUTO-ENTMCNC: 32.1 G/DL (ref 31.5–36.5)
MCV RBC AUTO: 91 FL (ref 78–100)
MCV RBC AUTO: 92 FL (ref 78–100)
O2/TOTAL GAS SETTING VFR VENT: 21 %
PCO2 BLD: 43 MM HG (ref 35–45)
PEEP: 0 CM H2O
PH BLD: 7.39 [PH] (ref 7.35–7.45)
PLATELET # BLD AUTO: 511 10E3/UL (ref 150–450)
PLATELET # BLD AUTO: 722 10E3/UL (ref 150–450)
PO2 BLD: 99 MM HG (ref 80–105)
POTASSIUM SERPL-SCNC: 3.8 MMOL/L (ref 3.4–5.3)
POTASSIUM SERPL-SCNC: 4.1 MMOL/L (ref 3.4–5.3)
PROT SERPL-MCNC: 7.3 G/DL (ref 6.4–8.3)
RBC # BLD AUTO: 3.22 10E6/UL (ref 3.8–5.2)
RBC # BLD AUTO: 4.25 10E6/UL (ref 3.8–5.2)
SAO2 % BLDA: 96 % (ref 92–100)
SODIUM SERPL-SCNC: 138 MMOL/L (ref 135–145)
SODIUM SERPL-SCNC: 142 MMOL/L (ref 135–145)
TROPONIN T SERPL HS-MCNC: 10 NG/L
WBC # BLD AUTO: 10.5 10E3/UL (ref 4–11)
WBC # BLD AUTO: 13.8 10E3/UL (ref 4–11)

## 2024-05-03 PROCEDURE — 99232 SBSQ HOSP IP/OBS MODERATE 35: CPT | Mod: GC

## 2024-05-03 PROCEDURE — 93010 ELECTROCARDIOGRAM REPORT: CPT | Performed by: INTERNAL MEDICINE

## 2024-05-03 PROCEDURE — 85027 COMPLETE CBC AUTOMATED: CPT

## 2024-05-03 PROCEDURE — 93005 ELECTROCARDIOGRAM TRACING: CPT

## 2024-05-03 PROCEDURE — T1013 SIGN LANG/ORAL INTERPRETER: HCPCS | Mod: U4,TEL,95

## 2024-05-03 PROCEDURE — 999N000158 HC STATISTIC RCP TIME ED VENT EA 10 MIN

## 2024-05-03 PROCEDURE — 70496 CT ANGIOGRAPHY HEAD: CPT

## 2024-05-03 PROCEDURE — 99207 PR NO CHARGE LOS: CPT | Performed by: STUDENT IN AN ORGANIZED HEALTH CARE EDUCATION/TRAINING PROGRAM

## 2024-05-03 PROCEDURE — 0042T CT HEAD PERFUSION W CONTRAST: CPT

## 2024-05-03 PROCEDURE — 84484 ASSAY OF TROPONIN QUANT: CPT

## 2024-05-03 PROCEDURE — 250N000009 HC RX 250

## 2024-05-03 PROCEDURE — 250N000012 HC RX MED GY IP 250 OP 636 PS 637

## 2024-05-03 PROCEDURE — 250N000011 HC RX IP 250 OP 636

## 2024-05-03 PROCEDURE — 87040 BLOOD CULTURE FOR BACTERIA: CPT

## 2024-05-03 PROCEDURE — 80053 COMPREHEN METABOLIC PANEL: CPT

## 2024-05-03 PROCEDURE — 84703 CHORIONIC GONADOTROPIN ASSAY: CPT

## 2024-05-03 PROCEDURE — 250N000013 HC RX MED GY IP 250 OP 250 PS 637: Performed by: INTERNAL MEDICINE

## 2024-05-03 PROCEDURE — 85730 THROMBOPLASTIN TIME PARTIAL: CPT

## 2024-05-03 PROCEDURE — 258N000003 HC RX IP 258 OP 636

## 2024-05-03 PROCEDURE — 120N000001 HC R&B MED SURG/OB

## 2024-05-03 PROCEDURE — 36415 COLL VENOUS BLD VENIPUNCTURE: CPT

## 2024-05-03 PROCEDURE — 82040 ASSAY OF SERUM ALBUMIN: CPT

## 2024-05-03 PROCEDURE — 36600 WITHDRAWAL OF ARTERIAL BLOOD: CPT

## 2024-05-03 PROCEDURE — 83605 ASSAY OF LACTIC ACID: CPT

## 2024-05-03 PROCEDURE — 36415 COLL VENOUS BLD VENIPUNCTURE: CPT | Performed by: FAMILY MEDICINE

## 2024-05-03 PROCEDURE — 82805 BLOOD GASES W/O2 SATURATION: CPT

## 2024-05-03 PROCEDURE — 85049 AUTOMATED PLATELET COUNT: CPT

## 2024-05-03 PROCEDURE — 85610 PROTHROMBIN TIME: CPT

## 2024-05-03 PROCEDURE — 80048 BASIC METABOLIC PNL TOTAL CA: CPT

## 2024-05-03 RX ORDER — LORAZEPAM 2 MG/ML
3 INJECTION INTRAMUSCULAR ONCE
Status: COMPLETED | OUTPATIENT
Start: 2024-05-03 | End: 2024-05-03

## 2024-05-03 RX ORDER — IOPAMIDOL 755 MG/ML
117 INJECTION, SOLUTION INTRAVASCULAR ONCE
Status: COMPLETED | OUTPATIENT
Start: 2024-05-03 | End: 2024-05-03

## 2024-05-03 RX ORDER — LORAZEPAM 2 MG/ML
INJECTION INTRAMUSCULAR
Status: COMPLETED
Start: 2024-05-03 | End: 2024-05-03

## 2024-05-03 RX ORDER — PREDNISONE 20 MG/1
40 TABLET ORAL DAILY
Status: DISCONTINUED | OUTPATIENT
Start: 2024-05-03 | End: 2024-05-04 | Stop reason: HOSPADM

## 2024-05-03 RX ADMIN — PREDNISONE 40 MG: 20 TABLET ORAL at 16:38

## 2024-05-03 RX ADMIN — IOPAMIDOL 117 ML: 755 INJECTION, SOLUTION INTRAVENOUS at 21:43

## 2024-05-03 RX ADMIN — SODIUM CHLORIDE, POTASSIUM CHLORIDE, SODIUM LACTATE AND CALCIUM CHLORIDE 500 ML: 600; 310; 30; 20 INJECTION, SOLUTION INTRAVENOUS at 22:30

## 2024-05-03 RX ADMIN — LORAZEPAM 3 MG: 2 INJECTION INTRAMUSCULAR; INTRAVENOUS at 21:02

## 2024-05-03 RX ADMIN — METHYLPREDNISOLONE SODIUM SUCCINATE 20 MG: 40 INJECTION, POWDER, FOR SOLUTION INTRAMUSCULAR; INTRAVENOUS at 06:06

## 2024-05-03 RX ADMIN — PANTOPRAZOLE SODIUM 40 MG: 40 TABLET, DELAYED RELEASE ORAL at 06:06

## 2024-05-03 RX ADMIN — SODIUM CHLORIDE 100 ML: 9 INJECTION, SOLUTION INTRAVENOUS at 21:43

## 2024-05-03 ASSESSMENT — ACTIVITIES OF DAILY LIVING (ADL)
ADLS_ACUITY_SCORE: 22
ADLS_ACUITY_SCORE: 22
ADLS_ACUITY_SCORE: 21
ADLS_ACUITY_SCORE: 22
ADLS_ACUITY_SCORE: 23
ADLS_ACUITY_SCORE: 23
ADLS_ACUITY_SCORE: 22
ADLS_ACUITY_SCORE: 23
ADLS_ACUITY_SCORE: 22
ADLS_ACUITY_SCORE: 24
ADLS_ACUITY_SCORE: 22

## 2024-05-03 NOTE — PROGRESS NOTES
[facility] progress Note    Subjective: Patient feeling better tolerating diet    Objective:  Vital signs in last 24 hours  Temp:  [97.8  F (36.6  C)-98.5  F (36.9  C)] 98  F (36.7  C)  Pulse:  [] 87  Resp:  [16-20] 18  BP: (104-130)/(66-89) 114/80  SpO2:  [96 %-100 %] 99 %     Gen: Awake, no acute distress    Patient Active Problem List   Diagnosis    Screening for cervical cancer- ASCUS, HPV+ 4/2017    Pain of back and right lower extremity    Abdominal pain, generalized    Abdominal pain    Inflammation of small intestine    Moderate recurrent major depression (H)    Hypokalemia    Colitis    Intra-abdominal abscess (H)    Celiac disease    Diarrhea    Chronic gastritis    Chronic diarrhea    Crohn's disease with other complication, unspecified gastrointestinal tract location (H)    S/P partial colectomy    Anemia, unspecified type    Atypical squamous cell changes of undetermined significance (ASCUS) on vaginal cytology with positive high risk human papilloma virus (HPV)    Syncope, unspecified syncope type       Labs:  CMP Results:   Recent Labs   Lab Test 05/03/24  0716 05/02/24  0901 05/01/24  1732      < > 135   POTASSIUM 4.1   < > 3.7  3.8   CHLORIDE 103   < > 102   CO2 26   < > 21*   ANIONGAP 13   < > 12   *   < > 95   BUN 14.6   < > 3.7*   CR 0.71   < > 0.56   BILITOTAL  --   --  0.2   ALKPHOS  --   --  152*   ALT  --   --  10   AST  --   --  14    < > = values in this interval not displayed.      CBC  Recent Labs   Lab 05/03/24  0716 05/02/24  0901 05/01/24  2111 05/01/24  0510   WBC 13.8* 5.1 3.7* 5.6   RBC 4.25 3.38* 3.15* 3.22*   HGB 12.3 10.0* 9.2* 9.2*   HCT 38.6 30.5* 29.0* 29.7*   MCV 91 90 92 92   MCH 28.9 29.6 29.2 28.6   MCHC 31.9 32.8 31.7 31.0*   RDW 14.8 14.6 14.7 14.7   * 458* 157 361     INRNo lab results found in last 7 days.   Lipase   Date Value Ref Range Status   04/30/2024 23 13 - 60 U/L Final   12/14/2022 32 13 - 60 U/L Final   10/29/2022 17 13 -  60 U/L Final   06/01/2022 33 0 - 52 U/L Final     Recent Labs   Lab 05/01/24  1732 04/30/24  1418   AST 14 21   ALT 10 13   ALKPHOS 152* 205*   BILITOTAL 0.2 0.3   LIPASE  --  23     Assessment: 43 years old with Crohn's disease, active disease seen on CT scan, infectious etiology is negative.  Increased output is likely from active disease.  Symptoms improved with addition of IV steroids, change steroids to prednisone 40 mg p.o.   Celiac disease.  Hypokalemia and weakness, likely from fluid loss        Plan:  Advance diet as tolerated, gluten-free diet.  Follow clinically and follow labs.  Start prednisone 40 mg once daily for 7 days, then 30 mg daily for 7 days then 20 mg daily for 7 days then 10 mg daily for 7 days then 5 mg daily for 7 days.  She can resume her outpatient azathioprine and Humira, will follow Humira levels.  They were drawn around day 5 or 7  She can be discharged from GI standpoint.  We will be available as needed, kindly call with any questions  Total time spent was 25 minutes.                                               Fausto Britt MD  Thank you for the opportunity to participate in the care of this patient.   Please feel free to call me with any questions or concerns.  Phone number [phone].

## 2024-05-03 NOTE — PLAN OF CARE
Problem: Adult Inpatient Plan of Care  Goal: Plan of Care Review  Outcome: Progressing   Pt stating she is feeling better today. Tolerated breakfast well, ate 100% of regular diet. Denies any pain.     Colostomy leaking. Bag changed, barrier ring also applied. Stoma WDL, having loose brown stool.     Pt A&Ox4. Integrations have been appropriate for situation when writer has been in room. Family visiting at bedside. 1:1 remains in place for safety.     SUKHJINDER PURI RN

## 2024-05-03 NOTE — PROGRESS NOTES
[facility]    Progress Note - Hospitalist Service       Date of Admission:  4/30/2024    Assessment & Plan     Dain Tejeda is a 43 year old female with past medical history of Crohn's disease complicated by colonic stricture status post partial colectomy with colostomy, celiac's, iron deficiency anemia who presented after syncopal episode, likely vasovagal in the setting of Crohn's flare.      Crohn's flare  Diagnosed with Crohn's late 2022, complicated by stricture status post partial colectomy with ostomy. Currently follows with [facility], on Humira and azathioprine. Noted stool output more watery over the past 3 days prior to admission, nonbloody. CT does show evidence of colitis, infectious versus inflammatory.  Enteric panel and C. Diff returned negative, decreasing concern for infectious etiology. GI consulted, treating for Crohn's flare.   - GI consulted, signed off  - Prednisone taper; 40 mg once daily for 7 days, then 30 mg daily for 7 days then 20 mg daily for 7 days then 10 mg daily for 7 days then 5 mg daily for 7 days.   - Advance diet as tolerated.   - Continue azathioprine, will resume Humira once discharged.     Disorganized behavior  On the morning of 5/1, patient appeared fatigued. Exhibited odd behavior of pretending to sleep to avoid speaking to provider, but upon insisting on conversation, responded to questions appropriately. Overnight on 5/2, started to exhibit more strange behavior and concern for hallucinations, see note from Dr. Hall on 5/1 for details. In the morning of 5/2, RN witnessed patient crying and hitting her legs while yelling. Later evaluation revealed her to be more calm, but laughing throughout the interview at inappropriate times and speaking very rapidly. Discussed with psych, who is more concerned for delirium at this time. Today, she is more calm, but still very tearful and does not remember events of the past three days.   - Psych consulted,  plans to visit patient today.   - Delirium precautions   - Seroquel 12.5 mg at bedtime.      Syncope  One-time episode of syncope on the morning of admission with loss of consciousness. Unclear etiology, but with some prodromal symptoms, suspicious for vasovagal, exacerbated by colitis/Crohn's flare and decreased oral intake.  Workup so far reassuring with sinus rhythm on EKG, CT PE run negative, head CT negative.  Orthostatics normal, but they were checked after she had fluid resuscitation. No further episodes noted during hospital stay.      Generalized weakness  Suspect in the setting of colitis and decreased oral intake.  Hypokalemia could be contributing.  No evidence of a systemic infection.  Symptoms improved following fluid bolus.  Continue to monitor.     Hypokalemia  2.7 admission.  Normal magnesium.  Consider GI loss/poor oral intake as etiology.  - Replacement protocol  - BMP daily     Normocytic anemia  History of iron deficiency  History of iron deficiency anemia with recent iron infusions through [facility].  Hemoglobin of 10.1 on admission.  No evidence of GI bleeding.  - CBC in a.m.     Thrombocytosis  Likely reactive with colitis  - CBC in a.m.     QTc prolongation  QTc mildly prolonged at 487.  Consider hypokalemia with a contributing factor.  Azathioprine also known to cause QT prolongation  - Monitor for now.     Chronic conditions   - Celiac's disease   - Low-grade cervical dysplasia (TREVOR-1) -diagnosed with recent colposcopy.  Outpatient follow-up.        Diet: Advance Diet as Tolerated: Regular Diet Adult; Regular Diet Adult    DVT Prophylaxis: Pneumatic Compression Devices  Chambers Catheter: Not present  Fluids: Oral   Lines: None     Cardiac Monitoring: None  Code Status: Full Code      Clinically Significant Risk Factors           # Hypercalcemia: corrected calcium is >10.1, will monitor as appropriate    # Hypoalbuminemia: Lowest albumin = 2.2 g/dL at 5/1/2024  5:32 PM, will monitor as  appropriate                     Disposition Plan     Expected Discharge Date: 05/03/2024                The patient's care was discussed with the Attending Physician, Dr. Bernstein .    Phoebe Sandhu MD  Hospitalist Service  [facility]  ______________________________________________________________________    Interval History     No behaviors observed overnight. 1:1 reports that she was in the bathroom crying this morning, Dain does not remember that. States that she doesn't remember the last few days of her hospitalization. Denies abdominal pain and reports decreased ostomy output. She has a good appetite and is drinking well.     Physical Exam   Vital Signs: Temp: 98  F (36.7  C) Temp src: Oral BP: 114/80 Pulse: 87   Resp: 18 SpO2: 99 % O2 Device: None (Room air)    Weight: 101 lbs 0 oz    General Appearance: Comfortable.  No acute distress.  Eyes: Clear sclera and conjunctiva.  Respiratory: Comfortable respiratory effort.  No crackles or wheezing.  Cardiovascular: Regular rate and rhythm.  No murmurs appreciated.  GI: Ostomy bag in place, currently empty.  Bowel sounds present.  Abdomen nondistended.  Soft.  No tenderness.  Neurologic: Alert and oriented.  Face is symmetric.  Speech is fluent. Engaged in interview, more calm and appropriate speech today.     Data     I have personally reviewed the following data over the past 24 hrs:    13.8 (H)  \   12.3   / 722 (H)     142 103 14.6 /  150 (H)   4.1 26 0.71 \       Imaging results reviewed over the past 24 hrs:   No results found for this or any previous visit (from the past 24 hour(s)).

## 2024-05-03 NOTE — PLAN OF CARE
Goal Outcome Evaluation:    Slept on and off. Oriented to self and time, stated she did not know why she was in hospital. Pt more conversational tonight and looking staff in eye, answering questions. No behaviors tonight, 1:1 still in room, MD ordered.     Temp: 98  F (36.7  C) Temp src: Oral BP: 112/68 Pulse: 84   Resp: 20 SpO2: 98 % O2 Device: None (Room air)

## 2024-05-03 NOTE — PLAN OF CARE
Goal Outcome Evaluation:                      Pt is a/o per . Family came to visit. When they left, pt tried to run after them. Not easily redirectable. Pt was upset about not being able to eat chicken and rice. Reports she does not follow a gluten free diet.    LSC.  BS active. Good PO. Colostomy started leaking. Was changed.   Voiding without difficulty.    PT very flat. Answers minimal questions. Laid in bed most of shift, talking/praying to herself.    1:1 for safety.

## 2024-05-03 NOTE — CONSULTS
"SPIRITUAL HEALTH SERVICES CONSULT NOTE  [facility]; P1    Saw pt Dain Ileana per Spiritual Care Consult    Patient/Family Understanding of Illness and Goals of Care - Met with Dain; Called [facility] line - ong  Ethan provided facilitated interpretation for this conversation. Dain engaged easily in conversation and was quite energetic at times. She made the sign of the cross when I identified my role in the hospital. When asked why she is here in the hospital, Dain talked about surgery, having Crohn's disease and having her uterus taken out. When I asked when her surgery was, she replied \"In October.\" Dain said that she has been carefully watching her diet since surgery. Dain also mentioned having headaches and feeling dizzy. At the time of of our conversation, Dain said that she felt \"shaky\". She believes this was due to being hungry. Renée, her NA, ordered her rice, a pork elsy, and some hot water for lunch. Dain says that she feels safe here in the hospital and believes that she will be discharged either later today or tomorrow. She was anxious to eat her food when it arrived so I ended our visit. No other concerns identified at this time.     Distress and Loss - Dain says that she doesn't remember anything from the last few days. She looks at the white board in her room and says \"I know it is Friday, May 3rd, but I don't know how long I have been here.\" When asked about any other concerns, she says that she is missing her 6 year old daughter and wants to go home to cook for her. Also disappointing to her was the fact that she does not have her phone here with her and therefore cannot call any family members. She says \"I don't know all their numbers - they are in my phone.\" At the end of our conversation, I showed Dain how to use our phone and she called her .     Strengths, Coping, and Resources - N/A    Meaning, Beliefs, and Spirituality - Dain says that she holds traditional Hmong beliefs. She " "also says \"I have not been to [employer] in awhile.\" When asked about any meaningful spiritual practices, she asked about calling her family members. Offered aromatherapy oil, which she declined.     Plan of Care: Spiritual care staff will remain available for further follow-up as requested by patient, family or staff.      Time Spent with Patient/Family: 35 minutes    Tricia Sue M.Div.      Office: [phone] (for non-urgent requests)  Please Vocera or page through [facility] for time-sensitive requests    "

## 2024-05-03 NOTE — CONSULTS
"      Psychiatry Consultation; Follow up              Interim history:    Psychiatry following up regarding patient status.    Dain Tejeda is a 43 year old female with a pertinent history of crohn's disease of small and large intestine, s/p colectomy, chronic gastritis, celiac disease, anemia,  who presents to this ED via walk-in for evaluation of syncope.     Patient reports generalized weakness and decreased appetite for 4 days. This morning, she felt weak and tired so she attempted to sit from a standing position and had a syncopal episode. Patient has a ostomy bag with less stool output in the last 2 days but it is \"watery\". She has a history of crohn's disease which she takes her medications as prescribed and does infusions, most recent infusion was on 4/22. Denies nausea, vomiting, abdominal pain, and any other symptoms.     Per RN notation:   Upon arrival of entering patient room to do initial assessment at 0730, patient crying, repeatedly hitting both legs with arms, and yelling in Hmong. While using interpretor, able to assess that patient is alert and oriented x4. Patient expressed that she was visually seeing Ken and God in hallways. Patient expressed wanting to see a \"Spiritual Director\". Stated hitting herself is part of her culture and Oriental orthodox. Writer verbalized that they were unaware and wanted to make sure patient stayed safe. Stated that her daughter and  were in a car accident and does not know the location of where they are or if they are alive currently. Patient talking rapidly and at times, interpretor had a difficult time understanding patient. Notified MD Hooper. Patient placed back on 1:1 for safety. Spiritual care consulted.         From my assessment on 5/2/24: Per provider, patient is suspected to be experiencing an acute exacerbation of Crohn's disease, and was subsequently started on methylprednisolone sodium succinate. Patient did not appear to sleep over night and has been " "consistently disorganized in the ED. Patient's disorganized speech and behaviors are acute since arriving to the ED. There is no indication of psychiatric diagnoses or treatments.     Per chart review, patient has been more organized in her behavior and communicates appropriately.       Today, I met with patient with assistance from [facility] . She reports that she has increased stress related to her Crohn's disease. She shares that she has someone to speak to regarding her stress, but it is not a psychiatrist or therapist. When asked if she is seeing or hearing things others cannot, she responded, \"I'm normal.\" Patient shares that she feels she would benefit from therapy and psychiatry referrals.           Current Medications:     Current Facility-Administered Medications   Medication Dose Route Frequency Provider Last Rate Last Admin    [Held by provider] adalimumab (HUMIRA *CF*) injection 40 mg  40 mg Subcutaneous Q14 Days Jefe Martin MD        azaTHIOprine (IMURAN) tablet 100 mg  100 mg Oral Daily Phoebe Sandhu MD        pantoprazole (PROTONIX) EC tablet 40 mg  40 mg Oral QAM  Fausto Britt MD   40 mg at 05/03/24 0606    predniSONE (DELTASONE) tablet 40 mg  40 mg Oral Daily Phoebe Sandhu MD   40 mg at 05/03/24 1638    QUEtiapine (SEROquel) half-tab 12.5 mg  12.5 mg Oral At Bedtime Molly Isaac NP   12.5 mg at 05/02/24 2148    sodium chloride (PF) 0.9% PF flush 3 mL  3 mL Intracatheter Q8H Jefe Martin MD   3 mL at 05/03/24 1638              MSE:   Calm and pleasant, somewhat guarded in answers. However, appropriately engaged in conversation.     Vital signs:  Temp: 97.8  F (36.6  C) Temp src: Oral BP: 108/71 Pulse: 78   Resp: 18 SpO2: 99 % O2 Device: None (Room air)   Height: 154.9 cm (5' 1\") Weight: 45.8 kg (101 lb)  Estimated body mass index is 19.08 kg/m  as calculated from the following:    Height as of this encounter: 1.549 m (5' 1\").    Weight as of this " encounter: 45.8 kg (101 lb).    Qtc: 477 on 5/2/2024         DSM-5 Diagnosis:   Neurocognitive disorder, delirium          Assessment:   Psychiatry following up regarding patient status. Psychiatry was initially consulted on 5/2 regarding acutely abnormal behaviors, with no established psychiatric history.  Patient was admitted to the emergency department following syncopal episode, with historical diagnosis of Crohn's disease.  Patient started on steroids for acute Crohn's exacerbation.  Prior to the evening of 5/1, patient was interacting appropriately with no disorganized behavior.  However, chart review indicates that on the evening of 5/1, patient displayed behaviors that were increasingly disorganized.      Today, patient is engaged appropriately in conversation and answers questions within context. She does appear guarded when discussing mental health, but appears receptive to referrals to outpatient psychiatry and therapy. It's difficult to discern whether patient has underlying psychiatric diagnoses, as she denies the presence of depression, anxiety or other mental health concerns. However, she might benefit from outpatient psychiatry and therapy for additional supports.             Summary of Recommendations:   1) If Seroquel appears helpful, can continue at HS. However, it would not be indicated for patient to discharge with this medication.    2) Can refer patient to outpatient therapy and psychiatry for additional supports        Page me or re-consult psychiatry as needed (psychiatry is signing off).       Molly Isaac, ANISA, HEATHER  Consult/Liaison Psychiatry  [facility]   Contact information available via [facility] Paging/Directory.  If I am not available, please call [facility] intake ([phone])

## 2024-05-04 VITALS
SYSTOLIC BLOOD PRESSURE: 125 MMHG | RESPIRATION RATE: 18 BRPM | OXYGEN SATURATION: 100 % | TEMPERATURE: 97.8 F | BODY MASS INDEX: 19.07 KG/M2 | WEIGHT: 101 LBS | HEART RATE: 79 BPM | HEIGHT: 61 IN | DIASTOLIC BLOOD PRESSURE: 82 MMHG

## 2024-05-04 PROBLEM — R46.89 SPELL OF ABNORMAL BEHAVIOR: Status: ACTIVE | Noted: 2024-05-04

## 2024-05-04 LAB
AMPHETAMINES UR QL SCN: NORMAL
ANION GAP SERPL CALCULATED.3IONS-SCNC: 9 MMOL/L (ref 7–15)
ATRIAL RATE - MUSE: 78 BPM
BARBITURATES UR QL SCN: NORMAL
BENZODIAZ UR QL SCN: NORMAL
BUN SERPL-MCNC: 11.4 MG/DL (ref 6–20)
BZE UR QL SCN: NORMAL
CALCIUM SERPL-MCNC: 7.9 MG/DL (ref 8.6–10)
CANNABINOIDS UR QL SCN: NORMAL
CHLORIDE SERPL-SCNC: 107 MMOL/L (ref 98–107)
CREAT SERPL-MCNC: 0.51 MG/DL (ref 0.51–0.95)
DEPRECATED HCO3 PLAS-SCNC: 24 MMOL/L (ref 22–29)
DIASTOLIC BLOOD PRESSURE - MUSE: NORMAL MMHG
EGFRCR SERPLBLD CKD-EPI 2021: >90 ML/MIN/1.73M2
ERYTHROCYTE [DISTWIDTH] IN BLOOD BY AUTOMATED COUNT: 15 % (ref 10–15)
FENTANYL UR QL: NORMAL
GLUCOSE BLDC GLUCOMTR-MCNC: 138 MG/DL (ref 70–99)
GLUCOSE SERPL-MCNC: 146 MG/DL (ref 70–99)
HCT VFR BLD AUTO: 29.3 % (ref 35–47)
HGB BLD-MCNC: 9.1 G/DL (ref 11.7–15.7)
INTERPRETATION ECG - MUSE: NORMAL
LACTATE SERPL-SCNC: 2 MMOL/L (ref 0.7–2)
LACTATE SERPL-SCNC: 2.5 MMOL/L (ref 0.7–2)
MCH RBC QN AUTO: 28.9 PG (ref 26.5–33)
MCHC RBC AUTO-ENTMCNC: 31.1 G/DL (ref 31.5–36.5)
MCV RBC AUTO: 93 FL (ref 78–100)
OPIATES UR QL SCN: NORMAL
P AXIS - MUSE: 57 DEGREES
PCP QUAL URINE (ROCHE): NORMAL
PLATELET # BLD AUTO: 378 10E3/UL (ref 150–450)
POTASSIUM SERPL-SCNC: 3.7 MMOL/L (ref 3.4–5.3)
PR INTERVAL - MUSE: 170 MS
QRS DURATION - MUSE: 74 MS
QT - MUSE: 410 MS
QTC - MUSE: 467 MS
R AXIS - MUSE: 72 DEGREES
RBC # BLD AUTO: 3.15 10E6/UL (ref 3.8–5.2)
SODIUM SERPL-SCNC: 140 MMOL/L (ref 135–145)
SYSTOLIC BLOOD PRESSURE - MUSE: NORMAL MMHG
T AXIS - MUSE: 79 DEGREES
VENTRICULAR RATE- MUSE: 78 BPM
WBC # BLD AUTO: 7.7 10E3/UL (ref 4–11)

## 2024-05-04 PROCEDURE — 99222 1ST HOSP IP/OBS MODERATE 55: CPT | Performed by: STUDENT IN AN ORGANIZED HEALTH CARE EDUCATION/TRAINING PROGRAM

## 2024-05-04 PROCEDURE — 83605 ASSAY OF LACTIC ACID: CPT

## 2024-05-04 PROCEDURE — 36415 COLL VENOUS BLD VENIPUNCTURE: CPT

## 2024-05-04 PROCEDURE — 250N000012 HC RX MED GY IP 250 OP 636 PS 637

## 2024-05-04 PROCEDURE — 99238 HOSP IP/OBS DSCHRG MGMT 30/<: CPT | Mod: GC

## 2024-05-04 PROCEDURE — 258N000003 HC RX IP 258 OP 636

## 2024-05-04 PROCEDURE — 250N000013 HC RX MED GY IP 250 OP 250 PS 637: Performed by: INTERNAL MEDICINE

## 2024-05-04 PROCEDURE — 85027 COMPLETE CBC AUTOMATED: CPT

## 2024-05-04 PROCEDURE — 80048 BASIC METABOLIC PNL TOTAL CA: CPT

## 2024-05-04 PROCEDURE — 80307 DRUG TEST PRSMV CHEM ANLYZR: CPT

## 2024-05-04 RX ORDER — SODIUM CHLORIDE 9 MG/ML
INJECTION, SOLUTION INTRAVENOUS CONTINUOUS
Status: DISCONTINUED | OUTPATIENT
Start: 2024-05-04 | End: 2024-05-04 | Stop reason: HOSPADM

## 2024-05-04 RX ORDER — PREDNISONE 10 MG/1
TABLET ORAL
Qty: 66 TABLET | Refills: 0 | Status: ON HOLD | OUTPATIENT
Start: 2024-05-04 | End: 2024-05-21

## 2024-05-04 RX ADMIN — PREDNISONE 40 MG: 20 TABLET ORAL at 10:00

## 2024-05-04 RX ADMIN — PANTOPRAZOLE SODIUM 40 MG: 40 TABLET, DELAYED RELEASE ORAL at 10:00

## 2024-05-04 RX ADMIN — SODIUM CHLORIDE: 9 INJECTION, SOLUTION INTRAVENOUS at 06:42

## 2024-05-04 RX ADMIN — AZATHIOPRINE 100 MG: 50 TABLET ORAL at 10:00

## 2024-05-04 RX ADMIN — SODIUM CHLORIDE: 9 INJECTION, SOLUTION INTRAVENOUS at 02:06

## 2024-05-04 ASSESSMENT — ACTIVITIES OF DAILY LIVING (ADL)
ADLS_ACUITY_SCORE: 26
ADLS_ACUITY_SCORE: 26
ADLS_ACUITY_SCORE: 29
ADLS_ACUITY_SCORE: 24
ADLS_ACUITY_SCORE: 29
ADLS_ACUITY_SCORE: 26
ADLS_ACUITY_SCORE: 29
ADLS_ACUITY_SCORE: 26
ADLS_ACUITY_SCORE: 29
ADLS_ACUITY_SCORE: 26
ADLS_ACUITY_SCORE: 29
ADLS_ACUITY_SCORE: 29

## 2024-05-04 NOTE — DISCHARGE SUMMARY
[facility]  Discharge Summary - Medicine & Pediatrics       Date of Admission:  4/30/2024  Date of Discharge:  5/4/2024  Discharging Provider: Phoebe Sandhu MD and Birdie Bernstein MD   Discharge Service: Hospitalist Service    Discharge Diagnoses     Crohn's flare  Syncope, likely vasovagal  Disorganized behavior     Follow-ups Needed After Discharge     - Follow up with primary care provider, Jefe Martin, within 7 days for hospital follow- up.  Recommend checking BMP at that appointment to follow-up potassium      Unresulted Labs Ordered in the Past 30 Days of this Admission       Date and Time Order Name Status Description    5/3/2024  9:53 PM Blood Culture Arm, Right In process     5/3/2024  9:53 PM Blood Culture Hand, Left In process     4/30/2024  6:46 PM Adalimumab Level and Antibodies In process         These results will be followed up by primary team.     Discharge Disposition   Discharged to home  Condition at discharge: Stable    Hospital Course     Dain Tejeda is a 43 year old female with past medical history of Crohn's disease complicated by colonic stricture status post partial colectomy with colostomy, celiac's, iron deficiency anemia who presented after syncopal episode, likely vasovagal in the setting of Crohn's flare.      Crohn's flare  Diagnosed with Crohn's late 2022, complicated by stricture status post partial colectomy with ostomy. Currently follows with [facility], on Humira and azathioprine. Noted stool output more watery over the past 3 days prior to admission, nonbloody. CT does show evidence of colitis, infectious versus inflammatory.  Enteric panel and C. Diff returned negative, decreasing concern for infectious etiology. GI consulted, treated for Crohn's flare.   - Discharged with prednisone taper; 40 mg once daily for 7 days, then 30 mg daily for 7 days then 20 mg daily for 7 days then 10 mg daily for 7 days then 5 mg daily for 7 days.   - Continue  azathioprine and Humira.      Disorganized behavior  On the morning of 5/1, patient appeared fatigued. Exhibited odd behavior of pretending to sleep to avoid speaking to provider, but upon insisting on conversation, responded to questions appropriately. Overnight on 5/2, started to exhibit more strange behavior and concern for hallucinations. In the morning of 5/2, RN witnessed patient crying and hitting her legs while yelling. Later evaluation revealed her to be more calm, but laughing throughout the interview at inappropriate times and speaking very rapidly. Psych evaluated patient with plan for outpatient therapy.     Patient had a repeat episode of somnolence on 5/3 with concern during the episode for seizure versus stroke. Normal labs and head CT at that time. Neurology was consulted; had a long conversation with patient and were more concerned that she is experiencing vasovagal syncope and has severe degree of stress with concern for PTSD versus panic disorder. No MRI or EEG was needed.   - Neuro and psych recommend outpatient psychiatry and therapy.      Syncope  One-time episode of syncope on the morning of admission with loss of consciousness. Unclear etiology, but with some prodromal symptoms, suspicious for vasovagal, exacerbated by colitis/Crohn's flare and decreased oral intake. Orthostatics normal, but they were checked after she had fluid resuscitation. Second episode observed during hospital stay, also prodromal and consistent with vasovagal.      Hypokalemia  2.7 admission.  Normal magnesium.  Consider GI loss/poor oral intake as etiology. Improved as stools improved.   - Recommend recheck at outpatient follow-up visit.      Normocytic anemia  History of iron deficiency  History of iron deficiency anemia with recent iron infusions through [facility].  Hemoglobin of 10.1 on admission.  No evidence of GI bleeding.    Consultations This Hospital Stay   GASTROENTEROLOGY IP CONSULT  SPIRITUAL HEALTH  SERVICES IP CONSULT  PSYCHIATRY IP CONSULT  PSYCHIATRY IP CONSULT  NEUROLOGY IP CONSULT    Code Status   Full Code     The patient was discussed with Dr. Day Sandhu MD  [facility], PGY-3  ______________________________________________________________________    Physical Exam   Vital Signs: Temp: 97.8  F (36.6  C) Temp src: Oral BP: 125/82 Pulse: 79   Resp: 18 SpO2: 100 % O2 Device: None (Room air)    Weight: 101 lbs 0 oz    General Appearance: Comfortable.  No acute distress.  Eyes: Clear sclera and conjunctiva.  Respiratory: Comfortable respiratory effort.  No crackles or wheezing.  Cardiovascular: Regular rate and rhythm.  No murmurs appreciated.  GI: Ostomy bag in place, currently empty.  Abdomen nondistended.  Soft.  No tenderness.  Neurologic: Alert and oriented.  Face is symmetric.  Speech is fluent. Calm. Intact and equal strength of upper and lower extremities.       Primary Care Physician   Jefe Martin    Discharge Orders      Reason for your hospital stay    Dain presented to the hospital with episode of syncope, attributed to be vasovagal due to her crohn's flare. She was treated for the crohn's flare with prednisone by the GI doctor. She also had multiple episodes of abnormal behaviors; she was evaluated by psych and neuro, and both teams agree that she needs outpatient therapy and further evaluation.     Follow-up and recommended labs and tests     Follow up with primary care provider, Jefe Martin, within 7 days for hospital follow- up.  Recommend checking BMP at that appointment to follow-up potassium     Activity    Your activity upon discharge: activity as tolerated     Diet    Follow this diet upon discharge: Gluten free diet       Significant Results and Procedures   Most Recent 3 CBC's:  Recent Labs   Lab Test 05/04/24  0718 05/03/24  2103 05/03/24  0716   WBC 7.7 10.5 13.8*   HGB 9.1* 9.5* 12.3   MCV 93 92 91    511* 722*     Most  Recent 3 BMP's:  Recent Labs   Lab Test 05/04/24  0718 05/04/24  0233 05/03/24  2103 05/03/24  2048 05/03/24  0716     --  138  --  142   POTASSIUM 3.7  --  3.8  --  4.1   CHLORIDE 107  --  105  --  103   CO2 24  --  25  --  26   BUN 11.4  --  14.1  --  14.6   CR 0.51  --  0.64  --  0.71   ANIONGAP 9  --  8  --  13   ALEX 7.9*  --  8.3*  --  9.1   * 138* 148*   < > 150*    < > = values in this interval not displayed.     Most Recent 2 LFT's:  Recent Labs   Lab Test 05/03/24 2103 05/01/24  1732   AST 17 14   ALT 11 10   ALKPHOS 122 152*   BILITOTAL <0.2 0.2   ,   Results for orders placed or performed during the hospital encounter of 04/30/24   CT Chest Pulmonary Embolism w Contrast    Narrative    EXAM: CT CHEST PULMONARY EMBOLISM W CONTRAST  LOCATION: [facility]  DATE: 4/30/2024    INDICATION: syncope,tachycardia  COMPARISON: CT chest 11/2/2022  TECHNIQUE: CT chest pulmonary angiogram during arterial phase injection of IV contrast. Multiplanar reformats and MIP reconstructions were performed. Dose reduction techniques were used.   CONTRAST: isovue 370 90ml    FINDINGS:  ANGIOGRAM CHEST: The pulmonary arteries are normal in caliber. No evidence of pulmonary embolism to the subsegmental level. No findings of right heart strain. The thoracic aorta is nonaneurysmal without acute abnormality.      LUNGS AND PLEURA: The central airways are patent. No areas of consolidation. No pleural effusion.    MEDIASTINUM/AXILLAE: Normal heart size. No pericardial effusion. No thoracic lymphadenopathy.    CORONARY ARTERY CALCIFICATION: Cannot evaluate.    UPPER ABDOMEN: Dictated separately, including the bowel wall thickening and inflammation in the visualized upper abdomen.    MUSCULOSKELETAL: No acute osseous abnormality.      Impression    IMPRESSION:  1.  No pulmonary embolism or other acute cardiopulmonary abnormality.  2.  Abdomen reported separately.   CT Abdomen Pelvis w Contrast     Narrative    EXAM: CT ABDOMEN PELVIS W CONTRAST  LOCATION: [facility]  DATE: 4/30/2024    INDICATION: syncope, ostomy, vomiting  COMPARISON: CT abdomen and pelvis 12/14/2022 and pelvic ultrasound 3/14/2024  TECHNIQUE: CT scan of the abdomen and pelvis was performed following injection of IV contrast. Multiplanar reformats were obtained. Dose reduction techniques were used.  CONTRAST: isovue 370 90ml    FINDINGS: Images degraded by motion artifact.    LOWER CHEST: Normal.    HEPATOBILIARY: Calcification right hepatic lobe is unchanged.    PANCREAS: Normal.    SPLEEN: Normal.    ADRENAL GLANDS: Normal.    KIDNEYS/BLADDER: Normal.    BOWEL: Prior resection of the distal colon, with Brenda's pouch and left anterior abdominal wall colostomy. Wall thickening and mucosal enhancement with luminal narrowing involving the ascending and transverse colon. Additional wall thickening and   mucosal enhancement t involving the distal and terminal ileum. No evidence for bowel obstruction.    LYMPH NODES: Numerous nonenlarged mesenteric nodes.    VASCULATURE: Normal.    PELVIC ORGANS: Large left adnexal/pelvic cyst measuring 6.4 x 4.5 cm, minimally changed.    MUSCULOSKELETAL: Normal.      Impression    IMPRESSION:   1.  Prior resection of the distal colon and colostomy left anterior abdominal wall.  2.  Wall thickening and mucosal enhancement involving the ascending and transverse colon as well as the distal and terminal ileum consistent with ongoing active inflammatory bowel disease.  3.  Large left adnexal cyst, unchanged. Please refer to prior pelvic ultrasound exam report for further discussion.   Head CT w/o contrast    Narrative    EXAM: CT HEAD W/O CONTRAST  LOCATION: [facility]  DATE: 4/30/2024    INDICATION: Headache, syncope.  COMPARISON: None.  TECHNIQUE: Routine CT Head without IV contrast. Multiplanar reformats. Dose reduction techniques were  used.    FINDINGS:  INTRACRANIAL CONTENTS: No intracranial hemorrhage, extraaxial collection, or mass effect.  No CT evidence of acute infarct. Normal parenchymal attenuation. Normal ventricles and sulci.     VISUALIZED ORBITS/SINUSES/MASTOIDS: No intraorbital abnormality. No paranasal sinus mucosal disease. No middle ear or mastoid effusion.    BONES/SOFT TISSUES: No acute abnormality.      Impression    IMPRESSION:  1.  Normal head CT.   CT Head w/o Contrast    Narrative    EXAM: CT HEAD W/O CONTRAST  LOCATION: [facility]  DATE: 5/1/2024    INDICATION: AMS, possible fall w head trauma, generalized weakness  COMPARISON: 4/30/2024.  TECHNIQUE: Routine CT Head without IV contrast. Multiplanar reformats. Dose reduction techniques were used.    FINDINGS:  INTRACRANIAL CONTENTS: No intracranial hemorrhage, extraaxial collection, or mass effect.  No CT evidence of acute infarct. Normal parenchymal attenuation. Normal ventricles and sulci.     VISUALIZED ORBITS/SINUSES/MASTOIDS: No intraorbital abnormality. No paranasal sinus mucosal disease. No middle ear or mastoid effusion.    BONES/SOFT TISSUES: No acute abnormality.      Impression    IMPRESSION:  1.  No CT finding of a mass, hemorrhage or focal area suggestive of acute infarct.   CTA Head Neck with Contrast    Narrative    EXAM: CTA HEAD NECK W CONTRAST  LOCATION: [facility]  DATE/TIME: 5/3/2024 9:43 PM CDT    INDICATION: Code stroke; unresponsive  COMPARISON: None.  CONTRAST: 117 mL Isovue 370  TECHNIQUE: Head and neck CT angiogram with IV contrast. Noncontrast head CT followed by axial helical CT images of the head and neck vessels obtained during the arterial phase of intravenous contrast administration. Axial 2D reconstructed images and   multiplanar 3D MIP reconstructed images of the head and neck vessels were performed by the technologist. Dose reduction techniques were used. All stenosis measurements made  according to NASCET criteria unless otherwise specified.    FINDINGS:   NONCONTRAST HEAD CT:   INTRACRANIAL CONTENTS: No intracranial hemorrhage, extraaxial collection, or mass effect.  No CT evidence of acute infarct. Normal parenchymal attenuation. Normal ventricles and sulci.     VISUALIZED ORBITS/SINUSES/MASTOIDS: No intraorbital abnormality. No significant paranasal sinus mucosal disease. No middle ear or mastoid effusion.    BONES/SOFT TISSUES: No acute abnormality.    HEAD CTA:  ANTERIOR CIRCULATION: No stenosis/occlusion, aneurysm, or high flow vascular malformation. Standard Solomon of Ceron anatomy.    POSTERIOR CIRCULATION: No stenosis/occlusion, aneurysm, or high flow vascular malformation. Dominant left and smaller right vertebral artery contribute to a normal basilar artery.     DURAL VENOUS SINUSES: Not well evaluated on a technical basis.    NECK CTA:  RIGHT CAROTID: No measurable stenosis or dissection.    LEFT CAROTID: No measurable stenosis or dissection.    VERTEBRAL ARTERIES: No focal stenosis or dissection. Dominant left and smaller right vertebral arteries.    AORTIC ARCH: Classic aortic arch anatomy with no significant stenosis at the origin of the great vessels.    NONVASCULAR STRUCTURES: Unremarkable.      Impression    IMPRESSION:   HEAD CT:  1.  No acute intracranial process or significant change since 5/1/2024.    HEAD CTA:   1.  No large vessel occlusion or hemodynamically significant stenosis.    NECK CTA:  1.  No large vessel occlusion or hemodynamically significant stenosis.    Dr. Catalan was contacted by me on 5/3/2024 9:42 PM CDT with the preliminary report.   CT Head Perfusion w Contrast    Narrative    EXAM: CT HEAD PERFUSION W CONTRAST  LOCATION: [facility]  DATE: 5/3/2024    INDICATION: Code stroke; unresponsive  COMPARISON:  Same day CTs.  TECHNIQUE: CT cerebral perfusion was performed utilizing a second contrast bolus. Perfusion data were post  processed with generation of standard perfusion maps and estimation of ischemic/infarcted volumes utilizing standard threshold values. Dose   reduction techniques were used.   CONTRAST: 50 mL Isovue 370    FINDINGS:   PERFUSION MAPS: Symmetrical cerebral perfusion. No focal deficits in cerebral blood flow or volume to suggest ischemia/oligemia.    RAPID ANALYSIS:  CBF<30% volume: 0 mL  Tmax>6sec: 0 mL  Mismatch volume: 0 mL  Mismatch ratio: None      Impression    IMPRESSION:   1.  Normal cerebral perfusion.       Discharge Medications   Current Discharge Medication List        START taking these medications    Details   predniSONE (DELTASONE) 10 MG tablet Take 4 tablets (40 mg) by mouth daily for 5 days, THEN 3 tablets (30 mg) daily for 7 days, THEN 2 tablets (20 mg) daily for 7 days, THEN 1 tablet (10 mg) daily for 7 days, THEN 0.5 tablets (5 mg) daily for 7 days.  Qty: 66 tablet, Refills: 0    Associated Diagnoses: Crohn's disease with other complication, unspecified gastrointestinal tract location (H)           CONTINUE these medications which have NOT CHANGED    Details   azaTHIOprine (IMURAN) 50 MG tablet Take 2 tablets by mouth daily      HUMIRA *CF* PEN 40 MG/0.4ML pen kit Inject 0.4 mLs Subcutaneous every 14 days           Allergies   Allergies   Allergen Reactions    Soybean-Containing Drug Products Itching and Blisters     Tofu- causes itching and sores in the mouth

## 2024-05-04 NOTE — PROGRESS NOTES
[facility] - Dig[facility] Progress Note     IMPRESSION:  43 years old with Crohn's disease, active disease seen on CT scan, infectious etiology is negative.  Increased output is likely from active disease.  Symptoms improved with addition of IV steroids, change steroids to prednisone 40 mg p.o.     #Celiac disease.  # Hypokalemia, resolved      RECOMMENDATIONS:  Advance diet as tolerated, gluten-free diet.  Follow clinically and follow labs.  Start prednisone 40 mg once daily for 7 days, then 30 mg daily for 7 days then 20 mg daily for 7 days then 10 mg daily for 7 days then 5 mg daily for 7 days.  She can resume her outpatient azathioprine and Humira, will follow Humira levels.  They were drawn around day 5 or 7    She can be discharged from GI standpoint.    GI will sign off at this time as she appears to be stable from Crohn's perspective. Thank you for consulting us on this pleasant patient. Please call if questions arise or the patient's status changes.       45 minutes of total time was spent providing patient care, including patient evaluation, reviewing documentation/test results, and     Contreras Pereira PA-C  [facility]  [phone]  ________________________________________________________________________      SUBJECTIVE:    Patient is doing well this morning from a GI standpoint, no major GI complaints.  She is tolerating diet well.    Had a long discussion with the patient regarding events of last night where she lost conscious and found on the floor.  Rapid response called.  Neurology consulted with negative head CT and neck CTA.  She was given Ativan.  Question if symptoms were related to hypotension versus seizure versus behavioral etiology. Psych also saw patient yesterday.    Patient shares with me that she was well aware of the episodes last night.  She explains her syncopal episode was likely related to exhaustion from lack of sleep. She has found it difficult to explain to  "other providers but given that I was a ong provider, she decided to share with me that these symptoms are not entirely new.   She believes in shamanism and spirits (common in the ong culture). She shares she has had \"spiritual guides\" that have been with her since she was 14 years old. Often times, they will talk with her a lot, especially at night. Recently, the conversations have been causing her a lot of fatigue and exhaustion. This morning, she was crying in the bathroom because she was asking her \"spirits\" to leave her alone so that she could get some rest.       OBJECTIVE:  /82   Pulse 79   Temp 97.8  F (36.6  C) (Oral)   Resp 18   Ht 1.549 m (5' 1\")   Wt 45.8 kg (101 lb)   LMP 2024 (Exact Date)   SpO2 100%   BMI 19.08 kg/m    Temp (24hrs), Av.9  F (36.6  C), Min:97.8  F (36.6  C), Max:98  F (36.7  C)    No data found.    Intake/Output Summary (Last 24 hours) at 2024 1239  Last data filed at 2024 1039  Gross per 24 hour   Intake 720 ml   Output 200 ml   Net 520 ml        PHYSICAL EXAM  GEN: Alert, oriented x3, communicative and in NAD.    LYMPH: No LAD noted.  HRT: RRR  LUNGS: CTA  ABD:  ND, +BS, no guarding or pain to palpation, no rebound, no HSM.  SKIN: No rash      LABS:  I have reviewed the patient's new clinical lab results:     Recent Labs   Lab Test 2416   WBC 7.7 10.5 13.8*   HGB 9.1* 9.5* 12.3   MCV 93 92 91    511* 722*   INR  --  1.04  --      Recent Labs   Lab Test 24  0716   POTASSIUM 3.7 3.8 4.1   CHLORIDE 107 105 103   CO2 24 25 26   BUN 11.4 14.1 14.6   CR 0.51 0.64 0.71   ANIONGAP 9 8 13     Recent Labs   Lab Test 2402/24  0126 24  1732 24  1418 12/15/22  0658 22  1004 22  0936 22  0647 10/29/22  2038 10/29/22  2027   ALBUMIN 2.5*  --  2.2* 2.6*   < >  --  3.7   < >  --  2.5*   BILITOTAL <0.2  --  0.2 0.3   < >  --  0.3   < >  --  " 0.4   ALT 11  --  10 13   < >  --  15   < >  --  8*   AST 17  --  14 21   < >  --  22   < >  --  14   PROTEIN  --  Negative  --   --   --  Negative  --   --  20*  --    LIPASE  --   --   --  23  --   --  32  --   --  17    < > = values in this interval not displayed.         IMAGING:  reviewed

## 2024-05-04 NOTE — CONSULTS
I received a page regarding a loss of consciousness event.  She was found by nursing to have slumped out of her chair and onto the floor, and was not responding to sternal rub.  There was no observed convulsion, or shaking movements to suggest seizure.  Lactic acid was mildly elevated at 2.4 at that time.  Her pupils were reported to be equal and reactive, stroke code was also called and she had head CT and CT angio of the head and neck that did not show any acute hemorrhage, or any large vessel occlusion/basilar occlusion.  She was given 2 mg of Ativan and then another 3 mg of Ativan and now remains somnolent.    Clinical suspicion remains high asked for syncope or orthostatic hypotension given her presentation.  She has also had some symptoms to suggest psychosis, or other psychiatric disorder.  At this point intracranial hemorrhage and large vessel occlusion has been ruled out, and she is getting treated for any possible seizure disorder with the 5 mg of Ativan and a loading dose of Keppra.    Anticipate her mental status will continue to clear as the Ativan works its way out of her system.  She will have an EEG in the morning for further workup, and can be evaluated by inpatient neurology at bedside at that time.    Keven Canada MD

## 2024-05-04 NOTE — CONSULTS
[facility]    Stroke Telephone Note    I was called by Bryant Tamayo on 05/04/24 regarding patient Dain Tejeda. The patient is a 43 year old female with Crohn's disease who came in with a syncopal event on 4/30 that was suspicious of vasovagal and was hypokalemic to 2.7.  During this encounter noted to have behavioral changes requiring a psych consult yesterday.  Today stroke code activated after she had a spell around 9 PM when she slid from the chair then noticed to have generalized shaking eyes rolled back not responding to sternal rub 3 mg of Ativan was given.  She then started to respond to, still not speaking or following commands.  /71    Vitals  BP: 115/84   Pulse: 87   Resp: 19   Temp: 98  F (36.7  C)   Weight: 45.8 kg (101 lb)    Stroke Code Data (for stroke code without tele)  Stroke code activated 05/03/24  2115   Stroke provider first response 05/03/24  2116   Last known normal 05/03/24  2100      Time of discovery (or onset of symptoms)        Head CT read by Stroke Neuro Provider 05/03/24  2143   Was stroke code de-escalated? Yes  05/03/24  2220     Imaging Findings  CT head: No evidence of acute ischemic stroke or intracranial hemorrhage  CTA head/neck: No evidence of critical stenosis or large vessel occlusion.  Mild intracranial luminal irregularity  CTP: No areas of hypoperfusion    Intravenous Thrombolysis  Not given due to:   - minor/isolated/quickly resolving symptoms  - stroke mimic: Seizure like activity,?  Nonepileptic spell, Ativan related lethargy    Endovascular Treatment  Not initiated due to absence of proximal vessel occlusion    Impression  Seizure-like activity  Acute encephalopathy  Previous with semiology reported not clear if patient had an actual seizure without any prior history of seizure risk factors.  Received 3 mg of Ativan that is likely contributing to the encephalopathy.  No focal neurological deficits appreciated, low suspicion of acute  "stroke however will do further workup to rule out    Recommendations   Recommend getting MRI brain with and without contrast  Routine EEG in the morning.  Agree with psych consult.  Further recommendations pending MRI.  Further infectious/metabolic/toxic workup per primary team.  If patient continues to be lethargic, nonverbal.  Could try flumazenil to see how much of this effect is contributed by Ativan.    My recommendations are based on the information provided over the phone by Dain Tejeda's in-person providers. They are not intended to replace the clinical judgment of her in-person providers. I was not requested to personally see or examine the patient at this time.     The Stroke Staff is Dr. Sabillon.    Starr Burgess MD  Vascular Neurology Fellow    To page me or covering stroke neurology team member, click here: [facility]  Choose \"On Call\" tab at top, then select \"NEUROLOGY/ALL SITES\" from middle drop-down box, press Enter, then look for \"stroke\" or \"telestroke\" for your site.        "

## 2024-05-04 NOTE — PROGRESS NOTES
"Significant Event:     Patient admitted for syncope thought to be secondary to a Crohn's flare.  Hospital course most recently has been complicated by mental status changes the day prior, felt to be psychogenic in nature -- psych consulted and started on Seroquel (not given this evening).  This evening, a rapid response was called after the patient was found to have more somnolence while using the restroom associated with a possible fall.  There are some mixed reports on what occurred, but there did appear to be a concern for seizure activity (eyes rollingback, but not convulsions or full body shaking). Therefore Ativan 3 mg were being pushed upon arrival. Please see Dr. Woodruff's note from this evening for further details of this event.     I assisted in the patient's care by reaching out to the patient's family.  I was able to get in contact with her  via telephone [employer] .  He expresses beliefs of spirits and that the patient's actions may have elicited spiritual intervention which are causing this presentation.  He will stay home this evening and pray \"while we do our part here in the hospital\".  Confirmed with family member at this time that patient remains DNR/DNI.    I also assisted in discussing with radiologist and neuro consultants.  Received call from radiologist Dr. Bryant Lagunas who stated there is no acute intracranial process or large vessel occlusion to suggest stroke.  Discussed with neurology team who suspect patient was oversedated in the context of high clinical suspicion initially for seizure activity.  Advised continued monitoring of vitals and every 2 hours neurochecks.  No indication for further workup this evening, will plan for EEG in the morning.  Also discussed with neurostroke consultant who similarly believes current presentation related to oversedation; will plan for MRI brain in the morning.    Intensivist Dr Zimmer also involved in patient's care and assisted in decision " making; very much appreciated.     Kirstin Catalan MD PGY-2  [facility]

## 2024-05-04 NOTE — PROGRESS NOTES
Care Management Discharge Note    Discharge Date: 05/04/2024       Discharge Disposition:  Home        Additional Information:  Pt discharging home.     JANA Jiang

## 2024-05-04 NOTE — PROVIDER NOTIFICATION
Patients lactic acid repeat lab came back as 2.5, Resident notified orders to start IV fluids 100/hr and repeat lactic in the morning.

## 2024-05-04 NOTE — PROGRESS NOTES
Discharge paperwork reviewed with patient and her . Patient's  declined waiting for  services to go through paperwork. Patient's spouse reports understanding the clinic will call for a follow up visit using  services.

## 2024-05-04 NOTE — SIGNIFICANT EVENT
Around 8:45 pm the patient was attempting to get up to shower was in the bathroom with the NA 1:1, the patient sat down on the floor with the NA who helped lower her down she did not hit her head at this time. The patient became unresponsive, pulse was noted at that time, airway open and oxygen saturation remained above 90 percent. Patient was lifted back to bed rapid response was called. Stat head CT, labs, ECG and tele were completed. Stroke code was initiated after ICU assessed the patient. Noted possible seizure activity during rapid response. 3 mg of ativan was given. Pupil responsive and equal, patient was able to squeeze hands with stimuli. Patient sent down to CT with swat nurse. Family updated by MD.

## 2024-05-04 NOTE — CARE PLAN
While bring patient to CT her airway occluded with her tongue due to lorazepam sedation.  26Fr. PVC Nasopharyngeal airway put into her right nares.

## 2024-05-04 NOTE — PLAN OF CARE
Problem: Colostomy  Goal: Absence of Infection Signs and Symptoms  Outcome: Progressing     Problem: Fall Injury Risk  Goal: Absence of Fall and Fall-Related Injury  Outcome: Progressing  Intervention: Identify and Manage Contributors  Recent Flowsheet Documentation  Taken 5/4/2024 1000 by Emily Mancera RN  Medication Review/Management: medications reviewed  Intervention: Promote Injury-Free Environment  Recent Flowsheet Documentation  Taken 5/4/2024 1000 by Emily Mancera RN  Safety Promotion/Fall Prevention:   activity supervised   nonskid shoes/slippers when out of bed   lighting adjusted   clutter free environment maintained   bedside attendant   safety round/check completed   treat underlying cause     Problem: Confusion Acute  Goal: Optimal Cognitive Function  Outcome: Progressing  Intervention: Minimize Contributing Factors  Recent Flowsheet Documentation  Taken 5/4/2024 1000 by Emily Mancera RN  Environment Familiarity/Consistency: personal clothing/items utilized   Goal Outcome Evaluation:    Patient observed assessment with neurologist this morning at bedside with . Patient is AAO but admits not remembering all events that have taken place in the last few days. When speaking with neurologist patient expresses she understands that she has what she believes are panic attacks. Discussed with neurologist multiple life events and that she believes her anxiety has gotten worse in the last few years. Reports when she has episodes of passing out she does feel numbness in her upper and lower extremities, but denies otherwise. Denies pain. Neuros intact. Passed bedside swallow evaluation with nurse. Expresses wanting to get therapy to help with her current needs.

## 2024-05-04 NOTE — CONSULTS
[facility]  Neurology Consultation    Patient Name:  Dain Tejeda  MRN:  [id number]    :  1980  Date of Service:  May 4, 2024  Primary care provider:  Jefe Matrin      Neurology consultation service was asked to see Dain Tejeda by Dr. Woodruff to evaluate for loss of consciousness episode.    Chief Complaint: Intermittent loss of consciousness, sense of panic    History of Present Illness:   Dain Tejeda is a 43 year old female with history of Crohn's disease who presents with following an episode of loss of consciousness.  Then on the morning of admission she describes that she was feeling lightheaded, her vision began to darken and she lost consciousness for some unclear period of time.  She describes feeling weak over the past several days leading up to this admission.  Her admission has also been characterized by some bizarre behavior requiring consultation to psychiatry, where she has been having delusions that her family was harmed in a car crash, making nonsensical statements, hitting herself with her hands, etc.  She was started on scheduled Seroquel 12.5 at bedtime for this reason.    There was an event last night where she was observed by her sitter to slump over in the bathroom and her eyes rolled backward.  She did not respond to sternal rub, and house officers were called to the room.  There was some suspicion she may have had a seizure so she was given 2 mg of Ativan, then another 3 mg of Ativan.  She had a CTA of the head and neck which was unremarkable and the stroke code was canceled.  She returned to her baseline several hours after the Ativan was given.    I had a long conversation with her this morning to the help of an in person [facility] . Ms. Tejeda was able to tell me that she remembers the event last night where she was walking to the bathroom, felt faint, as if you were about to fall over, and then she collapsed.  She does not have a great  recollection of what happened following this.  She does remember this morning crying and being upset.  I asked her about recent stressors, whether she felt safe at home, and why she was sad.  In response she told me that when she was growing up in [place] her mother had cancer, and all family members were needed to take care of her.  There was an episode where she was taking care of her mom, she asked her to sit on the edge of the bed while Ms. Tejeda changed the sheets, and then her mother collapsed forward on top of her.  She said her mom was intermittently bleeding, and possibly bleeding from the mouth when this happened.    Today she expresses that she is concerned that she might not survive, and will not be able to spend more time with her daughter this summer.  She describes feeling a severe degree of panic since the event with her mom when she was a child, and this seemed to worsen in 2017 after the birth of her child.  First she described that this must have been related to all of the hormones secondary to the pregnancy.  However, she then told me that there was another event in 2017 when her  came home from work, she gave him food, and then he collapsed onto the floor with food coming out of his mouth.  He was unresponsive at that time, she was afraid that he was dying.    Intermittently she has times when she has numbness of her hands bilaterally, as well as numbness of her face, the right side of the face more so than the left.  This is intermittent.  She has had frequent episodes where she feels weak upon standing, feels that she is about to pass out.  This happened leading up to this hospitalization, and was consistent with what she described happening last night during the stroke code.  She describes that she feels safe at home, that there is nothing threatening her there, though she has a severe sense of stress and panic from these past events.  She also has a lot of financial stress, including that  she was supposed to see a mental health therapist as an outpatient, though she was not going to be able to afford this and it would not be covered by her insurance.    ROS  A comprehensive ROS was performed and pertinent findings were included in HPI.     PMH  Past Medical History:   Diagnosis Date    Diet controlled gestational diabetes mellitus (GDM), antepartum 9/4/2017    Attempting diet control first    Gestational diabetes mellitus (GDM) in third trimester 9/4/2017    Attempting diet control first  Formatting of this note might be different from the original. Overview:  Attempting diet control first    Nausea/vomiting in pregnancy 4/24/2017    Postpartum hemorrhage 11/16/2017    Third degree laceration of perineum during delivery, postpartum 11/15/2017     Past Surgical History:   Procedure Laterality Date    COLONOSCOPY N/A 10/31/2022    Procedure: COLONOSCOPY with biopsies;  Surgeon: Luis Miguel Traore MD;  Location: [facility] GI    COLOSTOMY N/A 11/6/2022    Procedure: CREATION, COLOSTOMY;  Surgeon: Chandana Carlos DO;  Location: [facility] OR    LAPAROTOMY EXPLORATORY N/A 11/6/2022    Procedure: EXPLORATORY LAPAROTOMY SPLENIC FLEXURE MOBILIZATION;  Surgeon: Chandana Carlos DO;  Location: [facility] OR    RECTAL PROLAPSE REPAIR N/A 11/6/2022    Procedure: RESECTION OF DESCENDING COLON;  Surgeon: Chandana Carlos DO;  Location: [facility] OR       Medications   I have personally reviewed the patient's medication list.     Allergies  I have personally reviewed the patient's allergy list.     Social History  Social History     Socioeconomic History    Marital status:      Spouse name: Dmitriy Camargo    Number of children: 0    Years of education: 0    Highest education level: Not on file   Occupational History    Occupation: None   Tobacco Use    Smoking status: Never    Smokeless tobacco: Never   Vaping Use    Vaping status: Not on file   Substance and Sexual Activity    Alcohol use:  "No    Drug use: No    Sexual activity: Yes     Partners: Male   Other Topics Concern    Parent/sibling w/ CABG, MI or angioplasty before 65F 55M? Not Asked   Social History Narrative    Born in [facility], arrived to [facility] 02/2017. No education.      Social Determinants of Health     Financial Resource Strain: Not on file   Food Insecurity: Not on file   Transportation Needs: Not on file   Physical Activity: Not on file   Stress: Not on file   Social Connections: Not on file   Interpersonal Safety: Low Risk  (3/8/2024)    Interpersonal Safety     Do you feel physically and emotionally safe where you currently live?: Yes     Within the past 12 months, have you been hit, slapped, kicked or otherwise physically hurt by someone?: No     Within the past 12 months, have you been humiliated or emotionally abused in other ways by your partner or ex-partner?: No   Housing Stability: Not on file        Family History    Family History   Problem Relation Age of Onset    Gestational Diabetes Sister     Gestational Diabetes Sister     Diabetes No family hx of     Coronary Artery Disease No family hx of     Hypertension No family hx of     Breast Cancer No family hx of     Colon Cancer No family hx of     Prostate Cancer No family hx of     Other Cancer No family hx of           Physical Examination   Vitals: /76 (BP Location: Right arm)   Pulse 80   Temp 97.8  F (36.6  C) (Oral)   Resp 18   Ht 1.549 m (5' 1\")   Wt 45.8 kg (101 lb)   LMP 04/18/2024 (Exact Date)   SpO2 100%   BMI 19.08 kg/m    General: Lying in bed, NAD  Head: NC/AT  Eyes: no icterus, op pink and moist  Cardiac: RRR. Extremities warm, no edema.   Respiratory: non-labored on RA  GI: S/NT/ND  Skin: No rash or lesion on exposed skin  Psych: Mood pleasant, affect congruent  Neuro:  Mental status: Awake, alert, attentive, oriented to self, time, place, and circumstance. Language is fluent and coherent with intact comprehension of complex commands.   Cranial " nerves: VFF, PERRL, conjugate gaze, EOMI, facial sensation intact, face symmetric, shoulder shrug strong, tongue/uvula midline, no dysarthria.   Motor: Normal bulk and tone. No abnormal movements. 5/5 strength bilaterally in deltoids, biceps, triceps, hand , hip flexors, hip extensors, knee flexion, knee extension, plantarflexion, dorsiflexion.   Reflexes: Normo-reflexic and symmetric biceps, brachioradialis, triceps, patellae, and achilles. Negative Olvera, no clonus, toes down-going.  Sensory: Intact to light touch in proximal and distal aspects of all 4 extremities   Coordination: FNF and HS without ataxia or dysmetria. Rapid alternating movements intact.   Gait: Not assessed    Investigations   I have personally reviewed pertinent labs, tests, and radiological imaging. Discussion of notable findings is included under Impression.     Was patient transferred from outside hospital?   No    Impression  #Vasovagal syncope versus panic attack  #Panic disorder, suspected  #PTSD, suspected  #Crohn's disease    Ms. Tejeda is a 43-year-old woman who was admitted following a syncopal event, and has a suspected exacerbation of Crohn's disease.  Neurology was consulted after she had an unresponsive episode last night in which she slumped to the floor and was flaccid, nonresponsive.  It took her several hours to return to baseline which I suspect was secondary to the 5 mg of Ativan that she received during this stroke code.  She had no convulsions, no stiffness, no eye deviation or a significantly elevated lactate (2.5) to suggest a generalized tonic-clonic seizure.      She currently has a nonfocal exam, and with the normal CT head and CTA head and neck, there is no current indication for an MRI of the brain.  Given what she describes as a history of syncope including a classic prodrome, this is the most likely diagnosis of her episodes of loss of consciousness.  For this reason, she also has no current indication for an  inpatient EEG, and she is currently not symptomatic.    Overall, Ms. Tejeda describes a severe degree of stress with symptoms consistent with a panic disorder, likely PTSD.  Her loss of consciousness events may be worsened by this, though can also be secondary to orthostatic hypotension or vasovagal syncope if she is dehydrated, and exacerbated by spending excessive periods of time laying in bed.    She is in agreement with the plan to continue with outpatient mental health counseling, psychiatry, and hopefully our care coordinators will be able to figure out whether she can qualify for medical assistance or another way to affordably cover her requirement for outpatient therapies.    Recommendations  -Outpatient referral to adult mental health  -Continue outpatient psychiatry care   - Coordination of care with social work to allow low-cost therapy ([place]? Other insurance she may qualify for?)  -No current indication for MRI brain  - No current indication for EEG or antiepileptic therapy  -In agreement with maintaining fluids, checking blood pressure if there is another syncopal event  - Inpatient neurology will sign off at this time, please call if any new questions arise    Thank you for involving Neurology in the care of Dain Tejeda.  Please do not hesitate to call with questions.    Keven Canada MD    I spent over 80 minutes on this consultation including discussion with the patient, chart review, discussion with the residents earlier this morning.

## 2024-05-04 NOTE — PLAN OF CARE
Problem: Confusion Acute  Goal: Optimal Cognitive Function  Outcome: Not Progressing   Goal Outcome Evaluation:       Patient is alert to self only. Rapid response called on evenings around 2130 due to patient sitting on the floor in the bathroom and passing out. Patient brought to [facility] head CT by swat nurse during CT a nasal airway was placed due to patient being lethargic and not maintaining a clear airway. Around midnight patient back more alert and pulled it out, RN assessed patients airway and oxygen was stable, MD notified of nasal airway removal and patient status. Stroke code was called shortly after ICU MD evaluated patient, Stroke code was de-escalated will have MRI this AM and neuro will see patient today. 1:1 present. Patient continues to become more alert as the shift progresses still sleepy and lethargic but awakes to voice and touch. Attempted to use Easy Ice  patient is confused and  us unable to understand patient due to patient being sleepy. Q2 neuro checks completed this shift. VSS on RA. Lactic acid came back as 2.5, MD ordered IV fluids and recheck in the morning. UA obtained and sent to lab. Patient has remained in bed after RRT, sleeping comfortably. Patient remains NPO due to being too sleepy to complete a swallow study at this time. Patient awake around 0630 this morning was alert still confused to situation and place which was her baseline prior to RRT. Stroke bedside swallow study completed, patient passed the 3 oz water test. 1:1 remains at bedside for safety. No further acute events overnight.

## 2024-05-06 ENCOUNTER — PATIENT OUTREACH (OUTPATIENT)
Dept: CARE COORDINATION | Facility: CLINIC | Age: 44
End: 2024-05-06
Payer: COMMERCIAL

## 2024-05-06 LAB
ADALIMUMAB AB SERPL IA-MCNC: <1.7 U/ML
ADALIMUMAB SERPL-MCNC: <1.6 UG/ML

## 2024-05-06 NOTE — PROGRESS NOTES
Clinic Care Coordination Contact  [facility]/Voicemail    Clinical Data: Care Coordinator Outreach        Left message on patient's voicemail with call back information and requested return call.    Plan: Care Coordinator  via . Care Coordinator will try to reach patient again in 1-2 business days.

## 2024-05-07 ENCOUNTER — PATIENT OUTREACH (OUTPATIENT)
Dept: CARE COORDINATION | Facility: CLINIC | Age: 44
End: 2024-05-07
Payer: COMMERCIAL

## 2024-05-07 NOTE — PROGRESS NOTES
Clinic Care Coordination Contact  [facility]/Voicemail    Clinical Data: Care Coordinator Outreach        Left message on patient's voicemail with call back information and requested return call.    Plan: Care Coordinator  via . Care Coordinator will try to reach patient again in 1-2 business days.

## 2024-05-08 ENCOUNTER — PATIENT OUTREACH (OUTPATIENT)
Dept: CARE COORDINATION | Facility: CLINIC | Age: 44
End: 2024-05-08
Payer: COMMERCIAL

## 2024-05-08 NOTE — PROGRESS NOTES
Clinic Care Coordination Contact  [facility]/Voicemail    Clinical Data: Care Coordinator Outreach        Left message on patient's voicemail with call back information and requested return call.    Plan: Care Coordinator  via . Care Coordinator will do no further outreaches at this time.

## 2024-05-09 ENCOUNTER — HOSPITAL ENCOUNTER (EMERGENCY)
Facility: HOSPITAL | Age: 44
Discharge: HOME | End: 2024-05-09
Attending: STUDENT IN AN ORGANIZED HEALTH CARE EDUCATION/TRAINING PROGRAM | Admitting: STUDENT IN AN ORGANIZED HEALTH CARE EDUCATION/TRAINING PROGRAM
Payer: COMMERCIAL

## 2024-05-09 VITALS
BODY MASS INDEX: 17.5 KG/M2 | RESPIRATION RATE: 18 BRPM | TEMPERATURE: 98.5 F | HEART RATE: 80 BPM | HEIGHT: 61 IN | WEIGHT: 92.7 LBS | OXYGEN SATURATION: 100 % | SYSTOLIC BLOOD PRESSURE: 98 MMHG | DIASTOLIC BLOOD PRESSURE: 61 MMHG

## 2024-05-09 DIAGNOSIS — N39.0 ACUTE UTI: ICD-10-CM

## 2024-05-09 DIAGNOSIS — R10.84 GENERALIZED ABDOMINAL PAIN: ICD-10-CM

## 2024-05-09 LAB
ALBUMIN SERPL BCG-MCNC: 2.7 G/DL (ref 3.5–5.2)
ALBUMIN UR-MCNC: 70 MG/DL
ALP SERPL-CCNC: 186 U/L (ref 40–150)
ALT SERPL W P-5'-P-CCNC: 31 U/L (ref 0–50)
ANION GAP SERPL CALCULATED.3IONS-SCNC: 9 MMOL/L (ref 7–15)
APPEARANCE UR: ABNORMAL
AST SERPL W P-5'-P-CCNC: 34 U/L (ref 0–45)
BACTERIA BLD CULT: NO GROWTH
BACTERIA BLD CULT: NO GROWTH
BASOPHILS # BLD AUTO: 0 10E3/UL (ref 0–0.2)
BASOPHILS NFR BLD AUTO: 0 %
BILIRUB DIRECT SERPL-MCNC: <0.2 MG/DL (ref 0–0.3)
BILIRUB SERPL-MCNC: 0.6 MG/DL
BILIRUB UR QL STRIP: NEGATIVE
BUN SERPL-MCNC: 8.4 MG/DL (ref 6–20)
CALCIUM SERPL-MCNC: 8.4 MG/DL (ref 8.6–10)
CHLORIDE SERPL-SCNC: 101 MMOL/L (ref 98–107)
COLOR UR AUTO: ABNORMAL
CREAT SERPL-MCNC: 0.65 MG/DL (ref 0.51–0.95)
DEPRECATED HCO3 PLAS-SCNC: 26 MMOL/L (ref 22–29)
EGFRCR SERPLBLD CKD-EPI 2021: >90 ML/MIN/1.73M2
EOSINOPHIL # BLD AUTO: 0 10E3/UL (ref 0–0.7)
EOSINOPHIL NFR BLD AUTO: 0 %
ERYTHROCYTE [DISTWIDTH] IN BLOOD BY AUTOMATED COUNT: 14.3 % (ref 10–15)
GLUCOSE SERPL-MCNC: 104 MG/DL (ref 70–99)
GLUCOSE UR STRIP-MCNC: NEGATIVE MG/DL
HCG UR QL: NEGATIVE
HCT VFR BLD AUTO: 30.2 % (ref 35–47)
HGB BLD-MCNC: 9.9 G/DL (ref 11.7–15.7)
HGB UR QL STRIP: ABNORMAL
IMM GRANULOCYTES # BLD: 0.1 10E3/UL
IMM GRANULOCYTES NFR BLD: 1 %
KETONES UR STRIP-MCNC: 60 MG/DL
LACTATE SERPL-SCNC: 0.7 MMOL/L (ref 0.7–2)
LEUKOCYTE ESTERASE UR QL STRIP: ABNORMAL
LIPASE SERPL-CCNC: 29 U/L (ref 13–60)
LYMPHOCYTES # BLD AUTO: 0.7 10E3/UL (ref 0.8–5.3)
LYMPHOCYTES NFR BLD AUTO: 8 %
MAGNESIUM SERPL-MCNC: 1.9 MG/DL (ref 1.7–2.3)
MCH RBC QN AUTO: 29.6 PG (ref 26.5–33)
MCHC RBC AUTO-ENTMCNC: 32.8 G/DL (ref 31.5–36.5)
MCV RBC AUTO: 90 FL (ref 78–100)
MONOCYTES # BLD AUTO: 0.8 10E3/UL (ref 0–1.3)
MONOCYTES NFR BLD AUTO: 10 %
MUCOUS THREADS #/AREA URNS LPF: PRESENT /LPF
NEUTROPHILS # BLD AUTO: 7.2 10E3/UL (ref 1.6–8.3)
NEUTROPHILS NFR BLD AUTO: 81 %
NITRATE UR QL: NEGATIVE
NRBC # BLD AUTO: 0 10E3/UL
NRBC BLD AUTO-RTO: 0 /100
PH UR STRIP: 6.5 [PH] (ref 5–7)
PLATELET # BLD AUTO: 371 10E3/UL (ref 150–450)
POTASSIUM SERPL-SCNC: 3.3 MMOL/L (ref 3.4–5.3)
PROT SERPL-MCNC: 7.2 G/DL (ref 6.4–8.3)
RBC # BLD AUTO: 3.34 10E6/UL (ref 3.8–5.2)
RBC URINE: 22 /HPF
SODIUM SERPL-SCNC: 136 MMOL/L (ref 135–145)
SP GR UR STRIP: 1.03 (ref 1–1.03)
SQUAMOUS EPITHELIAL: 3 /HPF
UROBILINOGEN UR STRIP-MCNC: <2 MG/DL
WBC # BLD AUTO: 8.9 10E3/UL (ref 4–11)
WBC URINE: 4 /HPF

## 2024-05-09 PROCEDURE — 250N000011 HC RX IP 250 OP 636: Performed by: STUDENT IN AN ORGANIZED HEALTH CARE EDUCATION/TRAINING PROGRAM

## 2024-05-09 PROCEDURE — 258N000003 HC RX IP 258 OP 636: Performed by: STUDENT IN AN ORGANIZED HEALTH CARE EDUCATION/TRAINING PROGRAM

## 2024-05-09 PROCEDURE — 81025 URINE PREGNANCY TEST: CPT | Performed by: STUDENT IN AN ORGANIZED HEALTH CARE EDUCATION/TRAINING PROGRAM

## 2024-05-09 PROCEDURE — 80048 BASIC METABOLIC PNL TOTAL CA: CPT | Performed by: STUDENT IN AN ORGANIZED HEALTH CARE EDUCATION/TRAINING PROGRAM

## 2024-05-09 PROCEDURE — 80053 COMPREHEN METABOLIC PANEL: CPT | Performed by: STUDENT IN AN ORGANIZED HEALTH CARE EDUCATION/TRAINING PROGRAM

## 2024-05-09 PROCEDURE — 81001 URINALYSIS AUTO W/SCOPE: CPT | Performed by: STUDENT IN AN ORGANIZED HEALTH CARE EDUCATION/TRAINING PROGRAM

## 2024-05-09 PROCEDURE — 83690 ASSAY OF LIPASE: CPT | Performed by: STUDENT IN AN ORGANIZED HEALTH CARE EDUCATION/TRAINING PROGRAM

## 2024-05-09 PROCEDURE — 85025 COMPLETE CBC W/AUTO DIFF WBC: CPT | Performed by: STUDENT IN AN ORGANIZED HEALTH CARE EDUCATION/TRAINING PROGRAM

## 2024-05-09 PROCEDURE — 96375 TX/PRO/DX INJ NEW DRUG ADDON: CPT

## 2024-05-09 PROCEDURE — 250N000013 HC RX MED GY IP 250 OP 250 PS 637: Performed by: STUDENT IN AN ORGANIZED HEALTH CARE EDUCATION/TRAINING PROGRAM

## 2024-05-09 PROCEDURE — 82040 ASSAY OF SERUM ALBUMIN: CPT | Performed by: STUDENT IN AN ORGANIZED HEALTH CARE EDUCATION/TRAINING PROGRAM

## 2024-05-09 PROCEDURE — 99284 EMERGENCY DEPT VISIT MOD MDM: CPT | Mod: 25

## 2024-05-09 PROCEDURE — 96374 THER/PROPH/DIAG INJ IV PUSH: CPT | Mod: 59

## 2024-05-09 PROCEDURE — 83735 ASSAY OF MAGNESIUM: CPT | Performed by: STUDENT IN AN ORGANIZED HEALTH CARE EDUCATION/TRAINING PROGRAM

## 2024-05-09 PROCEDURE — 83605 ASSAY OF LACTIC ACID: CPT | Performed by: STUDENT IN AN ORGANIZED HEALTH CARE EDUCATION/TRAINING PROGRAM

## 2024-05-09 PROCEDURE — 36415 COLL VENOUS BLD VENIPUNCTURE: CPT | Performed by: STUDENT IN AN ORGANIZED HEALTH CARE EDUCATION/TRAINING PROGRAM

## 2024-05-09 PROCEDURE — 96361 HYDRATE IV INFUSION ADD-ON: CPT

## 2024-05-09 RX ORDER — ACETAMINOPHEN 325 MG/1
650 TABLET ORAL ONCE
Status: COMPLETED | OUTPATIENT
Start: 2024-05-09 | End: 2024-05-09

## 2024-05-09 RX ORDER — SULFAMETHOXAZOLE/TRIMETHOPRIM 800-160 MG
1 TABLET ORAL 2 TIMES DAILY
Qty: 6 TABLET | Refills: 0 | Status: ON HOLD | OUTPATIENT
Start: 2024-05-09 | End: 2024-05-21

## 2024-05-09 RX ORDER — ONDANSETRON 2 MG/ML
4 INJECTION INTRAMUSCULAR; INTRAVENOUS ONCE
Status: COMPLETED | OUTPATIENT
Start: 2024-05-09 | End: 2024-05-09

## 2024-05-09 RX ORDER — HYDROMORPHONE HCL IN WATER/PF 6 MG/30 ML
0.5 PATIENT CONTROLLED ANALGESIA SYRINGE INTRAVENOUS ONCE
Status: COMPLETED | OUTPATIENT
Start: 2024-05-09 | End: 2024-05-09

## 2024-05-09 RX ORDER — POTASSIUM CHLORIDE 1.5 G/1.58G
20 POWDER, FOR SOLUTION ORAL ONCE
Status: COMPLETED | OUTPATIENT
Start: 2024-05-09 | End: 2024-05-09

## 2024-05-09 RX ADMIN — POTASSIUM CHLORIDE 20 MEQ: 1.5 POWDER, FOR SOLUTION ORAL at 09:20

## 2024-05-09 RX ADMIN — ACETAMINOPHEN 650 MG: 325 TABLET ORAL at 08:13

## 2024-05-09 RX ADMIN — ONDANSETRON 4 MG: 2 INJECTION INTRAMUSCULAR; INTRAVENOUS at 08:13

## 2024-05-09 RX ADMIN — HYDROMORPHONE HYDROCHLORIDE 0.5 MG: 0.2 INJECTION, SOLUTION INTRAMUSCULAR; INTRAVENOUS; SUBCUTANEOUS at 08:13

## 2024-05-09 RX ADMIN — SODIUM CHLORIDE, POTASSIUM CHLORIDE, SODIUM LACTATE AND CALCIUM CHLORIDE 1000 ML: 600; 310; 30; 20 INJECTION, SOLUTION INTRAVENOUS at 08:08

## 2024-05-09 ASSESSMENT — ACTIVITIES OF DAILY LIVING (ADL)
ADLS_ACUITY_SCORE: 38

## 2024-05-09 ASSESSMENT — COLUMBIA-SUICIDE SEVERITY RATING SCALE - C-SSRS
1. IN THE PAST MONTH, HAVE YOU WISHED YOU WERE DEAD OR WISHED YOU COULD GO TO SLEEP AND NOT WAKE UP?: NO
2. HAVE YOU ACTUALLY HAD ANY THOUGHTS OF KILLING YOURSELF IN THE PAST MONTH?: NO
6. HAVE YOU EVER DONE ANYTHING, STARTED TO DO ANYTHING, OR PREPARED TO DO ANYTHING TO END YOUR LIFE?: NO

## 2024-05-09 NOTE — ED PROVIDER NOTES
EMERGENCY DEPARTMENT ENCOUNTER      NAME: Dain Tejeda  AGE: 43 year old female  YOB: 1980  MRN: [id number]  EVALUATION DATE & TIME: 5/9/2024  7:29 AM    PCP: Jefe Martin    ED PROVIDER: Jim White MD      Chief Complaint   Patient presents with    Abdominal Pain         FINAL IMPRESSION:  1. Generalized abdominal pain    2. Acute UTI          ED COURSE & MEDICAL DECISION MAKING:    Pertinent Labs & Imaging studies reviewed. (See chart for details)  43 year old female presents to the Emergency Department for evaluation of abdominal pain.    ED Course as of 05/09/24 1432   Thu May 09, 2024   0753 Patient is a 43-year-old female with history of Crohn's disease now status post partial colectomy who presents emergency department for evaluation of abdominal pain that began yesterday after dinner.  She had 1 episode of vomiting.  Describes the abdominal pain is somewhat diffuse persistent.  Is not any fevers at home.  No hematuria or dysuria.  Denies any chest pain or shortness of breath.  She had normal output into her ostomy as well as any bloody output.  Was recently mated to the hospital for suspected Crohn's flare and syncopal event.  Was discharged to continue her dosing of azathioprine Humira as well as a prednisone taper.  However after discussion with the patient it is unclear if she is actually taking prednisone as she says she has only been perscribed the azothioprine.    Exam patient appears well.  Borderline tachycardic but otherwise hemodynamically stable and afebrile.  Somewhat diffuse abdominal tenderness but no rebound or guarding and somewhat distractible.  She has an ostomy in the left abdomen with brown stool present.  No surrounding skin breakdown.    Will start with blood work to evaluate for any significant electrolyte derangements that she was previously noted of hypokalemia as well.  Will attempt symptomatic control with analgesics and anetiemitics       0965 Labs reviewed  interpret myself.  BMP shows mild hypokalemia with potassium 3.3.  Otherwise no significant metabolic derangements.  CBC is reassuring with normal white count and anemia that is actually slightly improved since return last CBC.  Alk phos minimally elevated but not far off patient's baseline LFTs and bilirubin within normal limits.   1315 On repeat evaluation patient has had improvement in her abdominal pain.  No ongoing vomiting.  With a benign abdomen at this point in time do not believe she requires repeat cross-sectional imaging.  Discussed patient's urinalysis as she does have a few red cells and she now notes that she has had some increased urinary frequency and dysuria since discharge from the hospital.   I think it is reasonable to put her on a short course of antibiotics for presumptive UTI.  Otherwise at this point in time lab workup is reassuring and she is now tolerating oral intake.  Believe she safe for discharge to follow-up with the outpatient setting.  Should return to the emergency department she develops intractable vomiting, inability tolerate oral medications, persistent fevers or other new or concerning symptoms.     Also discussed the patient that looks like she was prescribed prednisone taper during her  last hospital admission however states that she has not picked this up.  Stressed the importance of patient to pick this prescription up as well.  Patient expressed understanding was discharged stable condition.      7:31 AM I met and evaluated the patient.       Medical Decision Making  Obtained supplemental history:Supplemental history obtained?: No  Reviewed external records: External records reviewed?: Inpatient Record: [facility] from 4/30/04-5/4/24  Care impacted by chronic illness:Other: Crohn's disease  Care significantly affected by social determinants of health:N/A  Did you consider but not order tests?: Work up considered but not performed and documented in chart, if  applicable  Did you interpret images independently?: Independent interpretation of ECG and images noted in documentation, when applicable.  Consultation discussion with other provider:Did you involve another provider (consultant, , pharmacy, etc.)?: No  Discharge. I prescribed additional prescription strength medication(s) as charted. I considered admission, but discharged patient after significant clinical improvement.          At the conclusion of the encounter I discussed the results of all of the tests and the disposition. The questions were answered. The patient or family acknowledged understanding and was agreeable with the care plan.     0 minutes of critical care time     MEDICATIONS GIVEN IN THE EMERGENCY:  Medications   lactated ringers BOLUS 1,000 mL (0 mLs Intravenous Stopped 5/9/24 1326)   acetaminophen (TYLENOL) tablet 650 mg (650 mg Oral $Given 5/9/24 0813)   HYDROmorphone (DILAUDID) injection 0.5 mg (0.5 mg Intravenous $Given 5/9/24 0813)   ondansetron (ZOFRAN) injection 4 mg (4 mg Intravenous $Given 5/9/24 0813)   potassium chloride (KLOR-CON) Packet 20 mEq (20 mEq Oral $Given 5/9/24 0920)       NEW PRESCRIPTIONS STARTED AT TODAY'S ER VISIT  Discharge Medication List as of 5/9/2024  1:35 PM        START taking these medications    Details   sulfamethoxazole-trimethoprim (BACTRIM DS) 800-160 MG tablet Take 1 tablet by mouth 2 times daily for 3 days, Disp-6 tablet, R-0, E-Prescribe                =================================================================    HPI    Patient information was obtained from: Patient    Use of : Yes- via phone (language line)- Junito Tejeda is a 43 year old female with a pertinent history of colitis, intra-abdominal abscess, crohn's disease, anemia, ASCUS, who presents to this ED by private car for evaluation of abdominal pain.     Per chart review, patient was admitted to [facility] from 4/30/04-5/4/24 for a crohn's flare. Follows with  [facility], on Humira and azathioprine. Noted stool output more watery over the past 3 days prior to admission, nonbloody. CT showed evidence of colitis, infectious versus inflammatory. Enteric panel and C. Diff returned negative, decreasing concern for infectious etiology. GI consulted, treated for Crohn's flare. Discharged with prednisone taper; 40 mg once daily for 7 days, then 30 mg daily for 7 days then 20 mg daily for 7 days then 10 mg daily for 7 days then 5 mg daily for 7 days.     Patient reports yesterday (5/8/24) she developed severe abdominal pain. This morning, she had x1 episode of vomiting without blood. Had a recent hospitalization for Crohn's flare up and was discharged with Azathioprine and prednisone, however, patient is only taking the Azathioprine and notes this is not improving her symptoms. She does not further elaborate why she is not taking the prednisone.     Denies fevers, dysuria, hematuria, and blood in stool.     REVIEW OF SYSTEMS   Refer to the HPI    PAST MEDICAL HISTORY:  Past Medical History:   Diagnosis Date    Diet controlled gestational diabetes mellitus (GDM), antepartum 9/4/2017    Attempting diet control first    Gestational diabetes mellitus (GDM) in third trimester 9/4/2017    Attempting diet control first  Formatting of this note might be different from the original. Overview:  Attempting diet control first    Nausea/vomiting in pregnancy 4/24/2017    Postpartum hemorrhage 11/16/2017    Third degree laceration of perineum during delivery, postpartum 11/15/2017       PAST SURGICAL HISTORY:  Past Surgical History:   Procedure Laterality Date    COLONOSCOPY N/A 10/31/2022    Procedure: COLONOSCOPY with biopsies;  Surgeon: Luis Miguel Traore MD;  Location: [facility] GI    COLOSTOMY N/A 11/6/2022    Procedure: CREATION, COLOSTOMY;  Surgeon: Chandana Carlos DO;  Location: [facility] OR    LAPAROTOMY EXPLORATORY N/A 11/6/2022    Procedure: EXPLORATORY LAPAROTOMY SPLENIC  FLEXURE MOBILIZATION;  Surgeon: Chnadana Carlos DO;  Location: [facility] OR    RECTAL PROLAPSE REPAIR N/A 11/6/2022    Procedure: RESECTION OF DESCENDING COLON;  Surgeon: Chandana Carlos DO;  Location: [facility] OR           CURRENT MEDICATIONS:    sulfamethoxazole-trimethoprim (BACTRIM DS) 800-160 MG tablet  azaTHIOprine (IMURAN) 50 MG tablet  HUMIRA *CF* PEN 40 MG/0.4ML pen kit  predniSONE (DELTASONE) 10 MG tablet        ALLERGIES:  Allergies   Allergen Reactions    Soybean-Containing Drug Products Itching and Blisters     Tofu- causes itching and sores in the mouth       FAMILY HISTORY:  Family History   Problem Relation Age of Onset    Gestational Diabetes Sister     Gestational Diabetes Sister     Diabetes No family hx of     Coronary Artery Disease No family hx of     Hypertension No family hx of     Breast Cancer No family hx of     Colon Cancer No family hx of     Prostate Cancer No family hx of     Other Cancer No family hx of        SOCIAL HISTORY:   Social History     Socioeconomic History    Marital status:      Spouse name: Dmitriy Camargo    Number of children: 0    Years of education: 0   Occupational History    Occupation: None   Tobacco Use    Smoking status: Never    Smokeless tobacco: Never   Substance and Sexual Activity    Alcohol use: No    Drug use: No    Sexual activity: Yes     Partners: Male   Social History Narrative    Born in [facility], arrived to [facility] 02/2017. No education.      Social Determinants of Health     Interpersonal Safety: Low Risk  (3/8/2024)    Interpersonal Safety     Do you feel physically and emotionally safe where you currently live?: Yes     Within the past 12 months, have you been hit, slapped, kicked or otherwise physically hurt by someone?: No     Within the past 12 months, have you been humiliated or emotionally abused in other ways by your partner or ex-partner?: No       VITALS:  BP 98/61   Pulse 80   Temp 98.5  F (36.9  C) (Temporal)   Resp 18   " Ht 1.549 m (5' 1\")   Wt 42 kg (92 lb 11.2 oz)   LMP 04/18/2024 (Exact Date)   SpO2 100%   BMI 17.52 kg/m      PHYSICAL EXAM    Constitutional: Well developed, Well nourished, NAD,  HENT: Normocephalic, Atraumatic,  mucous membranes moist,   Neck- trachea midline, No stridor.    Eyes:EOMI, Conjunctiva normal, No discharge.   Respiratory: Normal breath sounds, No respiratory distress, No wheezing.    Cardiovascular: Normal heart rate, Regular rhythm,  No murmurs,   Abdominal: Soft, Diffuse tenderness, No rebound or guarding. Ostomy bag with brown stool in bag.     Musculoskeletal: no deformity or malalignment   Integument: Warm, Dry, No erythema,    Neurologic: Alert & oriented x 3   Psychiatric: Affect normal, Cooperative.      LAB:  All pertinent labs reviewed and interpreted.  Results for orders placed or performed during the hospital encounter of 05/09/24   Basic metabolic panel   Result Value Ref Range    Sodium 136 135 - 145 mmol/L    Potassium 3.3 (L) 3.4 - 5.3 mmol/L    Chloride 101 98 - 107 mmol/L    Carbon Dioxide (CO2) 26 22 - 29 mmol/L    Anion Gap 9 7 - 15 mmol/L    Urea Nitrogen 8.4 6.0 - 20.0 mg/dL    Creatinine 0.65 0.51 - 0.95 mg/dL    GFR Estimate >90 >60 mL/min/1.73m2    Calcium 8.4 (L) 8.6 - 10.0 mg/dL    Glucose 104 (H) 70 - 99 mg/dL   Result Value Ref Range    Lipase 29 13 - 60 U/L   Lactic acid whole blood   Result Value Ref Range    Lactic Acid 0.7 0.7 - 2.0 mmol/L   Result Value Ref Range    Magnesium 1.9 1.7 - 2.3 mg/dL   HCG qualitative urine (UPT)   Result Value Ref Range    hCG Urine Qualitative Negative Negative   UA with Microscopic reflex to Culture    Specimen: Urine, Midstream   Result Value Ref Range    Color Urine Orange (A) Colorless, Straw, Light Yellow, Yellow    Appearance Urine Turbid (A) Clear    Glucose Urine Negative Negative mg/dL    Bilirubin Urine Negative Negative    Ketones Urine 60 (A) Negative mg/dL    Specific Gravity Urine 1.028 1.001 - 1.030    Blood Urine " 0.1 mg/dL (A) Negative    pH Urine 6.5 5.0 - 7.0    Protein Albumin Urine 70 (A) Negative mg/dL    Urobilinogen Urine <2.0 <2.0 mg/dL    Nitrite Urine Negative Negative    Leukocyte Esterase Urine 75 Shilpa/uL (A) Negative    Mucus Urine Present (A) None Seen /LPF    RBC Urine 22 (H) <=2 /HPF    WBC Urine 4 <=5 /HPF    Squamous Epithelials Urine 3 (H) <=1 /HPF   CBC with platelets and differential   Result Value Ref Range    WBC Count 8.9 4.0 - 11.0 10e3/uL    RBC Count 3.34 (L) 3.80 - 5.20 10e6/uL    Hemoglobin 9.9 (L) 11.7 - 15.7 g/dL    Hematocrit 30.2 (L) 35.0 - 47.0 %    MCV 90 78 - 100 fL    MCH 29.6 26.5 - 33.0 pg    MCHC 32.8 31.5 - 36.5 g/dL    RDW 14.3 10.0 - 15.0 %    Platelet Count 371 150 - 450 10e3/uL    % Neutrophils 81 %    % Lymphocytes 8 %    % Monocytes 10 %    % Eosinophils 0 %    % Basophils 0 %    % Immature Granulocytes 1 %    NRBCs per 100 WBC 0 <1 /100    Absolute Neutrophils 7.2 1.6 - 8.3 10e3/uL    Absolute Lymphocytes 0.7 (L) 0.8 - 5.3 10e3/uL    Absolute Monocytes 0.8 0.0 - 1.3 10e3/uL    Absolute Eosinophils 0.0 0.0 - 0.7 10e3/uL    Absolute Basophils 0.0 0.0 - 0.2 10e3/uL    Absolute Immature Granulocytes 0.1 <=0.4 10e3/uL    Absolute NRBCs 0.0 10e3/uL   Hepatic function panel   Result Value Ref Range    Protein Total 7.2 6.4 - 8.3 g/dL    Albumin 2.7 (L) 3.5 - 5.2 g/dL    Bilirubin Total 0.6 <=1.2 mg/dL    Alkaline Phosphatase 186 (H) 40 - 150 U/L    AST 34 0 - 45 U/L    ALT 31 0 - 50 U/L    Bilirubin Direct <0.20 0.00 - 0.30 mg/dL       RADIOLOGY:  Reviewed all pertinent imaging. Please see official radiology report.  No orders to display     PROCEDURES:         [facility] Documentation:   CMS Diagnoses:              I, Cyndie Vasques, am serving as a scribe to document services personally performed by Jim White MD based on my observation and the provider's statements to me. I, Jim White MD, attest that Cyndie Vasques is acting in a scribe capacity, has observed  my performance of the services and has documented them in accordance with my direction.    Jim White MD  [facility] EMERGENCY DEPARTMENT  [address] [place] [id number] [phone]        Jim White MD  05/09/24 7039

## 2024-05-09 NOTE — ED TRIAGE NOTES
Patient states she started to have abdominal pain last night. She did not take any medications to relieve pain. Patient had 1 episode of vomiting this morning. Patient does have colostomy and states her output was a little less this morning but she believes it is due to her vomiting.     Triage Assessment (Adult)       Row Name 05/09/24 0726          Triage Assessment    Airway WDL WDL        Respiratory WDL    Respiratory WDL WDL        Skin Circulation/Temperature WDL    Skin Circulation/Temperature WDL WDL        Cardiac WDL    Cardiac WDL X;rhythm     Pulse Rate & Regularity tachycardic        Peripheral/Neurovascular WDL    Peripheral Neurovascular WDL WDL        Cognitive/Neuro/Behavioral WDL    Cognitive/Neuro/Behavioral WDL WDL

## 2024-05-09 NOTE — DISCHARGE INSTRUCTIONS
You were prescribed a prednisone taper at the discharge from hospitalization on 5/4.  This was sent to your pharmacy and you should take this as prescribed.  Additionally you from the emergency department visit today been given a short antibiotic course for possible UTI that could be contributing to your symptoms.  If you develop significantly worsening abdominal pain, vomiting that will not go away, high fevers at home or bloody output into your ostomy bag you should return to the emergency department for repeat evaluation.  
Quality 111:Pneumonia Vaccination Status For Older Adults: Pneumococcal Vaccination Previously Received
Quality 137: Melanoma: Continuity Of Care - Recall System: Patient information entered into a recall system that includes: target date for the next exam specified AND a process to follow up with patients regarding missed or unscheduled appointments
Detail Level: Detailed
Quality 226: Preventive Care And Screening: Tobacco Use: Screening And Cessation Intervention: Patient screened for tobacco use and is an ex/non-smoker
Quality 110: Preventive Care And Screening: Influenza Immunization: Influenza Immunization Administered during Influenza season

## 2024-05-10 ENCOUNTER — VIRTUAL VISIT (OUTPATIENT)
Dept: INTERPRETER SERVICES | Facility: CLINIC | Age: 44
End: 2024-05-10

## 2024-05-10 ENCOUNTER — APPOINTMENT (OUTPATIENT)
Dept: CT IMAGING | Facility: HOSPITAL | Age: 44
End: 2024-05-10
Attending: EMERGENCY MEDICINE
Payer: COMMERCIAL

## 2024-05-10 ENCOUNTER — HOSPITAL ENCOUNTER (INPATIENT)
Facility: HOSPITAL | Age: 44
LOS: 11 days | Discharge: HOME | End: 2024-05-21
Attending: EMERGENCY MEDICINE | Admitting: FAMILY MEDICINE
Payer: COMMERCIAL

## 2024-05-10 DIAGNOSIS — K50.113 CROHN'S DISEASE OF COLON WITH FISTULA (H): ICD-10-CM

## 2024-05-10 DIAGNOSIS — R65.21 SEPTIC SHOCK (H): ICD-10-CM

## 2024-05-10 DIAGNOSIS — A41.9 SEPTIC SHOCK (H): ICD-10-CM

## 2024-05-10 DIAGNOSIS — A41.9 SEPSIS, DUE TO UNSPECIFIED ORGANISM, UNSPECIFIED WHETHER ACUTE ORGAN DYSFUNCTION PRESENT (H): ICD-10-CM

## 2024-05-10 DIAGNOSIS — K50.90 CROHN'S DISEASE (H): Primary | ICD-10-CM

## 2024-05-10 DIAGNOSIS — K56.609 COLONIC OBSTRUCTION (H): ICD-10-CM

## 2024-05-10 DIAGNOSIS — Z87.19 HISTORY OF CROHN'S DISEASE: ICD-10-CM

## 2024-05-10 DIAGNOSIS — K65.1 INTRA-ABDOMINAL ABSCESS (H): ICD-10-CM

## 2024-05-10 DIAGNOSIS — E87.6 HYPOKALEMIA: ICD-10-CM

## 2024-05-10 LAB
ADV 40+41 DNA STL QL NAA+NON-PROBE: NEGATIVE
ALBUMIN SERPL BCG-MCNC: 2.7 G/DL (ref 3.5–5.2)
ALP SERPL-CCNC: 193 U/L (ref 40–150)
ALT SERPL W P-5'-P-CCNC: 23 U/L (ref 0–50)
ANION GAP SERPL CALCULATED.3IONS-SCNC: 13 MMOL/L (ref 7–15)
AST SERPL W P-5'-P-CCNC: 17 U/L (ref 0–45)
ASTRO TYP 1-8 RNA STL QL NAA+NON-PROBE: NEGATIVE
BASOPHILS # BLD AUTO: 0 10E3/UL (ref 0–0.2)
BASOPHILS NFR BLD AUTO: 0 %
BILIRUB DIRECT SERPL-MCNC: <0.2 MG/DL (ref 0–0.3)
BILIRUB SERPL-MCNC: 0.4 MG/DL
BUN SERPL-MCNC: 8.8 MG/DL (ref 6–20)
C CAYETANENSIS DNA STL QL NAA+NON-PROBE: NEGATIVE
C DIFF TOX B STL QL: NEGATIVE
CALCIUM SERPL-MCNC: 8.2 MG/DL (ref 8.6–10)
CAMPYLOBACTER DNA SPEC NAA+PROBE: NEGATIVE
CHLORIDE SERPL-SCNC: 98 MMOL/L (ref 98–107)
CREAT SERPL-MCNC: 0.68 MG/DL (ref 0.51–0.95)
CRYPTOSP DNA STL QL NAA+NON-PROBE: NEGATIVE
DEPRECATED HCO3 PLAS-SCNC: 24 MMOL/L (ref 22–29)
E COLI O157 DNA STL QL NAA+NON-PROBE: NORMAL
E HISTOLYT DNA STL QL NAA+NON-PROBE: NEGATIVE
EAEC ASTA GENE ISLT QL NAA+PROBE: NEGATIVE
EC STX1+STX2 GENES STL QL NAA+NON-PROBE: NEGATIVE
EGFRCR SERPLBLD CKD-EPI 2021: >90 ML/MIN/1.73M2
EOSINOPHIL # BLD AUTO: 0 10E3/UL (ref 0–0.7)
EOSINOPHIL NFR BLD AUTO: 0 %
EPEC EAE GENE STL QL NAA+NON-PROBE: NEGATIVE
ERYTHROCYTE [DISTWIDTH] IN BLOOD BY AUTOMATED COUNT: 14.6 % (ref 10–15)
ETEC LTA+ST1A+ST1B TOX ST NAA+NON-PROBE: NEGATIVE
FLUAV RNA SPEC QL NAA+PROBE: NEGATIVE
FLUBV RNA RESP QL NAA+PROBE: NEGATIVE
G LAMBLIA DNA STL QL NAA+NON-PROBE: NEGATIVE
GLUCOSE BLDC GLUCOMTR-MCNC: 96 MG/DL (ref 70–99)
GLUCOSE BLDC GLUCOMTR-MCNC: 97 MG/DL (ref 70–99)
GLUCOSE SERPL-MCNC: 125 MG/DL (ref 70–99)
HCT VFR BLD AUTO: 29.7 % (ref 35–47)
HGB BLD-MCNC: 9.8 G/DL (ref 11.7–15.7)
IMM GRANULOCYTES # BLD: 0.1 10E3/UL
IMM GRANULOCYTES NFR BLD: 1 %
LACTATE SERPL-SCNC: 0.9 MMOL/L (ref 0.7–2)
LYMPHOCYTES # BLD AUTO: 0.5 10E3/UL (ref 0.8–5.3)
LYMPHOCYTES NFR BLD AUTO: 5 %
MAGNESIUM SERPL-MCNC: 1.9 MG/DL (ref 1.7–2.3)
MCH RBC QN AUTO: 29.2 PG (ref 26.5–33)
MCHC RBC AUTO-ENTMCNC: 33 G/DL (ref 31.5–36.5)
MCV RBC AUTO: 88 FL (ref 78–100)
MONOCYTES # BLD AUTO: 0.9 10E3/UL (ref 0–1.3)
MONOCYTES NFR BLD AUTO: 9 %
NEUTROPHILS # BLD AUTO: 8.6 10E3/UL (ref 1.6–8.3)
NEUTROPHILS NFR BLD AUTO: 85 %
NOROVIRUS GI+II RNA STL QL NAA+NON-PROBE: NEGATIVE
NRBC # BLD AUTO: 0 10E3/UL
NRBC BLD AUTO-RTO: 0 /100
P SHIGELLOIDES DNA STL QL NAA+NON-PROBE: NEGATIVE
PLATELET # BLD AUTO: 375 10E3/UL (ref 150–450)
POTASSIUM SERPL-SCNC: 3 MMOL/L (ref 3.4–5.3)
POTASSIUM SERPL-SCNC: 3.1 MMOL/L (ref 3.4–5.3)
POTASSIUM SERPL-SCNC: 3.3 MMOL/L (ref 3.4–5.3)
PROT SERPL-MCNC: 7.2 G/DL (ref 6.4–8.3)
RADIOLOGIST FLAGS: ABNORMAL
RBC # BLD AUTO: 3.36 10E6/UL (ref 3.8–5.2)
RSV RNA SPEC NAA+PROBE: NEGATIVE
RVA RNA STL QL NAA+NON-PROBE: NEGATIVE
SALMONELLA SP RPOD STL QL NAA+PROBE: NEGATIVE
SAPO I+II+IV+V RNA STL QL NAA+NON-PROBE: NEGATIVE
SARS-COV-2 RNA RESP QL NAA+PROBE: NEGATIVE
SHIGELLA SP+EIEC IPAH ST NAA+NON-PROBE: NEGATIVE
SODIUM SERPL-SCNC: 135 MMOL/L (ref 135–145)
V CHOLERAE DNA SPEC QL NAA+PROBE: NEGATIVE
VIBRIO DNA SPEC NAA+PROBE: NEGATIVE
WBC # BLD AUTO: 10 10E3/UL (ref 4–11)
Y ENTEROCOL DNA STL QL NAA+PROBE: NEGATIVE

## 2024-05-10 PROCEDURE — 250N000009 HC RX 250: Performed by: EMERGENCY MEDICINE

## 2024-05-10 PROCEDURE — 74177 CT ABD & PELVIS W/CONTRAST: CPT

## 2024-05-10 PROCEDURE — 83605 ASSAY OF LACTIC ACID: CPT | Performed by: EMERGENCY MEDICINE

## 2024-05-10 PROCEDURE — 96375 TX/PRO/DX INJ NEW DRUG ADDON: CPT

## 2024-05-10 PROCEDURE — 250N000011 HC RX IP 250 OP 636: Performed by: FAMILY MEDICINE

## 2024-05-10 PROCEDURE — 258N000003 HC RX IP 258 OP 636: Performed by: EMERGENCY MEDICINE

## 2024-05-10 PROCEDURE — 99223 1ST HOSP IP/OBS HIGH 75: CPT | Mod: AI

## 2024-05-10 PROCEDURE — 84132 ASSAY OF SERUM POTASSIUM: CPT | Performed by: FAMILY MEDICINE

## 2024-05-10 PROCEDURE — 250N000011 HC RX IP 250 OP 636

## 2024-05-10 PROCEDURE — 99222 1ST HOSP IP/OBS MODERATE 55: CPT | Performed by: SURGERY

## 2024-05-10 PROCEDURE — 250N000011 HC RX IP 250 OP 636: Performed by: EMERGENCY MEDICINE

## 2024-05-10 PROCEDURE — 87637 SARSCOV2&INF A&B&RSV AMP PRB: CPT | Performed by: EMERGENCY MEDICINE

## 2024-05-10 PROCEDURE — 250N000013 HC RX MED GY IP 250 OP 250 PS 637: Performed by: EMERGENCY MEDICINE

## 2024-05-10 PROCEDURE — 36415 COLL VENOUS BLD VENIPUNCTURE: CPT | Performed by: EMERGENCY MEDICINE

## 2024-05-10 PROCEDURE — 96365 THER/PROPH/DIAG IV INF INIT: CPT | Mod: 59

## 2024-05-10 PROCEDURE — 85049 AUTOMATED PLATELET COUNT: CPT | Performed by: EMERGENCY MEDICINE

## 2024-05-10 PROCEDURE — 99292 CRITICAL CARE ADDL 30 MIN: CPT | Performed by: INTERNAL MEDICINE

## 2024-05-10 PROCEDURE — 3E043XZ INTRODUCTION OF VASOPRESSOR INTO CENTRAL VEIN, PERCUTANEOUS APPROACH: ICD-10-PCS | Performed by: INTERNAL MEDICINE

## 2024-05-10 PROCEDURE — 96376 TX/PRO/DX INJ SAME DRUG ADON: CPT

## 2024-05-10 PROCEDURE — 87507 IADNA-DNA/RNA PROBE TQ 12-25: CPT | Performed by: EMERGENCY MEDICINE

## 2024-05-10 PROCEDURE — 96367 TX/PROPH/DG ADDL SEQ IV INF: CPT

## 2024-05-10 PROCEDURE — 83735 ASSAY OF MAGNESIUM: CPT | Performed by: EMERGENCY MEDICINE

## 2024-05-10 PROCEDURE — 96361 HYDRATE IV INFUSION ADD-ON: CPT

## 2024-05-10 PROCEDURE — T1013 SIGN LANG/ORAL INTERPRETER: HCPCS | Mod: U4,TEL,95

## 2024-05-10 PROCEDURE — 250N000009 HC RX 250: Performed by: INTERNAL MEDICINE

## 2024-05-10 PROCEDURE — 258N000003 HC RX IP 258 OP 636

## 2024-05-10 PROCEDURE — 82248 BILIRUBIN DIRECT: CPT | Performed by: EMERGENCY MEDICINE

## 2024-05-10 PROCEDURE — 99285 EMERGENCY DEPT VISIT HI MDM: CPT | Mod: 25

## 2024-05-10 PROCEDURE — 96366 THER/PROPH/DIAG IV INF ADDON: CPT

## 2024-05-10 PROCEDURE — 99291 CRITICAL CARE FIRST HOUR: CPT | Performed by: INTERNAL MEDICINE

## 2024-05-10 PROCEDURE — 250N000011 HC RX IP 250 OP 636: Performed by: INTERNAL MEDICINE

## 2024-05-10 PROCEDURE — 272N000452 HC KIT SHRLOCK 5FR POWER PICC TRIPLE LUMEN

## 2024-05-10 PROCEDURE — 36569 INSJ PICC 5 YR+ W/O IMAGING: CPT

## 2024-05-10 PROCEDURE — 250N000009 HC RX 250: Performed by: FAMILY MEDICINE

## 2024-05-10 PROCEDURE — 87040 BLOOD CULTURE FOR BACTERIA: CPT | Performed by: EMERGENCY MEDICINE

## 2024-05-10 PROCEDURE — 200N000001 HC R&B ICU

## 2024-05-10 PROCEDURE — 87493 C DIFF AMPLIFIED PROBE: CPT | Performed by: EMERGENCY MEDICINE

## 2024-05-10 PROCEDURE — 36415 COLL VENOUS BLD VENIPUNCTURE: CPT | Performed by: FAMILY MEDICINE

## 2024-05-10 PROCEDURE — 80053 COMPREHEN METABOLIC PANEL: CPT | Performed by: EMERGENCY MEDICINE

## 2024-05-10 RX ORDER — HEPARIN SODIUM 5000 [USP'U]/.5ML
5000 INJECTION, SOLUTION INTRAVENOUS; SUBCUTANEOUS EVERY 8 HOURS
Status: DISCONTINUED | OUTPATIENT
Start: 2024-05-10 | End: 2024-05-15

## 2024-05-10 RX ORDER — DEXTROSE MONOHYDRATE 25 G/50ML
25-50 INJECTION, SOLUTION INTRAVENOUS
Status: DISCONTINUED | OUTPATIENT
Start: 2024-05-10 | End: 2024-05-21 | Stop reason: HOSPADM

## 2024-05-10 RX ORDER — POTASSIUM CHLORIDE 7.45 MG/ML
10 INJECTION INTRAVENOUS
Status: COMPLETED | OUTPATIENT
Start: 2024-05-10 | End: 2024-05-10

## 2024-05-10 RX ORDER — AMOXICILLIN 250 MG
2 CAPSULE ORAL 2 TIMES DAILY PRN
Status: DISCONTINUED | OUTPATIENT
Start: 2024-05-10 | End: 2024-05-14

## 2024-05-10 RX ORDER — HYDROMORPHONE HCL IN WATER/PF 6 MG/30 ML
0.4 PATIENT CONTROLLED ANALGESIA SYRINGE INTRAVENOUS
Status: DISCONTINUED | OUTPATIENT
Start: 2024-05-10 | End: 2024-05-21 | Stop reason: HOSPADM

## 2024-05-10 RX ORDER — NOREPINEPHRINE BITARTRATE 0.02 MG/ML
INJECTION, SOLUTION INTRAVENOUS
Status: COMPLETED
Start: 2024-05-10 | End: 2024-05-10

## 2024-05-10 RX ORDER — ACETAMINOPHEN 325 MG/1
650 TABLET ORAL ONCE
Status: COMPLETED | OUTPATIENT
Start: 2024-05-10 | End: 2024-05-10

## 2024-05-10 RX ORDER — PROCHLORPERAZINE MALEATE 10 MG
10 TABLET ORAL EVERY 6 HOURS PRN
Status: DISCONTINUED | OUTPATIENT
Start: 2024-05-10 | End: 2024-05-21 | Stop reason: HOSPADM

## 2024-05-10 RX ORDER — PIPERACILLIN SODIUM, TAZOBACTAM SODIUM 3; .375 G/15ML; G/15ML
3.38 INJECTION, POWDER, LYOPHILIZED, FOR SOLUTION INTRAVENOUS ONCE
Status: DISCONTINUED | OUTPATIENT
Start: 2024-05-10 | End: 2024-05-10

## 2024-05-10 RX ORDER — ONDANSETRON 4 MG/1
4 TABLET, ORALLY DISINTEGRATING ORAL EVERY 6 HOURS PRN
Status: DISCONTINUED | OUTPATIENT
Start: 2024-05-10 | End: 2024-05-21 | Stop reason: HOSPADM

## 2024-05-10 RX ORDER — NALOXONE HYDROCHLORIDE 0.4 MG/ML
0.4 INJECTION, SOLUTION INTRAMUSCULAR; INTRAVENOUS; SUBCUTANEOUS
Status: DISCONTINUED | OUTPATIENT
Start: 2024-05-10 | End: 2024-05-21 | Stop reason: HOSPADM

## 2024-05-10 RX ORDER — NICOTINE POLACRILEX 4 MG
15-30 LOZENGE BUCCAL
Status: DISCONTINUED | OUTPATIENT
Start: 2024-05-10 | End: 2024-05-21 | Stop reason: HOSPADM

## 2024-05-10 RX ORDER — ONDANSETRON 4 MG/1
4 TABLET, ORALLY DISINTEGRATING ORAL EVERY 6 HOURS PRN
Status: DISCONTINUED | OUTPATIENT
Start: 2024-05-10 | End: 2024-05-17

## 2024-05-10 RX ORDER — NOREPINEPHRINE BITARTRATE 0.02 MG/ML
.01-.6 INJECTION, SOLUTION INTRAVENOUS CONTINUOUS
Status: DISCONTINUED | OUTPATIENT
Start: 2024-05-10 | End: 2024-05-10

## 2024-05-10 RX ORDER — HYDROMORPHONE HCL IN WATER/PF 6 MG/30 ML
0.2 PATIENT CONTROLLED ANALGESIA SYRINGE INTRAVENOUS
Status: DISCONTINUED | OUTPATIENT
Start: 2024-05-10 | End: 2024-05-21 | Stop reason: HOSPADM

## 2024-05-10 RX ORDER — AZATHIOPRINE 50 MG/1
100 TABLET ORAL DAILY
Status: DISCONTINUED | OUTPATIENT
Start: 2024-05-10 | End: 2024-05-15

## 2024-05-10 RX ORDER — NOREPINEPHRINE BITARTRATE 0.02 MG/ML
.01-.6 INJECTION, SOLUTION INTRAVENOUS CONTINUOUS
Status: DISCONTINUED | OUTPATIENT
Start: 2024-05-10 | End: 2024-05-12

## 2024-05-10 RX ORDER — PIPERACILLIN SODIUM, TAZOBACTAM SODIUM 3; .375 G/15ML; G/15ML
3.38 INJECTION, POWDER, LYOPHILIZED, FOR SOLUTION INTRAVENOUS ONCE
Status: COMPLETED | OUTPATIENT
Start: 2024-05-10 | End: 2024-05-10

## 2024-05-10 RX ORDER — AMOXICILLIN 250 MG
1 CAPSULE ORAL 2 TIMES DAILY PRN
Status: DISCONTINUED | OUTPATIENT
Start: 2024-05-10 | End: 2024-05-14

## 2024-05-10 RX ORDER — ONDANSETRON 2 MG/ML
4 INJECTION INTRAMUSCULAR; INTRAVENOUS EVERY 6 HOURS PRN
Status: DISCONTINUED | OUTPATIENT
Start: 2024-05-10 | End: 2024-05-21 | Stop reason: HOSPADM

## 2024-05-10 RX ORDER — PIPERACILLIN SODIUM, TAZOBACTAM SODIUM 3; .375 G/15ML; G/15ML
3.38 INJECTION, POWDER, LYOPHILIZED, FOR SOLUTION INTRAVENOUS EVERY 8 HOURS
Status: DISCONTINUED | OUTPATIENT
Start: 2024-05-10 | End: 2024-05-21

## 2024-05-10 RX ORDER — ACETAMINOPHEN 650 MG/1
650 SUPPOSITORY RECTAL EVERY 4 HOURS PRN
Status: DISCONTINUED | OUTPATIENT
Start: 2024-05-10 | End: 2024-05-21 | Stop reason: HOSPADM

## 2024-05-10 RX ORDER — IOPAMIDOL 755 MG/ML
53 INJECTION, SOLUTION INTRAVASCULAR ONCE
Status: COMPLETED | OUTPATIENT
Start: 2024-05-10 | End: 2024-05-10

## 2024-05-10 RX ORDER — POTASSIUM CHLORIDE 7.45 MG/ML
10 INJECTION INTRAVENOUS ONCE
Status: COMPLETED | OUTPATIENT
Start: 2024-05-10 | End: 2024-05-10

## 2024-05-10 RX ORDER — SODIUM CHLORIDE 9 MG/ML
INJECTION, SOLUTION INTRAVENOUS CONTINUOUS
Status: ACTIVE | OUTPATIENT
Start: 2024-05-10 | End: 2024-05-11

## 2024-05-10 RX ORDER — CALCIUM CARBONATE 500 MG/1
1000 TABLET, CHEWABLE ORAL 4 TIMES DAILY PRN
Status: DISCONTINUED | OUTPATIENT
Start: 2024-05-10 | End: 2024-05-21 | Stop reason: HOSPADM

## 2024-05-10 RX ORDER — KETOROLAC TROMETHAMINE 15 MG/ML
15 INJECTION, SOLUTION INTRAMUSCULAR; INTRAVENOUS ONCE
Status: COMPLETED | OUTPATIENT
Start: 2024-05-10 | End: 2024-05-10

## 2024-05-10 RX ORDER — MORPHINE SULFATE 4 MG/ML
4 INJECTION, SOLUTION INTRAMUSCULAR; INTRAVENOUS ONCE
Status: COMPLETED | OUTPATIENT
Start: 2024-05-10 | End: 2024-05-10

## 2024-05-10 RX ORDER — ONDANSETRON 2 MG/ML
4 INJECTION INTRAMUSCULAR; INTRAVENOUS EVERY 6 HOURS PRN
Status: DISCONTINUED | OUTPATIENT
Start: 2024-05-10 | End: 2024-05-17

## 2024-05-10 RX ORDER — ACETAMINOPHEN 325 MG/1
650 TABLET ORAL EVERY 4 HOURS PRN
Status: DISCONTINUED | OUTPATIENT
Start: 2024-05-10 | End: 2024-05-21 | Stop reason: HOSPADM

## 2024-05-10 RX ORDER — PIPERACILLIN SODIUM, TAZOBACTAM SODIUM 3; .375 G/15ML; G/15ML
3.38 INJECTION, POWDER, LYOPHILIZED, FOR SOLUTION INTRAVENOUS EVERY 8 HOURS
Status: DISCONTINUED | OUTPATIENT
Start: 2024-05-10 | End: 2024-05-10

## 2024-05-10 RX ORDER — NALOXONE HYDROCHLORIDE 0.4 MG/ML
0.2 INJECTION, SOLUTION INTRAMUSCULAR; INTRAVENOUS; SUBCUTANEOUS
Status: DISCONTINUED | OUTPATIENT
Start: 2024-05-10 | End: 2024-05-21 | Stop reason: HOSPADM

## 2024-05-10 RX ORDER — PROCHLORPERAZINE 25 MG
25 SUPPOSITORY, RECTAL RECTAL EVERY 12 HOURS PRN
Status: DISCONTINUED | OUTPATIENT
Start: 2024-05-10 | End: 2024-05-21 | Stop reason: HOSPADM

## 2024-05-10 RX ADMIN — NOREPINEPHRINE BITARTRATE 0.03 MCG/KG/MIN: 0.02 INJECTION, SOLUTION INTRAVENOUS at 11:52

## 2024-05-10 RX ADMIN — PIPERACILLIN AND TAZOBACTAM 3.38 G: 3; .375 INJECTION, POWDER, FOR SOLUTION INTRAVENOUS at 09:19

## 2024-05-10 RX ADMIN — PIPERACILLIN AND TAZOBACTAM 3.38 G: 3; .375 INJECTION, POWDER, FOR SOLUTION INTRAVENOUS at 14:50

## 2024-05-10 RX ADMIN — IOPAMIDOL 53 ML: 755 INJECTION, SOLUTION INTRAVENOUS at 08:40

## 2024-05-10 RX ADMIN — ONDANSETRON 4 MG: 4 TABLET, ORALLY DISINTEGRATING ORAL at 17:05

## 2024-05-10 RX ADMIN — SODIUM CHLORIDE 1000 ML: 9 INJECTION, SOLUTION INTRAVENOUS at 07:45

## 2024-05-10 RX ADMIN — MORPHINE SULFATE 4 MG: 4 INJECTION, SOLUTION INTRAMUSCULAR; INTRAVENOUS at 07:48

## 2024-05-10 RX ADMIN — POTASSIUM CHLORIDE 10 MEQ: 7.46 INJECTION, SOLUTION INTRAVENOUS at 21:52

## 2024-05-10 RX ADMIN — ACETAMINOPHEN 650 MG: 325 TABLET ORAL at 08:42

## 2024-05-10 RX ADMIN — HYDROCORTISONE SODIUM SUCCINATE 100 MG: 100 INJECTION, POWDER, FOR SOLUTION INTRAMUSCULAR; INTRAVENOUS at 20:53

## 2024-05-10 RX ADMIN — HYDROMORPHONE HYDROCHLORIDE 0.2 MG: 0.2 INJECTION, SOLUTION INTRAMUSCULAR; INTRAVENOUS; SUBCUTANEOUS at 16:31

## 2024-05-10 RX ADMIN — HYDROCORTISONE SODIUM SUCCINATE 100 MG: 100 INJECTION, POWDER, FOR SOLUTION INTRAMUSCULAR; INTRAVENOUS at 14:44

## 2024-05-10 RX ADMIN — LIDOCAINE HYDROCHLORIDE 2.5 ML: 10 INJECTION, SOLUTION EPIDURAL; INFILTRATION; INTRACAUDAL; PERINEURAL at 12:40

## 2024-05-10 RX ADMIN — SODIUM CHLORIDE: 9 INJECTION, SOLUTION INTRAVENOUS at 20:52

## 2024-05-10 RX ADMIN — NOREPINEPHRINE BITARTRATE 0.02 MCG/KG/MIN: 0.02 INJECTION, SOLUTION INTRAVENOUS at 17:22

## 2024-05-10 RX ADMIN — POTASSIUM CHLORIDE 10 MEQ: 7.46 INJECTION, SOLUTION INTRAVENOUS at 17:31

## 2024-05-10 RX ADMIN — PIPERACILLIN AND TAZOBACTAM 3.38 G: 3; .375 INJECTION, POWDER, FOR SOLUTION INTRAVENOUS at 22:47

## 2024-05-10 RX ADMIN — KETOROLAC TROMETHAMINE 15 MG: 15 INJECTION, SOLUTION INTRAMUSCULAR; INTRAVENOUS at 08:42

## 2024-05-10 RX ADMIN — POTASSIUM CHLORIDE 10 MEQ: 7.46 INJECTION, SOLUTION INTRAVENOUS at 15:43

## 2024-05-10 RX ADMIN — POTASSIUM CHLORIDE 10 MEQ: 7.46 INJECTION, SOLUTION INTRAVENOUS at 22:55

## 2024-05-10 RX ADMIN — SODIUM CHLORIDE: 9 INJECTION, SOLUTION INTRAVENOUS at 10:41

## 2024-05-10 RX ADMIN — HEPARIN SODIUM 5000 UNITS: 10000 INJECTION, SOLUTION INTRAVENOUS; SUBCUTANEOUS at 21:57

## 2024-05-10 RX ADMIN — POTASSIUM CHLORIDE 10 MEQ: 7.46 INJECTION, SOLUTION INTRAVENOUS at 08:45

## 2024-05-10 ASSESSMENT — ENCOUNTER SYMPTOMS
VOMITING: 1
NAUSEA: 1
FEVER: 1
EYE PAIN: 0
SHORTNESS OF BREATH: 0
DYSURIA: 0
COUGH: 0
ABDOMINAL PAIN: 1

## 2024-05-10 ASSESSMENT — ACTIVITIES OF DAILY LIVING (ADL)
ADLS_ACUITY_SCORE: 40
ADLS_ACUITY_SCORE: 40
ADLS_ACUITY_SCORE: 38
ADLS_ACUITY_SCORE: 40
DEPENDENT_IADLS:: INDEPENDENT
ADLS_ACUITY_SCORE: 38
ADLS_ACUITY_SCORE: 40
ADLS_ACUITY_SCORE: 40
ADLS_ACUITY_SCORE: 38
ADLS_ACUITY_SCORE: 40
ADLS_ACUITY_SCORE: 38
ADLS_ACUITY_SCORE: 38

## 2024-05-10 NOTE — CONSULTS
"Care Management Initial Consult    General Information  Assessment completed with: Patient, Dain  Type of CM/SW Visit: Initial Assessment    Primary Care Provider verified and updated as needed: Yes   Readmission within the last 30 days: no previous admission in last 30 days      Reason for Consult: discharge planning  Advance Care Planning: Advance Care Planning Reviewed: no concerns identified          Communication Assessment  Patient's communication style: spoken language (non-English) (speaks Hmong)             Cognitive  Cognitive/Neuro/Behavioral:                        Living Environment:   People in home: spouse, child(sisi), dependent  Dmitriy Camargo  and 6 year old daughter  Current living Arrangements: apartment      Able to return to prior arrangements: yes       Family/Social Support:  Care provided by: self  Provides care for: child(sisi)  Marital Status:     Dmitriy       Description of Support System: Supportive, Involved    Support Assessment: Adequate family and caregiver support, Adequate social supports, Patient communicates needs well met    Current Resources:   Patient receiving home care services: No     Community Resources: None  Equipment currently used at home: colostomy/ostomy supplies  Supplies currently used at home: None    Employment/Financial:  Employment Status: unemployed     Employment/ Comments: \"no  history\"  Financial Concerns:     Referral to Financial Worker: No       Does the patient's insurance plan have a 3 day qualifying hospital stay waiver?  No    Functional Status:  Prior to admission patient needed assistance:   Dependent ADLs:: Independent, Ambulation-no assistive device  Dependent IADLs:: Independent  Assesssment of Functional Status: At functional baseline    Mental Health Status:          Chemical Dependency Status:                Values/Beliefs:  Spiritual, Cultural Beliefs, Religion Practices, Values that affect care:             "     Additional Information:  Dain lives in an apartment with her  Dmitriy Camargo and 6 year old daughter. She is independent with ADLs and IADLs.     Unknown discharge needs at this time, but should be able to return home.     Family to transport at discharge.    CM to follow for medical progression of care, discharge recommendations, and final discharge plan.    Alayna Parkinson RN

## 2024-05-10 NOTE — CONSULTS
Imaging and history reviewed. While we could attempt a percutaneous procedure, the bowel is very difficult to access. With the history of inflammatory disease this may be a risk for fistula formation. Repeat imaging after a course of antibiotics is recommended.

## 2024-05-10 NOTE — ED PROVIDER NOTES
EMERGENCY DEPARTMENT ENCOUNTER      NAME: Dain Tejeda  AGE: 43 year old female  YOB: 1980  MRN: [id number]  EVALUATION DATE & TIME: 5/10/2024  7:15 AM    PCP: Jefe Martin    ED PROVIDER: Hilda Wade M.D.      CHIEF COMPLAINT     Chief Complaint   Patient presents with     Vomiting         FINAL IMPRESSION:     1. Sepsis, due to unspecified organism, unspecified whether acute organ dysfunction present (H)    2. Intra-abdominal abscess (H)    3. Colonic obstruction (H)    4. Hypokalemia    5. History of Crohn's disease    6. Crohn's disease of colon with fistula (H)    7. Septic shock (H)          MEDICAL DECISION MAKING:       Pertinent Labs & Imaging studies reviewed. (See chart for details)    43 year old female presents to the Emergency Department for evaluation of abdominal pain    History  Supplemental history from   External Record(s) review as documented below    Exam  Toxic look like she does not feel well.  Febrile.  Temperature by me 102.  Dry mucous membranes.  Neck is supple tachycardic no murmurs.  Diffuse abdominal tears.  Patient skin warm no petechia no purpura    Differential Diagnosis include but not limited to abscess secondary to pyelonephritis appendicitis abscess infectious diarrhea COVID pneumonia among others.      Vital Signs: Febrile tachycardic  EKG: none  Imaging: I independently interpreted the abdomen and pelvis no free air..Formal read by radiologist.  Home meds: Reviewed  ED meds: Morphine zosyn kcl norepinephrine  Fluids: 1L NS  Labs:  K 3  Cr 0.68  Wbc 10  Hgb 9.8  platelets 375  Normal lactic acid.    Clinical Impression and Decision Making    43-year-old female history of Crohn's status post colon resection and subsequent colostomy presents here with diffuse abdominal pain and vomiting and fever and fevers.    Exam ill nontoxic neck is supple tachycardic and febrile with diffuse tenderness to palpation.  Stool output is brown no gross blood not grossly  equally.    Review of records.  Patient was seen here yesterday.  Was prescribed Bactrim for a UTI.  I reviewed urine.  Few squamous.  No urine culture done.  She was admitted recently for Crohn's exacerbation supposed to be on prednisone.  Stool cultures then were negative.    Multiple etiologies considered but given the fever pyelonephritis appendicitis abscess obstruction pneumonia considered.    Iv established.  Patient given 1 L normal saline.  Pancultured.  Started on sepsis pathway.    Given his abdominal tenderness palpation will image to evaluate for any reversible infectious pathology.    Review of external record shows positive urine culture back in 2022 Klebsiella.    Labs normal white blood cell count.  Anemic hemoglobin 9.8 with normal platelets.  Normal lactic acid.  Anemia is chronic.    Normal liver function test.  No elevation of the alkaline phosphatase.  Hypokalemia patient had been vomiting will replace.  Normal renal function will do Tylenol and Toradol as an antipyretic.    I received a call by the radiologist regarding the extensive disease.  The colonic obstruction.  The abscess on the fistula.    Patient was seen at bedside by both gastroenterology and general surgery.    Clinically patient is looking much better but he was noted that MAP was less than 65 after 30 cc/kg of normal saline and antibiotics.  Therefore norepinephrine started peripheral and PICC line placed.    spoke with the intensivist who accepted patient to the ICU.  Spoke with the admitting team who will manage patient.    Patient admitted in stable condition with guarded diagnosis.    In addition to the work out documented, I considered the following work up lumbar puncture.  She is ill-appearing has had fever hide neck is supple.  Main complaint is abdominal pain.  Initial questions appropriately.  No meningismus.           Medical Decision Making    History:  Supplemental history from: Family Member/Significant  Other  External Record(s) reviewed: Documented in chart    Work Up:  Chart documentation includes differential considered and any EKGs or imaging independently interpreted by provider, where specified.  In additional to work up documented, I considered the following work up: Documented in chart, if applicable.    External consultation:  Discussion of management with another provider: Gastroenterology and Hospitalist    Complicating factors:  Care impacted by chronic illness: Other: Crohn's disease, partial Colostomy  Care affected by social determinants of health: Access to Medical Care    Disposition considerations: Admit.      Review of External Records  Hospital/Clinic: [facility]- abnormal pap smear  Date: 4/22/2024      External Consultation  GI - Dr. Leone  Hospitalist - Dr. Suggs  Radiology  General Surgery - Dr. Agrawal  Intensivist - Dr. Francisco    ED COURSE   7:19 AM I met with the patient to obtain patient history and performed a physical exam. Discussed plan for ED work up including potential diagnostic studies and interventions.  8:16 AM reevaluated and updated  8:53 AM Spoke with Radiology.  8:56 AM I reassessed and updated the patient.  9:03 AM I spoke with Radiology.  9:04 AM Spoke with [facility] Resident Dr. Suggs who accepts the patient for admission [facility] inpatient.  9:09 AM Spoke with [facility], Dr. Leone.   9:36 AM GI is now at bedside.  9:40 AM Spoke with General surgery, Dr. Agrawal.  11:45 AM Reassessed the patient. Will page Intensivist and get a PICC line.  11:52 AM Spoke with [facility] Resident, Dr. Suggs.  11:54 AM Spoke with the Intensivist, Dr. Francisco who accepts the patient to the ICU.  11:56 AM Reassessed and updated patient with findings.  12:10 PM Spoke with [facility] ResidentDr. Suggs and updated them.  12:18 PM PICC line at bedside.  1:00 PM Reassessed the patient. Patient is doing fine.  1:18 PM reevaluated PICC line placed.    At the conclusion of the  encounter I discussed the results of all of the tests and the disposition. The questions were answered. The patient and  acknowledged understanding and was agreeable with the care plan.         Critical Care     Performed by: Dr Hilda Wade  Authorized by: Dr Hilda Wade  Total critical care time: 40 minutes  Critical care was necessary to treat or prevent imminent or life-threatening deterioration of the following conditions: Sepsis developing to septic shock.  Critical care was time spent personally by me on the following activities: development of treatment plan with patient or surrogate, discussions with consultants, examination of patient, evaluation of patient's response to treatment, obtaining history from patient or surrogate, ordering and performing treatments and interventions, ordering and review of laboratory studies, ordering and review of radiographic studies, re-evaluation of patient's condition and monitoring for potential decompensation.  Critical care time was exclusive of separately billable procedures and treating other patients.   MEDICATIONS GIVEN IN THE EMERGENCY:     Medications   lidocaine 1 % 0.1-1 mL (has no administration in time range)   sodium chloride (PF) 0.9% PF flush 3 mL (3 mLs Intracatheter Not Given 5/10/24 0943)   sodium chloride (PF) 0.9% PF flush 3 mL (has no administration in time range)   senna-docusate (SENOKOT-S/PERICOLACE) 8.6-50 MG per tablet 1 tablet (has no administration in time range)     Or   senna-docusate (SENOKOT-S/PERICOLACE) 8.6-50 MG per tablet 2 tablet (has no administration in time range)   calcium carbonate (TUMS) chewable tablet 1,000 mg (has no administration in time range)   acetaminophen (TYLENOL) tablet 650 mg (has no administration in time range)     Or   acetaminophen (TYLENOL) Suppository 650 mg (has no administration in time range)   HYDROmorphone (DILAUDID) injection 0.2 mg (has no administration in time range)   HYDROmorphone  (DILAUDID) injection 0.4 mg (has no administration in time range)   ondansetron (ZOFRAN ODT) ODT tab 4 mg (has no administration in time range)     Or   ondansetron (ZOFRAN) injection 4 mg (has no administration in time range)   prochlorperazine (COMPAZINE) injection 10 mg (has no administration in time range)     Or   prochlorperazine (COMPAZINE) tablet 10 mg (has no administration in time range)     Or   prochlorperazine (COMPAZINE) suppository 25 mg (has no administration in time range)   piperacillin-tazobactam (ZOSYN) 3.375 g vial to attach to  mL bag (has no administration in time range)   sodium chloride 0.9 % infusion ( Intravenous $New Bag 5/10/24 1041)   azaTHIOprine (IMURAN) tablet 100 mg ( Oral Unheld by provider 5/10/24 1213)   predniSONE (DELTASONE) tablet 30 mg ( Oral Automatically Held 5/14/24 0800)   norepinephrine (LEVOPHED) 4 mg in  mL infusion PREMIX (0.08 mcg/kg/min × 49 kg Intravenous Rate/Dose Change 5/10/24 1305)   lidocaine 1 % 0.1-5 mL (2.5 mLs Other $Given 5/10/24 1240)   sodium chloride (PF) 0.9% PF flush 10-40 mL (has no administration in time range)   hydrocortisone sodium succinate PF (solu-CORTEF) injection 100 mg (has no administration in time range)   sodium chloride 0.9% BOLUS 1,000 mL (0 mLs Intravenous Stopped 5/10/24 0900)   morphine (PF) injection 4 mg (4 mg Intravenous $Given 5/10/24 0748)   potassium chloride 10 mEq in 100 mL sterile water infusion (0 mEq Intravenous Stopped 5/10/24 1014)   acetaminophen (TYLENOL) tablet 650 mg (650 mg Oral $Given 5/10/24 0842)   ketorolac (TORADOL) injection 15 mg (15 mg Intravenous $Given 5/10/24 0842)   iopamidol (ISOVUE-370) solution 53 mL (53 mLs Intravenous $Given 5/10/24 0840)   piperacillin-tazobactam (ZOSYN) 3.375 g vial to attach to  mL bag (0 g Intravenous Stopped 5/10/24 1014)       NEW PRESCRIPTIONS STARTED AT TODAY'S ER VISIT     New Prescriptions    No medications on file           =================================================================    HPI     Patient information was obtained from: patient and     Use of : Yes - Phone - Junito Tejeda is a 43 year old female with pertinent medical history of s/p partial colostomy (11/2022), crohn's disease, chronic gastritis, celiac disease, anemia, who presents by private vehicle with family for abdominal pain.    Patient reports she was in the ED since yesterday for the same persistent severe diffuse abdominal pain. This morning, she developed nausea, vomiting, and a fever so she decided to come back into the ED for symptoms. She still has output in her colostomy bag. She does feel like her heart beat is fast.    She otherwise denies associating chest pain, eye pain, dysuria, ear pain, cough, rash, sore throat, and shortness of breath. There were no other concerns/complaints at this time.    Per chart review:  -4/30/2024-5/4/2024: Patient was admitted at [facility] for crohn's flare up complicated by partial colectomy with ostomy. CT showed colitis. C. Dif was negative. GI was consulted and recommended to treat flare up. She was discharged home on a prednisone taper.  -5/9/2024 (yesterday): Patient was seen at [facility] for abdominal pain. Labs showed mild hypokalemia and K of 3.3. UA showed few red cells with symptoms of urinary frequency and dysuria so she was discharged home with bactrim.    REVIEW OF SYSTEMS   Review of Systems   Constitutional:  Positive for fever.   Eyes:  Negative for pain.   Respiratory:  Negative for cough and shortness of breath.    Cardiovascular:  Negative for chest pain.   Gastrointestinal:  Positive for abdominal pain, nausea and vomiting.   Genitourinary:  Negative for dysuria.   All other systems reviewed and are negative.       PAST MEDICAL HISTORY:     Past Medical History:   Diagnosis Date     Diet controlled gestational diabetes mellitus (GDM),  antepartum 9/4/2017    Attempting diet control first     Gestational diabetes mellitus (GDM) in third trimester 9/4/2017    Attempting diet control first  Formatting of this note might be different from the original. Overview:  Attempting diet control first     Nausea/vomiting in pregnancy 4/24/2017     Postpartum hemorrhage 11/16/2017     Third degree laceration of perineum during delivery, postpartum 11/15/2017       PAST SURGICAL HISTORY:     Past Surgical History:   Procedure Laterality Date     COLONOSCOPY N/A 10/31/2022    Procedure: COLONOSCOPY with biopsies;  Surgeon: Luis Miguel Traore MD;  Location: [facility] GI     COLOSTOMY N/A 11/6/2022    Procedure: CREATION, COLOSTOMY;  Surgeon: Chandana Carlos DO;  Location: [facility] OR     LAPAROTOMY EXPLORATORY N/A 11/6/2022    Procedure: EXPLORATORY LAPAROTOMY SPLENIC FLEXURE MOBILIZATION;  Surgeon: Chandana Carlos DO;  Location: [facility] OR     PICC TRIPLE LUMEN PLACEMENT  5/10/2024     RECTAL PROLAPSE REPAIR N/A 11/6/2022    Procedure: RESECTION OF DESCENDING COLON;  Surgeon: Chandana Carlos DO;  Location: [facility] OR         CURRENT MEDICATIONS:   azaTHIOprine (IMURAN) 50 MG tablet  HUMIRA *CF* PEN 40 MG/0.4ML pen kit  predniSONE (DELTASONE) 10 MG tablet  sulfamethoxazole-trimethoprim (BACTRIM DS) 800-160 MG tablet         ALLERGIES:     Allergies   Allergen Reactions     Soy Allergy Anaphylaxis, Hives and Itching     Tofu- causes itching and sores in the mouth       FAMILY HISTORY:     Family History   Problem Relation Age of Onset     Gestational Diabetes Sister      Gestational Diabetes Sister      Diabetes No family hx of      Coronary Artery Disease No family hx of      Hypertension No family hx of      Breast Cancer No family hx of      Colon Cancer No family hx of      Prostate Cancer No family hx of      Other Cancer No family hx of        SOCIAL HISTORY:     Social History     Socioeconomic History     Marital status:  "     Spouse name: Dmitriy Camargo     Number of children: 0     Years of education: 0   Occupational History     Occupation: None   Tobacco Use     Smoking status: Never     Smokeless tobacco: Never   Substance and Sexual Activity     Alcohol use: No     Drug use: No     Sexual activity: Yes     Partners: Male   Social History Narrative    Born in [place] [facility] [place] arrived to [facility] 02/2017. No education.      Social Determinants of Health     Interpersonal Safety: Low Risk  (3/8/2024)    Interpersonal Safety      Do you feel physically and emotionally safe where you currently live?: Yes      Within the past 12 months, have you been hit, slapped, kicked or otherwise physically hurt by someone?: No      Within the past 12 months, have you been humiliated or emotionally abused in other ways by your partner or ex-partner?: No       VITALS:   /61   Pulse 60   Temp 98  F (36.7  C) (Oral)   Resp 20   Ht 1.575 m (5' 2\")   Wt 49 kg (108 lb)   LMP 04/18/2024 (Exact Date)   SpO2 100%   BMI 19.75 kg/m      PHYSICAL EXAM     Physical Exam  Vitals and nursing note reviewed. Exam conducted with a chaperone present.   Constitutional:       Appearance: She is ill-appearing. She is not toxic-appearing.   Cardiovascular:      Rate and Rhythm: Tachycardia present.   Abdominal:      Tenderness: There is generalized abdominal tenderness.          Comments: Ostomy with brown stool.  Midline surgical scar.  Diffuse tenderness palpation.         Physical Exam   Constitutional: No acute distress.     Head: Atraumatic.     Nose: Nose normal.     Mouth/Throat: Dry mucous membranes.    Eyes: EOM are normal. Pupils are equal, round, and reactive to light.     Ears: Bilateral pearly white tympanic membranes.    Neck: Normal range of motion. Neck supple.     Cardiovascular: Tachycardiac. Regular rhythm and normal heart sounds.  2+ femoral pulses/radial/DP pulses B    Pulmonary/Chest: Normal effort  and breath sounds normal.     Abdominal: Diffuse " tenderness to palpation to abdomen. Midline surgical scar on abdomen. Colostomy to LLQ.    Musculoskeletal: Normal range of motion.     Neurological: Moves upper and lower extremities equally.    Lymphatics: no edema, no calves pain, no palpable cords.    : NA    Skin: Skin is warm and dry.     Psychiatric: Normal mood and affect. Behavior is normal.       LAB:     All pertinent labs reviewed and interpreted.  Labs Ordered and Resulted from Time of ED Arrival to Time of ED Departure   BASIC METABOLIC PANEL - Abnormal       Result Value    Sodium 135      Potassium 3.0 (*)     Chloride 98      Carbon Dioxide (CO2) 24      Anion Gap 13      Urea Nitrogen 8.8      Creatinine 0.68      GFR Estimate >90      Calcium 8.2 (*)     Glucose 125 (*)    HEPATIC FUNCTION PANEL - Abnormal    Protein Total 7.2      Albumin 2.7 (*)     Bilirubin Total 0.4      Alkaline Phosphatase 193 (*)     AST 17      ALT 23      Bilirubin Direct <0.20     CBC WITH PLATELETS AND DIFFERENTIAL - Abnormal    WBC Count 10.0      RBC Count 3.36 (*)     Hemoglobin 9.8 (*)     Hematocrit 29.7 (*)     MCV 88      MCH 29.2      MCHC 33.0      RDW 14.6      Platelet Count 375      % Neutrophils 85      % Lymphocytes 5      % Monocytes 9      % Eosinophils 0      % Basophils 0      % Immature Granulocytes 1      NRBCs per 100 WBC 0      Absolute Neutrophils 8.6 (*)     Absolute Lymphocytes 0.5 (*)     Absolute Monocytes 0.9      Absolute Eosinophils 0.0      Absolute Basophils 0.0      Absolute Immature Granulocytes 0.1      Absolute NRBCs 0.0     LACTIC ACID WHOLE BLOOD - Normal    Lactic Acid 0.9     INFLUENZA A/B, RSV, & SARS-COV2 PCR - Normal    Influenza A PCR Negative      Influenza B PCR Negative      RSV PCR Negative      SARS CoV2 PCR Negative     MAGNESIUM - Normal    Magnesium 1.9     C. DIFFICILE TOXIN B PCR WITH REFLEX TO C. DIFFICILE ANTIGEN AND TOXINS A/B EIA - Normal    C Difficile Toxin B by PCR Negative     POTASSIUM   BLOOD CULTURE    BLOOD CULTURE   ENTERIC BACTERIA AND VIRUS PANEL BY PCR        RADIOLOGY:     Reviewed all pertinent imaging. Please see official radiology report.  CT Abdomen Pelvis w Contrast   Final Result   Abnormal   IMPRESSION:    1.  Significant progression of the acute inflammatory bowel disease. Changes are enumerated below. GI and/or colorectal consultation needed.   2.  Circumferential wall thickening of the transverse colon and splenic flexure leading to the ostomy has markedly progressed and now resulting in a colonic obstruction.   3.  There is at least a 10 cm long intramural abscess along the posterior wall of the proximal transverse colon.   4.  A small sinus tract is developing from the anterior wall of the distal transverse colon.   5.  Ascending colon and cecum are distended to 8.5 cm.   6.  Patulous ileocecal valve with long segment of back wash ileitis involving at least 25 cm of the distal ileum.   7.  Periportal edema has developed due to the progressive inflammatory state.   8.  Previously characterized simple left adnexal cyst again noted. Follow-up pelvic ultrasound in 6 months as noted before. Reference given below.   9.  Other noncritical findings as noted above.       [Urgent Result: Marked progression of her Crohn's colitis with colonic obstruction, long segment of intramural abscess, developing fistula and significant ileitis.      Finding was identified on 5/10/2024 8:49 AM CDT.       Hilda Wade was contacted by me on 5/10/2024 8:52 AM CDT and verbalized understanding of the critical result.      Premenopausal asymptomatic simple cyst:      >5 to <= 7 cm: Benign simple cyst. Clinically inconsequential finding. Follow-up pelvic ultrasound in 6 months is recommended.            REFERENCE:   Management of Asymptomatic Ovarian and Other Adnexal Cysts Imaged at US: Society of Radiologists in Ultrasound Consensus Conference Statement. Radiology September 2010; [phone].      Simple Adnexal Cysts:  SRU Consensus Conference Update on Follow-up and Reporting. Radiology September 2019. [phone].           EKG:       I have independently reviewed and interpreted the EKG(s) documented above.      PROCEDURES:     Procedures      I, Raisa Hopkins, am serving as a scribe to document services personally performed by Dr. Wade based on my observation and the provider's statements to me. I, Hilda Wade MD attest that Raisa Hopkins is acting in a scribe capacity, has observed my performance of the services and has documented them in accordance with my direction.    Hilda Wade M.D.  Emergency Medicine  [facility] EMERGENCY DEPARTMENT  [facility] [facility] [address] [phone]  Dept: [phone]       Hilda Wade MD  05/10/24 3627

## 2024-05-10 NOTE — CONSULTS
Gastroenterology Consultation     Consulting Physician   Hilda Wade MD     Reason For Consultation   Severe ileocolonic Crohn's disease.     Chief Complaint   Dain Tejeda came to the hospital for evaluation of abdominal pain and fevers.     History of Present Illness    Dain Tejeda is a pleasant 43 year old female with a past medical history of Crohn's disease involving both the rectum and the colon status post partial colectomy and colostomy in 2022.  Since late 2023 the patient has been on a combination of Humira and azathioprine.  Her last Humira injection was on May 8, 2024 and her next injection is scheduled for May 22, 2024.  The patient was recently in the hospital started on a course of steroids.  The patient is followed in GI clinic by Dr. Billy.  The patient has celiac sprue and is on a gluten-free diet.    The patient states that since her surgery in 2022 she had little to no symptoms and was doing quite well.  Then about a month ago the patient started to have more frequent bowel movements abdominal discomfort and was not doing well.  She presented to the ER and was started on course of steroids.  However, the patient never felt completely normal.  Yesterday the patient came to the hospital for not feeling well and having nausea with vomiting and abdominal discomfort.  She was given antibiotics and discharged to home but states that she got worse overnight and presented back to the ER for worsening of her symptoms including abdominal pain nausea with vomiting and fevers.  On abdominal CT scan the patient was found to have a partial bowel obstruction with possible phlegmon/abscess and fistulizing disease related to her Crohn's.  Given this the patient is being brought into the hospital for further evaluation and treatment.     Past History   Past Medical History:   Diagnosis Date     Diet controlled gestational diabetes mellitus (GDM), antepartum 9/4/2017    Attempting diet control first      Gestational diabetes mellitus (GDM) in third trimester 9/4/2017    Attempting diet control first  Formatting of this note might be different from the original. Overview:  Attempting diet control first     Nausea/vomiting in pregnancy 4/24/2017     Postpartum hemorrhage 11/16/2017     Third degree laceration of perineum during delivery, postpartum 11/15/2017      Past Surgical History:   Procedure Laterality Date     COLONOSCOPY N/A 10/31/2022    Procedure: COLONOSCOPY with biopsies;  Surgeon: Luis Miguel Traore MD;  Location: [facility] GI     COLOSTOMY N/A 11/6/2022    Procedure: CREATION, COLOSTOMY;  Surgeon: Chandana Carlos DO;  [place] [facility] [place]     LAPAROTOMY EXPLORATORY N/A 11/6/2022    Procedure: EXPLORATORY LAPAROTOMY SPLENIC FLEXURE MOBILIZATION;  Surgeon: Chandana Carlos DO;  [place] [facility] [place]     RECTAL PROLAPSE REPAIR N/A 11/6/2022    Procedure: RESECTION OF DESCENDING COLON;  Surgeon: Chandana Carlos DO;  [place] [facility] [place]        Family History Social History   Family History   Problem Relation Age of Onset     Gestational Diabetes Sister      Gestational Diabetes Sister      Diabetes No family hx of      Coronary Artery Disease No family hx of      Hypertension No family hx of      Breast Cancer No family hx of      Colon Cancer No family hx of      Prostate Cancer No family hx of      Other Cancer No family hx of       Occupation: Unemployed since surgery    Marital Status:  with 1 child    Tobacco: None    Alcohol: None    Recreational Drugs: None     Medications    (Not in a hospital admission)       Allergies   Allergies   Allergen Reactions     Soybean-Containing Drug Products Itching and Blisters     Tofu- causes itching and sores in the mouth         Review of Systems     no chest pain    no SOB    Fevers and chills    no weight gain or loss    Nausea with vomiting    1 month of on and off abdominal pain that is now worse    Loose ostomy output     "no black or bloody stools, liquid fairly normal ostomy output    no numbness or weakness    no jaundice or dark urine  A comprehensive review of systems was performed and was otherwise noncontributory.     Objective     Vitals Blood pressure 102/67, pulse 90, temperature (!) 102  F (38.9  C), temperature source Oral, resp. rate 20, height 1.575 m (5' 2\"), weight 49 kg (108 lb), last menstrual period 04/18/2024, SpO2 99%, not currently breastfeeding.          Physical   Exam   GENERAL: alert and oriented, uncomfortable    SKIN: warm and dry, no rashes    HEENT: atraumatic, anicteric, moist mucous membranes, neck soft/supple     PULMONARY: normal resp effort, breath sounds clear to auscultation bilateral    CARDIOVASCULAR: normal rate and rhythm, no murmurs, no edema    ABDOMEN: Diffuse tenderness to palpation, ostomy in place with brown stool, no distention, bowel sounds diminished    NEUROLOGICAL: appropriate mental status, grossly intact    PSYCHIATRIC: normal mood, affect and insight        Laboratory     Electrolytes    Recent Labs   Lab 05/10/24  0740 05/09/24  0808 05/04/24  0718    136 140   POTASSIUM 3.0* 3.3* 3.7   CHLORIDE 98 101 107   CO2 24 26 24   * 104* 146*   CR 0.68 0.65 0.51   BUN 8.8 8.4 11.4      Hematology    Recent Labs   Lab 05/10/24  0740 05/09/24  0808 05/04/24  0718 05/03/24  2103   HGB 9.8* 9.9* 9.1* 9.5*   MCV 88 90 93 92   WBC 10.0 8.9 7.7 10.5    371 378 511*   INR  --   --   --  1.04      LFTs & Lipase    Recent Labs   Lab 05/10/24  0740 05/09/24  0808 05/03/24  2103   AST 17 34 17   ALT 23 31 11   ALKPHOS 193* 186* 122   BILITOTAL 0.4 0.6 <0.2   LIPASE  --  29  --        Imaging Studies     Abdominal CT scan from May 10, 2024  1.  Significant progression of the acute inflammatory bowel disease. Changes are enumerated below. GI and/or colorectal consultation needed.  2.  Circumferential wall thickening of the transverse colon and splenic flexure leading to the ostomy " has markedly progressed and now resulting in a colonic obstruction.  3.  There is at least a 10 cm long intramural abscess along the posterior wall of the proximal transverse colon.  4.  A small sinus tract is developing from the anterior wall of the distal transverse colon.  5.  Ascending colon and cecum are distended to 8.5 cm.  6.  Patulous ileocecal valve with long segment of back wash ileitis involving at least 25 cm of the distal ileum.  7.  Periportal edema has developed due to the progressive inflammatory state.  8.  Previously characterized simple left adnexal cyst again noted. Follow-up pelvic ultrasound in 6 months as noted before. Reference given below.  9.  Other noncritical findings as noted above.    I have reviewed the current diagnostic and laboratory tests.           Impression    Dain Tejeda is a pleasant 43 year old female with a past medical history of ileocolonic Crohn's disease status post partial colectomy in 2022 now on azathioprine and Humira.  The patient has not been feeling well for more than a month and now presented to the hospital with worsening abdominal pain and nausea with vomiting.  The patient also was having fevers and her CT scan demonstrates an abscess along the colon wall.     Recommendations      Ileocolonic Crohn's disease   Evidence of fistulizing disease on the CT scan along with an abscess.  Broad-spectrum IV antibiotics.  Blood cultures are currently pending.  Would obtain stool cultures and C. difficile if not already done.  IV fluids for hydration.  IR to consider drain placement to drain the abscess.  If this is not possible surgery is on board and will possibly need to drain the abscess surgically.  Restart azathioprine once the patient is tolerating oral intake.  N.p.o. diet status for now.  If the patient has further nausea or vomiting would consider NG tube to low intermittent suction.  Compression stockings or some form of DVT prophylaxis.       Diet   N.p.o. for  now.  Will need to be on a gluten-free diet once she is tolerating oral intake.       Disposition   I would anticipate the patient would be here for at least 3 days.     Approximately 55 minutes of total time was spent providing patient care including patient evaluation, reviewing documentation/test results, and .           Michel Leone MD  Thank you for the opportunity to participate in the care of this patient.   Please feel free to call me with any questions or concerns.  Phone number [phone].

## 2024-05-10 NOTE — PHARMACY-ADMISSION MEDICATION HISTORY
Pharmacist Admission Medication History    Admission medication history is complete. The information provided in this note is only as accurate as the sources available at the time of the update.    Information Source(s): Patient, CareEverywhere/SureScripts, and   via in-person and Vocera    Pertinent Information:   Patient is not clear on the prednisone - whether she is taking the taper as ordered, or just taking one tablet daily.     Changes made to PTA medication list:  Added: None  Deleted: None  Changed: None    Allergies reviewed with patient and updates made in EHR: yes    Medication History Completed By: Judy Gómez [facility] 5/10/2024 9:32 AM    PTA Med List   Medication Sig Note Last Dose    azaTHIOprine (IMURAN) 50 MG tablet Take 2 tablets by mouth daily  Past Week at not today    HUMIRA *CF* PEN 40 MG/0.4ML pen kit Inject 0.4 mLs Subcutaneous every 14 days  Past Week at 5/8/24    predniSONE (DELTASONE) 10 MG tablet Take 4 tablets (40 mg) by mouth daily for 5 days, THEN 3 tablets (30 mg) daily for 7 days, THEN 2 tablets (20 mg) daily for 7 days, THEN 1 tablet (10 mg) daily for 7 days, THEN 0.5 tablets (5 mg) daily for 7 days. 5/10/2024: Rx on 5/4/24 upon discharge. Pt states she took 2 medications this morning, one being the antibiotic. Not sure if she is only taking 1 tablet of prednisone or if she is following the taper.  5/10/2024 at AM    sulfamethoxazole-trimethoprim (BACTRIM DS) 800-160 MG tablet Take 1 tablet by mouth 2 times daily for 3 days 5/10/2024: ED Rx on 5/9 for 3 days 5/10/2024 at AM

## 2024-05-10 NOTE — H&P
[facility]    History and Physical - Hospitalist Service       Date of Admission:  5/10/2024    Assessment & Plan      Dain Tejeda is a 43 year old female admitted on 5/10/2024. She has a history of Chron's disease complicated by colonic stricture s/p partial colectomy (11/2022), Celiac disease, SIOMARA and is admitted with abdominal pain, N/V and fevers.     Sepsis 2/2 intra-abdominal infection  Hypotension  Fever  Presented to ED on 5/9/24 with new severe abdominal pain, initially thought to be UTI and sent home on Bactrim. Now back after developing fevers, nausea and vomiting. Temp 102 in ED. WBC and lactate normal. BP initially OK but later developed soft pressures despite receiving IVF. ED provider ordered norepi, updated ICU where patient will transfer when able but we will remain primary team. Patient appears fatigued and uncomfortable but otherwise non-toxic appearing.   - continue IV Zosyn 3.375 g q6h  - on norepi, MAP goal >65  - mIVF  - daily CBC    Chron's disease, complicated by progression with colonic obstruction, abscess, developing fistula and ileitis   S/p partial colectomy 11/2022, has LLQ colostomy   Diagnosed in late 2022, complicated by stricture s/p partial colectomy with ostomy. Follows with [facility], on Humira and azathioprine. Was recently admitted on our service 4/30-5/4 after syncopal episode thought to be 2/2 Chron's flare after CT showed colitis but negative Cdiff and enteric panel. GI consulted at that time and recommended prednisone taper over 4 weeks. Unclear whether patient taking steroid as prescribed since discharge. Severe abdominal pain developed on day prior to admission, now with N/V and fevers. CT showing marked progression of Chron's with colonic obstruction, 10 cm intramural abscess, developing fistula and significant ileitis. Patient initiated on IV abx in ED, pain improved after receiving IV pain meds.   - GI and surgery consulted, appreciate   - will  continue PTA azathioprine, will defer to them regarding ongoing steroids   - NPO   - abx and mIVF as above  - VTE ppx as soon as able, PCDs for now  - follow up Cdiff and stool studies  - prn tylenol and IV dilaudid for pain  - prn antiemetics     Dirty UA  Initially seen in ED for her presenting symptoms on 5/10/24. Had been endorsing urinary symptoms at that time, some suspicion for infection based on UA. Discharged on Bactrim. Patient now denying urinary symptoms, lower suspicion for UTI at this time, but will be covered on Zosyn regardless.   - hold PTA Bactrim    Hypokalemia  3.0 on admission, supplementation given in ED.   - daily BMP  - replacement per RN protocol    Normocytic anemia  History of SIOMARA with recent iron infusions through [facility]. Hgb 9.8 on admission, stable with previous. Colostomy output appears non-bloody.  - daily CBC    Disorganized behaviors  Demonstrated odd behaviors during recent admission, treated with Seroquel 12.5 mg at bedtime. Psych consult at that time, did not recommend any ongoing meds upon discharge. Patient calm, alert and cooperative this admission so far.   - delirium protocol, hold on meds for now    Gamma gap  Chronic. Tprotein 7.2, albumin 2.7. Most certainly 2/2 inflammatory disease.     Incidental finding: benign pelvic simple cyst  >5 to <=7 cm. Follow up pelvic US in 6 months recommended.         Diet: NPO for Medical/Clinical Reasons Except for: Meds    DVT Prophylaxis: Pneumatic Compression Devices  Chambers Catheter: Not present  Fluids: mIVF  Lines: None     Cardiac Monitoring: None  Code Status: Full Code      Clinically Significant Risk Factors Present on Admission        # Hypokalemia: Lowest K = 3 mmol/L in last 2 days, will replace as needed       # Hypoalbuminemia: Lowest albumin = 2.7 g/dL at 5/10/2024  7:40 AM, will monitor as appropriate       # Circulatory Shock: required vasopressors within past 24 hours                  Disposition Plan      Expected  Discharge Date: 05/12/2024      Destination: home with family          The patient's care was discussed with the Attending Physician, Dr. Padilla .      Jacek Suggs MD  Hospitalist Service  [facility]  Securely message with [employer] (more info)  Text page via [facility] Paging/Directory   ______________________________________________________________________    Chief Complaint   Abdominal pain, N/V and fevers.     History is obtained from the patient and ED provider.     History of Present Illness   Dain Tejeda is a 43 year old female admitted on 5/10/2024. She has a history of Chron's disease complicated by colonic stricture s/p partial colectomy (11/2022), Celiac disease, SIOMARA and is admitted with abdominal pain, N/V and fevers.     Patient was recently admitted on our service 4/30-5/4 after syncopal episode. CT showed evidence of colitis, infectious vs inflammatory. Enteric panel and C. Diff negative, raising concern for Chron's flare. GI consulted, who initiated prednisone taper across 4 week period. Already on Humira and azathioprine through [facility].     Patient presented at ED on 5/9/24 for new severe abdominal pain. Had been endorsing urinary symptoms at that time, UA dirty and concerning for UTI so sent home on Bactrim. Later developed N/V and fevers that evening, continuing this AM so came back in.     Does not endorse urinary symptoms today. No CP, cough, HA, dizziness, SOB.     Unclear if she has been following prednisone taper as prescribed. Does have at home but only took 2 pills this AM, one being the antibiotic.       Past Medical History    Past Medical History:   Diagnosis Date    Diet controlled gestational diabetes mellitus (GDM), antepartum 9/4/2017    Attempting diet control first    Gestational diabetes mellitus (GDM) in third trimester 9/4/2017    Attempting diet control first  Formatting of this note might be different from the original. Overview:  Attempting diet control first     Nausea/vomiting in pregnancy 4/24/2017    Postpartum hemorrhage 11/16/2017    Third degree laceration of perineum during delivery, postpartum 11/15/2017       Past Surgical History   Past Surgical History:   Procedure Laterality Date    COLONOSCOPY N/A 10/31/2022    Procedure: COLONOSCOPY with biopsies;  Surgeon: Luis Miguel Traore MD;  Location: [facility] GI    COLOSTOMY N/A 11/6/2022    Procedure: CREATION, COLOSTOMY;  Surgeon: Chandana Carlos DO;  Location: [facility] [place]    LAPAROTOMY EXPLORATORY N/A 11/6/2022    Procedure: EXPLORATORY LAPAROTOMY SPLENIC FLEXURE MOBILIZATION;  Surgeon: Chandana Carlos DO;  Location: [facility] [place]    RECTAL PROLAPSE REPAIR N/A 11/6/2022    Procedure: RESECTION OF DESCENDING COLON;  Surgeon: Chandana Carlos DO;  Location: [facility] [place]       Prior to Admission Medications   Prior to Admission Medications   Prescriptions Last Dose Informant Patient Reported? Taking?   HUMIRA *CF* PEN 40 MG/0.4ML pen kit Past Week at 5/8/24  Yes Yes   Sig: Inject 0.4 mLs Subcutaneous every 14 days   azaTHIOprine (IMURAN) 50 MG tablet Past Week at not today  Yes Yes   Sig: Take 2 tablets by mouth daily   predniSONE (DELTASONE) 10 MG tablet 5/10/2024 at AM  No Yes   Sig: Take 4 tablets (40 mg) by mouth daily for 5 days, THEN 3 tablets (30 mg) daily for 7 days, THEN 2 tablets (20 mg) daily for 7 days, THEN 1 tablet (10 mg) daily for 7 days, THEN 0.5 tablets (5 mg) daily for 7 days.   sulfamethoxazole-trimethoprim (BACTRIM DS) 800-160 MG tablet 5/10/2024 at AM  No Yes   Sig: Take 1 tablet by mouth 2 times daily for 3 days      Facility-Administered Medications: None         Physical Exam   Vital Signs: Temp: 98  F (36.7  C) Temp src: Oral BP: 103/65 Pulse: 67   Resp: 20 SpO2: 100 % O2 Device: None (Room air)    Weight: 108 lbs 0 oz    General Appearance: lying in bed, appears fatigued and uncomfortable but non-toxic, alert, cooperative, NAD.  HEENT: Normocephalic, atraumatic.  EOMi. Dry mucous membranes.   Respiratory: No increased work of breathing. CTAB- no wheezing, crackles, rhonchi.   Cardiovascular: RRR, no murmurs. No peripheral edema.   Abdomen: Diffuse moderate TTP. Soft, nondistended. Colostomy at Q with non-bloody output.   Skin: Warm, dry. No rashes, jaundice, bruising.   Musculoskeletal: Moving extremities spontaneously.   Neurologic: No focal deficits.   Psychiatric: A&Ox3. Thought process linear and logical. Mood and affect normal.     Medical Decision Making       Please see A&P for additional details of medical decision making.      Data   ------------------------- PAST 24 HR DATA REVIEWED -----------------------------------------------

## 2024-05-10 NOTE — PROGRESS NOTES
[facility] - ICU    RN Progress Note:            Pertinent Assessments:      Please refer to flowsheet rows for full assessment     Hmong speaking pt AxOx4 on room air. RUE PICC c/d/I. Colostomy patent. Endorses abdominal pain and nausea, PRN IV dilaudid and PO zofran given to good effect. On IV zosyn. Levophed currently paused, previously on 0.2 mcg/min with good pressures. Potassium replaced, recheck at 8pm. SR on tele.            Key Events - This Shift:       Admitted to ICU at 1630.                  Barriers to Discharge / Downgrade:     Levophed.

## 2024-05-10 NOTE — PROCEDURES
"PICC Line Insertion Procedure Note    Pt. Name:   Dain Tejeda  MRN:          [id number]    Procedure:     Insertion of a  TRIPLE Lumen  5 fr  Bard SOLO (valved) Power PICC, Lot number [id number]    Indications: Multiple Infusions (incl vasopressor)    Contraindications : None    Procedure Details:    Patient identified with 2 identifiers and \"Time Out\" conducted.    Central line insertion bundle followed: Hand hygiene performed prior to procedure, site cleansed with Cholraprep (CHG), hat, mask, sterile gloves, sterile gown worn, patient draped with maximum barrier head to toe drape, sterile field maintained.    The right upper arm BASILIC vein was assessed by ultrasound and found to be anechoic, compressible, patent, and of adequate size.     Lidocaine 1% 2.5 ml administered SQ to the insertion site.     Modified Seldinger Technique (MST) used for insertion, ONE attempt(s) required to access vein. Imaging during access shows the needle within the vein.     PICC was advanced through peel-away sheath.    Catheter threaded without difficulty. The tip of the catheter was positioned in the SVC. Good blood return noted.    Catheter was flushed with 30 cc normal saline.     Catheter secured with Statlock, tissue adhesive (on insertion site only), Biopatch (CHG), and Tegaderm dressing applied.    The sharps that are included in the PICC insertion kit were accounted for and disposed of in the sharps container prior to breakdown of the sterile field.    CLABSI prevention brochure left at bedside.    Patient  tolerated procedure well.     Patient's primary RN notified PICC is ready for use.      Findings:    Total catheter length  37 cm, with 1 cm exposed. Mid upper arm circumference is 25 cm.     Tip placement verified in the distal SVC by [employer]:        Comments:  None        Gerardo Roldan, MSN, RN, [facility]   Vascular Access - [facility]      "

## 2024-05-10 NOTE — CONSULTS
CRITICAL CARE CONSULT:    5/10/2024  7:15 AM    CCx:Septic shock    HPI:Ms. Tejeda is a 43 year old lady with a past medical history significant for DM, Crohn's disease, anemia and a known intraabdominal abscess who presented to the ER on 5/9 with complaints of abdominal pain and discharged from the emergency department.  She presented this morning from home with worsening abdominal pain, vomiting and fevers.  She is noted to have a Duffy's pouch and had no evidence of bloody stool.  She had been discharged on a course of Bactrim when she had been seen the day before.  Upon arrival to the ED, she was noted to be hypotensive and administered normal saline for IV fluid resuscitation per the surviving sepsis guidelines.  Abdominal imaging subsequently revealed circumferential thickening of the transverse colon and splenic flexure leading to the ostomy which had progressed and now demonstrated a colonic obstruction.  She had a small sinus tract was noted to be developing from the anterior wall of the distal transverse colon with significant ileitis.  Did not demonstrate any evidence of leukocytosis (although is admittedly immunocompromise) but continued to be hypotensive and was initiated on norepinephrine.  It is noted that she was recently hospitalized at [facility] from 4/13 - 5/2 for a Crohn's flare and is chronically immunosuppressed with Humira and azathioprine.  She was treated with a prednisone taper and discharged, however, developed worsening abdominal pain with vomiting.    Past Medical History:  Diabetes mellitus.   Crohn's disease  Anemia  Intra-abdominal abscess    Past Surgical History:  Colostomy  Exploratory laparotomy  Repair of rectal prolapse    Social History:  Marital status:     Family History:  Reviewed in epic.    Allergies:  Reviewed in epic.    MAR Reviewed      ICU DAILY CHECKLIST                           Can patient transfer out of MICU? no    FAST HUG:    Feeding:  Feeding: No.   "Patient is receiving NPO    Chambers:No  Analgesia/Sedation:No   Thromboembolic prophylaxis: Yes; Mode:  Heparin  HOB>30:  Yes  Stress Ulcer Protocol Active: Yes; Mode: PPI  Glycemic Control: Any glucose > 180 yes; Mode of Insulin Therapy: Sliding Scale Insulin    INTUBATED:  Can patient have daily waking:  NA  Can patient have spontaneous breathing trial:  NA    Restraints? no    PHYSICAL THERAPY AND MOBILITY:  Can patient have PT and mobility trial: no  Activity: Bed Rest      Physical Exam:  Vent settings for last 24 hours:  Resp: 20      /63   Pulse 72   Temp 98  F (36.7  C) (Oral)   Resp 20   Ht 1.575 m (5' 2\")   Wt 49 kg (108 lb)   LMP 04/18/2024 (Exact Date)   SpO2 100%   BMI 19.75 kg/m      Intake/Output last 3 shifts:  No intake/output data recorded.  Intake/Output this shift:  I/O this shift:  In: 1368.33 [I.V.:268.33; IV Piggyback:1100]  Out: -     Physical Exam  Constitutional:       Appearance: She is ill-appearing.   HENT:      Head: Normocephalic.      Mouth/Throat:      Mouth: Mucous membranes are dry.   Cardiovascular:      Rate and Rhythm: Normal rate and regular rhythm.      Heart sounds: Normal heart sounds.   Pulmonary:      Effort: Pulmonary effort is normal.      Breath sounds: Normal breath sounds.   Abdominal:      General: Abdomen is flat.      Tenderness: There is abdominal tenderness. There is no guarding.      Comments: Ostomy bag in place. No evidence of blood.   Skin:     General: Skin is warm.   Neurological:      General: No focal deficit present.      Mental Status: She is alert and oriented to person, place, and time.         Lines/Drains/Tubes:  Peripheral IV 05/10/24 Distal;Right;Posterior Lower forearm (Active)   Site Assessment WDL 05/10/24 0742   Line Status Saline locked 05/10/24 0742   Dressing Transparent 05/10/24 0742   Dressing Status clean;dry;intact 05/10/24 0742   Phlebitis Scale 0-->no symptoms 05/10/24 0742   Number of days: 0       Peripheral IV 05/10/24 " Anterior;Distal;Left Lower forearm (Active)   Site Assessment [facility] 05/10/24 0909   Line Status Saline locked 05/10/24 0909   Dressing Transparent 05/10/24 0909   Dressing Status clean;dry;intact 05/10/24 0909   Phlebitis Scale 0-->no symptoms 05/10/24 0909   Number of days: 0       PICC 05/10/24 Triple Lumen Right Basilic Vasopressor (Active)   Site Assessment [facility] 05/10/24 1306   External Cath Length (cm) 1 cm 05/10/24 0002   Extremity Circumference (cm) 25 cm 05/10/24 0002   Dressing Chlorhexidine disk;Transparent;Securement device 05/10/24 1306   Dressing Status clean;dry;intact 05/10/24 1306   Dressing Change Due 05/17/24 05/10/24 0002   Line Necessity Yes, meets criteria 05/10/24 1306   Kendall - Status blood return noted;saline locked 05/10/24 1306   Gray - Intervention Flushed 05/10/24 1306   Red - Status blood return noted;saline locked 05/10/24 1306   Red - Intervention Flushed 05/10/24 1306   White - Status blood return noted;saline locked 05/10/24 1306   White - Intervention Flushed 05/10/24 1306   Infiltration? no 05/10/24 1306   PICC Comment OK to USE 05/10/24 1306   Number of days: 0       LAB:  ROUTINE ICU LABS (Last four results)  CMP  Recent Labs   Lab 05/10/24  0740 05/09/24  0808 05/04/24  0718 05/04/24  0233 05/03/24  2103    136 140  --  138   POTASSIUM 3.0* 3.3* 3.7  --  3.8   CHLORIDE 98 101 107  --  105   CO2 24 26 24  --  25   ANIONGAP 13 9 9  --  8   * 104* 146* 138* 148*   BUN 8.8 8.4 11.4  --  14.1   CR 0.68 0.65 0.51  --  0.64   GFRESTIMATED >90 >90 >90  --  >90   ALEX 8.2* 8.4* 7.9*  --  8.3*   MAG 1.9 1.9  --   --   --    PROTTOTAL 7.2 7.2  --   --  7.3   ALBUMIN 2.7* 2.7*  --   --  2.5*   BILITOTAL 0.4 0.6  --   --  <0.2   ALKPHOS 193* 186*  --   --  122   AST 17 34  --   --  17   ALT 23 31  --   --  11     CBC  Recent Labs   Lab 05/10/24  0740 05/09/24  0808 05/04/24  0718 05/03/24  2103   WBC 10.0 8.9 7.7 10.5   RBC 3.36* 3.34* 3.15* 3.22*   HGB 9.8* 9.9* 9.1* 9.5*   HCT  29.7* 30.2* 29.3* 29.6*   MCV 88 90 93 92   MCH 29.2 29.6 28.9 29.5   MCHC 33.0 32.8 31.1* 32.1   RDW 14.6 14.3 15.0 14.7    371 378 511*     INR  Recent Labs   Lab 05/03/24  2103   INR 1.04     Arterial Blood Gas  Recent Labs   Lab 05/03/24  2301   PH 7.39   PCO2 43   PO2 99   HCO3 26   O2PER 21         MICRO:  Date Source Organism   5/10 NP Swab Negative for Influenza A, B, RSV and Sars CoV2    Blood NGTD    Blood NGTD    Stool from ostomy pouch NGTD       Organism Antibiotic Antibiotic Start Date Antibiotic End Date   NGTD Pip-Tazo 5/10 Ongoing     IMAGING:  CT Abdomen Pelvis with Contrast 5/10/24:  1.  Significant progression of the acute inflammatory bowel disease. Changes are enumerated below. GI and/or colorectal consultation needed.  2.  Circumferential wall thickening of the transverse colon and splenic flexure leading to the ostomy has markedly progressed and now resulting in a colonic obstruction.  3.  There is at least a 10 cm long intramural abscess along the posterior wall of the proximal transverse colon.  4.  A small sinus tract is developing from the anterior wall of the distal transverse colon.  5.  Ascending colon and cecum are distended to 8.5 cm.  6.  Patulous ileocecal valve with long segment of back wash ileitis involving at least 25 cm of the distal ileum.  7.  Periportal edema has developed due to the progressive inflammatory state.  8.  Previously characterized simple left adnexal cyst again noted. Follow-up pelvic ultrasound in 6 months as noted before. Reference given below.  9.  Other noncritical findings as noted above.      [Urgent Result: Marked progression of her Crohn's colitis with colonic obstruction, long segment of intramural abscess, developing fistula and significant ileitis.    CT Abdomen Pelvis w/o Contrast 4/30/24:  1.  Prior resection of the distal colon and colostomy left anterior abdominal wall.  2.  Wall thickening and mucosal enhancement involving the ascending  and transverse colon as well as the distal and terminal ileum consistent with ongoing active inflammatory bowel disease.  3.  Large left adnexal cyst, unchanged. Please refer to prior pelvic ultrasound exam report for further discussion.    Assessment/Plan:  Dain Tejeda is a 43 year old lady with a past medical history significant for DM, Crohn's disease, anemia and a known intraabdominal abscess, presenting to the hospital for septic shock from an intraabdominal abscess with fistula formation.    NEURO:No focal findings. Of note, during recent hospitalization at the end of April, she did have an episode of unconsciousness and underwent a CT of the head including a perfusion study with contrast which was all noted to be unremarkable.  Manage pain with as needed hydromorphone.  CVS: Presented to the hospital with septic shock likely from intra-abdominal abscess.  She was treated with IV fluid resuscitation at 30 cc/kg and continued to be hypotensive necessitating the initiation of norepinephrine. She has no lactic acidosis.  Continue telemetry monitoring.  MAP goal of 60mmHg with SBP>90mmHg.  Vasopressors: Norepinephrine  Stress dose steroids  RESP:No acute issues  Maintain SpO2 of >92%.  Resp: 20    GI:Has a known medical history for Crohn's disease and is on Imuran and Humira. She was recently hospitalized at the end of April with a Crohn's flare and had been discharged on a prednisone taper. Imaging at the time was noted to have mucosal enhancement and thickening of the transverse colon. Upon re-imaging today, she is now noted to have progression of the mucosal thickening and colonic obstruction an abscess and fistula formation. IR, GI and surgery have reviewed imaging and no immediate intervention has been recommended.  Would make the patient NPO.  Initiate Protonix daily for ulcer prophylaxis.  Broad spectrum antibiotics.  RENAL :No acute issues.  Follow electrolytes and correct as abnormalities arise.  Follow urine  output.  ID:Has no leukocytosis but demonstrates an abscess and fistula in the transverse colon with associated septic shock.   Follow Blood, urine and stool cultures.  Antibiotics: Pip-Tazo.  HEMATOLOGIC:Noted to have anemia of chronic disease with thrombocytopenia/thrombocytosis.  Daily CBC.  ENDOCRINE:Known medical history of DM.  ICU insulin sliding scale.  ICU PROPHYLAXIS:Protonix, heparin.  DISPO:Unclear. Continues to require ICU levels of care.      Clinically Significant Risk Factors Present on Admission        # Hypokalemia: Lowest K = 3 mmol/L in last 2 days, will replace as needed       # Hypoalbuminemia: Lowest albumin = 2.7 g/dL at 5/10/2024  7:40 AM, will monitor as appropriate       # Circulatory Shock: required vasopressors within past 24 hours               # Anemia: based on hgb <11             Total Critical Care Time : 1 Hour 15 Minutes    Nancy Barreravi  Pulmonary and Critical Care  [id number]

## 2024-05-10 NOTE — ED TRIAGE NOTES
Patient reports 12 hours of vomiting and pain of 8 in left lower ABD. She has a colostomy on left lowe ABD that her  has stated is draining. Patient also has hX of diabetes.   Triage Assessment (Adult)       Row Name 05/10/24 0712          Triage Assessment    Airway WDL WDL        Respiratory WDL    Respiratory WDL WDL        Skin Circulation/Temperature WDL    Skin Circulation/Temperature WDL WDL        Cardiac WDL    Cardiac WDL WDL        Peripheral/Neurovascular WDL    Peripheral Neurovascular WDL WDL

## 2024-05-10 NOTE — CONSULTS
General surgery consult         ASSESSMENT     1. Sepsis, due to unspecified organism, unspecified whether acute organ dysfunction present (H)       43-year-old with a history of Crohn's disease presents with fever and abdominal pain.  She has evidence of inflammatory changes through her transverse colon and out to her colostomy.  There is concern for functional obstruction proximal to this inflamed region of the colon associated with an intramural abscess.  Patient is still passing stool out through her colostomy.      PLAN      Bowel rest  IV antibiotic  Consult interventional radiology to see if we can drain this intramural abscess  Work with gastroenterology to best treat this patient's inflammatory bowel disease         CHIEF COMPLAINT     Chief Complaint   Patient presents with    Vomiting         HPI     Dain Tejeda is a 43 year old female who presents with increasing abdominal pain and fever.  She is [place] 43[place]year[place] [facility] [place] history of Crohn's disease.  She has had surgery in the past and currently has a colostomy in the left upper quadrant of her abdomen.  She recently has become febrile with increasing abdominal pain and on CT scan she has an inflamed area of her transverse colon leading right out to the colostomy itself.  She also has distention of the more proximal colon.  At the area of the distention into the area that is more inflamed the patient has what appears to be an intramural abscess.         PAST MEDICAL HISTORY SURGICAL HISTORY     Past Medical History:   Diagnosis Date    Diet controlled gestational diabetes mellitus (GDM), antepartum 9/4/2017    Attempting diet control first    Gestational diabetes mellitus (GDM) in third trimester 9/4/2017    Attempting diet control first  Formatting of this note might be different from the original. Overview:  Attempting diet control first    Nausea/vomiting in pregnancy 4/24/2017    Postpartum hemorrhage 11/16/2017    Third degree laceration of  perineum during delivery, postpartum 11/15/2017    Past Surgical History:   Procedure Laterality Date    COLONOSCOPY N/A 10/31/2022    Procedure: COLONOSCOPY with biopsies;  Surgeon: Luis Miguel Traore MD;  Location: [facility] GI    COLOSTOMY N/A 11/6/2022    Procedure: CREATION, COLOSTOMY;  Surgeon: Chandana Carlos DO;  Location: [facility] OR    LAPAROTOMY EXPLORATORY N/A 11/6/2022    Procedure: EXPLORATORY LAPAROTOMY SPLENIC FLEXURE MOBILIZATION;  Surgeon: Chandana Carlos DO;  Location: [facility] OR    RECTAL PROLAPSE REPAIR N/A 11/6/2022    Procedure: RESECTION OF DESCENDING COLON;  Surgeon: Chandana Carlos DO;  Location: [facility] OR          CURRENT MEDICATIONS ALLERGIES     Current Outpatient Rx   Medication Sig Dispense Refill    azaTHIOprine (IMURAN) 50 MG tablet Take 2 tablets by mouth daily      HUMIRA *CF* PEN 40 MG/0.4ML pen kit Inject 0.4 mLs Subcutaneous every 14 days      predniSONE (DELTASONE) 10 MG tablet Take 4 tablets (40 mg) by mouth daily for 5 days, THEN 3 tablets (30 mg) daily for 7 days, THEN 2 tablets (20 mg) daily for 7 days, THEN 1 tablet (10 mg) daily for 7 days, THEN 0.5 tablets (5 mg) daily for 7 days. 66 tablet 0    sulfamethoxazole-trimethoprim (BACTRIM DS) 800-160 MG tablet Take 1 tablet by mouth 2 times daily for 3 days 6 tablet 0    Allergies   Allergen Reactions    Soy Allergy Anaphylaxis, Hives and Itching     Tofu- causes itching and sores in the mouth          FAMILY HISTORY     Family History   Problem Relation Age of Onset    Gestational Diabetes Sister     Gestational Diabetes Sister     Diabetes No family hx of     Coronary Artery Disease No family hx of     Hypertension No family hx of     Breast Cancer No family hx of     Colon Cancer No family hx of     Prostate Cancer No family hx of     Other Cancer No family hx of          SOCIAL HISTORY     Social History     Socioeconomic History    Marital status:      Spouse name: Dmitriy Camargo     "Number of children: 0    Years of education: 0    Highest education level: None   Occupational History    Occupation: None   Tobacco Use    Smoking status: Never    Smokeless tobacco: Never   Substance and Sexual Activity    Alcohol use: No    Drug use: No    Sexual activity: Yes     Partners: Male   Social History Narrative    Born in [facility], arrived to [facility] 02/[facility]. No education.      Social Determinants of Health     Interpersonal Safety: Low Risk  (3/8/2024)    Interpersonal Safety     Do you feel physically and emotionally safe where you currently live?: Yes     Within the past 12 months, have you been hit, slapped, kicked or otherwise physically hurt by someone?: No     Within the past 12 months, have you been humiliated or emotionally abused in other ways by your partner or ex-partner?: No         REVIEW OF SYSTEMS       12 point review of systems are unremarkable except for the symptoms described in the HPI    PHYSICAL EXAM     VITAL SIGNS: /67   Pulse 90   Temp (!) 102  F (38.9  C) (Oral)   Resp 20   Ht 1.575 m (5' 2\")   Wt 49 kg (108 lb)   LMP 04/18/2024 (Exact Date)   SpO2 99%   BMI 19.75 kg/m     General : Alert, cooperative, appears stated age   Skin: Skin color, texture, turgor normal, no rashes or lesions   Lymph nodes: No obvious adenopathy   Head: Normocephalic, without obvious abnormality, atraumatic   HEENT:  conjunctiva/corneas clear, EOM's intact, no scleral icterus   Throat: Lips, mucosa, and tongue normal; teeth and gums normal    Neck: Supple, thyroid not enlarged   Back: No CVA tenderness   Lungs: Clear to auscultation bilaterally, respirations unlabored, no rales, rhonchi or wheezes   Chest Wall:No tenderness or deformity   Heart: Regular rate and rhythm, S1, S2 normal, no murmur, rub or gallop   Abdomen: Soft, tender to palpation on the right side, no guarding, + bowel sounds active, she has a prominent keloid scar running down her midline  Extremities : No obvious " swelling,  Neurologic: Cranial Nerves II-XII normal, moves all extremities equally, no focal findings          RADIOLOGY      CT Abdomen Pelvis w Contrast   Final Result   Abnormal   IMPRESSION:    1.  Significant progression of the acute inflammatory bowel disease. Changes are enumerated below. GI and/or colorectal consultation needed.   2.  Circumferential wall thickening of the transverse colon and splenic flexure leading to the ostomy has markedly progressed and now resulting in a colonic obstruction.   3.  There is at least a 10 cm long intramural abscess along the posterior wall of the proximal transverse colon.   4.  A small sinus tract is developing from the anterior wall of the distal transverse colon.   5.  Ascending colon and cecum are distended to 8.5 cm.   6.  Patulous ileocecal valve with long segment of back wash ileitis involving at least 25 cm of the distal ileum.   7.  Periportal edema has developed due to the progressive inflammatory state.   8.  Previously characterized simple left adnexal cyst again noted. Follow-up pelvic ultrasound in 6 months as noted before. Reference given below.   9.  Other noncritical findings as noted above.       [Urgent Result: Marked progression of her Crohn's colitis with colonic obstruction, long segment of intramural abscess, developing fistula and significant ileitis.      Finding was identified on 5/10/2024 8:49 AM CDT.       Hilda Wade was contacted by me on 5/10/2024 8:52 AM CDT and verbalized understanding of the critical result.      Premenopausal asymptomatic simple cyst:      >5 to <= 7 cm: Benign simple cyst. Clinically inconsequential finding. Follow-up pelvic ultrasound in 6 months is recommended.            REFERENCE:   Management of Asymptomatic Ovarian and Other Adnexal Cysts Imaged at US: Society of Radiologists in Ultrasound Consensus Conference Statement. Radiology September 2010; [id number].      Simple Adnexal Cysts: SRU Consensus Conference  Update on Follow-up and Reporting. Radiology September 2019. [id number].          EKG     Reviewed        LABS     Results for orders placed or performed during the hospital encounter of 05/10/24   CT Abdomen Pelvis w Contrast   Result Value Ref Range    Radiologist flags (Urgent)      Marked progression of her Crohn's colitis with colonic obstruction, long segment of intramural abscess, developing fistula and significant ileitis.    Impression    IMPRESSION:   1.  Significant progression of the acute inflammatory bowel disease. Changes are enumerated below. GI and/or colorectal consultation needed.  2.  Circumferential wall thickening of the transverse colon and splenic flexure leading to the ostomy has markedly progressed and now resulting in a colonic obstruction.  3.  There is at least a 10 cm long intramural abscess along the posterior wall of the proximal transverse colon.  4.  A small sinus tract is developing from the anterior wall of the distal transverse colon.  5.  Ascending colon and cecum are distended to 8.5 cm.  6.  Patulous ileocecal valve with long segment of back wash ileitis involving at least 25 cm of the distal ileum.  7.  Periportal edema has developed due to the progressive inflammatory state.  8.  Previously characterized simple left adnexal cyst again noted. Follow-up pelvic ultrasound in 6 months as noted before. Reference given below.  9.  Other noncritical findings as noted above.     [Urgent Result: Marked progression of her Crohn's colitis with colonic obstruction, long segment of intramural abscess, developing fistula and significant ileitis.    Finding was identified on 5/10/2024 8:49 AM CDT.     Hilda Wade was contacted by me on 5/10/2024 8:52 AM CDT and verbalized understanding of the critical result.    Premenopausal asymptomatic simple cyst:    >5 to <= 7 cm: Benign simple cyst. Clinically inconsequential finding. Follow-up pelvic ultrasound in 6 months is  recommended.        REFERENCE:  Management of Asymptomatic Ovarian and Other Adnexal Cysts Imaged at US: Society of Radiologists in Ultrasound Consensus Conference Statement. Radiology September 2010; [id number].    Simple Adnexal Cysts: SRU Consensus Conference Update on Follow-up and Reporting. Radiology September 2019. [id number].   Basic metabolic panel   Result Value Ref Range    Sodium 135 135 - 145 mmol/L    Potassium 3.0 (L) 3.4 - 5.3 mmol/L    Chloride 98 98 - 107 mmol/L    Carbon Dioxide (CO2) 24 22 - 29 mmol/L    Anion Gap 13 7 - 15 mmol/L    Urea Nitrogen 8.8 6.0 - 20.0 mg/dL    Creatinine 0.68 0.51 - 0.95 mg/dL    GFR Estimate >90 >60 mL/min/1.73m2    Calcium 8.2 (L) 8.6 - 10.0 mg/dL    Glucose 125 (H) 70 - 99 mg/dL   Hepatic function panel   Result Value Ref Range    Protein Total 7.2 6.4 - 8.3 g/dL    Albumin 2.7 (L) 3.5 - 5.2 g/dL    Bilirubin Total 0.4 <=1.2 mg/dL    Alkaline Phosphatase 193 (H) 40 - 150 U/L    AST 17 0 - 45 U/L    ALT 23 0 - 50 U/L    Bilirubin Direct <0.20 0.00 - 0.30 mg/dL   Lactic acid whole blood   Result Value Ref Range    Lactic Acid 0.9 0.7 - 2.0 mmol/L   CBC with platelets and differential   Result Value Ref Range    WBC Count 10.0 4.0 - 11.0 10e3/uL    RBC Count 3.36 (L) 3.80 - 5.20 10e6/uL    Hemoglobin 9.8 (L) 11.7 - 15.7 g/dL    Hematocrit 29.7 (L) 35.0 - 47.0 %    MCV 88 78 - 100 fL    MCH 29.2 26.5 - 33.0 pg    MCHC 33.0 31.5 - 36.5 g/dL    RDW 14.6 10.0 - 15.0 %    Platelet Count 375 150 - 450 10e3/uL    % Neutrophils 85 %    % Lymphocytes 5 %    % Monocytes 9 %    % Eosinophils 0 %    % Basophils 0 %    % Immature Granulocytes 1 %    NRBCs per 100 WBC 0 <1 /100    Absolute Neutrophils 8.6 (H) 1.6 - 8.3 10e3/uL    Absolute Lymphocytes 0.5 (L) 0.8 - 5.3 10e3/uL    Absolute Monocytes 0.9 0.0 - 1.3 10e3/uL    Absolute Eosinophils 0.0 0.0 - 0.7 10e3/uL    Absolute Basophils 0.0 0.0 - 0.2 10e3/uL    Absolute Immature Granulocytes 0.1 <=0.4 10e3/uL    Absolute NRBCs 0.0  10e3/uL   Symptomatic Influenza A/B, RSV, & SARS-CoV2 PCR (COVID-19) Nose    Specimen: Nose; Swab   Result Value Ref Range    Influenza A PCR Negative Negative    Influenza B PCR Negative Negative    RSV PCR Negative Negative    SARS CoV2 PCR Negative Negative   Result Value Ref Range    Magnesium 1.9 1.7 - 2.3 mg/dL           [facility]  [phone]

## 2024-05-10 NOTE — ED NOTES
"[facility] ED Handoff Report    ED Chief Complaint: Vomiting     ED Diagnosis:  (A41.9) Sepsis, due to unspecified organism, unspecified whether acute organ dysfunction present (H)  Comment:   Plan:        PMH:    Past Medical History:   Diagnosis Date    Diet controlled gestational diabetes mellitus (GDM), antepartum 9/4/2017    Attempting diet control first    Gestational diabetes mellitus (GDM) in third trimester 9/4/2017    Attempting diet control first  Formatting of this note might be different from the original. Overview:  Attempting diet control first    Nausea/vomiting in pregnancy 4/24/2017    Postpartum hemorrhage 11/16/2017    Third degree laceration of perineum during delivery, postpartum 11/15/2017        Code Status:  Full Code     Falls Risk: No Band: Not applicable    Current Living Situation/Residence: lives with a significant other     Elimination Status: Continent: colostomy     Activity Level: Independent    Patients Preferred Language:  Other: Hmong      Needed: Yes    Vital Signs:  BP 90/59   Pulse 89   Temp (!) 102  F (38.9  C) (Oral)   Resp 20   Ht 1.575 m (5' 2\")   Wt 49 kg (108 lb)   LMP 04/18/2024 (Exact Date)   SpO2 98%   BMI 19.75 kg/m       Cardiac Rhythm:     Pain Score: 3/10    Is the Patient Confused:  No    Last Food or Drink: 5/9/24 at     Focused Assessment:  Abdominal, Patient has an ostomy bag. Complains of abdominal pain for the last several days. Vomiting for the last 12 hours. No vomiting on this admission. Pain has improved. C-diff results pending     Tests Performed: Done: Labs and Imaging    Treatments Provided:  fluids, meds     Family Dynamics/Concerns: No    Family Updated On Visitor Policy: Yes    Plan of Care Communicated to Family: Yes    Who Was Updated about Plan of Care:  was at bedside     Belongings Checklist Done and Signed by Patient: Yes    Medications sent with patient: no     Covid: symptomatic, negative    Additional " Information:     RN: Jackeline Sheffield RN 5/10/2024 10:42 AM

## 2024-05-11 LAB
ANION GAP SERPL CALCULATED.3IONS-SCNC: 12 MMOL/L (ref 7–15)
ATRIAL RATE - MUSE: 50 BPM
BUN SERPL-MCNC: 10.5 MG/DL (ref 6–20)
CALCIUM SERPL-MCNC: 6.8 MG/DL (ref 8.6–10)
CHLORIDE SERPL-SCNC: 114 MMOL/L (ref 98–107)
CREAT SERPL-MCNC: 0.45 MG/DL (ref 0.51–0.95)
DEPRECATED HCO3 PLAS-SCNC: 15 MMOL/L (ref 22–29)
DIASTOLIC BLOOD PRESSURE - MUSE: NORMAL MMHG
EGFRCR SERPLBLD CKD-EPI 2021: >90 ML/MIN/1.73M2
ERYTHROCYTE [DISTWIDTH] IN BLOOD BY AUTOMATED COUNT: 15.2 % (ref 10–15)
GLUCOSE BLDC GLUCOMTR-MCNC: 157 MG/DL (ref 70–99)
GLUCOSE SERPL-MCNC: 105 MG/DL (ref 70–99)
HCT VFR BLD AUTO: 25.5 % (ref 35–47)
HGB BLD-MCNC: 8 G/DL (ref 11.7–15.7)
INTERPRETATION ECG - MUSE: NORMAL
MAGNESIUM SERPL-MCNC: 1.8 MG/DL (ref 1.7–2.3)
MCH RBC QN AUTO: 29 PG (ref 26.5–33)
MCHC RBC AUTO-ENTMCNC: 31.4 G/DL (ref 31.5–36.5)
MCV RBC AUTO: 92 FL (ref 78–100)
P AXIS - MUSE: 47 DEGREES
PHOSPHATE SERPL-MCNC: 2.7 MG/DL (ref 2.5–4.5)
PLATELET # BLD AUTO: 269 10E3/UL (ref 150–450)
POTASSIUM SERPL-SCNC: 3.1 MMOL/L (ref 3.4–5.3)
POTASSIUM SERPL-SCNC: 3.1 MMOL/L (ref 3.4–5.3)
POTASSIUM SERPL-SCNC: 3.3 MMOL/L (ref 3.4–5.3)
POTASSIUM SERPL-SCNC: 4 MMOL/L (ref 3.4–5.3)
PR INTERVAL - MUSE: 152 MS
QRS DURATION - MUSE: 72 MS
QT - MUSE: 498 MS
QTC - MUSE: 454 MS
R AXIS - MUSE: 67 DEGREES
RBC # BLD AUTO: 2.76 10E6/UL (ref 3.8–5.2)
SODIUM SERPL-SCNC: 141 MMOL/L (ref 135–145)
SYSTOLIC BLOOD PRESSURE - MUSE: NORMAL MMHG
T AXIS - MUSE: 61 DEGREES
VENTRICULAR RATE- MUSE: 50 BPM
WBC # BLD AUTO: 5.7 10E3/UL (ref 4–11)

## 2024-05-11 PROCEDURE — 93005 ELECTROCARDIOGRAM TRACING: CPT

## 2024-05-11 PROCEDURE — 250N000009 HC RX 250: Performed by: INTERNAL MEDICINE

## 2024-05-11 PROCEDURE — 84100 ASSAY OF PHOSPHORUS: CPT | Performed by: INTERNAL MEDICINE

## 2024-05-11 PROCEDURE — 80048 BASIC METABOLIC PNL TOTAL CA: CPT

## 2024-05-11 PROCEDURE — 250N000011 HC RX IP 250 OP 636: Performed by: INTERNAL MEDICINE

## 2024-05-11 PROCEDURE — 84132 ASSAY OF SERUM POTASSIUM: CPT

## 2024-05-11 PROCEDURE — 85027 COMPLETE CBC AUTOMATED: CPT

## 2024-05-11 PROCEDURE — 84132 ASSAY OF SERUM POTASSIUM: CPT | Performed by: INTERNAL MEDICINE

## 2024-05-11 PROCEDURE — C9113 INJ PANTOPRAZOLE SODIUM, VIA: HCPCS | Performed by: INTERNAL MEDICINE

## 2024-05-11 PROCEDURE — 99291 CRITICAL CARE FIRST HOUR: CPT | Performed by: INTERNAL MEDICINE

## 2024-05-11 PROCEDURE — 93010 ELECTROCARDIOGRAM REPORT: CPT | Performed by: INTERNAL MEDICINE

## 2024-05-11 PROCEDURE — 99231 SBSQ HOSP IP/OBS SF/LOW 25: CPT | Performed by: SURGERY

## 2024-05-11 PROCEDURE — 258N000003 HC RX IP 258 OP 636

## 2024-05-11 PROCEDURE — 250N000011 HC RX IP 250 OP 636

## 2024-05-11 PROCEDURE — 258N000003 HC RX IP 258 OP 636: Performed by: INTERNAL MEDICINE

## 2024-05-11 PROCEDURE — 83735 ASSAY OF MAGNESIUM: CPT | Performed by: FAMILY MEDICINE

## 2024-05-11 PROCEDURE — 200N000001 HC R&B ICU

## 2024-05-11 RX ORDER — ATROPINE SULFATE 0.1 MG/ML
1 INJECTION INTRAVENOUS
Status: DISCONTINUED | OUTPATIENT
Start: 2024-05-11 | End: 2024-05-21 | Stop reason: HOSPADM

## 2024-05-11 RX ORDER — SODIUM CHLORIDE AND POTASSIUM CHLORIDE 150; 900 MG/100ML; MG/100ML
INJECTION, SOLUTION INTRAVENOUS CONTINUOUS
Status: CANCELLED | OUTPATIENT
Start: 2024-05-11

## 2024-05-11 RX ORDER — POTASSIUM CHLORIDE 1500 MG/1
20 TABLET, EXTENDED RELEASE ORAL ONCE
Qty: 1 TABLET | Refills: 0 | Status: DISCONTINUED | OUTPATIENT
Start: 2024-05-11 | End: 2024-05-11

## 2024-05-11 RX ORDER — CALCIUM CHLORIDE 100 MG/ML
1 INJECTION INTRAVENOUS; INTRAVENTRICULAR ONCE
Status: COMPLETED | OUTPATIENT
Start: 2024-05-11 | End: 2024-05-11

## 2024-05-11 RX ORDER — POTASSIUM CHLORIDE 7.45 MG/ML
10 INJECTION INTRAVENOUS
Qty: 200 ML | Refills: 0 | Status: COMPLETED | OUTPATIENT
Start: 2024-05-11 | End: 2024-05-11

## 2024-05-11 RX ORDER — DEXTROSE MONOHYDRATE 100 MG/ML
INJECTION, SOLUTION INTRAVENOUS CONTINUOUS PRN
Status: DISCONTINUED | OUTPATIENT
Start: 2024-05-11 | End: 2024-05-21 | Stop reason: HOSPADM

## 2024-05-11 RX ADMIN — POTASSIUM CHLORIDE 10 MEQ: 7.46 INJECTION, SOLUTION INTRAVENOUS at 14:23

## 2024-05-11 RX ADMIN — HEPARIN SODIUM 5000 UNITS: 10000 INJECTION, SOLUTION INTRAVENOUS; SUBCUTANEOUS at 21:34

## 2024-05-11 RX ADMIN — HYDROCORTISONE SODIUM SUCCINATE 50 MG: 100 INJECTION, POWDER, FOR SOLUTION INTRAMUSCULAR; INTRAVENOUS at 13:06

## 2024-05-11 RX ADMIN — PIPERACILLIN AND TAZOBACTAM 3.38 G: 3; .375 INJECTION, POWDER, FOR SOLUTION INTRAVENOUS at 14:23

## 2024-05-11 RX ADMIN — CALCIUM CHLORIDE 1 G: 100 INJECTION INTRAVENOUS; INTRAVENTRICULAR at 08:20

## 2024-05-11 RX ADMIN — HEPARIN SODIUM 5000 UNITS: 10000 INJECTION, SOLUTION INTRAVENOUS; SUBCUTANEOUS at 13:18

## 2024-05-11 RX ADMIN — POTASSIUM CHLORIDE 10 MEQ: 7.46 INJECTION, SOLUTION INTRAVENOUS at 05:16

## 2024-05-11 RX ADMIN — SODIUM BICARBONATE 50 MEQ: 84 INJECTION, SOLUTION INTRAVENOUS at 08:51

## 2024-05-11 RX ADMIN — HEPARIN SODIUM 5000 UNITS: 10000 INJECTION, SOLUTION INTRAVENOUS; SUBCUTANEOUS at 05:16

## 2024-05-11 RX ADMIN — ONDANSETRON 4 MG: 4 TABLET, ORALLY DISINTEGRATING ORAL at 10:32

## 2024-05-11 RX ADMIN — SODIUM CHLORIDE: 9 INJECTION, SOLUTION INTRAVENOUS at 06:55

## 2024-05-11 RX ADMIN — PANTOPRAZOLE SODIUM 40 MG: 40 INJECTION, POWDER, FOR SOLUTION INTRAVENOUS at 09:05

## 2024-05-11 RX ADMIN — HYDROCORTISONE SODIUM SUCCINATE 50 MG: 100 INJECTION, POWDER, FOR SOLUTION INTRAMUSCULAR; INTRAVENOUS at 19:54

## 2024-05-11 RX ADMIN — SODIUM CHLORIDE: 9 INJECTION, SOLUTION INTRAVENOUS at 17:22

## 2024-05-11 RX ADMIN — MAGNESIUM SULFATE HEPTAHYDRATE: 500 INJECTION, SOLUTION INTRAMUSCULAR; INTRAVENOUS at 19:54

## 2024-05-11 RX ADMIN — PIPERACILLIN AND TAZOBACTAM 3.38 G: 3; .375 INJECTION, POWDER, FOR SOLUTION INTRAVENOUS at 06:11

## 2024-05-11 RX ADMIN — POTASSIUM CHLORIDE 10 MEQ: 7.46 INJECTION, SOLUTION INTRAVENOUS at 06:11

## 2024-05-11 RX ADMIN — PIPERACILLIN AND TAZOBACTAM 3.38 G: 3; .375 INJECTION, POWDER, FOR SOLUTION INTRAVENOUS at 22:27

## 2024-05-11 RX ADMIN — POTASSIUM CHLORIDE 10 MEQ: 7.46 INJECTION, SOLUTION INTRAVENOUS at 13:18

## 2024-05-11 RX ADMIN — HYDROCORTISONE SODIUM SUCCINATE 100 MG: 100 INJECTION, POWDER, FOR SOLUTION INTRAMUSCULAR; INTRAVENOUS at 04:30

## 2024-05-11 ASSESSMENT — ACTIVITIES OF DAILY LIVING (ADL)
ADLS_ACUITY_SCORE: 23
ADLS_ACUITY_SCORE: 40
ADLS_ACUITY_SCORE: 23
ADLS_ACUITY_SCORE: 27
ADLS_ACUITY_SCORE: 23

## 2024-05-11 NOTE — CONSULTS
CLINICAL NUTRITION SERVICES - ASSESSMENT NOTE     Nutrition Prescription    RECOMMENDATIONS FOR MDs/PROVIDERS TO ORDER:      Malnutrition Status:    unable    Recommendations already ordered by Registered Dietitian (RD):  Custom TPN:  53 g AA, 130 g DEX at 50 ml/hr continuous.  Plan Omegaven lipid starting Monday.    Future/Additional Recommendations:  Monitor and adjust TPN daily     REASON FOR ASSESSMENT  Dain Tejeda is a/an 43 year old female assessed by the dietitian for Pharmacy/Nutrition to Start and Manage PN    NUTRITION HISTORY  Patient admitted with septic shock and intraabdominal abscess with fistula formation.  History of DM, Crohn's disease, partial colectomy and colostomy 2022, anemia, Celiac.  Unable to reach  today, unable to obtain po and weight history from patient     CURRENT NUTRITION ORDERS  Diet: NPO 5/10/24    Food allergy:  Soy, Gluten  due to celiac.    LABS  CMP  Recent Labs   Lab 05/11/24 0427 05/10/24  2058 05/10/24  2037 05/10/24  1729 05/10/24  1425 05/10/24  0740 05/09/24  0808     --   --   --   --  135 136   POTASSIUM 3.1*  3.1* 3.3*  --   --  3.1* 3.0* 3.3*   *  --  97 96  --  125* 104*   BUN 10.5  --   --   --   --  8.8 8.4   CR 0.45*  --   --   --   --  0.68 0.65   ALEX 6.8*  --   --   --   --  8.2* 8.4*   MAG 1.8  --   --   --   --  1.9 1.9   PHOS 2.7  --   --   --   --   --   --    ALBUMIN  --   --   --   --   --  2.7* 2.7*   BILITOTAL  --   --   --   --   --  0.4 0.6   ALKPHOS  --   --   --   --   --  193* 186*   AST  --   --   --   --   --  17 34   ALT  --   --   --   --   --  23 31     Labs reviewed    MEDICATIONS  Current Facility-Administered Medications   Medication Dose Route Frequency Provider Last Rate Last Admin    [Held by provider] azaTHIOprine (IMURAN) tablet 100 mg  100 mg Oral Daily Jacek Suggs MD        heparin ANTICOAGULANT injection 5,000 Units  5,000 Units Subcutaneous Q8H Nancy Robles MD   5,000 Units at 05/11/24 0516     hydrocortisone sodium succinate PF (solu-CORTEF) injection 50 mg  50 mg Intravenous Q8H Nancy Robles MD        pantoprazole (PROTONIX) IV push injection 40 mg  40 mg Intravenous Daily with breakfast Nancy Robles MD   40 mg at 05/11/24 0905    piperacillin-tazobactam (ZOSYN) 3.375 g vial to attach to  mL bag  3.375 g Intravenous Q8H Jacek Suggs MD   3.375 g at 05/11/24 0611    [Held by provider] predniSONE (DELTASONE) tablet 30 mg  30 mg Oral Daily Jacek Suggs MD        sodium chloride (PF) 0.9% PF flush 3 mL  3 mL Intracatheter Q8H Jacek Suggs MD   3 mL at 05/11/24 0907      Current Facility-Administered Medications   Medication Dose Route Frequency Provider Last Rate Last Admin    norepinephrine (LEVOPHED) 4 mg in  mL infusion PREMIX  0.01-0.6 mcg/kg/min Intravenous Continuous Nancy Robles MD 3.3 mL/hr at 05/11/24 0909 0.02 mcg/kg/min at 05/11/24 0909    sodium chloride 0.9 % infusion   Intravenous Continuous Jacek Suggs  mL/hr at 05/11/24 0655 New Bag at 05/11/24 0655      Current Facility-Administered Medications   Medication Dose Route Frequency Provider Last Rate Last Admin    acetaminophen (TYLENOL) tablet 650 mg  650 mg Oral Q4H PRN Jacek Suggs MD        Or    acetaminophen (TYLENOL) Suppository 650 mg  650 mg Rectal Q4H PRN Jacek Suggs MD        atropine injection 1 mg  1 mg Intravenous Once PRN Nancy Robles MD        calcium carbonate (TUMS) chewable tablet 1,000 mg  1,000 mg Oral 4x Daily PRN Jacek Suggs MD        glucose gel 15-30 g  15-30 g Oral Q15 Min PRN Nancy Robles MD        Or    dextrose 50 % injection 25-50 mL  25-50 mL Intravenous Q15 Min PRN Nancy Robles MD        Or    glucagon injection 1 mg  1 mg Subcutaneous Q15 Min PRN Nancy Robles MD        HYDROmorphone (DILAUDID) injection 0.2 mg  0.2 mg Intravenous Q2H PRN Jacek Suggs MD   0.2 mg at 05/10/24 1631    HYDROmorphone (DILAUDID) injection 0.4 mg  0.4 mg Intravenous Q2H PRN Ifeanyi,  MD Jacek        lidocaine 1 % 0.1-1 mL  0.1-1 mL Other Q1H PRN Jacek Suggs MD        lidocaine 1 % 0.1-5 mL  0.1-5 mL Other Q1H PRN Hilda Wade MD   2.5 mL at 05/10/24 1240    naloxone (NARCAN) injection 0.2 mg  0.2 mg Intravenous Q2 Min PRN Td Padilla MD        Or    naloxone (NARCAN) injection 0.4 mg  0.4 mg Intravenous Q2 Min PRN Td Padilla MD        Or    naloxone (NARCAN) injection 0.2 mg  0.2 mg Intramuscular Q2 Min PRN Td Padilla MD        Or    naloxone (NARCAN) injection 0.4 mg  0.4 mg Intramuscular Q2 Min PRN Td Padilla MD        ondansetron (ZOFRAN ODT) ODT tab 4 mg  4 mg Oral Q6H PRN Jacek Suggs MD   4 mg at 05/10/24 1705    Or    ondansetron (ZOFRAN) injection 4 mg  4 mg Intravenous Q6H PRN Jacek Suggs MD        ondansetron (ZOFRAN ODT) ODT tab 4 mg  4 mg Oral Q6H PRN Nancy Robles MD   4 mg at 05/11/24 1032    Or    ondansetron (ZOFRAN) injection 4 mg  4 mg Intravenous Q6H PRN Nancy Robles MD        prochlorperazine (COMPAZINE) injection 10 mg  10 mg Intravenous Q6H PRN Jacek Suggs MD        Or    prochlorperazine (COMPAZINE) tablet 10 mg  10 mg Oral Q6H PRN Jacek Suggs MD        Or    prochlorperazine (COMPAZINE) suppository 25 mg  25 mg Rectal Q12H PRN Jacek Suggs MD        senna-docusate (SENOKOT-S/PERICOLACE) 8.6-50 MG per tablet 1 tablet  1 tablet Oral BID PRN Jacek Suggs MD        Or    senna-docusate (SENOKOT-S/PERICOLACE) 8.6-50 MG per tablet 2 tablet  2 tablet Oral BID PRN Jacek Suggs MD        senna-docusate (SENOKOT-S/PERICOLACE) 8.6-50 MG per tablet 1 tablet  1 tablet Oral BID PRN Nancy Robles MD        Or    senna-docusate (SENOKOT-S/PERICOLACE) 8.6-50 MG per tablet 2 tablet  2 tablet Oral BID PRN Nancy Robles MD        sodium chloride (PF) 0.9% PF flush 10-40 mL  10-40 mL Intracatheter Once PRN Hilda Wade MD        sodium chloride (PF) 0.9% PF flush 3 mL  3 mL Intracatheter q1 min prn Jacek Suggs MD         "    Medications reviewed    ANTHROPOMETRICS  Height: 157.5 cm (5' 2\")  Most Recent Weight: 44 kg (97 lb)    BMI: Underweight BMI <18.5  Weight History:   Wt Readings from Last 10 Encounters:   05/10/24 44 kg (97 lb)   05/09/24 42 kg (92 lb 11.2 oz)   04/30/24 45.8 kg (101 lb)   04/22/24 45.8 kg (101 lb)   03/14/24 45.8 kg (101 lb)   03/08/24 46.3 kg (102 lb 1.9 oz)   08/09/23 43.1 kg (95 lb)   04/14/23 45.5 kg (100 lb 6.4 oz)   03/29/23 48.1 kg (106 lb)   02/15/23 49.4 kg (109 lb)   Down 3.9% < 2 wks.    Dosing Weight: 44 kg    ASSESSED NUTRITION NEEDS  Estimated Energy Needs: 6286-6255 kcals/day (25 - 30 kcals/kg)  Justification: Repletion  Estimated Protein Needs: 53-66 grams protein/day (1.2 - 1.5 grams of pro/kg)  Justification: Increased needs  Estimated Fluid Needs: 2802-3421 mL/day (1 mL/kcal)   Justification: Maintenance    PHYSICAL FINDINGS  See malnutrition section below.  Colostomy 550 ml out 5/10/24.  200 ml out so far today.   Abdominal CT 5/10/24:  Marked progression of her Crohn's colitis with colonic obstruction, long segment of intramural abscess, developing fistula and significant ileitis       MALNUTRITION:  % Weight Loss:  > 2% in 1 week (severe malnutrition).  3.9% < 2wks.  % Intake:  Unable today.  Unable to reach   Subcutaneous Fat Loss:  unable today  Muscle Loss:  unable today  Fluid Retention:  None noted per chart    Malnutrition Diagnosis: Unable to determine due to need to complete assessment with patient and assist of .    NUTRITION DIAGNOSIS  Altered GI function related to acute illness as evidenced by intraabdominal abscess with fistula formation, need for TPN     INTERVENTIONS  Implementation  Nutrition Education: unable to day.   Parenteral Nutrition/IV Fluids - will need Omegaven lipid due to allergies.  Will hold off on starting lipid until Monday.    53 g AA,  130 g DEX at 50 ml/hr continuous.  This to provide 1200 ml fluid, 654 kcals, 53 g protein, 130 g " carb.    Monitor and adjust TPN daily.     Goals  Tolerate Tpn  Meet nutrition needs  Electrolytes WNL     Monitoring/Evaluation  Progress toward goals will be monitored and evaluated per protocol.

## 2024-05-11 NOTE — PLAN OF CARE
Problem: Adult Inpatient Plan of Care  Goal: Absence of Hospital-Acquired Illness or Injury  Outcome: Progressing  Intervention: Identify and Manage Fall Risk  Recent Flowsheet Documentation  Taken 5/11/2024 0000 by Rajni Najera RN  Safety Promotion/Fall Prevention:   activity supervised   nonskid shoes/slippers when out of bed   lighting adjusted   clutter free environment maintained   bedside attendant   safety round/check completed   treat underlying cause  Taken 5/10/2024 2000 by Rajni Najera RN  Safety Promotion/Fall Prevention:   activity supervised   nonskid shoes/slippers when out of bed   lighting adjusted   clutter free environment maintained   bedside attendant   safety round/check completed   treat underlying cause  Intervention: Prevent Skin Injury  Recent Flowsheet Documentation  Taken 5/11/2024 0000 by Rajni Najera RN  Body Position: position changed independently  Taken 5/10/2024 2000 by Rajni Najera RN  Body Position: position changed independently  Intervention: Prevent Infection  Recent Flowsheet Documentation  Taken 5/11/2024 0000 by Rajni Najera RN  Infection Prevention:   hand hygiene promoted   rest/sleep promoted  Taken 5/10/2024 2000 by Rajni Najera RN  Infection Prevention:   hand hygiene promoted   rest/sleep promoted  Goal: Optimal Comfort and Wellbeing  Outcome: Progressing  Intervention: Monitor Pain and Promote Comfort  Recent Flowsheet Documentation  Taken 5/10/2024 2000 by Rajni Najera RN  Pain Management Interventions: declines     Problem: Fall Injury Risk  Goal: Absence of Fall and Fall-Related Injury  Outcome: Progressing  Intervention: Promote Injury-Free Environment  Recent Flowsheet Documentation  Taken 5/11/2024 0000 by Rajni Najera RN  Safety Promotion/Fall Prevention:   activity supervised   nonskid shoes/slippers when out of bed   lighting adjusted   clutter free environment maintained   bedside attendant   safety round/check completed    treat underlying cause  Taken 5/10/2024 2000 by Rajni Najera RN  Safety Promotion/Fall Prevention:   activity supervised   nonskid shoes/slippers when out of bed   lighting adjusted   clutter free environment maintained   bedside attendant   safety round/check completed   treat underlying cause   Goal Outcome Evaluation:       Pt states pain better controlled now than prior to admission. Appears to sleep between cares, appears comfortable. Continues to require levophed to maintain MAP >65.  Will continue to monitor. Assessment done with help of [employer]  via [employer].     Rajni Khan RN

## 2024-05-11 NOTE — PROGRESS NOTES
General Surgery Progress Note  Hospital Day # 1    Subjective:   CC: Crohn's disease  Status: Still with some component of discomfort but no other changes    Vitals:    05/11/24 1230 05/11/24 1300 05/11/24 1305 05/11/24 1400   BP: 96/60 (!) 80/52 99/56 115/63   Pulse: 63 69 55 79   Resp: 18 18 17 21   Temp:       TempSrc:       SpO2: 99% 100% 100% 99%   Weight:       Height:           Physical Exam:  General: NAD, pleasant  ABD: Stoma with small amount of gas in the bag, abdomen tender to palpation in the upper epigastric region along the path of the transverse colon, mild abdominal distention  EXT:no CCE    Recent Labs   Lab 05/11/24 0427   WBC 5.7   HGB 8.0*   HCT 25.5*          Recent Labs   Lab 05/11/24 0427 05/10/24  0740    135   CO2 15* 24   BUN 10.5 8.8   ALBUMIN  --  2.7*   ALKPHOS  --  193*   ALT  --  23   AST  --  17       Assessment: Crohn's disease    Plan: The CT scan is concerning for Crohn's colitis involving the transverse colon.  This is creating what appears to be an obstructive phenomenon with a 9 cm cecum.  I would recommend holding off on any drainage of the intramural abscess as this may cause fistulization in the setting of Crohn's disease.  -Would continue with gastroenterology management of her acute Crohn's pathology in the hopes that the transverse colon can open up and resolve the partial obstruction  -No surgical intervention warranted at this time.  If she has a refractory stenosis of the transverse colon then we may need to entertain resection and resetting of the stoma.  -Surgery will follow along    Pavel Carlos DO [facility]  [phone]

## 2024-05-11 NOTE — PROGRESS NOTES
"  Gastroenterology Progress Note     Subjective   The patient did relatively well overnight.  She is no longer having any fevers but continues to have abdominal discomfort.  She denies any black or bloody stools and has not had any hematemesis or coffee-ground emesis.  She does have ongoing nausea.     Objective     Vitals Blood pressure 100/72, pulse 66, temperature 97.5  F (36.4  C), temperature source Oral, resp. rate 17, height 1.575 m (5' 2\"), weight 44 kg (97 lb), last menstrual period 04/18/2024, SpO2 100%, not currently breastfeeding.          Physical Exam   General: awake, alert, oriented times three    Cardiovascular: RRR, no edema    Chest: lungs are clear to auscultation bilaterally    Abdomen: soft, tender to palpation, bowel sounds diminished    Neurologic: Moves all 4 extremities        Laboratory     Electrolytes    Recent Labs   Lab 05/11/24  0427 05/10/24  2058 05/10/24  2037 05/10/24  1729 05/10/24  1425 05/10/24  0740 05/09/24  0808     --   --   --   --  135 136   POTASSIUM 3.1*  3.1* 3.3*  --   --  3.1* 3.0* 3.3*   CHLORIDE 114*  --   --   --   --  98 101   CO2 15*  --   --   --   --  24 26   *  --  97 96  --  125* 104*   CR 0.45*  --   --   --   --  0.68 0.65   BUN 10.5  --   --   --   --  8.8 8.4      Hematology    Recent Labs   Lab 05/11/24  0427 05/10/24  0740 05/09/24  0808   HGB 8.0* 9.8* 9.9*   MCV 92 88 90   WBC 5.7 10.0 8.9    375 371      LFTs & Lipase    Recent Labs   Lab 05/10/24  0740 05/09/24  0808   AST 17 34   ALT 23 31   ALKPHOS 193* 186*   BILITOTAL 0.4 0.6   LIPASE  --  29       Imaging Studies     Abdominal CT Scan 5/10/2024  1.  Significant progression of the acute inflammatory bowel disease. Changes are enumerated below. GI and/or colorectal consultation needed.  2.  Circumferential wall thickening of the transverse colon and splenic flexure leading to the ostomy has markedly progressed and now resulting in a colonic obstruction.  3.  There is at " least a 10 cm long intramural abscess along the posterior wall of the proximal transverse colon.  4.  A small sinus tract is developing from the anterior wall of the distal transverse colon.  5.  Ascending colon and cecum are distended to 8.5 cm.  6.  Patulous ileocecal valve with long segment of back wash ileitis involving at least 25 cm of the distal ileum.  7.  Periportal edema has developed due to the progressive inflammatory state.  8.  Previously characterized simple left adnexal cyst again noted. Follow-up pelvic ultrasound in 6 months as noted before. Reference given below.  9.  Other noncritical findings as noted above.    I have reviewed the current diagnostic and laboratory tests.           Impression    Dain Tejeda is a pleasant 43 year old female with a past medical history of ileocolonic Crohn's disease status post partial colectomy in 2022 now on azathioprine and Humira. The patient has not been feeling well for more than a month and now presented to the hospital with worsening abdominal pain and nausea with vomiting. The patient also was having fevers and her CT scan demonstrates an abscess along the colon wall.      Recommendations      Ileocolonic Crohn's disease with fistula   Evidence of fistulizing disease on the CT scan along with an abscess.  Broad-spectrum IV antibiotics.  Blood cultures negative to date.  Drainage of abscess per IR or surgery if clinically indicated.  Would obtain stool cultures and C. difficile if not already done.  IV fluids for hydration.  Restart azathioprine once the patient is tolerating oral intake.  N.p.o. diet status for now.  If the patient has further nausea or vomiting would consider NG tube to low intermittent suction.  Compression stockings or some form of DVT prophylaxis.       Diet   P.o. for now.       Disposition   Ongoing ICU cares for now.     Approximately 25 minutes of total time was spent providing patient care including patient evaluation, reviewing  documentation/test results, and .           Michel Leone MD  Thank you for the opportunity to participate in the care of this patient.   Please feel free to call me with any questions or concerns.  Phone number [phone].

## 2024-05-11 NOTE — PROGRESS NOTES
Pulmonary/Critical Care Consult Team Note    Dain Tejeda,  1980, MRN [id number]  Admitting Dx: Hypokalemia [E87.6]  Colonic obstruction (H) [K56.609]  Intra-abdominal abscess (H) [K65.1]  History of Crohn's disease [Z87.19]  Septic shock (H) [A41.9, R65.21]  Crohn's disease of colon with fistula (H) [K50.113]  Sepsis, due to unspecified organism, unspecified whether acute organ dysfunction present (H) [A41.9]  Date / Time of Admission:  5/10/2024  7:15 AM    Interval Events:   Weaned off pressors overnight, but placed back on after BP dropped.   Started menstruating overnight.   Intermittent nausea relieved with Zofran.  Received CaCl2 for low calcium, amp of bicarb  - Started experiencing bradycardia following admin of Ca, EKG ordered    Intake/Output last 3 shifts:  I/O last 3 completed shifts:  In: 3341.66 [I.V.:2241.66; IV Piggyback:1100]  Out: 1250 [Urine:500; Stool:750]    Assessment/Plan: Dain Tejeda is a 43 year old female with PMHx of DM, Crohn's disease, and anemia presenting with abdominal pain and nausea and was found to have intraabdominal abscess, ileocolitis with fistula formation, and colonic obstruction. Admitted to the ICU for septic shock requiring pressors.    Neuro:   Analgesia and sedation with PRN tylenol, opioids.  Previous note stated that during prior admission had some unusual behaviors and was given quetiapine. None noted this admission.    CV:   Septic shock secondary to intraabdominal abscess on Levophed  - Wean levophed as tolerated  - MAP goal >65    Bradycardia, self-resolved  - Episode of bradycardia following CaCl2 administration, though unlikely these are related  - EKG ordered  - Order atropine to keep at bedside    - Parameters: HR <40, MAP <60    Pulm:  No acute interventions. Not vented. Resp 20.    GI:  Known Crohn's disease with colostomy, now in acute exacerbation with ileocolitis and fistula formation.  - GI following  - Holding Azathioprine until taking PO    Colonic  "obstruction  - Bowel rest  - Strict NPO  - Surgery following, recommend NGT decompression if continued N/V, not needed at this time    ID:  Intraabdominal Abscess with septic shock  - GI, Surgery, IR consulted - no drainage at this time  - On Zosyn, continue  - Enteric panel, C diff negative    Endo:  Diabetes mellitus  - Sugars have been WNL during admission, will hold off on insulin to avoid hypoglycemia    Renal:  No acute interventions.  Hypokalemia  Hypocalcemia  Metabolic Alkalosis  Likely secondary to vomiting and high ostomy output. Possible she also has malabsorption from her Crohn's disease.  - TPN, continue lyte replacement as needed    FEN/Electrolytes:  - NPO  - TPN  - mIVF  - Electrolyte replacement protocol      Code Status: FULL CODE    Infusions:  Current Facility-Administered Medications   Medication Dose Route Frequency Provider Last Rate Last Admin    norepinephrine (LEVOPHED) 4 mg in  mL infusion PREMIX  0.01-0.6 mcg/kg/min Intravenous Continuous MargaretNancy MD 6.6 mL/hr at 05/11/24 0356 0.04 mcg/kg/min at 05/11/24 0356    sodium chloride 0.9 % infusion   Intravenous Continuous Jacek Suggs  mL/hr at 05/11/24 0655 New Bag at 05/11/24 0655       ICU DAILY CHECKLIST           Can patient transfer out of MICU? no  FAST HUG:  Feeding:  Feeding: No  Chambers:{No  Analgesia/Sedation:No  Thromboembolic prophylaxis:Heparin and SCDs  HOB>30:  No  Stress Ulcer Protocol Active: Yes; Mode: PPI/H2 Antagonist and Not Indicated  Glycemic Control: No components found for: \"GLU\" Any glucose > 180 no; Mode of Insulin Therapy: Sliding Scale Insulin  INTUBATED:  Can patient have daily waking: N/A  Can patient have spontaneous breathing trial: N/A  Does pt have restraints: no    PHYSICAL THERAPY AND MOBILITY: Can patient have PT and mobility trial: yes Activity: ICU mobility protocol    Critical Care Time excluding procedures and family discussions greater than: 30 Minutes    Risk Factors Present on " "Admission:  Clinically Significant Risk Factors Present on Admission   { TIP  This section helps capture the illness of the patient on admission.     - Review diagnoses highlighted in blue; right click, edit & delete if not appropriate   - If blank, no additional diagnoses identified   :[id number]}     # Hypokalemia: Lowest K = 3 mmol/L in last 2 days, will replace as needed       # Hypoalbuminemia: Lowest albumin = 2.7 g/dL at 5/10/2024  7:40 AM, will monitor as appropriate       # Circulatory Shock: required vasopressors within past 24 hours       # Cachexia: Estimated body mass index is 17.74 kg/m  as calculated from the following:    Height as of this encounter: 1.575 m (5' 2\").    Weight as of this encounter: 44 kg (97 lb).           # Anemia: based on hgb <11           Allergies   Allergen Reactions    Soy Allergy Anaphylaxis, Hives and Itching     Tofu- causes itching and sores in the mouth       Meds: See MAR    Physical Exam:  /73   Pulse 70   Temp 97.4  F (36.3  C) (Oral)   Resp 20   Ht 1.575 m (5' 2\")   Wt 44 kg (97 lb)   LMP 04/18/2024 (Exact Date)   SpO2 99%   BMI 17.74 kg/m    Intake/Output this shift:  No intake/output data recorded.  GEN: No acute distress, alert. Nontoxic.  HEENT: normocephalic, atraumatic. Mucous membranes dry.  CVS: regular rhythm, no murmurs  RESP: Clear to auscultation and breathing bilaterally.  ABD: Soft, mildly tender to palpation diffusely. Midline abdominal incision. Ostomy bag with brown stool, no bleeding.   EXT: Warm, well perfused, no edema  NEURO/PSYCH:  alert and oriented, CN grossly intact. Appropriate affect/mentation.    Pertinent Labs: Latest lab results in EHR personally reviewed.   [facility]  Recent Labs   Lab 05/11/24  0427 05/10/24  2058 05/10/24  2037 05/10/24  1729 05/10/24  1425 05/10/24  0740 05/09/24  0808     --   --   --   --  135 136   POTASSIUM 3.1*  3.1* 3.3*  --   --  3.1* 3.0* 3.3*   CHLORIDE 114*  --   --   --   --  98 101   CO2 15*  -- "   --   --   --  24 26   ANIONGAP 12  --   --   --   --  13 9   *  --  97 96  --  125* 104*   BUN 10.5  --   --   --   --  8.8 8.4   CR 0.45*  --   --   --   --  0.68 0.65   GFRESTIMATED >90  --   --   --   --  >90 >90   ALEX 6.8*  --   --   --   --  8.2* 8.4*   MAG 1.8  --   --   --   --  1.9 1.9   PHOS 2.7  --   --   --   --   --   --    PROTTOTAL  --   --   --   --   --  7.2 7.2   ALBUMIN  --   --   --   --   --  2.7* 2.7*   BILITOTAL  --   --   --   --   --  0.4 0.6   ALKPHOS  --   --   --   --   --  193* 186*   AST  --   --   --   --   --  17 34   ALT  --   --   --   --   --  23 31     CBC  Recent Labs   Lab 05/11/24  0427 05/10/24  0740 05/09/24  0808   WBC 5.7 10.0 8.9   RBC 2.76* 3.36* 3.34*   HGB 8.0* 9.8* 9.9*   HCT 25.5* 29.7* 30.2*   MCV 92 88 90   MCH 29.0 29.2 29.6   MCHC 31.4* 33.0 32.8   RDW 15.2* 14.6 14.3    375 371     Enteric Panel: Negative  C Diff: Negative    Cultures: personally reviewed.    - Blood cultures: no growth to date    Imaging: personally reviewed.   No results found for this or any previous visit.    Results for orders placed during the hospital encounter of 04/30/24    CT Chest Pulmonary Embolism w Contrast    Narrative  EXAM: CT CHEST PULMONARY EMBOLISM W CONTRAST  LOCATION: [facility]  DATE: 4/30/2024    INDICATION: syncope,tachycardia  COMPARISON: CT chest 11/2/2022  TECHNIQUE: CT chest pulmonary angiogram during arterial phase injection of IV contrast. Multiplanar reformats and MIP reconstructions were performed. Dose reduction techniques were used.  CONTRAST: isovue 370 90ml    FINDINGS:  ANGIOGRAM CHEST: The pulmonary arteries are normal in caliber. No evidence of pulmonary embolism to the subsegmental level. No findings of right heart strain. The thoracic aorta is nonaneurysmal without acute abnormality.    LUNGS AND PLEURA: The central airways are patent. No areas of consolidation. No pleural effusion.    MEDIASTINUM/AXILLAE: Normal  heart size. No pericardial effusion. No thoracic lymphadenopathy.    CORONARY ARTERY CALCIFICATION: Cannot evaluate.    UPPER ABDOMEN: Dictated separately, including the bowel wall thickening and inflammation in the visualized upper abdomen.    MUSCULOSKELETAL: No acute osseous abnormality.    Impression  IMPRESSION:  1.  No pulmonary embolism or other acute cardiopulmonary abnormality.  2.  Abdomen reported separately.    Results for orders placed during the hospital encounter of 06/01/22    CT Abdomen Pelvis w/o Contrast    Narrative  EXAM: CT ABDOMEN PELVIS W/O CONTRAST  LOCATION: [facility]  DATE/TIME: 6/2/2022 12:24 AM    INDICATION: Abdominal pain, acute, nonlocalized.  COMPARISON: 4/14/2022.  TECHNIQUE: CT scan of the abdomen and pelvis was performed without IV contrast. Multiplanar reformats were obtained. Dose reduction techniques were used.  CONTRAST: None.    FINDINGS:  LOWER CHEST: Dependent atelectasis in both lower lungs.    HEPATOBILIARY: Small benign calcification in the right hepatic lobe should be of doubtful significance. Liver otherwise negative. No calcified gallstones or biliary dilatation.    PANCREAS: Normal.    SPLEEN: Normal.    ADRENAL GLANDS: Normal.    KIDNEYS/BLADDER: Normal.    BOWEL: Bowel is normal in caliber with no evidence for obstruction. There is some twisting of the mesentery of the lower abdomen which has a different configuration than on the prior study. A redundant sigmoid colon also takes a different course than on  the prior study. Findings could be seen with an internal hernia, however, this is of uncertain significance in the absence of any evidence for bowel obstruction. Clinical correlation. Negative for appendicitis. Moderate amount of stool in the colon.    LYMPH NODES: Normal.    VASCULATURE: Unremarkable.    PELVIC ORGANS: Nonspecific 4.5 cm left adnexal cyst.    MUSCULOSKELETAL: Normal.    Impression  IMPRESSION:  1.  There is some  twisting seen in the mesentery of the lower abdomen which represents a change compared with the prior study. A redundant sigmoid colon also takes a different course than on the previous exam. Findings could be seen with a process such  as internal hernia and could account for the patient's symptoms. However, there is no evidence for bowel obstruction and therefore these findings are of uncertain clinical significance. If the patient develops obstructive-type symptoms or if symptoms  worsen or progress, repeat imaging with oral contrast may be helpful. Alternatively, follow-up small bowel follow-through or CT enterography could be considered in further evaluation as well.    2.  Nonspecific 4.5 cm left adnexal cyst is not significantly changed compared to 4/14/2022. In a menstruating female, this would most often be physiologic in nature but given its persistence, consider follow-up ultrasound in 10-12 weeks for  reevaluation. Follow up sooner if symptoms indicate.    No results found for this or any previous visit.    Patient Active Problem List   Diagnosis    Screening for cervical cancer- ASCUS, HPV+ 4/2017    Pain of back and right lower extremity    Abdominal pain, generalized    Abdominal pain    Inflammation of small intestine    Moderate recurrent major depression (H)    Hypokalemia    Colitis    Intra-abdominal abscess (H)    Celiac disease    Diarrhea    Chronic gastritis    Chronic diarrhea    Crohn's disease with other complication, unspecified gastrointestinal tract location (H)    S/P partial colectomy    Anemia, unspecified type    Atypical squamous cell changes of undetermined significance (ASCUS) on vaginal cytology with positive high risk human papilloma virus (HPV)    Syncope, unspecified syncope type    Spell of abnormal behavior    Sepsis, due to unspecified organism, unspecified whether acute organ dysfunction present (H)     Patient's care was discussed with the attending, Dr. Robles.    Missy  Roderick, MS4  [facility]

## 2024-05-11 NOTE — PROGRESS NOTES
[facility] - ICU    RN Progress Note:            Pertinent Assessments:      Please refer to flowsheet rows for full assessment     Pt weaned off levophed with stable pressures and meeting MAP goal. Denies pain. Endorses mild nausea, PRN PO zofran given x 1. On IV zosyn. RUE PICC patent and c/d/I. Colostomy patent. SR/SB on tele with PACs. Potassium replaced. NS infusing at 100 ml/hr, to be stopped once TPN started.            Key Events - This Shift:       -Episode of HR down to 45 bpm after IV push calcium chloride administration.  -Weaned off levo around 10am, ambulated hallway with no issues.                  Barriers to Discharge / Downgrade:     Maintain stable pressures off levo.

## 2024-05-11 NOTE — PHARMACY-CONSULT NOTE
"Pharmacy Note: Parenteral Nutrition (PN) Management    Pharmacist consulted to dose PN for Dain Tejeda, a 43 year old female by Dr. Robles.    Subjective:    The patient is a new PN start.    The patient was started on PN in the hospital on 5/11/2024.    Indication for PN therapy: bowel obstruction    Inadequate nutrition anticipated for > 7 days.     Enteral nutrition contraindicated due to: small bowel obstruction.    Pertinent diseases and other special considerations  soy & gluten allergies    Social History     Tobacco Use    Smoking status: Never    Smokeless tobacco: Never   Substance Use Topics    Alcohol use: No    Drug use: No     Objective:    Ht Readings from Last 1 Encounters:   05/10/24 1.575 m (5' 2\")     Wt Readings from Last 1 Encounters:   05/10/24 44 kg (97 lb)       Body mass index is 17.74 kg/m .    Patient Vitals for the past 96 hrs:   Weight   05/10/24 1626 44 kg (97 lb)   05/10/24 0709 49 kg (108 lb)       Labs:  Last 3 days:  Recent Labs     05/09/24  0808 05/10/24  0740 05/10/24  1425 05/10/24  2058 05/11/24  0427 05/11/24  1035    135  --   --  141  --    POTASSIUM 3.3* 3.0* 3.1* 3.3* 3.1*  3.1* 3.3*   CHLORIDE 101 98  --   --  114*  --    CO2 26 24  --   --  15*  --    BUN 8.4 8.8  --   --  10.5  --    CR 0.65 0.68  --   --  0.45*  --    ALEX 8.4* 8.2*  --   --  6.8*  --    MAG 1.9 1.9  --   --  1.8  --    PHOS  --   --   --   --  2.7  --    PROTTOTAL 7.2 7.2  --   --   --   --    ALBUMIN 2.7* 2.7*  --   --   --   --    HGB 9.9* 9.8*  --   --  8.0*  --    HCT 30.2* 29.7*  --   --  25.5*  --     375  --   --  269  --    BILITOTAL 0.6 0.4  --   --   --   --    AST 34 17  --   --   --   --    ALT 31 23  --   --   --   --    ALKPHOS 186* 193*  --   --   --   --        Glucose (past 48 hours):   Recent Labs     05/10/24  0740 05/10/24  1729 05/10/24  2037 05/11/24  0427   * 96 97 105*       Intake/Output (last 24 hours): I/O last 3 completed shifts:  In: 3341.66 [I.V.:2241.66; " IV Piggyback:1100]  Out: 1250 [Urine:500; Stool:750]    Estimated CrCl: Estimated Creatinine Clearance: 114.3 mL/min (A) (based on SCr of 0.45 mg/dL (L)).    Assessment:    Initiate patient on PN therapy as a continuous central therapy.     Given the patient's current condition/oral intake, PN is still indicated.    Lab results reviewed:     Plan:  Rate of PN: 50 mL/hr. Stop maintenance fluids when TPN initiated.  Formula:   Amino Acids 53 grams  Dextrose 130 grams  Sodium 50 mEq/day  Potassium 60 mEq/day  Calcium 5 mEq/day  Magnesium 8 mEq/day  Phosphorus 15 mMol/day  Chloride: Acetate Ratio 1:4  Standard Multivitamins w/Vitamin K  Trace Elements  Fat Emulsion: Patient has a soy allergy, therefore Omegaven is the only fat emulsion option. None currently on site, but will work to obtain supply on Monday.  Check hyperal lytes and ionized calcium labs tomorrow.  Pharmacist will continue to follow the patient's lab results, clinical status and blood glucose results and make adjustments as appropriate.    Thank you for the consult.  Cyndie Conrad [facility]  5/11/2024 11:43 AM

## 2024-05-11 NOTE — PROGRESS NOTES
[facility]    Progress Note - Hospitalist Service       Date of Admission:  5/10/2024    Assessment & Plan     Dain Tejeda is a 43 year old female admitted on 5/10/2024. She has a history of Chron's disease complicated by colonic stricture s/p partial colectomy (11/2022), Celiac disease, SIOMARA admitted for septic shock from intraabdominal abscess with fistula formation. Patient currently under ICU cares with intermittent pressor support needed, will continue to follow.      Sepsis 2/2 intra-abdominal infection  Hypotension  Presented to ED on 5/9/24 with new severe abdominal pain, initially thought to be UTI and sent home on Bactrim, returned on 5/10 after developing fevers, nausea and vomiting. Developed hypotension in the ED despite fluid resuscitation and was transferred to the ICU for pressor support. Suspect septic shock secondary to colonic abscess as below.    - Continue IV Zosyn  - On norepi, MAP goal >65  - Stress dose steroids at 50 mg TID   - mIVF  - Daily CBC     Chron's disease, complicated by progression with colonic obstruction, abscess, developing fistula and ileitis   S/p partial colectomy 11/2022, has LLQ colostomy   Diagnosed in late 2022, complicated by stricture s/p partial colectomy with ostomy. Follows with [facility], on Humira and azathioprine. Was recently admitted on our service 4/30-5/4 after syncopal episode thought to be 2/2 Chron's flare after CT showed colitis but negative Cdiff and enteric panel. GI consulted at that time and recommended prednisone taper over 4 weeks, unclear whether patient taking steroid as prescribed since discharge. Severe abdominal pain developed on day prior to admission along with fever. CT showing marked progression of Chron's with colonic obstruction, 10 cm intramural abscess, developing fistula and significant ileitis. Patient initiated on IV abx in ED, pain improved after receiving IV pain meds.   - GI consulted  - NPO   - Zosyn  - Surgery  "consulted, appreciate recommendations  - IR consulted, noted that bowel access would be challenging. Recommended repeat imaging after antibiotics   - PRN Tylenol and IV dilaudid for pain  - Zofran PRN      Hypokalemia  3.0 on admission, supplementation given in ED.   - Daily BMP  - Replacement per RN protocol     Normocytic anemia  History of SIOMARA with recent iron infusions through [facility]. Hgb 9.8 on admission, stable with previous. Colostomy output appears non-bloody.  - Daily CBC     Disorganized behaviors  Demonstrated odd behaviors during recent admission, treated with Seroquel 12.5 mg at bedtime. Psych consult at that time, did not recommend any ongoing meds upon discharge. Patient calm, alert and cooperative this admission so far.   - Delirium protocol, hold on meds for now     Gamma gap  Chronic. Tprotein 7.2, albumin 2.7. Most certainly 2/2 inflammatory disease.      Incidental finding: benign pelvic simple cyst  >5 to <=7 cm. Follow up pelvic US in 6 months recommended.         Diet: NPO for Medical/Clinical Reasons Except for: Meds, Ice Chips  parenteral nutrition - ADULT compounded formula    DVT Prophylaxis: Enoxaparin (Lovenox) SQ  Chambers Catheter: Not present  Fluids: mIVF  Lines: PRESENT      PICC 05/10/24 Triple Lumen Right Basilic Vasopressor-Site Assessment: WDL      Cardiac Monitoring: ACTIVE order. Indication: ICU  Code Status: Full Code      Clinically Significant Risk Factors        # Hypokalemia: Lowest K = 3 mmol/L in last 2 days, will replace as needed       # Hypoalbuminemia: Lowest albumin = 2.7 g/dL at 5/10/2024  7:40 AM, will monitor as appropriate            # Cachexia: Estimated body mass index is 17.74 kg/m  as calculated from the following:    Height as of this encounter: 1.575 m (5' 2\").    Weight as of this encounter: 44 kg (97 lb)., PRESENT ON ADMISSION             The patient's care was discussed with the Attending Physician, Dr. Padilla .    Phoebe Sandhu MD  Hospitalist " [facility]  ____________________________________    Interval History     Patient continued to require intermittent pressor support overnight. Notes that her pain is improved with medication, denies bloody output in ostomy. Denies nausea/vomiting.     Physical Exam   Vital Signs: Temp: 97.4  F (36.3  C) Temp src: Oral BP: 99/56 Pulse: 55   Resp: 17 SpO2: 100 % O2 Device: None (Room air)    Weight: 97 lbs .04 oz    General:  No acute distress.   Psych:  Alert and oriented to person, place, and time. Able to articulate logical thoughts.   HEENT:  Eyes grossly normal to inspection. Mucous membranes moist.   Cardiovascular:  Regular rate and rhythm, normal S1 and S2 without murmur.   Respiratory:  Lungs clear to auscultation bilaterally. No wheezing or crackles.   Musculoskeletal:  No gross extremity deformities. No peripheral edema.  GI:  Soft. Tender in lower quadrants.     Data     I have personally reviewed the following data over the past 24 hrs:    5.7  \   8.0 (L)   / 269     141 114 (H) 10.5 /  105 (H)   3.3 (L) 15 (L) 0.45 (L) \       Imaging results reviewed over the past 24 hrs:   No results found for this or any previous visit (from the past 24 hour(s)).

## 2024-05-12 LAB
ALBUMIN SERPL BCG-MCNC: 1.9 G/DL (ref 3.5–5.2)
ALP SERPL-CCNC: 105 U/L (ref 40–150)
ALT SERPL W P-5'-P-CCNC: 11 U/L (ref 0–50)
ANION GAP SERPL CALCULATED.3IONS-SCNC: 7 MMOL/L (ref 7–15)
AST SERPL W P-5'-P-CCNC: 6 U/L (ref 0–45)
BILIRUB DIRECT SERPL-MCNC: <0.2 MG/DL (ref 0–0.3)
BILIRUB SERPL-MCNC: <0.2 MG/DL
BUN SERPL-MCNC: 17.2 MG/DL (ref 6–20)
CA-I BLD-MCNC: 4.9 MG/DL (ref 4.4–5.2)
CALCIUM SERPL-MCNC: 8.1 MG/DL (ref 8.6–10)
CHLORIDE SERPL-SCNC: 112 MMOL/L (ref 98–107)
CREAT SERPL-MCNC: 0.57 MG/DL (ref 0.51–0.95)
DEPRECATED HCO3 PLAS-SCNC: 24 MMOL/L (ref 22–29)
EGFRCR SERPLBLD CKD-EPI 2021: >90 ML/MIN/1.73M2
ERYTHROCYTE [DISTWIDTH] IN BLOOD BY AUTOMATED COUNT: 15.4 % (ref 10–15)
GLUCOSE BLDC GLUCOMTR-MCNC: 151 MG/DL (ref 70–99)
GLUCOSE SERPL-MCNC: 162 MG/DL (ref 70–99)
HCT VFR BLD AUTO: 25.9 % (ref 35–47)
HGB BLD-MCNC: 8.2 G/DL (ref 11.7–15.7)
INR PPP: 1.12 (ref 0.85–1.15)
MAGNESIUM SERPL-MCNC: 2.2 MG/DL (ref 1.7–2.3)
MCH RBC QN AUTO: 28.9 PG (ref 26.5–33)
MCHC RBC AUTO-ENTMCNC: 31.7 G/DL (ref 31.5–36.5)
MCV RBC AUTO: 91 FL (ref 78–100)
PHOSPHATE SERPL-MCNC: 2.8 MG/DL (ref 2.5–4.5)
PLATELET # BLD AUTO: 302 10E3/UL (ref 150–450)
POTASSIUM SERPL-SCNC: 4.1 MMOL/L (ref 3.4–5.3)
PREALB SERPL-MCNC: 7.7 MG/DL (ref 20–40)
PROT SERPL-MCNC: 5.5 G/DL (ref 6.4–8.3)
RBC # BLD AUTO: 2.84 10E6/UL (ref 3.8–5.2)
SODIUM SERPL-SCNC: 143 MMOL/L (ref 135–145)
WBC # BLD AUTO: 4.9 10E3/UL (ref 4–11)

## 2024-05-12 PROCEDURE — 82330 ASSAY OF CALCIUM: CPT | Performed by: INTERNAL MEDICINE

## 2024-05-12 PROCEDURE — 80053 COMPREHEN METABOLIC PANEL: CPT | Performed by: INTERNAL MEDICINE

## 2024-05-12 PROCEDURE — 250N000009 HC RX 250: Performed by: FAMILY MEDICINE

## 2024-05-12 PROCEDURE — C9113 INJ PANTOPRAZOLE SODIUM, VIA: HCPCS | Performed by: INTERNAL MEDICINE

## 2024-05-12 PROCEDURE — 82248 BILIRUBIN DIRECT: CPT | Performed by: INTERNAL MEDICINE

## 2024-05-12 PROCEDURE — 250N000011 HC RX IP 250 OP 636: Performed by: INTERNAL MEDICINE

## 2024-05-12 PROCEDURE — 85610 PROTHROMBIN TIME: CPT | Performed by: INTERNAL MEDICINE

## 2024-05-12 PROCEDURE — 85027 COMPLETE CBC AUTOMATED: CPT

## 2024-05-12 PROCEDURE — 84100 ASSAY OF PHOSPHORUS: CPT | Performed by: INTERNAL MEDICINE

## 2024-05-12 PROCEDURE — 250N000011 HC RX IP 250 OP 636

## 2024-05-12 PROCEDURE — 120N000004 HC R&B MS OVERFLOW

## 2024-05-12 PROCEDURE — 99232 SBSQ HOSP IP/OBS MODERATE 35: CPT | Mod: GC

## 2024-05-12 PROCEDURE — 83735 ASSAY OF MAGNESIUM: CPT | Performed by: INTERNAL MEDICINE

## 2024-05-12 PROCEDURE — 84134 ASSAY OF PREALBUMIN: CPT | Performed by: INTERNAL MEDICINE

## 2024-05-12 RX ADMIN — PIPERACILLIN AND TAZOBACTAM 3.38 G: 3; .375 INJECTION, POWDER, FOR SOLUTION INTRAVENOUS at 14:22

## 2024-05-12 RX ADMIN — HEPARIN SODIUM 5000 UNITS: 10000 INJECTION, SOLUTION INTRAVENOUS; SUBCUTANEOUS at 06:22

## 2024-05-12 RX ADMIN — HYDROCORTISONE SODIUM SUCCINATE 25 MG: 100 INJECTION, POWDER, FOR SOLUTION INTRAMUSCULAR; INTRAVENOUS at 14:21

## 2024-05-12 RX ADMIN — HEPARIN SODIUM 5000 UNITS: 10000 INJECTION, SOLUTION INTRAVENOUS; SUBCUTANEOUS at 21:15

## 2024-05-12 RX ADMIN — PIPERACILLIN AND TAZOBACTAM 3.38 G: 3; .375 INJECTION, POWDER, FOR SOLUTION INTRAVENOUS at 06:22

## 2024-05-12 RX ADMIN — HYDROCORTISONE SODIUM SUCCINATE 50 MG: 100 INJECTION, POWDER, FOR SOLUTION INTRAMUSCULAR; INTRAVENOUS at 04:22

## 2024-05-12 RX ADMIN — PANTOPRAZOLE SODIUM 40 MG: 40 INJECTION, POWDER, FOR SOLUTION INTRAVENOUS at 08:30

## 2024-05-12 RX ADMIN — HYDROCORTISONE SODIUM SUCCINATE 25 MG: 100 INJECTION, POWDER, FOR SOLUTION INTRAMUSCULAR; INTRAVENOUS at 21:15

## 2024-05-12 RX ADMIN — MAGNESIUM SULFATE HEPTAHYDRATE: 500 INJECTION, SOLUTION INTRAMUSCULAR; INTRAVENOUS at 20:16

## 2024-05-12 RX ADMIN — PIPERACILLIN AND TAZOBACTAM 3.38 G: 3; .375 INJECTION, POWDER, FOR SOLUTION INTRAVENOUS at 22:09

## 2024-05-12 RX ADMIN — HEPARIN SODIUM 5000 UNITS: 10000 INJECTION, SOLUTION INTRAVENOUS; SUBCUTANEOUS at 14:21

## 2024-05-12 RX ADMIN — HYDROMORPHONE HYDROCHLORIDE 0.2 MG: 0.2 INJECTION, SOLUTION INTRAMUSCULAR; INTRAVENOUS; SUBCUTANEOUS at 08:30

## 2024-05-12 ASSESSMENT — ACTIVITIES OF DAILY LIVING (ADL)
ADLS_ACUITY_SCORE: 27

## 2024-05-12 NOTE — PLAN OF CARE
Problem: Adult Inpatient Plan of Care  Goal: Absence of Hospital-Acquired Illness or Injury  Outcome: Progressing  Intervention: Identify and Manage Fall Risk  Recent Flowsheet Documentation  Taken 5/11/2024 2000 by Rajni Najera RN  Safety Promotion/Fall Prevention:   activity supervised   nonskid shoes/slippers when out of bed   lighting adjusted   clutter free environment maintained   bedside attendant   safety round/check completed   treat underlying cause  Intervention: Prevent Skin Injury  Recent Flowsheet Documentation  Taken 5/11/2024 2000 by Rajni Najera RN  Body Position: position changed independently  Intervention: Prevent Infection  Recent Flowsheet Documentation  Taken 5/11/2024 2000 by Rajni Najera RN  Infection Prevention:   hand hygiene promoted   rest/sleep promoted  Goal: Optimal Comfort and Wellbeing  Outcome: Progressing     Problem: Fall Injury Risk  Goal: Absence of Fall and Fall-Related Injury  Outcome: Progressing  Intervention: Promote Injury-Free Environment  Recent Flowsheet Documentation  Taken 5/11/2024 2000 by Rajni Najera RN  Safety Promotion/Fall Prevention:   activity supervised   nonskid shoes/slippers when out of bed   lighting adjusted   clutter free environment maintained   bedside attendant   safety round/check completed   treat underlying cause   Goal Outcome Evaluation:         Pt denies pain. Vitals stable. Started TPN.  Will continue to monitor.    Rajni Khan RN

## 2024-05-12 NOTE — PHARMACY-CONSULT NOTE
"Pharmacy Note: Parenteral Nutrition (PN) Management    Pharmacist consulted to dose PN for Dain Tejeda, a 43 year old female by Dr. Robles.    Subjective:    The patient is a new PN start.    The patient was started on PN in the hospital on 5/11/2024.    Indication for PN therapy: bowel obstruction    Inadequate nutrition anticipated for > 7 days.     Enteral nutrition contraindicated due to: small bowel obstruction.    Pertinent diseases and other special considerations  soy & gluten allergies    Social History     Tobacco Use    Smoking status: Never    Smokeless tobacco: Never   Substance Use Topics    Alcohol use: No    Drug use: No     Objective:    Ht Readings from Last 1 Encounters:   05/10/24 1.575 m (5' 2\")     Wt Readings from Last 1 Encounters:   05/10/24 44 kg (97 lb)       Body mass index is 17.74 kg/m .    Patient Vitals for the past 96 hrs:   Weight   05/10/24 1626 44 kg (97 lb)   05/10/24 0709 49 kg (108 lb)       Labs:  Last 3 days:  Recent Labs     05/10/24  0740 05/10/24  1425 05/10/24  2058 05/11/24  0427 05/11/24  1035 05/11/24  1815 05/12/24  0429     --   --  141  --   --  143   POTASSIUM 3.0* 3.1* 3.3* 3.1*  3.1* 3.3* 4.0 4.1   CHLORIDE 98  --   --  114*  --   --  112*   CO2 24  --   --  15*  --   --  24   BUN 8.8  --   --  10.5  --   --  17.2   CR 0.68  --   --  0.45*  --   --  0.57   ALEX 8.2*  --   --  6.8*  --   --  8.1*   MAG 1.9  --   --  1.8  --   --  2.2   PHOS  --   --   --  2.7  --   --  2.8   PROTTOTAL 7.2  --   --   --   --   --  5.5*   ALBUMIN 2.7*  --   --   --   --   --  1.9*   HGB 9.8*  --   --  8.0*  --   --  8.2*   HCT 29.7*  --   --  25.5*  --   --  25.9*     --   --  269  --   --  302   BILITOTAL 0.4  --   --   --   --   --  <0.2   AST 17  --   --   --   --   --  6   ALT 23  --   --   --   --   --  11   ALKPHOS 193*  --   --   --   --   --  105   INR  --   --   --   --   --   --  1.12       Glucose (past 48 hours):   Recent Labs     05/10/24  2109 05/10/24  2037 " 05/11/24  0427 05/11/24  2226 05/12/24  0429 05/12/24  0646   GLC 96 97 105* 157* 162* 151*       Intake/Output (last 24 hours): I/O last 3 completed shifts:  In: 2115 [I.V.:1610]  Out: 850 [Urine:600; Stool:250]    Estimated CrCl: Estimated Creatinine Clearance: 90.2 mL/min (based on SCr of 0.57 mg/dL).    Assessment:    Initiate patient on PN therapy as a continuous central therapy.     Given the patient's current condition/oral intake, PN is still indicated.    Lab results reviewed:     Plan:  Rate of PN: 60 mL/hr  Formula:   Amino Acids 60 grams  Dextrose 180 grams  Sodium 50 mEq/day  Potassium 60 mEq/day  Calcium 5 mEq/day  Magnesium 8 mEq/day  Phosphorus 15 mMol/day  Chloride: Acetate Ratio 1:4  Standard Multivitamins w/Vitamin K  Trace Elements  Fat Emulsion: Patient has a soy allergy, therefore Omegaven is the only fat emulsion option. None currently on site, but will work to obtain supply on Monday.  Check hyperal lytes labs tomorrow.  Pharmacist will continue to follow the patient's lab results, clinical status and blood glucose results and make adjustments as appropriate.    Thank you for the consult.  Cyndie Conrad [facility]  5/12/2024 9:07 AM

## 2024-05-12 NOTE — PROGRESS NOTES
General Surgery Progress Note  Hospital Day # 2    Subjective:   CC: Crohn's disease  Status: Slight pain still, but improving    Vitals:    05/12/24 0743 05/12/24 0800 05/12/24 0900 05/12/24 1000   BP:  96/58 98/69 93/61   Pulse:  58 65 55   Resp:       Temp: 97.6  F (36.4  C)      TempSrc: Oral      SpO2:  99% 98% 98%   Weight:       Height:           Physical Exam:  General: NAD, pleasant  ABD: stoma pink with stool and gas in bag, mild upper abdominal pain, no rebound or guarding   EXT:no CCE    Recent Labs   Lab 05/12/24  0429   WBC 4.9   HGB 8.2*   HCT 25.9*          Recent Labs   Lab 05/12/24  0429      CO2 24   BUN 17.2   ALBUMIN 1.9*   ALKPHOS 105   ALT 11   AST 6       Assessment: Crohn's disease    Plan: The CT scan is concerning for Crohn's colitis involving the transverse colon.  This is creating what appears to be an obstructive phenomenon with a 9 cm cecum.  I would recommend holding off on any drainage of the intramural abscess as this may cause fistulization in the setting of Crohn's disease.  -Would continue with gastroenterology management of her acute Crohn's pathology in the hopes that the transverse colon can open up and resolve the partial obstruction  -No surgical intervention warranted at this time.  If she has a refractory stenosis of the transverse colon then we may need to entertain resection and resetting of the stoma.  -Surgery will follow along    Jovany Dinh PA-C  [facility]  Surgery [facility] - [address]  [address]  [place] [address]?  Office: [phone]

## 2024-05-12 NOTE — PROGRESS NOTES
Nutrition Therapy  Parenteral Nutrition Brief follow-up       Dietitian to Pharmacy    Rate of TPN: 60 ml/hr  Grams Dextrose: 180  Grams Protein: 60    Lipids: hold.  Plan Omegaven lipid tomorrow if still on TPN.         Current Nutrition Intake:  Diet: NPO except medications, ice chips.  Custom TPN per central line:  53 g AA, 130 g DEX at 50 ml/hr continuous.  No lipid (planning to start Omegaven lipid tomorrow if still on TPN.  Omegaven indicated due to soy allergy.)    Food allergies:  wheat, soy.    TPN Provides:  1200 ml fluid, 654 kcals, 53 g protein, 130 g carb.     New Findings:    Labs:  sodium 143, potassium 4.1, magnesium 2.2, phosphorus 2.8, glucose 162 (H).  Outputs 5/11/24:  colostomy 375 ml, Urine 800 ml.    INTERVENTIONS  Implementation  Parenteral Nutrition/IV Fluids - Adjust TPN to better meet estimated needs:  60 g AA, 180 g DEX at 60 ml/hr continuous.    This to provide 852 kcals, 60 g protein, 180 g carb, 1440 ml fluid/day.   Above meets 77% estimated kcal needs and 100% estimated protein needs.    Plan to add Omegaven lipid tomorrow if patient still on TPN.

## 2024-05-12 NOTE — PLAN OF CARE
Problem: Adult Inpatient Plan of Care  Goal: Absence of Hospital-Acquired Illness or Injury  5/12/2024 0705 by Rajni Najera RN  Outcome: Progressing  5/11/2024 2216 by Rajni Najera RN  Outcome: Progressing  Intervention: Identify and Manage Fall Risk  Recent Flowsheet Documentation  Taken 5/12/2024 0400 by Rajni Najera RN  Safety Promotion/Fall Prevention:   activity supervised   nonskid shoes/slippers when out of bed   lighting adjusted   clutter free environment maintained   bedside attendant   safety round/check completed   treat underlying cause  Taken 5/12/2024 0000 by Rajni Najera RN  Safety Promotion/Fall Prevention:   activity supervised   nonskid shoes/slippers when out of bed   lighting adjusted   clutter free environment maintained   bedside attendant   safety round/check completed   treat underlying cause  Taken 5/11/2024 2000 by Rajni Najera RN  Safety Promotion/Fall Prevention:   activity supervised   nonskid shoes/slippers when out of bed   lighting adjusted   clutter free environment maintained   bedside attendant   safety round/check completed   treat underlying cause  Intervention: Prevent Skin Injury  Recent Flowsheet Documentation  Taken 5/12/2024 0400 by aRjni Najera RN  Body Position: position changed independently  Taken 5/12/2024 0000 by Rajni Najera RN  Body Position: position changed independently  Taken 5/11/2024 2200 by Rajni Najera RN  Body Position: position changed independently  Taken 5/11/2024 2000 by Rajni Najera RN  Body Position: position changed independently  Intervention: Prevent Infection  Recent Flowsheet Documentation  Taken 5/12/2024 0400 by Rajni Najera RN  Infection Prevention:   hand hygiene promoted   rest/sleep promoted  Taken 5/12/2024 0000 by Rajni Najera RN  Infection Prevention:   hand hygiene promoted   rest/sleep promoted  Taken 5/11/2024 2000 by Rajni Najera RN  Infection Prevention:   hand hygiene promoted    "rest/sleep promoted  Goal: Optimal Comfort and Wellbeing  5/12/2024 0705 by Rajni Najera RN  Outcome: Progressing  5/11/2024 2216 by Rajni Najera RN  Outcome: Progressing     Problem: Fall Injury Risk  Goal: Absence of Fall and Fall-Related Injury  5/12/2024 0705 by Rajni Najera RN  Outcome: Progressing  5/11/2024 2216 by Rajni Najera RN  Outcome: Progressing  Intervention: Promote Injury-Free Environment  Recent Flowsheet Documentation  Taken 5/12/2024 0400 by Rajni Najera, RN  Safety Promotion/Fall Prevention:   activity supervised   nonskid shoes/slippers when out of bed   lighting adjusted   clutter free environment maintained   bedside attendant   safety round/check completed   treat underlying cause  Taken 5/12/2024 0000 by Rajni Najera RN  Safety Promotion/Fall Prevention:   activity supervised   nonskid shoes/slippers when out of bed   lighting adjusted   clutter free environment maintained   bedside attendant   safety round/check completed   treat underlying cause  Taken 5/11/2024 2000 by Rajni Najera, RN  Safety Promotion/Fall Prevention:   activity supervised   nonskid shoes/slippers when out of bed   lighting adjusted   clutter free environment maintained   bedside attendant   safety round/check completed   treat underlying cause   Goal Outcome Evaluation:       Uneventful night. Vitals stable. Denies pain. Started TPN. Pt states feels \"hungry.\" Informed pt that she is still npo.  Pleasant and cooperative.     Rajni Khan RN                 "

## 2024-05-12 NOTE — PROGRESS NOTES
"  Gastroenterology Progress Note     Subjective   The patient seems to have improved clinically and is having less abdominal discomfort.  She continues to have some pain in her mid abdomen and is putting out brown stool from her ostomy bag.  No acute events overnight.     Objective     Vitals Blood pressure 93/64, pulse 55, temperature 97.6  F (36.4  C), temperature source Oral, resp. rate 16, height 1.575 m (5' 2\"), weight 44 kg (97 lb), last menstrual period 04/18/2024, SpO2 98%, not currently breastfeeding.          Physical Exam   General: awake, alert, oriented times three    Cardiovascular: RRR, no edema    Chest: lungs are clear to auscultation bilaterally    Abdomen: soft, tender to palpation, bowel sounds diminished, ostomy bag with brown stool no black or bloody material    Neurologic: Moves all 4 extremities        Laboratory     Electrolytes    Recent Labs   Lab 05/12/24  0646 05/12/24 0429 05/11/24  2226 05/11/24  1815 05/11/24  1035 05/11/24  0427 05/10/24  1425 05/10/24  0740   NA  --  143  --   --   --  141  --  135   POTASSIUM  --  4.1  --  4.0 3.3* 3.1*  3.1*   < > 3.0*   CHLORIDE  --  112*  --   --   --  114*  --  98   CO2  --  24  --   --   --  15*  --  24   * 162* 157*  --   --  105*   < > 125*   CR  --  0.57  --   --   --  0.45*  --  0.68   BUN  --  17.2  --   --   --  10.5  --  8.8    < > = values in this interval not displayed.      Hematology    Recent Labs   Lab 05/12/24  0429 05/11/24  0427 05/10/24  0740   HGB 8.2* 8.0* 9.8*   MCV 91 92 88   WBC 4.9 5.7 10.0    269 375   INR 1.12  --   --       LFTs & Lipase    Recent Labs   Lab 05/12/24  0429 05/10/24  0740 05/09/24  0808   AST 6 17 34   ALT 11 23 31   ALKPHOS 105 193* 186*   BILITOTAL <0.2 0.4 0.6   LIPASE  --   --  29       Imaging Studies     Abdominal CT Scan 5/10/2024  1.  Significant progression of the acute inflammatory bowel disease. Changes are enumerated below. GI and/or colorectal consultation needed.  2.  " Circumferential wall thickening of the transverse colon and splenic flexure leading to the ostomy has markedly progressed and now resulting in a colonic obstruction.  3.  There is at least a 10 cm long intramural abscess along the posterior wall of the proximal transverse colon.  4.  A small sinus tract is developing from the anterior wall of the distal transverse colon.  5.  Ascending colon and cecum are distended to 8.5 cm.  6.  Patulous ileocecal valve with long segment of back wash ileitis involving at least 25 cm of the distal ileum.  7.  Periportal edema has developed due to the progressive inflammatory state.  8.  Previously characterized simple left adnexal cyst again noted. Follow-up pelvic ultrasound in 6 months as noted before. Reference given below.  9.  Other noncritical findings as noted above.    I have reviewed the current diagnostic and laboratory tests.           Impression    Dain Tejeda is a pleasant 43 year old female with a past medical history of ileocolonic Crohn's disease status post partial colectomy in 2022 now on azathioprine and Humira. The patient has not been feeling well for more than a month and now presented to the hospital with worsening abdominal pain and nausea with vomiting. The patient also was having fevers and her CT scan demonstrates an abscess along the colon wall.      Recommendations      Ileocolonic Crohn's disease with fistula   Evidence of fistulizing disease on the CT scan along with an abscess.  Broad-spectrum IV antibiotics.  Unable to perform IR guided drainage placement as this would be extremely difficult.  Surgery is following along and the patient may need surgical intervention if the abscess/phlegmon does not respond to IV antibiotics and conservative cares.  C. difficile has been negative by PCR.  IV fluids for hydration.  Restart azathioprine once the patient is tolerating oral intake.  N.p.o. diet status for now.  If nausea/vomiting becomes an issue NG tube to  low intermittent suction.  Compression stockings or some form of DVT prophylaxis.       Diet   N.p.o. diet for now.       Disposition   Ongoing ICU cares for now.     Approximately 25 minutes of total time was spent providing patient care including patient evaluation, reviewing documentation/test results, and .           Michel Leone MD  Thank you for the opportunity to participate in the care of this patient.   Please feel free to call me with any questions or concerns.  Phone number [phone].

## 2024-05-12 NOTE — PROGRESS NOTES
[facility]    Progress Note - Hospitalist Service       Date of Admission:  5/10/2024    Assessment & Plan     Dain Tejeda is a 43 year old female admitted on 5/10/2024. She has a history of Chron's disease complicated by colonic stricture s/p partial colectomy (11/2022), Celiac disease, SIOMARA admitted for septic shock from intraabdominal abscess with fistula formation.      Sepsis 2/2 intra-abdominal infection   Hypotension - improving  Presented to ED on 5/9/24 with new severe abdominal pain, initially thought to be UTI and sent home on Bactrim, returned on 5/10 after developing fevers, nausea and vomiting. Developed hypotension in the ED despite fluid resuscitation and was transferred to the ICU for pressor support. Suspect septic shock secondary to colonic abscess as below.  Has been off pressors for 24 hours, transferred out of ICU on 5/12.   - Continue IV Zosyn  - Tapering stress dose steroids: 25 mg TID today, 25 mg BID tomorrow. Will discuss resumption of steroids with GI tomorrow.   - Daily CBC     Chron's disease, complicated by progression with colonic obstruction, abscess, developing fistula and ileitis   S/p partial colectomy 11/2022, has LLQ colostomy   Diagnosed in late 2022, complicated by stricture s/p partial colectomy with ostomy. Follows with [facility], on Humira and azathioprine. Was recently admitted on our service 4/30-5/4 after syncopal episode thought to be 2/2 Chron's flare after CT showed colitis but negative Cdiff and enteric panel. GI consulted at that time and recommended prednisone taper over 4 weeks, unclear whether patient taking steroid as prescribed since discharge. Severe abdominal pain developed on day prior to admission along with fever. CT showing marked progression of Chron's with colonic obstruction, 10 cm intramural abscess, developing fistula and significant ileitis. Patient initiated on IV abx in ED, pain improved after receiving IV pain meds. IR  "consulted, noted that bowel access would be challenging and recommended repeat imaging after antibiotics   - GI consulted  - NPO. RD consulted, TPN started 5/11.   - Zosyn  - Surgery consulted, appreciate recommendations  - PRN Tylenol and IV dilaudid for pain  - Zofran PRN      Hypokalemia  3.0 on admission, supplementation given in ED.   - Daily BMP  - Replacement per RN protocol     Normocytic anemia  History of SIOMARA with recent iron infusions through [facility]. Hgb 9.8 on admission, stable with previous. Colostomy output appears non-bloody.  - Daily CBC     Disorganized behaviors  Demonstrated odd behaviors during recent admission, treated with Seroquel 12.5 mg at bedtime. Psych consult at that time, did not recommend any ongoing meds upon discharge. Patient calm, alert and cooperative this admission so far.   - Delirium protocol, hold on meds for now     Gamma gap  Chronic. Tprotein 7.2, albumin 2.7. Most certainly 2/2 inflammatory disease.      Incidental finding: benign pelvic simple cyst  >5 to <=7 cm. Follow up pelvic US in 6 months recommended.         Diet: NPO for Medical/Clinical Reasons Except for: Meds, Ice Chips  parenteral nutrition - ADULT compounded formula  parenteral nutrition - ADULT compounded formula    DVT Prophylaxis: Enoxaparin (Lovenox) SQ  Chambers Catheter: Not present  Fluids: mIVF  Lines: PRESENT      PICC 05/10/24 Triple Lumen Right Basilic Vasopressor-Site Assessment: WDL      Cardiac Monitoring: ACTIVE order. Indication: ICU  Code Status: Full Code      Clinically Significant Risk Factors        # Hypokalemia: Lowest K = 3.1 mmol/L in last 2 days, will replace as needed       # Hypoalbuminemia: Lowest albumin = 1.9 g/dL at 5/12/2024  4:29 AM, will monitor as appropriate            # Cachexia: Estimated body mass index is 17.74 kg/m  as calculated from the following:    Height as of this encounter: 1.575 m (5' 2\").    Weight as of this encounter: 44 kg (97 lb)., PRESENT ON ADMISSION        "      The patient's care was discussed with the Attending Physician, Dr. Padilla .    Phoebe Sandhu MD  Hospitalist Service  [facility]  ____________________________________    Interval History     No acute events overnight. Has not required pressors in 24 hours, transitioning out of ICU cares. She notes that her pain is under good control today. Denies nausea/vomiting, no bloody output in ostomy. She is hungry but understands why she can't eat.     Physical Exam   Vital Signs: Temp: 97.6  F (36.4  C) Temp src: Oral BP: 93/61 Pulse: 55   Resp: 16 SpO2: 98 % O2 Device: None (Room air)    Weight: 97 lbs .04 oz    General:  No acute distress.   Psych:  Alert and oriented to person, place, and time. Able to articulate logical thoughts. Calm.   HEENT:  Eyes grossly normal to inspection. Mucous membranes moist.   Cardiovascular:  Regular rate and rhythm, normal S1 and S2 without murmur.   Respiratory:  Lungs clear to auscultation bilaterally. No wheezing or crackles.   Musculoskeletal:  No gross extremity deformities. No peripheral edema.  GI:  Soft. Non-distended. Mildly tender to palpation, improved from yesterday    Data     I have personally reviewed the following data over the past 24 hrs:    4.9  \   8.2 (L)   / 302     143 112 (H) 17.2 /  151 (H)   4.1 24 0.57 \     ALT: 11 AST: 6 AP: 105 TBILI: <0.2   ALB: 1.9 (L) TOT PROTEIN: 5.5 (L) LIPASE: N/A     INR:  1.12 PTT:  N/A   D-dimer:  N/A Fibrinogen:  N/A       Imaging results reviewed over the past 24 hrs:   No results found for this or any previous visit (from the past 24 hour(s)).

## 2024-05-12 NOTE — PROGRESS NOTES
Intensivist brief update    Patient remains stable  Off pressors  Room air  Will taper hydrocortisone to 25mg IV q8h; recommend gradual taper over 3-5 days then resume PTA dose of prednisone.  OK to downgrade from ICU  Resident team is primary  Our service will sign off; please call with questions.    Antwon Gibson) MD [place]  [facility] ([facility])  Clinic [phone]  Fax [phone]

## 2024-05-13 ENCOUNTER — VIRTUAL VISIT (OUTPATIENT)
Dept: INTERPRETER SERVICES | Facility: CLINIC | Age: 44
End: 2024-05-13
Payer: COMMERCIAL

## 2024-05-13 LAB
ALBUMIN SERPL BCG-MCNC: 2.1 G/DL (ref 3.5–5.2)
ALP SERPL-CCNC: 126 U/L (ref 40–150)
ALT SERPL W P-5'-P-CCNC: 10 U/L (ref 0–50)
ANION GAP SERPL CALCULATED.3IONS-SCNC: 5 MMOL/L (ref 7–15)
AST SERPL W P-5'-P-CCNC: 12 U/L (ref 0–45)
BILIRUB SERPL-MCNC: 0.2 MG/DL
BUN SERPL-MCNC: 22.3 MG/DL (ref 6–20)
CALCIUM SERPL-MCNC: 8.2 MG/DL (ref 8.6–10)
CHLORIDE SERPL-SCNC: 110 MMOL/L (ref 98–107)
CREAT SERPL-MCNC: 0.49 MG/DL (ref 0.51–0.95)
DEPRECATED HCO3 PLAS-SCNC: 28 MMOL/L (ref 22–29)
EGFRCR SERPLBLD CKD-EPI 2021: >90 ML/MIN/1.73M2
ERYTHROCYTE [DISTWIDTH] IN BLOOD BY AUTOMATED COUNT: 15.9 % (ref 10–15)
GLUCOSE BLDC GLUCOMTR-MCNC: 115 MG/DL (ref 70–99)
GLUCOSE BLDC GLUCOMTR-MCNC: 147 MG/DL (ref 70–99)
GLUCOSE SERPL-MCNC: 151 MG/DL (ref 70–99)
HCT VFR BLD AUTO: 25.2 % (ref 35–47)
HGB BLD-MCNC: 7.8 G/DL (ref 11.7–15.7)
INR PPP: 1.08 (ref 0.85–1.15)
MAGNESIUM SERPL-MCNC: 2.4 MG/DL (ref 1.7–2.3)
MCH RBC QN AUTO: 28.8 PG (ref 26.5–33)
MCHC RBC AUTO-ENTMCNC: 31 G/DL (ref 31.5–36.5)
MCV RBC AUTO: 93 FL (ref 78–100)
PHOSPHATE SERPL-MCNC: 2.7 MG/DL (ref 2.5–4.5)
PLATELET # BLD AUTO: 267 10E3/UL (ref 150–450)
POTASSIUM SERPL-SCNC: 4.4 MMOL/L (ref 3.4–5.3)
PREALB SERPL-MCNC: 14.4 MG/DL (ref 20–40)
PROT SERPL-MCNC: 5.6 G/DL (ref 6.4–8.3)
RBC # BLD AUTO: 2.71 10E6/UL (ref 3.8–5.2)
SODIUM SERPL-SCNC: 143 MMOL/L (ref 135–145)
WBC # BLD AUTO: 3.7 10E3/UL (ref 4–11)

## 2024-05-13 PROCEDURE — C9113 INJ PANTOPRAZOLE SODIUM, VIA: HCPCS | Performed by: INTERNAL MEDICINE

## 2024-05-13 PROCEDURE — 85610 PROTHROMBIN TIME: CPT | Performed by: INTERNAL MEDICINE

## 2024-05-13 PROCEDURE — 84100 ASSAY OF PHOSPHORUS: CPT | Performed by: INTERNAL MEDICINE

## 2024-05-13 PROCEDURE — 84134 ASSAY OF PREALBUMIN: CPT | Performed by: INTERNAL MEDICINE

## 2024-05-13 PROCEDURE — B4185 PARENTERAL SOL 10 GM LIPIDS: HCPCS | Mod: JZ | Performed by: FAMILY MEDICINE

## 2024-05-13 PROCEDURE — 80053 COMPREHEN METABOLIC PANEL: CPT | Performed by: INTERNAL MEDICINE

## 2024-05-13 PROCEDURE — 83735 ASSAY OF MAGNESIUM: CPT | Performed by: INTERNAL MEDICINE

## 2024-05-13 PROCEDURE — 93005 ELECTROCARDIOGRAM TRACING: CPT

## 2024-05-13 PROCEDURE — 250N000011 HC RX IP 250 OP 636: Performed by: INTERNAL MEDICINE

## 2024-05-13 PROCEDURE — T1013 SIGN LANG/ORAL INTERPRETER: HCPCS | Mod: U4,TEL,95 | Performed by: INTERPRETER

## 2024-05-13 PROCEDURE — 250N000009 HC RX 250: Performed by: FAMILY MEDICINE

## 2024-05-13 PROCEDURE — 250N000011 HC RX IP 250 OP 636

## 2024-05-13 PROCEDURE — 99232 SBSQ HOSP IP/OBS MODERATE 35: CPT | Mod: GC

## 2024-05-13 PROCEDURE — 93010 ELECTROCARDIOGRAM REPORT: CPT | Performed by: INTERNAL MEDICINE

## 2024-05-13 PROCEDURE — 85027 COMPLETE CBC AUTOMATED: CPT | Performed by: INTERNAL MEDICINE

## 2024-05-13 PROCEDURE — 99231 SBSQ HOSP IP/OBS SF/LOW 25: CPT

## 2024-05-13 PROCEDURE — 120N000001 HC R&B MED SURG/OB

## 2024-05-13 RX ORDER — DIPHENHYDRAMINE HYDROCHLORIDE 50 MG/ML
25 INJECTION INTRAMUSCULAR; INTRAVENOUS ONCE
Status: COMPLETED | OUTPATIENT
Start: 2024-05-13 | End: 2024-05-13

## 2024-05-13 RX ADMIN — HYDROCORTISONE SODIUM SUCCINATE 25 MG: 100 INJECTION, POWDER, FOR SOLUTION INTRAMUSCULAR; INTRAVENOUS at 06:04

## 2024-05-13 RX ADMIN — HEPARIN SODIUM 5000 UNITS: 10000 INJECTION, SOLUTION INTRAVENOUS; SUBCUTANEOUS at 15:38

## 2024-05-13 RX ADMIN — HEPARIN SODIUM 5000 UNITS: 10000 INJECTION, SOLUTION INTRAVENOUS; SUBCUTANEOUS at 22:10

## 2024-05-13 RX ADMIN — PIPERACILLIN AND TAZOBACTAM 3.38 G: 3; .375 INJECTION, POWDER, FOR SOLUTION INTRAVENOUS at 06:05

## 2024-05-13 RX ADMIN — PIPERACILLIN AND TAZOBACTAM 3.38 G: 3; .375 INJECTION, POWDER, FOR SOLUTION INTRAVENOUS at 15:38

## 2024-05-13 RX ADMIN — HYDROCORTISONE SODIUM SUCCINATE 25 MG: 100 INJECTION, POWDER, FOR SOLUTION INTRAMUSCULAR; INTRAVENOUS at 18:34

## 2024-05-13 RX ADMIN — FISH OIL 100 ML: 0.1 INJECTION, EMULSION INTRAVENOUS at 20:24

## 2024-05-13 RX ADMIN — HEPARIN SODIUM 5000 UNITS: 10000 INJECTION, SOLUTION INTRAVENOUS; SUBCUTANEOUS at 06:05

## 2024-05-13 RX ADMIN — MAGNESIUM SULFATE HEPTAHYDRATE: 500 INJECTION, SOLUTION INTRAMUSCULAR; INTRAVENOUS at 20:24

## 2024-05-13 RX ADMIN — PIPERACILLIN AND TAZOBACTAM 3.38 G: 3; .375 INJECTION, POWDER, FOR SOLUTION INTRAVENOUS at 22:10

## 2024-05-13 RX ADMIN — PANTOPRAZOLE SODIUM 40 MG: 40 INJECTION, POWDER, FOR SOLUTION INTRAVENOUS at 10:19

## 2024-05-13 RX ADMIN — DIPHENHYDRAMINE HYDROCHLORIDE 25 MG: 50 INJECTION, SOLUTION INTRAMUSCULAR; INTRAVENOUS at 22:54

## 2024-05-13 ASSESSMENT — ACTIVITIES OF DAILY LIVING (ADL)
ADLS_ACUITY_SCORE: 27
ADLS_ACUITY_SCORE: 27
ADLS_ACUITY_SCORE: 30
ADLS_ACUITY_SCORE: 27
ADLS_ACUITY_SCORE: 27
ADLS_ACUITY_SCORE: 30
ADLS_ACUITY_SCORE: 27
ADLS_ACUITY_SCORE: 27
ADLS_ACUITY_SCORE: 30
ADLS_ACUITY_SCORE: 27
ADLS_ACUITY_SCORE: 30
ADLS_ACUITY_SCORE: 27
ADLS_ACUITY_SCORE: 30
ADLS_ACUITY_SCORE: 27
ADLS_ACUITY_SCORE: 30
ADLS_ACUITY_SCORE: 27
ADLS_ACUITY_SCORE: 30

## 2024-05-13 NOTE — PROGRESS NOTES
Nutrition Therapy  Parenteral Nutrition follow-up       Dietitian to Pharmacy    Rate of TPN: 60 ml/hr continuous  Grams Dextrose: 200  Grams Protein: 60    Lipids: Omegaven 20 g/day (2 x 100 ml bottle)         Current Nutrition Intake:  Diet: NPO except medications, ice chips  Custom TPN: 60 g AA, 180 g DEX at 60 ml/hr continuous  Lipids held due to soy allergy.  Pharmacist working on getting Omegaven a soy free lipid.      Food allergies:  wheat, soy.     Provides:  852 kcals, 60 g protein, 180 g carb, 1440 ml fluid/day.     TPN start 5/11/12 due to intraabdominal abscess with fistula formation.    New Findings:  Met with patient with assist of [employer] .  Patient reports she has been eating 3 meals/day though with decreased intake since surgery 2 years ago.  (11/6/2022-exp lap, splenic flexure mobilization, creation colostomy, resection of descending colon.)   We discussed TPN providing nutrition at this time.   Patient reports soy allergy with reaction of swollen lips.    Weight Trends  Admission wt: 44 kg (97 lb) 5/10/24  Current wt: no new wt.  +4 L since admit per I/Os,  may be inaccurate as only one day of TPN documented in inputs.    Dosing Weight: 44 kg     ESTIMATED NUTRITION NEEDS  Energy Needs: 0501-3430 kcals/day (25 - 30 kcals/kg)  Justification: Repletion  Protein Needs: 53-66 grams protein/day (1.2 - 1.5 grams of pro/kg)  Justification: Increased needs  Fluid Needs: 3136-9623 mL/day (1 mL/kcal)   Justification: Maintenance    GI:  I/Os for 5/12/24:  1541/500.  Outputs include 300 ml +1 urine, 200 ml per colostomy.    Labs:   CMP  Recent Labs   Lab 05/13/24  0614 05/12/24  0646 05/12/24  0429 05/11/24  2226 05/11/24  1815 05/11/24  1035 05/11/24  0427 05/10/24  1425 05/10/24  0740 05/09/24  0808     --  143  --   --   --  141  --  135 136   POTASSIUM 4.4  --  4.1  --  4.0 3.3* 3.1*  3.1*   < > 3.0* 3.3*   * 151* 162* 157*  --   --  105*   < > 125* 104*   BUN 22.3*  --  17.2  --    --   --  10.5  --  8.8 8.4   CR 0.49*  --  0.57  --   --   --  0.45*  --  0.68 0.65   ALEX 8.2*  --  8.1*  --   --   --  6.8*  --  8.2* 8.4*   MAG 2.4*  --  2.2  --   --   --  1.8  --  1.9 1.9   PHOS 2.7  --  2.8  --   --   --  2.7  --   --   --    ALBUMIN 2.1*  --  1.9*  --   --   --   --   --  2.7* 2.7*   BILITOTAL 0.2  --  <0.2  --   --   --   --   --  0.4 0.6   ALKPHOS 126  --  105  --   --   --   --   --  193* 186*   AST 12  --  6  --   --   --   --   --  17 34   ALT 10  --  11  --   --   --   --   --  23 31    < > = values in this interval not displayed.     Labs reviewed by dietitian    Medications:   Current Facility-Administered Medications   Medication Dose Route Frequency Provider Last Rate Last Admin    [Held by provider] azaTHIOprine (IMURAN) tablet 100 mg  100 mg Oral Daily Antwon Esteves MD        heparin ANTICOAGULANT injection 5,000 Units  5,000 Units Subcutaneous Q8H Antwon Esteves MD   5,000 Units at 05/13/24 0605    hydrocortisone sodium succinate PF (solu-CORTEF) injection 25 mg  25 mg Intravenous Q12H Phoebe Sandhu MD   25 mg at 05/13/24 0604    pantoprazole (PROTONIX) IV push injection 40 mg  40 mg Intravenous Daily with breakfast Antwon Esteves MD   40 mg at 05/12/24 0830    piperacillin-tazobactam (ZOSYN) 3.375 g vial to attach to  mL bag  3.375 g Intravenous Q8H Antwon Esteves MD   3.375 g at 05/13/24 0605    [Held by provider] predniSONE (DELTASONE) tablet 30 mg  30 mg Oral Daily Antwon Esteves MD        sodium chloride (PF) 0.9% PF flush 3 mL  3 mL Intracatheter Q8H Antwon Esteves MD   3 mL at 05/13/24 0235      Current Facility-Administered Medications   Medication Dose Route Frequency Provider Last Rate Last Admin    dextrose 10% infusion   Intravenous Continuous PRN Antwon Esteves MD        parenteral nutrition - ADULT compounded formula   CENTRAL LINE IV TPN CONTINUOUS Td Padilla MD 60 mL/hr at 05/12/24 2016 New Bag at 05/12/24 2016      Current  Facility-Administered Medications   Medication Dose Route Frequency Provider Last Rate Last Admin    acetaminophen (TYLENOL) tablet 650 mg  650 mg Oral Q4H PRN Antwon Esteves MD        Or    acetaminophen (TYLENOL) Suppository 650 mg  650 mg Rectal Q4H PRN Antwon Esteves MD        atropine injection 1 mg  1 mg Intravenous Once PRN Antwon Esteves MD        calcium carbonate (TUMS) chewable tablet 1,000 mg  1,000 mg Oral 4x Daily PRN Antwon Esteves MD        dextrose 10% infusion   Intravenous Continuous PRN Antwon Esteves MD        glucose gel 15-30 g  15-30 g Oral Q15 Min PRN Antwon Esteves MD        Or    dextrose 50 % injection 25-50 mL  25-50 mL Intravenous Q15 Min PRN Antwon Esteves MD        Or    glucagon injection 1 mg  1 mg Subcutaneous Q15 Min PRN Antwon Esteves MD        HYDROmorphone (DILAUDID) injection 0.2 mg  0.2 mg Intravenous Q2H PRN Antwon Esteves MD   0.2 mg at 05/12/24 0830    HYDROmorphone (DILAUDID) injection 0.4 mg  0.4 mg Intravenous Q2H PRN Antwon Esteves MD        lidocaine 1 % 0.1-1 mL  0.1-1 mL Other Q1H PRN Antwon Esteves MD        lidocaine 1 % 0.1-5 mL  0.1-5 mL Other Q1H PRN Antwon Esteves MD   2.5 mL at 05/10/24 1240    naloxone (NARCAN) injection 0.2 mg  0.2 mg Intravenous Q2 Min PRN Antwon Esteves MD        Or    naloxone (NARCAN) injection 0.4 mg  0.4 mg Intravenous Q2 Min PRN Antwon Esteves MD        Or    naloxone (NARCAN) injection 0.2 mg  0.2 mg Intramuscular Q2 Min PRAntwon Wei MD        Or    naloxone (NARCAN) injection 0.4 mg  0.4 mg Intramuscular Q2 Min PRN Antwon Esteves MD        ondansetron (ZOFRAN ODT) ODT tab 4 mg  4 mg Oral Q6H PRN Antwon Esteves MD   4 mg at 05/10/24 1705    Or    ondansetron (ZOFRAN) injection 4 mg  4 mg Intravenous Q6H PRN Antwon Esteves MD        ondansetron (ZOFRAN ODT) ODT tab 4 mg  4 mg Oral Q6H PRN Antwon Esteves MD   4 mg at 05/11/24 1032    Or    ondansetron (ZOFRAN) injection 4 mg  4 mg Intravenous Q6H PRN Danielito  MD Antwon        prochlorperazine (COMPAZINE) injection 10 mg  10 mg Intravenous Q6H Antwon Louise MD        Or    prochlorperazine (COMPAZINE) tablet 10 mg  10 mg Oral Q6H PRAntwon Villarreal MD        Or    prochlorperazine (COMPAZINE) suppository 25 mg  25 mg Rectal Q12H Antwon Louise MD        senna-docusate (SENOKOT-S/PERICOLACE) 8.6-50 MG per tablet 1 tablet  1 tablet Oral BID Antwon Louise MD        Or    senna-docusate (SENOKOT-S/PERICOLACE) 8.6-50 MG per tablet 2 tablet  2 tablet Oral BID Antwon Louise MD        senperla-docusate (SENOKOT-S/PERICOLACE) 8.6-50 MG per tablet 1 tablet  1 tablet Oral BID Antwon Louise MD        Or    senna-docusate (SENOKOT-S/PERICOLACE) 8.6-50 MG per tablet 2 tablet  2 tablet Oral BID Antwon Louise MD        sodium chloride (PF) 0.9% PF flush 10-40 mL  10-40 mL Intracatheter Once PRAntwon Villarreal MD        sodium chloride (PF) 0.9% PF flush 3 mL  3 mL Intracatheter q1 min prAntwon Villarreal MD          Reviewed by dietitian       MALNUTRITION  % Intake: Decreased intake does not meet criteria  % Weight Loss: > 2% in 1 week (severe malnutrition).  3.9% < 2wks.   Subcutaneous Fat Loss: None observed  Muscle Loss: None observed  Fluid Accumulation/Edema: None noted  Malnutrition Diagnosis: Patient does not meet two of the established criteria necessary for diagnosing malnutrition but is at risk for malnutrition    Nutrition Diagnosis  Altered GI function related to acute illness as evidenced by intraabdominal abscess with fistula formation, need for TPN   Evaluation: continues    Goals   Tolerate TPN-progressing  Meet nutrition needs-progressing  Electrolytes WNL-magnesium slightly elevated  New- Blood sugar < 180.    INTERVENTIONS  Implementation  Parenteral Nutrition/IV Fluids - increase dextrose and add omegaven today.  60 g AA, 200 g DEX at 60 ml/hr continuous.  Start 200 ml omegaven daily, infuse over 12 hrs.    Above to provide 1144  kcals, 60 g protein, 200 g carb, 20 g lipid, 1440 ml fluid daily.    Above meets estimated needs.      Monitoring/Evaluation  Progress toward goals will be monitored and evaluated per protocol.

## 2024-05-13 NOTE — PROGRESS NOTES
[facility]    Progress Note - Hospitalist Service       Date of Admission:  5/10/2024    Assessment & Plan     Dain Tejeda is a 43 year old female with history of Chron's disease complicated by colonic stricture s/p partial colectomy (11/2022), Celiac disease, SIOMARA admitted for septic shock from intraabdominal abscess with fistula formation.      Sepsis 2/2 intra-abdominal infection - improved  Chron's disease, complicated by progression with colonic obstruction, abscess, developing fistula and ileitis   S/p partial colectomy 11/2022, has LLQ colostomy   Diagnosed in late 2022, complicated by stricture s/p partial colectomy with ostomy. Follows with [facility], on Humira and azathioprine. Was recently admitted on our service 4/30-5/4 after syncopal episode thought to be 2/2 Chron's flare after CT showed colitis but negative Cdiff and enteric panel. GI consulted at that time and recommended prednisone taper over 4 weeks, unclear whether patient taking steroid as prescribed since discharge. Severe abdominal pain developed on day prior to admission along with fever. Developed hypotension in the ED despite fluid resuscitation and was transferred to the ICU for pressor support. CT showing marked progression of Chron's with colonic obstruction, 10 cm intramural abscess, developing fistula and significant ileitis. Patient initiated on Zosyn in ER. IR consulted, noted that bowel access would be challenging and recommended repeat imaging after antibiotics   - GI consulted  - NPO. RD consulted, TPN started 5/11.   - Zosyn  - Tapering stress dose steroids: 25 mg BID today. Defer to GI for resumption of PTA steroids versus IV.   - Surgery consulted, appreciate recommendations  - Continue IV Zosyn  - PRN Tylenol and IV dilaudid for pain  - Zofran PRN   - Daily CBC      Hypokalemia  3.0 on admission, supplementation given in ED.   - Daily BMP  - Replacement per RN protocol     Normocytic anemia  History of SIOMARA  "with recent iron infusions through [facility]. Hgb 9.8 on admission, stable with previous. Colostomy output appears non-bloody. Hemoglobin downtrending today, patient again denies blood loss.   - Daily CBC     Disorganized behaviors  Demonstrated odd behaviors during recent admission, treated with Seroquel 12.5 mg at bedtime. Psych consult at that time, did not recommend any ongoing meds upon discharge. Patient calm, alert and cooperative this admission so far.   - Delirium protocol, hold on meds for now     Gamma gap  Chronic. Tprotein 7.2, albumin 2.7. Most certainly 2/2 inflammatory disease.      Incidental finding: benign pelvic simple cyst  >5 to <=7 cm. Follow up pelvic US in 6 months recommended.         Diet: NPO for Medical/Clinical Reasons Except for: Meds, Ice Chips  parenteral nutrition - ADULT compounded formula  parenteral nutrition - ADULT compounded formula    DVT Prophylaxis: Heparin SQ  Chambers Catheter: Not present  Fluids: TPN  Lines: PRESENT      PICC 05/10/24 Triple Lumen Right Basilic Vasopressor-Site Assessment: WDL      Cardiac Monitoring: ACTIVE order. Indication: ICU  Code Status: Full Code      Clinically Significant Risk Factors              # Hypoalbuminemia: Lowest albumin = 1.9 g/dL at 5/12/2024  4:29 AM, will monitor as appropriate            # Cachexia: Estimated body mass index is 17.74 kg/m  as calculated from the following:    Height as of this encounter: 1.575 m (5' 2\").    Weight as of this encounter: 44 kg (97 lb)., PRESENT ON ADMISSION          The patient's care was discussed with the Attending Physician, Dr. Dunlap .    Phoebe Sandhu MD  Hospitalist Service  [facility]  ______________________________________________________________________    Interval History     No acute events overnight. Denies abdominal pain today, states that she feels even better than yesterday. She was able to get good sleep. Denies nausea vomiting. Notes intermittent palpitations, " denies chest pain.     Physical Exam   Vital Signs: Temp: 97.8  F (36.6  C) Temp src: Oral BP: 101/76 Pulse: 52   Resp: 17 SpO2: 100 % O2 Device: None (Room air)    Weight: 97 lbs .04 oz    General:  No acute distress.   Psych:  Alert and oriented to person, place, and time. Calm.   HEENT:  Eyes grossly normal to inspection. Mucous membranes moist.   Cardiovascular:  Bradycardic, regular rhythm.   Respiratory:  Lungs clear to auscultation bilaterally. No wheezing or crackles.   Musculoskeletal:  No gross extremity deformities. No peripheral edema.  GI:  Soft. Non-distended. Non-tender.     Data     I have personally reviewed the following data over the past 24 hrs:    3.7 (L)  \   7.8 (L)   / 267     143 110 (H) 22.3 (H) /  151 (H)   4.4 28 0.49 (L) \     ALT: 10 AST: 12 AP: 126 TBILI: 0.2   ALB: 2.1 (L) TOT PROTEIN: 5.6 (L) LIPASE: N/A     INR:  1.08 PTT:  N/A   D-dimer:  N/A Fibrinogen:  N/A       Imaging results reviewed over the past 24 hrs:   No results found for this or any previous visit (from the past 24 hour(s)).

## 2024-05-13 NOTE — PROGRESS NOTES
[facility] Progress Note     IMPRESSION:  43-year-old mung speaking female with past medical history of ileocolonic Crohn's disease s/p partial colectomy in 2022, now on azathioprine and Humira, celiac disease who was admitted to the hospital again on 5/10/2024 for nausea and vomiting with CT scan demonstrating abscess along the colon wall.    # Ileocolonic Crohn's disease with fistula  CT scan with concerns of fistulizing disease along with abscess.  She is on broad-spectrum IV antibiotics.  IR saw the patient to discuss percutaneous procedure however noted that this would be difficult to access and may increase risk for fistula formation.  Surgery also following along but no plans for surgical intervention at this time.  - Blood cultures negative  - Infectious stool studies negative.     # Nausea/vomiting  Currently n.p.o., TPN started on 5/11/2024.    RECOMMENDATIONS:  - Continue NPO, continue TPN per nutrition    - Continue IV antibiotics.     - This is a complicated case and I question what options are available to her. From a GI standpoint, Pt may eventually need to transition to alternative Crohn's therapy such as infliximab. Steroids are not ideal in cases of fistulizing disease and abscess. It may be helpful to have ID weigh in on abscess management/abx treatment. I wonder if there would be any added benefit to have colorectal involved as well?         25 minutes of total time was spent providing patient care, including patient evaluation, reviewing documentation/test results, and     Contreras Pereira PA-C  [facility]  [phone]  ________________________________________________________________________      SUBJECTIVE:    Colostomy bag with brown liquid. Gas noted. She has abdominal discomfort but mainly complains of hunger. She denies nausea/vomiting. No bloody stools or melena.      OBJECTIVE:  /76 (BP Location: Left arm)   Pulse 52   Temp 97.8  F (36.6  C)  "(Oral)   Resp 17   Ht 1.575 m (5' 2\")   Wt 44 kg (97 lb)   LMP 2024 (Exact Date)   SpO2 100%   BMI 17.74 kg/m    Temp (24hrs), Av.6  F (36.4  C), Min:97.4  F (36.3  C), Max:97.8  F (36.6  C)    Patient Vitals for the past 72 hrs:   Weight   05/10/24 1626 44 kg (97 lb)       Intake/Output Summary (Last 24 hours) at 2024 1411  Last data filed at 2024 1200  Gross per 24 hour   Intake 2236.5 ml   Output 135 ml   Net 2101.5 ml        PHYSICAL EXAM  GEN: Alert, oriented x3, communicative and in NAD.    LYMPH: No LAD noted.  HRT: RRR  LUNGS: CTA  ABD:  ND, +BS, +Mild pain to palpation, no rebound, no HSM.  SKIN: No rash, jaundice or spider angiomata      LABS:  I have reviewed the patient's new clinical lab results:     Recent Labs   Lab Test 24  0624  0427 24  0718 24  2103   WBC 3.7* 4.9 5.7   < > 10.5   HGB 7.8* 8.2* 8.0*   < > 9.5*   MCV 93 91 92   < > 92    302 269   < > 511*   INR 1.08 1.12  --   --  1.04    < > = values in this interval not displayed.     Recent Labs   Lab Test 24  0624  1815 24  1035 24  0427   POTASSIUM 4.4 4.1 4.0   < > 3.1*  3.1*   CHLORIDE 110* 112*  --   --  114*   CO2 28 24  --   --  15*   BUN 22.3* 17.2  --   --  10.5   CR 0.49* 0.57  --   --  0.45*   ANIONGAP 5* 7  --   --  12    < > = values in this interval not displayed.     Recent Labs   Lab Test 05/13/24  0614 05/12/24  0429 05/10/24  0740 24  1148 24  0808 24  2103 24  0126 24  1732 24  1418 12/15/22  0658 22  1004 22  0936   ALBUMIN 2.1* 1.9* 2.7*  --  2.7*   < >  --    < > 2.6*   < >  --  3.7   BILITOTAL 0.2 <0.2 0.4  --  0.6   < >  --    < > 0.3   < >  --  0.3   ALT 10 11 23  --  31   < >  --    < > 13   < >  --  15   AST 12 6 17  --  34   < >  --    < > 21   < >  --  22   PROTEIN  --   --   --  70*  --   --  Negative  --   --   --  Negative  --    LIPASE  --   --   -- "   --  29  --   --   --  23  --   --  32    < > = values in this interval not displayed.         IMAGING:  Reviewed

## 2024-05-13 NOTE — PHARMACY-CONSULT NOTE
"Pharmacy Note: Parenteral Nutrition (PN) Management    Pharmacist consulted to dose PN for Dain Tejeda, a 43 year old female by Dr. Robles.    Subjective:    The patient is a new PN start.    The patient was started on PN in the hospital on 5/11/2024.    Indication for PN therapy: bowel obstruction    Inadequate nutrition anticipated for > 7 days.     Enteral nutrition contraindicated due to: small bowel obstruction.    Pertinent diseases and other special considerations  soy & gluten allergies    Social History     Tobacco Use    Smoking status: Never    Smokeless tobacco: Never   Substance Use Topics    Alcohol use: No    Drug use: No     Objective:    Ht Readings from Last 1 Encounters:   05/10/24 1.575 m (5' 2\")     Wt Readings from Last 1 Encounters:   05/10/24 44 kg (97 lb)       Body mass index is 17.74 kg/m .    Patient Vitals for the past 96 hrs:   Weight   05/10/24 1626 44 kg (97 lb)   05/10/24 0709 49 kg (108 lb)       Labs:  Last 3 days:  Recent Labs     05/10/24  1425 05/10/24  2058 05/11/24  0427 05/11/24  1035 05/11/24  1815 05/12/24  0429 05/12/24  0429 05/13/24  0614   NA  --   --  141  --   --  143  --  143   POTASSIUM 3.1* 3.3* 3.1*  3.1* 3.3* 4.0 4.1  --  4.4   CHLORIDE  --   --  114*  --   --  112*  --  110*   CO2  --   --  15*  --   --  24  --  28   BUN  --   --  10.5  --   --  17.2  --  22.3*   CR  --   --  0.45*  --   --  0.57  --  0.49*   ALEX  --   --  6.8*  --   --  8.1*  --  8.2*   MAG  --   --  1.8  --   --  2.2  --  2.4*   PHOS  --   --  2.7  --   --  2.8  --  2.7   PROTTOTAL  --   --   --   --   --  5.5*  --  5.6*   ALBUMIN  --   --   --   --   --  1.9*  --  2.1*   PREALB  --   --   --   --   --  7.7*  --   --    HGB  --   --  8.0*  --   --  8.2*  --  7.8*   HCT  --   --  25.5*  --   --  25.9*  --  25.2*   PLT  --   --  269  --   --  302  --  267   BILITOTAL  --   --   --   --   --  <0.2  --  0.2   AST  --   --   --   --   --  6  --  12   ALT  --   --   --   --   --  11  --  10   ALKPHOS  " --   --   --   --   --  105   < > 126   INR  --   --   --   --   --  1.12  --  1.08    < > = values in this interval not displayed.       Glucose (past 48 hours):   Recent Labs     05/11/24  2226 05/12/24  0429 05/12/24  0646 05/13/24  0614   * 162* 151* 151*       Intake/Output (last 24 hours): I/O last 3 completed shifts:  In: 1516.5 [I.V.:220]  Out: 125 [Stool:125]    Estimated CrCl: Estimated Creatinine Clearance: 104.9 mL/min (A) (based on SCr of 0.49 mg/dL (L)).    Assessment:    Initiate patient on PN therapy as a continuous central therapy.     Given the patient's current condition/oral intake, PN is still indicated.    Lab results reviewed:     Plan:  Rate of PN: 60 mL/hr  Formula:   Amino Acids 60 grams  Dextrose 200 grams  Sodium 50 mEq/day  Potassium 60 mEq/day  Calcium 5 mEq/day  Magnesium 6 mEq/day  Phosphorus 15 mMol/day  Chloride: Acetate Ratio 1:3  Standard Multivitamins w/Vitamin K  Trace Elements  Fat Emulsion: Omegaven (due to soy allergy) 20 g daily (2 bottles)  Check hyperal lytes labs tomorrow.  Pharmacist will continue to follow the patient's lab results, clinical status and blood glucose results and make adjustments as appropriate.    Thank you for the consult.  Cyndie Conrad [facility]  5/13/2024 9:10 AM

## 2024-05-13 NOTE — PLAN OF CARE
[facility] - ICU    RN Progress Note:            Pertinent Assessments:      Please refer to flowsheet rows for full assessment     Pt alertx4, lung sounds clear, SpO2 100%. Pt c/o being hungry, re-educated about TPN, plan of care with encouragement. Pt to be transfered to p1.    Telemetry reads Sinus Bradycardia.    Protocols:  K+: 4.4, am recheck, 5/14.  Magnesium: 2.4, am recheck, 5/14.             Key Events - This Shift:       VSS               Barriers to Discharge / Downgrade:     NPO currently on TPN.         Point of Contact Update YES-OR-NO: Yes  If No, reason:   Name:  Phone Number:  Summary of Conversation:           Problem: Sepsis/Septic Shock  Goal: Optimal Coping  Outcome: Progressing  Goal: Absence of Bleeding  Outcome: Progressing  Goal: Blood Glucose Level Within Targeted Range  Outcome: Progressing  Goal: Absence of Infection Signs and Symptoms  Outcome: Progressing  Intervention: Initiate Sepsis Management  Recent Flowsheet Documentation  Taken 5/13/2024 1600 by Amy López, RN  Infection Prevention:   hand hygiene promoted   rest/sleep promoted   single patient room provided   equipment surfaces disinfected   visitors restricted/screened  Intervention: Promote Recovery  Recent Flowsheet Documentation  Taken 5/13/2024 1600 by Amy López, RN  Activity Management: activity adjusted per tolerance  Goal: Optimal Nutrition Intake  Outcome: Progressing

## 2024-05-13 NOTE — PROGRESS NOTES
General Surgery Progress Note:    Hospital Day # 3    ASSESSMENT:     1. Sepsis, due to unspecified organism, unspecified whether acute organ dysfunction present (H)    2. Intra-abdominal abscess (H)    3. Colonic obstruction (H)    4. Hypokalemia    5. History of Crohn's disease    6. Crohn's disease of colon with fistula (H)    7. Septic shock (H)        Dain Tejeda is a 43 year old female with PMH Crohn's disease and colonic stricture s/p partial colectomy on 11/2022, celiac disease, presenting with inflammation and likely intramural abscess of the transverse colon with pLBO.  Patient is passing gas through her ostomy    PLAN:   -Continue NPO for now  -Activity as tolerated  -Continue IV antibiotics  -No surgical intervention planned at this time  -Medical management per primary team    SUBJECTIVE:   Dain Tejeda was seen on rounds.  Patient states she is feeling well and actually feeling hungry this morning.  States her abdominal pain has lessened and is almost none at baseline.  Patient denies nausea, vomiting, fever, chills, states she is passing some gas through the ostomy, burped it once this morning.  No other stool    Patient Vitals for the past 24 hrs:   BP Temp Temp src Pulse Resp SpO2   05/13/24 1036 101/76 97.8  F (36.6  C) Oral 52 17 100 %   05/13/24 1000 -- -- -- 56 -- 100 %   05/13/24 0900 -- -- -- 54 -- 100 %   05/13/24 0335 97/65 -- -- 53 -- 99 %   05/13/24 0230 102/72 97.6  F (36.4  C) Oral 53 -- 99 %   05/13/24 0200 -- -- -- (!) 46 -- 99 %   05/13/24 0100 -- -- -- (!) 44 -- 100 %   05/12/24 2117 99/65 97.4  F (36.3  C) -- 53 20 100 %   05/12/24 1700 104/70 -- -- 64 -- 100 %   05/12/24 1600 92/68 -- -- 57 -- 100 %   05/12/24 1500 95/68 -- -- 67 -- 100 %   05/12/24 1400 94/56 -- -- 54 -- 99 %       Physical Exam:  General: patient seen sitting up in chair in no acute distress  Resp: no increased work of breathing, breathing comfortably on room air  Abdomen: Generally soft and nondistended abdomen generally  nontender with palpation over all 4 quadrants.  Ostomy pink and viable.  Some gas in bag.  Very little liquid brown stool in bag at time of rounding  Extremities: No edema, cyanosis, or obvious deformities visualized on exam    Admission on 05/10/2024   Component Date Value    Sodium 05/10/2024 135     Potassium 05/10/2024 3.0 (L)     Chloride 05/10/2024 98     Carbon Dioxide (CO2) 05/10/2024 24     Anion Gap 05/10/2024 13     Urea Nitrogen 05/10/2024 8.8     Creatinine 05/10/2024 0.68     GFR Estimate 05/10/2024 >90     Calcium 05/10/2024 8.2 (L)     Glucose 05/10/2024 125 (H)     Protein Total 05/10/2024 7.2     Albumin 05/10/2024 2.7 (L)     Bilirubin Total 05/10/2024 0.4     Alkaline Phosphatase 05/10/2024 193 (H)     AST 05/10/2024 17     ALT 05/10/2024 23     Bilirubin Direct 05/10/2024 <0.20     Lactic Acid 05/10/2024 0.9     Culture 05/10/2024 No growth after 3 days     Culture 05/10/2024 No growth after 3 days     WBC Count 05/10/2024 10.0     RBC Count 05/10/2024 3.36 (L)     Hemoglobin 05/10/2024 9.8 (L)     Hematocrit 05/10/2024 29.7 (L)     MCV 05/10/2024 88     MCH 05/10/2024 29.2     MCHC 05/10/2024 33.0     RDW 05/10/2024 14.6     Platelet Count 05/10/2024 375     % Neutrophils 05/10/2024 85     % Lymphocytes 05/10/2024 5     % Monocytes 05/10/2024 9     % Eosinophils 05/10/2024 0     % Basophils 05/10/2024 0     % Immature Granulocytes 05/10/2024 1     NRBCs per 100 WBC 05/10/2024 0     Absolute Neutrophils 05/10/2024 8.6 (H)     Absolute Lymphocytes 05/10/2024 0.5 (L)     Absolute Monocytes 05/10/2024 0.9     Absolute Eosinophils 05/10/2024 0.0     Absolute Basophils 05/10/2024 0.0     Absolute Immature Granul* 05/10/2024 0.1     Absolute NRBCs 05/10/2024 0.0     Radiologist flags 05/10/2024 Marked progression of her Crohn's colitis with colonic obstruction, long segment of intramural abscess, developing fistula and significant ileitis. (Urgent)     Influenza A PCR 05/10/2024 Negative     Influenza  B PCR 05/10/2024 Negative     RSV PCR 05/10/2024 Negative     SARS CoV2 PCR 05/10/2024 Negative     Campylobacter species 05/10/2024 Negative     Salmonella species 05/10/2024 Negative     Vibrio species 05/10/2024 Negative     Vibrio cholerae 05/10/2024 Negative     Yersinia enterocolitica 05/10/2024 Negative     Enteropathogenic E. coli* 05/10/2024 Negative     Shiga-like toxin-produci* 05/10/2024 Negative     Shigella/Enteroinvasive * 05/10/2024 Negative     Cryptosporidium species 05/10/2024 Negative     Giardia lamblia 05/10/2024 Negative     Norovirus Gl/Gll 05/10/2024 Negative     Rotavirus A 05/10/2024 Negative     Plesiomonas shigelloides 05/10/2024 Negative     Enteroaggregative E. col* 05/10/2024 Negative     Enterotoxigenic E. coli * 05/10/2024 Negative     E. coli O157 05/10/2024 NA     Cyclospora cayetanensis 05/10/2024 Negative     Entamoeba histolytica 05/10/2024 Negative     Adenovirus F40/41 05/10/2024 Negative     Astrovirus 05/10/2024 Negative     Sapovirus 05/10/2024 Negative     C Difficile Toxin B by P* 05/10/2024 Negative     Magnesium 05/10/2024 1.9     Potassium 05/10/2024 3.1 (L)     Potassium 05/10/2024 3.3 (L)     GLUCOSE BY METER POCT 05/10/2024 96     Potassium 05/11/2024 3.1 (L)     GLUCOSE BY METER POCT 05/10/2024 97     Sodium 05/11/2024 141     Potassium 05/11/2024 3.1 (L)     Chloride 05/11/2024 114 (H)     Carbon Dioxide (CO2) 05/11/2024 15 (L)     Anion Gap 05/11/2024 12     Urea Nitrogen 05/11/2024 10.5     Creatinine 05/11/2024 0.45 (L)     GFR Estimate 05/11/2024 >90     Calcium 05/11/2024 6.8 (L)     Glucose 05/11/2024 105 (H)     WBC Count 05/11/2024 5.7     RBC Count 05/11/2024 2.76 (L)     Hemoglobin 05/11/2024 8.0 (L)     Hematocrit 05/11/2024 25.5 (L)     MCV 05/11/2024 92     MCH 05/11/2024 29.0     MCHC 05/11/2024 31.4 (L)     RDW 05/11/2024 15.2 (H)     Platelet Count 05/11/2024 269     Magnesium 05/11/2024 1.8     Phosphorus 05/11/2024 2.7     Potassium 05/11/2024  3.3 (L)     Ventricular Rate 05/11/2024 50     Atrial Rate 05/11/2024 50     OR Interval 05/11/2024 152     QRS Duration 05/11/2024 72     QT 05/11/2024 498     QTc 05/11/2024 454     P Axis 05/11/2024 47     R AXIS 05/11/2024 67     T Axis 05/11/2024 61     Interpretation ECG 05/11/2024                      Value:Sinus bradycardia with Premature atrial complexes  Nonspecific T wave abnormality  Abnormal ECG  When compared with ECG of 03-MAY-2024 20:59,  Premature atrial complexes are now Present  Vent. rate has decreased BY  28 BPM  Confirmed by RENE CONNOLLY MD LOC: ([id number]) on 5/11/2024 1:52:06 PM      Potassium 05/11/2024 4.0     GLUCOSE BY METER POCT 05/11/2024 157 (H)     WBC Count 05/12/2024 4.9     RBC Count 05/12/2024 2.84 (L)     Hemoglobin 05/12/2024 8.2 (L)     Hematocrit 05/12/2024 25.9 (L)     MCV 05/12/2024 91     MCH 05/12/2024 28.9     MCHC 05/12/2024 31.7     RDW 05/12/2024 15.4 (H)     Platelet Count 05/12/2024 302     Magnesium 05/12/2024 2.2     Phosphorus 05/12/2024 2.8     INR 05/12/2024 1.12     Sodium 05/12/2024 143     Potassium 05/12/2024 4.1     Carbon Dioxide (CO2) 05/12/2024 24     Anion Gap 05/12/2024 7     Urea Nitrogen 05/12/2024 17.2     Creatinine 05/12/2024 0.57     GFR Estimate 05/12/2024 >90     Calcium 05/12/2024 8.1 (L)     Chloride 05/12/2024 112 (H)     Glucose 05/12/2024 162 (H)     Alkaline Phosphatase 05/12/2024 105     AST 05/12/2024 6     ALT 05/12/2024 11     Protein Total 05/12/2024 5.5 (L)     Albumin 05/12/2024 1.9 (L)     Bilirubin Total 05/12/2024 <0.2     Prealbumin 05/12/2024 7.7 (L)     Bilirubin Direct 05/12/2024 <0.20     Calcium Ionized Whole Bl* 05/12/2024 4.9     GLUCOSE BY METER POCT 05/12/2024 151 (H)     WBC Count 05/13/2024 3.7 (L)     RBC Count 05/13/2024 2.71 (L)     Hemoglobin 05/13/2024 7.8 (L)     Hematocrit 05/13/2024 25.2 (L)     MCV 05/13/2024 93     MCH 05/13/2024 28.8     MCHC 05/13/2024 31.0 (L)     RDW 05/13/2024 15.9 (H)     Platelet  Count 05/13/2024 267     Magnesium 05/13/2024 2.4 (H)     Phosphorus 05/13/2024 2.7     Sodium 05/13/2024 143     Potassium 05/13/2024 4.4     Carbon Dioxide (CO2) 05/13/2024 28     Anion Gap 05/13/2024 5 (L)     Urea Nitrogen 05/13/2024 22.3 (H)     Creatinine 05/13/2024 0.49 (L)     GFR Estimate 05/13/2024 >90     Calcium 05/13/2024 8.2 (L)     Chloride 05/13/2024 110 (H)     Glucose 05/13/2024 151 (H)     Alkaline Phosphatase 05/13/2024 126     AST 05/13/2024 12     ALT 05/13/2024 10     Protein Total 05/13/2024 5.6 (L)     Albumin 05/13/2024 2.1 (L)     Bilirubin Total 05/13/2024 0.2     INR 05/13/2024 1.08     Prealbumin 05/13/2024 14.4 (L)     Ventricular Rate 05/13/2024 53     Atrial Rate 05/13/2024 53     AK Interval 05/13/2024 160     QRS Duration 05/13/2024 76     QT 05/13/2024 470     QTc 05/13/2024 441     P Axis 05/13/2024 53     R AXIS 05/13/2024 54     T Axis 05/13/2024 57     Interpretation ECG 05/13/2024                      Value:Sinus bradycardia with sinus arrhythmia  Otherwise normal ECG  When compared with ECG of 11-MAY-2024 08:45,  Premature atrial complexes are no longer Present      GLUCOSE BY METER POCT 05/13/2024 147 (H)         Jackeline Pressley PA-C  [facility]  [address],  [place], [place] [id number] [place] [address] [phone]

## 2024-05-13 NOTE — PROGRESS NOTES
Care Management Follow Up    Length of Stay (days): 3    Expected Discharge Date: 05/14/2024     Concerns to be Addressed: IV ABX, TPN    Patient plan of care discussed at interdisciplinary rounds: Yes    Anticipated Discharge Disposition:  TBD     Anticipated Discharge Services:    Anticipated Discharge DME:      Patient/family educated on Medicare website which has current facility and service quality ratings:    Education Provided on the Discharge Plan:    Patient/Family in Agreement with the Plan:      Referrals Placed by CM/SW:  none at this time  Private pay costs discussed: Not applicable    Additional Information:  Social history pre previous CM note:  Pt lives in an apartment with her  Dmitriy and daughter. She is independent with ADLs and IADLs at baseline. No services or equipment. Family to transport at discharge.    Chart reviewed: Pt admitted for septic shock from intraabdominal abscess with fistula formation. Pt off pressors and downgraded from ICU on 5/12. Pt not medically ready to discharge. Pt remains on IV ABX, TPN, and surgery following and may need surgical intervention if the abscess/phlegmon does not respond to IV ABX or conservative cares.    RNCM to follow for medical progression, recommendations, and final discharge plan.      Sandie House RN

## 2024-05-13 NOTE — PLAN OF CARE
Problem: Adult Inpatient Plan of Care  Goal: Optimal Comfort and Wellbeing  Outcome: Progressing     Problem: Fall Injury Risk  Goal: Absence of Fall and Fall-Related Injury  Outcome: Progressing  Intervention: Promote Injury-Free Environment  Recent Flowsheet Documentation  Taken 5/12/2024 2000 by Rajni Najera RN  Safety Promotion/Fall Prevention:   activity supervised   nonskid shoes/slippers when out of bed   lighting adjusted   clutter free environment maintained   bedside attendant   safety round/check completed   treat underlying cause   Goal Outcome Evaluation:       Pt appears to be sleeping currently, sinus harjit when sleeping in the low 40's. Will continue to monitor. Other vitals stable.     Rajni Khan RN

## 2024-05-14 ENCOUNTER — VIRTUAL VISIT (OUTPATIENT)
Dept: INTERPRETER SERVICES | Facility: CLINIC | Age: 44
End: 2024-05-14
Payer: COMMERCIAL

## 2024-05-14 ENCOUNTER — APPOINTMENT (OUTPATIENT)
Dept: RADIOLOGY | Facility: HOSPITAL | Age: 44
End: 2024-05-14
Attending: COLON & RECTAL SURGERY
Payer: COMMERCIAL

## 2024-05-14 ENCOUNTER — VIRTUAL VISIT (OUTPATIENT)
Dept: INTERPRETER SERVICES | Facility: CLINIC | Age: 44
End: 2024-05-14

## 2024-05-14 LAB
ANION GAP SERPL CALCULATED.3IONS-SCNC: 4 MMOL/L (ref 7–15)
ATRIAL RATE - MUSE: 53 BPM
BASOPHILS # BLD AUTO: 0 10E3/UL (ref 0–0.2)
BASOPHILS NFR BLD AUTO: 0 %
BUN SERPL-MCNC: 15.7 MG/DL (ref 6–20)
CALCIUM SERPL-MCNC: 7.8 MG/DL (ref 8.6–10)
CHLORIDE SERPL-SCNC: 106 MMOL/L (ref 98–107)
CREAT SERPL-MCNC: 0.47 MG/DL (ref 0.51–0.95)
DEPRECATED HCO3 PLAS-SCNC: 30 MMOL/L (ref 22–29)
DIASTOLIC BLOOD PRESSURE - MUSE: NORMAL MMHG
EGFRCR SERPLBLD CKD-EPI 2021: >90 ML/MIN/1.73M2
EOSINOPHIL # BLD AUTO: 0 10E3/UL (ref 0–0.7)
EOSINOPHIL NFR BLD AUTO: 1 %
ERYTHROCYTE [DISTWIDTH] IN BLOOD BY AUTOMATED COUNT: 15.7 % (ref 10–15)
GLUCOSE BLDC GLUCOMTR-MCNC: 126 MG/DL (ref 70–99)
GLUCOSE BLDC GLUCOMTR-MCNC: 144 MG/DL (ref 70–99)
GLUCOSE BLDC GLUCOMTR-MCNC: 161 MG/DL (ref 70–99)
GLUCOSE SERPL-MCNC: 139 MG/DL (ref 70–99)
HCT VFR BLD AUTO: 24.2 % (ref 35–47)
HGB BLD-MCNC: 7.5 G/DL (ref 11.7–15.7)
IMM GRANULOCYTES # BLD: 0 10E3/UL
IMM GRANULOCYTES NFR BLD: 1 %
INTERPRETATION ECG - MUSE: NORMAL
LYMPHOCYTES # BLD AUTO: 0.8 10E3/UL (ref 0.8–5.3)
LYMPHOCYTES NFR BLD AUTO: 25 %
MAGNESIUM SERPL-MCNC: 2.2 MG/DL (ref 1.7–2.3)
MCH RBC QN AUTO: 28.8 PG (ref 26.5–33)
MCHC RBC AUTO-ENTMCNC: 31 G/DL (ref 31.5–36.5)
MCV RBC AUTO: 93 FL (ref 78–100)
MONOCYTES # BLD AUTO: 0.3 10E3/UL (ref 0–1.3)
MONOCYTES NFR BLD AUTO: 8 %
NEUTROPHILS # BLD AUTO: 2.1 10E3/UL (ref 1.6–8.3)
NEUTROPHILS NFR BLD AUTO: 65 %
P AXIS - MUSE: 53 DEGREES
PHOSPHATE SERPL-MCNC: 2.6 MG/DL (ref 2.5–4.5)
PLATELET # BLD AUTO: 261 10E3/UL (ref 150–450)
POTASSIUM SERPL-SCNC: 3.6 MMOL/L (ref 3.4–5.3)
PR INTERVAL - MUSE: 160 MS
QRS DURATION - MUSE: 76 MS
QT - MUSE: 470 MS
QTC - MUSE: 441 MS
R AXIS - MUSE: 54 DEGREES
RBC # BLD AUTO: 2.6 10E6/UL (ref 3.8–5.2)
RETICS # AUTO: 0.04 10E6/UL (ref 0.03–0.1)
RETICS/RBC NFR AUTO: 1.4 % (ref 0.5–2)
SODIUM SERPL-SCNC: 140 MMOL/L (ref 135–145)
SYSTOLIC BLOOD PRESSURE - MUSE: NORMAL MMHG
T AXIS - MUSE: 57 DEGREES
VENTRICULAR RATE- MUSE: 53 BPM
WBC # BLD AUTO: 3 10E3/UL (ref 4–11)
WBC # BLD AUTO: NORMAL 10*3/UL

## 2024-05-14 PROCEDURE — 250N000011 HC RX IP 250 OP 636: Performed by: INTERNAL MEDICINE

## 2024-05-14 PROCEDURE — 82565 ASSAY OF CREATININE: CPT | Performed by: INTERNAL MEDICINE

## 2024-05-14 PROCEDURE — 120N000001 HC R&B MED SURG/OB

## 2024-05-14 PROCEDURE — 82374 ASSAY BLOOD CARBON DIOXIDE: CPT | Performed by: INTERNAL MEDICINE

## 2024-05-14 PROCEDURE — 250N000011 HC RX IP 250 OP 636

## 2024-05-14 PROCEDURE — T1013 SIGN LANG/ORAL INTERPRETER: HCPCS | Mod: U4,TEL,95

## 2024-05-14 PROCEDURE — 258N000003 HC RX IP 258 OP 636: Performed by: INTERNAL MEDICINE

## 2024-05-14 PROCEDURE — B4185 PARENTERAL SOL 10 GM LIPIDS: HCPCS | Mod: JZ | Performed by: FAMILY MEDICINE

## 2024-05-14 PROCEDURE — 85045 AUTOMATED RETICULOCYTE COUNT: CPT

## 2024-05-14 PROCEDURE — 85025 COMPLETE CBC W/AUTO DIFF WBC: CPT | Performed by: INTERNAL MEDICINE

## 2024-05-14 PROCEDURE — 99233 SBSQ HOSP IP/OBS HIGH 50: CPT | Mod: GC

## 2024-05-14 PROCEDURE — 85060 BLOOD SMEAR INTERPRETATION: CPT | Performed by: PATHOLOGY

## 2024-05-14 PROCEDURE — 84100 ASSAY OF PHOSPHORUS: CPT | Performed by: INTERNAL MEDICINE

## 2024-05-14 PROCEDURE — 83735 ASSAY OF MAGNESIUM: CPT | Performed by: INTERNAL MEDICINE

## 2024-05-14 PROCEDURE — 74018 RADEX ABDOMEN 1 VIEW: CPT

## 2024-05-14 PROCEDURE — 250N000009 HC RX 250: Mod: JZ | Performed by: FAMILY MEDICINE

## 2024-05-14 PROCEDURE — T1013 SIGN LANG/ORAL INTERPRETER: HCPCS | Mod: U4,TEL,95 | Performed by: INTERPRETER

## 2024-05-14 PROCEDURE — 250N000009 HC RX 250: Performed by: STUDENT IN AN ORGANIZED HEALTH CARE EDUCATION/TRAINING PROGRAM

## 2024-05-14 PROCEDURE — C9113 INJ PANTOPRAZOLE SODIUM, VIA: HCPCS | Performed by: INTERNAL MEDICINE

## 2024-05-14 RX ADMIN — IRON SUCROSE 200 MG: 20 INJECTION, SOLUTION INTRAVENOUS at 18:01

## 2024-05-14 RX ADMIN — FISH OIL 100 ML: 0.1 INJECTION, EMULSION INTRAVENOUS at 01:56

## 2024-05-14 RX ADMIN — PIPERACILLIN AND TAZOBACTAM 3.38 G: 3; .375 INJECTION, POWDER, FOR SOLUTION INTRAVENOUS at 22:08

## 2024-05-14 RX ADMIN — MAGNESIUM SULFATE HEPTAHYDRATE: 500 INJECTION, SOLUTION INTRAMUSCULAR; INTRAVENOUS at 20:12

## 2024-05-14 RX ADMIN — HEPARIN SODIUM 5000 UNITS: 10000 INJECTION, SOLUTION INTRAVENOUS; SUBCUTANEOUS at 06:17

## 2024-05-14 RX ADMIN — PIPERACILLIN AND TAZOBACTAM 3.38 G: 3; .375 INJECTION, POWDER, FOR SOLUTION INTRAVENOUS at 14:55

## 2024-05-14 RX ADMIN — PANTOPRAZOLE SODIUM 40 MG: 40 INJECTION, POWDER, FOR SOLUTION INTRAVENOUS at 07:51

## 2024-05-14 RX ADMIN — FISH OIL 100 ML: 0.1 INJECTION, EMULSION INTRAVENOUS at 20:16

## 2024-05-14 RX ADMIN — HEPARIN SODIUM 5000 UNITS: 10000 INJECTION, SOLUTION INTRAVENOUS; SUBCUTANEOUS at 22:09

## 2024-05-14 RX ADMIN — HYDROCORTISONE SODIUM SUCCINATE 25 MG: 100 INJECTION, POWDER, FOR SOLUTION INTRAMUSCULAR; INTRAVENOUS at 06:12

## 2024-05-14 RX ADMIN — HEPARIN SODIUM 5000 UNITS: 10000 INJECTION, SOLUTION INTRAVENOUS; SUBCUTANEOUS at 14:55

## 2024-05-14 RX ADMIN — PIPERACILLIN AND TAZOBACTAM 3.38 G: 3; .375 INJECTION, POWDER, FOR SOLUTION INTRAVENOUS at 06:17

## 2024-05-14 ASSESSMENT — ACTIVITIES OF DAILY LIVING (ADL)
ADLS_ACUITY_SCORE: 25
ADLS_ACUITY_SCORE: 26
ADLS_ACUITY_SCORE: 25
ADLS_ACUITY_SCORE: 30
ADLS_ACUITY_SCORE: 26
ADLS_ACUITY_SCORE: 25
ADLS_ACUITY_SCORE: 30
ADLS_ACUITY_SCORE: 26
ADLS_ACUITY_SCORE: 30
ADLS_ACUITY_SCORE: 30
ADLS_ACUITY_SCORE: 25
ADLS_ACUITY_SCORE: 30
ADLS_ACUITY_SCORE: 30
ADLS_ACUITY_SCORE: 25
ADLS_ACUITY_SCORE: 25
ADLS_ACUITY_SCORE: 26
ADLS_ACUITY_SCORE: 26
ADLS_ACUITY_SCORE: 25
ADLS_ACUITY_SCORE: 30
ADLS_ACUITY_SCORE: 30
ADLS_ACUITY_SCORE: 25
ADLS_ACUITY_SCORE: 26
ADLS_ACUITY_SCORE: 30

## 2024-05-14 NOTE — PROGRESS NOTES
[facility]    Progress Note - Hospitalist Service       Date of Admission:  5/10/2024    Assessment & Plan     Dain Tejeda is a 43 year old female admitted on 5/10/2024. She has a history of Chron's disease complicated by colonic stricture s/p partial colectomy (11/2022), Celiac disease, SIOMARA admitted for septic shock from intraabdominal abscess with fistula formation.      Chron's disease, complicated by progression with colonic obstruction, abscess, developing fistula and ileitis   S/p partial colectomy 11/2022, has LLQ colostomy   Diagnosed in late 2022, complicated by stricture s/p partial colectomy with ostomy. Follows with [facility], on Humira and azathioprine. Was recently admitted on our service 4/30-5/4 after syncopal episode thought to be 2/2 Chron's flare after CT showed colitis but negative Cdiff and enteric panel. GI consulted at that time and recommended prednisone taper over 4 weeks, unclear whether patient taking steroid as prescribed since discharge. Severe abdominal pain developed on day prior to admission along with fever. CT showing marked progression of Chron's with colonic obstruction, 10 cm intramural abscess, developing fistula and significant ileitis. Patient initiated on IV abx in ED, pain improved after receiving IV pain meds. IR consulted, noted that bowel access would be challenging and recommended repeat imaging after antibiotics. No clinical signs of abscess progression with mild diffuse tenderness on abdominal exam.   - Colorectal consulted, appreciate recommendations  - Repeat CT abd/pelvic tomorrow    - GI consulted  - NPO. RD consulted, TPN started 5/11.   - Zosyn  - Stop steroids, given fistulating disease and abscess  - PRN Tylenol and IV dilaudid for pain  - Zofran PRN     Sepsis 2/2 intra-abdominal infection   Hypotension - improving  Presented to ED on 5/9/24 with new severe abdominal pain, initially thought to be UTI and sent home on Bactrim, returned on  5/10 after developing fevers, nausea and vomiting. Developed hypotension in the ED despite fluid resuscitation and was transferred to the ICU for pressor support. Suspect septic shock secondary to colonic abscess as below.  Transferred out of ICU on 5/12 after being off pressors for 24 hours.   - Continue IV Zosyn  - Daily CBC    Normocytic anemia  Leukopenia   History of SIOMARA with recent iron infusions through [facility]. Hgb 9.8 on admission, normal differential at that time. Colostomy output appears non-bloody. Hgb tending down, most recently 7.5, MCV 93. Continues to have no signs of bleeding. Iron studies in Feb largely normal with mildly low TIBC and iron saturation. May be more consistent with anemia of chronic disease. Worsening leukopenia on 5/14, most recently 3.0. May be due to infection/inflammation or medication side effect, but will complete further workup to evaluate.   - Repeat CBC with differential  - Blood smear  - Consider iron and/or hemolysis labs in the future    Sinus bradycardia  Patient noted to be bradycardic between 40-60's, asymptomatic. EKG shows sinus bradycardia.   - If patient develops symptoms, recommend repeating EKG     Hypokalemia  3.0 on admission, supplementation given in ED.   - Daily BMP  - Replacement per RN protocol     Disorganized behaviors  Demonstrated odd behaviors during recent admission, treated with Seroquel 12.5 mg at bedtime. Psych consult at that time, did not recommend any ongoing meds upon discharge. Patient calm, alert and cooperative this admission so far.   - Delirium protocol, hold on meds for now     Gamma gap  Chronic. Tprotein 7.2, albumin 2.7. Most certainly 2/2 inflammatory disease.      Incidental finding: benign pelvic simple cyst  >5 to <=7 cm. Follow up pelvic US in 6 months recommended.         Diet: NPO for Medical/Clinical Reasons Except for: Meds, Ice Chips  parenteral nutrition - ADULT compounded formula  parenteral nutrition - ADULT compounded  formula    DVT Prophylaxis: Enoxaparin (Lovenox) SQ  Chambers Catheter: Not present  Fluids: mIVF  Lines: PRESENT      PICC 05/10/24 Triple Lumen Right Basilic Vasopressor-Site Assessment: WDL      Cardiac Monitoring: None  Code Status: Full Code      Clinically Significant Risk Factors              # Hypoalbuminemia: Lowest albumin = 1.9 g/dL at 5/12/2024  4:29 AM, will monitor as appropriate                        The patient's care was discussed with the Resident Physician, Dr. Sandhu and Attending Physician, Dr. Cristina .    Nicolle Schrader, MS4  Hospitalist Service  [facility]    I was present with the medical student who participated in the service and in the documentation of this note. I have verified the history and personally performed the physical exam and medical decision making, and have verified the content of the note, which accurately reflects my assessment of the patient and the plan of care.     Phoebe Sandhu MD  [facility], PGY-3  ____________________________________    Interval History     No acute events overnight. Feels about the same as yesterday, slightly more alert. Had a headache this morning. Abdominal pain well controlled. Mentioned some chest pressure, but unable to discern how long this has been going on for. No bloody output in ostomy.     Physical Exam   Vital Signs: Temp: 97.8  F (36.6  C) Temp src: Oral BP: 113/68 Pulse: (!) 47   Resp: 18 SpO2: 99 % O2 Device: None (Room air)    Weight: 105 lbs 2.55 oz    General:  No acute distress.   Psych:  Alert. Able to articulate logical thoughts. Calm.   HEENT:  Eyes grossly normal to inspection. Mucous membranes moist.   Cardiovascular:  Regular rate and rhythm, normal S1 and S2 without murmur.   Respiratory:  Lungs clear to auscultation bilaterally. No wheezing or crackles.   Musculoskeletal:  No gross extremity deformities. Mild peripheral edema.  GI:  Soft. Non-distended. Diffuse  mild tenderness to palpation.     Data     I have personally reviewed the following data over the past 24 hrs:    3.0 (L)  \   7.5 (L)   / 261     140 106 15.7 /  144 (H)   3.6 30 (H) 0.47 (L) \       Imaging results reviewed over the past 24 hrs:   No results found for this or any previous visit (from the past 24 hour(s)).

## 2024-05-14 NOTE — PROGRESS NOTES
Surgery:  Clinically the patient looks improved.   I have reviewed her chart and films.  In this situation with a young woman who has had previous colon surgery already and with recurrent colitis secondary to Crohn's, I feel we should have her evaluated by colorectal surgery.  If she needs further surgery it needs to be planned in such a way that she could potentially retain her continuity.  Will ask colorectal to see.  I do not think it is emergent as she does not need urgent surgery but I think she should be followed by them in the long-term.  We will sign off.    Nanda Marquis MD

## 2024-05-14 NOTE — PROGRESS NOTES
Nutrition Therapy  Parenteral Nutrition follow-up       Dietitian to Pharmacy    No change in TPN today  Rate of TPN: 60 ml/hr continuous  Grams Dextrose: 200  Grams Protein: 60    Lipids: Omegaven 20 g/day (2 x 100 ml bottle)         Current Nutrition Intake:  Diet: NPO except medications, ice chips  Custom TPN:   60 g AA, 200 g DEX at 60 ml/hr continuous.  200 ml omegaven daily, infuse over 12 hrs.    Above to provide 1144 kcals, 60 g protein, 200 g carb, 20 g lipid, 1440 ml fluid daily.    Above meets estimated needs.    Food allergies:  gluten (celiac), soy.     TPN start 5/11/12 due to intraabdominal abscess with fistula formation.    New Findings:     Weight Trends  Admission wt: 44 kg (97 lb) 5/10/24  Current wt:   Date/Time Weight Weight Method   05/13/24 1742 47.7 kg (105 lb 2.6 oz) Bed scale   05/10/24 1626 44 kg (97 lb) Bed scale   05/10/24 0709 49 kg (108 lb) --       Dosing Weight: 44 kg     ESTIMATED NUTRITION NEEDS  Energy Needs: 3541-8587 kcals/day (25 - 30 kcals/kg)  Justification: Repletion  Protein Needs: 53-66 grams protein/day (1.2 - 1.5 grams of pro/kg)  Justification: Increased needs  Fluid Needs: 4443-5932 mL/day (1 mL/kcal)   Justification: Maintenance    GI:  Colostomy small liquid/loose OP    Labs:   Labs reviewed by dietitian  -161 mg/dl past 24 hours    Medications:   Reviewed by dietitian   Iv solu-CORTEF, iv protonix daily, iv abx    MALNUTRITION  % Intake: Decreased intake does not meet criteria  % Weight Loss: > 2% in 1 week (severe malnutrition).  3.9% < 2wks.   Subcutaneous Fat Loss: None observed  Muscle Loss: None observed  Fluid Accumulation/Edema: None noted  Malnutrition Diagnosis: Patient does not meet two of the established criteria necessary for diagnosing malnutrition but is at risk for malnutrition    Nutrition Diagnosis  Altered GI function related to acute illness as evidenced by intraabdominal abscess with fistula formation, need for TPN   Evaluation:  continues    Goals   Tolerate TPN-progressing  Meet nutrition needs-progressing  Electrolytes WNL-met  New- Blood sugar < 180.- met    INTERVENTIONS  Implementation  No new     Monitoring/Evaluation  Progress toward goals will be monitored and evaluated per protocol.

## 2024-05-14 NOTE — CONSULTS
"[facility]   Consultation      Place of Service: [facility]  Reason for Consultation: Chron's colitis   Consult Requested by: Jackeline Pressley PA-C, General Surgery    History of Present Illness: Dain Tejeda is a 43 year-old Hmong speaking female with history of Crohn's disease complicated by colonic stricture s/p ex lap, resection of descending colon and colostomy creation by General Surgery in 2022 who now follows with [facility] for management of Crohn's disease currently on Humira and Azathioprine. The patient was recently admitted from 4/30-5/4 for suspected Crohn's flare and was discharged with 4 week prednisone taper. She has now been admitted since 5/10 due to severe abdominal pain, fever, and hypotension.    CT abd/pelvis with contrast on 5/10 showed circumferential wall thickening of transverse colon leading to the ostomy causing colonic obstruction, 10cm intramural abscess of transverse colon, developing fistula, and significant ileitis. She was initially in the ICU on pressor support, but has now weaned off. Remains on IV Zosyn and TPN. GI and General Surgery have been following on current admission. IR was consulted for possible management of intramural abscess, but was recommended to hold off on further procedure and continue IV abx to prevent fistula formation in the setting of Crohn's. She continues to improve with bowel rest and is having bowel function from ostomy. Labs today notable for WBC 3.0, Hgb 7.5, Cr 0.47, albumin 2.6. CRS was consulted for further recommendations given complex situation in the setting of Crohn's disease.     Sidecar phone  is used during interview today. She reports minimal to no abdominal pain currently, however states \"her body feels numb\". Denies nausea and continues to pass gas and stool from ostomy. 160cc stool recorded so far. Vitals remain stable and she is afebrile.     She follows with [facility], last received Humira on 5/8 and is " scheduled for next injection on 5/22. Last colonoscopy in September 2023 inflammation and ulceration in both the ileum and ascending/transverse/distal descending colon consistent with Crohn's disease.     Pertinent Labs:   Lab Results: personally reviewed   Lab Results   Component Value Date     05/14/2024     05/13/2024     05/12/2024    .0 04/24/2017    CO2 30 05/14/2024    CO2 28 05/13/2024    CO2 24 05/12/2024    CO2 25 06/23/2022    CO2 23 06/03/2022    CO2 22 06/02/2022    CO2 26.0 04/24/2017    BUN 15.7 05/14/2024    BUN 22.3 05/13/2024    BUN 17.2 05/12/2024    BUN 6 06/23/2022    BUN 3 06/03/2022    BUN 4 06/02/2022    BUN 7.0 04/24/2017     Lab Results   Component Value Date    WBC 3.0 05/14/2024    WBC 3.7 05/13/2024    WBC 4.9 05/12/2024    HGB 7.5 05/14/2024    HGB 7.8 05/13/2024    HGB 8.2 05/12/2024    HGB 10.8 01/10/2018    HGB 10.3 11/08/2017    HGB 9.8 09/18/2017    HCT 24.2 05/14/2024    HCT 25.2 05/13/2024    HCT 25.9 05/12/2024    HCT 35.9 01/10/2018    HCT 33.8 11/08/2017    HCT 31.4 07/31/2017    MCV 93 05/14/2024    MCV 93 05/13/2024    MCV 91 05/12/2024    MCV 83.1 01/10/2018    MCV 77.9 11/08/2017    MCV 86.3 07/31/2017     05/14/2024     05/13/2024     05/12/2024       Pertinent Radiology: I have personally reviewed the images and report of CT:    EXAM: CT ABDOMEN PELVIS W CONTRAST  LOCATION: [facility]  DATE: 4/30/2024     INDICATION: syncope, ostomy, vomiting  COMPARISON: CT abdomen and pelvis 12/14/2022 and pelvic ultrasound 3/14/2024  TECHNIQUE: CT scan of the abdomen and pelvis was performed following injection of IV contrast. Multiplanar reformats were obtained. Dose reduction techniques were used.  CONTRAST: isovue 370 90ml     FINDINGS: Images degraded by motion artifact.     LOWER CHEST: Normal.     HEPATOBILIARY: Calcification right hepatic lobe is unchanged.     PANCREAS: Normal.     SPLEEN: Normal.      ADRENAL GLANDS: Normal.     KIDNEYS/BLADDER: Normal.     BOWEL: Prior resection of the distal colon, with Brenda's pouch and left anterior abdominal wall colostomy. Wall thickening and mucosal enhancement with luminal narrowing involving the ascending and transverse colon. Additional wall thickening and   mucosal enhancement t involving the distal and terminal ileum. No evidence for bowel obstruction.     LYMPH NODES: Numerous nonenlarged mesenteric nodes.     VASCULATURE: Normal.     PELVIC ORGANS: Large left adnexal/pelvic cyst measuring 6.4 x 4.5 cm, minimally changed.     MUSCULOSKELETAL: Normal.                                                                      IMPRESSION:   1.  Prior resection of the distal colon and colostomy left anterior abdominal wall.  2.  Wall thickening and mucosal enhancement involving the ascending and transverse colon as well as the distal and terminal ileum consistent with ongoing active inflammatory bowel disease.  3.  Large left adnexal cyst, unchanged. Please refer to prior pelvic ultrasound exam report for further discussion.    EXAM: CT ABDOMEN PELVIS W CONTRAST  LOCATION: [facility]  DATE: 5/10/2024     INDICATION: Fever.  History of Crohn's with colostomy.  COMPARISON: CT AP 4/30/2024 and 12/24/2022, pelvic ultrasound 3/14/2024  TECHNIQUE: CT scan of the abdomen and pelvis was performed following injection of IV contrast. Multiplanar reformats were obtained. Dose reduction techniques were used.  CONTRAST: 53 mL of Isovue-370     FINDINGS:   LOWER CHEST: Unremarkable.     HEPATOBILIARY: Patient developed mild periportal edema. No portal vein thrombus. Calcified granuloma in the liver.     PANCREAS: Normal.     SPLEEN: Normal.     ADRENAL GLANDS: Normal.     KIDNEYS/BLADDER: Normal.     BOWEL: Inflammatory changes have markedly progressed involving the transverse colon and splenic flexure leading to her left, paramedian colostomy and resulting in  bowel obstruction.   Marked circumferential wall thickening of the colon leading to the ostomy has increased. There is at least a 10 cm long, intramural abscess involving the posterior wall of the proximal transverse colon (axial images , coronal images 23-33 and   sagittal images 34 through 44) a small sinus tract is also developing anteriorly along the distal transverse colon (axial image 79, coronal image 18), 10 cm from the ostomy.     Ascending colon and cecum proximal to this are markedly dilated, measuring up to 8.5 cm. Ileocecal valve is patulous and about the distal 25 cm at the ileum is also abnormal. Ileum is distended, featureless and demonstrates circumferential wall   enhancement. Multiple, small reactive nodes seen throughout. Stomach and proximal small bowel loops are collapsed. No pneumatosis, mesenteric thrombus or ascites.     Long Duffy's pouch is unremarkable.     LYMPH NODES: Few small left periaortic nodes are seen measuring up to 8 to 9 mm in short axis.     VASCULATURE: Normal.     PELVIC ORGANS: A simple, left adnexal cystic mass measuring 6.5 x 4 cm is again noted. Small myometrial fibroid along the right uterine fundus.     MUSCULOSKELETAL: No suspicious bone lesions. Bone island in the right mid iliac vein. Sacralization of  L5 on the left.                                                                      IMPRESSION:   1.  Significant progression of the acute inflammatory bowel disease. Changes are enumerated below. GI and/or colorectal consultation needed.  2.  Circumferential wall thickening of the transverse colon and splenic flexure leading to the ostomy has markedly progressed and now resulting in a colonic obstruction.  3.  There is at least a 10 cm long intramural abscess along the posterior wall of the proximal transverse colon.  4.  A small sinus tract is developing from the anterior wall of the distal transverse colon.  5.  Ascending colon and cecum are distended to  8.5 cm.  6.  Patulous ileocecal valve with long segment of back wash ileitis involving at least 25 cm of the distal ileum.  7.  Periportal edema has developed due to the progressive inflammatory state.  8.  Previously characterized simple left adnexal cyst again noted. Follow-up pelvic ultrasound in 6 months as noted before. Reference given below.  9.  Other noncritical findings as noted above.       Past Medical History:   Diagnosis Date    Diet controlled gestational diabetes mellitus (GDM), antepartum 9/4/2017    Attempting diet control first    Gestational diabetes mellitus (GDM) in third trimester 9/4/2017    Attempting diet control first  Formatting of this note might be different from the original. Overview:  Attempting diet control first    Nausea/vomiting in pregnancy 4/24/2017    Postpartum hemorrhage 11/16/2017    Third degree laceration of perineum during delivery, postpartum 11/15/2017       Past Surgical History:   Procedure Laterality Date    COLONOSCOPY N/A 10/31/2022    Procedure: COLONOSCOPY with biopsies;  Surgeon: Luis Miguel Traore MD;  Location: [facility] GI    COLOSTOMY N/A 11/6/2022    Procedure: CREATION, COLOSTOMY;  Surgeon: Chandana Carlos DO;  Location: [facility]    LAPAROTOMY EXPLORATORY N/A 11/6/2022    Procedure: EXPLORATORY LAPAROTOMY SPLENIC FLEXURE MOBILIZATION;  Surgeon: Chandana Carlos DO;  Location: [facility] OR    PICC TRIPLE LUMEN PLACEMENT  5/10/2024    RECTAL PROLAPSE REPAIR N/A 11/6/2022    Procedure: RESECTION OF DESCENDING COLON;  Surgeon: Chandana Carlos DO;  Location: [facility] OR       Family History   Problem Relation Age of Onset    Gestational Diabetes Sister     Gestational Diabetes Sister     Diabetes No family hx of     Coronary Artery Disease No family hx of     Hypertension No family hx of     Breast Cancer No family hx of     Colon Cancer No family hx of     Prostate Cancer No family hx of     Other Cancer No family hx of         Social History     Socioeconomic History    Marital status:      Spouse name: Dmitriy Camargo    Number of children: 0    Years of education: 0    Highest education level: Not on file   Occupational History    Occupation: None   Tobacco Use    Smoking status: Never    Smokeless tobacco: Never   Vaping Use    Vaping status: Not on file   Substance and Sexual Activity    Alcohol use: No    Drug use: No    Sexual activity: Yes     Partners: Male   Other Topics Concern    Parent/sibling w/ CABG, MI or angioplasty before 65F 55M? Not Asked   Social History Narrative    Born in [facility], arrived to [facility] 02/2017. No education.      Social Determinants of Health     Financial Resource Strain: Not on file   Food Insecurity: Not on file   Transportation Needs: Not on file   Physical Activity: Not on file   Stress: Not on file   Social Connections: Not on file   Interpersonal Safety: Low Risk  (3/8/2024)    Interpersonal Safety     Do you feel physically and emotionally safe where you currently live?: Yes     Within the past 12 months, have you been hit, slapped, kicked or otherwise physically hurt by someone?: No     Within the past 12 months, have you been humiliated or emotionally abused in other ways by your partner or ex-partner?: No   Housing Stability: Not on file       Current Facility-Administered Medications   Medication Dose Route Frequency Provider Last Rate Last Admin    acetaminophen (TYLENOL) tablet 650 mg  650 mg Oral Q4H PRN Antwon Esteves MD        Or    acetaminophen (TYLENOL) Suppository 650 mg  650 mg Rectal Q4H PRN Antwon Esteves MD        atropine injection 1 mg  1 mg Intravenous Once PRN Antwon Esteves MD        [Held by provider] azaTHIOprine (IMURAN) tablet 100 mg  100 mg Oral Daily Antwon Esteves MD        calcium carbonate (TUMS) chewable tablet 1,000 mg  1,000 mg Oral 4x Daily PRN Antwon Esteves MD        dextrose 10% infusion   Intravenous Continuous PRN Antwon Esteves MD        glucose  gel 15-30 g  15-30 g Oral Q15 Min PRN Antwon Esteves MD        Or    dextrose 50 % injection 25-50 mL  25-50 mL Intravenous Q15 Min PRN Antwon Esteves MD        Or    glucagon injection 1 mg  1 mg Subcutaneous Q15 Min PRN Antwon Esteves MD        Fish Oil Triglycerides (OMEGAVEN) infusion 100 mL  100 mL Intravenous Q24H Td Padilla MD   100 mL at 05/13/24 2024    Fish Oil Triglycerides (OMEGAVEN) infusion 100 mL  100 mL Intravenous Q24H Td Padilla MD   100 mL at 05/14/24 0156    heparin ANTICOAGULANT injection 5,000 Units  5,000 Units Subcutaneous Q8H Antwon Esteves MD   5,000 Units at 05/14/24 0617    hydrocortisone sodium succinate PF (solu-CORTEF) injection 25 mg  25 mg Intravenous Q12H Phoebe Sandhu MD   25 mg at 05/14/24 0612    HYDROmorphone (DILAUDID) injection 0.2 mg  0.2 mg Intravenous Q2H PRN Antwon Esteves MD   0.2 mg at 05/12/24 0830    HYDROmorphone (DILAUDID) injection 0.4 mg  0.4 mg Intravenous Q2H PRN Antwon Esteves MD        lidocaine 1 % 0.1-1 mL  0.1-1 mL Other Q1H PRN Antwon Esteves MD        naloxone (NARCAN) injection 0.2 mg  0.2 mg Intravenous Q2 Min PRN Antwon Esteves MD        Or    naloxone (NARCAN) injection 0.4 mg  0.4 mg Intravenous Q2 Min PRN Antwon Esteves MD        Or    naloxone (NARCAN) injection 0.2 mg  0.2 mg Intramuscular Q2 Min PRN Antwon Esteves MD        Or    naloxone (NARCAN) injection 0.4 mg  0.4 mg Intramuscular Q2 Min PRN Antwon Esteves MD        ondansetron (ZOFRAN ODT) ODT tab 4 mg  4 mg Oral Q6H PRN Antwon Esteves MD   4 mg at 05/10/24 1705    Or    ondansetron (ZOFRAN) injection 4 mg  4 mg Intravenous Q6H PRN Antwon Esteves MD        ondansetron (ZOFRAN ODT) ODT tab 4 mg  4 mg Oral Q6H PRN Antwon Esteves MD   4 mg at 05/11/24 1032    Or    ondansetron (ZOFRAN) injection 4 mg  4 mg Intravenous Q6H PRN Antwon Esteves MD        pantoprazole (PROTONIX) IV push injection 40 mg  40 mg Intravenous Daily with breakfast Antwon Esteves MD   40 mg  "at 05/14/24 0751    parenteral nutrition - ADULT compounded formula   CENTRAL LINE IV TPN CONTINUOUS Jacquelin Dunlap MD        parenteral nutrition - ADULT compounded formula   CENTRAL LINE IV TPN CONTINUOUS Td Padilla MD 60 mL/hr at 05/13/24 2024 New Bag at 05/13/24 2024    piperacillin-tazobactam (ZOSYN) 3.375 g vial to attach to  mL bag  3.375 g Intravenous Q8H Antwon Esteves MD   3.375 g at 05/14/24 0617    [Held by provider] predniSONE (DELTASONE) tablet 30 mg  30 mg Oral Daily Antwon Esteves MD        prochlorperazine (COMPAZINE) injection 10 mg  10 mg Intravenous Q6H PRN Antwon Esteves MD        Or    prochlorperazine (COMPAZINE) tablet 10 mg  10 mg Oral Q6H PRN Antwon Esteves MD        Or    prochlorperazine (COMPAZINE) suppository 25 mg  25 mg Rectal Q12H PRN Antwon Esteves MD        senna-docusate (SENOKOT-S/PERICOLACE) 8.6-50 MG per tablet 1 tablet  1 tablet Oral BID PRN Antwon Esteves MD        Or    senna-docusate (SENOKOT-S/PERICOLACE) 8.6-50 MG per tablet 2 tablet  2 tablet Oral BID PRN Antwon Esteves MD        senna-docusate (SENOKOT-S/PERICOLACE) 8.6-50 MG per tablet 1 tablet  1 tablet Oral BID PRN Antwon Esteves MD        Or    senna-docusate (SENOKOT-S/PERICOLACE) 8.6-50 MG per tablet 2 tablet  2 tablet Oral BID MAGNON Antwon Esteves MD        sodium chloride (PF) 0.9% PF flush 10-40 mL  10-40 mL Intracatheter Once PRN Antwon Esteves MD        sodium chloride (PF) 0.9% PF flush 3 mL  3 mL Intracatheter Q8H Antwon Esteves MD   3 mL at 05/14/24 0752    sodium chloride (PF) 0.9% PF flush 3 mL  3 mL Intracatheter q1 min prn Antwon Esteves MD           Review of Systems:   \"A full 10 point review of systems was taken and is negative aside from what is noted above in the HPI.\"     Allergies:   Allergies   Allergen Reactions    Gluten Meal      Celiac    Soy Allergy Anaphylaxis, Hives and Itching     Tofu- causes itching and sores in the mouth     Intake/Output past 24 " hrs:    Intake/Output Summary (Last 24 hours) at 5/14/2024 1051  Last data filed at 5/14/2024 1011  Gross per 24 hour   Intake 1686 ml   Output 1400 ml   Net 286 ml       Physical Exam:  Temp:  [97.4  F (36.3  C)-97.8  F (36.6  C)] 97.8  F (36.6  C)  Pulse:  [47-63] 47  Resp:  [18-20] 18  BP: ()/(64-79) 113/68  SpO2:  [99 %-100 %] 99 %  General: NAD, alert, cooperative  Head: normocephalic, without abnormality / atraumatic  Respiratory: non-labored breathing  Abdomen: Soft, tender primarily in RLQ and nondistended. No generalized peritoneal signs. Stoma pink and viable with watery stool in appliance  Skin: no rashes or lesions  Musculoskeletal: moves all four extremities equally  Psychological: alert and oriented, answers questions appropriately  Neurological: cranial nerves grossly intact    Assessment/Plan: Dain Tejeda is a 43 year-old female with history of Crohn's disease complicated by colonic stricture s/p ex lap, resection of descending colon and colostomy creation by General Surgery in 2022 who now follows with [facility] for management of Crohn's disease currently on Humira and Azathioprine. Recently admitted 4/30-5/4 for Crohn's flare discharged with steroid taper, readmitted on 5/10 with worsening ileocolonic crohn's disease with fistula and new intramural abscess. Remains on IV Zosyn, TPN, and NPO.     She continues to have bowel function from ostomy and has clinically improved with bowel rest and IV abx since admission. No acute surgical intervention indicated at this time, but would recommend repeat imaging to re-evaluate progression of Crohn's flare and intramural abscess. Will plan to repeat CT abd/pelvis w/contrast tomorrow (5/15) and should remain NPO with TPN and IV abx.     Dr. Lafleur will plan to discuss management plans further with patient this afternoon.     1. Repeat CT abd/pelvis w/ tomorrow  2. Remain NPO, agree with TPN for malnutrition  3. Continue broad spectrum IV  4. Appreciate GI input  "for Crohn's management  5. Supportive cares per primary    Medical Decision Making:   Additional workup / testing ordered: CT abd/pelvis tomorrow  Procedure / Surgery recommended: None at this time   Old records reviewed - Previous operative reports, previous admission, pathology, colonosocpy/EGD report, H&P, ED chart review, GS, GI, and IR chart review, labs  Medications added / changed: None at this time    This case was discussed with: Dr. Hugo Lafleur    Thank you for consulting [facility] regarding this patient's   care. Please contact us with questions or concerns.     Time spent: 60 minutes, with 40 minutes spent in counseling and coordinating care    Latasha Dolan PA-C  [facility]  [phone]     Colorectal Surgery Staff:  I have seen and examined the patient. I agree with the above documentation and plan of the fellow/PA above with the following additions/chages:    I spoke to Ms. Tejeda via a telephone [employer] . We recapped her history as documented above.  She states that currently, she feels better because she feels \"numb\".  Specifically, she means that she is no longer feeling the pain.  She states that she has actually a little bit hungry at this time.  When she moves around, she does notice some pain in the right lower quadrant.  She has noticed gas and stool in her colostomy but she is unable to quantify it.    She is afebrile and normotensive.  She is bradycardic.  In general, she is in no acute distress.  Her abdominal exam is significant for a well-healed midline laparotomy, left lower quadrant colostomy with gas and stool in the appliance, and tenderness in the right lower quadrant without rebound or guarding.  There has no focal or generalized peritonitis.    Her urine output does not appear to be closely recorded as it is 550 mL plus two unmeasured occurrences.  Stool is measured as 160 mL.  Since the 10th, she has had more than a L of stool " out.    White blood cell count is 3.  Hemoglobin is 7.5.  Bicarb is 30.  Creatinine is 0.47.  Glucose is 139.  Her last albumin is 2.1.  It was 1.9 on the 12th.    I have personally reviewed the CT scans both from the end of April as well as from the 10th of May.  I agree with the report as written.  The first scan demonstrates significant Crohn's colitis with ileitis of about 25 cm.  There was no perforation here.  The scan on the 10th of May is much more concerning.  There is a fairly large intramural abscess in the proximal transverse colon.  There is also distention of the cecum up to 8.5 cm.    A/P: 43F with Crohn's colitis, anemia, and protein calorie malnutrition.    Continue broad-spectrum IV antibiotics   Wean steroids as possible given the abscess  Continue TPN for  protein calorie malnutrition   NPO and IV fluids   Anemia should improve with improved nutrition and decrease inflammation  Transfuse for hemoglobin below 7 or hemodynamic instability  Abdominal x-ray today to make sure that she does not have any free air or significant distention of the cecum  We will plan to repeat the CT scan tomorrow which would be 5 days  If she did require surgery, this would be a completion total colectomy with end ileostomy.  However, my big concern is given the extent of inflammation in the ileum, we may end up sacrificing much more ileum then we want to putting her in a bad position down the road.  Therefore, every effort will be made to manage this non operatively in the acute setting and then potentially switch her to infliximab as an outpatient once we get the abscess under control.  Colorectal surgery will follow closely, please call us for any changes in patient is status.  I have extensively discussed her case with her outpatient gastroenterologist Dr. Britt and we are in agreement.     Hugo Lafleur MD MBA  [facility]  Office: [phone]  5/14/2024 2:29 PM     Total time spent: 65  minutes (>50% of time is face to face/coordination of care)

## 2024-05-14 NOTE — PLAN OF CARE
Problem: Adult Inpatient Plan of Care  Goal: Optimal Comfort and Wellbeing  Outcome: Progressing     Problem: Fall Injury Risk  Goal: Absence of Fall and Fall-Related Injury  Outcome: Progressing  Intervention: Identify and Manage Contributors  Recent Flowsheet Documentation  Taken 5/14/2024 0015 by David Allen RN  Medication Review/Management: medications reviewed  Intervention: Promote Injury-Free Environment  Recent Flowsheet Documentation  Taken 5/14/2024 0015 by David Allen RN  Safety Promotion/Fall Prevention:   activity supervised   assistive device/personal items within reach   clutter free environment maintained   nonskid shoes/slippers when out of bed   safety round/check completed   room organization consistent   Goal Outcome Evaluation:  A&Ox4. Denied pain. TPN running continuously at 60 mL/hr. Continent of bladder. Ostomy intact. Pt remains NPO. VSS.

## 2024-05-14 NOTE — PLAN OF CARE
Pt was ICU transfer, arrived to unit at 1730. HR lower in the 40s and 50s which has been ongoing for pt, Bps soft. Pt is Hmong speaking, used  for assessment and education. Pt denied pain this shift. Continues to be NPO, TPN and fish oil triglycerides started at 2000. LLQ colostomy intact and had ~100cc output. Pt c/o having hard time sleeping. House resident paged, and called back and ordered one time dose of IV benadryl. While giving flush after giving IV benadryl, pt started coughing repeatedly and asked to lift HOB. Pt coughed for about 30 seconds.  called and assessed pt. Pt reported having spontaneous cough while med was being administered. Pt also c/o mild chest tightness. While asking more questions and assessing pt through , pt reported tightness in the chest subsiding and was barely there. Reported off to oncoming RN since it was shift change and rounded pt with NOC RN. Pt reporting feeling okay. Nursing will continue to monitor.      Problem: Fall Injury Risk  Goal: Absence of Fall and Fall-Related Injury  Outcome: Progressing  Intervention: Identify and Manage Contributors  Recent Flowsheet Documentation  Taken 5/13/2024 1745 by Merry Blum RN  Medication Review/Management: medications reviewed  Intervention: Promote Injury-Free Environment  Recent Flowsheet Documentation  Taken 5/13/2024 1745 by Merry Blum RN  Safety Promotion/Fall Prevention:   activity supervised   assistive device/personal items within reach   clutter free environment maintained   nonskid shoes/slippers when out of bed   room door open   safety round/check completed     Problem: Sepsis/Septic Shock  Goal: Blood Glucose Level Within Targeted Range  Outcome: Progressing  Goal: Absence of Infection Signs and Symptoms  Outcome: Progressing  Intervention: Initiate Sepsis Management  Recent Flowsheet Documentation  Taken 5/13/2024 1745 by Merry Bulm RN  Infection Prevention:   hand  hygiene promoted   rest/sleep promoted   single patient room provided     Problem: Adult Inpatient Plan of Care  Goal: Absence of Hospital-Acquired Illness or Injury  Intervention: Identify and Manage Fall Risk  Recent Flowsheet Documentation  Taken 5/13/2024 1745 by Merry Blum RN  Safety Promotion/Fall Prevention:   activity supervised   assistive device/personal items within reach   clutter free environment maintained   nonskid shoes/slippers when out of bed   room door open   safety round/check completed  Intervention: Prevent Infection  Recent Flowsheet Documentation  Taken 5/13/2024 1745 by Merry Blum RN  Infection Prevention:   hand hygiene promoted   rest/sleep promoted   single patient room provided     Problem: Sepsis/Septic Shock  Goal: Optimal Coping  Intervention: Optimize Psychosocial Adjustment to Illness  Recent Flowsheet Documentation  Taken 5/13/2024 1745 by Merry Blum RN  Supportive Measures: active listening utilized   Goal Outcome Evaluation:

## 2024-05-14 NOTE — PROGRESS NOTES
Care Management Follow Up    Length of Stay (days): 4    Expected Discharge Date: 05/15/2024     Concerns to be Addressed: discharge planning     Patient plan of care discussed at interdisciplinary rounds: Yes    Anticipated Discharge Disposition:       Anticipated Discharge Services:    Anticipated Discharge DME:      Patient/family educated on Medicare website which has current facility and service quality ratings:    Education Provided on the Discharge Plan:    Patient/Family in Agreement with the Plan:      Referrals Placed by CM/SW:    Private pay costs discussed: Not applicable    Additional Information:  Chart reviewed. Patient is not medically ready for discharge.   Colorectal surgery following.   GI consulted.   Patient to have CT abd/pelvis with contrast on 5/15.   Patient on TPN & IV abx.      CM following for care progression and discharge planning needs/recs.     RAMU Garcia

## 2024-05-14 NOTE — PHARMACY-CONSULT NOTE
"Pharmacy Note: Parenteral Nutrition (PN) Management    Pharmacist consulted to dose PN for Dain Tejeda, a 43 year old female by Dr. Robles.    Subjective:    The patient is a new PN start.    The patient was started on PN in the hospital on 5/11/2024.    Indication for PN therapy: bowel obstruction    Inadequate nutrition anticipated for > 7 days.     Enteral nutrition contraindicated due to: small bowel obstruction.    Pertinent diseases and other special considerations  soy & gluten allergies    Social History     Tobacco Use    Smoking status: Never    Smokeless tobacco: Never   Substance Use Topics    Alcohol use: No    Drug use: No         Objective:    Ht Readings from Last 1 Encounters:   05/10/24 1.575 m (5' 2\")     Wt Readings from Last 1 Encounters:   05/13/24 47.7 kg (105 lb 2.6 oz)       Body mass index is 19.23 kg/m .    Patient Vitals for the past 96 hrs:   Weight   05/13/24 1742 47.7 kg (105 lb 2.6 oz)   05/10/24 1626 44 kg (97 lb)       Labs:  Last 3 days:  Recent Labs     05/11/24  1035 05/11/24  1815 05/12/24  0429 05/12/24  0429 05/13/24  0614 05/14/24  0608   NA  --   --  143  --  143 140   POTASSIUM 3.3* 4.0 4.1  --  4.4 3.6   CHLORIDE  --   --  112*  --  110* 106   CO2  --   --  24  --  28 30*   BUN  --   --  17.2  --  22.3* 15.7   CR  --   --  0.57  --  0.49* 0.47*   ALEX  --   --  8.1*  --  8.2* 7.8*   MAG  --   --  2.2  --  2.4* 2.2   PHOS  --   --  2.8  --  2.7 2.6   PROTTOTAL  --   --  5.5*  --  5.6*  --    ALBUMIN  --   --  1.9*  --  2.1*  --    PREALB  --   --  7.7*  --  14.4*  --    HGB  --   --  8.2*  --  7.8* 7.5*   HCT  --   --  25.9*  --  25.2* 24.2*   PLT  --   --  302  --  267 261   BILITOTAL  --   --  <0.2  --  0.2  --    AST  --   --  6  --  12  --    ALT  --   --  11  --  10  --    ALKPHOS  --   --  105   < > 126  --    INR  --   --  1.12  --  1.08  --     < > = values in this interval not displayed.       Glucose (past 48 hours):   Recent Labs     05/13/24  0614 05/13/24  1156 " 05/13/24  1833 05/14/24  0043 05/14/24  0608 05/14/24  0620   * 147* 115* 161* 139* 144*       Intake/Output (last 24 hours): I/O last 3 completed shifts:  In: 2046 [I.V.:3]  Out: 710 [Urine:550; Stool:160]    Estimated CrCl: Estimated Creatinine Clearance: 118.6 mL/min (A) (based on SCr of 0.47 mg/dL (L)).      Assessment:    Initiate patient on PN therapy as a continuous central therapy.     Given the patient's current condition/oral intake, PN is still indicated.    Lab results reviewed: BPM, Mag and Phos all WNL. Corrected Ca is WNL. No changes today.    Plan:  Rate of PN: 60 mL/hr  Formula:   Amino Acids 60 grams  Dextrose 200 grams  Sodium 50 mEq/day  Potassium 60 mEq/day  Calcium 5 mEq/day  Magnesium 6 mEq/day  Phosphorus 15 mMol/day  Chloride: Acetate Ratio 1:3  Standard Multivitamins w/Vitamin K  Trace Elements  Fat Emulsion: Omegaven (due to soy allergy) 20 g daily (2 bottles)  Check hyperal lytes labs tomorrow.  Pharmacist will continue to follow the patient's lab results, clinical status and blood glucose results and make adjustments as appropriate.    Thank you for the consult.    Aniyah Gage, Malathi

## 2024-05-15 ENCOUNTER — VIRTUAL VISIT (OUTPATIENT)
Dept: INTERPRETER SERVICES | Facility: CLINIC | Age: 44
End: 2024-05-15

## 2024-05-15 ENCOUNTER — VIRTUAL VISIT (OUTPATIENT)
Dept: INTERPRETER SERVICES | Facility: CLINIC | Age: 44
End: 2024-05-15
Payer: COMMERCIAL

## 2024-05-15 ENCOUNTER — APPOINTMENT (OUTPATIENT)
Dept: CT IMAGING | Facility: HOSPITAL | Age: 44
End: 2024-05-15
Attending: COLON & RECTAL SURGERY
Payer: COMMERCIAL

## 2024-05-15 LAB
ANION GAP SERPL CALCULATED.3IONS-SCNC: 7 MMOL/L (ref 7–15)
BACTERIA BLD CULT: NO GROWTH
BACTERIA BLD CULT: NO GROWTH
BASOPHILS # BLD AUTO: 0 10E3/UL (ref 0–0.2)
BASOPHILS NFR BLD AUTO: 0 %
BUN SERPL-MCNC: 11.6 MG/DL (ref 6–20)
CALCIUM SERPL-MCNC: 8.5 MG/DL (ref 8.6–10)
CHLORIDE SERPL-SCNC: 103 MMOL/L (ref 98–107)
CREAT SERPL-MCNC: 0.54 MG/DL (ref 0.51–0.95)
DEPRECATED HCO3 PLAS-SCNC: 32 MMOL/L (ref 22–29)
EGFRCR SERPLBLD CKD-EPI 2021: >90 ML/MIN/1.73M2
EOSINOPHIL # BLD AUTO: 0.1 10E3/UL (ref 0–0.7)
EOSINOPHIL NFR BLD AUTO: 1 %
ERYTHROCYTE [DISTWIDTH] IN BLOOD BY AUTOMATED COUNT: 15.5 % (ref 10–15)
GLUCOSE BLDC GLUCOMTR-MCNC: 139 MG/DL (ref 70–99)
GLUCOSE BLDC GLUCOMTR-MCNC: 142 MG/DL (ref 70–99)
GLUCOSE SERPL-MCNC: 96 MG/DL (ref 70–99)
HCT VFR BLD AUTO: 26.8 % (ref 35–47)
HGB BLD-MCNC: 8.2 G/DL (ref 11.7–15.7)
IMM GRANULOCYTES # BLD: 0.1 10E3/UL
IMM GRANULOCYTES NFR BLD: 2 %
LYMPHOCYTES # BLD AUTO: 0.7 10E3/UL (ref 0.8–5.3)
LYMPHOCYTES NFR BLD AUTO: 21 %
MAGNESIUM SERPL-MCNC: 2.3 MG/DL (ref 1.7–2.3)
MCH RBC QN AUTO: 28.6 PG (ref 26.5–33)
MCHC RBC AUTO-ENTMCNC: 30.6 G/DL (ref 31.5–36.5)
MCV RBC AUTO: 93 FL (ref 78–100)
MONOCYTES # BLD AUTO: 0.3 10E3/UL (ref 0–1.3)
MONOCYTES NFR BLD AUTO: 8 %
NEUTROPHILS # BLD AUTO: 2.4 10E3/UL (ref 1.6–8.3)
NEUTROPHILS NFR BLD AUTO: 68 %
NRBC # BLD AUTO: 0 10E3/UL
NRBC BLD AUTO-RTO: 1 /100
PATH REPORT.COMMENTS IMP SPEC: NORMAL
PATH REPORT.COMMENTS IMP SPEC: NORMAL
PATH REPORT.FINAL DX SPEC: NORMAL
PATH REPORT.MICROSCOPIC SPEC OTHER STN: NORMAL
PATH REPORT.RELEVANT HX SPEC: NORMAL
PHOSPHATE SERPL-MCNC: 3.6 MG/DL (ref 2.5–4.5)
PLATELET # BLD AUTO: 314 10E3/UL (ref 150–450)
POTASSIUM SERPL-SCNC: 4 MMOL/L (ref 3.4–5.3)
RBC # BLD AUTO: 2.87 10E6/UL (ref 3.8–5.2)
RETICS # AUTO: 0.05 10E6/UL (ref 0.03–0.1)
RETICS/RBC NFR AUTO: 1.9 % (ref 0.5–2)
SODIUM SERPL-SCNC: 142 MMOL/L (ref 135–145)
WBC # BLD AUTO: 3.5 10E3/UL (ref 4–11)

## 2024-05-15 PROCEDURE — T1013 SIGN LANG/ORAL INTERPRETER: HCPCS | Mod: U4,TEL,95 | Performed by: INTERPRETER

## 2024-05-15 PROCEDURE — 84100 ASSAY OF PHOSPHORUS: CPT | Performed by: INTERNAL MEDICINE

## 2024-05-15 PROCEDURE — 85025 COMPLETE CBC W/AUTO DIFF WBC: CPT | Performed by: STUDENT IN AN ORGANIZED HEALTH CARE EDUCATION/TRAINING PROGRAM

## 2024-05-15 PROCEDURE — 250N000009 HC RX 250: Mod: JZ | Performed by: FAMILY MEDICINE

## 2024-05-15 PROCEDURE — 258N000003 HC RX IP 258 OP 636: Performed by: INTERNAL MEDICINE

## 2024-05-15 PROCEDURE — B4185 PARENTERAL SOL 10 GM LIPIDS: HCPCS | Mod: JZ | Performed by: FAMILY MEDICINE

## 2024-05-15 PROCEDURE — 120N000001 HC R&B MED SURG/OB

## 2024-05-15 PROCEDURE — 250N000011 HC RX IP 250 OP 636: Performed by: INTERNAL MEDICINE

## 2024-05-15 PROCEDURE — 83735 ASSAY OF MAGNESIUM: CPT | Performed by: INTERNAL MEDICINE

## 2024-05-15 PROCEDURE — 250N000011 HC RX IP 250 OP 636: Performed by: COLON & RECTAL SURGERY

## 2024-05-15 PROCEDURE — T1013 SIGN LANG/ORAL INTERPRETER: HCPCS | Mod: U4,TEL,95

## 2024-05-15 PROCEDURE — 3E0336Z INTRODUCTION OF NUTRITIONAL SUBSTANCE INTO PERIPHERAL VEIN, PERCUTANEOUS APPROACH: ICD-10-PCS | Performed by: COLON & RECTAL SURGERY

## 2024-05-15 PROCEDURE — 250N000009 HC RX 250: Performed by: STUDENT IN AN ORGANIZED HEALTH CARE EDUCATION/TRAINING PROGRAM

## 2024-05-15 PROCEDURE — 85045 AUTOMATED RETICULOCYTE COUNT: CPT | Performed by: STUDENT IN AN ORGANIZED HEALTH CARE EDUCATION/TRAINING PROGRAM

## 2024-05-15 PROCEDURE — 74177 CT ABD & PELVIS W/CONTRAST: CPT

## 2024-05-15 PROCEDURE — 99232 SBSQ HOSP IP/OBS MODERATE 35: CPT | Mod: GC

## 2024-05-15 PROCEDURE — 250N000011 HC RX IP 250 OP 636: Performed by: STUDENT IN AN ORGANIZED HEALTH CARE EDUCATION/TRAINING PROGRAM

## 2024-05-15 PROCEDURE — C9113 INJ PANTOPRAZOLE SODIUM, VIA: HCPCS | Performed by: INTERNAL MEDICINE

## 2024-05-15 PROCEDURE — 80048 BASIC METABOLIC PNL TOTAL CA: CPT | Performed by: INTERNAL MEDICINE

## 2024-05-15 RX ORDER — ENOXAPARIN SODIUM 100 MG/ML
40 INJECTION SUBCUTANEOUS EVERY 24 HOURS
Status: DISCONTINUED | OUTPATIENT
Start: 2024-05-15 | End: 2024-05-21

## 2024-05-15 RX ORDER — IOPAMIDOL 755 MG/ML
52 INJECTION, SOLUTION INTRAVASCULAR ONCE
Status: COMPLETED | OUTPATIENT
Start: 2024-05-15 | End: 2024-05-15

## 2024-05-15 RX ADMIN — MAGNESIUM SULFATE HEPTAHYDRATE: 500 INJECTION, SOLUTION INTRAMUSCULAR; INTRAVENOUS at 20:11

## 2024-05-15 RX ADMIN — PIPERACILLIN AND TAZOBACTAM 3.38 G: 3; .375 INJECTION, POWDER, FOR SOLUTION INTRAVENOUS at 21:17

## 2024-05-15 RX ADMIN — IOPAMIDOL 52 ML: 755 INJECTION, SOLUTION INTRAVENOUS at 08:06

## 2024-05-15 RX ADMIN — PANTOPRAZOLE SODIUM 40 MG: 40 INJECTION, POWDER, FOR SOLUTION INTRAVENOUS at 08:44

## 2024-05-15 RX ADMIN — HEPARIN SODIUM 5000 UNITS: 10000 INJECTION, SOLUTION INTRAVENOUS; SUBCUTANEOUS at 06:41

## 2024-05-15 RX ADMIN — PIPERACILLIN AND TAZOBACTAM 3.38 G: 3; .375 INJECTION, POWDER, FOR SOLUTION INTRAVENOUS at 06:41

## 2024-05-15 RX ADMIN — PIPERACILLIN AND TAZOBACTAM 3.38 G: 3; .375 INJECTION, POWDER, FOR SOLUTION INTRAVENOUS at 14:08

## 2024-05-15 RX ADMIN — FISH OIL 100 ML: 0.1 INJECTION, EMULSION INTRAVENOUS at 02:03

## 2024-05-15 RX ADMIN — FISH OIL 100 ML: 0.1 INJECTION, EMULSION INTRAVENOUS at 20:12

## 2024-05-15 RX ADMIN — ENOXAPARIN SODIUM 40 MG: 40 INJECTION SUBCUTANEOUS at 21:17

## 2024-05-15 RX ADMIN — IRON SUCROSE 200 MG: 20 INJECTION, SOLUTION INTRAVENOUS at 08:44

## 2024-05-15 RX ADMIN — HEPARIN SODIUM 5000 UNITS: 10000 INJECTION, SOLUTION INTRAVENOUS; SUBCUTANEOUS at 14:07

## 2024-05-15 RX ADMIN — INFLIXIMAB 500 MG: 100 INJECTION, POWDER, LYOPHILIZED, FOR SOLUTION INTRAVENOUS at 16:51

## 2024-05-15 ASSESSMENT — ACTIVITIES OF DAILY LIVING (ADL)
ADLS_ACUITY_SCORE: 25

## 2024-05-15 NOTE — PHARMACY-CONSULT NOTE
"Pharmacy Note: Parenteral Nutrition (PN) Management    Pharmacist consulted to dose PN for Dain Tejeda, a 43 year old female by Dr. Robles.    Subjective:    The patient is a new PN start.    The patient was started on PN in the hospital on 5/11/2024.    Indication for PN therapy: bowel obstruction    Inadequate nutrition anticipated for > 7 days.     Enteral nutrition contraindicated due to: small bowel obstruction.    Pertinent diseases and other special considerations  soy & gluten allergies    Social History     Tobacco Use    Smoking status: Never    Smokeless tobacco: Never   Substance Use Topics    Alcohol use: No    Drug use: No       Objective:    Ht Readings from Last 1 Encounters:   05/10/24 1.575 m (5' 2\")     Wt Readings from Last 1 Encounters:   05/13/24 47.7 kg (105 lb 2.6 oz)       Body mass index is 19.23 kg/m .    Patient Vitals for the past 96 hrs:   Weight   05/13/24 1742 47.7 kg (105 lb 2.6 oz)       Labs:  Last 3 days:  Recent Labs     05/13/24  0614 05/14/24  0608 05/15/24  0627    140 142   POTASSIUM 4.4 3.6 4.0   CHLORIDE 110* 106 103   CO2 28 30* 32*   BUN 22.3* 15.7 11.6   CR 0.49* 0.47* 0.54   ALEX 8.2* 7.8* 8.5*   MAG 2.4* 2.2 2.3   PHOS 2.7 2.6 3.6   PROTTOTAL 5.6*  --   --    ALBUMIN 2.1*  --   --    PREALB 14.4*  --   --    HGB 7.8* 7.5* 8.2*   HCT 25.2* 24.2* 26.8*    261 314   BILITOTAL 0.2  --   --    AST 12  --   --    ALT 10  --   --    ALKPHOS 126  --   --    INR 1.08  --   --        Glucose (past 48 hours):   Recent Labs     05/13/24  1156 05/13/24  1833 05/14/24  0043 05/14/24  0608 05/14/24  0620 05/14/24  1837 05/15/24  0202 05/15/24  0627   * 115* 161* 139* 144* 126* 142* 96       Intake/Output (last 24 hours): I/O last 3 completed shifts:  In: -   Out: 2550 [Urine:2450; Stool:100]    Estimated CrCl: Estimated Creatinine Clearance: 103.2 mL/min (based on SCr of 0.54 mg/dL).        Assessment:    Initiate patient on PN therapy as a continuous central therapy. "     Given the patient's current condition/oral intake, PN is still indicated.    Lab results reviewed: BPM, Mag and Phos all WNL. Corrected Ca is WNL. No changes today.    Plan:  Rate of PN: 60 mL/hr  Formula:   Amino Acids 60 grams  Dextrose 200 grams  Sodium 50 mEq/day  Potassium 60 mEq/day  Calcium 5 mEq/day  Magnesium 6 mEq/day  Phosphorus 15 mMol/day  Chloride: Acetate Ratio 1:3  Standard Multivitamins w/Vitamin K  Trace Elements  Fat Emulsion: Omegaven (due to soy allergy) 20 g daily (2 bottles)  Check BMP and K, Mag, phos  labs tomorrow. Additional labs may be ordered per provider. Added triglycerides to Friday panel.  Pharmacist will continue to follow the patient's lab results, clinical status and blood glucose results and make adjustments as appropriate.    Thank you for the consult.    Aniyah Gage, MollyD

## 2024-05-15 NOTE — PROGRESS NOTES
Colon and Rectal Surgery  Daily Progress Note    Subjective  [facility] [place]. Patient reports she is doing okay this morning. She denies any pain this morning. She denies nausea, but is hungry. She is concerned about not eating anything for so long. She continues to have gas and stool from her colostomy. 100 ml recorded stoma output.       Objective  Intake/Output last 24 hrs:    Intake/Output Summary (Last 24 hours) at 5/15/2024 0941  Last data filed at 5/15/2024 0203  Gross per 24 hour   Intake --   Output 2150 ml   Net -2150 ml     Temp:  [97.7  F (36.5  C)-97.8  F (36.6  C)] 97.7  F (36.5  C)  Pulse:  [50-51] 50  Resp:  [20] 20  BP: (103-107)/(68-73) 107/68  SpO2:  [98 %-99 %] 98 %    Physical Exam:  General: awake, alert, sitting in chair, in no acute distress  Head: normocephalic, atraumatic  Respiratory: non-labored breathing  Abdomen: soft, mild tenderness in RLQ, non-distended  Stoma: pink, viable with scant light brown water. She notes emptying it recently.  Skin: No rashes or lesions  Musculoskeletal: moves all four extremities equally  Psychological: alert and oriented, answers questions appropriately    Pertinent Labs  Lab Results: personally reviewed.  Lab Results   Component Value Date     05/15/2024     05/14/2024     05/13/2024    .0 04/24/2017    CO2 32 05/15/2024    CO2 30 05/14/2024    CO2 28 05/13/2024    CO2 25 06/23/2022    CO2 23 06/03/2022    CO2 22 06/02/2022    CO2 26.0 04/24/2017    BUN 11.6 05/15/2024    BUN 15.7 05/14/2024    BUN 22.3 05/13/2024    BUN 6 06/23/2022    BUN 3 06/03/2022    BUN 4 06/02/2022    BUN 7.0 04/24/2017     Lab Results   Component Value Date    WBC 3.5 05/15/2024    WBC 3.0 05/14/2024    WBC 3.7 05/13/2024    HGB 8.2 05/15/2024    HGB 7.5 05/14/2024    HGB 7.8 05/13/2024    HGB 10.8 01/10/2018    HGB 10.3 11/08/2017    HGB 9.8 09/18/2017    HCT 26.8 05/15/2024    HCT 24.2 05/14/2024    HCT 25.2 05/13/2024     HCT 35.9 01/10/2018    HCT 33.8 11/08/2017    HCT 31.4 07/31/2017    MCV 93 05/15/2024    MCV 93 05/14/2024    MCV 93 05/13/2024    MCV 83.1 01/10/2018    MCV 77.9 11/08/2017    MCV 86.3 07/31/2017     05/15/2024     05/14/2024     05/13/2024       Assessment/Plan: This is a 43 year old female with history of Crohn's disease complicated by colonic stricture s/p ex lap, resection of descending colon and colostomy creation by General Surgery in 2022 who now follows with [facility] for management of Crohn's disease currently on Humira and Azathioprine. Recently admitted 4/30-5/4 for Crohn's flare discharged with steroid taper, readmitted on 5/10 with worsening ileocolonic crohn's disease with fistula and new intramural abscess. Remains on IV Zosyn, TPN, and NPO. CT abd/pelvis w/ showed unchanged long segment contiguous wall thickening of the ileum and colon, in particular the transverse colon leading up to the colostomy consistent with active inflammatory bowel disease., decreased size of the paracolonic abscess along the deep wall of the proximal transverse colon which now has internal gas attenuation and suggests continuity with the bowel lumen, development of mild four-quadrant abdominal ascites and small bilateral pleural effusions, new distention of the urinary bladder.    WBC 3.5 (3.0)  Hgb 8.2 (7.5)   Cr 0.54 (0.47)    - NPO and IV fluids  - Continue TPN  - Continue IV abx  - Transfuse for hgb <7 or hemodynamic instability  - Wean steroids as possible  - Appreciate GI recommendations for Chron's management    Will discuss with Dr. Lafleur and addend with any changes.    Gab Sandra PA-C  [facility]  [phone]..............................main    Colorectal Surgery Staff:  I have seen and examined the patient. I agree with the above documentation and plan of the fellow/PA above with the following additions/chages:    Ms. Tejeda states that she is feeling better today than  yesterday in terms of abdominal pain.  Has not been ambulating much.  No subjective chills or fevers.    Afebrile with normal vital signs.  Bradycardia is slightly less so than yesterday.  Abdomen is soft, tender focally more in the mid abdomen now, nondistended, no rebound or guarding, no focal or generalized peritonitis.  Colostomy with a small amount of stool and gas in the appliance.    White blood cell count is 3.5.  Hemoglobin is 8.2.  Bicarb is 32, potassium is 4.0.    I personally reviewed the images of the CT scan of the abdomen and pelvis.  There is continued thickening of the transverse colon as well as the distal ileum.  The transverse colon abscess is smaller but the fluid component has been replaced by gas.  There is also significantly more free fluid on this CT scan, but no evidence of free air.  This appears to be more from third spacing of fluid rather than her intra-abdominal process although it could certainly be a combination of both.    A/P: 43-year-old female with severe Crohn's colitis, ileitis, anemia, and protein calorie malnutrition.    Looking better clinically today. I reviewed the CT. There is still a lot of inflammation, but the abscess does look a little better. If we can get the inflammation under control an abscess of this size should be treatable with antibiotics. I spoke with Dr. Traore and we agreed on a plan of starting infliximab, iron infusion, weaning off the steroids, lovenox (instead of heparin), broad spectrum Abx, and TPN. Hopefully, if things continue to improve, we can start with clear liquids in the next day or so. Will continue to follow closely.     Total time spent: 26 minutes (>50% of time is face to face/coordination of care)    Hugo Lafleur MD MBA  [facility]  Office: [phone]  5/15/2024 4:23 PM

## 2024-05-15 NOTE — PLAN OF CARE
Problem: Sepsis/Septic Shock  Goal: Blood Glucose Level Within Targeted Range  Outcome: Progressing   Goal Outcome Evaluation:      Plan of Care Reviewed With: patient       on shift. Stand by assist. All three PICC lines infusing. VSS. NPO. TPN continuous and running at 60ml/hr.

## 2024-05-15 NOTE — PROGRESS NOTES
Nutrition Therapy  Parenteral Nutrition follow-up       Dietitian to Pharmacy    No change in TPN today  Rate of TPN: 60 ml/hr continuous  Grams Dextrose: 200  Grams Protein: 60    Lipids: Omegaven 20 g/day (2 x 100 ml bottle)         Current Nutrition Intake:  Diet: NPO except medications, ice chips    Custom TPN:   60 g AA, 200 g DEX at 60 ml/hr continuous.  200 ml omegaven daily, infuse over 12 hrs.    Above to provides 1144 kcals (24 kcal/kg), 60 g protein (1.2 g/kg), 200 g carb, 20 g lipid, 1440 ml fluid daily.    Above meets estimated needs.    Food allergies:  gluten (celiac), soy.     TPN start 5/11/12 due to intraabdominal abscess with fistula formation.    New Findings:   - Abd XR yesterday with no distended air filled loops of small bowel. CT completed today    Weight Trends  Admission wt: 44 kg (97 lb) 5/10/24  Current wt:   Date/Time Weight Weight Method   05/13/24 1742 47.7 kg (105 lb 2.6 oz) Bed scale   05/10/24 1626 44 kg (97 lb) Bed scale   05/10/24 0709 49 kg (108 lb) --     Dosing Weight: 44 kg     ESTIMATED NUTRITION NEEDS  Energy Needs: 6358-7046 kcals/day (25 - 30 kcals/kg)  Justification: Repletion  Protein Needs: 53-66 grams protein/day (1.2 - 1.5 grams of pro/kg)  Justification: Increased needs  Fluid Needs: 8036-9973 mL/day (1 mL/kcal)   Justification: Maintenance    GI:  Colostomy small liquid/loose OP, gas    Labs:   Labs reviewed by dietitian  -166 mg/dl past 24 hours    Medications:   Reviewed by dietitian   Iv solu-CORTEF, iv protonix daily, iv abx, iv iron sucrose    MALNUTRITION  % Intake: Decreased intake does not meet criteria  % Weight Loss: > 2% in 1 week (severe malnutrition).  3.9% < 2wks.   Subcutaneous Fat Loss: None observed  Muscle Loss: None observed  Fluid Accumulation/Edema: None noted    Malnutrition Diagnosis: Patient does not meet two of the established criteria necessary for diagnosing malnutrition but is at risk for malnutrition    Nutrition Diagnosis  Altered  GI function related to acute illness as evidenced by intraabdominal abscess with fistula formation, need for TPN   Evaluation: continues    Goals   Tolerate TPN-met  Meet estimated nutrition needs-progressing  Electrolytes WNL-met  Blood sugar < 180.- met    INTERVENTIONS  Implementation  Add weight-daily order to ensure weights being completed    Monitoring/Evaluation  Progress toward goals will be monitored and evaluated per protocol.

## 2024-05-15 NOTE — PROGRESS NOTES
[facility]    Progress Note - Hospitalist Service       Date of Admission:  5/10/2024    Assessment & Plan     Dain Tejeda is a 43 year old female admitted on 5/10/2024. She has a history of Chron's disease complicated by colonic stricture s/p partial colectomy (11/2022), Celiac disease, SIOMARA admitted for septic shock from intraabdominal abscess with fistula formation.      Crohn's disease, complicated by abscess, developing fistula and ileitis   S/p partial colectomy 11/2022, has LLQ colostomy   Sepsis 2/2 intra-abdominal infection - resolved  Diagnosed in late 2022, complicated by stricture s/p partial colectomy with ostomy. Follows with [facility], on Humira and azathioprine. Was recently admitted on our service 4/30-5/4 after syncopal episode thought to be 2/2 Chron's flare after CT showed colitis but negative Cdiff and enteric panel. GI consulted at that time and recommended prednisone taper over 4 weeks, unclear whether patient was taking steroid as prescribed since discharge. Presented to ED on 5/9/24 with new severe abdominal pain, initially thought to be UTI and sent home on Bactrim, returned on 5/10 after developing fevers, nausea and vomiting. Developed hypotension in the ED despite fluid resuscitation and was transferred to the ICU for pressor support. CT showing marked progression of Chron's with colonic obstruction, 10 cm intramural abscess, developing fistula and significant ileitis. Septic shock most likely secondary to colonic abscess. Patient initiated on IV abx in ED, pain improved after receiving IV pain meds. Transferred out of ICU on 5/12 after being off pressors for 24 hours. IR consulted, noted that bowel access would be challenging and recommended repeat imaging after antibiotics. No clinical signs of abscess progression with mild diffuse tenderness on abdominal exam. Repeat CT showed decreased size of abscess along deep wall of proximal transverse colon which now has  internal gas attenuation and suggests continuity with bowel lumen. No surgical intervention necessary at this time, will treat Chron's disease medically with infliximab with repeat CT 5 days after starting to re-evaluate abscess.   - Colorectal consulted, appreciate recommendations    - GI consulted  - NPO. RD consulted, TPN started 5/11.   - IV Zosyn  - Stopped steroids given fistulating disease and abscess  - IV Iron  - Start Infliximab  - Repeat CT 5 days after starting inflixmab   - Stop PTA azathioprine   - PRN Tylenol and IV dilaudid for pain  - Zofran PRN   - Daily CBC    Normocytic anemia  Leukopenia   History of SIOMARA with recent iron infusions through [facility]. Hgb 9.8 on admission, normal differential at that time. Colostomy output appears non-bloody. Hgb initially tending down, most recently 8.2, MCV 93. Continues to have no signs of bleeding. Iron studies in Feb largely normal with mildly low TIBC and iron saturation. May be more consistent with anemia of chronic disease. Worsening leukopenia on 5/14, most recently 3.5. Reticulocytes 1.9, indicating underproduction. Repeat CBC with differential normal. May be due to infection/inflammation or medication side effect, but will complete further workup to evaluate.   - Blood smear pending  - IV iron     Sinus bradycardia  Patient noted to be bradycardic between 40-60's, asymptomatic. EKG x2 shows sinus bradycardia.      Hypokalemia  3.0 on admission, supplementation given in ED.   - Daily BMP  - Replacement per RN protocol     Disorganized behaviors  Demonstrated odd behaviors during recent admission, treated with Seroquel 12.5 mg at bedtime. Psych consult at that time, did not recommend any ongoing meds upon discharge. Patient calm, alert and cooperative this admission so far.   - Delirium protocol, hold on meds for now     Gamma gap  Chronic. Tprotein 7.2, albumin 2.7. Most certainly 2/2 inflammatory disease.      Incidental finding: benign pelvic simple  cyst  >5 to <=7 cm. Follow up pelvic US in 6 months recommended.         Diet: NPO for Medical/Clinical Reasons Except for: Meds, Ice Chips  parenteral nutrition - ADULT compounded formula    DVT Prophylaxis: Enoxaparin (Lovenox) SQ  Chambers Catheter: Not present  Fluids: mIVF  Lines: PRESENT      PICC 05/10/24 Triple Lumen Right Basilic Vasopressor-Site Assessment: WDL      Cardiac Monitoring: None  Code Status: Full Code      Clinically Significant Risk Factors              # Hypoalbuminemia: Lowest albumin = 1.9 g/dL at 5/12/2024  4:29 AM, will monitor as appropriate                        The patient's care was discussed with the Resident Physician, Dr. Sandhu and Attending Physician, Dr. Cristina .    Nicolle Schrader, MS4  Hospitalist Service  [facility]    I was present with the medical student who participated in the service and in the documentation of this note. I have verified the history and personally performed the physical exam and medical decision making, and have verified the content of the note, which accurately reflects my assessment of the patient and the plan of care.     Phoebe Sandhu MD  [employer] [facility], PGY-3  ____________________________________    Interval History     No acute events overnight. Feels about the same as yesterday, continues to feel fatigued with mild SOB. No pain. No fevers, chills, palpitations. No bloody output in ostomy. Increased urination overnight, but no dysuria, feels like she is emptying her bladder. Some blood in urine with menstruation.     Physical Exam   Vital Signs: Temp: 97.7  F (36.5  C) Temp src: Oral BP: 107/68 Pulse: 50   Resp: 20 SpO2: 98 % O2 Device: None (Room air)    Weight: 105 lbs 2.55 oz    General:  No acute distress.   Psych:  Alert. Able to articulate logical thoughts. Calm.   HEENT:  Eyes grossly normal to inspection. Mucous membranes moist.   Cardiovascular:  Regular rate and rhythm, normal S1  and S2 without murmur.   Respiratory:  Lungs clear to auscultation bilaterally. No wheezing or crackles.   Musculoskeletal:  No gross extremity deformities. Mild peripheral edema.  GI:  Soft. Non-distended. Diffuse mild tenderness to palpation.     Data     I have personally reviewed the following data over the past 24 hrs:    3.5 (L)  \   8.2 (L)   / 314     142 103 11.6 /  96   4.0 32 (H) 0.54 \     Ferritin:  N/A % Retic:  1.9 LDH:  N/A       Imaging results reviewed over the past 24 hrs:   Recent Results (from the past 24 hour(s))   XR Abdomen Port 1 View    Narrative    EXAM: XR ABDOMEN PORT 1 VIEW  LOCATION: [facility]  DATE: 5/14/2024    INDICATION: Crohn's colitis  COMPARISON: CT 5/10/2024      Impression    IMPRESSION: No distended air-filled loops of small bowel. A staple line is seen over the left lower quadrant. Otherwise the bowel is not well assessed.

## 2024-05-15 NOTE — PLAN OF CARE
Patient calls appropriately and is a SBA with ambulation. Phone  used overnight and patient stated that she denies pain and had no questions for staff at that time. TPN and antibiotics infusing.     Problem: Adult Inpatient Plan of Care  Goal: Plan of Care Review  Description: The Plan of Care Review/Shift note should be completed every shift.  The Outcome Evaluation is a brief statement about your assessment that the patient is improving, declining, or no change.  This information will be displayed automatically on your shift  note.  Outcome: Progressing     Problem: Adult Inpatient Plan of Care  Goal: Absence of Hospital-Acquired Illness or Injury  Outcome: Progressing  Intervention: Identify and Manage Fall Risk  Recent Flowsheet Documentation  Taken 5/15/2024 0022 by Jena Robles RN  Safety Promotion/Fall Prevention:   activity supervised   assistive device/personal items within reach   clutter free environment maintained   nonskid shoes/slippers when out of bed   safety round/check completed   room organization consistent  Intervention: Prevent Skin Injury  Recent Flowsheet Documentation  Taken 5/15/2024 0022 by Jena Robles RN  Body Position: position changed independently  Intervention: Prevent and Manage VTE (Venous Thromboembolism) Risk  Recent Flowsheet Documentation  Taken 5/15/2024 0022 by Jena Robles RN  VTE Prevention/Management: (sq heparin) SCDs (sequential compression devices) off     Problem: Sepsis/Septic Shock  Goal: Absence of Infection Signs and Symptoms  Outcome: Progressing     Problem: Sepsis/Septic Shock  Goal: Optimal Nutrition Intake  Outcome: Progressing

## 2024-05-15 NOTE — PROGRESS NOTES
[facility] DIGESTIVE HEALTH PROGRESS NOTE      Subjective:                Had  available.  No change. Denies any pain, nausea, or vomiting.   Minimal colostomy output that is liquid and not black or bloody.  Admitted 5/9 ago with sepsis and CT demonstrating abscess along colonic wall requiring ICU and pressors.  IR avoided drainage procedure due to concern about creating fistula  and difficulty of procedure.  Surgery following and consulted colorectal surgery 5/14.    Started TPN 5/13     CT 5/15/2024: Decreased size pericolonic abscess along deep wall of proximal transverse colon now has internal gas attenuation suggesting continuation with bowel wall lumen (fistulizing).  Decreased in size 5.5 x 1.2 cm thickness.  Unchanged long segment continuous wall thickening of ileum and colon particularly transverse colon up to colostomy.  Duffy's pouch and lower left quadrant.  Development of mild four-quadrant abdominal ascites and small bilateral effusions.    Current medications (incomplete list)  Zosyn  TPN  Heparin prophylaxis   Hydrocortisone (Cortef) 25 mg IV every 12 hours  Dilaudid as needed  Protonix  Azathioprine 100 mg (held)  Humira 40 mg every 2 weeks.  Last dose 5/8/24 next due 5/22/24     Assessment:       43-year-old with celiac disease and severe small bowel and stricturing and perforating colonic Crohn's with resection of descending and colostomy since November 2022.  Was on dual therapy Humira and azathioprine over the past 7 months but has progression of disease.  Admitted with sepsis due to fistula and abscess off transverse colon.  Albumin 1.9.  Hemoglobin 7.5.  Currently on Zosyn, TPN, IV steroids and azathioprine.  Humira due next week.     Would consider this a failure of Humira and azathioprine and will switch to infliximab.  Would rapidly discontinue steroids.  Monitor closely with addition of infliximab and repeat imaging in 5 to 7 days.   Agree with colorectal surgery involvement.      Plan:       - Complete IV iron  - Continue TPN  - Continue broad-spectrum antibiotics  - Will start infliximab  - Taper off IV steroids over 1-2 days  - Stop azathioprine which has been held  - Likely in the hospital over the next week  - Plan on repeat CT scan in 5-7 days  - Dr Lafleur colorectal surgery following closely      Objective:                Vital signs in last 24 hrs;  Temp:  [97.7  F (36.5  C)-97.8  F (36.6  C)] 97.7  F (36.5  C)  Pulse:  [50-60] 60  Resp:  [20] 20  BP: ()/(61-73) 96/61  SpO2:  [98 %-100 %] 100 %    Physical Exam:   General: Comfortable appearing. Awake.   Cardiovascular: Regular rate. No edema  Chest: Non-labored breathing. Symmetric chest rise.   Abdomen: soft, non-tender, non-distended  Neurologic: Alert, no focal defects.     Current Labs:  CBC RESULTS:   Recent Labs   Lab 05/15/24  0627 05/14/24  0608 05/13/24  0614   WBC 3.5* 3.0* 3.7*   RBC 2.87* 2.60* 2.71*   HGB 8.2* 7.5* 7.8*   HCT 26.8* 24.2* 25.2*   MCV 93 93 93   MCH 28.6 28.8 28.8   MCHC 30.6* 31.0* 31.0*   RDW 15.5* 15.7* 15.9*    261 267        CMP Results:   Recent Labs   Lab 05/15/24  1226 05/15/24  0627 05/15/24  0202 05/14/24  0620 05/14/24  0608 05/13/24  1156 05/13/24  0614 05/12/24  0646 05/12/24  0429 05/10/24  1425 05/10/24  0740   NA  --  142  --   --  140  --  143  --  143   < > 135   POTASSIUM  --  4.0  --   --  3.6  --  4.4  --  4.1   < > 3.0*   CHLORIDE  --  103  --   --  106  --  110*  --  112*   < > 98   CO2  --  32*  --   --  30*  --  28  --  24   < > 24   ANIONGAP  --  7  --   --  4*  --  5*  --  7   < > 13   * 96 142*   < > 139*   < > 151*   < > 162*   < > 125*   BUN  --  11.6  --   --  15.7  --  22.3*  --  17.2   < > 8.8   CR  --  0.54  --   --  0.47*  --  0.49*  --  0.57   < > 0.68   BILITOTAL  --   --   --   --   --   --  0.2  --  <0.2  --  0.4   ALKPHOS  --   --   --   --   --   --  126  --  105  --  193*   ALT  --   --   --   --   --   --  10  --  11  --  23   AST  --   --    --   --   --   --  12  --  6  --  17    < > = values in this interval not displayed.        INR Results:   Lab Results   Component Value Date    INR 1.08 05/13/2024    INR 1.12 05/12/2024    INR 1.04 05/03/2024       Relevant Imaging:  CT Abdomen Pelvis w Contrast    Result Date: 5/15/2024  EXAM: CT ABDOMEN PELVIS W CONTRAST LOCATION: [facility] DATE: 5/15/2024 INDICATION: Ileocolonic Crohn's with intramural abscess COMPARISON: CT of the abdomen and pelvis with contrast 5/10/2024 TECHNIQUE: CT scan of the abdomen and pelvis was performed following injection of IV contrast. Multiplanar reformats were obtained. Dose reduction techniques were used. CONTRAST: IsoVue 370 52mL FINDINGS: LOWER CHEST: Small bilateral pleural effusions have developed which layer dependently, right slightly larger than left. Associated passive atelectasis of the posterior lower lobes. HEPATOBILIARY: Nondistended gallbladder. No gallbladder wall thickening. No calcified gallstones. No bile duct enlargement. There is a benign calcification in the right lateral aspect of the liver. No new liver lesions. Smooth liver capsule. Normal contrast opacification of the portal veins. PANCREAS: Normal. SPLEEN: Normal. ADRENAL GLANDS: Normal. KIDNEYS/BLADDER: Kidneys are normal in size and have symmetric parenchymal enhancement with normal cortex thickness. No urinary tract calculi or obstruction. Moderately distended urinary bladder has a smooth wall of uniform thickness. BOWEL: The stomach is mostly decompressed. Decompressed duodenum and jejunum. Segments of distal small bowel in the anterior lower abdomen have wall thickening consistent with acute enteritis but are less distended compared with 5 days earlier. Status post distal colon resection with Duffy's pouch staple line in the upper left pelvis and left lower quadrant colostomy. Diffuse wall thickening and mucosal hyperenhancement of the colon which is most extensive in  the transverse colon, also similar to 5 days earlier. A oblong, enhancing fluid collection which follows the posterior wall of the proximal transverse colon has decreased in size measuring 5.5 cm in length and 12 mm in thickness, with decrease in central fluid attenuation but development of internal gas which suggests communication with the bowel lumen. Mild four-quadrant abdominal ascites. LYMPH NODES: Normal. VASCULATURE: Normal. PELVIC ORGANS: Uterus is normal in size. Normal ovaries. Mild, smooth thickening along the mesorectal fascia in the presacral space. MUSCULOSKELETAL: Mild bone demineralization. Tiny lower and lumbar degenerative osteophytes. No aggressive or destructive bone lesions.     IMPRESSION: 1.  Previous segmental colon resection with Duffy's pouch and left lower quadrant colostomy. 2.  Unchanged long segment contiguous wall thickening of the ileum and colon, in particular the transverse colon leading up to the colostomy consistent with active inflammatory bowel disease. 3.  Decreased size of the paracolonic abscess along the deep wall of the proximal transverse colon which now has internal gas attenuation and suggests continuity with the bowel lumen. 4.  Development of mild four-quadrant abdominal ascites and small bilateral pleural effusions. 5.  New distention of the urinary bladder.    XR Abdomen Port 1 View    Result Date: 5/14/2024  EXAM: XR ABDOMEN PORT 1 VIEW LOCATION: [facility] DATE: 5/14/2024 INDICATION: Crohn's colitis COMPARISON: CT 5/10/2024     IMPRESSION: No distended air-filled loops of small bowel. A staple line is seen over the left lower quadrant. Otherwise the bowel is not well assessed.               Luis Miguel Traore M.D.  Thank you for the opportunity to participate in the care of this patient.   Please feel free to call me with any questions or concerns.  Phone number [phone].

## 2024-05-15 NOTE — PLAN OF CARE
Goal Outcome Evaluation:       Dain denied pain but states she feels weak. She sat in the recliner this a.m. [facility] patent, CDI.  used. Call light in reach.

## 2024-05-16 ENCOUNTER — OFFICE VISIT (OUTPATIENT)
Dept: INTERPRETER SERVICES | Facility: CLINIC | Age: 44
End: 2024-05-16

## 2024-05-16 ENCOUNTER — VIRTUAL VISIT (OUTPATIENT)
Dept: INTERPRETER SERVICES | Facility: CLINIC | Age: 44
End: 2024-05-16
Payer: COMMERCIAL

## 2024-05-16 PROBLEM — K65.1 INTRA-ABDOMINAL ABSCESS (H): Status: ACTIVE | Noted: 2022-10-29

## 2024-05-16 PROBLEM — K50.918 CROHN'S DISEASE WITH OTHER COMPLICATION, UNSPECIFIED GASTROINTESTINAL TRACT LOCATION (H): Status: ACTIVE | Noted: 2022-11-23

## 2024-05-16 LAB
ERYTHROCYTE [DISTWIDTH] IN BLOOD BY AUTOMATED COUNT: 15.6 % (ref 10–15)
GLUCOSE BLDC GLUCOMTR-MCNC: 122 MG/DL (ref 70–99)
GLUCOSE BLDC GLUCOMTR-MCNC: 186 MG/DL (ref 70–99)
HCT VFR BLD AUTO: 27.2 % (ref 35–47)
HGB BLD-MCNC: 8.6 G/DL (ref 11.7–15.7)
MAGNESIUM SERPL-MCNC: 2 MG/DL (ref 1.7–2.3)
MCH RBC QN AUTO: 29.6 PG (ref 26.5–33)
MCHC RBC AUTO-ENTMCNC: 31.6 G/DL (ref 31.5–36.5)
MCV RBC AUTO: 94 FL (ref 78–100)
PHOSPHATE SERPL-MCNC: 5.2 MG/DL (ref 2.5–4.5)
PLATELET # BLD AUTO: 331 10E3/UL (ref 150–450)
POTASSIUM SERPL-SCNC: 4 MMOL/L (ref 3.4–5.3)
RBC # BLD AUTO: 2.91 10E6/UL (ref 3.8–5.2)
VIT B12 SERPL-MCNC: 861 PG/ML (ref 232–1245)
VIT D+METAB SERPL-MCNC: 8 NG/ML (ref 20–50)
WBC # BLD AUTO: 4.7 10E3/UL (ref 4–11)

## 2024-05-16 PROCEDURE — 120N000001 HC R&B MED SURG/OB

## 2024-05-16 PROCEDURE — T1013 SIGN LANG/ORAL INTERPRETER: HCPCS | Mod: U3 | Performed by: INTERPRETER

## 2024-05-16 PROCEDURE — T1013 SIGN LANG/ORAL INTERPRETER: HCPCS | Mod: GT,TEL,95 | Performed by: INTERPRETER

## 2024-05-16 PROCEDURE — 250N000011 HC RX IP 250 OP 636: Performed by: INTERNAL MEDICINE

## 2024-05-16 PROCEDURE — C9113 INJ PANTOPRAZOLE SODIUM, VIA: HCPCS | Performed by: INTERNAL MEDICINE

## 2024-05-16 PROCEDURE — 250N000011 HC RX IP 250 OP 636: Performed by: COLON & RECTAL SURGERY

## 2024-05-16 PROCEDURE — 250N000009 HC RX 250: Mod: JZ | Performed by: FAMILY MEDICINE

## 2024-05-16 PROCEDURE — 82306 VITAMIN D 25 HYDROXY: CPT | Performed by: INTERNAL MEDICINE

## 2024-05-16 PROCEDURE — 84100 ASSAY OF PHOSPHORUS: CPT | Performed by: INTERNAL MEDICINE

## 2024-05-16 PROCEDURE — 84132 ASSAY OF SERUM POTASSIUM: CPT

## 2024-05-16 PROCEDURE — B4185 PARENTERAL SOL 10 GM LIPIDS: HCPCS | Mod: JZ | Performed by: FAMILY MEDICINE

## 2024-05-16 PROCEDURE — 99232 SBSQ HOSP IP/OBS MODERATE 35: CPT | Mod: GC

## 2024-05-16 PROCEDURE — 85027 COMPLETE CBC AUTOMATED: CPT | Performed by: INTERNAL MEDICINE

## 2024-05-16 PROCEDURE — 250N000013 HC RX MED GY IP 250 OP 250 PS 637

## 2024-05-16 PROCEDURE — 250N000009 HC RX 250: Performed by: STUDENT IN AN ORGANIZED HEALTH CARE EDUCATION/TRAINING PROGRAM

## 2024-05-16 PROCEDURE — 258N000003 HC RX IP 258 OP 636: Performed by: INTERNAL MEDICINE

## 2024-05-16 PROCEDURE — 83735 ASSAY OF MAGNESIUM: CPT | Performed by: INTERNAL MEDICINE

## 2024-05-16 PROCEDURE — 82607 VITAMIN B-12: CPT | Performed by: INTERNAL MEDICINE

## 2024-05-16 RX ADMIN — PIPERACILLIN AND TAZOBACTAM 3.38 G: 3; .375 INJECTION, POWDER, FOR SOLUTION INTRAVENOUS at 06:06

## 2024-05-16 RX ADMIN — FISH OIL 100 ML: 0.1 INJECTION, EMULSION INTRAVENOUS at 21:03

## 2024-05-16 RX ADMIN — ENOXAPARIN SODIUM 40 MG: 40 INJECTION SUBCUTANEOUS at 21:05

## 2024-05-16 RX ADMIN — MAGNESIUM SULFATE HEPTAHYDRATE: 500 INJECTION, SOLUTION INTRAMUSCULAR; INTRAVENOUS at 21:02

## 2024-05-16 RX ADMIN — PANTOPRAZOLE SODIUM 40 MG: 40 INJECTION, POWDER, FOR SOLUTION INTRAVENOUS at 10:15

## 2024-05-16 RX ADMIN — IRON SUCROSE 200 MG: 20 INJECTION, SOLUTION INTRAVENOUS at 10:15

## 2024-05-16 RX ADMIN — PIPERACILLIN AND TAZOBACTAM 3.38 G: 3; .375 INJECTION, POWDER, FOR SOLUTION INTRAVENOUS at 14:05

## 2024-05-16 RX ADMIN — Medication 5 MG: at 21:43

## 2024-05-16 RX ADMIN — FISH OIL 100 ML: 0.1 INJECTION, EMULSION INTRAVENOUS at 02:07

## 2024-05-16 RX ADMIN — PIPERACILLIN AND TAZOBACTAM 3.38 G: 3; .375 INJECTION, POWDER, FOR SOLUTION INTRAVENOUS at 21:05

## 2024-05-16 ASSESSMENT — ACTIVITIES OF DAILY LIVING (ADL)
ADLS_ACUITY_SCORE: 25
ADLS_ACUITY_SCORE: 24
ADLS_ACUITY_SCORE: 25
ADLS_ACUITY_SCORE: 24
ADLS_ACUITY_SCORE: 25
ADLS_ACUITY_SCORE: 24
ADLS_ACUITY_SCORE: 24
ADLS_ACUITY_SCORE: 25
ADLS_ACUITY_SCORE: 25
ADLS_ACUITY_SCORE: 24

## 2024-05-16 NOTE — PROGRESS NOTES
Care Management Follow Up    Length of Stay (days): 6    Expected Discharge Date: 05/19/2024     Concerns to be Addressed: discharge planning     Patient plan of care discussed at interdisciplinary rounds: Yes    Anticipated Discharge Disposition:  TBD     Anticipated Discharge Services:  NA  Anticipated Discharge DME:  [facility]    Patient/family educated on Medicare website which has current facility and service quality ratings:  NA  Education Provided on the Discharge Plan:  NA  Patient/Family in Agreement with the Plan:  NA    Referrals Placed by CM/SW:  [facility]  Private pay costs discussed: Not applicable    Additional Information:  Chart reviewed. Patient on TPN & IV abx.    Unknown discharge needs at this time.     Social HX: lives in an apartment with her  Dmitriy Camargo and 6 year old daughter. She is independent with ADLs and IADLs.     CM will continue to follow care progression and aide in discharge planning as needed.     Anuradha Salazar RN

## 2024-05-16 NOTE — PLAN OF CARE
Problem: Fall Injury Risk  Goal: Absence of Fall and Fall-Related Injury  Outcome: Progressing  Intervention: Identify and Manage Contributors  Recent Flowsheet Documentation  Taken 5/15/2024 1645 by Kevin Sow, RN  Medication Review/Management: medications reviewed  Intervention: Promote Injury-Free Environment  Recent Flowsheet Documentation  Taken 5/15/2024 1645 by Kevin Sow, RN  Safety Promotion/Fall Prevention:   activity supervised   assistive device/personal items within reach   clutter free environment maintained   increase visualization of patient   lighting adjusted   nonskid shoes/slippers when out of bed   patient and family education   room door open   room organization consistent   safety round/check completed     Problem: Sepsis/Septic Shock  Goal: Optimal Coping  Outcome: Progressing   Goal Outcome Evaluation:       VSS on RA. A&Ox4. Hmong  utilized for care. Denies pain. IV abx, IV remicade, TPN & IV fish oil given per order, infusing thru R 3x lumen PICC. Up SBA. Colostomy CDI, emptied per patient ritual. NPO + ice chips. Family visiting at bedside.

## 2024-05-16 NOTE — PROGRESS NOTES
Colon and Rectal Surgery  Daily Progress Note    Subjective  [facility] phone  used in communication today. Patient reports she feels about the same. She denies any pain while in bed. She denies nausea, but notes she is hungry. She is feeling drowsy this morning. She has not been up and walking much, only the bathroom. She has been emptying her colostomy bag bag herself.      Objective  Intake/Output last 24 hrs:    Intake/Output Summary (Last 24 hours) at 5/16/2024 1000  Last data filed at 5/15/2024 2227  Gross per 24 hour   Intake --   Output 0 ml   Net 0 ml     Temp:  [97  F (36.1  C)-98  F (36.7  C)] 98  F (36.7  C)  Pulse:  [54-58] 55  Resp:  [18-20] 18  BP: ()/(52-69) 92/52  SpO2:  [98 %] 98 %    Physical Exam:  General: awake, alert, lying in bed, in no acute distress  Head: normocephalic, atraumatic  Respiratory: non-labored breathing  Abdomen: soft, tender at site of injections, non-distended  Stoma: Pink, viable with scant light brown watery stool.   Skin: No rashes or lesions  Musculoskeletal: moves all four extremities equally  Psychological: alert and oriented, answers questions appropriately    Pertinent Labs  Lab Results: personally reviewed.  Lab Results   Component Value Date     05/15/2024     05/14/2024     05/13/2024    .0 04/24/2017    CO2 32 05/15/2024    CO2 30 05/14/2024    CO2 28 05/13/2024    CO2 25 06/23/2022    CO2 23 06/03/2022    CO2 22 06/02/2022    CO2 26.0 04/24/2017    BUN 11.6 05/15/2024    BUN 15.7 05/14/2024    BUN 22.3 05/13/2024    BUN 6 06/23/2022    BUN 3 06/03/2022    BUN 4 06/02/2022    BUN 7.0 04/24/2017     Lab Results   Component Value Date    WBC 4.7 05/16/2024    WBC 3.5 05/15/2024    WBC 3.0 05/14/2024    HGB 8.6 05/16/2024    HGB 8.2 05/15/2024    HGB 7.5 05/14/2024    HGB 10.8 01/10/2018    HGB 10.3 11/08/2017    HGB 9.8 09/18/2017    HCT 27.2 05/16/2024    HCT 26.8 05/15/2024    HCT 24.2 05/14/2024    HCT 35.9 01/10/2018    HCT  33.8 11/08/2017    HCT 31.4 07/31/2017    MCV 94 05/16/2024    MCV 93 05/15/2024    MCV 93 05/14/2024    MCV 83.1 01/10/2018    MCV 77.9 11/08/2017    MCV 86.3 07/31/2017     05/16/2024     05/15/2024     05/14/2024       Assessment/Plan: This is a 43 year old female with history of Crohn's disease complicated by colonic stricture s/p ex lap, resection of descending colon and colostomy creation by General Surgery in 2022 who now follows with [facility] for management of Crohn's disease currently on Humira and Azathioprine. Recently admitted 4/30-5/4 for Crohn's flare discharged with steroid taper, readmitted on 5/10 with worsening ileocolonic crohn's disease with fistula and new intramural abscess. Remains on IV Zosyn, TPN, and NPO. CT abd/pelvis w/ significant inflammation and slight improvement of abscess.      WBC 4.7 (3.5)  Hgb 8.6 (8.2)   Cr 0.54 (0.47)    - Clear liquid diet  - Broad spectrum abx  - Lovenox dvt ppx  - Continue TPN  - Wean steroids  - Began infliximab with GI yesterday, appreciate GI management of Chron's  - OOB/ambulate    Discussed with Dr. Miquel Sandra PA-C  [facility]  [phone]..............................main    Colorectal Surgery Staff:  I have seen and examined the patient. I agree with the above documentation and plan of the fellow/PA above with the following additions/chages:    Ms. Tejeda is continuing to feel better.  She has now tolerated clear liquids.  She is continuing to have nonbloody output from her colostomy.  Her vitals are normal.  Her abdomen is soft, nontender, and nondistended.  Her labs are reviewed.    A/P: 43F with Crohn's colitis, anemia, and severe protein calorie malnutrition.  -Doing well with clear liquids.  Tomorrow we will advance her to full liquids (gluten-free).   -If she continues on this current trajectory, we could potentially have her on a low fiber diet by Friday and wean her off of TPN and transition  her to oral antibiotics over the weekend for potential discharge as early as Sunday if she felt ready for it.  -In the meantime, it is extremely important that she gets up and ambulates as she has not been out of her room.  I discussed this with her using the [employer] .  -After her discharge, I will plan to see her in the office likely next week.  I will also be getting an outpatient CT scan and my office will set that up.  If her colitis resolves with the infliximab and she starts to look better, we could potentially end her antibiotics a little earlier than 8 weeks.    Total time spent: 23 minutes (greater than 50% of time is face-to-face and coordination of care)    Hugo Lafleur MD MBA  [facility]  Office: [phone]  5/16/2024 2:08 PM

## 2024-05-16 NOTE — PLAN OF CARE
Problem: Adult Inpatient Plan of Care  Goal: Absence of Hospital-Acquired Illness or Injury  Intervention: Identify and Manage Fall Risk  Recent Flowsheet Documentation  Taken 5/16/2024 0800 by Florence aWrd RN  Safety Promotion/Fall Prevention:   activity supervised   assistive device/personal items within reach   clutter free environment maintained   increase visualization of patient   lighting adjusted   nonskid shoes/slippers when out of bed   patient and family education   room door open   room organization consistent   safety round/check completed   Goal Outcome Evaluation:    No pain, no nausea.  Tolerates clear liquids.  Walking in the hallway.

## 2024-05-16 NOTE — PLAN OF CARE
"Patient calls appropriately and  used for cares. TPN and fish oil infusing in addition to antibiotics. Patient denies pain and states that, \"she feels about the same\".     Problem: Adult Inpatient Plan of Care  Goal: Plan of Care Review  Description: The Plan of Care Review/Shift note should be completed every shift.  The Outcome Evaluation is a brief statement about your assessment that the patient is improving, declining, or no change.  This information will be displayed automatically on your shift  note.  Outcome: Progressing     Problem: Adult Inpatient Plan of Care  Goal: Absence of Hospital-Acquired Illness or Injury  Intervention: Identify and Manage Fall Risk  Recent Flowsheet Documentation  Taken 5/16/2024 0100 by Jena Robles RN  Safety Promotion/Fall Prevention:   activity supervised   assistive device/personal items within reach   clutter free environment maintained   nonskid shoes/slippers when out of bed   safety round/check completed   room organization consistent     Problem: Fall Injury Risk  Goal: Absence of Fall and Fall-Related Injury  Outcome: Progressing  Intervention: Identify and Manage Contributors  Recent Flowsheet Documentation  Taken 5/16/2024 0100 by Jena Robles RN  Medication Review/Management: medications reviewed  Intervention: Promote Injury-Free Environment  Recent Flowsheet Documentation  Taken 5/16/2024 0100 by Jena Robles RN  Safety Promotion/Fall Prevention:   activity supervised   assistive device/personal items within reach   clutter free environment maintained   nonskid shoes/slippers when out of bed   safety round/check completed   room organization consistent     "

## 2024-05-16 NOTE — PROGRESS NOTES
[facility] DIGESTIVE HEALTH PROGRESS NOTE      Subjective:                 available.  A little better.  Denies any pain, nausea, or vomiting. Tolerating clear diet.  Minimal colostomy output that is liquid and not black or bloody.      Admitted 5/9  with sepsis requiring ICU and pressors. CT demonstrating abscess along colonic wall .  IR avoided drainage procedure due to concern about creating fistula  and difficulty of procedure.  Surgery following and consulted colorectal surgery 5/14.    Started TPN 5/13.       Follow up CT 5/15/2024: Decreased  pericolonic abscess along deep wall of proximal transverse colon now has internal gas attenuation suggesting continuation with bowel wall lumen (fistulizing).  Decreased in size 5.5 x 1.2 cm thickness.  Unchanged long segment continuous wall thickening of ileum and colon particularly transverse colon up to colostomy.  Duffy's pouch and lower left quadrant.  Development of mild four-quadrant abdominal ascites and small bilateral effusions.     Current medications (incomplete list)  Infliximab (Remicade) 10 mg/kg 1st dose 5/15/2024  Zosyn  TPN started 5/13  Venofer IV iron x 3 started 5/14  Heparin prophylaxis   Dilaudid as needed  Protonix    Previous medications (incomplete list)  Hydrocortisone (Cortef) 25 mg Q 12 hr (Discontinue 5/14)  Prednisone 30 mg (Discontinued 5/14)  Azathioprine 100 mg (Held on admit)  Humira 40 mg every 2 weeks.  Last dose 5/8/24 (Discontinued)     Assessment:       43-year-old with celiac disease and severe small bowel and stricturing & perforating colonic Crohn's with resection of descending and colostomy since November 2022.  Was on dual therapy Humira and azathioprine over the past 7 months but has progression of disease.  Admitted with sepsis due to fistula and abscess off transverse colon.  Albumin 1.9.  Hemoglobin 7.5.  Currently on Zosyn, TPN, IV iron, and started infliximab 10 mg/kg on 5/15     Considered  a failure of Humira and  azathioprine and started infliximab (Remicade).  Stopped steroids.  Would want high infliximab trough level > 20 ug/dL.   Monitor closely with addition of infliximab and repeat imaging in 5 to 7 days.   Appreciate colorectal surgery involvement.     Plan:     - Diet per colorectal surgery.  Make sure GLUTEN FREE  - Would give antibiotics for ~ 2 months.  After Zosyn switched to Augmentin  - Started infliximab 5/15 and will need to complete induction in 2 weeks and 6 weeks  - I notified [facility] financial dept. that she will need outpatient infliximab at our infusion center   - Check trough level in about a week.  Want level above 20 ug/dL  - Complete IV iron  - Continue TPN  - Plan on repeat CT scan in 5-7 days  - Dr Lafleur colorectal surgery following closely  - Restart azathioprine at discharge  -Check nutritional parameters including prealbumin, B12, zinc and vitamin D        Objective:                Vital signs in last 24 hrs;  Temp:  [97  F (36.1  C)-98  F (36.7  C)] 98  F (36.7  C)  Pulse:  [54-58] 55  Resp:  [18-20] 18  BP: ()/(52-69) 92/52  SpO2:  [98 %] 98 %    Physical Exam:   General: Comfortable appearing. Awake.   Cardiovascular: Regular rate. No edema  Chest: Non-labored breathing. Symmetric chest rise.   Abdomen: soft, non-tender, non-distended  Neurologic: Alert, no focal defects.     Current Labs:  CBC RESULTS:   Recent Labs   Lab 05/16/24  0558 05/15/24  0627 05/14/24  0608   WBC 4.7 3.5* 3.0*   RBC 2.91* 2.87* 2.60*   HGB 8.6* 8.2* 7.5*   HCT 27.2* 26.8* 24.2*   MCV 94 93 93   MCH 29.6 28.6 28.8   MCHC 31.6 30.6* 31.0*   RDW 15.6* 15.5* 15.7*    314 261        CMP Results:   Recent Labs   Lab 05/16/24  0813 05/16/24  0558 05/15/24  1226 05/15/24  0627 05/14/24  0620 05/14/24  0608 05/13/24  1156 05/13/24  0614 05/12/24  0646 05/12/24  0429 05/10/24  1425 05/10/24  0740   NA  --   --   --  142  --  140  --  143  --  143   < > 135   POTASSIUM  --  4.0  --  4.0  --  3.6  --  4.4  --  4.1   <  > 3.0*   CHLORIDE  --   --   --  103  --  106  --  110*  --  112*   < > 98   CO2  --   --   --  32*  --  30*  --  28  --  24   < > 24   ANIONGAP  --   --   --  7  --  4*  --  5*  --  7   < > 13   *  --  139* 96   < > 139*   < > 151*   < > 162*   < > 125*   BUN  --   --   --  11.6  --  15.7  --  22.3*  --  17.2   < > 8.8   CR  --   --   --  0.54  --  0.47*  --  0.49*  --  0.57   < > 0.68   BILITOTAL  --   --   --   --   --   --   --  0.2  --  <0.2  --  0.4   ALKPHOS  --   --   --   --   --   --   --  126  --  105  --  193*   ALT  --   --   --   --   --   --   --  10  --  11  --  23   AST  --   --   --   --   --   --   --  12  --  6  --  17    < > = values in this interval not displayed.        INR Results:   Lab Results   Component Value Date    INR 1.08 05/13/2024    INR 1.12 05/12/2024    INR 1.04 05/03/2024       Relevant Imaging:  CT Abdomen Pelvis w Contrast    Result Date: 5/15/2024  EXAM: CT ABDOMEN PELVIS W CONTRAST LOCATION: [facility] DATE: 5/15/2024 INDICATION: Ileocolonic Crohn's with intramural abscess COMPARISON: CT of the abdomen and pelvis with contrast 5/10/2024 TECHNIQUE: CT scan of the abdomen and pelvis was performed following injection of IV contrast. Multiplanar reformats were obtained. Dose reduction techniques were used. CONTRAST: IsoVue 370 52mL FINDINGS: LOWER CHEST: Small bilateral pleural effusions have developed which layer dependently, right slightly larger than left. Associated passive atelectasis of the posterior lower lobes. HEPATOBILIARY: Nondistended gallbladder. No gallbladder wall thickening. No calcified gallstones. No bile duct enlargement. There is a benign calcification in the right lateral aspect of the liver. No new liver lesions. Smooth liver capsule. Normal contrast opacification of the portal veins. PANCREAS: Normal. SPLEEN: Normal. ADRENAL GLANDS: Normal. KIDNEYS/BLADDER: Kidneys are normal in size and have symmetric parenchymal  enhancement with normal cortex thickness. No urinary tract calculi or obstruction. Moderately distended urinary bladder has a smooth wall of uniform thickness. BOWEL: The stomach is mostly decompressed. Decompressed duodenum and jejunum. Segments of distal small bowel in the anterior lower abdomen have wall thickening consistent with acute enteritis but are less distended compared with 5 days earlier. Status post distal colon resection with Duffy's pouch staple line in the upper left pelvis and left lower quadrant colostomy. Diffuse wall thickening and mucosal hyperenhancement of the colon which is most extensive in the transverse colon, also similar to 5 days earlier. A oblong, enhancing fluid collection which follows the posterior wall of the proximal transverse colon has decreased in size measuring 5.5 cm in length and 12 mm in thickness, with decrease in central fluid attenuation but development of internal gas which suggests communication with the bowel lumen. Mild four-quadrant abdominal ascites. LYMPH NODES: Normal. VASCULATURE: Normal. PELVIC ORGANS: Uterus is normal in size. Normal ovaries. Mild, smooth thickening along the mesorectal fascia in the presacral space. MUSCULOSKELETAL: Mild bone demineralization. Tiny lower and lumbar degenerative osteophytes. No aggressive or destructive bone lesions.     IMPRESSION: 1.  Previous segmental colon resection with Duffy's pouch and left lower quadrant colostomy. 2.  Unchanged long segment contiguous wall thickening of the ileum and colon, in particular the transverse colon leading up to the colostomy consistent with active inflammatory bowel disease. 3.  Decreased size of the paracolonic abscess along the deep wall of the proximal transverse colon which now has internal gas attenuation and suggests continuity with the bowel lumen. 4.  Development of mild four-quadrant abdominal ascites and small bilateral pleural effusions. 5.  New distention of the urinary  bladder.    XR Abdomen Port 1 View    Result Date: 5/14/2024  EXAM: XR ABDOMEN PORT 1 VIEW LOCATION: [facility] DATE: 5/14/2024 INDICATION: Crohn's colitis COMPARISON: CT 5/10/2024     IMPRESSION: No distended air-filled loops of small bowel. A staple line is seen over the left lower quadrant. Otherwise the bowel is not well assessed.               Luis Miguel Traore M.D.  Thank you for the opportunity to participate in the care of this patient.   Please feel free to call me with any questions or concerns.  Phone number [phone].

## 2024-05-16 NOTE — PROGRESS NOTES
[facility]    Progress Note - Hospitalist Service       Date of Admission:  5/10/2024    Assessment & Plan     Dain Tejeda is a 43 year old female admitted on 5/10/2024. She has a history of Chron's disease complicated by colonic stricture s/p partial colectomy (11/2022), Celiac disease, SIOMARA admitted for septic shock from intraabdominal abscess with fistula formation.      Crohn's disease, complicated by abscess, developing fistula and ileitis   S/p partial colectomy 11/2022, has LLQ colostomy   Sepsis 2/2 intra-abdominal infection - resolved  Diagnosed in late 2022, complicated by stricture s/p partial colectomy with ostomy. Follows with [facility], on Humira and azathioprine. Was recently admitted on our service 4/30-5/4 after syncopal episode thought to be 2/2 Chron's flare after CT showed colitis but negative Cdiff and enteric panel. GI consulted at that time and recommended prednisone taper over 4 weeks, unclear whether patient was taking steroid as prescribed since discharge. Presented to ED on 5/9/24 with new severe abdominal pain, initially thought to be UTI and sent home on Bactrim, returned on 5/10 after developing fevers, nausea and vomiting. Developed hypotension in the ED despite fluid resuscitation and was transferred to the ICU for pressor support. CT showing marked progression of Chron's with colonic obstruction, 10 cm intramural abscess, developing fistula and significant ileitis. Septic shock most likely secondary to colonic abscess. Patient initiated on IV abx in ED, pain improved after receiving IV pain meds. Transferred out of ICU on 5/12 after being off pressors for 24 hours. IR consulted, noted that bowel access would be challenging and recommended repeat imaging after antibiotics. No clinical signs of abscess progression with mild diffuse tenderness on abdominal exam. Repeat CT showed decreased size of abscess along deep wall of proximal transverse colon which now has  internal gas attenuation and suggests continuity with bowel lumen. No surgical intervention necessary at this time, will treat Chron's disease medically with infliximab with repeat CT 5 days after starting to re-evaluate abscess.   - Colorectal consulted, appreciate recommendations    - GI consulted  - Clear liquid diet. RD consulted, TPN started 5/11.   - IV Zosyn. Plan for antibiotics for two months (switch from Zosyn to Augmentin at discharge).   - Stopped steroids given fistulating disease and abscess  - IV Iron  - Started Infliximab on 5/15  - Repeat CT 5-7 days after starting inflixmab   - Stop PTA azathioprine with plan to restart at discharge.   - PRN Tylenol and IV dilaudid for pain  - Zofran PRN     Normocytic anemia  Leukopenia   History of SIOMARA with recent iron infusions through [facility]. Hgb 9.8 on admission, normal differential at that time. Colostomy output appears non-bloody. Hgb initially tending down, most recently 8.2, MCV 93. Continues to have no signs of bleeding. Iron studies in Feb largely normal with mildly low TIBC and iron saturation. May be more consistent with anemia of chronic disease. Worsening leukopenia on 5/14, most recently 3.5. Reticulocytes 1.9, indicating underproduction. Repeat CBC with differential normal. Blood smear showed mild normochromic-normocytic anemia, mild leukopenia without dysplastic or megaloblastic features. Most likely a combination of iron deficient anemia and anemia of chronic disease. Leukopenia most likely due to infection/inflammation. WBC improved to 4.7 on 5/16.   - IV iron   - Daily CBC    Sinus bradycardia  Patient noted to be bradycardic between 40-60's, asymptomatic. EKG x2 shows sinus bradycardia.      Hypokalemia  3.0 on admission, supplementation given in ED.   - Daily BMP  - Replacement per RN protocol     Disorganized behaviors  Demonstrated odd behaviors during recent admission, treated with Seroquel 12.5 mg at bedtime. Psych consult at that time, did  not recommend any ongoing meds upon discharge. Patient calm, alert and cooperative this admission so far.   - Delirium protocol, hold on meds for now     Gamma gap  Chronic. Tprotein 7.2, albumin 2.7. Most certainly 2/2 inflammatory disease.      Incidental finding: benign pelvic simple cyst  >5 to <=7 cm. Follow up pelvic US in 6 months recommended.         Diet: parenteral nutrition - ADULT compounded formula  parenteral nutrition - ADULT compounded formula  Clear Liquid Diet    DVT Prophylaxis: Enoxaparin (Lovenox) SQ  Chambers Catheter: Not present  Fluids: mIVF  Lines: PRESENT      PICC 05/10/24 Triple Lumen Right Basilic Vasopressor-Site Assessment: WDL      Cardiac Monitoring: None  Code Status: Full Code      Clinically Significant Risk Factors              # Hypoalbuminemia: Lowest albumin = 1.9 g/dL at 5/12/2024  4:29 AM, will monitor as appropriate                        The patient's care was discussed with the Resident Physician, Dr. Sandhu and Attending Physician, Dr. Bernstein .    Nicolle Schrader, MS4  Hospitalist Service  [facility]    I was present with the medical student who participated in the service and in the documentation of this note. I have verified the history and personally performed the physical exam and medical decision making, and have verified the content of the note, which accurately reflects my assessment of the patient and the plan of care.     Phoebe Sandhu MD  [facility] Medicine Residency, PGY-3  ____________________________________    Interval History     No acute events overnight. Feels about the same as yesterday, continues to feel fatigued, hungry. Discussed importance of getting her nutrition through IV to let her bowels rest and heal. No pain. No bloody output in ostomy.     Physical Exam   Vital Signs: Temp: 98  F (36.7  C) Temp src: Oral BP: 92/52 Pulse: 55   Resp: 18 SpO2: 98 % O2 Device: None (Room air)    Weight: 105 lbs 2.55  oz    General:  No acute distress.   Psych:  Alert. Able to articulate logical thoughts. Calm.   HEENT:  Eyes grossly normal to inspection. Mucous membranes moist.   Cardiovascular:  Extremities appear well perfused   Respiratory:  Lungs clear to auscultation bilaterally. No wheezing or crackles.   Musculoskeletal:  No gross extremity deformities.  GI:  Soft. Non-distended. Diffuse mild tenderness to palpation.     Data     I have personally reviewed the following data over the past 24 hrs:    4.7  \   8.6 (L)   / 331     N/A N/A N/A /  122 (H)   4.0 N/A N/A \       Imaging results reviewed over the past 24 hrs:   No results found for this or any previous visit (from the past 24 hour(s)).

## 2024-05-16 NOTE — PROGRESS NOTES
Nutrition Therapy  Parenteral Nutrition follow-up       Dietitian to Pharmacy    No change in TPN today  Rate of TPN: 60 ml/hr continuous  Grams Dextrose: 200  Grams Protein: 60    Lipids: Omegaven 20 g/day (2 x 100 ml bottle)         Current Nutrition Intake:  Diet: NPO except medications, ice chips    Custom TPN:   60 g AA, 200 g DEX at 60 ml/hr continuous.  200 ml omegaven daily, infuse over 12 hrs.    Above to provides 1144 kcals (24 kcal/kg), 60 g protein (1.2 g/kg), 200 g carb, 20 g lipid, 1440 ml fluid daily.    Above meets estimated needs.    Food allergies:  gluten (celiac), soy.     TPN start 5/11/12 due to intraabdominal abscess with fistula formation.    New Findings:   Per chart, CT 5/15/2024: Decreased size pericolonic abscess along deep wall of proximal transverse colon now has internal gas attenuation suggesting continuation with bowel wall lumen (fistulizing).  Decreased in size 5.5 x 1.2 cm thickness.  Unchanged long segment continuous wall thickening of ileum and colon particularly transverse colon up to colostomy.  Duffy's pouch and lower left quadrant.  Development of mild four-quadrant abdominal ascites and small bilateral effusions   Per GI:  - Complete IV iron  - Continue TPN  - Continue broad-spectrum antibiotics  - Will start infliximab  - Taper off IV steroids over 1-2 days  - Stop azathioprine which has been held  - Likely in the hospital over the next week  - Plan on repeat CT scan in 5-7 days  - Dr Lafleur colorectal surgery following closely    Weight Trends  Admission wt: 44 kg (97 lb) 5/10/24  Current wt:   Date/Time Weight Weight Method   05/13/24 1742 47.7 kg (105 lb 2.6 oz) Bed scale   05/10/24 1626 44 kg (97 lb) Bed scale   05/10/24 0709 49 kg (108 lb) --     Dosing Weight: 44 kg     ESTIMATED NUTRITION NEEDS  Energy Needs: 4572-9038 kcals/day (25 - 30 kcals/kg)  Justification: Repletion  Protein Needs: 53-66 grams protein/day (1.2 - 1.5 grams of pro/kg)  Justification: Increased  needs  Fluid Needs: 6596-8801 mL/day (1 mL/kcal)   Justification: Maintenance    GI:  Colostomy liquid/loose OP    Labs:   Labs reviewed  Phos 5.2  -142 mg/dl past 24 hours    Medications:   Reviewed by dietitian   Iv solu-CORTEF, IV -remicade today, iv protonix daily, iv abx, iv iron sucrose    MALNUTRITION 5/13/24  % Intake: Decreased intake does not meet criteria  % Weight Loss: > 2% in 1 week (severe malnutrition).  3.9% < 2wks.   Subcutaneous Fat Loss: None observed  Muscle Loss: None observed  Fluid Accumulation/Edema: None noted    Malnutrition Diagnosis: Patient does not meet two of the established criteria necessary for diagnosing malnutrition but is at risk for malnutrition    Nutrition Diagnosis  Altered GI function related to acute illness as evidenced by intraabdominal abscess with fistula formation, need for TPN   Evaluation: continues    Goals   Tolerate TPN-met  Meet estimated nutrition needs-progressing  Electrolytes WNL-phos is elevated today  Blood sugar < 180.- met    INTERVENTIONS  Implementation  Will ask Nursing to weigh - daily weight orders already in place    Monitoring/Evaluation  Progress toward goals will be monitored and evaluated per protocol.

## 2024-05-16 NOTE — PHARMACY-CONSULT NOTE
"Pharmacy Note: Parenteral Nutrition (PN) Management    Pharmacist consulted to dose PN for Dain Tejeda, a 43 year old female by Dr. Robles.    Subjective:    The patient is a new PN start.    The patient was started on PN in the hospital on 5/11/2024.    Indication for PN therapy: bowel obstruction    Inadequate nutrition anticipated for > 7 days.     Enteral nutrition contraindicated due to: small bowel obstruction.    Pertinent diseases and other special considerations  soy & gluten allergies    Social History     Tobacco Use    Smoking status: Never    Smokeless tobacco: Never   Substance Use Topics    Alcohol use: No    Drug use: No     Objective:    Ht Readings from Last 1 Encounters:   05/10/24 1.575 m (5' 2\")     Wt Readings from Last 1 Encounters:   05/13/24 47.7 kg (105 lb 2.6 oz)       Body mass index is 19.23 kg/m .    Patient Vitals for the past 96 hrs:   Weight   05/13/24 1742 47.7 kg (105 lb 2.6 oz)       Labs:  Last 3 days:  Recent Labs     05/14/24  0608 05/15/24  0627 05/16/24  0558    142  --    POTASSIUM 3.6 4.0 4.0   CHLORIDE 106 103  --    CO2 30* 32*  --    BUN 15.7 11.6  --    CR 0.47* 0.54  --    ALEX 7.8* 8.5*  --    MAG 2.2 2.3 2.0   PHOS 2.6 3.6 5.2*   HGB 7.5* 8.2* 8.6*   HCT 24.2* 26.8* 27.2*    314 331       Glucose (past 48 hours):   Recent Labs     05/14/24  1837 05/15/24  0202 05/15/24  0627 05/15/24  1226 05/16/24  0813   * 142* 96 139* 122*       Intake/Output (last 24 hours): No intake/output data recorded.    Estimated CrCl: Estimated Creatinine Clearance: 103.2 mL/min (based on SCr of 0.54 mg/dL).    Assessment:    Continue patient on PN therapy as a continuous central therapy.     Given the patient's current condition/oral intake, PN is still indicated.    Lab results reviewed: K and Mg normal, Phos elevated at 5.2 today, plan to not include any phos today and trend lab    Plan:  Rate of PN: 60 mL/hr   Formula:   Amino Acids 60 grams  Dextrose 200 grams  Sodium 50 " mEq/day  Potassium 60 mEq/day  Calcium 5 mEq/day  Magnesium 6 mEq/day  Phosphorus 0 mMol/day, no Phos in TPN today and follow up Phos tomorrow   Chloride: Acetate Ratio 1:3  Standard Multivitamins w/Vitamin K  Trace Elements  Fat Emulsion: Omegaven (due to soy allergy) 20 g daily (2 bottles). CMP tomorrow   Check TPN panel labs tomorrow.  Pharmacist will continue to follow the patient's lab results, clinical status and blood glucose results and make adjustments as appropriate.    Thank you for the consult.  JACOB ROSEN [facility]  5/16/2024 9:49 AM

## 2024-05-17 ENCOUNTER — APPOINTMENT (OUTPATIENT)
Dept: INTERPRETER SERVICES | Facility: CLINIC | Age: 44
End: 2024-05-17
Payer: COMMERCIAL

## 2024-05-17 LAB
ANION GAP SERPL CALCULATED.3IONS-SCNC: 8 MMOL/L (ref 7–15)
BUN SERPL-MCNC: 11.8 MG/DL (ref 6–20)
CALCIUM SERPL-MCNC: 8.8 MG/DL (ref 8.6–10)
CHLORIDE SERPL-SCNC: 102 MMOL/L (ref 98–107)
CREAT SERPL-MCNC: 0.62 MG/DL (ref 0.51–0.95)
DEPRECATED HCO3 PLAS-SCNC: 31 MMOL/L (ref 22–29)
EGFRCR SERPLBLD CKD-EPI 2021: >90 ML/MIN/1.73M2
ERYTHROCYTE [DISTWIDTH] IN BLOOD BY AUTOMATED COUNT: 16.8 % (ref 10–15)
GLUCOSE BLDC GLUCOMTR-MCNC: 123 MG/DL (ref 70–99)
GLUCOSE BLDC GLUCOMTR-MCNC: 136 MG/DL (ref 70–99)
GLUCOSE BLDC GLUCOMTR-MCNC: 147 MG/DL (ref 70–99)
GLUCOSE SERPL-MCNC: 126 MG/DL (ref 70–99)
HCT VFR BLD AUTO: 27.7 % (ref 35–47)
HGB BLD-MCNC: 8.6 G/DL (ref 11.7–15.7)
MAGNESIUM SERPL-MCNC: 2.3 MG/DL (ref 1.7–2.3)
MCH RBC QN AUTO: 31.5 PG (ref 26.5–33)
MCHC RBC AUTO-ENTMCNC: 31 G/DL (ref 31.5–36.5)
MCV RBC AUTO: 102 FL (ref 78–100)
PHOSPHATE SERPL-MCNC: 3.8 MG/DL (ref 2.5–4.5)
PLATELET # BLD AUTO: 287 10E3/UL (ref 150–450)
POTASSIUM SERPL-SCNC: 4 MMOL/L (ref 3.4–5.3)
RBC # BLD AUTO: 2.73 10E6/UL (ref 3.8–5.2)
SODIUM SERPL-SCNC: 141 MMOL/L (ref 135–145)
TRIGL SERPL-MCNC: 177 MG/DL
WBC # BLD AUTO: 3.7 10E3/UL (ref 4–11)

## 2024-05-17 PROCEDURE — 250N000011 HC RX IP 250 OP 636: Performed by: COLON & RECTAL SURGERY

## 2024-05-17 PROCEDURE — 250N000011 HC RX IP 250 OP 636: Performed by: INTERNAL MEDICINE

## 2024-05-17 PROCEDURE — 84100 ASSAY OF PHOSPHORUS: CPT | Performed by: INTERNAL MEDICINE

## 2024-05-17 PROCEDURE — 250N000009 HC RX 250: Mod: JZ | Performed by: FAMILY MEDICINE

## 2024-05-17 PROCEDURE — 85027 COMPLETE CBC AUTOMATED: CPT | Performed by: INTERNAL MEDICINE

## 2024-05-17 PROCEDURE — 82180 ASSAY OF ASCORBIC ACID: CPT | Performed by: INTERNAL MEDICINE

## 2024-05-17 PROCEDURE — 80048 BASIC METABOLIC PNL TOTAL CA: CPT | Performed by: INTERNAL MEDICINE

## 2024-05-17 PROCEDURE — 99232 SBSQ HOSP IP/OBS MODERATE 35: CPT | Mod: GC

## 2024-05-17 PROCEDURE — 250N000009 HC RX 250: Performed by: STUDENT IN AN ORGANIZED HEALTH CARE EDUCATION/TRAINING PROGRAM

## 2024-05-17 PROCEDURE — B4185 PARENTERAL SOL 10 GM LIPIDS: HCPCS | Mod: JZ | Performed by: FAMILY MEDICINE

## 2024-05-17 PROCEDURE — 84630 ASSAY OF ZINC: CPT | Performed by: INTERNAL MEDICINE

## 2024-05-17 PROCEDURE — 84478 ASSAY OF TRIGLYCERIDES: CPT | Performed by: STUDENT IN AN ORGANIZED HEALTH CARE EDUCATION/TRAINING PROGRAM

## 2024-05-17 PROCEDURE — 120N000001 HC R&B MED SURG/OB

## 2024-05-17 PROCEDURE — 83735 ASSAY OF MAGNESIUM: CPT | Performed by: INTERNAL MEDICINE

## 2024-05-17 PROCEDURE — C9113 INJ PANTOPRAZOLE SODIUM, VIA: HCPCS | Performed by: INTERNAL MEDICINE

## 2024-05-17 PROCEDURE — 84134 ASSAY OF PREALBUMIN: CPT | Performed by: INTERNAL MEDICINE

## 2024-05-17 RX ADMIN — PIPERACILLIN AND TAZOBACTAM 3.38 G: 3; .375 INJECTION, POWDER, FOR SOLUTION INTRAVENOUS at 04:58

## 2024-05-17 RX ADMIN — MAGNESIUM SULFATE HEPTAHYDRATE: 500 INJECTION, SOLUTION INTRAMUSCULAR; INTRAVENOUS at 20:11

## 2024-05-17 RX ADMIN — PANTOPRAZOLE SODIUM 40 MG: 40 INJECTION, POWDER, FOR SOLUTION INTRAVENOUS at 08:37

## 2024-05-17 RX ADMIN — ENOXAPARIN SODIUM 40 MG: 40 INJECTION SUBCUTANEOUS at 20:17

## 2024-05-17 RX ADMIN — PIPERACILLIN AND TAZOBACTAM 3.38 G: 3; .375 INJECTION, POWDER, FOR SOLUTION INTRAVENOUS at 20:17

## 2024-05-17 RX ADMIN — FISH OIL 100 ML: 0.1 INJECTION, EMULSION INTRAVENOUS at 01:56

## 2024-05-17 RX ADMIN — PIPERACILLIN AND TAZOBACTAM 3.38 G: 3; .375 INJECTION, POWDER, FOR SOLUTION INTRAVENOUS at 14:19

## 2024-05-17 ASSESSMENT — ACTIVITIES OF DAILY LIVING (ADL)
ADLS_ACUITY_SCORE: 22
ADLS_ACUITY_SCORE: 24
ADLS_ACUITY_SCORE: 22
ADLS_ACUITY_SCORE: 22
ADLS_ACUITY_SCORE: 24
ADLS_ACUITY_SCORE: 22
ADLS_ACUITY_SCORE: 24
ADLS_ACUITY_SCORE: 22
ADLS_ACUITY_SCORE: 24
ADLS_ACUITY_SCORE: 22
ADLS_ACUITY_SCORE: 24
ADLS_ACUITY_SCORE: 22
ADLS_ACUITY_SCORE: 24
ADLS_ACUITY_SCORE: 22
ADLS_ACUITY_SCORE: 24

## 2024-05-17 NOTE — PLAN OF CARE
Goal Outcome Evaluation:       Patient denies pain this shift.  Tolerating full liquid diet.  Continue TPN and IV antibiotics.  SBA, call light within reach.

## 2024-05-17 NOTE — PROGRESS NOTES
[facility]    Progress Note - Hospitalist Service       Date of Admission:  5/10/2024    Assessment & Plan     Dain Tejeda is a 43 year old female th history of Crohn's disease complicated by colonic stricture s/p partial colectomy (11/2022), Celiac disease, SIOMARA admitted for septic shock from intraabdominal abscess with developing fistula.      Crohn's disease, complicated by abscess, developing fistula and ileitis   S/p partial colectomy 11/2022, has LLQ colostomy   Sepsis 2/2 intra-abdominal infection - resolved  Diagnosed in late 2022, complicated by stricture s/p partial colectomy with ostomy. Follows with [facility], on Humira and azathioprine. Was recently admitted on our service 4/30-5/4 after syncopal episode thought to be 2/2 Crohn's flare after CT showed colitis but negative Cdiff and enteric panel. GI consulted at that time and recommended prednisone taper over 4 weeks, unclear whether patient was taking steroid as prescribed since discharge. Presented to ED on 5/10 after developing fevers, nausea and vomiting. Developed hypotension in the ED despite fluid resuscitation and was transferred to the ICU for pressor support. CT showing marked progression of Crohn's with colonic obstruction, 10 cm intramural abscess, developing fistula and significant ileitis. Septic shock most likely secondary to colonic abscess. Transferred out of ICU on 5/12 after being off pressors for 24 hours. Repeat CT showed decreased size of abscess along deep wall of proximal transverse colon which now has internal gas attenuation and suggests continuity with bowel lumen. No surgical intervention necessary at this time, will treat Crohn's disease medically with infliximab with repeat CT 5 days after starting to re-evaluate abscess.   - Colorectal consulted, appreciate recommendations     - Continue clear liquids, plan to advance to full liquids tomorrow   - Discussed importance of ambulation   - If diet progresses  well and TPN weaned successfully, could potentially discharge as early as Sunday   - Follow-up in colorectal clinic with repeat CT  - GI consulted  - TPN started 5/11.   - IV Zosyn. Plan for antibiotics for two months (switch from Zosyn to Augmentin at discharge).   - Stopped steroids given fistulating disease and abscess  - IV Iron  - Started Infliximab on 5/15  - Repeat CT 5-7 days after starting inflixmab   - Resume PTA azathioprine at discharge  - Start vitamin D 64837 units twice weekly  - PRN Tylenol and IV dilaudid for pain  - Zofran PRN     Normocytic anemia  Leukopenia   History of SIOMARA with recent iron infusions through [facility]. Hgb 9.8 on admission, normal differential at that time. Colostomy output appears non-bloody. Hgb initially tending down, most recently 8.2, MCV 93. Continues to have no signs of bleeding. Iron studies in Feb largely normal with mildly low TIBC and iron saturation. May be more consistent with anemia of chronic disease. Worsening leukopenia on 5/14, most recently 3.5. Reticulocytes 1.9, indicating underproduction. Repeat CBC with differential normal. Blood smear showed mild normochromic-normocytic anemia, mild leukopenia without dysplastic or megaloblastic features. Most likely a combination of iron deficient anemia and anemia of chronic disease. Leukopenia most likely due to infection/inflammation.   - IV iron   - Daily CBC    Sinus bradycardia  Patient noted to be bradycardic between 40-60's, asymptomatic. EKG x2 shows sinus bradycardia.      Hypokalemia  3.0 on admission, supplementation given in ED.   - Daily BMP  - Replacement per RN protocol     Disorganized behaviors  Demonstrated odd behaviors during recent admission, treated with Seroquel 12.5 mg at bedtime. Psych consult at that time, did not recommend any ongoing meds upon discharge. Patient calm, alert and cooperative this admission so far.   - Delirium protocol, hold on meds for now     Gamma gap  Chronic. Tprotein 7.2,  albumin 2.7. Most certainly 2/2 inflammatory disease.      Incidental finding: benign pelvic simple cyst  >5 to <=7 cm. Follow up pelvic US in 6 months recommended.         Diet: parenteral nutrition - ADULT compounded formula  parenteral nutrition - ADULT compounded formula  Full Liquid Diet    DVT Prophylaxis: Enoxaparin (Lovenox) SQ  Chambers Catheter: Not present  Fluids: mIVF  Lines: PRESENT      PICC 05/10/24 Triple Lumen Right Basilic Vasopressor-Site Assessment: WDL      Cardiac Monitoring: None  Code Status: Full Code      Clinically Significant Risk Factors              # Hypoalbuminemia: Lowest albumin = 1.9 g/dL at 5/12/2024  4:29 AM, will monitor as appropriate                        The patient's care was discussed with the Resident Physician, Dr. Sandhu and Attending Physician, Dr. Cristina .    Phoebe Snadhu MD  Hospitalist Service  [facility]  ____________________________________    Interval History     No acute events overnight. Feels about the same as yesterday. Currently denies abdominal pain, nausea/vomiting. She is looking forward to being able to eat, discussed that she needs to do well with clear liquids first before she would be able to broaden.     Physical Exam   Vital Signs: Temp: 98.2  F (36.8  C) Temp src: Oral BP: 90/53 Pulse: 70   Resp: 16 SpO2: 99 % O2 Device: None (Room air)    Weight: 105 lbs 2.55 oz    General:  No acute distress.   Psych:  Alert. Able to articulate logical thoughts. Calm.   HEENT:  Eyes grossly normal to inspection. Mucous membranes moist.   Cardiovascular:  Regular rate and rhythm.   Respiratory:  Lungs clear to auscultation bilaterally. No wheezing or crackles.   Musculoskeletal:  No gross extremity deformities.  GI:  Soft. Non-distended. Diffuse mild tenderness to palpation.     Data     I have personally reviewed the following data over the past 24 hrs:    3.7 (L)  \   8.6 (L)   / 287     141 102 11.8 /  136 (H)   4.0 31 (H) 0.62 \        Imaging results reviewed over the past 24 hrs:   No results found for this or any previous visit (from the past 24 hour(s)).

## 2024-05-17 NOTE — PHARMACY-CONSULT NOTE
"Pharmacy Note: Parenteral Nutrition (PN) Management    Pharmacist consulted to dose PN for Dain Tejeda, a 43 year old female by Dr. Robles.    Subjective:    The patient is a new PN start.    The patient was started on PN in the hospital on 5/11/24.    Indication for PN therapy: bowel obstruction    Inadequate nutrition anticipated for > 7 days.     Enteral nutrition contraindicated due to: small bowel obstruction.    Pertinent diseases and other special considerations  soy and gluten allergies    Social History     Tobacco Use    Smoking status: Never    Smokeless tobacco: Never   Substance Use Topics    Alcohol use: No    Drug use: No     Objective:    Ht Readings from Last 1 Encounters:   05/10/24 1.575 m (5' 2\")     Wt Readings from Last 1 Encounters:   05/13/24 47.7 kg (105 lb 2.6 oz)       Body mass index is 19.23 kg/m .    Patient Vitals for the past 96 hrs:   Weight   05/13/24 1742 47.7 kg (105 lb 2.6 oz)       Labs:  Last 3 days:  Recent Labs     05/15/24  0627 05/16/24  0558 05/17/24  0458 05/17/24  0644     --   --  141   POTASSIUM 4.0 4.0  --  4.0   CHLORIDE 103  --   --  102   CO2 32*  --   --  31*   BUN 11.6  --   --  11.8   CR 0.54  --   --  0.62   ALEX 8.5*  --   --  8.8   MAG 2.3 2.0 2.3  --    PHOS 3.6 5.2* 3.8  --    TRIG  --   --   --  177*   HGB 8.2* 8.6* 8.6*  --    HCT 26.8* 27.2* 27.7*  --     331 287  --        Glucose (past 48 hours):   Recent Labs     05/15/24  1226 05/16/24  0813 05/16/24  1141 05/17/24  0644 05/17/24  0909   * 122* 186* 126* 147*       Intake/Output (last 24 hours): I/O last 3 completed shifts:  In: -   Out: 150 [Stool:150]    Estimated CrCl: Estimated Creatinine Clearance: 89.9 mL/min (based on SCr of 0.62 mg/dL).    Assessment:    Continue patient on PN therapy as a continuous central therapy.     Given the patient's current condition/oral intake, PN is still indicated.    Lab results reviewed: CO2 slightly elevated - will reduce Cl:Ace ratio from 1:3 to " 1:2. Phos improved - will not add back in TPN yet, will re-eval tomorrow    Plan:  Rate of PN: 60 mL/hr   Formula:   Amino Acids 80 grams  Dextrose 200 grams  Sodium 50 mEq/day  Potassium 60 mEq/day  Calcium 5 mEq/day  Magnesium 6 mEq/day  Phosphorus 0 mMol/day  Chloride: Acetate Ratio 1:2  Standard Multivitamins w/Vitamin K  Trace Elements  Fat Emulsion: 20%, 250 mL IV 5 times weekly on Mon, Wed, Thurs, Sat, Sun  Check  Mag and Phos  labs tomorrow as ordered  Pharmacist will continue to follow the patient's lab results, clinical status and blood glucose results and make adjustments as appropriate.    Thank you for the consult.  Cielo Salazar RPH  5/17/2024 10:59 AM

## 2024-05-17 NOTE — PLAN OF CARE
Problem: Sepsis/Septic Shock  Goal: Absence of Infection Signs and Symptoms  5/17/2024 0835 by Kevin Sow, RN  Outcome: Progressing  5/16/2024 2231 by Kevin Sow RN  Outcome: Progressing  Intervention: Promote Stabilization  Recent Flowsheet Documentation  Taken 5/17/2024 0130 by Kevin Sow, RN  Fluid/Electrolyte Management: fluids provided     Problem: Oral Intake Inadequate  Goal: Improved Oral Intake  5/17/2024 0835 by Kevin Sow RN  Outcome: Progressing  5/16/2024 2231 by Kevin Sow RN  Outcome: Progressing   Goal Outcome Evaluation:       VSS on RA. A&Ox4. Hmong  utilized for care. Denies pain. IV abx, TPN, & IV fish oil infusing per order thru R 3x lumen PICC. Up SBA. Colostomy CDI, emptied per patient ritual.

## 2024-05-17 NOTE — PROGRESS NOTES
Nutrition Therapy  Parenteral Nutrition follow-up       Dietitian to Pharmacy      Rate of TPN: 60 ml/hr continuous  Grams Dextrose: 200  Grams Protein: 80 - increase    Lipids: Omegaven 20 g 5 x a week (2 x 100 ml bottle)- decrease         Current Nutrition Intake:  Diet: NPO except medications, ice chips    Custom TPN:   60 g AA, 200 g DEX at 60 ml/hr continuous.  200 ml omegaven daily, infuse over 12 hrs.    Above to provides 1144 kcals (24 kcal/kg), 60 g protein (1.2 g/kg), 200 g carb, 20 g lipid, 1440 ml fluid daily.    Above meets estimated needs.    Food allergies:  gluten (celiac), soy.     TPN start 5/11/12 due to intraabdominal abscess with fistula formation.    New Findings:   Continue clear liquids per colorectal surgery    Weight Trends  Admission wt: 44 kg (97 lb) 5/10/24  Current wt:  No new- requested yesterday  Date/Time Weight Weight Method   05/13/24 1742 47.7 kg (105 lb 2.6 oz) Bed scale   05/10/24 1626 44 kg (97 lb) Bed scale   05/10/24 0709 49 kg (108 lb) --     Dosing Weight: 44 kg     ESTIMATED NUTRITION NEEDS  Energy Needs: 2237-2484 kcals/day (25 - 30 kcals/kg)  Justification: Repletion  Protein Needs: 53-66 grams protein/day (1.2 - 1.5 grams of pro/kg)  Justification: Increased needs  Fluid Needs: 7648-2603 mL/day (1 mL/kcal)   Justification: Maintenance    GI:  Colostomy 150 ml OP yesterday    Labs:   Labs reviewed  Phosphorus removed from TPN last night d/t hyperphosphatemia.   Phos 3.8 WNL, improved  Trig 177 (H)  B12 861, WNL  Vit D 8 (L) - severely depleted  -186 mg/dl past 24 hours, in good control    Medications:   Reviewed by dietitian   Iv protonix    MALNUTRITION 5/13/24  % Intake: Decreased intake does not meet criteria  % Weight Loss: > 2% in 1 week (severe malnutrition).  3.9% < 2wks.   Subcutaneous Fat Loss: None observed  Muscle Loss: None observed  Fluid Accumulation/Edema: None noted    Malnutrition Diagnosis: Patient does not meet two of the established criteria  necessary for diagnosing malnutrition but is at risk for malnutrition    Nutrition Diagnosis  Altered GI function related to acute illness as evidenced by intraabdominal abscess with fistula formation, need for TPN   Evaluation: continues    Goals   Tolerate TPN-met  Meet estimated nutrition needs-met  Electrolytes WNL-met  Blood sugar < 180.- not met    INTERVENTIONS  Implementation  -Recommend vit D repletion  - Reduce lipids d/t hypertriglyceridemia to give 5 days a week and increase AA to 80g   D200 AA80 with 200 ml Omegaven (2 x 100 ml bottle) at 60 ml/hr = On average 1160 kcal, 80 g protein, 14.2g lipid    Monitoring/Evaluation  Progress toward goals will be monitored and evaluated per protocol.

## 2024-05-17 NOTE — PROGRESS NOTES
[facility] DIGESTIVE HEALTH PROGRESS NOTE      Subjective:                 available.  A little better.  Denies any pain, nausea, or vomiting. Tolerating clear diet.  Minimal colostomy output that is liquid and not black or bloody.       Admitted 5/9  with sepsis requiring ICU and pressors. CT demonstrating abscess along colonic wall .  IR avoided drainage procedure due to concern about creating fistula  and difficulty of procedure.  Surgery following and consulted colorectal surgery 5/14.    Started TPN 5/13.       Follow up CT 5/15/2024: Decreased  pericolonic abscess along deep wall of proximal transverse colon now has internal gas attenuation suggesting continuation with bowel wall lumen (fistulizing).  Decreased in size 5.5 x 1.2 cm thickness.  Unchanged long segment continuous wall thickening of ileum and colon particularly transverse colon up to colostomy.  Duffy's pouch and lower left quadrant.  Development of mild four-quadrant abdominal ascites and small bilateral effusions.     Current medications (incomplete list)  Infliximab (Remicade) 10 mg/kg 1st dose 5/15/2024  Zosyn  TPN started 5/13  Venofer IV iron every day started 5/14  Lovenox prophylaxis   Dilaudid as needed  Protonix     Previous medications (incomplete list)  Hydrocortisone (Cortef) 25 mg Q 12 hr (Discontinue 5/14)  Prednisone 30 mg (Discontinued 5/14)  Azathioprine 100 mg (Held on admit)  Humira 40 mg every 2 weeks.  Last dose 5/8/24 (Discontinued)    B12: 861  Prealbumin:  7.7 -> 14.4  Vitamin D:  8  Zinc:  pending     Assessment:       43-year-old with celiac disease and severe small bowel and stricturing & perforating colonic Crohn's with resection of descending and colostomy since November 2022.  Was on dual therapy Humira and azathioprine over the past 7 months but has progression of disease.  Admitted with sepsis due to fistula and abscess off transverse colon.  Albumin 1.9.  Hemoglobin 7.5.  Prealbumin:  7.7.  Vitamin D of 8.   Currently on Zosyn, TPN, IV iron, and started infliximab 10 mg/kg on 5/15.  Steroids stopped 5/14.       Considered  a failure of Humira and azathioprine and started infliximab (Remicade).  Stopped steroids.  Would want high infliximab trough level > 20 ug/dL.   Monitor closely with addition of infliximab and repeat imaging in 5 to 7 days.   Appreciate colorectal surgery involvement.     Plan:     - Diet (gluten free) per colorectal surgery  - Would give antibiotics for ~ 2 months.  After Zosyn switched to Augmentin  - Started infliximab 5/15 and will need to complete induction in 2 weeks and 6 weeks  - I notified [facility] financial dept. that she will need outpatient infliximab at our infusion center   - Check trough level in about a week.  Want level above 20 ug/dL  - Complete IV iron  - Continue TPN per CRSAL and switch to oral  - Plan on repeat CT scan in 5-7 days  - Dr Lafleur colorectal surgery following closely  - Restart azathioprine at discharge  - Start vitamin D  50,000 international unit(s) twice a week  -Address bone density as an outpatient                  Objective:                Vital signs in last 24 hrs;  Temp:  [98  F (36.7  C)-98.8  F (37.1  C)] 98.8  F (37.1  C)  Pulse:  [50-83] 50  Resp:  [16-19] 18  BP: ()/(54-83) 92/59  SpO2:  [94 %-99 %] 98 %    Physical Exam:   General: Comfortable appearing. Awake.   Cardiovascular: Regular rate. No edema  Chest: Non-labored breathing. Symmetric chest rise.   Abdomen: soft, non-tender, non-distended  Neurologic: Alert, no focal defects.     Current Labs:  CBC RESULTS:   Recent Labs   Lab 05/17/24  0458 05/16/24  0558 05/15/24  0627   WBC 3.7* 4.7 3.5*   RBC 2.73* 2.91* 2.87*   HGB 8.6* 8.6* 8.2*   HCT 27.7* 27.2* 26.8*   * 94 93   MCH 31.5 29.6 28.6   MCHC 31.0* 31.6 30.6*   RDW 16.8* 15.6* 15.5*    331 314        CMP Results:   Recent Labs   Lab 05/17/24  0909 05/17/24  0644 05/16/24  1141 05/16/24  0813 05/16/24  0558 05/15/24  1226  05/15/24  0627 05/14/24  0620 05/14/24  0608 05/13/24  1156 05/13/24  0614 05/12/24  0646 05/12/24  0429   NA  --  141  --   --   --   --  142  --  140  --  143  --  143   POTASSIUM  --  4.0  --   --  4.0  --  4.0  --  3.6  --  4.4  --  4.1   CHLORIDE  --  102  --   --   --   --  103  --  106  --  110*  --  112*   CO2  --  31*  --   --   --   --  32*  --  30*  --  28  --  24   ANIONGAP  --  8  --   --   --   --  7  --  4*  --  5*  --  7   * 126* 186*   < >  --    < > 96   < > 139*   < > 151*   < > 162*   BUN  --  11.8  --   --   --   --  11.6  --  15.7  --  22.3*  --  17.2   CR  --  0.62  --   --   --   --  0.54  --  0.47*  --  0.49*  --  0.57   BILITOTAL  --   --   --   --   --   --   --   --   --   --  0.2  --  <0.2   ALKPHOS  --   --   --   --   --   --   --   --   --   --  126  --  105   ALT  --   --   --   --   --   --   --   --   --   --  10  --  11   AST  --   --   --   --   --   --   --   --   --   --  12  --  6    < > = values in this interval not displayed.        INR Results:   Lab Results   Component Value Date    INR 1.08 05/13/2024    INR 1.12 05/12/2024    INR 1.04 05/03/2024       Relevant Imaging:  CT Abdomen Pelvis w Contrast    Result Date: 5/15/2024  EXAM: CT ABDOMEN PELVIS W CONTRAST LOCATION: [facility] DATE: 5/15/2024 INDICATION: Ileocolonic Crohn's with intramural abscess COMPARISON: CT of the abdomen and pelvis with contrast 5/10/2024 TECHNIQUE: CT scan of the abdomen and pelvis was performed following injection of IV contrast. Multiplanar reformats were obtained. Dose reduction techniques were used. CONTRAST: IsoVue 370 52mL FINDINGS: LOWER CHEST: Small bilateral pleural effusions have developed which layer dependently, right slightly larger than left. Associated passive atelectasis of the posterior lower lobes. HEPATOBILIARY: Nondistended gallbladder. No gallbladder wall thickening. No calcified gallstones. No bile duct enlargement. There is a benign  calcification in the right lateral aspect of the liver. No new liver lesions. Smooth liver capsule. Normal contrast opacification of the portal veins. PANCREAS: Normal. SPLEEN: Normal. ADRENAL GLANDS: Normal. KIDNEYS/BLADDER: Kidneys are normal in size and have symmetric parenchymal enhancement with normal cortex thickness. No urinary tract calculi or obstruction. Moderately distended urinary bladder has a smooth wall of uniform thickness. BOWEL: The stomach is mostly decompressed. Decompressed duodenum and jejunum. Segments of distal small bowel in the anterior lower abdomen have wall thickening consistent with acute enteritis but are less distended compared with 5 days earlier. Status post distal colon resection with Duffy's pouch staple line in the upper left pelvis and left lower quadrant colostomy. Diffuse wall thickening and mucosal hyperenhancement of the colon which is most extensive in the transverse colon, also similar to 5 days earlier. A oblong, enhancing fluid collection which follows the posterior wall of the proximal transverse colon has decreased in size measuring 5.5 cm in length and 12 mm in thickness, with decrease in central fluid attenuation but development of internal gas which suggests communication with the bowel lumen. Mild four-quadrant abdominal ascites. LYMPH NODES: Normal. VASCULATURE: Normal. PELVIC ORGANS: Uterus is normal in size. Normal ovaries. Mild, smooth thickening along the mesorectal fascia in the presacral space. MUSCULOSKELETAL: Mild bone demineralization. Tiny lower and lumbar degenerative osteophytes. No aggressive or destructive bone lesions.     IMPRESSION: 1.  Previous segmental colon resection with Duffy's pouch and left lower quadrant colostomy. 2.  Unchanged long segment contiguous wall thickening of the ileum and colon, in particular the transverse colon leading up to the colostomy consistent with active inflammatory bowel disease. 3.  Decreased size of the  paracolonic abscess along the deep wall of the proximal transverse colon which now has internal gas attenuation and suggests continuity with the bowel lumen. 4.  Development of mild four-quadrant abdominal ascites and small bilateral pleural effusions. 5.  New distention of the urinary bladder.    XR Abdomen Port 1 View    Result Date: 5/14/2024  EXAM: XR ABDOMEN PORT 1 VIEW LOCATION: [facility] DATE: 5/14/2024 INDICATION: Crohn's colitis COMPARISON: CT 5/10/2024     IMPRESSION: No distended air-filled loops of small bowel. A staple line is seen over the left lower quadrant. Otherwise the bowel is not well assessed.               Luis Miguel Traore M.D.  Thank you for the opportunity to participate in the care of this patient.   Please feel free to call me with any questions or concerns.  Phone number [phone].

## 2024-05-17 NOTE — PLAN OF CARE
Problem: Oral Intake Inadequate  Goal: Improved Oral Intake  Outcome: Progressing     Problem: Sepsis/Septic Shock  Goal: Optimal Coping  Outcome: Progressing   Goal Outcome Evaluation:       VSS on RA. A&Ox4. Hmong  utilized for care. Denies pain. IV abx, TPN, & IV fish oil given per order, infusing thru R 3x lumen PICC. Up SBA. Colostomy CDI, emptied per patient ritual. Tolerating clears diet. Hallway ambulated SBA. Family visiting at bedside. PRN melatonin effectively given for sleep aid.

## 2024-05-17 NOTE — PROGRESS NOTES
Colon and Rectal Surgery  Daily Progress Note    Subjective  [facility] [place]  used in communication today. Patient reports she feels about the same. She denies any pain while in bed. She denies nausea, but notes she is hungry. She is feeling drowsy this morning about the same as yesterday. She did get up to walk the halls yesterday one time. She has been emptying her colostomy bag bag herself.      Objective  Intake/Output last 24 hrs:    Intake/Output Summary (Last 24 hours) at 5/17/2024 1003  Last data filed at 5/16/2024 1435  Gross per 24 hour   Intake --   Output 150 ml   Net -150 ml     Temp:  [98  F (36.7  C)-98.8  F (37.1  C)] 98.8  F (37.1  C)  Pulse:  [50-83] 50  Resp:  [16-19] 18  BP: ()/(54-83) 92/59  SpO2:  [94 %-99 %] 98 %    Physical Exam:  General: awake, alert, lying in bed, in no acute distress  Head: normocephalic, atraumatic  Respiratory: non-labored breathing  Abdomen: soft, tender at site of injections, non-distended  Stoma: Pink, viable with scant light brown watery stool.   Skin: No rashes or lesions  Musculoskeletal: moves all four extremities equally  Psychological: alert and oriented, answers questions appropriately    Pertinent Labs  Lab Results: personally reviewed.  Lab Results   Component Value Date     05/17/2024     05/15/2024     05/14/2024    .0 04/24/2017    CO2 31 05/17/2024    CO2 32 05/15/2024    CO2 30 05/14/2024    CO2 25 06/23/2022    CO2 23 06/03/2022    CO2 22 06/02/2022    CO2 26.0 04/24/2017    BUN 11.8 05/17/2024    BUN 11.6 05/15/2024    BUN 15.7 05/14/2024    BUN 6 06/23/2022    BUN 3 06/03/2022    BUN 4 06/02/2022    BUN 7.0 04/24/2017     Lab Results   Component Value Date    WBC 3.7 05/17/2024    WBC 4.7 05/16/2024    WBC 3.5 05/15/2024    HGB 8.6 05/17/2024    HGB 8.6 05/16/2024    HGB 8.2 05/15/2024    HGB 10.8 01/10/2018    HGB 10.3 11/08/2017    HGB 9.8 09/18/2017    HCT 27.7 05/17/2024    HCT 27.2 05/16/2024    HCT 26.8  05/15/2024    HCT 35.9 01/10/2018    HCT 33.8 11/08/2017    HCT 31.4 07/31/2017     05/17/2024    MCV 94 05/16/2024    MCV 93 05/15/2024    MCV 83.1 01/10/2018    MCV 77.9 11/08/2017    MCV 86.3 07/31/2017     05/17/2024     05/16/2024     05/15/2024       Assessment/Plan: This is a 43 year old female with history of Crohn's disease complicated by colonic stricture s/p ex lap, resection of descending colon and colostomy creation by General Surgery in 2022 who now follows with [facility] for management of Crohn's disease currently on Humira and Azathioprine. Recently admitted 4/30-5/4 for Crohn's flare discharged with steroid taper, readmitted on 5/10 with worsening ileocolonic crohn's disease with fistula and new intramural abscess. Remains on IV Zosyn, TPN, and fulls. CT abd/pelvis on 5/15 w/ significant inflammation and slight improvement of abscess.     WBC 3.7 (4.7)  Hgb 8.6 (8.6)  Cr 0.62 (0.54)    - Full liquid diet, gluten free. Plan for low fiber diet, gluten free tomorrow  - Broad spectrum abx  - Lovenox dvt ppx  - Continue TPN. Plan to wean TPN once on low fiber  - Wean steroids  - Began infliximab with GI, appreciate GI management of Chron's  - OOB/ambulate  - Hopefully home Sunday.    Discussed with Dr. Miquel Sandra PA-C  [facility]  [phone]..............................main    Colorectal Surgery Staff:  I have seen and examined the patient. I agree with the above documentation and plan of the fellow/PA above with the following additions/chages:    Ms. Tejeda continues to feel good.  The full liquids did not give her any problems.  She is having colostomy output.  She has been up ambulating.  She denies any abdominal pain.    She is afebrile with normal vital signs.  Her abdomen is soft, nontender, and nondistended.  Her colostomy is pink.    A/P: 43-year-old female with severe protein calorie malnutrition, anemia, and severe Crohn's colitis with  intramural abscess.  -As long as she is not having any abdominal pain tomorrow, she can be advanced to a gluten-free low fiber diet.  You do not need to wait for colorectal surgery to see her prior to doing this.  -If she tolerates 80% of her calories by mouth over the course of the day on Saturday, her TPN can be weaned.  -Likely switch from IV Zosyn to oral Augmentin on Sunday.  -Discharge on Sunday versus Monday.  I already have follow-up with her scheduled next week.  I will follow her very closely.  I will arrange for an outpatient CT.    Total time spent: 22 minutes including non-face-to-face time    Hugo Lafleur MD MBA  [facility]  Office: [phone]  5/17/2024 7:12 PM

## 2024-05-18 ENCOUNTER — OFFICE VISIT (OUTPATIENT)
Dept: INTERPRETER SERVICES | Facility: CLINIC | Age: 44
End: 2024-05-18
Payer: COMMERCIAL

## 2024-05-18 LAB
ANION GAP SERPL CALCULATED.3IONS-SCNC: 7 MMOL/L (ref 7–15)
BUN SERPL-MCNC: 17.9 MG/DL (ref 6–20)
CALCIUM SERPL-MCNC: 9.1 MG/DL (ref 8.6–10)
CHLORIDE SERPL-SCNC: 102 MMOL/L (ref 98–107)
CREAT SERPL-MCNC: 0.54 MG/DL (ref 0.51–0.95)
DEPRECATED HCO3 PLAS-SCNC: 29 MMOL/L (ref 22–29)
EGFRCR SERPLBLD CKD-EPI 2021: >90 ML/MIN/1.73M2
ERYTHROCYTE [DISTWIDTH] IN BLOOD BY AUTOMATED COUNT: 17.2 % (ref 10–15)
GLUCOSE BLDC GLUCOMTR-MCNC: 114 MG/DL (ref 70–99)
GLUCOSE BLDC GLUCOMTR-MCNC: 119 MG/DL (ref 70–99)
GLUCOSE BLDC GLUCOMTR-MCNC: 123 MG/DL (ref 70–99)
GLUCOSE BLDC GLUCOMTR-MCNC: 128 MG/DL (ref 70–99)
GLUCOSE BLDC GLUCOMTR-MCNC: 131 MG/DL (ref 70–99)
GLUCOSE BLDC GLUCOMTR-MCNC: 140 MG/DL (ref 70–99)
GLUCOSE SERPL-MCNC: 119 MG/DL (ref 70–99)
HCT VFR BLD AUTO: 29.6 % (ref 35–47)
HGB BLD-MCNC: 9.2 G/DL (ref 11.7–15.7)
MAGNESIUM SERPL-MCNC: 2.1 MG/DL (ref 1.7–2.3)
MCH RBC QN AUTO: 29.7 PG (ref 26.5–33)
MCHC RBC AUTO-ENTMCNC: 31.1 G/DL (ref 31.5–36.5)
MCV RBC AUTO: 96 FL (ref 78–100)
PHOSPHATE SERPL-MCNC: 3 MG/DL (ref 2.5–4.5)
PLATELET # BLD AUTO: 334 10E3/UL (ref 150–450)
POTASSIUM SERPL-SCNC: 4.3 MMOL/L (ref 3.4–5.3)
PREALB SERPL-MCNC: 33 MG/DL (ref 20–40)
RBC # BLD AUTO: 3.1 10E6/UL (ref 3.8–5.2)
SODIUM SERPL-SCNC: 138 MMOL/L (ref 135–145)
WBC # BLD AUTO: 5.7 10E3/UL (ref 4–11)
ZINC SERPL-MCNC: 60.6 UG/DL

## 2024-05-18 PROCEDURE — C9113 INJ PANTOPRAZOLE SODIUM, VIA: HCPCS | Performed by: INTERNAL MEDICINE

## 2024-05-18 PROCEDURE — 80048 BASIC METABOLIC PNL TOTAL CA: CPT

## 2024-05-18 PROCEDURE — 250N000013 HC RX MED GY IP 250 OP 250 PS 637: Performed by: INTERNAL MEDICINE

## 2024-05-18 PROCEDURE — 84100 ASSAY OF PHOSPHORUS: CPT | Performed by: INTERNAL MEDICINE

## 2024-05-18 PROCEDURE — 250N000011 HC RX IP 250 OP 636: Performed by: COLON & RECTAL SURGERY

## 2024-05-18 PROCEDURE — 250N000011 HC RX IP 250 OP 636: Performed by: INTERNAL MEDICINE

## 2024-05-18 PROCEDURE — 250N000009 HC RX 250: Mod: JZ | Performed by: STUDENT IN AN ORGANIZED HEALTH CARE EDUCATION/TRAINING PROGRAM

## 2024-05-18 PROCEDURE — 120N000001 HC R&B MED SURG/OB

## 2024-05-18 PROCEDURE — 85027 COMPLETE CBC AUTOMATED: CPT | Performed by: INTERNAL MEDICINE

## 2024-05-18 PROCEDURE — 99232 SBSQ HOSP IP/OBS MODERATE 35: CPT | Mod: GC

## 2024-05-18 PROCEDURE — T1013 SIGN LANG/ORAL INTERPRETER: HCPCS | Mod: U3

## 2024-05-18 PROCEDURE — 83735 ASSAY OF MAGNESIUM: CPT | Performed by: INTERNAL MEDICINE

## 2024-05-18 PROCEDURE — B4185 PARENTERAL SOL 10 GM LIPIDS: HCPCS | Mod: JZ | Performed by: STUDENT IN AN ORGANIZED HEALTH CARE EDUCATION/TRAINING PROGRAM

## 2024-05-18 RX ORDER — PANTOPRAZOLE SODIUM 40 MG/1
40 TABLET, DELAYED RELEASE ORAL
Status: DISCONTINUED | OUTPATIENT
Start: 2024-05-19 | End: 2024-05-21 | Stop reason: HOSPADM

## 2024-05-18 RX ADMIN — PANTOPRAZOLE SODIUM 40 MG: 40 INJECTION, POWDER, FOR SOLUTION INTRAVENOUS at 10:43

## 2024-05-18 RX ADMIN — ENOXAPARIN SODIUM 40 MG: 40 INJECTION SUBCUTANEOUS at 21:25

## 2024-05-18 RX ADMIN — Medication 50000 UNITS: at 14:34

## 2024-05-18 RX ADMIN — PIPERACILLIN AND TAZOBACTAM 3.38 G: 3; .375 INJECTION, POWDER, FOR SOLUTION INTRAVENOUS at 05:34

## 2024-05-18 RX ADMIN — PIPERACILLIN AND TAZOBACTAM 3.38 G: 3; .375 INJECTION, POWDER, FOR SOLUTION INTRAVENOUS at 14:33

## 2024-05-18 RX ADMIN — MAGNESIUM SULFATE HEPTAHYDRATE: 500 INJECTION, SOLUTION INTRAMUSCULAR; INTRAVENOUS at 20:10

## 2024-05-18 RX ADMIN — FISH OIL 100 ML: 0.1 INJECTION, EMULSION INTRAVENOUS at 21:01

## 2024-05-18 RX ADMIN — PIPERACILLIN AND TAZOBACTAM 3.38 G: 3; .375 INJECTION, POWDER, FOR SOLUTION INTRAVENOUS at 21:20

## 2024-05-18 ASSESSMENT — ACTIVITIES OF DAILY LIVING (ADL)
ADLS_ACUITY_SCORE: 22
ADLS_ACUITY_SCORE: 23
ADLS_ACUITY_SCORE: 22
ADLS_ACUITY_SCORE: 23
ADLS_ACUITY_SCORE: 22
ADLS_ACUITY_SCORE: 23
ADLS_ACUITY_SCORE: 22
ADLS_ACUITY_SCORE: 22
ADLS_ACUITY_SCORE: 23
ADLS_ACUITY_SCORE: 22

## 2024-05-18 NOTE — PROGRESS NOTES
COLON & RECTAL SURGERY PROGRESS NOTE    S:  seen with  and nutritionist. Minimal PO intake, not particularly interest in eating    Vitals:  Vitals:    05/17/24 2245 05/18/24 0339 05/18/24 0552 05/18/24 0745   BP: (!) 88/51 (!) 88/54 (!) 88/52 (!) (P) 77/50   BP Location: Left arm  Left arm (P) Left arm   Patient Position: Semi-Rudd's  Semi-Rudd's    Cuff Size: Adult Small  Adult Small    Pulse: 60 58 52 (P) 72   Resp:   20 (P) 18   Temp: 97.7  F (36.5  C)  97.9  F (36.6  C) (P) 98.2  F (36.8  C)   TempSrc: Oral  Oral (P) Oral   SpO2: 98%  98% (P) 98%   Weight:       Height:         I/O:  I/O last 3 completed shifts:  In: 340 [P.O.:300; I.V.:40]  Out: -     PE:  General Appearance: no acute distress, alert and oriented, flat affect  Abdomen: soft, not tender, not distended, ostomy pink and patent of liquid stool   Ext: warm and well-perfused     Labs:  Recent Labs   Lab 05/18/24  0803 05/18/24  0540 05/18/24  0535 05/18/24  0022 05/17/24  0909 05/17/24  0644 05/17/24  0458 05/16/24  0813 05/16/24  0558 05/15/24  1226 05/15/24  0627 05/14/24  0620 05/14/24  0608 05/13/24  1156 05/13/24  0614 05/12/24  0646 05/12/24  0429   WBC  --   --  5.7  --   --   --  3.7*  --  4.7  --  3.5*  --  3.0*  --  3.7*  --  4.9   HGB  --   --  9.2*  --   --   --  8.6*  --  8.6*  --  8.2*  --  7.5*  --  7.8*  --  8.2*   PLT  --   --  334  --   --   --  287  --  331  --  314  --  261  --  267  --  302   NA  --   --  138  --   --  141  --   --   --   --  142  --  140  --  143  --  143   POTASSIUM  --   --  4.3  --   --  4.0  --   --  4.0  --  4.0  --  3.6  --  4.4  --  4.1   CHLORIDE  --   --  102  --   --  102  --   --   --   --  103  --  106  --  110*  --  112*   CO2  --   --  29  --   --  31*  --   --   --   --  32*  --  30*  --  28  --  24   BUN  --   --  17.9  --   --  11.8  --   --   --   --  11.6  --  15.7  --  22.3*  --  17.2   CR  --   --  0.54  --   --  0.62  --   --   --   --  0.54  --  0.47*  --  0.49*  --  0.57    * 119* 119* 128*   < > 126*  --    < >  --    < > 96   < > 139*   < > 151*   < > 162*   MAG  --   --  2.1  --   --   --  2.3  --  2.0  --  2.3  --  2.2  --  2.4*  --  2.2   PHOS  --   --  3.0  --   --   --  3.8  --  5.2*  --  3.6  --  2.6  --  2.7  --  2.8   INR  --   --   --   --   --   --   --   --   --   --   --   --   --   --  1.08  --  1.12   AST  --   --   --   --   --   --   --   --   --   --   --   --   --   --  12  --  6   ALT  --   --   --   --   --   --   --   --   --   --   --   --   --   --  10  --  11    < > = values in this interval not displayed.     A/P: 43YF with a history of Crohn's c/b colonic stricture s/p ex lap, resection of descending colon and colostomy creation in 2022, managed with Humira and AZA who presents with worsening ileocolic Crohn's with fistula and abscess.    She is clinically stable, but severely malnourished. Advanced to low fiber diet this AM and I encouraged her to eat and follow recommendations from nutrition. Ideally would start weaning TPN but will need to see if her PO intake is at least 80% of recommended value.     If tolerating PO will switch to PO abx on Sunday. Discharge pending PO intake.     Татьяна Calix MD MPH Colorectal Surgery Fellow     For follow-up, please first page the on-call colorectal resident listed in the paging system.

## 2024-05-18 NOTE — PROGRESS NOTES
[facility] DIGESTIVE HEALTH PROGRESS NOTE      Subjective:                Feels the same     Admitted 5/9  with sepsis requiring ICU and pressors. CT demonstrating abscess along colonic wall .  IR avoided drainage procedure due to concern about creating fistula  and difficulty of procedure.  Surgery following and consulted colorectal surgery 5/14.    Started TPN 5/13 then ADAT 5/16.     Follow up CT 5/15/2024: Decreased  pericolonic abscess along deep wall of proximal transverse colon now has internal gas attenuation suggesting continuation with bowel wall lumen (fistulizing).  Decreased in size 5.5 x 1.2 cm thickness.  Unchanged long segment continuous wall thickening of ileum and colon particularly transverse colon up to colostomy.  Duffy's pouch and lower left quadrant.  Development of mild four-quadrant abdominal ascites and small bilateral effusions.     Current medications (incomplete list)  Infliximab (Remicade) 10 mg/kg 1st dose 5/15/2024  Zosyn  TPN started 5/13  Venofer IV iron every day started 5/14  Lovenox prophylaxis   Dilaudid as needed  Protonix     Previous medications (incomplete list)  Hydrocortisone (Cortef) 25 mg Q 12 hr (Discontinue 5/14)  Prednisone 30 mg (Discontinued 5/14)  Azathioprine 100 mg (Held on admit)  Humira 40 mg every 2 weeks.  Last dose 5/8/24 (Discontinued)     B12: 861  Prealbumin:  7.7 -> 14.4  Vitamin D:  8  Zinc:  pending     Assessment:       43-year-old with celiac disease and severe small bowel and stricturing & perforating colonic Crohn's with resection of descending and colostomy since November 2022.  Was on dual therapy Humira and azathioprine over the past 7 months but has progression of disease.  Admitted with sepsis due to fistula and abscess off transverse colon.  Albumin 1.9.  Hemoglobin 7.5.  Prealbumin:  7.7.  Vitamin D of 8.  Currently on Zosyn, TPN, IV iron, and started infliximab 10 mg/kg on 5/15.  Steroids stopped 5/14.       Considered  a failure of Humira and  azathioprine and started infliximab (Remicade).  Stopped steroids.  Would want high infliximab trough level > 20 ug/dL.   Monitor closely with addition of infliximab and repeat imaging in 5 to 7 days.   Appreciate colorectal surgery involvement.     Plan:     - Diet (gluten free) per colorectal surgery  - Would give antibiotics for ~ 2 months.  After Zosyn switch to Augmentin  - Started infliximab 5/15 and will need to complete induction in 2 weeks and 6 weeks  - I notified [facility] financial dept. that she will need outpatient infliximab at our infusion center   - Check trough level ~ a week after initial infusion ~ 5/22. Want level well above 20 ug/dL  - Complete IV iron  - Continue TPN per CRSAL and switch to oral  - Plan on repeat CT scan in a couple days  - Dr Lafleur colorectal surgery following closely  - Restart azathioprine at discharge  - Start vitamin D  50,000 international unit(s) twice a week  -Address bone density as an outpatient          Objective:                Vital signs in last 24 hrs;  Temp:  [97.7  F (36.5  C)-98.2  F (36.8  C)] 98.2  F (36.8  C)  Pulse:  [52-75] 72  Resp:  [16-20] 18  BP: (77-90)/(50-54) 77/50  SpO2:  [98 %-100 %] 98 %    Physical Exam:   General: Comfortable appearing. Awake.   Cardiovascular: Regular rate. No edema  Chest: Non-labored breathing. Symmetric chest rise.   Abdomen: soft, non-tender, non-distended  Neurologic: Alert, no focal defects.     Current Labs:  CBC RESULTS:   Recent Labs   Lab 05/18/24  0535 05/17/24  0458 05/16/24  0558   WBC 5.7 3.7* 4.7   RBC 3.10* 2.73* 2.91*   HGB 9.2* 8.6* 8.6*   HCT 29.6* 27.7* 27.2*   MCV 96 102* 94   MCH 29.7 31.5 29.6   MCHC 31.1* 31.0* 31.6   RDW 17.2* 16.8* 15.6*    287 331        CMP Results:   Recent Labs   Lab 05/18/24  0803 05/18/24  0540 05/18/24  0535 05/17/24  0909 05/17/24  0644 05/16/24  0813 05/16/24  0558 05/15/24  1226 05/15/24  0627 05/13/24  1156 05/13/24  0614 05/12/24  0646 05/12/24  0429   NA  --   --  138   --  141  --   --   --  142   < > 143  --  143   POTASSIUM  --   --  4.3  --  4.0  --  4.0  --  4.0   < > 4.4  --  4.1   CHLORIDE  --   --  102  --  102  --   --   --  103   < > 110*  --  112*   CO2  --   --  29  --  31*  --   --   --  32*   < > 28  --  24   ANIONGAP  --   --  7  --  8  --   --   --  7   < > 5*  --  7   * 119* 119*   < > 126*   < >  --    < > 96   < > 151*   < > 162*   BUN  --   --  17.9  --  11.8  --   --   --  11.6   < > 22.3*  --  17.2   CR  --   --  0.54  --  0.62  --   --   --  0.54   < > 0.49*  --  0.57   BILITOTAL  --   --   --   --   --   --   --   --   --   --  0.2  --  <0.2   ALKPHOS  --   --   --   --   --   --   --   --   --   --  126  --  105   ALT  --   --   --   --   --   --   --   --   --   --  10  --  11   AST  --   --   --   --   --   --   --   --   --   --  12  --  6    < > = values in this interval not displayed.        INR Results:   Lab Results   Component Value Date    INR 1.08 05/13/2024    INR 1.12 05/12/2024    INR 1.04 05/03/2024       Relevant Imaging:  No results found.             Luis Miguel Traore M.D.  Thank you for the opportunity to participate in the care of this patient.   Please feel free to call me with any questions or concerns.  Phone number [phone].

## 2024-05-18 NOTE — PLAN OF CARE
Goal Outcome Evaluation:      Plan of Care Reviewed With: patient    Overall Patient Progress: improvingOverall Patient Progress: improving     Patient had a late lunch and did not order dinner. Blood sugar was 123 at 1800. Patient independent in room and empties her colostomy bag on her own. Denies pain.

## 2024-05-18 NOTE — PHARMACY-CONSULT NOTE
"Pharmacy Note: Parenteral Nutrition (PN) Management    Pharmacist consulted to dose PN for Dain Tejeda, a 43 year old female by Dr. Robles.    Subjective:    The patient is a new PN start.    The patient was started on PN in the hospital on 5/11/24.    Indication for PN therapy: bowel obstruction    Inadequate nutrition anticipated for > 7 days.     Enteral nutrition contraindicated due to: small bowel obstruction.    Pertinent diseases and other special considerations  soy and gluten allergies    Social History     Tobacco Use    Smoking status: Never    Smokeless tobacco: Never   Substance Use Topics    Alcohol use: No    Drug use: No     Objective:    Ht Readings from Last 1 Encounters:   05/10/24 1.575 m (5' 2\")     Wt Readings from Last 1 Encounters:   05/13/24 47.7 kg (105 lb 2.6 oz)       Body mass index is 19.23 kg/m .    No data found.    Labs:  Last 3 days:  Recent Labs     05/16/24  0558 05/17/24  0458 05/17/24  0644 05/17/24  0646 05/18/24  0535   NA  --   --  141  --  138   POTASSIUM 4.0  --  4.0  --  4.3   CHLORIDE  --   --  102  --  102   CO2  --   --  31*  --  29   BUN  --   --  11.8  --  17.9   CR  --   --  0.62  --  0.54   ALEX  --   --  8.8  --  9.1   MAG 2.0 2.3  --   --  2.1   PHOS 5.2* 3.8  --   --  3.0   PREALB  --   --   --  33.0  --    TRIG  --   --  177*  --   --    HGB 8.6* 8.6*  --   --  9.2*   HCT 27.2* 27.7*  --   --  29.6*    287  --   --  334       Glucose (past 48 hours):   Recent Labs     05/16/24  1141 05/17/24  0644 05/17/24  0909 05/17/24  1229 05/17/24  1801 05/18/24  0022 05/18/24  0535 05/18/24  0540 05/18/24  0803   * 126* 147* 136* 123* 128* 119* 119* 140*       Intake/Output (last 24 hours): I/O last 3 completed shifts:  In: 340 [P.O.:300; I.V.:40]  Out: -     Estimated CrCl: Estimated Creatinine Clearance: 103.2 mL/min (based on SCr of 0.54 mg/dL).    Assessment:    Continue patient on PN therapy as a continuous central therapy.     Given the patient's current " condition/oral intake, PN is still indicated. Anticipate weaning TPN in near future per CRS notes    Lab results reviewed: Phos continues to trend down - will add small amount back to TPN    Plan:  Rate of PN: 60 mL/hr  Formula:   Amino Acids 80 grams  Dextrose 200 grams  Sodium 50 mEq/day  Potassium  60mEq/day  Calcium 5 mEq/day  Magnesium 6 mEq/day  Phosphorus 6 mMol/day  Chloride: Acetate Ratio 1:2  Standard Multivitamins w/Vitamin K  Trace Elements  Fat Emulsion: 20%, 250 mL IV 5 times weekly on Mon, Wed, Thurs, Sat, Sun  Check BMP and Mag and Phos  labs tomorrow.  Pharmacist will continue to follow the patient's lab results, clinical status and blood glucose results and make adjustments as appropriate.    Thank you for the consult.  Cielo Salazar RPH  5/18/2024 10:23 AM

## 2024-05-18 NOTE — PROGRESS NOTES
Nutrition Therapy  Parenteral Nutrition F/U    TPN reviewed by RD. No changes today to plan.   Will continue to monitor closely.  Continue D 200 AA 80 @ 60 ml/hr plus omegaven 5 x/week ( 2 x 100 ml bottle)  Diet full liquids -pt ate 1 strawberry jello yesterday-continue to monitor po intake, diet advance and TPN needs.

## 2024-05-18 NOTE — PROGRESS NOTES
NUTRITION EDUCATION      REASON FOR ASSESSMENT:  Consulted to educate pt on gluten free and low fiber diet    CURRENT DIET:  Low fiber gluten free    NUTRITION DIAGNOSIS:  Food- and nutrition-related knowledge deficit R/t celiac disease and severe small bowel obstruction and stricturing&perforating colonic Crohn's with resection of descending and colostomy over the past 7 months as evidenced by consult for diet education on gluten free and low fiber diet and need for therapeutic diet    INTERVENTIONS:    Nutrition Prescription:  Gluten free, low fiber    Implementation:      *  Nutrition Education (Content): Had professional  to translate   A)  Provided handout low fiber nutrition therapy, gluten free nutrition therapy   B)  Discussed Foods containing gluten and those without gluten, 13 g fiber daily ( for ever how long surgeon would like), foods high and low in fiber, label reading      *  Nutrition Education (Application):   A)  Discussed current eating habits and recommended alternative food choices      *  Anticipate good compliance      *  Diet Education - refer to Education Flowsheet    Goals:      *  Patient will verbalize understanding of diet met ( pt asked appropriate questions)      *  All of the above goals met during the education session    Follow Up/Monitoring:      *  Provided RD contact information for future questions      *  Recommended Out-Patient Nutrition Referral, if further diet instructions are needed

## 2024-05-18 NOTE — PROGRESS NOTES
[facility]    Progress Note - Hospitalist Service       Date of Admission:  5/10/2024    Assessment & Plan   Dain Tejeda is a 43 year old female th history of Crohn's disease complicated by colonic stricture s/p partial colectomy (11/2022), Celiac disease, SIOMARA admitted for septic shock from intraabdominal abscess with developing fistula.      Crohn's disease, complicated by abscess, developing fistula and ileitis   S/p partial colectomy 11/2022, has LLQ colostomy   Sepsis 2/2 intra-abdominal infection - resolved  Diagnosed in late 2022, complicated by stricture s/p partial colectomy with ostomy. Follows with [facility], on Humira and azathioprine. Was recently admitted on our service 4/30-5/4 after syncopal episode thought to be 2/2 Crohn's flare after CT showed colitis but negative Cdiff and enteric panel. GI consulted at that time and recommended prednisone taper over 4 weeks, unclear whether patient was taking steroid as prescribed since discharge. Presented to ED on 5/10 after developing fevers, nausea and vomiting. Developed hypotension in the ED despite fluid resuscitation and was transferred to the ICU for pressor support. CT showing marked progression of Crohn's with colonic obstruction, 10 cm intramural abscess, developing fistula and significant ileitis. Septic shock most likely secondary to colonic abscess. Transferred out of ICU on 5/12 after being off pressors for 24 hours. Repeat CT showed decreased size of abscess along deep wall of proximal transverse colon which now has internal gas attenuation and suggests continuity with bowel lumen. No surgical intervention necessary at this time, will treat Crohn's disease medically with infliximab with repeat CT 5 days after starting to re-evaluate abscess.   - Colorectal consulted, appreciate recommendations                - advanced to full liquids today, may be able to advance to gluten free and low fiber diet later this afternoon               - Discussed importance of ambulation              - If diet progresses well and TPN weaned successfully, could potentially discharge as early as Sunday              - Follow-up in colorectal clinic with repeat CT  - GI consulted  - TPN started 5/11.   - IV Zosyn. Plan for antibiotics for two months (switch from Zosyn to Augmentin at discharge), consider transition to oral tomorrow 5/19  - Stopped steroids given fistulating disease and abscess  - IV Iron  - Started Infliximab on 5/15  - Repeat CT 5-7 days after starting inflixmab   - Resume PTA azathioprine at discharge  - Start vitamin D 48772 units twice weekly  - PRN Tylenol and IV dilaudid for pain  - Zofran PRN      Normocytic anemia  Leukopenia   History of SIOMARA with recent iron infusions through [facility]. Hgb 9.8 on admission, normal differential at that time. Colostomy output appears non-bloody. Hgb initially tending down, most recently 8.2, MCV 93. Continues to have no signs of bleeding. Iron studies in Feb largely normal with mildly low TIBC and iron saturation. May be more consistent with anemia of chronic disease. Worsening leukopenia on 5/14, most recently 3.5. Reticulocytes 1.9, indicating underproduction. Repeat CBC with differential normal. Blood smear showed mild normochromic-normocytic anemia, mild leukopenia without dysplastic or megaloblastic features. Most likely a combination of iron deficient anemia and anemia of chronic disease. Leukopenia most likely due to infection/inflammation.   - IV iron   - Daily CBC     Sinus bradycardia  Patient noted to be bradycardic between 40-60's, asymptomatic. EKG x2 shows sinus bradycardia.      Hypokalemia  3.0 on admission, supplementation given in ED.   - Daily BMP  - Replacement per RN protocol     Disorganized behaviors  Demonstrated odd behaviors during recent admission, treated with Seroquel 12.5 mg at bedtime. Psych consult at that time, did not recommend any ongoing meds upon discharge. Patient calm,  alert and cooperative this admission so far.  Did discuss starting medications for depression with patient during this admission, patient desiring to hold off until she feels better and willing to discuss in the outpatient setting.  - Delirium protocol, hold on meds for now  - discuss MDD/CHRISSY medications in outpatient setting     Gamma gap  Chronic. Tprotein 7.2, albumin 2.7. Most certainly 2/2 inflammatory disease.      Incidental finding: benign pelvic simple cyst  >5 to <=7 cm. Follow up pelvic US in 6 months recommended.         Diet: parenteral nutrition - ADULT compounded formula  Full Liquid Diet  Diet    DVT Prophylaxis: Enoxaparin (Lovenox) SQ  Chambers Catheter: Not present  Fluids: TPN, PO  Lines: PRESENT   PICC 05/10/24 Triple Lumen Right Basilic Vasopressor-Site Assessment: WDL      Cardiac Monitoring: None  Code Status: Full Code      Clinically Significant Risk Factors           # Hypoalbuminemia: Lowest albumin = 1.9 g/dL at 5/12/2024  4:29 AM, will monitor as appropriate                       Disposition Plan   Expected Discharge Date: 05/19/2024    Discharge Delays: IV Medication - consider oral or Home Infusion  Specialist Consult (enter specialist & decision needed in comments)  Destination: home with family  Discharge Comments: IV abx; TPN; trending Hgb        The patient's care was discussed with the Attending Physician, Dr. Cristina .    Rina Chairez MD  Hospitalist Service  [facility]  Securely message with [employer] (more info)  Text page via [employer] Paging/Directory   ______________________________________________________________________    Interval History   Noted to have an episode of hypotension to 77/50 overnight.  Per patient, did not feel very symptomatic though did feel little woozy as there were dried blood at the time of the blood sugar check.  It appears she has not been eating very much, question fluid intake.    Sister-in-law at bedside with patient today  expressing concerns regarding depression and anxiety.  Wondering if we should start medications for patient.  I discussed with patient at bedside, who states that she acknowledges these issues and would like to discuss these once she is feeling better.  No SI or HI today, though she does wish to go home.  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    Physical Exam   Vital Signs: Temp: (P) 98.2  F (36.8  C) Temp src: (P) Oral BP: (!) (P) 77/50 (nurse notified) Pulse: (P) 72   Resp: (P) 18 SpO2: (P) 98 % O2 Device: (P) None (Room air)    Weight: 105 lbs 2.55 oz    General: Alert and oriented x 3, no acute distress  Skin: clean, dry, and intact  HEENT: normocephalic, atraumatic, PERRL, EOMI, no conjunctival injection or icterus, ears and nose normal, no LAD or masses  Resp: clear to ausculation bilaterally, no rales or wheezes  Cardio: RRR, S1 and S2 present, no S3 or S4, no rubs or murmurs  Abdomen: soft, nontender, nondistended, bowel sounds present x 4,.  Ostomy bag appears to be draining stool appropriately.  Extremities: no erythema or edema  Psych: Flat affect, poor insight into medical condition, though does have appropriate decision-making capacity    Medical Decision Making   Please see A&P for additional details of medical decision making.      Data   ------------------------- PAST 24 HR DATA REVIEWED -----------------------------------------------    I have personally reviewed the following data over the past 24 hrs:    5.7  \   9.2 (L)   / 334     138 102 17.9 /  131 (H)   4.3 29 0.54 \       Imaging results reviewed over the past 24 hrs:   No results found for this or any previous visit (from the past 24 hour(s)).

## 2024-05-18 NOTE — PLAN OF CARE
Goal Outcome Evaluation:       Patient denies pain this shift.  Ambulating in halls.  Patient manages colostomy.  Independent in room.  Call light within reach.

## 2024-05-18 NOTE — PLAN OF CARE
Goal Outcome Evaluation:      Plan of Care Reviewed With: patient    Overall Patient Progress: improvingOverall Patient Progress: improving     Patient's BG was 128 at 0022. Denies pain. Up independently to the bathroom. TPN running at 60 mL/hr. At 0550 when drawing morning labs, patient started having tremors. BP soft overnight. Notified provider about BP and tremors and provider aware.

## 2024-05-19 ENCOUNTER — OFFICE VISIT (OUTPATIENT)
Dept: INTERPRETER SERVICES | Facility: CLINIC | Age: 44
End: 2024-05-19
Payer: COMMERCIAL

## 2024-05-19 LAB
ANION GAP SERPL CALCULATED.3IONS-SCNC: 7 MMOL/L (ref 7–15)
BUN SERPL-MCNC: 19.5 MG/DL (ref 6–20)
CALCIUM SERPL-MCNC: 9 MG/DL (ref 8.6–10)
CHLORIDE SERPL-SCNC: 103 MMOL/L (ref 98–107)
CREAT SERPL-MCNC: 0.57 MG/DL (ref 0.51–0.95)
DEPRECATED HCO3 PLAS-SCNC: 28 MMOL/L (ref 22–29)
EGFRCR SERPLBLD CKD-EPI 2021: >90 ML/MIN/1.73M2
ERYTHROCYTE [DISTWIDTH] IN BLOOD BY AUTOMATED COUNT: 17.6 % (ref 10–15)
GLUCOSE BLDC GLUCOMTR-MCNC: 108 MG/DL (ref 70–99)
GLUCOSE BLDC GLUCOMTR-MCNC: 118 MG/DL (ref 70–99)
GLUCOSE BLDC GLUCOMTR-MCNC: 126 MG/DL (ref 70–99)
GLUCOSE BLDC GLUCOMTR-MCNC: 136 MG/DL (ref 70–99)
GLUCOSE BLDC GLUCOMTR-MCNC: 140 MG/DL (ref 70–99)
GLUCOSE SERPL-MCNC: 112 MG/DL (ref 70–99)
HCT VFR BLD AUTO: 31 % (ref 35–47)
HGB BLD-MCNC: 9.3 G/DL (ref 11.7–15.7)
MAGNESIUM SERPL-MCNC: 2 MG/DL (ref 1.7–2.3)
MCH RBC QN AUTO: 28.9 PG (ref 26.5–33)
MCHC RBC AUTO-ENTMCNC: 30 G/DL (ref 31.5–36.5)
MCV RBC AUTO: 96 FL (ref 78–100)
PHOSPHATE SERPL-MCNC: 3.4 MG/DL (ref 2.5–4.5)
PLATELET # BLD AUTO: 320 10E3/UL (ref 150–450)
POTASSIUM SERPL-SCNC: 4.2 MMOL/L (ref 3.4–5.3)
RBC # BLD AUTO: 3.22 10E6/UL (ref 3.8–5.2)
SODIUM SERPL-SCNC: 138 MMOL/L (ref 135–145)
WBC # BLD AUTO: 6.2 10E3/UL (ref 4–11)

## 2024-05-19 PROCEDURE — 83735 ASSAY OF MAGNESIUM: CPT | Performed by: INTERNAL MEDICINE

## 2024-05-19 PROCEDURE — 99232 SBSQ HOSP IP/OBS MODERATE 35: CPT | Mod: GC

## 2024-05-19 PROCEDURE — 93010 ELECTROCARDIOGRAM REPORT: CPT | Performed by: STUDENT IN AN ORGANIZED HEALTH CARE EDUCATION/TRAINING PROGRAM

## 2024-05-19 PROCEDURE — 93005 ELECTROCARDIOGRAM TRACING: CPT

## 2024-05-19 PROCEDURE — 120N000001 HC R&B MED SURG/OB

## 2024-05-19 PROCEDURE — 250N000013 HC RX MED GY IP 250 OP 250 PS 637: Performed by: STUDENT IN AN ORGANIZED HEALTH CARE EDUCATION/TRAINING PROGRAM

## 2024-05-19 PROCEDURE — 85027 COMPLETE CBC AUTOMATED: CPT | Performed by: INTERNAL MEDICINE

## 2024-05-19 PROCEDURE — 250N000009 HC RX 250: Performed by: STUDENT IN AN ORGANIZED HEALTH CARE EDUCATION/TRAINING PROGRAM

## 2024-05-19 PROCEDURE — 250N000011 HC RX IP 250 OP 636: Performed by: COLON & RECTAL SURGERY

## 2024-05-19 PROCEDURE — B4185 PARENTERAL SOL 10 GM LIPIDS: HCPCS | Mod: JZ | Performed by: STUDENT IN AN ORGANIZED HEALTH CARE EDUCATION/TRAINING PROGRAM

## 2024-05-19 PROCEDURE — 84100 ASSAY OF PHOSPHORUS: CPT | Performed by: INTERNAL MEDICINE

## 2024-05-19 PROCEDURE — 80048 BASIC METABOLIC PNL TOTAL CA: CPT

## 2024-05-19 PROCEDURE — 250N000011 HC RX IP 250 OP 636: Performed by: INTERNAL MEDICINE

## 2024-05-19 PROCEDURE — T1013 SIGN LANG/ORAL INTERPRETER: HCPCS | Mod: U3

## 2024-05-19 RX ADMIN — ENOXAPARIN SODIUM 40 MG: 40 INJECTION SUBCUTANEOUS at 21:05

## 2024-05-19 RX ADMIN — MAGNESIUM SULFATE HEPTAHYDRATE: 500 INJECTION, SOLUTION INTRAMUSCULAR; INTRAVENOUS at 21:00

## 2024-05-19 RX ADMIN — PANTOPRAZOLE SODIUM 40 MG: 40 TABLET, DELAYED RELEASE ORAL at 06:41

## 2024-05-19 RX ADMIN — PIPERACILLIN AND TAZOBACTAM 3.38 G: 3; .375 INJECTION, POWDER, FOR SOLUTION INTRAVENOUS at 21:05

## 2024-05-19 RX ADMIN — PIPERACILLIN AND TAZOBACTAM 3.38 G: 3; .375 INJECTION, POWDER, FOR SOLUTION INTRAVENOUS at 13:49

## 2024-05-19 RX ADMIN — FISH OIL 100 ML: 0.1 INJECTION, EMULSION INTRAVENOUS at 21:01

## 2024-05-19 RX ADMIN — FISH OIL 100 ML: 0.1 INJECTION, EMULSION INTRAVENOUS at 02:56

## 2024-05-19 RX ADMIN — PIPERACILLIN AND TAZOBACTAM 3.38 G: 3; .375 INJECTION, POWDER, FOR SOLUTION INTRAVENOUS at 05:42

## 2024-05-19 ASSESSMENT — ACTIVITIES OF DAILY LIVING (ADL)
ADLS_ACUITY_SCORE: 23
ADLS_ACUITY_SCORE: 24
ADLS_ACUITY_SCORE: 23
ADLS_ACUITY_SCORE: 24
ADLS_ACUITY_SCORE: 23
ADLS_ACUITY_SCORE: 23
ADLS_ACUITY_SCORE: 24
ADLS_ACUITY_SCORE: 23
ADLS_ACUITY_SCORE: 24
ADLS_ACUITY_SCORE: 24
ADLS_ACUITY_SCORE: 23
ADLS_ACUITY_SCORE: 24
ADLS_ACUITY_SCORE: 23
ADLS_ACUITY_SCORE: 23
ADLS_ACUITY_SCORE: 24
ADLS_ACUITY_SCORE: 23

## 2024-05-19 NOTE — PROGRESS NOTES
Nutrition Therapy  Parenteral Nutrition follow-up       Dietitian to Pharmacy    Rate of TPN: 60 ml/hr  Grams Dextrose: 200  Grams Protein:80    Lipids: omegaven 5 times per week ( 2 x 100 ml bottles)         Current Nutrition Intake:  Diet: Low fiber, Gluten free  Po intake: pt consumed 75% of her supper meal on 5/18 ( 569 Calories and 19 g protein)  Nutrition Support: TPN D 200 AA 80 @ 60 ml/hr plus omegaven 5 x per week    Provides:  1144 Calories, 80 g protein, 200 g CHO, 20 g lipid, 1440 ml fluid/day    Weight Trends  Admission wt: 44 Kg ( 97 lb), 5/10/2024  Current wt: 47.7 Kg ( 105 lb) 5/13/2024    GI:  Passing flatus  Normoactive BS  Last BM ( colostomy) 5/18/2024    Labs:   Labs reviewed by dietitian: Na 138, K 4.2, Mg 2, phos 3.4, Glu 112    Estimated nutrition needs  Energy needs: 5178-1406 Calories/day ( 25-30 Reggie/Kg)  Justification: Repletion  Protein needs: 53-66+ g/day ( 1.2-1.5+ g/Kg)  Justification: increased needs  Fluid needs: 1519-1655 ml/day  Justification: maintenance    Medications:   Reviewed by dietitian: pantoprazole, zosyn, vit d 3    New Goals  Meet 80% estimated nutrition needs via po intake to discontinue TPN ( pt met~52% energy needs and ~36% protein needs yesterday at supper meal)  Tolerate TPN-met  Electrolytes WNL-met  BG < 180-met    INTERVENTIONS  Implementation  Start Calorie counts  Continue TPN/lipids today and follow up on 5/19- po intake re: ability to wean TPN and discontinue lipids?  Add ensure [employer] standard twice daily for a 10 am and 2 pm snack    Monitoring/Evaluation  Progress toward goals will be monitored and evaluated per protocol.

## 2024-05-19 NOTE — PROGRESS NOTES
[facility]    Progress Note - Hospitalist Service       Date of Admission:  5/10/2024    Assessment & Plan   Dain Tejeda is a 43 year old female th history of Crohn's disease complicated by colonic stricture s/p partial colectomy (11/2022), Celiac disease, SIOMARA admitted for septic shock from intraabdominal abscess with developing fistula.      Crohn's disease, complicated by abscess, developing fistula and ileitis   S/p partial colectomy 11/2022, has LLQ colostomy   Sepsis 2/2 intra-abdominal infection - resolved  Diagnosed in late 2022, complicated by stricture s/p partial colectomy with ostomy. Follows with [facility], on Humira and azathioprine. Was recently admitted on our service 4/30-5/4 after syncopal episode thought to be 2/2 Crohn's flare. GI consulted at that time and recommended prednisone taper over 4 weeks, unclear whether patient was taking steroid as prescribed since discharge. Presented to ED on 5/10 after developing fevers, nausea and vomiting. Developed hypotension in the ED despite fluid resuscitation and was transferred to the ICU for pressor support. CT showing marked progression of Crohn's with colonic obstruction, 10 cm intramural abscess, developing fistula and significant ileitis. Septic shock most likely secondary to colonic abscess. Transferred out of ICU on 5/12 after being off pressors for 24 hours. Repeat CT showed decreased size of abscess along deep wall of proximal transverse colon which now has internal gas attenuation and suggests continuity with bowel lumen. No surgical intervention necessary at this time, will treat Crohn's disease medically with infliximab.  - Colorectal consulted, appreciate recommendations                - advanced to gluten free and low fiber diet, encouraging oral intake              - Discussed importance of ambulation              - Once diet progresses well and TPN weaned successfully, can discharge              - Follow-up in colorectal  clinic with repeat CT  - GI consulted  - TPN started 5/11.   - IV Zosyn. Plan for antibiotics for two months (switch from Zosyn to Augmentin at discharge), consider transition to oral tomorrow 5/20  - Stopped steroids given fistulating disease and abscess  - IV Iron  - Started Infliximab on 5/15  - Repeat CT 5-7 days after starting inflixmab (5/20-5/22)  - Resume PTA azathioprine at discharge  - Start vitamin D 87979 units twice weekly  - PRN Tylenol and IV dilaudid for pain  - Zofran PRN      Normocytic anemia  Leukopenia   History of SIOMARA with recent iron infusions through [facility]. Hgb 9.8 on admission, normal differential at that time. Colostomy output appears non-bloody. Hgb initially tending down, most recently 8.2, MCV 93. Continues to have no signs of bleeding. Iron studies in Feb largely normal with mildly low TIBC and iron saturation. May be more consistent with anemia of chronic disease. Worsening leukopenia on 5/14, most recently 3.5. Reticulocytes 1.9, indicating underproduction. Repeat CBC with differential normal. Blood smear showed mild normochromic-normocytic anemia, mild leukopenia without dysplastic or megaloblastic features. Most likely a combination of iron deficient anemia and anemia of chronic disease. Leukopenia most likely due to infection/inflammation.   - IV iron   - Daily CBC     Sinus bradycardia  Patient noted to be bradycardic between 40-60's, asymptomatic. EKG x2 shows sinus bradycardia.      Hypokalemia  3.0 on admission, supplementation given in ED.   - Daily BMP  - Replacement per RN protocol     Disorganized behaviors  Demonstrated odd behaviors during recent admission, treated with Seroquel 12.5 mg at bedtime. Psych consult at that time, did not recommend any ongoing meds upon discharge. Patient calm, alert and cooperative this admission so far.  Did discuss starting medications for depression with patient during this admission, patient desiring to hold off until she feels better and  willing to discuss in the outpatient setting.  - Delirium protocol, hold on meds for now  - discuss MDD/CHRISSY medications in outpatient setting     Gamma gap  Chronic. Tprotein 7.2, albumin 2.7. Most certainly 2/2 inflammatory disease.      Incidental finding: benign pelvic simple cyst  >5 to <=7 cm. Follow up pelvic US in 6 months recommended.         Diet: Diet  parenteral nutrition - ADULT compounded formula  Combination Diet Low Fiber, Gluten Free Diet  Calorie Counts  Snacks/Supplements Adult: Lyn Naylor Standard Oral Supplement; Between Meals  parenteral nutrition - ADULT compounded formula    DVT Prophylaxis: Enoxaparin (Lovenox) SQ  Chambers Catheter: Not present  Fluids: TPN, PO  Lines: PRESENT   PICC 05/10/24 Triple Lumen Right Basilic Vasopressor-Site Assessment: WDL      Cardiac Monitoring: None  Code Status: Full Code      Clinically Significant Risk Factors           # Hypoalbuminemia: Lowest albumin = 1.9 g/dL at 5/12/2024  4:29 AM, will monitor as appropriate                       Disposition Plan   Expected Discharge Date: 05/19/2024    Discharge Delays: IV Medication - consider oral or Home Infusion  Specialist Consult (enter specialist & decision needed in comments)  Destination: home with family  Discharge Comments: IV abx; TPN; trending Hgb        The patient's care was discussed with the Attending Physician, Dr. Cristina .    Anuradha Chase MD  Hospitalist Service  [facility]  Securely message with [employer] (more info)  Text page via [employer] Paging/Directory   ______________________________________________________________________    Interval History   No acute events overnight. Patient still not eating much. Motivated to go home.    Physical Exam   Vital Signs: Temp: 98.6  F (37  C) Temp src: Oral BP: 92/59 Pulse: 63   Resp: 18 SpO2: 100 % O2 Device: None (Room air)    Weight: 105 lbs 2.55 oz    General: Alert and oriented x 3, no acute distress  Skin: clean, dry, and  intact  HEENT: normocephalic, atraumatic, PERRL, EOMI, no conjunctival injection or icterus, ears and nose normal, no LAD or masses  Resp: clear to ausculation bilaterally, no rales or wheezes  Cardio: RRR, S1 and S2 present, no S3 or S4, no rubs or murmurs  Abdomen: soft, nontender, nondistended, bowel sounds present x 4,.  Ostomy bag appears to be draining stool appropriately.  Extremities: no erythema or edema  Psych: Flat affect, poor insight into medical condition, though does have appropriate decision-making capacity    Medical Decision Making   Please see A&P for additional details of medical decision making.      Data   ------------------------- PAST 24 HR DATA REVIEWED -----------------------------------------------    I have personally reviewed the following data over the past 24 hrs:    6.2  \   9.3 (L)   / 320     138 103 19.5 /  126 (H)   4.2 28 0.57 \       Imaging results reviewed over the past 24 hrs:   No results found for this or any previous visit (from the past 24 hour(s)).

## 2024-05-19 NOTE — PLAN OF CARE
Problem: Sepsis/Septic Shock  Goal: Blood Glucose Level Within Targeted Range  Outcome: Progressing  Goal: Optimal Nutrition Intake  Outcome: Progressing     Problem: Colostomy  Goal: Absence of Infection Signs and Symptoms  Outcome: Progressing   Goal Outcome Evaluation:    Pt encouraged to drink water. Patient's BP improved to 92/58 after rechecking at 10 pm. Dr. Rucker saw patient. Per MD, will continue to monitor BP closely and call if SBP < 90. Colotomy pouch leaked. New 2 piece colostomy pouch applied tonight. Stoma is pink. Given iv Zosyn, fish oil and TPN iv infusions.                        I will START or STAY ON the medications listed below when I get home from the hospital:    Weekly cbc, cmp ---------- fax [phone]  -- Indication: For long term antibiotics    budesonide 0.5 mg/2 mL inhalation suspension  -- 0.5 milligram(s) inhaled 2 times a day  -- Indication: For Copd    methylPREDNISolone  --   medrol pack tapering  Prednisone 4 mgs at bedtime x3 doses  Prednisone 8 mgs at bedtime x1   prednisone 4mgs x3 doses after lunch  prednisone 4 mgs x5 doses before breakfast  -- Indication: For Copd    acetaminophen 325 mg oral tablet  -- 2 tab(s) by mouth every 6 hours, As needed, Moderate Pain (4 - 6)  -- Indication: For Pain    aspirin 81 mg oral tablet, chewable  -- 1 tab(s) by mouth once a day  -- Indication: For Cad    losartan 25 mg oral tablet  -- 4 tab(s) by mouth once a day  -- Indication: For Htn    tamsulosin 0.4 mg oral capsule  -- 1 cap(s) by mouth once a day (at bedtime)  -- Indication: For bph    enoxaparin  -- 100 milligram(s) subcutaneous every 12 hours bridge with coumadin  -- Indication: For dvt    Coumadin 7.5 mg oral tablet  -- 1 tab(s) by mouth once a day dose based on PT/INR  -- Indication: For dvt    gabapentin 100 mg oral capsule  -- 2 cap(s) by mouth 3 times a day  -- Indication: For Chronic pain    metformin 500 mg oral tablet  -- 1 tab(s) by mouth once a day  -- Indication: For diabete type 2    glyBURIDE 5 mg oral tablet  -- 1 tab(s) by mouth once a day  -- Indication: For diabetes type 2    insulin glargine  -- 30 unit(s) subcutaneous once a day (at bedtime)  -- Indication: For diabetes type 2    atorvastatin 20 mg oral tablet  -- 1 tab(s) by mouth once a day (at bedtime)  -- Indication: For DYSLIPIDEMIA    albuterol-ipratropium 2.5 mg-0.5 mg/3 mL inhalation solution  -- 3 milliliter(s) inhaled every 6 hours  -- Indication: For COPD    ertapenem 1 g injection  -- 1 dose(s) injectable once a day ( until 3/25/17)  -- Indication: For BACTEREMIA    furosemide 20 mg oral tablet  -- 2 tab(s) by mouth once a day  -- Indication: For DIASTOILC HEART FAILURE    omeprazole 40 mg oral delayed release capsule  -- 1 cap(s) by mouth once a day  -- Indication: For GERD    Centrum Silver oral tablet  -- 1 tab(s) by mouth once a day  -- Indication: For SupPLEMENT I will START or STAY ON the medications listed below when I get home from the hospital:    Weekly cbc, cmp ---------- fax [phone]  -- Indication: For long term antibiotics    budesonide 0.5 mg/2 mL inhalation suspension  -- 0.5 milligram(s) inhaled 2 times a day  -- Indication: For Copd    methylPREDNISolone  --   4mg before break fast (on 18, 19,20)  4 mg after lunch (on 17, 18)  4 mg after dinner (on 17)  4 mg HS (on 17, 18,19)  -- Indication: For Chronic obstructive pulmonary disease (COPD)    acetaminophen 325 mg oral tablet  -- 2 tab(s) by mouth every 6 hours, As needed,   -- Indication: For Pain    aspirin 81 mg oral tablet, chewable  -- 1 tab(s) by mouth once a day  -- Indication: For Cad    losartan 100 mg oral tablet  -- 1 tab(s) by mouth once a day  -- Indication: For Hypertension    tamsulosin 0.4 mg oral capsule  -- 1 cap(s) by mouth once a day (at bedtime)  -- Indication: For bph    enoxaparin  -- 100 milligram(s) subcutaneous every 12 hours bridge with coumadin  -- Indication: For dvt    Coumadin 5 mg oral tablet  -- 1 tab(s) by mouth once a day  Please dose coumadin as per INR, (goal   2-3)  -- Indication: For Deep vein thrombosis (DVT)    gabapentin 100 mg oral capsule  -- 2 cap(s) by mouth 3 times a day  -- Indication: For Chronic pain    metformin 500 mg oral tablet  -- 1 tab(s) by mouth once a day  -- Indication: For diabete type 2    glyBURIDE 5 mg oral tablet  -- 1 tab(s) by mouth once a day  -- Indication: For diabetes type 2    insulin glargine  -- 30 unit(s) subcutaneous once a day (at bedtime)  -- Indication: For diabetes type 2    atorvastatin 20 mg oral tablet  -- 1 tab(s) by mouth once a day (at bedtime)  -- Indication: For DYSLIPIDEMIA    albuterol-ipratropium 2.5 mg-0.5 mg/3 mL inhalation solution  -- 3 milliliter(s) inhaled every 6 hours  -- Indication: For COPD    ertapenem 1 g injection  -- 1 dose(s) injectable once a day ( until 3/25/17)  -- Indication: For BACTEREMIA    furosemide 20 mg oral tablet  -- 2 tab(s) by mouth once a day  -- Indication: For DIASTOILC HEART FAILURE    docusate sodium 100 mg oral capsule  -- 1 cap(s) by mouth 3 times a day, As needed, Constipation  -- Indication: For Constipation    senna oral tablet  -- 2 tab(s) by mouth once a day (at bedtime), As needed, Constipation  -- Indication: For Constipation    omeprazole 40 mg oral delayed release capsule  -- 1 cap(s) by mouth once a day  -- Indication: For GERD    Centrum Silver oral tablet  -- 1 tab(s) by mouth once a day  -- Indication: For SupPLEMENT

## 2024-05-19 NOTE — PROGRESS NOTES
Colorectal Surgery Progress Note  Date: 5/19/2024       S: low bps noted overnight. I/o not accurately recorded so not sure what fluid balance is. Feeling very weak/tired. Denies abdominal pain. Having a little appetite in the morning but not much later in day. Interviewed with phone     O: Temp:  [97.8  F (36.6  C)-98.7  F (37.1  C)] 97.8  F (36.6  C)  Pulse:  [54-74] 54  Resp:  [16-18] 18  BP: (77-97)/(50-59) 86/53  SpO2:  [98 %-100 %] 100 %    Intake/Output Summary (Last 24 hours) at 5/19/2024 0718  Last data filed at 5/19/2024 0040  Gross per 24 hour   Intake 480 ml   Output --   Net 480 ml       Exam:  Gen: aaox3, nad, nontoxic  Pulm: breathing nonlabored  Abd: soft, NTTP, ND, stoma pink, stool in bag  Ext: moving all ext      Labs: reviewed. normal    A/P: 43YF with a history of Crohn's c/b colonic stricture s/p ex lap, resection of descending colon and colostomy creation in 2022, managed with Humira and AZA who presents with worsening ileocolic Crohn's with fistula and abscess.   - continue diet and TPN given minimal intake, encourage oral intake  - need to monitor I&O accurately  - continue iv abx for now until getting closer to discharge  - may need to rescan sooner if no significant improvement in next day or so  - will continue to follow  Total time spent including non face to face time: 21 minutes  Graciela Leiva MD, MS, FACS, FASCRS  [facility]  Office: [phone]  5/19/2024 7:18 AM

## 2024-05-19 NOTE — PROGRESS NOTES
[facility] DIGESTIVE HEALTH PROGRESS NOTE      Subjective:                Feels the same     Admitted 5/9  with sepsis requiring ICU and pressors. CT demonstrated abscess along colonic wall .  IR avoided drainage procedure due to concern about creating fistula  and difficulty of procedure.  Surgery following and consulted colorectal surgery 5/14.    Started TPN 5/13 and ADAT 5/16.     Follow up CT 5/15/2024: Decreased  pericolonic abscess along deep wall of proximal transverse colon now has internal gas attenuation suggesting continuation with bowel wall lumen (fistulizing).  Decreased in size 5.5 x 1.2 cm thickness.  Unchanged long segment continuous wall thickening of ileum and colon particularly transverse colon up to colostomy.  Duffy's pouch and lower left quadrant.  Development of mild four-quadrant abdominal ascites and small bilateral effusions.     Current medications (incomplete list)  Infliximab (Remicade) 10 mg/kg 1st dose 5/15/2024  Zosyn  TPN started 5/13  Venofer IV iron every day started 5/14  Lovenox prophylaxis   Dilaudid as needed  Protonix     Previous medications (incomplete list)  Hydrocortisone (Cortef) 25 mg Q 12 hr (Discontinue 5/14)  Prednisone 30 mg (Discontinued 5/14)  Azathioprine 100 mg (Held on admit)  Humira 40 mg every 2 weeks.  Last dose 5/8/24 (Discontinued)     B12: 861  Prealbumin:  7.7 -> 14.4  Vitamin D:  8  Zinc:  60     Assessment:       43-year-old with celiac disease and severe small bowel and stricturing & perforating colonic Crohn's with resection of descending and colostomy since November 2022.  Was on dual therapy Humira and azathioprine over the past 7 months but had progression of disease.  Admitted with sepsis due to fistula and abscess off transverse colon.  Albumin 1.9.  Hemoglobin 7.5.  Prealbumin:  7.7.  Vitamin D of 8.  Currently on Zosyn, TPN, IV iron, and started infliximab 10 mg/kg on 5/15.  Steroids stopped 5/14.       Considered  a failure of Humira and  "azathioprine and started infliximab (Remicade).  Stopped steroids.  Would want high infliximab trough level > 20 ug/dL.   Monitor closely with addition of infliximab and repeat imaging in 5 to 7 days.   Appreciate colorectal surgery involvement.     Plan:     - Diet (gluten free) per CRSAL.  Starting orals and tapering TPN  - Would give antibiotics for ~ 2 months.  After Zosyn switch to Augmentin  - Started infliximab 5/15 and will need to complete induction in 2 weeks and 6 weeks  - I notified [facility] financial dept. that she will need outpatient infliximab at our infusion center   - Check trough level ~ a week after initial infusion ~ 5/22. Want level well above 20 ug/dL  - Complete IV iron  - Plan on repeat CT scan in 1-2 days per CRSAL  - Restart azathioprine at discharge  - Start vitamin D  50,000 international unit(s) twice a week  -Address bone density as an outpatient  -Concern with restricted diet with both celiac disease and report of \"severe\" allergy to soy            Objective:                Vital signs in last 24 hrs;  Temp:  [97.8  F (36.6  C)-98.7  F (37.1  C)] 98.6  F (37  C)  Pulse:  [54-74] 63  Resp:  [16-18] 18  BP: (81-97)/(52-59) 92/59  SpO2:  [99 %-100 %] 100 %    Physical Exam:   General: Comfortable appearing. Awake.   Cardiovascular: Regular rate. No edema  Chest: Non-labored breathing. Symmetric chest rise.   Abdomen: soft, non-tender, non-distended  Neurologic: Alert, no focal defects.     Current Labs:  CBC RESULTS:   Recent Labs   Lab 05/19/24  0636 05/18/24  0535 05/17/24  0458   WBC 6.2 5.7 3.7*   RBC 3.22* 3.10* 2.73*   HGB 9.3* 9.2* 8.6*   HCT 31.0* 29.6* 27.7*   MCV 96 96 102*   MCH 28.9 29.7 31.5   MCHC 30.0* 31.1* 31.0*   RDW 17.6* 17.2* 16.8*    334 287        CMP Results:   Recent Labs   Lab 05/19/24  0742 05/19/24  0643 05/19/24  0636 05/18/24  0540 05/18/24  0535 05/17/24  0909 05/17/24  0644 05/13/24  1156 05/13/24  0614   NA  --   --  138  --  138  --  141   < > 143 "   POTASSIUM  --   --  4.2  --  4.3  --  4.0   < > 4.4   CHLORIDE  --   --  103  --  102  --  102   < > 110*   CO2  --   --  28  --  29  --  31*   < > 28   ANIONGAP  --   --  7  --  7  --  8   < > 5*   * 108* 112*   < > 119*   < > 126*   < > 151*   BUN  --   --  19.5  --  17.9  --  11.8   < > 22.3*   CR  --   --  0.57  --  0.54  --  0.62   < > 0.49*   BILITOTAL  --   --   --   --   --   --   --   --  0.2   ALKPHOS  --   --   --   --   --   --   --   --  126   ALT  --   --   --   --   --   --   --   --  10   AST  --   --   --   --   --   --   --   --  12    < > = values in this interval not displayed.        INR Results:   Lab Results   Component Value Date    INR 1.08 05/13/2024    INR 1.12 05/12/2024    INR 1.04 05/03/2024       Relevant Imaging:  No results found.             Luis Miguel Traore M.D.  Thank you for the opportunity to participate in the care of this patient.   Please feel free to call me with any questions or concerns.  Phone number [phone].

## 2024-05-19 NOTE — PLAN OF CARE
Goal Outcome Evaluation:       Patient denies pain this shift.  Tolerating a low fiber diet.  Continue TPN and IV antibiotics.  Independent in room, call light within reach.

## 2024-05-19 NOTE — PROVIDER NOTIFICATION
Pt's BP 87/53 and after ambulating to the toilet BP rechecked 86/53. Pt stated that is feeling very tired. Page sent to House Officer. Per Dr. Rucker, will continue to monitor pt's BP.

## 2024-05-19 NOTE — PHARMACY-CONSULT NOTE
"Pharmacy Note: Parenteral Nutrition (PN) Management    Pharmacist consulted to dose PN for Dain Tejeda, a 43 year old female by Dr. Robles.    Subjective:    The patient is a new PN start.    The patient was started on PN in the hospital on 5/11/24.    Indication for PN therapy: bowel obstruction    Inadequate nutrition existing for > 7 days.     Enteral nutrition contraindicated due to: small bowel obstruction.    Pertinent diseases and other special considerations  soy and gluten allergies    Social History     Tobacco Use    Smoking status: Never    Smokeless tobacco: Never   Substance Use Topics    Alcohol use: No    Drug use: No     Objective:    Ht Readings from Last 1 Encounters:   05/10/24 1.575 m (5' 2\")     Wt Readings from Last 1 Encounters:   05/13/24 47.7 kg (105 lb 2.6 oz)       Body mass index is 19.23 kg/m .    No data found.    Labs:  Last 3 days:  Recent Labs     05/17/24  0458 05/17/24  0644 05/17/24  0646 05/18/24  0535 05/19/24  0636   NA  --  141  --  138 138   POTASSIUM  --  4.0  --  4.3 4.2   CHLORIDE  --  102  --  102 103   CO2  --  31*  --  29 28   BUN  --  11.8  --  17.9 19.5   CR  --  0.62  --  0.54 0.57   ALEX  --  8.8  --  9.1 9.0   MAG 2.3  --   --  2.1 2.0   PHOS 3.8  --   --  3.0 3.4   PREALB  --   --  33.0  --   --    TRIG  --  177*  --   --   --    HGB 8.6*  --   --  9.2* 9.3*   HCT 27.7*  --   --  29.6* 31.0*     --   --  334 320       Glucose (past 48 hours):   Recent Labs     05/18/24  0535 05/18/24  0540 05/18/24  0803 05/18/24  1214 05/18/24  1701 05/18/24  2150 05/19/24  0249 05/19/24  0636 05/19/24  0643 05/19/24  0742   * 119* 140* 131* 114* 123* 140* 112* 108* 136*       Intake/Output (last 24 hours): I/O last 3 completed shifts:  In: 480 [P.O.:480]  Out: -     Estimated CrCl: Estimated Creatinine Clearance: 97.8 mL/min (based on SCr of 0.57 mg/dL).    Assessment:    Continue patient on PN therapy as a continuous central therapy.     Given the patient's current " condition/oral intake, PN is still indicated.    Lab results reviewed: Labs in goal range - no changes to TPN    Plan:  Rate of PN: 60 mL/hr   Formula:   Amino Acids 80 grams  Dextrose 200 grams  Sodium 50 mEq/day  Potassium 60 mEq/day  Calcium 5 mEq/day  Magnesium 6 mEq/day  Phosphorus 6 mMol/day  Chloride: Acetate Ratio 1:2  Standard Multivitamins w/Vitamin K  Trace Elements  Fat Emulsion: 20%, 250 mL IV 5 times weekly on Mon, Wed, Thurs, Sat, Sun  Check TPN panel labs tomorrow.  Pharmacist will continue to follow the patient's lab results, clinical status and blood glucose results and make adjustments as appropriate.    Thank you for the consult.  Cielo Salazar RPH  5/19/2024 10:10 AM

## 2024-05-20 ENCOUNTER — APPOINTMENT (OUTPATIENT)
Dept: INTERPRETER SERVICES | Facility: CLINIC | Age: 44
End: 2024-05-20
Payer: COMMERCIAL

## 2024-05-20 LAB
ALBUMIN SERPL BCG-MCNC: 3.5 G/DL (ref 3.5–5.2)
ALP SERPL-CCNC: 114 U/L (ref 40–150)
ALT SERPL W P-5'-P-CCNC: 26 U/L (ref 0–50)
ANION GAP SERPL CALCULATED.3IONS-SCNC: 6 MMOL/L (ref 7–15)
AST SERPL W P-5'-P-CCNC: 17 U/L (ref 0–45)
ATRIAL RATE - MUSE: 71 BPM
BILIRUB SERPL-MCNC: 0.3 MG/DL
BUN SERPL-MCNC: 18.9 MG/DL (ref 6–20)
CALCIUM SERPL-MCNC: 8.9 MG/DL (ref 8.6–10)
CHLORIDE SERPL-SCNC: 105 MMOL/L (ref 98–107)
CREAT SERPL-MCNC: 0.57 MG/DL (ref 0.51–0.95)
DEPRECATED HCO3 PLAS-SCNC: 28 MMOL/L (ref 22–29)
DIASTOLIC BLOOD PRESSURE - MUSE: NORMAL MMHG
EGFRCR SERPLBLD CKD-EPI 2021: >90 ML/MIN/1.73M2
ERYTHROCYTE [DISTWIDTH] IN BLOOD BY AUTOMATED COUNT: 18.2 % (ref 10–15)
GLUCOSE BLDC GLUCOMTR-MCNC: 116 MG/DL (ref 70–99)
GLUCOSE BLDC GLUCOMTR-MCNC: 130 MG/DL (ref 70–99)
GLUCOSE BLDC GLUCOMTR-MCNC: 73 MG/DL (ref 70–99)
GLUCOSE SERPL-MCNC: 135 MG/DL (ref 70–99)
HCT VFR BLD AUTO: 31.8 % (ref 35–47)
HGB BLD-MCNC: 9.7 G/DL (ref 11.7–15.7)
INR PPP: 0.98 (ref 0.85–1.15)
INTERPRETATION ECG - MUSE: NORMAL
MAGNESIUM SERPL-MCNC: 2.1 MG/DL (ref 1.7–2.3)
MCH RBC QN AUTO: 29.6 PG (ref 26.5–33)
MCHC RBC AUTO-ENTMCNC: 30.5 G/DL (ref 31.5–36.5)
MCV RBC AUTO: 97 FL (ref 78–100)
P AXIS - MUSE: 56 DEGREES
PHOSPHATE SERPL-MCNC: 3 MG/DL (ref 2.5–4.5)
PLATELET # BLD AUTO: 313 10E3/UL (ref 150–450)
POTASSIUM SERPL-SCNC: 4.2 MMOL/L (ref 3.4–5.3)
PR INTERVAL - MUSE: 160 MS
PREALB SERPL-MCNC: 39.2 MG/DL (ref 20–40)
PROT SERPL-MCNC: 7.6 G/DL (ref 6.4–8.3)
QRS DURATION - MUSE: 64 MS
QT - MUSE: 376 MS
QTC - MUSE: 408 MS
R AXIS - MUSE: 68 DEGREES
RBC # BLD AUTO: 3.28 10E6/UL (ref 3.8–5.2)
SODIUM SERPL-SCNC: 139 MMOL/L (ref 135–145)
SYSTOLIC BLOOD PRESSURE - MUSE: NORMAL MMHG
T AXIS - MUSE: 71 DEGREES
VENTRICULAR RATE- MUSE: 71 BPM
WBC # BLD AUTO: 6 10E3/UL (ref 4–11)

## 2024-05-20 PROCEDURE — 84100 ASSAY OF PHOSPHORUS: CPT | Performed by: INTERNAL MEDICINE

## 2024-05-20 PROCEDURE — B4185 PARENTERAL SOL 10 GM LIPIDS: HCPCS | Mod: JZ | Performed by: STUDENT IN AN ORGANIZED HEALTH CARE EDUCATION/TRAINING PROGRAM

## 2024-05-20 PROCEDURE — 250N000009 HC RX 250: Performed by: STUDENT IN AN ORGANIZED HEALTH CARE EDUCATION/TRAINING PROGRAM

## 2024-05-20 PROCEDURE — 84134 ASSAY OF PREALBUMIN: CPT | Performed by: INTERNAL MEDICINE

## 2024-05-20 PROCEDURE — 250N000011 HC RX IP 250 OP 636: Performed by: INTERNAL MEDICINE

## 2024-05-20 PROCEDURE — 250N000009 HC RX 250: Mod: JZ | Performed by: STUDENT IN AN ORGANIZED HEALTH CARE EDUCATION/TRAINING PROGRAM

## 2024-05-20 PROCEDURE — 99232 SBSQ HOSP IP/OBS MODERATE 35: CPT | Mod: GC

## 2024-05-20 PROCEDURE — 120N000001 HC R&B MED SURG/OB

## 2024-05-20 PROCEDURE — 85027 COMPLETE CBC AUTOMATED: CPT | Performed by: INTERNAL MEDICINE

## 2024-05-20 PROCEDURE — 250N000013 HC RX MED GY IP 250 OP 250 PS 637

## 2024-05-20 PROCEDURE — 250N000013 HC RX MED GY IP 250 OP 250 PS 637: Performed by: STUDENT IN AN ORGANIZED HEALTH CARE EDUCATION/TRAINING PROGRAM

## 2024-05-20 PROCEDURE — 83735 ASSAY OF MAGNESIUM: CPT | Performed by: INTERNAL MEDICINE

## 2024-05-20 PROCEDURE — 80053 COMPREHEN METABOLIC PANEL: CPT | Performed by: INTERNAL MEDICINE

## 2024-05-20 PROCEDURE — 250N000011 HC RX IP 250 OP 636: Performed by: COLON & RECTAL SURGERY

## 2024-05-20 PROCEDURE — 85610 PROTHROMBIN TIME: CPT | Performed by: INTERNAL MEDICINE

## 2024-05-20 RX ORDER — HYDROXYZINE HYDROCHLORIDE 10 MG/1
10 TABLET, FILM COATED ORAL
Status: COMPLETED | OUTPATIENT
Start: 2024-05-20 | End: 2024-05-20

## 2024-05-20 RX ADMIN — FISH OIL 100 ML: 0.1 INJECTION, EMULSION INTRAVENOUS at 02:02

## 2024-05-20 RX ADMIN — PIPERACILLIN AND TAZOBACTAM 3.38 G: 3; .375 INJECTION, POWDER, FOR SOLUTION INTRAVENOUS at 04:55

## 2024-05-20 RX ADMIN — ENOXAPARIN SODIUM 40 MG: 40 INJECTION SUBCUTANEOUS at 20:28

## 2024-05-20 RX ADMIN — PIPERACILLIN AND TAZOBACTAM 3.38 G: 3; .375 INJECTION, POWDER, FOR SOLUTION INTRAVENOUS at 20:28

## 2024-05-20 RX ADMIN — PANTOPRAZOLE SODIUM 40 MG: 40 TABLET, DELAYED RELEASE ORAL at 06:16

## 2024-05-20 RX ADMIN — PIPERACILLIN AND TAZOBACTAM 3.38 G: 3; .375 INJECTION, POWDER, FOR SOLUTION INTRAVENOUS at 12:12

## 2024-05-20 RX ADMIN — HYDROXYZINE HYDROCHLORIDE 10 MG: 10 TABLET ORAL at 02:00

## 2024-05-20 RX ADMIN — MAGNESIUM SULFATE HEPTAHYDRATE: 500 INJECTION, SOLUTION INTRAMUSCULAR; INTRAVENOUS at 20:29

## 2024-05-20 ASSESSMENT — ACTIVITIES OF DAILY LIVING (ADL)
ADLS_ACUITY_SCORE: 22

## 2024-05-20 NOTE — PROGRESS NOTES
[facility]    Progress Note - Hospitalist Service       Date of Admission:  5/10/2024    Assessment & Plan   Dain Tejeda is a 43 year old female with history of Crohn's disease complicated by colonic stricture s/p partial colectomy (11/2022), Celiac disease, SIOMARA admitted for septic shock from intraabdominal abscess with developing fistula.      Crohn's disease, complicated by abscess, developing fistula and ileitis   S/p partial colectomy 11/2022, has LLQ colostomy   Sepsis 2/2 intra-abdominal infection - resolved  Diagnosed in late 2022, complicated by stricture s/p partial colectomy with ostomy. Follows with [facility], on Humira and azathioprine. Was recently admitted on our service 4/30-5/4 after syncopal episode thought to be 2/2 Crohn's flare. GI consulted at that time and recommended prednisone taper over 4 weeks, unclear whether patient was taking steroid as prescribed since discharge. Presented to ED on 5/10 after developing fevers, nausea and vomiting. Developed hypotension in the ED despite fluid resuscitation and was transferred to the ICU for pressor support. CT showing marked progression of Crohn's with colonic obstruction, 10 cm intramural abscess, developing fistula and significant ileitis. Septic shock most likely secondary to colonic abscess. Transferred out of ICU on 5/12 after being off pressors for 24 hours. Repeat CT showed decreased size of abscess along deep wall of proximal transverse colon which now has internal gas attenuation and suggests continuity with bowel lumen. No surgical intervention necessary at this time, will treat Crohn's disease medically with infliximab. Stopped steroids given fistulating disease and abscess  - Colorectal consulted, appreciate recommendations                - Advanced to gluten free and low fiber diet, encouraging oral intake              - Discussed importance of ambulation              - Once diet progresses well and TPN weaned  successfully, can discharge   - IV Zosyn. Plan for antibiotics for two months (switch from Zosyn to Augmentin at discharge)              - Follow-up with repeat CT, either inpatient or at colorectal clinic outpatient per colorectal recs  - GI consulted  - TPN started 5/11.   - Started Infliximab on 5/15 - check trough level on ~ 5/22   - Repeat CT 5-7 days after starting inflixmab (5/20-5/22)  - Resume PTA azathioprine at discharge  - Start vitamin D 49096 units twice weekly  - PRN Tylenol and IV dilaudid for pain  - Zofran PRN      Normocytic anemia  Leukopenia   History of SIOMARA with recent iron infusions through [facility]. Hgb 9.8 on admission, normal differential at that time. Colostomy output appears non-bloody. Hgb initially tending down, most recently 8.2, MCV 93. Continues to have no signs of bleeding. Iron studies in Feb largely normal with mildly low TIBC and iron saturation. May be more consistent with anemia of chronic disease. Worsening leukopenia on 5/14, most recently 3.5. Reticulocytes 1.9, indicating underproduction. Repeat CBC with differential normal. Blood smear showed mild normochromic-normocytic anemia, mild leukopenia without dysplastic or megaloblastic features. Most likely a combination of iron deficient anemia and anemia of chronic disease. Leukopenia most likely due to infection/inflammation. Completed course of IV iron while inpatient.   - Daily CBC     Sinus bradycardia  Patient noted to be bradycardic between 40-60's, asymptomatic. EKG x2 shows sinus bradycardia.      Hypokalemia  3.0 on admission, supplementation given in ED.   - Daily BMP  - Replacement per RN protocol     Disorganized behaviors  Demonstrated odd behaviors during recent admission, treated with Seroquel 12.5 mg at bedtime. Psych consult at that time, did not recommend any ongoing meds upon discharge. Patient calm, alert and cooperative this admission so far.  Did discuss starting medications for depression with patient during  this admission, patient desiring to hold off until she feels better and willing to discuss in the outpatient setting.  - Delirium protocol, hold on meds for now  - Discuss MDD/CHRISSY medications in outpatient setting     Gamma gap  Chronic. Tprotein 7.2, albumin 2.7. Most certainly 2/2 inflammatory disease.      Incidental finding: benign pelvic simple cyst  >5 to <=7 cm. Follow up pelvic US in 6 months recommended.         Diet: Diet  Combination Diet Low Fiber, Gluten Free Diet  Calorie Counts  Snacks/Supplements Adult: Lyn Naylor Standard Oral Supplement; Between Meals  parenteral nutrition - ADULT compounded formula  parenteral nutrition - ADULT compounded formula CYCLE    DVT Prophylaxis: Enoxaparin (Lovenox) SQ  Chambers Catheter: Not present  Fluids: TPN, PO  Lines: PRESENT   PICC 05/10/24 Triple Lumen Right Basilic Vasopressor-Site Assessment: WDL      Cardiac Monitoring: ACTIVE order. Indication: palpitations while sleeping  Code Status: Full Code      Clinically Significant Risk Factors           # Hypoalbuminemia: Lowest albumin = 1.9 g/dL at 5/12/2024  4:29 AM, will monitor as appropriate                       Disposition Plan   Expected Discharge Date: 05/19/2024    Discharge Delays: IV Medication - consider oral or Home Infusion  Specialist Consult (enter specialist & decision needed in comments)  Destination: home with family  Discharge Comments: IV abx; TPN; trending Hgb        The patient's care was discussed with the Attending Physician, Dr. Dunlap .    Phoebe Sandhu MD  Hospitalist Service  [facility]  ______________________________________________________________________    Interval History     No acute events overnight. Appears more sad today, feels like she is stuck in the hospital. Feels stressed with prolonged hospitalization. She notes that she feels hungry today and is looking forward to lunch, did not eat breakfast. Discussed the importance of continued oral diet to  get to discharge. Denies nausea/vomiting.     Physical Exam   Vital Signs: Temp: 98.1  F (36.7  C) Temp src: Oral BP: 98/66 Pulse: 72   Resp: 20 SpO2: 100 % O2 Device: None (Room air)    Weight: 105 lbs 2.55 oz    General: Alert and oriented x 3, no acute distress  HEENT:  no conjunctival injection or icterus, moist mucous membranes.   Resp: clear to ausculation bilaterally, no rales or wheezes  Cardio: RRR, S1 and S2 present, no S3 or S4, no rubs or murmurs  Abdomen: soft, nontender, non-distended. Ostomy bag appears to be draining stool appropriately.  Extremities: no erythema or edema  Psych: Flat affect, poor insight into medical condition, though does have appropriate decision-making capacity. More tearful today.     Data   ------------------------- PAST 24 HR DATA REVIEWED -----------------------------------------------    I have personally reviewed the following data over the past 24 hrs:    6.0  \   9.7 (L)   / 313     139 105 18.9 /  116 (H)   4.2 28 0.57 \     ALT: 26 AST: 17 AP: 114 TBILI: 0.3   ALB: 3.5 TOT PROTEIN: 7.6 LIPASE: N/A     INR:  0.98 PTT:  N/A   D-dimer:  N/A Fibrinogen:  N/A       Imaging results reviewed over the past 24 hrs:   No results found for this or any previous visit (from the past 24 hour(s)).

## 2024-05-20 NOTE — PROGRESS NOTES
Colon [facility] Rectal [facility]  Daily Progress Note    Subjective  Saw pt earlier this AM. Flat affect this morning. Phone  used. Tolerated diet yesterday, had rice and chicken. Says she is not wanting to eat as she does not want to push herself. Denies abdominal pain or nausea. Stoma remains productive, no output recorded. Was hopeful to discharge today, did discuss concern for PO intake goal regarding TPN.     Objective  Intake/Output last 24 hrs:    Intake/Output Summary (Last 24 hours) at 5/20/2024 1250  Last data filed at 5/20/2024 0600  Gross per 24 hour   Intake 200 ml   Output --   Net 200 ml     Temp:  [97.8  F (36.6  C)-98.1  F (36.7  C)] 98.1  F (36.7  C)  Pulse:  [63-84] 72  Resp:  [18-22] 20  BP: (86-98)/(56-66) 98/66  SpO2:  [98 %-100 %] 100 %    Physical Exam:  General: awake, alert, sitting up in bed, in no acute distress  Head: normocephalic, atraumatic  Respiratory: non-labored breathing  Abdomen: soft, non tender, non-distended. Stoma pink and viable with stool in appliance.   Skin: No rashes or lesions  Musculoskeletal: moves all four extremities equally  Psychological: alert and oriented, answers questions appropriately    Pertinent Labs  Lab Results: personally reviewed.  Lab Results   Component Value Date     05/20/2024     05/19/2024     05/18/2024    .0 04/24/2017    CO2 28 05/20/2024    CO2 28 05/19/2024    CO2 29 05/18/2024    CO2 25 06/23/2022    CO2 23 06/03/2022    CO2 22 06/02/2022    CO2 26.0 04/24/2017    BUN 18.9 05/20/2024    BUN 19.5 05/19/2024    BUN 17.9 05/18/2024    BUN 6 06/23/2022    BUN 3 06/03/2022    BUN 4 06/02/2022    BUN 7.0 04/24/2017     Lab Results   Component Value Date    WBC 6.0 05/20/2024    WBC 6.2 05/19/2024    WBC 5.7 05/18/2024    HGB 9.7 05/20/2024    HGB 9.3 05/19/2024    HGB 9.2 05/18/2024    HGB 10.8 01/10/2018    HGB 10.3 11/08/2017    HGB 9.8 09/18/2017    HCT 31.8 05/20/2024    HCT 31.0 05/19/2024    HCT 29.6 05/18/2024     HCT 35.9 01/10/2018    HCT 33.8 11/08/2017    HCT 31.4 07/31/2017    MCV 97 05/20/2024    MCV 96 05/19/2024    MCV 96 05/18/2024    MCV 83.1 01/10/2018    MCV 77.9 11/08/2017    MCV 86.3 07/31/2017     05/20/2024     05/19/2024     05/18/2024       Assessment/Plan: 43YF with a history of Crohn's c/b colonic stricture s/p ex lap, resection of descending colon and colostomy creation in 2022, managed with Humira and AZA who presents with worsening ileocolic Crohn's with fistula and abscess.     WBC 6.0  Hgb 9.7  Cr 0.57  Albumin 3.5    -Will hold off on repeat CT for now, may reconsider if no significant improvement or worsening  -Continue diet and TPN until goal PO intake and able to discontinue TPN  -IV abx until closer to discharge  -Monitor I&Os closely  -Crohn's management per GI  -Will continue to follow    Discusses with PERCY Mckeon-C  [facility]  [phone]..............................main

## 2024-05-20 NOTE — PROGRESS NOTES
Care Management Follow Up    Length of Stay (days): 10    Expected Discharge Date: 05/21/2024     Concerns to be Addressed: discharge planning ; medical progression    Patient plan of care discussed at interdisciplinary rounds: Yes    Anticipated Discharge Disposition:  home     Anticipated Discharge Services:  TBD  Anticipated Discharge DME:  [facility]    Patient/family educated on Medicare website which has current facility and service quality ratings:  NA  Education Provided on the Discharge Plan:  NA  Patient/Family in Agreement with the Plan:  NA    Referrals Placed by CM/SW:  [facility]  Private pay costs discussed: Not applicable    Additional Information:  Chart reviewed. Patient on TPN & IV abx.  Per colorectal sx, continue TPN until gaol PO intake and plan to switch to oral abx closer to discharge.       Social HX: lives in an apartment with her  Dmitriy Camrago and 6 year old daughter. She is independent with ADLs and IADLs.     CM will continue to follow care progression and aide in discharge planning as needed.     Angela Elder RN

## 2024-05-20 NOTE — PROGRESS NOTES
[facility] DIGESTIVE HEALTH PROGRESS NOTE      Subjective:                No acute changes overnight. Feels about the same. Flat affect, sad and sobbing.     Admitted 5/9  with sepsis requiring ICU and pressors. CT demonstrating abscess along colonic wall. IR avoided drainage procedure due to concern about creating fistula  and difficulty of procedure. Surgery following and consulted colorectal surgery 5/14. Started TPN 5/13 then ADAT 5/16.     Follow up CT 5/15/2024: Decreased  pericolonic abscess along deep wall of proximal transverse colon now has internal gas attenuation suggesting continuation with bowel wall lumen (fistulizing).  Decreased in size 5.5 x 1.2 cm thickness.  Unchanged long segment continuous wall thickening of ileum and colon particularly transverse colon up to colostomy.  Duffy's pouch and lower left quadrant.  Development of mild four-quadrant abdominal ascites and small bilateral effusions.     Current medications (incomplete list)  Infliximab (Remicade) 10 mg/kg 1st dose 5/15/2024  Zosyn  TPN started 5/13  Venofer IV iron every day started 5/14  Lovenox prophylaxis   Dilaudid as needed  Protonix     Previous medications (incomplete list)  Hydrocortisone (Cortef) 25 mg Q 12 hr (Discontinue 5/14)  Prednisone 30 mg (Discontinued 5/14)  Azathioprine 100 mg (Held on admit)  Humira 40 mg every 2 weeks.  Last dose 5/8/24 (Discontinued)     B12: 861  Prealbumin:  7.7 -> 14.4  Vitamin D:  8  Zinc:  60.6      Objective:                Body mass index is 19.23 kg/m .  Vital signs in last 24 hrs;  Temp:  [97.8  F (36.6  C)-98  F (36.7  C)] 98  F (36.7  C)  Pulse:  [63-84] 66  Resp:  [18-22] 22  BP: (86-97)/(56-62) 93/57  SpO2:  [98 %-100 %] 100 %    Physical Exam:   General: Comfortable appearing. Awake.   Cardiovascular: Regular rate. No edema  Chest: Non-labored breathing. Symmetric chest rise.   Abdomen: soft, non-tender, non-distended  Neurologic: Alert, no focal defects.     Current Labs:  CMP Results:    Recent Labs   Lab 05/20/24  0622 05/20/24  0610 05/20/24  0025 05/19/24  1828 05/19/24  0643 05/19/24  0636 05/18/24  0540 05/18/24  0535 05/17/24  0909 05/17/24  0644   NA  --  139  --   --   --  138  --  138  --  141   POTASSIUM  --  4.2  --   --   --  4.2  --  4.3  --  4.0   CHLORIDE  --  105  --   --   --  103  --  102  --  102   CO2  --  28  --   --   --  28  --  29  --  31*   * 135* 130* 118*   < > 112*   < > 119*   < > 126*   BUN  --  18.9  --   --   --  19.5  --  17.9  --  11.8   CR  --  0.57  --   --   --  0.57  --  0.54  --  0.62   BILITOTAL  --  0.3  --   --   --   --   --   --   --   --    ALKPHOS  --  114  --   --   --   --   --   --   --   --    ALT  --  26  --   --   --   --   --   --   --   --    AST  --  17  --   --   --   --   --   --   --   --     < > = values in this interval not displayed.      CBC  Recent Labs   Lab 05/20/24  0610 05/19/24  0636 05/18/24  0535 05/17/24  0458   WBC 6.0 6.2 5.7 3.7*   RBC 3.28* 3.22* 3.10* 2.73*   HGB 9.7* 9.3* 9.2* 8.6*   HCT 31.8* 31.0* 29.6* 27.7*   MCV 97 96 96 102*   RDW 18.2* 17.6* 17.2* 16.8*    320 334 287     INR  Recent Labs   Lab 05/20/24  0610   INR 0.98      LIPASENo lab results found in last 7 days.    Relevant Imaging:        Assessment:       43-year-old with celiac disease and severe small bowel and stricturing & perforating colonic Crohn's with resection of descending and colostomy since November 2022.  Was on dual therapy Humira and azathioprine over the past 7 months but has progression of disease.  Admitted with sepsis due to fistula and abscess off transverse colon.  Albumin 1.9.  Hemoglobin 7.5.  Prealbumin:  7.7.  Vitamin D of 8.  Currently on Zosyn, TPN, IV iron, and started infliximab 10 mg/kg on 5/15.  Steroids stopped 5/14.       Considered a failure of Humira and azathioprine and started infliximab (Remicade).  Stopped steroids.  Would want high infliximab trough level > 20 ug/dL.   Monitor closely with addition of  infliximab and repeat imaging in 5 to 7 days.   Appreciate colorectal surgery involvement.     Plan:     - Diet (gluten free) per colorectal surgery  - Would give antibiotics for ~ 2 months.  After Zosyn switch to Augmentin  - Started infliximab 5/15 and will need to complete induction in 2 weeks and 6 weeks  - I notified [facility] financial dept. that she will need outpatient infliximab at our infusion center   - Check trough level ~ a week after initial infusion ~ 5/22. Want level well above 20 ug/dL  - Complete IV iron  - Continue TPN per CRSAL and switch to oral  - Plan on repeat CT scan in a couple days  - Dr Lafleur colorectal surgery following closely  - Restart azathioprine at discharge  - Start vitamin D  50,000 international unit(s) twice a week  - Address bone density as an outpatient          35 minutes of total time was spent providing patient care, including patient evaluation, reviewing documentation/test results and .                                                Rohit Guillen M.D.  Thank you for the opportunity to participate in the care of this patient.   Please feel free to call me with any questions or concerns.  Phone number [phone].

## 2024-05-20 NOTE — PHARMACY-CONSULT NOTE
"Pharmacy Note: Parenteral Nutrition (PN) Management    Pharmacist consulted to dose PN for Dain Tejeda, a 43 year old female by Dr. Robles.    Subjective:    The patient is a new PN start.    The patient was started on PN in the hospital on 5/11/24.    Indication for PN therapy: bowel obstruction    Inadequate nutrition existing for > 7 days.     Enteral nutrition contraindicated due to: small bowel obstruction.    Pertinent diseases and other special considerations  soy and gluten allegies    Social History     Tobacco Use    Smoking status: Never    Smokeless tobacco: Never   Substance Use Topics    Alcohol use: No    Drug use: No     Objective:    Ht Readings from Last 1 Encounters:   05/10/24 1.575 m (5' 2\")     Wt Readings from Last 1 Encounters:   05/13/24 47.7 kg (105 lb 2.6 oz)       Body mass index is 19.23 kg/m .    No data found.    Labs:  Last 3 days:  Recent Labs     05/18/24  0535 05/19/24  0636 05/20/24  0610    138 139   POTASSIUM 4.3 4.2 4.2   CHLORIDE 102 103 105   CO2 29 28 28   BUN 17.9 19.5 18.9   CR 0.54 0.57 0.57   ALEX 9.1 9.0 8.9   MAG 2.1 2.0 2.1   PHOS 3.0 3.4 3.0   PROTTOTAL  --   --  7.6   ALBUMIN  --   --  3.5   HGB 9.2* 9.3* 9.7*   HCT 29.6* 31.0* 31.8*    320 313   BILITOTAL  --   --  0.3   AST  --   --  17   ALT  --   --  26   ALKPHOS  --   --  114   INR  --   --  0.98       Glucose (past 48 hours):   Recent Labs     05/18/24  2150 05/19/24  0249 05/19/24  0636 05/19/24  0643 05/19/24  0742 05/19/24  1259 05/19/24  1828 05/20/24  0025 05/20/24  0610 05/20/24  0622   * 140* 112* 108* 136* 126* 118* 130* 135* 116*       Intake/Output (last 24 hours): I/O last 3 completed shifts:  In: 200 [P.O.:200]  Out: -     Estimated CrCl: Estimated Creatinine Clearance: 97.8 mL/min (based on SCr of 0.57 mg/dL).    Assessment:    Continue patient on PN therapy as a cyclic central therapy.     Given the patient's current condition/oral intake, PN is still indicated.    Lab results " reviewed: Labs in goal range. Decrease macronutrients and change to cyclic per dietary recommendations    Plan:  Rate of PN: 45 mL/hr x1 hour, then 90 mL/hr x8 hours, then 45 mL/hr x1 hour, then stop  Formula:   Amino Acids 50 grams  Dextrose 150 grams  Sodium 50 mEq/day  Potassium 60 mEq/day  Calcium 5 mEq/day  Magnesium 6 mEq/day  Phosphorus 6 mMol/day  Chloride: Acetate Ratio 1:2  Standard Multivitamins w/Vitamin K  Trace Elements  Check BMP and Mag and Phos  labs tomorrow.  Pharmacist will continue to follow the patient's lab results, clinical status and blood glucose results and make adjustments as appropriate.    Thank you for the consult.  Cielo Salazar [facility]  5/20/2024 11:09 AM

## 2024-05-20 NOTE — PROGRESS NOTES
"Nutrition Therapy  Parenteral Nutrition follow-up       Dietitian to Pharmacy    Change TPN to noc cycle and decrease  Rate of TPN: 90 ml/hr x 8 hr and 45 ml/hr x 1 hr before and after  Grams Dextrose: 150  Grams Protein: 50    Lipids: Discontinue         Current Nutrition Intake:  Diet: Low fiber, Gluten free  Supplement: [employer] BID    Po intake: Poor to fair  Calorie count filled out for lunch yesterday at 112 kcal, 3 g protein - rice and chicken broth only  Yesterday Pt ordered breakfast, intake not documented. Did not order supper  Intake yesterday 112-500 kcal, 3-24 g protein for the day    Calorie count starting today  Breakfast today Pt took 1/2 of home freedman potatoes, 1/2 banana and rice at 185 kcal, 2 g protein    Via [employer]  pt reports her appetite is good. Pt sitting in folding chair facing away from door. She reports she is very tired and wants to be left alone to rest and she wants to go home.   She reports she did take a little of the [employer] shake and was not sure if she liked it.   Discussed intake goals to wean TPN. Pt shaking her head \"no\" when discussing intake goals. Flat and depressed today    Nutrition Support: TPN D 200 AA 80 @ 60 ml/hr plus omegaven 5 x per week  Provides:  1144 Calories, 80 g protein, 200 g CHO, 20 g lipid, 1440 ml fluid/day  TPN meets 100% of estimated nutrition needs    Weight Trends  Last weight 5/13  Wt ordered 5/15 and 5/17, none documented    GI:  Colostomy OP: Small to medium liquid to loose     Labs:   Labs reviewed by dietitian  -136 mg/dl past 24 hours    Estimated nutrition needs  Energy needs: 1676-0480 Calories/day ( 25-30 Reggie/Kg)  Justification: Repletion  Protein needs: 53-66+ g/day ( 1.2-1.5+ g/Kg)  Justification: increased needs  Fluid needs: 4398-5697 ml/day  Justification: maintenance    Medications:   Reviewed by dietitian: pantoprazole, zosyn, vit d 3    New Goals  Meet 80% estimated nutrition needs via po intake to discontinue TPN " - unable to assess  Tolerate TPN-met  Electrolytes WNL-met  BG < 180-met    INTERVENTIONS  Implementation  -Educated pt on p.o intake goals to wean TPN. Encouraged protein foods  -Encouraged intake of supplement  -Decrease TPN as pt is taking in some p.o intake:   Discontinue lipids  Decrease TPN to 90 ml/hr x 8 hr + 45 ml/hr x 1 hr before and after Dextrose 150g, Aa50g = 810 ml, 710 kcal, 50g protein, meeting 65% of estimated kcal, 100% of low end of estimated protein needs  -New weight  -continue calorie count    Monitoring/Evaluation  Progress toward goals will be monitored and evaluated per protocol.

## 2024-05-20 NOTE — PLAN OF CARE
Goal Outcome Evaluation:      Plan of Care Reviewed With: patient    Overall Patient Progress: improvingOverall Patient Progress: improving     Patient anxious about heart rate, stating when she sleeps she believes she becomes tachy and she can hear her heart beating in her ears. Messaged provider and patient placed on tele. At 2000 monitor showed heart rate of 170 at the highest. Checked on patient and patient restless and jiggling legs and arms. Messaged house officer and order for EKG obtained. EKG and 8pm tele strip showed NSR. TPN running at 60 mL/hr. Denies pain.

## 2024-05-20 NOTE — PLAN OF CARE
Problem: Sepsis/Septic Shock  Goal: Blood Glucose Level Within Targeted Range  Outcome: Progressing  Intervention: Optimize Glycemic Control  Recent Flowsheet Documentation  Taken 5/20/2024 0000 by Marlo Marinelli, RN  Glycemic Management: oral hydration promoted  Goal: Absence of Infection Signs and Symptoms  Outcome: Progressing  Intervention: Initiate Sepsis Management  Recent Flowsheet Documentation  Taken 5/20/2024 0000 by Marlo Marinelli, RN  Infection Prevention:   rest/sleep promoted   single patient room provided  Intervention: Promote Stabilization  Recent Flowsheet Documentation  Taken 5/20/2024 0000 by Marlo Marinelli, RN  Fluid/Electrolyte Management: fluids provided  Intervention: Promote Recovery  Recent Flowsheet Documentation  Taken 5/20/2024 0000 by Marlo Marinelli, RN  Activity Management: activity adjusted per tolerance     Problem: Colostomy  Goal: Optimal Pain Control and Function  Outcome: Progressing   Goal Outcome Evaluation:       Pt alert and oriented. Hmong speaking  line utilized to communicate. Pt appears to be anxious and was complaining of having jerky movements on her legs. House was paged with order. PRN Hydroxyzine tab given with good relief. TPN infusing.BG overnight within targeted range. On K+ and Mag protocol blood drawn and sent to lab. Pt denies pain. VSS on room air. Slept between cares.

## 2024-05-20 NOTE — PLAN OF CARE
Problem: Adult Inpatient Plan of Care  Goal: Absence of Hospital-Acquired Illness or Injury  Intervention: Identify and Manage Fall Risk  Recent Flowsheet Documentation  Taken 5/20/2024 0856 by Dexter Siddiqi, RN  Safety Promotion/Fall Prevention:   assistive device/personal items within reach   clutter free environment maintained   increased rounding and observation   increase visualization of patient   lighting adjusted   mobility aid in reach   nonskid shoes/slippers when out of bed   patient and family education   room organization consistent   safety round/check completed     Problem: Adult Inpatient Plan of Care  Goal: Optimal Comfort and Wellbeing  Outcome: Progressing     Problem: Adult Inpatient Plan of Care  Goal: Readiness for Transition of Care  Outcome: Progressing    SUBJECTIVE:  Pt a/o with flat affect, VSS. Pt on TPN running at 60 mL/hr; expected to stop TPN @ 1600 today to change formula @ 2000. Pt ambulated in hallway and had fair output for lunch.      Goal Outcome Evaluation:      Plan of Care Reviewed With: patient    Overall Patient Progress: improving    Dexter Santiago RN

## 2024-05-21 ENCOUNTER — VIRTUAL VISIT (OUTPATIENT)
Dept: INTERPRETER SERVICES | Facility: CLINIC | Age: 44
End: 2024-05-21
Payer: COMMERCIAL

## 2024-05-21 VITALS
HEIGHT: 62 IN | TEMPERATURE: 97.8 F | HEART RATE: 71 BPM | OXYGEN SATURATION: 100 % | DIASTOLIC BLOOD PRESSURE: 56 MMHG | BODY MASS INDEX: 16.92 KG/M2 | RESPIRATION RATE: 18 BRPM | WEIGHT: 91.93 LBS | SYSTOLIC BLOOD PRESSURE: 90 MMHG

## 2024-05-21 LAB
ANION GAP SERPL CALCULATED.3IONS-SCNC: 8 MMOL/L (ref 7–15)
BUN SERPL-MCNC: 18.3 MG/DL (ref 6–20)
CALCIUM SERPL-MCNC: 9.3 MG/DL (ref 8.6–10)
CHLORIDE SERPL-SCNC: 105 MMOL/L (ref 98–107)
CREAT SERPL-MCNC: 0.45 MG/DL (ref 0.51–0.95)
DEPRECATED HCO3 PLAS-SCNC: 26 MMOL/L (ref 22–29)
EGFRCR SERPLBLD CKD-EPI 2021: >90 ML/MIN/1.73M2
ERYTHROCYTE [DISTWIDTH] IN BLOOD BY AUTOMATED COUNT: 18.1 % (ref 10–15)
GLUCOSE BLDC GLUCOMTR-MCNC: 129 MG/DL (ref 70–99)
GLUCOSE BLDC GLUCOMTR-MCNC: 154 MG/DL (ref 70–99)
GLUCOSE BLDC GLUCOMTR-MCNC: 95 MG/DL (ref 70–99)
GLUCOSE BLDC GLUCOMTR-MCNC: 99 MG/DL (ref 70–99)
GLUCOSE SERPL-MCNC: 114 MG/DL (ref 70–99)
HCT VFR BLD AUTO: 30.6 % (ref 35–47)
HGB BLD-MCNC: 9.5 G/DL (ref 11.7–15.7)
MAGNESIUM SERPL-MCNC: 1.9 MG/DL (ref 1.7–2.3)
MCH RBC QN AUTO: 30.3 PG (ref 26.5–33)
MCHC RBC AUTO-ENTMCNC: 31 G/DL (ref 31.5–36.5)
MCV RBC AUTO: 98 FL (ref 78–100)
PHOSPHATE SERPL-MCNC: 2.3 MG/DL (ref 2.5–4.5)
PLATELET # BLD AUTO: 313 10E3/UL (ref 150–450)
POTASSIUM SERPL-SCNC: 4.2 MMOL/L (ref 3.4–5.3)
RBC # BLD AUTO: 3.14 10E6/UL (ref 3.8–5.2)
SODIUM SERPL-SCNC: 139 MMOL/L (ref 135–145)
VIT C SERPL-MCNC: 12 UMOL/L
WBC # BLD AUTO: 5.8 10E3/UL (ref 4–11)

## 2024-05-21 PROCEDURE — 250N000013 HC RX MED GY IP 250 OP 250 PS 637: Performed by: STUDENT IN AN ORGANIZED HEALTH CARE EDUCATION/TRAINING PROGRAM

## 2024-05-21 PROCEDURE — 250N000013 HC RX MED GY IP 250 OP 250 PS 637: Performed by: COLON & RECTAL SURGERY

## 2024-05-21 PROCEDURE — 80048 BASIC METABOLIC PNL TOTAL CA: CPT

## 2024-05-21 PROCEDURE — 85014 HEMATOCRIT: CPT | Performed by: INTERNAL MEDICINE

## 2024-05-21 PROCEDURE — 99238 HOSP IP/OBS DSCHRG MGMT 30/<: CPT | Mod: GC

## 2024-05-21 PROCEDURE — 83735 ASSAY OF MAGNESIUM: CPT | Performed by: INTERNAL MEDICINE

## 2024-05-21 PROCEDURE — 250N000011 HC RX IP 250 OP 636: Performed by: INTERNAL MEDICINE

## 2024-05-21 PROCEDURE — T1013 SIGN LANG/ORAL INTERPRETER: HCPCS | Mod: U4,TEL,95 | Performed by: INTERPRETER

## 2024-05-21 PROCEDURE — 84520 ASSAY OF UREA NITROGEN: CPT

## 2024-05-21 PROCEDURE — 82542 COL CHROMOTOGRAPHY QUAL/QUAN: CPT | Performed by: INTERNAL MEDICINE

## 2024-05-21 PROCEDURE — 84100 ASSAY OF PHOSPHORUS: CPT | Performed by: INTERNAL MEDICINE

## 2024-05-21 RX ORDER — ENOXAPARIN SODIUM 100 MG/ML
30 INJECTION SUBCUTANEOUS EVERY 24 HOURS
Status: DISCONTINUED | OUTPATIENT
Start: 2024-05-21 | End: 2024-05-21 | Stop reason: HOSPADM

## 2024-05-21 RX ADMIN — AMOXICILLIN AND CLAVULANATE POTASSIUM 1 TABLET: 875; 125 TABLET, FILM COATED ORAL at 10:47

## 2024-05-21 RX ADMIN — PANTOPRAZOLE SODIUM 40 MG: 40 TABLET, DELAYED RELEASE ORAL at 09:39

## 2024-05-21 RX ADMIN — PIPERACILLIN AND TAZOBACTAM 3.38 G: 3; .375 INJECTION, POWDER, FOR SOLUTION INTRAVENOUS at 06:01

## 2024-05-21 ASSESSMENT — ACTIVITIES OF DAILY LIVING (ADL)
ADLS_ACUITY_SCORE: 22

## 2024-05-21 NOTE — PROGRESS NOTES
CALORIE COUNT      Approximate Oral Intake for: 5/20  1 of 2 ordered meals completed: breakfast     Calories: 185  Protein: 2      Intake from TF/PN: 710 kcal, 50  g protein          Estimated Needs:    Calories: 2602-7933  Protein: 53-66      Summary:   For lunch pt ordered cheeseburger and rice with 50% intake    Estimate intake over 2 ordered meals yesterday 485 kcal, 14 g protein.   Pt additionally ate some food from home for supper, unknown what or how much.     Supplement intake unknown.     Adequacy of intake difficult to determine d/t eating food from home, incomplete calorie count. Unable to get "[employer]"  at this time.   Anticipate pt would refuse TPN tonight, if given. Intake may be inadequate but improved from day prior and pt reports she is eating more. Suspect pt will eat more in her home setting with cultural foods available, depressed in hospital setting    Addendum:  Nsg reports pt took 2 bites of rice and 2 bites of pork soup from home for breakfast. She is not taking and does not like the [employer] shake. Pt depressed-unsure if situational and would improve at home vs chronic?

## 2024-05-21 NOTE — PLAN OF CARE
Problem: Oral Intake Inadequate  Goal: Improved Oral Intake  Outcome: Progressing   Goal Outcome Evaluation:      Plan of Care Reviewed With: patient      BS 73. Patient denies pain, N/V. Patient very hesitant to start TPN at 2000, explained benefits via , she agreed for one more night. Zosyn running. Patient took tele off and would not let us put back on, MD notified. Patient stated she is eating better today, food from home.

## 2024-05-21 NOTE — PROGRESS NOTES
Care Management Follow Up    Length of Stay (days): 11    Expected Discharge Date: 05/21/2024     Concerns to be Addressed: discharge planning ; medical progression    Patient plan of care discussed at interdisciplinary rounds: Yes    Anticipated Discharge Disposition:  home     Anticipated Discharge Services:  none  Anticipated Discharge DME:  [facility]    Patient/family educated on Medicare website which has current facility and service quality ratings:  NA  Education Provided on the Discharge Plan:  NA  Patient/Family in Agreement with the Plan:  NA    Referrals Placed by CM/SW:  [facility]  Private pay costs discussed: Not applicable    Additional Information:  Chart reviewed. Patient weaned off TPN and transitioned to PO abx.    Social HX: lives in an apartment with her  Dmitriy Camargo and 6 year old daughter. She is independent with ADLs and IADLs.     CM will continue to follow care progression and aide in discharge planning as needed.     Angela Elder RN

## 2024-05-21 NOTE — DISCHARGE INSTRUCTIONS
21 days of your Vit D was given to you you will need to get the rest of the days from your primary pharmacy. To discuss with the pt via .

## 2024-05-21 NOTE — PROGRESS NOTES
"Nutrition Therapy  Parenteral Nutrition follow-up       Dietitian to Pharmacy    Discontinue TPN          Current Nutrition Intake:  Diet: Low fiber, Gluten free  Supplement: [employer] BID    Po intake: fair  See calorie count note      Nutrition Support: TPN   90 ml/hr x 8 hr + 45 ml/hr x 1 hr before and after Dextrose 150g, Aa50g   Provides= 810 ml, 710 kcal, 50g protein, meeting 65% of estimated kcal, 100% of low end of estimated protein needs    TPN intake/tolerance  Noted pt not wanting noc cycle TPN started last night but agreed to take \"one more night\". Pt eager to go home and telling staff that she is eating better.    Weight Trends  Last weight 5/13  Wt ordered 5/15 and 5/17, 5/20 none documented    GI:  Colostomy OP: Small liquid/loose green    Labs:   Labs reviewed   Phos 2.3 (L), decreased  BG  mg/dl past 24 hours    Estimated nutrition needs  Energy needs: 8995-4371 Calories/day ( 25-30 Reggie/Kg)  Justification: Repletion  Protein needs: 53-66+ g/day ( 1.2-1.5+ g/Kg)  Justification: increased needs  Fluid needs: 8444-3980 ml/day  Justification: maintenance    Medications:   Reviewed by dietitian: pantoprazole, zosyn, vit d 3    Goals  Meet 80% estimated nutrition needs via po intake to discontinue TPN - Difficult to assess, eating food from home, calorie counts incomplete  Tolerate TPN-met  Electrolytes WNL-met  BG < 180-met    INTERVENTIONS  Implementation  Anticipate pt would refuse TPN tonight, if given. Intake may be inadequate but improved from day prior and pt reports she is eating more. Suspect pt will eat more in her home setting with cultural foods available, and she is depressed in hospital setting     Discontinue TPN.     Monitoring/Evaluation  Progress toward goals will be monitored and evaluated per protocol.   "

## 2024-05-21 NOTE — PROGRESS NOTES
Per Dr. Mcintyre she clarified with Colorectal per her message back to me that they want the CT to be done out-pt on This coming Friday. Spoke with her also and let her know that the Infiximab level and antibodies was drawn per this writer as ordered and sent to lab.    Attempted to jeff Elzbieta from GI to let them know that that Infiximab level and antibodies was drawn and sent to lab. No answer    This writer spent several hours this shift on the phone coordinating with the pt and informing the pt of what was happening as far as her going home to her family, via [employer]  services.    Ordered two spare ostomy bags for the pt as she wanted to change her colostomy bag. #[id number]. They are with the pt.    Notified the dietician that the pt told this writer via the  that she did not like the [employer] supplements and that the pt is still having very poor intake. Those supplements have now been discontinued.    PICC 3 lumen has been p/I and will be removed by oncoming shift before discharge. Will alert oncoming RN of this.    K+-4.2 per protocol will recheck in the am  Mg-1.9 per protocol will recheck in the am    Pharmacy called and stated that they only had 21 days of pt's Vit D to dispense to the pt and that pt would have to get the rest of the days from her primary pharmacy. Will add to discharge summary. To discuss with the pt with .

## 2024-05-21 NOTE — PROGRESS NOTES
[facility] DIGESTIVE HEALTH PROGRESS NOTE      Subjective:                No acute changes overnight. Feels about the same but overall improved in the past week. Still has flat affect, not talking much. Able to tolerate some oral intake, weaned off TPN. Colorectal surgery evaluated and would prefer outpatient CT prior to follow-up.    Admitted 5/9  with sepsis requiring ICU and pressors. CT demonstrating abscess along colonic wall. IR avoided drainage procedure due to concern about creating fistula  and difficulty of procedure. Surgery following and consulted colorectal surgery 5/14. Started TPN 5/13 then ADAT 5/16.     Follow up CT 5/15/2024: Decreased  pericolonic abscess along deep wall of proximal transverse colon now has internal gas attenuation suggesting continuation with bowel wall lumen (fistulizing).  Decreased in size 5.5 x 1.2 cm thickness.  Unchanged long segment continuous wall thickening of ileum and colon particularly transverse colon up to colostomy.  Duffy's pouch and lower left quadrant.  Development of mild four-quadrant abdominal ascites and small bilateral effusions.     Current medications (incomplete list)  Infliximab (Remicade) 10 mg/kg 1st dose 5/15/2024  Zosyn  TPN started 5/13  Venofer IV iron every day started 5/14  Lovenox prophylaxis   Dilaudid as needed  Protonix     Previous medications (incomplete list)  Hydrocortisone (Cortef) 25 mg Q 12 hr (Discontinue 5/14)  Prednisone 30 mg (Discontinued 5/14)  Azathioprine 100 mg (Held on admit)  Humira 40 mg every 2 weeks.  Last dose 5/8/24 (Discontinued)     B12: 861  Prealbumin:  7.7 -> 14.4  Vitamin D:  8  Zinc:  60.6      Objective:                Body mass index is 16.81 kg/m  (pended).  Vital signs in last 24 hrs;  Temp:  [97.9  F (36.6  C)-98.9  F (37.2  C)] 97.9  F (36.6  C)  Pulse:  [65-76] 76  Resp:  [16-21] 16  BP: (83-98)/(51-66) 94/59  SpO2:  [100 %] 100 %    Physical Exam:   General: Comfortable appearing. Awake.   Cardiovascular:  Regular rate. No edema  Chest: Non-labored breathing. Symmetric chest rise.   Abdomen: soft, non-tender, non-distended  Neurologic: Alert, no focal defects.     Current Labs:  CMP Results:   Recent Labs   Lab 05/21/24  0803 05/21/24  0654 05/21/24  0552 05/21/24  0209 05/20/24  0622 05/20/24  0610 05/19/24  0643 05/19/24  0636 05/18/24  0540 05/18/24  0535   NA  --   --  139  --   --  139  --  138  --  138   POTASSIUM  --   --  4.2  --   --  4.2  --  4.2  --  4.3   CHLORIDE  --   --  105  --   --  105  --  103  --  102   CO2  --   --  26  --   --  28  --  28  --  29   GLC 95 129* 114* 154*   < > 135*   < > 112*   < > 119*   BUN  --   --  18.3  --   --  18.9  --  19.5  --  17.9   CR  --   --  0.45*  --   --  0.57  --  0.57  --  0.54   BILITOTAL  --   --   --   --   --  0.3  --   --   --   --    ALKPHOS  --   --   --   --   --  114  --   --   --   --    ALT  --   --   --   --   --  26  --   --   --   --    AST  --   --   --   --   --  17  --   --   --   --     < > = values in this interval not displayed.      CBC  Recent Labs   Lab 05/21/24  0552 05/20/24  0610 05/19/24  0636 05/18/24  0535   WBC 5.8 6.0 6.2 5.7   RBC 3.14* 3.28* 3.22* 3.10*   HGB 9.5* 9.7* 9.3* 9.2*   HCT 30.6* 31.8* 31.0* 29.6*   MCV 98 97 96 96   RDW 18.1* 18.2* 17.6* 17.2*    313 320 334     INR  Recent Labs   Lab 05/20/24  0610   INR 0.98      LIPASENo lab results found in last 7 days.    Relevant Imaging:        Assessment:       43-year-old with celiac disease and severe small bowel and stricturing & perforating colonic Crohn's with resection of descending and colostomy since November 2022.  Was on dual therapy Humira and azathioprine over the past 7 months but has progression of disease.  Admitted with sepsis due to fistula and abscess off transverse colon.  Albumin 1.9.  Hemoglobin 7.5.  Prealbumin:  7.7.  Vitamin D of 8.  Currently on Zosyn, TPN, IV iron, and started infliximab 10 mg/kg on 5/15.  Steroids stopped 5/14.        Considered a failure of Humira and azathioprine and started infliximab (Remicade). Stopped steroids. Would want high infliximab trough level > 20 ug/dL.   Monitor closely with addition of infliximab    Appreciate colorectal surgery involvement.     Plan:     - Diet (gluten free) per colorectal surgery  - Would give antibiotics for ~ 2 months.  After Zosyn switch to Augmentin  - Started infliximab 5/15 and will need to complete induction in 2 weeks and 6 weeks  - I notified [facility] financial dept. that she will need outpatient infliximab at our infusion center   - Check trough level on 5/22, would be OK for discharge after level drawn and we will plan for outpatient infusion as above. Want level well above 20 ug/dL  - Complete IV iron  - Weaned off TPN  - Plan on repeat CT scan as outpatient per CRSAL prior to f/u surgery visit  - Dr Lafleur colorectal surgery following closely  - Restart azathioprine at discharge  - Start vitamin D  50,000 international unit(s) twice a week  - Address bone density as an outpatient          35 minutes of total time was spent providing patient care, including patient evaluation, reviewing documentation/test results and .                                                Rohit Guillen M.D.  Thank you for the opportunity to participate in the care of this patient.   Please feel free to call me with any questions or concerns.  Phone number [phone].

## 2024-05-21 NOTE — PLAN OF CARE
Problem: Adult Inpatient Plan of Care  Goal: Readiness for Transition of Care  Outcome: Progressing     Problem: Oral Intake Inadequate  Goal: Improved Oral Intake  5/21/2024 0612 by Mala Alvarenga RN  Outcome: Progressing  5/21/2024 0401 by Mala Alvarenga RN  Outcome: Progressing     Problem: Colostomy  Goal: Optimal Coping  5/21/2024 0612 by Mala Alvarenga RN  Outcome: Progressing  5/21/2024 0401 by Mala Alvarenga RN  Outcome: Progressing   Goal Outcome Evaluation: Alert and oriented x4. Able to make her needs known. TPN infusing at 45ml/hr. Encouraged oral intake, taking sips of water. Denies nausea, Denies pain. VSS. BS monitored per order. Pt continues to refuse tele. Colostomy clean, pt is able to manage cleaning the bag.

## 2024-05-21 NOTE — PROGRESS NOTES
Care Management Discharge Note    Discharge Date: 05/21/2024       Discharge Disposition: Home;Outpatient Infusion Services    Discharge Services: None    Discharge DME: None    Discharge Transportation: family or friend will provide    Private pay costs discussed: Not applicable    Does the patient's insurance plan have a 3 day qualifying hospital stay waiver?  Yes     Which insurance plan 3 day waiver is available? Alternative insurance waiver    Will the waiver be used for post-acute placement? No    PAS Confirmation Code: NA  Patient/family educated on Medicare website which has current facility and service quality ratings:  NA    Education Provided on the Discharge Plan: Yes (per care team)  Persons Notified of Discharge Plans: per care team  Patient/Family in Agreement with the Plan: yes    Handoff Referral Completed: Yes    Additional Information:  Patient discharging home with OP follow up.  OP infusion at [facility].      Angela Elder RN

## 2024-05-21 NOTE — PROGRESS NOTES
PICC removed. Education provided. Pt discharging via private transport. All belongings sent with pt. Pt is aware of follow up CT and appointments.

## 2024-05-21 NOTE — PROGRESS NOTES
Colon and Rectal Surgery  Daily Progress Note    Subjective  Family at bedside, helping with breakfast. Reports feeling tired with no energy this morning. At about 25% of breakfast yesterday and 50% of lunch yesterday, patient states she is eating home foods well. Denies abdominal pain or nausea. Stoma remains productive. Wanting to go home. Phone  used.     Objective  Intake/Output last 24 hrs:    Intake/Output Summary (Last 24 hours) at 5/20/2024 1250  Last data filed at 5/20/2024 0600  Gross per 24 hour   Intake 200 ml   Output --   Net 200 ml     Temp:  [97.9  F (36.6  C)-98.9  F (37.2  C)] 97.9  F (36.6  C)  Pulse:  [65-76] 76  Resp:  [16-21] 16  BP: (83-98)/(51-66) 94/59  SpO2:  [100 %] 100 %    Physical Exam:  General: awake, alert, sitting up in bed, in no acute distress  Head: normocephalic, atraumatic  Respiratory: non-labored breathing  Abdomen: soft, mildly tender RLQ, non-distended. Stoma pink and viable with stool in appliance.   Skin: No rashes or lesions  Musculoskeletal: moves all four extremities equally  Psychological: alert and oriented, answers questions appropriately    Pertinent Labs  Lab Results: personally reviewed.  Lab Results   Component Value Date     05/20/2024     05/19/2024     05/18/2024    .0 04/24/2017    CO2 28 05/20/2024    CO2 28 05/19/2024    CO2 29 05/18/2024    CO2 25 06/23/2022    CO2 23 06/03/2022    CO2 22 06/02/2022    CO2 26.0 04/24/2017    BUN 18.9 05/20/2024    BUN 19.5 05/19/2024    BUN 17.9 05/18/2024    BUN 6 06/23/2022    BUN 3 06/03/2022    BUN 4 06/02/2022    BUN 7.0 04/24/2017     Lab Results   Component Value Date    WBC 6.0 05/20/2024    WBC 6.2 05/19/2024    WBC 5.7 05/18/2024    HGB 9.7 05/20/2024    HGB 9.3 05/19/2024    HGB 9.2 05/18/2024    HGB 10.8 01/10/2018    HGB 10.3 11/08/2017    HGB 9.8 09/18/2017    HCT 31.8 05/20/2024    HCT 31.0 05/19/2024    HCT 29.6 05/18/2024    HCT 35.9 01/10/2018    HCT 33.8 11/08/2017     HCT 31.4 07/31/2017    MCV 97 05/20/2024    MCV 96 05/19/2024    MCV 96 05/18/2024    MCV 83.1 01/10/2018    MCV 77.9 11/08/2017    MCV 86.3 07/31/2017     05/20/2024     05/19/2024     05/18/2024       Assessment/Plan: 43YF with a history of Crohn's c/b colonic stricture s/p ex lap, resection of descending colon and colostomy creation in 2022, managed with Humira and AZA who presents with worsening ileocolic Crohn's with fistula and abscess. Weaned off TPN.     WBC 5.8  Hgb 9.5  Cr 0.45      -No plans for inpatient imaging at this time, will plan to have outpatient CT prior to follow-up next week  -Continue diet (gluten free), weaned off TPN  -Switch to PO Augmentin today  -Crohn's management per GI  -Ok for discharge from CRS standpoint once cleared per medicine  -Will reschedule outpatient follow-up to next week with Dr. Lafleur, CT abd/pelvis prior to clinic visit  -Clinic information placed in AVS navigator    CRS will sign off, please reach out with any questions or concerns    Discusses with Dr. Miquel Dolan PA-C  [facility]  [phone]..............................main    no

## 2024-05-22 ENCOUNTER — PATIENT OUTREACH (OUTPATIENT)
Dept: CARE COORDINATION | Facility: CLINIC | Age: 44
End: 2024-05-22
Payer: COMMERCIAL

## 2024-05-22 NOTE — PROGRESS NOTES
[facility]    Background: Transitional Care Management program identified per system criteria and reviewed by [facility] team for possible outreach.    Assessment: Upon chart review, [facility] Team member will not proceed with patient outreach related to this episode of Transitional Care Management program due to reason below:    Patient has active communication with a nurse, provider or care team for reason of post-hospital follow up plan.  Outreach call by [facility] team not indicated to minimize duplicative efforts.     Patient was contacted today by a BSW from [facility] for Clinic Care Coordination - Post-Hospital. No [facility] outreach call needed at this time.    Plan: Transitional Care Management episode addressed appropriately per reason noted above.      HARRY Vinson  [phone]  [facility]     *Connected Care Resource Team does NOT follow patient ongoing. Referrals are identified based on internal discharge reports and the outreach is to ensure patient has an understanding of their discharge instructions.

## 2024-05-22 NOTE — DISCHARGE SUMMARY
"[facility]  Discharge Summary - Medicine & Pediatrics       Date of Admission:  5/10/2024  Date of Discharge:  5/21/2024  Discharging Provider: Phoebe Sandhu MD and Td Padilla MD   Discharge Service: Hospitalist Service    Discharge Diagnoses     Crohn's disease  Intra-abdominal abscess with developing fistula formation  Septic shock, resolved  Normocytic anemia  Benign pelvic cyst    Clinically Significant Risk Factors     # Cachexia: Estimated body mass index is 16.81 kg/m  as calculated from the following:    Height as of this encounter: 1.575 m (5' 2.01\").    Weight as of this encounter: 41.7 kg (91 lb 14.9 oz).       Follow-ups Needed After Discharge        - Colorectal recommendations: We will have you follow up with Dr. Lafleur. Your clinic appointment is scheduled for Wednesday, 5/29 at 12:55pm. Please arrive 30 minutes early to fill out your forms. Our clinic address is [address] [place] [address], [address], [place] [address]. Prior to your clinic visit we will also have you obtain a CT scan so we can review this at your visit. Your CT scan is scheduled 5/24 at 1:10pm and this will take place at [facility]. The address is [address] [place] [address] [address] in [place], [place]. If you have any questions or concerns please contact us at [phone]. We recommend you follow up with your GI team regarding treatment of your Crohn's.  - GI will call to schedule follow-up for your crohn's disease and Infliximab infusions.   - Please follow-up with Dr. Martin at [facility] in the next 1-2 weeks to discuss ongoing mood management and to assist with complicated follow-up      Unresulted Labs Ordered in the Past 30 Days of this Admission       Date and Time Order Name Status Description    5/21/2024 11:55 AM Infliximab Level and Antibodies In process         These results will be followed up by GI team in managing next doses of infliximab    Discharge Disposition   Discharged to " home  Condition at discharge: Stable    Hospital Course     Dain Tejeda is a 43 year old female with history of Crohn's disease complicated by colonic stricture s/p partial colectomy (11/2022), Celiac disease, SIOMARA admitted for septic shock from intraabdominal abscess with developing fistula.      Crohn's disease, complicated by abscess, developing fistula and ileitis   S/p partial colectomy 11/2022, has LLQ colostomy   Sepsis 2/2 intra-abdominal infection - resolved  Diagnosed in late 2022, complicated by stricture s/p partial colectomy with ostomy. Follows with [facility], on Humira and azathioprine. Was recently admitted on [facility] 4/30-5/4 after syncopal episode thought to be 2/2 Crohn's flare. GI consulted at that time and recommended prednisone taper over 4 weeks, unclear whether patient was taking steroid as prescribed. Presented to ED on 5/10 after developing fevers, nausea and vomiting. Developed hypotension in the ED despite fluid resuscitation and was transferred to the ICU for pressor support. CT showing marked progression of Crohn's with colonic obstruction, 10 cm intramural abscess, developing fistula and significant ileitis. Septic shock most likely secondary to colonic abscess. Transferred out of ICU on 5/12 after being off pressors for 24 hours. Repeat CT showed decreased size of abscess along deep wall of proximal transverse colon which now has internal gas attenuation and suggests continuity with bowel lumen. No surgical intervention necessary during hospitalization as determined by colorectal who followed throughout, will treat Crohn's disease medically with Infliximab as determined by GI . Stopped steroids given fistulating disease and abscess. Was NPO for a week with TPN, was able to tolerate oral intake by time of discharge without pain or nausea.   - Colorectal -- plan for repeat CT on 5/24 and follow-up visit on 5/29 with Dr. Lafleur  - Was treated with IV Zosyn throughout hospital stay,  transitioned to Augmentin on discharge with plan for two month duration.   - GI --  discontinued steroids and Humira. Started Infliximab on 5/15 with trough level checked on day of discharge to use to further titrate following doses. Resumed PTA azathioprine.   - Vitamin D started by GI due to low levels noted during hospitalization      Normocytic anemia - stable   Leukopenia - resolved  History of SIOMARA with recent iron infusions through [facility]. Hgb 9.8 on admission, normal differential at that time. Colostomy output appears non-bloody. Hgb initially tending down, most recently 8.2, MCV 93. Continues to have no signs of bleeding. Iron studies in Feb largely normal with mildly low TIBC and iron saturation. Worsening leukopenia on 5/14 at 3.5 so peripheral smear was performed and showed mild normochromic-normocytic anemia, mild leukopenia without dysplastic or megaloblastic features. Most likely a combination of iron deficient anemia and anemia of chronic disease. Leukopenia most likely due to infection/inflammation. Completed course of IV iron while inpatient.      Sinus bradycardia  Patient noted to be bradycardic between 40-60's, asymptomatic. EKG x2 shows sinus bradycardia. Improved by day of discharge.     Disorganized behaviors  Demonstrated odd behaviors during recent admission, treated with Seroquel 12.5 mg at bedtime. Psych consult at that time, did not recommend any ongoing meds upon discharge. Patient calm, alert and cooperative this admission so far.  Did discuss starting medications for depression with patient during this admission, patient desiring to hold off until she feels better and willing to discuss in the outpatient setting.  - Discuss MDD/CHRISSY medications in outpatient setting     Gamma gap  Chronic. Tprotein 7.2, albumin 2.7. Most certainly 2/2 inflammatory disease.      Incidental finding: benign pelvic simple cyst  >5 to <=7 cm. Follow up pelvic US in 6 months recommended.     Consultations This  Hospital Stay   GASTROENTEROLOGY IP CONSULT  CARE MANAGEMENT / SOCIAL WORK IP CONSULT  SURGERY GENERAL IP CONSULT  INTERVENTIONAL RADIOLOGY ADULT/PEDS IP CONSULT  VASCULAR ACCESS ADULT IP CONSULT  NUTRITION SERVICES ADULT IP CONSULT  PHARMACY/NUTRITION TO START AND MANAGE TPN  PHARMACY IP CONSULT  PHARMACY IP CONSULT  PHARMACY IP CONSULT  PHARMACY IP CONSULT  COLORECTAL SURGERY IP CONSULT  PHARMACY IP CONSULT  PHARMACY IP CONSULT  PHARMACY IP CONSULT  PHARMACY IP CONSULT  PHARMACY IP CONSULT  PHARMACY IP CONSULT  PHARMACY IP CONSULT    Code Status   Full Code     The patient was discussed with Dr. Randy Sandhu MD  [facility] Residency, PGY-3  ______________________________________________________________________    Physical Exam   Vital Signs: Temp: 97.8  F (36.6  C) Temp src: Oral BP: 90/56 Pulse: 71   Resp: 18 SpO2: 100 % O2 Device: None (Room air)    Weight: 91 lbs 14.91 oz    General: Alert and oriented x 3, no acute distress  HEENT:  no conjunctival injection or icterus, moist mucous membranes.   Resp: clear to ausculation bilaterally, no rales or wheezes  Cardio: RRR, S1 and S2 present, no S3 or S4, no rubs or murmurs  Abdomen: soft, nontender, non-distended. Ostomy bag appears to be draining stool appropriately.  Extremities: no erythema or edema  Psych: Flat affect, poor insight into medical condition. Calm today, optimistic with news of discharge.     Primary Care Physician   Jefe Martin    Discharge Orders      Reason for your hospital stay    Chron's flare up     Activity    Your activity upon discharge: activity as tolerated     Follow-up and recommended labs and tests     - We will have you follow up with Dr. Lafleur. Your clinic appointment is scheduled for Wednesday, 5/29 at 12:55pm. Please arrive 30 minutes early to fill out your forms. Our clinic address is [address] [place] [address], [address], [place] [address].     - Prior to your clinic visit we will also have you  obtain a CT scan so we can review this at your visit. Your CT scan is scheduled 5/24 at 1:10pm and this will take place at [facility]. The address is [address] [place] [address] in [place], [place]. If you have any questions or concerns please contact us at [phone]. We recommend you follow up with your GI team regarding treatment of your Crohn's.    - GI will call to schedule follow-up for your crohn's disease and Infliximab infusions.   - Please follow-up with Dr. Martin at [facility] in the next 1-2 weeks to discuss ongoing mood management and to assist with complicated follow-up     Full Code     Diet    Eat a variety of foods containing protein, carbohydrates, and healthy fats. At this time, please continue consuming a low fiber diet. This means consuming less than 13 grams of fiber daily. You should avoid too much fresh fruits/vegetables, whole wheat/whole grain products, nuts/seeds, corn, and beans. You will be able to re-introduce this food group to your diet in a couple of weeks.       Significant Results and Procedures   Most Recent 3 CBC's:  Recent Labs   Lab Test 05/21/24  0552 05/20/24  0610 05/19/24  0636   WBC 5.8 6.0 6.2   HGB 9.5* 9.7* 9.3*   MCV 98 97 96    313 320     Most Recent 3 BMP's:  Recent Labs   Lab Test 05/21/24  1210 05/21/24  0803 05/21/24  0654 05/21/24  0552 05/20/24  0622 05/20/24  0610 05/19/24  0643 05/19/24  0636   NA  --   --   --  139  --  139  --  138   POTASSIUM  --   --   --  4.2  --  4.2  --  4.2   CHLORIDE  --   --   --  105  --  105  --  103   CO2  --   --   --  26  --  28  --  28   BUN  --   --   --  18.3  --  18.9  --  19.5   CR  --   --   --  0.45*  --  0.57  --  0.57   ANIONGAP  --   --   --  8  --  6*  --  7   ALEX  --   --   --  9.3  --  8.9  --  9.0   GLC 99 95 129* 114*   < > 135*   < > 112*    < > = values in this interval not displayed.     Most Recent Anemia Panel:  Recent Labs   Lab Test 05/21/24  0552 05/17/24  0458 05/16/24  0558  05/15/24  0627 04/30/24  1418 08/09/23  1503 01/12/23  1155   0000   WBC 5.8   < > 4.7 3.5*   < >  --  7.9  --    HGB 9.5*   < > 8.6* 8.2*   < >  --  11.7  --    HCT 30.6*   < > 27.2* 26.8*   < >  --  34.1*  --    MCV 98   < > 94 93   < >  --  70*  --       < > 331 314   < >  --  427  --    RETICABSCT  --   --   --  0.054   < >  --   --   --    RETP  --   --   --  1.9   < >  --   --   --    OSMANY  --   --   --   --   --   --  11  --    B12  --   --  861  --   --  1,200  --    < >   FOLIC  --   --   --   --   --  5.4  --   --     < > = values in this interval not displayed.   ,   Results for orders placed or performed during the hospital encounter of 05/10/24   CT Abdomen Pelvis w Contrast     Value    Radiologist flags (Urgent)     Marked progression of her Crohn's colitis with colonic obstruction, long segment of intramural abscess, developing fistula and significant ileitis.    Narrative    EXAM: CT ABDOMEN PELVIS W CONTRAST  LOCATION: [facility]  DATE: 5/10/2024    INDICATION: Fever.  History of Crohn's with colostomy.  COMPARISON: CT AP 4/30/2024 and 12/24/2022, pelvic ultrasound 3/14/2024  TECHNIQUE: CT scan of the abdomen and pelvis was performed following injection of IV contrast. Multiplanar reformats were obtained. Dose reduction techniques were used.  CONTRAST: 53 mL of Isovue-370    FINDINGS:   LOWER CHEST: Unremarkable.    HEPATOBILIARY: Patient developed mild periportal edema. No portal vein thrombus. Calcified granuloma in the liver.    PANCREAS: Normal.    SPLEEN: Normal.    ADRENAL GLANDS: Normal.    KIDNEYS/BLADDER: Normal.    BOWEL: Inflammatory changes have markedly progressed involving the transverse colon and splenic flexure leading to her left, paramedian colostomy and resulting in bowel obstruction.   Marked circumferential wall thickening of the colon leading to the ostomy has increased. There is at least a 10 cm long, intramural abscess involving the posterior  wall of the proximal transverse colon (axial images , coronal images 23-33 and   sagittal images 34 through 44) a small sinus tract is also developing anteriorly along the distal transverse colon (axial image 79, coronal image 18), 10 cm from the ostomy.    Ascending colon and cecum proximal to this are markedly dilated, measuring up to 8.5 cm. Ileocecal valve is patulous and about the distal 25 cm at the ileum is also abnormal. Ileum is distended, featureless and demonstrates circumferential wall   enhancement. Multiple, small reactive nodes seen throughout. Stomach and proximal small bowel loops are collapsed. No pneumatosis, mesenteric thrombus or ascites.    Long Duffy's pouch is unremarkable.    LYMPH NODES: Few small left periaortic nodes are seen measuring up to 8 to 9 mm in short axis.    VASCULATURE: Normal.    PELVIC ORGANS: A simple, left adnexal cystic mass measuring 6.5 x 4 cm is again noted. Small myometrial fibroid along the right uterine fundus.    MUSCULOSKELETAL: No suspicious bone lesions. Bone island in the right mid iliac vein. Sacralization of  L5 on the left.      Impression    IMPRESSION:   1.  Significant progression of the acute inflammatory bowel disease. Changes are enumerated below. GI and/or colorectal consultation needed.  2.  Circumferential wall thickening of the transverse colon and splenic flexure leading to the ostomy has markedly progressed and now resulting in a colonic obstruction.  3.  There is at least a 10 cm long intramural abscess along the posterior wall of the proximal transverse colon.  4.  A small sinus tract is developing from the anterior wall of the distal transverse colon.  5.  Ascending colon and cecum are distended to 8.5 cm.  6.  Patulous ileocecal valve with long segment of back wash ileitis involving at least 25 cm of the distal ileum.  7.  Periportal edema has developed due to the progressive inflammatory state.  8.  Previously characterized simple  left adnexal cyst again noted. Follow-up pelvic ultrasound in 6 months as noted before. Reference given below.  9.  Other noncritical findings as noted above.     [Urgent Result: Marked progression of her Crohn's colitis with colonic obstruction, long segment of intramural abscess, developing fistula and significant ileitis.    Finding was identified on 5/10/2024 8:49 AM CDT.     Hilda Wade was contacted by me on 5/10/2024 8:52 AM CDT and verbalized understanding of the critical result.    Premenopausal asymptomatic simple cyst:    >5 to <= 7 cm: Benign simple cyst. Clinically inconsequential finding. Follow-up pelvic ultrasound in 6 months is recommended.        REFERENCE:  Management of Asymptomatic Ovarian and Other Adnexal Cysts Imaged at US: Society of Radiologists in Ultrasound Consensus Conference Statement. Radiology September 2010; [id number].    Simple Adnexal Cysts: SRU Consensus Conference Update on Follow-up and Reporting. Radiology September 2019. [phone].   XR Abdomen Port 1 View    Narrative    EXAM: XR ABDOMEN PORT 1 VIEW  LOCATION: [facility]  DATE: 5/14/2024    INDICATION: Crohn's colitis  COMPARISON: CT 5/10/2024      Impression    IMPRESSION: No distended air-filled loops of small bowel. A staple line is seen over the left lower quadrant. Otherwise the bowel is not well assessed.   CT Abdomen Pelvis w Contrast    Narrative    EXAM: CT ABDOMEN PELVIS W CONTRAST  LOCATION: [facility]  DATE: 5/15/2024    INDICATION: Ileocolonic Crohn's with intramural abscess  COMPARISON: CT of the abdomen and pelvis with contrast 5/10/2024  TECHNIQUE: CT scan of the abdomen and pelvis was performed following injection of IV contrast. Multiplanar reformats were obtained. Dose reduction techniques were used.  CONTRAST: IsoVue 370 52mL    FINDINGS:   LOWER CHEST: Small bilateral pleural effusions have developed which layer dependently, right slightly larger  than left. Associated passive atelectasis of the posterior lower lobes.    HEPATOBILIARY: Nondistended gallbladder. No gallbladder wall thickening. No calcified gallstones. No bile duct enlargement. There is a benign calcification in the right lateral aspect of the liver. No new liver lesions. Smooth liver capsule. Normal   contrast opacification of the portal veins.    PANCREAS: Normal.    SPLEEN: Normal.    ADRENAL GLANDS: Normal.    KIDNEYS/BLADDER: Kidneys are normal in size and have symmetric parenchymal enhancement with normal cortex thickness. No urinary tract calculi or obstruction. Moderately distended urinary bladder has a smooth wall of uniform thickness.    BOWEL: The stomach is mostly decompressed. Decompressed duodenum and jejunum. Segments of distal small bowel in the anterior lower abdomen have wall thickening consistent with acute enteritis but are less distended compared with 5 days earlier. Status   post distal colon resection with Duffy's pouch staple line in the upper left pelvis and left lower quadrant colostomy. Diffuse wall thickening and mucosal hyperenhancement of the colon which is most extensive in the transverse colon, also similar to 5   days earlier. A oblong, enhancing fluid collection which follows the posterior wall of the proximal transverse colon has decreased in size measuring 5.5 cm in length and 12 mm in thickness, with decrease in central fluid attenuation but development of   internal gas which suggests communication with the bowel lumen. Mild four-quadrant abdominal ascites.    LYMPH NODES: Normal.    VASCULATURE: Normal.    PELVIC ORGANS: Uterus is normal in size. Normal ovaries. Mild, smooth thickening along the mesorectal fascia in the presacral space.    MUSCULOSKELETAL: Mild bone demineralization. Tiny lower and lumbar degenerative osteophytes. No aggressive or destructive bone lesions.      Impression    IMPRESSION:     1.  Previous segmental colon resection with  Duffy's pouch and left lower quadrant colostomy.  2.  Unchanged long segment contiguous wall thickening of the ileum and colon, in particular the transverse colon leading up to the colostomy consistent with active inflammatory bowel disease.  3.  Decreased size of the paracolonic abscess along the deep wall of the proximal transverse colon which now has internal gas attenuation and suggests continuity with the bowel lumen.  4.  Development of mild four-quadrant abdominal ascites and small bilateral pleural effusions.  5.  New distention of the urinary bladder.       Discharge Medications   Discharge Medication List as of 5/21/2024  5:13 PM        START taking these medications    Details   amoxicillin-clavulanate (AUGMENTIN) 875-125 MG tablet Take 1 tablet by mouth every 12 hours, Disp-60 tablet, R-0, E-Prescribe           CONTINUE these medications which have CHANGED    Details   Vitamin D3 (CHOLECALCIFEROL) 125 MCG (5000 UT) tablet Take 10 tablets (1,250 mcg) by mouth every 7 days, Disp-40 tablet, R-0, E-Prescribe           CONTINUE these medications which have NOT CHANGED    Details   azaTHIOprine (IMURAN) 50 MG tablet Take 2 tablets by mouth daily, Historical           STOP taking these medications       HUMIRA *CF* PEN 40 MG/0.4ML pen kit Comments:   Reason for Stopping:         predniSONE (DELTASONE) 10 MG tablet Comments:   Reason for Stopping:         sulfamethoxazole-trimethoprim (BACTRIM DS) 800-160 MG tablet Comments:   Reason for Stopping:             Allergies   Allergies   Allergen Reactions    Gluten Meal      Celiac    Soy Allergy Anaphylaxis, Hives and Itching     Tofu- causes itching and sores in the mouth

## 2024-05-22 NOTE — PROGRESS NOTES
Clinic Care Coordination Contact  [facility]/Voicemail    Clinical Data: Care Coordinator Outreach        Left message on patient's voicemail with call back information and requested return call.    Plan: Care Coordinator  via . Care Coordinator will try to reach patient again in 1-2 business days.

## 2024-05-23 ENCOUNTER — PATIENT OUTREACH (OUTPATIENT)
Dept: CARE COORDINATION | Facility: CLINIC | Age: 44
End: 2024-05-23
Payer: COMMERCIAL

## 2024-05-23 ASSESSMENT — ACTIVITIES OF DAILY LIVING (ADL): DEPENDENT_IADLS:: INDEPENDENT

## 2024-05-23 NOTE — PROGRESS NOTES
Clinic Care Coordination Contact  Transitions of Care Outreach    Chief Complaint   Patient presents with    Clinic Care Coordination - Post Hospital     TCM-Hospital Follow up       Most Recent Admission Date: 5/10/2024   Most Recent Admission Diagnosis: Hypokalemia - E87.6  Colonic obstruction (H) - K56.609  Intra-abdominal abscess (H) - K65.1  History of Crohn's disease - Z87.19  Septic shock (H) - A41.9, R65.21  Crohn's disease of colon with fistula (H) - K50.113  Sepsis, due to unspecified organism, unspecified whether acute organ dysfunction present (H) - A41.9     Most Recent Discharge Date: 5/21/2024   Most Recent Discharge Diagnosis: Sepsis, due to unspecified organism, unspecified whether acute organ dysfunction present (H) - A41.9  Intra-abdominal abscess (H) - K65.1  Colonic obstruction (H) - K56.609  Hypokalemia - E87.6  History of Crohn's disease - Z87.19  Crohn's disease of colon with fistula (H) - K50.113  Septic shock (H) - A41.9, R65.21  Crohn's disease (H) - K50.90     Transitions of Care Assessment    Discharge Assessment  How are you doing now that you are home?: Pt is doing better  How are your symptoms? (Red Flag symptoms escalate to triage hotline per guidelines): Improved  Do you know how to contact your clinic care team if you have future questions or changes to your health status? : Yes  Does the patient have their discharge instructions? : Yes  Does the patient have questions regarding their discharge instructions? : No  Were you started on any new medications or were there changes to any of your previous medications? : Yes  Does the patient have all of their medications?: Yes  Do you have questions regarding any of your medications? : No  Do you have all of your needed medical supplies or equipment (DME)?  (i.e. oxygen tank, CPAP, cane, etc.): No - What equipment or supplies are needed?                  Follow up Plan     Discharge Follow-Up  Discharge follow up appointment scheduled in  alignment with recommended follow up timeframe or Transitions of Risk Category? (Low = within 30 days; Moderate= within 14 days; High= within 7 days): Yes  Discharge Follow Up Appointment Date: 05/28/24  Discharge Follow Up Appointment Scheduled with?: Primary Care Provider    Future Appointments   Date Time Provider Department Center   5/24/2024  1:40 PM [facility]1 [facility]   5/28/2024  8:20 AM [facility]       Outpatient Plan as outlined on AVS reviewed with patient.      For any urgent concerns, please contact our 24 hour nurse triage line: [phone]       JANA Burgos

## 2024-05-26 LAB
INFLIXIMAB AB SERPL IA-MCNC: <3.1 U/ML
INFLIXIMAB SERPL-MCNC: >34 UG/ML

## 2024-05-28 ENCOUNTER — TELEPHONE (OUTPATIENT)
Dept: FAMILY MEDICINE | Facility: CLINIC | Age: 44
End: 2024-05-28

## 2024-05-28 NOTE — TELEPHONE ENCOUNTER
Called pt per PCP to remind patient she has an appointment tomorrow a follow- up for colorectal. I did let pt know she missed CT scan I let her know I would ask referrals to call. GI has not called pt, pt told can be 1-800 number that they call from.

## 2024-09-11 ENCOUNTER — APPOINTMENT (OUTPATIENT)
Dept: ULTRASOUND IMAGING | Facility: HOSPITAL | Age: 44
End: 2024-09-11
Attending: EMERGENCY MEDICINE
Payer: COMMERCIAL

## 2024-09-11 ENCOUNTER — HOSPITAL ENCOUNTER (OUTPATIENT)
Facility: HOSPITAL | Age: 44
Setting detail: OBSERVATION
Discharge: HOME | End: 2024-09-12
Attending: EMERGENCY MEDICINE | Admitting: FAMILY MEDICINE
Payer: COMMERCIAL

## 2024-09-11 ENCOUNTER — APPOINTMENT (OUTPATIENT)
Dept: CT IMAGING | Facility: HOSPITAL | Age: 44
End: 2024-09-11
Attending: EMERGENCY MEDICINE
Payer: COMMERCIAL

## 2024-09-11 DIAGNOSIS — K52.9 ENTEROCOLITIS: ICD-10-CM

## 2024-09-11 DIAGNOSIS — N83.202 LEFT OVARIAN CYST: ICD-10-CM

## 2024-09-11 DIAGNOSIS — R10.13 EPIGASTRIC PAIN: ICD-10-CM

## 2024-09-11 LAB
ALBUMIN SERPL BCG-MCNC: 3.7 G/DL (ref 3.5–5.2)
ALBUMIN UR-MCNC: 50 MG/DL
ALP SERPL-CCNC: 82 U/L (ref 40–150)
ALT SERPL W P-5'-P-CCNC: 11 U/L (ref 0–50)
ANION GAP SERPL CALCULATED.3IONS-SCNC: 9 MMOL/L (ref 7–15)
APPEARANCE UR: ABNORMAL
AST SERPL W P-5'-P-CCNC: 15 U/L (ref 0–45)
BACTERIA #/AREA URNS HPF: ABNORMAL /HPF
BASOPHILS # BLD AUTO: 0 10E3/UL (ref 0–0.2)
BASOPHILS NFR BLD AUTO: 0 %
BILIRUB SERPL-MCNC: 0.4 MG/DL
BILIRUB UR QL STRIP: NEGATIVE
BUN SERPL-MCNC: 7.6 MG/DL (ref 6–20)
CALCIUM SERPL-MCNC: 9 MG/DL (ref 8.8–10.4)
CHLORIDE SERPL-SCNC: 102 MMOL/L (ref 98–107)
COLOR UR AUTO: YELLOW
CREAT SERPL-MCNC: 0.61 MG/DL (ref 0.51–0.95)
EGFRCR SERPLBLD CKD-EPI 2021: >90 ML/MIN/1.73M2
EOSINOPHIL # BLD AUTO: 0.2 10E3/UL (ref 0–0.7)
EOSINOPHIL NFR BLD AUTO: 3 %
ERYTHROCYTE [DISTWIDTH] IN BLOOD BY AUTOMATED COUNT: 12.5 % (ref 10–15)
GLUCOSE SERPL-MCNC: 105 MG/DL (ref 70–99)
GLUCOSE UR STRIP-MCNC: NEGATIVE MG/DL
HCG UR QL: NEGATIVE
HCO3 SERPL-SCNC: 27 MMOL/L (ref 22–29)
HCT VFR BLD AUTO: 35.3 % (ref 35–47)
HGB BLD-MCNC: 11.4 G/DL (ref 11.7–15.7)
HGB UR QL STRIP: ABNORMAL
HOLD SPECIMEN: NORMAL
HOLD SPECIMEN: NORMAL
IMM GRANULOCYTES # BLD: 0 10E3/UL
IMM GRANULOCYTES NFR BLD: 0 %
KETONES UR STRIP-MCNC: NEGATIVE MG/DL
LEUKOCYTE ESTERASE UR QL STRIP: ABNORMAL
LIPASE SERPL-CCNC: 33 U/L (ref 13–60)
LYMPHOCYTES # BLD AUTO: 1.5 10E3/UL (ref 0.8–5.3)
LYMPHOCYTES NFR BLD AUTO: 24 %
MCH RBC QN AUTO: 29.6 PG (ref 26.5–33)
MCHC RBC AUTO-ENTMCNC: 32.3 G/DL (ref 31.5–36.5)
MCV RBC AUTO: 92 FL (ref 78–100)
MONOCYTES # BLD AUTO: 0.5 10E3/UL (ref 0–1.3)
MONOCYTES NFR BLD AUTO: 7 %
MUCOUS THREADS #/AREA URNS LPF: PRESENT /LPF
NEUTROPHILS # BLD AUTO: 4.1 10E3/UL (ref 1.6–8.3)
NEUTROPHILS NFR BLD AUTO: 65 %
NITRATE UR QL: NEGATIVE
NRBC # BLD AUTO: 0 10E3/UL
NRBC BLD AUTO-RTO: 0 /100
PH UR STRIP: 6.5 [PH] (ref 5–7)
PLATELET # BLD AUTO: 341 10E3/UL (ref 150–450)
POTASSIUM SERPL-SCNC: 3.7 MMOL/L (ref 3.4–5.3)
PROT SERPL-MCNC: 8.5 G/DL (ref 6.4–8.3)
RADIOLOGIST FLAGS: NORMAL
RBC # BLD AUTO: 3.85 10E6/UL (ref 3.8–5.2)
RBC URINE: 7 /HPF
SODIUM SERPL-SCNC: 138 MMOL/L (ref 135–145)
SP GR UR STRIP: 1.03 (ref 1–1.03)
SQUAMOUS EPITHELIAL: 21 /HPF
TROPONIN T SERPL HS-MCNC: <6 NG/L
UROBILINOGEN UR STRIP-MCNC: <2 MG/DL
WBC # BLD AUTO: 6.3 10E3/UL (ref 4–11)
WBC URINE: 24 /HPF

## 2024-09-11 PROCEDURE — 250N000011 HC RX IP 250 OP 636: Performed by: EMERGENCY MEDICINE

## 2024-09-11 PROCEDURE — 74177 CT ABD & PELVIS W/CONTRAST: CPT

## 2024-09-11 PROCEDURE — 96374 THER/PROPH/DIAG INJ IV PUSH: CPT | Mod: 59

## 2024-09-11 PROCEDURE — 87086 URINE CULTURE/COLONY COUNT: CPT | Performed by: EMERGENCY MEDICINE

## 2024-09-11 PROCEDURE — G0378 HOSPITAL OBSERVATION PER HR: HCPCS

## 2024-09-11 PROCEDURE — 84484 ASSAY OF TROPONIN QUANT: CPT | Performed by: EMERGENCY MEDICINE

## 2024-09-11 PROCEDURE — 81001 URINALYSIS AUTO W/SCOPE: CPT | Performed by: EMERGENCY MEDICINE

## 2024-09-11 PROCEDURE — 85025 COMPLETE CBC W/AUTO DIFF WBC: CPT | Performed by: STUDENT IN AN ORGANIZED HEALTH CARE EDUCATION/TRAINING PROGRAM

## 2024-09-11 PROCEDURE — 93976 VASCULAR STUDY: CPT | Mod: XU

## 2024-09-11 PROCEDURE — 96375 TX/PRO/DX INJ NEW DRUG ADDON: CPT

## 2024-09-11 PROCEDURE — 81001 URINALYSIS AUTO W/SCOPE: CPT | Performed by: STUDENT IN AN ORGANIZED HEALTH CARE EDUCATION/TRAINING PROGRAM

## 2024-09-11 PROCEDURE — 80053 COMPREHEN METABOLIC PANEL: CPT | Performed by: STUDENT IN AN ORGANIZED HEALTH CARE EDUCATION/TRAINING PROGRAM

## 2024-09-11 PROCEDURE — 36415 COLL VENOUS BLD VENIPUNCTURE: CPT | Performed by: STUDENT IN AN ORGANIZED HEALTH CARE EDUCATION/TRAINING PROGRAM

## 2024-09-11 PROCEDURE — 80053 COMPREHEN METABOLIC PANEL: CPT | Performed by: EMERGENCY MEDICINE

## 2024-09-11 PROCEDURE — 93005 ELECTROCARDIOGRAM TRACING: CPT | Performed by: EMERGENCY MEDICINE

## 2024-09-11 PROCEDURE — 83690 ASSAY OF LIPASE: CPT | Performed by: EMERGENCY MEDICINE

## 2024-09-11 PROCEDURE — 96376 TX/PRO/DX INJ SAME DRUG ADON: CPT

## 2024-09-11 PROCEDURE — 81025 URINE PREGNANCY TEST: CPT | Performed by: EMERGENCY MEDICINE

## 2024-09-11 PROCEDURE — 99285 EMERGENCY DEPT VISIT HI MDM: CPT | Mod: 25

## 2024-09-11 PROCEDURE — 85025 COMPLETE CBC W/AUTO DIFF WBC: CPT | Performed by: EMERGENCY MEDICINE

## 2024-09-11 RX ORDER — IOPAMIDOL 755 MG/ML
50 INJECTION, SOLUTION INTRAVASCULAR ONCE
Status: COMPLETED | OUTPATIENT
Start: 2024-09-11 | End: 2024-09-11

## 2024-09-11 RX ORDER — ACETAMINOPHEN 325 MG/1
650 TABLET ORAL EVERY 4 HOURS PRN
Status: DISCONTINUED | OUTPATIENT
Start: 2024-09-11 | End: 2024-09-12 | Stop reason: HOSPADM

## 2024-09-11 RX ORDER — ONDANSETRON 4 MG/1
4 TABLET, ORALLY DISINTEGRATING ORAL EVERY 6 HOURS PRN
Status: DISCONTINUED | OUTPATIENT
Start: 2024-09-11 | End: 2024-09-12 | Stop reason: HOSPADM

## 2024-09-11 RX ORDER — AMOXICILLIN 250 MG
1 CAPSULE ORAL 2 TIMES DAILY PRN
Status: DISCONTINUED | OUTPATIENT
Start: 2024-09-11 | End: 2024-09-12 | Stop reason: HOSPADM

## 2024-09-11 RX ORDER — ADALIMUMAB 40MG/0.4ML
40 KIT SUBCUTANEOUS
COMMUNITY

## 2024-09-11 RX ORDER — ACETAMINOPHEN 650 MG/1
650 SUPPOSITORY RECTAL EVERY 4 HOURS PRN
Status: DISCONTINUED | OUTPATIENT
Start: 2024-09-11 | End: 2024-09-12 | Stop reason: HOSPADM

## 2024-09-11 RX ORDER — PROCHLORPERAZINE 25 MG
25 SUPPOSITORY, RECTAL RECTAL EVERY 12 HOURS PRN
Status: DISCONTINUED | OUTPATIENT
Start: 2024-09-11 | End: 2024-09-12 | Stop reason: HOSPADM

## 2024-09-11 RX ORDER — FAMOTIDINE 40 MG/1
40 TABLET, FILM COATED ORAL DAILY
COMMUNITY
End: 2024-09-11

## 2024-09-11 RX ORDER — HYDROMORPHONE HYDROCHLORIDE 2 MG/1
2 TABLET ORAL EVERY 4 HOURS PRN
Status: DISCONTINUED | OUTPATIENT
Start: 2024-09-11 | End: 2024-09-12 | Stop reason: HOSPADM

## 2024-09-11 RX ORDER — ONDANSETRON 2 MG/ML
4 INJECTION INTRAMUSCULAR; INTRAVENOUS ONCE
Status: COMPLETED | OUTPATIENT
Start: 2024-09-11 | End: 2024-09-11

## 2024-09-11 RX ORDER — PROCHLORPERAZINE MALEATE 10 MG
10 TABLET ORAL EVERY 6 HOURS PRN
Status: DISCONTINUED | OUTPATIENT
Start: 2024-09-11 | End: 2024-09-12 | Stop reason: HOSPADM

## 2024-09-11 RX ORDER — AMOXICILLIN 250 MG
2 CAPSULE ORAL 2 TIMES DAILY PRN
Status: DISCONTINUED | OUTPATIENT
Start: 2024-09-11 | End: 2024-09-12 | Stop reason: HOSPADM

## 2024-09-11 RX ORDER — ONDANSETRON 2 MG/ML
4 INJECTION INTRAMUSCULAR; INTRAVENOUS EVERY 6 HOURS PRN
Status: DISCONTINUED | OUTPATIENT
Start: 2024-09-11 | End: 2024-09-12 | Stop reason: HOSPADM

## 2024-09-11 RX ORDER — HYDROMORPHONE HCL IN WATER/PF 6 MG/30 ML
0.5 PATIENT CONTROLLED ANALGESIA SYRINGE INTRAVENOUS ONCE
Status: COMPLETED | OUTPATIENT
Start: 2024-09-11 | End: 2024-09-11

## 2024-09-11 RX ADMIN — HYDROMORPHONE HYDROCHLORIDE 0.5 MG: 0.2 INJECTION, SOLUTION INTRAMUSCULAR; INTRAVENOUS; SUBCUTANEOUS at 22:23

## 2024-09-11 RX ADMIN — ONDANSETRON 4 MG: 2 INJECTION INTRAMUSCULAR; INTRAVENOUS at 21:57

## 2024-09-11 RX ADMIN — FAMOTIDINE 20 MG: 10 INJECTION, SOLUTION INTRAVENOUS at 20:21

## 2024-09-11 RX ADMIN — IOPAMIDOL 50 ML: 755 INJECTION, SOLUTION INTRAVENOUS at 19:33

## 2024-09-11 RX ADMIN — HYDROMORPHONE HYDROCHLORIDE 0.5 MG: 1 INJECTION, SOLUTION INTRAMUSCULAR; INTRAVENOUS; SUBCUTANEOUS at 20:21

## 2024-09-11 ASSESSMENT — COLUMBIA-SUICIDE SEVERITY RATING SCALE - C-SSRS
2. HAVE YOU ACTUALLY HAD ANY THOUGHTS OF KILLING YOURSELF IN THE PAST MONTH?: NO
1. IN THE PAST MONTH, HAVE YOU WISHED YOU WERE DEAD OR WISHED YOU COULD GO TO SLEEP AND NOT WAKE UP?: NO
6. HAVE YOU EVER DONE ANYTHING, STARTED TO DO ANYTHING, OR PREPARED TO DO ANYTHING TO END YOUR LIFE?: NO

## 2024-09-11 ASSESSMENT — ACTIVITIES OF DAILY LIVING (ADL)
ADLS_ACUITY_SCORE: 37

## 2024-09-11 NOTE — ED TRIAGE NOTES
"Patient is here with abdominal pain that started last evening. Patient can not touch the area as it is sore and can not stand up straight. Patient as a colostomy bag that there has been no changes in output. Diarrhea with no fevers. Patient reports that her \"private area\" is also sore and painful with urination similar to when she gave birth years ago.     Patient was triage using language IPAD      Triage Assessment (Adult)       Row Name 09/11/24 [id number]          Triage Assessment    Airway WDL WDL        Respiratory WDL    Respiratory WDL WDL        Skin Circulation/Temperature WDL    Skin Circulation/Temperature WDL WDL        Cardiac WDL    Cardiac WDL --  subjective        Peripheral/Neurovascular WDL    Peripheral Neurovascular WDL WDL        Cognitive/Neuro/Behavioral WDL    Cognitive/Neuro/Behavioral WDL WDL                     "

## 2024-09-12 ENCOUNTER — VIRTUAL VISIT (OUTPATIENT)
Dept: INTERPRETER SERVICES | Facility: CLINIC | Age: 44
End: 2024-09-12
Payer: COMMERCIAL

## 2024-09-12 VITALS
SYSTOLIC BLOOD PRESSURE: 116 MMHG | DIASTOLIC BLOOD PRESSURE: 77 MMHG | BODY MASS INDEX: 18.61 KG/M2 | TEMPERATURE: 98.6 F | HEART RATE: 61 BPM | WEIGHT: 101.1 LBS | RESPIRATION RATE: 16 BRPM | OXYGEN SATURATION: 99 % | HEIGHT: 62 IN

## 2024-09-12 LAB
ADV 40+41 DNA STL QL NAA+NON-PROBE: NEGATIVE
ANION GAP SERPL CALCULATED.3IONS-SCNC: 9 MMOL/L (ref 7–15)
ASTRO TYP 1-8 RNA STL QL NAA+NON-PROBE: NEGATIVE
BACTERIA UR CULT: NORMAL
BUN SERPL-MCNC: 7.2 MG/DL (ref 6–20)
C CAYETANENSIS DNA STL QL NAA+NON-PROBE: NEGATIVE
CALCIUM SERPL-MCNC: 8.6 MG/DL (ref 8.8–10.4)
CAMPYLOBACTER DNA SPEC NAA+PROBE: NEGATIVE
CHLORIDE SERPL-SCNC: 103 MMOL/L (ref 98–107)
CREAT SERPL-MCNC: 0.65 MG/DL (ref 0.51–0.95)
CRYPTOSP DNA STL QL NAA+NON-PROBE: NEGATIVE
E COLI O157 DNA STL QL NAA+NON-PROBE: ABNORMAL
E HISTOLYT DNA STL QL NAA+NON-PROBE: NEGATIVE
EAEC ASTA GENE ISLT QL NAA+PROBE: POSITIVE
EC STX1+STX2 GENES STL QL NAA+NON-PROBE: NEGATIVE
EGFRCR SERPLBLD CKD-EPI 2021: >90 ML/MIN/1.73M2
EPEC EAE GENE STL QL NAA+NON-PROBE: POSITIVE
ERYTHROCYTE [DISTWIDTH] IN BLOOD BY AUTOMATED COUNT: 12.3 % (ref 10–15)
ETEC LTA+ST1A+ST1B TOX ST NAA+NON-PROBE: NEGATIVE
G LAMBLIA DNA STL QL NAA+NON-PROBE: NEGATIVE
GLUCOSE SERPL-MCNC: 102 MG/DL (ref 70–99)
HCO3 SERPL-SCNC: 28 MMOL/L (ref 22–29)
HCT VFR BLD AUTO: 32 % (ref 35–47)
HGB BLD-MCNC: 10.5 G/DL (ref 11.7–15.7)
MCH RBC QN AUTO: 29.7 PG (ref 26.5–33)
MCHC RBC AUTO-ENTMCNC: 32.8 G/DL (ref 31.5–36.5)
MCV RBC AUTO: 90 FL (ref 78–100)
NOROVIRUS GI+II RNA STL QL NAA+NON-PROBE: POSITIVE
P SHIGELLOIDES DNA STL QL NAA+NON-PROBE: NEGATIVE
PLATELET # BLD AUTO: 280 10E3/UL (ref 150–450)
POTASSIUM SERPL-SCNC: 3.4 MMOL/L (ref 3.4–5.3)
RBC # BLD AUTO: 3.54 10E6/UL (ref 3.8–5.2)
RVA RNA STL QL NAA+NON-PROBE: NEGATIVE
SALMONELLA SP RPOD STL QL NAA+PROBE: NEGATIVE
SAPO I+II+IV+V RNA STL QL NAA+NON-PROBE: NEGATIVE
SHIGELLA SP+EIEC IPAH ST NAA+NON-PROBE: NEGATIVE
SODIUM SERPL-SCNC: 140 MMOL/L (ref 135–145)
V CHOLERAE DNA SPEC QL NAA+PROBE: NEGATIVE
VIBRIO DNA SPEC NAA+PROBE: NEGATIVE
WBC # BLD AUTO: 6.4 10E3/UL (ref 4–11)
Y ENTEROCOL DNA STL QL NAA+PROBE: NEGATIVE

## 2024-09-12 PROCEDURE — 96376 TX/PRO/DX INJ SAME DRUG ADON: CPT

## 2024-09-12 PROCEDURE — 99223 1ST HOSP IP/OBS HIGH 75: CPT | Mod: AI

## 2024-09-12 PROCEDURE — G0378 HOSPITAL OBSERVATION PER HR: HCPCS

## 2024-09-12 PROCEDURE — 87507 IADNA-DNA/RNA PROBE TQ 12-25: CPT

## 2024-09-12 PROCEDURE — 80048 BASIC METABOLIC PNL TOTAL CA: CPT

## 2024-09-12 PROCEDURE — 258N000003 HC RX IP 258 OP 636

## 2024-09-12 PROCEDURE — 250N000011 HC RX IP 250 OP 636

## 2024-09-12 PROCEDURE — 36415 COLL VENOUS BLD VENIPUNCTURE: CPT

## 2024-09-12 PROCEDURE — 96361 HYDRATE IV INFUSION ADD-ON: CPT

## 2024-09-12 PROCEDURE — 85041 AUTOMATED RBC COUNT: CPT

## 2024-09-12 PROCEDURE — T1013 SIGN LANG/ORAL INTERPRETER: HCPCS | Mod: U4,TEL,95

## 2024-09-12 RX ORDER — NALOXONE HYDROCHLORIDE 0.4 MG/ML
0.4 INJECTION, SOLUTION INTRAMUSCULAR; INTRAVENOUS; SUBCUTANEOUS
Status: DISCONTINUED | OUTPATIENT
Start: 2024-09-12 | End: 2024-09-12 | Stop reason: HOSPADM

## 2024-09-12 RX ORDER — NALOXONE HYDROCHLORIDE 0.4 MG/ML
0.2 INJECTION, SOLUTION INTRAMUSCULAR; INTRAVENOUS; SUBCUTANEOUS
Status: DISCONTINUED | OUTPATIENT
Start: 2024-09-12 | End: 2024-09-12 | Stop reason: HOSPADM

## 2024-09-12 RX ORDER — AZATHIOPRINE 50 MG/1
100 TABLET ORAL DAILY
Status: DISCONTINUED | OUTPATIENT
Start: 2024-09-12 | End: 2024-09-12 | Stop reason: HOSPADM

## 2024-09-12 RX ORDER — SODIUM CHLORIDE, SODIUM LACTATE, POTASSIUM CHLORIDE, CALCIUM CHLORIDE 600; 310; 30; 20 MG/100ML; MG/100ML; MG/100ML; MG/100ML
INJECTION, SOLUTION INTRAVENOUS CONTINUOUS
Status: DISCONTINUED | OUTPATIENT
Start: 2024-09-12 | End: 2024-09-12 | Stop reason: HOSPADM

## 2024-09-12 RX ADMIN — SODIUM CHLORIDE, POTASSIUM CHLORIDE, SODIUM LACTATE AND CALCIUM CHLORIDE: 600; 310; 30; 20 INJECTION, SOLUTION INTRAVENOUS at 03:17

## 2024-09-12 RX ADMIN — FAMOTIDINE 20 MG: 10 INJECTION, SOLUTION INTRAVENOUS at 08:03

## 2024-09-12 ASSESSMENT — ENCOUNTER SYMPTOMS
NAUSEA: 0
DYSURIA: 0
HEMATURIA: 0
DIARRHEA: 0
CHILLS: 0
VOMITING: 0
SHORTNESS OF BREATH: 0
ABDOMINAL PAIN: 1
FEVER: 0
BLOOD IN STOOL: 0

## 2024-09-12 ASSESSMENT — ACTIVITIES OF DAILY LIVING (ADL)
ADLS_ACUITY_SCORE: 37
DEPENDENT_IADLS:: CLEANING;COOKING;LAUNDRY;SHOPPING;MEAL PREPARATION;TRANSPORTATION;MONEY MANAGEMENT
ADLS_ACUITY_SCORE: 37
ADLS_ACUITY_SCORE: 37

## 2024-09-12 NOTE — ED PROVIDER NOTES
Emergency Department Midlevel Supervisory Note     I had a face to face encounter with this patient seen by the Advanced Practice Provider (LA). I personally made/approved the management plan and take responsibility for the patient management. I personally saw patient and performed a substantive portion of the visit including all aspects of the medical decision making.     ED Course:  45-year-old female with a history of Crohn's disease presented to the ED for evaluation of epigastric pain.  Patient was hemodynamically stable upon arrival to the ED.  She did not appear to be in any obvious distress or discomfort at the time of my evaluation.  On exam the patient does not have mild tenderness to palpation noted over the epigastric region.  She did not have evidence of an acute abdomen, however.    CBC, CMP, troponin, and lipase were all reassuring.  hCG testing was negative.  Urinalysis shows a likely urinary tract infection.    CT scan of the abdomen shows findings consistent with enterocolitis in the distal ileum and colon.  She is also noted to have a cyst within the left hemipelvis.  Pelvic ultrasound was obtained for further evaluation of the cyst.  Ultrasound shows that the cyst is a simple ovarian cyst.    The patient's case was discussed with the on-call gastroenterologist who recommended admission for further workup and evaluation.     9:41 PM Jackeline Sanches PA-C staffed patient with me. I agree with their assessment and plan of management, and I will see the patient.  9:54 PM I met with the patient to introduce myself, gather additional history, perform my initial exam, and discuss the plan.     Brief HPI:     Dain Tejeda is a 44 year old female who presents for evaluation of abdominal pain. Patient has a history of Crohn's disease. Patient endorses throbbing left upper quadrant pain that is worsening over the course of the day. Pain is worse with palpation and movement. Endorses pain with swallowing.  "Tylenol did not relieve her pain.    I, Amelia Gaston, am serving as a scribe to document services personally performed by Katie Perea DO, based on my observations and the provider's statements to me. I, Katie Perea DO, attest that Amelia Gaston was acting in a scribe capacity, has observed my performance of the services and has documented them in accordance with my direction.    Brief Physical Exam: /66   Pulse 68   Temp 98.6  F (37  C)   Resp 16   Ht 1.575 m (5' 2\")   Wt 45.9 kg (101 lb 1.6 oz)   SpO2 99%   BMI 18.49 kg/m    Constitutional:  Alert, in no acute distress  EYES: Conjunctivae clear  HENT:  Atraumatic  Respiratory:  Respirations even, unlabored, in no acute respiratory distress  Cardiovascular:  Regular rate and rhythm, good peripheral perfusion  GI: Soft, non-distended.  Mild tenderness to palpation noted in the epigastric region.  Colostomy bag noted in the left mid abdomen.  Musculoskeletal:  Moves all 4 extremities equally, grossly symmetrical strength  Integument: Warm & dry. No appreciable rash, erythema.  Neurologic:  Alert & oriented, speech clear and fluent, no focal deficits noted  Psych: Normal mood and affect       MDM:  1. Enterocolitis    2. Epigastric pain    3. Left ovarian cyst            Labs and Imaging:  Results for orders placed or performed during the hospital encounter of 09/11/24   CT Abdomen Pelvis w Contrast    Impression    IMPRESSION:   1.  Enlarging prominent cyst left hemipelvis and ultrasound is recommended for further evaluation.    2.  Findings of enterocolitis involving the distal ileum and remaining colon extending to the left ostomy site with no abscess or free air.   US Pelvis Cmpl wo Transvaginal w Abd/Pel Duplex Lmt   Result Value Ref Range    Radiologist flags Ovarian cyst     Impression    IMPRESSION:      Simple appearing left ovarian cyst measuring up to 5.6 cm, previously 5.8 cm. Follow-up pelvic ultrasound is recommended in 6-12 " months.      [Recommend Follow Up: Ovarian cyst]    This report will be copied to the [facility] to ensure a provider acknowledges the finding.      Comprehensive metabolic panel   Result Value Ref Range    Sodium 138 135 - 145 mmol/L    Potassium 3.7 3.4 - 5.3 mmol/L    Carbon Dioxide (CO2) 27 22 - 29 mmol/L    Anion Gap 9 7 - 15 mmol/L    Urea Nitrogen 7.6 6.0 - 20.0 mg/dL    Creatinine 0.61 0.51 - 0.95 mg/dL    GFR Estimate >90 >60 mL/min/1.73m2    Calcium 9.0 8.8 - 10.4 mg/dL    Chloride 102 98 - 107 mmol/L    Glucose 105 (H) 70 - 99 mg/dL    Alkaline Phosphatase 82 40 - 150 U/L    AST 15 0 - 45 U/L    ALT 11 0 - 50 U/L    Protein Total 8.5 (H) 6.4 - 8.3 g/dL    Albumin 3.7 3.5 - 5.2 g/dL    Bilirubin Total 0.4 <=1.2 mg/dL   UA with Microscopic reflex to Culture    Specimen: Urine, Clean Catch   Result Value Ref Range    Color Urine Yellow Colorless, Straw, Light Yellow, Yellow    Appearance Urine Turbid (A) Clear    Glucose Urine Negative Negative mg/dL    Bilirubin Urine Negative Negative    Ketones Urine Negative Negative mg/dL    Specific Gravity Urine 1.030 1.001 - 1.030    Blood Urine 0.5 mg/dL (A) Negative    pH Urine 6.5 5.0 - 7.0    Protein Albumin Urine 50 (A) Negative mg/dL    Urobilinogen Urine <2.0 <2.0 mg/dL    Nitrite Urine Negative Negative    Leukocyte Esterase Urine 250 Shilpa/uL (A) Negative    Bacteria Urine Few (A) None Seen /HPF    Mucus Urine Present (A) None Seen /LPF    RBC Urine 7 (H) <=2 /HPF    WBC Urine 24 (H) <=5 /HPF    Squamous Epithelials Urine 21 (H) <=1 /HPF   CBC with platelets and differential   Result Value Ref Range    WBC Count 6.3 4.0 - 11.0 10e3/uL    RBC Count 3.85 3.80 - 5.20 10e6/uL    Hemoglobin 11.4 (L) 11.7 - 15.7 g/dL    Hematocrit 35.3 35.0 - 47.0 %    MCV 92 78 - 100 fL    MCH 29.6 26.5 - 33.0 pg    MCHC 32.3 31.5 - 36.5 g/dL    RDW 12.5 10.0 - 15.0 %    Platelet Count 341 150 - 450 10e3/uL    % Neutrophils 65 %    % Lymphocytes 24 %    % Monocytes 7 %     % Eosinophils 3 %    % Basophils 0 %    % Immature Granulocytes 0 %    NRBCs per 100 WBC 0 <1 /100    Absolute Neutrophils 4.1 1.6 - 8.3 10e3/uL    Absolute Lymphocytes 1.5 0.8 - 5.3 10e3/uL    Absolute Monocytes 0.5 0.0 - 1.3 10e3/uL    Absolute Eosinophils 0.2 0.0 - 0.7 10e3/uL    Absolute Basophils 0.0 0.0 - 0.2 10e3/uL    Absolute Immature Granulocytes 0.0 <=0.4 10e3/uL    Absolute NRBCs 0.0 10e3/uL   Extra Red Top Tube   Result Value Ref Range    Hold Specimen JIC    Extra Purple Top Tube   Result Value Ref Range    Hold Specimen JIC    HCG qualitative urine   Result Value Ref Range    hCG Urine Qualitative Negative Negative   Troponin T, High Sensitivity (now)   Result Value Ref Range    Troponin T, High Sensitivity <6 <=14 ng/L   Result Value Ref Range    Lipase 33 13 - 60 U/L       I have reviewed the relevant laboratory studies above.    I independently interpreted the following imaging study(s):   US Pelvis Cmpl wo Transvaginal w Abd/Pel Duplex Lmt   Final Result   IMPRESSION:        Simple appearing left ovarian cyst measuring up to 5.6 cm, previously 5.8 cm. Follow-up pelvic ultrasound is recommended in 6-12 months.         [Recommend Follow Up: Ovarian cyst]      This report will be copied to the [facility] to ensure a provider acknowledges the finding.          CT Abdomen Pelvis w Contrast   Final Result   IMPRESSION:    1.  Enlarging prominent cyst left hemipelvis and ultrasound is recommended for further evaluation.      2.  Findings of enterocolitis involving the distal ileum and remaining colon extending to the left ostomy site with no abscess or free air.             Katie Perea DO  [facility] EMERGENCY DEPARTMENT  [address] [place] [address] [phone]       Katie Perea DO  09/11/24 [id number]

## 2024-09-12 NOTE — DISCHARGE SUMMARY
[facility]  Discharge Summary - Medicine & Pediatrics       Date of Admission:  9/11/2024  Date of Discharge:  9/12/2024  Discharging Provider: Cielo Bernal MD   Discharge Service: Hospitalist Service    Discharge Diagnoses   Active Problems:    Enterocolitis    Date Noted: 9/11/2024    Epigastric pain    Date Noted: 9/11/2024    Left ovarian cyst    Date Noted: 9/11/2024    Clinically Significant Risk Factors          Follow-ups Needed After Discharge   Follow-up Appointments     Follow-up and recommended labs and tests       Follow up with primary care provider, Kevin Woodruff, within 7 days for   hospital follow- up.  The following labs/tests are recommended: BMP.    Follow-up with [facility]. They will call you to arrange this.          Unresulted Labs Ordered in the Past 30 Days of this Admission       No orders found for last 31 day(s).        These results will be followed up by PCP    Discharge Disposition   Discharged to home  Condition at discharge: Stable    Hospital Course   Dain Tejeda was admitted on 9/11/2024. She has a history of Crohn's disease s/p partial colectomy with colostomy creation, celiac disease, normocytic anemia, sinus bradycardia and is admitted for enterocolitis 2/2 Norovirus and E. Coli.  The following problems were addressed during her hospitalization:    Enterocolitis 2/2 Norovirus, E coli (EPEC, EAEC)  History of Crohn's disease, celiac disease   S/p partial colectomy with colostomy  Presented to ED with abdominal pain. VSS. CBC, CMP stable. CT abdomen/pelvis with some colon wall thickening consistent with enterocolitis. Initially suspected to have a mild Crohn's flare. Enteric pathogen panel postive for Norovirus and E coli (EPEC, EAEC). Symptoms resolved on first day of admission, and patient was discharged home with recommendations for supportive care and return precautions.   - GI consulted, appreciate recommendations:    - Supportive cares for  Norovirus/EPEC/EAEC enterocolitis   - Follow-up with [facility] in clinic (they will arrange follow-up)  -Follow-up with PCP   -Continue PTA Humira, azathioprine, omeprazole    History of Crohn's disease, celiac disease   Patient with known history of Crohn's disease since 2022, complicated by stricture and status post partial colectomy with colostomy creation.  Historically has followed with [facility]. On Humira and azathioprine chronically.  Last hospitalization in May 2024 for steroid-induced fistulizing disease and abscess, was supposed to have switched over from Humira to infliximab and follow-up with GI for repeat colonoscopy shortly after previous admission, however has been lost to follow-up.   -Saw GI during admission who will help arrange follow-up.   -Continue PTA Humira, azathioprine, omeprazole    Pelvic cyst  Known history of pelvic cyst.  CT abdomen pelvis on admission redemonstrating ovarian cyst, confirmed with pelvic ultrasound. No gynecologic symptoms reported, will continue to monitor.  - Recommend follow-up pelvic ultrasound in 6 to 12 months.      Chronic normocytic anemia  Hemoglobin on admission 11.4, actually up from baseline between 8 and 10.  No signs or symptoms of acute blood loss anemia, denies dark stools.  Will monitor.  -AM CBC    Consultations This Hospital Stay   GASTROENTEROLOGY IP CONSULT  CARE MANAGEMENT / SOCIAL WORK IP CONSULT    Code Status   Full Code       The patient was discussed with Dr. Rosenstein Emily Bergstedt, MD  [facility] EMERGENCY DEPARTMENT  [address] [address]  Phone: [phone]  ______________________________________________________________________    Physical Exam   Vital Signs: Temp: 98.6  F (37  C)   BP: 104/68 Pulse: 64   Resp: 16 SpO2: 100 %      Weight: 101 lbs 1.6 oz  GEN: Pleasant female, in no acute distress.   HEENT: Normocephalic, atraumatic. Extraoccular eye movements intact. Anicteric sclera. Moist  mucous membranes.   NECK: Supple. No cervical or supraclavicular adenopathy.   PULM: Non-labored breathing. No use of accessory muscles. Clear to ausculation bilaterally. No wheezes or crackles.   CV: Regular rate and rhythm. Normal S1, S2. No rubs, murmurs, or gallops.    ABDOMEN: Normoactive bowel sounds. Non-tender to palpation. Non-distended.  Colostomy site with no surrounding erythema or duskiness, draining brown stool.   EXTREMITES:  No clubbing, cyanosis, or edema.   NEURO:  Awake. Oriented to person, place, time and situation. Cranial nerves 2-12 grossly intact. Moving all extremities.    PSYCH: Calm. Appropriate affect, insight, judgment.        Primary Care Physician   Kevin Woodruff    Discharge Orders      Reason for your hospital stay    You were hospitalized for abdominal pain. You were found to have two gastrointestinal illnesses - norovirus and E coli. These are infections that will get better with time without any antibiotic treatment. Please stay well hydrated. Return to the hospital if your symptoms worsen.     Follow-up and recommended labs and tests     Follow up with primary care provider, Kevin Woodruff, within 7 days for hospital follow- up.  The following labs/tests are recommended: BMP.    Follow-up with [facility]. They will call you to arrange this.     Activity    Your activity upon discharge: activity as tolerated     Diet    Follow this diet upon discharge: Regular adult diet       Significant Results and Procedures   Most Recent 3 CBC's:  Recent Labs   Lab Test 09/12/24  0754 09/11/24  1741 05/21/24  0552   WBC 6.4 6.3 5.8   HGB 10.5* 11.4* 9.5*   MCV 90 92 98    341 313     Most Recent 3 BMP's:  Recent Labs   Lab Test 09/12/24  0754 09/11/24  1741 05/21/24  1210 05/21/24  0654 05/21/24  0552    138  --   --  139   POTASSIUM 3.4 3.7  --   --  4.2   CHLORIDE 103 102  --   --  105   CO2 28 27  --   --  26   BUN 7.2 7.6  --   --  18.3   CR 0.65 0.61  --   --  0.45*   ANIONGAP 9 9   --   --  8   ALEX 8.6* 9.0  --   --  9.3   * 105* 99   < > 114*    < > = values in this interval not displayed.   ,   Results for orders placed or performed during the hospital encounter of 09/11/24   CT Abdomen Pelvis w Contrast    Narrative    EXAM: CT ABDOMEN PELVIS W CONTRAST  LOCATION: [facility]  DATE: 9/11/2024    INDICATION: Abdominal pain, history of Crohn's with colectomy.  COMPARISON: CT 5/15/2024.  TECHNIQUE: CT scan of the abdomen and pelvis was performed following injection of IV contrast. Multiplanar reformats were obtained. Dose reduction techniques were used.  CONTRAST: 50 ml Isovue 370.    FINDINGS:   LOWER CHEST: Normal.    HEPATOBILIARY: The gallbladder is partially contracted and this should be secondary to nonfasting status of the patient, given the appearance of the stomach. The liver, bile ducts, hepatic veins, and portal veins are normal. There is a benign calcified   granuloma in the right lobe of the liver.    PANCREAS: Normal.    SPLEEN: Normal.    ADRENAL GLANDS: Normal.    KIDNEYS/BLADDER: Normal.    BOWEL: There are prior postoperative changes seen of the bowel. There is a colostomy in the upper left hemipelvis. There is circumferential thickening seen involving the distal ileum and remaining colon consistent with enterocolitis. No associated   abscess or free air identified. These findings appear similar to CT 5/15/2024 with the exception of the prior seen abscess extending inferiorly from the proximal transverse colon has resolved. No evidence for a mechanical obstruction.    LYMPH NODES: Normal.    VASCULATURE: Minimal calcification with no aneurysm.    PELVIC ORGANS: There is a prominent cyst seen in the left hemipelvis measuring 7.1 x 4.9 x 4.6 cm and this has increased since CT 5/15/2024 with previous measurement at 6.1 x 4.1 x 5.2 cm. This likely arises from the left ovary. I will recommend an   ultrasound examination of the pelvis for further  evaluation.    MUSCULOSKELETAL: Benign bone island right iliac bone. Normal variant bony bridging of L5 to the left sacrum.      Impression    IMPRESSION:   1.  Enlarging prominent cyst left hemipelvis and ultrasound is recommended for further evaluation.    2.  Findings of enterocolitis involving the distal ileum and remaining colon extending to the left ostomy site with no abscess or free air.   US Pelvis Cmpl wo Transvaginal w Abd/Pel Duplex Lmt     Value    Radiologist flags Ovarian cyst    Narrative    EXAM: US PELVIS CMPL WO TRANSVAGINAL W ABD/PEL DUPLEX LIMITED  LOCATION: [facility]  DATE: 9/11/2024    INDICATION: Left adnexal cyst.  COMPARISON: Same day CT abdomen/pelvis; pelvic ultrasound 3/14/2024.  TECHNIQUE: Transabdominal scans were performed. Color flow with spectral Doppler and waveform analysis performed.    FINDINGS:    UTERUS: 8.0 x 5.7 x 4.6 cm. Normal in size and position with no masses.    ENDOMETRIUM: 11 mm. Normal smooth endometrium.    RIGHT OVARY: 2.9 x 1.9 x 1.7 cm. Normal with arterial and venous duplex flow identified.    LEFT OVARY: 6.0 x 5.2 x 4.5 cm. Normal with arterial and venous duplex flow identified. A simple appearing cyst measures up to 5.6 cm, previously 5.8 cm.    No significant free fluid.      Impression    IMPRESSION:      Simple appearing left ovarian cyst measuring up to 5.6 cm, previously 5.8 cm. Follow-up pelvic ultrasound is recommended in 6-12 months.      [Recommend Follow Up: Ovarian cyst]    This report will be copied to the [facility] to ensure a provider acknowledges the finding.          Discharge Medications   Current Discharge Medication List        CONTINUE these medications which have NOT CHANGED    Details   adalimumab (HUMIRA *CF*) 40 MG/0.4ML prefilled syringe kit Inject 40 mg subcutaneously every 14 days. Takes on the 12th and 24th of each month.      azaTHIOprine (IMURAN) 50 MG tablet Take 2 tablets by mouth daily       omeprazole (PRILOSEC) 20 MG DR capsule Take 20 mg by mouth every morning (before breakfast).           Allergies   Allergies   Allergen Reactions    Gluten Meal      Celiac    Soy Allergy Anaphylaxis, Hives and Itching     Tofu- causes itching and sores in the mouth

## 2024-09-12 NOTE — MEDICATION SCRIBE - ADMISSION MEDICATION HISTORY
Medication Scribe Admission Medication History    Admission medication history is complete. The information provided in this note is only as accurate as the sources available at the time of the update.    Information Source(s): Patient and CareEverywhere/SureScripts via in-person    Pertinent Information: Pt is Hmong speaking,  via mobile language line tablet utilized for interview.    Patient manages her own medications at home, but is not able to name them. She is able to recognize names of medications when stated to her. She says she takes 3 medications (including one injection).     Patients fill hx is sparse since 5/2024. Pt reports that she uses her insurance and fills her medications at her pharmacy. When asked if paying cash, she said that she usually covers what is left over after insurance only (copays). Patient reports that she has been using medications she was given from last time she was in the hospital. When asked if anyone at home could bring in her medications or take photos to send her, she says only her  is home and he won't know how to do that.     Informed her that it would be helpful to have a record/list on her phone, or bring in all her medications when she comes in, but that I understand sometimes you are not able to due to the emergent situation you are in. Patient in agreement.    Humira 40mg/0.4mL  -patient reports taking this twice a month. Patient takes 40mg on the 12th and 24th of each month. Last taken 8/24/24. Next due 9/12/24. Humira is filled thru mail order pharmacy. Patient fills 2 pens a month, takes 1 full pen with each dose. Last discharge back in 5/21/24 patient told to STOP Humira.  Omeprazole  -added thru chart review, pt reports taking medication for her stomach.  Azathioprine  -fill hx shows last refilled thru insurance in 4/11/24. Pt reports still taking.    Changes made to PTA medication list:  Added: Humira 40mg, Omeprazole 20mg,  Deleted: Augmentin,  Vitamin D3,  Changed: None    Allergies reviewed with patient and updates made in EHR: yes    Medication History Completed By: Aryan Simons 9/11/2024 9:42 PM    PTA Med List   Medication Sig Last Dose    adalimumab (HUMIRA *CF*) 40 MG/0.4ML prefilled syringe kit Inject 40 mg subcutaneously every 14 days. Takes on the 12th and 24th of each month. 8/24/2024 at AM    azaTHIOprine (IMURAN) 50 MG tablet Take 2 tablets by mouth daily 9/11/2024 at AM    omeprazole (PRILOSEC) 20 MG DR capsule Take 20 mg by mouth every morning (before breakfast). 9/11/2024 at AM

## 2024-09-12 NOTE — CONSULTS
[facility] consult     Name: Dain Tejeda    Medical Record #: [id number]    YOB: 1980    Date/Time: 9/12/2024/2:15 PM    Reason for Consultation: Rosenstein, Benjamin, MD has asked me to evaluate Dain Tejeda regarding Crohn's disease.    HPI: 44 years old female with celiac disease and severe small bowel and colonic disease with resection of the descending colon and colostomy and Duffy's pouch.  She had been on dual therapy with Humira and azathioprine has had progression of disease with colon abscess.  This was treated with antibiotics.  She presents with pericolostomy pain for 1 day.  This has gotten better over the course of day today after receiving fluids and medications here.  No excessive ostomy output, no fever no chills, vomiting x 1.  She wants to go home and understands if symptoms get worse then they she will come back.    Review of Systems (ROS): Complete ROS otherwise negative except for as above.    Past Medical History:  Past Medical History:   Diagnosis Date    Diet controlled gestational diabetes mellitus (GDM), antepartum 9/4/2017    Attempting diet control first    Gestational diabetes mellitus (GDM) in third trimester 9/4/2017    Attempting diet control first  Formatting of this note might be different from the original. Overview:  Attempting diet control first    Nausea/vomiting in pregnancy 4/24/2017    Postpartum hemorrhage 11/16/2017    Third degree laceration of perineum during delivery, postpartum 11/15/2017       Medications:   (Not in a hospital admission)      Current Facility-Administered Medications:     acetaminophen (TYLENOL) tablet 650 mg, 650 mg, Oral, Q4H PRN **OR** acetaminophen (TYLENOL) Suppository 650 mg, 650 mg, Rectal, Q4H PRN, Rina Chairez MD    [Held by provider] adalimumab (HUMIRA *CF*) injection 40 mg, 40 mg, Subcutaneous, Q14 Days, Rina Chairez MD    [Held by provider] azaTHIOprine (IMURAN) tablet 100 mg, 100 mg, Oral, Daily, Rina Chairez MD     famotidine (PEPCID) injection 20 mg, 20 mg, Intravenous, Q12H, Rina Chairez MD, 20 mg at 09/12/24 0803    HYDROmorphone (DILAUDID) half-tab 1 mg, 1 mg, Oral, Q4H PRN, Rina Chairez MD    HYDROmorphone (DILAUDID) tablet 2 mg, 2 mg, Oral, Q4H PRN, Rina Chairez MD    lactated ringers infusion, , Intravenous, Continuous, Rina Chairez MD, Last Rate: 50 mL/hr at 09/12/24 0744, Rate Verify at 09/12/24 0744    naloxone (NARCAN) injection 0.2 mg, 0.2 mg, Intravenous, Q2 Min PRN **OR** naloxone (NARCAN) injection 0.4 mg, 0.4 mg, Intravenous, Q2 Min PRN **OR** naloxone (NARCAN) injection 0.2 mg, 0.2 mg, Intramuscular, Q2 Min PRN **OR** naloxone (NARCAN) injection 0.4 mg, 0.4 mg, Intramuscular, Q2 Min PRN, Bryant Tamayo MD    [Held by provider] omeprazole (PriLOSEC) CR capsule 20 mg, 20 mg, Oral, QAM AC, Rina Chairez MD    ondansetron (ZOFRAN ODT) ODT tab 4 mg, 4 mg, Oral, Q6H PRN **OR** ondansetron (ZOFRAN) injection 4 mg, 4 mg, Intravenous, Q6H PRN, Rina Chairez MD    prochlorperazine (COMPAZINE) injection 10 mg, 10 mg, Intravenous, Q6H PRN **OR** prochlorperazine (COMPAZINE) tablet 10 mg, 10 mg, Oral, Q6H PRN **OR** prochlorperazine (COMPAZINE) suppository 25 mg, 25 mg, Rectal, Q12H PRN, Rina Chairez MD    senna-docusate (SENOKOT-S/PERICOLACE) 8.6-50 MG per tablet 1 tablet, 1 tablet, Oral, BID PRN **OR** senna-docusate (SENOKOT-S/PERICOLACE) 8.6-50 MG per tablet 2 tablet, 2 tablet, Oral, BID PRN, Rina Chairez MD    Current Outpatient Medications:     adalimumab (HUMIRA *CF*) 40 MG/0.4ML prefilled syringe kit, Inject 40 mg subcutaneously every 14 days. Takes on the 12th and 24th of each month., Disp: , Rfl:     azaTHIOprine (IMURAN) 50 MG tablet, Take 2 tablets by mouth daily, Disp: , Rfl:     omeprazole (PRILOSEC) 20 MG DR capsule, Take 20 mg by mouth every morning (before breakfast)., Disp: , Rfl:        Allergies: Gluten meal and Soy allergy    Family History:  Family History   Problem Relation Age of Onset     "Gestational Diabetes Sister     Gestational Diabetes Sister     Diabetes No family hx of     Coronary Artery Disease No family hx of     Hypertension No family hx of     Breast Cancer No family hx of     Colon Cancer No family hx of     Prostate Cancer No family hx of     Other Cancer No family hx of        Social History:  Social History     Socioeconomic History    Marital status:      Spouse name: Dmitriy Camargo    Number of children: 0    Years of education: 0    Highest education level: Not on file   Occupational History    Occupation: None   Tobacco Use    Smoking status: Never    Smokeless tobacco: Never   Vaping Use    Vaping status: Not on file   Substance and Sexual Activity    Alcohol use: No    Drug use: No    Sexual activity: Yes     Partners: Male   Other Topics Concern    Parent/sibling w/ CABG, MI or angioplasty before 65F 55M? Not Asked   Social History Narrative    Born in [facility], arrived to [facility] 02/2017. No education.      Social Determinants of Health     Financial Resource Strain: Not on file   Food Insecurity: Not on file   Transportation Needs: Not on file   Physical Activity: Not on file   Stress: Not on file   Social Connections: Not on file   Interpersonal Safety: Low Risk  (3/8/2024)    Interpersonal Safety     Do you feel physically and emotionally safe where you currently live?: Yes     Within the past 12 months, have you been hit, slapped, kicked or otherwise physically hurt by someone?: No     Within the past 12 months, have you been humiliated or emotionally abused in other ways by your partner or ex-partner?: No   Housing Stability: Not on file       Physical Exam: /68   Pulse 64   Temp 98.6  F (37  C)   Resp 16   Ht 1.575 m (5' 2\")   Wt 45.9 kg (101 lb 1.6 oz)   SpO2 100%   BMI 18.49 kg/m      General: No acute distress  Eyes: No scleral icterus or conjunctivitis  Oropharynx: Moist, no ulcers or lesions  Neck/Thyroid: No neck masses or thyromegaly  Pulmonary: Lungs are " clear to auscultation bilaterally  Cardiovascular: Regular, rate, rhythm   Gastrointestinal: Midline scar soft, non-tedner, soft, non-tender, no rebound or guarding. No masses palpable.ostomy looks okay   skin: The patient is not jaundiced. No obvious rashes  Extremities: No pre-tibial edema, no clubbing.   Neurologic: Alert and oriented ×3 , non- focal, grossly intact.    Labs:    CBC RESULTS:   Recent Labs   Lab Test 09/12/24  0754   WBC 6.4   RBC 3.54*   HGB 10.5*   HCT 32.0*   MCV 90   MCH 29.7   MCHC 32.8   RDW 12.3           CMP Results:   Recent Labs   Lab Test 09/12/24  0754 09/11/24  1741    138   POTASSIUM 3.4 3.7   CHLORIDE 103 102   CO2 28 27   ANIONGAP 9 9   * 105*   BUN 7.2 7.6   CR 0.65 0.61   BILITOTAL  --  0.4   ALKPHOS  --  82   ALT  --  11   AST  --  15        INR Results:   Recent Labs   Lab Test 05/20/24  0610   INR 0.98        Stool test positive for norovirus and enteropathogenic E. coli    Radiology: US Pelvis Cmpl wo Transvaginal w Abd/Pel Duplex Lmt    Result Date: 9/11/2024  EXAM: US PELVIS CMPL WO TRANSVAGINAL W ABD/PEL DUPLEX LIMITED LOCATION: [facility] DATE: 9/11/2024 INDICATION: Left adnexal cyst. COMPARISON: Same day CT abdomen/pelvis; pelvic ultrasound 3/14/2024. TECHNIQUE: Transabdominal scans were performed. Color flow with spectral Doppler and waveform analysis performed. FINDINGS: UTERUS: 8.0 x 5.7 x 4.6 cm. Normal in size and position with no masses. ENDOMETRIUM: 11 mm. Normal smooth endometrium. RIGHT OVARY: 2.9 x 1.9 x 1.7 cm. Normal with arterial and venous duplex flow identified. LEFT OVARY: 6.0 x 5.2 x 4.5 cm. Normal with arterial and venous duplex flow identified. A simple appearing cyst measures up to 5.6 cm, previously 5.8 cm. No significant free fluid.     IMPRESSION:  Simple appearing left ovarian cyst measuring up to 5.6 cm, previously 5.8 cm. Follow-up pelvic ultrasound is recommended in 6-12 months. [Recommend Follow Up:  Ovarian cyst] This report will be copied to the [facility] to ensure a provider acknowledges the finding.     CT Abdomen Pelvis w Contrast    Result Date: 9/11/2024  EXAM: CT ABDOMEN PELVIS W CONTRAST LOCATION: [facility] DATE: 9/11/2024 INDICATION: Abdominal pain, history of Crohn's with colectomy. COMPARISON: CT 5/15/2024. TECHNIQUE: CT scan of the abdomen and pelvis was performed following injection of IV contrast. Multiplanar reformats were obtained. Dose reduction techniques were used. CONTRAST: 50 ml Isovue 370. FINDINGS: LOWER CHEST: Normal. HEPATOBILIARY: The gallbladder is partially contracted and this should be secondary to nonfasting status of the patient, given the appearance of the stomach. The liver, bile ducts, hepatic veins, and portal veins are normal. There is a benign calcified granuloma in the right lobe of the liver. PANCREAS: Normal. SPLEEN: Normal. ADRENAL GLANDS: Normal. KIDNEYS/BLADDER: Normal. BOWEL: There are prior postoperative changes seen of the bowel. There is a colostomy in the upper left hemipelvis. There is circumferential thickening seen involving the distal ileum and remaining colon consistent with enterocolitis. No associated abscess or free air identified. These findings appear similar to CT 5/15/2024 with the exception of the prior seen abscess extending inferiorly from the proximal transverse colon has resolved. No evidence for a mechanical obstruction. LYMPH NODES: Normal. VASCULATURE: Minimal calcification with no aneurysm. PELVIC ORGANS: There is a prominent cyst seen in the left hemipelvis measuring 7.1 x 4.9 x 4.6 cm and this has increased since CT 5/15/2024 with previous measurement at 6.1 x 4.1 x 5.2 cm. This likely arises from the left ovary. I will recommend an ultrasound examination of the pelvis for further evaluation. MUSCULOSKELETAL: Benign bone island right iliac bone. Normal variant bony bridging of L5 to the left sacrum.      IMPRESSION: 1.  Enlarging prominent cyst left hemipelvis and ultrasound is recommended for further evaluation. 2.  Findings of enterocolitis involving the distal ileum and remaining colon extending to the left ostomy site with no abscess or free air.     EGD September 2022  diagnosed celiac disease with Marsh 3A villous atrophy.  Colonoscopy October 2022 at [facility].  Only advanced descending colon due to stricture.  Diffuse erythematous mucosa in descending.  Colonoscopy September 2023: Ulcerated terminal ileum.  Ulcerated and pseudopolyps ascending through descending.  Duffy's pouch biopsied.  Pathology of terminal ileum moderately active chronic ileitis with erosions; random colon mildly active chronic colitis with ill formed granulomas; rectal biopsy minimally active chronic proctitis.  Impression:   Patient with Crohn's disease ileocolonic, severe, stricturing, recent abscess which has improved.  Now comes with abdominal pain in the setting of norovirus and enteropathogenic E. coli being positive.  No features of dehydration feels a lot better.  She wants to go home and manage her symptoms from home.    Recommendation:   Delay Humira by 3 days.  Continue azathioprine.  Plan to start infliximab once approved by her insurance, we will arrange for her follow-up.  Patient expressed understanding.  She understands that if symptoms get worse she will come back.    Total time spent was 40 minutes .                                              Fausto Britt M.D.  Thank you for the opportunity to participate in the care of this patient.   Please feel free to call me with any questions or concerns.  Phone number [phone].

## 2024-09-12 NOTE — ED PROVIDER NOTES
EMERGENCY DEPARTMENT ENCOUNTER      NAME: Dain Tejeda  AGE: 44 year old female  YOB: 1980  MRN: [id number]  EVALUATION DATE & TIME: No admission date for patient encounter.    PCP: Kevin Woodruff    ED PROVIDER: Jackeline Sanches PA-C      Chief Complaint   Patient presents with    Abdominal Pain         FINAL IMPRESSION:  1. Enterocolitis    2. Epigastric pain    3. Left ovarian cyst          ED COURSE & MEDICAL DECISION MAKING:    Pertinent Labs & Imaging studies reviewed. (See chart for details)    44 year old female presents to the Emergency Department for evaluation of abdominal pain.    Physical exam is remarkable for an uncomfortable appearing female.  Heart and lung sounds are clear diffusely throughout.  She has epigastric abdominal tenderness to palpation without rebound or guarding.  Vital signs are stable and she is afebrile.    CBC remarkable for mild anemia with hemoglobin of 11.4, no leukocytosis.  CMP unremarkable with no significant electrolyte derangements, normal liver and kidney function.  Lipase within normal limits.  Troponin is negative, EKG without acute ischemic changes.  Urinalysis somewhat equivocal with leukocyte esterase and a few bacteria but also contaminated.  Pregnancy test negative.  CT of the abdomen does show enterocolitis in the distal ileum approaching the colostomy, abscess from before has resolved.  A left ovarian cyst was also noted incidentally on the CT scan, and ultrasound of this was obtained per radiology recommendations which shows that it has increased in size when compared to previous imaging from earlier this year.      The patient was given IV Zofran, Pepcid, and Dilaudid with improvement in her symptoms.  I discussed her presentation and CT findings with [place] [facility] who would like the patient admitted for evaluation.  Plan to be kept n.p.o. at midnight. They do not recommend steroids or antibiotics at this time. Care discussed with staff  physician Dr. Katie Perea who is in agreement with the treatment plan.       Medical Decision Making  Obtained supplemental history:Supplemental history obtained?: No  Reviewed external records: External records reviewed?: Documented in chart and Inpatient Record: [facility] ED from 5/10/24 - 5/21/24  Care impacted by chronic illness:Other: Chrohn's Disease  Care significantly affected by social determinants of health:N/A  Did you consider but not order tests?: Work up considered but not performed and documented in chart, if applicable  Did you interpret images independently?: Independent interpretation of ECG and images noted in documentation, when applicable.  Consultation discussion with other provider:Did you involve another provider (consultant, , pharmacy, etc.)?: I discussed the care with another health care provider, see documentation for details.  Admit.  Not Applicable      ED Course   6:57 PM Performed my initial history and physical exam. Discussed workup in the emergency department, management of symptoms, and likely disposition.   8:43 PM I rechecked the patient.   9:14 PM Discussed with Dr. Katie Perea.   11:17 PM I spoke to the [place] Resident.    At the conclusion of the encounter I discussed the results of all of the tests and the disposition. The questions were answered. The patient or family acknowledged understanding and was agreeable with the care plan.     Voice recognition software was used in the creation of this note. Any grammatical or nonsensical errors are due to inherent errors with the software and are not the intention of the writer.     MEDICATIONS GIVEN IN THE EMERGENCY:  Medications   senna-docusate (SENOKOT-S/PERICOLACE) 8.6-50 MG per tablet 1 tablet (has no administration in time range)     Or   senna-docusate (SENOKOT-S/PERICOLACE) 8.6-50 MG per tablet 2 tablet (has no administration in time range)   ondansetron (ZOFRAN ODT) ODT tab 4 mg (has no  administration in time range)     Or   ondansetron (ZOFRAN) injection 4 mg (has no administration in time range)   acetaminophen (TYLENOL) tablet 650 mg (has no administration in time range)     Or   acetaminophen (TYLENOL) Suppository 650 mg (has no administration in time range)   HYDROmorphone (DILAUDID) half-tab 1 mg (has no administration in time range)   HYDROmorphone (DILAUDID) tablet 2 mg (has no administration in time range)   prochlorperazine (COMPAZINE) injection 10 mg (has no administration in time range)     Or   prochlorperazine (COMPAZINE) tablet 10 mg (has no administration in time range)     Or   prochlorperazine (COMPAZINE) suppository 25 mg (has no administration in time range)   HYDROmorphone (DILAUDID) injection 0.5 mg (0.5 mg Intravenous $Given 9/11/24 2021)   famotidine (PEPCID) injection 20 mg (20 mg Intravenous $Given 9/11/24 2021)   iopamidol (ISOVUE-370) solution 50 mL (50 mLs Intravenous $Given 9/11/24 1933)   ondansetron (ZOFRAN) injection 4 mg (4 mg Intravenous $Given 9/11/24 2157)   HYDROmorphone (DILAUDID) injection 0.5 mg (0.5 mg Intravenous $Given 9/11/24 2223)       NEW PRESCRIPTIONS STARTED AT TODAY'S ER VISIT  New Prescriptions    No medications on file            =================================================================    HPI    Patient information was obtained from: patient     Use of :Yes ([employer]  Phone) - Language Junito Tejeda is a 44 year old female with a pertinent history of crohn's disease who presents for evaluation of throbbing, sharp epigastric abdominal pain that started this morning and has continually gotten worse throughout the day. The pain does not radiate. Pain increases with touch and movement. This is the first time she has felt these symptoms but she notes that last time she came to the hospital with abdominal pain, she required admission and was diagnosed with Crohns. Took tylenol with no relief.     Patient denies fevers, chills,  chest pain, nausea, vomiting, difficulty breathing and urinary symptoms. She denies changes in her colostomy output.    Per chart review patient was admitted to [facility] from 5/10/24 to 5/21/24 for chron's disease with intra-abdominal abscess development with septic shock. XR abdomen was not well assessed. Ct abdomen with contrast showed Ileocolnic Chron's disease with intramural abscess. Pelvic organ had left adnexal cystic mass measuring 6.5x4 cm.  Discharged with amoxicillin.     REVIEW OF SYSTEMS   Review of Systems   Constitutional:  Negative for chills and fever.   Respiratory:  Negative for shortness of breath.    Cardiovascular:  Negative for chest pain.   Gastrointestinal:  Positive for abdominal pain. Negative for blood in stool, diarrhea, nausea and vomiting.   Genitourinary:  Negative for dysuria and hematuria.       All other systems reviewed and are negative unless noted in HPI.      PAST MEDICAL HISTORY:  Past Medical History:   Diagnosis Date    Diet controlled gestational diabetes mellitus (GDM), antepartum 9/4/2017    Attempting diet control first    Gestational diabetes mellitus (GDM) in third trimester 9/4/2017    Attempting diet control first  Formatting of this note might be different from the original. Overview:  Attempting diet control first    Nausea/vomiting in pregnancy 4/24/2017    Postpartum hemorrhage 11/16/2017    Third degree laceration of perineum during delivery, postpartum 11/15/2017       PAST SURGICAL HISTORY:  Past Surgical History:   Procedure Laterality Date    COLONOSCOPY N/A 10/31/2022    Procedure: COLONOSCOPY with biopsies;  Surgeon: Luis Miguel Traore MD;  Location: [facility] GI    COLOSTOMY N/A 11/6/2022    Procedure: CREATION, COLOSTOMY;  Surgeon: Chandana Carlos DO;  Location: [facility] Main OR    LAPAROTOMY EXPLORATORY N/A 11/6/2022    Procedure: EXPLORATORY LAPAROTOMY SPLENIC FLEXURE MOBILIZATION;  Surgeon: Chandana Carlos DO;  Location: [facility]  Main OR    PICC TRIPLE LUMEN PLACEMENT  5/10/2024    RECTAL PROLAPSE REPAIR N/A 11/6/2022    Procedure: RESECTION OF DESCENDING COLON;  Surgeon: Chandana Carlos DO;  Location: [facility] Main OR       CURRENT MEDICATIONS:    adalimumab (HUMIRA *CF*) 40 MG/0.4ML prefilled syringe kit  azaTHIOprine (IMURAN) 50 MG tablet  omeprazole (PRILOSEC) 20 MG DR capsule        ALLERGIES:  Allergies   Allergen Reactions    Gluten Meal      Celiac    Soy Allergy Anaphylaxis, Hives and Itching     Tofu- causes itching and sores in the mouth       FAMILY HISTORY:  Family History   Problem Relation Age of Onset    Gestational Diabetes Sister     Gestational Diabetes Sister     Diabetes No family hx of     Coronary Artery Disease No family hx of     Hypertension No family hx of     Breast Cancer No family hx of     Colon Cancer No family hx of     Prostate Cancer No family hx of     Other Cancer No family hx of        SOCIAL HISTORY:   Social History     Socioeconomic History    Marital status:      Spouse name: Dmitriy Camargo    Number of children: 0    Years of education: 0   Occupational History    Occupation: None   Tobacco Use    Smoking status: Never    Smokeless tobacco: Never   Substance and Sexual Activity    Alcohol use: No    Drug use: No    Sexual activity: Yes     Partners: Male   Social History Narrative    Born in [facility], arrived to [facility] 02/[facility]. No education.      Social Determinants of Health     Interpersonal Safety: Low Risk  (3/8/2024)    Interpersonal Safety     Do you feel physically and emotionally safe where you currently live?: Yes     Within the past 12 months, have you been hit, slapped, kicked or otherwise physically hurt by someone?: No     Within the past 12 months, have you been humiliated or emotionally abused in other ways by your partner or ex-partner?: No       VITALS:  Patient Vitals for the past 24 hrs:   BP Temp Pulse Resp SpO2 Height Weight   09/11/24 2130 109/66 -- 68 -- 99 % -- --   09/11/24  "2115 111/64 -- 70 -- 99 % -- --   09/11/24 2100 105/68 -- 68 -- 98 % -- --   09/11/24 2045 104/69 -- 70 -- 100 % -- --   09/11/24 2030 110/69 -- 72 -- 97 % -- --   09/11/24 1710 109/69 98.6  F (37  C) 81 16 100 % 1.575 m (5' 2\") 45.9 kg (101 lb 1.6 oz)       PHYSICAL EXAM    VITAL SIGNS: /66   Pulse 68   Temp 98.6  F (37  C)   Resp 16   Ht 1.575 m (5' 2\")   Wt 45.9 kg (101 lb 1.6 oz)   SpO2 99%   BMI 18.49 kg/m    General Appearance: Uncomfortable appearing; Alert, cooperative, normal speech and facial symmetry, appears stated age  Head:  Normocephalic, without obvious abnormality, atraumatic  Cardio:  Regular rate and rhythm, S1 and S2 normal, no murmur, rub    or gallop, 2+ pulses symmetric in all extremities  Pulm:  Clear to auscultation bilaterally, respirations unlabored with no accessory muscle use  Abdomen:  Epigastric tenderness to palpation; Active bowel sounds in all quadrants; abdomen is soft, non-distended with no rebound tenderness or guarding.   Back: No midline tenderness or step-offs, no CVA tenderness  Extremities: Moves all extremities  Neuro: Patient is awake, alert, and responsive to voice. No gross motor weaknesses or sensory loss; moves all extremities.    LAB:  All pertinent labs reviewed and interpreted.  Labs Ordered and Resulted from Time of ED Arrival to Time of ED Departure   COMPREHENSIVE METABOLIC PANEL - Abnormal       Result Value    Sodium 138      Potassium 3.7      Carbon Dioxide (CO2) 27      Anion Gap 9      Urea Nitrogen 7.6      Creatinine 0.61      GFR Estimate >90      Calcium 9.0      Chloride 102      Glucose 105 (*)     Alkaline Phosphatase 82      AST 15      ALT 11      Protein Total 8.5 (*)     Albumin 3.7      Bilirubin Total 0.4     ROUTINE UA WITH MICROSCOPIC REFLEX TO CULTURE - Abnormal    Color Urine Yellow      Appearance Urine Turbid (*)     Glucose Urine Negative      Bilirubin Urine Negative      Ketones Urine Negative      Specific Gravity Urine " 1.030      Blood Urine 0.5 mg/dL (*)     pH Urine 6.5      Protein Albumin Urine 50 (*)     Urobilinogen Urine <2.0      Nitrite Urine Negative      Leukocyte Esterase Urine 250 Shilpa/uL (*)     Bacteria Urine Few (*)     Mucus Urine Present (*)     RBC Urine 7 (*)     WBC Urine 24 (*)     Squamous Epithelials Urine 21 (*)    CBC WITH PLATELETS AND DIFFERENTIAL - Abnormal    WBC Count 6.3      RBC Count 3.85      Hemoglobin 11.4 (*)     Hematocrit 35.3      MCV 92      MCH 29.6      MCHC 32.3      RDW 12.5      Platelet Count 341      % Neutrophils 65      % Lymphocytes 24      % Monocytes 7      % Eosinophils 3      % Basophils 0      % Immature Granulocytes 0      NRBCs per 100 WBC 0      Absolute Neutrophils 4.1      Absolute Lymphocytes 1.5      Absolute Monocytes 0.5      Absolute Eosinophils 0.2      Absolute Basophils 0.0      Absolute Immature Granulocytes 0.0      Absolute NRBCs 0.0     HCG QUALITATIVE URINE - Normal    hCG Urine Qualitative Negative     TROPONIN T, HIGH SENSITIVITY - Normal    Troponin T, High Sensitivity <6     LIPASE - Normal    Lipase 33     URINE CULTURE       RADIOLOGY:  Reviewed all pertinent imaging. Please see official radiology report.  US Pelvis Cmpl wo Transvaginal w Abd/Pel Duplex Lmt   Final Result   IMPRESSION:        Simple appearing left ovarian cyst measuring up to 5.6 cm, previously 5.8 cm. Follow-up pelvic ultrasound is recommended in 6-12 months.         [Recommend Follow Up: Ovarian cyst]      This report will be copied to the [facility] to ensure a provider acknowledges the finding.          CT Abdomen Pelvis w Contrast   Final Result   IMPRESSION:    1.  Enlarging prominent cyst left hemipelvis and ultrasound is recommended for further evaluation.      2.  Findings of enterocolitis involving the distal ileum and remaining colon extending to the left ostomy site with no abscess or free air.          EKG:    Performed at: 19:44    I have independently  reviewed and interpreted the EKG, along with the final read. EKG also reviewed by Dr. Katie Perea.    Ventricular rate 70 bpm  DE interval 174 ms  QRS duration 72 ms  QT/QTc 406/438 ms  P-R-T axes 48 69 69    Impression: NSR      Jackeline Sanches PA-C  Emergency Medicine  [facility] EMERGENCY DEPARTMENT  [facility] [facility] [id number] [phone]  Dept: [phone]       Jackeline Sanches PA-C  09/12/24 0001

## 2024-09-12 NOTE — PLAN OF CARE
Goal Outcome Evaluation:      Plan of Care Reviewed With: patient          Outcome Evaluation: CM to follow for medical progression, recommendations and final discharge plans

## 2024-09-12 NOTE — CONSULTS
Care Management Initial Consult    General Information  Assessment completed with: Patient,    Type of CM/SW Visit: Initial Assessment    Primary Care Provider verified and updated as needed: Yes   Readmission within the last 30 days: no previous admission in last 30 days      Reason for Consult: other (see comments) (high readmission score)  Advance Care Planning: Advance Care Planning Reviewed: no concerns identified          Communication Assessment  Patient's communication style: spoken language (English or Bilingual)             Cognitive  Cognitive/Neuro/Behavioral: WDL                      Living Environment:   People in home: spouse, child(sisi), dependent  Dmitriy  Current living Arrangements: apartment      Able to return to prior arrangements: yes       Family/Social Support:  Care provided by: spouse/significant other, self  Provides care for: no one  Marital Status:   Support system:   Dmitriy       Description of Support System: Supportive, Involved         Current Resources:   Patient receiving home care services: No        Community Resources: None  Equipment currently used at home: none  Supplies currently used at home: None    Employment/Financial:  Employment Status: unemployed        Financial Concerns: none   Referral to Financial Worker: No       Does the patient's insurance plan have a 3 day qualifying hospital stay waiver?  Yes     Which insurance plan 3 day waiver is available? Alternative insurance waiver    Will the waiver be used for post-acute placement? Undetermined at this time    Lifestyle & Psychosocial Needs:  Social Determinants of Health     Food Insecurity: Not on file   Depression: At risk (4/22/2024)    PHQ-2     PHQ-2 Score: 6   Housing Stability: Not on file   Tobacco Use: Low Risk  (5/10/2024)    Patient History     Smoking Tobacco Use: Never     Smokeless Tobacco Use: Never     Passive Exposure: Not on file   Financial Resource Strain: Not on file   Alcohol Use: Not  on file   Transportation Needs: Not on file   Physical Activity: Not on file   Interpersonal Safety: Low Risk  (3/8/2024)    Interpersonal Safety     Do you feel physically and emotionally safe where you currently live?: Yes     Within the past 12 months, have you been hit, slapped, kicked or otherwise physically hurt by someone?: No     Within the past 12 months, have you been humiliated or emotionally abused in other ways by your partner or ex-partner?: No   Stress: Not on file   Social Connections: Not on file   Health Literacy: Not on file       Functional Status:  Prior to admission patient needed assistance:   Dependent ADLs:: Bathing, Dressing, Transfers  Dependent IADLs:: Cleaning, Cooking, Laundry, Shopping, Meal Preparation, Transportation, Money Management       Mental Health Status:          Chemical Dependency Status:                Values/Beliefs:  Spiritual, Cultural Beliefs, Mormon Practices, Values that affect care: no               Discussed  Partnership in Safe Discharge Planning  document with patient/family: No    Additional Information:  Pt is Hmong speaking. Writer entered the pt's room and offered an , pt consented. Writer called  services and put phone on speaker with [employer] . Writer went over role of care management, progression of care and possible need for services at discharge, including OP services, home care, or skilled nursing care. Patient alert, oriented and engaged in the conversation. Writer then verified patient demographics and updated any changes needed in the patient chart. Pt lives with her , Dmitriy and her seven year old daughter in an apartment. She is mainly independent with ADLs but when she has stomach pain needs assist with ADLs and IADLs. She does not drive and her  can transport her. She would like increased help with ADLs in the home and has reached out to the [facility] in the past but they will only offer 1.5 hours per day  and she does not feel this is enough so she is working with the county to try and get more hours. Writer encouraged pt to continue to reach out to the [facility] for services.     Next Steps: medical progression, recommendations    Sandie House RN

## 2024-09-12 NOTE — H&P
[facility]    History and Physical - Hospitalist Service       Date of Admission:  9/11/2024    Assessment & Plan      Dain Tejeda is a 44 year old female admitted on 9/11/2024. She has a history of Crohn's disease status post partial colectomy with colostomy creation, celiac disease, normocytic anemia, sinus bradycardia, disorganized behaviors and is admitted for enteric colitis concerning for Crohn's flare in setting of poor GI follow-up    Enterocolitis concerning for Crohn's flare  Status post partial colectomy with current colostomy  History of Crohn's disease, celiac disease  Patient with known history of Crohn's disease since 2022, complicated by stricture and status post partial colectomy with colostomy creation.  Historically has followed with [facility], on Humira and azathioprine chronically.  Last hospitalization in May 2024 for steroid-induced fistulizing disease and abscess, was supposed to have switched over from Humira to infliximab and follow-up with GI for repeat colonoscopy shortly after previous admission, however has been lost to follow-up, citing that the GI doctor stated it would take too long for her to get an.  Worsening abdominal pain near the site of her colostomy bag x 1 day, which prompted her to present to the ED.  Afebrile, very variable nausea and only 1 episode of emesis with onset of symptoms.  Denies diarrhea, no other Ill contacts.  WBC within normal limits. CT abdomen and pelvis on admission revealed differential thickening in the distal ileum and the remaining colon consistent with enterocolitis.  No associated abscess or free air identified findings to CT from admission 5/15/2024.  Suspect that her abdominal pain is most consistent with a mild Crohn's flare though cannot fully rule out viral cause at this time, will order stool test to confirm.  Given patient was lost to follow-up previously, [facility] requesting admission for further evaluation.  -GI consult,  appreciate recs  -Routine ostomy cares  -Enteric panel ordered, pending  -N.p.o. in case of procedure in the morning  -Gentle IVF  -hold PTA azathioprine for now, likely restart tomorrow  -hold PTA omeprazole  -due for Humira dose 9/12/2024, will hold for now, AM team to restart per GI recommendations  -A.m. BMP, CBC    Chronic normocytic anemia  Hemoglobin on admission 11.4, actually up from baseline between 8 and 10.  No signs or symptoms of acute blood loss anemia, denies dark stools.  Will monitor.  -AM CBC    History of disorganized behaviors  Noted on previous hospital stays.  Has previously responded well to Seroquel 12.5 mg while admitted.  Stable on admission, recommend monitoring closely    History pelvic cyst  Known history.  CT abdomen pelvis on admission redemonstrating ovarian cyst, confirmed with pelvic ultrasound.  Recommend follow-up pelvic ultrasound in 6 to 12 months.  No gynecologic symptoms reported, will continue to monitor.       Observation Goals: -diagnostic tests and consults completed and resulted, -vital signs normal or at patient baseline, -tolerating oral intake to maintain hydration, -adequate pain control on oral analgesics, -tolerating oral antibiotics or has plans for home infusion setup, -returns to baseline functional status, -safe disposition plan has been identified, Nurse to notify provider when observation goals have been met and patient is ready for discharge.  Diet: NPO per Anesthesia Guidelines for Procedure/Surgery Except for: Meds    DVT Prophylaxis: Pneumatic Compression Devices  Chambers Catheter: Not present  Fluids: mIVF  Lines: None     Cardiac Monitoring: None  Code Status: Full Code      Clinically Significant Risk Factors Present on Admission                                       Disposition Plan      Expected Discharge Date: 09/12/2024                  This patient will be formally staffed at AM rounds.      Rina Chairez MD  Hospitalist Service  [facility]  [facility]  Securely message with  (more info)  Text page via [facility] Paging/Directory   ______________________________________________________________________    Chief Complaint   Abdominal pain    History is obtained from the patient    History of Present Illness   Dain Tejeda is a 44 year old female who has a history of rohn's disease status post partial colectomy with colostomy creation, celiac disease, normocytic anemia, sinus bradycardia, disorganized behaviors symptoms with 1 day of worsening abdominal pain.    Patient describes her pain as a mild cramping in the area near her colostomy site.  Acute onset the day before admission.  Feels very tender to her palpation, though has not noticed any change in the color or texture of her stools.  Changes her colostomy bag once every 3 days, has not needed extra changes in the last day or 2.  Does not describe any other abdominal cramping in the region of her bowels. Has had some mild general malaise, but denies fever or chills.  Has been a little nauseous, 1 episode of emesis that was nonbloody and nonbilious.  No epigastric pain, no right upper quadrant pain, on no other worsening triggers.  Denies all other symptoms, including rash, worsening irritation, and leakage from the area of the colostomy.    When asked about her medications, patient reports that she uses 1 injectable medication and 2 oral medications at home daily.  Reports that the injectable medication is for her Crohn's disease but thinks that her oral medications are for her heartburn.  States that the injectable medication she has been using has been the one she has used for a long time, as this is the one that comes to the mail order pharmacy.  Unclear what the name of the medication is, has been using this for several months.    Lives at home with  and children.      Past Medical History    Past Medical History:   Diagnosis Date    Diet controlled gestational diabetes mellitus (GDM),  antepartum 9/4/2017    Attempting diet control first    Gestational diabetes mellitus (GDM) in third trimester 9/4/2017    Attempting diet control first  Formatting of this note might be different from the original. Overview:  Attempting diet control first    Nausea/vomiting in pregnancy 4/24/2017    Postpartum hemorrhage 11/16/2017    Third degree laceration of perineum during delivery, postpartum 11/15/2017       Past Surgical History   Past Surgical History:   Procedure Laterality Date    COLONOSCOPY N/A 10/31/2022    Procedure: COLONOSCOPY with biopsies;  Surgeon: Luis Miguel Traore MD;  Location: [facility] GI    COLOSTOMY N/A 11/6/2022    Procedure: CREATION, COLOSTOMY;  Surgeon: Chandana Carlos DO;  Location: [facility] OR    LAPAROTOMY EXPLORATORY N/A 11/6/2022    Procedure: EXPLORATORY LAPAROTOMY SPLENIC FLEXURE MOBILIZATION;  Surgeon: Chandana Carlos DO;  Location: [facility] OR    PICC TRIPLE LUMEN PLACEMENT  5/10/2024    RECTAL PROLAPSE REPAIR N/A 11/6/2022    Procedure: RESECTION OF DESCENDING COLON;  Surgeon: Chandana Carlos DO;  Location: [facility] OR       Prior to Admission Medications   Prior to Admission Medications   Prescriptions Last Dose Informant Patient Reported? Taking?   adalimumab (HUMIRA *CF*) 40 MG/0.4ML prefilled syringe kit 8/24/2024 at AM  Yes Yes   Sig: Inject 40 mg subcutaneously every 14 days. Takes on the 12th and 24th of each month.   azaTHIOprine (IMURAN) 50 MG tablet 9/11/2024 at AM  Yes Yes   Sig: Take 2 tablets by mouth daily   omeprazole (PRILOSEC) 20 MG DR capsule 9/11/2024 at AM  Yes Yes   Sig: Take 20 mg by mouth every morning (before breakfast).      Facility-Administered Medications: None        Review of Systems    The 10 point Review of Systems is negative other than noted in the HPI or here.      Physical Exam   Vital Signs: Temp: 98.6  F (37  C)   BP: 122/70 Pulse: 70   Resp: 16 SpO2: 98 %      Weight: 101 lbs 1.6 oz    General: Alert and  oriented x 3, no acute distress  Skin: clean, dry, and intact  HEENT: normocephalic, atraumatic, PERRL, EOMI, no conjunctival injection or icterus, ears and nose normal, no LAD or masses  Resp: clear to ausculation bilaterally, no rales or wheezes  Cardio: RRR, S1 and S2 present  Abdomen: Colostomy bag present left lower quadrant, draining soft but brownish-yellow stool.  Ostomy site itself appears nonirritated, nonerythematous, nonedematous.  Tender to palpation medial to her colostomy site.  Lower midline incision now with keloid, appears well-healed, nontender to palpation, no erythema or edema noted.  Bowel sounds present x 4, no epigastric, right upper quadrant, or right lower quadrant tenderness.  Extremities: no erythema or edema noted  Psych: normal mood, somewhat flat affect.      Medical Decision Making   Please see A&P for additional details of medical decision making.      Data   ------------------------- PAST 24 HR DATA REVIEWED -----------------------------------------------

## 2024-09-13 ENCOUNTER — PATIENT OUTREACH (OUTPATIENT)
Dept: CARE COORDINATION | Facility: CLINIC | Age: 44
End: 2024-09-13
Payer: COMMERCIAL

## 2024-09-13 NOTE — PROGRESS NOTES
Clinic Care Coordination Contact  Follow Up Progress Note      Assessment: The pt was recently in the ED, I called to check up on the pt, and help schedule a follow up. Pt was at [facility] for abdominal pain. I called and talked to the pt, pt stated that she is doing better. Pt stated that she will rest, if she does not recover, she will call the clinic.     Care Gaps:    Health Maintenance Due   Topic Date Due    YEARLY PREVENTIVE VISIT  Never done    ADVANCE CARE PLANNING  Never done    DEPRESSION ACTION PLAN  Never done    MAMMO SCREENING  Never done    COVID-19 Vaccine (1) Never done    HEPATITIS B IMMUNIZATION (1 of 3 - 19+ 3-dose series) Never done    LIPID  Never done    INFLUENZA VACCINE (1) 09/01/2024    COLORECTAL CANCER SCREENING  09/14/2024    HPV TEST  03/08/2025    PAP  03/08/2025

## 2024-09-30 LAB
ATRIAL RATE - MUSE: 70 BPM
DIASTOLIC BLOOD PRESSURE - MUSE: NORMAL MMHG
INTERPRETATION ECG - MUSE: NORMAL
P AXIS - MUSE: 48 DEGREES
PR INTERVAL - MUSE: 174 MS
QRS DURATION - MUSE: 72 MS
QT - MUSE: 406 MS
QTC - MUSE: 438 MS
R AXIS - MUSE: 69 DEGREES
SYSTOLIC BLOOD PRESSURE - MUSE: NORMAL MMHG
T AXIS - MUSE: 69 DEGREES
VENTRICULAR RATE- MUSE: 70 BPM

## 2024-10-08 ENCOUNTER — TELEPHONE (OUTPATIENT)
Dept: FAMILY MEDICINE | Facility: CLINIC | Age: 44
End: 2024-10-08

## 2024-10-08 NOTE — TELEPHONE ENCOUNTER
Forms/Letter Request    Type of form/letter: DME (wheelchair, hospital bed)      Type of DME requested:     -Ostomy pouches  -Solid skin barrier strips/ring/extenders  -Ostomy powder    Do we have the form/letter: Yes: Supplies    Who is the form from? [facility]    Where did/will the form come from? form was faxed in    When is form/letter needed by: ASAP    How would you like the form/letter returned: Fax : [phone]    Patient Notified form requests are processed in 5-7 business days:No    Okay to leave a detailed message?: No

## 2024-10-19 ENCOUNTER — HOSPITAL ENCOUNTER (INPATIENT)
Facility: HOSPITAL | Age: 44
LOS: 6 days | Discharge: HOME | End: 2024-10-26
Attending: EMERGENCY MEDICINE | Admitting: FAMILY MEDICINE
Payer: COMMERCIAL

## 2024-10-19 ENCOUNTER — APPOINTMENT (OUTPATIENT)
Dept: CT IMAGING | Facility: HOSPITAL | Age: 44
End: 2024-10-19
Attending: EMERGENCY MEDICINE
Payer: COMMERCIAL

## 2024-10-19 DIAGNOSIS — K52.9 ENTEROCOLITIS: ICD-10-CM

## 2024-10-19 DIAGNOSIS — A41.9 SEPSIS, DUE TO UNSPECIFIED ORGANISM, UNSPECIFIED WHETHER ACUTE ORGAN DYSFUNCTION PRESENT (H): ICD-10-CM

## 2024-10-19 DIAGNOSIS — K50.114 CROHN'S DISEASE OF COLON WITH ABSCESS (H): ICD-10-CM

## 2024-10-19 DIAGNOSIS — R10.84 GENERALIZED ABDOMINAL PAIN: ICD-10-CM

## 2024-10-19 DIAGNOSIS — K52.9 COLITIS: Primary | ICD-10-CM

## 2024-10-19 DIAGNOSIS — K65.1 INTRA-ABDOMINAL ABSCESS (H): ICD-10-CM

## 2024-10-19 LAB
ALBUMIN SERPL BCG-MCNC: 3.1 G/DL (ref 3.5–5.2)
ALP SERPL-CCNC: 193 U/L (ref 40–150)
ALT SERPL W P-5'-P-CCNC: 24 U/L (ref 0–50)
ANION GAP SERPL CALCULATED.3IONS-SCNC: 12 MMOL/L (ref 7–15)
AST SERPL W P-5'-P-CCNC: 47 U/L (ref 0–45)
BILIRUB SERPL-MCNC: 0.6 MG/DL
BUN SERPL-MCNC: 18.5 MG/DL (ref 6–20)
CALCIUM SERPL-MCNC: 9.1 MG/DL (ref 8.8–10.4)
CHLORIDE SERPL-SCNC: 96 MMOL/L (ref 98–107)
CREAT SERPL-MCNC: 1 MG/DL (ref 0.51–0.95)
EGFRCR SERPLBLD CKD-EPI 2021: 72 ML/MIN/1.73M2
ERYTHROCYTE [DISTWIDTH] IN BLOOD BY AUTOMATED COUNT: 12.8 % (ref 10–15)
GLUCOSE SERPL-MCNC: 136 MG/DL (ref 70–99)
HCO3 SERPL-SCNC: 27 MMOL/L (ref 22–29)
HCT VFR BLD AUTO: 34.5 % (ref 35–47)
HGB BLD-MCNC: 11.1 G/DL (ref 11.7–15.7)
LACTATE SERPL-SCNC: 1.2 MMOL/L (ref 0.7–2)
LIPASE SERPL-CCNC: 42 U/L (ref 13–60)
MAGNESIUM SERPL-MCNC: 1.5 MG/DL (ref 1.7–2.3)
MCH RBC QN AUTO: 28.2 PG (ref 26.5–33)
MCHC RBC AUTO-ENTMCNC: 32.2 G/DL (ref 31.5–36.5)
MCV RBC AUTO: 88 FL (ref 78–100)
PLATELET # BLD AUTO: 496 10E3/UL (ref 150–450)
POTASSIUM SERPL-SCNC: 3.4 MMOL/L (ref 3.4–5.3)
PROT SERPL-MCNC: 8.6 G/DL (ref 6.4–8.3)
RBC # BLD AUTO: 3.93 10E6/UL (ref 3.8–5.2)
SODIUM SERPL-SCNC: 135 MMOL/L (ref 135–145)
WBC # BLD AUTO: 13 10E3/UL (ref 4–11)

## 2024-10-19 PROCEDURE — 83605 ASSAY OF LACTIC ACID: CPT | Performed by: EMERGENCY MEDICINE

## 2024-10-19 PROCEDURE — 83690 ASSAY OF LIPASE: CPT | Performed by: EMERGENCY MEDICINE

## 2024-10-19 PROCEDURE — 83735 ASSAY OF MAGNESIUM: CPT | Performed by: EMERGENCY MEDICINE

## 2024-10-19 PROCEDURE — 250N000011 HC RX IP 250 OP 636: Performed by: EMERGENCY MEDICINE

## 2024-10-19 PROCEDURE — 96375 TX/PRO/DX INJ NEW DRUG ADDON: CPT

## 2024-10-19 PROCEDURE — 99285 EMERGENCY DEPT VISIT HI MDM: CPT | Mod: 25

## 2024-10-19 PROCEDURE — 85027 COMPLETE CBC AUTOMATED: CPT | Performed by: EMERGENCY MEDICINE

## 2024-10-19 PROCEDURE — 74177 CT ABD & PELVIS W/CONTRAST: CPT

## 2024-10-19 PROCEDURE — 36415 COLL VENOUS BLD VENIPUNCTURE: CPT | Performed by: EMERGENCY MEDICINE

## 2024-10-19 PROCEDURE — 80053 COMPREHEN METABOLIC PANEL: CPT | Performed by: EMERGENCY MEDICINE

## 2024-10-19 RX ORDER — MORPHINE SULFATE 2 MG/ML
2 INJECTION, SOLUTION INTRAMUSCULAR; INTRAVENOUS ONCE
Status: COMPLETED | OUTPATIENT
Start: 2024-10-19 | End: 2024-10-19

## 2024-10-19 RX ORDER — IOPAMIDOL 755 MG/ML
90 INJECTION, SOLUTION INTRAVASCULAR ONCE
Status: COMPLETED | OUTPATIENT
Start: 2024-10-20 | End: 2024-10-19

## 2024-10-19 RX ORDER — ONDANSETRON 2 MG/ML
4 INJECTION INTRAMUSCULAR; INTRAVENOUS ONCE
Status: COMPLETED | OUTPATIENT
Start: 2024-10-19 | End: 2024-10-19

## 2024-10-19 RX ADMIN — FAMOTIDINE 20 MG: 10 INJECTION, SOLUTION INTRAVENOUS at 22:54

## 2024-10-19 RX ADMIN — IOPAMIDOL 90 ML: 755 INJECTION, SOLUTION INTRAVENOUS at 23:47

## 2024-10-19 RX ADMIN — MORPHINE SULFATE 2 MG: 2 INJECTION, SOLUTION INTRAMUSCULAR; INTRAVENOUS at 22:55

## 2024-10-19 RX ADMIN — ONDANSETRON 4 MG: 2 INJECTION INTRAMUSCULAR; INTRAVENOUS at 22:53

## 2024-10-19 ASSESSMENT — COLUMBIA-SUICIDE SEVERITY RATING SCALE - C-SSRS
2. HAVE YOU ACTUALLY HAD ANY THOUGHTS OF KILLING YOURSELF IN THE PAST MONTH?: NO
6. HAVE YOU EVER DONE ANYTHING, STARTED TO DO ANYTHING, OR PREPARED TO DO ANYTHING TO END YOUR LIFE?: NO
1. IN THE PAST MONTH, HAVE YOU WISHED YOU WERE DEAD OR WISHED YOU COULD GO TO SLEEP AND NOT WAKE UP?: NO

## 2024-10-19 ASSESSMENT — ACTIVITIES OF DAILY LIVING (ADL): ADLS_ACUITY_SCORE: 37

## 2024-10-20 PROBLEM — R10.84 GENERALIZED ABDOMINAL PAIN: Status: ACTIVE | Noted: 2024-10-20

## 2024-10-20 PROBLEM — K50.114: Status: ACTIVE | Noted: 2024-10-20

## 2024-10-20 LAB
ALBUMIN SERPL BCG-MCNC: 2.6 G/DL (ref 3.5–5.2)
ANION GAP SERPL CALCULATED.3IONS-SCNC: 8 MMOL/L (ref 7–15)
BUN SERPL-MCNC: 15.2 MG/DL (ref 6–20)
CALCIUM SERPL-MCNC: 7.8 MG/DL (ref 8.8–10.4)
CHLORIDE SERPL-SCNC: 101 MMOL/L (ref 98–107)
CREAT SERPL-MCNC: 1.02 MG/DL (ref 0.51–0.95)
EGFRCR SERPLBLD CKD-EPI 2021: 70 ML/MIN/1.73M2
ERYTHROCYTE [DISTWIDTH] IN BLOOD BY AUTOMATED COUNT: 13 % (ref 10–15)
GLUCOSE SERPL-MCNC: 122 MG/DL (ref 70–99)
HCO3 SERPL-SCNC: 26 MMOL/L (ref 22–29)
HCT VFR BLD AUTO: 28.4 % (ref 35–47)
HGB BLD-MCNC: 9 G/DL (ref 11.7–15.7)
MAGNESIUM SERPL-MCNC: 1.5 MG/DL (ref 1.7–2.3)
MAGNESIUM SERPL-MCNC: 2.8 MG/DL (ref 1.7–2.3)
MCH RBC QN AUTO: 28.6 PG (ref 26.5–33)
MCHC RBC AUTO-ENTMCNC: 31.7 G/DL (ref 31.5–36.5)
MCV RBC AUTO: 90 FL (ref 78–100)
PHOSPHATE SERPL-MCNC: 3.7 MG/DL (ref 2.5–4.5)
PLATELET # BLD AUTO: 345 10E3/UL (ref 150–450)
POTASSIUM SERPL-SCNC: 3.1 MMOL/L (ref 3.4–5.3)
POTASSIUM SERPL-SCNC: 3.4 MMOL/L (ref 3.4–5.3)
POTASSIUM SERPL-SCNC: 3.4 MMOL/L (ref 3.4–5.3)
PREALB SERPL-MCNC: 5.7 MG/DL (ref 20–40)
RBC # BLD AUTO: 3.15 10E6/UL (ref 3.8–5.2)
SODIUM SERPL-SCNC: 135 MMOL/L (ref 135–145)
TRANSFERRIN SERPL-MCNC: 113 MG/DL (ref 200–360)
WBC # BLD AUTO: 12.7 10E3/UL (ref 4–11)

## 2024-10-20 PROCEDURE — 258N000003 HC RX IP 258 OP 636: Performed by: EMERGENCY MEDICINE

## 2024-10-20 PROCEDURE — 36415 COLL VENOUS BLD VENIPUNCTURE: CPT

## 2024-10-20 PROCEDURE — 99207 PR NO CHARGE LOS: CPT | Mod: GC | Performed by: FAMILY MEDICINE

## 2024-10-20 PROCEDURE — 96376 TX/PRO/DX INJ SAME DRUG ADON: CPT

## 2024-10-20 PROCEDURE — 250N000009 HC RX 250

## 2024-10-20 PROCEDURE — 83735 ASSAY OF MAGNESIUM: CPT | Performed by: FAMILY MEDICINE

## 2024-10-20 PROCEDURE — 96365 THER/PROPH/DIAG IV INF INIT: CPT | Mod: 59

## 2024-10-20 PROCEDURE — 120N000001 HC R&B MED SURG/OB

## 2024-10-20 PROCEDURE — 250N000013 HC RX MED GY IP 250 OP 250 PS 637: Performed by: FAMILY MEDICINE

## 2024-10-20 PROCEDURE — 250N000011 HC RX IP 250 OP 636: Performed by: EMERGENCY MEDICINE

## 2024-10-20 PROCEDURE — 85014 HEMATOCRIT: CPT

## 2024-10-20 PROCEDURE — 258N000003 HC RX IP 258 OP 636

## 2024-10-20 PROCEDURE — 250N000011 HC RX IP 250 OP 636: Mod: JW

## 2024-10-20 PROCEDURE — 250N000013 HC RX MED GY IP 250 OP 250 PS 637

## 2024-10-20 PROCEDURE — 80048 BASIC METABOLIC PNL TOTAL CA: CPT

## 2024-10-20 PROCEDURE — 84100 ASSAY OF PHOSPHORUS: CPT | Performed by: FAMILY MEDICINE

## 2024-10-20 PROCEDURE — 84466 ASSAY OF TRANSFERRIN: CPT | Performed by: STUDENT IN AN ORGANIZED HEALTH CARE EDUCATION/TRAINING PROGRAM

## 2024-10-20 PROCEDURE — 84132 ASSAY OF SERUM POTASSIUM: CPT

## 2024-10-20 PROCEDURE — 84134 ASSAY OF PREALBUMIN: CPT | Performed by: STUDENT IN AN ORGANIZED HEALTH CARE EDUCATION/TRAINING PROGRAM

## 2024-10-20 PROCEDURE — 250N000011 HC RX IP 250 OP 636: Performed by: FAMILY MEDICINE

## 2024-10-20 PROCEDURE — 36415 COLL VENOUS BLD VENIPUNCTURE: CPT | Performed by: FAMILY MEDICINE

## 2024-10-20 PROCEDURE — 84132 ASSAY OF SERUM POTASSIUM: CPT | Performed by: FAMILY MEDICINE

## 2024-10-20 PROCEDURE — 99222 1ST HOSP IP/OBS MODERATE 55: CPT | Mod: AI | Performed by: FAMILY MEDICINE

## 2024-10-20 RX ORDER — NALOXONE HYDROCHLORIDE 0.4 MG/ML
0.2 INJECTION, SOLUTION INTRAMUSCULAR; INTRAVENOUS; SUBCUTANEOUS
Status: DISCONTINUED | OUTPATIENT
Start: 2024-10-20 | End: 2024-10-26 | Stop reason: HOSPADM

## 2024-10-20 RX ORDER — MAGNESIUM SULFATE HEPTAHYDRATE 40 MG/ML
2 INJECTION, SOLUTION INTRAVENOUS ONCE
Status: COMPLETED | OUTPATIENT
Start: 2024-10-20 | End: 2024-10-20

## 2024-10-20 RX ORDER — PIPERACILLIN SODIUM, TAZOBACTAM SODIUM 3; .375 G/15ML; G/15ML
3.38 INJECTION, POWDER, LYOPHILIZED, FOR SOLUTION INTRAVENOUS EVERY 8 HOURS
Status: DISCONTINUED | OUTPATIENT
Start: 2024-10-20 | End: 2024-10-20

## 2024-10-20 RX ORDER — NALOXONE HYDROCHLORIDE 0.4 MG/ML
0.4 INJECTION, SOLUTION INTRAMUSCULAR; INTRAVENOUS; SUBCUTANEOUS
Status: DISCONTINUED | OUTPATIENT
Start: 2024-10-20 | End: 2024-10-26 | Stop reason: HOSPADM

## 2024-10-20 RX ORDER — SODIUM CHLORIDE 9 MG/ML
INJECTION, SOLUTION INTRAVENOUS CONTINUOUS
Status: DISCONTINUED | OUTPATIENT
Start: 2024-10-20 | End: 2024-10-26 | Stop reason: HOSPADM

## 2024-10-20 RX ORDER — PIPERACILLIN SODIUM, TAZOBACTAM SODIUM 3; .375 G/15ML; G/15ML
3.38 INJECTION, POWDER, LYOPHILIZED, FOR SOLUTION INTRAVENOUS EVERY 8 HOURS
Status: DISCONTINUED | OUTPATIENT
Start: 2024-10-20 | End: 2024-10-26 | Stop reason: HOSPADM

## 2024-10-20 RX ORDER — ACETAMINOPHEN 650 MG/1
650 SUPPOSITORY RECTAL EVERY 4 HOURS PRN
Status: DISCONTINUED | OUTPATIENT
Start: 2024-10-20 | End: 2024-10-26 | Stop reason: HOSPADM

## 2024-10-20 RX ORDER — OXYCODONE HYDROCHLORIDE 5 MG/1
5 TABLET ORAL EVERY 4 HOURS PRN
Status: DISCONTINUED | OUTPATIENT
Start: 2024-10-20 | End: 2024-10-26 | Stop reason: HOSPADM

## 2024-10-20 RX ORDER — POTASSIUM CHLORIDE 1.5 G/1.58G
20 POWDER, FOR SOLUTION ORAL ONCE
Status: COMPLETED | OUTPATIENT
Start: 2024-10-20 | End: 2024-10-20

## 2024-10-20 RX ORDER — PIPERACILLIN SODIUM, TAZOBACTAM SODIUM 3; .375 G/15ML; G/15ML
3.38 INJECTION, POWDER, LYOPHILIZED, FOR SOLUTION INTRAVENOUS ONCE
Status: COMPLETED | OUTPATIENT
Start: 2024-10-20 | End: 2024-10-20

## 2024-10-20 RX ORDER — ONDANSETRON 4 MG/1
4 TABLET, ORALLY DISINTEGRATING ORAL EVERY 6 HOURS PRN
Status: DISCONTINUED | OUTPATIENT
Start: 2024-10-20 | End: 2024-10-26 | Stop reason: HOSPADM

## 2024-10-20 RX ORDER — ONDANSETRON 2 MG/ML
4 INJECTION INTRAMUSCULAR; INTRAVENOUS EVERY 6 HOURS PRN
Status: DISCONTINUED | OUTPATIENT
Start: 2024-10-20 | End: 2024-10-26 | Stop reason: HOSPADM

## 2024-10-20 RX ORDER — PROCHLORPERAZINE MALEATE 10 MG
10 TABLET ORAL EVERY 6 HOURS PRN
Status: DISCONTINUED | OUTPATIENT
Start: 2024-10-20 | End: 2024-10-26 | Stop reason: HOSPADM

## 2024-10-20 RX ORDER — AMOXICILLIN 250 MG
1 CAPSULE ORAL 2 TIMES DAILY PRN
Status: DISCONTINUED | OUTPATIENT
Start: 2024-10-20 | End: 2024-10-26 | Stop reason: HOSPADM

## 2024-10-20 RX ORDER — AMOXICILLIN 250 MG
2 CAPSULE ORAL 2 TIMES DAILY PRN
Status: DISCONTINUED | OUTPATIENT
Start: 2024-10-20 | End: 2024-10-26 | Stop reason: HOSPADM

## 2024-10-20 RX ORDER — POTASSIUM CHLORIDE 1500 MG/1
20 TABLET, EXTENDED RELEASE ORAL ONCE
Status: COMPLETED | OUTPATIENT
Start: 2024-10-20 | End: 2024-10-20

## 2024-10-20 RX ORDER — CALCIUM CARBONATE 500 MG/1
1000 TABLET, CHEWABLE ORAL 4 TIMES DAILY PRN
Status: DISCONTINUED | OUTPATIENT
Start: 2024-10-20 | End: 2024-10-26 | Stop reason: HOSPADM

## 2024-10-20 RX ORDER — FAMOTIDINE 10 MG
10 TABLET ORAL 2 TIMES DAILY
COMMUNITY

## 2024-10-20 RX ORDER — MORPHINE SULFATE 2 MG/ML
2 INJECTION, SOLUTION INTRAMUSCULAR; INTRAVENOUS ONCE
Status: COMPLETED | OUTPATIENT
Start: 2024-10-20 | End: 2024-10-20

## 2024-10-20 RX ORDER — PROCHLORPERAZINE 25 MG
25 SUPPOSITORY, RECTAL RECTAL EVERY 12 HOURS PRN
Status: DISCONTINUED | OUTPATIENT
Start: 2024-10-20 | End: 2024-10-26 | Stop reason: HOSPADM

## 2024-10-20 RX ORDER — AZATHIOPRINE 50 MG/1
100 TABLET ORAL DAILY
Status: DISCONTINUED | OUTPATIENT
Start: 2024-10-20 | End: 2024-10-26 | Stop reason: HOSPADM

## 2024-10-20 RX ORDER — ACETAMINOPHEN 325 MG/1
650 TABLET ORAL EVERY 4 HOURS PRN
Status: DISCONTINUED | OUTPATIENT
Start: 2024-10-20 | End: 2024-10-26 | Stop reason: HOSPADM

## 2024-10-20 RX ADMIN — OXYCODONE HYDROCHLORIDE 5 MG: 5 TABLET ORAL at 08:50

## 2024-10-20 RX ADMIN — SODIUM CHLORIDE 1000 ML: 9 INJECTION, SOLUTION INTRAVENOUS at 01:37

## 2024-10-20 RX ADMIN — ACETAMINOPHEN 650 MG: 325 TABLET, FILM COATED ORAL at 16:43

## 2024-10-20 RX ADMIN — MORPHINE SULFATE 2 MG: 2 INJECTION, SOLUTION INTRAMUSCULAR; INTRAVENOUS at 01:37

## 2024-10-20 RX ADMIN — SODIUM CHLORIDE: 9 INJECTION, SOLUTION INTRAVENOUS at 04:37

## 2024-10-20 RX ADMIN — OXYCODONE HYDROCHLORIDE 5 MG: 5 TABLET ORAL at 12:24

## 2024-10-20 RX ADMIN — PIPERACILLIN AND TAZOBACTAM 3.38 G: 3; .375 INJECTION, POWDER, FOR SOLUTION INTRAVENOUS at 07:20

## 2024-10-20 RX ADMIN — SODIUM CHLORIDE 500 ML: 9 INJECTION, SOLUTION INTRAVENOUS at 08:49

## 2024-10-20 RX ADMIN — MAGNESIUM SULFATE HEPTAHYDRATE 2 G: 40 INJECTION, SOLUTION INTRAVENOUS at 10:29

## 2024-10-20 RX ADMIN — PANTOPRAZOLE SODIUM 40 MG: 40 INJECTION, POWDER, FOR SOLUTION INTRAVENOUS at 08:50

## 2024-10-20 RX ADMIN — HYDROMORPHONE HYDROCHLORIDE 0.4 MG: 1 INJECTION, SOLUTION INTRAMUSCULAR; INTRAVENOUS; SUBCUTANEOUS at 10:26

## 2024-10-20 RX ADMIN — HYDROMORPHONE HYDROCHLORIDE 0.4 MG: 1 INJECTION, SOLUTION INTRAMUSCULAR; INTRAVENOUS; SUBCUTANEOUS at 04:37

## 2024-10-20 RX ADMIN — PIPERACILLIN AND TAZOBACTAM 3.38 G: 3; .375 INJECTION, POWDER, FOR SOLUTION INTRAVENOUS at 16:44

## 2024-10-20 RX ADMIN — POTASSIUM CHLORIDE 20 MEQ: 1500 TABLET, EXTENDED RELEASE ORAL at 08:50

## 2024-10-20 RX ADMIN — SODIUM CHLORIDE: 9 INJECTION, SOLUTION INTRAVENOUS at 12:23

## 2024-10-20 RX ADMIN — PIPERACILLIN AND TAZOBACTAM 3.38 G: 3; .375 INJECTION, POWDER, FOR SOLUTION INTRAVENOUS at 23:59

## 2024-10-20 RX ADMIN — OXYCODONE HYDROCHLORIDE 5 MG: 5 TABLET ORAL at 16:43

## 2024-10-20 RX ADMIN — PIPERACILLIN AND TAZOBACTAM 3.38 G: 3; .375 INJECTION, POWDER, FOR SOLUTION INTRAVENOUS at 01:05

## 2024-10-20 RX ADMIN — POTASSIUM CHLORIDE 20 MEQ: 1.5 POWDER, FOR SOLUTION ORAL at 14:09

## 2024-10-20 ASSESSMENT — ACTIVITIES OF DAILY LIVING (ADL)
ADLS_ACUITY_SCORE: 21
ADLS_ACUITY_SCORE: 21
ADLS_ACUITY_SCORE: 37
ADLS_ACUITY_SCORE: 37
ADLS_ACUITY_SCORE: 21
ADLS_ACUITY_SCORE: 38
ADLS_ACUITY_SCORE: 21
DEPENDENT_IADLS:: TRANSPORTATION
ADLS_ACUITY_SCORE: 21
ADLS_ACUITY_SCORE: 37
ADLS_ACUITY_SCORE: 21
ADLS_ACUITY_SCORE: 23
ADLS_ACUITY_SCORE: 37

## 2024-10-20 NOTE — ED PROVIDER NOTES
EMERGENCY DEPARTMENT ENCOUNTER      NAME: Dain Tejeda  AGE: 44 year old female  YOB: 1980  MRN: [id number]  EVALUATION DATE & TIME: 10/19/2024 10:16 PM    ED PROVIDER: Kirstin Richardson MD    Chief Complaint   Patient presents with    Abdominal Pain       FINAL IMPRESSION  1. Enterocolitis    2. Crohn's disease of colon with abscess (H)    3. Generalized abdominal pain    4. Sepsis, due to unspecified organism, unspecified whether acute organ dysfunction present (H)        MEDICAL DECISION MAKING   Dain Tejeda is a 44 year old female who presents for evaluation of abdominal pain, nausea, and vomiting.   Records reviewed.  Patient has a history of Crohn's disease complicated by stricture, status post partial colectomy and colostomy.  She follows with [facility] and is on Humira and azathioprine.  She also has a history of norovirus and E. coli, admitted 9/11/2024 for management of the latter.  She saw [facility] at that time who recommended follow-up and continuation of Humira, azathioprine, and omeprazole.  Today, patient presents with complaints of ongoing abdominal pain, nausea, and vomiting.  She reports that the symptoms have been persistent since she was discharged/hospitalized most recently.  She states that she does not have anything to take for her symptoms.  Patient reports subjective fever but no measured temperature.  She notes that she has had some more liquidy output from her ostomy bag.  No urinary symptoms or other new complaints.    I considered a broad differential including but not limited to GERD, PUD, gastritis, hepatobiliary disease, gastroenteritis, appendicitis, diverticulitis, ureterolithiasis, pyelonephritis, cystitis, hernia, small bowel obstruction/ileus, perforation, mesenteric ischemia, Crohn's flare, fistula, complication of ostomy, LELIA, anemia. I have lower suspicion for symptoms secondary to cardiopulmonary or vascular process given history and exam. Discussed options for workup  and management with the patient. We agreed on plan for labs, CT A/P. Will manage symptoms with IV fluids and IV analgesic/antiemetic as well as pepcid.      ED Course as of 10/20/24 0409   Sun Oct 20, 2024   0012 Comprehensive metabolic panel(!)  CMP notable for creatinine of 1.00, slightly increased from baseline and may be related to GI losses.  Alk phos elevated at 183 and AST at 47, does appear acute as compared to a month ago at which time both were normal.  No other electrolyte derangements.  Bilirubin within normal limits.   0012 Lactic Acid: 1.2  Lactate within normal limits, less likely end-organ ischemia or systemic infectious process.    0012 CBC (+ platelets, no diff)(!)  CBC with leukocytosis and WBC of 13.0, concerning for infectious/inflammatory process.  Hemoglobin stable 11.1.   0012 Magnesium(!): 1.5  Magnesium slightly low at 1.5.  Will replete with IV.   0012 Lipase: 42  Lipase within normal limits, symptoms less likely related to acute pancreatitis.     0045 CT Abdomen Pelvis w Contrast  CT abdomen/pelvis revealed persistent mural thickening of the transverse colon at the level of colostomy site, worse from prior exam and concerning for worsening long segment colitis related to Crohn's disease.  Additionally, noted to have 2 pericolonic abscesses, possibly connected and possibly with microperforation.  Will plan to start antibiotics and discussed case with GI and surgery.     Workup today was notable for the above.  I rechecked the patient multiple times.  She did have improvement in symptoms after initial interventions but on return from CT scan, reported increasing abdominal pain.  I reviewed results of workup using assistance of  and recommended admission, which she was agreeable to.    I discussed the case with colorectal surgery who agreed with management thus far and will plan to see the patient on admission.  I discussed the case with resident medical team who agreed to  facilitate admission and will involve GI team.  Patient was started on antibiotics and given additional IV analgesic.  She was transferred to the floor in stable condition.  No acute events in my care.        Considerations in Medical Decision Making  History:  Obtained supplemental history: Supplemental history obtained?: No  Reviewed external records: External records reviewed?: Documented in chart  Care impacted by chronic illness: Other: Crohn's disease    Work Up:  In additional to work up documented, I considered the following work up: Documented in chart, if applicable.  Chart documentation includes differential considered and any EKGs or imaging independently interpreted by provider, where specified.    External consultation:  Discussion of management with another provider: Documented in chart, if applicable    Disposition considerations: Admit.    MIPS Documentation: Not Applicable     Critical care: 60 minutes excluding separately billable procedures.  Includes bedside management, time reviewing test results, review of records, discussing the case with staff, documenting the medical record and time spent with family members (or surrogate decision makers) discussing specific treatment issues.     ED COURSE  10:43 PM I met with the patient, obtained history, performed an initial exam, and discussed options and plan for diagnostics and treatment here in the ED.  12:55 AM Rechecked and updated patient on lab and imaging results. Discussed plan for care.   1:17 AM I spoke to Dr. Escalante, Colon and Rectal Surgery.   1:31 AM I spoke to Dr. Del Rio, [place] Resident, who accepts the patient for admission.     MEDICATIONS GIVEN IN THE ED  morphine (PF) injection 2 mg (2 mg Intravenous $Given 10/19/24 2256)   ondansetron (ZOFRAN) injection 4 mg (4 mg Intravenous $Given 10/19/24 2253)   famotidine (PEPCID) injection 20 mg (20 mg Intravenous $Given 10/19/24 2254)   iopamidol (ISOVUE-370) solution 90 mL (90  mLs Intravenous $Given 10/19/24 1280)   piperacillin-tazobactam (ZOSYN) 3.375 g vial to attach to  mL bag (0 g Intravenous Stopped 10/20/24 0135)   morphine (PF) injection 2 mg (2 mg Intravenous $Given 10/20/24 0137)   sodium chloride 0.9% BOLUS 1,000 mL (0 mLs Intravenous Stopped 10/20/24 0240)       NEW PRESCRIPTIONS STARTED AT TODAY'S VISIT  Current Discharge Medication List             =================================================================    HPI:    Use of : Yes (Phone, Hmong)     Dain Tejeda is a 44 year old female who presents to this Emergency Department for evaluation of abdominal pain.     The patient reports abdominal pain, vomiting, and watery stool for 3 days. The pain has been worsening. She has an ostomy bag in place, she notes watery stool than normal. Patient felt warm but did not measure her temperature. She has not taken anything for pain relief.     No known sick contacts.     Denies dysuria, frequency urinating. Denies cough, chest pain.    Per chart review, the patient was seen at [facility] Emergency Department on 9/11/2024 for evaluation of abdominal pain. CT abdomen/pelvis with some colon wall thickening consistent with enterocolitis. Initially suspected to have a mild Crohn's flare. Enteric pathogen panel postive for Norovirus and E coli (EPEC, EAEC). Symptoms resolved on first day of admission, and patient was discharged home. GI consulted, appreciate recommendations: Supportive cares for Norovirus/EPEC/EAEC enterocolitis. Follow-up with [facility] in clinic, and Continue PTA Humira, azathioprine, omeprazole.     RELEVANT HISTORY, MEDICATIONS, & ALLERGIES   Past medical history, surgical history, family history, medications, and allergies reviewed and pertinent noted in HPI.    REVIEW OF SYSTEMS:  A complete review of systems was performed with pertinent positives and negatives noted in the HPI. All other systems negative.     PHYSICAL EXAM:    Vitals: BP 98/62   Pulse  80   Temp 99.6  F (37.6  C) (Temporal)   Resp 16   Wt 45.8 kg (101 lb)   SpO2 99%   BMI 18.47 kg/m     General: Alert and interactive. Appears uncomfortable. Holding emesis bag. Moaning intermittently.   HENT: Atraumatic. Full AROM of neck. MMM.  Cardiovascular: Tachycardic, regular.   Chest/Pulmonary: Normal work of breathing. Speaking in complete sentences. Lungs CTAB. No chest wall tenderness or deformities.  Abdomen: Soft, nondistended. Diffuse tenderness to palpation with voluntary guarding, maximal just to right of ostomy site. Midline surgical scar. Ostomy bag with large amount of gas and liquid stool.   Extremities: Normal AROM of all major joints.  Skin: Warm and dry. Normal skin color.   Neuro: Speech clear. CNs grossly intact. Moves all extremities spontaneously.   Psych: Normal affect/mood, cooperative, memory appropriate.      LAB  Labs Ordered and Resulted from Time of ED Arrival to Time of ED Departure   COMPREHENSIVE METABOLIC PANEL - Abnormal       Result Value    Sodium 135      Potassium 3.4      Carbon Dioxide (CO2) 27      Anion Gap 12      Urea Nitrogen 18.5      Creatinine 1.00 (*)     GFR Estimate 72      Calcium 9.1      Chloride 96 (*)     Glucose 136 (*)     Alkaline Phosphatase 193 (*)     AST 47 (*)     ALT 24      Protein Total 8.6 (*)     Albumin 3.1 (*)     Bilirubin Total 0.6     MAGNESIUM - Abnormal    Magnesium 1.5 (*)    CBC WITH PLATELETS - Abnormal    WBC Count 13.0 (*)     RBC Count 3.93      Hemoglobin 11.1 (*)     Hematocrit 34.5 (*)     MCV 88      MCH 28.2      MCHC 32.2      RDW 12.8      Platelet Count 496 (*)    LIPASE - Normal    Lipase 42     LACTIC ACID WHOLE BLOOD WITH 1X REPEAT IN 2 HR WHEN >2 - Normal    Lactic Acid, Initial 1.2     BASIC METABOLIC PANEL   CBC WITH PLATELETS       RADIOLOGY  CT Abdomen Pelvis w Contrast   Final Result   IMPRESSION:    1.  Persistent mural thickening of the transverse colon from the hepatic flexure to the level of the colostomy  site, worsened from prior exam concerning for worsening long segment colitis, secondary to patient's known history of Crohn's disease.    2.  Two pericolonic abscesses which may be connected, with air and fluid seen within the proximal and distal transverse colon abscess measuring roughly 5.6 and 4.1 cm, as described above,  given the presence of air possibility of microperforation of the    colon and is of concern although no definitive communication within the abscess cavity and colon is seen at this time.            I, Nicole Sanchez, am serving as a scribe to document services personally performed by Dr. Kirstin Richardson based on my observation and the provider's statements to me. I, Kirstin Richardson MD attest that Nicole Sanchez is acting in a scribe capacity, has observed my performance of the services and has documented them in accordance with my direction.    Kirtsin Richardson M.D.  Emergency Medicine  [facility] EMERGENCY DEPARTMENT  [address] [place] [id number]  [phone]  Dept: [phone]     Kirstin Richardson MD  10/20/24 1306

## 2024-10-20 NOTE — PROGRESS NOTES
"CLINICAL NUTRITION SERVICES - ASSESSMENT NOTE     Nutrition Prescription    RECOMMENDATIONS FOR MDs/PROVIDERS TO ORDER:      Malnutrition Status:    TBD    Recommendations already ordered by Registered Dietitian (RD):  Lyn Naylor 2 pm snack    Future/Additional Recommendations:  Monitor intake, weight, labs, supplement tolerance  Obtain diet hx and NFPE     REASON FOR ASSESSMENT  Dain Tejeda is a/an 44 year old female assessed by the dietitian for Admission Nutrition Risk Screen for positive poor appetite and unsure weight loss.    HPI:  44 year old female who presents for evaluation of abdominal pain, nausea, and vomiting. She has a history of Crohn's disease status post partial colectomy with colostomy creation, celiac disease, normocytic anemia, sinus bradycardia, disorganized behaviors who is admitted for abdominal pain and intraabdominal abscess formation as sequela of Crohn's.     NUTRITION HISTORY  Attempted interview with pt, [place] [facility] [place] familiar to nutrition services from admit in May when she was on TPN and [employer]eg[employer] lipids. Was also provided with Lyn Naylor ONS, was not sure if she liked it or not. Will send for an afternoon snack.    Food allergies: gluten (celiac), soy    CURRENT NUTRITION ORDERS  Diet: Regular  Intake/Tolerance: no record of intake, diet just started    LABS  Labs reviewed.  On Mg, K, and phos replacement protocol    MEDICATIONS  Medications reviewed    ANTHROPOMETRICS  Height: 5'2\"  Most Recent Weight: 40.8 kg (89 lb 14.4 oz)    IBW: 50 kg  BMI: Underweight BMI <18.5  Weight History:   Wt Readings from Last 20 Encounters:   10/20/24 40.8 kg (89 lb 14.4 oz)   09/11/24 45.9 kg (101 lb 1.6 oz)   05/21/24 41.7 kg (91 lb 14.9 oz)   05/09/24 42 kg (92 lb 11.2 oz)   04/30/24 45.8 kg (101 lb)   04/22/24 45.8 kg (101 lb)   03/14/24 45.8 kg (101 lb)   03/08/24 46.3 kg (102 lb 1.9 oz)   08/09/23 43.1 kg (95 lb)   04/14/23 45.5 kg (100 lb 6.4 oz)   03/29/23 48.1 kg (106 lb) "   02/15/23 49.4 kg (109 lb)   01/12/23 48.6 kg (107 lb 1.9 oz)   12/14/22 48.5 kg (107 lb)   11/22/22 47.8 kg (105 lb 6.4 oz)   11/13/22 49.1 kg (108 lb 4.8 oz)   07/12/22 50.9 kg (112 lb 1.9 oz)   07/08/22 51.3 kg (113 lb)   06/20/22 54 kg (119 lb)   06/09/22 55.8 kg (123 lb)   Weight loss of 12# (12%) x 1+ months    Dosing Weight: 40.8 kg    ASSESSED NUTRITION NEEDS  Estimated Energy Needs: 4093-0608 kcals/day (30 - 35 kcals/kg )  Justification: Repletion and Underweight  Estimated Protein Needs: 49-61 grams protein/day (1.2 - 1.5 grams of pro/kg)  Justification: Increased needs  Estimated Fluid Needs: 1891-0214 mL/day (1 mL/kcal)   Justification: Maintenance    PHYSICAL FINDINGS  See malnutrition section below.  Colostomy   Abdominal discomfort    MALNUTRITION:  % Weight Loss:  > 5% in 1 month (severe malnutrition)  % Intake:  need nutrition hx  Subcutaneous Fat Loss:  unable  Muscle Loss:  unable  Fluid Retention:  None noted    Malnutrition Diagnosis: Unable to determine due to need nutrition hx and NFPE    NUTRITION DIAGNOSIS  Underweight related to altered GI function and suspected inadequate oral intake as evidenced by BMI < 18.5.      INTERVENTIONS  Implementation  Nutrition Education: Will be provided if education needs arise   Medical food supplement therapy     Goals  Maintain/gain weight  Patient to consume % of nutritionally adequate meals three times per day, or the equivalent with supplements/snacks.     Monitoring/Evaluation  Progress toward goals will be monitored and evaluated per protocol.

## 2024-10-20 NOTE — ED TRIAGE NOTES
The pt presents for evaluation of 3 days of abdominal pain and vomiting. Today the pain is worse. She reports pain is all over her abdomen with radiation toward her lower chest. Positive for diarrhea. Vomiting in triage.

## 2024-10-20 NOTE — CONSULTS
Care Management Initial Consult    General Information  Assessment completed with: Patient,    Type of CM/SW Visit: Initial Assessment    Primary Care Provider verified and updated as needed: Yes   Readmission within the last 30 days: no previous admission in last 30 days         Advance Care Planning: Advance Care Planning Reviewed:  (no HCD)          Communication Assessment  Patient's communication style: spoken language (non-English)    Hearing Difficulty or Deaf: no   Wear Glasses or Blind: no    Cognitive  Cognitive/Neuro/Behavioral: WDL                      Living Environment:   People in home: child(sisi), dependent, spouse     Current living Arrangements: apartment      Able to return to prior arrangements: yes       Family/Social Support:  Care provided by: self, spouse/significant other  Provides care for: child(sisi)  Marital Status:   Support system: Children,           Description of Support System: Supportive, Involved         Current Resources:   Patient receiving home care services: No        Community Resources: County Worker (food assistance)  Equipment currently used at home: none  Supplies currently used at home: None    Employment/Financial:  Employment Status: unemployed        Financial Concerns:   limited budget          Does the patient's insurance plan have a 3 day qualifying hospital stay waiver?  Need to verify    Lifestyle & Psychosocial Needs:  Social Determinants of Health     Food Insecurity: Low Risk  (10/20/2024)    Food Insecurity     Within the past 12 months, did you worry that your food would run out before you got money to buy more?: No     Within the past 12 months, did the food you bought just not last and you didn t have money to get more?: No   Depression: At risk (4/22/2024)    PHQ-2     PHQ-2 Score: 6   Housing Stability: Low Risk  (10/20/2024)    Housing Stability     Do you have housing? : Yes     Are you worried about losing your housing?: No   Tobacco  Use: Low Risk  (5/10/2024)    Patient History     Smoking Tobacco Use: Never     Smokeless Tobacco Use: Never     Passive Exposure: Not on file   Financial Resource Strain: Low Risk  (10/20/2024)    Financial Resource Strain     Within the past 12 months, have you or your family members you live with been unable to get utilities (heat, electricity) when it was really needed?: No   Alcohol Use: Not on file   Transportation Needs: Low Risk  (10/20/2024)    Transportation Needs     Within the past 12 months, has lack of transportation kept you from medical appointments, getting your medicines, non-medical meetings or appointments, work, or from getting things that you need?: No   Physical Activity: Not on file   Interpersonal Safety: Low Risk  (10/20/2024)    Interpersonal Safety     Do you feel physically and emotionally safe where you currently live?: Yes     Within the past 12 months, have you been hit, slapped, kicked or otherwise physically hurt by someone?: No     Within the past 12 months, have you been humiliated or emotionally abused in other ways by your partner or ex-partner?: No   Stress: Not on file   Social Connections: Not on file   Health Literacy: Not on file       Functional Status:  Prior to admission patient needed assistance:   Dependent ADLs:: Independent (when not ill)  Dependent IADLs:: Transportation         Discussed  Partnership in Safe Discharge Planning  document with patient/family: No    Additional Information:    Assessment completed with patient utilizing  service. Patient reports she lives in an apartment with her spouse and 7 year old daughter. When not ill, she is independent with ADLS/IADLS-except driving. When ill, her spouse supports her.  He also has his own medical issues and occasionally patient has to care for him.  If both patient and spouse are ill, they have adult children who can help them.  She ambulates without devices and has a [facility] helping her with  securing community resources. She qualifies for 1 hour of PCA services a day and due to low hour need, not able to secure a PCA.   Spouse is primary family contact and family able to transport at discharge.    Final discharge plan pending progression and recommendations.        Amber Moran RN

## 2024-10-20 NOTE — ED NOTES
[facility] ED Handoff Report    ED Chief Complaint: abdominal pain    ED Diagnosis:  (K52.9) Enterocolitis  Comment:    Plan:      (K50.114) Crohn's disease of colon with abscess (H)  Comment:    Plan:      (R10.84) Generalized abdominal pain  Comment:    Plan:      (A41.9) Sepsis, due to unspecified organism, unspecified whether acute organ dysfunction present (H)  Comment:    Plan:         PMH:    Past Medical History:   Diagnosis Date    Diet controlled gestational diabetes mellitus (GDM), antepartum 9/4/2017    Attempting diet control first    Gestational diabetes mellitus (GDM) in third trimester 9/4/2017    Attempting diet control first  Formatting of this note might be different from the original. Overview:  Attempting diet control first    Nausea/vomiting in pregnancy 4/24/2017    Postpartum hemorrhage 11/16/2017    Third degree laceration of perineum during delivery, postpartum 11/15/2017        Code Status:  Prior     Falls Risk: No Band: Not applicable    Current Living Situation/Residence: lives with a significant other     Elimination Status: Continent: Yes     Activity Level: Independent    Patients Preferred Language:  Other: hmong     Needed: Yes    Vital Signs:  BP 92/61   Pulse (!) 130   Temp 99.6  F (37.6  C) (Temporal)   Resp 16   Wt 45.8 kg (101 lb)   SpO2 99%   BMI 18.47 kg/m       Cardiac Rhythm:     Pain Score: 7/10    Is the Patient Confused:  No    Last Food or Drink: 10/19/24 at      Focused Assessment:  GI    Tests Performed: Done: Labs and Imaging    Treatments Provided:  pain meds, fluid, zosyn    Family Dynamics/Concerns: No    Family Updated On Visitor Policy: No    Plan of Care Communicated to Family: Yes    Who Was Updated about Plan of Care:     Belongings Checklist Done and Signed by Patient: No    Medications sent with patient:      Covid: asymptomatic ,      Additional Information: pt only speaks hmong. Pt has a colostomy.      RN: Camilo Valdez  SHARON    10/20/2024 1:53 AM

## 2024-10-20 NOTE — PHARMACY-ADMISSION MEDICATION HISTORY
"Pharmacy Intern Admission Medication History    Admission medication history is complete. The information provided in this note is only as accurate as the sources available at the time of the update.    Information Source(s): Patient and CareEverywhere/SureScripts via in-person    Pertinent Information: Added Avsola; the goal per previous encounters was to replace Humira with Avsola, but hasn't started course due to insurance not covering it. Patient claims they were given it once a while ago, but is currently back to doing Humira on the 12th and 24th of each month. Patient also reported being out of all oral medications Friday. Patient is no taking any vitamin supplements.     Changes made to PTA medication list:  Added: Pepcid, Avsola  Deleted: Omeprazole  Changed: None    Allergies reviewed with patient and updates made in EHR: yes    Medication History Completed By: GONZALEZ ORTIZ 10/20/2024 9:14 AM    PTA Med List   Medication Sig Last Dose    adalimumab (HUMIRA *CF*) 40 MG/0.4ML prefilled syringe kit Inject 40 mg subcutaneously every 14 days. Takes on the 12th and 24th of each month. 10/12/2024 at AM    azaTHIOprine (IMURAN) 50 MG tablet Take 2 tablets by mouth daily 10/17/2024 at AM    famotidine (PEPCID) 10 MG tablet Take 10 mg by mouth 2 times daily. 10/18/2024 at AM    inFLIXimab-axxq (AVSOLA IV) Infuse 5 mg/kg. Over 15 minutes \"every 0, 2, and 6 weeks\" Unknown at unknown     "

## 2024-10-20 NOTE — CONSULTS
Gastroenterology Consultation    Patient: Dain Tejeda   1980  Date of Consult:  10/20/2024  Admission Date/Time: 10/19/2024 10:16 PM  Primary Care Provider:  Kevin Woodruff    Requesting Physician: Bryant Tamayo MD    CHIEF COMPLAINT:   Abdominal pain    REASON FOR THE CONSULT: Abdominal pain on a background of Crohn's disease and celiac disease    HPI:   Seen with the aid of phone -Hmong.    The patient is a 44 year old  female with a history of Crohn's disease (diagnosed 2022), with involvement of the small bowel and colon s/p resection of the descending colon with colostomy and Duffy's pouch, currently on Humira and azathioprine who presents with worsening abdominal pain.    Patient has a history of a pericolonic abscess seen in May 2024, which appears to have resolved on imaging done in September.  Unfortunately now she presents with recurrence and with 2 abscesses.    Patient was seen by colorectal surgery who advised against surgical management at this time and also advised against IR guided drain given concern for possible colocutaneous fistulization resulting from this.    She was most recently seen in the hospital by my colleague Dr. Britt on 9/12/2024 when she presented similarly.  At that time, she was positive for norovirus and enteropathogenic E. coli.    Per [facility] records, her subcutaneous Humira was recently refilled on 9/27 but there was a plan to see her back in clinic and switch her to Infliximab.    CT 10/19  revealed evidence of marked mural thickening in the transverse colon extending from the hepatic flexure to the colostomy site and a pericolonic abscess containing gas and fluid adjacent to the distal transverse colon measuring about 4 x 2 cm near the colostomy site and another abscess measuring about 5.6 x 2.6 cm in the proximal transverse colon.  There was also some worsening lymphadenopathy.    On the CT scan done before this on 9/19/2024, the abscess appeared to  had resolved, and on previous imaging on 5/15/2024, it was decreasing in size from another CT scan done 5 days prior.    Prior GI workup:  EGD September 2022  diagnosed celiac disease with Marsh 3A villous atrophy.  Colonoscopy October 2022 at [facility].  Only advanced descending colon due to stricture.  Diffuse erythematous mucosa in descending.  Colonoscopy September 2023: Ulcerated terminal ileum.  Ulcerated and pseudopolyps ascending through descending.  Duffy's pouch biopsied.  Pathology of terminal ileum moderately active chronic ileitis with erosions; random colon mildly active chronic colitis with ill formed granulomas; rectal biopsy minimally active chronic proctitis.        REVIEW OF SYSTEMS:  Negative except as in HPI    PAST MEDICAL HISTORY:  Past Medical History:   Diagnosis Date    Diet controlled gestational diabetes mellitus (GDM), antepartum 9/4/2017    Attempting diet control first    Gestational diabetes mellitus (GDM) in third trimester 9/4/2017    Attempting diet control first  Formatting of this note might be different from the original. Overview:  Attempting diet control first    Nausea/vomiting in pregnancy 4/24/2017    Postpartum hemorrhage 11/16/2017    Third degree laceration of perineum during delivery, postpartum 11/15/2017       PAST SURGICAL HISTORY:  Past Surgical History:   Procedure Laterality Date    COLONOSCOPY N/A 10/31/2022    Procedure: COLONOSCOPY with biopsies;  Surgeon: Luis Miguel Traore MD;  Location[facility] GI    COLOSTOMY N/A 11/6/2022    Procedure: CREATION, COLOSTOMY;  Surgeon: Chandana Carlos DO;  Location: [facility] OR    LAPAROTOMY EXPLORATORY N/A 11/6/2022    Procedure: EXPLORATORY LAPAROTOMY SPLENIC FLEXURE MOBILIZATION;  Surgeon: Chandana Carlos DO;  Location: [facility] OR    PICC TRIPLE LUMEN PLACEMENT  5/10/2024    RECTAL PROLAPSE REPAIR N/A 11/6/2022    Procedure: RESECTION OF DESCENDING COLON;  Surgeon: Chandana Carlos DO;   Location: [facility] OR       MEDICATIONS:  Current Outpatient Medications   Medication Instructions    azaTHIOprine (IMURAN) 50 MG tablet 2 tablets, Oral, DAILY    famotidine (PEPCID) 10 mg, Oral, 2 TIMES DAILY    HUMIRA *CF* 40 mg, Subcutaneous, EVERY 14 DAYS, Takes on the 12th and 24th of each month.    inFLIXimab-axxq (AVSOLA IV) 5 mg/kg, Intravenous (Continuous Infusion), Over 15 minutes       ALLERGIES:  Gluten meal and Soy allergy    FAMILY HISTORY:  Family History   Problem Relation Age of Onset    Gestational Diabetes Sister     Gestational Diabetes Sister     Diabetes No family hx of     Coronary Artery Disease No family hx of     Hypertension No family hx of     Breast Cancer No family hx of     Colon Cancer No family hx of     Prostate Cancer No family hx of     Other Cancer No family hx of        SOCIAL HISTORY:  Social History     Tobacco Use    Smoking status: Never    Smokeless tobacco: Never   Substance Use Topics    Alcohol use: No       PHYSICAL EXAM:  /64 (BP Location: Left arm)   Pulse 82   Temp 98.7  F (37.1  C) (Oral)   Resp 16   Wt 40.8 kg (89 lb 14.4 oz)   SpO2 98%   BMI 16.44 kg/m    Body mass index is 16.44 kg/m .  Constitutional: healthy, alert, and no distress   Abdomen: Abdomen soft, non-tender. BS normal. No masses, organomegaly      LABS:  CBC:  Recent Labs   Lab Test 10/20/24  0555   WBC 12.7*   RBC 3.15*   HGB 9.0*   HCT 28.4*   MCV 90   MCH 28.6   MCHC 31.7   RDW 13.0           BMP:  Recent Labs   Lab 10/20/24  1237 10/20/24  0555 10/19/24  2257   NA  --  135 135   POTASSIUM 3.4 3.1* 3.4   CHLORIDE  --  101 96*   CO2  --  26 27   GLC  --  122* 136*   CR  --  1.02* 1.00*   BUN  --  15.2 18.5       Liver/Pancreas:  Recent Labs   Lab 10/19/24  2257   AST 47*   ALT 24   ALKPHOS 193*   BILITOTAL 0.6   LIPASE 42     Recent Labs   Lab Test 05/20/24  0610   INR 0.98       IMAGING:    CT Abdomen Pelvis w Contrast    Result Date: 10/20/2024  EXAM: CT ABDOMEN PELVIS W  CONTRAST LOCATION: [facility] DATE: 10/20/2024 INDICATION: abdominal pain, nausea, vomiting, ileostomy in place COMPARISON: None. TECHNIQUE: CT scan of the abdomen and pelvis was performed following injection of IV contrast. Multiplanar reformats were obtained. Dose reduction techniques were used. CONTRAST: isovue 370 90ml FINDINGS: LOWER CHEST: Normal. HEPATOBILIARY: No significant mass or bile duct dilatation. No calcified gallstones. PANCREAS: No significant mass, duct dilatation, or inflammatory change. SPLEEN: Normal size. ADRENAL GLANDS: No significant nodules. KIDNEYS/BLADDER: No significant mass, stone, or hydronephrosis. BOWEL: Postsurgical changes from left midabdomen colostomy are again noted with section of the descending colon again noted. There is marked mural thickening of the transverse colon extending from the hepatic flexure to the colostomy site. There is a pericolonic abscess containing gas and fluid seen adjacent to the distal transverse colon measuring roughly 2 x 4.4 x 4.1 cm (image 78, series 3; image 23, series 4), extending to just proximal to the colostomy site. There is a pericolonic abscess encasing the proximal transverse colon measuring roughly 1.5 x 2.6 x 5.6 cm (image 88, series 3; image 94, series 3) gas and fluid is seen within the abscess cavity. Mild mural thickening of the distal ascending colon is noted the cecum is within normal limits. The stomach, duodenum and small bowel are otherwise unremarkable. LYMPH NODES: There is an increased number of mesenteric subcentimeter lymph nodes, slightly worsened from prior exam. VASCULATURE: No abdominal aortic aneurysm. PELVIC ORGANS: Prominent left adnexal cyst measuring 7 x 4 cm, is unchanged from prior exam. MUSCULOSKELETAL: Unchanged.     IMPRESSION: 1.  Persistent mural thickening of the transverse colon from the hepatic flexure to the level of the colostomy site, worsened from prior exam concerning for  worsening long segment colitis, secondary to patient's known history of Crohn's disease. 2.  Two pericolonic abscesses which may be connected, with air and fluid seen within the proximal and distal transverse colon abscess measuring roughly 5.6 and 4.1 cm, as described above,  given the presence of air possibility of microperforation of the colon and is of concern although no definitive communication within the abscess cavity and colon is seen at this time.      ASSESSMENT:   44-year-old female with Crohn's disease of the small bowel and colon s/p colostomy and Brenda pouch, currently on Humira and azathioprine, with plans to switch to Remicade in the near future, underlying celiac disease, who presents with worsening abdominal pain and imaging evidence of  intra-abdominal abscesses.    PLAN:  -Given concern for worsening pericolonic abscesses with possible communication with the bowel, would recommend managing medically with IV antibiotics as ongoing-agree with Zosyn.  -Hold off on Humira and azathioprine for now.    -Consider repeat imaging in a few days to assess size of abscess cavities.  -Continued follow-up in the outpatient setting to discuss alternative treatment given progression of disease on Humira-noted plans for transition to infliximab.  - Gluten-free diet given celiac disease      45 minutes of total time was spent providing patient care, including patient evaluation, reviewing documentation/test results, and .          Isra Woods MD  Thank you for the opportunity to participate in the care of this patient.   Please feel free to call with any questions or concerns.  [phone].       CC: [facility], Kevin Woodruff

## 2024-10-20 NOTE — PLAN OF CARE
Problem: Comorbidity Management  Goal: Blood Pressure in Desired Range  Outcome: Progressing   Goal Outcome Evaluation:  Patient complaining of left sided abdominal pain.  Taking IV dilaudid and oxycodone for pain.  Denies nausea.  Mag 1.5 and K+3.1, replaced per protocol.  IV fluids infusing.

## 2024-10-20 NOTE — PROGRESS NOTES
[facility]    Progress Note - Hospitalist Service       Date of Admission:  10/19/2024    Assessment & Plan   Dain Tejeda is a 44 year old female admitted on 10/19/2024. She has a history of Crohn's disease status post partial colectomy with colostomy creation, celiac disease, normocytic anemia, sinus bradycardia, disorganized behaviors who is admitted for abdominal pain and intraabdominal abscess formation as sequela of Crohn's.      Sepsis, resolved  Pericolonic abscess   History of Crohn's disease, celiac disease   Status post partial colectomy with current colostomy  Patient with known history of Crohn's disease since 2022, complicated by stricture and status post partial colectomy with colostomy creation. Hypotensive and tachycardic on admission with a temperature of 99.65 concerning for sepsis. Labs on admission remarkable to WBC of 13, mag 1.5, and creatinine 1.00. Lactic of 1.2 on admission. CT imaging in the ED showing mural thickening of the transverse colon from the hepatic flexure to the colostomy site worsened from prior exam as well as two pericolonic abscesses which  may be connected. Air and fluid seen in the proximal and distal transverse colon concerning for possible microperforation. Colorectal surgery consulted, recommended non-operative management with IV antibiotics. Recommended against IR drainage at this time.  - Colorectal surgery consult, appreciate recommendations   - Non-operative treatment at this time. No IR drainage recommended  - GI consult placed, appreciate recs  - Sips of clears for now  - Continue Zosyn   - Pain management with PO oxycodone and IV Dilaudid   - PRN zofran and compazine for nausea   - Hold PTA azathioprine and Humira until GI can see the patient  - Protonix 40 mg daily      LELIA  Creatinine elevated at 1.0 on admission up from baseline around 0.6.  Likely in the setting of nausea and vomiting and thus likely prerenal.  Status post 1 L bolus  NS in the ED  - Maintenance fluids with NS 75 mL/h     Chronic normocytic anemia  Hemoglobin on admission 11.1 actually up from baseline between 8 and 10.  No signs or symptoms of acute blood loss anemia, denies dark stools. No dark stool or phoenix blood in ostomy bag on exam. Will monitor.  -Daily CBC     History of disorganized behaviors  Noted on previous hospital stays.  Has previously responded well to Seroquel 12.5 mg while admitted.  Stable on admission, recommend monitoring closely. May consider adding Seroquel if needed, hold off for now.      History pelvic cyst  Known history.  CT abdomen pelvis on admission redemonstrating ovarian cyst, confirmed with pelvic ultrasound.  Recommend follow-up pelvic ultrasound in 6 to 12 months.  No gynecologic symptoms reported, will continue to monitor.        Diet: Regular Diet Adult    DVT Prophylaxis: SCD  Chambers Catheter: Not present  Fluids: NS @ 75mL/hr  Lines: None     Cardiac Monitoring: None  Code Status: Full Code      Clinically Significant Risk Factors Present on Admission        # Hypokalemia: Lowest K = 3.1 mmol/L in last 2 days, will replace as needed   # Hypochloremia: Lowest Cl = 96 mmol/L in last 2 days, will monitor as appropriate    # Hypomagnesemia: Lowest Mg = 1.5 mg/dL in last 2 days, will replace as needed   # Hypoalbuminemia: Lowest albumin = 2.6 g/dL at 10/20/2024  5:55 AM, will monitor as appropriate        # Anemia: based on hgb <11           # Financial/Environmental Concerns:              The patient's care was discussed with the Attending Physician, Dr. Edmar MD .    IONA ROMERO MD  Hospitalist Service  [facility]  Securely message with [employer] (more info)  Text page via [facility] Paging/Directory   ______________________________________________________________________    Interval History   Significant abdominal pain still this morning. Stable overnight.     Physical Exam   Vital Signs: Temp: 97.9  F (36.6  C)  Temp src: Oral BP: 92/61 Pulse: 73   Resp: 16 SpO2: 97 % O2 Device: None (Room air)    Weight: 89 lbs 14.4 oz    GEN: Pleasant female, in no acute distress.   PULM: Non-labored breathing. No use of accessory muscles. Clear to ausculation bilaterally. No wheezes or crackles.   CV: Regular rate and rhythm. Normal S1, S2. No rubs, murmurs, or gallops.    ABDOMEN: Normoactive bowel sounds. Non-distended. Tender to palpation mostly in the LUQ.    EXTREMITES:  No clubbing, cyanosis, or edema.    PSYCH: Calm. Appropriate affect, insight, judgment.       Medical Decision Making             Data     I have personally reviewed the following data over the past 24 hrs:    12.7 (H)  \   9.0 (L)   / 345     135 101 15.2 /  122 (H)   3.4 26 1.02 (H) \     ALT: 24 AST: 47 (H) AP: 193 (H) TBILI: 0.6   ALB: 2.6 (L) TOT PROTEIN: 8.6 (H) LIPASE: 42     Procal: N/A CRP: N/A Lactic Acid: 1.2         Imaging results reviewed over the past 24 hrs:   Recent Results (from the past 24 hour(s))   CT Abdomen Pelvis w Contrast    Narrative    EXAM: CT ABDOMEN PELVIS W CONTRAST  LOCATION: [facility]  DATE: 10/20/2024    INDICATION: abdominal pain, nausea, vomiting, ileostomy in place  COMPARISON: None.  TECHNIQUE: CT scan of the abdomen and pelvis was performed following injection of IV contrast. Multiplanar reformats were obtained. Dose reduction techniques were used.  CONTRAST: isovue 370 90ml    FINDINGS:   LOWER CHEST: Normal.    HEPATOBILIARY: No significant mass or bile duct dilatation. No calcified gallstones.     PANCREAS: No significant mass, duct dilatation, or inflammatory change.    SPLEEN: Normal size.    ADRENAL GLANDS: No significant nodules.    KIDNEYS/BLADDER: No significant mass, stone, or hydronephrosis.    BOWEL: Postsurgical changes from left midabdomen colostomy are again noted with section of the descending colon again noted. There is marked mural thickening of the transverse colon extending from  the hepatic flexure to the colostomy site. There is a   pericolonic abscess containing gas and fluid seen adjacent to the distal transverse colon measuring roughly 2 x 4.4 x 4.1 cm (image 78, series 3; image 23, series 4), extending to just proximal to the colostomy site. There is a pericolonic abscess   encasing the proximal transverse colon measuring roughly 1.5 x 2.6 x 5.6 cm (image 88, series 3; image 94, series 3) gas and fluid is seen within the abscess cavity.    Mild mural thickening of the distal ascending colon is noted the cecum is within normal limits. The stomach, duodenum and small bowel are otherwise unremarkable.    LYMPH NODES: There is an increased number of mesenteric subcentimeter lymph nodes, slightly worsened from prior exam.    VASCULATURE: No abdominal aortic aneurysm.    PELVIC ORGANS: Prominent left adnexal cyst measuring 7 x 4 cm, is unchanged from prior exam.     MUSCULOSKELETAL: Unchanged.      Impression    IMPRESSION:   1.  Persistent mural thickening of the transverse colon from the hepatic flexure to the level of the colostomy site, worsened from prior exam concerning for worsening long segment colitis, secondary to patient's known history of Crohn's disease.   2.  Two pericolonic abscesses which may be connected, with air and fluid seen within the proximal and distal transverse colon abscess measuring roughly 5.6 and 4.1 cm, as described above,  given the presence of air possibility of microperforation of the   colon and is of concern although no definitive communication within the abscess cavity and colon is seen at this time.

## 2024-10-20 NOTE — H&P
[facility]    History and Physical - Hospitalist Service       Date of Admission:  10/19/2024    Assessment & Plan      Dain Tejeda is a 44 year old female admitted on 10/19/2024. She has a history of Crohn's disease status post partial colectomy with colostomy creation, celiac disease, normocytic anemia, sinus bradycardia, disorganized behaviors who is admitted for abdominal pain and intraabdominal abscess formation as sequela of Crohn's.     Sepsis   Pericolonic abscess   History of Crohn's disease, celiac disease   Status post partial colectomy with current colostomy  Patient with known history of Crohn's disease since 2022, complicated by stricture and status post partial colectomy with colostomy creation.  Historically has followed with [facility], on Humira and azathioprine chronically.  Hospitalized most recently in September of 2024 for enterocolitis due to Crohn's flare. She saw [facility] at that time who recommended follow-up and continuation of Humira, azathioprine, and omeprazole. Prior to this, she was hospitalized in May 2024 for steroid-induced fistulizing disease and abscess, was supposed to have switched over from Humira to infliximab and follow-up with GI for repeat colonoscopy shortly after previous admission, however had been lost to follow-up, citing that the GI doctor stated it would take too long for her to get in. Now here for worsening abdominal pain since discharge in September. Complaining of subjective fever and nausea associated with this pain. Hypotensive and tachycardic on admission with a temperature of 99.65 concerning for sepsis. Labs on admission remarkable to WBC of 13, mag 1.5, and creatinine 1.00. Lactic of 1.2 on admission. CT imaging in the ED showing mural thickening of the transverse colon from the hepatic flexure to the colostomy site worsened from prior exam as well as two pericolonic abscesses which  may be connected. Air and fluid seen in the  proximal and distal transverse colon concerning for possible microperforation. Colorectal surgery was consulted in the ED and will plan to see her later this morning. She was given a dose of Zosyn in the ED. Overall picture concerning for Crohn's disease with abscess formation and sepsis due to intraabdominal abscess formation.   - Colorectal surgery consult placed   - GI consult placed, appreciate recs  - NPO for now   - Continue Zosyn   - Pain management with PO oxycodone and IV Dilaudid   - PRN zofran and compazine for nausea   - Hold PTA azathioprine and Humira until GI can see the patient  - Protonix 40 mg daily     LELIA  Creatinine elevated at 1.0 on admission up from baseline around 0.6.  Likely in the setting of nausea and vomiting and thus likely prerenal.  Status post 1 L bolus NS in the ED  - Maintenance fluids with NS 75 mL/h    Chronic normocytic anemia  Hemoglobin on admission 11.1 actually up from baseline between 8 and 10.  No signs or symptoms of acute blood loss anemia, denies dark stools. No dark stool or phoenix blood in ostomy bag on exam. Will monitor.  -Daily CBC     History of disorganized behaviors  Noted on previous hospital stays.  Has previously responded well to Seroquel 12.5 mg while admitted.  Stable on admission, recommend monitoring closely. May consider adding Seroquel if needed, hold off for now.      History pelvic cyst  Known history.  CT abdomen pelvis on admission redemonstrating ovarian cyst, confirmed with pelvic ultrasound.  Recommend follow-up pelvic ultrasound in 6 to 12 months.  No gynecologic symptoms reported, will continue to monitor.        Diet: NPO per Anesthesia Guidelines for Procedure/Surgery Except for: MedsNPO   DVT Prophylaxis: Pneumatic Compression Devices  Chambers Catheter: Not present  Fluids: s/p 1L bolus in the ED   Lines: None     Cardiac Monitoring: None  Code Status: Full CodeFull Code     Clinically Significant Risk Factors Present on Admission          #  Hypochloremia: Lowest Cl = 96 mmol/L in last 2 days, will monitor as appropriate    # Hypomagnesemia: Lowest Mg = 1.5 mg/dL in last 2 days, will replace as needed   # Hypoalbuminemia: Lowest albumin = 3.1 g/dL at 10/19/2024 10:57 PM, will monitor as appropriate                 # Financial/Environmental Concerns:           Disposition Plan      Expected Discharge Date: 10/22/2024                This patient will be formally staffed at AM rounds.       Jacquelin Del Rio DO  Hospitalist Service  [facility]  Securely message with [employer] (more info)  Text page via [facility] Paging/Directory   ______________________________________________________________________    Chief Complaint   Abdominal pain, nausea, vomiting     History is obtained from the patient with the use of fitmob .    History of Present Illness   Dain Tejeda is a 44 year old female who presents with worsening abdominal pain for just over a month since discharge from the hospital on prior admission. She states that her pain has progressively worsened over the last month and she has had frequent nausea and vomiting. The pain became severe enough that she felt she needed to come and be seen. She reports subjective fever and increased loose stools recently. Denies dark, tarry stool or bright blood in her ostomy bag. Denies dysuria, SOB or chest pain. Her pain has improved in the ED with pain medications and she feels much more comfortably at the time of evaluation.       Past Medical History    Past Medical History:   Diagnosis Date    Diet controlled gestational diabetes mellitus (GDM), antepartum 9/4/2017    Attempting diet control first    Gestational diabetes mellitus (GDM) in third trimester 9/4/2017    Attempting diet control first  Formatting of this note might be different from the original. Overview:  Attempting diet control first    Nausea/vomiting in pregnancy 4/24/2017    Postpartum hemorrhage 11/16/2017    Third degree  laceration of perineum during delivery, postpartum 11/15/2017       Past Surgical History   Past Surgical History:   Procedure Laterality Date    COLONOSCOPY N/A 10/31/2022    Procedure: COLONOSCOPY with biopsies;  Surgeon: Luis Miguel Traore MD;  Location: [facility] GI    COLOSTOMY N/A 11/6/2022    Procedure: CREATION, COLOSTOMY;  Surgeon: Chandana Carlos DO;  Location: [facility] OR    LAPAROTOMY EXPLORATORY N/A 11/6/2022    Procedure: EXPLORATORY LAPAROTOMY SPLENIC FLEXURE MOBILIZATION;  Surgeon: Chandana Carlos DO;  Location: [facility] OR    PICC TRIPLE LUMEN PLACEMENT  5/10/2024    RECTAL PROLAPSE REPAIR N/A 11/6/2022    Procedure: RESECTION OF DESCENDING COLON;  Surgeon: Chandana Carlos DO;  Location: [facility] OR       Prior to Admission Medications   Prior to Admission Medications   Prescriptions Last Dose Informant Patient Reported? Taking?   adalimumab (HUMIRA *CF*) 40 MG/0.4ML prefilled syringe kit   Yes No   Sig: Inject 40 mg subcutaneously every 14 days. Takes on the 12th and 24th of each month.   azaTHIOprine (IMURAN) 50 MG tablet   Yes No   Sig: Take 2 tablets by mouth daily   omeprazole (PRILOSEC) 20 MG DR capsule   Yes No   Sig: Take 20 mg by mouth every morning (before breakfast).      Facility-Administered Medications: None           Physical Exam   Vital Signs: Temp: 99.6  F (37.6  C) Temp src: Temporal BP: 100/68 Pulse: 84   Resp: 16 SpO2: 99 % O2 Device: None (Room air)    Weight: 101 lbs 0 oz    General Appearance: Appears older than stated age. Thin. Fatigued appearing. Comfortable at present. No acute distress.  HENT: Atraumatic. Normocephalic. Dry mucous membranes, dry, peeling lips.   Respiratory: No acute respiratory distress. Lungs are CTAB.    Cardiovascular: Tachycardic but regular. NMRG.   GI: Soft, nondistended abdomen. Diffusely tender to palpation worst near the ostomy site and directly to the right. Midline scar present. Ostomy bag is distended with gas  and light brown liquid stool.   Skin: Warm and dry.     Medical Decision Making             Data     I have personally reviewed the following data over the past 24 hrs:    13.0 (H)  \   11.1 (L)   / 496 (H)     135 96 (L) 18.5 /  136 (H)   3.4 27 1.00 (H) \     ALT: 24 AST: 47 (H) AP: 193 (H) TBILI: 0.6   ALB: 3.1 (L) TOT PROTEIN: 8.6 (H) LIPASE: 42     Procal: N/A CRP: N/A Lactic Acid: 1.2         Imaging results reviewed over the past 24 hrs:   Recent Results (from the past 24 hour(s))   CT Abdomen Pelvis w Contrast    Narrative    EXAM: CT ABDOMEN PELVIS W CONTRAST  LOCATION: [facility]  DATE: 10/20/2024    INDICATION: abdominal pain, nausea, vomiting, ileostomy in place  COMPARISON: None.  TECHNIQUE: CT scan of the abdomen and pelvis was performed following injection of IV contrast. Multiplanar reformats were obtained. Dose reduction techniques were used.  CONTRAST: isovue 370 90ml    FINDINGS:   LOWER CHEST: Normal.    HEPATOBILIARY: No significant mass or bile duct dilatation. No calcified gallstones.     PANCREAS: No significant mass, duct dilatation, or inflammatory change.    SPLEEN: Normal size.    ADRENAL GLANDS: No significant nodules.    KIDNEYS/BLADDER: No significant mass, stone, or hydronephrosis.    BOWEL: Postsurgical changes from left midabdomen colostomy are again noted with section of the descending colon again noted. There is marked mural thickening of the transverse colon extending from the hepatic flexure to the colostomy site. There is a   pericolonic abscess containing gas and fluid seen adjacent to the distal transverse colon measuring roughly 2 x 4.4 x 4.1 cm (image 78, series 3; image 23, series 4), extending to just proximal to the colostomy site. There is a pericolonic abscess   encasing the proximal transverse colon measuring roughly 1.5 x 2.6 x 5.6 cm (image 88, series 3; image 94, series 3) gas and fluid is seen within the abscess cavity.    Mild mural  thickening of the distal ascending colon is noted the cecum is within normal limits. The stomach, duodenum and small bowel are otherwise unremarkable.    LYMPH NODES: There is an increased number of mesenteric subcentimeter lymph nodes, slightly worsened from prior exam.    VASCULATURE: No abdominal aortic aneurysm.    PELVIC ORGANS: Prominent left adnexal cyst measuring 7 x 4 cm, is unchanged from prior exam.     MUSCULOSKELETAL: Unchanged.      Impression    IMPRESSION:   1.  Persistent mural thickening of the transverse colon from the hepatic flexure to the level of the colostomy site, worsened from prior exam concerning for worsening long segment colitis, secondary to patient's known history of Crohn's disease.   2.  Two pericolonic abscesses which may be connected, with air and fluid seen within the proximal and distal transverse colon abscess measuring roughly 5.6 and 4.1 cm, as described above,  given the presence of air possibility of microperforation of the   colon and is of concern although no definitive communication within the abscess cavity and colon is seen at this time.

## 2024-10-20 NOTE — PLAN OF CARE
Problem: Adult Inpatient Plan of Care  Goal: Absence of Hospital-Acquired Illness or Injury  10/20/2024 0806 by Anna Rosario RN  Outcome: Progressing  10/20/2024 0805 by Anna Rosario RN  Outcome: Progressing  Intervention: Identify and Manage Fall Risk  Recent Flowsheet Documentation  Taken 10/20/2024 0347 by Anna Rosario RN  Safety Promotion/Fall Prevention:   activity supervised   clutter free environment maintained   lighting adjusted   patient and family education   nonskid shoes/slippers when out of bed   safety round/check completed   supervised activity  Intervention: Prevent Skin Injury  Recent Flowsheet Documentation  Taken 10/20/2024 0347 by Anna Rosario RN  Device Skin Pressure Protection: tubing/devices free from skin contact  Intervention: Prevent Infection  Recent Flowsheet Documentation  Taken 10/20/2024 0347 by Anna Rosario RN  Infection Prevention:   rest/sleep promoted   single patient room provided   hand hygiene promoted  Goal: Optimal Comfort and Wellbeing  10/20/2024 0806 by Anna Rosario RN  Outcome: Progressing  10/20/2024 0805 by Anna Rosario RN  Outcome: Progressing     Problem: Pain Acute  Goal: Optimal Pain Control and Function  Outcome: Progressing  Intervention: Prevent or Manage Pain  Recent Flowsheet Documentation  Taken 10/20/2024 0347 by Anna Rosario RN  Medication Review/Management: medications reviewed     Goal Outcome Evaluation:  Pt came up from ED at 3:45am. Hmong speaking only. Pt has a chronic colostomy bag in place. Pain managed with PRN 0.4mg IV dilaudid. Continuous NS running at 75 ml/hr. On Mg, phosp, K+ protocol, AM check. NPO maintained overnight. IV zosyn running. SBA to bathroom, voided x1.

## 2024-10-20 NOTE — CONSULTS
[facility]   Consultation      Place of Service: [facility]  Reason for Consultation: Crohn's Disease; intra-abdominal abscess   Consult Requested by: Dr. Quang DO    History of Present Illness: Patient is a 44-year-old female with a history of Crohn's disease that is presenting for abdominal pain associated with some nausea and vomiting.  Has been going on for a few days before getting worse which prompted her visit to the ER.  She reports that her stoma output has been the same in terms of quantity.  She does note a different odor to the stool.  She has a history of Crohn's disease and is being managed by [facility].  She is unable to state who her primary GI physician is but states that he works in [place].  She is on biologic therapy and her last dose was on October 12, 2024.  She has been on Humira in the past but was started on infliximab in May.  She has been admitted to the hospital multiple times over the last 6 months with flares of her Crohn's disease.  Her admission in May was also significant for a intramural pericolonic abscess.  She was treated with antibiotics and did not undergo any procedures at that time.  Most recent admission was in September and she was treated for enterocolitis secondary to norovirus and E. coli.  In terms of surgical history, she has undergone a partial colectomy with an end colostomy for a colonic stricture in 2022.  Upon presentation to the ER, she was found to be afebrile and normotensive.  Her white count was elevated 13.  A CT of the abdomen and pelvis was done with IV contrast.  This showed significant inflammation of her distal colon leading up to the ostomy.  There are also 2 fluid collections in the pericolonic area.  These fluid collections appear to be fistulizing from the colon.  She has been admitted and started on IV Zosyn.    He is hmong speaking.   services were used for these  consultation.    Pertinent Labs:   Lab Results: personally reviewed   Lab Results   Component Value Date     10/20/2024     10/19/2024     09/12/2024    .0 04/24/2017    CO2 26 10/20/2024    CO2 27 10/19/2024    CO2 28 09/12/2024    CO2 25 06/23/2022    CO2 23 06/03/2022    CO2 22 06/02/2022    CO2 26.0 04/24/2017    BUN 15.2 10/20/2024    BUN 18.5 10/19/2024    BUN 7.2 09/12/2024    BUN 6 06/23/2022    BUN 3 06/03/2022    BUN 4 06/02/2022    BUN 7.0 04/24/2017     Lab Results   Component Value Date    WBC 12.7 10/20/2024    WBC 13.0 10/19/2024    WBC 6.4 09/12/2024    HGB 9.0 10/20/2024    HGB 11.1 10/19/2024    HGB 10.5 09/12/2024    HGB 10.8 01/10/2018    HGB 10.3 11/08/2017    HGB 9.8 09/18/2017    HCT 28.4 10/20/2024    HCT 34.5 10/19/2024    HCT 32.0 09/12/2024    HCT 35.9 01/10/2018    HCT 33.8 11/08/2017    HCT 31.4 07/31/2017    MCV 90 10/20/2024    MCV 88 10/19/2024    MCV 90 09/12/2024    MCV 83.1 01/10/2018    MCV 77.9 11/08/2017    MCV 86.3 07/31/2017     10/20/2024     10/19/2024     09/12/2024       Pertinent Radiology: I have personally reviewed the images and report of CT abdomen and pelvis with IV contrast and my impression/s include: Significant inflammation of the transverse colon leading up to the ostomy, multiple fluid collections intra-abdominal he likely to be abscesses secondary to fistula from the colon.  Large pelvic cyst that is stable upon comparison to prior imaging.    Past Medical History:   Diagnosis Date    Diet controlled gestational diabetes mellitus (GDM), antepartum 9/4/2017    Attempting diet control first    Gestational diabetes mellitus (GDM) in third trimester 9/4/2017    Attempting diet control first  Formatting of this note might be different from the original. Overview:  Attempting diet control first    Nausea/vomiting in pregnancy 4/24/2017    Postpartum hemorrhage 11/16/2017    Third degree laceration of perineum during  delivery, postpartum 11/15/2017       Past Surgical History:   Procedure Laterality Date    COLONOSCOPY N/A 10/31/2022    Procedure: COLONOSCOPY with biopsies;  Surgeon: Luis Miguel Traore MD;  Location: [facility] GI    COLOSTOMY N/A 11/6/2022    Procedure: CREATION, COLOSTOMY;  Surgeon: Chandana Carlos DO;  Location: [facility]    LAPAROTOMY EXPLORATORY N/A 11/6/2022    Procedure: EXPLORATORY LAPAROTOMY SPLENIC FLEXURE MOBILIZATION;  Surgeon: Chandana Carlos DO;  Location: [facility]    PICC TRIPLE LUMEN PLACEMENT  5/10/2024    RECTAL PROLAPSE REPAIR N/A 11/6/2022    Procedure: RESECTION OF DESCENDING COLON;  Surgeon: Chandana Carlos DO;  Location: [facility]       Family History   Problem Relation Age of Onset    Gestational Diabetes Sister     Gestational Diabetes Sister     Diabetes No family hx of     Coronary Artery Disease No family hx of     Hypertension No family hx of     Breast Cancer No family hx of     Colon Cancer No family hx of     Prostate Cancer No family hx of     Other Cancer No family hx of        Social History     Socioeconomic History    Marital status:      Spouse name: Dmitriy Camargo    Number of children: 0    Years of education: 0    Highest education level: Not on file   Occupational History    Occupation: None   Tobacco Use    Smoking status: Never    Smokeless tobacco: Never   Vaping Use    Vaping status: Not on file   Substance and Sexual Activity    Alcohol use: No    Drug use: No    Sexual activity: Yes     Partners: Male   Other Topics Concern    Parent/sibling w/ CABG, MI or angioplasty before 65F 55M? Not Asked   Social History Narrative    Born in [facility], arrived to [facility] 02/[facility]. No education.      Social Determinants of Health     Financial Resource Strain: Low Risk  (10/20/2024)    Financial Resource Strain     Within the past 12 months, have you or your family members you live with been unable to get utilities (heat, electricity) when it was  really needed?: No   Food Insecurity: Low Risk  (10/20/2024)    Food Insecurity     Within the past 12 months, did you worry that your food would run out before you got money to buy more?: No     Within the past 12 months, did the food you bought just not last and you didn t have money to get more?: No   Transportation Needs: Low Risk  (10/20/2024)    Transportation Needs     Within the past 12 months, has lack of transportation kept you from medical appointments, getting your medicines, non-medical meetings or appointments, work, or from getting things that you need?: No   Physical Activity: Not on file   Stress: Not on file   Social Connections: Not on file   Interpersonal Safety: Low Risk  (10/20/2024)    Interpersonal Safety     Do you feel physically and emotionally safe where you currently live?: Yes     Within the past 12 months, have you been hit, slapped, kicked or otherwise physically hurt by someone?: No     Within the past 12 months, have you been humiliated or emotionally abused in other ways by your partner or ex-partner?: No   Housing Stability: Low Risk  (10/20/2024)    Housing Stability     Do you have housing? : Yes     Are you worried about losing your housing?: No       Current Facility-Administered Medications   Medication Dose Route Frequency Provider Last Rate Last Admin    acetaminophen (TYLENOL) tablet 650 mg  650 mg Oral Q4H PRN Jacquelin Del Rio DO        Or    acetaminophen (TYLENOL) Suppository 650 mg  650 mg Rectal Q4H PRN Jacquelin Del Rio DO        [Held by provider] adalimumab (HUMIRA *CF*) injection 40 mg  40 mg Subcutaneous Q14 Days Jacquelin Del Rio DO        [Held by provider] azaTHIOprine (IMURAN) tablet 100 mg  100 mg Oral Daily Jacquelin Del Rio DO        calcium carbonate (TUMS) chewable tablet 1,000 mg  1,000 mg Oral 4x Daily PRN Jacquelin Del Rio DO        HYDROmorphone (DILAUDID) injection 0.4 mg  0.4 mg Intravenous Q3H PRN Jacquelin Del Rio DO   0.4 mg at 10/20/24 0436     lidocaine 1 % 0.1-1 mL  0.1-1 mL Other Q1H PRN Jacquelin Del Rio DO        magnesium sulfate 2 g in 50 mL sterile water intermittent infusion  2 g Intravenous Once Bryant Tamayo MD        naloxone (NARCAN) injection 0.2 mg  0.2 mg Intravenous Q2 Min PRN Jacquelin Del Rio, DO        Or    naloxone (NARCAN) injection 0.4 mg  0.4 mg Intravenous Q2 Min PRN Jacquelin Del Rio, DO        Or    naloxone (NARCAN) injection 0.2 mg  0.2 mg Intramuscular Q2 Min PRN Quang, Jacquelin, DO        Or    naloxone (NARCAN) injection 0.4 mg  0.4 mg Intramuscular Q2 Min PRN Jacquelin Del Rio, DO        ondansetron (ZOFRAN ODT) ODT tab 4 mg  4 mg Oral Q6H PRN Jacquelin Del Rio, DO        Or    ondansetron (ZOFRAN) injection 4 mg  4 mg Intravenous Q6H PRN Jacquelin Del Rio DO        oxyCODONE IR (ROXICODONE) half-tab 2.5 mg  2.5 mg Oral Q4H PRN Jacquelin Del Rio DO        Or    oxyCODONE (ROXICODONE) tablet 5 mg  5 mg Oral Q4H PRN Jacquelin Del Rio, DO   5 mg at 10/20/24 0850    pantoprazole (PROTONIX) IV push injection 40 mg  40 mg Intravenous Daily with breakfast Jacquelin Del Rio DO   40 mg at 10/20/24 0850    piperacillin-tazobactam (ZOSYN) 3.375 g vial to attach to  mL bag  3.375 g Intravenous Q8H Jacquelin Del Rio, DO   3.375 g at 10/20/24 0720    prochlorperazine (COMPAZINE) injection 10 mg  10 mg Intravenous Q6H PRN Jacquelin Del Rio DO        Or    prochlorperazine (COMPAZINE) tablet 10 mg  10 mg Oral Q6H PRN Jacquelin Del Rio,         Or    prochlorperazine (COMPAZINE) suppository 25 mg  25 mg Rectal Q12H PRN Jacquelin Del Rio DO        senna-docusate (SENOKOT-S/PERICOLACE) 8.6-50 MG per tablet 1 tablet  1 tablet Oral BID PRN Jacquelin Del Rio DO        Or    senna-docusate (SENOKOT-S/PERICOLACE) 8.6-50 MG per tablet 2 tablet  2 tablet Oral BID PRN Jacquelin Del Rio DO        sodium chloride (PF) 0.9% PF flush 3 mL  3 mL Intracatheter Q8H Jacquelin Del Rio DO        sodium chloride (PF) 0.9% PF flush 3 mL  3 mL  "Intracatheter q1 min prn Jacquelin Del Rio, DO        sodium chloride 0.9 % infusion   Intravenous Continuous Jacquelin Del Rio, DO 75 mL/hr at 10/20/24 0437 New Bag at 10/20/24 0437       Review of Systems:   \"A full 10 point review of systems was taken and is negative aside from what is noted above in the HPI.\"     Consitutional / General: Denies wt changes, fevers, chills or sweats.  Allergies:   Allergies   Allergen Reactions    Gluten Meal      Celiac    Soy Allergy Anaphylaxis, Hives and Itching     Tofu- causes itching and sores in the mouth       Intake/Output past 24 hrs:    Intake/Output Summary (Last 24 hours) at 10/20/2024 0958  Last data filed at 10/20/2024 0852  Gross per 24 hour   Intake 600 ml   Output 100 ml   Net 500 ml       Physical Exam:  Temp:  [97.9  F (36.6  C)-99.6  F (37.6  C)] 97.9  F (36.6  C)  Pulse:  [] 74  Resp:  [16-18] 18  BP: ()/(58-76) 91/58  SpO2:  [96 %-100 %] 99 %  General: NAD, alert, cooperative  Head: normocephalic, without abnormality / atraumatic  Respiratory: non-labored breathing  Abdomen: Soft, nondistended, tenderness in the left lower quadrant and parastomal area.  No signs of peritonitis.  Stoma in the left with stool output.  Perianal/Rectal: Performed  Skin: no rashes or lesions  Musculoskeletal: moves all four extremities equally; no calf edema or tenderness  Psychological: alert and oriented, answers questions appropriately  Neurological: cranial nerves grossly intact    Assessment/Plan: Dain Tejeda is a 44-year-old female with a history of Crohn's disease that is presenting with abdominal pain secondary to intra-abdominal abscesses and inflammation of her colon likely secondary to her fistulizing Crohn's disease.  Based on my review of the imaging as well as examining the patient, there are no indications for emergent operation at this time.  I would recommend proceeding with the conservative management including IV antibiotics.  I would recommend a consult " with gastroenterology as well.  We will continue to monitor her progress.  Given the nature of her disease and the fluid collections, I would not recommend IR drainage at this time as this may lead to the development of colocutaneous fistulae.    1.  No acute surgical interventions indicated  2.  Recommend GI consult  3.  Continue IV antibiotics.  4.  Can have sips of clears from our standpoint.  5.  Appreciate hospitalist recommendations and care.  6.  Will order nutrition labs.  7.  Will continue to monitor.  Recommend serial abdominal exams.  Please contact us if there is any worsening or changes to her clinical status.      Time spent: 47 minutes, with >50% minutes spent in counseling and coordinating care    Kimberly Angel MD  [facility]  Office: [phone]  10/20/2024 10:11 AM

## 2024-10-21 LAB
ANION GAP SERPL CALCULATED.3IONS-SCNC: 7 MMOL/L (ref 7–15)
BUN SERPL-MCNC: 10.2 MG/DL (ref 6–20)
CALCIUM SERPL-MCNC: 7.7 MG/DL (ref 8.8–10.4)
CHLORIDE SERPL-SCNC: 106 MMOL/L (ref 98–107)
CREAT SERPL-MCNC: 0.87 MG/DL (ref 0.51–0.95)
EGFRCR SERPLBLD CKD-EPI 2021: 85 ML/MIN/1.73M2
ERYTHROCYTE [DISTWIDTH] IN BLOOD BY AUTOMATED COUNT: 13.2 % (ref 10–15)
GLUCOSE SERPL-MCNC: 111 MG/DL (ref 70–99)
HCO3 SERPL-SCNC: 25 MMOL/L (ref 22–29)
HCT VFR BLD AUTO: 27.6 % (ref 35–47)
HGB BLD-MCNC: 8.5 G/DL (ref 11.7–15.7)
MAGNESIUM SERPL-MCNC: 1.5 MG/DL (ref 1.7–2.3)
MCH RBC QN AUTO: 28.2 PG (ref 26.5–33)
MCHC RBC AUTO-ENTMCNC: 30.8 G/DL (ref 31.5–36.5)
MCV RBC AUTO: 92 FL (ref 78–100)
PHOSPHATE SERPL-MCNC: 2.6 MG/DL (ref 2.5–4.5)
PLATELET # BLD AUTO: 322 10E3/UL (ref 150–450)
POTASSIUM SERPL-SCNC: 3.6 MMOL/L (ref 3.4–5.3)
RBC # BLD AUTO: 3.01 10E6/UL (ref 3.8–5.2)
SODIUM SERPL-SCNC: 138 MMOL/L (ref 135–145)
WBC # BLD AUTO: 9.8 10E3/UL (ref 4–11)

## 2024-10-21 PROCEDURE — 85027 COMPLETE CBC AUTOMATED: CPT

## 2024-10-21 PROCEDURE — 84100 ASSAY OF PHOSPHORUS: CPT | Performed by: FAMILY MEDICINE

## 2024-10-21 PROCEDURE — 80048 BASIC METABOLIC PNL TOTAL CA: CPT

## 2024-10-21 PROCEDURE — 250N000013 HC RX MED GY IP 250 OP 250 PS 637

## 2024-10-21 PROCEDURE — 83735 ASSAY OF MAGNESIUM: CPT | Performed by: FAMILY MEDICINE

## 2024-10-21 PROCEDURE — 99232 SBSQ HOSP IP/OBS MODERATE 35: CPT | Mod: GC | Performed by: FAMILY MEDICINE

## 2024-10-21 PROCEDURE — 250N000011 HC RX IP 250 OP 636: Performed by: FAMILY MEDICINE

## 2024-10-21 PROCEDURE — 36415 COLL VENOUS BLD VENIPUNCTURE: CPT

## 2024-10-21 PROCEDURE — 250N000011 HC RX IP 250 OP 636

## 2024-10-21 PROCEDURE — 250N000013 HC RX MED GY IP 250 OP 250 PS 637: Performed by: FAMILY MEDICINE

## 2024-10-21 PROCEDURE — 258N000003 HC RX IP 258 OP 636

## 2024-10-21 PROCEDURE — 120N000001 HC R&B MED SURG/OB

## 2024-10-21 PROCEDURE — 250N000009 HC RX 250

## 2024-10-21 RX ORDER — POTASSIUM CHLORIDE 1500 MG/1
20 TABLET, EXTENDED RELEASE ORAL ONCE
Status: COMPLETED | OUTPATIENT
Start: 2024-10-21 | End: 2024-10-21

## 2024-10-21 RX ORDER — MAGNESIUM SULFATE HEPTAHYDRATE 40 MG/ML
2 INJECTION, SOLUTION INTRAVENOUS ONCE
Status: COMPLETED | OUTPATIENT
Start: 2024-10-21 | End: 2024-10-21

## 2024-10-21 RX ADMIN — OXYCODONE HYDROCHLORIDE 5 MG: 5 TABLET ORAL at 18:14

## 2024-10-21 RX ADMIN — HYDROMORPHONE HYDROCHLORIDE 0.4 MG: 1 INJECTION, SOLUTION INTRAMUSCULAR; INTRAVENOUS; SUBCUTANEOUS at 08:47

## 2024-10-21 RX ADMIN — HYDROMORPHONE HYDROCHLORIDE 0.4 MG: 1 INJECTION, SOLUTION INTRAMUSCULAR; INTRAVENOUS; SUBCUTANEOUS at 04:01

## 2024-10-21 RX ADMIN — POTASSIUM CHLORIDE 20 MEQ: 1500 TABLET, EXTENDED RELEASE ORAL at 04:01

## 2024-10-21 RX ADMIN — SODIUM CHLORIDE: 9 INJECTION, SOLUTION INTRAVENOUS at 13:46

## 2024-10-21 RX ADMIN — SODIUM CHLORIDE: 9 INJECTION, SOLUTION INTRAVENOUS at 01:15

## 2024-10-21 RX ADMIN — HYDROMORPHONE HYDROCHLORIDE 0.4 MG: 1 INJECTION, SOLUTION INTRAMUSCULAR; INTRAVENOUS; SUBCUTANEOUS at 00:05

## 2024-10-21 RX ADMIN — PIPERACILLIN AND TAZOBACTAM 3.38 G: 3; .375 INJECTION, POWDER, FOR SOLUTION INTRAVENOUS at 16:45

## 2024-10-21 RX ADMIN — MAGNESIUM SULFATE HEPTAHYDRATE 2 G: 40 INJECTION, SOLUTION INTRAVENOUS at 20:14

## 2024-10-21 RX ADMIN — PANTOPRAZOLE SODIUM 40 MG: 40 INJECTION, POWDER, FOR SOLUTION INTRAVENOUS at 08:48

## 2024-10-21 RX ADMIN — OXYCODONE HYDROCHLORIDE 5 MG: 5 TABLET ORAL at 13:43

## 2024-10-21 RX ADMIN — PIPERACILLIN AND TAZOBACTAM 3.38 G: 3; .375 INJECTION, POWDER, FOR SOLUTION INTRAVENOUS at 08:51

## 2024-10-21 ASSESSMENT — ACTIVITIES OF DAILY LIVING (ADL)
ADLS_ACUITY_SCORE: 26
ADLS_ACUITY_SCORE: 23
ADLS_ACUITY_SCORE: 29
ADLS_ACUITY_SCORE: 26
ADLS_ACUITY_SCORE: 26
ADLS_ACUITY_SCORE: 29
ADLS_ACUITY_SCORE: 23
ADLS_ACUITY_SCORE: 26
ADLS_ACUITY_SCORE: 23
ADLS_ACUITY_SCORE: 26
ADLS_ACUITY_SCORE: 23
ADLS_ACUITY_SCORE: 23
ADLS_ACUITY_SCORE: 26
ADLS_ACUITY_SCORE: 29
ADLS_ACUITY_SCORE: 29

## 2024-10-21 NOTE — PLAN OF CARE
Problem: Pain Acute  Goal: Optimal Pain Control and Function  Outcome: Progressing     Problem: Pain Acute  Goal: Optimal Pain Control and Function  Intervention: Prevent or Manage Pain  Recent Flowsheet Documentation  Taken 10/21/2024 5175 by Belén Vaughn RN  Medication Review/Management: medications reviewed   Goal Outcome Evaluation:Pt  alert and oriented complaining of lower abdominal pain, dilaudid given with mild relieve, chronic colostomy in place, patent with output, SBA to bathroom, ate food from home, Hmong speaking, pt an make her needs known.

## 2024-10-21 NOTE — PROGRESS NOTES
[facility] - GASTROENTEROLOGY PROGRESS NOTE     SUBJECTIVE: Still having a lot of pain, mainly epigastric.       OBJECTIVE:  /72 (BP Location: Left arm)   Pulse 85   Temp 99.4  F (37.4  C) (Oral)   Resp 17   Wt 40.8 kg (89 lb 14.4 oz)   SpO2 98%   BMI 16.44 kg/m    Temp (24hrs), Av.5  F (36.9  C), Min:97.8  F (36.6  C), Max:99.4  F (37.4  C)    Patient Vitals for the past 72 hrs:   Weight   10/20/24 0941 40.8 kg (89 lb 14.4 oz)   10/20/24 0347 41.6 kg (91 lb 11.4 oz)   10/19/24 2208 45.8 kg (101 lb)        PHYSICAL EXAM  GEN: Alert, no distress  ABD: Soft, significant epigastric tenderness    Additional Data:  I have reviewed the patient's new clinical lab results:   Recent Labs   Lab Test 10/21/24  0551 10/20/24  0555 10/19/24  2257 24  0552 24  0610 24  0608 24  0614 24  0429   WBC 9.8 12.7* 13.0*   < > 6.0   < > 3.7* 4.9   HGB 8.5* 9.0* 11.1*   < > 9.7*   < > 7.8* 8.2*   MCV 92 90 88   < > 97   < > 93 91    345 496*   < > 313   < > 267 302   INR  --   --   --   --  0.98  --  1.08 1.12    < > = values in this interval not displayed.     Recent Labs   Lab Test 10/21/24  0551 10/20/24  1822 10/20/24  1237 10/20/24  0555 10/19/24  2257     --   --  135 135   POTASSIUM 3.6 3.4 3.4 3.1* 3.4   CHLORIDE 106  --   --  101 96*   CO2 25  --   --  26 27   BUN 10.2  --   --  15.2 18.5   CR 0.87  --   --  1.02* 1.00*   ANIONGAP 7  --   --  8 12   ALEX 7.7*  --   --  7.8* 9.1   *  --   --  122* 136*     Recent Labs   Lab Test 10/20/24  0555 10/19/24  2257 24  1742 24  1741 24  0610 05/10/24  0740 24  1148 24  0808 24  2103 24  0126   ALBUMIN 2.6* 3.1*  --  3.7 3.5   < >  --  2.7*   < >  --    BILITOTAL  --  0.6  --  0.4 0.3   < >  --  0.6   < >  --    ALT  --  24  --  11 26   < >  --  31   < >  --    AST  --  47*  --  15 17   < >  --  34   < >  --    PROTEIN  --   --  50*  --   --   --  70*  --   --  Negative   LIPASE  --  42   --  33  --   --   --  29  --   --     < > = values in this interval not displayed.        IMPRESSION/Plan:  Crohn's disease with colostomy now with pericolonic abscesses.  She is currently being medically managed on antibiotics, no surgery planned at the moment.  Humira and azathioprine on hold  Continue IV antibiotics.  Diet as tolerated, may need TPN given poor nutritional status and limited enteric feeding given her fistulizing Crohn's disease.    15 minutes of total time was spent providing patient care, including patient evaluation, reviewing documentation/test results, coordination of care with other providers, and .    Keven Mendez  Cell [phone]  After 5 PM, please call [phone]

## 2024-10-21 NOTE — PROGRESS NOTES
[facility]    Progress Note - Hospitalist Service       Date of Admission:  10/19/2024    Assessment & Plan   Dain Tejeda is a 44 year old female admitted on 10/19/2024. She has a history of Crohn's disease status post partial colectomy with colostomy creation, celiac disease, normocytic anemia, sinus bradycardia, disorganized behaviors who is admitted for abdominal pain and intraabdominal abscess formation as sequela of Crohn's.      Sepsis, resolved  Pericolonic abscess   History of Crohn's disease, celiac disease   Status post partial colectomy with current colostomy  Malnutrition  Patient with known history of Crohn's disease since 2022, complicated by stricture and status post partial colectomy with colostomy creation. Hypotensive and tachycardic on admission with a temperature of 99.65 concerning for sepsis. Labs on admission remarkable to WBC of 13, mag 1.5, and creatinine 1.00. Lactic of 1.2 on admission. CT imaging in the ED showing mural thickening of the transverse colon from the hepatic flexure to the colostomy site worsened from prior exam as well as two pericolonic abscesses which  may be connected. Air and fluid seen in the proximal and distal transverse colon concerning for possible microperforation. Colorectal surgery consulted, recommended non-operative management with IV antibiotics. Recommended against IR drainage at this time.  - Colorectal surgery consult, appreciate recommendations              - Non-operative treatment at this time. No IR drainage recommended  - GI consult placed, appreciate recs               -Continue clear diet               -Continue IV Zosyn               -CT in the next few days, to monitor abscess               -Serial abdominal exams  - Continue Zosyn   - Pain management with PO oxycodone and IV Dilaudid   - PRN zofran and compazine for nausea   - Hold PTA azathioprine and Humira until GI can see the patient  - Protonix 40 mg daily    -Nutritionist consult appreciate recs     LELIA, improving  Creatinine elevated at 1.0 on admission up from baseline around 0.6.  Likely in the setting of nausea and vomiting and thus likely prerenal.  Status post 1 L bolus NS in the ED, continues on maintenance IV fluids due to appropriate intake.  Creatinine has been stable.  - Maintenance fluids with NS 75 mL/h     Chronic normocytic anemia  Hemoglobin on admission 11.1 actually up from baseline between 8 and 10.  No signs or symptoms of acute blood loss anemia, denies dark stools. No dark stool or phoenix blood in ostomy bag on exam. Will monitor.  -Daily CBC     History of disorganized behaviors  Noted on previous hospital stays.  Has previously responded well to Seroquel 12.5 mg while admitted.  Stable on admission, recommend monitoring closely. May consider adding Seroquel if needed, hold off for now.      History pelvic cyst  Known history.  CT abdomen pelvis on admission redemonstrating ovarian cyst, confirmed with pelvic ultrasound.  Recommend follow-up pelvic ultrasound in 6 to 12 months.  No gynecologic symptoms reported, will continue to monitor.              Diet: Regular Diet Adult  Snacks/Supplements Adult: Bil[employer] Standard Oral Supplement; Between Meals    DVT Prophylaxis: Pneumatic Compression Devices  Chambers Catheter: Not present  Fluids: mIVF 75 ml/hr   Lines: None     Cardiac Monitoring: None  Code Status: Full Code      Clinically Significant Risk Factors        # Hypokalemia: Lowest K = 3.1 mmol/L in last 2 days, will replace as needed   # Hypochloremia: Lowest Cl = 96 mmol/L in last 2 days, will monitor as appropriate    # Hypomagnesemia: Lowest Mg = 1.5 mg/dL in last 2 days, will replace as needed   # Hypoalbuminemia: Lowest albumin = 2.6 g/dL at 10/20/2024  5:55 AM, will monitor as appropriate                   # Financial/Environmental Concerns:           Disposition Plan      Expected Discharge Date: 10/25/2024    Discharge Delays: IV  Medication - consider oral or Home Infusion            The patient's care was discussed with the Attending Physician, Dr. Tamayo .    Charlotte Bello MD  Hospitalist Service  [facility]  Securely message with [employer] (more info)  Text page via [employer] Paging/Directory   ______________________________________________________________________    Interval History   No acute events overnight.  Continues to have ongoing abdominal pain rates it at 4 out of 10 today.  Starting with oral pain medications which will last longer and could use IV pain meds for breakthrough pain.  Will go back on clear liquid.  Otherwise no nausea vomiting no shortness of breath or chest pain.  Encouraged increasing p.o. intake by taking sips of liquids.    Physical Exam   Vital Signs: Temp: 99.4  F (37.4  C) Temp src: Oral BP: 102/72 Pulse: 85   Resp: 17 SpO2: 98 % O2 Device: None (Room air)    Weight: 89 lbs 14.4 oz    General Appearance: Alert and oriented, NAD  Respiratory: Normal work of breathing, clear breath sounds bilaterally no wheezing  Cardiovascular: Normal S1, S2, no murmur  GI: Soft, tenderness medial of the colostomy, no guarding, colostomy with brown stool  Skin: Normal appearance and turgor no visible rash  MSK: Notable lower limb edema bilaterally     Medical Decision Making             Data     I have personally reviewed the following data over the past 24 hrs:    9.8  \   8.5 (L)   / 322     138 106 10.2 /  111 (H)   3.6 25 0.87 \       Imaging results reviewed over the past 24 hrs:   No results found for this or any previous visit (from the past 24 hour(s)).

## 2024-10-21 NOTE — PROGRESS NOTES
[facility]  Daily Progress Note         Dain Tejeda is a 44-year-old female with a history of Crohn's disease that is presenting with abdominal pain secondary to intra-abdominal abscesses and inflammation of her colon likely secondary to her fistulizing Crohn's disease.     Subjective  - NAEO, BP runs low at baseline and asymptomatic   - Afebrile  - Having ostomy output  - Pain present in lower/right abdomen     Objective    Intake/Output Summary (Last 24 hours) at 10/21/2024 0730  Last data filed at 10/21/2024 0639  Gross per 24 hour   Intake 1435.75 ml   Output 100 ml   Net 1335.75 ml       BP (!) 89/58 (BP Location: Left arm)   Pulse 78   Temp 97.9  F (36.6  C) (Oral)   Resp 15   Wt 40.8 kg (89 lb 14.4 oz)   SpO2 100%   BMI 16.44 kg/m      Physical Exam:  NAD, laying in bed  RR  No labored breathing  Abdomen is soft, not distended, tender in the lower/right abdomen, not peritonitic  Ostomy pink and healthy, stool and gas in the bag     Pertinent Labs  Reviewed- WBC normalized      Assessment/Plan:   1.  No acute surgical interventions indicated  2.  Recommend GI consult; appreciate recommendations  3.  Continue IV antibiotics  4.  Prealbumin 5.7, severe malnutrition, recommend supplements that are high calorie/protein and nutrition consult   5. OK for clears from our standpoint - currently on a regular diet. Would continue to monitor and if patient is having increased bloating or pain with PO intake would back down   6.  Will continue to monitor.  Recommend serial abdominal exams.  Please contact us if there is any worsening or changes to her clinical status. Given the nature of her disease and the fluid collections, I would not recommend IR drainage at this time as this may lead to the development of colocutaneous fistulae.

## 2024-10-21 NOTE — PLAN OF CARE
Problem: Pain Acute  Goal: Optimal Pain Control and Function  Outcome: Progressing  Intervention: Prevent or Manage Pain  Recent Flowsheet Documentation  Taken 10/20/2024 2323 by Eladio Herrera, RN  Medication Review/Management: medications reviewed  Taken 10/20/2024 2223 by Eladio Herrera, RN  Medication Review/Management: medications reviewed     Problem: Adult Inpatient Plan of Care  Goal: Optimal Comfort and Wellbeing  Outcome: Progressing     Problem: Infection  Goal: Absence of Infection Signs and Symptoms  10/21/2024 0520 by Eladio Herrera, RN  Outcome: Progressing  10/21/2024 0517 by Eladio Herrera RN  Outcome: Progressing     Problem: Colostomy  Goal: Absence of Bleeding  Outcome: Progressing   Goal Outcome Evaluation:       A&OX4, c/o abd pain, IVdilaudid 0.4mg given X2, effective. NS running at 75 ml/hr. IV abx running as ordered. Chronic colostomy in place, self manages. SBA to bathroom, Mg, phosp, K+ protocol, AM check. BP (!) 89/58 (BP Location: Left arm)   Pulse 78   Temp 97.9  F (36.6  C) (Oral)   Resp 15   Wt 40.8 kg (89 lb 14.4 oz)   SpO2 100%   BMI 16.44 kg/m

## 2024-10-21 NOTE — CONSULTS
"CLINICAL NUTRITION SERVICES -  Brief ASSESSMENT NOTE     Nutrition Prescription    RECOMMENDATIONS FOR MDs/PROVIDERS TO ORDER:  None    Malnutrition Status:    Severe in acute illness    RD Intervention  -Change Lyn [employer] shake to ensure clear tid while on clear liquids    Future/Additional Recommendations:  -Add mvi with minerals and thiamine 100 mg daily x 10 days once able to tolerate more po intake d/t not meeting RDIs 1 week  -Adjust supplement pending intake, weight, tolerance, acceptance, diet advance, labs       Consult:     hx of chrons complicated by periocolonic abscess, has ongoing abdominal pain, poor po intake and malnutrition.   See initial nutrition assessment from yesterday as well    NUTRITION HISTORY  Pt seen with [facility] She reports she is having quite a bit of abd pain. She states she received pain medication 1/2 hour ago and it is less. She does not feel as weak as when she came in-she feels this was d/t dehydration from N/V at home    Pt reports eating mainly soft food at home but not able to eat much for about a week d/t N/V/abd pain    Pt last followed by RD in May. Pt quite depressed and flat affect during that stay. Pt tearful but brighter and more interactive, with good eye contact and expressing gratitude this stay.     Food allergies: gluten (celiac), soy    CURRENT NUTRITION ORDERS  Diet:  Clear liquid   Supplement: Guanxi.me 1.0     Intake/Tolerance: Poor  -Pt was on regular until 1000 this am and had some soft food from home last night but \"it did not sit right\" with her. Pt understanding and only wanting to take clear liquids right now for GI rest.   -Pt taking juice, jello today  -Reassured pt that current tx should be helpful as she was feeling hopeless and unsure if she would get better, however it would take time.     Pt main concern is having her pain in control and getting back home.   Pt reports she likes the [employer] but is quite thick and may not be good for " "her to take right now.   She likes sweet taste. She thinks sour \"would be too much for me\" right now. Pt agreed to try ensure clear. She thought the gelatin plus may be too much.     Receiving NS IVF at 75 ml/hr    LABS  Labs reviewed.  On Mg, K, and phos replacement protocol  Mag 1.5 (L), decreased post replacement check of 2.8    MEDICATIONS  Medications reviewed  Iv protonix, iv abx    ANTHROPOMETRICS  BMI: Underweight BMI <18.5  Weight loss of 12# (12%) x 1+ months    Dosing Weight: 40.8 kg    ASSESSED NUTRITION NEEDS  Estimated Energy Needs: 6642-5094 kcals/day (30 - 35 kcals/kg )  Justification: Repletion and Underweight  Estimated Protein Needs: 49-61 grams protein/day (1.2 - 1.5 grams of pro/kg)  Justification: Increased needs  Estimated Fluid Needs: 3017-1874 mL/day (1 mL/kcal)   Justification: Maintenance      MALNUTRITION:  % Weight Loss:  > 5% in 1 month (severe malnutrition)  % Intake:  <50% > 5 days (severe malnutrition)  Subcutaneous Fat Loss:  none noted  Muscle Loss:  none noted  Fluid Retention:  None noted    Malnutrition Diagnosis: Severe in acute illness    Previous NUTRITION DIAGNOSIS  Underweight related to altered GI function and suspected inadequate oral intake as evidenced by BMI < 18.5.      Current NUTRITION DIAGNOSIS  Inadequate nutrition intake r/t altered GI function evidenced by unable to tolerate advanced diet, poor appetite, weight loss    Goals  Maintain/gain weight  Patient to consume % of nutritionally adequate meals three times per day, or the equivalent with supplements/snacks.     Monitoring/Evaluation  Progress toward goals will be monitored and evaluated per protocol.    "

## 2024-10-22 ENCOUNTER — VIRTUAL VISIT (OUTPATIENT)
Dept: INTERPRETER SERVICES | Facility: CLINIC | Age: 44
End: 2024-10-22
Payer: COMMERCIAL

## 2024-10-22 ENCOUNTER — VIRTUAL VISIT (OUTPATIENT)
Dept: INTERPRETER SERVICES | Facility: CLINIC | Age: 44
End: 2024-10-22

## 2024-10-22 LAB
ANION GAP SERPL CALCULATED.3IONS-SCNC: 13 MMOL/L (ref 7–15)
BUN SERPL-MCNC: 7.2 MG/DL (ref 6–20)
CALCIUM SERPL-MCNC: 7.7 MG/DL (ref 8.8–10.4)
CHLORIDE SERPL-SCNC: 100 MMOL/L (ref 98–107)
CREAT SERPL-MCNC: 0.7 MG/DL (ref 0.51–0.95)
EGFRCR SERPLBLD CKD-EPI 2021: >90 ML/MIN/1.73M2
ERYTHROCYTE [DISTWIDTH] IN BLOOD BY AUTOMATED COUNT: 13.2 % (ref 10–15)
GLUCOSE BLDC GLUCOMTR-MCNC: 156 MG/DL (ref 70–99)
GLUCOSE SERPL-MCNC: 93 MG/DL (ref 70–99)
HCO3 SERPL-SCNC: 23 MMOL/L (ref 22–29)
HCT VFR BLD AUTO: 25.4 % (ref 35–47)
HGB BLD-MCNC: 8 G/DL (ref 11.7–15.7)
MAGNESIUM SERPL-MCNC: 1.9 MG/DL (ref 1.7–2.3)
MCH RBC QN AUTO: 28.9 PG (ref 26.5–33)
MCHC RBC AUTO-ENTMCNC: 31.5 G/DL (ref 31.5–36.5)
MCV RBC AUTO: 92 FL (ref 78–100)
PHOSPHATE SERPL-MCNC: 3.2 MG/DL (ref 2.5–4.5)
PLATELET # BLD AUTO: 336 10E3/UL (ref 150–450)
POTASSIUM SERPL-SCNC: 3 MMOL/L (ref 3.4–5.3)
POTASSIUM SERPL-SCNC: 3.3 MMOL/L (ref 3.4–5.3)
POTASSIUM SERPL-SCNC: 3.4 MMOL/L (ref 3.4–5.3)
RBC # BLD AUTO: 2.77 10E6/UL (ref 3.8–5.2)
SODIUM SERPL-SCNC: 136 MMOL/L (ref 135–145)
WBC # BLD AUTO: 9.4 10E3/UL (ref 4–11)

## 2024-10-22 PROCEDURE — 84132 ASSAY OF SERUM POTASSIUM: CPT | Performed by: FAMILY MEDICINE

## 2024-10-22 PROCEDURE — 99232 SBSQ HOSP IP/OBS MODERATE 35: CPT | Mod: GC | Performed by: FAMILY MEDICINE

## 2024-10-22 PROCEDURE — 250N000009 HC RX 250

## 2024-10-22 PROCEDURE — 250N000011 HC RX IP 250 OP 636

## 2024-10-22 PROCEDURE — 80048 BASIC METABOLIC PNL TOTAL CA: CPT

## 2024-10-22 PROCEDURE — 36569 INSJ PICC 5 YR+ W/O IMAGING: CPT

## 2024-10-22 PROCEDURE — 85027 COMPLETE CBC AUTOMATED: CPT

## 2024-10-22 PROCEDURE — T1013 SIGN LANG/ORAL INTERPRETER: HCPCS | Mod: U4,TEL,95 | Performed by: INTERPRETER

## 2024-10-22 PROCEDURE — 258N000003 HC RX IP 258 OP 636

## 2024-10-22 PROCEDURE — 120N000001 HC R&B MED SURG/OB

## 2024-10-22 PROCEDURE — 3E0436Z INTRODUCTION OF NUTRITIONAL SUBSTANCE INTO CENTRAL VEIN, PERCUTANEOUS APPROACH: ICD-10-PCS | Performed by: FAMILY MEDICINE

## 2024-10-22 PROCEDURE — 272N000451 HC KIT SHRLOCK 5FR POWER PICC DOUBLE LUMEN

## 2024-10-22 PROCEDURE — 36415 COLL VENOUS BLD VENIPUNCTURE: CPT | Performed by: FAMILY MEDICINE

## 2024-10-22 PROCEDURE — 250N000013 HC RX MED GY IP 250 OP 250 PS 637

## 2024-10-22 PROCEDURE — 83735 ASSAY OF MAGNESIUM: CPT | Performed by: FAMILY MEDICINE

## 2024-10-22 PROCEDURE — 250N000013 HC RX MED GY IP 250 OP 250 PS 637: Performed by: FAMILY MEDICINE

## 2024-10-22 PROCEDURE — 36415 COLL VENOUS BLD VENIPUNCTURE: CPT

## 2024-10-22 PROCEDURE — 84100 ASSAY OF PHOSPHORUS: CPT | Performed by: FAMILY MEDICINE

## 2024-10-22 PROCEDURE — B4185 PARENTERAL SOL 10 GM LIPIDS: HCPCS

## 2024-10-22 RX ORDER — DEXTROSE MONOHYDRATE 100 MG/ML
INJECTION, SOLUTION INTRAVENOUS CONTINUOUS PRN
Status: DISCONTINUED | OUTPATIENT
Start: 2024-10-22 | End: 2024-10-26 | Stop reason: HOSPADM

## 2024-10-22 RX ORDER — POTASSIUM CHLORIDE 1.5 G/1.58G
20 POWDER, FOR SOLUTION ORAL ONCE
Status: COMPLETED | OUTPATIENT
Start: 2024-10-22 | End: 2024-10-22

## 2024-10-22 RX ORDER — LIDOCAINE 40 MG/G
CREAM TOPICAL
Status: ACTIVE | OUTPATIENT
Start: 2024-10-22 | End: 2024-10-25

## 2024-10-22 RX ORDER — ENOXAPARIN SODIUM 100 MG/ML
30 INJECTION SUBCUTANEOUS EVERY 24 HOURS
Status: DISCONTINUED | OUTPATIENT
Start: 2024-10-22 | End: 2024-10-26 | Stop reason: HOSPADM

## 2024-10-22 RX ORDER — POTASSIUM CHLORIDE 1500 MG/1
40 TABLET, EXTENDED RELEASE ORAL ONCE
Status: COMPLETED | OUTPATIENT
Start: 2024-10-22 | End: 2024-10-22

## 2024-10-22 RX ORDER — POTASSIUM CHLORIDE 1500 MG/1
20 TABLET, EXTENDED RELEASE ORAL ONCE
Status: COMPLETED | OUTPATIENT
Start: 2024-10-22 | End: 2024-10-22

## 2024-10-22 RX ORDER — POTASSIUM CHLORIDE 1500 MG/1
20 TABLET, EXTENDED RELEASE ORAL ONCE
Status: DISCONTINUED | OUTPATIENT
Start: 2024-10-22 | End: 2024-10-22

## 2024-10-22 RX ADMIN — PANTOPRAZOLE SODIUM 40 MG: 40 INJECTION, POWDER, FOR SOLUTION INTRAVENOUS at 08:17

## 2024-10-22 RX ADMIN — POTASSIUM CHLORIDE 20 MEQ: 1.5 POWDER, FOR SOLUTION ORAL at 20:03

## 2024-10-22 RX ADMIN — POTASSIUM CHLORIDE 20 MEQ: 1500 TABLET, EXTENDED RELEASE ORAL at 14:48

## 2024-10-22 RX ADMIN — PIPERACILLIN AND TAZOBACTAM 3.38 G: 3; .375 INJECTION, POWDER, FOR SOLUTION INTRAVENOUS at 00:53

## 2024-10-22 RX ADMIN — PIPERACILLIN AND TAZOBACTAM 3.38 G: 3; .375 INJECTION, POWDER, FOR SOLUTION INTRAVENOUS at 08:17

## 2024-10-22 RX ADMIN — OXYCODONE HYDROCHLORIDE 5 MG: 5 TABLET ORAL at 20:57

## 2024-10-22 RX ADMIN — LIDOCAINE HYDROCHLORIDE 3 ML: 10 INJECTION, SOLUTION EPIDURAL; INFILTRATION; INTRACAUDAL; PERINEURAL at 14:25

## 2024-10-22 RX ADMIN — ENOXAPARIN SODIUM 30 MG: 30 INJECTION SUBCUTANEOUS at 16:40

## 2024-10-22 RX ADMIN — THIAMINE HCL TAB 100 MG 100 MG: 100 TAB at 14:48

## 2024-10-22 RX ADMIN — ACETAMINOPHEN 650 MG: 325 TABLET, FILM COATED ORAL at 04:54

## 2024-10-22 RX ADMIN — POTASSIUM CHLORIDE 40 MEQ: 1500 TABLET, EXTENDED RELEASE ORAL at 08:17

## 2024-10-22 RX ADMIN — SODIUM CHLORIDE: 9 INJECTION, SOLUTION INTRAVENOUS at 16:20

## 2024-10-22 RX ADMIN — PIPERACILLIN AND TAZOBACTAM 3.38 G: 3; .375 INJECTION, POWDER, FOR SOLUTION INTRAVENOUS at 16:18

## 2024-10-22 RX ADMIN — ASCORBIC ACID, VITAMIN A PALMITATE, CHOLECALCIFEROL, THIAMINE HYDROCHLORIDE, RIBOFLAVIN-5 PHOSPHATE SODIUM, PYRIDOXINE HYDROCHLORIDE, NIACINAMIDE, DEXPANTHENOL, ALPHA-TOCOPHEROL ACETATE, VITAMIN K1, FOLIC ACID, BIOTIN, CYANOCOBALAMIN: 200; 3300; 200; 6; 3.6; 6; 40; 15; 10; 150; 600; 60; 5 INJECTION, SOLUTION INTRAVENOUS at 20:49

## 2024-10-22 RX ADMIN — FISH OIL 200 ML: 0.1 INJECTION, EMULSION INTRAVENOUS at 21:07

## 2024-10-22 ASSESSMENT — ACTIVITIES OF DAILY LIVING (ADL)
ADLS_ACUITY_SCORE: 29
ADLS_ACUITY_SCORE: 23
ADLS_ACUITY_SCORE: 23
ADLS_ACUITY_SCORE: 26
ADLS_ACUITY_SCORE: 23
ADLS_ACUITY_SCORE: 26
ADLS_ACUITY_SCORE: 26
ADLS_ACUITY_SCORE: 29
ADLS_ACUITY_SCORE: 23
ADLS_ACUITY_SCORE: 26
ADLS_ACUITY_SCORE: 29
ADLS_ACUITY_SCORE: 23
ADLS_ACUITY_SCORE: 23
ADLS_ACUITY_SCORE: 26
ADLS_ACUITY_SCORE: 23
ADLS_ACUITY_SCORE: 26

## 2024-10-22 NOTE — PROGRESS NOTES
[facility]  Daily Progress Note    Subjective  Patient was seen with the aid of a phone .   She is doing okay this morning. Denies pain. She is tolerating clears and denies nausea. She continues to pass gas and stool from her stoma. AVSS    Objective  Intake/Output last 24 hrs:  No intake or output data in the 24 hours ending 10/22/24 1028  Temp:  [97.8  F (36.6  C)-99.6  F (37.6  C)] 97.8  F (36.6  C)  Pulse:  [71-80] 71  Resp:  [16] 16  BP: (102-104)/(69-71) 104/71  SpO2:  [96 %-98 %] 98 %    Physical Exam:  General: awake, alert, lying in bed, in no acute distress  Head: normocephalic, atraumatic  Respiratory: non-labored breathing  Abdomen: soft, appropriately tender, non-distended  Stoma: Pink, viable, soft brown stool in appliance  Skin: No rashes or lesions  Musculoskeletal: moves all four extremities equally  Psychological: alert and oriented, answers questions appropriately    Pertinent Labs  Lab Results: personally reviewed.  Lab Results   Component Value Date     10/22/2024     10/21/2024     10/20/2024    .0 04/24/2017    CO2 23 10/22/2024    CO2 25 10/21/2024    CO2 26 10/20/2024    CO2 25 06/23/2022    CO2 23 06/03/2022    CO2 22 06/02/2022    CO2 26.0 04/24/2017    BUN 7.2 10/22/2024    BUN 10.2 10/21/2024    BUN 15.2 10/20/2024    BUN 6 06/23/2022    BUN 3 06/03/2022    BUN 4 06/02/2022    BUN 7.0 04/24/2017     Lab Results   Component Value Date    WBC 9.4 10/22/2024    WBC 9.8 10/21/2024    WBC 12.7 10/20/2024    HGB 8.0 10/22/2024    HGB 8.5 10/21/2024    HGB 9.0 10/20/2024    HGB 10.8 01/10/2018    HGB 10.3 11/08/2017    HGB 9.8 09/18/2017    HCT 25.4 10/22/2024    HCT 27.6 10/21/2024    HCT 28.4 10/20/2024    HCT 35.9 01/10/2018    HCT 33.8 11/08/2017    HCT 31.4 07/31/2017    MCV 92 10/22/2024    MCV 92 10/21/2024    MCV 90 10/20/2024    MCV 83.1 01/10/2018    MCV 77.9 11/08/2017    MCV 86.3 07/31/2017     10/22/2024     10/21/2024      10/20/2024       Assessment/Plan: This is a 44 year old female with a history of Crohn's disease that is presenting with abdominal pain secondary to intra-abdominal abscesses and inflammation of her colon likely secondary to her fistulizing Crohn's disease     WBC 9.4 (9.8)  Hgb 8.0 (8.5)  Cr 0.70 (0.87)    - Continue clears  - Recommend TPN given very poor nutrition status  - IV abx   - Would avoid surgical intervention given her poor nutrition status   - Serial abdominal exams   - OOB/Ambulate     Discussed with Dr. Virgilio Sandra PA-C  [facility]  [phone]..............................main    COLORECTAL STAFF ADDENDUM  Patient seen and examined by me. 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 She was last admitted to the hospital in may with a similar presentation.   She had an ex lap and resection of her descending colon in 2020 for a colonoic stricture by general surgery and then was initially managed with Humira and azathioprine for her Crohn's. She was admitted in May with abdominal pain and fever and CT showed thickening of the transverse colon with a 10cm intramural abscess and ileitis. She was sick enough at that time to need pressors briefly. She responded to IV abx and TPN and was eventually discharged home, with the plan to likely switch to remicade. She had seen my partner Dr. Lafleur at that time and was supposed to follow up with him but did not. They have still been awaiting insurance approval for her remicade, her last dose of Humira was on 9/27.  When she was readmitted this time CT showed thickening of her transverse colon with an abscess 4x2cm near the colostomy  and 5.6x2.6 near the proximal transverse colon.    She has not had good PO intake while at home and on admission her nutrition labs were notable for a prealbumin of 5.7, transferrin of 113. She was started on IV abx.  Since admission her pain has improved and is now fairly minimal. Tolerating clears but not taking in much. Colostomy is producing gas and stool. Today's visit was with a [employer] .  Her last colonoscopy was in Sept 2023 with inflammation and ulceration of the ileum and ascending, transverse and distal descending colon consistent with her Crohn's.     AVSS  Abd soft, non distended, mildly tender, Left mid abdomen colostomy with gas and stool  Midline scar  Flat affect  Unlabored respirations     I personally reviewed her labs and CT scan as noted above    A/P 44 year old female with complex medical and surgical hx of crohn's of her ileum and colon with intramural abscesses, severe protein calorie malnutrition    At this point given her severe malnutrition I would try and avoid an operation unless it were an absolute emergency, she would be extremely high risk of a severe complication in this setting. In addition, it is unclear now and on her last admission how much of her small bowel is involved. Surgery would entail a completion colectomy with end ileostomy, and it would be unclear how much healthy small bowel is remaining.   She is severely malnourished and I strongly recommend starting picc and tpn. OK for clears with supplements for the time being. As long as her pain continues to improve, we can trial solid food again in the near future but will need to see how much she is actually able to take in.   I think would still be best at this time to try and maximize her medical therapy, and try and get her to a point to switch her to the remicade to see if we can get an improved response. She may ultimately still end up needed a completion colectomy, but the goal would be to hopefully minimize  the amount of small intestine involved as well. I am unsure if she has had a CTE or MRE at any point to get a better idea of this but if not then may be helpful to get in the near future.   In the meantime, continue IV abx, ok for clears with supplements, TPN, and lovenox for dvt prophylaxis.  CRS will continue to closely follow, please alert us with any changes or questions.    Patient specific risk factors: severe malnutrition    Total time spent on chart review, face to face time and coordination of care: 35 minutes      Alexa Poole MD  [facility]  Office: [phone]  10/22/2024 3:14 PM

## 2024-10-22 NOTE — PROCEDURES
"PICC Line Insertion Procedure Note  Pt. Name: Dain Tejeda  MRN:        [id number]    Procedure: Insertion of a  DUAL Lumen  5 fr  Bard SOLO (valved) Power PICC, Lot number [id number]    Indications: TPN    Contraindications : NONE NOTED    Procedure Details     Patient identified with 2 identifiers and \"Time Out\" conducted.  .     Central line insertion bundle followed: hand hygiene performed prior to procedure, site cleansed with cholraprep, hat, mask, sterile gloves, sterile gown worn, patient draped with maximum barrier head to toe drape, sterile field maintained.    The vein was assessed and found to be compressible and of adequate size.     Lidocaine 1% 3 ml administered sq to the insertion site. A 5 Fr PICC was inserted into the BASILIC vein of the right arm with ultrasound guidance. 1 attempt(s) required to access vein.   Catheter threaded without difficulty. Good blood return noted.    Modified Seldinger Technique used for insertion.    The 8 sharps that are included in the PICC insertion kit were accounted for and disposed of in the sharps container prior to breakdown of the sterile field.    Catheter secured with Statlock, biopatch and Tegaderm dressing applied.    Findings:    Total catheter length  37 cm, with 1 cm exposed. Mid upper arm circumference is 22.5 cm. Catheter was flushed with 20 cc NS. Patient  tolerated procedure well.    Tip placement verified by [employer] . Tip placement in the SVC.      CLABSI prevention brochure left at bedside.    Patient's primary RN notified PICC is ready for use.      Comments:        Anna Oro RN PICC  [facility]      "

## 2024-10-22 NOTE — PROGRESS NOTES
[facility] - GASTROENTEROLOGY PROGRESS NOTE     SUBJECTIVE:  ate a little chicken today, still having pain but feeling a little better.  Discussed with a phone .  Patient not sure she wants the PICC line and TPN.        OBJECTIVE:  /71 (BP Location: Left arm, Patient Position: Semi-Rudd's, Cuff Size: Adult Small)   Pulse 71   Temp 97.8  F (36.6  C) (Oral)   Resp 16   Wt 40.8 kg (89 lb 14.4 oz)   SpO2 98%   BMI 16.44 kg/m    Temp (24hrs), Av.8  F (37.1  C), Min:97.8  F (36.6  C), Max:99.6  F (37.6  C)    Patient Vitals for the past 72 hrs:   Weight   10/20/24 0941 40.8 kg (89 lb 14.4 oz)   10/20/24 0347 41.6 kg (91 lb 11.4 oz)   10/19/24 2208 45.8 kg (101 lb)        PHYSICAL EXAM  GEN: Alert, no distress      Additional Data:  I have reviewed the patient's new clinical lab results:   Recent Labs   Lab Test 10/22/24  0505 10/21/24  0551 10/20/24  0555 24  0552 24  0610 24  0608 24  0614 24  0429   WBC 9.4 9.8 12.7*   < > 6.0   < > 3.7* 4.9   HGB 8.0* 8.5* 9.0*   < > 9.7*   < > 7.8* 8.2*   MCV 92 92 90   < > 97   < > 93 91    322 345   < > 313   < > 267 302   INR  --   --   --   --  0.98  --  1.08 1.12    < > = values in this interval not displayed.     Recent Labs   Lab Test 10/22/24  1231 10/22/24  0505 10/21/24  0551 10/20/24  1237 10/20/24  0555   NA  --  136 138  --  135   POTASSIUM 3.3* 3.0* 3.6   < > 3.1*   CHLORIDE  --  100 106  --  101   CO2  --  23 25  --  26   BUN  --  7.2 10.2  --  15.2   CR  --  0.70 0.87  --  1.02*   ANIONGAP  --  13 7  --  8   ALEX  --  7.7* 7.7*  --  7.8*   GLC  --  93 111*  --  122*    < > = values in this interval not displayed.     Recent Labs   Lab Test 10/20/24  0555 10/19/24  2257 24  1742 24  1741 24  0610 05/10/24  0740 24  1148 24  0808 24  2103 24  0126   ALBUMIN 2.6* 3.1*  --  3.7 3.5   < >  --  2.7*   < >  --    BILITOTAL  --  0.6  --  0.4 0.3   < >  --  0.6   < >  --    ALT   --  24  --  11 26   < >  --  31   < >  --    AST  --  47*  --  15 17   < >  --  34   < >  --    PROTEIN  --   --  50*  --   --   --  70*  --   --  Negative   LIPASE  --  42  --  33  --   --   --  29  --   --     < > = values in this interval not displayed.        IMPRESSION/Plan:  Crohn's disease with colostomy now with pericolonic abscesses, being medically managed with antibiotics.  Humira and azathioprine on hold  On antimicrobials  Has very poor nutrition.  I told her repeatedly today that in order to heal if she does need better nutrition and that she can continue to eat orally but that we should give her TPN for short while to boost her nutritional status and healing ability.  She eventually consented to PICC line and TPN.    20 minutes of total time was spent providing patient care, including patient evaluation, reviewing documentation/test results, coordination of care with other providers, and .    Keven Mendez  Cell [phone]  After 5 PM, please call [phone]

## 2024-10-22 NOTE — CONSULTS
CLINICAL NUTRITION NOTE  CONSULT - Pharmacy/Nutrition to start & manage TPN  per colorectal surgery d/t poor nutritional status. Recommending avoiding any surgery d/t poor nutritional status    Diet: Clear liquids  Supplement: Ensure clear daily     Intake: 25% of meals  Receiving NS IVF at 75 ml/hr    P.o intake has been <50% x 10 days    GI - no pain or nausea today  Labs - K+ 3.3 (L), improved from 3.0 post replacement. Phos, mag WNL    Dosing Weight: 40.8 kg     ASSESSED NUTRITION NEEDS  Estimated Energy Needs: 0021-2047 kcals/day (30 - 35 kcals/kg )  Justification: Repletion and Underweight  Estimated Protein Needs: 49-61 grams protein/day (1.2 - 1.5 grams of pro/kg)  Justification: Increased needs  Estimated Fluid Needs: 1327-5088 mL/day (1 mL/kcal)   Justification: Maintenance    Intervention:  Pt is at risk for refeeding  -Check K+, mag, phos next 2 days while TPN titrates.   -Recommend replace K+, mag, phos if low    -Initiate Clinimix 5/20 (out of 5/15 and 8/14 formulations) at 20 ml/hr (50% of goal) with 20 g omegaven daily (2 x 100 ml bottle) (allergic to soy)+ mvi with minerals  Provides: 960 ml, 96 g cho, 24 g protein, 646 kcal, 50g fat/day to meet 50% of estimated kcal, 45% of estimated protein needs    -Will titrate TPN over next 2 days pending s/s of refeeding  Goal TPN will be clinimix 5/20 at 40 ml/hr with 20g omegaven daily (2 x 100 ml bottle)+ mvi with minerals  Provides = 960 ml, 1068 kcal, 192 g cho, 48 g protein to meet 85% of estimated kcal, 100% of estimated protein needs    -Recommend wean IVF as TPN titrates.   -Add thiamine 100 mg daily x 10 days po d/t not meeting RDIs x 10 days, risk for refeeding

## 2024-10-22 NOTE — PROGRESS NOTES
[facility]    Progress Note - Hospitalist Service       Date of Admission:  10/19/2024    Assessment & Plan   Dain Tejeda is a 44 year old female admitted on 10/19/2024. She has a history of Crohn's disease status post partial colectomy with colostomy creation, celiac disease, normocytic anemia, sinus bradycardia, disorganized behaviors who is admitted for abdominal pain and intraabdominal abscess formation as sequela of Crohn's.  Ongoing poor nutritional status and limited enteric feeding TPN started 10/22.     Sepsis, resolved  Pericolonic abscess   History of Crohn's disease, celiac disease   Status post partial colectomy with current colostomy  Malnutrition  Patient with known history of Crohn's disease since 2022, complicated by stricture and status post partial colectomy with colostomy creation. Hypotensive and tachycardic on admission with a temperature of 99.65 concerning for sepsis. Labs on admission remarkable to WBC of 13, mag 1.5, and creatinine 1.00. Lactic of 1.2 on admission. CT imaging in the ED showing mural thickening of the transverse colon from the hepatic flexure to the colostomy site worsened from prior exam as well as two pericolonic abscesses which  may be connected. Air and fluid seen in the proximal and distal transverse colon concerning for possible microperforation. Colorectal surgery consulted, recommended non-operative management with IV antibiotics. Recommended against IR drainage at this time.  Followed by colorectal and GI has ongoing poor nutritional status limited enteral intake given fistulizing Crohn disease recs to start TPN.  - Colorectal surgery consult, appreciate recommendations              - Non-operative treatment at this time. No IR drainage recommended  - GI consult placed, appreciate recs               -Continue clear diet               -Continue IV Zosyn               -CT in the next few days, to monitor abscess               -Serial abdominal  exams  - Continue Zosyn   - Pain management with PO oxycodone and IV Dilaudid   - PRN zofran and compazine for nausea   - Hold PTA azathioprine and Humira until GI can see the patient  - Protonix 40 mg daily   -Nutritionist consult appreciate recs  -Start TPN today  -Monitor electrolytes closely, replace per protocol        LELIA, improving  Creatinine elevated at 1.0 on admission up from baseline around 0.6.  Likely in the setting of nausea and vomiting and thus likely prerenal.  Status post 1 L bolus NS in the ED, continues on maintenance IV fluids due to appropriate intake.  Creatinine has been stable.  - Maintenance fluids with NS 75 mL/h     Chronic normocytic anemia  Hemoglobin on admission 11.1 actually up from baseline between 8 and 10.  No signs or symptoms of acute blood loss anemia, denies dark stools. No dark stool or phoenix blood in ostomy bag on exam. Will monitor.  -Daily CBC     History of disorganized behaviors  Noted on previous hospital stays.  Has previously responded well to Seroquel 12.5 mg while admitted.  Stable on admission, recommend monitoring closely. May consider adding Seroquel if needed, hold off for now.      History pelvic cyst  Known history.  CT abdomen pelvis on admission redemonstrating ovarian cyst, confirmed with pelvic ultrasound.  Recommend follow-up pelvic ultrasound in 6 to 12 months.  No gynecologic symptoms reported, will continue to monitor.     Hypokalemia   Malnutrition   potassium 3.3, likely in the setting of Crohn's and poor nutritional status. Will start potassium replacement protocol and monitor closely.  -Potassium replacement protocol        Diet: Clear Liquid Diet  Snacks/Supplements Adult: Ensure Clear; With Meals    DVT Prophylaxis: Pneumatic Compression Devices  Chambers Catheter: Not present  Fluids: mIVF 75 ml/hr   Lines: None     Cardiac Monitoring: None  Code Status: Full Code      Clinically Significant Risk Factors        # Hypokalemia: Lowest K = 3 mmol/L  in last 2 days, will replace as needed      # Hypomagnesemia: Lowest Mg = 1.5 mg/dL in last 2 days, will replace as needed   # Hypoalbuminemia: Lowest albumin = 2.6 g/dL at 10/20/2024  5:55 AM, will monitor as appropriate                   # Financial/Environmental Concerns:           Disposition Plan      Expected Discharge Date: 10/27/2024    Discharge Delays: IV Medication - consider oral or Home Infusion            The patient's care was discussed with the Attending Physician, Dr. Tamayo .    Charlotte Bello MD  Hospitalist Service  [facility]  Securely message with [employer] (more info)  Text page via [facility] Paging/Directory   ______________________________________________________________________    Interval History   No acute events overnight.  States abdominal pain is somewhat improved from yesterday.  Has been trying to take clear liquids but has been difficult with her poor appetite.  Otherwise denies nausea vomiting shortness of breath or chest pain.      Physical Exam   Vital Signs: Temp: 97.8  F (36.6  C) Temp src: Oral BP: 104/71 Pulse: 71   Resp: 16 SpO2: 98 % O2 Device: None (Room air)    Weight: 89 lbs 14.4 oz    General Appearance: Alert and oriented, NAD  Respiratory: Normal breath sounds bilaterally no wheezing   cardiovascular: Normal S1, S2, no murmurs   GI: Soft, nontender no guarding colostomy with brown stool.    Skin: Normal appearance and turgor no visible rash  MSK: No lower limb bilaterally    Medical Decision Making             Data     I have personally reviewed the following data over the past 24 hrs:    9.4  \   8.0 (L)   / 336     136 100 7.2 /  93   3.3 (L) 23 0.70 \       Imaging results reviewed over the past 24 hrs:   No results found for this or any previous visit (from the past 24 hour(s)).

## 2024-10-22 NOTE — PLAN OF CARE
Goal Outcome Evaluation:  Patient A&Ox4, flat affect. Up out of bed for bathroom, but not interested in further activity. Sleeping intermittently throughout day. Eating 25-75% of meals. Denies pain. Colostomy intact, outputting brown liquid stool. PICC placed today. IVF running.       Problem: Adult Inpatient Plan of Care  Goal: Absence of Hospital-Acquired Illness or Injury  Intervention: Prevent Skin Injury  Recent Flowsheet Documentation  Taken 10/22/2024 1600 by Earnest Moulton, RN  Device Skin Pressure Protection: tubing/devices free from skin contact  Taken 10/22/2024 1530 by Earnest Moulton, RN  Body Position: position changed independently  Taken 10/22/2024 1300 by Earnest Moulton, RN  Body Position: position changed independently  Taken 10/22/2024 0830 by Earnest Moulton, RN  Body Position: position changed independently  Taken 10/22/2024 0800 by Earnest Moulton, RN  Device Skin Pressure Protection: tubing/devices free from skin contact     Problem: Adult Inpatient Plan of Care  Goal: Optimal Comfort and Wellbeing  Outcome: Progressing  Intervention: Monitor Pain and Promote Comfort  Recent Flowsheet Documentation  Taken 10/22/2024 1530 by Earnest Moulton, RN  Pain Management Interventions:   repositioned   rest  Taken 10/22/2024 0830 by Earnest Moulton, RN  Pain Management Interventions:   repositioned   rest

## 2024-10-22 NOTE — PHARMACY-CONSULT NOTE
"Pharmacy Note: Parenteral Nutrition (PN) Management    Pharmacist consulted to dose PN for Dain Tejeda, a 44 year old female by Colorectal surgery service.    Subjective:    The patient is a new PN start.    The patient was started on PN in the hospital on 10/22/24.    Indication for PN therapy:  poor nutritional intake with fistulizing chron's disease    Inadequate nutrition anticipated for > 7 days.     Enteral nutrition contraindicated due to: failed enteral feeding trial.    Pertinent diseases and other special considerations  soy allergy    Social History     Tobacco Use    Smoking status: Never    Smokeless tobacco: Never   Substance Use Topics    Alcohol use: No    Drug use: No     Objective:    Ht Readings from Last 1 Encounters:   09/11/24 1.575 m (5' 2\")     Wt Readings from Last 1 Encounters:   10/20/24 40.8 kg (89 lb 14.4 oz)       Body mass index is 16.44 kg/m .    Patient Vitals for the past 96 hrs:   Weight   10/20/24 0941 40.8 kg (89 lb 14.4 oz)   10/20/24 0347 41.6 kg (91 lb 11.4 oz)   10/19/24 2208 45.8 kg (101 lb)       Labs:  Last 3 days:  Recent Labs     10/19/24  2257 10/20/24  0555 10/20/24  1237 10/20/24  1822 10/21/24  0551 10/22/24  0003 10/22/24  0505 10/22/24  1231    135  --   --  138  --  136  --    POTASSIUM 3.4 3.1* 3.4 3.4 3.6  --  3.0* 3.3*   CHLORIDE 96* 101  --   --  106  --  100  --    CO2 27 26  --   --  25  --  23  --    BUN 18.5 15.2  --   --  10.2  --  7.2  --    CR 1.00* 1.02*  --   --  0.87  --  0.70  --    ALEX 9.1 7.8*  --   --  7.7*  --  7.7*  --    MAG 1.5* 1.5* 2.8*  --  1.5* 1.9  --   --    PHOS  --  3.7  --   --  2.6  --  3.2  --    PROTTOTAL 8.6*  --   --   --   --   --   --   --    ALBUMIN 3.1* 2.6*  --   --   --   --   --   --    PREALB  --  5.7*  --   --   --   --   --   --    HGB 11.1* 9.0*  --   --  8.5*  --  8.0*  --    HCT 34.5* 28.4*  --   --  27.6*  --  25.4*  --    * 345  --   --  322  --  336  --    BILITOTAL 0.6  --   --   --   --   --   --   --  "   AST 47*  --   --   --   --   --   --   --    ALT 24  --   --   --   --   --   --   --    ALKPHOS 193*  --   --   --   --   --   --   --        Glucose (past 48 hours):   Recent Labs     10/21/24  0551 10/22/24  0505   * 93       Intake/Output (last 24 hours): No intake/output data recorded.    Estimated CrCl: Estimated Creatinine Clearance: 67.4 mL/min (based on SCr of 0.7 mg/dL).    Assessment:    Initiate patient on PN therapy as a continuous central therapy.     Given the patient's current condition/oral intake, PN is still indicated.    Lab results reviewed: 10/22/24, labs good. K is low, plan clinimix for now.     Plan:  Rate of PN: 20 mL/hr  Formula:   Clinimix 5/20, could do custom TPN tomorrow  Fat Emulsion: Due to patients soy allergy we will need to use fish oil triglycerides (omegaven), 20 g (200 mL) daily  Check TPN panel labs tomorrow.  Pharmacist will continue to follow the patient's lab results, clinical status and blood glucose results and make adjustments as appropriate.    Thank you for the consult.  JACOB ROSEN [facility]  10/22/2024 2:44 PM

## 2024-10-22 NOTE — PLAN OF CARE
Goal Outcome Evaluation:    Cared for pt from 1900 to 2300.    Pt Hmong speaking, denies pain at first then 4/10 abdominal pain later in the shift. Pt requested waiting to take pain medication for NOC shift. NS running at 75mL/hr continuous. IV Magnesium replaced and recheck around 0000. Colostomy in place, pt manages emptying this on her own. SBA to the BR.      Problem: Adult Inpatient Plan of Care  Goal: Plan of Care Review  Description: The Plan of Care Review/Shift note should be completed every shift.  The Outcome Evaluation is a brief statement about your assessment that the patient is improving, declining, or no change.  This information will be displayed automatically on your shift  note.  Outcome: Progressing  Flowsheets (Taken 10/22/2024 0008)  Plan of Care Reviewed With: patient  Overall Patient Progress: improving     Problem: Adult Inpatient Plan of Care  Goal: Optimal Comfort and Wellbeing  Outcome: Progressing     Problem: Adult Inpatient Plan of Care  Goal: Readiness for Transition of Care  Outcome: Progressing     Problem: Electrolyte Imbalance  Goal: Electrolyte Balance  Intervention: Monitor and Manage Electrolyte Imbalance  Recent Flowsheet Documentation  Taken 10/21/2024 2014 by Ene Fulton, RN  Fluid/Electrolyte Management: fluids provided         Plan of Care Reviewed With: patient    Overall Patient Progress: improvingOverall Patient Progress: improving

## 2024-10-22 NOTE — PLAN OF CARE
"Pt is A&Ox4, calm, cooperative, speaks Hmong  - vitals stable  - NS @75 ml/hr  - IV antibiotics overnight  - colostomy: unremarkable. Pt cares for and emptied her bag.   - SBA  - phosphorous, mag, and potassium recheck this morning  - pain 5/10 to LUQ, tylenol 650 given       Problem: Adult Inpatient Plan of Care  Goal: Plan of Care Review  Description: The Plan of Care Review/Shift note should be completed every shift.  The Outcome Evaluation is a brief statement about your assessment that the patient is improving, declining, or no change.  This information will be displayed automatically on your shift  note.  Outcome: Progressing  Flowsheets (Taken 10/22/2024 0647)  Plan of Care Reviewed With: patient  Overall Patient Progress: improving  Goal: Patient-Specific Goal (Individualized)  Description: You can add care plan individualizations to a care plan. Examples of Individualization might be:  \"Parent requests to be called daily at 9am for status\", \"I have a hard time hearing out of my right ear\", or \"Do not touch me to wake me up as it startles  me\".  Outcome: Progressing  Goal: Absence of Hospital-Acquired Illness or Injury  Outcome: Progressing  Intervention: Identify and Manage Fall Risk  Recent Flowsheet Documentation  Taken 10/22/2024 0120 by Criselda Bentley RN  Safety Promotion/Fall Prevention: activity supervised  Intervention: Prevent Skin Injury  Recent Flowsheet Documentation  Taken 10/22/2024 0454 by Criselda Bentley RN  Body Position: position changed independently  Taken 10/22/2024 0120 by Criselda Bentley RN  Body Position: position changed independently  Intervention: Prevent Infection  Recent Flowsheet Documentation  Taken 10/22/2024 0120 by Criselda Bentley RN  Infection Prevention:   rest/sleep promoted   single patient room provided   hand hygiene promoted  Goal: Optimal Comfort and Wellbeing  Outcome: Progressing  Intervention: Monitor Pain and Promote Comfort  Recent Flowsheet " Documentation  Taken 10/22/2024 0454 by Criselda Bentley RN  Pain Management Interventions: medication (see MAR)  Goal: Readiness for Transition of Care  Outcome: Progressing     Problem: Comorbidity Management  Goal: Blood Pressure in Desired Range  Outcome: Progressing  Intervention: Maintain Blood Pressure Management  Recent Flowsheet Documentation  Taken 10/22/2024 0120 by Criselda Bentley RN  Medication Review/Management: medications reviewed  Goal: Maintenance of Behavioral Health Symptom Control  Outcome: Progressing  Intervention: Maintain Behavioral Health Symptom Control  Recent Flowsheet Documentation  Taken 10/22/2024 0120 by Criselda Bentley RN  Medication Review/Management: medications reviewed     Problem: Infection  Goal: Absence of Infection Signs and Symptoms  Outcome: Progressing     Problem: Skin Injury Risk Increased  Goal: Skin Health and Integrity  Outcome: Progressing  Intervention: Plan: Nurse Driven Intervention: Moisture Management  Recent Flowsheet Documentation  Taken 10/22/2024 0120 by Criselda Bentley RN  Moisture Interventions: Encourage regular toileting  Intervention: Optimize Skin Protection  Recent Flowsheet Documentation  Taken 10/22/2024 0454 by Criselda Bentley RN  Head of Bed (HOB) Positioning: HOB at 20-30 degrees  Taken 10/22/2024 0120 by Criselda Bentley RN  Activity Management: activity adjusted per tolerance  Head of Bed (HOB) Positioning: HOB at 20-30 degrees     Problem: Electrolyte Imbalance  Goal: Electrolyte Balance  Outcome: Progressing  Intervention: Monitor and Manage Electrolyte Imbalance  Recent Flowsheet Documentation  Taken 10/22/2024 0120 by Criselda Bentley RN  Fluid/Electrolyte Management: fluids provided     Problem: Colostomy  Goal: Absence of Bleeding  Outcome: Progressing  Goal: Nausea and Vomiting Relief  Outcome: Progressing  Goal: Optimal Stoma Healing  Outcome: Progressing     Problem: Anemia  Goal: Anemia Symptom Improvement  Outcome:  Progressing  Intervention: Monitor and Manage Anemia  Recent Flowsheet Documentation  Taken 10/22/2024 0120 by Criselda Bentley RN  Safety Promotion/Fall Prevention: activity supervised     Problem: Pain Acute  Goal: Optimal Pain Control and Function  Outcome: Not Progressing  Intervention: Develop Pain Management Plan  Recent Flowsheet Documentation  Taken 10/22/2024 0454 by Criselda Bentley RN  Pain Management Interventions: medication (see MAR)  Intervention: Prevent or Manage Pain  Recent Flowsheet Documentation  Taken 10/22/2024 0120 by Criselda Bentley RN  Sleep/Rest Enhancement:   awakenings minimized   relaxation techniques promoted   regular sleep/rest pattern promoted  Medication Review/Management: medications reviewed  Intervention: Optimize Psychosocial Wellbeing  Recent Flowsheet Documentation  Taken 10/22/2024 0120 by Criselda Bentley RN  Supportive Measures:   active listening utilized   verbalization of feelings encouraged   Goal Outcome Evaluation:      Plan of Care Reviewed With: patient    Overall Patient Progress: improvingOverall Patient Progress: improving

## 2024-10-23 ENCOUNTER — VIRTUAL VISIT (OUTPATIENT)
Dept: INTERPRETER SERVICES | Facility: CLINIC | Age: 44
End: 2024-10-23
Payer: COMMERCIAL

## 2024-10-23 LAB
ALBUMIN SERPL BCG-MCNC: 2.2 G/DL (ref 3.5–5.2)
ALP SERPL-CCNC: 117 U/L (ref 40–150)
ALT SERPL W P-5'-P-CCNC: 7 U/L (ref 0–50)
ANION GAP SERPL CALCULATED.3IONS-SCNC: 8 MMOL/L (ref 7–15)
AST SERPL W P-5'-P-CCNC: 12 U/L (ref 0–45)
BILIRUB DIRECT SERPL-MCNC: <0.2 MG/DL (ref 0–0.3)
BILIRUB SERPL-MCNC: 0.2 MG/DL
BUN SERPL-MCNC: 5.4 MG/DL (ref 6–20)
CALCIUM SERPL-MCNC: 7.6 MG/DL (ref 8.8–10.4)
CHLORIDE SERPL-SCNC: 104 MMOL/L (ref 98–107)
CREAT SERPL-MCNC: 0.67 MG/DL (ref 0.51–0.95)
EGFRCR SERPLBLD CKD-EPI 2021: >90 ML/MIN/1.73M2
ERYTHROCYTE [DISTWIDTH] IN BLOOD BY AUTOMATED COUNT: 13.2 % (ref 10–15)
GLUCOSE BLDC GLUCOMTR-MCNC: 115 MG/DL (ref 70–99)
GLUCOSE BLDC GLUCOMTR-MCNC: 121 MG/DL (ref 70–99)
GLUCOSE SERPL-MCNC: 117 MG/DL (ref 70–99)
GLUCOSE SERPL-MCNC: 131 MG/DL (ref 70–99)
GLUCOSE SERPL-MCNC: 131 MG/DL (ref 70–99)
HCO3 SERPL-SCNC: 26 MMOL/L (ref 22–29)
HCT VFR BLD AUTO: 26 % (ref 35–47)
HGB BLD-MCNC: 8.1 G/DL (ref 11.7–15.7)
INR PPP: 1.12 (ref 0.85–1.15)
MAGNESIUM SERPL-MCNC: 1.4 MG/DL (ref 1.7–2.3)
MAGNESIUM SERPL-MCNC: 2.1 MG/DL (ref 1.7–2.3)
MCH RBC QN AUTO: 28.2 PG (ref 26.5–33)
MCHC RBC AUTO-ENTMCNC: 31.2 G/DL (ref 31.5–36.5)
MCV RBC AUTO: 91 FL (ref 78–100)
PHOSPHATE SERPL-MCNC: 1.8 MG/DL (ref 2.5–4.5)
PHOSPHATE SERPL-MCNC: 2.5 MG/DL (ref 2.5–4.5)
PLATELET # BLD AUTO: 320 10E3/UL (ref 150–450)
POTASSIUM SERPL-SCNC: 3.4 MMOL/L (ref 3.4–5.3)
POTASSIUM SERPL-SCNC: 3.7 MMOL/L (ref 3.4–5.3)
POTASSIUM SERPL-SCNC: 3.7 MMOL/L (ref 3.4–5.3)
PREALB SERPL-MCNC: 5.7 MG/DL (ref 20–40)
PROT SERPL-MCNC: 6.4 G/DL (ref 6.4–8.3)
RBC # BLD AUTO: 2.87 10E6/UL (ref 3.8–5.2)
SODIUM SERPL-SCNC: 138 MMOL/L (ref 135–145)
WBC # BLD AUTO: 5.4 10E3/UL (ref 4–11)

## 2024-10-23 PROCEDURE — 250N000009 HC RX 250: Performed by: FAMILY MEDICINE

## 2024-10-23 PROCEDURE — 250N000013 HC RX MED GY IP 250 OP 250 PS 637: Performed by: FAMILY MEDICINE

## 2024-10-23 PROCEDURE — 250N000009 HC RX 250

## 2024-10-23 PROCEDURE — 85027 COMPLETE CBC AUTOMATED: CPT

## 2024-10-23 PROCEDURE — 250N000011 HC RX IP 250 OP 636: Performed by: FAMILY MEDICINE

## 2024-10-23 PROCEDURE — 84132 ASSAY OF SERUM POTASSIUM: CPT | Performed by: FAMILY MEDICINE

## 2024-10-23 PROCEDURE — B4185 PARENTERAL SOL 10 GM LIPIDS: HCPCS

## 2024-10-23 PROCEDURE — 82248 BILIRUBIN DIRECT: CPT

## 2024-10-23 PROCEDURE — 84134 ASSAY OF PREALBUMIN: CPT

## 2024-10-23 PROCEDURE — 99232 SBSQ HOSP IP/OBS MODERATE 35: CPT | Mod: GC | Performed by: FAMILY MEDICINE

## 2024-10-23 PROCEDURE — 80053 COMPREHEN METABOLIC PANEL: CPT | Performed by: FAMILY MEDICINE

## 2024-10-23 PROCEDURE — 82947 ASSAY GLUCOSE BLOOD QUANT: CPT

## 2024-10-23 PROCEDURE — 250N000013 HC RX MED GY IP 250 OP 250 PS 637

## 2024-10-23 PROCEDURE — 83735 ASSAY OF MAGNESIUM: CPT | Performed by: FAMILY MEDICINE

## 2024-10-23 PROCEDURE — 120N000001 HC R&B MED SURG/OB

## 2024-10-23 PROCEDURE — 250N000011 HC RX IP 250 OP 636

## 2024-10-23 PROCEDURE — T1013 SIGN LANG/ORAL INTERPRETER: HCPCS | Mod: GT,TEL,95 | Performed by: INTERPRETER

## 2024-10-23 PROCEDURE — 85610 PROTHROMBIN TIME: CPT

## 2024-10-23 PROCEDURE — 258N000003 HC RX IP 258 OP 636

## 2024-10-23 PROCEDURE — 84100 ASSAY OF PHOSPHORUS: CPT | Performed by: FAMILY MEDICINE

## 2024-10-23 RX ORDER — POTASSIUM CHLORIDE 1.5 G/1.58G
20 POWDER, FOR SOLUTION ORAL ONCE
Status: COMPLETED | OUTPATIENT
Start: 2024-10-23 | End: 2024-10-23

## 2024-10-23 RX ORDER — MAGNESIUM SULFATE HEPTAHYDRATE 40 MG/ML
2 INJECTION, SOLUTION INTRAVENOUS ONCE
Status: COMPLETED | OUTPATIENT
Start: 2024-10-23 | End: 2024-10-23

## 2024-10-23 RX ADMIN — PIPERACILLIN AND TAZOBACTAM 3.38 G: 3; .375 INJECTION, POWDER, FOR SOLUTION INTRAVENOUS at 00:26

## 2024-10-23 RX ADMIN — MAGNESIUM SULFATE HEPTAHYDRATE 2 G: 40 INJECTION, SOLUTION INTRAVENOUS at 08:18

## 2024-10-23 RX ADMIN — POTASSIUM & SODIUM PHOSPHATES POWDER PACK 280-160-250 MG 2 PACKET: 280-160-250 PACK at 16:07

## 2024-10-23 RX ADMIN — POTASSIUM & SODIUM PHOSPHATES POWDER PACK 280-160-250 MG 2 PACKET: 280-160-250 PACK at 08:18

## 2024-10-23 RX ADMIN — POTASSIUM & SODIUM PHOSPHATES POWDER PACK 280-160-250 MG 2 PACKET: 280-160-250 PACK at 11:52

## 2024-10-23 RX ADMIN — THIAMINE HCL TAB 100 MG 100 MG: 100 TAB at 08:16

## 2024-10-23 RX ADMIN — ENOXAPARIN SODIUM 30 MG: 30 INJECTION SUBCUTANEOUS at 16:06

## 2024-10-23 RX ADMIN — PIPERACILLIN AND TAZOBACTAM 3.38 G: 3; .375 INJECTION, POWDER, FOR SOLUTION INTRAVENOUS at 08:28

## 2024-10-23 RX ADMIN — FISH OIL 200 ML: 0.1 INJECTION, EMULSION INTRAVENOUS at 19:55

## 2024-10-23 RX ADMIN — SODIUM CHLORIDE: 9 INJECTION, SOLUTION INTRAVENOUS at 06:25

## 2024-10-23 RX ADMIN — PIPERACILLIN AND TAZOBACTAM 3.38 G: 3; .375 INJECTION, POWDER, FOR SOLUTION INTRAVENOUS at 16:01

## 2024-10-23 RX ADMIN — MAGNESIUM SULFATE HEPTAHYDRATE: 500 INJECTION, SOLUTION INTRAMUSCULAR; INTRAVENOUS at 19:52

## 2024-10-23 RX ADMIN — OXYCODONE HYDROCHLORIDE 5 MG: 5 TABLET ORAL at 21:02

## 2024-10-23 RX ADMIN — PANTOPRAZOLE SODIUM 40 MG: 40 INJECTION, POWDER, FOR SOLUTION INTRAVENOUS at 08:16

## 2024-10-23 RX ADMIN — OXYCODONE HYDROCHLORIDE 5 MG: 5 TABLET ORAL at 08:09

## 2024-10-23 RX ADMIN — POTASSIUM CHLORIDE 20 MEQ: 1.5 POWDER, FOR SOLUTION ORAL at 02:44

## 2024-10-23 RX ADMIN — SODIUM CHLORIDE: 9 INJECTION, SOLUTION INTRAVENOUS at 19:55

## 2024-10-23 ASSESSMENT — ACTIVITIES OF DAILY LIVING (ADL)
ADLS_ACUITY_SCORE: 0
ADLS_ACUITY_SCORE: 23
ADLS_ACUITY_SCORE: 0

## 2024-10-23 NOTE — PLAN OF CARE
Problem: Adult Inpatient Plan of Care  Goal: Plan of Care Review  Description: The Plan of Care Review/Shift note should be completed every shift.  The Outcome Evaluation is a brief statement about your assessment that the patient is improving, declining, or no change.  This information will be displayed automatically on your shift  note.  Outcome: Progressing  Goal: Absence of Hospital-Acquired Illness or Injury  Intervention: Identify and Manage Fall Risk  Recent Flowsheet Documentation  Taken 10/23/2024 0100 by Bridgett Barrow RN  Safety Promotion/Fall Prevention: activity supervised  Taken 10/22/2024 2000 by Bridgett Barrow RN  Safety Promotion/Fall Prevention: activity supervised  Intervention: Prevent Skin Injury  Recent Flowsheet Documentation  Taken 10/23/2024 0200 by Bridgett Barrow RN  Body Position: position changed independently  Taken 10/23/2024 0100 by Bridgett Barrow RN  Body Position: position changed independently  Taken 10/22/2024 2000 by Bridgett Barrow RN  Body Position: position changed independently  Intervention: Prevent and Manage VTE (Venous Thromboembolism) Risk  Recent Flowsheet Documentation  Taken 10/23/2024 0100 by Bridgett Barrow RN  VTE Prevention/Management: SCDs off (sequential compression devices)  Taken 10/22/2024 2000 by Bridgett Barrow RN  VTE Prevention/Management: SCDs off (sequential compression devices)  Intervention: Prevent Infection  Recent Flowsheet Documentation  Taken 10/23/2024 0100 by Bridgett Barrow RN  Infection Prevention:   rest/sleep promoted   single patient room provided   hand hygiene promoted  Taken 10/22/2024 2000 by Bridgett Barrow RN  Infection Prevention:   rest/sleep promoted   single patient room provided   hand hygiene promoted  Goal: Optimal Comfort and Wellbeing  Intervention: Monitor Pain and Promote Comfort  Recent Flowsheet Documentation  Taken 10/23/2024 0027 by  Bridgett Barrow RN  Pain Management Interventions: medication offered but refused  Taken 10/22/2024 2057 by Bridgett Barrow RN  Pain Management Interventions: medication (see MAR)     Problem: Pain Acute  Goal: Optimal Pain Control and Function  Outcome: Progressing  Intervention: Develop Pain Management Plan  Recent Flowsheet Documentation  Taken 10/23/2024 0027 by Bridgett Barrow RN  Pain Management Interventions: medication offered but refused  Taken 10/22/2024 2057 by Bridgett Barrow RN  Pain Management Interventions: medication (see MAR)  Intervention: Prevent or Manage Pain  Recent Flowsheet Documentation  Taken 10/23/2024 0100 by Bridgett Barrow RN  Sleep/Rest Enhancement:   awakenings minimized   regular sleep/rest pattern promoted  Medication Review/Management: medications reviewed  Taken 10/22/2024 2000 by Bridgett Barrow RN  Sleep/Rest Enhancement:   awakenings minimized   regular sleep/rest pattern promoted  Medication Review/Management: medications reviewed  Intervention: Optimize Psychosocial Wellbeing  Recent Flowsheet Documentation  Taken 10/23/2024 0100 by Bridgett Barrow RN  Supportive Measures: relaxation techniques promoted  Taken 10/22/2024 2000 by Bridgett Barrow RN  Supportive Measures: relaxation techniques promoted     Problem: Comorbidity Management  Goal: Blood Pressure in Desired Range  Outcome: Progressing  Intervention: Maintain Blood Pressure Management  Recent Flowsheet Documentation  Taken 10/23/2024 0100 by Bridgett Barrow RN  Medication Review/Management: medications reviewed  Taken 10/22/2024 2000 by Bridgett Barrow RN  Medication Review/Management: medications reviewed     Problem: Comorbidity Management  Goal: Blood Glucose Levels Within Targeted Range  Outcome: Progressing  Intervention: Monitor and Manage Glycemia  Recent Flowsheet Documentation  Taken 10/23/2024 0100 by Bridgett Barrow  Francisco GARCIA RN  Medication Review/Management: medications reviewed  Taken 10/22/2024 2000 by Bridgett Barrow RN  Medication Review/Management: medications reviewed     Problem: Infection  Goal: Absence of Infection Signs and Symptoms  Outcome: Progressing     Problem: Electrolyte Imbalance  Goal: Electrolyte Balance  Outcome: Progressing     Problem: Colostomy  Goal: Nausea and Vomiting Relief  Outcome: Progressing   Goal Outcome Evaluation:       Pt alert & oriented. Pain managed by prn oxycodone 5mg. PICC double lumen intact, infusing well. IVF NS at 75ml/hr. TPN started as scheduled, tolerated well, infusing at 20ml/hr. IV abx given as scheduled. Fish oil triglycerides infusing well at 16.7ml/hr. No nausea and vomiting reported. Up to bedside commode. Colostomy bag emptied. K replaced, recheck at AM. On room air, Vital Signs WNL. Will continue to monitor.

## 2024-10-23 NOTE — PLAN OF CARE
"  Problem: Adult Inpatient Plan of Care  Goal: Plan of Care Review  Outcome: Progressing   Goal Outcome Evaluation:  Patient alert. Hmong speaking, used  line with assessment. Calm with flat affect. Abdominal discomfort is managed with prn oxycodone. Currently rating her pain 1-2/10 after she received oxycodone this morning. Clear liquids-100% this morning. TPN infusing. NS 75/hr infusing. Double lumen PICC intact. Patient stated \"she is hungry\" IV antibiotics scheduled. Colorectal following. Colostomy emptied x1 for soft brown/green stool.  Ambulated 200ft with SBA using a rolling walker. Gait slow and steady. Mag. 1.4, Phos. 1.8, K+ 3.7=protocols implemented. Will recheck labs this afternoon.   Sandie Reeves RN                    "

## 2024-10-23 NOTE — PROGRESS NOTES
Colon and Rectal Surgery  Daily Progress Note    Subjective  Patient was seen with the aid of a phone .   She is doing okay this morning about the same as yesterday. Denies pain. She is tolerating clears and denies nausea. She continues to pass gas and stool from her stoma. She has now started TPN. Has not walked much outside of her room. AVSS    Objective  Intake/Output last 24 hrs:  No intake or output data in the 24 hours ending 10/22/24 1028  Temp:  [97.9  F (36.6  C)-98.3  F (36.8  C)] 97.9  F (36.6  C)  Pulse:  [60-67] 60  Resp:  [16-18] 16  BP: (84-97)/(58-66) 94/61  SpO2:  [96 %-99 %] 99 %    Physical Exam:  General: awake, alert, lying in bed, in no acute distress  Head: normocephalic, atraumatic  Respiratory: non-labored breathing  Abdomen: soft, appropriately tender, non-distended  Stoma: Pink, viable, soft brown stool in appliance  Skin: No rashes or lesions  Musculoskeletal: moves all four extremities equally  Psychological: alert and oriented, flat, answers questions appropriately    Pertinent Labs  Lab Results: personally reviewed.  Lab Results   Component Value Date     10/22/2024     10/21/2024     10/20/2024    .0 04/24/2017    CO2 23 10/22/2024    CO2 25 10/21/2024    CO2 26 10/20/2024    CO2 25 06/23/2022    CO2 23 06/03/2022    CO2 22 06/02/2022    CO2 26.0 04/24/2017    BUN 7.2 10/22/2024    BUN 10.2 10/21/2024    BUN 15.2 10/20/2024    BUN 6 06/23/2022    BUN 3 06/03/2022    BUN 4 06/02/2022    BUN 7.0 04/24/2017     Lab Results   Component Value Date    WBC 9.4 10/22/2024    WBC 9.8 10/21/2024    WBC 12.7 10/20/2024    HGB 8.0 10/22/2024    HGB 8.5 10/21/2024    HGB 9.0 10/20/2024    HGB 10.8 01/10/2018    HGB 10.3 11/08/2017    HGB 9.8 09/18/2017    HCT 25.4 10/22/2024    HCT 27.6 10/21/2024    HCT 28.4 10/20/2024    HCT 35.9 01/10/2018    HCT 33.8 11/08/2017    HCT 31.4 07/31/2017    MCV 92 10/22/2024    MCV 92 10/21/2024    MCV 90 10/20/2024    MCV 83.1  01/10/2018    MCV 77.9 11/08/2017    MCV 86.3 07/31/2017     10/22/2024     10/21/2024     10/20/2024       Assessment/Plan: This is a 44 year old female with a history of Crohn's disease that is presenting with abdominal pain secondary to intra-abdominal abscesses and inflammation of her colon likely secondary to her fistulizing Crohn's disease     WBC 5.4 (9.4)   Hgb 8.1 (8.0)  Cr 0.67 (0.70)    - Continue clears, with supplements   - Continue TPN  - Lovenox dvt ppx  - IV abx   - Would avoid surgical intervention given her poor nutrition status   - Serial abdominal exams   - OOB/Ambulate     Discussed with Dr. Virgilio Sandra PA-C  [facility]  [phone]..............................main

## 2024-10-23 NOTE — PROGRESS NOTES
[facility]    Progress Note - Hospitalist Service       Date of Admission:  10/19/2024    Assessment & Plan   Dain Tejeda is a 44 year old female admitted on 10/19/2024. She has a history of Crohn's disease status post partial colectomy with colostomy creation, celiac disease, normocytic anemia, sinus bradycardia, disorganized behaviors who is admitted for abdominal pain and intraabdominal abscess formation as sequela of Crohn's.  Ongoing poor nutritional status and limited enteric feeding TPN started 10/22.     Sepsis, resolved  Pericolonic abscess   History of Crohn's disease, celiac disease   Status post partial colectomy with current colostomy  Malnutrition  Patient with known history of Crohn's disease since 2022, complicated by stricture and status post partial colectomy with colostomy creation. Hypotensive and tachycardic on admission with a temperature of 99.65 concerning for sepsis. Labs on admission remarkable to WBC of 13, mag 1.5, and creatinine 1.00. Lactic of 1.2 on admission. CT imaging in the ED showing mural thickening of the transverse colon from the hepatic flexure to the colostomy site worsened from prior exam as well as two pericolonic abscesses which  may be connected. Air and fluid seen in the proximal and distal transverse colon concerning for possible microperforation. Colorectal surgery consulted, recommended non-operative management with IV antibiotics. Recommended against IR drainage at this time.  Followed by colorectal and GI has ongoing poor nutritional status limited enteral intake given fistulizing Crohn disease recs to start TPN.  - Colorectal surgery consult, appreciate recommendations              - Non-operative treatment at this time. No IR drainage recommended  - GI consult placed, appreciate recs               -Continue clear diet               -Continue IV Zosyn               -CT in the next few days, to monitor abscess               -Serial abdominal  exams  - Continue Zosyn   - Pain management with PO oxycodone and IV Dilaudid   - PRN zofran and compazine for nausea   - Hold PTA azathioprine and Humira until GI can see the patient  - Protonix 40 mg daily   -Nutritionist consult appreciate recs  -Start TPN 10/22  - Monitor electrolytes closely, replace per protocol         LELIA, improving  Creatinine elevated at 1.0 on admission up from baseline around 0.6.  Likely in the setting of nausea and vomiting and thus likely prerenal.  Status post 1 L bolus NS in the ED, continues on maintenance IV fluids due to appropriate intake.  Creatinine has been stable.  - Maintenance fluids with NS 75 mL/h     Chronic normocytic anemia  Hemoglobin on admission 11.1 actually up from baseline between 8 and 10.  No signs or symptoms of acute blood loss anemia, denies dark stools. No dark stool or phoenix blood in ostomy bag on exam. Will monitor.  -Daily CBC     History of disorganized behaviors  Noted on previous hospital stays.  Has previously responded well to Seroquel 12.5 mg while admitted.  Stable on admission, recommend monitoring closely. May consider adding Seroquel if needed, hold off for now.      History pelvic cyst  Known history.  CT abdomen pelvis on admission redemonstrating ovarian cyst, confirmed with pelvic ultrasound.  Recommend follow-up pelvic ultrasound in 6 to 12 months.  No gynecologic symptoms reported, will continue to monitor.     Hypokalemia   Electrolytes disturbances  Malnutrition   potassium 3.3, likely in the setting of Crohn's and poor nutritional status. Will start potassium replacement protocol and monitor closely. Monitor for refeeding syndrome.  -Potassium replacement protocol        Diet: Clear Liquid Diet  Snacks/Supplements Adult: Ensure Clear; With Meals  parenteral nutrition - Clinimix E  parenteral nutrition - ADULT compounded formula    DVT Prophylaxis: Enoxaparin (Lovenox) SQ  Chambers Catheter: Not present  Fluids: mIVF 100/ml/h  Lines:  PRESENT      PICC 10/22/24 Double Lumen Right Basilic-Site Assessment: WDL      Cardiac Monitoring: None  Code Status: Full Code      Clinically Significant Risk Factors        # Hypokalemia: Lowest K = 3 mmol/L in last 2 days, will replace as needed      # Hypomagnesemia: Lowest Mg = 1.4 mg/dL in last 2 days, will replace as needed   # Hypoalbuminemia: Lowest albumin = 2.2 g/dL at 10/23/2024  6:19 AM, will monitor as appropriate                   # Financial/Environmental Concerns:           Disposition Plan      Expected Discharge Date: 10/27/2024    Discharge Delays: IV Medication - consider oral or Home Infusion            The patient's care was discussed with the Attending Physician, Dr. Tamayo .    Charlotte Bello MD  Hospitalist Service  [facility]  Securely message with [employer] info)  Text page via [employer] Paging/Directory   ______________________________________________________________________    Interval History   No acute events overnight. Reports no abdominal pain today. Poor po intake. Started on TPN yesterday. Otherwise denies chest pain or sob.     Physical Exam   Vital Signs: Temp: 97.9  F (36.6  C) Temp src: Oral BP: 94/61 Pulse: 60   Resp: 16 SpO2: 99 % O2 Device: None (Room air)    Weight: 89 lbs 14.4 oz    General Appearance: Alert, oriented, NAD  Respiratory: normal work of breathing, clear breath sound B/L. No wheezing  Cardiovascular: normal S1, S2, no murmur  GI: soft, non distended, colostomy with brown stool, no guarding  Skin: normal appearance and turgor, no visible rash  MSK No lower limb edema B/L.     Medical Decision Making             Data     I have personally reviewed the following data over the past 24 hrs:    5.4  \   8.1 (L)   / 320     138 104 5.4 (L) /  131 (H); 131 (H)   3.7; 3.7 26 0.67 \     ALT: 7 AST: 12 AP: 117 TBILI: 0.2   ALB: 2.2 (L) TOT PROTEIN: 6.4 LIPASE: N/A     INR:  1.12 PTT:  N/A   D-dimer:  N/A Fibrinogen:  N/A       Imaging results  reviewed over the past 24 hrs:   No results found for this or any previous visit (from the past 24 hours).

## 2024-10-23 NOTE — PROGRESS NOTES
[facility] - GASTROENTEROLOGY PROGRESS NOTE     SUBJECTIVE: Her pain is gone.  She is tolerating clear liquids.  Wants to eat more.  Also getting TPN.       OBJECTIVE:  BP 94/61 (BP Location: Left arm)   Pulse 60   Temp 97.9  F (36.6  C) (Oral)   Resp 16   Wt 40.8 kg (89 lb 14.4 oz)   SpO2 99%   BMI 16.44 kg/m    Temp (24hrs), Av.1  F (36.7  C), Min:97.9  F (36.6  C), Max:98.3  F (36.8  C)    No data found.     PHYSICAL EXAM  GEN: Alert, no distress  ABD: Soft, non-tender    Additional Data:  I have reviewed the patient's new clinical lab results:   Recent Labs   Lab Test 10/23/24  0619 10/22/24  0505 10/21/24  0551 24  0552 24  0610 24  0608 24  0614   WBC 5.4 9.4 9.8   < > 6.0   < > 3.7*   HGB 8.1* 8.0* 8.5*   < > 9.7*   < > 7.8*   MCV 91 92 92   < > 97   < > 93    336 322   < > 313   < > 267   INR 1.12  --   --   --  0.98  --  1.08    < > = values in this interval not displayed.     Recent Labs   Lab Test 10/23/24  1221 10/23/24  0619 10/23/24  0025 10/22/24  1834 10/22/24  1828 10/22/24  1231 10/22/24  0505 10/21/24  0551   NA  --  138  --   --   --   --  136 138   POTASSIUM  --  3.7  3.7 3.4  --  3.4   < > 3.0* 3.6   CHLORIDE  --  104  --   --   --   --  100 106   CO2  --  26  --   --   --   --  23 25   BUN  --  5.4*  --   --   --   --  7.2 10.2   CR  --  0.67  --   --   --   --  0.70 0.87   ANIONGAP  --  8  --   --   --   --  13 7   ALEX  --  7.6*  --   --   --   --  7.7* 7.7*   * 131*  131* 117*   < >  --   --  93 111*    < > = values in this interval not displayed.     Recent Labs   Lab Test 10/23/24  0619 10/20/24  0555 10/19/24  2257 24  1742 24  1741 05/10/24  0740 24  1148 24  0808 24  2103 24  0126   ALBUMIN 2.2* 2.6* 3.1*  --  3.7   < >  --  2.7*   < >  --    BILITOTAL 0.2  --  0.6  --  0.4   < >  --  0.6   < >  --    ALT 7  --  24  --  11   < >  --  31   < >  --    AST 12  --  47*  --  15   < >  --  34   < >  --    PROTEIN   --   --   --  50*  --   --  70*  --   --  Negative   LIPASE  --   --  42  --  33  --   --  29  --   --     < > = values in this interval not displayed.        IMPRESSION/Plan:  Crohn's with colostomy now with pericolonic abscesses, improved with limited enteral intake and IV antibiotics.  Her Humira and azathioprine are on hold.    She may be able to advance diet soon, will discuss with colorectal surgery.  She will be transitioning to Avsola infliximab infusions which has already been approved by her insurance as an outpatient.  Would also use azathioprine when we can restart meds.  Prealbumin is still 5 showing severe malnutrition.  Continue TPN.    Discussed with surgical team, will remain on clears for now.      20 minutes of total time was spent providing patient care, including patient evaluation, reviewing documentation/test results, coordination of care with other providers, and .    Keven Mendez  Cell [phone]  After 5 PM, please call [phone]

## 2024-10-23 NOTE — PROGRESS NOTES
Nutrition Therapy  Parenteral Nutrition follow-up       Recommendations for MD  -Recommend wean IVF-TPN rate will be at goal tonight     Dietitian to Pharmacy    Change to Custom TPN formula  Rate of TPN: 45 ml/hr - increase  Grams Dextrose: 96g- no change  Grams Protein:61 g- increase    Lipids: 20 g Omegaven daily - no change    -Change oral thiamine to thiamine in  mg x 9 more days     Future Recommendations/Monitoring  - Titrate TPN to goal pending tolerance, s/s of refeeding, p.o intake, weight  - Goal TPN to meet 100% of estimated needs = @ 45 ml/hr Dextrose 222g Aa61g with 20 g omegaven daily = 1080 ml, 61 g protein, 1225 kcal, 20 g fat daily, GIR 3.8 mg/kg/min  -  Check triglycerides in 7 days if still on TPN         Current Nutrition Intake:  Diet: Clear liquid  Supplement: Ensure clear tid  P.o intake: 25% of meals    Nutrition Support: PICC placed yesterday and TPN started    Clinimix 5/20 (out of 5/15 and 8/14 formulations) at 20 ml/hr (50% of goal) with 20 g omegaven daily (2 x 100 ml bottle) (allergic to soy)+ mvi with minerals    Provides: 960 ml, 96 g cho, 24 g protein, 646 kcal, 50g fat/day to meet 50% of estimated kcal, 45% of estimated protein needs    Receiving NS IVF at 75 ml/hr    Weight Trends  Admission wt: 45.8 kg (101 lb) Stated?  Weight Hx  Date/Time Weight Weight Method   10/20/24 0941 40.8 kg (89 lb 14.4 oz) Standing scale   10/20/24 0347 41.6 kg (91 lb 11.4 oz) --   10/19/24 2208 45.8 kg (101 lb) --     Dosing Weight: 40.8 kg     ASSESSED NUTRITION NEEDS  Estimated Energy Needs: 5957-0200 kcals/day (30 - 35 kcals/kg )  Justification: Repletion and Underweight  Estimated Protein Needs: 49-61 grams protein/day (1.2 - 1.5 grams of pro/kg)  Justification: Increased needs  Estimated Fluid Needs: 9217-3522 mL/day (1 mL/kcal)   Justification: Maintenance    GI:  Colostomy OP small loose  150 ml yesterday. 450 ml today so far    Labs:   Labs reviewed by dietitian  On mag, phos and K+  standard replacement protocols  Mag 1.4 (L), decreased  Phos 1.8 (L) decreased  K+ WNL  Glucose 131 (H), increased  Pt may be showing s/s of refeeding    Medications:   Reviewed by dietitian   Iv magso4 today, iv protonix, iv abx, neutraphos today, kcl today, thiamine 100 mg daily x 10 days    MALNUTRITION:  % Weight Loss:  > 5% in 1 month (severe malnutrition)  % Intake:  <50% > 5 days (severe malnutrition)  Subcutaneous Fat Loss:  none noted  Muscle Loss:  none noted  Fluid Retention:  None noted     Malnutrition Diagnosis: Severe in acute illness    Nutrition Diagnosis  Inadequate nutrition intake r/t altered GI function evidenced by unable to tolerate advanced diet, poor appetite, weight loss     Previous Goals  Maintain/gain weight  Patient to consume % of nutritionally adequate meals three times per day, or the equivalent with supplements/snacks.- not progressing    New Goals  -Maintain/gain weight  -Meet minimum 1225 kcal, 49 g protein per dosing weight 40.8 kg per day  -Advance diet   -Electrolytes WNL    Interventions  - Change TPN to custom formula and increase to better meet nutrition needs:  No change in CHO provisions d/t possible s/s of refeeding    At 45 ml/hr Dextrose 96g Aa61g with 20 g Omegaven (2 x 100 ml bottles) = 1080 ml, 61 g protein (1.5 g/kg), 96 g CHO, 794 kcal, 20 g fat daily to meet 65% of estimated kcal, 100% of estimated protein needs        Monitoring/Evaluation  Progress toward goals will be monitored and evaluated per protocol.

## 2024-10-23 NOTE — PROGRESS NOTES
Care Management Follow Up    Length of Stay (days): 3    Expected Discharge Date: 10/27/2024    Anticipated Discharge Plan:   pending progression of care    Transportation: Anticipate Family/friend  Barriers to Discharge: medical progression, IV, antibiotics, TPN, surgery following    Prior Living Situation: apartment with child(sisi), dependent, spouse    Discussed  Partnership in Safe Discharge Planning  document with patient/family: No     Handoff Completed: No, handoff not indicated or clinically appropriate    Patient/Spokesperson Updated: No    Additional Information:  Chart reviewed. Patient not medically ready for discharge.   GI and surgery following, currently on TPN.    Patient lives in an apartment with her spouse and 7 year old daughter. Patient is typically independent with ADLS/IADLS-except driving. Adult children are support as needed.  Spouse is primary family contact and family able to transport at discharge.    Next Steps: medical progression    Lashae Vargas, LGSW

## 2024-10-23 NOTE — PHARMACY-CONSULT NOTE
"Pharmacy Note: Parenteral Nutrition (PN) Management    Pharmacist consulted to dose PN for Dain Tejeda, a 44 year old female by Colorectal surgery service.    Subjective:    The patient is a new PN start.    The patient was started on PN in the hospital on 10/22/24.    Indication for PN therapy:   poor nutritional intake with fistulizing chron's disease, colostomy, pericolonic abscesses     Inadequate nutrition anticipated for > 7 days.     Enteral nutrition contraindicated due to: failed enteral feeding trial.    Pertinent diseases and other special considerations  soy allergy    Social History     Tobacco Use    Smoking status: Never    Smokeless tobacco: Never   Substance Use Topics    Alcohol use: No    Drug use: No     Objective:    Ht Readings from Last 1 Encounters:   09/11/24 1.575 m (5' 2\")     Wt Readings from Last 1 Encounters:   10/20/24 40.8 kg (89 lb 14.4 oz)       Body mass index is 16.44 kg/m .    Patient Vitals for the past 96 hrs:   Weight   10/20/24 0941 40.8 kg (89 lb 14.4 oz)   10/20/24 0347 41.6 kg (91 lb 11.4 oz)   10/19/24 2208 45.8 kg (101 lb)       Labs:  Last 3 days:  Recent Labs     10/20/24  1237 10/20/24  1822 10/21/24  0551 10/22/24  0003 10/22/24  0505 10/22/24  1231 10/22/24  1828 10/23/24  0025 10/23/24  0619   NA  --   --  138  --  136  --   --   --  138   POTASSIUM 3.4 3.4 3.6  --  3.0* 3.3* 3.4 3.4 3.7  3.7   CHLORIDE  --   --  106  --  100  --   --   --  104   CO2  --   --  25  --  23  --   --   --  26   BUN  --   --  10.2  --  7.2  --   --   --  5.4*   CR  --   --  0.87  --  0.70  --   --   --  0.67   ALEX  --   --  7.7*  --  7.7*  --   --   --  7.6*   MAG 2.8*  --  1.5* 1.9  --   --   --   --  1.4*   PHOS  --   --  2.6  --  3.2  --   --   --  1.8*   PROTTOTAL  --   --   --   --   --   --   --   --  6.4   ALBUMIN  --   --   --   --   --   --   --   --  2.2*   HGB  --   --  8.5*  --  8.0*  --   --   --  8.1*   HCT  --   --  27.6*  --  25.4*  --   --   --  26.0*   PLT  --   --  322  " --  336  --   --   --  320   BILITOTAL  --   --   --   --   --   --   --   --  0.2   AST  --   --   --   --   --   --   --   --  12   ALT  --   --   --   --   --   --   --   --  7   ALKPHOS  --   --   --   --   --   --   --   --  117   INR  --   --   --   --   --   --   --   --  1.12       Glucose (past 48 hours):   Recent Labs     10/22/24  0505 10/22/24  1834 10/23/24  0025 10/23/24  0619   GLC 93 156* 117* 131*  131*       Intake/Output (last 24 hours): I/O last 3 completed shifts:  In: 240 [P.O.:240]  Out: 850 [Urine:250; Stool:600]    Estimated CrCl: Estimated Creatinine Clearance: 70.5 mL/min (based on SCr of 0.67 mg/dL).    Assessment:    Continue patient on PN therapy as a continuous central therapy. PICC placed 10/22.    Given the patient's current condition/oral intake, PN is still indicated.    Lab results reviewed: 10/23  - Mag and Phos low - replacing outside of TPN (on K/Mg/Phos replacement protocols).   - Calcium corrects to 9.04 (albumin 2.2)  - CO2/Cl WNL     Plan:  Change to custom TPN today. Rate of PN: increase to 45 mL/hr .   Formula:   Amino Acids 61 grams  Dextrose 96 grams  Sodium 60 mEq/day  Potassium 50 mEq/day  Calcium 8 mEq/day  Magnesium 16 mEq/day  Phosphorus 30 mMol/day  Chloride: Acetate Ratio 1:1  Standard Multivitamins w/Vitamin K  Trace Elements  Add IV Thiamine to complete a total of 10 days per RD (PO started 10/22)  Fat Emulsion: 20 g Omegaven daily   Check BMP labs tomorrow.  Pharmacist will continue to follow the patient's lab results, clinical status and blood glucose results and make adjustments as appropriate.    Thank you for the consult.  Tashia Harman, PharmD, BCCP 10/23/2024 9:04 AM

## 2024-10-24 ENCOUNTER — VIRTUAL VISIT (OUTPATIENT)
Dept: INTERPRETER SERVICES | Facility: CLINIC | Age: 44
End: 2024-10-24
Payer: COMMERCIAL

## 2024-10-24 LAB
ANION GAP SERPL CALCULATED.3IONS-SCNC: 6 MMOL/L (ref 7–15)
BUN SERPL-MCNC: 3.4 MG/DL (ref 6–20)
CALCIUM SERPL-MCNC: 7.8 MG/DL (ref 8.8–10.4)
CHLORIDE SERPL-SCNC: 108 MMOL/L (ref 98–107)
CREAT SERPL-MCNC: 0.52 MG/DL (ref 0.51–0.95)
EGFRCR SERPLBLD CKD-EPI 2021: >90 ML/MIN/1.73M2
ERYTHROCYTE [DISTWIDTH] IN BLOOD BY AUTOMATED COUNT: 13.3 % (ref 10–15)
GLUCOSE BLDC GLUCOMTR-MCNC: 107 MG/DL (ref 70–99)
GLUCOSE BLDC GLUCOMTR-MCNC: 109 MG/DL (ref 70–99)
GLUCOSE BLDC GLUCOMTR-MCNC: 88 MG/DL (ref 70–99)
GLUCOSE BLDC GLUCOMTR-MCNC: 98 MG/DL (ref 70–99)
GLUCOSE SERPL-MCNC: 113 MG/DL (ref 70–99)
HCO3 SERPL-SCNC: 26 MMOL/L (ref 22–29)
HCT VFR BLD AUTO: 25.7 % (ref 35–47)
HGB BLD-MCNC: 7.7 G/DL (ref 11.7–15.7)
MAGNESIUM SERPL-MCNC: 1.7 MG/DL (ref 1.7–2.3)
MCH RBC QN AUTO: 28 PG (ref 26.5–33)
MCHC RBC AUTO-ENTMCNC: 30 G/DL (ref 31.5–36.5)
MCV RBC AUTO: 94 FL (ref 78–100)
PHOSPHATE SERPL-MCNC: 3.4 MG/DL (ref 2.5–4.5)
PLATELET # BLD AUTO: 282 10E3/UL (ref 150–450)
POTASSIUM SERPL-SCNC: 3.6 MMOL/L (ref 3.4–5.3)
RBC # BLD AUTO: 2.75 10E6/UL (ref 3.8–5.2)
SODIUM SERPL-SCNC: 140 MMOL/L (ref 135–145)
WBC # BLD AUTO: 4.1 10E3/UL (ref 4–11)

## 2024-10-24 PROCEDURE — 85027 COMPLETE CBC AUTOMATED: CPT

## 2024-10-24 PROCEDURE — 250N000011 HC RX IP 250 OP 636

## 2024-10-24 PROCEDURE — 258N000003 HC RX IP 258 OP 636

## 2024-10-24 PROCEDURE — 80048 BASIC METABOLIC PNL TOTAL CA: CPT

## 2024-10-24 PROCEDURE — 250N000013 HC RX MED GY IP 250 OP 250 PS 637

## 2024-10-24 PROCEDURE — 84100 ASSAY OF PHOSPHORUS: CPT

## 2024-10-24 PROCEDURE — 250N000009 HC RX 250

## 2024-10-24 PROCEDURE — 99232 SBSQ HOSP IP/OBS MODERATE 35: CPT | Mod: GC | Performed by: FAMILY MEDICINE

## 2024-10-24 PROCEDURE — 250N000009 HC RX 250: Performed by: FAMILY MEDICINE

## 2024-10-24 PROCEDURE — B4185 PARENTERAL SOL 10 GM LIPIDS: HCPCS

## 2024-10-24 PROCEDURE — 120N000001 HC R&B MED SURG/OB

## 2024-10-24 PROCEDURE — T1013 SIGN LANG/ORAL INTERPRETER: HCPCS | Mod: GT,TEL,95 | Performed by: INTERPRETER

## 2024-10-24 PROCEDURE — 83735 ASSAY OF MAGNESIUM: CPT

## 2024-10-24 RX ADMIN — MAGNESIUM SULFATE HEPTAHYDRATE: 500 INJECTION, SOLUTION INTRAMUSCULAR; INTRAVENOUS at 20:35

## 2024-10-24 RX ADMIN — OXYCODONE HYDROCHLORIDE 5 MG: 5 TABLET ORAL at 22:49

## 2024-10-24 RX ADMIN — FISH OIL 200 ML: 0.1 INJECTION, EMULSION INTRAVENOUS at 20:35

## 2024-10-24 RX ADMIN — PANTOPRAZOLE SODIUM 40 MG: 40 INJECTION, POWDER, FOR SOLUTION INTRAVENOUS at 08:18

## 2024-10-24 RX ADMIN — PIPERACILLIN AND TAZOBACTAM 3.38 G: 3; .375 INJECTION, POWDER, FOR SOLUTION INTRAVENOUS at 16:38

## 2024-10-24 RX ADMIN — PIPERACILLIN AND TAZOBACTAM 3.38 G: 3; .375 INJECTION, POWDER, FOR SOLUTION INTRAVENOUS at 08:18

## 2024-10-24 RX ADMIN — ENOXAPARIN SODIUM 30 MG: 30 INJECTION SUBCUTANEOUS at 16:38

## 2024-10-24 RX ADMIN — PIPERACILLIN AND TAZOBACTAM 3.38 G: 3; .375 INJECTION, POWDER, FOR SOLUTION INTRAVENOUS at 00:07

## 2024-10-24 RX ADMIN — SODIUM CHLORIDE 75 ML/HR: 9 INJECTION, SOLUTION INTRAVENOUS at 09:42

## 2024-10-24 NOTE — PLAN OF CARE
Problem: Adult Inpatient Plan of Care  Goal: Plan of Care Review  Description: The Plan of Care Review/Shift note should be completed every shift.  The Outcome Evaluation is a brief statement about your assessment that the patient is improving, declining, or no change.  This information will be displayed automatically on your shift  note.  10/24/2024 0446 by Aubrie Smith RN  Outcome: Progressing  10/23/2024 2226 by Aubrie Smith RN  Outcome: Progressing  10/23/2024 2225 by Aubrie Smith RN  Outcome: Progressing     Problem: Parenteral Nutrition  Goal: Effective Intravenous Nutrition Therapy Delivery  Outcome: Progressing  Intervention: Optimize Intravenous Nutrition Delivery  Recent Flowsheet Documentation  Taken 10/24/2024 0010 by Aubrie Smith RN  Nutrition Support Management: parenteral nutrition rate increased  Medication Review/Management: medications reviewed  Taken 10/23/2024 1600 by Aubrie Smith RN  Nutrition Support Management: parenteral nutrition rate increased  Medication Review/Management: medications reviewed  Intervention: Optimize Nutrition, Fluid and Electrolyte Intake  Recent Flowsheet Documentation  Taken 10/24/2024 0010 by Aubrie Smith RN  Fluid/Electrolyte Management:   fluids provided   intravenous fluids adjusted   intravenous fluid replacement initiated  Taken 10/23/2024 1600 by Aubrie Smith RN  Fluid/Electrolyte Management:   fluids provided   intravenous fluids adjusted   intravenous fluid replacement initiated   Goal Outcome Evaluation:       Slept between cares. Denies pain. TPN/fish oil triglycerides infusing. Colostomy intact.

## 2024-10-24 NOTE — PROGRESS NOTES
Nutrition Therapy  Parenteral Nutrition follow-up       Recommendations to MD  None     Dietitian to Pharmacy    Rate of TPN: 45 ml/hr -no change  Grams Dextrose: 166g- increase  Grams Protein:61 g-no change    Lipids: 20 g Omegaven daily - no change     RD Interventions  - Calorie count x 3 days  - - Add gel plus tid with meals + ensure clear tid = 1170 kcal, 84 g protein total     Future Recommendations/Monitoring  - Titrate TPN to goal pending tolerance, s/s of refeeding, p.o intake, weight  -  Check triglycerides in 5 days if still on TPN         Current Nutrition Intake:  Diet: Clear liquid  Supplement: Ensure clear tid    P.o intake: 50% of clear liquids. Took at least 1 ensure clear yesterday.   Good fluid intake-1L yesterday    Nutrition Support: 10/22 PICC placed  and TPN started    Custom TPN   @45 ml/hr Dextrose 96g Aa61g with 20 g Omegaven (2 x 100 ml bottles) + mvi with minerals and thiamine 100 mg x 10 days    Provides= 1080 ml, 61 g protein (1.5 g/kg), 96 g CHO, 794 kcal, 20 g fat daily to meet 65% of estimated kcal, 100% of estimated protein needs     Receiving NS IVF at 75 ml/hr -put on hold this afternoon    Met with pt via ipad . She reports no abd pain and good appetite but less than baseline. She likes the ensure clear more than the [employer] shake she has had in the past. She is willing to try gel plus supplement. Discussed calorie count and goals to wean TPN pending intake tolerance/amount.   Pt reports she she is feeling well she eats 3 meal/day of a little rice, pork, fruit    Weight Trends  Admission wt: 45.8 kg (101 lb) Stated?  Weight Hx: Up 6 lb past 4 days  Date/Time Weight Weight Method   10/24/24 0652 43.3 kg (95 lb 8 oz) Standing scale   10/20/24 0941 40.8 kg (89 lb 14.4 oz) Standing scale   10/20/24 0347 41.6 kg (91 lb 11.4 oz) --   10/19/24 2208 45.8 kg (101 lb) --     Dosing Weight: 40.8 kg     ASSESSED NUTRITION NEEDS  Estimated Energy Needs: 9569-4775 kcals/day (30 -  35 kcals/kg )  Justification: Repletion and Underweight  Estimated Protein Needs: 49-61 grams protein/day (1.2 - 1.5 grams of pro/kg)  Justification: Increased needs  Estimated Fluid Needs: 2914-4065 mL/day (1 mL/kcal)   Justification: Maintenance    GI:  Colostomy OP loose  700 ml yesterday    Labs:   Labs reviewed by dietitian  On mag, phos and K+ standard   Electrolytes WNL  Glucose WNL    Medications:   Reviewed by dietitian   iv protonix, iv abx    MALNUTRITION:  % Weight Loss:  > 5% in 1 month (severe malnutrition)  % Intake:  <50% > 5 days (severe malnutrition)  Subcutaneous Fat Loss: lumbar mild loss, thoracic moderate to severe loss  Muscle Loss:  Moderate loss temporalis, buccal, clavicle, deltoid  Fluid Retention:  None noted   Pt reports she feels she has lost weight all over since her surgery     Malnutrition Diagnosis: Severe in acute illness    Nutrition Diagnosis  Inadequate nutrition intake r/t altered GI function evidenced by unable to tolerate advanced diet, poor appetite, weight loss     New Goals  -Maintain/gain weight- progressing  -Meet minimum 1225 kcal, 49 g protein per dosing weight 40.8 kg per day- progressing  -Advance diet - progressing  -Electrolytes WNL- met    Interventions  -Increase Dextrose to 75% of goal to better meet nutrition needs  @45 ml/hr Dextrose 166 AA 61 g protein with 20g Omegaven daily = 1080 ml, 166 g cho, 61 g protein, 20 g fat, 1032 kcal/day to meet 85% of estimated kcal, 100% of estimated protein needs  - Calorie count x 3 days  - Add gel plus tid with meals + ensure clear tid = 1170 kcal, 84 g protein total      Monitoring/Evaluation  Progress toward goals will be monitored and evaluated per protocol.

## 2024-10-24 NOTE — PLAN OF CARE
Problem: Adult Inpatient Plan of Care  Goal: Plan of Care Review  Description: The Plan of Care Review/Shift note should be completed every shift.  The Outcome Evaluation is a brief statement about your assessment that the patient is improving, declining, or no change.  This information will be displayed automatically on your shift  note.  10/23/2024 2226 by Aubrie Smith RN  Outcome: Progressing  10/23/2024 2225 by Aubrie Smith RN  Outcome: Progressing     Problem: Pain Acute  Goal: Optimal Pain Control and Function  Outcome: Progressing  Intervention: Develop Pain Management Plan  Recent Flowsheet Documentation  Taken 10/23/2024 2102 by Aubrie Smith RN  Pain Management Interventions:   medication (see MAR)   rest  Intervention: Prevent or Manage Pain  Recent Flowsheet Documentation  Taken 10/23/2024 1600 by Aubrie Smith RN  Medication Review/Management: medications reviewed  Intervention: Optimize Psychosocial Wellbeing  Recent Flowsheet Documentation  Taken 10/23/2024 1600 by Aubrie Smith RN  Supportive Measures:   active listening utilized   self-care encouraged     Problem: Bowel Disease, Inflammatory (Ulcerative Colitis or Crohn's Disease)  Goal: Optimal Nutrition Delivery  Outcome: Progressing  Intervention: Promote and Optimize Nutrition Intake  Recent Flowsheet Documentation  Taken 10/23/2024 1600 by Aubrie Smith RN  Nutrition Support Management: parenteral nutrition rate increased   Goal Outcome Evaluation:       Colostomy with adequate output. Voided x1. C/o generalized abdominal pain, PRN oxy given - effective. Tolerating some clear liquid diet - see chart.     TPN & fish oil triglycerides infusing via PICC.

## 2024-10-24 NOTE — PHARMACY-CONSULT NOTE
"Pharmacy Note: Parenteral Nutrition (PN) Management    Pharmacist consulted to dose PN for Dain Tejeda, a 44 year old female by Colorectal surgery service.     Subjective:    The patient is a new PN start.    The patient was started on PN in the hospital on 10/22/24.    Indication for PN therapy:  poor nutritional intake with fistulizing chron's disease, colostomy, pericolonic abscesses    Inadequate nutrition anticipated for > 7 days.     Enteral nutrition contraindicated due to: failed enteral feeding trial.    Pertinent diseases and other special considerations  soy allergy    Social History     Tobacco Use    Smoking status: Never    Smokeless tobacco: Never   Substance Use Topics    Alcohol use: No    Drug use: No     Objective:    Ht Readings from Last 1 Encounters:   09/11/24 1.575 m (5' 2\")     Wt Readings from Last 1 Encounters:   10/24/24 43.3 kg (95 lb 8 oz)       Body mass index is 17.47 kg/m .    Patient Vitals for the past 96 hrs:   Weight   10/24/24 0652 43.3 kg (95 lb 8 oz)       Labs:  Last 3 days:  Recent Labs     10/22/24  0003 10/22/24  0505 10/22/24  1231 10/22/24  1828 10/23/24  0025 10/23/24  0619 10/23/24  1200 10/23/24  1903 10/24/24  0538   NA  --  136  --   --   --  138  --   --  140   POTASSIUM  --  3.0* 3.3* 3.4 3.4 3.7  3.7  --   --  3.6   CHLORIDE  --  100  --   --   --  104  --   --  108*   CO2  --  23  --   --   --  26  --   --  26   BUN  --  7.2  --   --   --  5.4*  --   --  3.4*   CR  --  0.70  --   --   --  0.67  --   --  0.52   ALEX  --  7.7*  --   --   --  7.6*  --   --  7.8*   MAG 1.9  --   --   --   --  1.4* 2.1  --  1.7   PHOS  --  3.2  --   --   --  1.8*  --  2.5 3.4   PROTTOTAL  --   --   --   --   --  6.4  --   --   --    ALBUMIN  --   --   --   --   --  2.2*  --   --   --    PREALB  --   --   --   --   --  5.7*  --   --   --    HGB  --  8.0*  --   --   --  8.1*  --   --  7.7*   HCT  --  25.4*  --   --   --  26.0*  --   --  25.7*   PLT  --  336  --   --   --  320  --   --  282 "   BILITOTAL  --   --   --   --   --  0.2  --   --   --    AST  --   --   --   --   --  12  --   --   --    ALT  --   --   --   --   --  7  --   --   --    ALKPHOS  --   --   --   --   --  117  --   --   --    INR  --   --   --   --   --  1.12  --   --   --        Glucose (past 48 hours):   Recent Labs     10/22/24  1834 10/23/24  0025 10/23/24  0619 10/23/24  1221 10/23/24  1845 10/24/24  0009 10/24/24  0538 10/24/24  0607   * 117* 131*  131* 115* 121* 98 113* 109*       Intake/Output (last 24 hours): I/O last 3 completed shifts:  In: 4491 [P.O.:1120; I.V.:2697]  Out: 1800 [Urine:1500; Stool:300]    Estimated CrCl: Estimated Creatinine Clearance: 96.3 mL/min (based on SCr of 0.52 mg/dL).    Assessment:    Continue patient on PN therapy as a continuous central therapy.     Given the patient's current condition/oral intake, PN is still indicated.    Lab results reviewed: 10/24/24  - Chloride high at 108, will reduce Chloride in TPN this evening.  - electrolytes improved (on replacement protocols as well). Will keep K, Mg, Phos the same today since increasing dextrose in TPN, but may need to reduce lytes tomorrow.  - BG controlled. No history of diabetes.  -   Plan:  Rate of PN: 45 mL/hr   Formula:   Amino Acids 61 grams  Dextrose 166 grams (increased)  Sodium 60 mEq/day  Potassium 50 mEq/day  Calcium 8 mEq/day  Magnesium 16 mEq/day  Phosphorus 30 mMol/day  Chloride: Acetate Ratio: max acetate (changed to about 1:5)  Standard Multivitamins w/Vitamin K  Trace Elements  Thiamine 100 mg   Fat Emulsion: 20 g Omegaven daily   Check BMP + Mg labs tomorrow.  Pharmacist will continue to follow the patient's lab results, clinical status and blood glucose results and make adjustments as appropriate.    Thank you for the consult.  Tashia Harman, PharmD, BCCP 10/24/2024 10:20 AM

## 2024-10-24 NOTE — PROGRESS NOTES
Care Management Follow Up    Length of Stay (days): 4    Expected Discharge Date: 10/27/2024    Anticipated Discharge Plan:   TBD    Transportation: Anticipate Family/friend    PT Recommendations:    OT Recommendations:        Barriers to Discharge: medical stability, IV abx, TPN, Advance diet    Prior Living Situation: apartment with child(sisi), dependent, spouse    Discussed  Partnership in Safe Discharge Planning  document with patient/family: No     Handoff Completed: No, handoff not indicated or clinically appropriate    Patient/Spokesperson Updated: No    Additional Information:  Chart reviewed. Current plan cont to be: cont TPN, trial full liquids, cont Iv ABX. Pt not medically ready to discharge. No current update that the pt will need TPN or IV ABX at discharge.    Next Steps: follow for medical progression, possible need for IV ABX/TPN at discharge.    Sandie House RN

## 2024-10-24 NOTE — PROGRESS NOTES
[facility] - GASTROENTEROLOGY PROGRESS NOTE     SUBJECTIVE: She feels better today having less pain.  She was able to advance to full liquid diet without complaints.  She is drinking a lot of the clear Ensure supplements.       OBJECTIVE:  BP 98/62 (BP Location: Right arm)   Pulse 58   Temp 97.7  F (36.5  C) (Oral)   Resp 18   Wt 43.3 kg (95 lb 8 oz)   SpO2 99%   BMI 17.47 kg/m    Temp (24hrs), Av.6  F (36.4  C), Min:97.5  F (36.4  C), Max:97.7  F (36.5  C)    Patient Vitals for the past 72 hrs:   Weight   10/24/24 0652 43.3 kg (95 lb 8 oz)        PHYSICAL EXAM  GEN: Alert, no distress  ABD: Soft, mild mid abdominal tenderness    Additional Data:  I have reviewed the patient's new clinical lab results:   Recent Labs   Lab Test 10/24/24  0538 10/23/24  0619 10/22/24  0505 24  0552 24  0610 24  0608 24  0614   WBC 4.1 5.4 9.4   < > 6.0   < > 3.7*   HGB 7.7* 8.1* 8.0*   < > 9.7*   < > 7.8*   MCV 94 91 92   < > 97   < > 93    320 336   < > 313   < > 267   INR  --  1.12  --   --  0.98  --  1.08    < > = values in this interval not displayed.     Recent Labs   Lab Test 10/24/24  1249 10/24/24  0607 10/24/24  0538 10/23/24  1221 10/23/24  0619 10/23/24  0025 10/22/24  1231 10/22/24  0505   NA  --   --  140  --  138  --   --  136   POTASSIUM  --   --  3.6  --  3.7  3.7 3.4   < > 3.0*   CHLORIDE  --   --  108*  --  104  --   --  100   CO2  --   --  26  --  26  --   --  23   BUN  --   --  3.4*  --  5.4*  --   --  7.2   CR  --   --  0.52  --  0.67  --   --  0.70   ANIONGAP  --   --  6*  --  8  --   --  13   ALEX  --   --  7.8*  --  7.6*  --   --  7.7*   GLC 88 109* 113*   < > 131*  131* 117*   < > 93    < > = values in this interval not displayed.     Recent Labs   Lab Test 10/23/24  0619 10/20/24  0555 10/19/24  2257 24  1742 24  1741 05/10/24  0740 24  1148 24  0808 24  2103 24  0126   ALBUMIN 2.2* 2.6* 3.1*  --  3.7   < >  --  2.7*   < >  --     BILITOTAL 0.2  --  0.6  --  0.4   < >  --  0.6   < >  --    ALT 7  --  24  --  11   < >  --  31   < >  --    AST 12  --  47*  --  15   < >  --  34   < >  --    PROTEIN  --   --   --  50*  --   --  70*  --   --  Negative   LIPASE  --   --  42  --  33  --   --  29  --   --     < > = values in this interval not displayed.     IMPRESSION/Plan:  Crohn disease with colostomy now with pericolonic abscesses, slowly improving on antibiotics and limited enteral feeds.  On TPN for severe malnutrition.  Diet is slowly being advanced.  We have her set up for Avsola infliximab infusions as an outpatient along with azathioprine when she can go back on Crohn's treatment.    10 minutes of total time was spent providing patient care, including patient evaluation, reviewing documentation/test results, coordination of care with other providers, and .    Keven Mendez  Cell [phone]  After 5 PM, please call [phone]

## 2024-10-24 NOTE — PLAN OF CARE
Problem: Adult Inpatient Plan of Care  Goal: Plan of Care Review  Outcome: Progressing   Goal Outcome Evaluation:         Patient alert, hmong speaking. More alert today. Denies any discomfort. Up independently. Ambulated 200ft. Gait slow and steady. Consumed 50% of her clear liquid diet this morning. Drinking supplements throughout the day. Voiding clear yellow urine. Colostomy intact-small soft brown/green stool noted in bag. IV antibiotics scheduled. TPN infusing via PICC LINE. Colorectal and GI following.  Sandie Clinton RN

## 2024-10-24 NOTE — PROGRESS NOTES
Colon and Rectal Surgery  Daily Progress Note    Subjective  Reports abdominal pain is stable. Appears comfortable. Tolerated clears yesterday without nausea or vomiting.     Objective  Intake/Output last 24 hrs:  No intake or output data in the 24 hours ending 10/22/24 1028  Temp:  [97.5  F (36.4  C)-97.9  F (36.6  C)] 97.5  F (36.4  C)  Pulse:  [56-60] 56  Resp:  [16-18] 16  BP: (91-99)/(50-66) 99/66  SpO2:  [99 %] 99 %    Physical Exam:  General: awake, alert, lying in bed, in no acute distress  Head: normocephalic, atraumatic  Respiratory: non-labored breathing  Abdomen: soft, non tender, non-distended. Midline incision healing. Stool and gas in ostomy appliance.   Skin: No rashes or lesions  Musculoskeletal: moves all four extremities equally  Psychological: alert and oriented    Assessment/Plan: This is a 44 year old woman with fistulizing Crohn's disease presenting with abdominal pain and intra-abdominal abscesses. Prior history of partial colectomy and ostomy.     - Ok to trial full liquid diet  - Continue TPN  - Lovenox dvt ppx  - IV abx   - Would avoid surgical intervention given her poor nutrition status   - Serial abdominal exams   - OOB/Ambulate     Discussed with Dr. Virgilio Ruvalcaba MD  Colon and Rectal Surgery Fellow  [facility]  [phone]..............................main

## 2024-10-24 NOTE — PROGRESS NOTES
[facility]    Progress Note - Hospitalist Service       Date of Admission:  10/19/2024    Assessment & Plan   Dain Tejeda is a 44 year old female admitted on 10/19/2024. She has a history of Crohn's disease status post partial colectomy with colostomy creation, celiac disease, normocytic anemia, sinus bradycardia, disorganized behaviors who is admitted for abdominal pain and intraabdominal abscess formation as sequela of Crohn's.  Ongoing poor nutritional status and limited enteric feeding TPN started 10/22.     Sepsis, resolved  Pericolonic abscess   History of Crohn's disease, celiac disease   Status post partial colectomy with current colostomy  Malnutrition  Patient with known history of Crohn's disease since 2022, complicated by stricture and status post partial colectomy with colostomy creation. Hypotensive and tachycardic on admission with a temperature of 99.65 concerning for sepsis. Labs on admission remarkable to WBC of 13, mag 1.5, and creatinine 1.00. Lactic of 1.2 on admission. CT imaging in the ED showing mural thickening of the transverse colon from the hepatic flexure to the colostomy site worsened from prior exam as well as two pericolonic abscesses which  may be connected. Air and fluid seen in the proximal and distal transverse colon concerning for possible microperforation. Colorectal surgery consulted, recommended non-operative management with IV antibiotics. Recommended against IR drainage at this time.  Followed by colorectal and GI has ongoing poor nutritional status limited enteral intake given fistulizing Crohn disease recs to start TPN.  - Colorectal surgery consult, appreciate recommendations              - Non-operative treatment at this time. No IR drainage recommended  - GI consult placed, appreciate recs               -Can trial full liquids per colorectal surgery                -Continue IV Zosyn               -CT in the next few days, to monitor abscess                -Serial abdominal exams  - Pain management with PO oxycodone and IV Dilaudid   - PRN zofran and compazine for nausea   - Hold PTA azathioprine and Humira until GI can see the patient  - Protonix 40 mg daily   - Nutritionist consult appreciate recs  - Start TPN 10/22  - Monitor electrolytes closely, replace per protocol      LELIA, improving  Creatinine elevated at 1.0 on admission up from baseline around 0.6.  Likely in the setting of nausea and vomiting and thus likely prerenal.  Status post 1 L bolus NS in the ED, continues on maintenance IV fluids due to appropriate intake.  Creatinine has been stable.  - Maintenance fluids with NS 75 mL/h     Chronic normocytic anemia  Hemoglobin on admission 11.1 actually up from baseline between 8 and 10.  No signs or symptoms of acute blood loss anemia, denies dark stools. No dark stool or phoenix blood in ostomy bag on exam. Will monitor. 10/24, Hgb dropped to 7.7, will repeat Hgb this evening.   -Daily CBC     History of disorganized behaviors  Noted on previous hospital stays.  Has previously responded well to Seroquel 12.5 mg while admitted.  Stable on admission, recommend monitoring closely. May consider adding Seroquel if needed, hold off for now.      History pelvic cyst  Known history.  CT abdomen pelvis on admission redemonstrating ovarian cyst, confirmed with pelvic ultrasound.  Recommend follow-up pelvic ultrasound in 6 to 12 months.  No gynecologic symptoms reported, will continue to monitor.     Hypokalemia   Electrolytes disturbances  Malnutrition   potassium 3.3, likely in the setting of Crohn's and poor nutritional status. Will start potassium replacement protocol and monitor closely. Monitor for refeeding syndrome.  -Potassium replacement protocol           Diet: Snacks/Supplements Adult: Ensure Clear; With Meals  parenteral nutrition - ADULT compounded formula  parenteral nutrition - ADULT compounded formula  Full Liquid Diet    DVT Prophylaxis:  Enoxaparin (Lovenox) SQ  Chambers Catheter: Not present  Fluids: TPN  Lines: PRESENT      PICC 10/22/24 Double Lumen Right Basilic-Site Assessment: WDL      Cardiac Monitoring: None  Code Status: Full Code      Clinically Significant Risk Factors        # Hypokalemia: Lowest K = 3.3 mmol/L in last 2 days, will replace as needed   # Hyperchloremia: Highest Cl = 108 mmol/L in last 2 days, will monitor as appropriate        # Hypomagnesemia: Lowest Mg = 1.4 mg/dL in last 2 days, will replace as needed   # Hypoalbuminemia: Lowest albumin = 2.2 g/dL at 10/23/2024  6:19 AM, will monitor as appropriate                   # Financial/Environmental Concerns:           Disposition Plan      Expected Discharge Date: 10/27/2024    Discharge Delays: IV Medication - consider oral or Home Infusion  Voiding/Eating Trial needed            The patient's care was discussed with the Attending Physician, Dr. Orantes .    Charlotte Bello MD  Hospitalist Service  [facility]  Securely message with [employer] (more info)  Text page via [employer] Paging/Directory   ______________________________________________________________________    Interval History   No acute events overnight. Today feels abdominal pain is improved. Has been trying clear liquids will trial advancing full liquids. GI and surgery following.     Physical Exam   Vital Signs: Temp: 97.7  F (36.5  C) Temp src: Oral BP: 98/62 Pulse: 58   Resp: 18 SpO2: 99 % O2 Device: None (Room air)    Weight: 95 lbs 8 oz    General Appearance: Alert and oriented, NAD  Respiratory: normal breath sounds, no wheezing or crackles  Cardiovascular: normal S1, S2, no murmur  GI: soft, non distended, non tender, colostomy with brown stool.   Skin: normal appearance and turgor, no rash  Other: No lower limb edema B/L.      Medical Decision Making             Data     I have personally reviewed the following data over the past 24 hrs:    4.1  \   7.7 (L)   / 282     140 108 (H) 3.4 (L) /   109 (H)   3.6 26 0.52 \       Imaging results reviewed over the past 24 hrs:   No results found for this or any previous visit (from the past 24 hours).

## 2024-10-25 LAB
ALBUMIN SERPL BCG-MCNC: 2.4 G/DL (ref 3.5–5.2)
ALP SERPL-CCNC: 86 U/L (ref 40–150)
ALT SERPL W P-5'-P-CCNC: 9 U/L (ref 0–50)
ANION GAP SERPL CALCULATED.3IONS-SCNC: 7 MMOL/L (ref 7–15)
AST SERPL W P-5'-P-CCNC: 15 U/L (ref 0–45)
BILIRUB DIRECT SERPL-MCNC: <0.2 MG/DL (ref 0–0.3)
BILIRUB SERPL-MCNC: <0.2 MG/DL
BUN SERPL-MCNC: 13.1 MG/DL (ref 6–20)
CA-I BLD-MCNC: 4.7 MG/DL (ref 4.4–5.2)
CALCIUM SERPL-MCNC: 8 MG/DL (ref 8.8–10.4)
CHLORIDE SERPL-SCNC: 105 MMOL/L (ref 98–107)
CREAT SERPL-MCNC: 0.58 MG/DL (ref 0.51–0.95)
EGFRCR SERPLBLD CKD-EPI 2021: >90 ML/MIN/1.73M2
ERYTHROCYTE [DISTWIDTH] IN BLOOD BY AUTOMATED COUNT: 13.2 % (ref 10–15)
GLUCOSE BLDC GLUCOMTR-MCNC: 113 MG/DL (ref 70–99)
GLUCOSE BLDC GLUCOMTR-MCNC: 118 MG/DL (ref 70–99)
GLUCOSE BLDC GLUCOMTR-MCNC: 139 MG/DL (ref 70–99)
GLUCOSE SERPL-MCNC: 107 MG/DL (ref 70–99)
HCO3 SERPL-SCNC: 28 MMOL/L (ref 22–29)
HCT VFR BLD AUTO: 26.3 % (ref 35–47)
HGB BLD-MCNC: 8.1 G/DL (ref 11.7–15.7)
MAGNESIUM SERPL-MCNC: 1.6 MG/DL (ref 1.7–2.3)
MCH RBC QN AUTO: 28.4 PG (ref 26.5–33)
MCHC RBC AUTO-ENTMCNC: 30.8 G/DL (ref 31.5–36.5)
MCV RBC AUTO: 92 FL (ref 78–100)
PHOSPHATE SERPL-MCNC: 3.7 MG/DL (ref 2.5–4.5)
PLATELET # BLD AUTO: 334 10E3/UL (ref 150–450)
POTASSIUM SERPL-SCNC: 3.9 MMOL/L (ref 3.4–5.3)
PROT SERPL-MCNC: 6.3 G/DL (ref 6.4–8.3)
RBC # BLD AUTO: 2.85 10E6/UL (ref 3.8–5.2)
SODIUM SERPL-SCNC: 140 MMOL/L (ref 135–145)
WBC # BLD AUTO: 5.5 10E3/UL (ref 4–11)

## 2024-10-25 PROCEDURE — 80048 BASIC METABOLIC PNL TOTAL CA: CPT

## 2024-10-25 PROCEDURE — 83735 ASSAY OF MAGNESIUM: CPT

## 2024-10-25 PROCEDURE — 99232 SBSQ HOSP IP/OBS MODERATE 35: CPT | Mod: GC | Performed by: FAMILY MEDICINE

## 2024-10-25 PROCEDURE — 85027 COMPLETE CBC AUTOMATED: CPT

## 2024-10-25 PROCEDURE — 84100 ASSAY OF PHOSPHORUS: CPT

## 2024-10-25 PROCEDURE — 250N000011 HC RX IP 250 OP 636

## 2024-10-25 PROCEDURE — 250N000009 HC RX 250: Performed by: FAMILY MEDICINE

## 2024-10-25 PROCEDURE — 120N000001 HC R&B MED SURG/OB

## 2024-10-25 PROCEDURE — 250N000009 HC RX 250

## 2024-10-25 PROCEDURE — 82248 BILIRUBIN DIRECT: CPT | Performed by: COLON & RECTAL SURGERY

## 2024-10-25 PROCEDURE — 82374 ASSAY BLOOD CARBON DIOXIDE: CPT

## 2024-10-25 PROCEDURE — B4185 PARENTERAL SOL 10 GM LIPIDS: HCPCS

## 2024-10-25 PROCEDURE — 82330 ASSAY OF CALCIUM: CPT | Performed by: FAMILY MEDICINE

## 2024-10-25 RX ADMIN — ENOXAPARIN SODIUM 30 MG: 30 INJECTION SUBCUTANEOUS at 15:47

## 2024-10-25 RX ADMIN — PIPERACILLIN AND TAZOBACTAM 3.38 G: 3; .375 INJECTION, POWDER, FOR SOLUTION INTRAVENOUS at 15:47

## 2024-10-25 RX ADMIN — MAGNESIUM SULFATE HEPTAHYDRATE: 500 INJECTION, SOLUTION INTRAMUSCULAR; INTRAVENOUS at 19:57

## 2024-10-25 RX ADMIN — FISH OIL 200 ML: 0.1 INJECTION, EMULSION INTRAVENOUS at 19:58

## 2024-10-25 RX ADMIN — PIPERACILLIN AND TAZOBACTAM 3.38 G: 3; .375 INJECTION, POWDER, FOR SOLUTION INTRAVENOUS at 00:41

## 2024-10-25 RX ADMIN — PIPERACILLIN AND TAZOBACTAM 3.38 G: 3; .375 INJECTION, POWDER, FOR SOLUTION INTRAVENOUS at 08:13

## 2024-10-25 RX ADMIN — PANTOPRAZOLE SODIUM 40 MG: 40 INJECTION, POWDER, FOR SOLUTION INTRAVENOUS at 08:13

## 2024-10-25 NOTE — PLAN OF CARE
Problem: Adult Inpatient Plan of Care  Goal: Optimal Comfort and Wellbeing  Outcome: Progressing     Problem: Comorbidity Management  Goal: Blood Glucose Levels Within Targeted Range  Outcome: Progressing     Problem: Skin Injury Risk Increased  Goal: Skin Health and Integrity  Intervention: Plan: Nurse Driven Intervention: Moisture Management  Recent Flowsheet Documentation  Taken 10/25/2024 0052 by Jewels Olvera RN  Moisture Interventions:   Incontinence pad   Barrier ointment (CriticAid, Triad paste)  Intervention: Plan: Nurse Driven Intervention: Friction and Shear  Recent Flowsheet Documentation  Taken 10/25/2024 0052 by Jewels Olvera RN  Friction/Shear Interventions: HOB 30 degrees or less     Problem: Electrolyte Imbalance  Goal: Electrolyte Balance  Outcome: Progressing     Problem: Parenteral Nutrition  Goal: Effective Intravenous Nutrition Therapy Delivery  Outcome: Progressing   Goal Outcome Evaluation:    Continuous TPN running at 45 ml/hr in addition to Fish Oil Triglycerides at 16.7 ml/hr. May also have Full Liquid diet. Started Calorie Count 10/25 through 10/29. Soft blood pressure 88/56. Pain medication offered several times but declined. Blood sugar checks every 6 hours; 139 and 107. Mg, K+ and Phosphorus Protocols. Manages colostomy independently.

## 2024-10-25 NOTE — PLAN OF CARE
Assumed care 1500 to 2300. A&O x 4. Independent. Denies pain. Hmong speaking, minimal English. Room air. Up to bedside commode, colostomy. TPN & Lipids infusing. Call light within reach, able to make needs known. Bed alarm on for safety.    Problem: Parenteral Nutrition  Goal: Effective Intravenous Nutrition Therapy Delivery  Intervention: Optimize Intravenous Nutrition Delivery  Recent Flowsheet Documentation  Taken 10/24/2024 2035 by Divina Bruce RN  Medication Review/Management: medications reviewed  Taken 10/24/2024 1638 by Divina Bruce RN  Medication Review/Management: medications reviewed     Problem: Bowel Disease, Inflammatory (Ulcerative Colitis or Crohn's Disease)  Goal: Optimal Adaptation to Chronic Illness  Intervention: Support Psychosocial Response to Illness  Recent Flowsheet Documentation  Taken 10/24/2024 2035 by Divina Bruce RN  Supportive Measures: active listening utilized  Taken 10/24/2024 1638 by Divina Bruce RN  Supportive Measures: active listening utilized

## 2024-10-25 NOTE — PROGRESS NOTES
Nutrition Therapy  Parenteral Nutrition follow-up       Recommendations to MD  None     Dietitian to Pharmacy    Rate of TPN: 45 ml/hr -no change  Grams Dextrose: 222 g- increase  Grams Protein: 61 g-no change    Lipids: 20 g Omegaven daily - no change     RD Interventions  - Calorie count x 3 days  -nursing communication to save meal tickets and record % eaten     Future Recommendations/Monitoring  - Monitor TPN tolerance, s/s of refeeding, p.o intake, weight  -  Check triglycerides in 4 days if still on TPN         Current Nutrition Intake:  Diet: Full liquid  Supplement: gel plus tid with meals + ensure clear tid = 1170 kcal, 84 g protein total     P.o intake: 1 meal ticket (dinner) saved from 10/24-no record of intake  Per flowsheets ate 50% of breakfast and 75% of lunch    Nutrition Support: 10/22 PICC placed  and TPN started    Custom TPN   @45 ml/hr Dextrose 166 g AA 61 g with 20 g Omegaven (2 x 100 ml bottles) + mvi with minerals and thiamine 100 mg x 10 days    Provides= 1080 ml, 166 g cho, 61 g protein, 20 g fat, 1032 kcal/day to meet 85% of estimated kcal, 100% of estimated protein needs    Weight Trends  Admission wt: 45.8 kg (101 lb) Stated?  Weight Hx: Up 6 lb past 4 days  Date/Time Weight Weight Method   10/24/24 0652 43.3 kg (95 lb 8 oz) Standing scale   10/20/24 0941 40.8 kg (89 lb 14.4 oz) Standing scale   10/20/24 0347 41.6 kg (91 lb 11.4 oz) --   10/19/24 2208 45.8 kg (101 lb) --     Dosing Weight: 40.8 kg     ASSESSED NUTRITION NEEDS  Estimated Energy Needs: 5430-3870 kcals/day (30 - 35 kcals/kg )  Justification: Repletion and Underweight  Estimated Protein Needs: 49-61 grams protein/day (1.2 - 1.5 grams of pro/kg)  Justification: Increased needs  Estimated Fluid Needs: 8065-1677 mL/day (1 mL/kcal)   Justification: Maintenance    GI:  Colostomy     Labs:   Labs reviewed by dietitian  K 3.9, Mg 1.6 (L), phos 3.7   On mag, phos and K+ standard   Glucose 139    Medications:   Reviewed by dietitian    iv protonix, iv abx    MALNUTRITION:  % Weight Loss:  > 5% in 1 month (severe malnutrition)  % Intake:  <50% > 5 days (severe malnutrition)  Subcutaneous Fat Loss: lumbar mild loss, thoracic moderate to severe loss  Muscle Loss:  Moderate loss temporalis, buccal, clavicle, deltoid  Fluid Retention:  None noted   Pt reports she feels she has lost weight all over since her surgery     Malnutrition Diagnosis: Severe in acute illness    Nutrition Diagnosis  Inadequate nutrition intake r/t altered GI function evidenced by unable to tolerate advanced diet, poor appetite, weight loss     Goals  -Maintain/gain weight- progressing  -Meet minimum 1225 kcal, 49 g protein per dosing weight 40.8 kg per day- progressing  -Advance diet - progressing  -Electrolytes WNL- not met    Interventions  -Increase Dextrose to 100% of goal to meet nutrition needs  @45 ml/hr Dextrose 222 AA 61 g protein with 20g Omegaven daily = 1080 ml, 222 g cho, 61 g protein, 20 g fat, 1225 kcal/day to meet 100% of estimated kcal, 100% of estimated protein needs  - Calorie count x 3 days    Monitoring/Evaluation  Progress toward goals will be monitored and evaluated per protocol.

## 2024-10-25 NOTE — PLAN OF CARE
"  Problem: Adult Inpatient Plan of Care  Goal: Absence of Hospital-Acquired Illness or Injury  Intervention: Identify and Manage Fall Risk   Goal Outcome Evaluation:         Patient alert and oriented. Hmong speaking. Does speak some english. \"I want to go home real bad\"  TPN infusing. IV antibiotics scheduled. PICC line intact. Denies any discomfort. Consumed 100% of her full liquid diet for breakfast. Will advance to low fiber for lunch. Emptied Colostomy for soft brown stool. Voiding clear yellow urine. Ambulated for 15 minutes with SBA.     Sandie Reeves RN           "

## 2024-10-25 NOTE — PROGRESS NOTES
[facility]    Progress Note - Hospitalist Service       Date of Admission:  10/19/2024    Assessment & Plan   Dain Tejeda is a 44 year old female admitted on 10/19/2024. She has a history of Crohn's disease status post partial colectomy with colostomy creation, celiac disease, normocytic anemia, sinus bradycardia, disorganized behaviors who is admitted for abdominal pain and intraabdominal abscess formation as sequela of Crohn's.  Ongoing poor nutritional status and limited enteric feeding TPN started 10/22.     Sepsis  Pericolonic abscess   History of Crohn's disease, celiac disease   Status post partial colectomy with current colostomy  Malnutrition  Patient with known history of Crohn's disease since 2022, complicated by stricture and status post partial colectomy with colostomy creation. Hypotensive and tachycardic on admission with a temperature of 99.65 concerning for sepsis. Labs on admission remarkable to WBC of 13, mag 1.5, and creatinine 1.00. Lactic of 1.2 on admission. CT imaging in the ED showing mural thickening of the transverse colon from the hepatic flexure to the colostomy site worsened from prior exam as well as two pericolonic abscesses which  may be connected. Air and fluid seen in the proximal and distal transverse colon concerning for possible microperforation. Colorectal surgery consulted, recommended non-operative management with IV antibiotics. Recommended against IR drainage at this time.  Followed by colorectal and GI has ongoing poor nutritional status limited enteral intake given fistulizing Crohn disease recs to start TPN.  - Colorectal surgery consult, appreciate recommendations              - Non-operative treatment at this time. No IR drainage recommended  - GI consult placed, appreciate recs               -Advancing diet - low fiber               -Continue IV Zosyn               -CT in the next few days, to monitor abscess               -Serial abdominal  exams  - Pain management with PO oxycodone and IV Dilaudid   - PRN zofran and compazine for nausea   - Hold PTA azathioprine and Humira until GI can see the patient  - Protonix 40 mg daily   - Nutritionist consult appreciate recs       - calorie counting   - Start TPN 10/22  - Monitor electrolytes closely, replace per protocol      LELIA, improving  Creatinine elevated at 1.0 on admission up from baseline around 0.6.  Likely in the setting of nausea and vomiting and thus likely prerenal.  Status post 1 L bolus NS in the ED, continues on maintenance IV fluids due to appropriate intake.  Creatinine has been stable.  - Discontinue maintenance fluids 10/24     Chronic normocytic anemia  Hemoglobin on admission 11.1 actually up from baseline between 8 and 10.  No signs or symptoms of acute blood loss anemia, denies dark stools. No dark stool or phoenix blood in ostomy bag on exam. Will monitor. 10/24, Hgb dropped to 7.7, 10/24, repeat Hgb stable, will continue to monitor.   -Daily CBC     History of disorganized behaviors  Noted on previous hospital stays.  Has previously responded well to Seroquel 12.5 mg while admitted.  Stable on admission, recommend monitoring closely. May consider adding Seroquel if needed, hold off for now.      History pelvic cyst  Known history.  CT abdomen pelvis on admission redemonstrating ovarian cyst, confirmed with pelvic ultrasound.  Recommend follow-up pelvic ultrasound in 6 to 12 months.  No gynecologic symptoms reported, will continue to monitor.       Electrolytes disturbances  Malnutrition   potassium 3.3, likely in the setting of Crohn's and poor nutritional status. Will start potassium replacement protocol and monitor closely. Monitor for refeeding syndrome.  -Potassium, mag and phos replacement protocol              Diet: Snacks/Supplements Adult: Ensure Clear; With Meals  parenteral nutrition - ADULT compounded formula  Calorie Counts  Snacks/Supplements Adult: Gelatein Plus; With  Meals  parenteral nutrition - ADULT compounded formula  Low Fiber Diet    DVT Prophylaxis: Enoxaparin (Lovenox) SQ  Chambers Catheter: Not present  Fluids: TPN  Lines: PRESENT      PICC 10/22/24 Double Lumen Right Basilic-Site Assessment: WDL      Cardiac Monitoring: None  Code Status: Full Code      Clinically Significant Risk Factors          # Hyperchloremia: Highest Cl = 108 mmol/L in last 2 days, will monitor as appropriate        # Hypomagnesemia: Lowest Mg = 1.6 mg/dL in last 2 days, will replace as needed   # Hypoalbuminemia: Lowest albumin = 2.2 g/dL at 10/23/2024  6:19 AM, will monitor as appropriate                # Severe Malnutrition: based on nutrition assessment    # Financial/Environmental Concerns:           Disposition Plan      Expected Discharge Date: 10/27/2024    Discharge Delays: IV Medication - consider oral or Home Infusion  Voiding/Eating Trial needed            The patient's care was discussed with the Attending Physician, Dr. Padilla .    Charlotte Bello MD  Hospitalist Service  [facility]  Securely message with Vocera (more info)  Text page via [employer] Paging/Directory   ______________________________________________________________________    Interval History   No acute events overnight. Reports abdominal pain is improved for the past two days. No N/V. Eating breakfast this morning with no nausea or abdominal pain. No sob or chest pain.     Physical Exam   Vital Signs: Temp: 98.3  F (36.8  C) Temp src: Oral BP: 100/62 Pulse: 54   Resp: 16 SpO2: 99 % O2 Device: None (Room air)    Weight: 95 lbs 8 oz    General Appearance: Alert and oriented, NAD  Respiratory: normal work of breathing, clear breath sounds, no wheezing  Cardiovascular: normal S1, S2, no murmur  GI: soft, non distended, mild tenderness, no guarding  Skin: normal appearance and turgor, no visible rash  MSK: No limb edema B/L.      Medical Decision Making             Data     I have personally reviewed the  following data over the past 24 hrs:    5.5  \   8.1 (L)   / 334     140 105 13.1 /  118 (H)   3.9 28 0.58 \       Imaging results reviewed over the past 24 hrs:   No results found for this or any previous visit (from the past 24 hours).

## 2024-10-25 NOTE — PROGRESS NOTES
[facility]  Daily Progress Note    Subjective  Patient was seen this morning with the aid of a phone .    Patient reports she is doing well this morning. Denies any pain. Tolerating fulls. She is hungry for more food. Passing gas and stool through stoma. Wants to go home.     Objective  Intake/Output last 24 hrs:    Intake/Output Summary (Last 24 hours) at 10/25/2024 0942  Last data filed at 10/24/2024 1330  Gross per 24 hour   Intake 670 ml   Output --   Net 670 ml     Temp:  [97.3  F (36.3  C)-98.3  F (36.8  C)] 98.3  F (36.8  C)  Pulse:  [54-94] 54  Resp:  [16-18] 16  BP: ()/(56-62) 100/62  SpO2:  [98 %-99 %] 99 %    Physical Exam:  General: awake, alert, lying in bed, in no acute distress  Head: normocephalic, atraumatic  Respiratory: non-labored breathing  Abdomen: soft, appropriately tender, non-distended  Stoma: Pink, brown, loose stool in appliance.   Skin: No rashes or lesions  Musculoskeletal: moves all four extremities equally  Psychological: alert and oriented, answers questions appropriately    Pertinent Labs  Lab Results: personally reviewed.  Lab Results   Component Value Date     10/25/2024     10/24/2024     10/23/2024    .0 04/24/2017    CO2 28 10/25/2024    CO2 26 10/24/2024    CO2 26 10/23/2024    CO2 25 06/23/2022    CO2 23 06/03/2022    CO2 22 06/02/2022    CO2 26.0 04/24/2017    BUN 13.1 10/25/2024    BUN 3.4 10/24/2024    BUN 5.4 10/23/2024    BUN 6 06/23/2022    BUN 3 06/03/2022    BUN 4 06/02/2022    BUN 7.0 04/24/2017     Lab Results   Component Value Date    WBC 5.5 10/25/2024    WBC 4.1 10/24/2024    WBC 5.4 10/23/2024    HGB 8.1 10/25/2024    HGB 7.7 10/24/2024    HGB 8.1 10/23/2024    HGB 10.8 01/10/2018    HGB 10.3 11/08/2017    HGB 9.8 09/18/2017    HCT 26.3 10/25/2024    HCT 25.7 10/24/2024    HCT 26.0 10/23/2024    HCT 35.9 01/10/2018    HCT 33.8 11/08/2017    HCT 31.4 07/31/2017    MCV 92 10/25/2024    MCV 94 10/24/2024    MCV  91 10/23/2024    MCV 83.1 01/10/2018    MCV 77.9 11/08/2017    MCV 86.3 07/31/2017     10/25/2024     10/24/2024     10/23/2024       Assessment/Plan: This is a 44 year old female with fistulizing Crohn's disease presenting with abdominal pain and intra-abdominal abscesses. Prior history of partial colectomy and ostomy.     Hgb 8.1 (7.7)  WBC 5.5 (4.1)  Cr 0.58 (0.52)    - Low fiber diet  - Continue TPN  - Lovenox dvt ppx  - IV abx  - Recommend Calorie Counts  - Would avoid surgical intervention given her poor nutrition status   - Serial abdominal exams   - OOB/Ambulate    Discussed with Dr. Virgilio Sandra PA-C  [facility]  [phone]..............................main    COLORECTAL STAFF ADDENDUM  Patient seen and examined by me. Agree with above with following comments/additions:    Continuing to do ok. Minimal pain. Eating some    AVSS  Abd soft, non distended, minimal upper abdominal tenderness    Agree with plan above  Will need to see how much she is taking in but would continue tpn for now, will also check her albumin.  If she is able to take in enough over the weekend could potentially discharge Sunday or Monday if continues to do well.   Last admission she was transitioned onto augmentin for a 2 month course, would repeat again    Alexa Poole MD  [facility]  Office: [phone]  10/25/2024 5:15 PM

## 2024-10-25 NOTE — PROGRESS NOTES
Care Management Follow Up    Length of Stay (days): 5    Expected Discharge Date: 10/27/2024    Anticipated Discharge Plan:   Home Likely no CM needs    Transportation: Anticipate Family/friend    PT Recommendations:    OT Recommendations:        Barriers to Discharge: medical stability    Prior Living Situation: apartment with child(sisi), dependent, spouse    Discussed  Partnership in Safe Discharge Planning  document with patient/family: No     Handoff Completed: No, handoff not indicated or clinically appropriate    Patient/Spokesperson Updated: No    Additional Information:  Chart reviewed. cont TPN, trial full liquids, cont Iv ABX. Writer did reach out to the provider to see if pt will need TPN at discharge and was told that the pt will not be discharging on TPN but still needs a few more days here in the hospital.        Sandie House RN

## 2024-10-25 NOTE — PHARMACY-CONSULT NOTE
"Pharmacy Note: Parenteral Nutrition (PN) Management    Pharmacist consulted to dose PN for Dain Tejeda, a 44 year old female    Subjective:    The patient is a new PN start.    The patient was started on PN in the hospital on 10/22/24.    Indication for PN therapy: IBS (Crohn's with fistula), Abscesses    Inadequate nutrition anticipated for > 7 days.     Enteral nutrition contraindicated due to: failed enteral feeding trial.    Pertinent diseases and other special considerations  Soy allergy    Social History     Tobacco Use    Smoking status: Never    Smokeless tobacco: Never   Substance Use Topics    Alcohol use: No    Drug use: No     Objective:    Ht Readings from Last 1 Encounters:   09/11/24 1.575 m (5' 2\")     Wt Readings from Last 1 Encounters:   10/24/24 43.3 kg (95 lb 8 oz)       Body mass index is 17.47 kg/m .    Patient Vitals for the past 96 hrs:   Weight   10/24/24 0652 43.3 kg (95 lb 8 oz)       Labs:  Last 3 days:  Recent Labs     10/22/24  1231 10/22/24  1828 10/23/24  0025 10/23/24  0619 10/23/24  1200 10/23/24  1903 10/24/24  0538 10/25/24  0526   NA  --   --   --  138  --   --  140 140   POTASSIUM 3.3* 3.4 3.4 3.7  3.7  --   --  3.6 3.9   CHLORIDE  --   --   --  104  --   --  108* 105   CO2  --   --   --  26  --   --  26 28   BUN  --   --   --  5.4*  --   --  3.4* 13.1   CR  --   --   --  0.67  --   --  0.52 0.58   ALEX  --   --   --  7.6*  --   --  7.8* 8.0*   MAG  --   --   --  1.4* 2.1  --  1.7 1.6*   PHOS  --   --   --  1.8*  --  2.5 3.4 3.7   PROTTOTAL  --   --   --  6.4  --   --   --   --    ALBUMIN  --   --   --  2.2*  --   --   --   --    PREALB  --   --   --  5.7*  --   --   --   --    HGB  --   --   --  8.1*  --   --  7.7* 8.1*   HCT  --   --   --  26.0*  --   --  25.7* 26.3*   PLT  --   --   --  320  --   --  282 334   BILITOTAL  --   --   --  0.2  --   --   --   --    AST  --   --   --  12  --   --   --   --    ALT  --   --   --  7  --   --   --   --    ALKPHOS  --   --   --  117  --   " --   --   --    INR  --   --   --  1.12  --   --   --   --        Glucose (past 48 hours):   Recent Labs     10/23/24  1221 10/23/24  1845 10/24/24  0009 10/24/24  0538 10/24/24  0607 10/24/24  1249 10/24/24  2022 10/25/24  0043 10/25/24  0526   * 121* 98 113* 109* 88 107* 139* 107*       Intake/Output (last 24 hours): I/O last 3 completed shifts:  In: 1840 [P.O.:1120; I.V.:720]  Out: 800 [Urine:800]    Estimated CrCl: Estimated Creatinine Clearance: 86.4 mL/min (based on SCr of 0.58 mg/dL).    Assessment:    Continue patient on PN therapy as a continuous central therapy.     Given the patient's current condition/oral intake, PN is still indicated.    Lab results reviewed: 10/25/24  Electrolytes are largely within normal range. Will increase Mg, and adjust Cl:Ac ratio.       Plan:  Rate of PN: 45 mL/hr   Formula:   Amino Acids 61 grams  Dextrose 222 grams  Sodium 60 mEq/day  Potassium 50 mEq/day  Calcium 8 mEq/day  Magnesium 20 mEq/day  Phosphorus 30 mMol/day  Chloride: Acetate Ratio 1:3  Standard Multivitamins w/Vitamin K  Trace Elements  Other Thiamine 100 mg   Fat Emulsion: 20%, 250 mL IV Daily   Check ionized calcium and BMP, Mg, Phos,  labs tomorrow.  Pharmacist will continue to follow the patient's lab results, clinical status and blood glucose results and make adjustments as appropriate.    Thank you for the consult.  Judy Gómez, [facility]  10/25/2024 8:50 AM

## 2024-10-26 VITALS
WEIGHT: 95.5 LBS | TEMPERATURE: 98 F | BODY MASS INDEX: 17.47 KG/M2 | HEART RATE: 70 BPM | RESPIRATION RATE: 18 BRPM | SYSTOLIC BLOOD PRESSURE: 91 MMHG | OXYGEN SATURATION: 99 % | DIASTOLIC BLOOD PRESSURE: 52 MMHG

## 2024-10-26 LAB
ANION GAP SERPL CALCULATED.3IONS-SCNC: 10 MMOL/L (ref 7–15)
BUN SERPL-MCNC: 18.5 MG/DL (ref 6–20)
CALCIUM SERPL-MCNC: 8.6 MG/DL (ref 8.8–10.4)
CHLORIDE SERPL-SCNC: 102 MMOL/L (ref 98–107)
CREAT SERPL-MCNC: 0.62 MG/DL (ref 0.51–0.95)
EGFRCR SERPLBLD CKD-EPI 2021: >90 ML/MIN/1.73M2
ERYTHROCYTE [DISTWIDTH] IN BLOOD BY AUTOMATED COUNT: 13.3 % (ref 10–15)
GLUCOSE BLDC GLUCOMTR-MCNC: 129 MG/DL (ref 70–99)
GLUCOSE SERPL-MCNC: 99 MG/DL (ref 70–99)
HCO3 SERPL-SCNC: 28 MMOL/L (ref 22–29)
HCT VFR BLD AUTO: 27.5 % (ref 35–47)
HGB BLD-MCNC: 8.4 G/DL (ref 11.7–15.7)
MAGNESIUM SERPL-MCNC: 2 MG/DL (ref 1.7–2.3)
MCH RBC QN AUTO: 28.1 PG (ref 26.5–33)
MCHC RBC AUTO-ENTMCNC: 30.5 G/DL (ref 31.5–36.5)
MCV RBC AUTO: 92 FL (ref 78–100)
PHOSPHATE SERPL-MCNC: 4.3 MG/DL (ref 2.5–4.5)
PLATELET # BLD AUTO: 387 10E3/UL (ref 150–450)
POTASSIUM SERPL-SCNC: 4.3 MMOL/L (ref 3.4–5.3)
RBC # BLD AUTO: 2.99 10E6/UL (ref 3.8–5.2)
SODIUM SERPL-SCNC: 140 MMOL/L (ref 135–145)
WBC # BLD AUTO: 5.6 10E3/UL (ref 4–11)

## 2024-10-26 PROCEDURE — 80048 BASIC METABOLIC PNL TOTAL CA: CPT

## 2024-10-26 PROCEDURE — 250N000011 HC RX IP 250 OP 636

## 2024-10-26 PROCEDURE — 99238 HOSP IP/OBS DSCHRG MGMT 30/<: CPT | Mod: GC | Performed by: FAMILY MEDICINE

## 2024-10-26 PROCEDURE — 83735 ASSAY OF MAGNESIUM: CPT | Performed by: FAMILY MEDICINE

## 2024-10-26 PROCEDURE — 85014 HEMATOCRIT: CPT

## 2024-10-26 PROCEDURE — 250N000009 HC RX 250

## 2024-10-26 PROCEDURE — 85027 COMPLETE CBC AUTOMATED: CPT

## 2024-10-26 PROCEDURE — 84100 ASSAY OF PHOSPHORUS: CPT | Performed by: FAMILY MEDICINE

## 2024-10-26 RX ORDER — PANTOPRAZOLE SODIUM 40 MG/1
40 TABLET, DELAYED RELEASE ORAL
Status: DISCONTINUED | OUTPATIENT
Start: 2024-10-27 | End: 2024-10-26 | Stop reason: HOSPADM

## 2024-10-26 RX ADMIN — PIPERACILLIN AND TAZOBACTAM 3.38 G: 3; .375 INJECTION, POWDER, FOR SOLUTION INTRAVENOUS at 00:33

## 2024-10-26 RX ADMIN — PIPERACILLIN AND TAZOBACTAM 3.38 G: 3; .375 INJECTION, POWDER, FOR SOLUTION INTRAVENOUS at 09:45

## 2024-10-26 RX ADMIN — ENOXAPARIN SODIUM 30 MG: 30 INJECTION SUBCUTANEOUS at 16:53

## 2024-10-26 RX ADMIN — PANTOPRAZOLE SODIUM 40 MG: 40 INJECTION, POWDER, FOR SOLUTION INTRAVENOUS at 09:45

## 2024-10-26 ASSESSMENT — ACTIVITIES OF DAILY LIVING (ADL)
ADLS_ACUITY_SCORE: 0

## 2024-10-26 NOTE — PLAN OF CARE
Problem: Adult Inpatient Plan of Care  Goal: Plan of Care Review  Description: The Plan of Care Review/Shift note should be completed every shift.  The Outcome Evaluation is a brief statement about your assessment that the patient is improving, declining, or no change.  This information will be displayed automatically on your shift  note.  Outcome: Progressing     Problem: Pain Acute  Goal: Optimal Pain Control and Function  Outcome: Progressing  Intervention: Prevent or Manage Pain  Recent Flowsheet Documentation  Taken 10/26/2024 1600 by Oralia Quiñones RN  Medication Review/Management: medications reviewed  Intervention: Optimize Psychosocial Wellbeing  Recent Flowsheet Documentation  Taken 10/26/2024 1600 by Oralia Quiñones RN  Supportive Measures: active listening utilized     Problem: Comorbidity Management  Goal: Blood Pressure in Desired Range  Outcome: Progressing  Intervention: Maintain Blood Pressure Management  Recent Flowsheet Documentation  Taken 10/26/2024 1600 by Oralia Quiñones RN  Medication Review/Management: medications reviewed     Problem: Infection  Goal: Absence of Infection Signs and Symptoms  Outcome: Progressing     Problem: Skin Injury Risk Increased  Goal: Skin Health and Integrity  Outcome: Progressing  Intervention: Plan: Nurse Driven Intervention: Moisture Management  Recent Flowsheet Documentation  Taken 10/26/2024 1600 by Oralia Quiñones RN  Moisture Interventions: Fecal collection device  Intervention: Plan: Nurse Driven Intervention: Friction and Shear  Recent Flowsheet Documentation  Taken 10/26/2024 1600 by Oralia Quiñones RN  Friction/Shear Interventions: HOB 30 degrees or less  Intervention: Optimize Skin Protection  Recent Flowsheet Documentation  Taken 10/26/2024 1600 by Oralia Quiñones, RN  Activity Management: activity adjusted per tolerance  Head of Bed (HOB) Positioning: HOB at 20-30 degrees     Problem: Colostomy  Goal: Absence of Bleeding  Outcome: Progressing    Goal Outcome Evaluation:                    Pt. Discharged at around 5.40 pm. She was wheeled by staff downstairs.  pickup. TPN discontinued. PICC line removed. VSS.

## 2024-10-26 NOTE — PLAN OF CARE
Problem: Pain Acute  Goal: Optimal Pain Control and Function  Outcome: Progressing  Intervention: Prevent or Manage Pain  Recent Flowsheet Documentation  Taken 10/26/2024 0945 by Maria Eugenia Medina RN  Medication Review/Management: medications reviewed  Intervention: Optimize Psychosocial Wellbeing  Recent Flowsheet Documentation  Taken 10/26/2024 0945 by Maria Eugenia Medina RN  Supportive Measures: active listening utilized     Problem: Colostomy  Goal: Nausea and Vomiting Relief  Outcome: Progressing     Problem: Parenteral Nutrition  Goal: Effective Intravenous Nutrition Therapy Delivery  Outcome: Progressing  Intervention: Optimize Intravenous Nutrition Delivery  Recent Flowsheet Documentation  Taken 10/26/2024 0945 by Maria Eugenia Medina RN  Medication Review/Management: medications reviewed   Goal Outcome Evaluation:               New orders for TPN to taper tonight and current bag will be last.  Eating small amounts low fiber diet.  Denies pain.

## 2024-10-26 NOTE — DISCHARGE SUMMARY
[facility]  Discharge Summary - Medicine & Pediatrics       Date of Admission:  10/19/2024  Date of Discharge:  10/26/2024  Discharging Provider: Td Andrew  Discharge Service: Hospitalist Service    Discharge Diagnoses   Crohn's disease of colon with abscess (H)  Generalized abdominal pain  Sepsis, due to unspecified organism, unspecified whether acute organ dysfunction present (H)  Colitis  Intra-abdominal abscess (H)        Clinically Significant Risk Factors     # Severe Malnutrition: based on nutrition assessment      Follow-ups Needed After Discharge   Close follow up with GI and colorectal surgery.     Unresulted Labs Ordered in the Past 30 Days of this Admission       No orders found from 9/19/2024 to 10/20/2024.            Discharge Disposition   Discharged to home  Condition at discharge: Stable    Hospital Course   Dain Tejeda with past medical history significant for Crohn's disease s/p colectomy with colostomy creation. She was admitted on 10/19/2024 for abdominal pain and fth intraabdominal abscess formation.   The following problems were addressed during her hospitalization:       Sepsis  Pericolonic abscess   History of Crohn's disease, celiac disease   Status post partial colectomy with current colostomy  Malnutrition  Patient with known history of Crohn's disease since 2022, complicated by stricture and status post partial colectomy with colostomy creation. Hypotensive and tachycardic on admission with a temperature of 99.65 concerning for sepsis. Labs on admission remarkable to WBC of 13, mag 1.5, and creatinine 1.00. Lactic of 1.2 on admission. CT imaging in the ED showing mural thickening of the transverse colon from the hepatic flexure to the colostomy site worsened from prior exam as well as two pericolonic abscesses which  may be connected. Air and fluid seen in the proximal and distal transverse colon concerning for possible microperforation. Colorectal  surgery consulted, recommended non-operative management with IV antibiotics. Recommended against IR drainage at this time.  Followed by colorectal and GI has ongoing poor nutritional status limited enteral intake given fistulizing Crohn disease recs to start TPN 10/22-10/26 patient continued to report improvement in her symptoms and meeting nutritional needs per mouth prior to discharge. Patient is discharged with Augmentin for 2 months and close follow up with CRS and GI.   - continue to hold PTA azathioprine and Humira until seen by GI outpatient- Protonix 40 mg daily   - amoxicillin-clavulanate (AUGMENTIN) 875-125 MG tablet; Take 1 tablet by mouth 2 times daily.   - close follow up with GI and colorectal surgery      LELIA, improving  Creatinine elevated at 1.0 on admission up from baseline around 0.6.  Likely in the setting of nausea and vomiting and thus likely prerenal.  Status post 1 L bolus NS in the ED, continues on maintenance IV fluids due to appropriate intake. Discontinue maintenance fluids 10/24 and continued to remain stable.      Chronic normocytic anemia  Hemoglobin on admission 11.1 actually up from baseline between 8 and 10.  No signs or symptoms of acute blood loss anemia, denies dark stools. No dark stool or phoenix blood in ostomy bag on exam. Will monitor. 10/24, Hgb dropped to 7.7, 10/24, repeat Hgb stable, will continue to monitor.        History of disorganized behaviors  Noted on previous hospital stays.  Has previously responded well to Seroquel 12.5 mg while admitted.  Stable on admission, recommend monitoring closely. May consider adding Seroquel if needed, hold off for now.      History pelvic cyst  Known history.  CT abdomen pelvis on admission redemonstrating ovarian cyst, confirmed with pelvic ultrasound.  Recommend follow-up pelvic ultrasound in 6 to 12 months.  No gynecologic symptoms reported, will continue to monitor.  - outpatient pelvic ultrasound in 6-12 months         Electrolytes disturbances  Malnutrition   potassium 3.3, likely in the setting of Crohn's and poor nutritional status. Will start potassium replacement protocol and monitor closely. Monitor for refeeding syndrome.  -Potassium, mag and phos replacement protocol       Consultations This Hospital Stay   COLORECTAL SURGERY IP CONSULT  GASTROENTEROLOGY IP CONSULT  CARE MANAGEMENT / SOCIAL WORK IP CONSULT  NUTRITION SERVICES ADULT IP CONSULT  PHARMACY/NUTRITION TO START AND MANAGE TPN  VASCULAR ACCESS ADULT IP CONSULT  PHARMACY IP CONSULT  VASCULAR ACCESS ADULT IP CONSULT  PHARMACY/NUTRITION TO START AND MANAGE TPN  PHARMACY IP CONSULT  PHARMACY IP CONSULT  PHARMACY IP CONSULT  PHARMACY IP CONSULT    Code Status   Full Code       The patient was discussed with Dr. Randy Bello MD  [facility] [address] [place] [address]  Phone: [phone]  Fax: [phone]  ______________________________________________________________________    Physical Exam   Vital Signs: Temp: 98.3  F (36.8  C) Temp src: Oral BP: 91/55 Pulse: 58   Resp: 16 SpO2: 100 % O2 Device: None (Room air)    Weight: 95 lbs 8 oz  General Appearance: : Alert, oriented, NAD  Respiratory: Normal breath sounds bilaterally no wheezing or crackles  Cardiovascular: Normal S1, S2, no murmur  GI: Soft nondistended, nontender, colostomy with brown stool  Skin: Normal appearance and turgor no visible rash  MSK no lower limb edema bilaterally      Primary Care Physician   Kevin Woodruff    Discharge Orders      Primary Care - Care Coordination Referral      Reason for your hospital stay    Admitted with abdominal pain found to have a pericolonic abscess and required intravenous antibiotics, nutrition and fluids. Your symptoms continue to improve and you will continue with oral antibiotics for two months and follow up closely with GI and colorectal surgery.     Activity    Your activity upon discharge:  activity as tolerated     Diet    Follow this diet upon discharge: Current Diet:Orders Placed This Encounter            Low Fiber Diet     Hospital Follow-up with Existing Primary Care Provider (PCP)            Discharge Follow-up Details    Hospital Follow-up with Existing Primary Care Provider (PCP)   Expected   date: Oct 26, 2024      Follow Up Appointment Details:     Schedule Primary Care visit within: 7 Days                Primary Care - Care Coordination Referral      Reason for Referral: Care Transition    Transition: Home care discharge    Clinical Staff have discussed the Care Coordination Referral with the patient and/or caregiver: No            Significant Results and Procedures   Most Recent 3 CBC's:  Recent Labs   Lab Test 10/26/24  0649 10/25/24  0526 10/24/24  0538   WBC 5.6 5.5 4.1   HGB 8.4* 8.1* 7.7*   MCV 92 92 94    334 282     Most Recent 3 BMP's:  Recent Labs   Lab Test 10/26/24  0649 10/26/24  0010 10/25/24  1750 10/25/24  1144 10/25/24  0526 10/24/24  0607 10/24/24  0538     --   --   --  140  --  140   POTASSIUM 4.3  --   --   --  3.9  --  3.6   CHLORIDE 102  --   --   --  105  --  108*   CO2 28  --   --   --  28  --  26   BUN 18.5  --   --   --  13.1  --  3.4*   CR 0.62  --   --   --  0.58  --  0.52   ANIONGAP 10  --   --   --  7  --  6*   ALEX 8.6*  --   --   --  8.0*  --  7.8*   GLC 99 129* 113*   < > 107*   < > 113*    < > = values in this interval not displayed.   ,   Results for orders placed or performed during the hospital encounter of 10/19/24   CT Abdomen Pelvis w Contrast    Narrative    EXAM: CT ABDOMEN PELVIS W CONTRAST  LOCATION: [facility]  DATE: 10/20/2024    INDICATION: abdominal pain, nausea, vomiting, ileostomy in place  COMPARISON: None.  TECHNIQUE: CT scan of the abdomen and pelvis was performed following injection of IV contrast. Multiplanar reformats were obtained. Dose reduction techniques were used.  CONTRAST: isovue 370  90ml    FINDINGS:   LOWER CHEST: Normal.    HEPATOBILIARY: No significant mass or bile duct dilatation. No calcified gallstones.     PANCREAS: No significant mass, duct dilatation, or inflammatory change.    SPLEEN: Normal size.    ADRENAL GLANDS: No significant nodules.    KIDNEYS/BLADDER: No significant mass, stone, or hydronephrosis.    BOWEL: Postsurgical changes from left midabdomen colostomy are again noted with section of the descending colon again noted. There is marked mural thickening of the transverse colon extending from the hepatic flexure to the colostomy site. There is a   pericolonic abscess containing gas and fluid seen adjacent to the distal transverse colon measuring roughly 2 x 4.4 x 4.1 cm (image 78, series 3; image 23, series 4), extending to just proximal to the colostomy site. There is a pericolonic abscess   encasing the proximal transverse colon measuring roughly 1.5 x 2.6 x 5.6 cm (image 88, series 3; image 94, series 3) gas and fluid is seen within the abscess cavity.    Mild mural thickening of the distal ascending colon is noted the cecum is within normal limits. The stomach, duodenum and small bowel are otherwise unremarkable.    LYMPH NODES: There is an increased number of mesenteric subcentimeter lymph nodes, slightly worsened from prior exam.    VASCULATURE: No abdominal aortic aneurysm.    PELVIC ORGANS: Prominent left adnexal cyst measuring 7 x 4 cm, is unchanged from prior exam.     MUSCULOSKELETAL: Unchanged.      Impression    IMPRESSION:   1.  Persistent mural thickening of the transverse colon from the hepatic flexure to the level of the colostomy site, worsened from prior exam concerning for worsening long segment colitis, secondary to patient's known history of Crohn's disease.   2.  Two pericolonic abscesses which may be connected, with air and fluid seen within the proximal and distal transverse colon abscess measuring roughly 5.6 and 4.1 cm, as described above,  given  the presence of air possibility of microperforation of the   colon and is of concern although no definitive communication within the abscess cavity and colon is seen at this time.       Discharge Medications   Current Discharge Medication List        START taking these medications    Details   amoxicillin-clavulanate (AUGMENTIN) 875-125 MG tablet Take 1 tablet by mouth 2 times daily.  Qty: 120 tablet, Refills: 0    Associated Diagnoses: Intra-abdominal abscess (H)           CONTINUE these medications which have NOT CHANGED    Details   famotidine (PEPCID) 10 MG tablet Take 10 mg by mouth 2 times daily.           STOP taking these medications       adalimumab (HUMIRA *CF*) 40 MG/0.4ML prefilled syringe kit Comments:   Reason for Stopping:         azaTHIOprine (IMURAN) 50 MG tablet Comments:   Reason for Stopping:         inFLIXimab-axxq (AVSOLA IV) Comments:   Reason for Stopping:             Allergies   Allergies   Allergen Reactions    Gluten Meal      Celiac    Soy Allergy Anaphylaxis, Hives and Itching     Tofu- causes itching and sores in the mouth

## 2024-10-26 NOTE — PROGRESS NOTES
Care Management Discharge Note    Discharge Date: 10/27/2024       Discharge Disposition: Home    Discharge Services: None    Discharge DME: None    Discharge Transportation: family or friend will provide    Private pay costs discussed: Not applicable    Does the patient's insurance plan have a 3 day qualifying hospital stay waiver?  No    PAS Confirmation Code: N/A  Patient/family educated on Medicare website which has current facility and service quality ratings: no    Education Provided on the Discharge Plan: Yes  Persons Notified of Discharge Plans: Per Team  Patient/Family in Agreement with the Plan: yes    Handoff Referral Completed: No, handoff not indicated or clinically appropriate    Additional Information:  Plan is for Pt to discharge home with family support. Will have TPN discontinued before discharge. Switch to PO antibiotics. Family will be able to support as needed after discharge. Family to transport. No further CM needs requested or anticipated.    CM will sign off. Please contact CM if any additional needs arise prior to discharge.      HIRA Ann

## 2024-10-26 NOTE — PROGRESS NOTES
Nutrition Therapy  Parenteral Nutrition follow-up       Recommendations to MD  Met 100% of estimated nutrition needs with PO yesterday, could discontinue TPN     Dietitian to Pharmacy -no changes    Rate of TPN: 45 ml/hr   Grams Dextrose: 222 g  Grams Protein: 61 g    Lipids: 20 g Omegaven daily      RD Interventions  - Calorie count      Future Recommendations/Monitoring  - Monitor TPN tolerance, s/s of refeeding, p.o intake, weight  - Check triglycerides in 3 days if still on TPN         Current Nutrition Intake:  Diet: Low fiber  Supplement: gel plus tid with meals + ensure clear tid = 1170 kcal, 84 g protein total     Calorie Count: 2095+ kcals and 107+ gm protein  Pt also ate 100% of food from home, fish and fish soup  Dinner meal ticket saved, no % eaten for food items, per flowsheets at 75%    Nutrition Support: 10/22 PICC placed  and TPN started    Custom TPN   @45 ml/hr Dextrose 222 g AA 61 g with 20 g Omegaven (2 x 100 ml bottles) + mvi with minerals and thiamine 100 mg x 10 days    Provides= 1080 ml, 222 g cho, 61 g protein, 20 g fat, 1225 kcal/day to meet 100% of estimated kcal and protein needs    Weight Trends  Admission wt: 45.8 kg (101 lb) Stated?  Weight Hx: Up 6 lb past 4 days  Date/Time Weight Weight Method   10/24/24 0652 43.3 kg (95 lb 8 oz) Standing scale   10/20/24 0941 40.8 kg (89 lb 14.4 oz) Standing scale   10/20/24 0347 41.6 kg (91 lb 11.4 oz) --   10/19/24 2208 45.8 kg (101 lb) --     Dosing Weight: 40.8 kg     ASSESSED NUTRITION NEEDS  Estimated Energy Needs: 7526-6513 kcals/day (30 - 35 kcals/kg )  Justification: Repletion and Underweight  Estimated Protein Needs: 49-61 grams protein/day (1.2 - 1.5 grams of pro/kg)  Justification: Increased needs  Estimated Fluid Needs: 1329-7062 mL/day (1 mL/kcal)   Justification: Maintenance    GI:  Colostomy 900 mL OP 10/25/24    Labs:   On mag, phos and K+ standard replacement  Glucose 129    Medications:   Reviewed by dietitian   iv protonix, iv  abx    MALNUTRITION:  % Weight Loss:  > 5% in 1 month (severe malnutrition)  % Intake:  <50% > 5 days (severe malnutrition)  Subcutaneous Fat Loss: lumbar mild loss, thoracic moderate to severe loss  Muscle Loss:  Moderate loss temporalis, buccal, clavicle, deltoid  Fluid Retention:  None noted   Pt reports she feels she has lost weight all over since her surgery     Malnutrition Diagnosis: Severe in acute illness    Nutrition Diagnosis  Inadequate nutrition intake r/t altered GI function evidenced by unable to tolerate advanced diet, poor appetite, weight loss     Goals  -Maintain/gain weight- progressing  -Meet minimum 1225 kcal, 49 g protein per dosing weight 40.8 kg per day- met with po yesterday (and TPN)  -Advance diet - met  -Electrolytes WNL- no new labs today    Interventions  Continue same TPN  Calorie count x 3 days    Monitoring/Evaluation  Progress toward goals will be monitored and evaluated per protocol.     Addendum:  Spoke with pharmacist who spoke with colorectal surgery-plan to taper and stop TPN today.

## 2024-10-26 NOTE — PLAN OF CARE
Problem: Adult Inpatient Plan of Care  Goal: Plan of Care Review  Description: The Plan of Care Review/Shift note should be completed every shift.  The Outcome Evaluation is a brief statement about your assessment that the patient is improving, declining, or no change.  This information will be displayed automatically on your shift  note.  Outcome: Progressing     Problem: Comorbidity Management  Goal: Blood Pressure in Desired Range  Outcome: Progressing     Problem: Infection  Goal: Absence of Infection Signs and Symptoms  Outcome: Progressing     Problem: Skin Injury Risk Increased  Goal: Skin Health and Integrity  Outcome: Progressing  Intervention: Plan: Nurse Driven Intervention: Friction and Shear  Recent Flowsheet Documentation  Taken 10/25/2024 1545 by Oralia Quiñones RN  Friction/Shear Interventions: HOB 30 degrees or less  Intervention: Optimize Skin Protection  Recent Flowsheet Documentation  Taken 10/25/2024 1545 by Oralia Quiñones RN  Activity Management: ambulated to bathroom     Problem: Colostomy  Goal: Absence of Bleeding  Outcome: Progressing     Problem: Parenteral Nutrition  Goal: Effective Intravenous Nutrition Therapy Delivery  Outcome: Progressing     Problem: Adult Inpatient Plan of Care  Goal: Absence of Hospital-Acquired Illness or Injury  Intervention: Identify and Manage Fall Risk  Recent Flowsheet Documentation  Taken 10/25/2024 1545 by Oralia Quiñones RN  Safety Promotion/Fall Prevention:   activity supervised   clutter free environment maintained  Intervention: Prevent Infection  Recent Flowsheet Documentation  Taken 10/25/2024 1545 by Oralia Quiñones RN  Infection Prevention: hand hygiene promoted     Problem: Pain Acute  Goal: Optimal Pain Control and Function  Intervention: Optimize Psychosocial Wellbeing  Recent Flowsheet Documentation  Taken 10/25/2024 1545 by Oralia Quiñones RN  Supportive Measures: active listening utilized     Problem: Anemia  Goal: Anemia Symptom  Improvement  Intervention: Monitor and Manage Anemia  Recent Flowsheet Documentation  Taken 10/25/2024 1545 by Oralia Quiñones, RN  Safety Promotion/Fall Prevention:   activity supervised   clutter free environment maintained     Problem: Bowel Disease, Inflammatory (Ulcerative Colitis or Crohn's Disease)  Goal: Optimal Adaptation to Chronic Illness  Intervention: Support Psychosocial Response to Illness  Recent Flowsheet Documentation  Taken 10/25/2024 1545 by Oralia Quiñones, RN  Supportive Measures: active listening utilized   Goal Outcome Evaluation:                  Pt. Denied pain. States that she is doing better today. TPN running at 45 ml/hr. Omegaven is infusing as well. RA. Pt. ate 100% food from home. IV abx given. Continue with POC.

## 2024-10-26 NOTE — PROGRESS NOTES
Colon and Rectal Surgery  Daily Progress Note    Subjective  Patient was seen this morning with the aid of a phone .    Patient reports feeling well. Denies any abdominal pain or nausea. Ate all of her meals yesterday. Having good ostomy output.    Objective    Intake/Output Summary (Last 24 hours) at 10/26/2024 1042  Last data filed at 10/26/2024 0650  Gross per 24 hour   Intake 2397 ml   Output 900 ml   Net 1497 ml       Temp:  [97.3  F (36.3  C)-98.3  F (36.8  C)] 98.3  F (36.8  C)  Pulse:  [58-78] 58  Resp:  [16-18] 16  BP: (80-92)/(51-59) 91/55  SpO2:  [97 %-100 %] 100 %    Physical Exam:  General: awake, alert, lying in bed, in no acute distress  Head: normocephalic, atraumatic  Respiratory: non-labored breathing  Abdomen: soft, non- tender, non-distended  Stoma: Pink, brown, loose stool in appliance.   Skin: No rashes or lesions  Musculoskeletal: moves all four extremities equally  Psychological: alert and oriented, answers questions appropriately    Pertinent Labs  Lab Results: personally reviewed.  WBC 5 from 5  Hb 8.4 from 8.1  Cr 0.6    Assessment/Plan: This is a 44 year old female with fistulizing Crohn's disease presenting with abdominal pain and intra-abdominal abscesses, clinically improving with adequate PO intake and resolution of GI symptoms.    Recommendations:  - OK for discharge home today from CRS perspective. We will arrange for close outpatient CRS follow up.  - Low fiber diet  - Appears to be meeting nutritional needs by mouth, does not require TPN on discharge from CRS perspective  - Lovenox dvt ppx while admitted  - Transition IV antibiotcs to PO Augmentin for 2 month course on discharge  - Would avoid surgical intervention given her poor nutrition status   - OOB/Ambulate    Claudio Beckham MD  Fellow, Colon & Rectal Surgery  [facility]  10/26/2024  10:44 AM    Colorectal Surgery can be reached at [phone] at all times. Between 5pm and 7am you will be connected to  the on-call physician

## 2024-10-26 NOTE — PLAN OF CARE
Problem: Adult Inpatient Plan of Care  Goal: Optimal Comfort and Wellbeing  Outcome: Progressing   Goal Outcome Evaluation:  No new issues overnight.  Slept between cares.  Denied abdominal pain.  Colostomy pouch with 200cc loose output. Up to bedside commode independently. Picc line patent and with good blood return.  TPN and lipids infusing without incident.  Zosyn given as well overnight.

## 2024-10-26 NOTE — PHARMACY-CONSULT NOTE
"ADDENDUM: Per CRS : adequate PO intake, ok to discharge home today, does not require TPN on discharge, ok to taper tonight's TPN then stop      Pharmacy Note: Parenteral Nutrition (PN) Management    Pharmacist consulted to dose PN for Dain Tejeda, a 44 year old female     Subjective:     The patient is a new PN start.     The patient was started on PN in the hospital on 10/22/24.     Indication for PN therapy: IBS (Crohn's with fistula), Abscesses     Inadequate nutrition anticipated for > 7 days.      Enteral nutrition contraindicated due to: failed enteral feeding trial.     Pertinent diseases and other special considerations  Soy allergy       Social History     Tobacco Use    Smoking status: Never    Smokeless tobacco: Never   Substance Use Topics    Alcohol use: No    Drug use: No     Objective:    Ht Readings from Last 1 Encounters:   09/11/24 1.575 m (5' 2\")     Wt Readings from Last 1 Encounters:   10/24/24 43.3 kg (95 lb 8 oz)       Body mass index is 17.47 kg/m .    Patient Vitals for the past 96 hrs:   Weight   10/24/24 0652 43.3 kg (95 lb 8 oz)       Labs:  Last 3 days:  Recent Labs     10/23/24  1200 10/23/24  1903 10/24/24  0538 10/25/24  0526 10/26/24  0649   NA  --   --  140 140 140   POTASSIUM  --   --  3.6 3.9 4.3   CHLORIDE  --   --  108* 105 102   CO2  --   --  26 28 28   BUN  --   --  3.4* 13.1 18.5   CR  --   --  0.52 0.58 0.62   ALEX  --   --  7.8* 8.0* 8.6*   MAG 2.1  --  1.7 1.6* 2.0   PHOS  --  2.5 3.4 3.7 4.3   PROTTOTAL  --   --   --  6.3*  --    ALBUMIN  --   --   --  2.4*  --    HGB  --   --  7.7* 8.1* 8.4*   HCT  --   --  25.7* 26.3* 27.5*   PLT  --   --  282 334 387   BILITOTAL  --   --   --  <0.2  --    AST  --   --   --  15  --    ALT  --   --   --  9  --    ALKPHOS  --   --   --  86  --        Glucose (past 48 hours):   Recent Labs     10/24/24  1249 10/24/24  2022 10/25/24  0043 10/25/24  0526 10/25/24  1144 10/25/24  1750 10/26/24  0010 10/26/24  0649   GLC 88 107* 139* 107* 118* 113* " 129* 99       Intake/Output (last 24 hours): I/O last 3 completed shifts:  In: 2817 [P.O.:1220]  Out: 900 [Stool:900]    Estimated CrCl: Estimated Creatinine Clearance: 80.8 mL/min (based on SCr of 0.62 mg/dL).    Assessment:  Continue patient on PN therapy as a continuous central therapy.      Given the patient's current condition/oral intake, PN is still indicated.     Lab results reviewed: 10/25/24  Electrolytes are largely within normal range. Will decrease phos since it is trending toward the upper end of the range         Plan:  Rate of PN: 45 mL/hr   Formula:   Amino Acids 61 grams  Dextrose 222 grams  Sodium 60 mEq/day  Potassium 50 mEq/day  Calcium 8 mEq/day  Magnesium 20 mEq/day  Phosphorus 20 mMol/day  Chloride: Acetate Ratio 1:3  Standard Multivitamins w/Vitamin K  Trace Elements  Other Thiamine 100 mg   Fat Emulsion: 20%, 250 mL IV Daily   Check BMP, Mg, Phos,  labs tomorrow.  Pharmacist will continue to follow the patient's lab results, clinical status and blood glucose results and make adjustments as appropriate.    Thank you for the consult.  Ursula Machuca RPH  10/26/2024 10:06 AM

## 2024-11-04 ENCOUNTER — APPOINTMENT (OUTPATIENT)
Dept: INTERPRETER SERVICES | Facility: CLINIC | Age: 44
End: 2024-11-04
Payer: COMMERCIAL

## 2024-11-04 ENCOUNTER — TELEPHONE (OUTPATIENT)
Dept: FAMILY MEDICINE | Facility: CLINIC | Age: 44
End: 2024-11-04
Payer: COMMERCIAL

## 2024-11-04 NOTE — TELEPHONE ENCOUNTER
Attempted to contact the patient via  to assist with scheduling a hospital follow up. Hospitalized 10/19/24 - 10/26/24 for Crohn's Disease, Abdominal pain, sepsis, colitis and intra-abdominal abscess.     Bety Giron, [facility]  Clinic Care Coordinator

## 2024-11-12 ENCOUNTER — OFFICE VISIT (OUTPATIENT)
Dept: FAMILY MEDICINE | Facility: CLINIC | Age: 44
End: 2024-11-12
Payer: COMMERCIAL

## 2024-11-12 VITALS
WEIGHT: 93 LBS | BODY MASS INDEX: 18.75 KG/M2 | DIASTOLIC BLOOD PRESSURE: 67 MMHG | TEMPERATURE: 98 F | HEIGHT: 59 IN | RESPIRATION RATE: 22 BRPM | OXYGEN SATURATION: 100 % | HEART RATE: 77 BPM | SYSTOLIC BLOOD PRESSURE: 105 MMHG

## 2024-11-12 DIAGNOSIS — Z93.3 COLOSTOMY PRESENT (H): ICD-10-CM

## 2024-11-12 DIAGNOSIS — Z90.49 S/P PARTIAL COLECTOMY: ICD-10-CM

## 2024-11-12 DIAGNOSIS — K50.114 CROHN'S DISEASE OF COLON WITH ABSCESS (H): Primary | ICD-10-CM

## 2024-11-12 DIAGNOSIS — R10.84 GENERALIZED ABDOMINAL PAIN: ICD-10-CM

## 2024-11-12 DIAGNOSIS — Z09 HOSPITAL DISCHARGE FOLLOW-UP: ICD-10-CM

## 2024-11-12 LAB
ANION GAP SERPL CALCULATED.3IONS-SCNC: 11 MMOL/L (ref 7–15)
BUN SERPL-MCNC: 15.3 MG/DL (ref 6–20)
CALCIUM SERPL-MCNC: 9.1 MG/DL (ref 8.8–10.4)
CHLORIDE SERPL-SCNC: 102 MMOL/L (ref 98–107)
CREAT SERPL-MCNC: 0.58 MG/DL (ref 0.51–0.95)
EGFRCR SERPLBLD CKD-EPI 2021: >90 ML/MIN/1.73M2
ERYTHROCYTE [DISTWIDTH] IN BLOOD BY AUTOMATED COUNT: 14.9 % (ref 10–15)
GLUCOSE SERPL-MCNC: 73 MG/DL (ref 70–99)
HCO3 SERPL-SCNC: 28 MMOL/L (ref 22–29)
HCT VFR BLD AUTO: 32.6 % (ref 35–47)
HGB BLD-MCNC: 10.2 G/DL (ref 11.7–15.7)
MCH RBC QN AUTO: 29.2 PG (ref 26.5–33)
MCHC RBC AUTO-ENTMCNC: 31.3 G/DL (ref 31.5–36.5)
MCV RBC AUTO: 93 FL (ref 78–100)
PLATELET # BLD AUTO: 296 10E3/UL (ref 150–450)
POTASSIUM SERPL-SCNC: 3 MMOL/L (ref 3.4–5.3)
RBC # BLD AUTO: 3.49 10E6/UL (ref 3.8–5.2)
SODIUM SERPL-SCNC: 141 MMOL/L (ref 135–145)
WBC # BLD AUTO: 5.7 10E3/UL (ref 4–11)

## 2024-11-12 RX ORDER — ONDANSETRON 4 MG/1
4 TABLET, ORALLY DISINTEGRATING ORAL EVERY 12 HOURS PRN
Qty: 15 TABLET | Refills: 0 | Status: ON HOLD | OUTPATIENT
Start: 2024-11-12

## 2024-11-12 ASSESSMENT — PATIENT HEALTH QUESTIONNAIRE - PHQ9
10. IF YOU CHECKED OFF ANY PROBLEMS, HOW DIFFICULT HAVE THESE PROBLEMS MADE IT FOR YOU TO DO YOUR WORK, TAKE CARE OF THINGS AT HOME, OR GET ALONG WITH OTHER PEOPLE: NOT DIFFICULT AT ALL
SUM OF ALL RESPONSES TO PHQ QUESTIONS 1-9: 7
SUM OF ALL RESPONSES TO PHQ QUESTIONS 1-9: 7

## 2024-11-12 NOTE — PROGRESS NOTES
Assessment & Plan     Hospital discharge follow-up  Crohn's disease of colon with abscess (H)  S/P partial colectomy  Colostomy present (H)  Generalized abdominal pain  Patient with known history of Crohn's disease since 2022, complicated by stricture and status post partial colectomy with colostomy creation.  CT imaging in the ED showing two pericolonic abscesses which  may be connected. Air and fluid seen in the proximal and distal transverse colon concerning for possible microperforation. Colorectal surgery consulted, recommended non-operative management with IV antibiotics. Recommended against IR drainage.  TPN 10/22-10/26.  Patient continued to report improvement in her symptoms and meeting nutritional needs per mouth prior to discharge. Patient is discharged with Augmentin for 2 months and close follow up with CRS and GI.   She has not been able to  her antibiotic because the prescribing provider was not in network.  She has also not scheduled follow-up with [facility] yet.  Her abdominal pain has been getting worse, she has been able to eat, but continues to have nausea and diarrhea.  Does not appear to be systemically ill today, denies fevers or chills at home, I strongly stressed return precautions and reasons to go to ER, I also printed these precautions in Hmong and had clinic staff highlight and go over these with her.  I resent the antibiotic today hoping that it might be covered if it is prescribed from clinic, also printed out GoodRx if still not covered.  I called [facility] and asked if her follow-up could be expedited, they will call her soon with  and patient was agreeable. I will schedule close follow-up with our clinic as well.  - CBC with platelets  - Basic metabolic panel  - amoxicillin-clavulanate (AUGMENTIN) 875-125 MG tablet  Dispense: 120 tablet; Refill: 0  - ondansetron (ZOFRAN ODT) 4 MG ODT tab  Dispense: 15 tablet; Refill: 0  - Follow-up in 1 week        MED REC REQUIRED  Post  Medication Reconciliation Status:       See Patient Instructions      Subjective   Dain is a 44 year old, presenting for the following health issues:  Hospital F/U (Needs pain med, got Rx for pain but unable to )    Not so good, lots of health issues  Felt better on discharge  Feels worse  More abdominal pain  Some nausea/vomiting, more so at night, most nights  Has been able to eat, soft foods, rice, vegetables, jello, yam  Diarrhea, no blood  Sometimes feels lightheaded when she has diarrhea  No fevers/chills    Was not able to get antibiotic from pharmacy since the doctor who prescribed it was not in the network, she has [employer] insurance  Does not have appointment with GI doctors yet    PATIENT HEALTH QUESTIONNAIRE-9 (PHQ - 9)    Over the last 2 weeks, how often have you been bothered by any of the following problems?    1. Little interest or pleasure in doing things -  (Patient-Rptd) Several days   2. Feeling down, depressed, or hopeless -  (Patient-Rptd) Not at all   3. Trouble falling or staying asleep, or sleeping too much - (Patient-Rptd) Nearly every day   4. Feeling tired or having little energy -  (Patient-Rptd) Nearly every day   5. Poor appetite or overeating -  (Patient-Rptd) Not at all   6. Feeling bad about yourself - or that you are a failure or have let yourself or your family down -  (Patient-Rptd) Not at all   7. Trouble concentrating on things, such as reading the newspaper or watching television - (Patient-Rptd) Not at all   8. Moving or speaking so slowly that other people could have noticed? Or the opposite - being so fidgety or restless that you have been moving around a lot more than usual (Patient-Rptd) Not at all   9. Thoughts that you would be better off dead or of hurting  yourself in some way (Patient-Rptd) Not at all   Total Score: (Patient-Rptd) 7     If you checked off any problems, how difficult have these problems made it for you to do your work, take care of things at home,  "or get along with other people?      Developed by Paddy Hale, Graciela Méndez, Yordan Ortiz and colleagues, with an educational lupe from [employer] No permission required to reproduce, translate, display or distribute. permission required to reproduce, translate, display or distribute.         11/12/2024     8:18 AM   Additional Questions   Roomed by Baljeet   Accompanied by CASANDRA         11/12/2024    Information    services provided? Yes   Language Hmong   Type of interpretation provided Face-to-face    name Mark Tejeda    ID [id number]    Agency [employer]    phone number [phone]        \[facility]\""     Hospital Follow-up Visit:    Hospital/Nursing Home/IP Rehab Facility: [facility]  Date of Admission: 10/19/24  Date of Discharge: 10/26/24  Reason(s) for Admission: Colonic abscess secondary to crohns, sepsis  Was the patient in the ICU or did the patient experience delirium during hospitalization?  No  Do you have any other stressors you would like to discuss with your provider? No    Problems taking medications regularly:  Yes unable to   Medication changes since discharge: Start Augmentin, continue pepcid, STOP Humira, azathioprine, infliximab  Problems adhering to non-medication therapy:        Summary of hospitalization:  [facility] discharge summary reviewed  Diagnostic Tests/Treatments reviewed.  Follow up needed: [facility]  Other Healthcare Providers Involved in Patient s Care:         [facility]  Update since discharge: worsened.         Plan of care communicated with patient                 Objective    /67   Pulse 77   Temp 98  F (36.7  C)   Resp 22   Ht 1.499 m (4' 11\")   Wt 42.2 kg (93 lb)   LMP 09/25/2024 (Approximate)   SpO2 100%   BMI 18.78 kg/m    Body mass index is 18.78 kg/m .  Physical Exam     GENERAL: alert and no distress  EYES: Eyes grossly normal to inspection,and conjunctivae " and sclerae normal  HENT: mouth without ulcers or lesions, moist mucus membranes  RESP: breathing comfortably on room air  CV: strong pulse, no peripheral edema  ABDOMEN: soft, diffusely tender, colostomy over left abdomen bag has small amount of non-bloody stool, midline scar  MS: no gross musculoskeletal defects noted, no edema  SKIN: warm, dry, no suspicious lesions or rashes on exposed skin  NEURO: Normal strength and tone, mentation intact and speech normal  PSYCH: mentation appears normal, affect normal/bright          Signed Electronically by: Doroteo Rucker MD    Answers submitted by the patient for this visit:  Patient Health Questionnaire (Submitted on 11/12/2024)  If you checked off any problems, how difficult have these problems made it for you to do your work, take care of things at home, or get along with other people?: Not difficult at all  PHQ9 TOTAL SCORE: 7

## 2024-11-13 ENCOUNTER — HOSPITAL ENCOUNTER (INPATIENT)
Facility: HOSPITAL | Age: 44
End: 2024-11-13
Attending: EMERGENCY MEDICINE | Admitting: FAMILY MEDICINE
Payer: COMMERCIAL

## 2024-11-13 ENCOUNTER — APPOINTMENT (OUTPATIENT)
Dept: CT IMAGING | Facility: HOSPITAL | Age: 44
End: 2024-11-13
Attending: EMERGENCY MEDICINE
Payer: COMMERCIAL

## 2024-11-13 ENCOUNTER — VIRTUAL VISIT (OUTPATIENT)
Dept: INTERPRETER SERVICES | Facility: CLINIC | Age: 44
End: 2024-11-13

## 2024-11-13 DIAGNOSIS — K51.90 ULCERATIVE COLITIS (H): Primary | ICD-10-CM

## 2024-11-13 DIAGNOSIS — K50.114 CROHN'S DISEASE OF COLON WITH ABSCESS (H): ICD-10-CM

## 2024-11-13 DIAGNOSIS — K52.9 ACUTE COLITIS: ICD-10-CM

## 2024-11-13 LAB
ALBUMIN SERPL BCG-MCNC: 3.7 G/DL (ref 3.5–5.2)
ALBUMIN UR-MCNC: 20 MG/DL
ALP SERPL-CCNC: 84 U/L (ref 40–150)
ALT SERPL W P-5'-P-CCNC: 15 U/L (ref 0–50)
ANION GAP SERPL CALCULATED.3IONS-SCNC: 13 MMOL/L (ref 7–15)
APPEARANCE UR: CLEAR
AST SERPL W P-5'-P-CCNC: 23 U/L (ref 0–45)
BASOPHILS # BLD AUTO: 0 10E3/UL (ref 0–0.2)
BASOPHILS NFR BLD AUTO: 0 %
BILIRUB SERPL-MCNC: 0.3 MG/DL
BILIRUB UR QL STRIP: NEGATIVE
BUN SERPL-MCNC: 10.4 MG/DL (ref 6–20)
CALCIUM SERPL-MCNC: 9.3 MG/DL (ref 8.8–10.4)
CHLORIDE SERPL-SCNC: 102 MMOL/L (ref 98–107)
COLOR UR AUTO: ABNORMAL
CREAT SERPL-MCNC: 0.66 MG/DL (ref 0.51–0.95)
CRP SERPL-MCNC: 46.3 MG/L
EGFRCR SERPLBLD CKD-EPI 2021: >90 ML/MIN/1.73M2
EOSINOPHIL # BLD AUTO: 0.2 10E3/UL (ref 0–0.7)
EOSINOPHIL NFR BLD AUTO: 2 %
ERYTHROCYTE [DISTWIDTH] IN BLOOD BY AUTOMATED COUNT: 15 % (ref 10–15)
GLUCOSE SERPL-MCNC: 109 MG/DL (ref 70–99)
GLUCOSE UR STRIP-MCNC: NEGATIVE MG/DL
HCG SERPL QL: NEGATIVE
HCO3 SERPL-SCNC: 27 MMOL/L (ref 22–29)
HCT VFR BLD AUTO: 31.3 % (ref 35–47)
HGB BLD-MCNC: 9.9 G/DL (ref 11.7–15.7)
HGB UR QL STRIP: ABNORMAL
IMM GRANULOCYTES # BLD: 0 10E3/UL
IMM GRANULOCYTES NFR BLD: 0 %
KETONES UR STRIP-MCNC: NEGATIVE MG/DL
LEUKOCYTE ESTERASE UR QL STRIP: NEGATIVE
LIPASE SERPL-CCNC: 43 U/L (ref 13–60)
LYMPHOCYTES # BLD AUTO: 1.9 10E3/UL (ref 0.8–5.3)
LYMPHOCYTES NFR BLD AUTO: 27 %
MCH RBC QN AUTO: 28.4 PG (ref 26.5–33)
MCHC RBC AUTO-ENTMCNC: 31.6 G/DL (ref 31.5–36.5)
MCV RBC AUTO: 90 FL (ref 78–100)
MONOCYTES # BLD AUTO: 0.4 10E3/UL (ref 0–1.3)
MONOCYTES NFR BLD AUTO: 5 %
MUCOUS THREADS #/AREA URNS LPF: PRESENT /LPF
NEUTROPHILS # BLD AUTO: 4.6 10E3/UL (ref 1.6–8.3)
NEUTROPHILS NFR BLD AUTO: 65 %
NITRATE UR QL: NEGATIVE
NRBC # BLD AUTO: 0 10E3/UL
NRBC BLD AUTO-RTO: 0 /100
PH UR STRIP: 6.5 [PH] (ref 5–7)
PLATELET # BLD AUTO: 325 10E3/UL (ref 150–450)
POTASSIUM SERPL-SCNC: 3.5 MMOL/L (ref 3.4–5.3)
PROT SERPL-MCNC: 9 G/DL (ref 6.4–8.3)
RBC # BLD AUTO: 3.49 10E6/UL (ref 3.8–5.2)
RBC URINE: 16 /HPF
SODIUM SERPL-SCNC: 142 MMOL/L (ref 135–145)
SP GR UR STRIP: 1.02 (ref 1–1.03)
SQUAMOUS EPITHELIAL: 6 /HPF
UROBILINOGEN UR STRIP-MCNC: <2 MG/DL
WBC # BLD AUTO: 7 10E3/UL (ref 4–11)
WBC URINE: 3 /HPF

## 2024-11-13 PROCEDURE — 250N000011 HC RX IP 250 OP 636

## 2024-11-13 PROCEDURE — 81001 URINALYSIS AUTO W/SCOPE: CPT | Performed by: EMERGENCY MEDICINE

## 2024-11-13 PROCEDURE — 82435 ASSAY OF BLOOD CHLORIDE: CPT | Performed by: EMERGENCY MEDICINE

## 2024-11-13 PROCEDURE — 74177 CT ABD & PELVIS W/CONTRAST: CPT

## 2024-11-13 PROCEDURE — 96361 HYDRATE IV INFUSION ADD-ON: CPT

## 2024-11-13 PROCEDURE — 250N000011 HC RX IP 250 OP 636: Mod: JZ | Performed by: EMERGENCY MEDICINE

## 2024-11-13 PROCEDURE — 99223 1ST HOSP IP/OBS HIGH 75: CPT | Mod: AI | Performed by: FAMILY MEDICINE

## 2024-11-13 PROCEDURE — 96375 TX/PRO/DX INJ NEW DRUG ADDON: CPT

## 2024-11-13 PROCEDURE — 85004 AUTOMATED DIFF WBC COUNT: CPT | Performed by: EMERGENCY MEDICINE

## 2024-11-13 PROCEDURE — 86140 C-REACTIVE PROTEIN: CPT | Performed by: EMERGENCY MEDICINE

## 2024-11-13 PROCEDURE — 250N000009 HC RX 250: Performed by: EMERGENCY MEDICINE

## 2024-11-13 PROCEDURE — 96365 THER/PROPH/DIAG IV INF INIT: CPT

## 2024-11-13 PROCEDURE — 96376 TX/PRO/DX INJ SAME DRUG ADON: CPT

## 2024-11-13 PROCEDURE — 36415 COLL VENOUS BLD VENIPUNCTURE: CPT | Performed by: EMERGENCY MEDICINE

## 2024-11-13 PROCEDURE — 84155 ASSAY OF PROTEIN SERUM: CPT | Performed by: EMERGENCY MEDICINE

## 2024-11-13 PROCEDURE — 120N000001 HC R&B MED SURG/OB

## 2024-11-13 PROCEDURE — 250N000011 HC RX IP 250 OP 636: Performed by: EMERGENCY MEDICINE

## 2024-11-13 PROCEDURE — 84703 CHORIONIC GONADOTROPIN ASSAY: CPT | Performed by: EMERGENCY MEDICINE

## 2024-11-13 PROCEDURE — T1013 SIGN LANG/ORAL INTERPRETER: HCPCS | Mod: U4,TEL,95 | Performed by: INTERPRETER

## 2024-11-13 PROCEDURE — 258N000003 HC RX IP 258 OP 636

## 2024-11-13 PROCEDURE — 96366 THER/PROPH/DIAG IV INF ADDON: CPT

## 2024-11-13 PROCEDURE — 83690 ASSAY OF LIPASE: CPT | Performed by: EMERGENCY MEDICINE

## 2024-11-13 PROCEDURE — 85018 HEMOGLOBIN: CPT | Performed by: EMERGENCY MEDICINE

## 2024-11-13 PROCEDURE — 258N000003 HC RX IP 258 OP 636: Performed by: EMERGENCY MEDICINE

## 2024-11-13 PROCEDURE — 99285 EMERGENCY DEPT VISIT HI MDM: CPT | Mod: 25

## 2024-11-13 RX ORDER — HYDROMORPHONE HCL IN WATER/PF 6 MG/30 ML
0.4 PATIENT CONTROLLED ANALGESIA SYRINGE INTRAVENOUS
Status: DISCONTINUED | OUTPATIENT
Start: 2024-11-13 | End: 2024-11-17 | Stop reason: HOSPADM

## 2024-11-13 RX ORDER — MORPHINE SULFATE 4 MG/ML
4 INJECTION, SOLUTION INTRAMUSCULAR; INTRAVENOUS
Status: DISCONTINUED | OUTPATIENT
Start: 2024-11-13 | End: 2024-11-13

## 2024-11-13 RX ORDER — PROCHLORPERAZINE MALEATE 10 MG
10 TABLET ORAL EVERY 6 HOURS PRN
Status: DISCONTINUED | OUTPATIENT
Start: 2024-11-13 | End: 2024-11-17 | Stop reason: HOSPADM

## 2024-11-13 RX ORDER — IOPAMIDOL 755 MG/ML
45 INJECTION, SOLUTION INTRAVASCULAR ONCE
Status: COMPLETED | OUTPATIENT
Start: 2024-11-13 | End: 2024-11-13

## 2024-11-13 RX ORDER — HYDROMORPHONE HCL IN WATER/PF 6 MG/30 ML
0.2 PATIENT CONTROLLED ANALGESIA SYRINGE INTRAVENOUS
Status: DISCONTINUED | OUTPATIENT
Start: 2024-11-13 | End: 2024-11-17 | Stop reason: HOSPADM

## 2024-11-13 RX ORDER — ONDANSETRON 2 MG/ML
4 INJECTION INTRAMUSCULAR; INTRAVENOUS EVERY 6 HOURS PRN
Status: DISCONTINUED | OUTPATIENT
Start: 2024-11-13 | End: 2024-11-17 | Stop reason: HOSPADM

## 2024-11-13 RX ORDER — PIPERACILLIN SODIUM, TAZOBACTAM SODIUM 3; .375 G/15ML; G/15ML
3.38 INJECTION, POWDER, LYOPHILIZED, FOR SOLUTION INTRAVENOUS EVERY 8 HOURS
Status: DISCONTINUED | OUTPATIENT
Start: 2024-11-14 | End: 2024-11-17 | Stop reason: HOSPADM

## 2024-11-13 RX ORDER — AMOXICILLIN 250 MG
2 CAPSULE ORAL 2 TIMES DAILY PRN
Status: DISCONTINUED | OUTPATIENT
Start: 2024-11-13 | End: 2024-11-17 | Stop reason: HOSPADM

## 2024-11-13 RX ORDER — LIDOCAINE 40 MG/G
CREAM TOPICAL
Status: DISCONTINUED | OUTPATIENT
Start: 2024-11-13 | End: 2024-11-17 | Stop reason: HOSPADM

## 2024-11-13 RX ORDER — POLYETHYLENE GLYCOL 3350 17 G/17G
17 POWDER, FOR SOLUTION ORAL 2 TIMES DAILY PRN
Status: DISCONTINUED | OUTPATIENT
Start: 2024-11-13 | End: 2024-11-14

## 2024-11-13 RX ORDER — AMOXICILLIN 250 MG
1 CAPSULE ORAL 2 TIMES DAILY PRN
Status: DISCONTINUED | OUTPATIENT
Start: 2024-11-13 | End: 2024-11-17 | Stop reason: HOSPADM

## 2024-11-13 RX ORDER — ONDANSETRON 2 MG/ML
4 INJECTION INTRAMUSCULAR; INTRAVENOUS
Status: COMPLETED | OUTPATIENT
Start: 2024-11-13 | End: 2024-11-13

## 2024-11-13 RX ORDER — ACETAMINOPHEN 650 MG/1
650 SUPPOSITORY RECTAL EVERY 4 HOURS PRN
Status: DISCONTINUED | OUTPATIENT
Start: 2024-11-13 | End: 2024-11-17 | Stop reason: HOSPADM

## 2024-11-13 RX ORDER — ACETAMINOPHEN 325 MG/1
650 TABLET ORAL EVERY 4 HOURS PRN
Status: DISCONTINUED | OUTPATIENT
Start: 2024-11-13 | End: 2024-11-17 | Stop reason: HOSPADM

## 2024-11-13 RX ORDER — ONDANSETRON 4 MG/1
4 TABLET, ORALLY DISINTEGRATING ORAL EVERY 6 HOURS PRN
Status: DISCONTINUED | OUTPATIENT
Start: 2024-11-13 | End: 2024-11-17 | Stop reason: HOSPADM

## 2024-11-13 RX ORDER — OXYCODONE HYDROCHLORIDE 5 MG/1
5 TABLET ORAL EVERY 4 HOURS PRN
Status: DISCONTINUED | OUTPATIENT
Start: 2024-11-13 | End: 2024-11-17 | Stop reason: HOSPADM

## 2024-11-13 RX ORDER — PIPERACILLIN SODIUM, TAZOBACTAM SODIUM 3; .375 G/15ML; G/15ML
3.38 INJECTION, POWDER, LYOPHILIZED, FOR SOLUTION INTRAVENOUS ONCE
Status: COMPLETED | OUTPATIENT
Start: 2024-11-13 | End: 2024-11-13

## 2024-11-13 RX ADMIN — MORPHINE SULFATE 4 MG: 4 INJECTION, SOLUTION INTRAMUSCULAR; INTRAVENOUS at 16:38

## 2024-11-13 RX ADMIN — MORPHINE SULFATE 4 MG: 4 INJECTION, SOLUTION INTRAMUSCULAR; INTRAVENOUS at 21:32

## 2024-11-13 RX ADMIN — MORPHINE SULFATE 4 MG: 4 INJECTION, SOLUTION INTRAMUSCULAR; INTRAVENOUS at 19:14

## 2024-11-13 RX ADMIN — PANTOPRAZOLE SODIUM 40 MG: 40 INJECTION, POWDER, FOR SOLUTION INTRAVENOUS at 15:44

## 2024-11-13 RX ADMIN — HYDROMORPHONE HYDROCHLORIDE 0.4 MG: 0.2 INJECTION, SOLUTION INTRAMUSCULAR; INTRAVENOUS; SUBCUTANEOUS at 23:32

## 2024-11-13 RX ADMIN — SODIUM CHLORIDE 1000 ML: 9 INJECTION, SOLUTION INTRAVENOUS at 16:35

## 2024-11-13 RX ADMIN — IOPAMIDOL 45 ML: 755 INJECTION, SOLUTION INTRAVENOUS at 16:58

## 2024-11-13 RX ADMIN — PIPERACILLIN AND TAZOBACTAM 3.38 G: 3; .375 INJECTION, POWDER, FOR SOLUTION INTRAVENOUS at 21:32

## 2024-11-13 RX ADMIN — SODIUM CHLORIDE 1000 ML: 9 INJECTION, SOLUTION INTRAVENOUS at 23:30

## 2024-11-13 RX ADMIN — ONDANSETRON 4 MG: 2 INJECTION INTRAMUSCULAR; INTRAVENOUS at 15:47

## 2024-11-13 ASSESSMENT — ACTIVITIES OF DAILY LIVING (ADL)
ADLS_ACUITY_SCORE: 0

## 2024-11-13 NOTE — ED PROVIDER NOTES
EMERGENCY DEPARTMENT ENCOUNTER      NAME: Dain Tejeda  AGE: 44 year old female  YOB: 1980  MRN: [id number]  EVALUATION DATE & TIME: 11/13/2024  4:16 PM    PCP: Kevin Woodruff    ED PROVIDER: Reggie Couch M.D.      Chief Complaint   Patient presents with    Abdominal Pain         FINAL IMPRESSION:  Abdominal pain  Acute colitis    ED COURSE & MEDICAL DECISION MAKING:    Pertinent Labs & Imaging studies reviewed. (See chart for details)  44 year old female presents to the Emergency Department for evaluation of worsening abdominal pain.  Patient hospitalized late October with sepsis and pericolonic abscesses related to her Crohn's disease.  Patient with prior colostomy.  Had been discharged but was unable to get her antibiotics due to insurance issues.  Pain slowly worsening.  Saw her primary care physician yesterday and started on antibiotics.  Exam reveals a thin adult female in marked distress.  Vital signs unremarkable.  Exam limited as patient was seen in the triage area.  Baseline laboratory evaluation and imaging being performed patient likely require hospitalization.  Zosyn initiated in triage.     4:00 PM I met with the patient for the initial interview and physical examination. Discussed plan for treatment and workup in the ED.    5:48 PM.  White cell count normal at 7.  Urine without evidence of obvious infection.  C-reactive protein significantly elevated 46.3.  Lipase normal.  Comprehensive metabolic panel normal.  CT imaging reviewed.  Patient with extensive stool retention suggesting potential obstructive process.  Awaiting definitive report.  Call placed to the admitting physician.  5:59 PM I rechecked the patient. .  Markedly tender abdomen.  Will plan on admission  7:10 PM.  CT report with evidence of worsening inflammatory/infectious process of the residual transverse colon.  Call placed to colorectal surgery  At the conclusion of the encounter I discussed the results of all of the tests  and the disposition. The questions were answered and return precautions provided. The patient or family acknowledged understanding and was agreeable with the care plan.   7:21 PM I spoke with Dr. Leiva, colorectal surgery.  They will follow patient tomorrow.  No urgent interventions presently  7:23 PM I spoke with Dr. Rojo regarding admission.       .     MEDICATIONS GIVEN IN THE EMERGENCY:  Medications   morphine (PF) injection 4 mg (4 mg Intravenous $Given 11/13/24 1638)   ondansetron (ZOFRAN) injection 4 mg (4 mg Intravenous $Given 11/13/24 1547)   pantoprazole (PROTONIX) IV push injection 40 mg (40 mg Intravenous $Given 11/13/24 1544)   sodium chloride 0.9% BOLUS 1,000 mL (1,000 mLs Intravenous $New Bag 11/13/24 1635)   iopamidol (ISOVUE-370) solution 45 mL (45 mLs Intravenous $Given 11/13/24 1658)       NEW PRESCRIPTIONS STARTED AT TODAY'S ER VISIT  New Prescriptions    No medications on file          =================================================================    HPI    Patient information was obtained from: the patient    Use of Intrepreter: Yes ( Phone) - Language Junito Tejeda is a 44 year old female with a pertient medical history of colitis, hypokalemia, colitis, celiac disease, chronic gastritis, chronic diarrhea, and Crohn's disease who presents to the ED for evaluation of abdominal pain.     The patient was in the hospital from 10/19-10/26 for abdominal pain and was given pain medications with improvement in her pain. She was discharged with antibiotics, but was unable to come home with them due to her doctor not being a part of her [employer] coverage.     For the past 3 days (since 11/10), the patient has had abdominal pain, vomiting, chills, subjective fever, and diarrhea. Last night (11/12), she saw her primary care provider and was given the antibiotics that she was supposed to take home after her hospital stay. She took them last night, but they have not improved her  symptoms.     Per Chart Review, the patient was seen at [facility] from 10/19/24-10/26/2024 for abdominal pain. She was evaluated and diagnosed with sepsis, pericolonic abscess, and malnutrition. She had a CT scan done. She required IV antibiotics and nutrition and fluids in the emergency department. She was discharged with Augmentin.       REVIEW OF SYSTEMS   Constitutional:  Denies fever, Positive for chills   Respiratory:  Denies productive cough or increased work of breathing  Cardiovascular:  Denies chest pain, palpitations  GI: Positive for Abdominal pain, nausea, vomiting, or change in bowel or bladder habits   Musculoskeletal:  Denies any new muscle/joint swelling  Skin:  Denies rash   Neurologic:  Denies focal weakness  All systems negative except as marked.     PAST MEDICAL HISTORY:  Past Medical History:   Diagnosis Date    Diet controlled gestational diabetes mellitus (GDM), antepartum 9/4/2017    Attempting diet control first    Gestational diabetes mellitus (GDM) in third trimester 9/4/2017    Attempting diet control first  Formatting of this note might be different from the original. Overview:  Attempting diet control first    Nausea/vomiting in pregnancy 4/24/2017    Postpartum hemorrhage 11/16/2017    Third degree laceration of perineum during delivery, postpartum 11/15/2017       PAST SURGICAL HISTORY:  Past Surgical History:   Procedure Laterality Date    COLONOSCOPY N/A 10/31/2022    Procedure: COLONOSCOPY with biopsies;  Surgeon: Luis Miguel Traore MD;  Location: [facility] GI    COLOSTOMY N/A 11/6/2022    Procedure: CREATION, COLOSTOMY;  Surgeon: Chandana Carlos DO;  Location: [facility] OR    LAPAROTOMY EXPLORATORY N/A 11/6/2022    Procedure: EXPLORATORY LAPAROTOMY SPLENIC FLEXURE MOBILIZATION;  Surgeon: Chandana Carlos DO;  Location: [facility] OR    PICC DOUBLE LUMEN PLACEMENT  10/22/2024    PICC TRIPLE LUMEN PLACEMENT  5/10/2024    RECTAL PROLAPSE  REPAIR N/A 11/6/2022    Procedure: RESECTION OF DESCENDING COLON;  Surgeon: Chandana Carlos DO;  Location: [facility] OR         CURRENT MEDICATIONS:      Current Facility-Administered Medications:     morphine (PF) injection 4 mg, 4 mg, Intravenous, Q1H PRN, Reggie Couch MD, 4 mg at 11/13/24 1638    Current Outpatient Medications:     amoxicillin-clavulanate (AUGMENTIN) 875-125 MG tablet, Take 1 tablet by mouth 2 times daily., Disp: 120 tablet, Rfl: 0    amoxicillin-clavulanate (AUGMENTIN) 875-125 MG tablet, Take 1 tablet by mouth 2 times daily., Disp: 120 tablet, Rfl: 0    famotidine (PEPCID) 10 MG tablet, Take 10 mg by mouth 2 times daily., Disp: , Rfl:     ondansetron (ZOFRAN ODT) 4 MG ODT tab, Take 1 tablet (4 mg) by mouth every 12 hours as needed for nausea or vomiting., Disp: 15 tablet, Rfl: 0    ALLERGIES:  Allergies   Allergen Reactions    Gluten Meal      Celiac    Soy Allergy Anaphylaxis, Hives and Itching     Tofu- causes itching and sores in the mouth       FAMILY HISTORY:  Family History   Problem Relation Age of Onset    Gestational Diabetes Sister     Gestational Diabetes Sister     Diabetes No family hx of     Coronary Artery Disease No family hx of     Hypertension No family hx of     Breast Cancer No family hx of     Colon Cancer No family hx of     Prostate Cancer No family hx of     Other Cancer No family hx of        SOCIAL HISTORY:   Social History     Socioeconomic History    Marital status:      Spouse name: Dmitriy Camargo    Number of children: 0    Years of education: 0   Occupational History    Occupation: None   Tobacco Use    Smoking status: Never     Passive exposure: Never    Smokeless tobacco: Never   Substance and Sexual Activity    Alcohol use: No    Drug use: No    Sexual activity: Yes     Partners: Male   Social History Narrative    Born in [facility], arrived to [facility] 02/[facility]. No education.      Social Drivers of Health     Financial Resource Strain: Low Risk   (10/20/2024)    Financial Resource Strain     Within the past 12 months, have you or your family members you live with been unable to get utilities (heat, electricity) when it was really needed?: No   Food Insecurity: Low Risk  (10/20/2024)    Food Insecurity     Within the past 12 months, did you worry that your food would run out before you got money to buy more?: No     Within the past 12 months, did the food you bought just not last and you didn t have money to get more?: No   Transportation Needs: Low Risk  (10/20/2024)    Transportation Needs     Within the past 12 months, has lack of transportation kept you from medical appointments, getting your medicines, non-medical meetings or appointments, work, or from getting things that you need?: No   Interpersonal Safety: Low Risk  (10/20/2024)    Interpersonal Safety     Do you feel physically and emotionally safe where you currently live?: Yes     Within the past 12 months, have you been hit, slapped, kicked or otherwise physically hurt by someone?: No     Within the past 12 months, have you been humiliated or emotionally abused in other ways by your partner or ex-partner?: No   Housing Stability: Low Risk  (10/20/2024)    Housing Stability     Do you have housing? : Yes     Are you worried about losing your housing?: No       VITALS:  Patient Vitals for the past 24 hrs:   BP Temp Temp src Pulse Resp SpO2   11/13/24 1815 125/83 -- -- 80 -- 100 %   11/13/24 1800 123/82 -- -- 81 -- 100 %   11/13/24 1745 123/82 -- -- 79 -- 100 %   11/13/24 1730 123/80 -- -- 82 -- 100 %   11/13/24 1715 123/84 -- -- 82 -- 99 %   11/13/24 1713 124/74 -- -- 83 -- 99 %   11/13/24 1628 134/86 -- -- 85 -- 100 %   11/13/24 1543 -- 99.4  F (37.4  C) Oral -- 20 --   11/13/24 1409 (!) 144/22 -- -- 100 -- 99 %        PHYSICAL EXAM    Constitutional:  Awake, alert, distressed  HENT:  Normocephalic, Atraumatic. Bilateral external ears normal. Oropharynx moist. Nose normal. Neck- Normal range of  motion with no guarding, No midline cervical tenderness, Supple, No stridor.   Eyes:  PERRL, EOMI with no signs of entrapment, Conjunctiva normal, No discharge.   Respiratory:  Normal breath sounds, No respiratory distress, No wheezing.    Cardiovascular:  Normal heart rate, Normal rhythm, No appreciable rubs or gallops.   GI:  Soft, No tenderness, No distension, No palpable masses  Musculoskeletal:  Intact distal pulses, No edema. Good range of motion in all major joints. No tenderness to palpation or major deformities noted.  Integument:  Warm, Dry, No erythema, No rash.   Neurologic:  Alert & oriented, Normal motor function, Normal sensory function, No focal deficits noted.   Psychiatric:  Affect normal, Judgment normal, Mood normal.     LAB:  All pertinent labs reviewed and interpreted.  Results for orders placed or performed during the hospital encounter of 11/13/24   CT Abdomen Pelvis w Contrast    Impression    IMPRESSION:   1.  Progressive, moderate to severe infectious versus inflammatory change of the residual transverse and right hemicolon. Consider gastroenterology or surgical consultation.  2.  Moderate stool volume within the right hemicolon, likely accounting for mild distention of the appendix and terminal ileum.  3.  Interval resolution of previous pericolonic abscess.  4.  Unchanged cystic lesion along the left posterior aspect of the uterus, measuring 4.1 x 6.5 cm.  5.  Hepatic steatosis.  6.  Mildly thick-walled distal esophagus; correlate for a distal esophagitis.     Comprehensive metabolic panel   Result Value Ref Range    Sodium 142 135 - 145 mmol/L    Potassium 3.5 3.4 - 5.3 mmol/L    Carbon Dioxide (CO2) 27 22 - 29 mmol/L    Anion Gap 13 7 - 15 mmol/L    Urea Nitrogen 10.4 6.0 - 20.0 mg/dL    Creatinine 0.66 0.51 - 0.95 mg/dL    GFR Estimate >90 >60 mL/min/1.73m2    Calcium 9.3 8.8 - 10.4 mg/dL    Chloride 102 98 - 107 mmol/L    Glucose 109 (H) 70 - 99 mg/dL    Alkaline Phosphatase 84 40 -  150 U/L    AST 23 0 - 45 U/L    ALT 15 0 - 50 U/L    Protein Total 9.0 (H) 6.4 - 8.3 g/dL    Albumin 3.7 3.5 - 5.2 g/dL    Bilirubin Total 0.3 <=1.2 mg/dL   hCG Qualitative Pregnancy   Result Value Ref Range    hCG Serum Qualitative Negative Negative   Result Value Ref Range    Lipase 43 13 - 60 U/L   UA with Microscopic reflex to Culture    Specimen: Urine, Midstream   Result Value Ref Range    Color Urine Light Yellow Colorless, Straw, Light Yellow, Yellow    Appearance Urine Clear Clear    Glucose Urine Negative Negative mg/dL    Bilirubin Urine Negative Negative    Ketones Urine Negative Negative mg/dL    Specific Gravity Urine 1.023 1.001 - 1.030    Blood Urine 0.2 mg/dL (A) Negative    pH Urine 6.5 5.0 - 7.0    Protein Albumin Urine 20 (A) Negative mg/dL    Urobilinogen Urine <2.0 <2.0 mg/dL    Nitrite Urine Negative Negative    Leukocyte Esterase Urine Negative Negative    Mucus Urine Present (A) None Seen /LPF    RBC Urine 16 (H) <=2 /HPF    WBC Urine 3 <=5 /HPF    Squamous Epithelials Urine 6 (H) <=1 /HPF   CBC with platelets and differential   Result Value Ref Range    WBC Count 7.0 4.0 - 11.0 10e3/uL    RBC Count 3.49 (L) 3.80 - 5.20 10e6/uL    Hemoglobin 9.9 (L) 11.7 - 15.7 g/dL    Hematocrit 31.3 (L) 35.0 - 47.0 %    MCV 90 78 - 100 fL    MCH 28.4 26.5 - 33.0 pg    MCHC 31.6 31.5 - 36.5 g/dL    RDW 15.0 10.0 - 15.0 %    Platelet Count 325 150 - 450 10e3/uL    % Neutrophils 65 %    % Lymphocytes 27 %    % Monocytes 5 %    % Eosinophils 2 %    % Basophils 0 %    % Immature Granulocytes 0 %    NRBCs per 100 WBC 0 <1 /100    Absolute Neutrophils 4.6 1.6 - 8.3 10e3/uL    Absolute Lymphocytes 1.9 0.8 - 5.3 10e3/uL    Absolute Monocytes 0.4 0.0 - 1.3 10e3/uL    Absolute Eosinophils 0.2 0.0 - 0.7 10e3/uL    Absolute Basophils 0.0 0.0 - 0.2 10e3/uL    Absolute Immature Granulocytes 0.0 <=0.4 10e3/uL    Absolute NRBCs 0.0 10e3/uL   Result Value Ref Range    CRP Inflammation 46.30 (H) <5.00 mg/L        RADIOLOGY:  Reviewed all pertinent imaging. Please see official radiology report.  CT Abdomen Pelvis w Contrast   Final Result   IMPRESSION:    1.  Progressive, moderate to severe infectious versus inflammatory change of the residual transverse and right hemicolon. Consider gastroenterology or surgical consultation.   2.  Moderate stool volume within the right hemicolon, likely accounting for mild distention of the appendix and terminal ileum.   3.  Interval resolution of previous pericolonic abscess.   4.  Unchanged cystic lesion along the left posterior aspect of the uterus, measuring 4.1 x 6.5 cm.   5.  Hepatic steatosis.   6.  Mildly thick-walled distal esophagus; correlate for a distal esophagitis.                   I, Contreras Pereira, am serving as a scribe to document services personally performed by Reggie Couch MD, based on my observation and the provider's statements to me. I, Reggie Couch MD attest that Contreras Pereira is acting in a scribe capacity, has observed my performance of the services and has documented them in accordance with my direction.    Reggie Couch M.D.  Emergency Medicine  [facility] EMERGENCY DEPARTMENT     Reggie Couch MD  11/13/24 1924

## 2024-11-14 LAB
ADV 40+41 DNA STL QL NAA+NON-PROBE: NEGATIVE
ANION GAP SERPL CALCULATED.3IONS-SCNC: 11 MMOL/L (ref 7–15)
ASTRO TYP 1-8 RNA STL QL NAA+NON-PROBE: NEGATIVE
BUN SERPL-MCNC: 6.9 MG/DL (ref 6–20)
C CAYETANENSIS DNA STL QL NAA+NON-PROBE: NEGATIVE
C DIFF TOX B STL QL: NEGATIVE
CALCIUM SERPL-MCNC: 8.1 MG/DL (ref 8.8–10.4)
CAMPYLOBACTER DNA SPEC NAA+PROBE: NEGATIVE
CHLORIDE SERPL-SCNC: 105 MMOL/L (ref 98–107)
CREAT SERPL-MCNC: 0.52 MG/DL (ref 0.51–0.95)
CRYPTOSP DNA STL QL NAA+NON-PROBE: NEGATIVE
E COLI O157 DNA STL QL NAA+NON-PROBE: ABNORMAL
E HISTOLYT DNA STL QL NAA+NON-PROBE: NEGATIVE
EAEC ASTA GENE ISLT QL NAA+PROBE: NEGATIVE
EC STX1+STX2 GENES STL QL NAA+NON-PROBE: NEGATIVE
EGFRCR SERPLBLD CKD-EPI 2021: >90 ML/MIN/1.73M2
EPEC EAE GENE STL QL NAA+NON-PROBE: POSITIVE
ERYTHROCYTE [DISTWIDTH] IN BLOOD BY AUTOMATED COUNT: 14.9 % (ref 10–15)
ETEC LTA+ST1A+ST1B TOX ST NAA+NON-PROBE: POSITIVE
G LAMBLIA DNA STL QL NAA+NON-PROBE: NEGATIVE
GLUCOSE SERPL-MCNC: 97 MG/DL (ref 70–99)
HCO3 SERPL-SCNC: 24 MMOL/L (ref 22–29)
HCT VFR BLD AUTO: 28.1 % (ref 35–47)
HGB BLD-MCNC: 8.9 G/DL (ref 11.7–15.7)
MCH RBC QN AUTO: 29.1 PG (ref 26.5–33)
MCHC RBC AUTO-ENTMCNC: 31.7 G/DL (ref 31.5–36.5)
MCV RBC AUTO: 92 FL (ref 78–100)
NOROVIRUS GI+II RNA STL QL NAA+NON-PROBE: POSITIVE
P SHIGELLOIDES DNA STL QL NAA+NON-PROBE: NEGATIVE
PLATELET # BLD AUTO: 250 10E3/UL (ref 150–450)
POTASSIUM SERPL-SCNC: 2.8 MMOL/L (ref 3.4–5.3)
POTASSIUM SERPL-SCNC: 3.6 MMOL/L (ref 3.4–5.3)
RBC # BLD AUTO: 3.06 10E6/UL (ref 3.8–5.2)
RVA RNA STL QL NAA+NON-PROBE: NEGATIVE
SALMONELLA SP RPOD STL QL NAA+PROBE: NEGATIVE
SAPO I+II+IV+V RNA STL QL NAA+NON-PROBE: NEGATIVE
SHIGELLA SP+EIEC IPAH ST NAA+NON-PROBE: NEGATIVE
SODIUM SERPL-SCNC: 140 MMOL/L (ref 135–145)
V CHOLERAE DNA SPEC QL NAA+PROBE: NEGATIVE
VIBRIO DNA SPEC NAA+PROBE: NEGATIVE
WBC # BLD AUTO: 8.5 10E3/UL (ref 4–11)
Y ENTEROCOL DNA STL QL NAA+PROBE: NEGATIVE

## 2024-11-14 PROCEDURE — 85014 HEMATOCRIT: CPT

## 2024-11-14 PROCEDURE — 87507 IADNA-DNA/RNA PROBE TQ 12-25: CPT | Performed by: PHYSICIAN ASSISTANT

## 2024-11-14 PROCEDURE — 250N000009 HC RX 250

## 2024-11-14 PROCEDURE — 36415 COLL VENOUS BLD VENIPUNCTURE: CPT

## 2024-11-14 PROCEDURE — 250N000011 HC RX IP 250 OP 636

## 2024-11-14 PROCEDURE — 87493 C DIFF AMPLIFIED PROBE: CPT | Performed by: PHYSICIAN ASSISTANT

## 2024-11-14 PROCEDURE — 250N000013 HC RX MED GY IP 250 OP 250 PS 637: Performed by: PHYSICIAN ASSISTANT

## 2024-11-14 PROCEDURE — 84132 ASSAY OF SERUM POTASSIUM: CPT

## 2024-11-14 PROCEDURE — 250N000013 HC RX MED GY IP 250 OP 250 PS 637

## 2024-11-14 PROCEDURE — 120N000001 HC R&B MED SURG/OB

## 2024-11-14 PROCEDURE — 80048 BASIC METABOLIC PNL TOTAL CA: CPT

## 2024-11-14 RX ORDER — POTASSIUM CHLORIDE 7.45 MG/ML
10 INJECTION INTRAVENOUS
Status: DISCONTINUED | OUTPATIENT
Start: 2024-11-14 | End: 2024-11-14 | Stop reason: ALTCHOICE

## 2024-11-14 RX ORDER — NALOXONE HYDROCHLORIDE 0.4 MG/ML
0.2 INJECTION, SOLUTION INTRAMUSCULAR; INTRAVENOUS; SUBCUTANEOUS
Status: DISCONTINUED | OUTPATIENT
Start: 2024-11-14 | End: 2024-11-17 | Stop reason: HOSPADM

## 2024-11-14 RX ORDER — POTASSIUM CHLORIDE 20MEQ/15ML
40 LIQUID (ML) ORAL ONCE
Status: COMPLETED | OUTPATIENT
Start: 2024-11-14 | End: 2024-11-14

## 2024-11-14 RX ORDER — POLYETHYLENE GLYCOL 3350 17 G/17G
17 POWDER, FOR SOLUTION ORAL 2 TIMES DAILY
Status: DISCONTINUED | OUTPATIENT
Start: 2024-11-14 | End: 2024-11-17 | Stop reason: HOSPADM

## 2024-11-14 RX ORDER — HYDROMORPHONE HCL IN WATER/PF 6 MG/30 ML
0.2 PATIENT CONTROLLED ANALGESIA SYRINGE INTRAVENOUS ONCE
Status: COMPLETED | OUTPATIENT
Start: 2024-11-14 | End: 2024-11-14

## 2024-11-14 RX ORDER — NALOXONE HYDROCHLORIDE 0.4 MG/ML
0.4 INJECTION, SOLUTION INTRAMUSCULAR; INTRAVENOUS; SUBCUTANEOUS
Status: DISCONTINUED | OUTPATIENT
Start: 2024-11-14 | End: 2024-11-17 | Stop reason: HOSPADM

## 2024-11-14 RX ORDER — POTASSIUM CHLORIDE 1500 MG/1
40 TABLET, EXTENDED RELEASE ORAL ONCE
Status: COMPLETED | OUTPATIENT
Start: 2024-11-14 | End: 2024-11-14

## 2024-11-14 RX ADMIN — PIPERACILLIN AND TAZOBACTAM 3.38 G: 3; .375 INJECTION, POWDER, FOR SOLUTION INTRAVENOUS at 18:30

## 2024-11-14 RX ADMIN — OXYCODONE HYDROCHLORIDE 5 MG: 5 TABLET ORAL at 18:43

## 2024-11-14 RX ADMIN — ONDANSETRON 4 MG: 2 INJECTION INTRAMUSCULAR; INTRAVENOUS at 16:45

## 2024-11-14 RX ADMIN — OXYCODONE HYDROCHLORIDE 5 MG: 5 TABLET ORAL at 14:09

## 2024-11-14 RX ADMIN — PROCHLORPERAZINE MALEATE 10 MG: 10 TABLET ORAL at 20:54

## 2024-11-14 RX ADMIN — HYDROMORPHONE HYDROCHLORIDE 0.4 MG: 0.2 INJECTION, SOLUTION INTRAMUSCULAR; INTRAVENOUS; SUBCUTANEOUS at 11:05

## 2024-11-14 RX ADMIN — POTASSIUM CHLORIDE 40 MEQ: 1.5 SOLUTION ORAL at 11:37

## 2024-11-14 RX ADMIN — HYDROMORPHONE HYDROCHLORIDE 0.4 MG: 0.2 INJECTION, SOLUTION INTRAMUSCULAR; INTRAVENOUS; SUBCUTANEOUS at 16:49

## 2024-11-14 RX ADMIN — POLYETHYLENE GLYCOL 3350 17 G: 17 POWDER, FOR SOLUTION ORAL at 18:18

## 2024-11-14 RX ADMIN — HYDROMORPHONE HYDROCHLORIDE 0.2 MG: 0.2 INJECTION, SOLUTION INTRAMUSCULAR; INTRAVENOUS; SUBCUTANEOUS at 00:28

## 2024-11-14 RX ADMIN — PANTOPRAZOLE SODIUM 40 MG: 40 INJECTION, POWDER, FOR SOLUTION INTRAVENOUS at 07:48

## 2024-11-14 RX ADMIN — HYDROMORPHONE HYDROCHLORIDE 0.4 MG: 0.2 INJECTION, SOLUTION INTRAMUSCULAR; INTRAVENOUS; SUBCUTANEOUS at 03:46

## 2024-11-14 RX ADMIN — PIPERACILLIN AND TAZOBACTAM 3.38 G: 3; .375 INJECTION, POWDER, FOR SOLUTION INTRAVENOUS at 11:05

## 2024-11-14 RX ADMIN — ACETAMINOPHEN 650 MG: 325 TABLET ORAL at 18:13

## 2024-11-14 RX ADMIN — ONDANSETRON 4 MG: 2 INJECTION INTRAMUSCULAR; INTRAVENOUS at 04:48

## 2024-11-14 RX ADMIN — HYDROMORPHONE HYDROCHLORIDE 0.4 MG: 0.2 INJECTION, SOLUTION INTRAMUSCULAR; INTRAVENOUS; SUBCUTANEOUS at 07:48

## 2024-11-14 RX ADMIN — PIPERACILLIN AND TAZOBACTAM 3.38 G: 3; .375 INJECTION, POWDER, FOR SOLUTION INTRAVENOUS at 03:49

## 2024-11-14 RX ADMIN — HYDROMORPHONE HYDROCHLORIDE 0.4 MG: 0.2 INJECTION, SOLUTION INTRAMUSCULAR; INTRAVENOUS; SUBCUTANEOUS at 20:53

## 2024-11-14 RX ADMIN — POLYETHYLENE GLYCOL 3350 17 G: 17 POWDER, FOR SOLUTION ORAL at 20:54

## 2024-11-14 ASSESSMENT — ACTIVITIES OF DAILY LIVING (ADL)
ADLS_ACUITY_SCORE: 0
FALL_HISTORY_WITHIN_LAST_SIX_MONTHS: NO
WEAR_GLASSES_OR_BLIND: NO
ADLS_ACUITY_SCORE: 0
DOING_ERRANDS_INDEPENDENTLY_DIFFICULTY: NO
ADLS_ACUITY_SCORE: 0
HEARING_DIFFICULTY_OR_DEAF: NO
CONCENTRATING,_REMEMBERING_OR_MAKING_DECISIONS_DIFFICULTY: NO
ADLS_ACUITY_SCORE: 0
DRESSING/BATHING_DIFFICULTY: NO
ADLS_ACUITY_SCORE: 0
DIFFICULTY_EATING/SWALLOWING: NO
ADLS_ACUITY_SCORE: 0
WALKING_OR_CLIMBING_STAIRS_DIFFICULTY: NO
ADLS_ACUITY_SCORE: 0
TOILETING_ISSUES: NO
DIFFICULTY_COMMUNICATING: NO
ADLS_ACUITY_SCORE: 0
CHANGE_IN_FUNCTIONAL_STATUS_SINCE_ONSET_OF_CURRENT_ILLNESS/INJURY: NO
ADLS_ACUITY_SCORE: 0

## 2024-11-14 NOTE — CONSULTS
Care Management Initial Consult    General Information  Assessment completed with: Care Team Member, ANJELICA-chart review,    Type of CM/SW Visit: Initial Assessment    Primary Care Provider verified and updated as needed: Yes   Readmission within the last 30 days:        Reason for Consult: decision-making  Advance Care Planning: Advance Care Planning Reviewed: no concerns identified          Communication Assessment  Patient's communication style: spoken language (non-English) (Hmong)    Hearing Difficulty or Deaf: no   Wear Glasses or Blind: no    Cognitive  Cognitive/Neuro/Behavioral: WDL                      Living Environment:   People in home: child(sisi), dependent, spouse     Current living Arrangements: apartment      Able to return to prior arrangements:         Family/Social Support:  Care provided by: self, spouse/significant other  Provides care for:    Marital Status:   Support system:           Description of Support System:           Current Resources:   Patient receiving home care services:          Community Resources:    Equipment currently used at home: none  Supplies currently used at home:      Employment/Financial:  Employment Status:          Financial Concerns:             Does the patient's insurance plan have a 3 day qualifying hospital stay waiver?  Yes     Which insurance plan 3 day waiver is available? Alternative insurance waiver    Will the waiver be used for post-acute placement? Undetermined at this time    Lifestyle & Psychosocial Needs:  Social Drivers of Health     Food Insecurity: Low Risk  (11/14/2024)    Food Insecurity     Within the past 12 months, did you worry that your food would run out before you got money to buy more?: No     Within the past 12 months, did the food you bought just not last and you didn t have money to get more?: No   Depression: Not at risk (11/12/2024)    PHQ-2     PHQ-2 Score: 2   Housing Stability: Low Risk  (11/14/2024)    Housing  "Stability     Do you have housing? : Yes     Are you worried about losing your housing?: No   Tobacco Use: Low Risk  (11/12/2024)    Patient History     Smoking Tobacco Use: Never     Smokeless Tobacco Use: Never     Passive Exposure: Never   Financial Resource Strain: Low Risk  (11/14/2024)    Financial Resource Strain     Within the past 12 months, have you or your family members you live with been unable to get utilities (heat, electricity) when it was really needed?: No   Alcohol Use: Not on file   Transportation Needs: Low Risk  (11/14/2024)    Transportation Needs     Within the past 12 months, has lack of transportation kept you from medical appointments, getting your medicines, non-medical meetings or appointments, work, or from getting things that you need?: No   Physical Activity: Not on file   Interpersonal Safety: Low Risk  (11/14/2024)    Interpersonal Safety     Do you feel physically and emotionally safe where you currently live?: Yes     Within the past 12 months, have you been hit, slapped, kicked or otherwise physically hurt by someone?: No     Within the past 12 months, have you been humiliated or emotionally abused in other ways by your partner or ex-partner?: No   Stress: Not on file   Social Connections: Not on file   Health Literacy: Not on file       Functional Status:  Prior to admission patient needed assistance:              Mental Health Status:          Chemical Dependency Status:                Values/Beliefs:  Spiritual, Cultural Beliefs, Moravian Practices, Values that affect care:    Description of Beliefs that Will Affect Care: Cultural            Discussed  Partnership in Safe Discharge Planning  document with patient/family: Yes:     Additional Information:    Readmit. Previous hospitalization from 10/19-10/26. Had difficulty obtaining necessary medication due to insurance issues, per notes.     Assessment completed 10/20: \"Assessment completed with patient utilizing  " "service. Patient reports she lives in an apartment with her spouse and 7 year old daughter. When not ill, she is independent with ADLS/IADLS-except driving. When ill, her spouse supports her.  He also has his own medical issues and occasionally patient has to care for him.  If both patient and spouse are ill, they have adult children who can help them.  She ambulates without devices and has a [facility] helping her with securing community resources. She qualifies for 1 hour of PCA services a day and due to low hour need, not able to secure a PCA.   Spouse is primary family contact and family able to transport at discharge\"      Pharmacy liaison reviewed previous hospitalization and noted $153 cost of med at [facility]. Informed by MD that sometimes resident licenses are a problem without outside pharmacies. Pharmacy liaison concerned about why [facility] didn't contact hospital to ask for different provider. Staff to make sure any medications go with pt from [facility].     Next Steps: CM following, anticipate no CM needs.     ANGEL Marie      "

## 2024-11-14 NOTE — UTILIZATION REVIEW
Admission Status; Secondary Review Determination   Under the authority of the Utilization Management Committee, the utilization review process indicated a secondary review on Dain Tejeda. The review outcome is based on review of the medical records, discussions with staff, and applying clinical experience noted on the date of the review.   (x) Inpatient Status Appropriate - This patient's medical care is consistent with medical management for inpatient care and reasonable inpatient medical practice.     RATIONALE FOR DETERMINATION   44-year-old male with history of Crohn's disease admitted with abdominal pain, vomiting, chills, fever and diarrhea with recent pericolonic abscess.  History of colonic stricture status post open resection of descending colon and colostomy created by general surgery in 2022.  CT imaging shows progressive moderate to severe infectious versus inflammatory change of the residual transverse and right hemicolon necessitating colorectal and GI consultation.  Was seen by GI and now started on biologic medication.  Started on clear liquids but if unable to tolerate will need to consider TPN.  Seen by colorectal surgery and felt if she has obstructive symptoms secondary to possible inflammatory stricture and currently recommending medical management but will follow.    At the time of admission with the information available to the attending physician more than 2 nights Hospital complex care was anticipated, based on patient risk of adverse outcome if treated as outpatient and complex care required. Inpatient admission is appropriate based on the Medicare guidelines.   The information on this document is developed by the utilization review team in order for the business office to ensure compliance. This only denotes the appropriateness of proper admission status and does not reflect the quality of care rendered.   The definitions of Inpatient Status and Observation Status used in making the  determination above are those provided in the CMS Coverage Manual, Chapter 1 and Chapter 6, section 70.4.   Sincerely,   Shane Smith MD  Utilization Review  Physician Advisor  [facility]

## 2024-11-14 NOTE — MEDICATION SCRIBE - ADMISSION MEDICATION HISTORY
Medication Scribe Admission Medication History    Admission medication history is complete. The information provided in this note is only as accurate as the sources available at the time of the update.    Information Source(s): Patient and CareEverywhere/SureScripts via in-person    Pertinent Information: pt states hasn't  had Pepcid past couple weeks has no supply at home, states needs refill has an appointment with her primary doctor on 11/19 regarding med     Changes made to PTA medication list:  Added: None  Deleted: None  Changed: None    Allergies reviewed with patient and updates made in EHR: yes    Medication History Completed By: SHERRY NAIR 11/13/2024 6:36 PM    PTA Med List   Medication Sig Last Dose/Taking    amoxicillin-clavulanate (AUGMENTIN) 875-125 MG tablet Take 1 tablet by mouth 2 times daily. 11/13/2024 Morning    famotidine (PEPCID) 10 MG tablet Take 10 mg by mouth 2 times daily. Past Week    ondansetron (ZOFRAN ODT) 4 MG ODT tab Take 1 tablet (4 mg) by mouth every 12 hours as needed for nausea or vomiting. Unknown

## 2024-11-14 NOTE — PROGRESS NOTES
[facility]    Progress Note - Hospitalist Service       Date of Admission:  11/13/2024    Assessment & Plan   Dain Tejeda is a 44 year old female admitted on 11/13/2024. She has a history of Crohn's disease s/p partial colectomy with colostomy, Celiac disease, chronic gastritis, colitis, and chronic diarrhea who is admitted for colitis uncontrolled with outpatient management.     Changes 11/14  GI starting biologic and miralax and CLD     Colitis   History of Crohn's disease, celiac disease   Status post partial colectomy with current colostomy  Malnutrition  Patient with known history of Crohn's disease since 2022, complicated by stricture and status post partial colectomy with colostomy creation. Had followed with [facility] and was previously on Humira and azathioprine though of note did not restart on azathioprine or Humira after last hospitalization. Recent admission from 10/20-10/26/24 due to colitis and pericolonic abscess. Presented 11/13 with worsening abdominal pain, nausea, and vomiting. Vitally stable and afebrile.  Very tender on abdominal exam especially on the right. Labs revealed normal WBC and CMP. CRP elevated to 46.3. Urine with blood though not infectious appearing. CT abdomen/pelvis with progressive moderate to severe infectious versus inflammatory transverse and right hemicolitis. Previously noted pericolonic abscess was resolved. Also with moderate stool burden. GI recommending biologic and zosyn.   - Colorectal surgery consulted, appreciate recs   - NPO, GI consult, will follow  - GI consulted    - Zosyn   - Biologic   - Miralax   -CLD if tolerated    -11/19 OP with Dr. Britt   - NPO at midnight  - Zosyn  - pain management with Tylenol, PO oxycodone and IV Dilaudid  - Zofran and compazine prn nausea  - pantoprazole 40 mg daily      Chronic normocytic anemia  Hemoglobin on admission 9.9, within baseline between 8 and 10.  No signs or symptoms of acute blood loss anemia,  denies dark stools. No dark stool or phoenix blood in ostomy bag on exam.   - will monitor with daily CBC     History of disorganized behaviors  Noted on previous hospital stays.  Has previously responded well to Seroquel 12.5 mg while admitted.  Stable on admission  - close monitoring  - may consider adding Seroquel if needed     History left ovarian cyst  Known history.  CT abdomen pelvis on admission redemonstrating ovarian cyst. Recommended follow-up pelvic ultrasound in 6 to 12 months previously.  - follow up due after 3/24           Diet: Clear Liquid Diet    DVT Prophylaxis: Pneumatic Compression Devices  Chambers Catheter: Not present  Fluids: CLD  Lines: None     Cardiac Monitoring: None  Code Status: Full Code      Clinically Significant Risk Factors Present on Admission        # Hypokalemia: Lowest K = 2.8 mmol/L in last 2 days, will replace as needed    # Hypocalcemia: Lowest Ca = 8.1 mg/dL in last 2 days, will monitor and replace as appropriate              # Anemia: based on hgb <11           # Financial/Environmental Concerns: unable to afford medication(s) (insurance issues previously?)         Social Drivers of Health          Disposition Plan     Medically Ready for Discharge: Anticipated in 2-4 Days         The patient's care was discussed with the Attending Physician, Dr. Tamayo .    Steve Camp MD  Hospitalist Service  [facility]  Securely message with [employer] (more info)  Text page via [facility] Paging/Directory   ______________________________________________________________________    Interval History   No acute overnight events.     Patient is able to eat but will vomit afterwards which is why she is here. She has intermittent severe abdominal pain on her right side. Her lower right abdomen has also swollen for 3 days.     Patient denies headache, fever, chills, shortness of breath, difficulty breathing, chest pain, nausea, vomiting, change in urination, change in bowel  movements.         Physical Exam   Vital Signs: Temp: 98.7  F (37.1  C) Temp src: Oral BP: 115/75 Pulse: 83   Resp: 18 SpO2: 98 % O2 Device: None (Room air)    Weight: 94 lbs 14.4 oz  GENERAL: Healthy, alert and no distress  EYES: Eyes grossly normal to inspection.  No discharge or erythema, or obvious scleral/conjunctival abnormalities.  RESP:  Lungs clear throughout. No wheeze or crackles.   CV: Heart RRR. No murmur  Abdomen: Ostomy bag in place with feculent material output, no blood. Clean central incision site from previous surgery. Mild tenderness to palpation in RLQ. Some fullness of the RLQ as well. No rebound or guarding.  MSK: No gross deformity. Normal tone.  SKIN: Visible skin clear. No significant rash, abnormal pigmentation or lesions.  NEURO: Cranial nerves grossly intact.  Mentation and speech appropriate for age.  PSYCH: Mentation appears normal, affect normal/bright, judgement and insight intact, normal speech and appearance well-groomed.      Medical Decision Making       Please see A&P for additional details of medical decision making.      Data   ------------------------- PAST 24 HR DATA REVIEWED -----------------------------------------------    I have personally reviewed the following data over the past 24 hrs:    8.5  \   8.9 (L)   / 250     140 105 6.9 /  97   2.8 (L) 24 0.52 \     ALT: 15 AST: 23 AP: 84 TBILI: 0.3   ALB: 3.7 TOT PROTEIN: 9.0 (H) LIPASE: 43     Procal: N/A CRP: 46.30 (H) Lactic Acid: N/A         Imaging results reviewed over the past 24 hrs:   Recent Results (from the past 24 hours)   CT Abdomen Pelvis w Contrast    Narrative    EXAM: CT ABDOMEN PELVIS W CONTRAST  LOCATION: [facility]  DATE: 11/13/2024    INDICATION: Abdominal pain, vomiting, chills, fever, and diarrhea; recent pericolonic abscess; Crohn's disease.  COMPARISON: 10/19/2024.  TECHNIQUE: CT scan of the abdomen and pelvis was performed following injection of IV contrast. Multiplanar  reformats were obtained. Dose reduction techniques were used.  CONTRAST: 45 mL Isovue-370.    FINDINGS:    LOWER CHEST: Mildly thick-walled distal esophagus; correlate for a distal esophagitis.    HEPATOBILIARY: Diffuse hepatic steatosis. Unchanged, benign calcification in the right hepatic lobe.    Gallbladder is normal.    No intrahepatic or intrahepatic biliary ductal dilatation.    PANCREAS: Enhances normally. No peripancreatic inflammatory fat stranding.    SPLEEN: Enhances normally. Normal size.    ADRENAL GLANDS: Normal.    KIDNEYS: Both kidneys enhance symmetrically, without hydronephrosis.    No nephroureterolithiasis.    Urinary bladder is unremarkable.    PELVIC ORGANS: Unchanged cystic lesion along the left posterior aspect of the uterus, measuring 4.1 x 6.5 cm.    BOWEL: Status post partial colectomy, with end colostomy at the left lower quadrant and Brenda's pouch. Residual transverse and right hemicolon demonstrates moderate to severe wall thickening and progressive inflammatory change. Moderate stool volume   is present within the right hemicolon, likely accounting for mild distention of the appendix and terminal ileum. Associated wall thickening of the terminal ileum is consistent with superimposed infectious/inflammatory change. Interval resolution of   previous pericolonic abscess occurring near the colostomy site.    No intraperitoneal free fluid or free air.    LYMPH NODES: No suspicious abdominal or pelvic lymphadenopathy. Reactive subcentimeter lymph nodes within the upper abdomen, similar to prior.    VASCULATURE: No abdominal aortic aneurysm. Mild atheromatous disease    MUSCULOSKELETAL: No suspicious abnormality.    OTHER: No additionally significant abnormalities.      Impression    IMPRESSION:   1.  Progressive, moderate to severe infectious versus inflammatory change of the residual transverse and right hemicolon. Consider gastroenterology or surgical consultation.  2.  Moderate stool  volume within the right hemicolon, likely accounting for mild distention of the appendix and terminal ileum.  3.  Interval resolution of previous pericolonic abscess.  4.  Unchanged cystic lesion along the left posterior aspect of the uterus, measuring 4.1 x 6.5 cm.  5.  Hepatic steatosis.  6.  Mildly thick-walled distal esophagus; correlate for a distal esophagitis.

## 2024-11-14 NOTE — PLAN OF CARE
Goal Outcome Evaluation:      Plan of Care Reviewed With: patient    Overall Patient Progress: improving    Outcome Evaluation: Started on clear liquid diet, improvement in pain      Problem: Nausea and Vomiting  Goal: Nausea and Vomiting Relief  Outcome: Progressing     Problem: Bowel Disease, Inflammatory (Ulcerative Colitis or Crohn's Disease)  Goal: Optimal Pain Control and Function  Outcome: Progressing  Intervention: Prevent or Manage Pain  Recent Flowsheet Documentation  Taken 11/14/2024 1409 by Braden Barnhart, RN  Pain Management Interventions:   medication (see MAR)   emotional support  Taken 11/14/2024 1105 by Braden Barnhart RN  Pain Management Interventions:   medication (see MAR)   emotional support   heat applied  Taken 11/14/2024 0748 by Braden Barnhart, RN  Pain Management Interventions:   medication (see MAR)   emotional support          NURSING PROGRESS NOTE       Shift Summary: Patient VSS on RA this shift, A+Ox4, able to make needs known.       Pertinent Shift Updates:  Endorsing abdominal pain radiating to back up to 8/10 this shift, most recent pain rating down to 1/10. Started on clear liquid diet. Endorsing mild intermittent nausea this shift, declines need for prn intervention. K protocol initiated, pt unable to tolerate IV replacement so switched to PO solution. Level to be rechecked this evening. 225 ml output from colostomy, sample sent for enteric panel.         Plan:  Collect sample to r/o c. Diff, continue to monitor pain and tolerance of diet.       Braden Barnhart RN 11/14/2024  [id number]     For detailed vital signs and assessments, please see documentation flowsheets. For detailed medication administrations, please see MAR.

## 2024-11-14 NOTE — PLAN OF CARE
Problem: Adult Inpatient Plan of Care  Goal: Optimal Comfort and Wellbeing  Intervention: Monitor Pain and Promote Comfort  Recent Flowsheet Documentation  Taken 11/14/2024 0416 by Cielo Jha, RN  Pain Management Interventions:   emotional support   pain management plan reviewed with patient/caregiver   pillow support provided   rest   therapeutic presence     Problem: Nausea and Vomiting  Goal: Nausea and Vomiting Relief  Outcome: Progressing  Intervention: Prevent and Manage Nausea and Vomiting  Recent Flowsheet Documentation  Taken 11/14/2024 0415 by Cielo Jha, RN  Nausea/Vomiting Interventions: antiemetic     Problem: Bowel Disease, Inflammatory (Ulcerative Colitis or Crohn's Disease)  Goal: Optimal Adaptation to Chronic Illness  Outcome: Progressing   Goal Outcome Evaluation:    Patient alert, oriented x 4. Pleasant and co-operative with cares. Denied chest pain. Denied shortness of breath. Complained of generalized abdominal pain with tenderness to touch. Administered Dilaudid prn for pain management, reported some relief. Complained of nausea, administered Zofran prn, reported some relief. Stand by assistance to ambulate to the bathroom. Tolerated Zosyn infusion okay. Will continue to monitor the patient.

## 2024-11-14 NOTE — ED NOTES
[facility] ED Handoff Report    ED Chief Complaint:     ED Diagnosis:  (K52.9) Acute colitis  Comment:   Plan:        PMH:    Past Medical History:   Diagnosis Date    Diet controlled gestational diabetes mellitus (GDM), antepartum 9/4/2017    Attempting diet control first    Gestational diabetes mellitus (GDM) in third trimester 9/4/2017    Attempting diet control first  Formatting of this note might be different from the original. Overview:  Attempting diet control first    Nausea/vomiting in pregnancy 4/24/2017    Postpartum hemorrhage 11/16/2017    Third degree laceration of perineum during delivery, postpartum 11/15/2017        Code Status:  Full Code     Falls Risk: N/A Band: Not applicable    Current Living Situation/Residence: lives with a significant other     Elimination Status: Continent: Yes     Activity Level: Independent    Patients Preferred Language:  English     Needed: No    Vital Signs:  /72   Pulse 73   Temp 99.4  F (37.4  C) (Oral)   Resp 20   LMP 09/25/2024 (Approximate)   SpO2 98%        Pain Score: 4/10    Is the Patient Confused:  No    Last Food or Drink: 11/13/24     Tests Performed: Done: Labs and Imaging    Treatments Provided:  See MAR    Family Dynamics/Concerns: No    Belongings Checklist Done and Signed by Patient: Yes    Additional Information: Patient alert/oriented x4 and able to make needs known. Abdominal incision x1 WDL.     RN: Kavitha Stout RN   11/14/2024 3:29 AM

## 2024-11-14 NOTE — CONSULTS
[facility]   Consultation      Place of Service: [facility]  Reason for Consultation: Crohn's colitis   Consult Requested by: Dr Rojo    History of Present Illness: Dain Tejeda is a 44 year old Hmong speaking woman with Crohn's complicated by colonic stricture status post open resection of descending colon and colostomy creation by general surgery in 2022 who follows with [facility] on Humira and azathioprine.  She presents with nausea, vomiting, and abdominal pain.  She was recently hospitalized 10/19-26 with abdominal pain and intra-abdominal abscess.  This was treated with IV antibiotics she was discharged on Augmentin.  She was unable to get her antibiotics due to insurance issues.  She was not able to follow-up with colorectal surgery or GI yet after discharge. Per [facility] note, outpatient GI plan was to be to transition to [facility].     2 days ago she developed abdominal pain, vomiting, and inability to keep food down.  Reports her colostomy output was about the same.    Per EMR, last colonoscopy was  in 2023 which demonstrated inflammation and ulceration of the ileum and ascending, transverse and distal descending colon consistent with her Crohn's.       Pertinent Labs:   Lab Results: personally reviewed   Lab Results   Component Value Date     11/13/2024     11/12/2024     10/26/2024    .0 04/24/2017    CO2 27 11/13/2024    CO2 28 11/12/2024    CO2 28 10/26/2024    CO2 25 06/23/2022    CO2 23 06/03/2022    CO2 22 06/02/2022    CO2 26.0 04/24/2017    BUN 10.4 11/13/2024    BUN 15.3 11/12/2024    BUN 18.5 10/26/2024    BUN 6 06/23/2022    BUN 3 06/03/2022    BUN 4 06/02/2022    BUN 7.0 04/24/2017     Lab Results   Component Value Date    WBC 7.0 11/13/2024    WBC 5.7 11/12/2024    WBC 5.6 10/26/2024    HGB 9.9 11/13/2024    HGB 10.2 11/12/2024    HGB 8.4 10/26/2024    HGB 10.8 01/10/2018    HGB 10.3 11/08/2017    HGB 9.8 09/18/2017    HCT 31.3 11/13/2024    HCT 32.6  11/12/2024    HCT 27.5 10/26/2024    HCT 35.9 01/10/2018    HCT 33.8 11/08/2017    HCT 31.4 07/31/2017    MCV 90 11/13/2024    MCV 93 11/12/2024    MCV 92 10/26/2024    MCV 83.1 01/10/2018    MCV 77.9 11/08/2017    MCV 86.3 07/31/2017     11/13/2024     11/12/2024     10/26/2024       Pertinent Radiology: I have personally reviewed the images and report of CT AP and my impression/s include  IMPRESSION:   1.  Progressive, moderate to severe infectious versus inflammatory change of the residual transverse and right hemicolon. Consider gastroenterology or surgical consultation.  2.  Moderate stool volume within the right hemicolon, likely accounting for mild distention of the appendix and terminal ileum.  3.  Interval resolution of previous pericolonic abscess.  4.  Unchanged cystic lesion along the left posterior aspect of the uterus, measuring 4.1 x 6.5 cm.  5.  Hepatic steatosis.  6.  Mildly thick-walled distal esophagus; correlate for a distal esophagitis.    Past Medical History:   Diagnosis Date    Diet controlled gestational diabetes mellitus (GDM), antepartum 9/4/2017    Attempting diet control first    Gestational diabetes mellitus (GDM) in third trimester 9/4/2017    Attempting diet control first  Formatting of this note might be different from the original. Overview:  Attempting diet control first    Nausea/vomiting in pregnancy 4/24/2017    Postpartum hemorrhage 11/16/2017    Third degree laceration of perineum during delivery, postpartum 11/15/2017       Past Surgical History:   Procedure Laterality Date    COLONOSCOPY N/A 10/31/2022    Procedure: COLONOSCOPY with biopsies;  Surgeon: Luis Miguel Traore MD;  Location: [facility] GI    COLOSTOMY N/A 11/6/2022    Procedure: CREATION, COLOSTOMY;  Surgeon: Chandana Carlos DO;  Location: [facility] Main OR    LAPAROTOMY EXPLORATORY N/A 11/6/2022    Procedure: EXPLORATORY LAPAROTOMY SPLENIC FLEXURE MOBILIZATION;  Surgeon: Chandana Carlos  DO Td;  Location: [facility] OR    PICC DOUBLE LUMEN PLACEMENT  10/22/2024    PICC TRIPLE LUMEN PLACEMENT  5/10/2024    RECTAL PROLAPSE REPAIR N/A 11/6/2022    Procedure: RESECTION OF DESCENDING COLON;  Surgeon: Chandana Carlos DO;  Location: [facility] OR       Family History   Problem Relation Age of Onset    Gestational Diabetes Sister     Gestational Diabetes Sister     Diabetes No family hx of     Coronary Artery Disease No family hx of     Hypertension No family hx of     Breast Cancer No family hx of     Colon Cancer No family hx of     Prostate Cancer No family hx of     Other Cancer No family hx of        Social History     Socioeconomic History    Marital status:      Spouse name: Dmitriy Camargo    Number of children: 0    Years of education: 0    Highest education level: Not on file   Occupational History    Occupation: None   Tobacco Use    Smoking status: Never     Passive exposure: Never    Smokeless tobacco: Never   Vaping Use    Vaping status: Not on file   Substance and Sexual Activity    Alcohol use: No    Drug use: No    Sexual activity: Yes     Partners: Male   Other Topics Concern    Parent/sibling w/ CABG, MI or angioplasty before 65F 55M? Not Asked   Social History Narrative    Born in [facility], arrived to [facility], [place] 02/2017. No education.      Social Drivers of Health     Financial Resource Strain: Low Risk  (11/14/2024)    Financial Resource Strain     Within the past 12 months, have you or your family members you live with been unable to get utilities (heat, electricity) when it was really needed?: No   Food Insecurity: Low Risk  (11/14/2024)    Food Insecurity     Within the past 12 months, did you worry that your food would run out before you got money to buy more?: No     Within the past 12 months, did the food you bought just not last and you didn t have money to get more?: No   Transportation Needs: Low Risk  (11/14/2024)    Transportation Needs     Within the past 12 months,  has lack of transportation kept you from medical appointments, getting your medicines, non-medical meetings or appointments, work, or from getting things that you need?: No   Physical Activity: Not on file   Stress: Not on file   Social Connections: Not on file   Interpersonal Safety: Low Risk  (11/14/2024)    Interpersonal Safety     Do you feel physically and emotionally safe where you currently live?: Yes     Within the past 12 months, have you been hit, slapped, kicked or otherwise physically hurt by someone?: No     Within the past 12 months, have you been humiliated or emotionally abused in other ways by your partner or ex-partner?: No   Housing Stability: Low Risk  (11/14/2024)    Housing Stability     Do you have housing? : Yes     Are you worried about losing your housing?: No       Current Facility-Administered Medications   Medication Dose Route Frequency Provider Last Rate Last Admin    acetaminophen (TYLENOL) tablet 650 mg  650 mg Oral Q4H PRN Precious Rojo MD        Or    acetaminophen (TYLENOL) Suppository 650 mg  650 mg Rectal Q4H PRN Precious Rojo MD        HYDROmorphone (DILAUDID) injection 0.2 mg  0.2 mg Intravenous Q2H PRN Precious Rojo MD        HYDROmorphone (DILAUDID) injection 0.4 mg  0.4 mg Intravenous Q2H PRN Precious Rojo MD   0.4 mg at 11/14/24 0346    lidocaine (LMX4) cream   Topical Q1H PRN Precious Rojo MD        lidocaine 1 % 0.1-1 mL  0.1-1 mL Other Q1H PRN Precious Rojo MD        naloxone (NARCAN) injection 0.2 mg  0.2 mg Intravenous Q2 Min PRRina Jason MD        Or    naloxone (NARCAN) injection 0.4 mg  0.4 mg Intravenous Q2 Min PRN Rina Chairez MD        Or    naloxone (NARCAN) injection 0.2 mg  0.2 mg Intramuscular Q2 Min PRRina Jason MD        Or    naloxone (NARCAN) injection 0.4 mg  0.4 mg Intramuscular Q2 Min PRRina Jason MD        ondansetron (ZOFRAN ODT) ODT tab 4 mg  4 mg Oral Q6H PRN Precious Rojo MD        Or    ondansetron  "(ZOFRAN) injection 4 mg  4 mg Intravenous Q6H PRN Precious Rojo MD   4 mg at 11/14/24 0448    oxyCODONE (ROXICODONE) tablet 5 mg  5 mg Oral Q4H PRPrecious Adams MD        oxyCODONE IR (ROXICODONE) half-tab 2.5 mg  2.5 mg Oral Q4H PRPrecious Adams MD        pantoprazole (PROTONIX) IV push injection 40 mg  40 mg Intravenous Daily with breakfast Precious Rojo MD        piperacillin-tazobactam (ZOSYN) 3.375 g vial to attach to  mL bag  3.375 g Intravenous Q8H Precious Rojo MD   3.375 g at 11/14/24 0349    polyethylene glycol (MIRALAX) Packet 17 g  17 g Oral BID PRN Precious Rojo MD        prochlorperazine (COMPAZINE) injection 10 mg  10 mg Intravenous Q6H PRPrecious Adams MD        Or    prochlorperazine (COMPAZINE) tablet 10 mg  10 mg Oral Q6H PRN Precious Rojo MD        senna-docusate (SENOKOT-S/PERICOLACE) 8.6-50 MG per tablet 1 tablet  1 tablet Oral BID PRPrecious Adams MD        Or    senna-docusate (SENOKOT-S/PERICOLACE) 8.6-50 MG per tablet 2 tablet  2 tablet Oral BID PRPrecious Adams MD        sodium chloride (PF) 0.9% PF flush 3 mL  3 mL Intracatheter Q8H Precious Rojo MD        sodium chloride (PF) 0.9% PF flush 3 mL  3 mL Intracatheter q1 min prPrecious Adams MD           Review of Systems:   \"A full 10 point review of systems was taken and is negative aside from what is noted above in the HPI.\"     Consitutional / General: Denies wt changes, fevers, chills or sweats.  Allergies:   Allergies   Allergen Reactions    Gluten Meal      Celiac    Soy Allergy Anaphylaxis, Hives and Itching     Tofu- causes itching and sores in the mouth     Intake/Output past 24 hrs:    Intake/Output Summary (Last 24 hours) at 11/14/2024 0604  Last data filed at 11/14/2024 0000  Gross per 24 hour   Intake 0 ml   Output --   Net 0 ml       Physical Exam:  Temp:  [99  F (37.2  C)-99.4  F (37.4  C)] 99  F (37.2  C)  Pulse:  [] 80  Resp:  [18-20] 18  BP: " (112-144)/(22-86) 118/80  SpO2:  [98 %-100 %] 99 %  General: NAD, alert, cooperative  Head: normocephalic, without abnormality / atraumatic  Respiratory: non-labored breathing  Abdomen: tender in RLQ. No guarding or peritonitis. L colostomy in place with stool.   Skin: no rashes or lesions  Musculoskeletal: moves all four extremities equally  Psychological: alert and oriented, answers questions appropriately    Assessment/Plan: Dain Tejeda is a 44 year old Hmong speaking woman with Crohn's with prior colonic stricture status post open resection of descending colon and colostomy creation in 2022 who follows with [facility] on Humira and azathioprine.  She presents with nausea, vomiting, and abdominal pain and CT demonstrates moderate-severe inflammation of the transverse and right colon with moderate stool in the right colon, as well as resolution of prior pericolonic abscess.     It appears she has some obstructive symptoms secondary to possible inflammatory stricture. Recommend non-operative management at this time with consult to GI for evaluation of treatment of possible Crohn's colitis, especially given that her prior abscess has resolved.     1. Agree with NPO. Pt previously malnourished, if remains NPO for long period of time would consider PICC and TPN  2. Recommend GI consult for treatment of Crohn's colitis  3. CRS will continue to follow      This case was discussed with: Dr Lafleur    Thank you for consulting [facility] regarding this patient's   care. Please contact us with questions or concerns.     Bekah Ruvalcaba MD  [facility] Fellow  [facility]  [phone]..............................main    Colorectal Surgery Staff:  I have seen and examined the patient. I agree with the above documentation and plan of the fellow/PA above with the following additions/chages:    Ms. Tejeda is a 44-year-old Hmong speaking female who is known to me for her Crohn's colitis.   There are some question of Crohn's ileitis versus backwash ileitis.  Her history has been captured well above, however in brief, she has a history of a left colectomy and colostomy done by general surgery in 2022.  I initially met her when she was hospitalized less than a month ago for 2 colonic abscesses and significant thickening of the residual colon.  That hospitalization was managed medically however as an outpatient, she was unable to  her antibiotics, follow-up with colorectal surgery, or follow-up with gastroenterology.  She was eventually directed to the ER where she presented with similar findings.    She states that for the past 2 days, she has had crampy abdominal pain with some loose stools from her colostomy.  She was admitted to the hospital and started on IV antibiotics.  In the past, she has been on azathioprine and Humira.  Of note, she was hospitalized again a month prior to that and was found to be positive for both norovirus and enteropathogenic E. coli.    She states that she has started to feel better since she has been admitted.  She is starting to have output from her colostomy.  She currently has a temperature of 100.7.  Her pulse is 94 and her blood pressure is 123/76.  On abdominal exam, she has a well-healed midline laparotomy incision in the left lower quadrant colostomy that is full of stool.  She is tender to palpation of the right lower quadrant without any rebound or guarding.    Her white blood cell count is normal at 8.5.  She is anemic with a hemoglobin of 8.9.    I have personally reviewed her CT scan.  She has significant thickening of transverse colon which is completely collapsed and upstream from this, the right colon is dilated and full of stool.    A/P: 44F with Crohn's colitis and a question of backwash ileitis versus active inflammatory bowel disease.    Based on her exam today, she has had multiple similar hospitalizations and has been unable to resolve this  problem with medical therapy.  She has faced several social barriers to getting her care and has not been able to stay on outpatient directed therapy.  It is very likely that if we discharged her with a similar plan, we will end up getting a similar result.  Therefore, my recommendation would be the following:  NPO/IV fluids/serial abdominal exams  IV Zosyn  Stool testing for C. difficile and stool pathogens  I would recommend against infliximab as I do not think that she is going to be able to get to a long-term solution by changing Biologics here.  I would recommend as long as the stool studies are negative, starting a steroid taper and then getting her set up directly for outpatient surgery for a completion total abdominal colectomy with end ileostomy.  Looking back at her previous scans, she has had significant thickening of the ileum, but she is always had quite a bit of back wash stool and I do not know if this truly represents active inflammatory bowel disease.  I will also coordinate with her gastroenterologist to see if she has ever had an outpatient CTE/MRE to see if there is any documented evidence of small bowel disease.  If not, I do think surgery would be the best chance at long-term relief from her symptoms.    Total time spent including non-face-to-face time: 47 minutes    Hugo Lafleur MD MBA  [facility]  Office: [phone]  11/14/2024 6:19 PM

## 2024-11-14 NOTE — CONSULTS
GI CONSULT NOTE      Name: Dain Tejeda    Medical Record #: [id number]    YOB: 1980    Date: 11/14/2024    CC: We were consulted by Steve Camp MD to see Dain Tejeda in regards to Crohn's disease.    HPI: This is a 44 year old Hmong -speaking female who was seen with use of professional audio  (language line). Pt has history of Crohn's disease (diagnosed 2022), with involvement of the small bowel and colon s/p resection of the descending colon with colostomy and Duffy's pouch.  Also history pericolonic abscesses in May which appeared resolved on imaging in September, though then had recurrence in October 2024.  Has been treated with Humira and azathioprine until recently.  Last hospitalized October 19-26, 2024 with 2 pericolonic abscesses measuring 5.6 and 4.1 cm proximal transverse colon and extending just proximal to the colostomy site.  CRS of the patient and she was ultimately treated with IV antibiotics, given concern that if drain were placed she may develop a colocutaneous fistula.  Plans are for Augmentin as an outpatient, though the patient did not pick this up, citing insurance issues.Plan was to follow-up with colorectal surgery and [facility] though she was not able to do so.  Our office was contacted by Dr. Flores at [facility] on 11/12 on the patient was in his clinic.  That she had not been taking her outpatient antibiotics after discharge, was in clinic with increasing abdominal pain though no fever and stable vitals.  She was then started on Augmentin.  Then schedule follow-up with us for November 19 with Dr. Billy.  She presented to the ED the following day (yesterday) with nausea, vomiting, worsening abdominal pain.  CT with IV contrast showed resolution of the previous pericolonic abscesses, though moderate-severe infection versus inflammatory change in the residual transverse and right hemicolon.  Also had a generous amount of stool on the right colon and mild distention  of the appendix and terminal ileum.  Patient without fever or leukocytosis on presentation.  Currently on Zosyn.    Was last seen in our office January 2024 with recommendation to follow-up in 3-4 months.  She has not followed up in the clinic since then.  Was initially treated with azathioprine, then Humira added on in Oct 2023.  Appears azathioprine was stopped after her recent admission, though she reports her last dose of Humira was November 12, 2024 (2 days ago).  She reports that she has been on Humira every 2 weeks, though missed a dose when she was hospitalized last month with the abscesses.      She was hospitalized in Sept 2024 and seen by Dr. Britt on 9/12/2024 when she presented similarly. At that time, she was positive for norovirus and enteropathogenic E.  coli.    Per [facility] records, her subcutaneous Humira was recently refilled on 9/27 but there was a plan to see her back in clinic and switch her to Infliximab.    Prior GI workup:  EGD September 2022 diagnosed celiac disease with Marsh 3A  villous atrophy.  Colonoscopy October 2022 at [facility]. Only advanced descending  colon due to stricture. Diffuse erythematous mucosa in  descending.  Colonoscopy September 2023: Ulcerated terminal ileum. Ulcerated  and pseudopolyps ascending through descending. Duffy's pouch  biopsied. Pathology of terminal ileum moderately active chronic  ileitis with erosions; random colon mildly active chronic colitis  with ill formed granulomas; rectal biopsy minimally active  chronic proctitis.    Past medical history  Past Medical History:   Diagnosis Date    Diet controlled gestational diabetes mellitus (GDM), antepartum 9/4/2017    Attempting diet control first    Gestational diabetes mellitus (GDM) in third trimester 9/4/2017    Attempting diet control first  Formatting of this note might be different from the original. Overview:  Attempting diet control first    Nausea/vomiting in pregnancy 4/24/2017    Postpartum  hemorrhage 11/16/2017    Third degree laceration of perineum during delivery, postpartum 11/15/2017      Family history  Family History   Problem Relation Age of Onset    Gestational Diabetes Sister     Gestational Diabetes Sister     Diabetes No family hx of     Coronary Artery Disease No family hx of     Hypertension No family hx of     Breast Cancer No family hx of     Colon Cancer No family hx of     Prostate Cancer No family hx of     Other Cancer No family hx of       Social history - lives with non-English speaking .   Social History     Tobacco Use    Smoking status: Never     Passive exposure: Never    Smokeless tobacco: Never   Substance Use Topics    Alcohol use: No    Drug use: No     Medications:   Humira, Augmentin, famotidine BID, Zofran prn.     Allergies:    Allergies   Allergen Reactions    Gluten Meal      Celiac    Soy Allergy Anaphylaxis, Hives and Itching     Tofu- causes itching and sores in the mouth      REVIEW OF SYSTEMS (ROS): Review of systems is as per HPI.  Remainder of complete review of systems is negative.      PHYSICAL EXAMINATION:      /75 (BP Location: Left arm)   Pulse 83   Temp 98.7  F (37.1  C) (Oral)   Resp 18   LMP 09/25/2024 (Approximate)   SpO2 98%   GEN: This, undernourished 44 year old [place]  female in no acute distress.  CARDIO: Regular rate and rhythm  GI: Non-distended.  Bowel sounds positive.  Soft. tender to light palpation throughout abdomen; worse in RLQ.  No guarding.  EXT: warm, no lower extremity edema  NEURO: Alert and oriented.  Speech fluid.    PSYCH: Mental status appropriate, mood and affect normal.      LABS and IMAGING  Recent Labs   Lab 11/14/24  0559 11/13/24  1538 11/12/24  0858   WBC 8.5 7.0 5.7   RBC 3.06* 3.49* 3.49*   HGB 8.9* 9.9* 10.2*   HCT 28.1* 31.3* 32.6*   MCV 92 90 93   MCH 29.1 28.4 29.2   MCHC 31.7 31.6 31.3*   RDW 14.9 15.0 14.9    325 296      Recent Labs   Lab 11/14/24  0559 11/13/24  1538  11/12/24  0858    142 141   CO2 24 27 28   BUN 6.9 10.4 15.3     Recent Labs   Lab 11/13/24  1538   ALKPHOS 84   AST 23   ALT 15     Lab Results   Component Value Date    INR 1.12 10/23/2024    INR 0.98 05/20/2024    INR 1.08 05/13/2024     CT Abdomen Pelvis w Contrast  Result Date: 11/13/2024  EXAM: CT ABDOMEN PELVIS W CONTRAST LOCATION: [facility] DATE: 11/13/2024 INDICATION: Abdominal pain, vomiting, chills, fever, and diarrhea; recent pericolonic abscess; Crohn's disease. COMPARISON: 10/19/2024. TECHNIQUE: CT scan of the abdomen and pelvis was performed following injection of IV contrast. Multiplanar reformats were obtained. Dose reduction techniques were used. CONTRAST: 45 mL Isovue-370. FINDINGS: LOWER CHEST: Mildly thick-walled distal esophagus; correlate for a distal esophagitis. HEPATOBILIARY: Diffuse hepatic steatosis. Unchanged, benign calcification in the right hepatic lobe. Gallbladder is normal. No intrahepatic or intrahepatic biliary ductal dilatation. PANCREAS: Enhances normally. No peripancreatic inflammatory fat stranding. SPLEEN: Enhances normally. Normal size. ADRENAL GLANDS: Normal. KIDNEYS: Both kidneys enhance symmetrically, without hydronephrosis. No nephroureterolithiasis. Urinary bladder is unremarkable. PELVIC ORGANS: Unchanged cystic lesion along the left posterior aspect of the uterus, measuring 4.1 x 6.5 cm. BOWEL: Status post partial colectomy, with end colostomy at the left lower quadrant and Brenda's pouch. Residual transverse and right hemicolon demonstrates moderate to severe wall thickening and progressive inflammatory change. Moderate stool volume is present within the right hemicolon, likely accounting for mild distention of the appendix and terminal ileum. Associated wall thickening of the terminal ileum is consistent with superimposed infectious/inflammatory change. Interval resolution of previous pericolonic abscess occurring near the  colostomy site. No intraperitoneal free fluid or free air. LYMPH NODES: No suspicious abdominal or pelvic lymphadenopathy. Reactive subcentimeter lymph nodes within the upper abdomen, similar to prior. VASCULATURE: No abdominal aortic aneurysm. Mild atheromatous disease MUSCULOSKELETAL: No suspicious abnormality. OTHER: No additionally significant abnormalities.     IMPRESSION: 1.  Progressive, moderate to severe infectious versus inflammatory change of the residual transverse and right hemicolon. Consider gastroenterology or surgical consultation. 2.  Moderate stool volume within the right hemicolon, likely accounting for mild distention of the appendix and terminal ileum. 3.  Interval resolution of previous pericolonic abscess. 4.  Unchanged cystic lesion along the left posterior aspect of the uterus, measuring 4.1 x 6.5 cm. 5.  Hepatic steatosis. 6.  Mildly thick-walled distal esophagus; correlate for a distal esophagitis.      ASSESSMENT  #Crohn's ileocolitis.  Complex Crohn's disease with stricturing phenotype, s/p resection of the descending colon with colostomy and Duffy's pouch, also with two separate bouts of pericolonic abscesses in the past 6 months. Has been failing Humira and azathioprine.  Plans have been to switch to Avsola (though communication has been a challenge with her).  Recently hospitalized 10/19-26, 2024 with intra-abdominal abscesses treated with IV antibiotics; these appear resolved on current CT, even though pt did not follow through on taking Augmentin as was recommended.  Presented to ED yesterday with nausea, vomiting and worsening abdominal pain.  CT 11/13/24 showing  improvement of the pericolonic abscess, though moderate to severe infectious versus inflammatory change through much of the remaining colon along with moderate stool volume in the right colon.  CRS has seen the patient recommending medical management.  Will need to rule out infection; stool studies have been ordered.   May need to consider steroids versus working on getting her on Avsola (infliximab bioequivalent) in the near future.     #Duodenal villous atrophy  While celiac is mentioned in her chart, she has not had celiac genetic test and her TTG has not been elevated in the past. It is possible that the villous atrophy in her duodenum is due to Crohn's.  Favor checking celiac genetic test to sort this out further. Should also have small bowel biopsies done in the future to follow-up on villous atrophy, as this can contribute to iron and folic acid deficiency.      Patient Active Problem List   Diagnosis    Screening for cervical cancer- ASCUS, HPV+ 4/2017    Pain of back and right lower extremity    Abdominal pain, generalized    Abdominal pain    Inflammation of small intestine    Moderate recurrent major depression (H)    Hypokalemia    Colitis    Intra-abdominal abscess (H)    Celiac disease    Diarrhea    Chronic gastritis    Chronic diarrhea    Crohn's disease with other complication, unspecified gastrointestinal tract location (H)    S/P partial colectomy    Anemia, unspecified type    Atypical squamous cell changes of undetermined significance (ASCUS) on vaginal cytology with positive high risk human papilloma virus (HPV)    Syncope, unspecified syncope type    Spell of abnormal behavior    Sepsis, due to unspecified organism, unspecified whether acute organ dysfunction present (H)    Enterocolitis    Epigastric pain    Left ovarian cyst    Generalized abdominal pain    Crohn's disease of colon with abscess (H)    Ulcerative colitis (H)     PLAN  1. On Zosyn  2. Obtain stool studies -- Cdiff, enteric pathogens (bacterial andviral).   3. The team here will discuss further with Dr Britt regarding next steps -- I.e. steroids versus biologic versus other.   4. Add Miralax daily to decrease colonic stool burden.    5. Judicious use of opioids.  Recommend trying Tylenol first-line.  6.  Antiemetics as needed.  7.  Clear  liquids.  If unable to tolerate, will then need to consider TPN.  8. Outpt follow-up Nov 19, 2024 with Dr Britt in our [place] location.    Patient will be discussed with Dr Bull.   Thank you for asking to consult on this patient.    Gulshan Denton PA-C   11/14/2024 12:00 PM  [facility]  [phone]       ADDENDUM  Discussion was had between [facility] providers Dr. Billy and Dr. Bull.  Ultimately, decision made to initiate infliximab.  This was explained to the patient if [facility] professional A/P along  and pt is in agreement.  Infliximab is a biologic medication with side effect profile is similar to Humira.  Discussed dosing frequency with patient.    Gulshan Denton PA-C   11/14/2024 12:00 PM  [facility]  [phone]     Total of 75 minutes was spent on today's care.

## 2024-11-14 NOTE — H&P
[facility]    History and Physical - Hospitalist Service       Date of Admission:  11/13/2024    Assessment & Plan      Dain Tejeda is a 44 year old female admitted on 11/13/2024. She has a history of Crohn's disease s/p partial colectomy with colostomy, Celiac disease, chronic gastritis, colitis, and chronic diarrhea who is admitted for colitis uncontrolled with outpatient management.      Colitis   History of Crohn's disease, celiac disease   Status post partial colectomy with current colostomy  Malnutrition  Patient with known history of Crohn's disease since 2022, complicated by stricture and status post partial colectomy with colostomy creation. Had followed with [facility] and was previously on Humira and azathioprine though of note did not restart on azathioprine or Humira after last hospitalization.   Recent admission from 10/20-10/26/24 due to colitis and pericolonic abscess. Presented 11/13 with worsening abdominal pain, nausea, and vomiting. Vitally stable and afebrile.  Very tender on abdominal exam especially on the right. Labs revealed normal WBC and CMP. CRP elevated to 46.3. Urine with blood though not infectious appearing. CT abdomen/pelvis with progressive moderate to severe infectious versus inflammatory transverse and right hemicolitis. Previously noted pericolonic abscess was resolved. Also with moderate stool burden. ED provider discussed case with colorectal surgery did not recommend any immediate surgical plan and wanted to evaluate in the morning.   - Colorectal surgery consulted, appreciate recs  - consider GI consult in AM  - NPO at midnight  - Zosyn  - pain management with Tylenol, PO oxycodone and IV Dilaudid  - Zofran and compazine prn nausea  - pantoprazole 40 mg daily     Chronic normocytic anemia  Hemoglobin on admission 9.9, within baseline between 8 and 10.  No signs or symptoms of acute blood loss anemia, denies dark stools. No dark stool or phoenix blood in  ostomy bag on exam.   - will monitor with daily CBC    History of disorganized behaviors  Noted on previous hospital stays.  Has previously responded well to Seroquel 12.5 mg while admitted.  Stable on admission  - close monitoring  - may consider adding Seroquel if needed     History pelvic cyst  Known history.  CT abdomen pelvis on admission redemonstrating ovarian cyst. Recommended follow-up pelvic ultrasound in 6 to 12 months previously.        Diet: NPO per Anesthesia Guidelines for Procedure/Surgery Except for: Meds  DVT Prophylaxis: Pneumatic Compression Devices  Chambers Catheter: Not present  Fluids: s/p 2L NS bolus  Lines: None     Cardiac Monitoring: None  Code Status: Full Code    Clinically Significant Risk Factors Present on Admission        # Hypokalemia: Lowest K = 3 mmol/L in last 2 days, will replace as needed                   # Anemia: based on hgb <11           # Financial/Environmental Concerns:           Disposition Plan      Expected Discharge Date: 11/15/2024                The patient's care was discussed with the Patient, on-call Team, and will be formally discussed with Attending Physician .    Precious Rojo MD  Hospitalist Service  [facility]  Securely message with [employer] (more info)  Text page via [employer] Paging/Directory   ______________________________________________________________________    Chief Complaint   Abdominal pain    History is obtained from the patient and chart review    History of Present Illness   Dain Tejeda is a 44 year old female who has a history of Crohn's disease s/p partial colectomy with colostomy, celiac disease, normocytic anemia, disorganized behaviors and is admitted for abdominal pain found to have colitis.     Was hospitalized from 10/20/2024 to 10/26/2024 due to sepsis and pericolonic abscess in the setting of Crohn's.  Received IV antibiotics and was discharged with Augmentin.  Patient reports was unable to get antibiotics due to to  insurance coverage issues with out of network provider.  Also recommended to have close follow-up with colorectal surgery and GI which she was not able to do.     2 days ago developed abdominal pain and vomiting.    Was seen at [place] clinic on 11/12/2024 with abdominal pain nausea and loose stool.  Given Augmentin which she took 2 doses of prior to presentation to ED.     Patient reports that had worsening abdominal pain at noon on 11/13/2024.  Had additional episodes of vomiting and was unable to keep her food and drink down.  Did not notice any significant difference in the colostomy output and had not emptied bag today. Decided to present to the emergency room.  Denies any measured fever though reports felt feverish.  No chest pain or shortness of breath.    ED course:  Vitally stable.  Received morphine 4 mg, Zofran 4 mg, and pantoprazole 40 mg.  Also given 1 L NS bolus. Patient reports improvement in pain to 5-6/10 after morphine.     Past Medical History    Past Medical History:   Diagnosis Date    Diet controlled gestational diabetes mellitus (GDM), antepartum 9/4/2017    Attempting diet control first    Gestational diabetes mellitus (GDM) in third trimester 9/4/2017    Attempting diet control first  Formatting of this note might be different from the original. Overview:  Attempting diet control first    Nausea/vomiting in pregnancy 4/24/2017    Postpartum hemorrhage 11/16/2017    Third degree laceration of perineum during delivery, postpartum 11/15/2017     Past Surgical History   Past Surgical History:   Procedure Laterality Date    COLONOSCOPY N/A 10/31/2022    Procedure: COLONOSCOPY with biopsies;  Surgeon: Luis Miguel Traore MD;  Location: [facility] GI    COLOSTOMY N/A 11/6/2022    Procedure: CREATION, COLOSTOMY;  Surgeon: Chandana Carlos DO;  Location: [facility] OR    LAPAROTOMY EXPLORATORY N/A 11/6/2022    Procedure: EXPLORATORY LAPAROTOMY SPLENIC FLEXURE MOBILIZATION;  Surgeon: Breanna  Chandana Appiah DO;  Location: [facility] OR    PICC DOUBLE LUMEN PLACEMENT  10/22/2024    PICC TRIPLE LUMEN PLACEMENT  5/10/2024    RECTAL PROLAPSE REPAIR N/A 11/6/2022    Procedure: RESECTION OF DESCENDING COLON;  Surgeon: Chandana Carlos DO;  Location: [facility] OR     Prior to Admission Medications   Prior to Admission Medications   Prescriptions Last Dose Informant Patient Reported? Taking?   amoxicillin-clavulanate (AUGMENTIN) 875-125 MG tablet 11/13/2024 Morning  No Yes   Sig: Take 1 tablet by mouth 2 times daily.   famotidine (PEPCID) 10 MG tablet Past Week  Yes Yes   Sig: Take 10 mg by mouth 2 times daily.   ondansetron (ZOFRAN ODT) 4 MG ODT tab Unknown  No Yes   Sig: Take 1 tablet (4 mg) by mouth every 12 hours as needed for nausea or vomiting.      Facility-Administered Medications: None           Physical Exam   Vital Signs: Temp: 99.4  F (37.4  C) Temp src: Oral BP: 128/85 Pulse: 86   Resp: 20 SpO2: 99 % O2 Device: None (Room air)    Weight: 0 lbs 0 oz    Constitutional: awake, alert, thin, intermittent grimacing in pain  Eyes: Lids and lashes normal, pupils equal round and reactive to light, sclera clear, conjunctiva normal  ENT: Normocephalic, without obvious abnormality, atraumatic; mucus membranes dry   Respiratory: Clear to auscultation bilaterally, no crackles or wheezing  Cardiovascular: Regular rate and rhythm, normal S1 and S2, no murmur noted  GI: Soft, very tender to mild palpation, especially in right side of abdomen, midline scar; colostomy bag in place with light brown liquid stool  Skin: warm, dry  Neurologic: Awake, alert, oriented to name, place and time.  Cranial nerves II-XII are grossly intact.    Medical Decision Making   Please see A&P for additional details of medical decision making.      Data   ------------------------- PAST 24 HR DATA REVIEWED -----------------------------------------------    I have personally reviewed the following data over the past 24  hrs:    7.0  \   9.9 (L)   / 325     142 102 10.4 /  109 (H)   3.5 27 0.66 \     ALT: 15 AST: 23 AP: 84 TBILI: 0.3   ALB: 3.7 TOT PROTEIN: 9.0 (H) LIPASE: 43     Procal: N/A CRP: 46.30 (H) Lactic Acid: N/A         Imaging results reviewed over the past 24 hrs:   Recent Results (from the past 24 hours)   CT Abdomen Pelvis w Contrast    Narrative    EXAM: CT ABDOMEN PELVIS W CONTRAST  LOCATION: [facility]  DATE: 11/13/2024    INDICATION: Abdominal pain, vomiting, chills, fever, and diarrhea; recent pericolonic abscess; Crohn's disease.  COMPARISON: 10/19/2024.  TECHNIQUE: CT scan of the abdomen and pelvis was performed following injection of IV contrast. Multiplanar reformats were obtained. Dose reduction techniques were used.  CONTRAST: 45 mL Isovue-370.    FINDINGS:    LOWER CHEST: Mildly thick-walled distal esophagus; correlate for a distal esophagitis.    HEPATOBILIARY: Diffuse hepatic steatosis. Unchanged, benign calcification in the right hepatic lobe.    Gallbladder is normal.    No intrahepatic or intrahepatic biliary ductal dilatation.    PANCREAS: Enhances normally. No peripancreatic inflammatory fat stranding.    SPLEEN: Enhances normally. Normal size.    ADRENAL GLANDS: Normal.    KIDNEYS: Both kidneys enhance symmetrically, without hydronephrosis.    No nephroureterolithiasis.    Urinary bladder is unremarkable.    PELVIC ORGANS: Unchanged cystic lesion along the left posterior aspect of the uterus, measuring 4.1 x 6.5 cm.    BOWEL: Status post partial colectomy, with end colostomy at the left lower quadrant and Brenda's pouch. Residual transverse and right hemicolon demonstrates moderate to severe wall thickening and progressive inflammatory change. Moderate stool volume   is present within the right hemicolon, likely accounting for mild distention of the appendix and terminal ileum. Associated wall thickening of the terminal ileum is consistent with superimposed  infectious/inflammatory change. Interval resolution of   previous pericolonic abscess occurring near the colostomy site.    No intraperitoneal free fluid or free air.    LYMPH NODES: No suspicious abdominal or pelvic lymphadenopathy. Reactive subcentimeter lymph nodes within the upper abdomen, similar to prior.    VASCULATURE: No abdominal aortic aneurysm. Mild atheromatous disease    MUSCULOSKELETAL: No suspicious abnormality.    OTHER: No additionally significant abnormalities.      Impression    IMPRESSION:   1.  Progressive, moderate to severe infectious versus inflammatory change of the residual transverse and right hemicolon. Consider gastroenterology or surgical consultation.  2.  Moderate stool volume within the right hemicolon, likely accounting for mild distention of the appendix and terminal ileum.  3.  Interval resolution of previous pericolonic abscess.  4.  Unchanged cystic lesion along the left posterior aspect of the uterus, measuring 4.1 x 6.5 cm.  5.  Hepatic steatosis.  6.  Mildly thick-walled distal esophagus; correlate for a distal esophagitis.

## 2024-11-15 ENCOUNTER — VIRTUAL VISIT (OUTPATIENT)
Dept: INTERPRETER SERVICES | Facility: CLINIC | Age: 44
End: 2024-11-15
Payer: COMMERCIAL

## 2024-11-15 LAB
ANION GAP SERPL CALCULATED.3IONS-SCNC: 7 MMOL/L (ref 7–15)
BUN SERPL-MCNC: 7.4 MG/DL (ref 6–20)
CALCIUM SERPL-MCNC: 8.2 MG/DL (ref 8.8–10.4)
CHLORIDE SERPL-SCNC: 102 MMOL/L (ref 98–107)
CREAT SERPL-MCNC: 0.6 MG/DL (ref 0.51–0.95)
EGFRCR SERPLBLD CKD-EPI 2021: >90 ML/MIN/1.73M2
ERYTHROCYTE [DISTWIDTH] IN BLOOD BY AUTOMATED COUNT: 14.6 % (ref 10–15)
GLUCOSE SERPL-MCNC: 96 MG/DL (ref 70–99)
HCO3 SERPL-SCNC: 28 MMOL/L (ref 22–29)
HCT VFR BLD AUTO: 26.7 % (ref 35–47)
HGB BLD-MCNC: 8.7 G/DL (ref 11.7–15.7)
MCH RBC QN AUTO: 29.3 PG (ref 26.5–33)
MCHC RBC AUTO-ENTMCNC: 32.6 G/DL (ref 31.5–36.5)
MCV RBC AUTO: 90 FL (ref 78–100)
PLATELET # BLD AUTO: 242 10E3/UL (ref 150–450)
POTASSIUM SERPL-SCNC: 2.7 MMOL/L (ref 3.4–5.3)
POTASSIUM SERPL-SCNC: 3.6 MMOL/L (ref 3.4–5.3)
RBC # BLD AUTO: 2.97 10E6/UL (ref 3.8–5.2)
SODIUM SERPL-SCNC: 137 MMOL/L (ref 135–145)
WBC # BLD AUTO: 7.1 10E3/UL (ref 4–11)

## 2024-11-15 PROCEDURE — 250N000013 HC RX MED GY IP 250 OP 250 PS 637

## 2024-11-15 PROCEDURE — 84132 ASSAY OF SERUM POTASSIUM: CPT | Performed by: FAMILY MEDICINE

## 2024-11-15 PROCEDURE — 99232 SBSQ HOSP IP/OBS MODERATE 35: CPT | Mod: GC | Performed by: FAMILY MEDICINE

## 2024-11-15 PROCEDURE — 85018 HEMOGLOBIN: CPT

## 2024-11-15 PROCEDURE — 250N000013 HC RX MED GY IP 250 OP 250 PS 637: Performed by: FAMILY MEDICINE

## 2024-11-15 PROCEDURE — 250N000013 HC RX MED GY IP 250 OP 250 PS 637: Performed by: PHYSICIAN ASSISTANT

## 2024-11-15 PROCEDURE — 250N000009 HC RX 250

## 2024-11-15 PROCEDURE — 250N000011 HC RX IP 250 OP 636: Performed by: PHYSICIAN ASSISTANT

## 2024-11-15 PROCEDURE — 36415 COLL VENOUS BLD VENIPUNCTURE: CPT

## 2024-11-15 PROCEDURE — 36415 COLL VENOUS BLD VENIPUNCTURE: CPT | Performed by: FAMILY MEDICINE

## 2024-11-15 PROCEDURE — T1013 SIGN LANG/ORAL INTERPRETER: HCPCS | Mod: U4,TEL,95 | Performed by: INTERPRETER

## 2024-11-15 PROCEDURE — 80048 BASIC METABOLIC PNL TOTAL CA: CPT

## 2024-11-15 PROCEDURE — 250N000011 HC RX IP 250 OP 636

## 2024-11-15 PROCEDURE — 120N000001 HC R&B MED SURG/OB

## 2024-11-15 PROCEDURE — 82310 ASSAY OF CALCIUM: CPT

## 2024-11-15 RX ORDER — METHYLPREDNISOLONE SODIUM SUCCINATE 40 MG/ML
20 INJECTION INTRAMUSCULAR; INTRAVENOUS EVERY 8 HOURS
Status: DISCONTINUED | OUTPATIENT
Start: 2024-11-15 | End: 2024-11-17 | Stop reason: HOSPADM

## 2024-11-15 RX ORDER — AZITHROMYCIN 250 MG/1
500 TABLET, FILM COATED ORAL DAILY
Status: COMPLETED | OUTPATIENT
Start: 2024-11-15 | End: 2024-11-17

## 2024-11-15 RX ORDER — MULTIVITAMIN,THERAPEUTIC
1 TABLET ORAL DAILY
Status: DISCONTINUED | OUTPATIENT
Start: 2024-11-16 | End: 2024-11-17 | Stop reason: HOSPADM

## 2024-11-15 RX ORDER — POTASSIUM CHLORIDE 1.5 G/1.58G
40 POWDER, FOR SOLUTION ORAL ONCE
Status: COMPLETED | OUTPATIENT
Start: 2024-11-15 | End: 2024-11-15

## 2024-11-15 RX ADMIN — PIPERACILLIN AND TAZOBACTAM 3.38 G: 3; .375 INJECTION, POWDER, FOR SOLUTION INTRAVENOUS at 02:50

## 2024-11-15 RX ADMIN — OXYCODONE HYDROCHLORIDE 5 MG: 5 TABLET ORAL at 14:36

## 2024-11-15 RX ADMIN — HYDROMORPHONE HYDROCHLORIDE 0.2 MG: 0.2 INJECTION, SOLUTION INTRAMUSCULAR; INTRAVENOUS; SUBCUTANEOUS at 06:59

## 2024-11-15 RX ADMIN — METHYLPREDNISOLONE SODIUM SUCCINATE 20 MG: 40 INJECTION, POWDER, FOR SOLUTION INTRAMUSCULAR; INTRAVENOUS at 23:05

## 2024-11-15 RX ADMIN — HYDROMORPHONE HYDROCHLORIDE 0.4 MG: 0.2 INJECTION, SOLUTION INTRAMUSCULAR; INTRAVENOUS; SUBCUTANEOUS at 00:26

## 2024-11-15 RX ADMIN — OXYCODONE HYDROCHLORIDE 5 MG: 5 TABLET ORAL at 08:24

## 2024-11-15 RX ADMIN — ACETAMINOPHEN 650 MG: 325 TABLET ORAL at 02:48

## 2024-11-15 RX ADMIN — AZITHROMYCIN DIHYDRATE 500 MG: 250 TABLET, FILM COATED ORAL at 14:36

## 2024-11-15 RX ADMIN — ACETAMINOPHEN 650 MG: 325 TABLET ORAL at 20:21

## 2024-11-15 RX ADMIN — POLYETHYLENE GLYCOL 3350 17 G: 17 POWDER, FOR SOLUTION ORAL at 20:21

## 2024-11-15 RX ADMIN — POLYETHYLENE GLYCOL 3350 17 G: 17 POWDER, FOR SOLUTION ORAL at 08:24

## 2024-11-15 RX ADMIN — ONDANSETRON 4 MG: 4 TABLET, ORALLY DISINTEGRATING ORAL at 20:33

## 2024-11-15 RX ADMIN — METHYLPREDNISOLONE SODIUM SUCCINATE 20 MG: 40 INJECTION, POWDER, FOR SOLUTION INTRAMUSCULAR; INTRAVENOUS at 14:36

## 2024-11-15 RX ADMIN — POTASSIUM CHLORIDE 40 MEQ: 1.5 POWDER, FOR SOLUTION ORAL at 12:20

## 2024-11-15 RX ADMIN — OXYCODONE HYDROCHLORIDE 5 MG: 5 TABLET ORAL at 02:48

## 2024-11-15 RX ADMIN — PANTOPRAZOLE SODIUM 40 MG: 40 INJECTION, POWDER, FOR SOLUTION INTRAVENOUS at 08:24

## 2024-11-15 RX ADMIN — PIPERACILLIN AND TAZOBACTAM 3.38 G: 3; .375 INJECTION, POWDER, FOR SOLUTION INTRAVENOUS at 11:14

## 2024-11-15 RX ADMIN — OXYCODONE HYDROCHLORIDE 5 MG: 5 TABLET ORAL at 20:22

## 2024-11-15 RX ADMIN — PIPERACILLIN AND TAZOBACTAM 3.38 G: 3; .375 INJECTION, POWDER, FOR SOLUTION INTRAVENOUS at 18:54

## 2024-11-15 RX ADMIN — ACETAMINOPHEN 650 MG: 325 TABLET ORAL at 14:49

## 2024-11-15 NOTE — PROGRESS NOTES
Colon [facility] Rectal [facility]  Daily Progress Note    Subjective  Reports ongoing pain, but has been controlled with pain medications. Mild nausea, also controlled with anti-emetics. Stoma remains productive with 800cc. Stool studies negative for Cdiff, positive for Norovirus, EPEC, and ETEC.    Temp of 100.4F last evening, stable since. Infliximab has been held per recommendations. Phone  used     Objective  Intake/Output last 24 hrs:    Intake/Output Summary (Last 24 hours) at 11/15/2024 0804  Last data filed at 11/15/2024 0200  Gross per 24 hour   Intake 540 ml   Output 1750 ml   Net -1210 ml     Temp:  [97.4  F (36.3  C)-100.7  F (38.2  C)] 98.5  F (36.9  C)  Pulse:  [78-94] 88  Resp:  [14-18] 14  BP: ()/(55-76) 110/65  SpO2:  [96 %-100 %] 100 %    Physical Exam:  General: awake, alert, laying in bed, in no acute distress  Head: normocephalic, atraumatic  Respiratory: non-labored breathing  Abdomen: soft, tender in lower and central abdomen and nondistended. No peritoneal signs. Stoma pink and viable with stool in appliance.   Skin: No rashes or lesions  Musculoskeletal: moves all four extremities equally  Psychological: alert and oriented, answers questions appropriately    Pertinent Labs  Lab Results: personally reviewed.  Lab Results   Component Value Date     11/14/2024     11/13/2024     11/12/2024    .0 04/24/2017    CO2 24 11/14/2024    CO2 27 11/13/2024    CO2 28 11/12/2024    CO2 25 06/23/2022    CO2 23 06/03/2022    CO2 22 06/02/2022    CO2 26.0 04/24/2017    BUN 6.9 11/14/2024    BUN 10.4 11/13/2024    BUN 15.3 11/12/2024    BUN 6 06/23/2022    BUN 3 06/03/2022    BUN 4 06/02/2022    BUN 7.0 04/24/2017     Lab Results   Component Value Date    WBC 8.5 11/14/2024    WBC 7.0 11/13/2024    WBC 5.7 11/12/2024    HGB 8.9 11/14/2024    HGB 9.9 11/13/2024    HGB 10.2 11/12/2024    HGB 10.8 01/10/2018    HGB 10.3 11/08/2017    HGB 9.8 09/18/2017    HCT 28.1 11/14/2024     HCT 31.3 11/13/2024    HCT 32.6 11/12/2024    HCT 35.9 01/10/2018    HCT 33.8 11/08/2017    HCT 31.4 07/31/2017    MCV 92 11/14/2024    MCV 90 11/13/2024    MCV 93 11/12/2024    MCV 83.1 01/10/2018    MCV 77.9 11/08/2017    MCV 86.3 07/31/2017     11/14/2024     11/13/2024     11/12/2024       Assessment/Plan: 44F with Crohn's colitis and a question of backwash ileitis versus active inflammatory bowel disease. Stool studies negative for Cdiff, positive for Norovirus, EPEC, and ETEC.     Given her social barriers and several recurrent admissions, likely she will continue the same course without surgical interventions. Dr. Lafleur will discuss with Dr. Billy about management plans and if stool studies play a role in her clinical status. Recommend holding infliximab for now, instead consider starting steroid taper and getting her set up outpatient for surgery with completion total abdominal colectomy with end ileostomy. Could coordinate further work-up outpatient for evidence of small bowel disease.     -NPO  -IVFs and supportive cares  -IV Zosyn  -Steroids per GI, Dr. Lafleur will discuss with Dr. Billy  -Serial abdominal exams    Please alert with any acute changes. Discussed with Dr. Miquel Dolan PA-C  [facility]  [phone]..............................main     Colorectal Surgery Staff:  I have seen and examined the patient. I agree with the above documentation and plan of the fellow/PA above with the following additions/chages:    Ms. Tejeda is feeling better today and her pain is much less. She continues to have stool output. She is ambulating. No dizziness/lightheadedness.     Temperature was last recorded at 100.8 with a HR of 110 and a BP of 110/59.    Her abdominal exam is significantly improved from yesterday.  She is much less tender and much less distended.  Even with deep palpation.  She has no voluntary or involuntary guarding.  She has no signs of  focal or generalized peritonitis.    Stool studies were again positive for norovirus, EPEC, and ETEC.    44F w/ Crohn's Colitis and ileitis with recurrent norovirus, EPEC, and ETEC.    Continue supportive cares with IV fluids and IV antibiotics.  Okay to start clear liquid diet with no longer febrile.  Started on steroids by GI today.  I will see her as an outpatient with tentative plans for surgery.  However now that her stool studies were positive, it raises the question as to whether all of this colitis is from an infectious etiology which would certainly not require treatment with surgery.  She may even need a break from her immunocompromising medications in order to clear this.  I did directly speak with her gastroenterologist today and we will discuss her at IBD conference as well.    Colorectal surgery will follow peripherally over the weekend, please call if there is any change in patient's status or hemodynamic status.    Total time spent including non-face to face time: 21 minutes    Hugo Lafleur MD MBA  [facility]  Office: [phone]  11/15/2024 5:39 PM

## 2024-11-15 NOTE — PLAN OF CARE
Problem: Bowel Disease, Inflammatory (Ulcerative Colitis or Crohn's Disease)  Goal: Diarrhea Symptom Relief  Outcome: Progressing     Problem: Bowel Disease, Inflammatory (Ulcerative Colitis or Crohn's Disease)  Goal: Absence of Infection Signs and Symptoms  Outcome: Progressing   Goal Outcome Evaluation:         A/O/  C/o abdominal pain, prn iv dilaudid given with some relief. Prn oxy given later on evening for abdominal pain.    C/o nausea, prn iv zofran given with good relief per patient report.     Patient reported having chills before getting infliximab infusion. Temp 100.4 F. Patient having nausea at the time and could not take po tylenol. Once nausea resolved, prn tylenol given for a temp of 100.7. Dr. Lafleur from [facility] notified and order to hold medication and wait for stool studies results. [facility] provider notified as well.     Colostomy with brown stool and passing gas.   Stool sample collected and sent to lab for C.Diff.     Patient up to bathroom with SBA/assist x 1.     K protocol, to be rechecked tomorrow am.

## 2024-11-15 NOTE — PLAN OF CARE
Problem: Adult Inpatient Plan of Care  Goal: Absence of Hospital-Acquired Illness or Injury  Outcome: Progressing  Intervention: Prevent Infection  Recent Flowsheet Documentation  Taken 11/15/2024 0824 by Norma Montana RN  Infection Prevention:   hand hygiene promoted   rest/sleep promoted   single patient room provided     Problem: Adult Inpatient Plan of Care  Goal: Optimal Comfort and Wellbeing  Outcome: Progressing  Intervention: Monitor Pain and Promote Comfort  Recent Flowsheet Documentation  Taken 11/15/2024 0824 by Norma Montana RN  Pain Management Interventions: medication (see MAR)     Problem: Pain Acute  Goal: Optimal Pain Control and Function  Outcome: Progressing  Intervention: Develop Pain Management Plan  Recent Flowsheet Documentation  Taken 11/15/2024 0824 by Norma Montana RN  Pain Management Interventions: medication (see MAR)     Problem: Bowel Disease, Inflammatory (Ulcerative Colitis or Crohn's Disease)  Goal: Absence of Infection Signs and Symptoms  Outcome: Progressing  Pt C/O generalized pain throughout lower abdomin rating pain 5-6/10.  Gave pt oxycodone and tylenol for pain management.    This afternoon pt c/o pain and chills.  Temp was 103.0.  Tylenol and oxycodone given for pain and chills.  MD notified of elevated temp.  No new orders.  Temp decreased to 100.8 one hour after giving tylenol.

## 2024-11-15 NOTE — PROGRESS NOTES
[facility]    Progress Note - Hospitalist Service       Date of Admission:  11/13/2024    Assessment & Plan   Dain Tejeda is a 44 year old female admitted on 11/13/2024. She has a history of Crohn's disease s/p partial colectomy with colostomy, Celiac disease, chronic gastritis, colitis, and chronic diarrhea who is admitted for colitis uncontrolled with outpatient management.      Changes 11/15  GI starting biologic and miralax and CLD  CRS recommending total colectomy and steroids  Wishes to leave 11/16 if able for patrice bday party on Sunday   Azithromycin 500 mg daily for 3 days for ETEC     Colitis   History of Crohn's disease, celiac disease   Status post partial colectomy with current colostomy  Malnutrition  Patient with known history of Crohn's disease since 2022, complicated by stricture and status post partial colectomy with colostomy creation. Had followed with [facility] and was previously on Humira and azathioprine though of note did not restart on azathioprine or Humira after last hospitalization. Recent admission from 10/20-10/26/24 due to colitis and pericolonic abscess. Presented 11/13 with worsening abdominal pain, nausea, and vomiting. Vitally stable and afebrile.  Very tender on abdominal exam especially on the right. Labs revealed normal WBC and CMP. CRP elevated to 46.3. Urine with blood though not infectious appearing. CT abdomen/pelvis with progressive moderate to severe infectious versus inflammatory transverse and right hemicolitis. Previously noted pericolonic abscess was resolved. Also with moderate stool burden. GI recommending biologic and zosyn.   - Colorectal surgery consulted, appreciate recs              - GI consult, will follow   - Recommending total colectomy and steroids   - GI consulted               - Zosyn              - Biologic              - Miralax              -CLD if tolerated               -11/19 OP with Dr. Britt   - David  - pain management  with Tylenol, PO oxycodone and IV Dilaudid  - Zofran and compazine prn nausea  - pantoprazole 40 mg daily     EPEC  ETEC  Norovirus  Patient positive for the above.  Will treat with azithromycin for 3 days due to acute status in the hospital.  - Azithromycin 500 mg daily for 3 days     Chronic normocytic anemia  Hemoglobin on admission 9.9, within baseline between 8 and 10.  No signs or symptoms of acute blood loss anemia, denies dark stools. No dark stool or phoenix blood in ostomy bag on exam.   - will monitor with daily CBC     History of disorganized behaviors  Noted on previous hospital stays.  Has previously responded well to Seroquel 12.5 mg while admitted.  Stable on admission  - close monitoring  - may consider adding Seroquel if needed     History left ovarian cyst  Known history.  CT abdomen pelvis on admission redemonstrating ovarian cyst. Recommended follow-up pelvic ultrasound in 6 to 12 months previously.  - follow up due after 3/24             Diet: Clear Liquid Diet  Snacks/Supplements Adult: Ensure Clear; With Meals    DVT Prophylaxis: Pneumatic Compression Devices  Chambers Catheter: Not present  Fluids: CLD  Lines: None     Cardiac Monitoring: None  Code Status: Full Code      Clinically Significant Risk Factors        # Hypokalemia: Lowest K = 2.7 mmol/L in last 2 days, will replace as needed    # Hypocalcemia: Lowest Ca = 8.1 mg/dL in last 2 days, will monitor and replace as appropriate                        # Financial/Environmental Concerns: unable to afford medication(s) (insurance issues previously?)         Social Drivers of Health          Disposition Plan     Medically Ready for Discharge: Anticipated in 2-4 Days         The patient's care was discussed with the Attending Physician, Dr. Rivera .    Steve Camp MD  Hospitalist Service  [facility]  Securely message with [employer] (more info)  Text page via [facility] Paging/Directory    ______________________________________________________________________    Interval History   No acute overnight events.      Patient continues to have similar abdominal pain but her nausea is actually improved and she is tolerating somewhat of a diet.  She is waiting this weeks to some the specialist today and does not have any complaints for me today.  Patient did have a little bit of fever last night and felt that subjectively as well.      Physical Exam   Vital Signs: Temp: 98.5  F (36.9  C) Temp src: Oral BP: 110/65 Pulse: 88   Resp: 14 SpO2: 100 % O2 Device: None (Room air)    Weight: 94 lbs 14.4 oz  GENERAL: Healthy, alert and no distress  EYES: Eyes grossly normal to inspection.  No discharge or erythema, or obvious scleral/conjunctival abnormalities.  RESP:  Lungs clear throughout. No wheeze or crackles.   CV: Heart RRR. No murmur  Abdomen: Ostomy bag in place with feculent material output, no blood. Clean central incision site from previous surgery. Mild tenderness to palpation in RLQ. Some fullness of the RLQ as well. No rebound or guarding.  MSK: No gross deformity. Normal tone.  SKIN: Visible skin clear. No significant rash, abnormal pigmentation or lesions.  NEURO: Cranial nerves grossly intact.  Mentation and speech appropriate for age.  PSYCH: Mentation appears normal, affect normal/bright, judgement and insight intact, normal speech and appearance well-groomed.    Medical Decision Making       Please see A&P for additional details of medical decision making.      Data   ------------------------- PAST 24 HR DATA REVIEWED -----------------------------------------------    I have personally reviewed the following data over the past 24 hrs:    7.1  \   8.7 (L)   / 242     137 102 7.4 /  96   2.7 (L) 28 0.60 \       Imaging results reviewed over the past 24 hrs:   No results found for this or any previous visit (from the past 24 hours).

## 2024-11-15 NOTE — PLAN OF CARE
Problem: Adult Inpatient Plan of Care  Goal: Optimal Comfort and Wellbeing  Outcome: Progressing  Intervention: Monitor Pain and Promote Comfort  Recent Flowsheet Documentation  Taken 11/15/2024 0026 by Frank Chairez RN  Pain Management Interventions:   medication (see MAR)   emotional support     Problem: Pain Acute  Goal: Optimal Pain Control and Function  Outcome: Progressing  Intervention: Develop Pain Management Plan  Recent Flowsheet Documentation  Taken 11/15/2024 0026 by Frank Chairez RN  Pain Management Interventions:   medication (see MAR)   emotional support   Goal Outcome Evaluation:      Plan of Care Reviewed With: patient    Overall Patient Progress: improving       A&O x4. SBA.  Enteric precaution.  Pt empties own ostomy bag. Denies nausea.  Pt reports 7/10 pain to abdomin.  Managed with dilaudid, oxy, and tylenol per MD order.  Plan of care ongoing.

## 2024-11-15 NOTE — PROGRESS NOTES
"CLINICAL NUTRITION SERVICES - ASSESSMENT NOTE     Nutrition Prescription    RECOMMENDATIONS FOR MDs/PROVIDERS TO ORDER:      Malnutrition Status:    Unable to determine - need nutrition hx, nfpe    Recommendations already ordered by Registered Dietitian (RD):  Daily multivitamin per Staff Recommendation    Future/Additional Recommendations:  Diet advancement vs nutrition support next 2-3 days     REASON FOR ASSESSMENT  Dain Tejeda is a/an 44 year old female assessed by the dietitian for Admission Nutrition Risk Screen for eating poorly due to decreased appetite; and unsure of weight loss.    Per chart, pt has a history of Crohn's disease s/p partial colectomy with colostomy, Celiac disease, chronic gastritis, colitis, and chronic diarrhea who is admitted for colitis uncontrolled with outpatient management. Diffuse abdominal pain. Malnutrition    NUTRITION HISTORY  Gluten and Soy Allergies  Pt busy    Reviewed a selection of RD notes from past hospitalizations in October and May of this year.  Pt on TPN during both admissions.    Per ED note 11/13/24, pt has had abdominal pain, vomiting, chills, subjective fever, diarrhea    CURRENT NUTRITION ORDERS  Diet: Clear Liquid  Intake/Tolerance: 540 mL yesterday    LABS  Labs reviewed  K 2.7 L  Glucose 96  Hgb 8.7 L (pt baseline 8-10)    MEDICATIONS  Medications reviewed  Zithromax  Solu medrol  IV zosyn  Miralax 2 times daily  Kcl  K replacement protocol    ANTHROPOMETRICS  Height: 4' 11\"  Most Recent Weight: 43 kg (94 lb 14.4 oz)  6.9% weight loss in 2 months  IBW: 44 kg  BMI: Normal BMI  19.17  Weight History:   Wt Readings from Last 10 Encounters:   11/14/24 43 kg (94 lb 14.4 oz)   11/12/24 42.2 kg (93 lb)   10/24/24 43.3 kg (95 lb 8 oz)   09/11/24 45.9 kg (101 lb 1.6 oz)   05/21/24 41.7 kg (91 lb 14.9 oz)   05/09/24 42 kg (92 lb 11.2 oz)   04/30/24 45.8 kg (101 lb)   04/22/24 45.8 kg (101 lb)   03/14/24 45.8 kg (101 lb)   03/08/24 46.3 kg (102 lb 1.9 oz)      Dosing Weight: " 43 kg    ASSESSED NUTRITION NEEDS  Estimated Energy Needs: 1290 -1500 kcals/day (30 - 35 kcals/kg )  Justification: Increased needs  Estimated Protein Needs: 52 - 65 grams protein/day (1.2 - 1.5 grams of pro/kg)  Justification: Increased needs and Repletion  Estimated Fluid Needs: 5673-0845+ mL/day (30 - 35+ mL/kg)   Justification: Maintenance, gi losses    PHYSICAL FINDINGS  Per chart,  GI: abdominal discomfort, tender  Audible BS  Flatus  Last BM today - liquid, brown, pt has Colostomy, which pt empties herself  Colostomy yesterday - 800 mL out    MALNUTRITION:  % Weight Loss:  > 5% in 1 month (severe malnutrition)  % Intake:  need nutrition hx  Subcutaneous Fat Loss:  unable to assess  Muscle Loss:  unable to assess  Fluid Retention:  None noted    Malnutrition Diagnosis: Unable to determine due to need nutrition hx and nfpe    NUTRITION DIAGNOSIS  Inadequate oral intake related to altered GI function related to colitis as evidenced by abdominal discomfort, n/v/d    INTERVENTIONS  Implementation  Medical food supplement therapy     Goals  Diet advancement vs nutrition support within 2-3 days.     Monitoring/Evaluation  Progress toward goals will be monitored and evaluated per protocol.

## 2024-11-15 NOTE — PROVIDER NOTIFICATION
ID lab called critical result.   Stool studies positive for Norovirus, enteropathogenic e-coli and enterotoxigenic e-coli. Notified Dr. Suggs. No new orders at this time.

## 2024-11-15 NOTE — PROGRESS NOTES
[facility] Progress Note       SUBJECTIVE:  Patient reports ongoing, diffuse abdominal pain with bloating. Ostomy output the same without melena/hematochezia. Tolerating clears.    Patient seen with [facility]  via [employer].       OBJECTIVE:  /65 (BP Location: Right arm)   Pulse 88   Temp 98.5  F (36.9  C) (Oral)   Resp 14   Wt 43 kg (94 lb 14.4 oz)   LMP 2024 (Approximate)   SpO2 100%   BMI 19.17 kg/m    Temp (24hrs), Av.4  F (37.4  C), Min:97.4  F (36.3  C), Max:100.7  F (38.2  C)    Patient Vitals for the past 72 hrs:   Weight   24 1248 43 kg (94 lb 14.4 oz)       Intake/Output Summary (Last 24 hours) at 11/15/2024 1117  Last data filed at 11/15/2024 0200  Gross per 24 hour   Intake 540 ml   Output 1750 ml   Net -1210 ml        PHYSICAL EXAM  GEN: NAD, female appears stated age sitting up in bed  HRT: no LE edema  RESP: unlabored  ABD: vertical scar, +BS, soft, diffusely tender, ostomy in LLQ with light brown stool  SKIN: No rash or jaundice      Additional Data:  I have reviewed the patient's new clinical lab results:     Recent Labs   Lab Test 11/15/24  0749 24  0559 24  1538 10/24/24  0538 10/23/24  0619 24  0552 24  0610 24  0608 24  0614   WBC 7.1 8.5 7.0   < > 5.4   < > 6.0   < > 3.7*   HGB 8.7* 8.9* 9.9*   < > 8.1*   < > 9.7*   < > 7.8*   MCV 90 92 90   < > 91   < > 97   < > 93    250 325   < > 320   < > 313   < > 267   INR  --   --   --   --  1.12  --  0.98  --  1.08    < > = values in this interval not displayed.     Recent Labs   Lab Test 11/15/24  0749 24  1558 24  0559 24  1538   POTASSIUM 2.7* 3.6 2.8* 3.5   CHLORIDE 102  --  105 102   CO2   --     BUN 7.4  --  6.9 10.4   ANIONGAP 7  --  11 13     Recent Labs   Lab Test 24  1610 24  1538 10/25/24  0526 10/23/24  0619 10/20/24  0555 10/19/24  2257 24  1742 24  1741 05/10/24  0740 24  1148   ALBUMIN  --   3.7 2.4* 2.2*   < > 3.1*  --  3.7   < >  --    BILITOTAL  --  0.3 <0.2 0.2  --  0.6  --  0.4   < >  --    ALT  --  15 9 7  --  24  --  11   < >  --    AST  --  23 15 12  --  47*  --  15   < >  --    PROTEIN 20*  --   --   --   --   --  50*  --   --  70*   LIPASE  --  43  --   --   --  42  --  33  --   --     < > = values in this interval not displayed.     CRP 11/13/24: 46.30  C diff 11/14/24: negative  Enteric bacteria/virus stool 11/14/24: positive for EPEC, ETEC, and norovirus    Imaging results:  CT abdomen/pelvis 11/13/24:  FINDINGS:  LOWER CHEST: Mildly thick-walled distal esophagus; correlate for a distal esophagitis.  HEPATOBILIARY: Diffuse hepatic steatosis. Unchanged, benign calcification in the right hepatic lobe.  Gallbladder is normal.  No intrahepatic or intrahepatic biliary ductal dilatation.  PANCREAS: Enhances normally. No peripancreatic inflammatory fat stranding.  SPLEEN: Enhances normally. Normal size.  ADRENAL GLANDS: Normal.  KIDNEYS: Both kidneys enhance symmetrically, without hydronephrosis.  No nephroureterolithiasis.  Urinary bladder is unremarkable.  PELVIC ORGANS: Unchanged cystic lesion along the left posterior aspect of the uterus, measuring 4.1 x 6.5 cm.  BOWEL: Status post partial colectomy, with end colostomy at the left lower quadrant and Brenda's pouch. Residual transverse and right hemicolon demonstrates moderate to severe wall thickening and progressive inflammatory change. Moderate stool volume is present within the right hemicolon, likely accounting for mild distention of the appendix and terminal ileum. Associated wall thickening of the terminal ileum is consistent with superimposed infectious/inflammatory change. Interval resolution of previous pericolonic abscess occurring near the colostomy site.  No intraperitoneal free fluid or free air.  LYMPH NODES: No suspicious abdominal or pelvic lymphadenopathy. Reactive subcentimeter lymph nodes within the upper abdomen, similar  to prior.  VASCULATURE: No abdominal aortic aneurysm. Mild atheromatous disease  MUSCULOSKELETAL: No suspicious abnormality.  OTHER: No additionally significant abnormalities.                                                                   IMPRESSION:   1.  Progressive, moderate to severe infectious versus inflammatory change of the residual transverse and right hemicolon. Consider gastroenterology or surgical consultation.  2.  Moderate stool volume within the right hemicolon, likely accounting for mild distention of the appendix and terminal ileum.  3.  Interval resolution of previous pericolonic abscess.  4.  Unchanged cystic lesion along the left posterior aspect of the uterus, measuring 4.1 x 6.5 cm.  5.  Hepatic steatosis.  6.  Mildly thick-walled distal esophagus; correlate for a distal esophagitis.       IMPRESSION:  Crohn's disease  Diarrhea  This is a 45 y/o female with PMH Crohn's ileocolitis dx 2022, stricturing phenotype s/p descending colon resection with colostomy and Duffy's pouch Nov 2022, pericolonic abscesses and developing fistula May 2024 admitted 11/13 for diarrhea and malnutrition. She was started on infliximab and continued on azathioprine 5/2024 admission but it seems she has remained on Humira instead and has not taking azathioprine. In addition, she did not take outpt abx for abscess as prescribed. CT shows progressive, mod-severe inflammation of the remaining colon and resolution of previous pericolonic abscess. Plan was to re-initiate infliximab 11/14 but after stool tests returned positive for EPEC, ETEC and norovirus, negative for C diff, this was deferred. D/w patient's outpatient IBD provider, who spoke with CRS, and recommendation is for steroids and abx with plan for surgery in the near future.       PLAN:  - Solumedrol 20 mg IV q8h, transition to oral prednisone 40 mg daily at discharge  - Continue abx per surgery/medicine  - Diet per CRS  - Outpatient IBD follow up 11/19 with  Dr. Britt as scheduled      (Dr. Duncan)  Jewels Wyatt PA-C  [facility]  11/15/2024 11:17 AM  [phone] (office)    40 minutes of total time was spent providing patient care, including patient evaluation, reviewing documentation/test results, , and documentation.  ________________________________________________________________________

## 2024-11-15 NOTE — PLAN OF CARE
Problem: Pain Acute  Goal: Optimal Pain Control and Function  Outcome: Progressing     Problem: Nausea and Vomiting  Goal: Nausea and Vomiting Relief  Outcome: Progressing     Problem: Bowel Disease, Inflammatory (Ulcerative Colitis or Crohn's Disease)  Goal: Optimal Nutrition Delivery  Outcome: Progressing   Goal Outcome Evaluation:    Generalized abdominal pain managed with prn pain meds with some relief. Prn compazine given for nausea. Tolerating sips of clear liquids. Colostomy intact with brown liquid stool. C-diff rule out, results pending. Continues with iv antibiotics. Up with SBA.

## 2024-11-16 VITALS
DIASTOLIC BLOOD PRESSURE: 68 MMHG | WEIGHT: 96.3 LBS | RESPIRATION RATE: 18 BRPM | OXYGEN SATURATION: 98 % | SYSTOLIC BLOOD PRESSURE: 117 MMHG | BODY MASS INDEX: 19.45 KG/M2 | HEART RATE: 56 BPM | TEMPERATURE: 97.4 F

## 2024-11-16 LAB
ANION GAP SERPL CALCULATED.3IONS-SCNC: 9 MMOL/L (ref 7–15)
BUN SERPL-MCNC: 9.6 MG/DL (ref 6–20)
CALCIUM SERPL-MCNC: 8.5 MG/DL (ref 8.8–10.4)
CHLORIDE SERPL-SCNC: 104 MMOL/L (ref 98–107)
CREAT SERPL-MCNC: 0.52 MG/DL (ref 0.51–0.95)
EGFRCR SERPLBLD CKD-EPI 2021: >90 ML/MIN/1.73M2
ERYTHROCYTE [DISTWIDTH] IN BLOOD BY AUTOMATED COUNT: 14.7 % (ref 10–15)
GLUCOSE SERPL-MCNC: 233 MG/DL (ref 70–99)
HCO3 SERPL-SCNC: 25 MMOL/L (ref 22–29)
HCT VFR BLD AUTO: 28 % (ref 35–47)
HGB BLD-MCNC: 9 G/DL (ref 11.7–15.7)
MCH RBC QN AUTO: 29.1 PG (ref 26.5–33)
MCHC RBC AUTO-ENTMCNC: 32.1 G/DL (ref 31.5–36.5)
MCV RBC AUTO: 91 FL (ref 78–100)
PLATELET # BLD AUTO: 256 10E3/UL (ref 150–450)
POTASSIUM SERPL-SCNC: 3.2 MMOL/L (ref 3.4–5.3)
POTASSIUM SERPL-SCNC: 3.4 MMOL/L (ref 3.4–5.3)
POTASSIUM SERPL-SCNC: 3.8 MMOL/L (ref 3.4–5.3)
RBC # BLD AUTO: 3.09 10E6/UL (ref 3.8–5.2)
SODIUM SERPL-SCNC: 138 MMOL/L (ref 135–145)
WBC # BLD AUTO: 8.4 10E3/UL (ref 4–11)

## 2024-11-16 PROCEDURE — 36415 COLL VENOUS BLD VENIPUNCTURE: CPT

## 2024-11-16 PROCEDURE — 250N000013 HC RX MED GY IP 250 OP 250 PS 637: Performed by: PHYSICIAN ASSISTANT

## 2024-11-16 PROCEDURE — 84295 ASSAY OF SERUM SODIUM: CPT

## 2024-11-16 PROCEDURE — 120N000001 HC R&B MED SURG/OB

## 2024-11-16 PROCEDURE — 250N000011 HC RX IP 250 OP 636: Performed by: PHYSICIAN ASSISTANT

## 2024-11-16 PROCEDURE — 84132 ASSAY OF SERUM POTASSIUM: CPT

## 2024-11-16 PROCEDURE — 85014 HEMATOCRIT: CPT

## 2024-11-16 PROCEDURE — 250N000013 HC RX MED GY IP 250 OP 250 PS 637: Performed by: FAMILY MEDICINE

## 2024-11-16 PROCEDURE — 82565 ASSAY OF CREATININE: CPT

## 2024-11-16 PROCEDURE — 84132 ASSAY OF SERUM POTASSIUM: CPT | Performed by: FAMILY MEDICINE

## 2024-11-16 PROCEDURE — 82435 ASSAY OF BLOOD CHLORIDE: CPT

## 2024-11-16 PROCEDURE — 250N000009 HC RX 250

## 2024-11-16 PROCEDURE — 36415 COLL VENOUS BLD VENIPUNCTURE: CPT | Performed by: FAMILY MEDICINE

## 2024-11-16 PROCEDURE — 250N000011 HC RX IP 250 OP 636

## 2024-11-16 PROCEDURE — 99232 SBSQ HOSP IP/OBS MODERATE 35: CPT | Mod: GC | Performed by: FAMILY MEDICINE

## 2024-11-16 PROCEDURE — 80048 BASIC METABOLIC PNL TOTAL CA: CPT

## 2024-11-16 PROCEDURE — 250N000013 HC RX MED GY IP 250 OP 250 PS 637

## 2024-11-16 RX ORDER — POTASSIUM CHLORIDE 1500 MG/1
20 TABLET, EXTENDED RELEASE ORAL ONCE
Status: COMPLETED | OUTPATIENT
Start: 2024-11-16 | End: 2024-11-16

## 2024-11-16 RX ADMIN — PIPERACILLIN AND TAZOBACTAM 3.38 G: 3; .375 INJECTION, POWDER, FOR SOLUTION INTRAVENOUS at 11:37

## 2024-11-16 RX ADMIN — PROCHLORPERAZINE MALEATE 10 MG: 10 TABLET ORAL at 01:11

## 2024-11-16 RX ADMIN — METHYLPREDNISOLONE SODIUM SUCCINATE 20 MG: 40 INJECTION, POWDER, FOR SOLUTION INTRAMUSCULAR; INTRAVENOUS at 14:45

## 2024-11-16 RX ADMIN — AZITHROMYCIN DIHYDRATE 500 MG: 250 TABLET, FILM COATED ORAL at 08:54

## 2024-11-16 RX ADMIN — METHYLPREDNISOLONE SODIUM SUCCINATE 20 MG: 40 INJECTION, POWDER, FOR SOLUTION INTRAMUSCULAR; INTRAVENOUS at 06:28

## 2024-11-16 RX ADMIN — OXYCODONE HYDROCHLORIDE 5 MG: 5 TABLET ORAL at 19:01

## 2024-11-16 RX ADMIN — POLYETHYLENE GLYCOL 3350 17 G: 17 POWDER, FOR SOLUTION ORAL at 22:12

## 2024-11-16 RX ADMIN — POTASSIUM CHLORIDE 20 MEQ: 1500 TABLET, EXTENDED RELEASE ORAL at 14:45

## 2024-11-16 RX ADMIN — PIPERACILLIN AND TAZOBACTAM 3.38 G: 3; .375 INJECTION, POWDER, FOR SOLUTION INTRAVENOUS at 03:43

## 2024-11-16 RX ADMIN — PANTOPRAZOLE SODIUM 40 MG: 40 INJECTION, POWDER, FOR SOLUTION INTRAVENOUS at 08:53

## 2024-11-16 RX ADMIN — ACETAMINOPHEN 650 MG: 325 TABLET ORAL at 22:13

## 2024-11-16 RX ADMIN — POLYETHYLENE GLYCOL 3350 17 G: 17 POWDER, FOR SOLUTION ORAL at 08:54

## 2024-11-16 RX ADMIN — THERA TABS 1 TABLET: TAB at 08:54

## 2024-11-16 RX ADMIN — POTASSIUM CHLORIDE 20 MEQ: 1500 TABLET, EXTENDED RELEASE ORAL at 08:54

## 2024-11-16 RX ADMIN — METHYLPREDNISOLONE SODIUM SUCCINATE 20 MG: 40 INJECTION, POWDER, FOR SOLUTION INTRAMUSCULAR; INTRAVENOUS at 22:12

## 2024-11-16 RX ADMIN — OXYCODONE HYDROCHLORIDE 5 MG: 5 TABLET ORAL at 14:45

## 2024-11-16 RX ADMIN — PIPERACILLIN AND TAZOBACTAM 3.38 G: 3; .375 INJECTION, POWDER, FOR SOLUTION INTRAVENOUS at 18:56

## 2024-11-16 RX ADMIN — HYDROMORPHONE HYDROCHLORIDE 0.2 MG: 0.2 INJECTION, SOLUTION INTRAMUSCULAR; INTRAVENOUS; SUBCUTANEOUS at 00:57

## 2024-11-16 NOTE — PROGRESS NOTES
[facility] Progress Note    Subjective:  Patient states that she feels much better.  No melena or hematochezia in ostomy bag.  She has only been on a clear liquid diet.    Objective:  Vital signs in last 24 hours  Temp:  [96.2  F (35.7  C)-103  F (39.4  C)] 97.6  F (36.4  C)  Pulse:  [] 62  Resp:  [16] 16  BP: ()/(59) 104/59  SpO2:  [96 %-98 %] 98 % O2 Device: None (Room air)      Constitutional: healthy, alert, and no distress   Cardiovascular: Normal rate  Respiratory: Normal respiratory rate  Abdomen: no pain upon abdominal palpation  NEURO: moves all extremities spontaneously   JOINT/EXTREMITIES: extremities normal- no gross deformities noted and normal muscle tone      LABORATORY    ELECTROLYTE PANEL   Recent Labs   Lab 11/16/24  0703 11/15/24  1834 11/15/24  0749 11/14/24  1558 11/14/24  0559     --  137  --  140   POTASSIUM 3.2* 3.6 2.7*   < > 2.8*   CHLORIDE 104  --  102  --  105   CO2 25  --  28  --  24   *  --  96  --  97   CR 0.52  --  0.60  --  0.52   BUN 9.6  --  7.4  --  6.9    < > = values in this interval not displayed.      HEMATOLOGY PANEL   Recent Labs   Lab 11/16/24  0703 11/15/24  0749 11/14/24  0559   HGB 9.0* 8.7* 8.9*   MCV 91 90 92   WBC 8.4 7.1 8.5    242 250      LIVER AND PANCREAS PANEL   Recent Labs   Lab 11/13/24  1538   AST 23   ALT 15   ALKPHOS 84   BILITOTAL 0.3   LIPASE 43     IMAGING STUDIES    CT Abdomen Pelvis w Contrast    Result Date: 11/13/2024  EXAM: CT ABDOMEN PELVIS W CONTRAST LOCATION: [facility] DATE: 11/13/2024 INDICATION: Abdominal pain, vomiting, chills, fever, and diarrhea; recent pericolonic abscess; Crohn's disease. COMPARISON: 10/19/2024. TECHNIQUE: CT scan of the abdomen and pelvis was performed following injection of IV contrast. Multiplanar reformats were obtained. Dose reduction techniques were used. CONTRAST: 45 mL Isovue-370. FINDINGS: LOWER CHEST: Mildly thick-walled distal esophagus;  correlate for a distal esophagitis. HEPATOBILIARY: Diffuse hepatic steatosis. Unchanged, benign calcification in the right hepatic lobe. Gallbladder is normal. No intrahepatic or intrahepatic biliary ductal dilatation. PANCREAS: Enhances normally. No peripancreatic inflammatory fat stranding. SPLEEN: Enhances normally. Normal size. ADRENAL GLANDS: Normal. KIDNEYS: Both kidneys enhance symmetrically, without hydronephrosis. No nephroureterolithiasis. Urinary bladder is unremarkable. PELVIC ORGANS: Unchanged cystic lesion along the left posterior aspect of the uterus, measuring 4.1 x 6.5 cm. BOWEL: Status post partial colectomy, with end colostomy at the left lower quadrant and Brenad's pouch. Residual transverse and right hemicolon demonstrates moderate to severe wall thickening and progressive inflammatory change. Moderate stool volume is present within the right hemicolon, likely accounting for mild distention of the appendix and terminal ileum. Associated wall thickening of the terminal ileum is consistent with superimposed infectious/inflammatory change. Interval resolution of previous pericolonic abscess occurring near the colostomy site. No intraperitoneal free fluid or free air. LYMPH NODES: No suspicious abdominal or pelvic lymphadenopathy. Reactive subcentimeter lymph nodes within the upper abdomen, similar to prior. VASCULATURE: No abdominal aortic aneurysm. Mild atheromatous disease MUSCULOSKELETAL: No suspicious abnormality. OTHER: No additionally significant abnormalities.     IMPRESSION: 1.  Progressive, moderate to severe infectious versus inflammatory change of the residual transverse and right hemicolon. Consider gastroenterology or surgical consultation. 2.  Moderate stool volume within the right hemicolon, likely accounting for mild distention of the appendix and terminal ileum. 3.  Interval resolution of previous pericolonic abscess. 4.  Unchanged cystic lesion along the left posterior aspect of  the uterus, measuring 4.1 x 6.5 cm. 5.  Hepatic steatosis. 6.  Mildly thick-walled distal esophagus; correlate for a distal esophagitis.       I have reviewed the current diagnostic and laboratory tests.                Assessment:  This is a 45 y/o female with PMH Crohn's ileocolitis dx 2022, stricturing phenotype s/p descending colon resection with colostomy and Duffy's pouch Nov 2022, pericolonic abscesses and developing fistula May 2024 admitted 11/13 for diarrhea and malnutrition. She was started on infliximab and continued on azathioprine 5/2024 admission but it seems she has remained on Humira instead and has not taking azathioprine. In addition, she did not take outpt abx for abscess as prescribed. CT shows progressive, mod-severe inflammation of the remaining colon and resolution of previous pericolonic abscess. Plan was to re-initiate infliximab 11/14 but after stool tests returned positive for EPEC, ETEC and norovirus, negative for C diff, this was deferred. D/w patient's outpatient IBD provider, who spoke with CRS, and recommendation is for steroids and abx with plan for surgery in the near future.     # Stricturing Crohn's ileocolitis with previous descending colon resection with colostomy and Duffy's pouch  # Enteric pathogen panel norovirus, and E coli    Patient is feeling better and she is asking about going home.  At this time though I would like to see her eat more solid food before this is the case and continue IV steroids and send her out on a taper.  Will also allow more time for us to monitor to see how she is doing terms of infection clearance.    I will increase her diet to slowly advance as tolerated to a normal diet and see how she does for the next 24 to 48 hours.    Plan:  --Continue antibiotics  -- Continue IV steroids  -- Advance diet as tolerated to a normal diet  -- Will continue to follow    25 minutes of total time was spent providing patient care including patient evaluation,  reviewing documentation/test results, and .                                                  Chidi Duncan MD  Thank you for the opportunity to participate in the care of this patient.   Please feel free to call me with any questions or concerns.  Phone number [phone].      Patient Active Problem List   Diagnosis    Screening for cervical cancer- ASCUS, HPV+ 4/2017    Pain of back and right lower extremity    Abdominal pain, generalized    Abdominal pain    Inflammation of small intestine    Moderate recurrent major depression (H)    Hypokalemia    Colitis    Intra-abdominal abscess (H)    Celiac disease    Diarrhea    Chronic gastritis    Chronic diarrhea    Crohn's disease with other complication, unspecified gastrointestinal tract location (H)    S/P partial colectomy    Anemia, unspecified type    Atypical squamous cell changes of undetermined significance (ASCUS) on vaginal cytology with positive high risk human papilloma virus (HPV)    Syncope, unspecified syncope type    Spell of abnormal behavior    Sepsis, due to unspecified organism, unspecified whether acute organ dysfunction present (H)    Enterocolitis    Epigastric pain    Left ovarian cyst    Generalized abdominal pain    Crohn's disease of colon with abscess (H)    Ulcerative colitis (H)

## 2024-11-16 NOTE — PLAN OF CARE
Problem: Adult Inpatient Plan of Care  Goal: Optimal Comfort and Wellbeing  Outcome: Progressing     Problem: Pain Acute  Goal: Optimal Pain Control and Function  Outcome: Progressing     Problem: Nausea and Vomiting  Goal: Nausea and Vomiting Relief  Outcome: Progressing     Problem: Bowel Disease, Inflammatory (Ulcerative Colitis or Crohn's Disease)  Goal: Optimal Nutrition Delivery  Outcome: Progressing   Pt started on low fiber diet.  Pt tolerated without nausea but had pain in abdomin after eating. Oxycodone given for pain management.

## 2024-11-16 NOTE — PROGRESS NOTES
[facility]    Progress Note - Hospitalist Service       Date of Admission:  11/13/2024    Assessment & Plan   Dain Tejeda is a 44 year old female admitted on 11/13/2024. She has a history of Crohn's disease s/p partial colectomy with colostomy, Celiac disease, chronic gastritis, colitis, and chronic diarrhea who is admitted for colitis uncontrolled with outpatient management.      Changes 11/16  GI: IV steroids and antibiotics. ADAT.  CRS no longer recommending colectomy   Azithromycin 500 mg daily for 3 days for ETEC  Pain improving     Colitis   History of Crohn's disease, celiac disease   Status post partial colectomy with current colostomy  Malnutrition  Patient with known history of Crohn's disease since 2022, complicated by stricture and status post partial colectomy with colostomy creation. Had followed with [facility] and was previously on Humira and azathioprine though of note did not restart on azathioprine or Humira after last hospitalization. Recent admission from 10/20-10/26/24 due to colitis and pericolonic abscess. Presented 11/13 with worsening abdominal pain, nausea, and vomiting. Vitally stable and afebrile.  Very tender on abdominal exam especially on the right. Labs revealed normal WBC and CMP. CRP elevated to 46.3. Urine with blood though not infectious appearing. CT abdomen/pelvis with progressive moderate to severe infectious versus inflammatory transverse and right hemicolitis. Previously noted pericolonic abscess was resolved. Also with moderate stool burden. GI recommending IV steroids and abx. Pain improving on 11/16, ADAT.  - Colorectal surgery consulted, appreciate recs              - GI consult, will follow              - Recommending steroids, no longer recommending colectomy   - GI consulted               - Zosyn              - Biologic              - Miralax              -ADAT              -11/19 OP with Dr. Britt   - David  - pain management with Tylenol, PO  oxycodone and IV Dilaudid  - Zofran and compazine prn nausea  - pantoprazole 40 mg daily      EPEC  ETEC  Norovirus  Patient positive for the above.  Will treat with azithromycin for 3 days due to acute status in the hospital.  - Azithromycin 500 mg daily for 3 days     Chronic normocytic anemia  Hemoglobin on admission 9.9, within baseline between 8 and 10.  No signs or symptoms of acute blood loss anemia, denies dark stools. No dark stool or phoenix blood in ostomy bag on exam.   - will monitor with daily CBC     History of disorganized behaviors  Noted on previous hospital stays.  Has previously responded well to Seroquel 12.5 mg while admitted.  Stable on admission  - close monitoring  - may consider adding Seroquel if needed     History left ovarian cyst  Known history.  CT abdomen pelvis on admission redemonstrating ovarian cyst. Recommended follow-up pelvic ultrasound in 6 to 12 months previously.  - follow up due after 3/24           Diet: Snacks/Supplements Adult: Ensure Clear; With Meals  Low Fiber Diet    DVT Prophylaxis: Pneumatic Compression Devices  Chambers Catheter: Not present  Fluids: PO  Lines: None     Cardiac Monitoring: None  Code Status: Full Code      Clinically Significant Risk Factors        # Hypokalemia: Lowest K = 2.7 mmol/L in last 2 days, will replace as needed                           # Financial/Environmental Concerns: unable to afford medication(s) (insurance issues previously?)         Social Drivers of Health          Disposition Plan     Medically Ready for Discharge: Anticipated in 2-4 Days         The patient's care was discussed with the Attending Physician, Dr. Bernstein .    Steve Camp MD  Hospitalist Service  [employer]  Securely message with [employer] (more info)  Text page via [employer] Paging/Directory   ______________________________________________________________________    Interval History   Patient requiring less pain medications over the last 24  hours.  Nausea improved as well.    Patient reports that eating has been going okay on a clear liquid diet.  Her nausea is improved.  Still has some abdominal pain but not nearly as bad as it was.  Wondering what when she can go.  Did have a fever yesterday but none since then.    Denies chest pain, difficulty breathing, chills, pain with urination    Physical Exam   Vital Signs: Temp: 97.6  F (36.4  C) Temp src: Oral BP: 104/59 Pulse: 62   Resp: 16 SpO2: 98 % O2 Device: None (Room air)    Weight: 94 lbs 14.4 oz  GENERAL: Healthy, alert and no distress  EYES: Eyes grossly normal to inspection.  No discharge or erythema, or obvious scleral/conjunctival abnormalities.  RESP:  Lungs clear throughout. No wheeze or crackles.   CV: Heart RRR. No murmur  Abdomen: Ostomy bag in place with feculent material output, no blood. Clean central incision site from previous surgery. Mild tenderness to palpation in RLQ. Some fullness of the RLQ as well. Both of these sites less painful than yesterday. No rebound or guarding.  MSK: No gross deformity. Normal tone.  SKIN: Visible skin clear. No significant rash, abnormal pigmentation or lesions.  NEURO: Cranial nerves grossly intact.  Mentation and speech appropriate for age.  PSYCH: Mentation appears normal, affect normal/bright, judgement and insight intact, normal speech and appearance well-groomed.       Medical Decision Making       Please see A&P for additional details of medical decision making.      Data   ------------------------- PAST 24 HR DATA REVIEWED -----------------------------------------------    I have personally reviewed the following data over the past 24 hrs:    8.4  \   9.0 (L)   / 256     138 104 9.6 /  233 (H)   3.2 (L) 25 0.52 \       Imaging results reviewed over the past 24 hrs:   No results found for this or any previous visit (from the past 24 hours).

## 2024-11-16 NOTE — PLAN OF CARE
Goal Outcome Evaluation:    Patient pain controlled with prn dilaudid, compazine given for intermittent nausea. Enteric precaution maintained , continue to monitor.       Vital signs:  Temp: 97.3  F (36.3  C) Temp src: Oral BP: 97/59 Pulse: 60   Resp: 16 SpO2: 98 % O2 Device: None (Room air)     and stable.

## 2024-11-17 VITALS
HEART RATE: 62 BPM | TEMPERATURE: 97.7 F | BODY MASS INDEX: 19.45 KG/M2 | OXYGEN SATURATION: 100 % | SYSTOLIC BLOOD PRESSURE: 114 MMHG | RESPIRATION RATE: 18 BRPM | DIASTOLIC BLOOD PRESSURE: 62 MMHG | WEIGHT: 96.3 LBS

## 2024-11-17 LAB
ANION GAP SERPL CALCULATED.3IONS-SCNC: 10 MMOL/L (ref 7–15)
BUN SERPL-MCNC: 7.8 MG/DL (ref 6–20)
CALCIUM SERPL-MCNC: 8.7 MG/DL (ref 8.8–10.4)
CHLORIDE SERPL-SCNC: 104 MMOL/L (ref 98–107)
CREAT SERPL-MCNC: 0.54 MG/DL (ref 0.51–0.95)
EGFRCR SERPLBLD CKD-EPI 2021: >90 ML/MIN/1.73M2
ERYTHROCYTE [DISTWIDTH] IN BLOOD BY AUTOMATED COUNT: 15.1 % (ref 10–15)
GLUCOSE SERPL-MCNC: 138 MG/DL (ref 70–99)
HCO3 SERPL-SCNC: 26 MMOL/L (ref 22–29)
HCT VFR BLD AUTO: 27.4 % (ref 35–47)
HGB BLD-MCNC: 8.7 G/DL (ref 11.7–15.7)
MCH RBC QN AUTO: 28.9 PG (ref 26.5–33)
MCHC RBC AUTO-ENTMCNC: 31.8 G/DL (ref 31.5–36.5)
MCV RBC AUTO: 91 FL (ref 78–100)
PLATELET # BLD AUTO: 301 10E3/UL (ref 150–450)
POTASSIUM SERPL-SCNC: 3.8 MMOL/L (ref 3.4–5.3)
RBC # BLD AUTO: 3.01 10E6/UL (ref 3.8–5.2)
SODIUM SERPL-SCNC: 140 MMOL/L (ref 135–145)
WBC # BLD AUTO: 11.8 10E3/UL (ref 4–11)

## 2024-11-17 PROCEDURE — 85027 COMPLETE CBC AUTOMATED: CPT

## 2024-11-17 PROCEDURE — 250N000011 HC RX IP 250 OP 636

## 2024-11-17 PROCEDURE — 250N000013 HC RX MED GY IP 250 OP 250 PS 637

## 2024-11-17 PROCEDURE — 250N000011 HC RX IP 250 OP 636: Performed by: PHYSICIAN ASSISTANT

## 2024-11-17 PROCEDURE — 82310 ASSAY OF CALCIUM: CPT

## 2024-11-17 PROCEDURE — 250N000009 HC RX 250

## 2024-11-17 PROCEDURE — 36415 COLL VENOUS BLD VENIPUNCTURE: CPT

## 2024-11-17 PROCEDURE — 250N000013 HC RX MED GY IP 250 OP 250 PS 637: Performed by: FAMILY MEDICINE

## 2024-11-17 PROCEDURE — 250N000013 HC RX MED GY IP 250 OP 250 PS 637: Performed by: PHYSICIAN ASSISTANT

## 2024-11-17 PROCEDURE — 80048 BASIC METABOLIC PNL TOTAL CA: CPT

## 2024-11-17 RX ORDER — PREDNISONE 10 MG/1
30 TABLET ORAL DAILY
Qty: 21 TABLET | Refills: 0 | Status: SHIPPED | OUTPATIENT
Start: 2024-11-24 | End: 2024-11-18

## 2024-11-17 RX ORDER — PREDNISONE 20 MG/1
40 TABLET ORAL DAILY
Qty: 14 TABLET | Refills: 0 | Status: SHIPPED | OUTPATIENT
Start: 2024-11-17 | End: 2024-11-18

## 2024-11-17 RX ORDER — PREDNISONE 5 MG/1
5 TABLET ORAL DAILY
Qty: 7 TABLET | Refills: 0 | Status: SHIPPED | OUTPATIENT
Start: 2024-11-24 | End: 2024-11-18

## 2024-11-17 RX ADMIN — AZITHROMYCIN DIHYDRATE 500 MG: 250 TABLET, FILM COATED ORAL at 09:22

## 2024-11-17 RX ADMIN — POLYETHYLENE GLYCOL 3350 17 G: 17 POWDER, FOR SOLUTION ORAL at 09:22

## 2024-11-17 RX ADMIN — PIPERACILLIN AND TAZOBACTAM 3.38 G: 3; .375 INJECTION, POWDER, FOR SOLUTION INTRAVENOUS at 12:13

## 2024-11-17 RX ADMIN — METHYLPREDNISOLONE SODIUM SUCCINATE 20 MG: 40 INJECTION, POWDER, FOR SOLUTION INTRAMUSCULAR; INTRAVENOUS at 05:19

## 2024-11-17 RX ADMIN — PANTOPRAZOLE SODIUM 40 MG: 40 INJECTION, POWDER, FOR SOLUTION INTRAVENOUS at 09:22

## 2024-11-17 RX ADMIN — PIPERACILLIN AND TAZOBACTAM 3.38 G: 3; .375 INJECTION, POWDER, FOR SOLUTION INTRAVENOUS at 02:30

## 2024-11-17 RX ADMIN — THERA TABS 1 TABLET: TAB at 09:22

## 2024-11-17 RX ADMIN — OXYCODONE HYDROCHLORIDE 5 MG: 5 TABLET ORAL at 01:30

## 2024-11-17 ASSESSMENT — ACTIVITIES OF DAILY LIVING (ADL)
ADLS_ACUITY_SCORE: 0

## 2024-11-17 NOTE — PLAN OF CARE
Goal Outcome Evaluation:      Plan of Care Reviewed With: patient    Overall Patient Progress: improvingOverall Patient Progress: improving    Outcome Evaluation: Pt A&O and independent in her room. Completes her own colostomy cares. C/O headache rated 4/10, administered PRN Acetaminophen which resolved. She spoke enough English to complete an assessment. No other complaints.    /68 (BP Location: Right arm)   Pulse 56   Temp 97.4  F (36.3  C) (Oral)   Resp 18   Wt 43.7 kg (96 lb 4.8 oz)   LMP 09/25/2024 (Approximate)   SpO2 98%   BMI 19.45 kg/m        Problem: Bowel Disease, Inflammatory (Ulcerative Colitis or Crohn's Disease)  Goal: Absence of Infection Signs and Symptoms  Outcome: Not Progressing  Intervention: Prevent or Manage Infection  Recent Flowsheet Documentation  Taken 11/16/2024 2213 by Anuradha Guzman RN  Isolation Precautions: enteric precautions maintained     Problem: Infection  Goal: Absence of Infection Signs and Symptoms  Outcome: Not Progressing  Intervention: Prevent or Manage Infection  Recent Flowsheet Documentation  Taken 11/16/2024 2213 by Anuradha Guzman RN  Isolation Precautions: enteric precautions maintained

## 2024-11-17 NOTE — DISCHARGE INSTRUCTIONS
Steroid Taper:  Take 40 mg prednisone daily for 7 days  Then 35 mg prednisone daily for 7 days  Then 30 mg -> 25 -> 20 -> 15 -> 10 -> 5 -> stop    I sent prescription to your pharmacy for the first 2 weeks.     Follow up with GI this week  Call colorectal surgery and make appointment in next 2 weeks

## 2024-11-17 NOTE — PLAN OF CARE
Problem: Pain Acute  Goal: Optimal Pain Control and Function  Outcome: Progressing     Problem: Bowel Disease, Inflammatory (Ulcerative Colitis or Crohn's Disease)  Goal: Diarrhea Symptom Relief  Outcome: Progressing     Problem: Adult Inpatient Plan of Care  Goal: Optimal Comfort and Wellbeing  Outcome: Progressing   Goal Outcome Evaluation:      Plan of Care Reviewed With: patient      Patient A and O4. Independent in room. Hmong speaking. Enteric precautions. PRN oxy given at 0130 for abdominal pain. Proved effective. VSS on RA. K protocol recheck in the AM.

## 2024-11-17 NOTE — PLAN OF CARE
Problem: Adult Inpatient Plan of Care  Goal: Absence of Hospital-Acquired Illness or Injury  Outcome: Progressing  Intervention: Identify and Manage Fall Risk  Recent Flowsheet Documentation  Taken 11/17/2024 [id number] by Norma Montana RN  Safety Promotion/Fall Prevention:   clutter free environment maintained   nonskid shoes/slippers when out of bed     Problem: Adult Inpatient Plan of Care  Goal: Optimal Comfort and Wellbeing  Outcome: Progressing     Problem: Pain Acute  Goal: Optimal Pain Control and Function  Outcome: Progressing     Problem: Bowel Disease, Inflammatory (Ulcerative Colitis or Crohn's Disease)  Goal: Optimal Adaptation to Chronic Illness  Outcome: Progressing     Problem: Bowel Disease, Inflammatory (Ulcerative Colitis or Crohn's Disease)  Goal: Diarrhea Symptom Relief  Outcome: Progressing   Pt ok to discharge home today.  Went over discharge instructions with patients daughter over the phone.  Unable to get [employer]  through language line.  Daughter verbalized understanding of orders and will call department if she has questions.  Pt was taken down to door via wheelchair to be transported home by .

## 2024-11-17 NOTE — DISCHARGE SUMMARY
[facility]  Discharge Summary - Medicine & Pediatrics       Date of Admission:  11/13/2024  Date of Discharge:  11/17/2024  Discharging Provider: Steve Camp MD, Dr. Bernstein  Discharge Service: Hospitalist Service    Discharge Diagnoses   Stricturing Crohn's ileocolitis status post resection  Enteric pathogen panel positive for E. coli and norovirus    Clinically Significant Risk Factors          Follow-ups Needed After Discharge   Follow-up Appointments       Hospital Follow-up with Existing Primary Care Provider (PCP)      Please see details below         Schedule Primary Care visit within: 7 Days   Recommended labs and Imaging (to be ordered by Primary Care Provider): Ensure patient following up with GI and CRS. and that she is on steroid therapy.           Only provided 2 weeks of steroid taper, ensure she gets the rest of the taper    Unresulted Labs Ordered in the Past 30 Days of this Admission       No orders found from 10/14/2024 to 11/14/2024.        These results will be followed up by Steve Camp MD     Discharge Disposition   Discharged to home  Condition at discharge: Stable    Hospital Course   Dain Tejeda was admitted on 11/13/2024 for abdominal pain and decreased p.o. intake likely secondary to norovirus and E. coli in the setting of complicated Crohn's disease.  The following problems were addressed during her hospitalization:    Stricturing Crohn's ileocolitis status post resection  Status post partial colectomy and colostomy  Malnutrition  Crohn's disease since 2022 complicated by stricture and status post partial colectomy with colostomy creation.  Follows with [facility] and previously on Humira and azathioprine.  Recent admission in 10/24 due to pericolonic abscess.  Was supposed to be on antibiotics after leaving but was unable to pick them up.  Came in with a couple days of abdominal pain and decreased p.o. intake.  CRP elevated but no leukocytosis.  CT abdomen pelvis  with moderate to severe infectious versus inflammatory and a colitis.  Previous pericolonic abscess was resolved.  Was given IV steroids while here.  Initially concerned that a total colectomy may be required per colorectal surgery however enteric panel ended up being positive as below and is an alternate explanation for her disease and may not require a total colectomy.  Was tolerating a low fiber diet for 24 hours on discharge can advance as tolerated going home.  GI recommendations:  -Okay to discharge today  -Low fiber diet and advance as tolerated  -Prednisone taper starting with 40 mg daily for 7 days titrating down by 5 mg weekly  -No further Humira or azathioprine until seen in outpatient setting  -Outpatient IBD follow-up on 11/19 with Dr. Britt in [place] at 10:10AM  Follow-up with colorectal surgery in outpatient setting  Sent first 2 weeks of steroid therapy to her pharmacy     EPEC  ETEC  Norovirus  Patient positive for the above on enteric panel.  Treated with azithromycin for 3 days due to acute status and hospital  -No further antibiotic therapy required    Pericolonic abscess  Patient with pericolonic abscess during October hospitalization. Was discharged with 2 months of augmentin for this abscess. She never picked it up. On admission her abscess was resolved on imaging.  - discharging without antibiotics  - Call to schedule appointment with colorectal surgery in 2 weeks    History of left ovarian cyst  Due for follow-up ultrasound 3/25      Consultations This Hospital Stay   COLORECTAL SURGERY IP CONSULT  CARE MANAGEMENT / SOCIAL WORK IP CONSULT  GASTROENTEROLOGY IP CONSULT    Code Status   Full Code       The patient was discussed with Dr. Day Camp MD  [facility] [address] [place] [address]  Phone: [phone]  Fax: [phone]  ______________________________________________________________________    Physical Exam    Vital Signs: Temp: 97.7  F (36.5  C) Temp src: Oral BP: 114/62 Pulse: 62   Resp: 18 SpO2: 100 % O2 Device: None (Room air)    Weight: 96 lbs 4.8 oz  EYES: Eyes grossly normal to inspection.  No discharge or erythema, or obvious scleral/conjunctival abnormalities.  RESP:  Lungs clear throughout. No wheeze or crackles.   CV: Heart RRR. No murmur  Abdomen: Ostomy bag in place with feculent material output, no blood. Clean central incision site from previous surgery. Mild tenderness to palpation in RLQ. Some fullness of the RLQ as well. Both of these sites less painful than yesterday. No rebound or guarding.  MSK: No gross deformity. Normal tone.  SKIN: Visible skin clear. No significant rash, abnormal pigmentation or lesions.  NEURO: Cranial nerves grossly intact.  Mentation and speech appropriate for age.  PSYCH: Mentation appears normal, affect normal/bright, judgement and insight intact, normal speech and appearance well-groomed.      Primary Care Physician   Kevin Woodruff    Discharge Orders      Primary Care - Care Coordination Referral      Reason for your hospital stay    You came to the hospital for abdominal pain and decreased oral intake. You were found to have a bacterial and viral infection of your bowels. You received steroids and antibiotics while here. You were tolerating a diet without pain before discharge. You are going home on a long course of steroid therapy. Follow up with GI this week and call to make appointment with colorectal surgery in the next 2 weeks.     Activity    Your activity upon discharge: activity as tolerated     Diet    Follow this diet upon discharge: Current Diet:Orders Placed This Encounter      Snacks/Supplements Adult: Ensure Clear; With Meals      Low Fiber Diet     Hospital Follow-up with Existing Primary Care Provider (PCP)    Please see details below            Significant Results and Procedures   Most Recent 3 CBC's:  Recent Labs   Lab Test 11/17/24  2385  11/16/24  0703 11/15/24  0749   WBC 11.8* 8.4 7.1   HGB 8.7* 9.0* 8.7*   MCV 91 91 90    256 242     Most Recent 3 BMP's:  Recent Labs   Lab Test 11/17/24  0548 11/16/24  1914 11/16/24  1350 11/16/24  0703 11/15/24  1834 11/15/24  0749     --   --  138  --  137   POTASSIUM 3.8 3.8 3.4 3.2*   < > 2.7*   CHLORIDE 104  --   --  104  --  102   CO2 26  --   --  25  --  28   BUN 7.8  --   --  9.6  --  7.4   CR 0.54  --   --  0.52  --  0.60   ANIONGAP 10  --   --  9  --  7   ALEX 8.7*  --   --  8.5*  --  8.2*   *  --   --  233*  --  96    < > = values in this interval not displayed.   ,   Results for orders placed or performed during the hospital encounter of 11/13/24   CT Abdomen Pelvis w Contrast    Narrative    EXAM: CT ABDOMEN PELVIS W CONTRAST  LOCATION: [facility]  DATE: 11/13/2024    INDICATION: Abdominal pain, vomiting, chills, fever, and diarrhea; recent pericolonic abscess; Crohn's disease.  COMPARISON: 10/19/2024.  TECHNIQUE: CT scan of the abdomen and pelvis was performed following injection of IV contrast. Multiplanar reformats were obtained. Dose reduction techniques were used.  CONTRAST: 45 mL Isovue-370.    FINDINGS:    LOWER CHEST: Mildly thick-walled distal esophagus; correlate for a distal esophagitis.    HEPATOBILIARY: Diffuse hepatic steatosis. Unchanged, benign calcification in the right hepatic lobe.    Gallbladder is normal.    No intrahepatic or intrahepatic biliary ductal dilatation.    PANCREAS: Enhances normally. No peripancreatic inflammatory fat stranding.    SPLEEN: Enhances normally. Normal size.    ADRENAL GLANDS: Normal.    KIDNEYS: Both kidneys enhance symmetrically, without hydronephrosis.    No nephroureterolithiasis.    Urinary bladder is unremarkable.    PELVIC ORGANS: Unchanged cystic lesion along the left posterior aspect of the uterus, measuring 4.1 x 6.5 cm.    BOWEL: Status post partial colectomy, with end colostomy at the left lower  quadrant and Brenda's pouch. Residual transverse and right hemicolon demonstrates moderate to severe wall thickening and progressive inflammatory change. Moderate stool volume   is present within the right hemicolon, likely accounting for mild distention of the appendix and terminal ileum. Associated wall thickening of the terminal ileum is consistent with superimposed infectious/inflammatory change. Interval resolution of   previous pericolonic abscess occurring near the colostomy site.    No intraperitoneal free fluid or free air.    LYMPH NODES: No suspicious abdominal or pelvic lymphadenopathy. Reactive subcentimeter lymph nodes within the upper abdomen, similar to prior.    VASCULATURE: No abdominal aortic aneurysm. Mild atheromatous disease    MUSCULOSKELETAL: No suspicious abnormality.    OTHER: No additionally significant abnormalities.      Impression    IMPRESSION:   1.  Progressive, moderate to severe infectious versus inflammatory change of the residual transverse and right hemicolon. Consider gastroenterology or surgical consultation.  2.  Moderate stool volume within the right hemicolon, likely accounting for mild distention of the appendix and terminal ileum.  3.  Interval resolution of previous pericolonic abscess.  4.  Unchanged cystic lesion along the left posterior aspect of the uterus, measuring 4.1 x 6.5 cm.  5.  Hepatic steatosis.  6.  Mildly thick-walled distal esophagus; correlate for a distal esophagitis.         Discharge Medications   Current Discharge Medication List        START taking these medications    Details   !! predniSONE (DELTASONE) 10 MG tablet Take 3 tablets (30 mg) by mouth daily.  Qty: 21 tablet, Refills: 0    Comments: Week 2 of steroid taper - 35 mg daily for 7 days  Associated Diagnoses: Crohn's disease of colon with abscess (H)      !! predniSONE (DELTASONE) 20 MG tablet Take 2 tablets (40 mg) by mouth daily.  Qty: 14 tablet, Refills: 0    Comments: First week of  prednisone taper - 40 mg daily for 7 days  Associated Diagnoses: Crohn's disease of colon with abscess (H)      !! predniSONE (DELTASONE) 5 MG tablet Take 1 tablet (5 mg) by mouth daily.  Qty: 7 tablet, Refills: 0    Comments: Week 2 of steroid taper - 35 mg daily for 7 days  Associated Diagnoses: Crohn's disease of colon with abscess (H)       !! - Potential duplicate medications found. Please discuss with provider.        CONTINUE these medications which have NOT CHANGED    Details   famotidine (PEPCID) 10 MG tablet Take 10 mg by mouth 2 times daily.           STOP taking these medications       amoxicillin-clavulanate (AUGMENTIN) 875-125 MG tablet Comments:   Reason for Stopping:         ondansetron (ZOFRAN ODT) 4 MG ODT tab Comments:   Reason for Stopping:             Allergies   Allergies   Allergen Reactions    Gluten Meal      Celiac    Soy Allergy Anaphylaxis, Hives and Itching     Tofu- causes itching and sores in the mouth

## 2024-11-17 NOTE — PROGRESS NOTES
[facility] Progress Note       SUBJECTIVE:  Patient reports she feels at baseline. She is tolerating low fiber diet without pain and has had improvement in ostomy output. She wants to be home for her child's birthday party at 1 PM and will have to watch her 7 year old tonight while the rest of her family leaves for vacation.     Patient seen with [facility]  via [employer].       OBJECTIVE:  /62 (BP Location: Right arm)   Pulse 62   Temp 97.7  F (36.5  C) (Oral)   Resp 18   Wt 43.7 kg (96 lb 4.8 oz)   LMP 2024 (Approximate)   SpO2 100%   BMI 19.45 kg/m    Temp (24hrs), Av.4  F (37.4  C), Min:97.4  F (36.3  C), Max:100.7  F (38.2  C)    Patient Vitals for the past 72 hrs:   Weight   24 1900 43.7 kg (96 lb 4.8 oz)   24 1248 43 kg (94 lb 14.4 oz)       Intake/Output Summary (Last 24 hours) at 11/15/2024 1117  Last data filed at 11/15/2024 0200  Gross per 24 hour   Intake 540 ml   Output 1750 ml   Net -1210 ml        PHYSICAL EXAM  GEN: NAD, female appears stated age sitting up in bed  HRT: no LE edema  RESP: unlabored  ABD: vertical scar, soft, nontender, ostomy in LLQ with brown stool  SKIN: No rash or jaundice      Additional Data:  I have reviewed the patient's new clinical lab results:     Recent Labs   Lab Test 24  0548 24  0703 11/15/24  0749 10/24/24  0538 10/23/24  0619 24  0552 24  0610 24  0608 24  0614   WBC 11.8* 8.4 7.1   < > 5.4   < > 6.0   < > 3.7*   HGB 8.7* 9.0* 8.7*   < > 8.1*   < > 9.7*   < > 7.8*   MCV 91 91 90   < > 91   < > 97   < > 93    256 242   < > 320   < > 313   < > 267   INR  --   --   --   --  1.12  --  0.98  --  1.08    < > = values in this interval not displayed.     Recent Labs   Lab Test 24  0548 24  1914 24  1350 24  0703 11/15/24  1834 11/15/24  0749   POTASSIUM 3.8 3.8 3.4 3.2*   < > 2.7*   CHLORIDE 104  --   --  104  --  102   CO2 26  --   --  25  --  28    BUN 7.8  --   --  9.6  --  7.4   ANIONGAP 10  --   --  9  --  7    < > = values in this interval not displayed.     Recent Labs   Lab Test 11/13/24  1610 11/13/24  1538 10/25/24  0526 10/23/24  0619 10/20/24  0555 10/19/24  2257 09/11/24  1742 09/11/24  1741 05/10/24  0740 05/09/24  1148   ALBUMIN  --  3.7 2.4* 2.2*   < > 3.1*  --  3.7   < >  --    BILITOTAL  --  0.3 <0.2 0.2  --  0.6  --  0.4   < >  --    ALT  --  15 9 7  --  24  --  11   < >  --    AST  --  23 15 12  --  47*  --  15   < >  --    PROTEIN 20*  --   --   --   --   --  50*  --   --  70*   LIPASE  --  43  --   --   --  42  --  33  --   --     < > = values in this interval not displayed.     CRP 11/13/24: 46.30  C diff 11/14/24: negative  Enteric bacteria/virus stool 11/14/24: positive for EPEC, ETEC, and norovirus    Imaging results:  CT abdomen/pelvis 11/13/24:  FINDINGS:  LOWER CHEST: Mildly thick-walled distal esophagus; correlate for a distal esophagitis.  HEPATOBILIARY: Diffuse hepatic steatosis. Unchanged, benign calcification in the right hepatic lobe.  Gallbladder is normal.  No intrahepatic or intrahepatic biliary ductal dilatation.  PANCREAS: Enhances normally. No peripancreatic inflammatory fat stranding.  SPLEEN: Enhances normally. Normal size.  ADRENAL GLANDS: Normal.  KIDNEYS: Both kidneys enhance symmetrically, without hydronephrosis.  No nephroureterolithiasis.  Urinary bladder is unremarkable.  PELVIC ORGANS: Unchanged cystic lesion along the left posterior aspect of the uterus, measuring 4.1 x 6.5 cm.  BOWEL: Status post partial colectomy, with end colostomy at the left lower quadrant and Brenda's pouch. Residual transverse and right hemicolon demonstrates moderate to severe wall thickening and progressive inflammatory change. Moderate stool volume is present within the right hemicolon, likely accounting for mild distention of the appendix and terminal ileum. Associated wall thickening of the terminal ileum is consistent with  superimposed infectious/inflammatory change. Interval resolution of previous pericolonic abscess occurring near the colostomy site.  No intraperitoneal free fluid or free air.  LYMPH NODES: No suspicious abdominal or pelvic lymphadenopathy. Reactive subcentimeter lymph nodes within the upper abdomen, similar to prior.  VASCULATURE: No abdominal aortic aneurysm. Mild atheromatous disease  MUSCULOSKELETAL: No suspicious abnormality.  OTHER: No additionally significant abnormalities.                                                                   IMPRESSION:   1.  Progressive, moderate to severe infectious versus inflammatory change of the residual transverse and right hemicolon. Consider gastroenterology or surgical consultation.  2.  Moderate stool volume within the right hemicolon, likely accounting for mild distention of the appendix and terminal ileum.  3.  Interval resolution of previous pericolonic abscess.  4.  Unchanged cystic lesion along the left posterior aspect of the uterus, measuring 4.1 x 6.5 cm.  5.  Hepatic steatosis.  6.  Mildly thick-walled distal esophagus; correlate for a distal esophagitis.       IMPRESSION:  Stricturing Crohn's ileocolitis s/p resection  Enteric pathogen panel with norovirus and E coli  This is a 43 y/o female with PMH Crohn's ileocolitis dx 2022, stricturing phenotype s/p descending colon resection with colostomy and Duffy's pouch Nov 2022, pericolonic abscesses and developing fistula May 2024 admitted 11/13 for diarrhea and malnutrition. She was started on infliximab and continued on azathioprine 5/2024 admission but it seems she has remained on Humira instead and has not taking azathioprine. In addition, she did not take outpt abx for abscess as prescribed.     CT on arrival showed progressive, mod-severe inflammation of the remaining colon and resolution of previous pericolonic abscess. Plan 11/14 was to re-initiate infliximab but after stool tests returned positive for  EPEC, ETEC and norovirus, negative for C diff, this was deferred. D/w patient's outpatient IBD provider 11/15, who spoke with CRS, and recommendation was for steroids and abx with plan for surgery in the near future.     She has been on IV Zosyn since 11/14, azithromycin 500 mg given 11/15-/11/17 for E coli and norovirus, and she was started on IV steroids 11/15. Diet advanced to low fiber 11/16. She is tolerating diet without reported pain and has had improvement in ostomy output, reports she is back to baseline. She would like to discharge due to family events today, d/w Hops[employer]ist. She asks if she can still get her flu/COVID vaccinations 11/20 and both are fine.       PLAN:  - Okay to discharge today per GI  - Antibiotics per CRS/medicine  - Low fiber diet, ADAT to regular diet as tolerated  - Discharge on prednisone 40 mg daily x 7 days, reduce by 5 mg every 7 days  - No further Humira or azathioprine until seen as outpatient  - Outpatient IBD follow up as scheduled 11/19 with Dr. Britt in [place] at 10:10 AM ([facility])       (Dr. Duncan)  Jewels Wyatt PA-C  [facility]  11/17/2024 9:59 AM  [phone] (office)    48 minutes of total time was spent providing patient care, including patient evaluation, reviewing documentation/test results, , and documentation.  ________________________________________________________________________

## 2024-11-18 ENCOUNTER — TELEPHONE (OUTPATIENT)
Dept: FAMILY MEDICINE | Facility: CLINIC | Age: 44
End: 2024-11-18
Payer: COMMERCIAL

## 2024-11-18 RX ORDER — PREDNISONE 5 MG/1
5 TABLET ORAL DAILY
Qty: 7 TABLET | Refills: 0 | Status: SHIPPED | OUTPATIENT
Start: 2024-11-24

## 2024-11-18 RX ORDER — PREDNISONE 10 MG/1
30 TABLET ORAL DAILY
Qty: 21 TABLET | Refills: 0 | OUTPATIENT
Start: 2024-11-24

## 2024-11-18 RX ORDER — PREDNISONE 20 MG/1
40 TABLET ORAL DAILY
Qty: 14 TABLET | Refills: 0 | OUTPATIENT
Start: 2024-11-18

## 2024-11-18 RX ORDER — PREDNISONE 10 MG/1
30 TABLET ORAL DAILY
Qty: 21 TABLET | Refills: 0 | Status: SHIPPED | OUTPATIENT
Start: 2024-11-24

## 2024-11-18 RX ORDER — PREDNISONE 20 MG/1
40 TABLET ORAL DAILY
Qty: 14 TABLET | Refills: 0 | Status: SHIPPED | OUTPATIENT
Start: 2024-11-18

## 2024-11-18 RX ORDER — PREDNISONE 5 MG/1
5 TABLET ORAL DAILY
Qty: 7 TABLET | Refills: 0 | OUTPATIENT
Start: 2024-11-24

## 2024-11-18 NOTE — TELEPHONE ENCOUNTER
Nanda states that they are having issues filling patient's prednisone, as the provider who first filled was an intern and not a MD.     Writer called [facility] and they state the first provider was not covered under the patient's insurance plan. [facility] did get a different prescription for the entire prednisone taper and is filling it now.     Updated Nanda that the prednisone is being filled.

## 2024-11-19 ENCOUNTER — TRANSFERRED RECORDS (OUTPATIENT)
Dept: HEALTH INFORMATION MANAGEMENT | Facility: CLINIC | Age: 44
End: 2024-11-19
Payer: COMMERCIAL

## 2024-11-19 DIAGNOSIS — K50.114 CROHN'S DISEASE OF COLON WITH ABSCESS (H): ICD-10-CM

## 2024-11-19 LAB
ADV 40+41 DNA STL QL NAA+NON-PROBE: NEGATIVE
ASTRO TYP 1-8 RNA STL QL NAA+NON-PROBE: NEGATIVE
C CAYETANENSIS DNA STL QL NAA+NON-PROBE: NEGATIVE
CAMPYLOBACTER DNA SPEC NAA+PROBE: NEGATIVE
CRYPTOSP DNA STL QL NAA+NON-PROBE: NEGATIVE
E COLI O157 DNA STL QL NAA+NON-PROBE: ABNORMAL
E HISTOLYT DNA STL QL NAA+NON-PROBE: NEGATIVE
EAEC ASTA GENE ISLT QL NAA+PROBE: NEGATIVE
EC STX1+STX2 GENES STL QL NAA+NON-PROBE: NEGATIVE
EPEC EAE GENE STL QL NAA+NON-PROBE: POSITIVE
ETEC LTA+ST1A+ST1B TOX ST NAA+NON-PROBE: POSITIVE
G LAMBLIA DNA STL QL NAA+NON-PROBE: NEGATIVE
NOROVIRUS GI+II RNA STL QL NAA+NON-PROBE: POSITIVE
P SHIGELLOIDES DNA STL QL NAA+NON-PROBE: NEGATIVE
RVA RNA STL QL NAA+NON-PROBE: NEGATIVE
SALMONELLA SP RPOD STL QL NAA+PROBE: NEGATIVE
SAPO I+II+IV+V RNA STL QL NAA+NON-PROBE: NEGATIVE
SHIGELLA SP+EIEC IPAH ST NAA+NON-PROBE: NEGATIVE
V CHOLERAE DNA SPEC QL NAA+PROBE: NEGATIVE
VIBRIO DNA SPEC NAA+PROBE: NEGATIVE
Y ENTEROCOL DNA STL QL NAA+PROBE: NEGATIVE

## 2024-11-19 RX ORDER — PREDNISONE 20 MG/1
40 TABLET ORAL DAILY
Qty: 14 TABLET | Refills: 0 | Status: CANCELLED | OUTPATIENT
Start: 2024-11-19

## 2024-12-11 ENCOUNTER — OFFICE VISIT (OUTPATIENT)
Dept: FAMILY MEDICINE | Facility: CLINIC | Age: 44
End: 2024-12-11
Payer: COMMERCIAL

## 2024-12-11 VITALS
DIASTOLIC BLOOD PRESSURE: 71 MMHG | SYSTOLIC BLOOD PRESSURE: 109 MMHG | BODY MASS INDEX: 20.99 KG/M2 | WEIGHT: 104.12 LBS | OXYGEN SATURATION: 98 % | HEART RATE: 88 BPM | TEMPERATURE: 98.2 F | HEIGHT: 59 IN

## 2024-12-11 DIAGNOSIS — Z13.220 LIPID SCREENING: ICD-10-CM

## 2024-12-11 DIAGNOSIS — K50.918 CROHN'S DISEASE WITH OTHER COMPLICATION, UNSPECIFIED GASTROINTESTINAL TRACT LOCATION (H): Primary | ICD-10-CM

## 2024-12-11 PROCEDURE — 80061 LIPID PANEL: CPT

## 2024-12-11 PROCEDURE — 99213 OFFICE O/P EST LOW 20 MIN: CPT | Mod: 25

## 2024-12-11 PROCEDURE — 90656 IIV3 VACC NO PRSV 0.5 ML IM: CPT

## 2024-12-11 PROCEDURE — 90471 IMMUNIZATION ADMIN: CPT

## 2024-12-11 PROCEDURE — 36415 COLL VENOUS BLD VENIPUNCTURE: CPT

## 2024-12-11 NOTE — PATIENT INSTRUCTIONS
Will give you update from Colorectal surgery clinic if needing any additional paperwork completed prior to your surgery  Will schedule you for a pre-operative visit, if not required we can always cancel  Continue taking your prednisone and finish out your course  Follow up with me after 12/30/24 for pre-operative visit.

## 2024-12-11 NOTE — PROGRESS NOTES
Assessment & Plan     (K50.918) Crohn's disease with other complication, unspecified gastrointestinal tract location (H)  (primary encounter diagnosis)  Comment: Patient presents for f/up Crohn's ileocolitis (hx of prior resection and colostomy in place), recent hospitalization 11/13-11/17 for pericolonic abscess. Discharged on prednisone taper 40 mg decrease by 5 weekly, currently on 25 mg daily. Followed up w/GI Dr. Britt 12/3, elected to pursue further surgical management w/CRS, Dr. Lafleur. Called surgery office to determine if patient needs pre-op visit with us prior to this, they plan to call the writer back to confirm but will schedule her a pre-op appointment slot so she has it if needed. Continue prednisone taper until finished, GI will start patient on azathioprine and infliximab post-operatively. Discussed vaccinations today, ok for influenza but will hold off on additional vaccines until 4 weeks after finishing prednisone course. Abdominal pain significantly improved since hospitalization.  Plan:   Has upcoming surgery with [facility], Dr. Lafleur, 1/30/2025  Pre-operative appointment slot booked if needed    (Z13.220) Lipid screening  Comment: routine screen, check today  Plan: Lipid panel reflex to direct LDL Non-fasting        No follow-ups on file.    Subjective   Dain is a 44 year old, presenting for the following health issues:  Gastrointestinal Problem        12/11/2024     3:21 PM   Additional Questions   Roomed by melvina fox   Accompanied by daughter         12/11/2024    Information    services provided? Yes   Language Hmong   Type of interpretation provided Face-to-face        Dain Tejeda is a 45 y/o F presenting to clinic today for follow up crohn's disease in setting of hospitalization at [facility] 11/13-11/17 for stricturing crohn's ileocolitis (has colostomy, s/p prior resection), pericolonic abscess which has resolved. Refilled her prednisone last visit to complete her taper (40  "mg, decrease by 5 mg weekly).    Patient saw GI 12/3 (Dr. Britt), and was encouraged to see CRS for further surgical management. Her options presented per GI were to either start infliximab and azathioprine after surgery, or start now if electing to not pursue surgical management. Appears has surgical appointment scheduled with Dr. Lafleur, CRS, 1/30/25. She has confirmed that she has scheduled her surgery and was instructed to finish her prednisone taper.     She reports that she has a pre-op visit scheduled     She continues on prednisone taper from her hospitalization, currently taking 25 mg daily. States has no abdominal pain currently or noticed any blood in her ostomy.     Discussed vaccinations, she is eligible to get influenza vaccine but due to her being on prednisone currently, will need to be off of this medication for 4 weeks before receiving any additional vaccinations.                Objective    /71 (BP Location: Right arm, Patient Position: Sitting, Cuff Size: Adult Regular)   Pulse 88   Temp 98.2  F (36.8  C) (Oral)   Ht 1.499 m (4' 11\")   Wt 47.2 kg (104 lb 1.9 oz)   LMP 09/25/2024 (Approximate)   SpO2 98%   BMI 21.03 kg/m    Body mass index is 21.03 kg/m .  Physical Exam   GENERAL: alert and no distress  NECK: no adenopathy, no asymmetry, masses, or scars  RESP: lungs clear to auscultation - no rales, rhonchi or wheezes  CV: regular rate and rhythm, normal S1 S2, no S3 or S4, no murmur, click or rub, no peripheral edema  ABDOMEN: soft, nontender, no hepatosplenomegaly, no masses and bowel sounds normal  ABDOMEN: soft, nontender, without hepatosplenomegaly or masses, bowel sounds normal, and ostomy bag in place w/out drainage or surrounding erythema.  MS: no gross musculoskeletal defects noted, no edema        Signed Electronically by: Kevin Woodruff MD  {Email feedback regarding this note to [email or web address]   :[id number]}  "

## 2024-12-12 LAB
CHOLEST SERPL-MCNC: 191 MG/DL
FASTING STATUS PATIENT QL REPORTED: NORMAL
HDLC SERPL-MCNC: 78 MG/DL
LDLC SERPL CALC-MCNC: 97 MG/DL
NONHDLC SERPL-MCNC: 113 MG/DL
TRIGL SERPL-MCNC: 80 MG/DL

## 2024-12-13 NOTE — TELEPHONE ENCOUNTER
[facility] phone call message- general phone call:    Reason for call: Calling to schedule appt for Patient, Patient will need a [facility]  caller. She states she took Patient to [facility] on [address] last Wednesday to get a UPT done. Patient is about 8 weeks pregnant. Told her that will have a OB Nurse call the patient to do intake and talk to her about her pregnancy and then we will be able to set up an appt for her. Please call and advise.     Return call needed: Yes, call Patient's number on file, not the caller.     OK to leave a message on voice mail?     Primary language: Pristones      needed? Yes    Call taken on April 17, 2017 at 12:29 PM by Mj Camargo  
not applicable

## 2024-12-20 ENCOUNTER — TELEPHONE (OUTPATIENT)
Dept: FAMILY MEDICINE | Facility: CLINIC | Age: 44
End: 2024-12-20

## 2024-12-20 DIAGNOSIS — K50.114 CROHN'S DISEASE OF COLON WITH ABSCESS (H): ICD-10-CM

## 2024-12-20 NOTE — TELEPHONE ENCOUNTER
Forms/Letter Request    Type of form/letter: OTHER:        Do we have the form/letter: Yes: Crohn's & Colitis Residual Capacity     Who is the form from? [employer], [employer]    Where did/will the form come from? form was faxed in    When is form/letter needed by: Jan 2, 2024    How would you like the form/letter returned: Fax : [phone]    Patient Notified form requests are processed in 5-7 business days:Yes

## 2024-12-20 NOTE — TELEPHONE ENCOUNTER
Medication Question or Refill        What medication are you calling about (include dose and sig)?:   predniSONE (DELTASONE) 10 MG tablet   Sig - Route: Take 3 tablets (30 mg) by mouth daily for 7 days, THEN 2.5 tablets (25 mg) daily for 7 days, THEN 2 tablets (20 mg) daily for 7 days, THEN 1.5 tablets (15 mg) daily for 7 days, THEN 1 tablet (10 mg) daily for 7 days, THEN 0.5 tablets (5 mg) daily for 7 days. - Oral     Preferred Pharmacy:       [id number] - [place], [place] - [address] [place] [address]  Phone: [phone] Fax: [phone]      Controlled Substance Agreement on file:   CSA -- Patient Level:    CSA: None found at the patient level.       Who prescribed the medication?: Bjekre     Do you need a refill? Yes    When did you use the medication last? Unsure     Patient offered an appointment? Yes: she has an appt on Ross 15 for pre op     Do you have any questions or concerns?  No      Okay to leave a detailed message?: Yes at Cell number on file:    Telephone Information:   Mobile [phone]

## 2024-12-23 RX ORDER — PREDNISONE 10 MG/1
TABLET ORAL
Qty: 21 TABLET | Refills: 0 | Status: ON HOLD | OUTPATIENT
Start: 2024-12-23 | End: 2025-01-13

## 2024-12-26 ENCOUNTER — APPOINTMENT (OUTPATIENT)
Dept: CT IMAGING | Facility: HOSPITAL | Age: 44
End: 2024-12-26
Attending: EMERGENCY MEDICINE
Payer: COMMERCIAL

## 2024-12-26 ENCOUNTER — HOSPITAL ENCOUNTER (INPATIENT)
Facility: HOSPITAL | Age: 44
End: 2024-12-26
Attending: EMERGENCY MEDICINE | Admitting: FAMILY MEDICINE
Payer: COMMERCIAL

## 2024-12-26 VITALS
BODY MASS INDEX: 21.01 KG/M2 | TEMPERATURE: 98.5 F | DIASTOLIC BLOOD PRESSURE: 71 MMHG | SYSTOLIC BLOOD PRESSURE: 112 MMHG | HEART RATE: 88 BPM | WEIGHT: 104 LBS | OXYGEN SATURATION: 96 % | RESPIRATION RATE: 18 BRPM

## 2024-12-26 DIAGNOSIS — K51.014 ULCERATIVE PANCOLITIS WITH ABSCESS (H): ICD-10-CM

## 2024-12-26 DIAGNOSIS — Z43.2 ENCOUNTER FOR ATTENTION TO ILEOSTOMY (H): Primary | ICD-10-CM

## 2024-12-26 DIAGNOSIS — N94.89 PELVIC CYST IN FEMALE: ICD-10-CM

## 2024-12-26 DIAGNOSIS — K50.114 CROHN'S DISEASE OF COLON WITH ABSCESS (H): ICD-10-CM

## 2024-12-26 DIAGNOSIS — Z93.3 COLOSTOMY STATUS (H): ICD-10-CM

## 2024-12-26 DIAGNOSIS — K50.912 EXACERBATION OF CROHN'S DISEASE WITH INTESTINAL OBSTRUCTION (H): ICD-10-CM

## 2024-12-26 DIAGNOSIS — K56.609 SBO (SMALL BOWEL OBSTRUCTION) (H): ICD-10-CM

## 2024-12-26 LAB
ABO + RH BLD: NORMAL
ADV 40+41 DNA STL QL NAA+NON-PROBE: NEGATIVE
ALBUMIN SERPL BCG-MCNC: 4 G/DL (ref 3.5–5.2)
ALP SERPL-CCNC: 133 U/L (ref 40–150)
ALT SERPL W P-5'-P-CCNC: 68 U/L (ref 0–50)
ANION GAP SERPL CALCULATED.3IONS-SCNC: 11 MMOL/L (ref 7–15)
AST SERPL W P-5'-P-CCNC: 19 U/L (ref 0–45)
ASTRO TYP 1-8 RNA STL QL NAA+NON-PROBE: NEGATIVE
BASOPHILS # BLD AUTO: 0 10E3/UL (ref 0–0.2)
BASOPHILS NFR BLD AUTO: 0 %
BILIRUB DIRECT SERPL-MCNC: <0.2 MG/DL (ref 0–0.3)
BILIRUB SERPL-MCNC: 0.3 MG/DL
BLD GP AB SCN SERPL QL: NEGATIVE
BUN SERPL-MCNC: 17.6 MG/DL (ref 6–20)
C CAYETANENSIS DNA STL QL NAA+NON-PROBE: NEGATIVE
C DIFF TOX B STL QL: NEGATIVE
CALCIUM SERPL-MCNC: 9.8 MG/DL (ref 8.8–10.4)
CAMPYLOBACTER DNA SPEC NAA+PROBE: NEGATIVE
CHLORIDE SERPL-SCNC: 100 MMOL/L (ref 98–107)
CREAT SERPL-MCNC: 0.53 MG/DL (ref 0.51–0.95)
CRP SERPL-MCNC: 50.6 MG/L
CRYPTOSP DNA STL QL NAA+NON-PROBE: NEGATIVE
E COLI O157 DNA STL QL NAA+NON-PROBE: NORMAL
E HISTOLYT DNA STL QL NAA+NON-PROBE: NEGATIVE
EAEC ASTA GENE ISLT QL NAA+PROBE: NEGATIVE
EC STX1+STX2 GENES STL QL NAA+NON-PROBE: NEGATIVE
EGFRCR SERPLBLD CKD-EPI 2021: >90 ML/MIN/1.73M2
EOSINOPHIL # BLD AUTO: 0 10E3/UL (ref 0–0.7)
EOSINOPHIL NFR BLD AUTO: 0 %
EPEC EAE GENE STL QL NAA+NON-PROBE: NEGATIVE
ERYTHROCYTE [DISTWIDTH] IN BLOOD BY AUTOMATED COUNT: 14.8 % (ref 10–15)
ETEC LTA+ST1A+ST1B TOX ST NAA+NON-PROBE: NEGATIVE
FLUAV RNA SPEC QL NAA+PROBE: NEGATIVE
FLUBV RNA RESP QL NAA+PROBE: NEGATIVE
G LAMBLIA DNA STL QL NAA+NON-PROBE: NEGATIVE
GLUCOSE SERPL-MCNC: 105 MG/DL (ref 70–99)
HCO3 SERPL-SCNC: 26 MMOL/L (ref 22–29)
HCT VFR BLD AUTO: 37.7 % (ref 35–47)
HGB BLD-MCNC: 12.2 G/DL (ref 11.7–15.7)
HOLD SPECIMEN: NORMAL
IMM GRANULOCYTES # BLD: 0.1 10E3/UL
IMM GRANULOCYTES NFR BLD: 1 %
LIPASE SERPL-CCNC: 27 U/L (ref 13–60)
LYMPHOCYTES # BLD AUTO: 2.2 10E3/UL (ref 0.8–5.3)
LYMPHOCYTES NFR BLD AUTO: 14 %
MAGNESIUM SERPL-MCNC: 2 MG/DL (ref 1.7–2.3)
MCH RBC QN AUTO: 29.5 PG (ref 26.5–33)
MCHC RBC AUTO-ENTMCNC: 32.4 G/DL (ref 31.5–36.5)
MCV RBC AUTO: 91 FL (ref 78–100)
MONOCYTES # BLD AUTO: 0.7 10E3/UL (ref 0–1.3)
MONOCYTES NFR BLD AUTO: 5 %
NEUTROPHILS # BLD AUTO: 12.4 10E3/UL (ref 1.6–8.3)
NEUTROPHILS NFR BLD AUTO: 80 %
NOROVIRUS GI+II RNA STL QL NAA+NON-PROBE: NEGATIVE
NRBC # BLD AUTO: 0 10E3/UL
NRBC BLD AUTO-RTO: 0 /100
P SHIGELLOIDES DNA STL QL NAA+NON-PROBE: NEGATIVE
PLATELET # BLD AUTO: 355 10E3/UL (ref 150–450)
POTASSIUM SERPL-SCNC: 3.5 MMOL/L (ref 3.4–5.3)
PROT SERPL-MCNC: 8.2 G/DL (ref 6.4–8.3)
RBC # BLD AUTO: 4.13 10E6/UL (ref 3.8–5.2)
RSV RNA SPEC NAA+PROBE: NEGATIVE
RVA RNA STL QL NAA+NON-PROBE: NEGATIVE
SALMONELLA SP RPOD STL QL NAA+PROBE: NEGATIVE
SAPO I+II+IV+V RNA STL QL NAA+NON-PROBE: NEGATIVE
SARS-COV-2 RNA RESP QL NAA+PROBE: NEGATIVE
SHIGELLA SP+EIEC IPAH ST NAA+NON-PROBE: NEGATIVE
SODIUM SERPL-SCNC: 137 MMOL/L (ref 135–145)
SPECIMEN EXP DATE BLD: NORMAL
V CHOLERAE DNA SPEC QL NAA+PROBE: NEGATIVE
VIBRIO DNA SPEC NAA+PROBE: NEGATIVE
WBC # BLD AUTO: 15.5 10E3/UL (ref 4–11)
Y ENTEROCOL DNA STL QL NAA+PROBE: NEGATIVE

## 2024-12-26 PROCEDURE — 80076 HEPATIC FUNCTION PANEL: CPT | Performed by: EMERGENCY MEDICINE

## 2024-12-26 PROCEDURE — 272N000451 HC KIT SHRLOCK 5FR POWER PICC DOUBLE LUMEN

## 2024-12-26 PROCEDURE — 86900 BLOOD TYPING SEROLOGIC ABO: CPT | Performed by: EMERGENCY MEDICINE

## 2024-12-26 PROCEDURE — 258N000003 HC RX IP 258 OP 636: Performed by: EMERGENCY MEDICINE

## 2024-12-26 PROCEDURE — 74177 CT ABD & PELVIS W/CONTRAST: CPT

## 2024-12-26 PROCEDURE — 86140 C-REACTIVE PROTEIN: CPT | Performed by: EMERGENCY MEDICINE

## 2024-12-26 PROCEDURE — 258N000003 HC RX IP 258 OP 636

## 2024-12-26 PROCEDURE — 250N000011 HC RX IP 250 OP 636: Mod: JZ | Performed by: EMERGENCY MEDICINE

## 2024-12-26 PROCEDURE — 36415 COLL VENOUS BLD VENIPUNCTURE: CPT | Performed by: EMERGENCY MEDICINE

## 2024-12-26 PROCEDURE — 250N000011 HC RX IP 250 OP 636: Performed by: FAMILY MEDICINE

## 2024-12-26 PROCEDURE — 250N000013 HC RX MED GY IP 250 OP 250 PS 637

## 2024-12-26 PROCEDURE — 83735 ASSAY OF MAGNESIUM: CPT | Performed by: EMERGENCY MEDICINE

## 2024-12-26 PROCEDURE — 87493 C DIFF AMPLIFIED PROBE: CPT | Performed by: EMERGENCY MEDICINE

## 2024-12-26 PROCEDURE — 83690 ASSAY OF LIPASE: CPT | Performed by: EMERGENCY MEDICINE

## 2024-12-26 PROCEDURE — 272N000278 HC DEVICE 5FR SECURACATH

## 2024-12-26 PROCEDURE — 82248 BILIRUBIN DIRECT: CPT | Performed by: EMERGENCY MEDICINE

## 2024-12-26 PROCEDURE — 250N000011 HC RX IP 250 OP 636: Performed by: EMERGENCY MEDICINE

## 2024-12-26 PROCEDURE — 36569 INSJ PICC 5 YR+ W/O IMAGING: CPT

## 2024-12-26 PROCEDURE — 87507 IADNA-DNA/RNA PROBE TQ 12-25: CPT | Performed by: EMERGENCY MEDICINE

## 2024-12-26 PROCEDURE — 85025 COMPLETE CBC W/AUTO DIFF WBC: CPT | Performed by: EMERGENCY MEDICINE

## 2024-12-26 PROCEDURE — 82310 ASSAY OF CALCIUM: CPT | Performed by: EMERGENCY MEDICINE

## 2024-12-26 PROCEDURE — 82565 ASSAY OF CREATININE: CPT | Performed by: EMERGENCY MEDICINE

## 2024-12-26 PROCEDURE — 99285 EMERGENCY DEPT VISIT HI MDM: CPT | Mod: 25

## 2024-12-26 PROCEDURE — 96374 THER/PROPH/DIAG INJ IV PUSH: CPT | Mod: 59

## 2024-12-26 PROCEDURE — 87637 SARSCOV2&INF A&B&RSV AMP PRB: CPT | Performed by: EMERGENCY MEDICINE

## 2024-12-26 PROCEDURE — 82947 ASSAY GLUCOSE BLOOD QUANT: CPT | Performed by: EMERGENCY MEDICINE

## 2024-12-26 PROCEDURE — 96375 TX/PRO/DX INJ NEW DRUG ADDON: CPT

## 2024-12-26 PROCEDURE — 96361 HYDRATE IV INFUSION ADD-ON: CPT

## 2024-12-26 PROCEDURE — 99222 1ST HOSP IP/OBS MODERATE 55: CPT | Mod: AI

## 2024-12-26 PROCEDURE — 250N000009 HC RX 250

## 2024-12-26 PROCEDURE — 120N000001 HC R&B MED SURG/OB

## 2024-12-26 RX ORDER — ONDANSETRON 4 MG/1
4 TABLET, ORALLY DISINTEGRATING ORAL EVERY 6 HOURS PRN
Status: ACTIVE | OUTPATIENT
Start: 2024-12-26

## 2024-12-26 RX ORDER — LOPERAMIDE HYDROCHLORIDE 2 MG/1
2 TABLET ORAL 4 TIMES DAILY PRN
Status: ON HOLD | COMMUNITY

## 2024-12-26 RX ORDER — DIPHENHYDRAMINE HCL 25 MG
25 TABLET ORAL EVERY 6 HOURS PRN
Status: ON HOLD | COMMUNITY

## 2024-12-26 RX ORDER — OXYCODONE HYDROCHLORIDE 5 MG/1
10 TABLET ORAL EVERY 4 HOURS PRN
Status: DISPENSED | OUTPATIENT
Start: 2024-12-26

## 2024-12-26 RX ORDER — DIPHENHYDRAMINE HCL 25 MG
25 CAPSULE ORAL EVERY 6 HOURS PRN
Status: ACTIVE | OUTPATIENT
Start: 2024-12-26

## 2024-12-26 RX ORDER — ACETAMINOPHEN 650 MG/1
650 SUPPOSITORY RECTAL EVERY 4 HOURS PRN
Status: ACTIVE | OUTPATIENT
Start: 2024-12-26

## 2024-12-26 RX ORDER — IOPAMIDOL 755 MG/ML
51 INJECTION, SOLUTION INTRAVASCULAR ONCE
Status: COMPLETED | OUTPATIENT
Start: 2024-12-26 | End: 2024-12-26

## 2024-12-26 RX ORDER — HYDROMORPHONE HCL IN WATER/PF 6 MG/30 ML
0.4 PATIENT CONTROLLED ANALGESIA SYRINGE INTRAVENOUS
Status: ACTIVE | OUTPATIENT
Start: 2024-12-26

## 2024-12-26 RX ORDER — NALOXONE HYDROCHLORIDE 0.4 MG/ML
0.4 INJECTION, SOLUTION INTRAMUSCULAR; INTRAVENOUS; SUBCUTANEOUS
Status: ACTIVE | OUTPATIENT
Start: 2024-12-26

## 2024-12-26 RX ORDER — ONDANSETRON 2 MG/ML
4 INJECTION INTRAMUSCULAR; INTRAVENOUS ONCE
Status: COMPLETED | OUTPATIENT
Start: 2024-12-26 | End: 2024-12-26

## 2024-12-26 RX ORDER — SODIUM CHLORIDE, SODIUM LACTATE, POTASSIUM CHLORIDE, CALCIUM CHLORIDE 600; 310; 30; 20 MG/100ML; MG/100ML; MG/100ML; MG/100ML
INJECTION, SOLUTION INTRAVENOUS CONTINUOUS
Status: ACTIVE | OUTPATIENT
Start: 2024-12-26

## 2024-12-26 RX ORDER — OXYCODONE HYDROCHLORIDE 5 MG/1
5 TABLET ORAL EVERY 4 HOURS PRN
Status: ACTIVE | OUTPATIENT
Start: 2024-12-26

## 2024-12-26 RX ORDER — ACETAMINOPHEN 325 MG/1
650 TABLET ORAL EVERY 4 HOURS PRN
Status: ACTIVE | OUTPATIENT
Start: 2024-12-26

## 2024-12-26 RX ORDER — NALOXONE HYDROCHLORIDE 0.4 MG/ML
0.2 INJECTION, SOLUTION INTRAMUSCULAR; INTRAVENOUS; SUBCUTANEOUS
Status: ACTIVE | OUTPATIENT
Start: 2024-12-26

## 2024-12-26 RX ORDER — ONDANSETRON 4 MG/1
4 TABLET, ORALLY DISINTEGRATING ORAL EVERY 8 HOURS PRN
Status: ON HOLD | COMMUNITY

## 2024-12-26 RX ORDER — PROCHLORPERAZINE MALEATE 10 MG
10 TABLET ORAL EVERY 6 HOURS PRN
Status: ACTIVE | OUTPATIENT
Start: 2024-12-26

## 2024-12-26 RX ORDER — PREDNISONE 10 MG/1
TABLET ORAL
Qty: 21 TABLET | OUTPATIENT
Start: 2024-12-26 | End: 2025-01-16

## 2024-12-26 RX ORDER — MORPHINE SULFATE 4 MG/ML
4 INJECTION, SOLUTION INTRAMUSCULAR; INTRAVENOUS ONCE
Status: COMPLETED | OUTPATIENT
Start: 2024-12-26 | End: 2024-12-26

## 2024-12-26 RX ORDER — ONDANSETRON 2 MG/ML
4 INJECTION INTRAMUSCULAR; INTRAVENOUS EVERY 6 HOURS PRN
Status: DISPENSED | OUTPATIENT
Start: 2024-12-26

## 2024-12-26 RX ORDER — HYDROMORPHONE HCL IN WATER/PF 6 MG/30 ML
0.2 PATIENT CONTROLLED ANALGESIA SYRINGE INTRAVENOUS
Status: ACTIVE | OUTPATIENT
Start: 2024-12-26

## 2024-12-26 RX ADMIN — LIDOCAINE HYDROCHLORIDE 1 ML: 10 INJECTION, SOLUTION INFILTRATION; PERINEURAL at 22:18

## 2024-12-26 RX ADMIN — MORPHINE SULFATE 4 MG: 4 INJECTION, SOLUTION INTRAMUSCULAR; INTRAVENOUS at 12:44

## 2024-12-26 RX ADMIN — SODIUM CHLORIDE 1000 ML: 9 INJECTION, SOLUTION INTRAVENOUS at 09:33

## 2024-12-26 RX ADMIN — OXYCODONE HYDROCHLORIDE 10 MG: 5 TABLET ORAL at 22:10

## 2024-12-26 RX ADMIN — SODIUM CHLORIDE, POTASSIUM CHLORIDE, SODIUM LACTATE AND CALCIUM CHLORIDE: 600; 310; 30; 20 INJECTION, SOLUTION INTRAVENOUS at 15:30

## 2024-12-26 RX ADMIN — OXYCODONE HYDROCHLORIDE 10 MG: 5 TABLET ORAL at 17:50

## 2024-12-26 RX ADMIN — ONDANSETRON 4 MG: 2 INJECTION INTRAMUSCULAR; INTRAVENOUS at 09:33

## 2024-12-26 RX ADMIN — MORPHINE SULFATE 4 MG: 4 INJECTION, SOLUTION INTRAMUSCULAR; INTRAVENOUS at 09:34

## 2024-12-26 RX ADMIN — THIAMINE HCL TAB 100 MG 100 MG: 100 TAB at 15:33

## 2024-12-26 RX ADMIN — IOPAMIDOL 51 ML: 755 INJECTION, SOLUTION INTRAVENOUS at 11:16

## 2024-12-26 RX ADMIN — ONDANSETRON 4 MG: 2 INJECTION INTRAMUSCULAR; INTRAVENOUS at 15:30

## 2024-12-26 ASSESSMENT — COLUMBIA-SUICIDE SEVERITY RATING SCALE - C-SSRS
6. HAVE YOU EVER DONE ANYTHING, STARTED TO DO ANYTHING, OR PREPARED TO DO ANYTHING TO END YOUR LIFE?: NO
1. IN THE PAST MONTH, HAVE YOU WISHED YOU WERE DEAD OR WISHED YOU COULD GO TO SLEEP AND NOT WAKE UP?: NO
2. HAVE YOU ACTUALLY HAD ANY THOUGHTS OF KILLING YOURSELF IN THE PAST MONTH?: NO

## 2024-12-26 ASSESSMENT — ACTIVITIES OF DAILY LIVING (ADL)
ADLS_ACUITY_SCORE: 36
ADLS_ACUITY_SCORE: 62
ADLS_ACUITY_SCORE: 36
ADLS_ACUITY_SCORE: 36
ADLS_ACUITY_SCORE: 62
ADLS_ACUITY_SCORE: 36
ADLS_ACUITY_SCORE: 62

## 2024-12-26 NOTE — H&P
"[facility]    History and Physical - Hospitalist Service       Date of Admission:  12/26/2024    Assessment & Plan      Dain Tejeda is a 44 year old female w/PMHx significant for Crohn's Disease s/p partial colectomy/colostomy and recent admission for infectious colitis admitted on 12/26/2024 for acute abdominal pain, fth bowel obstruction near ileum.     Ileal bowel obstruction  Hx of Crohn's Disease s/p partial colectomy/colostomy  Hx of strictures   Patient presents w/2d of intermittent, severe abdominal cramping in lower abdomen in setting of known Crohn's Disease, associated with n/v and poor PO intake. Reports she has had \"normal\" colostomy output, but though bag does have dark, maroon colored stools. CT AP showed strictures in transverse colon and bowel obstruction upstream to distal ileus. CRP and WBC elevated, AST mildly elevated - CMP otherwise wnl. Of note, she was recently hospitalized 11/13 - 11/17 for infectious colitis and sent out on prednisone taper, which she reports she has been taking. Patient reports she will be having surgery in Jan 2025, which is consistent w/chart rvw - appears to be planning for total colectomy w/end ileostomy, presumably for management of complicated Crohn's Disease. Follows w/[facility], unable to see their notes in our system. ED provider reached out to [facility], who would like CRS's input in terms of IV steroids.   -GI consult, recs appreciated  -CRS consult, recs appreciated  - mIVF LR @ 100ml/h   -Zofran/compazine PRN for nausea/vomiting  -NPO in case of procedure and for bowel rest   -Pain regimen:   -Tylenol 650mg PRN   -Oxycodone 5-10mg q4h PRN for moderate pain   -Dilaudid 0.2-0.4mg q2h PRN for severe pain             Diet: NPO per Anesthesia Guidelines for Procedure/Surgery Except for: Meds, Ice Chips    DVT Prophylaxis: Pneumatic Compression Devices  Chambers Catheter: Not present  Lines: None     Cardiac Monitoring: None  Code Status: Full Code  " "    Clinically Significant Risk Factors Present on Admission                                 # Financial/Environmental Concerns:           Disposition Plan     Medically Ready for Discharge: Anticipated in 2-4 Days         The patient's care was discussed with the Attending Physician, Dr. Tamayo .    Franklin Hall MD  Hospitalist Service  [facility]  Securely message with Blogl[employer] (more info)  Text page via [facility] Paging/Directory     ______________________________________________________________________    Chief Complaint   Abdominal pain    History is obtained from the patient    History of Present Illness   Dain Tejeda is a 44 year old female w/PMHx significant for Crohn's Disease s/p colectomy/colostomy and recent admission for infectious colitis admitted on 12/26/2024 for acute abdominal pain.     -Developed sudden sharp, cramping pain in her abdomen 12/25, but thought hospital was not opened for [place], so stayed home until today. Episodes last from seconds to minutes, but could be several hours apart.   -Had clear/yellow/green emesis with almost all episodes. Unable to eat/drink anything for almost 2d.   -No inciting incident. Thinks stool output in colostomy bag has been \"normal.\" Received powder and takes good care of skin surrounding it.   -No fevers/chills. Reports HA. Complaining of facial swelling - attributes this to prednisone. Denies dysuria, hematuria, increased urine frequency.       Past Medical History    Past Medical History:   Diagnosis Date    Diet controlled gestational diabetes mellitus (GDM), antepartum 9/4/2017    Attempting diet control first    Gestational diabetes mellitus (GDM) in third trimester 9/4/2017    Attempting diet control first  Formatting of this note might be different from the original. Overview:  Attempting diet control first    Nausea/vomiting in pregnancy 4/24/2017    Postpartum hemorrhage 11/16/2017    Third degree laceration of perineum during " delivery, postpartum 11/15/2017       Past Surgical History   Past Surgical History:   Procedure Laterality Date    COLONOSCOPY N/A 10/31/2022    Procedure: COLONOSCOPY with biopsies;  Surgeon: Luis Miguel Traore MD;  [facility] GI    COLOSTOMY N/A 11/6/2022    Procedure: CREATION, COLOSTOMY;  Surgeon: Chandana Carlos DO;  Location: [facility] OR    LAPAROTOMY EXPLORATORY N/A 11/6/2022    Procedure: EXPLORATORY LAPAROTOMY SPLENIC FLEXURE MOBILIZATION;  Surgeon: Chandana Carlos DO;  Location: [facility] OR    PICC DOUBLE LUMEN PLACEMENT  10/22/2024    PICC TRIPLE LUMEN PLACEMENT  5/10/2024    RECTAL PROLAPSE REPAIR N/A 11/6/2022    Procedure: RESECTION OF DESCENDING COLON;  Surgeon: Chandana Carlos DO;  Location: [facility] OR       Prior to Admission Medications   Prior to Admission Medications   Prescriptions Last Dose Informant Patient Reported? Taking?   chlorpheniramine-pseudoePHEDrine (PSEUDO-GEST PLUS) 4-60 MG TABS per tablet   Yes Yes   Sig: Take 1 tablet by mouth every 6 hours as needed for allergies.   diphenhydrAMINE (BENADRYL) 25 MG tablet   Yes Yes   Sig: Take 25 mg by mouth every 6 hours as needed for itching or allergies.   diphenhydrAMINE-acetaminophen (TYLENOL PM)  MG tablet   Yes Yes   Sig: Take 1 tablet by mouth nightly as needed for sleep.   loperamide (IMODIUM A-D) 2 MG tablet   Yes Yes   Sig: Take 2 mg by mouth 4 times daily as needed for diarrhea.   ondansetron (ZOFRAN ODT) 4 MG ODT tab 12/25/2024 Morning  Yes Yes   Sig: Take 4 mg by mouth every 8 hours as needed for nausea.   predniSONE (DELTASONE) 10 MG tablet 12/25/2024 Morning  No Yes   Sig: Take 1.5 tablets (15 mg) by mouth daily for 7 days, THEN 1 tablet (10 mg) daily for 7 days, THEN 0.5 tablets (5 mg) daily for 7 days.      Facility-Administered Medications: None        Review of Systems    The 10 point Review of Systems is negative other than noted in the HPI or here.     Physical Exam   Vital  Signs: Temp: 98.8  F (37.1  C) Temp src: Tympanic BP: 120/74 Pulse: 83   Resp: 18 SpO2: 97 % O2 Device: None (Room air)    Weight: 104 lbs 0 oz    Physical Exam  Constitutional:       Appearance: She is not toxic-appearing.      Comments: Tired, conversing appropriately in Hmong, voice soft.    HENT:      Head: Normocephalic and atraumatic.   Eyes:      General: No scleral icterus.  Cardiovascular:      Rate and Rhythm: Normal rate and regular rhythm.      Heart sounds: No murmur heard.     No friction rub. No gallop.   Pulmonary:      Effort: Pulmonary effort is normal. No respiratory distress.      Breath sounds: No wheezing, rhonchi or rales.   Abdominal:      Comments: Upper quadrants - nontender. Lower quadrants tender on light palpation. Guarding present. Mild distension. Ostomy bag w/maroon-colored stool output.    Musculoskeletal:      Right lower leg: No edema.      Left lower leg: No edema.   Skin:     General: Skin is warm and dry.   Neurological:      General: No focal deficit present.      Mental Status: She is alert and oriented to person, place, and time.      Comments: Alert, oriented x3, no focal deficits on gross examination. No facial droop. Speech coherent.    Psychiatric:      Comments: Flat affect, sad, linear and logical thought process.            Medical Decision Making       ------------------ MEDICAL DECISION MAKING ------------------------------------------------------------------------------------------------------      Data     I have personally reviewed the following data over the past 24 hrs:    15.5 (H)  \   12.2   / 355     137 100 17.6 /  105 (H)   3.5 26 0.53 \     ALT: 68 (H) AST: 19 AP: 133 TBILI: 0.3   ALB: 4.0 TOT PROTEIN: 8.2 LIPASE: 27     Procal: N/A CRP: 50.60 (H) Lactic Acid: N/A         Imaging results reviewed over the past 24 hrs:   Recent Results (from the past 24 hours)   CT Abdomen Pelvis w Contrast    Narrative    EXAM: CT ABDOMEN PELVIS W CONTRAST  LOCATION: [facility]  DATE: 12/26/2024    INDICATION: abdominal pain and vomiting.  Recent colostomy.  History of Crohn's.  COMPARISON: CT exams 11/13/2024, 10/19/2024 and 7/11/2022  TECHNIQUE: CT scan of the abdomen and pelvis was performed following injection of IV contrast. Multiplanar reformats were obtained. Dose reduction techniques were used.  CONTRAST: 51ml isovue 370    FINDINGS:   LOWER CHEST: Normal.    HEPATOBILIARY: Normal.    PANCREAS: Normal.    SPLEEN: Normal.    ADRENAL GLANDS: Normal.    KIDNEYS/BLADDER: Normal.    BOWEL: Left lower quadrant colostomy. Persistent moderate to severe inflammatory changes of the ascending and transverse colon. There are also moderate to severe inflammatory changes of the ileum. Moderate distention of the distal ileum with fecalization   of the enteric contents. Mildly distended cecum and ascending colon. There is moderate segment luminal narrowing of the transverse colon. No free air fluid.    LYMPH NODES: Prominent likely reactive central mesenteric lymph nodes.    VASCULATURE: Normal.    PELVIC ORGANS: Stable 6.5 cm left posterior pelvic cystic lesion. Newly visualized 1.7 cm right ovarian follicle. No pelvic free fluid.    MUSCULOSKELETAL: Stable right iliac bone island.      Impression    IMPRESSION:   1.  Moderate inflammatory changes of the ileum, ascending and transverse colon with associated luminal narrowing of the transverse colon. These findings are consistent with stricturing Crohn's disease. Associated mild to moderate upstream colonic and   distal ileal distention consistent with a bowel obstruction.  2.  No evidence for penetrating disease.  3.  Stable likely benign 6.5 cm pelvic cyst. Recommend attention on follow-up.

## 2024-12-26 NOTE — ED PROVIDER NOTES
EMERGENCY DEPARTMENT ENCOUNTER      NAME: Dain Tejeda  AGE: 44 year old female  YOB: 1980  MRN: [id number]  EVALUATION DATE & TIME: No admission date for patient encounter.    PCP: Kevin Woodruff    ED PROVIDER: Hilda Wade M.D.      CHIEF COMPLAINT     Chief Complaint   Patient presents with    Abdominal Pain    Nausea, Vomiting, & Diarrhea         FINAL IMPRESSION:     1. SBO (small bowel obstruction) (H)    2. Exacerbation of Crohn's disease with intestinal obstruction (H)    3. Colostomy status (H)    4. Pelvic cyst in female          MEDICAL DECISION MAKING:     ED Course as of 12/26/24 1519   Thu Dec 26, 2024   0920 Mrs. Ileana 44-year-old female with history of Crohn's and colostomy presents here with abdominal pain and vomiting for the last 2 days.   1017 Has a colostomy.  Denies having any blood on it.  Denies any other symptoms.   1018 Exam she looks like she does not feel well but is nontoxic.  Slightly dry mucous membranes.  Dark wine colored output from the colostomy.  Patient states he is not blood.   1018 Differential and gnosis include but not limited to viral etiology electrolyte and normalities obstruction inflammation recurrence among others.   1018 IV established.  Patient given 1 L normal saline.  Given Zofran for nausea.  Given morphine for pain.  Will image abdomen.   1019 Reviewed recent admission.  In November she had enterocolitis norovirus and had stricture with surgery.   1022 Lab work revealed normal renal function liver function test ALT minimally elevated normal bilirubin normal lipase normal magnesium elevated CRP.   1022 Elevated white blood cell count with a left shift.   1150 Ct abdomen and pelvis independently interpreted by me concerning for obstruction formal read by radiologist.   1516 CT does show obstruction and inflammation.   1516 Spoke with gastroenterology.  They recommend 20 mg of prednisone every 8 hours if okay with colorectal.   1517 I spoke with colorectal.   They are going to evaluate patient.  They are going to discuss it with attending and will decide if prednisone is okay with the plan.   1517 Admitting team evaluating patient.   1517 No Vomiting while at the ED.   1517 Admitted to the [facility] for abdominal pain obstruction and Crohn's exacerbation.  Needs consults with GI and colorectal surgery.       Medical Decision Making    History:  Supplemental history from:   External Record(s) reviewed: [facility] 12/11/2020 for Crohn's disease    Work Up:  Chart documentation includes differential considered and any EKGs or imaging independently interpreted by provider, where specified.  In additional to work up documented, I considered the following work up: Antibiotics.  Now current recommendation by GI.  Pending stool cultures.    External consultation:  Discussion of management with another provider: GI, colorectal, [facility] team.    Complicating factors:  Care impacted by chronic illness: chronic gastritis, Crohn's disease, sepsis, anemia, celiac disease, Ulcerative colitis, and hypokalemia.       Disposition considerations: Admit.    [facility]         ED COURSE     9:19 AM  met patient and performed my initial exam.   12:07 AM talked to Dr. Gil from [facility] patient.   12:15 PM paged admitting team regarding patients admission.   12:41 PM Talked to admitting doctor, Dr. Hall from [facility] regarding patient.   1:16 PM Hospitalist came and is at bedside with patient.  1:58 PM Talked to Hugo Lafleur MD  from colorectal surgery. He will discussed with attending about the prednisone medication, and will come see patient at bedside.     At the conclusion of the encounter I discussed the results of all of the tests and the disposition. The questions were answered. The patient and  acknowledged understanding and was agreeable with the care plan.         MEDICATIONS GIVEN IN THE EMERGENCY:     Medications   ondansetron (ZOFRAN) injection 4 mg  (has no administration in time range)   diphenhydrAMINE (BENADRYL) capsule 25 mg (has no administration in time range)   lidocaine 1 % 0.1-1 mL (has no administration in time range)   sodium chloride (PF) 0.9% PF flush 3 mL (has no administration in time range)   sodium chloride (PF) 0.9% PF flush 3 mL (has no administration in time range)   lactated ringers infusion (has no administration in time range)   acetaminophen (TYLENOL) tablet 650 mg (has no administration in time range)     Or   acetaminophen (TYLENOL) Suppository 650 mg (has no administration in time range)   HYDROmorphone (DILAUDID) injection 0.2 mg (has no administration in time range)   HYDROmorphone (DILAUDID) injection 0.4 mg (has no administration in time range)   oxyCODONE (ROXICODONE) tablet 5 mg (has no administration in time range)   oxyCODONE (ROXICODONE) tablet 10 mg (has no administration in time range)   ondansetron (ZOFRAN ODT) ODT tab 4 mg (has no administration in time range)     Or   ondansetron (ZOFRAN) injection 4 mg (has no administration in time range)   prochlorperazine (COMPAZINE) injection 10 mg (has no administration in time range)     Or   prochlorperazine (COMPAZINE) tablet 10 mg (has no administration in time range)   thiamine (B-1) tablet 100 mg (has no administration in time range)   sodium chloride 0.9% BOLUS 1,000 mL (0 mLs Intravenous Stopped 12/26/24 1057)   morphine (PF) injection 4 mg (4 mg Intravenous $Given 12/26/24 0934)   ondansetron (ZOFRAN) injection 4 mg (4 mg Intravenous $Given 12/26/24 0933)   iopamidol (ISOVUE-370) solution 51 mL (51 mLs Intravenous $Given 12/26/24 1116)   morphine (PF) injection 4 mg (4 mg Intravenous $Given 12/26/24 1244)       NEW PRESCRIPTIONS STARTED AT TODAY'S ER VISIT     New Prescriptions    No medications on file          =================================================================    HPI     Patient information was obtained from: Patient's     Use of : N/A       Dain Tejeda is a 44 year old female who presents to the ED with  by walk-in for evaluation of abdominal pain, nausea, vomiting and diarrhea.     The patient reports abdominal pain and vomiting that started last night. She also endorses vomiting and some headache. Patient takes medication for her Crohn's disease. However, she does not know if she is taking prednisone or penicillin for her Crohn's disease.     Patient denies any blood in stool. She also mentioned that she shefali have surgery here [facility] in February.     She denies any dysuria or any chance of pregnancy. No other complaints at this time.       REVIEW OF SYSTEMS   Review of Systems   SEE HPI    PAST MEDICAL HISTORY:     Past Medical History:   Diagnosis Date    Diet controlled gestational diabetes mellitus (GDM), antepartum 9/4/2017    Attempting diet control first    Gestational diabetes mellitus (GDM) in third trimester 9/4/2017    Attempting diet control first  Formatting of this note might be different from the original. Overview:  Attempting diet control first    Nausea/vomiting in pregnancy 4/24/2017    Postpartum hemorrhage 11/16/2017    Third degree laceration of perineum during delivery, postpartum 11/15/2017       PAST SURGICAL HISTORY:     Past Surgical History:   Procedure Laterality Date    COLONOSCOPY N/A 10/31/2022    Procedure: COLONOSCOPY with biopsies;  Surgeon: Luis Miguel Traore MD;  Location: [facility] GI    COLOSTOMY N/A 11/6/2022    Procedure: CREATION, COLOSTOMY;  Surgeon: Chandana Carlos DO;  Location: [facility] OR    LAPAROTOMY EXPLORATORY N/A 11/6/2022    Procedure: EXPLORATORY LAPAROTOMY SPLENIC FLEXURE MOBILIZATION;  Surgeon: Chandana Carlos DO;  Location: [facility] OR    PICC DOUBLE LUMEN PLACEMENT  10/22/2024    PICC TRIPLE LUMEN PLACEMENT  5/10/2024    RECTAL PROLAPSE REPAIR N/A 11/6/2022    Procedure: RESECTION OF DESCENDING COLON;  Surgeon: Chandana Carlos DO;  Location: [facility] OR          CURRENT MEDICATIONS:   chlorpheniramine-pseudoePHEDrine (PSEUDO-GEST PLUS) 4-60 MG TABS per tablet  diphenhydrAMINE (BENADRYL) 25 MG tablet  diphenhydrAMINE-acetaminophen (TYLENOL PM)  MG tablet  loperamide (IMODIUM A-D) 2 MG tablet  ondansetron (ZOFRAN ODT) 4 MG ODT tab  predniSONE (DELTASONE) 10 MG tablet         ALLERGIES:     Allergies   Allergen Reactions    Gluten Meal      Celiac    Soy Allergy (Do Not Select) Anaphylaxis, Hives and Itching     Tofu- causes itching and sores in the mouth       FAMILY HISTORY:     Family History   Problem Relation Age of Onset    Gestational Diabetes Sister     Gestational Diabetes Sister     Diabetes No family hx of     Coronary Artery Disease No family hx of     Hypertension No family hx of     Breast Cancer No family hx of     Colon Cancer No family hx of     Prostate Cancer No family hx of     Other Cancer No family hx of        SOCIAL HISTORY:     Social History     Socioeconomic History    Marital status:      Spouse name: Dmitriy Camargo    Number of children: 0    Years of education: 0   Occupational History    Occupation: None   Tobacco Use    Smoking status: Never     Passive exposure: Never    Smokeless tobacco: Never   Substance and Sexual Activity    Alcohol use: No    Drug use: No    Sexual activity: Yes     Partners: Male   Social History Narrative    Born in [facility], arrived to [facility] 02/2017. No education.      Social Drivers of Health     Financial Resource Strain: Low Risk  (11/14/2024)    Financial Resource Strain     Within the past 12 months, have you or your family members you live with been unable to get utilities (heat, electricity) when it was really needed?: No   Food Insecurity: Low Risk  (11/14/2024)    Food Insecurity     Within the past 12 months, did you worry that your food would run out before you got money to buy more?: No     Within the past 12 months, did the food you bought just not last and you didn t have money to get more?: No    Transportation Needs: Low Risk  (11/14/2024)    Transportation Needs     Within the past 12 months, has lack of transportation kept you from medical appointments, getting your medicines, non-medical meetings or appointments, work, or from getting things that you need?: No   Interpersonal Safety: Low Risk  (11/14/2024)    Interpersonal Safety     Do you feel physically and emotionally safe where you currently live?: Yes     Within the past 12 months, have you been hit, slapped, kicked or otherwise physically hurt by someone?: No     Within the past 12 months, have you been humiliated or emotionally abused in other ways by your partner or ex-partner?: No   Housing Stability: Low Risk  (11/14/2024)    Housing Stability     Do you have housing? : Yes     Are you worried about losing your housing?: No       VITALS:   /77   Pulse 85   Temp 98.8  F (37.1  C) (Tympanic)   Resp 18   Wt 47.2 kg (104 lb)   LMP 09/25/2024 (Approximate)   SpO2 98%   BMI 21.01 kg/m      PHYSICAL EXAM     Physical Exam  Vitals and nursing note reviewed. Exam conducted with a chaperone present.   Constitutional:       Appearance: She is well-developed and normal weight.      Comments: Nontoxic looks as she does not feel well   Neurological:      Mental Status: She is alert.         Physical Exam   Constitutional:     Head: Atraumatic.     Nose: Nose normal.     Mouth/Throat: Oropharynx is clear and moist.     Eyes: EOM are normal. Pupils are equal, round, and reactive to light.     Ears: Bilateral pearly white tympanic membranes.    Neck: Normal range of motion. Neck supple.     Cardiovascular: Normal rate, regular rhythm and normal heart sounds.  2+ femoral pulses/radial/DP pulses B    Pulmonary/Chest: Normal effort  and breath sounds normal.     Abdominal: soft nontender. Colostomy bag on the left side. Surgical scar on the abdomen.     Musculoskeletal: Normal range of motion.     Neurological: Moves upper and lower extremities  equally.    Lymphatics: no edema, no calves pain, no palpable cords.    : NA    Skin: Skin is warm and dry.     Psychiatric: Normal mood and affect. Behavior is normal.       LAB:     All pertinent labs reviewed and interpreted.  Labs Ordered and Resulted from Time of ED Arrival to Time of ED Departure   BASIC METABOLIC PANEL - Abnormal       Result Value    Sodium 137      Potassium 3.5      Chloride 100      Carbon Dioxide (CO2) 26      Anion Gap 11      Urea Nitrogen 17.6      Creatinine 0.53      GFR Estimate >90      Calcium 9.8      Glucose 105 (*)    HEPATIC FUNCTION PANEL - Abnormal    Protein Total 8.2      Albumin 4.0      Bilirubin Total 0.3      Alkaline Phosphatase 133      AST 19      ALT 68 (*)     Bilirubin Direct <0.20     CRP INFLAMMATION - Abnormal    CRP Inflammation 50.60 (*)    CBC WITH PLATELETS AND DIFFERENTIAL - Abnormal    WBC Count 15.5 (*)     RBC Count 4.13      Hemoglobin 12.2      Hematocrit 37.7      MCV 91      MCH 29.5      MCHC 32.4      RDW 14.8      Platelet Count 355      % Neutrophils 80      % Lymphocytes 14      % Monocytes 5      % Eosinophils 0      % Basophils 0      % Immature Granulocytes 1      NRBCs per 100 WBC 0      Absolute Neutrophils 12.4 (*)     Absolute Lymphocytes 2.2      Absolute Monocytes 0.7      Absolute Eosinophils 0.0      Absolute Basophils 0.0      Absolute Immature Granulocytes 0.1      Absolute NRBCs 0.0     LIPASE - Normal    Lipase 27     INFLUENZA A/B, RSV AND SARS-COV2 PCR - Normal    Influenza A PCR Negative      Influenza B PCR Negative      RSV PCR Negative      SARS CoV2 PCR Negative     MAGNESIUM - Normal    Magnesium 2.0     C. DIFFICILE TOXIN B PCR WITH REFLEX TO C. DIFFICILE ANTIGEN AND TOXINS A/B EIA - Normal    C Difficile Toxin B by PCR Negative     TYPE AND SCREEN, ADULT    ABO/RH(D) A POS      Antibody Screen Negative      SPECIMEN EXPIRATION DATE [id number]     ENTERIC BACTERIA AND VIRUS PANEL BY PCR   ABO/RH TYPE AND SCREEN         RADIOLOGY:     Reviewed all pertinent imaging. Please see official radiology report.  CT Abdomen Pelvis w Contrast   Final Result   IMPRESSION:    1.  Moderate inflammatory changes of the ileum, ascending and transverse colon with associated luminal narrowing of the transverse colon. These findings are consistent with stricturing Crohn's disease. Associated mild to moderate upstream colonic and    distal ileal distention consistent with a bowel obstruction.   2.  No evidence for penetrating disease.   3.  Stable likely benign 6.5 cm pelvic cyst. Recommend attention on follow-up.           EKG:       I have independently reviewed and interpreted the EKG(s) documented above.      PROCEDURES:     Procedures      I, Gaby Griffin, am serving as a scribe to document services personally performed by Dr. Wade based on my observation and the provider's statements to me. I, Hilda Wade MD attest that Gaby Griffin is acting in a scribe capacity, has observed my performance of the services and has documented them in accordance with my direction.    Hilda Wade M.D.  Emergency Medicine  [facility] EMERGENCY DEPARTMENT  [facility] [facility] [id number] [phone]  Dept: [phone]       Hilda Wade MD  12/26/24 1306

## 2024-12-26 NOTE — ED NOTES
"[facility] ED Handoff Report    ED Chief Complaint: Abdominal pain, nausea, vomiting, and diarrhea    ED Diagnosis:  (K56.609) SBO (small bowel obstruction) (H)  Comment:   Plan:     (K50.912) Exacerbation of Crohn's disease with intestinal obstruction (H)  Comment:   Plan:     (Z93.3) Colostomy status (H)  Comment:   Plan:     (N94.89) Pelvic cyst in female  Comment:   Plan:        PMH:    Past Medical History:   Diagnosis Date    Diet controlled gestational diabetes mellitus (GDM), antepartum 9/4/2017    Attempting diet control first    Gestational diabetes mellitus (GDM) in third trimester 9/4/2017    Attempting diet control first  Formatting of this note might be different from the original. Overview:  Attempting diet control first    Nausea/vomiting in pregnancy 4/24/2017    Postpartum hemorrhage 11/16/2017    Third degree laceration of perineum during delivery, postpartum 11/15/2017        Code Status:  Full Code     Falls Risk: No Band: Not applicable    Current Living Situation/Residence: Lives in a duplex with 6 family members.     Elimination Status: Continent: Yes     Activity Level: SBA    Patients Preferred Language:  Other: elba     Needed: Yes Where is the patient located?    Vital Signs:  /73   Pulse 82   Temp 98.8  F (37.1  C) (Tympanic)   Resp 18   Wt 47.2 kg (104 lb)   LMP 09/25/2024 (Approximate)   SpO2 97%   BMI 21.01 kg/m       Cardiac Rhythm: N/A    Pain Score: 6/10    Is the Patient Confused:  No    Last Food or Drink: 12/25/24 at 1400    Focused Assessment:  Pt c/o generalized abdominal pain, nausea, vomiting, and diarrhea. Pain is a little better, and rated a \"6.\" Her nausea is a also better after some Zofran.    Tests Performed: Done: Labs and Imaging    Treatments Provided:  IV fluids, Zofran, Morphine, Thiamine    Family Dynamics/Concerns: No    Family Updated On Visitor Policy: No    Plan of Care Communicated to Family: No    Who Was Updated about Plan " of Care: Just the pt.    Belongings Checklist Done and Signed by Patient: Yes    Medications sent with patient: None    Covid: symptomatic, negative    Additional Information: Pt appears to have a bowel obstruction. Pt will have colorectal consult and GI consult.    Ariel Arredondo RN 12/26/2024 4:50 PM

## 2024-12-26 NOTE — CONSULTS
[facility]   Consultation      Place of Service: [facility]  Reason for Consultation:  Ilial bowel obstruction, hx of Crohn's s/p colectomy/colostomy   Consult Requested by:  Dr. Franklin Hall    History of Present Illness: Dain Tejeda is a 45 yo F with history of Crohn's, colostomy who was recently hospitalized 11/13 - 11/17 for abdominal pain and Crohn's, which she was found to have norovirus at this time. She was doing well at home and was seen in clinic with tentative surgery date of 1/30/2025. She was taking prednisone taper as prescribed by her GI team at [facility]. She began having pain two days ago and vomiting. She was unaware the ED was open so waited and presented today. She has noticed change in her stool output and feels things are looser and runny. She has noticed maroon stool for the past few days and in November, which she attributes is caused by he diet. CT abd/pelvis w/ showed moderate inflammatory changes of the ileum, ascending and transverse colon with associated luminal narrowing of the transverse colon. Associated mild to moderate upstream colonic and distal ileal distention consistent with a bowel obstruction. She is NPO and admitted. Of note she is okay with surgery during this admission if she is not improving.           Pertinent Labs:   Lab Results: personally reviewed   Lab Results   Component Value Date     12/26/2024     11/17/2024     11/16/2024    .0 04/24/2017    CO2 26 12/26/2024    CO2 26 11/17/2024    CO2 25 11/16/2024    CO2 25 06/23/2022    CO2 23 06/03/2022    CO2 22 06/02/2022    CO2 26.0 04/24/2017    BUN 17.6 12/26/2024    BUN 7.8 11/17/2024    BUN 9.6 11/16/2024    BUN 6 06/23/2022    BUN 3 06/03/2022    BUN 4 06/02/2022    BUN 7.0 04/24/2017     Lab Results   Component Value Date    WBC 15.5 12/26/2024    WBC 11.8 11/17/2024    WBC 8.4 11/16/2024    HGB 12.2 12/26/2024    HGB 8.7 11/17/2024    HGB 9.0 11/16/2024    HGB 10.8  01/10/2018    HGB 10.3 11/08/2017    HGB 9.8 09/18/2017    HCT 37.7 12/26/2024    HCT 27.4 11/17/2024    HCT 28.0 11/16/2024    HCT 35.9 01/10/2018    HCT 33.8 11/08/2017    HCT 31.4 07/31/2017    MCV 91 12/26/2024    MCV 91 11/17/2024    MCV 91 11/16/2024    MCV 83.1 01/10/2018    MCV 77.9 11/08/2017    MCV 86.3 07/31/2017     12/26/2024     11/17/2024     11/16/2024       Pertinent Radiology:     EXAM: CT ABDOMEN PELVIS W CONTRAST  LOCATION: [facility]  DATE: 12/26/2024     INDICATION: abdominal pain and vomiting.  Recent colostomy.  History of Crohn's.  COMPARISON: CT exams 11/13/2024, 10/19/2024 and 7/11/2022  TECHNIQUE: CT scan of the abdomen and pelvis was performed following injection of IV contrast. Multiplanar reformats were obtained. Dose reduction techniques were used.  CONTRAST: 51ml isovue 370     FINDINGS:   LOWER CHEST: Normal.     HEPATOBILIARY: Normal.     PANCREAS: Normal.     SPLEEN: Normal.     ADRENAL GLANDS: Normal.     KIDNEYS/BLADDER: Normal.     BOWEL: Left lower quadrant colostomy. Persistent moderate to severe inflammatory changes of the ascending and transverse colon. There are also moderate to severe inflammatory changes of the ileum. Moderate distention of the distal ileum with fecalization   of the enteric contents. Mildly distended cecum and ascending colon. There is moderate segment luminal narrowing of the transverse colon. No free air fluid.     LYMPH NODES: Prominent likely reactive central mesenteric lymph nodes.     VASCULATURE: Normal.     PELVIC ORGANS: Stable 6.5 cm left posterior pelvic cystic lesion. Newly visualized 1.7 cm right ovarian follicle. No pelvic free fluid.     MUSCULOSKELETAL: Stable right iliac bone island.                                                                      IMPRESSION:   1.  Moderate inflammatory changes of the ileum, ascending and transverse colon with associated luminal narrowing of the  transverse colon. These findings are consistent with stricturing Crohn's disease. Associated mild to moderate upstream colonic and   distal ileal distention consistent with a bowel obstruction.  2.  No evidence for penetrating disease.  3.  Stable likely benign 6.5 cm pelvic cyst. Recommend attention on follow-up.        Past Medical History:   Diagnosis Date    Diet controlled gestational diabetes mellitus (GDM), antepartum 9/4/2017    Attempting diet control first    Gestational diabetes mellitus (GDM) in third trimester 9/4/2017    Attempting diet control first  Formatting of this note might be different from the original. Overview:  Attempting diet control first    Nausea/vomiting in pregnancy 4/24/2017    Postpartum hemorrhage 11/16/2017    Third degree laceration of perineum during delivery, postpartum 11/15/2017       Past Surgical History:   Procedure Laterality Date    COLONOSCOPY N/A 10/31/2022    Procedure: COLONOSCOPY with biopsies;  Surgeon: Luis Miguel Traore MD;  Location: [facility] GI    COLOSTOMY N/A 11/6/2022    Procedure: CREATION, COLOSTOMY;  Surgeon: Chandana Carlos DO;  Location: [facility] OR    LAPAROTOMY EXPLORATORY N/A 11/6/2022    Procedure: EXPLORATORY LAPAROTOMY SPLENIC FLEXURE MOBILIZATION;  Surgeon: Chandana Carlos DO;  Location: [facility] OR    PICC DOUBLE LUMEN PLACEMENT  10/22/2024    PICC TRIPLE LUMEN PLACEMENT  5/10/2024    RECTAL PROLAPSE REPAIR N/A 11/6/2022    Procedure: RESECTION OF DESCENDING COLON;  Surgeon: Chandana Carlos DO;  Location: [facility] OR       Family History   Problem Relation Age of Onset    Gestational Diabetes Sister     Gestational Diabetes Sister     Diabetes No family hx of     Coronary Artery Disease No family hx of     Hypertension No family hx of     Breast Cancer No family hx of     Colon Cancer No family hx of     Prostate Cancer No family hx of     Other Cancer No family hx of        Social History     Socioeconomic  History    Marital status:      Spouse name: Dmitriy Camargo    Number of children: 0    Years of education: 0    Highest education level: Not on file   Occupational History    Occupation: None   Tobacco Use    Smoking status: Never     Passive exposure: Never    Smokeless tobacco: Never   Vaping Use    Vaping status: Not on file   Substance and Sexual Activity    Alcohol use: No    Drug use: No    Sexual activity: Yes     Partners: Male   Other Topics Concern    Parent/sibling w/ CABG, MI or angioplasty before 65F 55M? Not Asked   Social History Narrative    Born in [facility], arrived to [facility] 02/2017. No education.      Social Drivers of Health     Financial Resource Strain: Low Risk  (11/14/2024)    Financial Resource Strain     Within the past 12 months, have you or your family members you live with been unable to get utilities (heat, electricity) when it was really needed?: No   Food Insecurity: Low Risk  (11/14/2024)    Food Insecurity     Within the past 12 months, did you worry that your food would run out before you got money to buy more?: No     Within the past 12 months, did the food you bought just not last and you didn t have money to get more?: No   Transportation Needs: Low Risk  (11/14/2024)    Transportation Needs     Within the past 12 months, has lack of transportation kept you from medical appointments, getting your medicines, non-medical meetings or appointments, work, or from getting things that you need?: No   Physical Activity: Not on file   Stress: Not on file   Social Connections: Not on file   Interpersonal Safety: Low Risk  (11/14/2024)    Interpersonal Safety     Do you feel physically and emotionally safe where you currently live?: Yes     Within the past 12 months, have you been hit, slapped, kicked or otherwise physically hurt by someone?: No     Within the past 12 months, have you been humiliated or emotionally abused in other ways by your partner or ex-partner?: No   Housing Stability:  Low Risk  (11/14/2024)    Housing Stability     Do you have housing? : Yes     Are you worried about losing your housing?: No       Current Facility-Administered Medications   Medication Dose Route Frequency Provider Last Rate Last Admin    acetaminophen (TYLENOL) tablet 650 mg  650 mg Oral Q4H PRN Franklin Hall MD        Or    acetaminophen (TYLENOL) Suppository 650 mg  650 mg Rectal Q4H PRN Franklin Hall MD        diphenhydrAMINE (BENADRYL) capsule 25 mg  25 mg Oral Q6H PRN Bryant Tamayo MD        HYDROmorphone (DILAUDID) injection 0.2 mg  0.2 mg Intravenous Q2H PRN Franklin Hall MD        HYDROmorphone (DILAUDID) injection 0.4 mg  0.4 mg Intravenous Q2H PRN Franklin Hall MD        lactated ringers infusion   Intravenous Continuous Franklin Hall  mL/hr at 12/26/24 1530 New Bag at 12/26/24 1530    lidocaine 1 % 0.1-1 mL  0.1-1 mL Other Q1H PRN Franklin Hall MD        ondansetron (ZOFRAN ODT) ODT tab 4 mg  4 mg Oral Q6H PRN Franklin Hall MD        Or    ondansetron (ZOFRAN) injection 4 mg  4 mg Intravenous Q6H PRN Franklin Hall MD        oxyCODONE (ROXICODONE) tablet 10 mg  10 mg Oral Q4H PRN Franklin Hall MD        oxyCODONE (ROXICODONE) tablet 5 mg  5 mg Oral Q4H PRN Franklin Hall MD        prochlorperazine (COMPAZINE) injection 10 mg  10 mg Intravenous Q6H PRN Franklin Hall MD        Or    prochlorperazine (COMPAZINE) tablet 10 mg  10 mg Oral Q6H PRN Franklin Hall MD        sodium chloride (PF) 0.9% PF flush 3 mL  3 mL Intracatheter Q8H Franklin Hall MD   3 mL at 12/26/24 1530    sodium chloride (PF) 0.9% PF flush 3 mL  3 mL Intracatheter q1 min prn Franklin Hall MD        thiamine (B-1) tablet 100 mg  100 mg Oral Daily Franklin Hall MD   100 mg at 12/26/24 1533     Current Outpatient Medications   Medication Sig Dispense Refill    chlorpheniramine-pseudoePHEDrine (PSEUDO-GEST PLUS) 4-60 MG TABS per tablet Take 1 tablet by mouth every 6 hours as needed for allergies.       "diphenhydrAMINE (BENADRYL) 25 MG tablet Take 25 mg by mouth every 6 hours as needed for itching or allergies.      diphenhydrAMINE-acetaminophen (TYLENOL PM)  MG tablet Take 1 tablet by mouth nightly as needed for sleep.      loperamide (IMODIUM A-D) 2 MG tablet Take 2 mg by mouth 4 times daily as needed for diarrhea.      ondansetron (ZOFRAN ODT) 4 MG ODT tab Take 4 mg by mouth every 8 hours as needed for nausea.      predniSONE (DELTASONE) 10 MG tablet Take 1.5 tablets (15 mg) by mouth daily for 7 days, THEN 1 tablet (10 mg) daily for 7 days, THEN 0.5 tablets (5 mg) daily for 7 days. 21 tablet 0       Allergies   Allergen Reactions    Gluten Meal      Celiac    Soy Allergy (Do Not Select) Anaphylaxis, Hives and Itching     Tofu- causes itching and sores in the mouth       Review of Systems:   \"A full 10 point review of systems was taken and is negative aside from what is noted above in the HPI.\"       Intake/Output past 24 hrs:    Intake/Output Summary (Last 24 hours) at 12/26/2024 1607  Last data filed at 12/26/2024 1057  Gross per 24 hour   Intake 1000 ml   Output --   Net 1000 ml       Physical Exam:  Temp:  [98.8  F (37.1  C)] 98.8  F (37.1  C)  Pulse:  [] 83  Resp:  [18] 18  BP: (104-131)/(68-92) 104/75  SpO2:  [97 %-100 %] 98 %    General: NAD, alert, cooperative  Head: normocephalic, without abnormality / atraumatic  Respiratory: non-labored breathing  Abdomen: soft, diffusely tender, Stoma red with maroon stool in appliance  Perianal/Rectal:  Skin: no rashes or lesions  Musculoskeletal: moves all four extremities equally; no calf edema or tenderness  Psychological: alert and oriented, answers questions appropriately  Neurological: cranial nerves grossly intact    Assessment/Plan: Dain Tejeda is a 43 yo F with history of Crohn's, colostomy. She was taking prednisone taper as prescribed by her GI team at [facility]. She began having pain two days ago and vomiting. She was unaware the ED was open so waited " and presented today. CT abd/pelvis w/ showed moderate inflammatory changes of the ileum, ascending and transverse colon with associated luminal narrowing of the transverse colon. Associated mild to moderate upstream colonic and distal ileal distention consistent with a bowel obstruction. She is NPO and admitted. Of note she is okay with surgery during this admission if she is not improving.     Hgb 12.2  WBC 15.5     1. NPO  2. Pharmacy and nutrition consult for TPN, w/ picc line placement  3. Possible surgery during this admission    Medical Decision Making:   Additional workup / testing ordered: None  Procedure / Surgery recommended: Picc line placement   Old records reviewed - Old notes, labs, imaging  Medications added / changed: TPN     This case was discussed with: Dr. Hugo Lafleur    Thank you for consulting [facility] regarding this patient's care. Please contact us with questions or concerns.     Today's total time spent including direct patient care and management, and care coordination: 60 minutes    Gab Sandra PA-C  [facility]  [phone]    Colorectal Surgery Staff:  I have seen and examined the patient. I agree with the above documentation and plan of the fellow/PA above with the following additions/chages:    Ms. Tejeda is a 44-year-old female who is well-known to me for her history of Crohn's colitis and ileitis.  Please see my previous notes for full details as this is the third consultation for this specific problem.  She continues to have problems with Crohn's colitis with a stricture in the transverse colon that causes her to come in with a large bowel obstruction.  In the past, I have tried to get her set up for surgery as an outpatient and have made every effort to do so, however we just cannot get her from her gastroenterologist's office over to mine.  At one point, when she showed up to her GI doctor's office, I tried to get her to drive directly to my  office so we could get her set up for surgery but this did not happen.  She then ends up being discharged and unfortunately she is unable to make her follow-up appointment.  We have even tried to set her up directly for surgery however she was not yet ready to commit to this.    Regardless, she now comes back in with the same problem.  She continues to have abdominal pain in the mid abdomen and right lower quadrant.  Her CT scan is again consistent with luminal narrowing of the transverse colon with thickening of the proximal colon.  There is either backwash ileitis or thickening of the small bowel secondary to Crohn's.  There is a significant amount of fecalization in the small bowel.    When I am seeing her today, she does feel slightly better.  She has had ostomy output from her colostomy.  It does appear to be somewhat bloody in nature.    On examination, she is afebrile with normal vital signs.  Her abdomen is soft, focally tender in the right lower quadrant and epigastrium.  The colostomy is pink and there is a reddish output in the colostomy appliance.  She has a hypertrophic scar at her prior midline incision.    White blood cell count is 15.5, hemoglobin is 12.2, creatinine is 0.54.    I have personally reviewed the CT scan images and report and my impression is as above.    A/P:44 with Crohn's colitis and recurrent large bowel obstructions again presenting with a recurrent partial large bowel obstruction.    I spoke to Ms. Tejeda with the assistance of a video [employer] .  At this point, I do not think it is reasonable to make any effort to get her out to an outpatient surgery as this has failed numerous times before.  Therefore, we will plan to keep her as an inpatient.  We discussed the procedure of laparoscopic completion total abdominal colectomy with end ileostomy.  As her ostomy is functioning, I would like to give her a few days to decompress if possible which would give us a better chance of  doing this laparoscopically.  He will also give us time for stoma marking.  I will placed her on TPN as she has not been eating very well and I do not know when the next time she is going to be able to eat again is.  At this point, we will look for a date for surgery within the next week.    Total time spent including non-face-to-face time: 42 minutes     Hugo Lafleur MD MBA  [facility]  Office: [phone]  12/26/2024 6:08 PM

## 2024-12-26 NOTE — ED TRIAGE NOTES
Patient arrives by private car for evaluation of abdominal pain with nausea/vomiting and diarrhea that started yesterday.  History of Crohns disease.

## 2024-12-26 NOTE — PHARMACY-ADMISSION MEDICATION HISTORY
Pharmacist Admission Medication History    Admission medication history is complete. The information provided in this note is only as accurate as the sources available at the time of the update.    Information Source(s): Patient, Prescription bottles, and CareEverywhere/SureScripts via in-person    Pertinent Information: Patient has been taking 15 mg prednisone daily, instructed to taper down to 10 mg starting 12/30/24. Patient had multiple other pills in prednisone prescription bottle, identified benadryl, tylenol PM, zofran, loperamide, chlorpheniramine-pseudoephedrine, and ibuprofen. Did not add ibuprofen to list as likely should not be using NSAIDs with [facility] Crohn's.     Changes made to PTA medication list:  Added: all but prednisone  Deleted: famotidine  Changed: None    Allergies reviewed with patient and updates made in EHR: yes    Medication History Completed By: BRO GOLDEN [facility] 12/26/2024 12:47 PM    PTA Med List   Medication Sig Last Dose/Taking    chlorpheniramine-pseudoePHEDrine (PSEUDO-GEST PLUS) 4-60 MG TABS per tablet Take 1 tablet by mouth every 6 hours as needed for allergies. Taking As Needed    diphenhydrAMINE (BENADRYL) 25 MG tablet Take 25 mg by mouth every 6 hours as needed for itching or allergies. Taking As Needed    diphenhydrAMINE-acetaminophen (TYLENOL PM)  MG tablet Take 1 tablet by mouth nightly as needed for sleep. Taking As Needed    loperamide (IMODIUM A-D) 2 MG tablet Take 2 mg by mouth 4 times daily as needed for diarrhea. Taking As Needed    ondansetron (ZOFRAN ODT) 4 MG ODT tab Take 4 mg by mouth every 8 hours as needed for nausea. 12/25/2024 Morning    predniSONE (DELTASONE) 10 MG tablet Take 1.5 tablets (15 mg) by mouth daily for 7 days, THEN 1 tablet (10 mg) daily for 7 days, THEN 0.5 tablets (5 mg) daily for 7 days. 12/25/2024 Morning

## 2024-12-27 LAB
ANION GAP SERPL CALCULATED.3IONS-SCNC: 9 MMOL/L (ref 7–15)
BUN SERPL-MCNC: 11.3 MG/DL (ref 6–20)
CALCIUM SERPL-MCNC: 8.5 MG/DL (ref 8.8–10.4)
CHLORIDE SERPL-SCNC: 101 MMOL/L (ref 98–107)
CREAT SERPL-MCNC: 0.52 MG/DL (ref 0.51–0.95)
CRP SERPL-MCNC: 67.4 MG/L
EGFRCR SERPLBLD CKD-EPI 2021: >90 ML/MIN/1.73M2
ERYTHROCYTE [DISTWIDTH] IN BLOOD BY AUTOMATED COUNT: 15 % (ref 10–15)
GLUCOSE BLDC GLUCOMTR-MCNC: 125 MG/DL (ref 70–99)
GLUCOSE BLDC GLUCOMTR-MCNC: 144 MG/DL (ref 70–99)
GLUCOSE BLDC GLUCOMTR-MCNC: 90 MG/DL (ref 70–99)
GLUCOSE SERPL-MCNC: 91 MG/DL (ref 70–99)
HCO3 SERPL-SCNC: 26 MMOL/L (ref 22–29)
HCT VFR BLD AUTO: 31.7 % (ref 35–47)
HGB BLD-MCNC: 9.8 G/DL (ref 11.7–15.7)
MAGNESIUM SERPL-MCNC: 1.7 MG/DL (ref 1.7–2.3)
MCH RBC QN AUTO: 29.2 PG (ref 26.5–33)
MCHC RBC AUTO-ENTMCNC: 30.9 G/DL (ref 31.5–36.5)
MCV RBC AUTO: 94 FL (ref 78–100)
PHOSPHATE SERPL-MCNC: 3.3 MG/DL (ref 2.5–4.5)
PLATELET # BLD AUTO: 264 10E3/UL (ref 150–450)
POTASSIUM SERPL-SCNC: 3.5 MMOL/L (ref 3.4–5.3)
RBC # BLD AUTO: 3.36 10E6/UL (ref 3.8–5.2)
SODIUM SERPL-SCNC: 136 MMOL/L (ref 135–145)
WBC # BLD AUTO: 9.5 10E3/UL (ref 4–11)

## 2024-12-27 PROCEDURE — 85014 HEMATOCRIT: CPT

## 2024-12-27 PROCEDURE — 250N000009 HC RX 250

## 2024-12-27 PROCEDURE — 250N000013 HC RX MED GY IP 250 OP 250 PS 637

## 2024-12-27 PROCEDURE — 99232 SBSQ HOSP IP/OBS MODERATE 35: CPT | Mod: GC

## 2024-12-27 PROCEDURE — 250N000011 HC RX IP 250 OP 636

## 2024-12-27 PROCEDURE — 258N000003 HC RX IP 258 OP 636

## 2024-12-27 PROCEDURE — 83735 ASSAY OF MAGNESIUM: CPT | Performed by: FAMILY MEDICINE

## 2024-12-27 PROCEDURE — 84100 ASSAY OF PHOSPHORUS: CPT | Performed by: FAMILY MEDICINE

## 2024-12-27 PROCEDURE — 84132 ASSAY OF SERUM POTASSIUM: CPT

## 2024-12-27 PROCEDURE — 85041 AUTOMATED RBC COUNT: CPT

## 2024-12-27 PROCEDURE — 86140 C-REACTIVE PROTEIN: CPT

## 2024-12-27 PROCEDURE — 80048 BASIC METABOLIC PNL TOTAL CA: CPT

## 2024-12-27 PROCEDURE — B4185 PARENTERAL SOL 10 GM LIPIDS: HCPCS

## 2024-12-27 PROCEDURE — 120N000001 HC R&B MED SURG/OB

## 2024-12-27 PROCEDURE — 82565 ASSAY OF CREATININE: CPT

## 2024-12-27 RX ORDER — METHYLPREDNISOLONE SODIUM SUCCINATE 40 MG/ML
20 INJECTION INTRAMUSCULAR; INTRAVENOUS EVERY 8 HOURS
Status: DISCONTINUED | OUTPATIENT
Start: 2024-12-27 | End: 2024-12-28

## 2024-12-27 RX ORDER — POLYETHYLENE GLYCOL 3350 17 G/17G
238 POWDER, FOR SOLUTION ORAL ONCE
Status: COMPLETED | OUTPATIENT
Start: 2024-12-29 | End: 2024-12-29

## 2024-12-27 RX ORDER — ENOXAPARIN SODIUM 100 MG/ML
40 INJECTION SUBCUTANEOUS DAILY
Status: DISCONTINUED | OUTPATIENT
Start: 2024-12-27 | End: 2025-01-01

## 2024-12-27 RX ORDER — DEXTROSE MONOHYDRATE 100 MG/ML
INJECTION, SOLUTION INTRAVENOUS CONTINUOUS PRN
Status: DISCONTINUED | OUTPATIENT
Start: 2024-12-27 | End: 2025-01-15 | Stop reason: HOSPADM

## 2024-12-27 RX ADMIN — THIAMINE HCL TAB 100 MG 100 MG: 100 TAB at 09:21

## 2024-12-27 RX ADMIN — FISH OIL 200 ML: 0.1 INJECTION, EMULSION INTRAVENOUS at 20:39

## 2024-12-27 RX ADMIN — ENOXAPARIN SODIUM 40 MG: 40 INJECTION SUBCUTANEOUS at 11:55

## 2024-12-27 RX ADMIN — SODIUM CHLORIDE, POTASSIUM CHLORIDE, SODIUM LACTATE AND CALCIUM CHLORIDE: 600; 310; 30; 20 INJECTION, SOLUTION INTRAVENOUS at 02:06

## 2024-12-27 RX ADMIN — MAGNESIUM SULFATE HEPTAHYDRATE: 500 INJECTION, SOLUTION INTRAMUSCULAR; INTRAVENOUS at 20:38

## 2024-12-27 RX ADMIN — HYDROMORPHONE HYDROCHLORIDE 0.4 MG: 0.2 INJECTION, SOLUTION INTRAMUSCULAR; INTRAVENOUS; SUBCUTANEOUS at 21:42

## 2024-12-27 RX ADMIN — ONDANSETRON 4 MG: 2 INJECTION INTRAMUSCULAR; INTRAVENOUS at 02:12

## 2024-12-27 ASSESSMENT — ACTIVITIES OF DAILY LIVING (ADL)
ADLS_ACUITY_SCORE: 32
ADLS_ACUITY_SCORE: 36
ADLS_ACUITY_SCORE: 32
ADLS_ACUITY_SCORE: 36
ADLS_ACUITY_SCORE: 32
DEPENDENT_IADLS:: TRANSPORTATION
ADLS_ACUITY_SCORE: 32
ADLS_ACUITY_SCORE: 36
ADLS_ACUITY_SCORE: 32
ADLS_ACUITY_SCORE: 36
ADLS_ACUITY_SCORE: 32
ADLS_ACUITY_SCORE: 36
ADLS_ACUITY_SCORE: 32
ADLS_ACUITY_SCORE: 36

## 2024-12-27 NOTE — PHARMACY-CONSULT NOTE
"Pharmacy Note: Parenteral Nutrition (PN) Management    Pharmacist consulted to dose PN for Dain Tejeda, a 44 year old female by Gab Sandra PA-C.    Subjective:    The patient is a new PN start.    The patient was started on PN in the hospital on 12/27/24.    Indication for PN therapy: bowel obstruction    Inadequate nutrition anticipated for > 7 days.     Enteral nutrition contraindicated due to:  bowel obstruction .      Social History     Tobacco Use    Smoking status: Never     Passive exposure: Never    Smokeless tobacco: Never   Substance Use Topics    Alcohol use: No    Drug use: No     Objective:    Ht Readings from Last 1 Encounters:   12/11/24 1.499 m (4' 11\")     Wt Readings from Last 1 Encounters:   12/26/24 47.2 kg (104 lb)       Body mass index is 21.01 kg/m .    Patient Vitals for the past 96 hrs:   Weight   12/26/24 0908 47.2 kg (104 lb)       Labs:  Last 3 days:  Recent Labs     12/26/24  0924 12/27/24  0602    136   POTASSIUM 3.5 3.5   CHLORIDE 100 101   CO2 26 26   BUN 17.6 11.3   CR 0.53 0.52   ALEX 9.8 8.5*   MAG 2.0 1.7   PHOS  --  3.3   PROTTOTAL 8.2  --    ALBUMIN 4.0  --    HGB 12.2 9.8*   HCT 37.7 31.7*    264   BILITOTAL 0.3  --    AST 19  --    ALT 68*  --    ALKPHOS 133  --        Glucose (past 48 hours):   Recent Labs     12/26/24  0924 12/27/24  0602   * 91       Intake/Output (last 24 hours): I/O last 3 completed shifts:  In: 1000 [IV Piggyback:1000]  Out: 800 [Stool:800]    Estimated CrCl: Estimated Creatinine Clearance: 105 mL/min (based on SCr of 0.52 mg/dL).    Assessment:    Initiate patient on PN therapy as a continuous central therapy.     Given the patient's current condition/oral intake, PN is still indicated.    Lab results reviewed: 12/27 yes    Plan:  Rate of PN: 60 mL/hr   Formula:   Amino Acids 60 grams  Dextrose 150 grams  Sodium 50 mEq/day  Potassium 40 mEq/day  Calcium 6 mEq/day  Magnesium 7 mEq/day  Phosphorus 20 mMol/day  Chloride: Acetate Ratio " 1:1  Standard Multivitamins w/Vitamin K  Trace Elements  Other Thiamine 100mg added to TPN in place of po thiamine.  Fat Emulsion: Omegaven 20g  (2 x 100 mL bottles) IV daily.  Check hyperal lytes labs tomorrow.  Pharmacist will continue to follow the patient's lab results, clinical status and blood glucose results and make adjustments as appropriate.    Thank you for the consult.  Ursula Machuca RPH  12/27/2024 9:49 AM

## 2024-12-27 NOTE — CONSULTS
Care Management Initial Consult    General Information  Assessment completed with: Patient, Dain Tejeda  Type of CM/SW Visit: Initial Assessment    Primary Care Provider verified and updated as needed: Yes   Readmission within the last 30 days: no previous admission in last 30 days      Reason for Consult: discharge planning  Advance Care Planning:            Communication Assessment  Patient's communication style: spoken language (non-English)    Hearing Difficulty or Deaf: no   Wear Glasses or Blind: no    Cognitive  Cognitive/Neuro/Behavioral: WDL                      Living Environment:   People in home: spouse, child(sisi), adult, child(sisi), dependent  Dmitriy  Current living Arrangements: apartment      Able to return to prior arrangements: yes  Living Arrangement Comments: Has family support as needed    Family/Social Support:  Care provided by: self, spouse/significant other  Provides care for: child(sisi)  Marital Status:   Support system: , Children  Dmitriy       Description of Support System: Supportive    Support Assessment: Adequate family and caregiver support    Current Resources:   Patient receiving home care services: No     Community Resources: None  Equipment currently used at home: none  Supplies currently used at home:  (Ostomy supplies)    Employment/Financial:  Employment Status:          Financial Concerns: none   Referral to Financial Worker: No     Does the patient's insurance plan have a 3 day qualifying hospital stay waiver?  Yes     Which insurance plan 3 day waiver is available? Alternative insurance waiver    Will the waiver be used for post-acute placement? Undetermined at this time    Lifestyle & Psychosocial Needs:  Social Drivers of Health     Food Insecurity: Low Risk  (12/26/2024)    Food Insecurity     Within the past 12 months, did you worry that your food would run out before you got money to buy more?: No     Within the past 12 months, did the food you bought just not  last and you didn t have money to get more?: No   Depression: Not at risk (12/11/2024)    PHQ-2     PHQ-2 Score: 2   Housing Stability: Low Risk  (12/26/2024)    Housing Stability     Do you have housing? : Yes     Are you worried about losing your housing?: No   Tobacco Use: Low Risk  (12/11/2024)    Patient History     Smoking Tobacco Use: Never     Smokeless Tobacco Use: Never     Passive Exposure: Never   Financial Resource Strain: Low Risk  (12/26/2024)    Financial Resource Strain     Within the past 12 months, have you or your family members you live with been unable to get utilities (heat, electricity) when it was really needed?: No   Alcohol Use: Not on file   Transportation Needs: Low Risk  (12/26/2024)    Transportation Needs     Within the past 12 months, has lack of transportation kept you from medical appointments, getting your medicines, non-medical meetings or appointments, work, or from getting things that you need?: No   Physical Activity: Not on file   Interpersonal Safety: Low Risk  (12/26/2024)    Interpersonal Safety     Do you feel physically and emotionally safe where you currently live?: Yes     Within the past 12 months, have you been hit, slapped, kicked or otherwise physically hurt by someone?: No     Within the past 12 months, have you been humiliated or emotionally abused in other ways by your partner or ex-partner?: No   Stress: Not on file   Social Connections: Not on file   Health Literacy: Not on file     Functional Status:  Prior to admission patient needed assistance:   Dependent ADLs:: Independent  Dependent IADLs:: Transportation     Mental Health Status:  Mental Health Status: No Current Concerns       Chemical Dependency Status:  Chemical Dependency Status: No Current Concerns           Values/Beliefs:  Spiritual, Cultural Beliefs, Holiness Practices, Values that affect care: no          Values/Beliefs Comment: Shamanism    Discussed  Partnership in Safe Discharge Planning   document with patient/family: No    Additional Information:  Patient is  and largely independent with activities of daily living at baseline. Her  provides assistance as needed and transportation. She has adult children who can provide support as well.  She does not use any DME for mobility but has ostomy supplies at home.      Next Steps: No care management needs identified at this time but CM will continue to monitor progression of care, review team recommendations and provide discharge planning assist as needed.      Kirstin Guillen RN

## 2024-12-27 NOTE — PLAN OF CARE
Dual Skin Assessment Note:    Patient admitted from ED from to P2.     Comprehensive skin inspection completed by myself and Kellee Banda RN.     Abnormal skin assessment findings: old scar from prior surgery. No acute skin issues present    LDA Initiated for skin breakdown/non-blanchable redness: No    Provider notified: No    If yes, WOC Consult order obtained: Not applicable    Jessie Méndez RN 7:08 PM

## 2024-12-27 NOTE — CONSULTS
GASTROENTEROLOGY CONSULT NOTE       CONSULTING PHYSICIAN REASON FOR CONSULTATION   Bryant Tamayo MD I was asked to see this patient for Crohn's disease     CHIEF COMPLAINT   Abdominal pain     HISTORY OF PRESENT ILLNESS   Ms. Dain Tejeda is a pleasant 44 year old lady who presented to the hospital with complaints of abdominal pain, nausea, vomiting and diarrhea.    Patient has history of ileocolonic Crohn's disease and follows up with [facility] as an outpatient.  She was seen by Dr. Burt earlier this month.  Patient has had colostomy with Brenda pouch creation 2 years ago.  She was previously on adalimumab and azathioprine and then switched to infliximab and azathioprine.  As per clinic notes it appears that her compliance is somewhat questionable.  In November 2024 she was started on a steroid taper.  She was sent to colorectal surgery and there were plans to do surgery in January 2025.    Patient denies any use of NSAIDs.    Left lower quadrant colostomy bag noticed.  Patient had a colonoscopy done in 2023 which showed presence of ileitis, colitis with presence of inflammation and pseudopolyps.    ER course:  Patient underwent a CT scan of abdomen and pelvis which showed stricturing of a ascending and transverse colon consistent with stricturing Crohn's disease.  There was evidence of bowel obstruction as well.  Colorectal surgery was consulted.  Gastroenterology was consulted as well.  C. difficile was tested and was found to be negative.     PAST HISTORY      Past Medical History:   Diagnosis Date    Diet controlled gestational diabetes mellitus (GDM), antepartum 9/4/2017    Attempting diet control first    Gestational diabetes mellitus (GDM) in third trimester 9/4/2017    Attempting diet control first  Formatting of this note might be different from the original. Overview:  Attempting diet control first    Nausea/vomiting in pregnancy 4/24/2017    Postpartum hemorrhage 11/16/2017     Third degree laceration of perineum during delivery, postpartum 11/15/2017      Past Surgical History:   Procedure Laterality Date    COLONOSCOPY N/A 10/31/2022    Procedure: COLONOSCOPY with biopsies;  Surgeon: Luis Miguel Traore MD;  Location: [facility] GI    COLOSTOMY N/A 11/6/2022    Procedure: CREATION, COLOSTOMY;  Surgeon: Chandana Carlos DO;  Location: [facility] OR    LAPAROTOMY EXPLORATORY N/A 11/6/2022    Procedure: EXPLORATORY LAPAROTOMY SPLENIC FLEXURE MOBILIZATION;  Surgeon: Chandana Carlos DO;  Location: [facility] OR    PICC DOUBLE LUMEN PLACEMENT  10/22/2024    PICC TRIPLE LUMEN PLACEMENT  5/10/2024    RECTAL PROLAPSE REPAIR N/A 11/6/2022    Procedure: RESECTION OF DESCENDING COLON;  Surgeon: Chandana Carlos DO;  Location: [facility] OR        Family History Social History   Family History   Problem Relation Age of Onset    Gestational Diabetes Sister     Gestational Diabetes Sister     Diabetes No family hx of     Coronary Artery Disease No family hx of     Hypertension No family hx of     Breast Cancer No family hx of     Colon Cancer No family hx of     Prostate Cancer No family hx of     Other Cancer No family hx of      No family history of colon, liver, esophagus, stomach, pancreas cancer. No h/o Chisholm esophagus, celiac disease or IBD. Social History     Socioeconomic History    Marital status:      Spouse name: Dmitriy Camargo    Number of children: 0    Years of education: 0    Highest education level: None   Occupational History    Occupation: None   Tobacco Use    Smoking status: Never     Passive exposure: Never    Smokeless tobacco: Never   Substance and Sexual Activity    Alcohol use: No    Drug use: No    Sexual activity: Yes     Partners: Male   Social History Narrative    Born in [place] [place] [facility] arrived to [facility] 02/2017. No education.      Social Drivers of Health     Financial Resource Strain: Low Risk  (12/26/2024)    Financial Resource Strain     Within the past 12  months, have you or your family members you live with been unable to get utilities (heat, electricity) when it was really needed?: No   Food Insecurity: Low Risk  (12/26/2024)    Food Insecurity     Within the past 12 months, did you worry that your food would run out before you got money to buy more?: No     Within the past 12 months, did the food you bought just not last and you didn t have money to get more?: No   Transportation Needs: Low Risk  (12/26/2024)    Transportation Needs     Within the past 12 months, has lack of transportation kept you from medical appointments, getting your medicines, non-medical meetings or appointments, work, or from getting things that you need?: No   Interpersonal Safety: Low Risk  (11/14/2024)    Interpersonal Safety     Do you feel physically and emotionally safe where you currently live?: Yes     Within the past 12 months, have you been hit, slapped, kicked or otherwise physically hurt by someone?: No     Within the past 12 months, have you been humiliated or emotionally abused in other ways by your partner or ex-partner?: No   Housing Stability: Low Risk  (12/26/2024)    Housing Stability     Do you have housing? : Yes     Are you worried about losing your housing?: No        MEDICATIONS & ALLERGIES   Medications Prior to Admission   Medication Sig Dispense Refill Last Dose/Taking    chlorpheniramine-pseudoePHEDrine (PSEUDO-GEST PLUS) 4-60 MG TABS per tablet Take 1 tablet by mouth every 6 hours as needed for allergies.   Taking As Needed    diphenhydrAMINE (BENADRYL) 25 MG tablet Take 25 mg by mouth every 6 hours as needed for itching or allergies.   Taking As Needed    diphenhydrAMINE-acetaminophen (TYLENOL PM)  MG tablet Take 1 tablet by mouth nightly as needed for sleep.   Taking As Needed    loperamide (IMODIUM A-D) 2 MG tablet Take 2 mg by mouth 4 times daily as needed for diarrhea.   Taking As Needed    ondansetron (ZOFRAN ODT) 4 MG ODT tab Take 4 mg by mouth  every 8 hours as needed for nausea.   12/25/2024 Morning    predniSONE (DELTASONE) 10 MG tablet Take 1.5 tablets (15 mg) by mouth daily for 7 days, THEN 1 tablet (10 mg) daily for 7 days, THEN 0.5 tablets (5 mg) daily for 7 days. 21 tablet 0 12/25/2024 Morning      Allergies   Allergen Reactions    Gluten Meal      Celiac    Soy Allergy (Do Not Select) Anaphylaxis, Hives and Itching     Tofu- causes itching and sores in the mouth        Current Facility-Administered Medications:     acetaminophen (TYLENOL) tablet 650 mg, 650 mg, Oral, Q4H PRN **OR** acetaminophen (TYLENOL) Suppository 650 mg, 650 mg, Rectal, Q4H PRN, Franklin Hall MD    diphenhydrAMINE (BENADRYL) capsule 25 mg, 25 mg, Oral, Q6H PRN, Bryant Tamayo MD    HYDROmorphone (DILAUDID) injection 0.2 mg, 0.2 mg, Intravenous, Q2H PRN, Franklin Hall MD    HYDROmorphone (DILAUDID) injection 0.4 mg, 0.4 mg, Intravenous, Q2H PRN, Franklin Hall MD    lactated ringers infusion, , Intravenous, Continuous, Franklin Hall MD, Last Rate: 100 mL/hr at 12/26/24 1530, New Bag at 12/26/24 1530    lidocaine 1 % 0.1-1 mL, 0.1-1 mL, Other, Q1H PRN, Franklin Hall MD    ondansetron (ZOFRAN ODT) ODT tab 4 mg, 4 mg, Oral, Q6H PRN **OR** ondansetron (ZOFRAN) injection 4 mg, 4 mg, Intravenous, Q6H PRN, Franklin Hall MD    oxyCODONE (ROXICODONE) tablet 10 mg, 10 mg, Oral, Q4H PRN, Franklin Hall MD, 10 mg at 12/26/24 1750    oxyCODONE (ROXICODONE) tablet 5 mg, 5 mg, Oral, Q4H PRN, Franklin Hall MD    prochlorperazine (COMPAZINE) injection 10 mg, 10 mg, Intravenous, Q6H PRN **OR** prochlorperazine (COMPAZINE) tablet 10 mg, 10 mg, Oral, Q6H PRN, Franklin Hall MD    sodium chloride (PF) 0.9% PF flush 3 mL, 3 mL, Intracatheter, Q8H, Franklin Hall MD, 3 mL at 12/26/24 1530    sodium chloride (PF) 0.9% PF flush 3 mL, 3 mL, Intracatheter, q1 min prn, Franklin Hall MD    thiamine (B-1) tablet 100 mg, 100 mg, Oral, Daily, Franklin Hall MD, 100 mg at 12/26/24 1533    REVIEW OF  SYSTEMS   Except as noted elsewhere in the note, the ROS is negative for Constitutional, HEENT, CV, Respiratory, GI, , Musculoskeletal, Neuro, Psychiatric, Heme/Lymph node, Allergic, Endocrine, and Skin.  A complete 14-point review of systems was was done and was found to be negative other than what has been stated up in history of present illness.     OBJECTIVE   Vitals /71 (BP Location: Left arm)   Pulse 88   Temp 98.5  F (36.9  C) (Oral)   Resp 18   Wt 47.2 kg (104 lb)   LMP 09/25/2024 (Approximate)   SpO2 96%   BMI 21.01 kg/m           PHYSICAL EXAM:  General:   Patient is lying in bed in no acute distress. Appears well nourished.   Head:  Atraumatic, normocephalic   Eyes:   Conjunctivae normal, no scleral icterus. Extraocular movements intact. Pupils equal, round, and reactive to light.   Ears:  Hearing grossly intact   Mouth:  Oral mucosa moist, normal. No ulcers.   Neck:   Supple, no carotid bruits or elevated JVP. Thyroid not palpable. No cervical lymphadenopathy. Trachea midline.   Chest:  Clear to auscultation bilaterally. No wheezes, rales or rhonchi. Bilateral equal air entry. Normal respiratory effort. No cyanosis.   Heart/vasc:  S1, S2 heard normal. No murmurs, gallops or rubs. All peripheral pulses palpable and symmetric.   Abdomen:   Soft, non-distended, non-tender. No peritoneal signs. No organomegaly. No shifting dullness or ascites. Bowel sounds are normal.  Left lower quadrant colostomy bag noticed.  There is red-tinged stool noted.   Neurologic:   Alert and oriented x 3. No focal neurologic deficits. Cranial nerves II-XII grossly intact. Strength 5/5 in flexors and extensors of upper and lower extremities. Sensation symmetric and equal in all limbs and on trunk. Reflexes 2+ in elbows, knees and ankles. No asterixis.   Lymphatic:   No axillary, cervical or inguinal lymphadenopathy.   Musculosk:  No joint effusions. No clubbing. No edema.   Psychiatric:  Normal mood, affect and  insight. Not depressed, suicidal or homicidal.   Skin:  Warm and dry, color normal appropriate for race. No obvious rashes or lesions noted.     LABORATORY AND IMAGING   BMP   Recent Labs   Lab Test 12/26/24 0924 11/17/24  0548 11/16/24  1914 11/16/24  1350 11/16/24  0703   POTASSIUM 3.5 3.8 3.8   < > 3.2*   CHLORIDE 100 104  --   --  104   CO2 26 26  --   --  25   BUN 17.6 7.8  --   --  9.6   CR 0.53 0.54  --   --  0.52   ANIONGAP 11 10  --   --  9    < > = values in this interval not displayed.        HEMATOLOGY PANEL   Recent Labs   Lab Test 12/26/24 0924 11/17/24  0548 11/16/24  0703 10/24/24  0538 10/23/24  0619 05/21/24  0552 05/20/24  0610 05/14/24  0608 05/13/24  0614   WBC 15.5* 11.8* 8.4   < > 5.4   < > 6.0   < > 3.7*   HGB 12.2 8.7* 9.0*   < > 8.1*   < > 9.7*   < > 7.8*   MCV 91 91 91   < > 91   < > 97   < > 93    301 256   < > 320   < > 313   < > 267   INR  --   --   --   --  1.12  --  0.98  --  1.08    < > = values in this interval not displayed.        LIVER AND PANCREAS PANEL   Recent Labs   Lab Test 12/26/24 0924 11/13/24  1610 11/13/24  1538 10/25/24  0526 10/20/24  0555 10/19/24  2257 09/11/24  1742 05/10/24  0740 05/09/24  1148   ALBUMIN 4.0  --  3.7 2.4*   < > 3.1*  --    < >  --    BILITOTAL 0.3  --  0.3 <0.2   < > 0.6  --    < >  --    ALT 68*  --  15 9   < > 24  --    < >  --    AST 19  --  23 15   < > 47*  --    < >  --    PROTEIN  --  20*  --   --   --   --  50*  --  70*   LIPASE 27  --  43  --   --  42  --    < >  --     < > = values in this interval not displayed.       RELEVANT IMAGING:   CT abdomen pelvis with contrast done on December 26, 2024 showed:  MPRESSION:   1.  Moderate inflammatory changes of the ileum, ascending and transverse colon with associated luminal narrowing of the transverse colon. These findings are consistent with stricturing Crohn's disease. Associated mild to moderate upstream colonic and   distal ileal distention consistent with a bowel obstruction.  2.   No evidence for penetrating disease.  3.  Stable likely benign 6.5 cm pelvic cyst. Recommend attention on follow-up.     I have reviewed the current diagnostic and laboratory tests.     IMPRESSION / RECOMMENDATIONS   Assessment:  In summary, Ms. Dain Tejeda is a pleasant 44 year old lady who I am seeing in consultation for stricturing Crohn's disease.    Recommendations/plan:  1.  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  CRP is 50.  - Colorectal surgery consultation.  - DVT prophylaxis.  - Avoid smoking and NSAIDs.  - Once bowel obstruction gets resolved then patient would need colonoscopy.  - Follow-up with IBD clinic at [facility].  - N.p.o., IV fluids, NG tube as per surgery.    2.  Minimally elevated ALT  - This is secondary to underlying inflammation.  Thank you for the consult.    Xavi Gil MD  [facility]  [phone]      This document was created using voice recognition technology. Please excuse any typographical errors that may have occurred, and call with any questions.        Xavi Gil MD, FACP   [facility]  [email or web address]       Thank you for the opportunity to participate in the care of this patient.   Please feel free to call with any questions or concerns.  [phone].       CC: [facility], Kevin Woodruff

## 2024-12-27 NOTE — PLAN OF CARE
"  Problem: Adult Inpatient Plan of Care  Goal: Plan of Care Review  Description: The Plan of Care Review/Shift note should be completed every shift.  The Outcome Evaluation is a brief statement about your assessment that the patient is improving, declining, or no change.  This information will be displayed automatically on your shift  note.  Outcome: Progressing  Goal: Patient-Specific Goal (Individualized)  Description: You can add care plan individualizations to a care plan. Examples of Individualization might be:  \"Parent requests to be called daily at 9am for status\", \"I have a hard time hearing out of my right ear\", or \"Do not touch me to wake me up as it startles  me\".  Outcome: Progressing  Goal: Absence of Hospital-Acquired Illness or Injury  Outcome: Progressing  Goal: Optimal Comfort and Wellbeing  Outcome: Progressing  Goal: Readiness for Transition of Care  Outcome: Progressing     Problem: Pain Acute  Goal: Optimal Pain Control and Function  Outcome: Progressing     Problem: Intestinal Obstruction  Goal: Optimal Bowel Function  Outcome: Progressing  Goal: Fluid and Electrolyte Balance  Outcome: Progressing  Goal: Absence of Infection Signs and Symptoms  Outcome: Progressing  Goal: Optimize Nutrition Status  Outcome: Progressing  Goal: Optimal Pain Control and Function  Outcome: Progressing   Goal Outcome Evaluation:         Pt alert and oriented x4, Hmong speaking. NPO, complains of nausea/pain, given zofran x1, oxycodone given x1, PICC placed, blood return noted, LR infusing at 100/hr, call light within reach, slept between cares                "

## 2024-12-27 NOTE — PLAN OF CARE
Goal Outcome Evaluation:      Plan of Care Reviewed With: patient          Outcome Evaluation: Anticipated discharge goal is home with family when tolerating PO intake.

## 2024-12-27 NOTE — PROCEDURES
[facility]    Double Lumen PICC Placement    Date/Time: 12/26/2024 10:11 PM    Performed by: Phoebe Ochoa RN  Authorized by: Bryant Tamayo MD  Indications: vascular access      UNIVERSAL PROTOCOL   Site Marked: Yes  Prior Images Obtained and Reviewed:  No  Required items: Required blood products, implants, devices and special equipment available    Patient identity confirmed:  Arm band and hospital-assigned identification number  NA - No sedation, light sedation, or local anesthesia  Confirmation Checklist:  Relevant allergies, procedure was appropriate and matched the consent or emergent situation, correct equipment/implants were available and patient's identity using two indicators  Time out: Immediately prior to the procedure a time out was called    Universal Protocol: the Joint Commission Universal Protocol was followed    Preparation: Patient was prepped and draped in usual sterile fashion    ESBL (mL):  1     ANESTHESIA    Anesthesia:  Local infiltration  Local Anesthetic:  Lidocaine 1% without epinephrine  Anesthetic Total (mL):  2      SEDATION    Patient Sedated: No        Preparation: skin prepped with ChloraPrep  Skin prep agent: skin prep agent completely dried prior to procedure  Sterile barriers: maximum sterile barriers were used: cap, mask, sterile gown, sterile gloves, and large sterile sheet  Hand hygiene: hand hygiene performed prior to central venous catheter insertion  Type of line used: PICC  Catheter type: double lumen  Lumen type: power PICC  Catheter size: 5 Fr  Brand: Bard  Lot number: [id number]  Placement method: MST, ultrasound and tip navigation system  Number of attempts: 1  Difficulty threading catheter: no  Successful placement: yes  Orientation: left  Catheter to Vein (%): 15  Location: basilic vein  Tip Location: SVC/RA Junction  Arm circumference: adults 10 cm  Extremity circumference: 24  Visible catheter length: 2  Total catheter length:  37  Dressing and securement: chlorhexidine disc applied, subcutaneous anchor securement system and transparent securement dressing  Post procedure assessment: placement verified by 3CG technology and blood return through all ports  PROCEDURE   Patient Tolerance:  Patient tolerated the procedure well with no immediate complicationsDescribe Procedure: Pt tolerated picc placement well. Good blood return. Flushes easily. Verified with 3CG. Ok to use.    Ok to use  Disposal: sharps and needle count correct at the end of procedure, needles and guidewire disposed in sharps container

## 2024-12-27 NOTE — PROGRESS NOTES
[facility]  Daily Progress Note    Subjective  Patient reports she is doing okay this morning. Still feels pain. Continues to have stool output. Nausea overnight, but denies vomiting.     Objective  Intake/Output last 24 hrs:    Intake/Output Summary (Last 24 hours) at 12/27/2024 0930  Last data filed at 12/27/2024 0755  Gross per 24 hour   Intake 1000 ml   Output 1100 ml   Net -100 ml     Temp:  [98.5  F (36.9  C)-99.5  F (37.5  C)] 99.5  F (37.5  C)  Pulse:  [80-92] 89  Resp:  [18-20] 18  BP: (104-131)/(67-92) 112/67  SpO2:  [96 %-100 %] 97 %    Physical Exam:  General: awake, alert, lying in bed, in no acute distress  Head: normocephalic, atraumatic  Respiratory: non-labored breathing  Abdomen: soft, tender in RLQ and midline, non-distended  Stoma: Red with maroon stool in appliance  Skin: No rashes or lesions  Musculoskeletal: moves all four extremities equally  Psychological: alert and oriented, answers questions appropriately    Pertinent Labs  Lab Results: personally reviewed.  Lab Results   Component Value Date     12/27/2024     12/26/2024     11/17/2024    .0 04/24/2017    CO2 26 12/27/2024    CO2 26 12/26/2024    CO2 26 11/17/2024    CO2 25 06/23/2022    CO2 23 06/03/2022    CO2 22 06/02/2022    CO2 26.0 04/24/2017    BUN 11.3 12/27/2024    BUN 17.6 12/26/2024    BUN 7.8 11/17/2024    BUN 6 06/23/2022    BUN 3 06/03/2022    BUN 4 06/02/2022    BUN 7.0 04/24/2017     Lab Results   Component Value Date    WBC 9.5 12/27/2024    WBC 15.5 12/26/2024    WBC 11.8 11/17/2024    HGB 9.8 12/27/2024    HGB 12.2 12/26/2024    HGB 8.7 11/17/2024    HGB 10.8 01/10/2018    HGB 10.3 11/08/2017    HGB 9.8 09/18/2017    HCT 31.7 12/27/2024    HCT 37.7 12/26/2024    HCT 27.4 11/17/2024    HCT 35.9 01/10/2018    HCT 33.8 11/08/2017    HCT 31.4 07/31/2017    MCV 94 12/27/2024    MCV 91 12/26/2024    MCV 91 11/17/2024    MCV 83.1 01/10/2018    MCV 77.9 11/08/2017    MCV 86.3 07/31/2017      12/27/2024     12/26/2024     11/17/2024       Assessment/Plan: This is a 44 year old female with Crohn's colitis and recurrent large bowel obstructions again presenting with a recurrent partial large bowel obstruction.    Hgb 9.8 (12.2)   WBC 9.5 (15.5)  Cr 0.52 (0.53)    - NPO, TPN  - Appreciate nutrition and pharmacy assistance    - Monitor I/O  - Planning for surgery sometime next week.    Discussed with REGINA McdonaldC  [facility]  [phone]..............................main    Colorectal Surgery Staff:  I have seen and examined the patient. I agree with the above documentation and plan of the fellow/PA above with the following additions/chages:    Ms. Tejeda is doing about the same.  Her abdominal pain is a little less.  She feels less bloated being NPO.    She is afebrile although though her heart rate is 100.  Her temperature is 99.5.  Her abdomen is soft, focally tender in the right lower quadrant and epigastrium.  Nondistended.  Her colostomy is pink with some reddish output.    White blood cell count is 9.5, hemoglobin is 9.8, creatinine is 0.52.    A/P: 44F with Crohn's colitis and partial large bowel obstruction.    We continued our discussion that at this point, she has very clearly failed medical management.  She does actually have an elective surgery date set up but I do not think that she is going to make it as an outpatient until then and would likely end up just getting readmitted.  Therefore, we will get her added onto the schedule for Monday for a laparoscopic total abdominal colectomy with end ileostomy.  I am going to have her seen by wound ostomy prior to surgery for marking of the ileostomy.  She will stay n.p.o. on TPN over the course of the weekend.  On Sunday, we are going to try to gently bowel prep her.  If this causes a problem, we can stop the prep.    Total time spent including non-face-to-face time: 18 minutes    Hugo  MD AC Lafleur  [facility]  Office: [phone]  12/27/2024 4:08 PM

## 2024-12-27 NOTE — PROGRESS NOTES
"[facility]    Medicine Progress Note - Hospitalist Service    Date of Admission:  12/26/2024    Assessment & Plan      Dain Tejeda is a 44 year old female w/PMHx significant for Crohn's Disease s/p partial colectomy/colostomy and recent admission for infectious colitis admitted on 12/26/2024 for acute abdominal pain, fth bowel obstruction near ileum, CRS and GI following. Likely total colectomy next week.     Ileal bowel obstruction  Hx of Crohn's Disease s/p partial colectomy/colostomy  Hx of strictures   Patient presents w/2d of intermittent, severe abdominal cramping in lower abdomen in setting of known Crohn's Disease, associated with n/v and poor PO intake. Reports she has had \"normal\" colostomy output, but though bag does have dark, maroon colored stools. CT AP showed strictures in transverse colon and bowel obstruction upstream to distal ileus. CRP and WBC elevated, AST mildly elevated - CMP otherwise wnl. Of note, she was recently hospitalized 11/13 - 11/17 for infectious colitis and sent out on prednisone taper, which she reports she has been taking. Patient reports she will be having surgery in Jan 2025, which is consistent w/chart rvw - appears to be planning for total colectomy w/end ileostomy, presumably for management of complicated Crohn's Disease. Follows w/[facility], unable to see their notes in our system.   -CRS consult, recs appreciated   -NPO for bowel rest   -Nutrition/pharmacy consult for TPN   -Likely surgery next week   -GI consult, recs appreciated   -Recommend solumedrol 20mg IV q8h, but awaiting CRS input   -DVT ppx -- Lovenox 40mg IV q24h    -Avoid NSAIDs   -Will need follow-up for colonoscopy after bowel obstruction resolves -Follow-up w/[facility]   -Discontinue mIVF LR   -Zofran/compazine PRN for nausea/vomiting  -Pain regimen:   -Tylenol 650mg PRN   -Oxycodone 5-10mg q4h PRN for moderate pain   -Dilaudid 0.2-0.4mg q2h PRN for severe pain           Diet: NPO per " Anesthesia Guidelines for Procedure/Surgery Except for: Meds, Ice Chips    DVT Prophylaxis: Pneumatic Compression Devices  Chambers Catheter: Not present  Lines: PRESENT      PICC 12/26/24 Double Lumen Left Basilic tpn-Site Assessment: WDL      Cardiac Monitoring: None  Code Status: Full Code      Clinically Significant Risk Factors Present on Admission           # Hypocalcemia: Lowest Ca = 8.5 mg/dL in last 2 days, will monitor and replace as appropriate              # Anemia: based on hgb <11           # Financial/Environmental Concerns:           Social Drivers of Health            Disposition Plan     Medically Ready for Discharge: Anticipated in 5+ Days           The patient's care was discussed with the Attending Physician, Dr. Padilla .    Franklin Hall MD  Hospitalist Service  [facility]  Securely message with [employer] (more info)  Text page via [employer] Paging/Directory   ______________________________________________________________________    Interval History   NAEO. Abdominal pain and n/v well-controlled. Reports having dry mouth/throat. Denies chest pain, dyspnea. Passing gas.     Physical Exam   Vital Signs: Temp: 99.5  F (37.5  C) Temp src: Oral BP: 112/67 Pulse: 89   Resp: 18 SpO2: 97 % O2 Device: None (Room air)    Weight: 104 lbs 0 oz    Physical Exam  Constitutional:       Appearance: Normal appearance. She is not toxic-appearing.      Comments: Tired, conversing appropriately in Hmong, soft voice.    HENT:      Head: Normocephalic and atraumatic.   Eyes:      General: No scleral icterus.  Cardiovascular:      Rate and Rhythm: Normal rate and regular rhythm.      Heart sounds: No murmur heard.     No friction rub. No gallop.   Pulmonary:      Effort: Pulmonary effort is normal.      Breath sounds: No wheezing, rhonchi or rales.   Abdominal:      Palpations: Abdomen is soft.      Tenderness: There is no guarding or rebound.      Comments: BS+, soft, mild distention, mild tenderness  to palpation in lower quadrants. Ostomy bag w/maroon-colored stools in LLQ.    Musculoskeletal:      Right lower leg: No edema.      Left lower leg: No edema.   Skin:     General: Skin is warm and dry.   Neurological:      General: No focal deficit present.      Mental Status: She is alert and oriented to person, place, and time. Mental status is at baseline.   Psychiatric:         Mood and Affect: Mood normal.         Behavior: Behavior normal.           Medical Decision Making       ------------------ MEDICAL DECISION MAKING ------------------------------------------------------------------------------------------------------      Data   ------------------------- PAST 24 HR DATA REVIEWED -----------------------------------------------

## 2024-12-27 NOTE — CONSULTS
"CLINICAL NUTRITION SERVICES - ASSESSMENT NOTE     Nutrition Prescription    RECOMMENDATIONS FOR MDs/PROVIDERS TO ORDER:  Decrease/stop IVF with TPN start    Malnutrition Status:    TBD    Recommendations already ordered by Registered Dietitian (RD):  Custom TPN: 150 gm dextrose, 60 gm AA at 60 mL/hr with 20 g omegaven daily (2 x 100 ml bottle) (allergic to soy)  Change oral thiamine to thiamine in TPN   Weigh pt    Future/Additional Recommendations:  Monitor wt, labs  Goal TPN to meet 100% of estimated needs = 60 mL/hr, 217 gm dextrose, 60 gm AA, with 20 gm omegaven daily = 1440 mL, 1201 kcal, 60 g protein, 217 gm carb, 20 g fat daily      REASON FOR ASSESSMENT  Dain Tejeda is a/an 44 year old female assessed by the dietitian for Pharmacy/Nutrition to Start and Manage PN    HPI: 44 year old female w/PMHx significant for Crohn's Disease s/p partial colectomy/colostomy and recent admission for infectious colitis admitted on 12/26/2024 for acute abdominal pain, nausea, vomiting and diarrhea, fth bowel obstruction near ileum.     Plan for laparoscopic completion total abdominal colectomy with end ileostomy this admit    NUTRITION HISTORY  Met with pt, spoke with her via . States was eating ok until yesterday when started having abdominal pain, last time she ate was on 12/25 and prior to that was eating fine. Feels as though she has gained some weight over the past month. Wondering if she would be able to eat this weekend, explained plan for nutrition was TPN.     Familiar to NFS from previous admits, has been on TPN in the past.    Allergies: gluten and soy    CURRENT NUTRITION ORDERS  Diet: NPO  Intake/Tolerance: na    LABS  Labs reviewed. Triglycerides 80 -12/11/24    MEDICATIONS  Medications reviewed. Include LR at 100 mL/hr, thiamine    ANTHROPOMETRICS  Height: 4'11\"  Most Recent Weight: 47.2 kg (104 lb)    IBW: 44.5 kg  BMI: Normal BMI  Weight History:   Wt Readings from Last 20 Encounters:   12/26/24 47.2 " kg (104 lb)   12/11/24 47.2 kg (104 lb 1.9 oz)   11/29/24 45.8 kg (101 lb)   11/16/24 43.7 kg (96 lb 4.8 oz)   11/12/24 42.2 kg (93 lb)   10/24/24 43.3 kg (95 lb 8 oz)   09/11/24 45.9 kg (101 lb 1.6 oz)   05/21/24 41.7 kg (91 lb 14.9 oz)   05/09/24 42 kg (92 lb 11.2 oz)   04/30/24 45.8 kg (101 lb)   04/22/24 45.8 kg (101 lb)   03/14/24 45.8 kg (101 lb)   03/08/24 46.3 kg (102 lb 1.9 oz)   08/09/23 43.1 kg (95 lb)   04/14/23 45.5 kg (100 lb 6.4 oz)   03/29/23 48.1 kg (106 lb)   02/15/23 49.4 kg (109 lb)   01/12/23 48.6 kg (107 lb 1.9 oz)   12/14/22 48.5 kg (107 lb)   11/22/22 47.8 kg (105 lb 6.4 oz)   Admit wt likely stated, requested wt    Dosing Weight: 47.2 kg    ASSESSED NUTRITION NEEDS  Estimated Energy Needs: 3382-4639 kcals/day (25 -30 kcals/kg )  Justification: Maintenance  Estimated Protein Needs: 56-70 grams protein/day (1.2 - 1.5 grams of pro/kg)  Justification: Increased needs  Estimated Fluid Needs: 1741-3330 mL/day (30 - 35 mL/kg)   Justification: Increased needs    PHYSICAL FINDINGS  See malnutrition section below.  Colostomy -red, dark stool 800 mL output 12/26  Abdominal discomfort    MALNUTRITION:  % Weight Loss:  need admit wt to assess, per pt and wt from 12/11 weight gain  % Intake:  Decreased intake does not meet criteria for malnutrition   Subcutaneous Fat Loss:  None observed  Muscle Loss:  Temporal region mild depletion and Dorsal hand region mild depletion  Fluid Retention:  None noted    Malnutrition Diagnosis: Unable to determine due to need admit wt    NUTRITION DIAGNOSIS  Inadequate oral intake related to altered GI function as evidenced by NPO and need for nutrition support.      INTERVENTIONS  Implementation  Nutrition Education: No education needs assessed at this time   Parenteral Nutrition/IV Fluids - Initiate custom - 150 gm dextrose, 60 gm AA at 60 mL/hr with 20 g omegaven daily (2 x 100 ml bottle) (allergic to soy)   Provides: 1440 mL, 974 kcals, 60 gm protein, 150 gm carb, 20 gm  fat  Weigh pt    Goals  Meet nutrition needs  Maintain weight     Monitoring/Evaluation  Progress toward goals will be monitored and evaluated per protocol.

## 2024-12-27 NOTE — PLAN OF CARE
Goal Outcome Evaluation:      Plan of Care Reviewed With: patient    Overall Patient Progress: no change    7/10 pain reported. PRN oxycodone given. 500cc of maroon stool emptied from colostomy bag. Pt met with Dr. Lafleur and he explained the need for her to have surgery with the interpretor. Pt okay with plan. Questions answered. NPO remains. NS running.     .Jessie Méndez RN      Problem: Adult Inpatient Plan of Care  Goal: Plan of Care Review  Outcome: Progressing  Flowsheets (Taken 12/26/2024 1900)  Plan of Care Reviewed With: patient  Overall Patient Progress: no change     Problem: Pain Acute  Goal: Optimal Pain Control and Function  Outcome: Progressing

## 2024-12-28 LAB
ALBUMIN SERPL BCG-MCNC: 3.4 G/DL (ref 3.5–5.2)
ALP SERPL-CCNC: 109 U/L (ref 40–150)
ALT SERPL W P-5'-P-CCNC: 33 U/L (ref 0–50)
ANION GAP SERPL CALCULATED.3IONS-SCNC: 9 MMOL/L (ref 7–15)
AST SERPL W P-5'-P-CCNC: 13 U/L (ref 0–45)
BILIRUB DIRECT SERPL-MCNC: <0.2 MG/DL (ref 0–0.3)
BILIRUB SERPL-MCNC: 0.3 MG/DL
BUN SERPL-MCNC: 8.7 MG/DL (ref 6–20)
CALCIUM SERPL-MCNC: 9.2 MG/DL (ref 8.8–10.4)
CHLORIDE SERPL-SCNC: 99 MMOL/L (ref 98–107)
CREAT SERPL-MCNC: 0.51 MG/DL (ref 0.51–0.95)
EGFRCR SERPLBLD CKD-EPI 2021: >90 ML/MIN/1.73M2
ERYTHROCYTE [DISTWIDTH] IN BLOOD BY AUTOMATED COUNT: 14.6 % (ref 10–15)
GLUCOSE BLDC GLUCOMTR-MCNC: 117 MG/DL (ref 70–99)
GLUCOSE BLDC GLUCOMTR-MCNC: 129 MG/DL (ref 70–99)
GLUCOSE SERPL-MCNC: 99 MG/DL (ref 70–99)
HCO3 SERPL-SCNC: 28 MMOL/L (ref 22–29)
HCT VFR BLD AUTO: 32.8 % (ref 35–47)
HGB BLD-MCNC: 10.5 G/DL (ref 11.7–15.7)
INR PPP: 1.09 (ref 0.85–1.15)
MAGNESIUM SERPL-MCNC: 2 MG/DL (ref 1.7–2.3)
MCH RBC QN AUTO: 29.5 PG (ref 26.5–33)
MCHC RBC AUTO-ENTMCNC: 32 G/DL (ref 31.5–36.5)
MCV RBC AUTO: 92 FL (ref 78–100)
PHOSPHATE SERPL-MCNC: 4.7 MG/DL (ref 2.5–4.5)
PLATELET # BLD AUTO: 303 10E3/UL (ref 150–450)
POTASSIUM SERPL-SCNC: 3.5 MMOL/L (ref 3.4–5.3)
PREALB SERPL-MCNC: 16.4 MG/DL (ref 20–40)
PROT SERPL-MCNC: 7.3 G/DL (ref 6.4–8.3)
RBC # BLD AUTO: 3.56 10E6/UL (ref 3.8–5.2)
SODIUM SERPL-SCNC: 136 MMOL/L (ref 135–145)
WBC # BLD AUTO: 11.9 10E3/UL (ref 4–11)

## 2024-12-28 PROCEDURE — 84100 ASSAY OF PHOSPHORUS: CPT

## 2024-12-28 PROCEDURE — 82435 ASSAY OF BLOOD CHLORIDE: CPT

## 2024-12-28 PROCEDURE — 84460 ALANINE AMINO (ALT) (SGPT): CPT

## 2024-12-28 PROCEDURE — 250N000013 HC RX MED GY IP 250 OP 250 PS 637

## 2024-12-28 PROCEDURE — 250N000009 HC RX 250

## 2024-12-28 PROCEDURE — 250N000009 HC RX 250: Performed by: FAMILY MEDICINE

## 2024-12-28 PROCEDURE — 82947 ASSAY GLUCOSE BLOOD QUANT: CPT

## 2024-12-28 PROCEDURE — 250N000011 HC RX IP 250 OP 636

## 2024-12-28 PROCEDURE — 82248 BILIRUBIN DIRECT: CPT

## 2024-12-28 PROCEDURE — 84134 ASSAY OF PREALBUMIN: CPT

## 2024-12-28 PROCEDURE — 85610 PROTHROMBIN TIME: CPT

## 2024-12-28 PROCEDURE — B4185 PARENTERAL SOL 10 GM LIPIDS: HCPCS

## 2024-12-28 PROCEDURE — 83735 ASSAY OF MAGNESIUM: CPT

## 2024-12-28 PROCEDURE — 85018 HEMOGLOBIN: CPT

## 2024-12-28 PROCEDURE — 120N000001 HC R&B MED SURG/OB

## 2024-12-28 PROCEDURE — 99232 SBSQ HOSP IP/OBS MODERATE 35: CPT | Mod: GC

## 2024-12-28 RX ADMIN — MAGNESIUM SULFATE HEPTAHYDRATE: 500 INJECTION, SOLUTION INTRAMUSCULAR; INTRAVENOUS at 20:01

## 2024-12-28 RX ADMIN — ACETAMINOPHEN 650 MG: 325 TABLET ORAL at 20:00

## 2024-12-28 RX ADMIN — HYDROMORPHONE HYDROCHLORIDE 0.2 MG: 0.2 INJECTION, SOLUTION INTRAMUSCULAR; INTRAVENOUS; SUBCUTANEOUS at 05:13

## 2024-12-28 RX ADMIN — HYDROMORPHONE HYDROCHLORIDE 0.2 MG: 0.2 INJECTION, SOLUTION INTRAMUSCULAR; INTRAVENOUS; SUBCUTANEOUS at 08:14

## 2024-12-28 RX ADMIN — HYDROMORPHONE HYDROCHLORIDE 0.2 MG: 0.2 INJECTION, SOLUTION INTRAMUSCULAR; INTRAVENOUS; SUBCUTANEOUS at 18:07

## 2024-12-28 RX ADMIN — OXYCODONE HYDROCHLORIDE 10 MG: 5 TABLET ORAL at 19:59

## 2024-12-28 RX ADMIN — ENOXAPARIN SODIUM 40 MG: 40 INJECTION SUBCUTANEOUS at 08:14

## 2024-12-28 RX ADMIN — FISH OIL 200 ML: 0.1 INJECTION, EMULSION INTRAVENOUS at 20:02

## 2024-12-28 RX ADMIN — HYDROMORPHONE HYDROCHLORIDE 0.2 MG: 0.2 INJECTION, SOLUTION INTRAMUSCULAR; INTRAVENOUS; SUBCUTANEOUS at 13:47

## 2024-12-28 ASSESSMENT — ACTIVITIES OF DAILY LIVING (ADL)
ADLS_ACUITY_SCORE: 32

## 2024-12-28 NOTE — PROGRESS NOTES
"[facility]    Medicine Progress Note - Hospitalist Service    Date of Admission:  12/26/2024    Assessment & Plan      Dain Tejeda is a 44 year old female w/PMHx significant for Crohn's Disease s/p partial colectomy/colostomy and recent admission for infectious colitis admitted on 12/26/2024 for acute abdominal pain, fth bowel obstruction near ileum, CRS and GI following. Planned for OR on 12/30.     Ileal bowel obstruction  Hx of Crohn's Disease s/p partial colectomy/colostomy  Hx of strictures   Patient presents w/2d of intermittent, severe abdominal cramping in lower abdomen in setting of known Crohn's Disease, associated with n/v and poor PO intake. Reports she has had \"normal\" colostomy output, but though bag does have dark, maroon colored stools. CT AP showed strictures in transverse colon and bowel obstruction upstream to distal ileus. CRP and WBC elevated, AST mildly elevated - CMP otherwise wnl. Of note, she was recently hospitalized 11/13 - 11/17 for infectious colitis and sent out on prednisone taper, which she reports she has been taking. Patient reports she will be having surgery in Jan 2025, which is consistent w/chart rvw - appears to be planning for total colectomy w/end ileostomy, presumably for management of complicated Crohn's Disease. Follows w/[facility], unable to see their notes in our system.   -CRS consult, recs appreciated   -NPO for bowel rest; prep to be completed tomorrow   -Nutrition/pharmacy consult for TPN   -OR 12/30 for laparoscopic total abdominal colectomy with end ileostomy  -GI consult, recs appreciated   -DVT ppx -- Lovenox 40mg IV q24h    -Avoid NSAIDs   -Will need follow-up for colonoscopy after bowel obstruction resolves -Follow-up w/[facility]   -Zofran/compazine PRN for nausea/vomiting  -Pain regimen:   -Tylenol 650mg PRN   -Oxycodone 5-10mg q4h PRN for moderate pain   -Dilaudid 0.2-0.4mg q2h PRN for severe pain           Diet: NPO per Anesthesia " Guidelines for Procedure/Surgery Except for: Meds, Ice Chips  parenteral nutrition - ADULT compounded formula  parenteral nutrition - ADULT compounded formula    DVT Prophylaxis: Pneumatic Compression Devices  Chambers Catheter: Not present  Lines: PRESENT      PICC 12/26/24 Double Lumen Left Basilic tpn-Site Assessment: WDL      Cardiac Monitoring: None  Code Status: Full Code      Clinically Significant Risk Factors           # Hypocalcemia: Lowest Ca = 8.5 mg/dL in last 2 days, will monitor and replace as appropriate     # Hypoalbuminemia: Lowest albumin = 3.4 g/dL at 12/28/2024  6:15 AM, will monitor as appropriate                      # Financial/Environmental Concerns: none         Social Drivers of Health            Disposition Plan     Medically Ready for Discharge: Anticipated in 5+ Days           The patient's care was discussed with the Attending Physician, Dr. Padilla .    Giles Hobbs MD  Hospitalist Service  [facility]  Securely message with [employer] (more info)  Text page via [employer] Paging/Directory   ______________________________________________________________________    Interval History   Feeling well today. No acute overnight events. No concerns.     Physical Exam   Vital Signs: Temp: 98.5  F (36.9  C) Temp src: Oral BP: 105/63 Pulse: 91   Resp: 16 SpO2: 96 % O2 Device: None (Room air)    Weight: 104 lbs 0 oz    Physical Exam  Constitutional:       Appearance: Normal appearance. She is not toxic-appearing.      Comments: Tired, conversing appropriately in Hmong, soft voice.    HENT:      Head: Normocephalic and atraumatic.   Eyes:      General: No scleral icterus.  Cardiovascular:      Rate and Rhythm: Normal rate and regular rhythm.      Heart sounds: No murmur heard.     No friction rub. No gallop.   Pulmonary:      Effort: Pulmonary effort is normal.      Breath sounds: No wheezing, rhonchi or rales.   Abdominal:      Palpations: Abdomen is soft.      Tenderness: There is  no guarding or rebound.      Comments: BS+, soft, mild distention, mild tenderness to palpation in lower quadrants. Ostomy bag w/maroon-colored stools in LLQ.    Musculoskeletal:      Right lower leg: No edema.      Left lower leg: No edema.   Skin:     General: Skin is warm and dry.   Neurological:      General: No focal deficit present.      Mental Status: She is alert and oriented to person, place, and time. Mental status is at baseline.   Psychiatric:         Mood and Affect: Mood normal.         Behavior: Behavior normal.           Medical Decision Making       ------------------ MEDICAL DECISION MAKING ------------------------------------------------------------------------------------------------------      Data   ------------------------- PAST 24 HR DATA REVIEWED -----------------------------------------------

## 2024-12-28 NOTE — PLAN OF CARE
Problem: Pain Acute  Goal: Optimal Pain Control and Function  Outcome: Progressing     Problem: Adult Inpatient Plan of Care  Goal: Optimal Comfort and Wellbeing  Outcome: Progressing     Problem: Intestinal Obstruction  Goal: Absence of Infection Signs and Symptoms  Outcome: Progressing     Pt c/o abdominal pain.  Dilaudid given for pain management.   Pt continues on TPN today.  Surgery possible on Monday.

## 2024-12-28 NOTE — PHARMACY-CONSULT NOTE
"Pharmacy Note: Parenteral Nutrition (PN) Management    Pharmacist consulted to dose PN for Dain Tejeda, a 44 year old female.    Subjective:    The patient is a new PN start.    The patient was started on PN in the hospital on 12/27/24.    Indication for PN therapy: bowel obstruction    Inadequate nutrition anticipated for > 7 days.     Enteral nutrition contraindicated due to:  bowel obstruction .    Pertinent diseases and other special considerations  n/a    Social History     Tobacco Use    Smoking status: Never     Passive exposure: Never    Smokeless tobacco: Never   Substance Use Topics    Alcohol use: No    Drug use: No     Objective:    Ht Readings from Last 1 Encounters:   12/11/24 1.499 m (4' 11\")     Wt Readings from Last 1 Encounters:   12/26/24 47.2 kg (104 lb)       Body mass index is 21.01 kg/m .    Patient Vitals for the past 96 hrs:   Weight   12/26/24 0908 47.2 kg (104 lb)       Labs:  Last 3 days:  Recent Labs     12/26/24  0924 12/27/24  0602 12/28/24  0615    136 136   POTASSIUM 3.5 3.5 3.5   CHLORIDE 100 101 99   CO2 26 26 28   BUN 17.6 11.3 8.7   CR 0.53 0.52 0.51   ALEX 9.8 8.5* 9.2   MAG 2.0 1.7 2.0   PHOS  --  3.3 4.7*   PROTTOTAL 8.2  --  7.3   ALBUMIN 4.0  --  3.4*   HGB 12.2 9.8* 10.5*   HCT 37.7 31.7* 32.8*    264 303   BILITOTAL 0.3  --  0.3   AST 19  --  13   ALT 68*  --  33   ALKPHOS 133  --  109   INR  --   --  1.09       Glucose (past 48 hours):   Recent Labs     12/27/24  0602 12/27/24  1242 12/27/24  1857 12/27/24  2143 12/28/24  0522 12/28/24  0615   GLC 91 144* 90 125* 129* 99       Intake/Output (last 24 hours): I/O last 3 completed shifts:  In: -   Out: 1000 [Urine:300; Stool:700]    Estimated CrCl: Estimated Creatinine Clearance: 107.1 mL/min (based on SCr of 0.51 mg/dL).    Assessment:    Continue patient on PN therapy as a continuous central therapy.     Given the patient's current condition/oral intake, PN is still indicated.    Lab results reviewed: 12/28/24. " Anesthesia Pre Eval Note    Anesthesia ROS/Med Hx          Pulmonary Review:  History of: asthma -   The patient is a current smoker.     Neuro/Psych Review:    Positive for psychiatric history - Depression, Anxiety and Bipolar    End/Other Review:    Positive for obesity class II - 35.00 - 39.99  Positive for anemia      Relevant Problems   No relevant active problems       Physical Exam     Airway   Mallampati: II  TM Distance: >3 FB  Neck ROM: Full  Neck: Non-tender and Able to place in sniff position  TMJ Mobility: Good    Cardiovascular  Cardiovascular exam normal  Cardio Rhythm: Regular  Cardio Rate: Normal    Head Assessment  Head assessment: Normocephalic    General Assessment  General Assessment: Alert and oriented and No acute distress    Dental Exam  Dental exam normal    Pulmonary Exam  Pulmonary exam normal  Breath sounds clear to auscultation:  Yes    Abdominal Exam    Patient Demonstrates:  Obese      Anesthesia Plan:  Anesthesia Plan    ASA Status: 3    Anesthesia Type: General    Induction: Intravenous  Preferred Airway Type: ETT  Maintenance: Inhalational      Post-op Pain Management: Per Surgeon      Checklist  Reviewed: Nursing Notes, Consultations, Patient Summary, Medications, Allergies, NPO Status, DNR Status, Care Everywhere, Outside Records, Beta Blocker Status, Problem list, Past Med History and Lab Results    Informed Consent  The proposed anesthetic plan, including its risks and benefits, have been discussed with the Patient  - along with the risks and benefits of alternatives.  Questions were encouraged and answered and the patient and/or representative understands and agrees to proceed.     Blood Products: Not Anticipated    Consent/Risks Discussed Statement:  I have discussed with the patient, and/or patient's legal representative, the nature and purpose of anesthesia for the planned procedure. I have discussed elements of the patient's history or examination, as noted above and/or as  Phosphorous up from 3.3 to 4.7 mg/dL today, will decrease from 20 to 5 mmol/L in TPN formula today.    Plan:  Rate of PN: 60 mL/hr.  Formula: changes in bold  Amino Acids 60 grams  Dextrose 210 grams  Sodium 50 mEq/day  Potassium 40 mEq/day  Calcium 6 mEq/day  Magnesium 7 mEq/day  Phosphorus 5 mMol/day  Chloride: Acetate Ratio 1:1  Standard Multivitamins w/Vitamin K  Trace Elements  Thiamine 100 mg   Fat Emulsion: Omegaven 20 gm (100 mL x 2 bottles) IV daily  Check BMP and magnesium and phosphorous  labs tomorrow.  Pharmacist will continue to follow the patient's lab results, clinical status and blood glucose results and make adjustments as appropriate.    Thank you for the consult.  Willow Parkinson RPH  12/28/2024 9:49 AM     follows, that place the patient at higher risk of complications - BMI> 30 (obesity) and pulmonary disease -    I discussed with the patient (and/or patient's legal representative) the risks and benefits of the proposed anesthesia plan, General, which may include services performed by other anesthesia providers.    Alternative anesthesia plans, if available, were reviewed with the patient (and/or patient's legal representative). Discussion has been held with the patient (and/or patient's legal representative) regarding risks of anesthesia, which include emergent situations that may require change in anesthesia plan, as well as the following: Nausea, Vomiting, Sore Throat, Dental Injury, Allergic Reaction, Hypotension, Intra-operative Awareness and depressed breathing    The patient (and/or patient's legal representative) has indicated understanding, his/her questions have been answered, and he/she wishes to proceed with the planned anesthetic.

## 2024-12-28 NOTE — PROGRESS NOTES
Nutrition Therapy  Parenteral Nutrition follow-up       Dietitian to Pharmacy    Rate of TPN:  60 ml/hr continuous.       Grams Dextrose: 210 g-this is an increase.  Grams Protein:60 g     Lipids: Omegaven 20 g daily.         Current Nutrition Intake:  Diet: NPO    Food allergies:  Soy, gluten    Custom TPN per central line- - 150 gm dextrose, 60 gm AA at 60 mL/hr with 20 g omegaven daily (2 x 100 ml bottle) (allergic to soy)   Provides: 1440 mL, 950 kcals, 60 gm protein, 150 gm carb, 20 gm fat daily    TPN start 12/27/24.    Weight Trends  Admission wt: 47.2 kg (104 lb) 12/26  One wt found this admission.    Dosing Weight: 47.2 kg     ASSESSED NUTRITION NEEDS  Estimated Energy Needs: 7501-7690 kcals/day (25 -30 kcals/kg )  Justification: Maintenance  Estimated Protein Needs: 56-70 grams protein/day (1.2 - 1.5 grams of pro/kg)  Justification: Increased needs  Estimated Fluid Needs: 2170-0549 mL/day (30 - 35 mL/kg)   Justification: Increased needs    GI:  Colostomy output 300 ml 12/27/24    Labs:   Labs reviewed by dietitian    Medications:   Reviewed by dietitian     Nutrition Diagnosis  Inadequate oral intake related to altered GI function as evidenced by NPO and need for nutrition support. -continues.      Goals   Meet nutrition needs-progressing  Maintain weigh-no new wt.      INTERVENTIONS  Implementation  TPN-Increase dextrose to 210 g, continue 60 g AA at 60 ml/hr continuous.  Continue 20 g Omegaven daily.  This to provide 1154 kcals, 60 g protein, 210 g carb, 20 g lipid, 1440 ml fluid daily.    GIR=3.08 mg CHO/kg/min.      Monitoring/Evaluation  Progress toward goals will be monitored and evaluated per protocol.

## 2024-12-28 NOTE — PLAN OF CARE
Problem: Adult Inpatient Plan of Care  Goal: Absence of Hospital-Acquired Illness or Injury  Intervention: Prevent and Manage VTE (Venous Thromboembolism) Risk  Recent Flowsheet Documentation  Taken 12/27/2024 2300 by Frances Duarte RN  VTE Prevention/Management: SCDs off (sequential compression devices)     Problem: Intestinal Obstruction  Goal: Fluid and Electrolyte Balance  Intervention: Monitor and Manage Hypovolemia  Recent Flowsheet Documentation  Taken 12/27/2024 2300 by Frances Duarte RN  Fluid/Electrolyte Management: fluids provided   Goal Outcome Evaluation:Patient is alert and oriented. Independent to cares. On TPN and Lipids at the moment via a double lumen PICC. Made comfortable in bed with reassurance given.

## 2024-12-29 LAB
ANION GAP SERPL CALCULATED.3IONS-SCNC: 7 MMOL/L (ref 7–15)
BUN SERPL-MCNC: 11.5 MG/DL (ref 6–20)
CALCIUM SERPL-MCNC: 9.1 MG/DL (ref 8.8–10.4)
CHLORIDE SERPL-SCNC: 100 MMOL/L (ref 98–107)
CREAT SERPL-MCNC: 0.47 MG/DL (ref 0.51–0.95)
EGFRCR SERPLBLD CKD-EPI 2021: >90 ML/MIN/1.73M2
ERYTHROCYTE [DISTWIDTH] IN BLOOD BY AUTOMATED COUNT: 14.5 % (ref 10–15)
GLUCOSE BLDC GLUCOMTR-MCNC: 118 MG/DL (ref 70–99)
GLUCOSE BLDC GLUCOMTR-MCNC: 150 MG/DL (ref 70–99)
GLUCOSE SERPL-MCNC: 128 MG/DL (ref 70–99)
HCO3 SERPL-SCNC: 27 MMOL/L (ref 22–29)
HCT VFR BLD AUTO: 33.6 % (ref 35–47)
HGB BLD-MCNC: 10.5 G/DL (ref 11.7–15.7)
MAGNESIUM SERPL-MCNC: 1.9 MG/DL (ref 1.7–2.3)
MCH RBC QN AUTO: 29.2 PG (ref 26.5–33)
MCHC RBC AUTO-ENTMCNC: 31.3 G/DL (ref 31.5–36.5)
MCV RBC AUTO: 94 FL (ref 78–100)
PHOSPHATE SERPL-MCNC: 3.8 MG/DL (ref 2.5–4.5)
PLATELET # BLD AUTO: 290 10E3/UL (ref 150–450)
POTASSIUM SERPL-SCNC: 3.6 MMOL/L (ref 3.4–5.3)
RBC # BLD AUTO: 3.59 10E6/UL (ref 3.8–5.2)
SODIUM SERPL-SCNC: 134 MMOL/L (ref 135–145)
WBC # BLD AUTO: 11.3 10E3/UL (ref 4–11)

## 2024-12-29 PROCEDURE — 80048 BASIC METABOLIC PNL TOTAL CA: CPT

## 2024-12-29 PROCEDURE — 99232 SBSQ HOSP IP/OBS MODERATE 35: CPT | Mod: GC

## 2024-12-29 PROCEDURE — 250N000011 HC RX IP 250 OP 636

## 2024-12-29 PROCEDURE — 250N000009 HC RX 250

## 2024-12-29 PROCEDURE — 83735 ASSAY OF MAGNESIUM: CPT

## 2024-12-29 PROCEDURE — 84100 ASSAY OF PHOSPHORUS: CPT

## 2024-12-29 PROCEDURE — 82310 ASSAY OF CALCIUM: CPT

## 2024-12-29 PROCEDURE — 250N000013 HC RX MED GY IP 250 OP 250 PS 637

## 2024-12-29 PROCEDURE — 120N000001 HC R&B MED SURG/OB

## 2024-12-29 PROCEDURE — B4185 PARENTERAL SOL 10 GM LIPIDS: HCPCS

## 2024-12-29 PROCEDURE — 250N000009 HC RX 250: Performed by: FAMILY MEDICINE

## 2024-12-29 PROCEDURE — 85014 HEMATOCRIT: CPT

## 2024-12-29 RX ORDER — METHYLPREDNISOLONE SODIUM SUCCINATE 40 MG/ML
20 INJECTION INTRAMUSCULAR; INTRAVENOUS EVERY 8 HOURS
Status: DISCONTINUED | OUTPATIENT
Start: 2024-12-29 | End: 2024-12-30

## 2024-12-29 RX ADMIN — ACETAMINOPHEN 650 MG: 325 TABLET ORAL at 20:30

## 2024-12-29 RX ADMIN — FISH OIL 200 ML: 0.1 INJECTION, EMULSION INTRAVENOUS at 20:39

## 2024-12-29 RX ADMIN — HYDROMORPHONE HYDROCHLORIDE 0.2 MG: 0.2 INJECTION, SOLUTION INTRAMUSCULAR; INTRAVENOUS; SUBCUTANEOUS at 21:28

## 2024-12-29 RX ADMIN — OXYCODONE HYDROCHLORIDE 10 MG: 5 TABLET ORAL at 04:35

## 2024-12-29 RX ADMIN — POLYETHYLENE GLYCOL 3350 238 G: 17 POWDER, FOR SOLUTION ORAL at 18:08

## 2024-12-29 RX ADMIN — METHYLPREDNISOLONE SODIUM SUCCINATE 20 MG: 40 INJECTION, POWDER, FOR SOLUTION INTRAMUSCULAR; INTRAVENOUS at 20:23

## 2024-12-29 RX ADMIN — ONDANSETRON 4 MG: 2 INJECTION INTRAMUSCULAR; INTRAVENOUS at 20:27

## 2024-12-29 RX ADMIN — ENOXAPARIN SODIUM 40 MG: 40 INJECTION SUBCUTANEOUS at 09:05

## 2024-12-29 RX ADMIN — OXYCODONE HYDROCHLORIDE 10 MG: 5 TABLET ORAL at 20:23

## 2024-12-29 RX ADMIN — MAGNESIUM SULFATE HEPTAHYDRATE: 500 INJECTION, SOLUTION INTRAMUSCULAR; INTRAVENOUS at 20:38

## 2024-12-29 RX ADMIN — ACETAMINOPHEN 650 MG: 325 TABLET ORAL at 04:35

## 2024-12-29 ASSESSMENT — ACTIVITIES OF DAILY LIVING (ADL)
ADLS_ACUITY_SCORE: 32

## 2024-12-29 NOTE — PROGRESS NOTES
GASTROENTEROLOGY PROGRESS NOTE       SUBJECTIVE   Patient was seen in her room.  Her ostomy no longer has bloody output.  Patient is scheduled to undergo total colectomy on Monday.  I used a [employer]  to communicate with the patient.  Her daughter was present at bedside.     OBJECTIVE     Vitals /76 (BP Location: Right arm)   Pulse 107   Temp 100.2  F (37.9  C) (Oral)   Resp 15   Wt 46.3 kg (102 lb)   LMP 09/25/2024 (Approximate)   SpO2 97%   BMI 20.60 kg/m          PHYSICAL EXAM:  General:   Patient is lying in bed in no acute distress.   Head:  Atraumatic, normocephalic   Eyes:   Conjunctivae normal, no scleral icterus. Extraocular movements intact. Pupils equal, round, and reactive to light.   Ears:  Hearing grossly intact   Mouth:  Oral mucosa moist, normal. No ulcers.   Neck:   Supple, no carotid bruits or elevated JVP. Thyroid not palpable. No cervical lymphadenopathy. Trachea midline.   Chest:  Clear to auscultation bilaterally. No wheezes, rales or rhonchi. Bilateral equal air entry. Normal respiratory effort. No cyanosis.   Heart/vasc:  S1, S2 heard normal. No murmurs, gallops or rubs. All peripheral pulses palpable and symmetric.   Abdomen:   Soft, non-distended, non-tender. No peritoneal signs. No organomegaly. No shifting dullness or ascites. Bowel sounds are normal.  Left lower quadrant colostomy noticed.          LABORATORY AND IMAGING   ELECTROLYTE PANEL   Recent Labs   Lab Test 12/29/24  0603 12/28/24  0615 12/27/24  0602   POTASSIUM 3.6 3.5 3.5   CHLORIDE 100 99 101   CO2 27 28 26   BUN 11.5 8.7 11.3   ANIONGAP 7 9 9        HEMATOLOGY PANEL   Recent Labs   Lab Test 12/29/24  0603 12/28/24  0615 12/27/24  0602 10/24/24  0538 10/23/24  0619 05/21/24  0552 05/20/24  0610   WBC 11.3* 11.9* 9.5   < > 5.4   < > 6.0   HGB 10.5* 10.5* 9.8*   < > 8.1*   < > 9.7*   MCV 94 92 94   < > 91   < > 97    303 264   < > 320   < > 313   INR  --  1.09  --   --  1.12  --  0.98    < > =  values in this interval not displayed.        LIVER AND PANCREAS PANEL   Recent Labs   Lab Test 12/28/24  0615 12/26/24  0924 11/13/24  1610 11/13/24  1538 10/20/24  0555 10/19/24  2257 09/11/24  1742 05/10/24  0740 05/09/24  1148   ALBUMIN 3.4* 4.0  --  3.7   < > 3.1*  --    < >  --    BILITOTAL 0.3 0.3  --  0.3   < > 0.6  --    < >  --    ALT 33 68*  --  15   < > 24  --    < >  --    AST 13 19  --  23   < > 47*  --    < >  --    PROTEIN  --   --  20*  --   --   --  50*  --  70*   LIPASE  --  27  --  43  --  42  --    < >  --     < > = values in this interval not displayed.        I have reviewed the current diagnostic and laboratory tests.     Current medications:    Current Facility-Administered Medications:     acetaminophen (TYLENOL) tablet 650 mg, 650 mg, Oral, Q4H PRN, 650 mg at 12/29/24 0435 **OR** acetaminophen (TYLENOL) Suppository 650 mg, 650 mg, Rectal, Q4H PRN, Franklin Hall MD    dextrose 10% infusion, , Intravenous, Continuous PRN, Gab Sandra PA-C    diphenhydrAMINE (BENADRYL) capsule 25 mg, 25 mg, Oral, Q6H PRN, Bryant Tamayo MD    enoxaparin ANTICOAGULANT (LOVENOX) injection 40 mg, 40 mg, Subcutaneous, Daily, Franklin Hall MD, 40 mg at 12/29/24 0905    Fish Oil Triglycerides (OMEGAVEN) infusion 200 mL, 200 mL, Intravenous, Q24H, Gab Sandra PA-C, 200 mL at 12/28/24 2002    HYDROmorphone (DILAUDID) injection 0.2 mg, 0.2 mg, Intravenous, Q2H PRN, Franklin Hall MD, 0.2 mg at 12/28/24 1807    HYDROmorphone (DILAUDID) injection 0.4 mg, 0.4 mg, Intravenous, Q2H PRN, Franklin Hall MD, 0.4 mg at 12/27/24 2142    lidocaine 1 % 0.1-1 mL, 0.1-1 mL, Other, Q1H PRN, Franklin Hall MD, 1 mL at 12/26/24 2218    naloxone (NARCAN) injection 0.2 mg, 0.2 mg, Intravenous, Q2 Min PRN **OR** naloxone (NARCAN) injection 0.4 mg, 0.4 mg, Intravenous, Q2 Min PRN **OR** naloxone (NARCAN) injection 0.2 mg, 0.2 mg, Intramuscular, Q2 Min PRN **OR** naloxone (NARCAN) injection 0.4 mg, 0.4 mg, Intramuscular, Q2 Min  PRN, Bryant Tamayo MD    ondansetron (ZOFRAN ODT) ODT tab 4 mg, 4 mg, Oral, Q6H PRN **OR** ondansetron (ZOFRAN) injection 4 mg, 4 mg, Intravenous, Q6H PRN, Franklin Hall MD, 4 mg at 12/27/24 0212    oxyCODONE (ROXICODONE) tablet 10 mg, 10 mg, Oral, Q4H PRN, Franklin Hall MD, 10 mg at 12/29/24 0435    oxyCODONE (ROXICODONE) tablet 5 mg, 5 mg, Oral, Q4H PRN, Franklin Hall MD    parenteral nutrition - ADULT compounded formula, , CENTRAL LINE IV, TPN CONTINUOUS, Td Padilla MD    parenteral nutrition - ADULT compounded formula, , CENTRAL LINE IV, TPN CONTINUOUS, Td Padilla MD, Last Rate: 60 mL/hr at 12/28/24 2001, New Bag at 12/28/24 2001    polyethylene glycol (MIRALAX) powder 238 g, 238 g, Oral, Once, Gab Sandra, PA-C    prochlorperazine (COMPAZINE) injection 10 mg, 10 mg, Intravenous, Q6H PRN **OR** prochlorperazine (COMPAZINE) tablet 10 mg, 10 mg, Oral, Q6H PRN, Franklin Hall MD    sodium chloride (PF) 0.9% PF flush 10-20 mL, 10-20 mL, Intracatheter, q1 min prn, Boaz, Gab, PA-C, 20 mL at 12/29/24 0603    sodium chloride (PF) 0.9% PF flush 10-40 mL, 10-40 mL, Intracatheter, Q7 Days, Boaz, Arsam, PA-C    sodium chloride (PF) 0.9% PF flush 10-40 mL, 10-40 mL, Intracatheter, Q1H PRN, Boaz, Arsam, PA-C, 20 mL at 12/26/24 2217    sodium chloride (PF) 0.9% PF flush 3 mL, 3 mL, Intracatheter, Q8H, Franklin Hall MD, 3 mL at 12/29/24 0905    sodium chloride (PF) 0.9% PF flush 3 mL, 3 mL, Intracatheter, q1 min prn, Franklin Hall MD    [Held by provider] thiamine (B-1) tablet 100 mg, 100 mg, Oral, Daily, Gab Sandra PA-C    IMPRESSION / RECOMMENDATIONS   Assessment:  In summary, Ms. Dain Tejeda is a pleasant 44 year old lady who I am seeing in consultation for stricturing Crohn's disease.     Recommendations/plan:  1.  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  CRP is 50.  -  Colorectal surgery consultation.  - DVT prophylaxis.  - Avoid smoking and NSAIDs.  - Plans noted for appropriate colectomy with end ileostomy on Monday  - Follow-up with IBD clinic at [facility].  - IV fluids as per surgery.     2.  Minimally elevated ALT  - Resolved  - This is secondary to underlying inflammation.  Thank you for the consult.    25 minutes were spent in coordination of care, ordering tests, reviewing labs, documentation, talking to patient/family members and members of healthcare team.    Xavi Gil MD  [facility]  [phone]      This document was created using voice recognition technology. Please excuse any typographical errors that may have occurred, and call with any questions.        Xavi Gil MD, FACP   [facility]  [email or web address]       Thank you for the opportunity to participate in the care of this patient.   Please feel free to call with any questions or concerns.  [phone].       CC: [facility], Kevin Woodruff

## 2024-12-29 NOTE — PROGRESS NOTES
GASTROENTEROLOGY PROGRESS NOTE       SUBJECTIVE   Patient was seen in her room.  Her ostomy no longer has bloody output.  Patient is scheduled to undergo total colectomy on Monday.  I used a [employer]  to communicate with the patient.     OBJECTIVE     Vitals /63 (BP Location: Right arm)   Pulse 88   Temp 99.4  F (37.4  C) (Oral)   Resp 16   Wt 46 kg (101 lb 8 oz)   LMP 09/25/2024 (Approximate)   SpO2 98%   BMI 20.50 kg/m          PHYSICAL EXAM:  General:   Patient is lying in bed in no acute distress.   Head:  Atraumatic, normocephalic   Eyes:   Conjunctivae normal, no scleral icterus. Extraocular movements intact. Pupils equal, round, and reactive to light.   Ears:  Hearing grossly intact   Mouth:  Oral mucosa moist, normal. No ulcers.   Neck:   Supple, no carotid bruits or elevated JVP. Thyroid not palpable. No cervical lymphadenopathy. Trachea midline.   Chest:  Clear to auscultation bilaterally. No wheezes, rales or rhonchi. Bilateral equal air entry. Normal respiratory effort. No cyanosis.   Heart/vasc:  S1, S2 heard normal. No murmurs, gallops or rubs. All peripheral pulses palpable and symmetric.   Abdomen:   Soft, non-distended, non-tender. No peritoneal signs. No organomegaly. No shifting dullness or ascites. Bowel sounds are normal.  Left lower quadrant colostomy noticed.          LABORATORY AND IMAGING   ELECTROLYTE PANEL   Recent Labs   Lab Test 12/28/24  0615 12/27/24  0602 12/26/24  0924   POTASSIUM 3.5 3.5 3.5   CHLORIDE 99 101 100   CO2 28 26 26   BUN 8.7 11.3 17.6   ANIONGAP 9 9 11        HEMATOLOGY PANEL   Recent Labs   Lab Test 12/28/24  0615 12/27/24  0602 12/26/24  0924 10/24/24  0538 10/23/24  0619 05/21/24  0552 05/20/24  0610   WBC 11.9* 9.5 15.5*   < > 5.4   < > 6.0   HGB 10.5* 9.8* 12.2   < > 8.1*   < > 9.7*   MCV 92 94 91   < > 91   < > 97    264 355   < > 320   < > 313   INR 1.09  --   --   --  1.12  --  0.98    < > = values in this interval not displayed.         LIVER AND PANCREAS PANEL   Recent Labs   Lab Test 12/28/24  0615 12/26/24  0924 11/13/24  1610 11/13/24  1538 10/20/24  0555 10/19/24  2257 09/11/24  1742 05/10/24  0740 05/09/24  1148   ALBUMIN 3.4* 4.0  --  3.7   < > 3.1*  --    < >  --    BILITOTAL 0.3 0.3  --  0.3   < > 0.6  --    < >  --    ALT 33 68*  --  15   < > 24  --    < >  --    AST 13 19  --  23   < > 47*  --    < >  --    PROTEIN  --   --  20*  --   --   --  50*  --  70*   LIPASE  --  27  --  43  --  42  --    < >  --     < > = values in this interval not displayed.        I have reviewed the current diagnostic and laboratory tests.     Current medications:    Current Facility-Administered Medications:     acetaminophen (TYLENOL) tablet 650 mg, 650 mg, Oral, Q4H PRN, 650 mg at 12/28/24 2000 **OR** acetaminophen (TYLENOL) Suppository 650 mg, 650 mg, Rectal, Q4H PRN, Franklin Hall MD    dextrose 10% infusion, , Intravenous, Continuous PRN, Gab Sandra PA-C    diphenhydrAMINE (BENADRYL) capsule 25 mg, 25 mg, Oral, Q6H PRN, Bryant Tamayo MD    enoxaparin ANTICOAGULANT (LOVENOX) injection 40 mg, 40 mg, Subcutaneous, Daily, Franklin Hall MD, 40 mg at 12/28/24 0814    Fish Oil Triglycerides (OMEGAVEN) infusion 200 mL, 200 mL, Intravenous, Q24H, Gab Sandra PA-C, 200 mL at 12/28/24 2002    HYDROmorphone (DILAUDID) injection 0.2 mg, 0.2 mg, Intravenous, Q2H PRN, Franklin Hall MD, 0.2 mg at 12/28/24 1807    HYDROmorphone (DILAUDID) injection 0.4 mg, 0.4 mg, Intravenous, Q2H PRN, Franklin Hall MD, 0.4 mg at 12/27/24 2142    lidocaine 1 % 0.1-1 mL, 0.1-1 mL, Other, Q1H PRN, Franklin Hall MD, 1 mL at 12/26/24 2218    naloxone (NARCAN) injection 0.2 mg, 0.2 mg, Intravenous, Q2 Min PRN **OR** naloxone (NARCAN) injection 0.4 mg, 0.4 mg, Intravenous, Q2 Min PRN **OR** naloxone (NARCAN) injection 0.2 mg, 0.2 mg, Intramuscular, Q2 Min PRN **OR** naloxone (NARCAN) injection 0.4 mg, 0.4 mg, Intramuscular, Q2 Min PRN, Bryant Tamayo MD     ondansetron (ZOFRAN ODT) ODT tab 4 mg, 4 mg, Oral, Q6H PRN **OR** ondansetron (ZOFRAN) injection 4 mg, 4 mg, Intravenous, Q6H PRN, Franklin Hall MD, 4 mg at 12/27/24 0212    oxyCODONE (ROXICODONE) tablet 10 mg, 10 mg, Oral, Q4H PRN, Franklin Hall MD, 10 mg at 12/28/24 1959    oxyCODONE (ROXICODONE) tablet 5 mg, 5 mg, Oral, Q4H PRN, Franklin Hall MD    parenteral nutrition - ADULT compounded formula, , CENTRAL LINE IV, TPN CONTINUOUS, Td Padilla MD, Last Rate: 60 mL/hr at 12/28/24 2001, New Bag at 12/28/24 2001    [START ON 12/29/2024] polyethylene glycol (MIRALAX) powder 238 g, 238 g, Oral, Once, Gab Sandra PA-C    prochlorperazine (COMPAZINE) injection 10 mg, 10 mg, Intravenous, Q6H PRN **OR** prochlorperazine (COMPAZINE) tablet 10 mg, 10 mg, Oral, Q6H PRN, Franklin Hall MD    sodium chloride (PF) 0.9% PF flush 10-20 mL, 10-20 mL, Intracatheter, q1 min prn, Gab Sandra PA-C, 1 mL at 12/27/24 1155    sodium chloride (PF) 0.9% PF flush 10-40 mL, 10-40 mL, Intracatheter, Q7 Days, Gab Sandra PA-C    sodium chloride (PF) 0.9% PF flush 10-40 mL, 10-40 mL, Intracatheter, Q1H PRN, Gab Sandra PA-C, 20 mL at 12/26/24 2217    sodium chloride (PF) 0.9% PF flush 3 mL, 3 mL, Intracatheter, Q8H, Franklin Hall MD, 10 mL at 12/28/24 2001    sodium chloride (PF) 0.9% PF flush 3 mL, 3 mL, Intracatheter, q1 min prn, Franklin Hall MD    [Held by provider] thiamine (B-1) tablet 100 mg, 100 mg, Oral, Daily, Gab Sandra PA-C    IMPRESSION / RECOMMENDATIONS   Assessment:  In summary, Ms. Dain Tejeda is a pleasant 44 year old lady who I am seeing in consultation for stricturing Crohn's disease.     Recommendations/plan:  1.  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  CRP is 50.  - Colorectal surgery consultation.  - DVT prophylaxis.  - Avoid smoking and NSAIDs.  - Plans noted for appropriate colectomy  with end ileostomy on Monday  - Follow-up with IBD clinic at [facility].  - IV fluids, NG tube as per surgery.     2.  Minimally elevated ALT  - This is secondary to underlying inflammation.  Thank you for the consult.    25 minutes were spent in coordination of care, ordering tests, reviewing labs, documentation, talking to patient/family members and members of healthcare team.    Xavi Gil MD  [facility]  [phone]      This document was created using voice recognition technology. Please excuse any typographical errors that may have occurred, and call with any questions.        Xavi Gil MD, FACP   [facility]  [email or web address]       Thank you for the opportunity to participate in the care of this patient.   Please feel free to call with any questions or concerns.  [phone].       CC: [facility], Kevin Woodruff

## 2024-12-29 NOTE — PLAN OF CARE
Goal Outcome Evaluation:      Plan of Care Reviewed With: patient    Overall Patient Progress: no changeOverall Patient Progress: no change    Outcome Evaluation: Pt A&O and independent in her room. Left quadrant colostomy cares completed by pt. Stool was watery/loose light brown. Pt C/O generalized abdominal pain rated 6/10 and was administered PRN oral pain medication at HS. TPN and special lipids running per order. Denied nausea. She is NPO except for ice chips and sips of water with meds. Will continue to monitor.    /63 (BP Location: Right arm)   Pulse 88   Temp 99.4  F (37.4  C) (Oral)   Resp 16   Wt 46 kg (101 lb 8 oz)   LMP 09/25/2024 (Approximate)   SpO2 98%   BMI 20.50 kg/m      Problem: Pain Acute  Goal: Optimal Pain Control and Function  Intervention: Prevent or Manage Pain  Recent Flowsheet Documentation  Taken 12/28/2024 1959 by Anuradha Guzman RN  Medication Review/Management: high-risk medications identified     Problem: Intestinal Obstruction  Goal: Optimal Bowel Function  Outcome: Progressing  Intervention: Promote Bowel Function  Recent Flowsheet Documentation  Taken 12/28/2024 1959 by Anuradha Guzman, RN  Body Position: position changed independently  Head of Bed (HOB) Positioning: HOB at 20 degrees     Problem: Nausea and Vomiting  Goal: Nausea and Vomiting Relief  Outcome: Progressing

## 2024-12-29 NOTE — PLAN OF CARE
Problem: Adult Inpatient Plan of Care  Goal: Absence of Hospital-Acquired Illness or Injury  Outcome: Progressing  Intervention: Identify and Manage Fall Risk  Recent Flowsheet Documentation  Taken 12/29/2024 0905 by Norma Montana RN  Safety Promotion/Fall Prevention:   assistive device/personal items within reach   clutter free environment maintained   nonskid shoes/slippers when out of bed  Intervention: Prevent Infection  Recent Flowsheet Documentation  Taken 12/29/2024 0905 by Norma Montana RN  Infection Prevention:   hand hygiene promoted   rest/sleep promoted   single patient room provided     Problem: Adult Inpatient Plan of Care  Goal: Optimal Comfort and Wellbeing  Outcome: Progressing     Problem: Pain Acute  Goal: Optimal Pain Control and Function  Outcome: Progressing     Problem: Nausea and Vomiting  Goal: Nausea and Vomiting Relief  Outcome: Progressing       In room that does not have shower and pt is requesting shower.  Pt will be moved once room is cleaned.    Pt having minimal pain today.  No pain medications given.  Will be starting prep tonight for surgery tomorrow.

## 2024-12-29 NOTE — PROGRESS NOTES
Nutrition Therapy  Parenteral Nutrition follow-up       Dietitian to Pharmacy    Rate of TPN:  60 ml/hr continuous.       Grams Dextrose: 224 g-this is an increase.  Grams Protein: 60 g     Lipids: Omegaven 20 g daily.         Current Nutrition Intake:  Diet: NPO    Food allergies:  Soy, gluten    Custom TPN per central line- - 210 gm dextrose, 60 gm AA at 60 mL/hr with 20 g omegaven daily (2 x 100 ml bottle) (allergic to soy)   Provides: 1440 mL, 1154 kcals, 60 gm protein, 210 gm carb, 20 gm fat daily    TPN start 12/27/24.    New Findings:  Planned total colectomy Monday.      Weight Trends  Admission wt: 47.2 kg (104 lb) 12/26  Current wt : 46 kg (101 lb 8 oz) 12/28    Dosing Weight: 47.2 kg     ASSESSED NUTRITION NEEDS  Estimated Energy Needs: 5330-8738 kcals/day (25 -30 kcals/kg )  Justification: Maintenance  Estimated Protein Needs: 56-70 grams protein/day (1.2 - 1.5 grams of pro/kg)  Justification: Increased needs  Estimated Fluid Needs: 8138-7227 mL/day (30 - 35 mL/kg)   Justification: Increased needs    GI:  Colostomy output 400 ml 12/28/24    Labs:   Labs reviewed by dietitian    Medications:   Reviewed by dietitian     Nutrition Diagnosis  Inadequate oral intake related to altered GI function as evidenced by NPO and need for nutrition support. -continues.      Goals   Meet nutrition needs-progressing  Maintain weigh-no new wt.      INTERVENTIONS  Implementation  TPN-Increase dextrose to 224 g.  Continue 60 g AA.  Continue 60 ml/hr continuous.  Continue 20g Omegaven daily.    This to provide 1201 kcals, 60 g protein, 224 g carb, 20 g lipid, 1440 ml fluid daily.    GIR=3.38 mg CHO/kg/min.      Monitoring/Evaluation  Progress toward goals will be monitored and evaluated per protocol.

## 2024-12-29 NOTE — PROGRESS NOTES
"[facility]    Progress Note - Hospitalist Service       Date of Admission:  12/26/2024    Assessment & Plan   Dain Tejeda is a 44 year old female w/PMHx significant for Crohn's Disease s/p partial colectomy/colostomy and recent admission for infectious colitis admitted on 12/26/2024 for acute abdominal pain, fth bowel obstruction near ileum, CRS and GI following. Planned for OR on 12/30.      12/29 updates: started steroids Q8 Hours per GI recs    Ileal bowel obstruction  Hx of Crohn's Disease s/p partial colectomy/colostomy  Hx of strictures   Patient presents w/2d of intermittent, severe abdominal cramping in lower abdomen in setting of known Crohn's Disease, associated with n/v and poor PO intake. Reports she has had \"normal\" colostomy output, but though bag does have dark, maroon colored stools. CT AP showed strictures in transverse colon and bowel obstruction upstream to distal ileus. CRP and WBC elevated, AST mildly elevated - CMP otherwise wnl. Of note, she was recently hospitalized 11/13 - 11/17 for infectious colitis and sent out on prednisone taper, which she reports she has been taking. Patient reports she will be having surgery in Jan 2025, which is consistent w/chart rvw - appears to be planning for total colectomy w/end ileostomy, presumably for management of complicated Crohn's Disease. Follows w/[facility], unable to see their notes in our system.   -CRS consult, recs appreciated              -NPO for bowel rest; prep to be completed tomorrow              -Nutrition/pharmacy consult for TPN              -OR 12/30 for laparoscopic total abdominal colectomy with end ileostomy  -GI consult, recs appreciated              - Recommend IV steroids 20 mg Solu-Medrol every 8 hours.  CRP is 50.  - Colorectal surgery consultation.  - DVT prophylaxis.  - Avoid smoking and NSAIDs.  - Plans noted for appropriate colectomy with end ileostomy on Monday  - Follow-up with IBD clinic at [facility].  - IV fluids as per surgery.  -Zofran/compazine PRN for nausea/vomiting  -Pain regimen:              -Tylenol 650mg PRN              -Oxycodone 5-10mg q4h PRN for moderate pain              -Dilaudid 0.2-0.4mg q2h PRN for severe pain         Diet: NPO per Anesthesia Guidelines for Procedure/Surgery Except for: Meds, Ice Chips  parenteral nutrition - ADULT compounded formula  parenteral nutrition - ADULT compounded formula    DVT Prophylaxis: Pneumatic Compression Devices  Chambers Catheter: Not present  Fluids: NPO, getting TPN  Lines: PRESENT      PICC 12/26/24 Double Lumen Left Basilic tpn-Site Assessment: WDL      Cardiac Monitoring: None  Code Status: Full Code      Clinically Significant Risk Factors         # Hyponatremia: Lowest Na = 134 mmol/L in last 2 days, will monitor as appropriate       # Hypoalbuminemia: Lowest albumin = 3.4 g/dL at 12/28/2024  6:15 AM, will monitor as appropriate                    # Financial/Environmental Concerns: none         Social Drivers of Health          Disposition Plan     Medically Ready for Discharge: Anticipated in 2-4 Days         The patient's care was discussed with the Attending Physician, Dr. Padilla .    Doroteo Rucker MD  Hospitalist Service  [facility]  Securely message with [employer] (more info)  Text page via [employer] Paging/Directory   ______________________________________________________________________    Interval History   TOBI  Pain well controlled  Unable to get [employer]  but daughter in room able to interpret    Physical Exam   Vital Signs: Temp: 100.2  F (37.9  C) Temp src: Oral BP: 121/76 Pulse: 107   Resp: 15 SpO2: 97 % O2 Device: None (Room air)    Weight: 102 lbs 1.6 oz    GENERAL: healthy, alert and no distress  RESP: speaking in full sentences, normal work of breathing   CV: extremities well perfused  ABDOMEN: soft, diffusely tender to palpation, more so in LLQ  MS: no gross musculoskeletal defects  noted, no edema  PSYCH: mentation appears normal, affect normal/bright       Data     I have personally reviewed the following data over the past 24 hrs:    11.3 (H)  \   10.5 (L)   / 290     134 (L) 100 11.5 /  150 (H)   3.6 27 0.47 (L) \       Imaging results reviewed over the past 24 hrs:   No results found for this or any previous visit (from the past 24 hours).

## 2024-12-29 NOTE — PROGRESS NOTES
Care Management Follow Up    Length of Stay (days): 3    Expected Discharge Date: 12/31/2024     Concerns to be Addressed:       Patient plan of care discussed at interdisciplinary rounds: Yes    Anticipated Discharge Disposition:                Anticipated Discharge Services:    Anticipated Discharge DME:      Patient/family educated on Medicare website which has current facility and service quality ratings:  na  Education Provided on the Discharge Plan:  na  Patient/Family in Agreement with the Plan:  na    Referrals Placed by CM/SW:    Private pay costs discussed: Not applicable    Discussed  Partnership in Safe Discharge Planning  document with patient/family: No     Handoff Completed: No, handoff not indicated or clinically appropriate    Additional Information:  Per chart review; plan for total colectomy on Monday 12/30. Per notes patient is up independent. Currently receiving TPN via PICC. Initial plan is to go home on PO. Will continue to monitor      Next Steps:  CM will continue to monitor progression of care, review team recommendations and provide discharge planning assist as needed.    Christine Wilcox RN

## 2024-12-30 ENCOUNTER — ANESTHESIA (OUTPATIENT)
Dept: SURGERY | Facility: HOSPITAL | Age: 44
End: 2024-12-30
Payer: COMMERCIAL

## 2024-12-30 ENCOUNTER — VIRTUAL VISIT (OUTPATIENT)
Dept: INTERPRETER SERVICES | Facility: CLINIC | Age: 44
End: 2024-12-30

## 2024-12-30 ENCOUNTER — ANESTHESIA EVENT (OUTPATIENT)
Dept: SURGERY | Facility: HOSPITAL | Age: 44
End: 2024-12-30
Payer: COMMERCIAL

## 2024-12-30 LAB
ABO + RH BLD: NORMAL
ALBUMIN SERPL BCG-MCNC: 3.7 G/DL (ref 3.5–5.2)
ALP SERPL-CCNC: 128 U/L (ref 40–150)
ALT SERPL W P-5'-P-CCNC: 23 U/L (ref 0–50)
ANION GAP SERPL CALCULATED.3IONS-SCNC: 13 MMOL/L (ref 7–15)
AST SERPL W P-5'-P-CCNC: 10 U/L (ref 0–45)
BILIRUB SERPL-MCNC: 0.2 MG/DL
BLD GP AB SCN SERPL QL: NEGATIVE
BUN SERPL-MCNC: 20.5 MG/DL (ref 6–20)
CALCIUM SERPL-MCNC: 10.1 MG/DL (ref 8.8–10.4)
CHLORIDE SERPL-SCNC: 99 MMOL/L (ref 98–107)
CREAT SERPL-MCNC: 0.62 MG/DL (ref 0.51–0.95)
EGFRCR SERPLBLD CKD-EPI 2021: >90 ML/MIN/1.73M2
ERYTHROCYTE [DISTWIDTH] IN BLOOD BY AUTOMATED COUNT: 14.1 % (ref 10–15)
GLUCOSE BLDC GLUCOMTR-MCNC: 125 MG/DL (ref 70–99)
GLUCOSE BLDC GLUCOMTR-MCNC: 171 MG/DL (ref 70–99)
GLUCOSE BLDC GLUCOMTR-MCNC: 192 MG/DL (ref 70–99)
GLUCOSE BLDC GLUCOMTR-MCNC: 232 MG/DL (ref 70–99)
GLUCOSE BLDC GLUCOMTR-MCNC: 263 MG/DL (ref 70–99)
GLUCOSE BLDC GLUCOMTR-MCNC: 280 MG/DL (ref 70–99)
GLUCOSE SERPL-MCNC: 189 MG/DL (ref 70–99)
HCG SERPL QL: NEGATIVE
HCO3 SERPL-SCNC: 25 MMOL/L (ref 22–29)
HCT VFR BLD AUTO: 35.5 % (ref 35–47)
HGB BLD-MCNC: 11.5 G/DL (ref 11.7–15.7)
HGB BLD-MCNC: 11.5 G/DL (ref 11.7–15.7)
INR PPP: 1.01 (ref 0.85–1.15)
MAGNESIUM SERPL-MCNC: 2.3 MG/DL (ref 1.7–2.3)
MCH RBC QN AUTO: 29.7 PG (ref 26.5–33)
MCHC RBC AUTO-ENTMCNC: 32.4 G/DL (ref 31.5–36.5)
MCV RBC AUTO: 92 FL (ref 78–100)
PHOSPHATE SERPL-MCNC: 4.6 MG/DL (ref 2.5–4.5)
PLATELET # BLD AUTO: 354 10E3/UL (ref 150–450)
POTASSIUM SERPL-SCNC: 4.2 MMOL/L (ref 3.4–5.3)
PREALB SERPL-MCNC: 19.2 MG/DL (ref 20–40)
PROT SERPL-MCNC: 8.3 G/DL (ref 6.4–8.3)
RBC # BLD AUTO: 3.87 10E6/UL (ref 3.8–5.2)
SODIUM SERPL-SCNC: 137 MMOL/L (ref 135–145)
SPECIMEN EXP DATE BLD: NORMAL
TRIGL SERPL-MCNC: 74 MG/DL
WBC # BLD AUTO: 14.3 10E3/UL (ref 4–11)

## 2024-12-30 PROCEDURE — 85610 PROTHROMBIN TIME: CPT

## 2024-12-30 PROCEDURE — 84703 CHORIONIC GONADOTROPIN ASSAY: CPT | Performed by: ANESTHESIOLOGY

## 2024-12-30 PROCEDURE — B4185 PARENTERAL SOL 10 GM LIPIDS: HCPCS

## 2024-12-30 PROCEDURE — 82040 ASSAY OF SERUM ALBUMIN: CPT

## 2024-12-30 PROCEDURE — 86850 RBC ANTIBODY SCREEN: CPT

## 2024-12-30 PROCEDURE — 84155 ASSAY OF PROTEIN SERUM: CPT

## 2024-12-30 PROCEDURE — 250N000011 HC RX IP 250 OP 636

## 2024-12-30 PROCEDURE — 88342 IMHCHEM/IMCYTCHM 1ST ANTB: CPT | Mod: TC | Performed by: COLON & RECTAL SURGERY

## 2024-12-30 PROCEDURE — 250N000009 HC RX 250: Performed by: STUDENT IN AN ORGANIZED HEALTH CARE EDUCATION/TRAINING PROGRAM

## 2024-12-30 PROCEDURE — 88304 TISSUE EXAM BY PATHOLOGIST: CPT | Mod: 26 | Performed by: PATHOLOGY

## 2024-12-30 PROCEDURE — 258N000003 HC RX IP 258 OP 636: Performed by: ANESTHESIOLOGY

## 2024-12-30 PROCEDURE — 88307 TISSUE EXAM BY PATHOLOGIST: CPT | Mod: 26 | Performed by: PATHOLOGY

## 2024-12-30 PROCEDURE — 0DTE0ZZ RESECTION OF LARGE INTESTINE, OPEN APPROACH: ICD-10-PCS | Performed by: COLON & RECTAL SURGERY

## 2024-12-30 PROCEDURE — 85027 COMPLETE CBC AUTOMATED: CPT

## 2024-12-30 PROCEDURE — 250N000011 HC RX IP 250 OP 636: Mod: JZ

## 2024-12-30 PROCEDURE — 250N000009 HC RX 250: Performed by: COLON & RECTAL SURGERY

## 2024-12-30 PROCEDURE — 0DB80ZZ EXCISION OF SMALL INTESTINE, OPEN APPROACH: ICD-10-PCS | Performed by: COLON & RECTAL SURGERY

## 2024-12-30 PROCEDURE — 84478 ASSAY OF TRIGLYCERIDES: CPT | Performed by: FAMILY MEDICINE

## 2024-12-30 PROCEDURE — 250N000009 HC RX 250: Performed by: ANESTHESIOLOGY

## 2024-12-30 PROCEDURE — 84134 ASSAY OF PREALBUMIN: CPT

## 2024-12-30 PROCEDURE — 250N000011 HC RX IP 250 OP 636: Performed by: ANESTHESIOLOGY

## 2024-12-30 PROCEDURE — 250N000025 HC SEVOFLURANE, PER MIN: Performed by: COLON & RECTAL SURGERY

## 2024-12-30 PROCEDURE — 258N000003 HC RX IP 258 OP 636: Performed by: NURSE ANESTHETIST, CERTIFIED REGISTERED

## 2024-12-30 PROCEDURE — 88342 IMHCHEM/IMCYTCHM 1ST ANTB: CPT | Mod: 26 | Performed by: PATHOLOGY

## 2024-12-30 PROCEDURE — 370N000017 HC ANESTHESIA TECHNICAL FEE, PER MIN: Performed by: COLON & RECTAL SURGERY

## 2024-12-30 PROCEDURE — 250N000013 HC RX MED GY IP 250 OP 250 PS 637: Performed by: STUDENT IN AN ORGANIZED HEALTH CARE EDUCATION/TRAINING PROGRAM

## 2024-12-30 PROCEDURE — 3E0436Z INTRODUCTION OF NUTRITIONAL SUBSTANCE INTO CENTRAL VEIN, PERCUTANEOUS APPROACH: ICD-10-PCS | Performed by: COLON & RECTAL SURGERY

## 2024-12-30 PROCEDURE — 83735 ASSAY OF MAGNESIUM: CPT

## 2024-12-30 PROCEDURE — 710N000009 HC RECOVERY PHASE 1, LEVEL 1, PER MIN: Performed by: COLON & RECTAL SURGERY

## 2024-12-30 PROCEDURE — 84100 ASSAY OF PHOSPHORUS: CPT

## 2024-12-30 PROCEDURE — 85018 HEMOGLOBIN: CPT

## 2024-12-30 PROCEDURE — 999N000141 HC STATISTIC PRE-PROCEDURE NURSING ASSESSMENT: Performed by: COLON & RECTAL SURGERY

## 2024-12-30 PROCEDURE — 250N000011 HC RX IP 250 OP 636: Performed by: STUDENT IN AN ORGANIZED HEALTH CARE EDUCATION/TRAINING PROGRAM

## 2024-12-30 PROCEDURE — 36415 COLL VENOUS BLD VENIPUNCTURE: CPT | Performed by: ANESTHESIOLOGY

## 2024-12-30 PROCEDURE — 250N000013 HC RX MED GY IP 250 OP 250 PS 637

## 2024-12-30 PROCEDURE — 0D1B0Z4 BYPASS ILEUM TO CUTANEOUS, OPEN APPROACH: ICD-10-PCS | Performed by: COLON & RECTAL SURGERY

## 2024-12-30 PROCEDURE — 86900 BLOOD TYPING SEROLOGIC ABO: CPT

## 2024-12-30 PROCEDURE — 360N000077 HC SURGERY LEVEL 4, PER MIN: Performed by: COLON & RECTAL SURGERY

## 2024-12-30 PROCEDURE — 120N000001 HC R&B MED SURG/OB

## 2024-12-30 PROCEDURE — 250N000009 HC RX 250

## 2024-12-30 PROCEDURE — 272N000001 HC OR GENERAL SUPPLY STERILE: Performed by: COLON & RECTAL SURGERY

## 2024-12-30 PROCEDURE — 258N000003 HC RX IP 258 OP 636: Performed by: STUDENT IN AN ORGANIZED HEALTH CARE EDUCATION/TRAINING PROGRAM

## 2024-12-30 PROCEDURE — T1013 SIGN LANG/ORAL INTERPRETER: HCPCS | Mod: GT,TEL,95 | Performed by: INTERPRETER

## 2024-12-30 PROCEDURE — 250N000009 HC RX 250: Performed by: FAMILY MEDICINE

## 2024-12-30 PROCEDURE — 0DNE0ZZ RELEASE LARGE INTESTINE, OPEN APPROACH: ICD-10-PCS | Performed by: COLON & RECTAL SURGERY

## 2024-12-30 RX ORDER — ONDANSETRON 2 MG/ML
4 INJECTION INTRAMUSCULAR; INTRAVENOUS EVERY 30 MIN PRN
Status: DISCONTINUED | OUTPATIENT
Start: 2024-12-30 | End: 2025-01-03

## 2024-12-30 RX ORDER — OXYCODONE HYDROCHLORIDE 5 MG/1
5 TABLET ORAL
Status: DISCONTINUED | OUTPATIENT
Start: 2024-12-30 | End: 2024-12-31

## 2024-12-30 RX ORDER — SODIUM CHLORIDE, SODIUM LACTATE, POTASSIUM CHLORIDE, CALCIUM CHLORIDE 600; 310; 30; 20 MG/100ML; MG/100ML; MG/100ML; MG/100ML
INJECTION, SOLUTION INTRAVENOUS CONTINUOUS
Status: DISCONTINUED | OUTPATIENT
Start: 2024-12-30 | End: 2024-12-30 | Stop reason: HOSPADM

## 2024-12-30 RX ORDER — ONDANSETRON 4 MG/1
4 TABLET, ORALLY DISINTEGRATING ORAL EVERY 30 MIN PRN
Status: DISCONTINUED | OUTPATIENT
Start: 2024-12-30 | End: 2025-01-03

## 2024-12-30 RX ORDER — DEXTROSE MONOHYDRATE 25 G/50ML
25-50 INJECTION, SOLUTION INTRAVENOUS
Status: DISCONTINUED | OUTPATIENT
Start: 2024-12-30 | End: 2024-12-30 | Stop reason: ALTCHOICE

## 2024-12-30 RX ORDER — ONDANSETRON 2 MG/ML
4 INJECTION INTRAMUSCULAR; INTRAVENOUS EVERY 30 MIN PRN
Status: DISCONTINUED | OUTPATIENT
Start: 2024-12-30 | End: 2024-12-30 | Stop reason: HOSPADM

## 2024-12-30 RX ORDER — ONDANSETRON 2 MG/ML
4 INJECTION INTRAMUSCULAR; INTRAVENOUS ONCE
Status: COMPLETED | OUTPATIENT
Start: 2024-12-30 | End: 2024-12-30

## 2024-12-30 RX ORDER — SODIUM CHLORIDE, SODIUM LACTATE, POTASSIUM CHLORIDE, CALCIUM CHLORIDE 600; 310; 30; 20 MG/100ML; MG/100ML; MG/100ML; MG/100ML
INJECTION, SOLUTION INTRAVENOUS CONTINUOUS
Status: DISCONTINUED | OUTPATIENT
Start: 2024-12-30 | End: 2025-01-07

## 2024-12-30 RX ORDER — SODIUM CHLORIDE 9 MG/ML
INJECTION, SOLUTION INTRAVENOUS CONTINUOUS PRN
Status: DISCONTINUED | OUTPATIENT
Start: 2024-12-30 | End: 2024-12-30

## 2024-12-30 RX ORDER — FENTANYL CITRATE 50 UG/ML
50 INJECTION, SOLUTION INTRAMUSCULAR; INTRAVENOUS EVERY 5 MIN PRN
Status: DISCONTINUED | OUTPATIENT
Start: 2024-12-30 | End: 2024-12-30 | Stop reason: HOSPADM

## 2024-12-30 RX ORDER — SODIUM CHLORIDE, SODIUM LACTATE, POTASSIUM CHLORIDE, CALCIUM CHLORIDE 600; 310; 30; 20 MG/100ML; MG/100ML; MG/100ML; MG/100ML
INJECTION, SOLUTION INTRAVENOUS CONTINUOUS
Status: DISCONTINUED | OUTPATIENT
Start: 2024-12-30 | End: 2024-12-30

## 2024-12-30 RX ORDER — KETAMINE HYDROCHLORIDE 10 MG/ML
INJECTION INTRAMUSCULAR; INTRAVENOUS PRN
Status: DISCONTINUED | OUTPATIENT
Start: 2024-12-30 | End: 2024-12-30

## 2024-12-30 RX ORDER — CEFAZOLIN SODIUM/WATER 2 G/20 ML
2 SYRINGE (ML) INTRAVENOUS
Status: COMPLETED | OUTPATIENT
Start: 2024-12-30 | End: 2024-12-30

## 2024-12-30 RX ORDER — NALOXONE HYDROCHLORIDE 0.4 MG/ML
0.1 INJECTION, SOLUTION INTRAMUSCULAR; INTRAVENOUS; SUBCUTANEOUS
Status: DISCONTINUED | OUTPATIENT
Start: 2024-12-30 | End: 2024-12-30 | Stop reason: HOSPADM

## 2024-12-30 RX ORDER — DEXTROSE MONOHYDRATE 25 G/50ML
25-50 INJECTION, SOLUTION INTRAVENOUS
Status: DISCONTINUED | OUTPATIENT
Start: 2024-12-30 | End: 2025-01-15 | Stop reason: HOSPADM

## 2024-12-30 RX ORDER — DEXAMETHASONE SODIUM PHOSPHATE 10 MG/ML
4 INJECTION, SOLUTION INTRAMUSCULAR; INTRAVENOUS
Status: DISCONTINUED | OUTPATIENT
Start: 2024-12-30 | End: 2025-01-03

## 2024-12-30 RX ORDER — HEPARIN SODIUM 5000 [USP'U]/.5ML
5000 INJECTION, SOLUTION INTRAVENOUS; SUBCUTANEOUS
Status: COMPLETED | OUTPATIENT
Start: 2024-12-30 | End: 2024-12-30

## 2024-12-30 RX ORDER — ACETAMINOPHEN 325 MG/1
975 TABLET ORAL ONCE
Status: COMPLETED | OUTPATIENT
Start: 2024-12-30 | End: 2024-12-30

## 2024-12-30 RX ORDER — HYDROMORPHONE HCL IN WATER/PF 6 MG/30 ML
0.2 PATIENT CONTROLLED ANALGESIA SYRINGE INTRAVENOUS EVERY 5 MIN PRN
Status: DISCONTINUED | OUTPATIENT
Start: 2024-12-30 | End: 2024-12-30 | Stop reason: HOSPADM

## 2024-12-30 RX ORDER — ONDANSETRON 4 MG/1
4 TABLET, ORALLY DISINTEGRATING ORAL EVERY 30 MIN PRN
Status: DISCONTINUED | OUTPATIENT
Start: 2024-12-30 | End: 2024-12-30 | Stop reason: HOSPADM

## 2024-12-30 RX ORDER — DEXAMETHASONE SODIUM PHOSPHATE 10 MG/ML
INJECTION, SOLUTION INTRAMUSCULAR; INTRAVENOUS PRN
Status: DISCONTINUED | OUTPATIENT
Start: 2024-12-30 | End: 2024-12-30

## 2024-12-30 RX ORDER — NICOTINE POLACRILEX 4 MG
15-30 LOZENGE BUCCAL
Status: DISCONTINUED | OUTPATIENT
Start: 2024-12-30 | End: 2024-12-30 | Stop reason: ALTCHOICE

## 2024-12-30 RX ORDER — OXYCODONE HYDROCHLORIDE 5 MG/1
10 TABLET ORAL
Status: DISCONTINUED | OUTPATIENT
Start: 2024-12-30 | End: 2024-12-31

## 2024-12-30 RX ORDER — PROPOFOL 10 MG/ML
INJECTION, EMULSION INTRAVENOUS PRN
Status: DISCONTINUED | OUTPATIENT
Start: 2024-12-30 | End: 2024-12-30

## 2024-12-30 RX ORDER — NALOXONE HYDROCHLORIDE 0.4 MG/ML
0.1 INJECTION, SOLUTION INTRAMUSCULAR; INTRAVENOUS; SUBCUTANEOUS
Status: DISCONTINUED | OUTPATIENT
Start: 2024-12-30 | End: 2025-01-05

## 2024-12-30 RX ORDER — FENTANYL CITRATE 50 UG/ML
25 INJECTION, SOLUTION INTRAMUSCULAR; INTRAVENOUS EVERY 5 MIN PRN
Status: DISCONTINUED | OUTPATIENT
Start: 2024-12-30 | End: 2024-12-30 | Stop reason: HOSPADM

## 2024-12-30 RX ORDER — ONDANSETRON 2 MG/ML
INJECTION INTRAMUSCULAR; INTRAVENOUS PRN
Status: DISCONTINUED | OUTPATIENT
Start: 2024-12-30 | End: 2024-12-30

## 2024-12-30 RX ORDER — METRONIDAZOLE 500 MG/100ML
500 INJECTION, SOLUTION INTRAVENOUS
Status: COMPLETED | OUTPATIENT
Start: 2024-12-30 | End: 2024-12-30

## 2024-12-30 RX ORDER — FENTANYL CITRATE 50 UG/ML
INJECTION, SOLUTION INTRAMUSCULAR; INTRAVENOUS PRN
Status: DISCONTINUED | OUTPATIENT
Start: 2024-12-30 | End: 2024-12-30

## 2024-12-30 RX ORDER — CEFAZOLIN SODIUM/WATER 2 G/20 ML
2 SYRINGE (ML) INTRAVENOUS SEE ADMIN INSTRUCTIONS
Status: DISCONTINUED | OUTPATIENT
Start: 2024-12-30 | End: 2024-12-30 | Stop reason: HOSPADM

## 2024-12-30 RX ORDER — DEXAMETHASONE SODIUM PHOSPHATE 4 MG/ML
4 INJECTION, SOLUTION INTRA-ARTICULAR; INTRALESIONAL; INTRAMUSCULAR; INTRAVENOUS; SOFT TISSUE
Status: DISCONTINUED | OUTPATIENT
Start: 2024-12-30 | End: 2024-12-30 | Stop reason: HOSPADM

## 2024-12-30 RX ORDER — PROPOFOL 10 MG/ML
INJECTION, EMULSION INTRAVENOUS CONTINUOUS PRN
Status: DISCONTINUED | OUTPATIENT
Start: 2024-12-30 | End: 2024-12-30

## 2024-12-30 RX ORDER — SODIUM CHLORIDE, SODIUM LACTATE, POTASSIUM CHLORIDE, CALCIUM CHLORIDE 600; 310; 30; 20 MG/100ML; MG/100ML; MG/100ML; MG/100ML
INJECTION, SOLUTION INTRAVENOUS CONTINUOUS PRN
Status: DISCONTINUED | OUTPATIENT
Start: 2024-12-30 | End: 2024-12-30

## 2024-12-30 RX ORDER — HYDROMORPHONE HCL IN WATER/PF 6 MG/30 ML
0.4 PATIENT CONTROLLED ANALGESIA SYRINGE INTRAVENOUS EVERY 5 MIN PRN
Status: DISCONTINUED | OUTPATIENT
Start: 2024-12-30 | End: 2024-12-30 | Stop reason: HOSPADM

## 2024-12-30 RX ORDER — LIDOCAINE 40 MG/G
CREAM TOPICAL
Status: DISCONTINUED | OUTPATIENT
Start: 2024-12-30 | End: 2024-12-30 | Stop reason: HOSPADM

## 2024-12-30 RX ORDER — MAGNESIUM HYDROXIDE 1200 MG/15ML
LIQUID ORAL PRN
Status: DISCONTINUED | OUTPATIENT
Start: 2024-12-30 | End: 2024-12-30 | Stop reason: HOSPADM

## 2024-12-30 RX ORDER — NICOTINE POLACRILEX 4 MG
15-30 LOZENGE BUCCAL
Status: DISCONTINUED | OUTPATIENT
Start: 2024-12-30 | End: 2025-01-15 | Stop reason: HOSPADM

## 2024-12-30 RX ADMIN — HEPARIN SODIUM 5000 UNITS: 5000 INJECTION, SOLUTION INTRAVENOUS; SUBCUTANEOUS at 07:53

## 2024-12-30 RX ADMIN — SODIUM CHLORIDE, POTASSIUM CHLORIDE, SODIUM LACTATE AND CALCIUM CHLORIDE: 600; 310; 30; 20 INJECTION, SOLUTION INTRAVENOUS at 18:44

## 2024-12-30 RX ADMIN — HYDROMORPHONE HYDROCHLORIDE 0.2 MG: 1 INJECTION, SOLUTION INTRAMUSCULAR; INTRAVENOUS; SUBCUTANEOUS at 09:02

## 2024-12-30 RX ADMIN — METHYLPREDNISOLONE SODIUM SUCCINATE 20 MG: 40 INJECTION, POWDER, FOR SOLUTION INTRAMUSCULAR; INTRAVENOUS at 05:00

## 2024-12-30 RX ADMIN — FENTANYL CITRATE 50 MCG: 50 INJECTION, SOLUTION INTRAMUSCULAR; INTRAVENOUS at 09:29

## 2024-12-30 RX ADMIN — PROPOFOL 50 MG: 10 INJECTION, EMULSION INTRAVENOUS at 09:57

## 2024-12-30 RX ADMIN — SODIUM CHLORIDE: 9 INJECTION, SOLUTION INTRAVENOUS at 07:45

## 2024-12-30 RX ADMIN — ONDANSETRON 4 MG: 2 INJECTION INTRAMUSCULAR; INTRAVENOUS at 14:36

## 2024-12-30 RX ADMIN — HYDROMORPHONE HYDROCHLORIDE 0.4 MG: 0.2 INJECTION, SOLUTION INTRAMUSCULAR; INTRAVENOUS; SUBCUTANEOUS at 17:53

## 2024-12-30 RX ADMIN — HYDROMORPHONE HYDROCHLORIDE 0.3 MG: 1 INJECTION, SOLUTION INTRAMUSCULAR; INTRAVENOUS; SUBCUTANEOUS at 08:48

## 2024-12-30 RX ADMIN — PROPOFOL 200 MCG/KG/MIN: 10 INJECTION, EMULSION INTRAVENOUS at 08:05

## 2024-12-30 RX ADMIN — DEXAMETHASONE SODIUM PHOSPHATE 5 MG: 10 INJECTION, SOLUTION INTRAMUSCULAR; INTRAVENOUS at 08:22

## 2024-12-30 RX ADMIN — FENTANYL CITRATE 50 MCG: 50 INJECTION, SOLUTION INTRAMUSCULAR; INTRAVENOUS at 16:14

## 2024-12-30 RX ADMIN — MAGNESIUM SULFATE HEPTAHYDRATE: 500 INJECTION, SOLUTION INTRAMUSCULAR; INTRAVENOUS at 20:11

## 2024-12-30 RX ADMIN — KETAMINE HYDROCHLORIDE 10 MG: 10 INJECTION INTRAMUSCULAR; INTRAVENOUS at 08:49

## 2024-12-30 RX ADMIN — ROCURONIUM BROMIDE 100 MG: 50 INJECTION, SOLUTION INTRAVENOUS at 08:01

## 2024-12-30 RX ADMIN — HYDROMORPHONE HYDROCHLORIDE 0.5 MG: 1 INJECTION, SOLUTION INTRAMUSCULAR; INTRAVENOUS; SUBCUTANEOUS at 09:10

## 2024-12-30 RX ADMIN — PHENYLEPHRINE HYDROCHLORIDE 100 MCG: 10 INJECTION INTRAVENOUS at 08:13

## 2024-12-30 RX ADMIN — FENTANYL CITRATE 50 MCG: 50 INJECTION, SOLUTION INTRAMUSCULAR; INTRAVENOUS at 09:23

## 2024-12-30 RX ADMIN — Medication 2 G: at 07:57

## 2024-12-30 RX ADMIN — EPINEPHRINE 40 ML: 1 INJECTION INTRAMUSCULAR; INTRAVENOUS; SUBCUTANEOUS at 15:15

## 2024-12-30 RX ADMIN — HUMAN INSULIN 3 UNITS: 100 INJECTION, SOLUTION SUBCUTANEOUS at 13:30

## 2024-12-30 RX ADMIN — HYDROMORPHONE HYDROCHLORIDE 0.4 MG: 0.2 INJECTION, SOLUTION INTRAMUSCULAR; INTRAVENOUS; SUBCUTANEOUS at 21:58

## 2024-12-30 RX ADMIN — ROCURONIUM BROMIDE 30 MG: 50 INJECTION, SOLUTION INTRAVENOUS at 13:02

## 2024-12-30 RX ADMIN — ROCURONIUM BROMIDE 30 MG: 50 INJECTION, SOLUTION INTRAVENOUS at 10:15

## 2024-12-30 RX ADMIN — HYDROMORPHONE HYDROCHLORIDE 0.2 MG: 0.2 INJECTION, SOLUTION INTRAMUSCULAR; INTRAVENOUS; SUBCUTANEOUS at 16:40

## 2024-12-30 RX ADMIN — SODIUM CHLORIDE, POTASSIUM CHLORIDE, SODIUM LACTATE AND CALCIUM CHLORIDE: 600; 310; 30; 20 INJECTION, SOLUTION INTRAVENOUS at 07:00

## 2024-12-30 RX ADMIN — PROPOFOL 50 MG: 10 INJECTION, EMULSION INTRAVENOUS at 14:33

## 2024-12-30 RX ADMIN — FISH OIL 200 ML: 0.1 INJECTION, EMULSION INTRAVENOUS at 20:11

## 2024-12-30 RX ADMIN — SODIUM CHLORIDE: 9 INJECTION, SOLUTION INTRAVENOUS at 14:18

## 2024-12-30 RX ADMIN — PROPOFOL 30 MG: 10 INJECTION, EMULSION INTRAVENOUS at 14:50

## 2024-12-30 RX ADMIN — SODIUM CHLORIDE: 9 INJECTION, SOLUTION INTRAVENOUS at 10:28

## 2024-12-30 RX ADMIN — ONDANSETRON 4 MG: 2 INJECTION INTRAMUSCULAR; INTRAVENOUS at 07:00

## 2024-12-30 RX ADMIN — FENTANYL CITRATE 25 MCG: 50 INJECTION, SOLUTION INTRAMUSCULAR; INTRAVENOUS at 16:03

## 2024-12-30 RX ADMIN — ACETAMINOPHEN 975 MG: 325 TABLET ORAL at 07:56

## 2024-12-30 RX ADMIN — KETAMINE HYDROCHLORIDE 20 MG: 10 INJECTION INTRAMUSCULAR; INTRAVENOUS at 08:01

## 2024-12-30 RX ADMIN — PROPOFOL 150 MG: 10 INJECTION, EMULSION INTRAVENOUS at 08:01

## 2024-12-30 RX ADMIN — FENTANYL CITRATE 50 MCG: 50 INJECTION, SOLUTION INTRAMUSCULAR; INTRAVENOUS at 14:55

## 2024-12-30 RX ADMIN — ROCURONIUM BROMIDE 20 MG: 50 INJECTION, SOLUTION INTRAVENOUS at 09:46

## 2024-12-30 RX ADMIN — FENTANYL CITRATE 25 MCG: 50 INJECTION, SOLUTION INTRAMUSCULAR; INTRAVENOUS at 16:08

## 2024-12-30 RX ADMIN — FENTANYL CITRATE 50 MCG: 50 INJECTION, SOLUTION INTRAMUSCULAR; INTRAVENOUS at 10:42

## 2024-12-30 RX ADMIN — FENTANYL CITRATE 50 MCG: 50 INJECTION, SOLUTION INTRAMUSCULAR; INTRAVENOUS at 07:54

## 2024-12-30 RX ADMIN — HYDROMORPHONE HYDROCHLORIDE 0.4 MG: 0.2 INJECTION, SOLUTION INTRAMUSCULAR; INTRAVENOUS; SUBCUTANEOUS at 23:54

## 2024-12-30 RX ADMIN — PROPOFOL 30 MG: 10 INJECTION, EMULSION INTRAVENOUS at 14:47

## 2024-12-30 RX ADMIN — SODIUM CHLORIDE, POTASSIUM CHLORIDE, SODIUM LACTATE AND CALCIUM CHLORIDE: 600; 310; 30; 20 INJECTION, SOLUTION INTRAVENOUS at 12:36

## 2024-12-30 RX ADMIN — FENTANYL CITRATE 50 MCG: 50 INJECTION, SOLUTION INTRAMUSCULAR; INTRAVENOUS at 15:03

## 2024-12-30 RX ADMIN — ROCURONIUM BROMIDE 20 MG: 50 INJECTION, SOLUTION INTRAVENOUS at 11:39

## 2024-12-30 RX ADMIN — PHENYLEPHRINE HYDROCHLORIDE 50 MCG: 10 INJECTION INTRAVENOUS at 13:34

## 2024-12-30 RX ADMIN — DEXMEDETOMIDINE HYDROCHLORIDE 8 MCG: 100 INJECTION, SOLUTION INTRAVENOUS at 10:15

## 2024-12-30 RX ADMIN — HYDROMORPHONE HYDROCHLORIDE 0.2 MG: 0.2 INJECTION, SOLUTION INTRAMUSCULAR; INTRAVENOUS; SUBCUTANEOUS at 16:24

## 2024-12-30 RX ADMIN — HYDROMORPHONE HYDROCHLORIDE 0.4 MG: 0.2 INJECTION, SOLUTION INTRAMUSCULAR; INTRAVENOUS; SUBCUTANEOUS at 19:50

## 2024-12-30 RX ADMIN — ROCURONIUM BROMIDE 20 MG: 50 INJECTION, SOLUTION INTRAVENOUS at 14:00

## 2024-12-30 RX ADMIN — LIDOCAINE HYDROCHLORIDE 30 MG: 10 INJECTION, SOLUTION INFILTRATION; PERINEURAL at 08:01

## 2024-12-30 RX ADMIN — FENTANYL CITRATE 50 MCG: 50 INJECTION, SOLUTION INTRAMUSCULAR; INTRAVENOUS at 13:09

## 2024-12-30 RX ADMIN — METRONIDAZOLE 500 MG: 500 INJECTION, SOLUTION INTRAVENOUS at 06:45

## 2024-12-30 RX ADMIN — PHENYLEPHRINE HYDROCHLORIDE 100 MCG: 10 INJECTION INTRAVENOUS at 08:04

## 2024-12-30 RX ADMIN — SODIUM CHLORIDE, POTASSIUM CHLORIDE, SODIUM LACTATE AND CALCIUM CHLORIDE: 600; 310; 30; 20 INJECTION, SOLUTION INTRAVENOUS at 06:45

## 2024-12-30 RX ADMIN — Medication 2 G: at 11:55

## 2024-12-30 RX ADMIN — KETAMINE HYDROCHLORIDE 20 MG: 10 INJECTION INTRAMUSCULAR; INTRAVENOUS at 08:58

## 2024-12-30 RX ADMIN — FENTANYL CITRATE 50 MCG: 50 INJECTION, SOLUTION INTRAMUSCULAR; INTRAVENOUS at 08:01

## 2024-12-30 RX ADMIN — SUGAMMADEX 200 MG: 100 INJECTION, SOLUTION INTRAVENOUS at 15:01

## 2024-12-30 RX ADMIN — MIDAZOLAM HYDROCHLORIDE 2 MG: 1 INJECTION, SOLUTION INTRAMUSCULAR; INTRAVENOUS at 07:54

## 2024-12-30 RX ADMIN — DEXMEDETOMIDINE HYDROCHLORIDE 12 MCG: 100 INJECTION, SOLUTION INTRAVENOUS at 10:01

## 2024-12-30 RX ADMIN — PROPOFOL 40 MG: 10 INJECTION, EMULSION INTRAVENOUS at 13:51

## 2024-12-30 ASSESSMENT — ACTIVITIES OF DAILY LIVING (ADL)
ADLS_ACUITY_SCORE: 32

## 2024-12-30 NOTE — PLAN OF CARE
Goal Outcome Evaluation:      Plan of Care Reviewed With: patient    Overall Patient Progress: no changeOverall Patient Progress: no change    Outcome Evaluation: Pt unable to tolerate bowel prep. She C/O abdominal pain, nausea and emesis x 1. Administered PRN medications.  Colostomy with yellow watery output. Blood sugar at 0000 232. TPN and soy lipids running per order. She is A&O and independent in her room.    BP 98/60 (BP Location: Right arm)   Pulse 73   Temp 97.6  F (36.4  C) (Oral)   Resp 20   Wt 46.3 kg (102 lb 1.6 oz)   LMP 09/25/2024 (Approximate)   SpO2 97%   BMI 20.62 kg/m      Problem: Nausea and Vomiting  Goal: Nausea and Vomiting Relief  Outcome: Not Progressing  Intervention: Prevent and Manage Nausea and Vomiting  Recent Flowsheet Documentation  Taken 12/29/2024 2023 by Anuradha Guzman RN  Nausea/Vomiting Interventions:   antiemetic   cool cloth applied  Oral Care: oral rinse provided  Environmental Support: calm environment promoted     Problem: Pain Acute  Goal: Optimal Pain Control and Function  Intervention: Prevent or Manage Pain  Recent Flowsheet Documentation  Taken 12/29/2024 2023 by Anuradha Guzman RN  Medication Review/Management: high-risk medications identified     Problem: Intestinal Obstruction  Goal: Optimal Bowel Function  Outcome: Progressing  Intervention: Promote Bowel Function  Recent Flowsheet Documentation  Taken 12/29/2024 2023 by Anuradha Guzman RN  Body Position: position changed independently  Head of Bed (HOB) Positioning: HOB at 30-45 degrees

## 2024-12-30 NOTE — PHARMACY-CONSULT NOTE
"Pharmacy Note: Parenteral Nutrition (PN) Management    Pharmacist consulted to dose PN for Dain Tejeda, a 44 year old female.    Subjective:    The patient is a new PN start.    The patient was started on PN in the hospital on 12/27/24.    Indication for PN therapy: bowel obstruction    Inadequate nutrition anticipated for > 7 days.     Enteral nutrition contraindicated due to:  bowel obstruction .    Pertinent diseases and other special considerations  n/a    Social History     Tobacco Use    Smoking status: Never     Passive exposure: Never    Smokeless tobacco: Never   Substance Use Topics    Alcohol use: No    Drug use: No     Objective:    Ht Readings from Last 1 Encounters:   12/11/24 1.499 m (4' 11\")     Wt Readings from Last 1 Encounters:   12/29/24 46.3 kg (102 lb 1.6 oz)       Body mass index is 20.62 kg/m .    Patient Vitals for the past 96 hrs:   Weight   12/29/24 1933 46.3 kg (102 lb 1.6 oz)   12/29/24 1124 46.3 kg (102 lb)   12/28/24 1943 46 kg (101 lb 8 oz)       Labs:  Last 3 days:  Recent Labs     12/28/24  0615 12/29/24  0603 12/30/24  0511 12/30/24  0711    134* 137  --    POTASSIUM 3.5 3.6 4.2  --    CHLORIDE 99 100 99  --    CO2 28 27 25  --    BUN 8.7 11.5 20.5*  --    CR 0.51 0.47* 0.62  --    ALEX 9.2 9.1 10.1  --    MAG 2.0 1.9 2.3  --    PHOS 4.7* 3.8 4.6*  --    PROTTOTAL 7.3  --  8.3  --    ALBUMIN 3.4*  --  3.7  --    PREALB 16.4*  --  19.2*  --    TRIG  --   --  74  --    HGB 10.5* 10.5* 11.5* 11.5*   HCT 32.8* 33.6* 35.5  --     290 354  --    BILITOTAL 0.3  --  0.2  --    AST 13  --  10  --    ALT 33  --  23  --    ALKPHOS 109  --  128  --    INR 1.09  --  1.01  --        Glucose (past 48 hours):   Recent Labs     12/28/24  1812 12/29/24  0603 12/29/24  1128 12/29/24  1805 12/30/24  0021 12/30/24  0511   * 128* 118* 150* 232* 189*       Intake/Output (last 24 hours): I/O last 3 completed shifts:  In: 2801.3 [P.O.:1260]  Out: -     Estimated CrCl: Estimated Creatinine " Clearance: 86.4 mL/min (based on SCr of 0.62 mg/dL).    Assessment:    Continue patient on PN therapy as a continuous central therapy.     Given the patient's current condition/oral intake, PN is still indicated.    Lab results reviewed: 12/30/24  Phosphorous trending upward, 4.6 mg/dL today. Will back off on phosphorous in TPN formula today.   Blood glucoses starting to trend up on TPN and steroids. Recommend insulin correction prn and will assess need to add insulin to TPN formula daily.    Plan:  Rate of PN: 60 mL/hr.  Formula: changes in bold  Amino Acids 70 grams  Dextrose 224 grams  Sodium 55 mEq/day  Potassium 40 mEq/day  Calcium 6 mEq/day  Magnesium 7 mEq/day  Phosphorus 3 mMol/day  Chloride: Acetate Ratio 1:1  Standard Multivitamins w/Vitamin K  Trace Elements  Thiamine 100 mg  Fat Emulsion: Omegaven 20 gm (10 x 2 bottles) IV daily.   Check BMP labs tomorrow.  Pharmacist will continue to follow the patient's lab results, clinical status and blood glucose results and make adjustments as appropriate.    Thank you for the consult.  Willow Parkinson RPH  12/30/2024 12:01 PM

## 2024-12-30 NOTE — BRIEF OP NOTE
[facility]    Brief Operative Note    Pre-operative diagnosis: Exacerbation of Crohn's disease with intestinal obstruction (H) [K50.912]  Post-operative diagnosis Same as pre-operative diagnosis    Procedure: EXPLORATORY LAPAROTOMY, TAKEDOWN OF COLOSTOMY , COMPLETION OF TOTAL ABDOMINAL COLECTOMY ,SMALL BOWEL RESECTION WITH EXTENSIVE LYSIS OF ADHESIONS AND ILEOSTOMY., N/A - Abdomen    Surgeon: Surgeons and Role:     * Hugo Lafleur MD - Primary     * Dania Mares PA-C - Assisting     * Claudio Beckham MD - Fellow - Assisting  Anesthesia: General with Block   Estimated Blood Loss: 50 mL from 12/30/2024  7:55 AM to 12/30/2024  3:17 PM      Drains: Ace-Colin  Specimens:   ID Type Source Tests Collected by Time Destination   1 : COLOSTOMY Tissue Large Intestine, Colon SURGICAL PATHOLOGY EXAM Hugo Lafleur MD 12/30/2024  9:10 AM    2 : COMPLETION TOTAL ABDOMINAL COLON AND TERMINAL ILIUM (SEND TO  IN [facility]) Tissue Large Intestine, Colon SURGICAL PATHOLOGY EXAM Hugo Lafleur MD 12/30/2024  1:03 PM    3 : SMALL BOWEL (SEND TO  IN [facility]) Tissue Small Intestine SURGICAL PATHOLOGY EXAM Hugo Lafleur MD 12/30/2024  1:18 PM    4 : ADDITIONAL SMALL BOWEL (SEND TO  IN [facility]) Tissue Small Intestine SURGICAL PATHOLOGY EXAM Hugo Lafleur MD 12/30/2024  1:38 PM      Findings:   Significant adhesions of entire colon and small bowel. Firm ascending colon with likely abscess cavity near the splenic flexure. Unavoidable serosal injuries to the small bowel that were resected with the specimen and 1 repaired primarily. One small bowel anastomosis .  Complications: None.  Implants: * No implants in log *        POSTOPERATIVE / POSTPROCEDURE NOTE - IMMEDIATE :    Surgeon(s)/Proceduralist(s) and Assistants (if any):  Surgeon(s):  Dania Mares PA-C Kane, William J, MD Damle, Aneel, MD Damle, Aneel, MD  Circulator: Paulina Dodson RN  Relief Circulator:  Cesar Schwartz, SHARON; Cyndie Garcia, SHARON  Relief Scrub: Leti Peng; Birdie Cadet  Scrub Person: Jena Restrepo    Procedure(s):  Exploratory laparotomy  Takedown of colostomy  Completion total abdominal colectomy  Small bowel resection  Extensive lysis of adhesions  End ileostomy creation    Procedure(s) findings:   Significant adhesions of entire colon and small bowel. Firm ascending colon with likely abscess cavity near the splenic flexure. Unavoidable serosal injuries to the small bowel that were resected with the specimen and 1 repaired primarily. One small bowel anastomosis.    Specimen(s) removed: Yes    (EBL) Estimated blood loss (ml): 50 mL    Postoperative/Postprocedure Diagnosis: Crohn's disease      KAY CARVER PA-C      ADDENDUM:    PATIENT DATA  Indicate Y or N:  Home O2 No  Hemodialysis No  Transplant patient No  Cirrhosis No  Steroids in last 30 days Yes  Immunomodulators in last 30 days No  Anticoagulation at time of surgery No   List medication   Prior abdominal surgery Yes  Pelvic irradiation No    Albumin within 30 days if known 3.7  Hgb within 30 days if known 11.5  Cr within 30 days if known 0.62  Body mass index is 20.62 kg/m .    OR DATA  Emergent Yes   <24 hours No   <1 week Yes  Bowel Prep (Y or N) Yes  Antibiotics (Y or N) Yes  DVT prophylaxis    Heparin Yes   SCD Yes   None No  Drain Yes  ASA (1,2,3,4,5 if unknown) 3  OR time (min) 372  Stents No  Transfuse >/= 2U No  Anastomosis   Stapled Yes   Handsewn No  Leak Test (pos, neg, not done) not done

## 2024-12-30 NOTE — INTERVAL H&P NOTE
I have reviewed the surgical (or preoperative) H&P that is linked to this encounter, and examined the patient. Noted changes include: Those as documented in my daily progress notes.     44F w/ partial large bowel obstruction secondary to Crohn's colitis with abscess.    Plan for laparoscopic completion total abdominal colectomy with end ileostomy.     Clinical Conditions Present on Arrival:  Clinically Significant Risk Factors Present on Admission                     Hugo Lafleur MD, AC  [facility]  Office: [phone]  12/30/2024 7:25 AM

## 2024-12-30 NOTE — OR NURSING
Dr. Figueroa contacted about patient's heart rate midd 110s to 122. Will continue to monitor. No orders at this time. Patient appears comfortable. Providing IV fluids.

## 2024-12-30 NOTE — PROGRESS NOTES
3:44 PM  Brief Medicine Progress Note    Attempted to see patient in morning and afternoon, but she was in the OR for total colectomy. Will follow-up on recs from specialists and see patient tomorrow.     Franklin Hall MD PGY2  [facility]

## 2024-12-30 NOTE — PROGRESS NOTES
Nutrition Therapy  Parenteral Nutrition follow-up       Dietitian to Pharmacy    Rate of TPN:  60 ml/hr continuous.       Grams Dextrose: 224 gm  Grams Protein: 70 gm Increase    Lipids: Omegaven 20 gm daily.      Will provide: 1241 kcals, 70 g protein, 224 g carb, 20 g lipid, 1440 ml fluid daily.    GIR=3.38 mg CHO/kg/min.         Current Nutrition Intake:  Diet: NPO    Food allergies:  Soy, gluten    Custom TPN per central line- -  60 gm AA, 224 gm Dextrose at 60 mL/hr with 20 g omegaven daily (2 x 100 ml bottle) (allergic to soy)   Provides:     TPN start 12/27/24.    New Findings:  In surgery for planned total colectomy with end ileostomy    Weight Trends  Admission wt: 47.2 kg (104 lb) 12/26  Current wt :   Date/Time Weight Weight Method   12/29/24 1933 46.3 kg (102 lb 1.6 oz) Standing scale   12/29/24 1124 46.3 kg (102 lb) Standing scale   12/28/24 1943 46 kg (101 lb 8 oz) Standing scale   12/26/24 0908 47.2 kg (104 lb)        Dosing Weight: 47.2 kg     ASSESSED NUTRITION NEEDS  Estimated Energy Needs: 1457-4473 kcals/day (25 -30 kcals/kg )  Justification: Maintenance  Estimated Protein Needs: 56-70 grams protein/day (1.2 - 1.5 grams of pro/kg)  Justification: Increased needs  Estimated Fluid Needs: 7551-3618 mL/day (30 - 35 mL/kg)   Justification: Increased needs    GI:  Colostomy output 400 ml 12/28/24    Labs:   Labs reviewed   Phos 4.6 sl H  Glucose 189 H  Triglyceride 74  FSBG 232 H    Medications:   Reviewed by dietitian   Some meds on hold for surgery  Cont IV, LR at 100 mL/hr    Nutrition Diagnosis  Inadequate oral intake related to altered GI function as evidenced by NPO and need for nutrition support. -continues.    Goals   Meet nutrition needs-progressing  Maintain weigh- progressing    INTERVENTIONS  Implementation  TPN-Modify (see above)    Monitoring/Evaluation  Progress toward goals will be monitored and evaluated per protocol.

## 2024-12-30 NOTE — ANESTHESIA POSTPROCEDURE EVALUATION
Patient: Dain Tejeda    Procedure: Procedure(s):  EXPLORATORY LAPAROTOMY, TAKEDOWN OF COLOSTOMY , COMPLETION OF TOTAL ABDOMINAL COLECTOMY ,SMALL BOWEL RESECTION WITH EXTENSIVE LYSIS OF ADHESIONS AND ILEOSTOMY.       Anesthesia Type:  General    Note:  Disposition: Inpatient   Postop Pain Control: Uneventful            Sign Out: Well controlled pain   PONV: No   Neuro/Psych: Uneventful            Sign Out: Acceptable/Baseline neuro status   Airway/Respiratory: Uneventful            Sign Out: Acceptable/Baseline resp. status   CV/Hemodynamics: Uneventful            Sign Out: Acceptable CV status; No obvious hypovolemia; No obvious fluid overload   Other NRE:    DID A NON-ROUTINE EVENT OCCUR? No           Last vitals:  Vitals Value Taken Time   /98 12/30/24 1615   Temp 31.1  C (87.98  F) 12/30/24 1623   Pulse 120 12/30/24 1623   Resp 20 12/30/24 1623   SpO2 100 % 12/30/24 1623   Vitals shown include unfiled device data.    Electronically Signed By: Nina Maurer MD  December 30, 2024  4:25 PM

## 2024-12-30 NOTE — ANESTHESIA CARE TRANSFER NOTE
Patient: Dain Tejeda    Procedure: Procedure(s):  EXPLORATORY LAPAROTOMY, TAKEDOWN OF COLOSTOMY , COMPLETION OF TOTAL ABDOMINAL COLECTOMY ,SMALL BOWEL RESECTION WITH EXTENSIVE LYSIS OF ADHESIONS AND ILEOSTOMY.       Diagnosis: Exacerbation of Crohn's disease with intestinal obstruction (H) [K50.912]  Diagnosis Additional Information: No value filed.    Anesthesia Type:   General     Note:    Oropharynx: oropharynx clear of all foreign objects, spontaneously breathing and nasal gatric tube in place  Level of Consciousness: drowsy  Oxygen Supplementation: face mask  Level of Supplemental Oxygen (L/min / FiO2): 6  Independent Airway: airway patency satisfactory and stable  Dentition: dentition unchanged  Vital Signs Stable: post-procedure vital signs reviewed and stable  Report to RN Given: handoff report given  Patient transferred to: PACU    Handoff Report: Identifed the Patient, Identified the Reponsible Provider, Reviewed the pertinent medical history, Discussed the surgical course, Reviewed Intra-OP anesthesia mangement and issues during anesthesia, Set expectations for post-procedure period and Allowed opportunity for questions and acknowledgement of understanding  Vitals:  Vitals Value Taken Time   /84 12/30/24 1524   Temp 36.3  C (97.3  F) 12/30/24 1524   Pulse 117 12/30/24 1527   Resp 22 12/30/24 1527   SpO2 100 % 12/30/24 1527   Vitals shown include unfiled device data.    Electronically Signed By: HEATHER Lacey CRNA  December 30, 2024  3:29 PM

## 2024-12-30 NOTE — ANESTHESIA PROCEDURE NOTES
Airway       Patient location during procedure: OR       Procedure Start/Stop Times: 12/30/2024 8:03 AM  Staff -        CRNA: Janeen Phelan APRN CRNA       Performed By: CRNA  Consent for Airway        Urgency: elective  Indications and Patient Condition       Indications for airway management: pilo-procedural       Induction type:intravenous       Mask difficulty assessment: 1 - vent by mask    Final Airway Details       Final airway type: endotracheal airway       Successful airway: ETT - single and Oral  Endotracheal Airway Details        ETT size (mm): 7.0       Cuffed: yes       Cuff volume (mL): 8       Successful intubation technique: direct laryngoscopy       DL Blade Type: Sue 2       Grade View of Cords: 1       Adjucts: stylet       Position: Right       Measured from: gums/teeth       Secured at (cm): 22       Bite block used: None    Post intubation assessment        Placement verified by: capnometry, equal breath sounds and chest rise        Number of attempts at approach: 1       Secured with: silk tape       Ease of procedure: easy       Dentition: Intact and Unchanged       Dental guard used and removed.    Medication(s) Administered   Medication Administration Time: 12/30/2024 8:03 AM

## 2024-12-30 NOTE — CONSULTS
WOC Note    Consult for stoma marking noted to be entered after WOC service hours on 12/27/24. When WOC arrived for shift on 12/30/24 - patient was noted to be in OR in the Epic system.    Milly Babcock MSN RN CWOCN  Pager no longer is use, please contact through [employer]: [facility] [employer] [facility]

## 2024-12-30 NOTE — ANESTHESIA PREPROCEDURE EVALUATION
Anesthesia Pre-Procedure Evaluation    Patient: Dain Tejeda   MRN: [id number] : 1980        Procedure : Procedure(s):  LAPAROTOMY          Past Medical History:   Diagnosis Date    Diet controlled gestational diabetes mellitus (GDM), antepartum 2017    Attempting diet control first    Gestational diabetes mellitus (GDM) in third trimester 2017    Attempting diet control first  Formatting of this note might be different from the original. Overview:  Attempting diet control first    Nausea/vomiting in pregnancy 2017    Postpartum hemorrhage 2017    Third degree laceration of perineum during delivery, postpartum 11/15/2017      Past Surgical History:   Procedure Laterality Date    COLONOSCOPY N/A 10/31/2022    Procedure: COLONOSCOPY with biopsies;  Surgeon: Luis Miguel Traore MD;  Location: [facility] GI    COLOSTOMY N/A 2022    Procedure: CREATION, COLOSTOMY;  Surgeon: Chandana Carlos DO;  Location: [facility] OR    LAPAROTOMY EXPLORATORY N/A 2022    Procedure: EXPLORATORY LAPAROTOMY SPLENIC FLEXURE MOBILIZATION;  Surgeon: Chandana Carlos DO;  Location: [facility] OR    PICC DOUBLE LUMEN PLACEMENT  10/22/2024    PICC TRIPLE LUMEN PLACEMENT  5/10/2024    RECTAL PROLAPSE REPAIR N/A 2022    Procedure: RESECTION OF DESCENDING COLON;  Surgeon: Chandana Carlos DO;  Location: [facility] OR      Allergies   Allergen Reactions    Gluten Meal      Celiac    Soy Allergy (Do Not Select) Anaphylaxis, Hives and Itching     Tofu- causes itching and sores in the mouth      Social History     Tobacco Use    Smoking status: Never     Passive exposure: Never    Smokeless tobacco: Never   Substance Use Topics    Alcohol use: No      Wt Readings from Last 1 Encounters:   24 46.3 kg (102 lb 1.6 oz)        Anesthesia Evaluation   Pt has not had prior anesthetic         ROS/MED HX  ENT/Pulmonary: Comment: R/o Tb: airborne precautions   (-) sleep apnea   Neurologic:  - neg  neurologic ROS     Cardiovascular:  - neg cardiovascular ROS  (-) hypertension, CAD and dyslipidemia   METS/Exercise Tolerance:     Hematologic:  - neg hematologic  ROS     Musculoskeletal:  - neg musculoskeletal ROS     GI/Hepatic: Comment: IBD/Celiac  Bowel obstruction   (-) GERD and esophageal disease   Renal/Genitourinary:  - neg Renal ROS     Endo:  - neg endo ROS     Psychiatric/Substance Use:  - neg psychiatric ROS     Infectious Disease:  - neg infectious disease ROS     Malignancy:       Other:            Physical Exam    Airway  airway exam normal      Mallampati: II    Neck ROM: full   Mouth opening: > 3 cm    Respiratory Devices and Support         Dental     Comment: Poor dentition      B=Bridge, C=Chipped, L=Loose, M=Missing    Cardiovascular   cardiovascular exam normal       Rhythm and rate: regular and normal     Pulmonary   pulmonary exam normal        breath sounds clear to auscultation         OUTSIDE LABS:  CBC:   Lab Results   Component Value Date    WBC 14.3 (H) 12/30/2024    WBC 11.3 (H) 12/29/2024    HGB 11.5 (L) 12/30/2024    HGB 10.5 (L) 12/29/2024    HCT 35.5 12/30/2024    HCT 33.6 (L) 12/29/2024     12/30/2024     12/29/2024     BMP:   Lab Results   Component Value Date     (L) 12/29/2024     12/28/2024    POTASSIUM 3.6 12/29/2024    POTASSIUM 3.5 12/28/2024    CHLORIDE 100 12/29/2024    CHLORIDE 99 12/28/2024    CO2 27 12/29/2024    CO2 28 12/28/2024    BUN 11.5 12/29/2024    BUN 8.7 12/28/2024    CR 0.47 (L) 12/29/2024    CR 0.51 12/28/2024     (H) 12/30/2024     (H) 12/29/2024     COAGS:   Lab Results   Component Value Date    PTT 29 05/03/2024    INR 1.09 12/28/2024     POC:   Lab Results   Component Value Date    HCG Negative 09/11/2024    HCGS Negative 11/13/2024     HEPATIC:   Lab Results   Component Value Date    ALBUMIN 3.4 (L) 12/28/2024    PROTTOTAL 7.3 12/28/2024    ALT 33 12/28/2024    AST 13 12/28/2024    ALKPHOS 109 12/28/2024     BILITOTAL 0.3 12/28/2024     OTHER:   Lab Results   Component Value Date    PH 7.39 05/03/2024    LACT 1.2 10/19/2024    ALEX 9.1 12/29/2024    PHOS 3.8 12/29/2024    MAG 1.9 12/29/2024    LIPASE 27 12/26/2024    TSH 1.58 08/09/2023    CRP 6.1 (H) 05/24/2022    SED 90 (H) 05/24/2022       Anesthesia Plan    ASA Status:  3    NPO Status:  NPO Appropriate    Anesthesia Type: General.     - Airway: ETT   Induction: Intravenous, RSI.   Maintenance: TIVA.        Consents    Anesthesia Plan(s) and associated risks, benefits, and realistic alternatives discussed. Questions answered and patient/representative(s) expressed understanding.     - Discussed:     - Discussed with:  Patient      - Extended Intubation/Ventilatory Support Discussed: No.      - Patient is DNR/DNI Status: No     Use of blood products discussed: Yes.     - Discussed with: Patient.     - Consented: consented to blood products     Postoperative Care    Pain management: IV analgesics, Oral pain medications, Peripheral nerve block (Single Shot).   PONV prophylaxis: Ondansetron (or other 5HT-3), Dexamethasone or Solumedrol     Comments:                Michel Figueroa MD

## 2024-12-30 NOTE — PROGRESS NOTES
BRIEF GI NOTE:    Attempted to see patient this morning and afternoon, currently in surgery.     GI will continue to follow and see the patient tomorrow.     Discussed with Dr. Matty Ruff Will CNP  [facility]

## 2024-12-30 NOTE — PROGRESS NOTES
Report given to LINDSAY RN regarding pt.  here and sitting outside the room. Currently pt getting CHG wipes to prepare her.

## 2024-12-30 NOTE — ANESTHESIA PROCEDURE NOTES
"TAP Procedure Note    Pre-Procedure   Staff -        Anesthesiologist:  Nina Maurer MD       Performed By: anesthesiologist       Location: OR       Procedure Start/Stop Times: 12/30/2024 3:15 PM and 12/30/2024 3:17 PM       Pre-Anesthestic Checklist: patient identified, IV checked, site marked, risks and benefits discussed, informed consent, monitors and equipment checked, pre-op evaluation, at physician/surgeon's request and post-op pain management  Timeout:       Correct Patient: Yes        Correct Procedure: Yes        Correct Site: Yes        Correct Position: Yes        Correct Laterality: Yes        Site Marked: Yes  Procedure Documentation  Procedure: TAP       Laterality: bilateral       Patient Position: supine       Patient Prep/Sterile Barriers: sterile gloves       Skin prep: Chloraprep       Needle Type: Touhy needle       1. Ultrasound was used to identify targeted nerve, plexus, vascular marker, or fascial plane and place a needle adjacent to it in real-time.       2. Ultrasound was used to visualize the spread of anesthetic in close proximity to the above referenced structure.       4. The visualized anatomic structures appeared normal.       5. There were no apparent abnormal pathologic findings.    Assessment/Narrative         The placement was negative for: blood aspirated, painful injection and site bleeding       Paresthesias: No.       Bolus given via needle. no blood aspirated via catheter.        Secured via.        Insertion/Infusion Method: Single Shot    Medication(s) Administered   Ropivacaine 0.5% w/ 1:200K Epi (Injection) (Mixture components: ROPivacaine 5 MG/ML Soln, 50 mL; EPINEPHrine 1 MG/ML Soln, 0.25 mL) - Injection   40 mL - 12/30/2024 3:15:00 PM  Medication Administration Time: 12/30/2024 3:15 PM      FOR [facility] ([facility]) ONLY:   Pain Team Contact information: please page the Pain Team Via [employer]. Search \"Pain\". During daytime hours, please page the attending first. " At night please page the resident first.

## 2024-12-31 ENCOUNTER — APPOINTMENT (OUTPATIENT)
Dept: RADIOLOGY | Facility: HOSPITAL | Age: 44
End: 2024-12-31
Payer: COMMERCIAL

## 2024-12-31 DIAGNOSIS — K50.114 CROHN'S DISEASE OF COLON WITH ABSCESS (H): ICD-10-CM

## 2024-12-31 LAB
ALBUMIN SERPL BCG-MCNC: 3.1 G/DL (ref 3.5–5.2)
ALP SERPL-CCNC: 103 U/L (ref 40–150)
ALT SERPL W P-5'-P-CCNC: 18 U/L (ref 0–50)
ANION GAP SERPL CALCULATED.3IONS-SCNC: 10 MMOL/L (ref 7–15)
ANION GAP SERPL CALCULATED.3IONS-SCNC: 11 MMOL/L (ref 7–15)
AST SERPL W P-5'-P-CCNC: 20 U/L (ref 0–45)
ATRIAL RATE - MUSE: 118 BPM
ATRIAL RATE - MUSE: 118 BPM
BASE EXCESS BLDV CALC-SCNC: 3.9 MMOL/L (ref -3–3)
BILIRUB SERPL-MCNC: 0.2 MG/DL
BUN SERPL-MCNC: 11.5 MG/DL (ref 6–20)
BUN SERPL-MCNC: 13.7 MG/DL (ref 6–20)
CALCIUM SERPL-MCNC: 8.6 MG/DL (ref 8.8–10.4)
CALCIUM SERPL-MCNC: 8.9 MG/DL (ref 8.8–10.4)
CHLORIDE SERPL-SCNC: 100 MMOL/L (ref 98–107)
CHLORIDE SERPL-SCNC: 104 MMOL/L (ref 98–107)
CREAT SERPL-MCNC: 0.47 MG/DL (ref 0.51–0.95)
CREAT SERPL-MCNC: 0.6 MG/DL (ref 0.51–0.95)
D DIMER PPP FEU-MCNC: 3.75 UG/ML FEU (ref 0–0.5)
DIASTOLIC BLOOD PRESSURE - MUSE: NORMAL MMHG
DIASTOLIC BLOOD PRESSURE - MUSE: NORMAL MMHG
EGFRCR SERPLBLD CKD-EPI 2021: >90 ML/MIN/1.73M2
EGFRCR SERPLBLD CKD-EPI 2021: >90 ML/MIN/1.73M2
ERYTHROCYTE [DISTWIDTH] IN BLOOD BY AUTOMATED COUNT: 14.2 % (ref 10–15)
ERYTHROCYTE [DISTWIDTH] IN BLOOD BY AUTOMATED COUNT: 14.2 % (ref 10–15)
EST. AVERAGE GLUCOSE BLD GHB EST-MCNC: 88 MG/DL
GLUCOSE BLDC GLUCOMTR-MCNC: 139 MG/DL (ref 70–99)
GLUCOSE BLDC GLUCOMTR-MCNC: 151 MG/DL (ref 70–99)
GLUCOSE BLDC GLUCOMTR-MCNC: 161 MG/DL (ref 70–99)
GLUCOSE BLDC GLUCOMTR-MCNC: 169 MG/DL (ref 70–99)
GLUCOSE BLDC GLUCOMTR-MCNC: 175 MG/DL (ref 70–99)
GLUCOSE BLDC GLUCOMTR-MCNC: 177 MG/DL (ref 70–99)
GLUCOSE BLDC GLUCOMTR-MCNC: 185 MG/DL (ref 70–99)
GLUCOSE SERPL-MCNC: 158 MG/DL (ref 70–99)
GLUCOSE SERPL-MCNC: 166 MG/DL (ref 70–99)
HBA1C MFR BLD: 4.7 %
HCO3 BLDV-SCNC: 31 MMOL/L (ref 21–28)
HCO3 SERPL-SCNC: 26 MMOL/L (ref 22–29)
HCO3 SERPL-SCNC: 27 MMOL/L (ref 22–29)
HCT VFR BLD AUTO: 33.2 % (ref 35–47)
HCT VFR BLD AUTO: 37.3 % (ref 35–47)
HGB BLD-MCNC: 10.8 G/DL (ref 11.7–15.7)
HGB BLD-MCNC: 12.1 G/DL (ref 11.7–15.7)
HOLD SPECIMEN: NORMAL
INTERPRETATION ECG - MUSE: NORMAL
INTERPRETATION ECG - MUSE: NORMAL
LACTATE SERPL-SCNC: 2.3 MMOL/L (ref 0.7–2)
MAGNESIUM SERPL-MCNC: 1.7 MG/DL (ref 1.7–2.3)
MCH RBC QN AUTO: 29.6 PG (ref 26.5–33)
MCH RBC QN AUTO: 29.8 PG (ref 26.5–33)
MCHC RBC AUTO-ENTMCNC: 32.4 G/DL (ref 31.5–36.5)
MCHC RBC AUTO-ENTMCNC: 32.5 G/DL (ref 31.5–36.5)
MCV RBC AUTO: 91 FL (ref 78–100)
MCV RBC AUTO: 92 FL (ref 78–100)
O2/TOTAL GAS SETTING VFR VENT: 21 %
OXYHGB MFR BLDV: 21 % (ref 70–75)
P AXIS - MUSE: 62 DEGREES
P AXIS - MUSE: 67 DEGREES
PCO2 BLDV: 60 MM HG (ref 40–50)
PH BLDV: 7.33 [PH] (ref 7.32–7.43)
PHOSPHATE SERPL-MCNC: 3.2 MG/DL (ref 2.5–4.5)
PLATELET # BLD AUTO: 393 10E3/UL (ref 150–450)
PLATELET # BLD AUTO: 407 10E3/UL (ref 150–450)
PO2 BLDV: 20 MM HG (ref 25–47)
POTASSIUM SERPL-SCNC: 4 MMOL/L (ref 3.4–5.3)
POTASSIUM SERPL-SCNC: 5.1 MMOL/L (ref 3.4–5.3)
PR INTERVAL - MUSE: 144 MS
PR INTERVAL - MUSE: 152 MS
PROT SERPL-MCNC: 6.9 G/DL (ref 6.4–8.3)
QRS DURATION - MUSE: 64 MS
QRS DURATION - MUSE: 66 MS
QT - MUSE: 304 MS
QT - MUSE: 314 MS
QTC - MUSE: 426 MS
QTC - MUSE: 440 MS
R AXIS - MUSE: 27 DEGREES
R AXIS - MUSE: 54 DEGREES
RBC # BLD AUTO: 3.63 10E6/UL (ref 3.8–5.2)
RBC # BLD AUTO: 4.09 10E6/UL (ref 3.8–5.2)
SAO2 % BLDV: 21.5 % (ref 70–75)
SODIUM SERPL-SCNC: 138 MMOL/L (ref 135–145)
SODIUM SERPL-SCNC: 140 MMOL/L (ref 135–145)
SYSTOLIC BLOOD PRESSURE - MUSE: NORMAL MMHG
SYSTOLIC BLOOD PRESSURE - MUSE: NORMAL MMHG
T AXIS - MUSE: 58 DEGREES
T AXIS - MUSE: 64 DEGREES
TROPONIN T SERPL HS-MCNC: <6 NG/L
VENTRICULAR RATE- MUSE: 118 BPM
VENTRICULAR RATE- MUSE: 118 BPM
WBC # BLD AUTO: 24.4 10E3/UL (ref 4–11)
WBC # BLD AUTO: 25 10E3/UL (ref 4–11)

## 2024-12-31 PROCEDURE — 258N000003 HC RX IP 258 OP 636: Performed by: COLON & RECTAL SURGERY

## 2024-12-31 PROCEDURE — 82805 BLOOD GASES W/O2 SATURATION: CPT | Performed by: FAMILY MEDICINE

## 2024-12-31 PROCEDURE — 93010 ELECTROCARDIOGRAM REPORT: CPT | Performed by: STUDENT IN AN ORGANIZED HEALTH CARE EDUCATION/TRAINING PROGRAM

## 2024-12-31 PROCEDURE — 99207 ECG 12-LEAD WITH MUSE (LHE): CPT | Performed by: INTERNAL MEDICINE

## 2024-12-31 PROCEDURE — 83735 ASSAY OF MAGNESIUM: CPT | Performed by: STUDENT IN AN ORGANIZED HEALTH CARE EDUCATION/TRAINING PROGRAM

## 2024-12-31 PROCEDURE — 120N000001 HC R&B MED SURG/OB

## 2024-12-31 PROCEDURE — 71045 X-RAY EXAM CHEST 1 VIEW: CPT

## 2024-12-31 PROCEDURE — 250N000011 HC RX IP 250 OP 636: Performed by: COLON & RECTAL SURGERY

## 2024-12-31 PROCEDURE — 250N000011 HC RX IP 250 OP 636

## 2024-12-31 PROCEDURE — 85014 HEMATOCRIT: CPT

## 2024-12-31 PROCEDURE — 83605 ASSAY OF LACTIC ACID: CPT

## 2024-12-31 PROCEDURE — 999N000157 HC STATISTIC RCP TIME EA 10 MIN

## 2024-12-31 PROCEDURE — G0463 HOSPITAL OUTPT CLINIC VISIT: HCPCS

## 2024-12-31 PROCEDURE — 84100 ASSAY OF PHOSPHORUS: CPT | Performed by: FAMILY MEDICINE

## 2024-12-31 PROCEDURE — 85379 FIBRIN DEGRADATION QUANT: CPT

## 2024-12-31 PROCEDURE — 84484 ASSAY OF TROPONIN QUANT: CPT

## 2024-12-31 PROCEDURE — 258N000003 HC RX IP 258 OP 636: Performed by: STUDENT IN AN ORGANIZED HEALTH CARE EDUCATION/TRAINING PROGRAM

## 2024-12-31 PROCEDURE — 93005 ELECTROCARDIOGRAM TRACING: CPT

## 2024-12-31 PROCEDURE — 74018 RADEX ABDOMEN 1 VIEW: CPT

## 2024-12-31 PROCEDURE — 85018 HEMOGLOBIN: CPT

## 2024-12-31 PROCEDURE — 250N000009 HC RX 250: Performed by: COLON & RECTAL SURGERY

## 2024-12-31 PROCEDURE — 80053 COMPREHEN METABOLIC PANEL: CPT

## 2024-12-31 PROCEDURE — 250N000012 HC RX MED GY IP 250 OP 636 PS 637

## 2024-12-31 PROCEDURE — 83036 HEMOGLOBIN GLYCOSYLATED A1C: CPT | Performed by: COLON & RECTAL SURGERY

## 2024-12-31 PROCEDURE — 93010 ELECTROCARDIOGRAM REPORT: CPT | Mod: 77 | Performed by: INTERNAL MEDICINE

## 2024-12-31 PROCEDURE — 250N000013 HC RX MED GY IP 250 OP 250 PS 637

## 2024-12-31 PROCEDURE — 250N000011 HC RX IP 250 OP 636: Performed by: STUDENT IN AN ORGANIZED HEALTH CARE EDUCATION/TRAINING PROGRAM

## 2024-12-31 PROCEDURE — 93010 ELECTROCARDIOGRAM REPORT: CPT | Mod: RTG | Performed by: STUDENT IN AN ORGANIZED HEALTH CARE EDUCATION/TRAINING PROGRAM

## 2024-12-31 RX ORDER — METHOCARBAMOL 100 MG/ML
1000 INJECTION, SOLUTION INTRAMUSCULAR; INTRAVENOUS EVERY 8 HOURS
Status: COMPLETED | OUTPATIENT
Start: 2024-12-31 | End: 2025-01-02

## 2024-12-31 RX ORDER — ACETAMINOPHEN 325 MG/1
975 TABLET ORAL EVERY 8 HOURS
Status: DISCONTINUED | OUTPATIENT
Start: 2024-12-31 | End: 2024-12-31

## 2024-12-31 RX ORDER — HYDROMORPHONE HCL IN WATER/PF 6 MG/30 ML
0.4 PATIENT CONTROLLED ANALGESIA SYRINGE INTRAVENOUS ONCE
Status: COMPLETED | OUTPATIENT
Start: 2024-12-31 | End: 2024-12-31

## 2024-12-31 RX ORDER — PREDNISONE 10 MG/1
TABLET ORAL
Qty: 21 TABLET | OUTPATIENT
Start: 2024-12-31 | End: 2025-01-21

## 2024-12-31 RX ORDER — ACETAMINOPHEN 10 MG/ML
1000 INJECTION, SOLUTION INTRAVENOUS EVERY 8 HOURS
Status: DISCONTINUED | OUTPATIENT
Start: 2024-12-31 | End: 2025-01-13

## 2024-12-31 RX ADMIN — Medication: at 02:30

## 2024-12-31 RX ADMIN — INSULIN ASPART 1 UNITS: 100 INJECTION, SOLUTION INTRAVENOUS; SUBCUTANEOUS at 03:19

## 2024-12-31 RX ADMIN — METHOCARBAMOL 1000 MG: 100 INJECTION INTRAMUSCULAR; INTRAVENOUS at 08:31

## 2024-12-31 RX ADMIN — INSULIN ASPART 1 UNITS: 100 INJECTION, SOLUTION INTRAVENOUS; SUBCUTANEOUS at 07:22

## 2024-12-31 RX ADMIN — ACETAMINOPHEN 1000 MG: 10 INJECTION, SOLUTION INTRAVENOUS at 18:58

## 2024-12-31 RX ADMIN — ACETAMINOPHEN 1000 MG: 10 INJECTION, SOLUTION INTRAVENOUS at 10:38

## 2024-12-31 RX ADMIN — METHOCARBAMOL 1000 MG: 100 INJECTION INTRAMUSCULAR; INTRAVENOUS at 15:21

## 2024-12-31 RX ADMIN — PHENOL 1 ML: 1.5 LIQUID ORAL at 15:28

## 2024-12-31 RX ADMIN — INSULIN ASPART 1 UNITS: 100 INJECTION, SOLUTION INTRAVENOUS; SUBCUTANEOUS at 19:00

## 2024-12-31 RX ADMIN — HYDROMORPHONE HYDROCHLORIDE 0.4 MG: 0.2 INJECTION, SOLUTION INTRAMUSCULAR; INTRAVENOUS; SUBCUTANEOUS at 01:13

## 2024-12-31 RX ADMIN — INSULIN ASPART 1 UNITS: 100 INJECTION, SOLUTION INTRAVENOUS; SUBCUTANEOUS at 10:46

## 2024-12-31 RX ADMIN — ENOXAPARIN SODIUM 40 MG: 40 INJECTION SUBCUTANEOUS at 08:38

## 2024-12-31 RX ADMIN — SODIUM CHLORIDE, POTASSIUM CHLORIDE, SODIUM LACTATE AND CALCIUM CHLORIDE 500 ML: 600; 310; 30; 20 INJECTION, SOLUTION INTRAVENOUS at 02:00

## 2024-12-31 RX ADMIN — SODIUM CHLORIDE, POTASSIUM CHLORIDE, SODIUM LACTATE AND CALCIUM CHLORIDE: 600; 310; 30; 20 INJECTION, SOLUTION INTRAVENOUS at 04:05

## 2024-12-31 RX ADMIN — MAGNESIUM SULFATE HEPTAHYDRATE: 500 INJECTION, SOLUTION INTRAMUSCULAR; INTRAVENOUS at 21:09

## 2024-12-31 ASSESSMENT — ACTIVITIES OF DAILY LIVING (ADL)
ADLS_ACUITY_SCORE: 42
ADLS_ACUITY_SCORE: 36
ADLS_ACUITY_SCORE: 32
ADLS_ACUITY_SCORE: 36
ADLS_ACUITY_SCORE: 42
ADLS_ACUITY_SCORE: 36
ADLS_ACUITY_SCORE: 32
ADLS_ACUITY_SCORE: 36
ADLS_ACUITY_SCORE: 32
ADLS_ACUITY_SCORE: 36
ADLS_ACUITY_SCORE: 42
ADLS_ACUITY_SCORE: 42
ADLS_ACUITY_SCORE: 36
ADLS_ACUITY_SCORE: 36

## 2024-12-31 NOTE — PLAN OF CARE
Problem: Surgery Nonspecified  Goal: Effective Oxygenation and Ventilation  Intervention: Optimize Oxygenation and Ventilation  Recent Flowsheet Documentation  Taken 12/30/2024 2100 by Frances Duarte RN  Cough And Deep Breathing: done with encouragement     Problem: Adult Inpatient Plan of Care  Goal: Absence of Hospital-Acquired Illness or Injury  Intervention: Prevent and Manage VTE (Venous Thromboembolism) Risk  Recent Flowsheet Documentation  Taken 12/30/2024 2100 by Frances Duarte RN  VTE Prevention/Management: SCDs on (sequential compression devices)   Goal Outcome Evaluation:  Made patient comfortable in bed with needed medications and care given . Reassurance given to allay anxiety.

## 2024-12-31 NOTE — PROGRESS NOTES
[facility]    Progress Note - Hospitalist Service       Date of Admission:  12/26/2024    Assessment & Plan   Dain Tejeda is a 44 year old female w/PMHx significant for Crohn's Disease s/p partial colectomy/colostomy and recent admission for infectious colitis admitted on 12/26/2024 for acute abdominal pain, fth bowel obstruction near ileum s/p ex lap, complete total colectomy and ileostomy, small bowel resection, and lysis of adhesions on 12/31. Hospitalization c/b chest pain shortly after surgery on 12/30. Broad workup unremarkable, thought to be 2/2 poorly controlled post-op pain, now on PCA.      Family medicine service will be signing-off. Please do not hesitate to reach out if further assistance is needed.     Ileal bowel obstruction s/p ex lap w/total colectomy and ileostomy  Small bowel adhesions s/p resection w/lysis   Hx of Crohn's Disease s/p partial colectomy/colostomy  Hx of strictures   Patient presents w/2d of intermittent, severe abdominal cramping in lower abdomen in setting of known complicated Crohn's Disease, associated with n/v and poor PO intake. Colostomy output on admission noted for dark, maroon colored stools. CT AP showed strictures in transverse colon and bowel obstruction upstream to distal ileus. Taken to OR 12/30 for extensive procedure w/CRS. Also found to have small bowel adhesions during ex lap. Overnight, she developed severe pain and tachycardia, but broad workup negative for cardiopulmonary etiology. Presumed to be 2/2 post-operative pain.   -CRS now primary  -GI following, recs appreciated              - Recommend IV steroids 20 mg Solu-Medrol every 8 hours  - DVT prophylaxis.  - Avoid smoking and NSAIDs.  - Plans noted for appropriate colectomy with end ileostomy on Monday  - Follow-up with IBD clinic at [facility].  - IV fluids as per surgery.          Diet: parenteral nutrition - ADULT compounded formula  NPO for Medical/Clinical Reasons  Except for: NPO but receiving PN  parenteral nutrition - ADULT compounded formula    DVT Prophylaxis: Pneumatic Compression Devices  Chambers Catheter: PRESENT, indication: Surgical procedure  Fluids: NPO, getting TPN  Lines: PRESENT      PICC 12/26/24 Double Lumen Left Basilic tpn-Site Assessment: WDL      Cardiac Monitoring: None  Code Status: Full Code      Clinically Significant Risk Factors            # Hypercalcemia: corrected calcium is >10.1, will monitor as appropriate    # Hypoalbuminemia: Lowest albumin = 3.1 g/dL at 12/31/2024  1:15 AM, will monitor as appropriate                    # Financial/Environmental Concerns: none         Social Drivers of Health          Disposition Plan     Medically Ready for Discharge: Anticipated in 2-4 Days         The patient's care was discussed with the Attending Physician, Dr. Bernstein .    Franklin Hall MD  Hospitalist Service  [facility]  Securely message with [facility] (more info)  Text page via [employer] Paging/Directory   ______________________________________________________________________    Interval History   TOBI  Pain well controlled  Unable to get [employer]  but daughter in room able to interpret    Physical Exam   Vital Signs: Temp: 98.3  F (36.8  C) Temp src: Oral BP: 103/69 Pulse: 109   Resp: 18 SpO2: 96 % O2 Device: None (Room air) Oxygen Delivery: 2 LPM  Weight: 102 lbs 1.6 oz    GENERAL: Active, alert, uncomfortable appearing, diaphoretic, NAD  LUNGS: Normal WOB, mild tachypnea, lungs clear to auscultation  HEART: Appears well-perfused, tachycardic, regular rhythm, murmurs/rubs/clicks not appreciated   ABD: Bandage/dressing over abdomen CDI. Ileostomy w/serosanguinous drainage.   MSK: ROM of all 4 extremities grossly intact, no BLE edema on gross examination   SKIN: No obvious lesions on gross examination  NEUROLOGIC: Normal tone throughout. Normal reflexes for age  PSYCH: Tired, anxious         Data     I have personally reviewed  the following data over the past 24 hrs:    24.4 (H)  \   10.8 (L)   / 393     140 104 11.5 /  151 (H)   4.0 26 0.47 (L) \     ALT: 18 AST: 20 AP: 103 TBILI: 0.2   ALB: 3.1 (L) TOT PROTEIN: 6.9 LIPASE: N/A     Trop: <6 BNP: N/A     Procal: N/A CRP: N/A Lactic Acid: 2.3 (H)       INR:  N/A PTT:  N/A   D-dimer:  3.75 (H) Fibrinogen:  N/A       Imaging results reviewed over the past 24 hrs:   Recent Results (from the past 24 hours)   XR Chest Port 1 View    Narrative    EXAM: XR CHEST PORT 1 VIEW  LOCATION: [facility]  DATE: 12/31/2024    INDICATION: Chest pain.  COMPARISON: CT pulmonary angiogram 4/30/2024.      Impression    IMPRESSION: Interval placement of left PICC, tip at the cavoatrial junction, satisfactory positioning. Low lung volumes. Both lungs remain clear. No adenopathy or effusion. Normal cardiac size and pulmonary vascularity. Enteric tube has been placed, tip   and side-port in the stomach. Anatomic alignment of the bones and joints.   XR Abdomen Port 1 View    Narrative    EXAM: XR ABDOMEN PORT 1 VIEW  LOCATION: [facility]  DATE: 12/31/2024    INDICATION: Abdominal pain; postoperative day 1 from laparoscopic cholecystectomy.  COMPARISON: None available.      Impression    IMPRESSION: Enteric suction tube tip overlies the region of the gastric body. Midline surgical staples are present. Surgical drainage catheter terminates near the right paracolic gutter. Nonspecific bowel gas pattern, due to a paucity of bowel gas.

## 2024-12-31 NOTE — PHARMACY-CONSULT NOTE
"Pharmacy Note: Parenteral Nutrition (PN) Management    Pharmacist consulted to dose PN for Dain Tejeda, a 44 year old female.    Subjective:    The patient is a new PN start.    The patient was started on PN in the hospital on 12/27/24.    Indication for PN therapy: bowel obstruction    Inadequate nutrition anticipated for > 7 days.     Enteral nutrition contraindicated due to:  bowel obstruction .    Pertinent diseases and other special considerations  n/a    Social History     Tobacco Use    Smoking status: Never     Passive exposure: Never    Smokeless tobacco: Never   Substance Use Topics    Alcohol use: No    Drug use: No     Objective:    Ht Readings from Last 1 Encounters:   12/11/24 1.499 m (4' 11\")     Wt Readings from Last 1 Encounters:   12/29/24 46.3 kg (102 lb 1.6 oz)       Body mass index is 20.62 kg/m .    Patient Vitals for the past 96 hrs:   Weight   12/29/24 1933 46.3 kg (102 lb 1.6 oz)   12/29/24 1124 46.3 kg (102 lb)   12/28/24 1943 46 kg (101 lb 8 oz)       Labs:  Last 3 days:  Recent Labs     12/29/24  0603 12/30/24  0511 12/30/24  0511 12/30/24  0711 12/31/24  0115 12/31/24  0542   * 137  --   --  138 140   POTASSIUM 3.6 4.2  --   --  5.1 4.0   CHLORIDE 100 99  --   --  100 104   CO2 27 25  --   --  27 26   BUN 11.5 20.5*  --   --  13.7 11.5   CR 0.47* 0.62  --   --  0.60 0.47*   ALEX 9.1 10.1  --   --  8.9 8.6*   MAG 1.9 2.3  --   --   --  1.7   PHOS 3.8 4.6*  --   --   --  3.2   PROTTOTAL  --  8.3  --   --  6.9  --    ALBUMIN  --  3.7  --   --  3.1*  --    PREALB  --  19.2*  --   --   --   --    TRIG  --  74  --   --   --   --    HGB 10.5* 11.5*  --  11.5* 12.1 10.8*   HCT 33.6* 35.5  --   --  37.3 33.2*    354  --   --  407 393   BILITOTAL  --  0.2  --   --  0.2  --    AST  --  10  --   --  20  --    ALT  --  23  --   --  18  --    ALKPHOS  --  128   < >  --  103  --    INR  --  1.01  --   --   --   --     < > = values in this interval not displayed.       Glucose (past 48 hours): "   Recent Labs     12/30/24  1422 12/30/24  1730 12/30/24  1841 12/30/24  2122 12/31/24  0102 12/31/24  0115 12/31/24  0303 12/31/24  0542 12/31/24  0717 12/31/24  0746   * 192* 171* 125* 177* 166* 185* 158* 169* 175*       Intake/Output (last 24 hours): I/O last 3 completed shifts:  In: 4281 [I.V.:2604]  Out: 1830 [Urine:1700; Drains:80; Blood:50]    Estimated CrCl: Estimated Creatinine Clearance: 114 mL/min (A) (based on SCr of 0.47 mg/dL (L)).    Assessment:    Continue patient on PN therapy as a continuous central therapy.     Given the patient's current condition/oral intake, PN is still indicated.    Lab results reviewed:   12/31 Phos dropped back down, -- increase in TPN; Mg trending down -- increase; Ca a bit low -- check ionized calcium tomorrow; insulin sliding scale initiated  12/30/24 Phosphorous trending upward, 4.6 mg/dL today. Will back off on phosphorous in TPN formula today. Blood glucoses starting to trend up on TPN and steroids. Recommend insulin correction prn and will assess need to add insulin to TPN formula daily.    Plan:  Rate of PN: 60 mL/hr.  Formula: changes in bold  Amino Acids 70 grams  Dextrose 224 grams  Sodium 55 mEq/day  Potassium 40 mEq/day  Calcium 6 mEq/day  Magnesium 8 mEq/day  Phosphorus 5 mMol/day  Chloride: Acetate Ratio 1:1  Standard Multivitamins w/Vitamin K  Trace Elements  Thiamine 100 mg  Fat Emulsion: Omegaven 20 gm (10 x 2 bottles) IV daily.   Check hyperal lytes and ionized calcium labs tomorrow.  Pharmacist will continue to follow the patient's lab results, clinical status and blood glucose results and make adjustments as appropriate.    Thank you for the consult.  Cyndie Conrad [facility]  12/31/2024 9:36 AM

## 2024-12-31 NOTE — PROGRESS NOTES
COLON & RECTAL SURGERY  PROGRESS NOTE  December 31, 2024    44F with PMH crohns disease and partial large bowel obstruction who is sp exploratory laparotomy and takedown of colostomy with end ileostomy on 12/30.     SUBJECTIVE:    - Pain crisis overnight, tachycardia and tachypnea  - AXR, CXR and EKG performed, sinus tachycardia   - PCA added, a bit more comfortable but still in pain     OBJECTIVE:  Temp:  [95.4  F (35.2  C)-98.7  F (37.1  C)] 98.7  F (37.1  C)  Pulse:  [101-130] 119  Resp:  [14-40] 21  BP: (124-144)/(64-98) 124/89  SpO2:  [95 %-100 %] 97 %    Intake/Output Summary (Last 24 hours) at 12/31/2024 0831  Last data filed at 12/31/2024 0600  Gross per 24 hour   Intake 4281 ml   Output 1830 ml   Net 2451 ml       GENERAL: Uncomfortable  EXTREMITIES: Warm and well perfused  ABDOMEN:  Soft, appropriately tender, non-distended. No guarding, rigidity, or peritoneal signs.   INCISION:  C/d/I  OSTOMY: pink and healthy, bowel sweat and small amt of blood in bag   DRAIN: DOUGLAS drain, serosanginous drainage. NGT with bilious output    PLAN:     1.  NPO with NGT in place, AROBF  2. Multimodal pain control - IV tylenol, PCA, muscle relaxer. NO NSAIDS given hx crohns.   3.  IVF + TPN  4. Sliding scale insulin   5. Keep allen till pain is controlled   6. OOB, ambulate  7. Await path   8. Lovenox for ppx        Gi Sherman MD - Fellow   [facility]      Colorectal Surgery Staff:  I have seen and examined the patient. I agree with the above documentation and plan of the fellow/PA above with the following additions/chages:    Had a bad night last night secondary to pain control.  She was seen by the family medicine team last night and evaluated for chest pain.  Medications were adjusted and she has been more comfortable today.  She had not yet been out of bed.    She is afebrile with normal vital signs.  Her heart rate is mildly elevated at 101.  Urine output is excellent at 1.7 L.  DOUGLAS drain is 80 mL  serosanguineous.  No ostomy output.    White blood cell count 24.4, hemoglobin 10.8, creatinine 0.47, potassium 4, CO2 26.    A/P: 44F POD 1 s/p open completion TAC, colostomy takedown, small bowel resection with primary anastomosis, and end ileostomy for medically refractory Crohn's Colitis.    Neuro: Multimodal pain control.  Continue PCA for now.  No oral narcotics until taking a regular diet.  No NSAIDs secondary to Crohn's disease.  Added Robaxin and IV Tylenol secondary to pain severity.    Cardiovascular: Hemodynamically stable.  Chest pain workup unremarkable.    Pulmonary: Capnography while on PCA.    GI: Continue sips and chips until she starts to have some output from the ileostomy.    : Continue current fluids.  Continue to monitor creatinine.  Continue to monitor and replete electrolytes. Once her pain is controlled well enough to ambulate, we can remove allen.     Heme: Continue DVT chemoprophylaxis.    ID: No indication for antibiotics.    Endo: Blood sugar checks and sliding scale insulin.    Nutrition: Continue TPN until taking 80% of calories by mouth.    Disposition: In hospital until bowel function has resumed and ileostomy output is appropriate.    Hugo Lafleur MD MBA  [facility]  Office: [phone]  12/31/2024 4:50 PM

## 2024-12-31 NOTE — TELEPHONE ENCOUNTER
[employer] calling for form- needs by 1/2/25 - fax number is [phone]- not what is listed below

## 2024-12-31 NOTE — PLAN OF CARE
Dr. Gi Sherman at bedside at 0730 for AM rounds.  Off-going RN, and writer present and collaborated with MD.  Improved pain control needed in post op setting.  Dain reports surgical pain 7/10 currently.  Grimacing and guarding noted.  Remains tachycardic.   Dain reports not being able to sleep d/t pain.    Dain had 49 unsuccessful attempts with current PCA set-up noted on monitor over the last 2 hours with success of 10 doses of demand dosing received.    New orders: IV Tylenol 1000 mg q 8, Robaxin 1000 mg  IV q 8, Increase Dilaudid PCA demand dosing from 0.1 mg to 0.2mg.  No continuous rate at this time.    Hmong  utilized over video at bedside.    Birdie Celis RN

## 2024-12-31 NOTE — PROGRESS NOTES
Nutrition Therapy  Parenteral Nutrition follow-up       Dietitian to Pharmacy    No changes today.    Rate of TPN:  60 ml/hr continuous.       Grams Dextrose: 224 gm  Grams Protein: 70 gm     Lipids: Omegaven 20 gm daily.      Provides: 1262 kcals, 70 g protein, 224 g carb, 20 g lipid, 1440 ml fluid daily.    GIR=3.38 mg CHO/kg/min         Current Nutrition Intake:  Diet: NPO    Food allergies:  Soy, gluten    Custom TPN per central line- -  70 gm AA, 224 gm Dextrose at 60 mL/hr with 20 g omegaven daily (2 x 100 ml bottle) (allergic to soy)   Provides: 1262 kcals, 70 g protein, 224 g carb, 20 g lipid, 1440 ml fluid daily.    GIR=3.38 mg CHO/kg/min    TPN start 24.    New Findings:  S/p ex lap, takedown of colostomy, completion of total abdominal colectomy, small bowel resection with extensive lysis of adhesions and ileostomy 24.    Per notes, pt with significant pain this morning. Overnight - rapid response called for worsening pain.    Weight Trends  Admission wt: 47.2 kg (104 lb)   Current wt :   Date/Time Weight Weight Method   24 1933 46.3 kg (102 lb 1.6 oz) Standing scale   24 1124 46.3 kg (102 lb) Standing scale   24 1943 46 kg (101 lb 8 oz) Standing scale   24 0908 47.2 kg (104 lb)        Dosing Weight: 47.2 kg     ASSESSED NUTRITION NEEDS  Estimated Energy Needs: 6320-3446 kcals/day (25 -30 kcals/kg )  Justification: Maintenance  Estimated Protein Needs: 56-70 grams protein/day (1.2 - 1.5 grams of pro/kg)  Justification: Increased needs  Estimated Fluid Needs: 6262-8598 mL/day (30 - 35 mL/kg)   Justification: Increased needs    GI:  Drains output 80 mL 24  New ileostomy    Urine output good yesterday  - 1700 mL 24    Labs:   Labs reviewed   Mg 1.7 wdl  Phos 3.2 wdl  Glucose 158 H  FSB, 263  24 Triglyceride 74 wdl    Medications:   Reviewed   Novolog  Cont IV, LR at 10 mL/hr  Dilaudid prn    Nutrition Diagnosis  Inadequate oral intake related to  altered GI function as evidenced by NPO and need for nutrition support. -continues.    Goals   Meet nutrition needs-Met  Maintain weigh- need new wt    INTERVENTIONS  Implementation  TPN-no changes    Monitoring/Evaluation  Progress toward goals will be monitored and evaluated per protocol.

## 2024-12-31 NOTE — CONSULTS
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  Stoma observed through transparent pouch and is red/pink, edematous and moist.   Succus noted in pouch.  None emptied today.    Will plan for first pouch change/assessment on 1/2/25.    Treatment Plan:   Ileostomy pouch change with teaching; nursing to change if any leaking noted.    Orders: Written    RECOMMEND PRIMARY TEAM ORDER: None, at this time  Education provided: plan of care  Discussed plan of care with: Patient  WOC nurse follow-up plan: daily (M-F)  Notify WOC if wound(s) deteriorate.  Nursing to notify the Provider(s) and re-consult the WOC Nurse if new skin concern.    DATA:     Current support surface: Standard  Standard gel mattress (Isoflex)  Containment of urine/stool: Continent of bladder and Ileostomy pouch  BMI: Body mass index is 20.62 kg/m .   Active diet order: Orders Placed This Encounter      NPO for Medical/Clinical Reasons Except for: NPO but receiving PN     Output: I/O last 3 completed shifts:  In: 4281 [I.V.:2604]  Out: 1830 [Urine:1700; Drains:80; Blood:50]     Labs:   Recent Labs   Lab 12/31/24  0542 12/31/24  0115 12/30/24  0711 12/30/24  0511   ALBUMIN  --  3.1*  --  3.7   PREALB  --   --   --  19.2*   HGB 10.8* 12.1   < > 11.5*   INR  --   --   --  1.01   WBC 24.4* 25.0*  --  14.3*     < > = values in this interval not displayed.     Pressure injury risk assessment:   Sensory Perception: 4-->no impairment  Moisture: 4-->rarely moist  Activity: 3-->walks occasionally  Mobility: 4-->no limitation  Nutrition: 3-->adequate  Friction and Shear: 3-->no apparent problem  Saravanan Score: 21    Milly Babcock MSN RN CWOCN  Pager no longer is use, please contact through [employer] group: [facility]

## 2024-12-31 NOTE — PLAN OF CARE
"Goal Outcome Evaluation:             Tachycardic. Very painful.  Diaphoretic. Tachypenic .  NG LIS, DOUGLAS,  drain with tan dressing.  Chambers with U.O. H.O. called to assess.  Surgeon ordered PCA demand only.  Does not  appear helpful at this time.  Pain still significant. Reinforcement on PCA use needed.   Problem: Adult Inpatient Plan of Care  Goal: Plan of Care Review  Description: The Plan of Care Review/Shift note should be completed every shift.  The Outcome Evaluation is a brief statement about your assessment that the patient is improving, declining, or no change.  This information will be displayed automatically on your shift  note.  Outcome: Not Progressing  Goal: Patient-Specific Goal (Individualized)  Description: You can add care plan individualizations to a care plan. Examples of Individualization might be:  \"Parent requests to be called daily at 9am for status\", \"I have a hard time hearing out of my right ear\", or \"Do not touch me to wake me up as it startles  me\".  Outcome: Not Progressing  Goal: Absence of Hospital-Acquired Illness or Injury  Outcome: Not Progressing  Intervention: Identify and Manage Fall Risk  Recent Flowsheet Documentation  Taken 12/31/2024 0400 by Anna Sapp RN  Safety Promotion/Fall Prevention:   activity supervised   nonskid shoes/slippers when out of bed  Taken 12/31/2024 0000 by Anna Sapp RN  Safety Promotion/Fall Prevention:   activity supervised   nonskid shoes/slippers when out of bed  Intervention: Prevent Skin Injury  Recent Flowsheet Documentation  Taken 12/31/2024 0400 by Anna Sapp RN  Body Position:   supine   supine, head elevated  Taken 12/31/2024 0000 by Anna Sapp RN  Body Position:   supine   supine, head elevated  Intervention: Prevent and Manage VTE (Venous Thromboembolism) Risk  Recent Flowsheet Documentation  Taken 12/31/2024 0400 by Anna Sapp RN  VTE Prevention/Management: SCDs on (sequential " compression devices)  Taken 12/31/2024 0000 by Anna Sapp RN  VTE Prevention/Management: SCDs on (sequential compression devices)  Goal: Optimal Comfort and Wellbeing  Outcome: Not Progressing  Intervention: Monitor Pain and Promote Comfort  Recent Flowsheet Documentation  Taken 12/31/2024 0400 by Anna Sapp RN  Pain Management Interventions:   medication (see MAR)   MD notified (comment)  Taken 12/31/2024 0000 by Anna Sapp RN  Pain Management Interventions:   medication (see MAR)   MD notified (comment)  Goal: Readiness for Transition of Care  Outcome: Not Progressing     Problem: Pain Acute  Goal: Optimal Pain Control and Function  Outcome: Not Progressing  Intervention: Develop Pain Management Plan  Recent Flowsheet Documentation  Taken 12/31/2024 0400 by Anna Sapp RN  Pain Management Interventions:   medication (see MAR)   MD notified (comment)  Taken 12/31/2024 0000 by Anna Sapp RN  Pain Management Interventions:   medication (see MAR)   MD notified (comment)     Problem: Intestinal Obstruction  Goal: Optimal Bowel Function  Outcome: Not Progressing  Intervention: Promote Bowel Function  Recent Flowsheet Documentation  Taken 12/31/2024 0400 by Anna Sapp RN  Body Position:   supine   supine, head elevated  Taken 12/31/2024 0000 by Anna Sapp RN  Body Position:   supine   supine, head elevated  Goal: Fluid and Electrolyte Balance  Outcome: Not Progressing  Goal: Absence of Infection Signs and Symptoms  Outcome: Not Progressing  Goal: Optimize Nutrition Status  Outcome: Not Progressing  Goal: Optimal Pain Control and Function  Outcome: Not Progressing  Intervention: Prevent or Manage Pain  Recent Flowsheet Documentation  Taken 12/31/2024 0400 by Anna Sapp RN  Pain Management Interventions:   medication (see MAR)   MD notified (comment)  Taken 12/31/2024 0000 by Anna Sapp RN  Pain Management  Interventions:   medication (see MAR)   MD notified (comment)     Problem: Surgery Nonspecified  Goal: Effective Bowel Elimination  Outcome: Not Progressing  Goal: Blood Glucose Level Within Targeted Range  Outcome: Not Progressing  Goal: Absence of Infection Signs and Symptoms  Outcome: Not Progressing  Goal: Optimal Pain Control and Function  Outcome: Not Progressing  Intervention: Prevent or Manage Pain  Recent Flowsheet Documentation  Taken 12/31/2024 0400 by Anna Sapp, RN  Pain Management Interventions:   medication (see MAR)   MD notified (comment)  Taken 12/31/2024 0000 by Anna Sapp RN  Pain Management Interventions:   medication (see MAR)   MD notified (comment)

## 2024-12-31 NOTE — PROGRESS NOTES
Care Management Follow Up    Length of Stay (days): 5    Expected Discharge Date: 01/01/2025     Concerns to be Addressed:     discharge planning, medical progression  Patient plan of care discussed at interdisciplinary rounds: Yes    Anticipated Discharge Disposition:  home              Anticipated Discharge Services:  home care RN   Anticipated Discharge DME:  NA, Ostomy supplies    Patient/family educated on Medicare website which has current facility and service quality ratings:  [facility]  Education Provided on the Discharge Plan:  na  Patient/Family in Agreement with the Plan:  na    Referrals Placed by CM/SW:  home care  Private pay costs discussed: Not applicable    Discussed  Partnership in Safe Discharge Planning  document with patient/family: No     Handoff Completed: No, handoff not indicated or clinically appropriate    Additional Information:  Total colectomy on Monday 12/30.     Per notes patient is up independent.     Currently receiving TPN via PICC. Initial plan is to go home on PO. Will continue to monitor.    12/21:  New colectomy.  Suspect patient would benefit from home care RN for continued new ostomy teaching and help with ordering supplies.  CM placed referral to [facility].     [facility] is not able to find an accepting home care agency.  Patient will need to follow up with OP provider and ensure OP provider clinic is assisting with supply ordering.           Next Steps:  CM will continue to monitor progression of care, review team recommendations and provide discharge planning assist as needed.    Angela Elder RN

## 2024-12-31 NOTE — SIGNIFICANT EVENT
Significant Event Note    Time of event: 1:11 AM December 31, 2024    Description of event:  RN paged regarding worsening pain.  Upon arrival to the room patient is alert, diaphoretic, barely able to speak appears to be in significant pain and reports 10/10 chest pain.  Vitals were stable except for tachycardia .  I called a rapid response for sudden onset of severe chest pain.     Patient continued to report chest pain despite giving repeat dose of IV Dilaudid.  She reports chest pain that is radiating up to her neck but also notes severe abdominal pain.  Immediately got an EKG, CXR/Abdominal  x-ray.  Collected labs including lactic, dimer, VBG, CBC, and CMP.  Exam notable for acute distress 2/2 to pain,diaphoresis, shallow rapid breathing, lungs sound clear, tachycardic with normal S1 and S2 no murmur, abdominal exam: generalized tenderness on palpation, no signs of guarding, drain in place with serosanguineous fluid noted and ileostomy in place.     Briefly, patient has history of Crohn's disease complicated with abscess and partial large bowel obstruction with prior history of left lower quadrant colostomy.  She is POD 0 s/p Expl lapraotomy with total abdominal colectomy and colostomy takedown and end ileostomy.      Patient continued to be in severe chest and abdominal pain.  EKG with sinus tachycardia, no acute ischemic changes. While awaiting further workup results, I paged colorectal surgery to discuss if further workup is needed urgently and adjustments of current pain management.    Plan:  Acute onset chest pain and worsening abdominal pain on postoperative day 0 after major abdominal surgery.  Cardiac/ischemic causes ruled out at this time with EKG showing normal sinus tach with no acute ischemic changes and normal troponin.  Chest x-ray ordered to rule out pneumothorax or subcutaneous emphysema.  Mildly elevated lactic likely postsurgical and in the setting of dehydration; fluid bolus given.   Elevated D-dimer, likely in the setting of stress/inflammatory response and most likely not indicating PE given lack of hypoxia and DVT prophylaxis. Pain management adjusted with PCA Dilaudid colorectal surgery and patient appears more comfortable on reevaluation.  - PCA dilaudid  - repeat lactic after fluid bolus    Upon reevaluation patient is sleeping and appears more comfortable.    Paged to update Dr. Miquel Bello MD

## 2024-12-31 NOTE — OP NOTE
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    Post-Operative Diagnosis: Same    Procedure:   Open completion total abdominal colectomy with colostomy takedown and end ileostomy  Small bowel resection  Lysis of adhesions >3 hours     Surgeon: Hugo Lafleur MD    Assistant(s): Dania Mares PA-C and Claudio Beckham MD [facility] Colorectal Surgery Fellow    Anesthesia: General     Estimated Blood Loss: 50ml    Indication: The patient is a 44 year old female with a history of Crohn's colitis and ileitis complicated by perforation of the transverse colon with abscess and recurrent large bowel obstructions as well as infections with E. coli and norovirus.  She had tried and failed multiple attempts at medical management.  We discussed the the procedure of laparoscopic completion total abdominal colectomy with end ileostomy. Risks outlined include bleeding, infection, and damage to surrounding structures including the small bowel and ureter.     Findings: The entire peritoneal cavity was essentially frozen with small bowel adhesions densely here to the anterior abdominal wall, colon, loops of other small bowel, retroperitoneum, and pelvis.  After extensive lysis of adhesions, there was a resection of approximately 30 cm of mid small bowel as well as 20 cm of terminal ileum (in addition to the entire remaining colon).  There is a total of 1 small bowel anastomosis and 2 areas of repaired serosal injuries.  Of note, given the nature of disease, these injuries were expected and unavoidable.  After all bowel resections, there was a total of approximately 280 cm of residual small bowel.  There was some evidence of inflammation in the residual 50 cm of ileum.    Specimen:  A:  Colostomy  B: Total abdominal colon with terminal ileum  C: Small bowel  D: Additional small bowel    Operative Details: After informed consent was obtained, the patient was brought to the operating room and placed in supine position on the operating room table. Sequential compression devices were placed on bilateral lower extremities prior to induction, and general anesthesia was induced without difficulty. The patient was placed in well-padded dorsal lithotomy position and IV antibiotics were administered. A Pigazzi pink pad was used on the operating room table.  The colostomy site was closed using a 3-0 Vicryl suture.  The patient's abdomen was sterilely prepped and draped in usual fashion. A allen catheter was placed by the operating room nurse without difficulty.  A timeout was performed per protocol.    We began with an incision at the mucocutaneous junction of the colostomy.  This was taken down through the skin and subcutaneous tissues to the level of the fascia.  However, from here it was very difficult to enter the peritoneal cavity as there were dense adhesions not just from the colostomy itself but the surrounding bowel.  When we were able to enter an area of the peritoneal cavity, we could see that it was essentially frozen.  Therefore, we were unable to make any attempt at laparoscopy.  The end of the colon was stapled off and outer gloves and instruments were changed.  We continued by making a midline laparotomy incision.  She did have a prior hypertrophic scar so this was excised.  This was taken down through the skin and subcutaneous tissue to the level of the fascia.  The fascia was opened sharply using a 10 blade.  We could see immediately that the entire peritoneal cavity was essentially filled with adhesions.  We then worked meticulously over the next several hours to take down these adhesions.  There were a few loops of bowel where they were so densely adherent to the anterior abdominal wall  that even with attempting to take down a portion of the abdominal wall with the bowel, it was not possible to keep these loops of bowel intact.  There were initially 3 areas of enterotomy that were sewn shut using running 3-0 Vicryl.  Once we are able to free up all of these adhesions from the anterior abdominal wall, a wound protector was placed.  However, there were innumerable adhesions from the small bowel down to the colon itself.  These were so densely adherent, that I was suspicious of a potential underlying fistula.  However, I was able to sharply excise the small bowel off of the colon and I did not see any evidence of a fistula.  With the small bowel free from the colon, we turned our attention to the pelvis and freed up the small bowel from the stump of the sigmoid colon as this would be tethering her ileostomy.    Finally after this lysis of adhesions, we proceeded to move forward with the total abdominal colectomy.  This was done by starting with an inferior approach to the right colon.  The mesentery and retroperitoneum were .  The duodenum was identified and swept down out of harm's way.  The ileocolic pedicle was divided between multiple firings of the LigaSure.  The gastrocolic ligament was then divided and finally, the mesentery of the transverse colon and descending colon were divided. At this point the entire colon was resected.  I then evaluated the terminal ileum.  There was clearly at least 20 cm here that was so inflamed that it would not be possible to leave this as an ileostomy.  Finally, 10 cm proximal to this, was one of the larger enterotomies and so rather than leave a large enterotomy just proximal to the ileostomy, this was all resected en bloc.  The remaining mesentery was divided and the specimen was passed off of the field.    I then evaluated the remainder of the small bowel.  There was a grouping of 2 enterotomies and serosal injury that were within 30 cm of 1 another so  I divided the ends of small bowel here and created a primary anastomosis.  This was approximately in the mid small bowel.  This was done using a BRENDA 75 mm stapler as well as a TA 90 stapler.  The transverse staple line was oversewn using 3-0 Vicryl Lembert sutures.  I then ran the small bowel from the ligament of Treitz to the area of the planned ileostomy.  There was a total of 280 cm of healthy small bowel remaining.  I found 3 other areas of small serosal injuries and these were oversewn using 3-0 Vicryl Lembert sutures.  At this point, we had excised all over the colon, repaired all of the small bowel, and I did not see any other evidence of active Crohn's disease with the exception of fairly mild inflammation of the final 20 cm of ileum.  Therefore, we proceeded to closure.  Given the risk of leak from enterotomy, I placed a #19 round Tima drain into the pelvis.  This was brought out through a left lower quadrant incision.  I then created a skin incision in the right lower quadrant at our planned ileostomy site.  This was taken down through the skin and subcutaneous tissues to the level of the fascia.  The fascia was opened in a cruciate fashion.  The ileum was delivered with the mesentery straight.  This was then secured using a Fifi clamp.  At this point, gowns, gloves, and instruments were changed for the clean closure portion of the procedure.    We began by closing the colostomy site.  This was done using multiple #1 PDS interrupted sutures.  The midline was then closed using running #1 PDS interrupted sutures.  The soft tissues were all irrigated out.  The midline was closed with staples and the colostomy site was closed using 3-0 Monocryl with a Telfa wick in the middle.  These wounds were sterilely dressed.  Finally, we turned our attention to ileostomy creation.  The staple line was excised.  A standard Brooked colostomy was created using 3-0 Vicryl sutures.  An ileostomy appliance was placed.   This completed our planned procedure.  All sponge and instrument counts were correct x 2.    Complications: None    Disposition: Stable, extubated, to PACU    Hugo Lafleur MD, AC  [facility]  Office: [phone]  12/30/2024 8:55 PM

## 2025-01-01 ENCOUNTER — APPOINTMENT (OUTPATIENT)
Dept: INTERPRETER SERVICES | Facility: CLINIC | Age: 45
End: 2025-01-01

## 2025-01-01 LAB
ANION GAP SERPL CALCULATED.3IONS-SCNC: 9 MMOL/L (ref 7–15)
BUN SERPL-MCNC: 13.1 MG/DL (ref 6–20)
CA-I BLD-MCNC: 4.7 MG/DL (ref 4.4–5.2)
CALCIUM SERPL-MCNC: 8.7 MG/DL (ref 8.8–10.4)
CHLORIDE SERPL-SCNC: 99 MMOL/L (ref 98–107)
CREAT SERPL-MCNC: 0.45 MG/DL (ref 0.51–0.95)
EGFRCR SERPLBLD CKD-EPI 2021: >90 ML/MIN/1.73M2
ERYTHROCYTE [DISTWIDTH] IN BLOOD BY AUTOMATED COUNT: 14.6 % (ref 10–15)
ERYTHROCYTE [DISTWIDTH] IN BLOOD BY AUTOMATED COUNT: 14.6 % (ref 10–15)
GLUCOSE BLDC GLUCOMTR-MCNC: 126 MG/DL (ref 70–99)
GLUCOSE BLDC GLUCOMTR-MCNC: 128 MG/DL (ref 70–99)
GLUCOSE BLDC GLUCOMTR-MCNC: 140 MG/DL (ref 70–99)
GLUCOSE BLDC GLUCOMTR-MCNC: 144 MG/DL (ref 70–99)
GLUCOSE BLDC GLUCOMTR-MCNC: 147 MG/DL (ref 70–99)
GLUCOSE SERPL-MCNC: 126 MG/DL (ref 70–99)
HCO3 SERPL-SCNC: 28 MMOL/L (ref 22–29)
HCT VFR BLD AUTO: 27 % (ref 35–47)
HCT VFR BLD AUTO: 28.4 % (ref 35–47)
HGB BLD-MCNC: 8.5 G/DL (ref 11.7–15.7)
HGB BLD-MCNC: 8.9 G/DL (ref 11.7–15.7)
MAGNESIUM SERPL-MCNC: 1.7 MG/DL (ref 1.7–2.3)
MCH RBC QN AUTO: 29.2 PG (ref 26.5–33)
MCH RBC QN AUTO: 29.2 PG (ref 26.5–33)
MCHC RBC AUTO-ENTMCNC: 31.3 G/DL (ref 31.5–36.5)
MCHC RBC AUTO-ENTMCNC: 31.5 G/DL (ref 31.5–36.5)
MCV RBC AUTO: 93 FL (ref 78–100)
MCV RBC AUTO: 93 FL (ref 78–100)
PHOSPHATE SERPL-MCNC: 3.2 MG/DL (ref 2.5–4.5)
PLATELET # BLD AUTO: 320 10E3/UL (ref 150–450)
PLATELET # BLD AUTO: 325 10E3/UL (ref 150–450)
POTASSIUM SERPL-SCNC: 3.9 MMOL/L (ref 3.4–5.3)
RBC # BLD AUTO: 2.91 10E6/UL (ref 3.8–5.2)
RBC # BLD AUTO: 3.05 10E6/UL (ref 3.8–5.2)
SODIUM SERPL-SCNC: 136 MMOL/L (ref 135–145)
WBC # BLD AUTO: 17.7 10E3/UL (ref 4–11)
WBC # BLD AUTO: 18.9 10E3/UL (ref 4–11)

## 2025-01-01 PROCEDURE — 85014 HEMATOCRIT: CPT

## 2025-01-01 PROCEDURE — 250N000009 HC RX 250: Performed by: FAMILY MEDICINE

## 2025-01-01 PROCEDURE — 84100 ASSAY OF PHOSPHORUS: CPT

## 2025-01-01 PROCEDURE — 85048 AUTOMATED LEUKOCYTE COUNT: CPT

## 2025-01-01 PROCEDURE — 85014 HEMATOCRIT: CPT | Performed by: STUDENT IN AN ORGANIZED HEALTH CARE EDUCATION/TRAINING PROGRAM

## 2025-01-01 PROCEDURE — 80048 BASIC METABOLIC PNL TOTAL CA: CPT

## 2025-01-01 PROCEDURE — 82330 ASSAY OF CALCIUM: CPT | Performed by: COLON & RECTAL SURGERY

## 2025-01-01 PROCEDURE — B4185 PARENTERAL SOL 10 GM LIPIDS: HCPCS

## 2025-01-01 PROCEDURE — 83735 ASSAY OF MAGNESIUM: CPT

## 2025-01-01 PROCEDURE — 250N000011 HC RX IP 250 OP 636: Performed by: COLON & RECTAL SURGERY

## 2025-01-01 PROCEDURE — 85041 AUTOMATED RBC COUNT: CPT | Performed by: STUDENT IN AN ORGANIZED HEALTH CARE EDUCATION/TRAINING PROGRAM

## 2025-01-01 PROCEDURE — 250N000011 HC RX IP 250 OP 636: Performed by: STUDENT IN AN ORGANIZED HEALTH CARE EDUCATION/TRAINING PROGRAM

## 2025-01-01 PROCEDURE — 250N000009 HC RX 250

## 2025-01-01 PROCEDURE — 120N000001 HC R&B MED SURG/OB

## 2025-01-01 RX ORDER — ENOXAPARIN SODIUM 100 MG/ML
40 INJECTION SUBCUTANEOUS DAILY
Status: DISCONTINUED | OUTPATIENT
Start: 2025-01-01 | End: 2025-01-15 | Stop reason: HOSPADM

## 2025-01-01 RX ADMIN — INSULIN ASPART 1 UNITS: 100 INJECTION, SOLUTION INTRAVENOUS; SUBCUTANEOUS at 04:57

## 2025-01-01 RX ADMIN — METHOCARBAMOL 1000 MG: 100 INJECTION INTRAMUSCULAR; INTRAVENOUS at 00:05

## 2025-01-01 RX ADMIN — METHOCARBAMOL 1000 MG: 100 INJECTION INTRAMUSCULAR; INTRAVENOUS at 07:48

## 2025-01-01 RX ADMIN — FISH OIL 200 ML: 0.1 INJECTION, EMULSION INTRAVENOUS at 20:29

## 2025-01-01 RX ADMIN — ACETAMINOPHEN 1000 MG: 10 INJECTION, SOLUTION INTRAVENOUS at 04:01

## 2025-01-01 RX ADMIN — INSULIN ASPART 1 UNITS: 100 INJECTION, SOLUTION INTRAVENOUS; SUBCUTANEOUS at 07:48

## 2025-01-01 RX ADMIN — PHENOL 1 ML: 1.5 LIQUID ORAL at 19:17

## 2025-01-01 RX ADMIN — MAGNESIUM SULFATE HEPTAHYDRATE: 500 INJECTION, SOLUTION INTRAMUSCULAR; INTRAVENOUS at 20:29

## 2025-01-01 RX ADMIN — Medication: at 07:44

## 2025-01-01 RX ADMIN — ACETAMINOPHEN 1000 MG: 10 INJECTION, SOLUTION INTRAVENOUS at 11:15

## 2025-01-01 RX ADMIN — FISH OIL 200 ML: 0.1 INJECTION, EMULSION INTRAVENOUS at 00:05

## 2025-01-01 RX ADMIN — ACETAMINOPHEN 1000 MG: 10 INJECTION, SOLUTION INTRAVENOUS at 19:18

## 2025-01-01 RX ADMIN — INSULIN ASPART 1 UNITS: 100 INJECTION, SOLUTION INTRAVENOUS; SUBCUTANEOUS at 00:05

## 2025-01-01 RX ADMIN — METHOCARBAMOL 1000 MG: 100 INJECTION INTRAMUSCULAR; INTRAVENOUS at 16:05

## 2025-01-01 RX ADMIN — PHENOL 1 ML: 1.5 LIQUID ORAL at 04:20

## 2025-01-01 RX ADMIN — PHENOL 1 ML: 1.5 LIQUID ORAL at 16:33

## 2025-01-01 RX ADMIN — ENOXAPARIN SODIUM 40 MG: 40 INJECTION SUBCUTANEOUS at 13:36

## 2025-01-01 ASSESSMENT — ACTIVITIES OF DAILY LIVING (ADL)
ADLS_ACUITY_SCORE: 42
ADLS_ACUITY_SCORE: 42
ADLS_ACUITY_SCORE: 41
ADLS_ACUITY_SCORE: 40
ADLS_ACUITY_SCORE: 41
ADLS_ACUITY_SCORE: 42
ADLS_ACUITY_SCORE: 44
ADLS_ACUITY_SCORE: 40
ADLS_ACUITY_SCORE: 41
ADLS_ACUITY_SCORE: 40
ADLS_ACUITY_SCORE: 41
ADLS_ACUITY_SCORE: 42
ADLS_ACUITY_SCORE: 42
ADLS_ACUITY_SCORE: 44
ADLS_ACUITY_SCORE: 44
ADLS_ACUITY_SCORE: 41
ADLS_ACUITY_SCORE: 41
ADLS_ACUITY_SCORE: 44
ADLS_ACUITY_SCORE: 41
ADLS_ACUITY_SCORE: 40
ADLS_ACUITY_SCORE: 41
ADLS_ACUITY_SCORE: 42
ADLS_ACUITY_SCORE: 44

## 2025-01-01 NOTE — PLAN OF CARE
Goal Outcome Evaluation:    Problem: Adult Inpatient Plan of Care  Goal: Optimal Comfort and Wellbeing  Outcome: Progressing  Patient rates pain 3 to 5 out of 10. Continues to use PCA frequently.     Chambers cath patent and draining well. No output noted in DOUGLAS drain overnight, scant amount of bloody drainage noted in ileostomy bag. NG to LIS.

## 2025-01-01 NOTE — PROGRESS NOTES
COLON & RECTAL SURGERY  PROGRESS NOTE    44F with PMH crohns disease and partial large bowel obstruction who is sp exploratory laparotomy and takedown of colostomy with end ileostomy on 12/30.     SUBJECTIVE:    - Still reports poor pain control, most pain 2/2 the incision   - Minimal NGT output, looks like saliva   - No gas or succus in ostomy bag, just bowel sweat  - Has been out of bed, walking to and from bathroom     OBJECTIVE:  Temp:  [97.7  F (36.5  C)-98.4  F (36.9  C)] 97.7  F (36.5  C)  Pulse:  [] 94  Resp:  [16-18] 16  BP: ()/(63-71) 101/65  SpO2:  [97 %-99 %] 99 %    Intake/Output Summary (Last 24 hours) at 12/31/2024 0831  Last data filed at 12/31/2024 0600  Gross per 24 hour   Intake 4281 ml   Output 1830 ml   Net 2451 ml       GENERAL: Uncomfortable  EXTREMITIES: Warm and well perfused  ABDOMEN:  Soft, appropriately tender, non-distended. No guarding, rigidity, or peritoneal signs.   INCISION:  C/d/I  OSTOMY: pink and healthy, bowel sweat and small amt of blood in bag   DRAIN: DOUGLAS drain, serosanginous drainage. NGT with minimal output- functioning properly.     PLAN:     Neuro: Multimodal pain control.  Continue PCA for now.  No oral narcotics until taking a regular diet.  No NSAIDs secondary to Crohn's disease.  Added Robaxin and IV Tylenol secondary to pain severity.    Cardiovascular: Hemodynamically stable.  Chest pain workup unremarkable.    Pulmonary: Capnography while on PCA.    GI: Continue sips and chips until she starts to have some output from the ileostomy. AROBF. Discussed removing NGT but patient wishes to keep it at this time.     : Continue current fluids.  Continue to monitor creatinine.  Continue to monitor and replete electrolytes. Once her pain is controlled well enough to ambulate, we can remove allen.     Heme: Continue DVT chemoprophylaxis. Slight drop in Hb but stable.     ID: No indication for antibiotics.    Endo: Blood sugar checks and sliding scale  insulin.    Nutrition: Continue TPN until taking 80% of calories by mouth.

## 2025-01-01 NOTE — PLAN OF CARE
0500-8283    Goal Outcome Evaluation: Vss. Patient pain appears well-controlled with PCA, rates pain at 2/10 during shift. TPN running, delay on lipids as they were out of stock and had to be brought in. Chambers patent and draining. Continue to monitr.       Problem: Nausea and Vomiting  Goal: Nausea and Vomiting Relief  Outcome: Progressing     Problem: Surgery Nonspecified  Goal: Absence of Bleeding  Outcome: Progressing     Problem: Surgery Nonspecified  Goal: Effective Urinary Elimination  Outcome: Progressing

## 2025-01-01 NOTE — PLAN OF CARE
POD#1.  VS stable, other then tachycardic.     Very painful initially this morning - 7/10.  Pain much improved by mid AM.  Pain consistently rated 2-3/10 with PCA dose change, adding Tylenol and Robaxin.  Able to sit, dangle and ambulate a short distance in room tonight with staff assist.  Up in recliner for 3+ hours.      Working on IS with prompting.  No shortness of breath or chest pain reported.    NPO - mouth swabs offered.  Strict and I&0s - see outputs.  Scant output via DOUGLAS/ bloody drainage from pouch - stoma red/protruding.  Adequate allen output - urine kimberly.  NGT to LIS.    Midline incision UTV - foam dressing left in place.  Abdominal binder added for splinting.    Continue with plan of care.  Birdie Celis RN

## 2025-01-01 NOTE — PROGRESS NOTES
Nutrition Therapy  Parenteral Nutrition follow-up       Dietitian to Pharmacy-No changes    Rate of TPN:  60 ml/hr continuous.       Grams Dextrose: 224 gm  Grams Protein: 70 gm     Lipids: Omegaven 20 gm daily.      Provides: 1262 kcals, 70 g protein, 224 g carb, 20 g lipid, 1440 ml fluid daily.    GIR=3.38 mg CHO/kg/min         Current Nutrition Intake:  Diet: NPO    Food allergies:  Soy, gluten    Custom TPN per central line- -  70 gm AA, 224 gm Dextrose at 60 mL/hr with 20 g omegaven daily (2 x 100 ml bottle) (allergic to soy)   Provides: 1262 kcals, 70 g protein, 224 g carb, 20 g lipid, 1440 ml fluid daily.    GIR=3.38 mg CHO/kg/min    TPN start 24.    Weight Trends  Admission wt: 47.2 kg (104 lb)   Current wt :   Date/Time Weight Weight Method   24 1933 46.3 kg (102 lb 1.6 oz) Standing scale   24 1124 46.3 kg (102 lb) Standing scale   24 1943 46 kg (101 lb 8 oz) Standing scale   24 0908 47.2 kg (104 lb)      Dosing Weight: 47.2 kg     ASSESSED NUTRITION NEEDS  Estimated Energy Needs: 8605-3918 kcals/day (25 -30 kcals/kg )  Justification: Maintenance  Estimated Protein Needs: 56-70 grams protein/day (1.2 - 1.5 grams of pro/kg)  Justification: Increased needs  Estimated Fluid Needs: 5276-4951 mL/day (30 - 35 mL/kg)   Justification: Increased needs    PHYSICAL FINDINGS:  Abdominal discomfort  Faint, hypoactive bowel sounds  Drains 20 mL output 24   mL output 24  New ileostomy    Urine output  - 950 mL 24    Labs:   Labs reviewed   Glucose 126 H  FSB, 144, 140     Medications:   Reviewed   Cont IV, LR at 10 mL/hr    Nutrition Diagnosis  Inadequate oral intake related to altered GI function as evidenced by NPO and need for nutrition support. -continues.    Goals   Meet nutrition needs-Met  Maintain weigh- need new wt    INTERVENTIONS  Implementation  TPN-no changes    Monitoring/Evaluation  Progress toward goals will be monitored and evaluated per  protocol.

## 2025-01-01 NOTE — PLAN OF CARE
Goal Outcome Evaluation:      Plan of Care Reviewed With: patient, family    Overall Patient Progress: improvingOverall Patient Progress: improving    Outcome Evaluation: Still working on pain control- PCA doses changed, ice applied, encouraged activity- Chambers has been removed and has voided post removal. NGT lo LIS, ileostomy with only small serosang output_ NO flatus.      Problem: Pain Acute  Goal: Optimal Pain Control and Function  Intervention: Develop Pain Management Plan  Recent Flowsheet Documentation  Taken 1/1/2025 0744 by Kirstin Mckeon RN  Pain Management Interventions:   medication (see MAR)   relaxation techniques promoted   repositioned   rest     Problem: Pain Acute  Goal: Optimal Pain Control and Function  Intervention: Prevent or Manage Pain  Recent Flowsheet Documentation  Taken 1/1/2025 0801 by Kirstin Mckeon RN  Medication Review/Management:   medications reviewed   high-risk medications identified     Problem: Intestinal Obstruction  Goal: Optimal Bowel Function  Intervention: Promote Bowel Function  Recent Flowsheet Documentation  Taken 1/1/2025 0744 by Kirstin Mckeon RN  Body Position:   position maintained   supine, head elevated  Head of Bed (HOB) Positioning: HOB at 20-30 degrees  Positioning/Transfer Devices:   pillows   in use

## 2025-01-01 NOTE — PHARMACY-CONSULT NOTE
"Pharmacy Note: Parenteral Nutrition (PN) Management    Pharmacist consulted to dose PN for Dain Tejeda, a 44 year old female.    Subjective:    The patient is a new PN start.    The patient was started on PN in the hospital on 12/27/24.    Indication for PN therapy: bowel obstruction    Inadequate nutrition anticipated for > 7 days.     Enteral nutrition contraindicated due to:  bowel obstruction .    Pertinent diseases and other special considerations  n/a    Social History     Tobacco Use    Smoking status: Never     Passive exposure: Never    Smokeless tobacco: Never   Substance Use Topics    Alcohol use: No    Drug use: No     Objective:    Ht Readings from Last 1 Encounters:   12/11/24 1.499 m (4' 11\")     Wt Readings from Last 1 Encounters:   12/29/24 46.3 kg (102 lb 1.6 oz)       Body mass index is 20.62 kg/m .    Patient Vitals for the past 96 hrs:   Weight   12/29/24 1933 46.3 kg (102 lb 1.6 oz)   12/29/24 1124 46.3 kg (102 lb)   12/28/24 1943 46 kg (101 lb 8 oz)       Labs:  Last 3 days:  Recent Labs     12/29/24  0603 12/30/24  0511 12/30/24  0511 12/30/24  0711 12/31/24  0115 12/31/24  0542   * 137  --   --  138 140   POTASSIUM 3.6 4.2  --   --  5.1 4.0   CHLORIDE 100 99  --   --  100 104   CO2 27 25  --   --  27 26   BUN 11.5 20.5*  --   --  13.7 11.5   CR 0.47* 0.62  --   --  0.60 0.47*   ALEX 9.1 10.1  --   --  8.9 8.6*   MAG 1.9 2.3  --   --   --  1.7   PHOS 3.8 4.6*  --   --   --  3.2   PROTTOTAL  --  8.3  --   --  6.9  --    ALBUMIN  --  3.7  --   --  3.1*  --    PREALB  --  19.2*  --   --   --   --    TRIG  --  74  --   --   --   --    HGB 10.5* 11.5*  --  11.5* 12.1 10.8*   HCT 33.6* 35.5  --   --  37.3 33.2*    354  --   --  407 393   BILITOTAL  --  0.2  --   --  0.2  --    AST  --  10  --   --  20  --    ALT  --  23  --   --  18  --    ALKPHOS  --  128   < >  --  103  --    INR  --  1.01  --   --   --   --     < > = values in this interval not displayed.       Glucose (past 48 hours): "   Recent Labs     12/30/24  1422 12/30/24  1730 12/30/24  1841 12/30/24  2122 12/31/24  0102 12/31/24  0115 12/31/24  0303 12/31/24  0542 12/31/24  0717 12/31/24  0746   * 192* 171* 125* 177* 166* 185* 158* 169* 175*       Intake/Output (last 24 hours): I/O last 3 completed shifts:  In: 2138.17 [I.V.:620.17]  Out: 1345 [Urine:950; Emesis/NG output:375; Drains:20]    Estimated CrCl: Estimated Creatinine Clearance: 119 mL/min (A) (based on SCr of 0.45 mg/dL (L)).    Assessment:    Continue patient on PN therapy as a continuous central therapy.     Given the patient's current condition/oral intake, PN is still indicated.    Lab results reviewed:   12/31 Phos dropped back down, -- increase in TPN; Mg trending down -- increase; Ca a bit low -- check ionized calcium tomorrow; insulin sliding scale initiated  12/30/24 Phosphorous trending upward, 4.6 mg/dL today. Will back off on phosphorous in TPN formula today. Blood glucoses starting to trend up on TPN and steroids. Recommend insulin correction prn and will assess need to add insulin to TPN formula daily.    Plan:  Rate of PN: 60 mL/hr.  Formula: changes in bold  Amino Acids 70 grams  Dextrose 224 grams  Sodium 55 mEq/day  Potassium 40 mEq/day  Calcium 6 mEq/day  Magnesium 8 mEq/day  Phosphorus 5 mMol/day  Chloride: Acetate Ratio 1:1  Standard Multivitamins w/Vitamin K  Trace Elements  Thiamine 100 mg  Fat Emulsion: Omegaven 20 gm (10 x 2 bottles) IV daily.   Check  any ordered  labs tomorrow.  Pharmacist will continue to follow the patient's lab results, clinical status and blood glucose results and make adjustments as appropriate.    Thank you for the consult.  Cyndie Conrad [facility]  12/31/2024 9:36 AM

## 2025-01-02 ENCOUNTER — APPOINTMENT (OUTPATIENT)
Dept: INTERPRETER SERVICES | Facility: CLINIC | Age: 45
End: 2025-01-02

## 2025-01-02 VITALS
DIASTOLIC BLOOD PRESSURE: 70 MMHG | OXYGEN SATURATION: 98 % | TEMPERATURE: 98.7 F | RESPIRATION RATE: 22 BRPM | HEART RATE: 125 BPM | BODY MASS INDEX: 20.44 KG/M2 | SYSTOLIC BLOOD PRESSURE: 115 MMHG | WEIGHT: 101.19 LBS

## 2025-01-02 LAB
ANION GAP SERPL CALCULATED.3IONS-SCNC: 8 MMOL/L (ref 7–15)
BUN SERPL-MCNC: 13 MG/DL (ref 6–20)
CALCIUM SERPL-MCNC: 8.9 MG/DL (ref 8.8–10.4)
CHLORIDE SERPL-SCNC: 98 MMOL/L (ref 98–107)
CREAT SERPL-MCNC: 0.43 MG/DL (ref 0.51–0.95)
EGFRCR SERPLBLD CKD-EPI 2021: >90 ML/MIN/1.73M2
ERYTHROCYTE [DISTWIDTH] IN BLOOD BY AUTOMATED COUNT: 14.7 % (ref 10–15)
GLUCOSE BLDC GLUCOMTR-MCNC: 119 MG/DL (ref 70–99)
GLUCOSE BLDC GLUCOMTR-MCNC: 122 MG/DL (ref 70–99)
GLUCOSE BLDC GLUCOMTR-MCNC: 126 MG/DL (ref 70–99)
GLUCOSE BLDC GLUCOMTR-MCNC: 137 MG/DL (ref 70–99)
GLUCOSE BLDC GLUCOMTR-MCNC: 140 MG/DL (ref 70–99)
GLUCOSE BLDC GLUCOMTR-MCNC: 150 MG/DL (ref 70–99)
GLUCOSE SERPL-MCNC: 155 MG/DL (ref 70–99)
HCO3 SERPL-SCNC: 29 MMOL/L (ref 22–29)
HCT VFR BLD AUTO: 28.2 % (ref 35–47)
HGB BLD-MCNC: 8.9 G/DL (ref 11.7–15.7)
MCH RBC QN AUTO: 29.5 PG (ref 26.5–33)
MCHC RBC AUTO-ENTMCNC: 31.6 G/DL (ref 31.5–36.5)
MCV RBC AUTO: 93 FL (ref 78–100)
PLATELET # BLD AUTO: 364 10E3/UL (ref 150–450)
POTASSIUM SERPL-SCNC: 3.4 MMOL/L (ref 3.4–5.3)
RBC # BLD AUTO: 3.02 10E6/UL (ref 3.8–5.2)
SODIUM SERPL-SCNC: 135 MMOL/L (ref 135–145)
WBC # BLD AUTO: 18.5 10E3/UL (ref 4–11)

## 2025-01-02 PROCEDURE — 250N000009 HC RX 250

## 2025-01-02 PROCEDURE — 85027 COMPLETE CBC AUTOMATED: CPT

## 2025-01-02 PROCEDURE — 250N000011 HC RX IP 250 OP 636: Performed by: COLON & RECTAL SURGERY

## 2025-01-02 PROCEDURE — 250N000013 HC RX MED GY IP 250 OP 250 PS 637: Performed by: STUDENT IN AN ORGANIZED HEALTH CARE EDUCATION/TRAINING PROGRAM

## 2025-01-02 PROCEDURE — 250N000011 HC RX IP 250 OP 636: Performed by: STUDENT IN AN ORGANIZED HEALTH CARE EDUCATION/TRAINING PROGRAM

## 2025-01-02 PROCEDURE — 120N000001 HC R&B MED SURG/OB

## 2025-01-02 PROCEDURE — 250N000009 HC RX 250: Performed by: FAMILY MEDICINE

## 2025-01-02 PROCEDURE — B4185 PARENTERAL SOL 10 GM LIPIDS: HCPCS

## 2025-01-02 PROCEDURE — 80048 BASIC METABOLIC PNL TOTAL CA: CPT

## 2025-01-02 RX ADMIN — MAGNESIUM SULFATE HEPTAHYDRATE: 500 INJECTION, SOLUTION INTRAMUSCULAR; INTRAVENOUS at 20:34

## 2025-01-02 RX ADMIN — INSULIN ASPART 1 UNITS: 100 INJECTION, SOLUTION INTRAVENOUS; SUBCUTANEOUS at 04:27

## 2025-01-02 RX ADMIN — ACETAMINOPHEN 1000 MG: 10 INJECTION, SOLUTION INTRAVENOUS at 21:01

## 2025-01-02 RX ADMIN — METHOCARBAMOL 1000 MG: 100 INJECTION INTRAMUSCULAR; INTRAVENOUS at 00:29

## 2025-01-02 RX ADMIN — INSULIN ASPART 1 UNITS: 100 INJECTION, SOLUTION INTRAVENOUS; SUBCUTANEOUS at 08:23

## 2025-01-02 RX ADMIN — ACETAMINOPHEN 1000 MG: 10 INJECTION, SOLUTION INTRAVENOUS at 11:57

## 2025-01-02 RX ADMIN — FISH OIL 100 ML: 0.1 INJECTION, EMULSION INTRAVENOUS at 22:21

## 2025-01-02 RX ADMIN — Medication: at 23:51

## 2025-01-02 RX ADMIN — PHENOL 1 ML: 1.5 LIQUID ORAL at 14:11

## 2025-01-02 RX ADMIN — ACETAMINOPHEN 1000 MG: 10 INJECTION, SOLUTION INTRAVENOUS at 03:29

## 2025-01-02 RX ADMIN — ENOXAPARIN SODIUM 40 MG: 40 INJECTION SUBCUTANEOUS at 08:23

## 2025-01-02 ASSESSMENT — ACTIVITIES OF DAILY LIVING (ADL)
ADLS_ACUITY_SCORE: 44
ADLS_ACUITY_SCORE: 44
ADLS_ACUITY_SCORE: 40
ADLS_ACUITY_SCORE: 44
ADLS_ACUITY_SCORE: 40
ADLS_ACUITY_SCORE: 40
ADLS_ACUITY_SCORE: 44
ADLS_ACUITY_SCORE: 40
ADLS_ACUITY_SCORE: 44
ADLS_ACUITY_SCORE: 40
ADLS_ACUITY_SCORE: 44
ADLS_ACUITY_SCORE: 40
ADLS_ACUITY_SCORE: 44
ADLS_ACUITY_SCORE: 40
ADLS_ACUITY_SCORE: 40
ADLS_ACUITY_SCORE: 44
ADLS_ACUITY_SCORE: 42
ADLS_ACUITY_SCORE: 40

## 2025-01-02 NOTE — PLAN OF CARE
"  Problem: Adult Inpatient Plan of Care  Goal: Plan of Care Review  Description: The Plan of Care Review/Shift note should be completed every shift.  The Outcome Evaluation is a brief statement about your assessment that the patient is improving, declining, or no change.  This information will be displayed automatically on your shift  note.  Outcome: Progressing     Problem: Adult Inpatient Plan of Care  Goal: Patient-Specific Goal (Individualized)  Description: You can add care plan individualizations to a care plan. Examples of Individualization might be:  \"Parent requests to be called daily at 9am for status\", \"I have a hard time hearing out of my right ear\", or \"Do not touch me to wake me up as it startles  me\".  Outcome: Progressing     Problem: Adult Inpatient Plan of Care  Goal: Optimal Comfort and Wellbeing  Outcome: Progressing  Intervention: Monitor Pain and Promote Comfort  Recent Flowsheet Documentation  Taken 1/1/2025 1918 by Aspen Stubbs, RN  Pain Management Interventions: medication (see MAR)  Taken 1/1/2025 1634 by Aspen Stubbs, RN  Pain Management Interventions:   cold applied   medication (see MAR)   emotional support   pain management plan reviewed with patient/caregiver   pain pump in use     Patient alert and oriented. VSS, tachycardic at times. Continuous pulse ox on. PICC in place, blood return noted, running lipids + TPN and PCA + LR. Ostomy in place, sweat/tan liquid noted, no BM at this time. DOUGLAS in place, patent, draining. Midline incision CDI. C/o pain throughout shift, refer to MAR for meds given. Abdominal binder and ice pack help with pain per pt. NGT to low-intermittent suction. C/o throat pain, has prn spray if needed. Remains NPO.  and 126 this shift, no insulin given. Using bedpan. Cooperative with cares and staff.   "

## 2025-01-02 NOTE — PROGRESS NOTES
Nutrition Therapy  Parenteral Nutrition follow-up       Dietitian to Pharmacy-No changes    Rate of TPN:  60 ml/hr continuous.       Grams Dextrose: 224 gm  Grams Protein: 70 gm     Lipids: Omegaven 20 gm daily.      Provides: 1262 kcals, 70 g protein, 224 g carb, 20 g lipid, 1440 ml fluid daily.    GIR=3.38 mg CHO/kg/min         Current Nutrition Intake:  Diet: NPO    Food allergies:  Soy, gluten    Custom TPN per central line- -  70 gm AA, 224 gm Dextrose at 60 mL/hr with 20 g omegaven daily (2 x 100 ml bottle) (allergic to soy)   Provides: 1262 kcals, 70 g protein, 224 g carb, 20 g lipid, 1440 ml fluid daily.    GIR=3.38 mg CHO/kg/min    TPN start 24.    Weight Trends  Admission wt: 47.2 kg (104 lb)   Current wt :   Date/Time Weight Weight Method   25 1237 46.4 kg (102 lb 4.7 oz) Bed scale   24 1933 46.3 kg (102 lb 1.6 oz) Standing scale   24 1124 46.3 kg (102 lb) Standing scale   24 1943 46 kg (101 lb 8 oz) Standing scale   24 0908 47.2 kg (104 lb) --     Dosing Weight: 47.2 kg     ASSESSED NUTRITION NEEDS  Estimated Energy Needs: 1290-1787 kcals/day (25 -30 kcals/kg )  Justification: Maintenance  Estimated Protein Needs: 56-70 grams protein/day (1.2 - 1.5 grams of pro/kg)  Justification: Increased needs  Estimated Fluid Needs: 0067-9877 mL/day (30 - 35 mL/kg)   Justification: Increased needs    PHYSICAL FINDINGS:  Bowel sounds not audible  Drains 15 mL output 25   mL output 25  New ileostomy 10 mL output 25    Labs:   Labs reviewed   Glucose 155 H  FSB, 150, 140     Medications:   Reviewed   Cont IV, LR at 10 mL/hr    Nutrition Diagnosis  Inadequate oral intake related to altered GI function as evidenced by NPO and need for nutrition support. -continues.    Goals   Meet nutrition needs-Met  Maintain weigh-Met    INTERVENTIONS  Implementation  TPN-no changes    Monitoring/Evaluation  Progress toward goals will be monitored and evaluated per  protocol.

## 2025-01-02 NOTE — PROGRESS NOTES
COLON & RECTAL SURGERY  PROGRESS NOTE    44F with PMH crohns disease and partial large bowel obstruction who is sp exploratory laparotomy and takedown of colostomy with end ileostomy on 12/30.     SUBJECTIVE:    - Resting in bed, continues to endorse pain  - No gas or succus in ostomy  - NGT in place with minimal, thin output   - Voiding    OBJECTIVE:  Temp:  [97.7  F (36.5  C)-99.7  F (37.6  C)] 99.5  F (37.5  C)  Pulse:  [] 130  Resp:  [16-22] 22  BP: (101-111)/(65-75) 111/68  SpO2:  [97 %-99 %] 98 %      Intake/Output Summary (Last 24 hours) at 1/2/2025 0721  Last data filed at 1/2/2025 0543  Gross per 24 hour   Intake 1738.06 ml   Output 775 ml   Net 963.06 ml     GENERAL: Appears comfortable  EXTREMITIES: Warm and well perfused  ABDOMEN:  Soft, appropriately tender, non-distended. No guarding, rigidity, or peritoneal signs.   INCISION:  midline closed with staples  OSTOMY: pink and healthy w some edema, bowel sweat and small amt of blood in bag   DRAIN: DOUGLAS drain, serosanginous drainage. NGT with minimal output- functioning properly.     A/P: 44F POD 3 s/p completion TAC, SBR, and end ileostomy for Crohn's Disease.     Neuro: Multimodal pain control.  Will slightly decrease dose of PCA (due to observed sedation).  No oral narcotics until taking a regular diet.  No NSAIDs secondary to Crohn's disease.  Added Robaxin and IV Tylenol secondary to pain severity. Will see if removing NG helps her throat pain.     Cardiovascular: Hemodynamically stable. Tachycardia improved. Initial chest pain work-up negative.     Pulmonary: Must be on continuous pulse ox with capnography at all times due to risk of respiratory depression. IS is barely 500mL now. Continue to work on this as much as possible.     GI: Continue sips and chips until she starts to have some output from the ileostomy. AROBF. Remove NG tube. No objective signs of leak but will continue to follow.     : Chambers removed - voiding. Continue to monitor  electrolytes and replete. Record strict ins/outs.     Heme: Continue DVT chemoprophylaxis w/ Lovenox     ID: No indication for antibiotics. Continue to monitor WBC - likely inflammatory, not infectious. Will continue to monitor.     Endo: Blood sugar checks and sliding scale insulin.    Nutrition: Continue TPN until taking 80% of calories by mouth.      Colorectal Surgery Staff:  I have seen and examined the patient. I agree with the above documentation and plan of the fellow/PA above with the following additions/chages:    Pain still a problem. Complaining of pain in her chest (via ), but she points to her throat as the site of the pain and grimaces when swallowing. Clamp trial was minimal.     Afebrile. HR is 112. BP normal.   UOP: not fully recorded but 525mL over first shift.  Ileostomy: 10mL, s/s.  NmL  Pulm: Lungs clear but diminished breath sounds b/l.   Abdomen soft, tender but not more than expected. Incision, c/d/I. Ileostomy pink. Colostomy site looks good.    Labs reviewed. WBC elevated but stable. Hb stable. No acidosis.     I have updated the A/P above.     MD RYAN SyedA  [facility]  Office: [phone]  2025 2:00 PM

## 2025-01-02 NOTE — PLAN OF CARE
Goal Outcome Evaluation:      Plan of Care Reviewed With: patient    Overall Patient Progress: improvingOverall Patient Progress: improving    Outcome Evaluation: pain control still work in progress- PCA doses changed, Tylenol IV scheduled, throat spray available. NG was clamped for 4 hours with minimal output when reattached to suction. Dr Lafleur at bedside to assess- NG removed per orders. Encouraged activity- ambulating to bathroom, up in chair x90 minutes. Voiding well. Minimal output from DOUGLAS, scant sersang from ileostomy. NO flatus and faint hypo active bowel sounds present.      Problem: Pain Acute  Goal: Optimal Pain Control and Function  Intervention: Develop Pain Management Plan  Recent Flowsheet Documentation  Taken 1/2/2025 0838 by Kirstin Mckeon RN  Pain Management Interventions: medication (see MAR)     Problem: Intestinal Obstruction  Goal: Optimal Bowel Function  Intervention: Promote Bowel Function  Recent Flowsheet Documentation  Taken 1/2/2025 1057 by Kirstin Mckeon, RN  Chair: Recline and up in chair  Taken 1/2/2025 0838 by Kirstin Mckeon RN  Body Position: supine, head elevated  Head of Bed (HOB) Positioning: HOB at 20-30 degrees

## 2025-01-02 NOTE — PHARMACY-CONSULT NOTE
"Pharmacy Note: Parenteral Nutrition (PN) Management    Pharmacist consulted to dose PN for Dain Tejeda, a 44 year old female.    Subjective:    The patient is a new PN start.    The patient was started on PN in the hospital on 12/27/2024.    Indication for PN therapy: bowel obstruction    Inadequate nutrition anticipated for > 7 days.     Enteral nutrition contraindicated due to:  bowel obstruction .    Social History     Tobacco Use    Smoking status: Never     Passive exposure: Never    Smokeless tobacco: Never   Substance Use Topics    Alcohol use: No    Drug use: No     Objective:    Ht Readings from Last 1 Encounters:   12/11/24 1.499 m (4' 11\")     Wt Readings from Last 1 Encounters:   01/01/25 46.4 kg (102 lb 4.7 oz)       Body mass index is 20.66 kg/m .    Patient Vitals for the past 96 hrs:   Weight   01/01/25 1237 46.4 kg (102 lb 4.7 oz)   12/29/24 1933 46.3 kg (102 lb 1.6 oz)   12/29/24 1124 46.3 kg (102 lb)       Labs:  Last 3 days:  Recent Labs     12/31/24  0115 12/31/24  0542 01/01/25  0657 01/01/25  1129 01/02/25  0535    140 136  --  135   POTASSIUM 5.1 4.0 3.9  --  3.4   CHLORIDE 100 104 99  --  98   CO2 27 26 28  --  29   BUN 13.7 11.5 13.1  --  13.0   CR 0.60 0.47* 0.45*  --  0.43*   ALEX 8.9 8.6* 8.7*  --  8.9   MAG  --  1.7 1.7  --   --    PHOS  --  3.2 3.2  --   --    PROTTOTAL 6.9  --   --   --   --    ALBUMIN 3.1*  --   --   --   --    HGB 12.1 10.8* 8.5* 8.9* 8.9*   HCT 37.3 33.2* 27.0* 28.4* 28.2*    393 320 325 364   BILITOTAL 0.2  --   --   --   --    AST 20  --   --   --   --    ALT 18  --   --   --   --    ALKPHOS 103  --   --   --   --        Glucose (past 48 hours):   Recent Labs     01/01/25  0003 01/01/25  0438 01/01/25  0657 01/01/25  0743 01/01/25  1650 01/01/25  1916 01/02/25  0011 01/02/25  0419 01/02/25  0535 01/02/25  0747   * 144* 126* 140* 128* 126* 126* 150* 155* 140*       Intake/Output (last 24 hours): I/O last 3 completed shifts:  In: 1738.06 " [I.V.:282.06]  Out: 775 [Urine:100; Emesis/NG output:650; Drains:15; Stool:10]    Estimated CrCl: Estimated Creatinine Clearance: 124.8 mL/min (A) (based on SCr of 0.43 mg/dL (L)).    Assessment:    Continue patient on PN therapy as a continuous central therapy.     Given the patient's current condition/oral intake, PN is still indicated.    Lab results reviewed:     Plan:  Rate of PN: 60 mL/hr.  Formula:   Amino Acids 70 grams  Dextrose 224 grams  Sodium 55 mEq/day  Potassium 40 mEq/day  Calcium 6 mEq/day  Magnesium 8 mEq/day  Phosphorus 5 mMol/day  Chloride: Acetate Ratio 1:1  Standard Multivitamins w/Vitamin K  Trace Elements  Thiamine 100 mg  Fat Emulsion: Omegaven 20 gm (10 x 2 bottles) IV daily  Check labs per protocol.  Pharmacist will continue to follow the patient's lab results, clinical status and blood glucose results and make adjustments as appropriate.    Thank you for the consult.  Christie Lockhart RPH  1/2/2025 9:14 AM

## 2025-01-02 NOTE — PLAN OF CARE
Goal Outcome Evaluation:               Tachycardic.  Diaphoretic.  Pain between #5-#10.  PCA demand only.  Roboxin and tylenol scheduled.  TPN/Lipids. DOUGLAS 5cc.  NG 200cc.  Ileosomy red fluid  10cc. Picc with blood return and labs drawn.  Ambulated to br x1 missed hat. Very pain  Problem: Pain Acute  Goal: Optimal Pain Control and Function  Outcome: Not Progressing  Intervention: Develop Pain Management Plan  Recent Flowsheet Documentation  Taken 1/2/2025 0123 by Anna Sapp, RN  Pain Management Interventions:   medication (see MAR)   cold applied  Taken 1/2/2025 0000 by Anna Sapp, RN  Pain Management Interventions: medication (see MAR)   ful to get OOB and ambulate.   Unmeasurable void with much hesistancy.  Pt used bedpan start of shift.  BS .

## 2025-01-03 ENCOUNTER — APPOINTMENT (OUTPATIENT)
Dept: OCCUPATIONAL THERAPY | Facility: HOSPITAL | Age: 45
End: 2025-01-03
Payer: COMMERCIAL

## 2025-01-03 LAB
ANION GAP SERPL CALCULATED.3IONS-SCNC: 10 MMOL/L (ref 7–15)
BUN SERPL-MCNC: 14.8 MG/DL (ref 6–20)
CALCIUM SERPL-MCNC: 8.8 MG/DL (ref 8.8–10.4)
CHLORIDE SERPL-SCNC: 101 MMOL/L (ref 98–107)
CREAT SERPL-MCNC: 0.45 MG/DL (ref 0.51–0.95)
EGFRCR SERPLBLD CKD-EPI 2021: >90 ML/MIN/1.73M2
ERYTHROCYTE [DISTWIDTH] IN BLOOD BY AUTOMATED COUNT: 14.9 % (ref 10–15)
GLUCOSE BLDC GLUCOMTR-MCNC: 118 MG/DL (ref 70–99)
GLUCOSE BLDC GLUCOMTR-MCNC: 127 MG/DL (ref 70–99)
GLUCOSE BLDC GLUCOMTR-MCNC: 130 MG/DL (ref 70–99)
GLUCOSE BLDC GLUCOMTR-MCNC: 139 MG/DL (ref 70–99)
GLUCOSE BLDC GLUCOMTR-MCNC: 148 MG/DL (ref 70–99)
GLUCOSE BLDC GLUCOMTR-MCNC: 155 MG/DL (ref 70–99)
GLUCOSE SERPL-MCNC: 142 MG/DL (ref 70–99)
HCO3 SERPL-SCNC: 26 MMOL/L (ref 22–29)
HCT VFR BLD AUTO: 25.2 % (ref 35–47)
HGB BLD-MCNC: 8.2 G/DL (ref 11.7–15.7)
MAGNESIUM SERPL-MCNC: 1.7 MG/DL (ref 1.7–2.3)
MCH RBC QN AUTO: 29.9 PG (ref 26.5–33)
MCHC RBC AUTO-ENTMCNC: 32.5 G/DL (ref 31.5–36.5)
MCV RBC AUTO: 92 FL (ref 78–100)
PHOSPHATE SERPL-MCNC: 4 MG/DL (ref 2.5–4.5)
PLATELET # BLD AUTO: 381 10E3/UL (ref 150–450)
POTASSIUM SERPL-SCNC: 3.4 MMOL/L (ref 3.4–5.3)
RBC # BLD AUTO: 2.74 10E6/UL (ref 3.8–5.2)
SODIUM SERPL-SCNC: 137 MMOL/L (ref 135–145)
WBC # BLD AUTO: 21.7 10E3/UL (ref 4–11)

## 2025-01-03 PROCEDURE — B4185 PARENTERAL SOL 10 GM LIPIDS: HCPCS

## 2025-01-03 PROCEDURE — 97166 OT EVAL MOD COMPLEX 45 MIN: CPT | Mod: GO

## 2025-01-03 PROCEDURE — 83735 ASSAY OF MAGNESIUM: CPT

## 2025-01-03 PROCEDURE — G0463 HOSPITAL OUTPT CLINIC VISIT: HCPCS

## 2025-01-03 PROCEDURE — 250N000011 HC RX IP 250 OP 636: Performed by: STUDENT IN AN ORGANIZED HEALTH CARE EDUCATION/TRAINING PROGRAM

## 2025-01-03 PROCEDURE — 97535 SELF CARE MNGMENT TRAINING: CPT | Mod: GO

## 2025-01-03 PROCEDURE — 258N000003 HC RX IP 258 OP 636: Performed by: STUDENT IN AN ORGANIZED HEALTH CARE EDUCATION/TRAINING PROGRAM

## 2025-01-03 PROCEDURE — 82435 ASSAY OF BLOOD CHLORIDE: CPT

## 2025-01-03 PROCEDURE — 250N000011 HC RX IP 250 OP 636: Performed by: COLON & RECTAL SURGERY

## 2025-01-03 PROCEDURE — 120N000001 HC R&B MED SURG/OB

## 2025-01-03 PROCEDURE — 250N000009 HC RX 250: Performed by: FAMILY MEDICINE

## 2025-01-03 PROCEDURE — 250N000009 HC RX 250

## 2025-01-03 PROCEDURE — 84100 ASSAY OF PHOSPHORUS: CPT

## 2025-01-03 PROCEDURE — 80048 BASIC METABOLIC PNL TOTAL CA: CPT

## 2025-01-03 PROCEDURE — 82374 ASSAY BLOOD CARBON DIOXIDE: CPT

## 2025-01-03 PROCEDURE — 85014 HEMATOCRIT: CPT

## 2025-01-03 RX ORDER — METHOCARBAMOL 100 MG/ML
1000 INJECTION, SOLUTION INTRAMUSCULAR; INTRAVENOUS EVERY 8 HOURS
Status: COMPLETED | OUTPATIENT
Start: 2025-01-03 | End: 2025-01-05

## 2025-01-03 RX ADMIN — ACETAMINOPHEN 1000 MG: 10 INJECTION, SOLUTION INTRAVENOUS at 04:07

## 2025-01-03 RX ADMIN — SODIUM CHLORIDE, POTASSIUM CHLORIDE, SODIUM LACTATE AND CALCIUM CHLORIDE: 600; 310; 30; 20 INJECTION, SOLUTION INTRAVENOUS at 00:03

## 2025-01-03 RX ADMIN — INSULIN ASPART 1 UNITS: 100 INJECTION, SOLUTION INTRAVENOUS; SUBCUTANEOUS at 16:37

## 2025-01-03 RX ADMIN — METHOCARBAMOL 1000 MG: 100 INJECTION INTRAMUSCULAR; INTRAVENOUS at 13:25

## 2025-01-03 RX ADMIN — MAGNESIUM SULFATE HEPTAHYDRATE: 500 INJECTION, SOLUTION INTRAMUSCULAR; INTRAVENOUS at 21:02

## 2025-01-03 RX ADMIN — FISH OIL 200 ML: 0.1 INJECTION, EMULSION INTRAVENOUS at 21:03

## 2025-01-03 RX ADMIN — ACETAMINOPHEN 1000 MG: 10 INJECTION, SOLUTION INTRAVENOUS at 20:36

## 2025-01-03 RX ADMIN — ACETAMINOPHEN 1000 MG: 10 INJECTION, SOLUTION INTRAVENOUS at 13:13

## 2025-01-03 RX ADMIN — METHOCARBAMOL 1000 MG: 100 INJECTION INTRAMUSCULAR; INTRAVENOUS at 21:05

## 2025-01-03 RX ADMIN — ENOXAPARIN SODIUM 40 MG: 40 INJECTION SUBCUTANEOUS at 08:11

## 2025-01-03 RX ADMIN — INSULIN ASPART 1 UNITS: 100 INJECTION, SOLUTION INTRAVENOUS; SUBCUTANEOUS at 13:30

## 2025-01-03 ASSESSMENT — ACTIVITIES OF DAILY LIVING (ADL)
ADLS_ACUITY_SCORE: 44

## 2025-01-03 NOTE — PLAN OF CARE
Problem: Adult Inpatient Plan of Care  Goal: Optimal Comfort and Wellbeing  Outcome: Progressing  Intervention: Monitor Pain and Promote Comfort  Recent Flowsheet Documentation  Taken 1/3/2025 0518 by Rina Zavaleta, RN  Pain Management Interventions: (pca) medication (see MAR)  Taken 1/3/2025 0411 by Rina Zavaleta, RN  Pain Management Interventions: medication (see MAR)  Taken 1/3/2025 0116 by Rina Zavaleta, RN  Pain Management Interventions: (pca) medication (see MAR)   Goal Outcome Evaluation:       Pt is alert and oriented, able to make needs known.  and 118. Pt NPO. Ileostomy intact with small amount of bloody drainage. DOUGLAS intact with serosanguineous drainage noted. Pt refused to get up due to pain, used bedpan to void. Dilaudid PCA, pt rates pain 6/10. Resting inbetween cares. Bowel sounds faint.     Previous shift was not able to get correct filter and tubing for lipids. Lipids would not infuse and were stopped at 00:33.  Writer was able to get a different filter from pharmacy and lipids resumed at 04:50am.     Rina Zavaleta, RN

## 2025-01-03 NOTE — PROGRESS NOTES
[facility]  WOC Nurse Inpatient Assessment     Consulted for: New Ileo    Summary: Patient had take down of colostomy during this surgery as well    Patient History (according to provider note(s):    Pre-Operative Diagnosis:   Crohn's Disease (Crohn's Disease of the small and large intestine with complication - abscess)  Anemia  Partial large bowel obstruction  Immunocompromise secondary to corticosteroid use     Post-Operative Diagnosis: Same     Procedure:   Open completion total abdominal colectomy with colostomy takedown and end ileostomy  Small bowel resection  Lysis of adhesions >3 hours     Assessment:    WOC nurse spoke to patient through .  Patient was c/o pain in abdomen, nurse had refilled PCA pump.  Patient said she felt okay with the pouch was in too much pain to discuss today and did not allow WOC nurse to assess.  WOC will follow up next week as able.  Previous WOC nurse noted stoma was pink and viable. Flowsheets note small amount of output.  Discharge orders entered.    Treatment Plan:   Ileostomy pouch change with teaching; nursing to change if any leaking noted.    Orders: Reviewed    RECOMMEND PRIMARY TEAM ORDER: None, at this time  Education provided: plan of care  Discussed plan of care with: Patient  WOC nurse follow-up plan: daily (M-F)  Notify WOC if wound(s) deteriorate.  Nursing to notify the Provider(s) and re-consult the WOC Nurse if new skin concern.    DATA:     Current support surface: Standard  Standard gel mattress (Isoflex)  Containment of urine/stool: Continent of bladder and Ileostomy pouch  BMI: Body mass index is 20.44 kg/m .   Active diet order: Orders Placed This Encounter      NPO for Medical/Clinical Reasons Except for: NPO but receiving PN     Output: I/O last 3 completed shifts:  In: 1836.6 [I.V.:1262]  Out: 965 [Urine:925; Emesis/NG output:25; Drains:10; Stool:5]     Labs:   Recent Labs   Lab 01/03/25  0654 01/01/25  0657  12/31/24  0542 12/31/24  0115 12/30/24  0711 12/30/24  0511   ALBUMIN  --   --   --  3.1*  --  3.7   PREALB  --   --   --   --   --  19.2*   HGB 8.2*   < > 10.8* 12.1   < > 11.5*   INR  --   --   --   --   --  1.01   WBC 21.7*   < > 24.4* 25.0*  --  14.3*   A1C  --   --  4.7  --   --   --     < > = values in this interval not displayed.     Pressure injury risk assessment:   Sensory Perception: 4-->no impairment  Moisture: 3-->occasionally moist  Activity: 3-->walks occasionally  Mobility: 3-->slightly limited  Nutrition: 3-->adequate  Friction and Shear: 2-->potential problem  Saravanan Score: 18    Precious Pickens, CHASITYN, RN, PHN, HNB-BC, CWOCN  Pager no longer is use, please contact through [employer] group: [facility] [employer] [facility]

## 2025-01-03 NOTE — PROGRESS NOTES
01/03/25 1110   Appointment Info   Signing Clinician's Name / Credentials (OT) Mary Reyes,OTR/L       Present yes   Language Hmong   Living Environment   People in Home spouse;child(sisi), dependent   Current Living Arrangements other (see comments)  (stated she lives in an apt but it has multiple living units w/ stairs that she cannot negotiate so she stays in common living area on main level ,)   Home Accessibility other (see comments)  (unclear if stairs to enter)   Transportation Anticipated family or friend will provide   Self-Care   Current Activity Tolerance fair   Equipment Currently Used at Home none   Fall history within last six months no   Instrumental Activities of Daily Living (IADL)   IADL Comments   (unknown PLOF, pt stated spouse is retired but unknown if has been assisting pt w/ ADLs)   General Information   Onset of Illness/Injury or Date of Surgery 12/26/24   Patient/Family Therapy Goal Statement (OT) none stated   Additional Occupational Profile Info/Pertinent History of Current Problem Dain Tejeda is a 44 year old female w/PMHx significant for Crohn's Disease s/p partial colectomy/colostomy and recent admission for infectious colitis admitted on 12/26/2024 for acute abdominal pain, fth bowel obstruction near ileum s/p ex lap, complete total colectomy and ileostomy, small bowel resection, and lysis of adhesions on 12/31.   Existing Precautions/Restrictions abdominal;other (see comments)  (multiple lines)   Cognitive Status Examination   Behavioral Issues   (cooperative)   Affect/Mental Status (Cognitive) WFL   Follows Commands WFL   Visual Perception   Visual Impairment/Limitations WFL   Posture   Posture forward head position   Range of Motion Comprehensive   General Range of Motion no range of motion deficits identified   Strength Comprehensive (MMT)   Comment, General Manual Muscle Testing (MMT) Assessment generalized weakness   Coordination   Upper Extremity Coordination  No deficits were identified   Bed Mobility   Bed Mobility sit-supine;rolling right   Rolling Right [place] (Bed Mobility) contact guard   Sit-Supine [place] (Bed Mobility) minimum assist (75% patient effort)   Transfers   Transfers bed-chair transfer   Transfer Skill: Bed to Chair/Chair to Bed   Bed-Chair [place] (Transfers) contact guard   Balance   Balance Assessment standing dynamic balance   Balance Comments WFL w/ FWW   Activities of Daily Living   BADL Assessment/Intervention lower body dressing;toileting   Lower Body Dressing Assessment/Training   Comment, (Lower Body Dressing) per clinical judgement will require assist   Toileting   Comment, (Toileting) per clinical judgement will require assist   Clinical Impression   Criteria for Skilled Therapeutic Interventions Met (OT) Yes, treatment indicated   OT Diagnosis decreased ADLs   OT Problem List-Impairments impacting ADL activity tolerance impaired;mobility;pain;strength   Assessment of Occupational Performance 3-5 Performance Deficits   Identified Performance Deficits bed mobility, trsfs, drsg, toileting   Planned Therapy Interventions (OT) ADL retraining;bed mobility training;strengthening;transfer training   Clinical Decision Making Complexity (OT) detailed assessment/moderate complexity   Risk & Benefits of therapy have been explained evaluation/treatment results reviewed;care plan/treatment goals reviewed;patient   OT Total Evaluation Time   OT Eval, Moderate Complexity Minutes (84199) 10   OT Goals   Therapy Frequency (OT) Daily   OT Predicted Duration/Target Date for Goal Attainment 01/10/25   OT Goals Lower Body Dressing;Transfers;Toilet Transfer/Toileting   OT: Lower Body Dressing Minimal assist   OT: Transfer Supervision/stand-by assist;with assistive device   OT: Toilet Transfer/Toileting Minimal assist;cleaning and garment management   Interventions   Interventions Quick Adds Self-Care/Home Management   Self-Care/Home Management    Self-Care/Home Mgmt/ADL, Compensatory, Meal Prep Minutes (00778) 13   Symptoms Noted During/After Treatment (Meal Preparation/Planning Training) fatigue;increased pain   Treatment Detail/Skilled Intervention RN ok'julien session. Evaluation completed. Treatment initiated. Pt in chair. Assist to lower footrest. STS trial w/ CGA w/ cues for hand placement and technique. Trsf chair>bed w/ FWW w/ CGA w cues for hand placement, posture and safety. Moving slowly w/ forward posture. Sidestepped to HOB w/ w/ FWW w/ CGA w/ instruction in technique. Sit >supine min A w/ logrolling cues. HR on 130's. O2 sats 100%. Left in bed at end of session w/ bed alarm on and call light in reach.   OT Discharge Planning   OT Plan progress trsfs, bed mobility, toileting, dressing, get further PLOF info as able   OT Discharge Recommendation (DC Rec) home with assist;home with home care occupational therapy   OT Rationale for DC Rec Ax1 for ADLs and transfers. Anticipate may be able to d/c home when medically stable.Pt will require 24 hr assist w/ all ADLs and mobility using AD.   OT Brief overview of current status CGA trsfs   Total Session Time   Timed Code Treatment Minutes 13   Total Session Time (sum of timed and untimed services) 23

## 2025-01-03 NOTE — PLAN OF CARE
Problem: Adult Inpatient Plan of Care  Goal: Absence of Hospital-Acquired Illness or Injury  Outcome: Progressing  Intervention: Identify and Manage Fall Risk  Recent Flowsheet Documentation  Taken 1/2/2025 1600 by Yenni Pereira RN  Safety Promotion/Fall Prevention: activity supervised  Intervention: Prevent Skin Injury  Recent Flowsheet Documentation  Taken 1/2/2025 1600 by Yenni Pereira RN  Body Position: position changed independently  Skin Protection: adhesive use limited  Intervention: Prevent and Manage VTE (Venous Thromboembolism) Risk  Recent Flowsheet Documentation  Taken 1/2/2025 1600 by Yenni Pereira RN  VTE Prevention/Management: SCDs on (sequential compression devices)  Intervention: Prevent Infection  Recent Flowsheet Documentation  Taken 1/2/2025 1600 by Yenni Pereira RN  Infection Prevention:   equipment surfaces disinfected   hand hygiene promoted   personal protective equipment utilized   rest/sleep promoted   single patient room provided     Problem: Pain Acute  Goal: Optimal Pain Control and Function  Outcome: Progressing  Intervention: Develop Pain Management Plan  Recent Flowsheet Documentation  Taken 1/2/2025 2200 by Yenni Pereira RN  Pain Management Interventions: rest  Taken 1/2/2025 2101 by Yenni Pereira RN  Pain Management Interventions: (PCA pump) medication (see MAR)  Taken 1/2/2025 2000 by Yenni Pereira RN  Pain Management Interventions: rest  Taken 1/2/2025 1630 by Yenni Pereira RN  Pain Management Interventions: (PCA pump) medication (see MAR)  Intervention: Prevent or Manage Pain  Recent Flowsheet Documentation  Taken 1/2/2025 1600 by Yenni Pereira RN  Medication Review/Management: medications reviewed     Problem: Surgery Nonspecified  Goal: Absence of Bleeding  Outcome: Progressing   Goal Outcome Evaluation:       A&Ox4, assist x1, room air. Reports 6/10 constant abdomen pain. Patient uses PCA pain pump and reports some pain relief. Lung fields  diminished and tachypnea. Tachycardia. Bowel sounds faint and hypoactive. Patient voiding yellow urine. No bowel movement this shift.

## 2025-01-03 NOTE — PROGRESS NOTES
Care Management Follow Up    Length of Stay (days): 8    Expected Discharge Date: 01/06/2025    Anticipated Discharge Plan:  Home, Home Care    Transportation: Confirmed Family/friend    PT Recommendations:    OT Recommendations:        Barriers to Discharge: medical stability    Prior Living Situation: apartment with spouse, child(sisi), adult, child(sisi), dependent    Discussed  Partnership in Safe Discharge Planning  document with patient/family: No     Handoff Completed: No, handoff not indicated or clinically appropriate    Patient/Spokesperson Updated: No    Additional Information:  SW received message that [facility] able to accept pt for home RN services. Anticipate pt will discharge with home care services when medically stable.    Next Steps: Follow for medical stability, fax discharge orders to [facility].    ANGEL Fry

## 2025-01-03 NOTE — DISCHARGE INSTRUCTIONS
Ostomy Discharge Instructions/Orders    Hospital:  [facility]    Ostomy Clinic Follow-up:  Please arrive 10 - 15 minutes prior to your ostomy clinic appointment, so you can complete the registration process.  [facility] ([phone]) Please leave a voice message including your name, date of birth and phone number requesting a clinic appointment. On the day of your appointment, enter the main doors of [facility] and tell the people at the desk you are there for the ostomy clinic. They will direct your next steps.  Follow up as needed for complications - call your clinic to make an appointment    Pouch Change Instructions:  1. Change appliance twice weekly and as needed for any leakage.  Notify the WO Nurse if changing pouch more often than daily or if skin is itchy.  2. Empty pouch when no more than 1/3 to 1/2 full (solid, liquid, gas).  3. May shower with pouch on or off, removing pouch/ barrier down to the skin is ok. (Note: fecal output can not be controlled, so there may be occasional clean up if you choose to shower without a pouch.)    The pouching procedure:  1.  Use the  Pouch Change Instruction  sheet to gather and organize supplies  2.  Trace old pattern onto new pouch and cut out new opening on new barrier.  3.  Remove old pouch, observe for any leakage.  4.  Clean skin with water and paper towel.  5.  Apply Ring around stoma. (May use other products as instructed by WO nurse at this time.)  6.  Apply prepared barrier to the clean skin and press into place.  7.  Hold hand on pouch/over stoma to warm pouch (2 - 5 minutes) to help adherence.    Ostomy products to use at hospital discharge (supplies sent home from hospital, use until new supplies arrive):  [place] 1 piece CTF flat #0047  [place] Adapt Cera Ring #[id number]    ABC's of Ostomy Care  Activity:  Walk short distances & increase as your strength allows  You may climb stairs  Do not do strenuous exercise or  activity such as golfing or heavy lifting  Do not drive until approved by your doctor  Carry an extra pouching system with you, be prepared (do not leave this in the car as weather may adversely affect the adhesive)    Bathing:  You may shower with your pouch on; Dry under the pouch after the shower  If it is your change day, you may remove pouch and shower without it    Clothing:  Wear loose clothing for comfort in the immediate post-op period  No leather belts should be worn near the stoma, as it can cause an injury to the stoma mucosa    Diet:  Consume foods throughout the day that will help to thicken output and therefore help maintain a good seal.    Consider these foods:  Grains such as pasta, rice, soda crackers ,pretzels, cheese and yogurt, applesauce, bananas, potatoes, peanut butter and tapioca  Some foods increase/thin your output: Sugars,Prunes, Raisins  Eat 3-4 servings of protein per day  No timed release medications    Avoiding a blockage:  Eat 6 small meals/day; small bites and chew well  Avoid raw vegetables, seeds, nuts peels, grapefruit, oranges, pineapple, fruit peelings (e.g. apple)  Avoid tough meats like jerkies and meat with casings; ground meat is easier to digest, chew other cuts of meat well  If the food cannot be cut easily with a fork avoid for now.  IF you have a blockage try to gently massage blockage, do NOT take a laxative and call MD.    Fluids:  Drink plenty of fluids, minimally  8 cups of non-caffeinated beverage per day  plus if you empty your pouch, have another glass of fluid  Remember caffeine and alcohol makes you more dehydrated, be sure to add in more fluids if consuming caffeine or alcohol  Monitor the color of your urine, if getting too dark, need more fluids    Avoiding dehydration:  Remember caffeine and alcohol makes you more dehydrated, be sure to add in more fluids if consuming caffeine or alcohol  Monitor the color of your urine, if getting too dark increase fluid  intake  Keep your input/output in balance and notify MD if out of balance  If you feel you are getting dehydrated then consider drinking Gatorade/Pedialyte or similar beverage    Stoma Injury or Concern:  Call the [facility] Nurse to report the following concerns (see phone number above):  Skin concerns around the stoma: red, rash, open areas  Concerns with the pouch opening being too small or too large  Bulging around the stoma without pain and with continued output  Pouch leaking daily or more frequently    Call the surgeon or go to the emergency department for the following concerns:  Stoma turns blue or darker in color  Bulging around the stoma with no output; likely will have pain as well      Supply Ordering after hospital discharge:  Upon discharge from the hospital you will be sent home with ostomy supplies.   These supplies are to be used for your twice weekly stoma site care while you order/set up your monthly supply order.     If you have been set up for home care visits the home home care nurse will help you coordinate ordering supplies.      If you have not been set up for home care then make sure to make a follow up appointment with the [facility] nurse to reassess your abdomen and ostomy for changes and determine the correct plan.  At that time ordering supplies will be reviewed.      Supply ordering:  You are responsible to order ostomy supplies after discharge from the hospital.  Please contact your medical supply company and give them the information detailed below related to ostomy supplies. The medical supply company will give further instructions if necessary.    You may choose whichever ostomy product supplier you desire. Some more common suppliers/contact information are listed below:  [employer] Phone: [phone] ext. [phone]; Fax: [phone] (local company with store near [facility] and [address] in [place])  [facility] [employer] Ph: [phone] ext. [phone]  [facility]   Phone:  "[phone]  [employer] Phone: [phone] ext. [phone]    Your Medical Supplier will need your surgeon's name, phone and fax number:  Clinic.orders: [facility] ([facility]) Phone: [phone]; Fax: [phone] (SP) or [phone] (MW)    Surgery date:  12/30  Surgeon:  Miquel  Procedure:  colostomy takedown, colectomy, ileostomy creation  Related diagnosis:  Crohn's    Recommended supplies to order:  Pouches: Fecal Pouches One Piece: [employer] Flat CTF #2755   Accessories:Ring/Paste: [place] Adapt Cera 2\" Ring #[id number]    Authorizing MD: Dr. Lafleur    Verbal Order for Ostomy Supplies for 1 month per:   Signature: Precious Pickens [facility] RN      Additional Ostomy Resources:  Support Groups:   Ostomym[place].[email or web address] - [facility] Ostomy Association of the [place] - email newsletters and in-person monthly meetings with virtual option   2.   [employer] - [facility] ostomy support group - newsletters and in-person monthly meetings     Virtual Ostomy Support  [employer]- https://www.PulmOne.co/    Common Product Manufacturers:  [employer]Level[employer]ven/en/ostomycare  2.   Coloplast.us/ostomy  3.   [employer]Level[employer]ven/ostomy    Ostomy Support Products:  Stealthbelt.com - pouch supports/covers  2.   Ostobuddy - smart phone anjum to help manage ostomy cares        "

## 2025-01-03 NOTE — PROGRESS NOTES
Colon and Rectal Surgery  Daily Progress Note    Subjective  [facility] phone  used. Patient reports pain is a little better today, continues to have pain between 4-6/10. Throat/chest pain is a bit better after NGT removal but still present. She denies any nausea. Ileostomy with 5 mL recorded. DOUGLAS drain with 10 mL. Over 925 mL urine output.     Objective  Intake/Output last 24 hrs:    Intake/Output Summary (Last 24 hours) at 1/3/2025 1106  Last data filed at 1/3/2025 0955  Gross per 24 hour   Intake 1836.6 ml   Output 1090 ml   Net 746.6 ml     Temp:  [98.3  F (36.8  C)-100.2  F (37.9  C)] 98.5  F (36.9  C)  Pulse:  [] 114  Resp:  [16-26] 20  BP: ()/(60-76) 97/60  SpO2:  [96 %-99 %] 99 %    Physical Exam:  General: awake, alert, lying in bed, in no acute distress  Head: normocephalic, atraumatic  Respiratory: non-labored breathing  Abdomen: soft, tender, non-distended              Incisions: clean, dry and staples intact              Drains: DOUGLAS with 10 ml SS in past 24 hrs   Ileostomy: pink and viable, loose but slightly thicker stool in bag. Previous stoma site dressing clean and dry.   Skin: No rashes or lesions  Musculoskeletal: moves all four extremities equally  Psychological: alert and oriented, answers questions appropriately    Pertinent Labs  Lab Results: personally reviewed.  Lab Results   Component Value Date     01/03/2025     01/02/2025     01/01/2025    .0 04/24/2017    CO2 26 01/03/2025    CO2 29 01/02/2025    CO2 28 01/01/2025    CO2 25 06/23/2022    CO2 23 06/03/2022    CO2 22 06/02/2022    CO2 26.0 04/24/2017    BUN 14.8 01/03/2025    BUN 13.0 01/02/2025    BUN 13.1 01/01/2025    BUN 6 06/23/2022    BUN 3 06/03/2022    BUN 4 06/02/2022    BUN 7.0 04/24/2017     Lab Results   Component Value Date    WBC 21.7 01/03/2025    WBC 18.5 01/02/2025    WBC 18.9 01/01/2025    HGB 8.2 01/03/2025    HGB 8.9 01/02/2025    HGB 8.9 01/01/2025    HGB 10.8 01/10/2018    HGB  10.3 11/08/2017    HGB 9.8 09/18/2017    HCT 25.2 01/03/2025    HCT 28.2 01/02/2025    HCT 28.4 01/01/2025    HCT 35.9 01/10/2018    HCT 33.8 11/08/2017    HCT 31.4 07/31/2017    MCV 92 01/03/2025    MCV 93 01/02/2025    MCV 93 01/01/2025    MCV 83.1 01/10/2018    MCV 77.9 11/08/2017    MCV 86.3 07/31/2017     01/03/2025     01/02/2025     01/01/2025       Assessment/Plan: 44 year old female POD #4 s/p completion TAC, SBR, and end ileostomy for Crohn's Disease.     Neuro: Pain control has been the immediate issue since surgery. Multimodal pain control.  Continue PCA and monitor sedation.  No oral narcotics until taking a regular diet.  No NSAIDs secondary to Crohn's disease. Robaxin and IV Tylenol secondary to pain severity.      Cardiovascular: Hemodynamically stable. Tachycardia improved. Initial chest pain work-up negative.      Pulmonary: Must be on continuous pulse ox with capnography at all times due to risk of respiratory depression. Continue frequent IS 10x per hour while awake     GI: Continue sips and chips for now ans await further ileostomy output. No objective signs of leak but will continue to follow. Hopefully we can start clears soon once she has a little more ileostomy output.      : Continue to monitor electrolytes and replete. Record strict ins/outs.      Heme: Continue DVT chemoprophylaxis w/ Lovenox      ID: WBC going up, now 21.7. Risks include enterotomy given extent of dissection and small bowel anastomosis. No sign of bilious output, but that does not rule it out. If she has not opened up by POD 7 (Monday), we will plan to get a CT abd/pelvis, but if she has a fever, we will scan her earlier.      Endo: Blood sugar checks and sliding scale insulin.     Nutrition: Continue TPN until taking 80% of calories by mouth.    Dispo: TBD, PT/OT consults placed to help increase activity    Dania Mares PA-C  [facility]  [phone]..............................main    Colorectal Surgery Staff:  I have seen and examined the patient. I agree with the above documentation and plan of the fellow/PA above with the following additions/chages:    Continues to have pain but improved. Has not ambulated much.     Afebrile. Tachycardia improved. BP stable. UOP 925mL. Ileostomy recorded as 5mL but at least 100mL in appliance.  NAD  Abdomen soft, tender, non-distended. DOUGLAS s/s. Ileostomy pink.    WBC 21.7 (increased from 18.5)  Hb 8.2  Cr 0.45    I have amended the assessment and plan above.     Hugo Lafleur MD MBA  [facility]  Office: [phone]  1/3/2025 6:13 PM

## 2025-01-03 NOTE — PHARMACY-CONSULT NOTE
"Pharmacy Note: Parenteral Nutrition (PN) Management    Pharmacist consulted to dose PN for Dain Tejeda, a 44 year old female.  Subjective:    The patient is a new PN start.     The patient was started on PN in the hospital on 12/27/2024.     Indication for PN therapy: bowel obstruction     Inadequate nutrition anticipated for > 7 days.      Enteral nutrition contraindicated due to:  bowel obstruction     Social History     Tobacco Use    Smoking status: Never     Passive exposure: Never    Smokeless tobacco: Never   Substance Use Topics    Alcohol use: No    Drug use: No     Objective:    Ht Readings from Last 1 Encounters:   12/11/24 1.499 m (4' 11\")     Wt Readings from Last 1 Encounters:   01/02/25 45.9 kg (101 lb 3.1 oz)       Body mass index is 20.44 kg/m .    Patient Vitals for the past 96 hrs:   Weight   01/02/25 1143 45.9 kg (101 lb 3.1 oz)   01/01/25 1237 46.4 kg (102 lb 4.7 oz)       Labs:  Last 3 days:  Recent Labs     01/01/25  0657 01/01/25  1129 01/02/25  0535 01/03/25  0654     --  135 137   POTASSIUM 3.9  --  3.4 3.4   CHLORIDE 99  --  98 101   CO2 28  --  29 26   BUN 13.1  --  13.0 14.8   CR 0.45*  --  0.43* 0.45*   ALEX 8.7*  --  8.9 8.8   MAG 1.7  --   --  1.7   PHOS 3.2  --   --  4.0   HGB 8.5* 8.9* 8.9* 8.2*   HCT 27.0* 28.4* 28.2* 25.2*    325 364 381       Glucose (past 48 hours):   Recent Labs     01/02/25  0419 01/02/25  0535 01/02/25  0747 01/02/25  1147 01/02/25  1654 01/02/25  2058 01/03/25  0113 01/03/25  0359 01/03/25  0654 01/03/25  0746   * 155* 140* 119* 137* 122* 130* 118* 142* 139*       Intake/Output (last 24 hours): I/O last 3 completed shifts:  In: 1836.6 [I.V.:1262]  Out: 965 [Urine:925; Emesis/NG output:25; Drains:10; Stool:5]    Estimated CrCl: Estimated Creatinine Clearance: 118 mL/min (A) (based on SCr of 0.45 mg/dL (L)).    Assessment:    Continue patient on PN therapy as a continuous central therapy.     Given the patient's current condition/oral intake, PN " is still indicated.    Lab results reviewed: BMP, Mg, Phos, BG    Plan:  Rate of PN: 60 mL/hr  Formula:   Amino Acids 70 grams  Dextrose 224 grams  Sodium 55 mEq/day  Potassium 40 mEq/day  Calcium 6 mEq/day  Magnesium 8 mEq/day  Phosphorus 5 mMol/day  Chloride: Acetate Ratio 1:1  Standard Multivitamins w/Vitamin K  Trace Elements  Thiamine 100 mg  Fat Emulsion: Omegaven 20 gm (10 x 2 bottles) IV daily   Check hyperal lytes and BMP labs tomorrow.  Pharmacist will continue to follow the patient's lab results, clinical status and blood glucose results and make adjustments as appropriate.    Thank you for the consult.  BRO GOLDEN [facility]  1/3/2025 8:38 AM

## 2025-01-03 NOTE — PROGRESS NOTES
Nutrition Therapy  Parenteral Nutrition follow-up       Dietitian to Pharmacy-No changes    Rate of TPN:  60 ml/hr continuous.       Grams Dextrose: 224 gm  Grams Protein: 70 gm     Lipids: Omegaven 20 gm daily.      Provides: 1262 kcals, 70 g protein, 224 g carb, 20 g lipid, 1440 ml fluid daily.    GIR=3.38 mg CHO/kg/min         Current Nutrition Intake:  Diet: NPO    Food allergies:  Soy, gluten    Custom TPN per central line- 70 gm AA, 224 gm Dextrose at 60 mL/hr with 20 g omegaven daily (2 x 100 ml bottle) (allergic to soy)   Provides: 1262 kcals, 70 g protein, 224 g carb, 20 g lipid, 1440 ml fluid daily.    GIR=3.38 mg CHO/kg/min    TPN start 12/27/24.    Weight Trends  Admission wt: 47.2 kg (104 lb) 12/26  Date/Time Weight Weight Method   01/02/25 1143 45.9 kg (101 lb 3.1 oz) --   01/01/25 1237 46.4 kg (102 lb 4.7 oz) Bed scale   12/29/24 1933 46.3 kg (102 lb 1.6 oz) Standing scale   12/29/24 1124 46.3 kg (102 lb) Standing scale   12/28/24 1943 46 kg (101 lb 8 oz) Standing scale   12/26/24 0908 47.2 kg (104 lb) --     Dosing Weight: 47.2 kg     ASSESSED NUTRITION NEEDS  Estimated Energy Needs: 1338-2463 kcals/day (25 -30 kcals/kg )  Justification: Maintenance  Estimated Protein Needs: 56-70 grams protein/day (1.2 - 1.5 grams of pro/kg)  Justification: Increased needs  Estimated Fluid Needs: 6509-2037 mL/day (30 - 35 mL/kg)   Justification: Increased needs    PHYSICAL FINDINGS:  Faint, hypoactive bowel sounds   Abdominal discomfort  Drains 10 mL output 1/2/25  NG 25 mL output 1/2/25 -removed  New ileostomy 5 mL output 1/2/25    Labs:   Labs reviewed     Medications:   Reviewed   Cont IV, LR at 10 mL/hr    Nutrition Diagnosis  Inadequate oral intake related to altered GI function as evidenced by NPO and need for nutrition support. -continues.    Goals   Meet nutrition needs-Met  Maintain weigh-Met    INTERVENTIONS  Implementation  TPN-no changes    Monitoring/Evaluation  Progress toward goals will be monitored  and evaluated per protocol.

## 2025-01-04 ENCOUNTER — VIRTUAL VISIT (OUTPATIENT)
Dept: INTERPRETER SERVICES | Facility: CLINIC | Age: 45
End: 2025-01-04

## 2025-01-04 ENCOUNTER — APPOINTMENT (OUTPATIENT)
Dept: PHYSICAL THERAPY | Facility: HOSPITAL | Age: 45
End: 2025-01-04
Payer: COMMERCIAL

## 2025-01-04 LAB
ANION GAP SERPL CALCULATED.3IONS-SCNC: 11 MMOL/L (ref 7–15)
BUN SERPL-MCNC: 15.3 MG/DL (ref 6–20)
CALCIUM SERPL-MCNC: 9.5 MG/DL (ref 8.8–10.4)
CHLORIDE SERPL-SCNC: 101 MMOL/L (ref 98–107)
CREAT SERPL-MCNC: 0.38 MG/DL (ref 0.51–0.95)
EGFRCR SERPLBLD CKD-EPI 2021: >90 ML/MIN/1.73M2
ERYTHROCYTE [DISTWIDTH] IN BLOOD BY AUTOMATED COUNT: 14.8 % (ref 10–15)
GLUCOSE BLDC GLUCOMTR-MCNC: 129 MG/DL (ref 70–99)
GLUCOSE BLDC GLUCOMTR-MCNC: 130 MG/DL (ref 70–99)
GLUCOSE BLDC GLUCOMTR-MCNC: 152 MG/DL (ref 70–99)
GLUCOSE BLDC GLUCOMTR-MCNC: 156 MG/DL (ref 70–99)
GLUCOSE BLDC GLUCOMTR-MCNC: 161 MG/DL (ref 70–99)
GLUCOSE BLDC GLUCOMTR-MCNC: 186 MG/DL (ref 70–99)
GLUCOSE SERPL-MCNC: 141 MG/DL (ref 70–99)
HCO3 SERPL-SCNC: 29 MMOL/L (ref 22–29)
HCT VFR BLD AUTO: 27.2 % (ref 35–47)
HGB BLD-MCNC: 8.7 G/DL (ref 11.7–15.7)
MAGNESIUM SERPL-MCNC: 2 MG/DL (ref 1.7–2.3)
MCH RBC QN AUTO: 29.2 PG (ref 26.5–33)
MCHC RBC AUTO-ENTMCNC: 32 G/DL (ref 31.5–36.5)
MCV RBC AUTO: 91 FL (ref 78–100)
PHOSPHATE SERPL-MCNC: 4.1 MG/DL (ref 2.5–4.5)
PLATELET # BLD AUTO: 441 10E3/UL (ref 150–450)
POTASSIUM SERPL-SCNC: 3.5 MMOL/L (ref 3.4–5.3)
RBC # BLD AUTO: 2.98 10E6/UL (ref 3.8–5.2)
SODIUM SERPL-SCNC: 141 MMOL/L (ref 135–145)
WBC # BLD AUTO: 20.2 10E3/UL (ref 4–11)

## 2025-01-04 PROCEDURE — 120N000001 HC R&B MED SURG/OB

## 2025-01-04 PROCEDURE — B4185 PARENTERAL SOL 10 GM LIPIDS: HCPCS

## 2025-01-04 PROCEDURE — 85049 AUTOMATED PLATELET COUNT: CPT

## 2025-01-04 PROCEDURE — 250N000009 HC RX 250: Performed by: FAMILY MEDICINE

## 2025-01-04 PROCEDURE — 97116 GAIT TRAINING THERAPY: CPT | Mod: GP | Performed by: PHYSICAL THERAPIST

## 2025-01-04 PROCEDURE — 85018 HEMOGLOBIN: CPT

## 2025-01-04 PROCEDURE — 250N000009 HC RX 250

## 2025-01-04 PROCEDURE — 82310 ASSAY OF CALCIUM: CPT

## 2025-01-04 PROCEDURE — 84100 ASSAY OF PHOSPHORUS: CPT | Performed by: FAMILY MEDICINE

## 2025-01-04 PROCEDURE — 82565 ASSAY OF CREATININE: CPT

## 2025-01-04 PROCEDURE — 250N000011 HC RX IP 250 OP 636: Performed by: COLON & RECTAL SURGERY

## 2025-01-04 PROCEDURE — 250N000011 HC RX IP 250 OP 636: Performed by: STUDENT IN AN ORGANIZED HEALTH CARE EDUCATION/TRAINING PROGRAM

## 2025-01-04 PROCEDURE — 258N000003 HC RX IP 258 OP 636: Performed by: STUDENT IN AN ORGANIZED HEALTH CARE EDUCATION/TRAINING PROGRAM

## 2025-01-04 PROCEDURE — 80048 BASIC METABOLIC PNL TOTAL CA: CPT

## 2025-01-04 PROCEDURE — 250N000011 HC RX IP 250 OP 636

## 2025-01-04 PROCEDURE — 97162 PT EVAL MOD COMPLEX 30 MIN: CPT | Mod: GP | Performed by: PHYSICAL THERAPIST

## 2025-01-04 PROCEDURE — 83735 ASSAY OF MAGNESIUM: CPT | Performed by: FAMILY MEDICINE

## 2025-01-04 RX ADMIN — ENOXAPARIN SODIUM 40 MG: 40 INJECTION SUBCUTANEOUS at 09:01

## 2025-01-04 RX ADMIN — METHOCARBAMOL 1000 MG: 100 INJECTION INTRAMUSCULAR; INTRAVENOUS at 13:56

## 2025-01-04 RX ADMIN — ACETAMINOPHEN 1000 MG: 10 INJECTION, SOLUTION INTRAVENOUS at 12:05

## 2025-01-04 RX ADMIN — ACETAMINOPHEN 1000 MG: 10 INJECTION, SOLUTION INTRAVENOUS at 03:01

## 2025-01-04 RX ADMIN — FISH OIL 200 ML: 0.1 INJECTION, EMULSION INTRAVENOUS at 20:10

## 2025-01-04 RX ADMIN — METHOCARBAMOL 1000 MG: 100 INJECTION INTRAMUSCULAR; INTRAVENOUS at 22:34

## 2025-01-04 RX ADMIN — ACETAMINOPHEN 1000 MG: 10 INJECTION, SOLUTION INTRAVENOUS at 20:26

## 2025-01-04 RX ADMIN — INSULIN ASPART 1 UNITS: 100 INJECTION, SOLUTION INTRAVENOUS; SUBCUTANEOUS at 09:01

## 2025-01-04 RX ADMIN — METHOCARBAMOL 1000 MG: 100 INJECTION INTRAMUSCULAR; INTRAVENOUS at 05:09

## 2025-01-04 RX ADMIN — INSULIN ASPART 1 UNITS: 100 INJECTION, SOLUTION INTRAVENOUS; SUBCUTANEOUS at 03:01

## 2025-01-04 RX ADMIN — MAGNESIUM SULFATE HEPTAHYDRATE: 500 INJECTION, SOLUTION INTRAMUSCULAR; INTRAVENOUS at 20:10

## 2025-01-04 RX ADMIN — SODIUM CHLORIDE, POTASSIUM CHLORIDE, SODIUM LACTATE AND CALCIUM CHLORIDE: 600; 310; 30; 20 INJECTION, SOLUTION INTRAVENOUS at 05:20

## 2025-01-04 RX ADMIN — ONDANSETRON 4 MG: 2 INJECTION INTRAMUSCULAR; INTRAVENOUS at 05:53

## 2025-01-04 RX ADMIN — INSULIN ASPART 1 UNITS: 100 INJECTION, SOLUTION INTRAVENOUS; SUBCUTANEOUS at 00:29

## 2025-01-04 RX ADMIN — INSULIN ASPART 1 UNITS: 100 INJECTION, SOLUTION INTRAVENOUS; SUBCUTANEOUS at 12:04

## 2025-01-04 ASSESSMENT — ACTIVITIES OF DAILY LIVING (ADL)
ADLS_ACUITY_SCORE: 44

## 2025-01-04 NOTE — PLAN OF CARE
"Pain: Patient reports ongoing pain. Appears comfortable at rest, but guards and grimaces with movement. Patient encouraged to utilize PCA button prior to activity.  Neuro: Minimally interacts with staff and family. Patient reports feeling \"very tired.\"  Resp: Patient reports feeling congested, indicating medial chest. Lung sounds clear, no cough. Denies difficulty breathing.   Cardio: Tachycardic in 120's rather than typical trend of 100's-110's  GI/: Ileostomy intact, output watery/loose. Up to bathroom to void. Missed collection hat. Output noted to be bright red in color, likely from menstrual cycle.  Skin: Sacrum discolored, appears to be healing bruise.  Activity: Up in chair this morning. Ambulated to bathroom. Attempted to turn patient to left side.  Nutrition/IV: NPO. On TPN, lipids.      4:04 PM  Ileostomy bag leaking into incision line slightly. Changed. Patient attempted to administer PCA several times over course of bag change.     Patient reports slight discomfort with chicken broth, but denies nausea. Rates pain 2/10 overall, primarily with movement.   "

## 2025-01-04 NOTE — PLAN OF CARE
Problem: Surgery Nonspecified  Goal: Optimal Pain Control and Function  Intervention: Prevent or Manage Pain  Recent Flowsheet Documentation  Taken 1/3/2025 1941 by Precious Garay, RN  Pain Management Interventions: (pca) medication (see MAR)     Problem: Intestinal Obstruction  Goal: Fluid and Electrolyte Balance  Outcome: Progressing     Problem: Intestinal Obstruction  Goal: Optimize Nutrition Status  Outcome: Progressing     Problem: Intestinal Obstruction  Goal: Optimal Bowel Function  Outcome: Progressing  Intervention: Promote Bowel Function  Recent Flowsheet Documentation  Taken 1/3/2025 1941 by Precious Garay, RN  Body Position:   position changed independently   supine  Head of Bed (HOB) Positioning: HOB at 30 degrees     Goal Outcome Evaluation: Patient is A/O, has been in bed all shift, CO of pain, on PCA pump, Last pain score 5/10, ABD binder for comfort, NPO diet, currently running TPN and lipids, PRN Zofran given for nausea, last . Patient diaphoretic, no fevers. Ileo has good output.Patient  will let their needs be known.    Precious Garay, RN

## 2025-01-04 NOTE — PHARMACY-CONSULT NOTE
"Pharmacy Note: Parenteral Nutrition (PN) Management    Pharmacist consulted to dose PN for Dain Tejeda, a 44 year old female.      Subjective:    The patient is a new PN start.     The patient was started on PN in the hospital on 12/27/2024.     Indication for PN therapy: bowel obstruction     Inadequate nutrition anticipated for > 7 days.      Enteral nutrition contraindicated due to:  bowel obstruction     Social History     Tobacco Use    Smoking status: Never     Passive exposure: Never    Smokeless tobacco: Never   Substance Use Topics    Alcohol use: No    Drug use: No     Objective:    Ht Readings from Last 1 Encounters:   12/11/24 1.499 m (4' 11\")     Wt Readings from Last 1 Encounters:   01/02/25 45.9 kg (101 lb 3.1 oz)       Body mass index is 20.44 kg/m .    Patient Vitals for the past 96 hrs:   Weight   01/02/25 1143 45.9 kg (101 lb 3.1 oz)   01/01/25 1237 46.4 kg (102 lb 4.7 oz)       Labs:  Last 3 days:  Recent Labs     01/01/25  1129 01/02/25  0535 01/03/25  0654 01/04/25  0459   NA  --  135 137 141   POTASSIUM  --  3.4 3.4 3.5   CHLORIDE  --  98 101 101   CO2  --  29 26 29   BUN  --  13.0 14.8 15.3   CR  --  0.43* 0.45* 0.38*   ALEX  --  8.9 8.8 9.5   MAG  --   --  1.7 2.0   PHOS  --   --  4.0 4.1   HGB 8.9* 8.9* 8.2* 8.7*   HCT 28.4* 28.2* 25.2* 27.2*    364 381 441       Glucose (past 48 hours):   Recent Labs     01/03/25  0359 01/03/25  0654 01/03/25  0746 01/03/25  1316 01/03/25  1632 01/03/25  2042 01/04/25  0026 01/04/25  0302 01/04/25  0459 01/04/25  0812   * 142* 139* 148* 155* 127* 156* 152* 141* 186*       Intake/Output (last 24 hours): I/O last 3 completed shifts:  In: 1645 [I.V.:735]  Out: 2310 [Urine:1700; Drains:10; Stool:600]    Estimated CrCl: Estimated Creatinine Clearance: 139.7 mL/min (A) (based on SCr of 0.38 mg/dL (L)).    Assessment:    Continue patient on PN therapy as a continuous central therapy.     Given the patient's current condition/oral intake, PN is still " indicated.    Lab results reviewed: BMP, Mg, Phos, BG     Plan:  Rate of PN: 60 mL/hr  Formula:   Amino Acids 70 grams  Dextrose 224 grams  Sodium 55 mEq/day  Potassium 40 mEq/day  Calcium 6 mEq/day  Magnesium 8 mEq/day  Phosphorus 5 mMol/day  Chloride: Acetate Ratio 1:1  Standard Multivitamins w/Vitamin K  Trace Elements  Thiamine 100 mg  Fat Emulsion:Omegaven 20 gm (10 x 2 bottles) IV daily   Check hyperal lytes and BMP labs tomorrow.  Pharmacist will continue to follow the patient's lab results, clinical status and blood glucose results and make adjustments as appropriate.    Thank you for the consult.  BRO GOLDEN [facility]  1/4/2025 9:46 AM

## 2025-01-04 NOTE — PROGRESS NOTES
01/04/25 1030   Appointment Info   Signing Clinician's Name / Credentials (PT) Yady Oneil, ANNE       Present yes   Language Hmong   Living Environment   People in Home spouse;child(sisi), dependent   Current Living Arrangements apartment;other (see comments)  (Pt states she stays on the main living area.  There is no bathroom or bedroom on this level.)   Home Accessibility other (see comments)  (unclear)   Transportation Anticipated family or friend will provide   Self-Care   Usual Activity Tolerance fair   Current Activity Tolerance fair   Equipment Currently Used at Home none   Fall history within last six months no   Activity/Exercise/Self-Care Comment Pt reports  also has diabetes and sometimes they are not able to care for selves or each other or 6yo daughter.  Step children are able to help when needed.   General Information   Onset of Illness/Injury or Date of Surgery 12/26/24   Referring Physician Dania Mares PA-C   Patient/Family Therapy Goals Statement (PT) pt would like to go home with support.  Will need commode  and FWW to return home.   Pertinent History of Current Problem (include personal factors and/or comorbidities that impact the POC) per chart review:  44F with PMH crohns disease and partial large bowel obstruction who is sp exploratory laparotomy and takedown of colostomy with end ileostomy on 12/30   Existing Precautions/Restrictions abdominal   Cognition   Affect/Mental Status (Cognition) flat/blunted affect   Orientation Status (Cognition) oriented x 4   Follows Commands (Cognition) WFL   Behavioral Issues withdrawn   Posture    Posture   (bent at hips in standing, difficult to stand upright)   Range of Motion (ROM)   Range of Motion ROM deficits secondary to surgical procedure;ROM deficits secondary to pain   Strength (Manual Muscle Testing)   Strength (Manual Muscle Testing) Deficits observed during functional mobility   Bed Mobility   Bed  Mobility rolling left;supine-sit;sit-supine   Rolling Left [place] (Bed Mobility) minimum assist (75% patient effort)   Supine-Sit [place] (Bed Mobility) minimum assist (75% patient effort)   Sit-Supine [place] (Bed Mobility) minimum assist (75% patient effort)   Bed Mobility Limitations decreased ability to use arms for pushing/pulling;impaired ability to control trunk for mobility   Impairments Contributing to Impaired Bed Mobility pain;decreased ROM;decreased strength   Assistive Device (Bed Mobility) bed rails   Transfers   Transfers sit-stand transfer;toilet transfer   Impairments Contributing to Impaired Transfers pain;decreased strength   Sit-Stand Transfer   Sit-Stand [place] (Transfers) contact guard   Assistive Device (Sit-Stand Transfers) walker, front-wheeled   Toilet Transfer   [place] Level (Toilet Transfer) contact guard   Assistive Device (Toilet Transfer) walker, front-wheeled   Type (Toilet Transfer) sit-stand;stand-sit   Gait/Stairs (Locomotion)   [place] Level (Gait) contact guard;1 person to manage equipment   Assistive Device (Gait) walker, front-wheeled   Distance in Feet (Gait) 5   Pattern (Gait) step-to   Deviations/Abnormal Patterns (Gait) shirley decreased   Clinical Impression   Criteria for Skilled Therapeutic Intervention Yes, treatment indicated   PT Diagnosis (PT) Impaired functional mobility, gait abnormality   Influenced by the following impairments decreased strength, decreased endurance   Functional limitations due to impairments Gait, transfers, bed mobility   Clinical Presentation (PT Evaluation Complexity) evolving   Clinical Presentation Rationale Pt presents as clinically diagnosed.   Clinical Decision Making (Complexity) moderate complexity   Planned Therapy Interventions (PT) bed mobility training;gait training;home exercise program;transfer training;strengthening   Risk & Benefits of therapy have been explained evaluation/treatment results  reviewed;patient   PT Total Evaluation Time   PT Eval, Moderate Complexity Minutes (96678) 15   Physical Therapy Goals   PT Frequency 5x/week   PT Predicted Duration/Target Date for Goal Attainment 01/11/25   PT Goals Bed Mobility;Transfers;Gait   PT: Bed Mobility Supervision/stand-by assist;Supine to/from sit;Rolling   PT: Transfers Supervision/stand-by assist;Sit to/from stand;Bed to/from chair;Assistive device   PT: Gait Supervision/stand-by assist;Assistive device;Greater than 200 feet   Interventions   Interventions Quick Adds Gait Training   Gait Training   Gait Training Minutes (07811) 12   Symptoms Noted During/After Treatment (Gait Training) increased pain;fatigue   Treatment Detail/Skilled Intervention Pt amb 5 ft plus 55ft with FWW Min A and assist for lines.   Distance in Feet 60   [place] Level (Gait Training) minimum assist (75% patient effort)   Physical Assistance Level (Gait Training) 1 person assist   Assistive Device (Gait Training) rolling walker   Pattern Analysis (Gait Training) swing-to gait   Gait Analysis Deviations decreased shirley   Impairments (Gait Analysis/Training) pain;strength decreased   PT Discharge Planning   PT Plan gait, bed mobility, stairs, transfers   PT Discharge Recommendation (DC Rec) home with assist;home with home care physical therapy   PT Rationale for DC Rec Pt requires assist with mobility but reports family will be able to help.  If family is not able to make home accessible, rec TCU.   PT Brief overview of current status amb 50 ft with FWW CGA, sit <>stand with SBA, bed mob Mod A   PT Equipment Needed at Discharge walker, rolling

## 2025-01-04 NOTE — PROGRESS NOTES
Colon and Rectal Surgery  Daily Progress Note    Subjective  ong phone  used. Continuing to feel better. Pain is less prominent today. Denies nausea or emesis. Ostomy output increasing with 600 ml in last day. Thirsty and making good urine output. On TPN.       Objective  Intake/Output last 24 hrs:    Intake/Output Summary (Last 24 hours) at 1/3/2025 1106  Last data filed at 1/3/2025 0955  Gross per 24 hour   Intake 1836.6 ml   Output 1090 ml   Net 746.6 ml     Temp:  [97.7  F (36.5  C)-99.4  F (37.4  C)] 97.8  F (36.6  C)  Pulse:  [101-126] 108  Resp:  [20-22] 22  BP: (101-114)/(60-78) 114/78  SpO2:  [97 %-100 %] 99 %    Physical Exam:  General: awake, alert, lying in bed, in no acute distress  Head: normocephalic, atraumatic  Respiratory: non-labored breathing  Abdomen: soft, tender, non-distended              Incisions: clean, dry and staples intact              Drains: DOUGLAS with 10 ml SS in past 24 hrs   Ileostomy: pink and viable, loose stool. Previous stoma site dressing clean and dry.   Skin: No rashes or lesions  Musculoskeletal: moves all four extremities equally  Psychological: alert and oriented, answers questions appropriately    Pertinent Labs  Lab Results: personally reviewed.  Lab Results   Component Value Date     01/03/2025     01/02/2025     01/01/2025    .0 04/24/2017    CO2 26 01/03/2025    CO2 29 01/02/2025    CO2 28 01/01/2025    CO2 25 06/23/2022    CO2 23 06/03/2022    CO2 22 06/02/2022    CO2 26.0 04/24/2017    BUN 14.8 01/03/2025    BUN 13.0 01/02/2025    BUN 13.1 01/01/2025    BUN 6 06/23/2022    BUN 3 06/03/2022    BUN 4 06/02/2022    BUN 7.0 04/24/2017     Lab Results   Component Value Date    WBC 21.7 01/03/2025    WBC 18.5 01/02/2025    WBC 18.9 01/01/2025    HGB 8.2 01/03/2025    HGB 8.9 01/02/2025    HGB 8.9 01/01/2025    HGB 10.8 01/10/2018    HGB 10.3 11/08/2017    HGB 9.8 09/18/2017    HCT 25.2 01/03/2025    HCT 28.2 01/02/2025    HCT 28.4  01/01/2025    HCT 35.9 01/10/2018    HCT 33.8 11/08/2017    HCT 31.4 07/31/2017    MCV 92 01/03/2025    MCV 93 01/02/2025    MCV 93 01/01/2025    MCV 83.1 01/10/2018    MCV 77.9 11/08/2017    MCV 86.3 07/31/2017     01/03/2025     01/02/2025     01/01/2025       Assessment/Plan: 44 year old female POD #5 s/p completion TAC, SBR, and end ileostomy for Crohn's Disease.     Overall, improving with better return of bowel function. We will advance diet to clear liquids. Continue TPN. WBC improving to 20.2.    Neuro: Pain control has been the immediate issue since surgery. Multimodal pain control.  Continue PCA and monitor sedation.  No oral narcotics until taking a regular diet.  No NSAIDs secondary to Crohn's disease. Robaxin and IV Tylenol secondary to pain severity.      Cardiovascular: Hemodynamically stable. Tachycardia improved. Initial chest pain work-up negative.      Pulmonary: Must be on continuous pulse ox with capnography at all times due to risk of respiratory depression. Continue frequent IS 10x per hour while awake     GI: Continue sips and chips for now ans await further ileostomy output. No objective signs of leak but will continue to follow. Hopefully we can start clears soon once she has a little more ileostomy output.      : Continue to monitor electrolytes and replete. Record strict ins/outs.      Heme: Continue DVT chemoprophylaxis w/ Lovenox      ID: WBC starting to trend down, now 20.2. Risks include enterotomy given extent of dissection and small bowel anastomosis. No sign of bilious output, but that does not rule it out. If she has not opened up by POD 7 (Monday), we will plan to get a CT abd/pelvis, but if she has a fever, we will scan her earlier.      Endo: Blood sugar checks and sliding scale insulin.     Nutrition: Continue TPN until taking 80% of calories by mouth.    Dispo: TBD, PT/OT consults placed to help increase activity    Franklin Parkinson MD  Fellow, Colon  & Rectal Surgery  [facility]  01/04/2025  2:59 PM    Colorectal Surgery can be reached at [phone] at all times. Between 5pm and 7am you will be connected to the on-call physician

## 2025-01-04 NOTE — PLAN OF CARE
Problem: Adult Inpatient Plan of Care  Goal: Readiness for Transition of Care  Outcome: Progressing     Problem: Pain Acute  Goal: Optimal Pain Control and Function  Outcome: Progressing  Intervention: Develop Pain Management Plan  Recent Flowsheet Documentation  Taken 1/3/2025 0955 by Dave Sheth RN  Pain Management Interventions: (pca) medication (see MAR)  Intervention: Prevent or Manage Pain  Recent Flowsheet Documentation  Taken 1/3/2025 0955 by Dave Sheth RN  Sensory Stimulation Regulation: quiet environment promoted     Problem: Intestinal Obstruction  Goal: Optimal Bowel Function  Outcome: Progressing  Goal: Fluid and Electrolyte Balance  Outcome: Progressing  Goal: Optimal Pain Control and Function  Outcome: Progressing  Intervention: Prevent or Manage Pain  Recent Flowsheet Documentation  Taken 1/3/2025 0955 by Dave Sheth RN  Pain Management Interventions: (pca) medication (see MAR)     Problem: Nausea and Vomiting  Goal: Nausea and Vomiting Relief  Outcome: Progressing     Problem: Surgery Nonspecified  Goal: Effective Bowel Elimination  Outcome: Progressing  Goal: Effective Urinary Elimination  Outcome: Progressing   Goal Outcome Evaluation:       Pain is tolerated with use of regimen, worked with therapy today in the room, ambulated to the bathroom, sat  up in the chair and tolerated well, abdominal incisions dry and intact, ileostomy in place with stool, patient voiding freely with adequate amounts        Dave Sheth RN

## 2025-01-04 NOTE — PROGRESS NOTES
Nutrition Therapy  Parenteral Nutrition follow-up       Dietitian to Pharmacy-No changes    Rate of TPN:  60 ml/hr continuous.       Grams Dextrose: 224 gm  Grams Protein: 70 gm     Lipids: Omegaven 20 gm daily.      Provides: 1262 kcals, 70 g protein, 224 g carb, 20 g lipid, 1440 ml fluid daily.    GIR=3.38 mg CHO/kg/min  100% of estimated nutrition needs   Future: may need to increase volume with increased ileostomy output - will watch for diet advance.     Per surgery note yesterday continue TPN until taking 80% by mouth    Current Nutrition Intake:  Diet: NPO    Food allergies:  Soy, gluten    Custom TPN per central line- 70 gm AA, 224 gm Dextrose at 60 mL/hr with 20 g omegaven daily (2 x 100 ml bottle) (allergic to soy)   Provides: 1262 kcals, 70 g protein, 224 g carb, 20 g lipid, 1440 ml fluid daily.    GIR=3.38 mg CHO/kg/min    TPN start 24.    Weight Trends  Admission wt: 47.2 kg (104 lb)   Date/Time Weight Weight Method   25 1143 45.9 kg (101 lb 3.1 oz) --   25 1237 46.4 kg (102 lb 4.7 oz) Bed scale   24 1933 46.3 kg (102 lb 1.6 oz) Standing scale   24 1124 46.3 kg (102 lb) Standing scale   24 1943 46 kg (101 lb 8 oz) Standing scale   24 0908 47.2 kg (104 lb) --     Dosing Weight: 47.2 kg     ASSESSED NUTRITION NEEDS  Estimated Energy Needs: 5959-3937 kcals/day (25 -30 kcals/kg )  Justification: Maintenance  Estimated Protein Needs: 56-70 grams protein/day (1.2 - 1.5 grams of pro/kg)  Justification: Increased needs  Estimated Fluid Needs: 0347-0494 mL/day (30 - 35 mL/kg)   Justification: Increased needs    PHYSICAL FINDINGS:  Audible bowel sounds   Abdominal discomfort, tender  Drains 10 mL output 1/3/25  New ileostomy 600 mL output 1/3/25  Surgical incision    Good urine output    Labs:   Labs reviewed   FSB, 152, 186    Medications:   Reviewed  novolog   Cont IV, LR at 10 mL/hr    Nutrition Diagnosis  Inadequate oral intake related to altered GI function  as evidenced by NPO and need for nutrition support. -continues.    Goals   Meet nutrition needs-Met  Maintain weigh-Met    INTERVENTIONS  Implementation  TPN-no changes    Monitoring/Evaluation  Progress toward goals will be monitored and evaluated per protocol.

## 2025-01-05 ENCOUNTER — APPOINTMENT (OUTPATIENT)
Dept: OCCUPATIONAL THERAPY | Facility: HOSPITAL | Age: 45
End: 2025-01-05
Payer: COMMERCIAL

## 2025-01-05 LAB
ANION GAP SERPL CALCULATED.3IONS-SCNC: 9 MMOL/L (ref 7–15)
BUN SERPL-MCNC: 15.8 MG/DL (ref 6–20)
CALCIUM SERPL-MCNC: 9 MG/DL (ref 8.8–10.4)
CHLORIDE SERPL-SCNC: 106 MMOL/L (ref 98–107)
CREAT SERPL-MCNC: 0.4 MG/DL (ref 0.51–0.95)
EGFRCR SERPLBLD CKD-EPI 2021: >90 ML/MIN/1.73M2
ERYTHROCYTE [DISTWIDTH] IN BLOOD BY AUTOMATED COUNT: 15.1 % (ref 10–15)
GLUCOSE BLDC GLUCOMTR-MCNC: 116 MG/DL (ref 70–99)
GLUCOSE BLDC GLUCOMTR-MCNC: 121 MG/DL (ref 70–99)
GLUCOSE BLDC GLUCOMTR-MCNC: 124 MG/DL (ref 70–99)
GLUCOSE BLDC GLUCOMTR-MCNC: 133 MG/DL (ref 70–99)
GLUCOSE BLDC GLUCOMTR-MCNC: 140 MG/DL (ref 70–99)
GLUCOSE BLDC GLUCOMTR-MCNC: 142 MG/DL (ref 70–99)
GLUCOSE SERPL-MCNC: 118 MG/DL (ref 70–99)
HCO3 SERPL-SCNC: 24 MMOL/L (ref 22–29)
HCT VFR BLD AUTO: 24.2 % (ref 35–47)
HGB BLD-MCNC: 7.5 G/DL (ref 11.7–15.7)
MAGNESIUM SERPL-MCNC: 1.9 MG/DL (ref 1.7–2.3)
MCH RBC QN AUTO: 28.8 PG (ref 26.5–33)
MCHC RBC AUTO-ENTMCNC: 31 G/DL (ref 31.5–36.5)
MCV RBC AUTO: 93 FL (ref 78–100)
PHOSPHATE SERPL-MCNC: 4 MG/DL (ref 2.5–4.5)
PLATELET # BLD AUTO: 421 10E3/UL (ref 150–450)
POTASSIUM SERPL-SCNC: 3.9 MMOL/L (ref 3.4–5.3)
RBC # BLD AUTO: 2.6 10E6/UL (ref 3.8–5.2)
SODIUM SERPL-SCNC: 139 MMOL/L (ref 135–145)
WBC # BLD AUTO: 14.6 10E3/UL (ref 4–11)

## 2025-01-05 PROCEDURE — 250N000009 HC RX 250

## 2025-01-05 PROCEDURE — 82565 ASSAY OF CREATININE: CPT

## 2025-01-05 PROCEDURE — 82435 ASSAY OF BLOOD CHLORIDE: CPT

## 2025-01-05 PROCEDURE — 120N000001 HC R&B MED SURG/OB

## 2025-01-05 PROCEDURE — 97535 SELF CARE MNGMENT TRAINING: CPT | Mod: GO

## 2025-01-05 PROCEDURE — 85014 HEMATOCRIT: CPT

## 2025-01-05 PROCEDURE — 85041 AUTOMATED RBC COUNT: CPT

## 2025-01-05 PROCEDURE — 83735 ASSAY OF MAGNESIUM: CPT | Performed by: FAMILY MEDICINE

## 2025-01-05 PROCEDURE — 250N000009 HC RX 250: Performed by: FAMILY MEDICINE

## 2025-01-05 PROCEDURE — 250N000011 HC RX IP 250 OP 636: Performed by: STUDENT IN AN ORGANIZED HEALTH CARE EDUCATION/TRAINING PROGRAM

## 2025-01-05 PROCEDURE — B4185 PARENTERAL SOL 10 GM LIPIDS: HCPCS

## 2025-01-05 PROCEDURE — 250N000011 HC RX IP 250 OP 636: Performed by: COLON & RECTAL SURGERY

## 2025-01-05 PROCEDURE — 84100 ASSAY OF PHOSPHORUS: CPT | Performed by: FAMILY MEDICINE

## 2025-01-05 RX ADMIN — INSULIN ASPART 1 UNITS: 100 INJECTION, SOLUTION INTRAVENOUS; SUBCUTANEOUS at 01:00

## 2025-01-05 RX ADMIN — METHOCARBAMOL 1000 MG: 100 INJECTION INTRAMUSCULAR; INTRAVENOUS at 05:33

## 2025-01-05 RX ADMIN — ACETAMINOPHEN 1000 MG: 10 INJECTION, SOLUTION INTRAVENOUS at 20:45

## 2025-01-05 RX ADMIN — INSULIN ASPART 1 UNITS: 100 INJECTION, SOLUTION INTRAVENOUS; SUBCUTANEOUS at 09:15

## 2025-01-05 RX ADMIN — FISH OIL 200 ML: 0.1 INJECTION, EMULSION INTRAVENOUS at 21:17

## 2025-01-05 RX ADMIN — MAGNESIUM SULFATE HEPTAHYDRATE: 500 INJECTION, SOLUTION INTRAMUSCULAR; INTRAVENOUS at 21:16

## 2025-01-05 RX ADMIN — ENOXAPARIN SODIUM 40 MG: 40 INJECTION SUBCUTANEOUS at 09:19

## 2025-01-05 RX ADMIN — ACETAMINOPHEN 1000 MG: 10 INJECTION, SOLUTION INTRAVENOUS at 12:27

## 2025-01-05 RX ADMIN — ACETAMINOPHEN 1000 MG: 10 INJECTION, SOLUTION INTRAVENOUS at 04:37

## 2025-01-05 ASSESSMENT — ACTIVITIES OF DAILY LIVING (ADL)
ADLS_ACUITY_SCORE: 44

## 2025-01-05 NOTE — PROGRESS NOTES
Nutrition Therapy  Parenteral Nutrition follow-up       Dietitian to Pharmacy-No changes    Rate of TPN:  60 ml/hr continuous.       Grams Dextrose: 224 gm  Grams Protein: 70 gm     Lipids: Omegaven 20 gm daily.      Provides: 1262 kcals, 70 g protein, 224 g carb, 20 g lipid, 1440 ml fluid daily.    GIR=3.38 mg CHO/kg/min  100% of estimated nutrition needs   Future: watch for dehydration - urine output down, pt started clears     Per surgery note continue TPN until taking 80% by mouth    Current Nutrition Intake:  Diet: Clear liquid, advanced yesterday afternoon  Intake:  120 mL yesterday  Food allergies:  Soy, gluten    Custom TPN per central line- 70 gm AA, 224 gm Dextrose at 60 mL/hr with 20 g omegaven daily (2 x 100 ml bottle) (allergic to soy)   Provides: 1262 kcals, 70 g protein, 224 g carb, 20 g lipid, 1440 ml fluid daily.    GIR=3.38 mg CHO/kg/min    240 mL/day from LR    TPN start 24.    Weight Trends  Admission wt: 47.2 kg (104 lb) 25 1700 45.1 kg (99 lb 8 oz) Standing scale   25 1143 45.9 kg (101 lb 3.1 oz) --   25 1237 46.4 kg (102 lb 4.7 oz) Bed scale   24 1933 46.3 kg (102 lb 1.6 oz) Standing scale   24 1124 46.3 kg (102 lb) Standing scale   24 1943 46 kg (101 lb 8 oz) Standing scale   24 0908 47.2 kg (104 lb)      Dosing Weight: 47.2 kg     ASSESSED NUTRITION NEEDS  Estimated Energy Needs: 3962-9213 kcals/day (25 -30 kcals/kg )  Justification: Maintenance  Estimated Protein Needs: 56-70 grams protein/day (1.2 - 1.5 grams of pro/kg)  Justification: Increased needs  Estimated Fluid Needs: 8837-8021 mL/day (30 - 35 mL/kg)   Justification: Increased needs    PHYSICAL FINDINGS:  Audible bowel sounds, hypoactive  Abdominal discomfort, tender  Drains 10 mL output 25  New ileostomy 625 mL output 25  Surgical incision    Urine output 425 mL 25    Labs:   Labs reviewed   FSB, 121,142    Medications:   Reviewed  novolog   Cont IV, LR at 10  mL/hr    Nutrition Diagnosis  Inadequate oral intake related to altered GI function as evidenced by NPO and need for nutrition support. -continues.    Goals   Meet nutrition needs-Met  Maintain weigh- down  Diet advancement - new - progressing    INTERVENTIONS  Implementation  TPN-no changes    Monitoring/Evaluation  Progress toward goals will be monitored and evaluated per protocol.

## 2025-01-05 NOTE — PHARMACY-CONSULT NOTE
"Pharmacy Note: Parenteral Nutrition (PN) Management    Pharmacist consulted to dose PN for Dain Tejeda, a 44 year old female.    Subjective:    The patient is a new PN start.     The patient was started on PN in the hospital on 12/27/2024.     Indication for PN therapy: bowel obstruction     Inadequate nutrition anticipated for > 7 days.      Enteral nutrition contraindicated due to:  bowel obstruction     Social History     Tobacco Use    Smoking status: Never     Passive exposure: Never    Smokeless tobacco: Never   Substance Use Topics    Alcohol use: No    Drug use: No     Objective:    Ht Readings from Last 1 Encounters:   12/11/24 1.499 m (4' 11\")     Wt Readings from Last 1 Encounters:   01/04/25 45.1 kg (99 lb 8 oz)       Body mass index is 20.1 kg/m .    Patient Vitals for the past 96 hrs:   Weight   01/04/25 1700 45.1 kg (99 lb 8 oz)   01/02/25 1143 45.9 kg (101 lb 3.1 oz)   01/01/25 1237 46.4 kg (102 lb 4.7 oz)       Labs:  Last 3 days:  Recent Labs     01/03/25  0654 01/04/25  0459 01/05/25  0557    141 139   POTASSIUM 3.4 3.5 3.9   CHLORIDE 101 101 106   CO2 26 29 24   BUN 14.8 15.3 15.8   CR 0.45* 0.38* 0.40*   ALEX 8.8 9.5 9.0   MAG 1.7 2.0 1.9   PHOS 4.0 4.1 4.0   HGB 8.2* 8.7* 7.5*   HCT 25.2* 27.2* 24.2*    441 421       Glucose (past 48 hours):   Recent Labs     01/04/25  0302 01/04/25  0459 01/04/25  0812 01/04/25  1129 01/04/25  1641 01/04/25  2042 01/05/25  0036 01/05/25  0418 01/05/25  0557 01/05/25  0816   * 141* 186* 161* 129* 130* 140* 121* 118* 142*       Intake/Output (last 24 hours): I/O last 3 completed shifts:  In: 2697.4 [P.O.:120; I.V.:186]  Out: 1060 [Urine:425; Drains:10; Stool:625]    Estimated CrCl: Estimated Creatinine Clearance: 130.4 mL/min (A) (based on SCr of 0.4 mg/dL (L)).    Assessment:    Continue patient on PN therapy as a continuous central therapy.     Given the patient's current condition/oral intake, PN is still indicated.    Lab results reviewed: " BMP, Mg, Phos, BG    Plan:  Rate of PN: 60 mL/hr   Formula:   Amino Acids 70 grams  Dextrose 224 grams  Sodium 55 mEq/day  Potassium 40 mEq/day  Calcium 6 mEq/day  Magnesium 8 mEq/day  Phosphorus 5 mMol/day  Chloride: Acetate Ratio 1:1  Standard Multivitamins w/Vitamin K  Trace Elements  Thiamine 100 mg  Fat Emulsion: Omegaven 20 gm (10 x 2 bottles) IV daily   Check TPN panel & triglycerides labs tomorrow.  Pharmacist will continue to follow the patient's lab results, clinical status and blood glucose results and make adjustments as appropriate.    Thank you for the consult.  BRO GOLDEN RPH  1/5/2025 10:33 AM

## 2025-01-05 NOTE — PROGRESS NOTES
COLON & RECTAL SURGERY PROGRESS NOTE  Colon/Rectal Surgery Staff  POD#6 s/p TAC with EI    SUBJECTIVE:  Feels ok.  Pain manageable.  Ostomy functioning.    VITALS:  B/P: 109/69, T: 97.7, P: 89, R: 20  Patient Vitals for the past 24 hrs:   BP Temp Temp src Pulse Resp SpO2 Weight   01/05/25 0729 109/69 97.7  F (36.5  C) Oral 89 20 98 % --   01/05/25 0419 111/77 97.6  F (36.4  C) Oral 99 20 100 % --   01/04/25 2331 112/74 97.9  F (36.6  C) Oral 97 20 99 % --   01/04/25 2026 -- -- -- -- 20 -- --   01/04/25 1857 -- -- -- 105 18 -- --   01/04/25 1700 -- -- -- -- -- -- 45.1 kg (99 lb 8 oz)   01/04/25 1510 115/81 98.4  F (36.9  C) Oral 105 18 99 % --   01/04/25 1209 -- -- -- 108 20 -- --       Intake/Output Summary (Last 24 hours) at 1/5/2025 1119  Last data filed at 1/5/2025 1048  Gross per 24 hour   Intake 2709.4 ml   Output 1060 ml   Net 1649.4 ml       EXAM:  General Appearance:  A+Ox3, NAD  Abdomen: Soft,diffusely tender, ND, staples intact, some drainage at the ostomy appliance, ODUGLAS serosang     RECENT LABS:  Recent Labs   Lab Test 01/05/25  0557 01/04/25  0459   WBC 14.6* 20.2*   RBC 2.60* 2.98*   HGB 7.5* 8.7*   HCT 24.2* 27.2*   MCV 93 91   MCH 28.8 29.2   MCHC 31.0* 32.0    441     Recent Labs   Lab Test 01/05/25  0557 01/04/25  0459 01/03/25  0654   POTASSIUM 3.9 3.5 3.4   CHLORIDE 106 101 101   BUN 15.8 15.3 14.8     Recent Labs   Lab Test 12/30/24  0511 12/28/24  0615 10/23/24  0619   INR 1.01 1.09 1.12       ASSESSMENT AND PLAN: 43 y/o woman POD#6 s/p TAC with EI    - adv to full liquids  - cont TPN  - WBC normalizing -> cont to trend.  Cont ABX  - OOB amb  - lovenox for prophylaxis  - ostomy teaching  - awaiting further SUKUMAR Hale MD ....................  1/5/2025   11:19 AM  Colon and Rectal Surgery Staff  [phone]

## 2025-01-05 NOTE — PLAN OF CARE
Problem: Adult Inpatient Plan of Care  Goal: Absence of Hospital-Acquired Illness or Injury  Intervention: Identify and Manage Fall Risk  Recent Flowsheet Documentation  Taken 1/4/2025 2036 by Maday Skelton, RN  Safety Promotion/Fall Prevention:   activity supervised   assistive device/personal items within reach   clutter free environment maintained   nonskid shoes/slippers when out of bed   safety round/check completed     Problem: Adult Inpatient Plan of Care  Goal: Optimal Comfort and Wellbeing  Intervention: Monitor Pain and Promote Comfort  Recent Flowsheet Documentation  Taken 1/4/2025 2026 by Maday Skelton, RN  Pain Management Interventions: medication (see MAR)   Goal Outcome Evaluation:         [id number]  POD5  A/Ox4  VSS ex tachycardia. On RA  C/o 6/10 pain, on PCA pump  Assist x 1, not OOB, pt stated she is having too much pain  Clear liquid diet   L PICC infusing TPN and lipids and LR and PCA  Midline incision with gauze, CDI.   L DOUGLAS with minimal output  R ileostomy with 125 output  Nursing to continue to monitor.                 Report received from LIANNA Rivera.

## 2025-01-05 NOTE — PLAN OF CARE
Problem: Adult Inpatient Plan of Care  Goal: Absence of Hospital-Acquired Illness or Injury  Intervention: Prevent and Manage VTE (Venous Thromboembolism) Risk  Recent Flowsheet Documentation  Taken 1/5/2025 1300 by Rina Noe, RN  VTE Prevention/Management: SCDs off (sequential compression devices)     Problem: Surgery Nonspecified  Goal: Effective Oxygenation and Ventilation  Intervention: Optimize Oxygenation and Ventilation  Recent Flowsheet Documentation  Taken 1/5/2025 1300 by Rina Noe, RN  Activity Management: up in chair   Goal Outcome Evaluation:  Abdominal pain managed with PCA dilaudid. Sat up in chair 4 hours. Diet advanced to full liquid, denies nausea.

## 2025-01-05 NOTE — PLAN OF CARE
"  Problem: Adult Inpatient Plan of Care  Goal: Plan of Care Review  Description: The Plan of Care Review/Shift note should be completed every shift.  The Outcome Evaluation is a brief statement about your assessment that the patient is improving, declining, or no change.  This information will be displayed automatically on your shift  note.  Outcome: Progressing     Problem: Adult Inpatient Plan of Care  Goal: Patient-Specific Goal (Individualized)  Description: You can add care plan individualizations to a care plan. Examples of Individualization might be:  \"Parent requests to be called daily at 9am for status\", \"I have a hard time hearing out of my right ear\", or \"Do not touch me to wake me up as it startles  me\".  Outcome: Progressing     Problem: Adult Inpatient Plan of Care  Goal: Absence of Hospital-Acquired Illness or Injury  Outcome: Progressing  Intervention: Identify and Manage Fall Risk  Recent Flowsheet Documentation  Taken 1/5/2025 0400 by Ronald Moore RN  Safety Promotion/Fall Prevention:   activity supervised   lighting adjusted  Taken 1/5/2025 0000 by Ronald Moore RN  Safety Promotion/Fall Prevention:   activity supervised   lighting adjusted  Intervention: Prevent Skin Injury  Recent Flowsheet Documentation  Taken 1/5/2025 0400 by Ronald Moore RN  Body Position: position changed independently  Taken 1/5/2025 0000 by Ronald Moore RN  Body Position: position changed independently  Intervention: Prevent and Manage VTE (Venous Thromboembolism) Risk  Recent Flowsheet Documentation  Taken 1/5/2025 0400 by Ronald Moore RN  VTE Prevention/Management:   SCDs off (sequential compression devices)   patient refused intervention  Taken 1/5/2025 0000 by Ronald Moore RN  VTE Prevention/Management:   SCDs off (sequential compression devices)   patient refused intervention   Goal Outcome Evaluation:       PCA for pain management. TPN infusing.                 "

## 2025-01-05 NOTE — PROGRESS NOTES
Care Management Follow Up    Length of Stay (days): 10    Expected Discharge Date: 01/06/2025    Anticipated Discharge Plan:  Home, Home Care    Transportation: Anticipate Family/friend    PT Recommendations: home with assist, home with home care physical therapy  OT Recommendations:  home with assist, home with home care occupational therapy     Barriers to Discharge: medical stability    Prior Living Situation: apartment with spouse, child(sisi), adult, child(sisi), dependent    Discussed  Partnership in Safe Discharge Planning  document with patient/family: No     Handoff Completed: No, handoff not indicated or clinically appropriate    Patient/Spokesperson Updated: No    Additional Information:  SW noted PT/OT recommendations for home care. SW sent message to [facility] asking if they can add these services to pts care when pt discharges. They have already accepted pt for home RN.    Next Steps: follow up with home care, medical stability    ANGEL Fry

## 2025-01-06 ENCOUNTER — APPOINTMENT (OUTPATIENT)
Dept: OCCUPATIONAL THERAPY | Facility: HOSPITAL | Age: 45
End: 2025-01-06
Payer: COMMERCIAL

## 2025-01-06 ENCOUNTER — VIRTUAL VISIT (OUTPATIENT)
Dept: INTERPRETER SERVICES | Facility: CLINIC | Age: 45
End: 2025-01-06

## 2025-01-06 LAB
ALBUMIN SERPL BCG-MCNC: 2.7 G/DL (ref 3.5–5.2)
ALP SERPL-CCNC: 503 U/L (ref 40–150)
ALT SERPL W P-5'-P-CCNC: 113 U/L (ref 0–50)
ANION GAP SERPL CALCULATED.3IONS-SCNC: 10 MMOL/L (ref 7–15)
AST SERPL W P-5'-P-CCNC: 81 U/L (ref 0–45)
BILIRUB SERPL-MCNC: 0.2 MG/DL
BUN SERPL-MCNC: 13.3 MG/DL (ref 6–20)
CALCIUM SERPL-MCNC: 9.2 MG/DL (ref 8.8–10.4)
CHLORIDE SERPL-SCNC: 104 MMOL/L (ref 98–107)
CREAT SERPL-MCNC: 0.38 MG/DL (ref 0.51–0.95)
EGFRCR SERPLBLD CKD-EPI 2021: >90 ML/MIN/1.73M2
ERYTHROCYTE [DISTWIDTH] IN BLOOD BY AUTOMATED COUNT: 15.2 % (ref 10–15)
GLUCOSE BLDC GLUCOMTR-MCNC: 113 MG/DL (ref 70–99)
GLUCOSE BLDC GLUCOMTR-MCNC: 127 MG/DL (ref 70–99)
GLUCOSE BLDC GLUCOMTR-MCNC: 127 MG/DL (ref 70–99)
GLUCOSE BLDC GLUCOMTR-MCNC: 130 MG/DL (ref 70–99)
GLUCOSE BLDC GLUCOMTR-MCNC: 135 MG/DL (ref 70–99)
GLUCOSE BLDC GLUCOMTR-MCNC: 136 MG/DL (ref 70–99)
GLUCOSE SERPL-MCNC: 130 MG/DL (ref 70–99)
HCO3 SERPL-SCNC: 25 MMOL/L (ref 22–29)
HCT VFR BLD AUTO: 23.9 % (ref 35–47)
HGB BLD-MCNC: 7.6 G/DL (ref 11.7–15.7)
INR PPP: 1.04 (ref 0.85–1.15)
MAGNESIUM SERPL-MCNC: 1.9 MG/DL (ref 1.7–2.3)
MCH RBC QN AUTO: 29.7 PG (ref 26.5–33)
MCHC RBC AUTO-ENTMCNC: 31.8 G/DL (ref 31.5–36.5)
MCV RBC AUTO: 93 FL (ref 78–100)
PHOSPHATE SERPL-MCNC: 4.4 MG/DL (ref 2.5–4.5)
PLATELET # BLD AUTO: 402 10E3/UL (ref 150–450)
POTASSIUM SERPL-SCNC: 3.9 MMOL/L (ref 3.4–5.3)
PREALB SERPL-MCNC: 16.3 MG/DL (ref 20–40)
PROT SERPL-MCNC: 7.2 G/DL (ref 6.4–8.3)
RBC # BLD AUTO: 2.56 10E6/UL (ref 3.8–5.2)
SODIUM SERPL-SCNC: 139 MMOL/L (ref 135–145)
TRIGL SERPL-MCNC: 185 MG/DL
WBC # BLD AUTO: 12.3 10E3/UL (ref 4–11)

## 2025-01-06 PROCEDURE — T1013 SIGN LANG/ORAL INTERPRETER: HCPCS | Mod: U4,TEL,95

## 2025-01-06 PROCEDURE — 83735 ASSAY OF MAGNESIUM: CPT

## 2025-01-06 PROCEDURE — G0463 HOSPITAL OUTPT CLINIC VISIT: HCPCS

## 2025-01-06 PROCEDURE — 250N000009 HC RX 250: Performed by: FAMILY MEDICINE

## 2025-01-06 PROCEDURE — 250N000011 HC RX IP 250 OP 636: Performed by: COLON & RECTAL SURGERY

## 2025-01-06 PROCEDURE — 84100 ASSAY OF PHOSPHORUS: CPT

## 2025-01-06 PROCEDURE — 120N000001 HC R&B MED SURG/OB

## 2025-01-06 PROCEDURE — 250N000009 HC RX 250

## 2025-01-06 PROCEDURE — 97535 SELF CARE MNGMENT TRAINING: CPT | Mod: GO

## 2025-01-06 PROCEDURE — 84134 ASSAY OF PREALBUMIN: CPT

## 2025-01-06 PROCEDURE — 82040 ASSAY OF SERUM ALBUMIN: CPT

## 2025-01-06 PROCEDURE — 85610 PROTHROMBIN TIME: CPT

## 2025-01-06 PROCEDURE — 85027 COMPLETE CBC AUTOMATED: CPT

## 2025-01-06 PROCEDURE — B4185 PARENTERAL SOL 10 GM LIPIDS: HCPCS

## 2025-01-06 PROCEDURE — 84155 ASSAY OF PROTEIN SERUM: CPT

## 2025-01-06 PROCEDURE — 250N000011 HC RX IP 250 OP 636: Performed by: STUDENT IN AN ORGANIZED HEALTH CARE EDUCATION/TRAINING PROGRAM

## 2025-01-06 PROCEDURE — 84478 ASSAY OF TRIGLYCERIDES: CPT | Performed by: FAMILY MEDICINE

## 2025-01-06 RX ADMIN — ACETAMINOPHEN 1000 MG: 10 INJECTION, SOLUTION INTRAVENOUS at 19:49

## 2025-01-06 RX ADMIN — FISH OIL 200 ML: 0.1 INJECTION, EMULSION INTRAVENOUS at 19:49

## 2025-01-06 RX ADMIN — MAGNESIUM SULFATE HEPTAHYDRATE: 500 INJECTION, SOLUTION INTRAMUSCULAR; INTRAVENOUS at 19:49

## 2025-01-06 RX ADMIN — Medication: at 10:52

## 2025-01-06 RX ADMIN — ENOXAPARIN SODIUM 40 MG: 40 INJECTION SUBCUTANEOUS at 08:03

## 2025-01-06 RX ADMIN — ACETAMINOPHEN 1000 MG: 10 INJECTION, SOLUTION INTRAVENOUS at 12:15

## 2025-01-06 RX ADMIN — ACETAMINOPHEN 1000 MG: 10 INJECTION, SOLUTION INTRAVENOUS at 03:54

## 2025-01-06 ASSESSMENT — ACTIVITIES OF DAILY LIVING (ADL)
ADLS_ACUITY_SCORE: 45
ADLS_ACUITY_SCORE: 44
ADLS_ACUITY_SCORE: 45
ADLS_ACUITY_SCORE: 45
ADLS_ACUITY_SCORE: 44
ADLS_ACUITY_SCORE: 44
ADLS_ACUITY_SCORE: 45
ADLS_ACUITY_SCORE: 44
ADLS_ACUITY_SCORE: 45
ADLS_ACUITY_SCORE: 44
ADLS_ACUITY_SCORE: 45
ADLS_ACUITY_SCORE: 45
ADLS_ACUITY_SCORE: 44
ADLS_ACUITY_SCORE: 44
ADLS_ACUITY_SCORE: 45

## 2025-01-06 NOTE — PROGRESS NOTES
[facility]  WO Nurse Inpatient Assessment     Consulted for: New Ileo    Summary: Pt in significant pain, states she'll be able to care for pouch at home after discharge but not performing hands on care during today's visit except for covering pouch to warm. Patient had take down of colostomy during this surgery as well. 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     Post-Operative Diagnosis: Same     Procedure:   Open completion total abdominal colectomy with colostomy takedown and end ileostomy  Small bowel resection  Lysis of adhesions >3 hours     Assessment:      Assessment of new end Ileostomy:  Diagnosis Pertinent to Stoma:  Crohn's and partial large bowel obstruction       Surgery Date: 12/30/24  [facility]       [facility]: [facility]  Pouching system in place on assessment today: [place] one piece with barrier ring  Pouch barrier status: intact and 50% melted (barrier ring)  Pouch last changed/wear time: 1/2 (4 days)  Reason for pouch change today: ostomy education and initial post-op assessment  Effectiveness of current pouching/ supply plan:  effective; most likely will need soft convexity at discharge  Change made with ostomy management today: No  Pouching system placed today: [place] one piece and barrier ring   Supplies: at bedside and discussed with patient   Last photo: 1/6/25 16/25 - drainage from incision leaking on pouch      1/6/25 1/6/25 1/6/25    Stoma location: RLQ  Stoma size: 35mm x 35mm  Stoma appearance: viable, healthy, beefy red, round, protruberant, and with some necrosis on stoma  Mucocutaneous junction:  intact  Peristomal complication(s): none   Output: brown and slightly thicker than  liquid  Output volume emptied during visit: 90ml  Abdominal assessment:  tender  Surgical site(s): removed dressing and reapplied gauze/ABDs over stapled incision (did not remove from drain site or prior ostomy site)  NG still in place? No  Pain: not described but pt is voicing pain  Is patient still on a PCA? Yes    Ostomy education assessment:  Participant of teaching session today: patient through Language Line   Education completed today: Initial fitting, Stoma assessment, Pouching system assessment , Refitting of appliance , Pouch change demonstration, Ostomy accessory product use , Introduction to pouches, Peristomal skin care, and difference between output of ileostomy vs colostomy (since pt had colostomy prior)  Educational materials/methods: Verbal, Demonstration, and Hands on  Education still needed: Pouch change return demonstration, Pouch emptying return demonstration, Intake and output recording, Fluid and electrolyte balance , Importance of hydration, When to seek medical attention, Low fiber diet , Lifestyle adjustments , and Discharge instructions  Learning Comprehension:   Psychosocial assessment: pt voicing she'll be able to care for ostomy at home but wanting WOC to perform cares; educated that her taking on the cares will help her to feel more independent  Patient readiness for education today: observing and in significant pain  Following today's visit: patient  is able to demonstrate;         1. How to empty their pouch? Pt previously had colostomy and cared for independently. Not demonstrated today        2. How to change their pouch? Demo provided ; Pt previously had colostomy and cared for independently.         3. How to read and record intake and output correctly? No  Preparation for discharge completed: Placed prescription recommendations in discharge navigator for MD to sign, Ensured patient has extra supplies for discharge, and Discussed how to order supplies after discharge  "  Preparation for discharge still needed: Ordered samples from  after gaining consent from patient/caregiver, Discussed how and when to make an outpatient WOC nurse appointment after discharge, Prepared for discharge home with home care, and Discuss signs/symptoms of when to seek medical attention  Pt support system on discharge: spouse  WOC recommend home care? Yes  Face to face time: 45 minutes    Treatment Plan:   RLQ Ileostomy pouching plan:   Pouching system: ostomy supplies pouches: [place] FECAL (612320) ostomy supplies barrier: n/a  Accessories used: [facility] ostomy accessories: 2\" Cera Barrier Ring ([id number])   Frequency of pouch changes: Twice weekly  WOC follow up plan: Daily Monday-Friday (as able)  Bedside RN interventions: Change pouch PRN if leaking using the supplies above, Empty pouch when 1/3 to 1/2 full, ensure to clean pouch outlet after emptying to prevent odor, Notify WOC for ongoing pouch leakage, Document stoma appearance and output volume, color, and consistency every shift, Encourage patient to empty pouch independently, and Assist patient to measure and record output     Orders: Updated    RECOMMEND PRIMARY TEAM ORDER: None, at this time  Education provided: plan of care  Discussed plan of care with: Patient  WOC nurse follow-up plan: daily (M-F)  Notify WOC if wound(s) deteriorate.  Nursing to notify the Provider(s) and re-consult the WOC Nurse if new skin concern.    DATA:     Current support surface: Standard  Standard gel mattress (Isoflex)  Containment of urine/stool: Continent of bladder and Ileostomy pouch  BMI: Body mass index is 20.1 kg/m .   Active diet order: Orders Placed This Encounter      Full Liquid Diet     Output: I/O last 3 completed shifts:  In: 1913 [P.O.:1874; I.V.:39]  Out: 850 [Urine:700; Drains:5; Stool:145]     Labs:   Recent Labs   Lab 01/06/25  0701 01/01/25  0657 12/31/24  0542   ALBUMIN 2.7*  --   --    HGB 7.6*   < > 10.8*   INR 1.04  --   --  "   WBC 12.3*   < > 24.4*   A1C  --   --  4.7    < > = values in this interval not displayed.     Pressure injury risk assessment:   Sensory Perception: 4-->no impairment  Moisture: 4-->rarely moist  Activity: 3-->walks occasionally  Mobility: 3-->slightly limited  Nutrition: 3-->adequate  Friction and Shear: 3-->no apparent problem  Saravanan Score: 20    Sigrid Brannon RN, CWOCN  Pager no longer is use, please contact through [facility] group: [facility] [facility]

## 2025-01-06 NOTE — PROGRESS NOTES
New drainage at the incision site noted during handoff report.  Small amount of bright red blood.

## 2025-01-06 NOTE — PHARMACY-CONSULT NOTE
"Pharmacy Note: Parenteral Nutrition (PN) Management     Pharmacist consulted to dose PN for Dain Tejeda, a 44 year old female.     Subjective:     The patient is a new PN start.     The patient was started on PN in the hospital on 12/27/2024.     Indication for PN therapy: bowel obstruction     Inadequate nutrition anticipated for > 7 days.      Enteral nutrition contraindicated due to:  bowel obstruction     Social History     Tobacco Use    Smoking status: Never     Passive exposure: Never    Smokeless tobacco: Never   Substance Use Topics    Alcohol use: No    Drug use: No     Objective:    Ht Readings from Last 1 Encounters:   12/11/24 1.499 m (4' 11\")     Wt Readings from Last 1 Encounters:   01/04/25 45.1 kg (99 lb 8 oz)       Body mass index is 20.1 kg/m .    Patient Vitals for the past 96 hrs:   Weight   01/04/25 1700 45.1 kg (99 lb 8 oz)   01/02/25 1143 45.9 kg (101 lb 3.1 oz)       Labs:  Last 3 days:  Recent Labs     01/04/25  0459 01/05/25  0557 01/06/25  0701    139 139   POTASSIUM 3.5 3.9 3.9   CHLORIDE 101 106 104   CO2 29 24 25   BUN 15.3 15.8 13.3   CR 0.38* 0.40* 0.38*   ALEX 9.5 9.0 9.2   MAG 2.0 1.9 1.9   PHOS 4.1 4.0 4.4   PROTTOTAL  --   --  7.2   ALBUMIN  --   --  2.7*   TRIG  --   --  185*   HGB 8.7* 7.5* 7.6*   HCT 27.2* 24.2* 23.9*    421 402   BILITOTAL  --   --  0.2   AST  --   --  81*   ALT  --   --  113*   ALKPHOS  --   --  503*   INR  --   --  1.04       Glucose (past 48 hours):   Recent Labs     01/05/25  0418 01/05/25  0557 01/05/25  0816 01/05/25  1350 01/05/25  1653 01/05/25  2006 01/06/25  0045 01/06/25  0415 01/06/25  0701 01/06/25  0801   * 118* 142* 133* 116* 124* 135* 127* 130* 130*       Intake/Output (last 24 hours): I/O last 3 completed shifts:  In: 1913 [P.O.:1874; I.V.:39]  Out: 850 [Urine:700; Drains:5; Stool:145]    Estimated CrCl: Estimated Creatinine Clearance: 137.3 mL/min (A) (based on SCr of 0.38 mg/dL (L)).    Assessment:    Continue patient on PN " therapy as a cyclic central therapy.     Given the patient's current condition/oral intake, PN is still indicated.    Lab results reviewed:     Increase Mg and remove phos with next bag change.  Patient will transition to a cyclic TPN starting this PM.    Plan:  Rate of PN: 65 mL/hr x 1 hour, them 131 ml/hr x 10 hours, then 65 ml/hr x 1 hour.  Formula:   Amino Acids 70 grams  Dextrose 224 grams  Sodium 55 mEq/day  Potassium 40 mEq/day  Calcium 6 mEq/day  Magnesium 12 mEq/day  Phosphorus 0 mMol/day  Chloride: Acetate Ratio 1:1  Standard Multivitamins w/Vitamin K  Trace Elements  Thiamine 100 mg  Fat Emulsion: Omegaven 20 gm (10 x 2 bottles) IV daily   Check hyperal lytes plus ionized Ca tomorrow.  Pharmacist will continue to follow the patient's lab results, clinical status and blood glucose results and make adjustments as appropriate.    Thank you for the consult.  Dangelo Rivers RPH  1/6/2025 9:44 AM

## 2025-01-06 NOTE — PROGRESS NOTES
Colon and Rectal Surgery  Daily Progress Note    Subjective  [facility] [place]. Patient reports having a lot of abdominal pain overnight that is now improved. Rates pain 4-5/10 currently. Denies any nausea or vomiting. Has been tolerating some full liquids. Ileostomy with 145 mL out. 1.8 L PO intake. No vital signs recorded this morning, but stable last night.    Objective  Intake/Output last 24 hrs:    Intake/Output Summary (Last 24 hours) at 1/6/2025 1037  Last data filed at 1/6/2025 0818  Gross per 24 hour   Intake 2956.56 ml   Output 850 ml   Net 2106.56 ml     Temp:  [97.8  F (36.6  C)-98.2  F (36.8  C)] 97.8  F (36.6  C)  Pulse:  [86-99] 99  Resp:  [18-20] 18  BP: (100-112)/(59-74) 109/69  SpO2:  [99 %-100 %] 99 %    Physical Exam:  General: awake, alert, lying in bed, in no acute distress  Head: normocephalic, atraumatic  Respiratory: non-labored breathing  Abdomen: soft, appropriately tender, non-distended              Incisions: midline incision with serosanguinous drainage from middle, and inferior aspects of the incision. Very mild surrounding erythema, no induration or fluctuance Staples intact. Prior stoma site with serosanguinous drainage, no erythema.              Drains: DOUGLAS with 5 ml serosanguinous in past 24 hrs   Ileostomy: pink and viable, loose brown output in bag  Skin: No rashes or lesions  Musculoskeletal: moves all four extremities equally  Psychological: alert and oriented, answers questions appropriately    Pertinent Labs  Lab Results: personally reviewed.  Lab Results   Component Value Date     01/06/2025     01/05/2025     01/04/2025    .0 04/24/2017    CO2 25 01/06/2025    CO2 24 01/05/2025    CO2 29 01/04/2025    CO2 25 06/23/2022    CO2 23 06/03/2022    CO2 22 06/02/2022    CO2 26.0 04/24/2017    BUN 13.3 01/06/2025    BUN 15.8 01/05/2025    BUN 15.3 01/04/2025    BUN 6 06/23/2022    BUN 3 06/03/2022    BUN 4 06/02/2022    BUN 7.0 04/24/2017      Lab Results   Component Value Date    WBC 12.3 01/06/2025    WBC 14.6 01/05/2025    WBC 20.2 01/04/2025    HGB 7.6 01/06/2025    HGB 7.5 01/05/2025    HGB 8.7 01/04/2025    HGB 10.8 01/10/2018    HGB 10.3 11/08/2017    HGB 9.8 09/18/2017    HCT 23.9 01/06/2025    HCT 24.2 01/05/2025    HCT 27.2 01/04/2025    HCT 35.9 01/10/2018    HCT 33.8 11/08/2017    HCT 31.4 07/31/2017    MCV 93 01/06/2025    MCV 93 01/05/2025    MCV 91 01/04/2025    MCV 83.1 01/10/2018    MCV 77.9 11/08/2017    MCV 86.3 07/31/2017     01/06/2025     01/05/2025     01/04/2025       Assessment/Plan: This is a 44 year old female POD #7 s/p TAC and end ileostomy for Crohn's disease    WBC 12.3 from 14.6, 20.2  Hgb 7.6  Albumin 2.7    - No need for CT at this time as WBC improving, no fevers, and she is having some stoma output.  - Continue full liquids for today  - Will continue TPN until tolerating 80% of calories PO  - Continue PCA for pain. Will plan to transition off once tolerating low fiber diet. NO NSAIDs with Crohn's disease  - Strict I&O, monitor stoma output  - Lovenox DVT ppx  - Blood sugar checks and sliding scale insulin QAC & HS  - OOB/Ambulate, PT/OT consults  - WOCN cares    Discussed with Dr. Virgilio Mares, PA-C  [facility]  [phone]..............................main

## 2025-01-06 NOTE — PROGRESS NOTES
"Care Management Follow Up    Length of Stay (days): 11    Expected Discharge Date: 01/08/2025    Anticipated Discharge Plan:  Home, Home Care ([facility] home care can take pt on for home RN (for nursing care), but not PT/OT.     Transportation: Anticipate Family/friend    PT Recommendations: home with assist, home with home care physical therapy  OT Recommendations:  home with assist, home with home care occupational therapy     Barriers to Discharge: medical stability/ colorectal surgery following pt. Still on TPN.     Prior Living Situation: apartment with spouse, child(sisi), adult, child(sisi), dependent    Discussed  Partnership in Safe Discharge Planning  document with patient/family: No     Handoff Completed: Yes, [facility] PCP: Internal handoff referral completed    Patient/Spokesperson Updated: No     Additional Information:  Therapy recs home therapy.     Pt was accepted by [facility] for home RN (for ostomy care). RNCM called them today to see if they could add home PT/OT, they said , \"the therapy person is out and they cannot add home therapies. \"        RNCM sent [facility] a facesheet to see if they could further outsource to see if any agency would take on for home PT/OT/RN, (awaiting response). Per home care tab 10 other agencies declined due to type of insurance, or at capacity, so not sure if this will be possible.         RNCM did keep [facility] home RN for now , just in case home therapy cannot be found.         [facility] is able to take pt on for home PT/ OT/RN.     [facility] care updated.       Next Steps: Send home care orders day of discharge. Follow TPN and Abx plan. CM will continue to monitor progression of care, review team recommendations and provide discharge planning assist as needed.       Anna Zepeda RN      "

## 2025-01-06 NOTE — PLAN OF CARE
Goal Outcome Evaluation:      Pt rates constant pain 5 or 6/10 in her LLQ of abdomen that radiates to her L rib cage. TPN running for 24 hrs. At 60mL/hr. Lipids (OMEGAVEN) running for 6 hrs. At 16.7mL/hr  PCA pump continuous 0.2mg/mL w/ LR 10mL/hr.    Pt up to BR SBA w/ gbw  DOUGLAS drain patent minimal serosangenous output. Liquid stool for Ileostomy bag.  VSS on RA continuous pulse ox. Sats upper 90's.  Tolerating full liquid diet w/ poor appetite. Ate about 25%      BG stable 116 and 124        Problem: Adult Inpatient Plan of Care  Goal: Plan of Care Review  Description: The Plan of Care Review/Shift note should be completed every shift.  The Outcome Evaluation is a brief statement about your assessment that the patient is improving, declining, or no change.  This information will be displayed automatically on your shift  note.  Outcome: Progressing  Flowsheets (Taken 1/5/2025 8395)  Plan of Care Reviewed With: patient  Overall Patient Progress: improving     Problem: Adult Inpatient Plan of Care  Goal: Optimal Comfort and Wellbeing  Outcome: Progressing     Problem: Adult Inpatient Plan of Care  Goal: Optimal Comfort and Wellbeing  Intervention: Monitor Pain and Promote Comfort  Recent Flowsheet Documentation  Taken 1/5/2025 1545 by Ene Fulton, RN  Pain Management Interventions: pain pump in use     Problem: Adult Inpatient Plan of Care  Goal: Readiness for Transition of Care  Outcome: Progressing     Problem: Pain Acute  Goal: Optimal Pain Control and Function  Intervention: Prevent or Manage Pain  Recent Flowsheet Documentation  Taken 1/5/2025 1545 by Ene Fulton, RN  Medication Review/Management: medications reviewed     Problem: Intestinal Obstruction  Goal: Fluid and Electrolyte Balance  Intervention: Monitor and Manage Hypovolemia  Recent Flowsheet Documentation  Taken 1/5/2025 1545 by Ene Fulton, RN  Fluid/Electrolyte Management: fluids provided     Problem: Surgery Nonspecified  Goal:  Blood Glucose Level Within Targeted Range  Intervention: Optimize Glycemic Control  Recent Flowsheet Documentation  Taken 1/5/2025 1545 by Ene Fulton, RN  Hyperglycemia Management: blood glucose monitored  Hypoglycemia Management: blood glucose monitored       Plan of Care Reviewed With: patient    Overall Patient Progress: improvingOverall Patient Progress: improving

## 2025-01-06 NOTE — PROGRESS NOTES
CLINICAL NUTRITION SERVICES - REASSESSMENT NOTE     RECOMMENDATIONS FOR MDs/PROVIDERS TO ORDER:    Registered Dietitian Interventions:  Transition to cyclic TPN of D 224 AA 70 @ 65 ml/hr x 1 hr, 130 ml/hr x 10 hrs  And 65 ml/hr x 1 hr x 1 hr and off. Continue same lipids over 12 or less hrs    Future/Additional Recommendations:  Monitor po intake, diet adv, labs, weight     SUBJECTIVE INFORMATION  Pt states she's trying to eat, she is open to trying a nutritional supplement  (John griffin is soy and gluten free)    CURRENT NUTRITION ORDERS  Diet: Full Liquid  Nutrition Support: TPN ( custom) D 224 AA 70 @ 60 ml/hr plus 20 g omegaven daily: Providing 1262 Calories, 70 g protein, 224 g CHO (GIR 3.38 mg CHO/Kg/min), 20 g lipid  Meeting 100% estimated nutrition needs  Lactated ringers @ 10 ml/hr    Per surgery note continue TPN until taking 80% by mouth    CURRENT INTAKE/TOLERANCE  Ate 25% supper meal on 1/5/2025     NEW FINDINGS  Weight: 45.1 Kg ( 1/4/2025)  Skin/wounds: Surgical incision  GI symptoms: Abdominal discomfort, liquid/brown stool (ileostomy and DOUGLAS drain), Drain output 5 ml on 1/5/2025 passing flatus.  175 ml output documented on 1/5  Nutrition-relevant labs: Cr 0.38 (L), alk phos 503 (H),  (H), AST 81 (H), Glu 130 (H),  (H)  Nutrition-relevant medications: reviewed    NUTRITION DIAGNOSIS  Inadequate oral intake related to altered GI function as evidenced by need for nutrition support- continues    INTERVENTIONS  Recommend transition to cyclic TPN of D 224 AA 70 @ 65 ml/hr x 1 hr, 131 ml/hr x 10 hrs and 65 ml/hr x 1 hr and off. Continue same lipids over 12 or less hrs.  Add john griffin nutritional supplement ( standard 1.4 Reggie)  No need to change lipids at this time-if TG increase may want to cut back on lipids    Goals  Decrease liver enzymes  Tolerate cyclic TPN  Po intake to meet 80% of nutritional needs prior to discontinuing TPN     Monitoring/Evaluation      Progress toward goals will be  monitored and evaluated per policy.

## 2025-01-07 ENCOUNTER — APPOINTMENT (OUTPATIENT)
Dept: OCCUPATIONAL THERAPY | Facility: HOSPITAL | Age: 45
End: 2025-01-07
Payer: COMMERCIAL

## 2025-01-07 ENCOUNTER — APPOINTMENT (OUTPATIENT)
Dept: PHYSICAL THERAPY | Facility: HOSPITAL | Age: 45
End: 2025-01-07
Payer: COMMERCIAL

## 2025-01-07 ENCOUNTER — APPOINTMENT (OUTPATIENT)
Dept: CT IMAGING | Facility: HOSPITAL | Age: 45
End: 2025-01-07
Payer: COMMERCIAL

## 2025-01-07 LAB
ANION GAP SERPL CALCULATED.3IONS-SCNC: 10 MMOL/L (ref 7–15)
BUN SERPL-MCNC: 17.9 MG/DL (ref 6–20)
CA-I BLD-MCNC: 4.9 MG/DL (ref 4.4–5.2)
CALCIUM SERPL-MCNC: 9.3 MG/DL (ref 8.8–10.4)
CHLORIDE SERPL-SCNC: 100 MMOL/L (ref 98–107)
CREAT SERPL-MCNC: 0.41 MG/DL (ref 0.51–0.95)
EGFRCR SERPLBLD CKD-EPI 2021: >90 ML/MIN/1.73M2
ERYTHROCYTE [DISTWIDTH] IN BLOOD BY AUTOMATED COUNT: 15 % (ref 10–15)
GLUCOSE BLDC GLUCOMTR-MCNC: 115 MG/DL (ref 70–99)
GLUCOSE BLDC GLUCOMTR-MCNC: 130 MG/DL (ref 70–99)
GLUCOSE BLDC GLUCOMTR-MCNC: 136 MG/DL (ref 70–99)
GLUCOSE BLDC GLUCOMTR-MCNC: 152 MG/DL (ref 70–99)
GLUCOSE BLDC GLUCOMTR-MCNC: 171 MG/DL (ref 70–99)
GLUCOSE SERPL-MCNC: 82 MG/DL (ref 70–99)
HCO3 SERPL-SCNC: 26 MMOL/L (ref 22–29)
HCT VFR BLD AUTO: 26.1 % (ref 35–47)
HGB BLD-MCNC: 8.2 G/DL (ref 11.7–15.7)
MAGNESIUM SERPL-MCNC: 2 MG/DL (ref 1.7–2.3)
MCH RBC QN AUTO: 29.3 PG (ref 26.5–33)
MCHC RBC AUTO-ENTMCNC: 31.4 G/DL (ref 31.5–36.5)
MCV RBC AUTO: 93 FL (ref 78–100)
PATH REPORT.ADDENDUM SPEC: NORMAL
PATH REPORT.COMMENTS IMP SPEC: NORMAL
PATH REPORT.FINAL DX SPEC: NORMAL
PATH REPORT.GROSS SPEC: NORMAL
PATH REPORT.MICROSCOPIC SPEC OTHER STN: NORMAL
PATH REPORT.RELEVANT HX SPEC: NORMAL
PHOSPHATE SERPL-MCNC: 3.4 MG/DL (ref 2.5–4.5)
PHOTO IMAGE: NORMAL
PLATELET # BLD AUTO: 482 10E3/UL (ref 150–450)
POTASSIUM SERPL-SCNC: 4.4 MMOL/L (ref 3.4–5.3)
RBC # BLD AUTO: 2.8 10E6/UL (ref 3.8–5.2)
SODIUM SERPL-SCNC: 136 MMOL/L (ref 135–145)
WBC # BLD AUTO: 16.9 10E3/UL (ref 4–11)

## 2025-01-07 PROCEDURE — 250N000011 HC RX IP 250 OP 636: Performed by: STUDENT IN AN ORGANIZED HEALTH CARE EDUCATION/TRAINING PROGRAM

## 2025-01-07 PROCEDURE — 250N000009 HC RX 250: Performed by: FAMILY MEDICINE

## 2025-01-07 PROCEDURE — 85014 HEMATOCRIT: CPT

## 2025-01-07 PROCEDURE — 250N000011 HC RX IP 250 OP 636: Performed by: COLON & RECTAL SURGERY

## 2025-01-07 PROCEDURE — 250N000011 HC RX IP 250 OP 636: Performed by: FAMILY MEDICINE

## 2025-01-07 PROCEDURE — 97535 SELF CARE MNGMENT TRAINING: CPT | Mod: GO

## 2025-01-07 PROCEDURE — 83735 ASSAY OF MAGNESIUM: CPT | Performed by: FAMILY MEDICINE

## 2025-01-07 PROCEDURE — 120N000001 HC R&B MED SURG/OB

## 2025-01-07 PROCEDURE — 97530 THERAPEUTIC ACTIVITIES: CPT | Mod: GP

## 2025-01-07 PROCEDURE — 250N000011 HC RX IP 250 OP 636

## 2025-01-07 PROCEDURE — 258N000003 HC RX IP 258 OP 636

## 2025-01-07 PROCEDURE — 80048 BASIC METABOLIC PNL TOTAL CA: CPT

## 2025-01-07 PROCEDURE — 82565 ASSAY OF CREATININE: CPT

## 2025-01-07 PROCEDURE — 84100 ASSAY OF PHOSPHORUS: CPT | Performed by: FAMILY MEDICINE

## 2025-01-07 PROCEDURE — B4185 PARENTERAL SOL 10 GM LIPIDS: HCPCS

## 2025-01-07 PROCEDURE — 71260 CT THORAX DX C+: CPT

## 2025-01-07 PROCEDURE — T1013 SIGN LANG/ORAL INTERPRETER: HCPCS | Mod: GT | Performed by: INTERPRETER

## 2025-01-07 PROCEDURE — 250N000009 HC RX 250

## 2025-01-07 PROCEDURE — 82330 ASSAY OF CALCIUM: CPT | Performed by: FAMILY MEDICINE

## 2025-01-07 RX ORDER — PIPERACILLIN SODIUM, TAZOBACTAM SODIUM 3; .375 G/15ML; G/15ML
3.38 INJECTION, POWDER, LYOPHILIZED, FOR SOLUTION INTRAVENOUS ONCE
Status: COMPLETED | OUTPATIENT
Start: 2025-01-07 | End: 2025-01-07

## 2025-01-07 RX ORDER — SODIUM CHLORIDE 9 MG/ML
INJECTION, SOLUTION INTRAVENOUS CONTINUOUS
Status: DISCONTINUED | OUTPATIENT
Start: 2025-01-07 | End: 2025-01-15 | Stop reason: HOSPADM

## 2025-01-07 RX ORDER — SODIUM CHLORIDE, SODIUM LACTATE, POTASSIUM CHLORIDE, CALCIUM CHLORIDE 600; 310; 30; 20 MG/100ML; MG/100ML; MG/100ML; MG/100ML
INJECTION, SOLUTION INTRAVENOUS CONTINUOUS
Status: DISCONTINUED | OUTPATIENT
Start: 2025-01-08 | End: 2025-01-07

## 2025-01-07 RX ORDER — PIPERACILLIN SODIUM, TAZOBACTAM SODIUM 3; .375 G/15ML; G/15ML
3.38 INJECTION, POWDER, LYOPHILIZED, FOR SOLUTION INTRAVENOUS EVERY 8 HOURS
Status: DISCONTINUED | OUTPATIENT
Start: 2025-01-08 | End: 2025-01-11

## 2025-01-07 RX ORDER — IOPAMIDOL 755 MG/ML
49 INJECTION, SOLUTION INTRAVASCULAR ONCE
Status: COMPLETED | OUTPATIENT
Start: 2025-01-07 | End: 2025-01-07

## 2025-01-07 RX ADMIN — ACETAMINOPHEN 1000 MG: 10 INJECTION, SOLUTION INTRAVENOUS at 11:31

## 2025-01-07 RX ADMIN — SODIUM CHLORIDE: 9 INJECTION, SOLUTION INTRAVENOUS at 22:30

## 2025-01-07 RX ADMIN — FISH OIL 200 ML: 0.1 INJECTION, EMULSION INTRAVENOUS at 20:10

## 2025-01-07 RX ADMIN — PIPERACILLIN AND TAZOBACTAM 3.38 G: 3; .375 INJECTION, POWDER, FOR SOLUTION INTRAVENOUS at 22:30

## 2025-01-07 RX ADMIN — PROCHLORPERAZINE EDISYLATE 10 MG: 5 INJECTION INTRAMUSCULAR; INTRAVENOUS at 11:40

## 2025-01-07 RX ADMIN — ACETAMINOPHEN 1000 MG: 10 INJECTION, SOLUTION INTRAVENOUS at 05:06

## 2025-01-07 RX ADMIN — INSULIN ASPART 1 UNITS: 100 INJECTION, SOLUTION INTRAVENOUS; SUBCUTANEOUS at 07:54

## 2025-01-07 RX ADMIN — MAGNESIUM SULFATE HEPTAHYDRATE: 500 INJECTION, SOLUTION INTRAMUSCULAR; INTRAVENOUS at 20:10

## 2025-01-07 RX ADMIN — ACETAMINOPHEN 1000 MG: 10 INJECTION, SOLUTION INTRAVENOUS at 20:33

## 2025-01-07 RX ADMIN — ONDANSETRON 4 MG: 2 INJECTION INTRAMUSCULAR; INTRAVENOUS at 09:41

## 2025-01-07 RX ADMIN — IOPAMIDOL 49 ML: 755 INJECTION, SOLUTION INTRAVENOUS at 11:10

## 2025-01-07 RX ADMIN — ENOXAPARIN SODIUM 40 MG: 40 INJECTION SUBCUTANEOUS at 07:58

## 2025-01-07 ASSESSMENT — ACTIVITIES OF DAILY LIVING (ADL)
ADLS_ACUITY_SCORE: 45

## 2025-01-07 NOTE — PROGRESS NOTES
CLINICAL NUTRITION SERVICES - REASSESSMENT NOTE     RECOMMENDATIONS FOR MDs/PROVIDERS TO ORDER:    Registered Dietitian Interventions:  Continue same TPN regimen of D 224 AA 70 @ 65 ml/hr x 1 hr, 131 ml/hr  X 10 hrs and 65 ml/hr x 1 hr and off.  Continue same lipid regimen: omegaven  20 g ( 100 ml x 2 bottles) IV daily ( allergic to soy)  Change [employer] supplement to apple ensure clear per pt's preference    Future/Additional Recommendations:  Monitor po intake, weight, TPN tolerance, labs, ability to wean TPN     SUBJECTIVE INFORMATION  Assessed patient in room._ pt states she tried some of the [employer] supplement yesterday ( did not drink all) she states she would like the apple ensure clear instead as she liked that better ( received previously)    CURRENT NUTRITION ORDERS  Diet: Full liquids  Supplements: [place] standard 1.4 Reggie  Nutrition Support: Cyclic TPN: D 224 AA 70 @ 65 ml/hr x 1 hr, 131 ml/hr x 10 hrs, 65 ml/hr x 1 hr and off. Omegaven 20 g /day providin ml, 1262 Calories, 70 g protein 224 g CHO, 20 g lipid  Lactated ringers: 10 ml/hr  TPN started on 2024    CURRENT INTAKE/TOLERANCE  Pt consumed 75% breakfast meal  ( 280 Calories and 9 g protein)     NEW FINDINGS  Weight: 45.1 Kg (99 lb 8 oz) on 2025  GI symptoms: Abdominal discomfort, last BM 2025  I/O: 2424/1330  Ileostomy out put: small amounts x 3 and 70 ml on 2025  Nutrition-relevant labs: Cr 0.41 (L), Mg 2, phos 3.4, Glu 82  Nutrition-relevant medications: Reviewed    NUTRITION DIAGNOSIS  Inadequate oral intake related to altered GI function as evidenced by need for nutrition support- Continues    INTERVENTIONS  Continue same cyclic TPN regimen today  Obtain new weight  Change [employer] supplement to apple ensure clear    Goals  Meet /tolerate 80% of Calories po prior to discontinuing TPN  Maintain weight  Tolerate cyclic TPN- progressing  Decrease liver enzymes     Monitoring/Evaluation      Progress toward  goals will be monitored and evaluated per policy.

## 2025-01-07 NOTE — PHARMACY-CONSULT NOTE
"Pharmacy Note: Parenteral Nutrition (PN) Management     Pharmacist consulted to dose PN for Dain Tejeda, a 44 year old female.     Subjective:     The patient is a new PN start.     The patient was started on PN in the hospital on 12/27/2024.     Indication for PN therapy: bowel obstruction     Inadequate nutrition anticipated for > 7 days.      Enteral nutrition contraindicated due to:  bowel obstruction     Social History     Tobacco Use    Smoking status: Never     Passive exposure: Never    Smokeless tobacco: Never   Substance Use Topics    Alcohol use: No    Drug use: No     Objective:    Ht Readings from Last 1 Encounters:   12/11/24 1.499 m (4' 11\")     Wt Readings from Last 1 Encounters:   01/04/25 45.1 kg (99 lb 8 oz)       Body mass index is 20.1 kg/m .    Patient Vitals for the past 96 hrs:   Weight   01/04/25 1700 45.1 kg (99 lb 8 oz)       Labs:  Last 3 days:  Recent Labs     01/05/25  0557 01/06/25  0701 01/07/25  0545    139 136   POTASSIUM 3.9 3.9 4.4   CHLORIDE 106 104 100   CO2 24 25 26   BUN 15.8 13.3 17.9   CR 0.40* 0.38* 0.41*   ALEX 9.0 9.2 9.3   MAG 1.9 1.9 2.0   PHOS 4.0 4.4 3.4   PROTTOTAL  --  7.2  --    ALBUMIN  --  2.7*  --    PREALB  --  16.3*  --    TRIG  --  185*  --    HGB 7.5* 7.6* 8.2*   HCT 24.2* 23.9* 26.1*    402 482*   BILITOTAL  --  0.2  --    AST  --  81*  --    ALT  --  113*  --    ALKPHOS  --  503*  --    INR  --  1.04  --        Glucose (past 48 hours):   Recent Labs     01/06/25  0045 01/06/25  0415 01/06/25  0701 01/06/25  0801 01/06/25  1223 01/06/25  1650 01/06/25  2002 01/07/25  0258 01/07/25  0545 01/07/25  0725   * 127* 130* 130* 127* 136* 113* 152* 82 171*       Intake/Output (last 24 hours): I/O last 3 completed shifts:  In: 2450.36 [P.O.:480; I.V.:52.8]  Out: 1735 [Urine:1530; Drains:5; Stool:200]    Estimated CrCl: Estimated Creatinine Clearance: 127.3 mL/min (A) (based on SCr of 0.41 mg/dL (L)).    Assessment:    Continue patient on PN therapy as " a cyclic central therapy.     Given the patient's current condition/oral intake, PN is still indicated.    Lab results reviewed:     No changes to formulation needed.    Plan:  Rate of PN: 65 mL/hr x 1 hour, them 131 ml/hr x 10 hours, then 65 ml/hr x 1 hour.   Formula:   Amino Acids 70 grams  Dextrose 224 grams  Sodium 55 mEq/day  Potassium 40 mEq/day  Calcium 6 mEq/day  Magnesium 12 mEq/day  Phosphorus 0 mMol/day  Chloride: Acetate Ratio 1:1  Standard Multivitamins w/Vitamin K  Trace Elements  Thiamine 100 mg  Fat Emulsion: Omegaven 20 gm (10 x 2 bottles) IV daily   Check hyperal lytes tomorrow.  Pharmacist will continue to follow the patient's lab results, clinical status and blood glucose results and make adjustments as appropriate.    Thank you for the consult.  Dangelo Rivers RPH  1/7/2025 9:10 AM

## 2025-01-07 NOTE — PROGRESS NOTES
Colon and Rectal Surgery  Daily Progress Note    Subjective  Telephone [employer]  used. Patient reports having a lot of pain overnight, rates it 7-9/10. It is a bit better this morning, but she still has more pain than yesterday. She has taken in minimal full liquids, 480 mL recorded. She denies nausea or vomiting, but states it feels like things get stuck in her throat. Ileostomy with 200 mL recorded. Adequate urine output. Afebrile, , /61. On 2 L NC.     Objective  Intake/Output last 24 hrs:    Intake/Output Summary (Last 24 hours) at 1/7/2025 1040  Last data filed at 1/7/2025 0941  Gross per 24 hour   Intake 1416.8 ml   Output 2135 ml   Net -718.2 ml     Temp:  [98.5  F (36.9  C)-99  F (37.2  C)] 98.9  F (37.2  C)  Pulse:  [] 101  Resp:  [16-18] 16  BP: (101-106)/(60-63) 106/61  SpO2:  [97 %-99 %] 98 %    Physical Exam:  General: awake, alert, lying in bed, in no acute distress  Head: normocephalic, atraumatic  Respiratory: non-labored breathing  Abdomen: soft, appropriately tender, non-distended              Incisions: clean, dry and intact              Drains: DOUGLAS with 5 cc serosanguinous in past 24 hrs  Skin: No rashes or lesions  Musculoskeletal: moves all four extremities equally  Psychological: alert and oriented, answers questions appropriately    Pertinent Labs  Lab Results: personally reviewed.  Lab Results   Component Value Date     01/07/2025     01/06/2025     01/05/2025    .0 04/24/2017    CO2 26 01/07/2025    CO2 25 01/06/2025    CO2 24 01/05/2025    CO2 25 06/23/2022    CO2 23 06/03/2022    CO2 22 06/02/2022    CO2 26.0 04/24/2017    BUN 17.9 01/07/2025    BUN 13.3 01/06/2025    BUN 15.8 01/05/2025    BUN 6 06/23/2022    BUN 3 06/03/2022    BUN 4 06/02/2022    BUN 7.0 04/24/2017     Lab Results   Component Value Date    WBC 16.9 01/07/2025    WBC 12.3 01/06/2025    WBC 14.6 01/05/2025    HGB 8.2 01/07/2025    HGB 7.6 01/06/2025    HGB 7.5 01/05/2025     HGB 10.8 01/10/2018    HGB 10.3 11/08/2017    HGB 9.8 09/18/2017    HCT 26.1 01/07/2025    HCT 23.9 01/06/2025    HCT 24.2 01/05/2025    HCT 35.9 01/10/2018    HCT 33.8 11/08/2017    HCT 31.4 07/31/2017    MCV 93 01/07/2025    MCV 93 01/06/2025    MCV 93 01/05/2025    MCV 83.1 01/10/2018    MCV 77.9 11/08/2017    MCV 86.3 07/31/2017     01/07/2025     01/06/2025     01/05/2025       Assessment/Plan: This is a 44 year old female POD #8 s/p TAC and end ileostomy for Crohn's disease     WBC increased to 16.9 from 12.3  Hgb 8.2     - WBC increased and worsened abdominal pain, will order CT chest abdomen pelvis with IV contrast to evaluate for source of infection  - Continue full liquids   - TPN until tolerating 80% of calories PO  - Continue PCA for pain. Will plan to transition off once tolerating low fiber diet. NO NSAIDs with Crohn's disease  - Strict I&O, monitor stoma output  - Lovenox DVT ppx  - Blood sugar checks and sliding scale insulin QAC & HS  - OOB/Ambulate, PT/OT consults  - WOCN cares      Discussed with Dr. Virgilio Mares, PASharleneC  [facility]  [phone]..............................main

## 2025-01-07 NOTE — PROGRESS NOTES
Care Management Follow Up    Length of Stay (days): 12    Expected Discharge Date: 01/08/2025    Anticipated Discharge Plan:  Home, Home Care    Transportation: Anticipate Family/friend    PT Recommendations: home with assist, home with home care physical therapy  OT Recommendations:  home with assist, home with home care occupational therapy     Barriers to Discharge: medical stability/ Colorectal surgery following. TPN still.     Prior Living Situation: apartment with spouse, child(sisi), adult, child(sisi), dependent    Discussed  Partnership in Safe Discharge Planning  document with patient/family: No     Handoff Completed: No, handoff not indicated or clinically appropriate    Patient/Spokesperson Updated: Yes. Who? Pt     Additional Information:  Therapy recs home therapy.       [facility] accepted pt for home RN (ostomy care), PT/OT.         Next Steps: Send home care orders day of discharge. CM will continue to monitor progression of care, review team recommendations and provide discharge planning assist as needed.       Anna Zepeda RN

## 2025-01-07 NOTE — PLAN OF CARE
Problem: Adult Inpatient Plan of Care  Goal: Optimal Comfort and Wellbeing  Outcome: Progressing     Problem: Pain Acute  Goal: Optimal Pain Control and Function  Intervention: Prevent or Manage Pain  Recent Flowsheet Documentation  Taken 1/7/2025 0010 by Mary Grace Castañeda RN  Medication Review/Management: medications reviewed  Taken 1/6/2025 1949 by Mary Grace Castañeda RN  Medication Review/Management: medications reviewed     Problem: Intestinal Obstruction  Goal: Fluid and Electrolyte Balance  Outcome: Progressing   Goal Outcome Evaluation:      Plan of Care Reviewed With: patient    Overall Patient Progress: improvingOverall Patient Progress: improving     NURSING PROGRESS NOTE  Shift Summary      Date: January 7, 2025     Neuro/Musculoskeletal:  A&Ox4.   Cardiac:  VSS.     Respiratory:  Sating in the 90s on RA  GI/:  Adequate urine output. Ileostomy in place  Diet/Appetite:  Tolerating full liquid diet. TPN running  Activity:  Assist of 1. Bed pan utilized  Pain:  PCA pump and scheduled tylenol given, patient pain 6-8/10  Skin:  No new deficits noted.   LDAs + Drips/IVF:  L PICC, R ileostomy in place, and L DOUGLAS drain in place  Protocols/Labs:  phos protocol        Mary Grace Castañeda RN  ....................................................

## 2025-01-08 ENCOUNTER — TELEPHONE (OUTPATIENT)
Dept: FAMILY MEDICINE | Facility: CLINIC | Age: 45
End: 2025-01-08

## 2025-01-08 ENCOUNTER — APPOINTMENT (OUTPATIENT)
Dept: CT IMAGING | Facility: HOSPITAL | Age: 45
End: 2025-01-08
Attending: STUDENT IN AN ORGANIZED HEALTH CARE EDUCATION/TRAINING PROGRAM
Payer: COMMERCIAL

## 2025-01-08 ENCOUNTER — VIRTUAL VISIT (OUTPATIENT)
Dept: INTERPRETER SERVICES | Facility: CLINIC | Age: 45
End: 2025-01-08

## 2025-01-08 LAB
ANION GAP SERPL CALCULATED.3IONS-SCNC: 10 MMOL/L (ref 7–15)
BUN SERPL-MCNC: 16.5 MG/DL (ref 6–20)
CALCIUM SERPL-MCNC: 9.4 MG/DL (ref 8.8–10.4)
CHLORIDE SERPL-SCNC: 101 MMOL/L (ref 98–107)
CREAT SERPL-MCNC: 0.42 MG/DL (ref 0.51–0.95)
EGFRCR SERPLBLD CKD-EPI 2021: >90 ML/MIN/1.73M2
ERYTHROCYTE [DISTWIDTH] IN BLOOD BY AUTOMATED COUNT: 14.9 % (ref 10–15)
GLUCOSE BLDC GLUCOMTR-MCNC: 107 MG/DL (ref 70–99)
GLUCOSE BLDC GLUCOMTR-MCNC: 110 MG/DL (ref 70–99)
GLUCOSE BLDC GLUCOMTR-MCNC: 122 MG/DL (ref 70–99)
GLUCOSE BLDC GLUCOMTR-MCNC: 156 MG/DL (ref 70–99)
GLUCOSE BLDC GLUCOMTR-MCNC: 190 MG/DL (ref 70–99)
GLUCOSE SERPL-MCNC: 139 MG/DL (ref 70–99)
HCO3 SERPL-SCNC: 27 MMOL/L (ref 22–29)
HCT VFR BLD AUTO: 25 % (ref 35–47)
HGB BLD-MCNC: 7.8 G/DL (ref 11.7–15.7)
MAGNESIUM SERPL-MCNC: 2.1 MG/DL (ref 1.7–2.3)
MCH RBC QN AUTO: 29 PG (ref 26.5–33)
MCHC RBC AUTO-ENTMCNC: 31.2 G/DL (ref 31.5–36.5)
MCV RBC AUTO: 93 FL (ref 78–100)
PHOSPHATE SERPL-MCNC: 3.6 MG/DL (ref 2.5–4.5)
PLATELET # BLD AUTO: 505 10E3/UL (ref 150–450)
POTASSIUM SERPL-SCNC: 4 MMOL/L (ref 3.4–5.3)
RBC # BLD AUTO: 2.69 10E6/UL (ref 3.8–5.2)
SODIUM SERPL-SCNC: 138 MMOL/L (ref 135–145)
WBC # BLD AUTO: 15.9 10E3/UL (ref 4–11)

## 2025-01-08 PROCEDURE — 77012 CT SCAN FOR NEEDLE BIOPSY: CPT

## 2025-01-08 PROCEDURE — 87077 CULTURE AEROBIC IDENTIFY: CPT | Performed by: STUDENT IN AN ORGANIZED HEALTH CARE EDUCATION/TRAINING PROGRAM

## 2025-01-08 PROCEDURE — 120N000001 HC R&B MED SURG/OB

## 2025-01-08 PROCEDURE — 250N000011 HC RX IP 250 OP 636: Performed by: COLON & RECTAL SURGERY

## 2025-01-08 PROCEDURE — 250N000011 HC RX IP 250 OP 636

## 2025-01-08 PROCEDURE — 83735 ASSAY OF MAGNESIUM: CPT

## 2025-01-08 PROCEDURE — 87186 SC STD MICRODIL/AGAR DIL: CPT | Performed by: STUDENT IN AN ORGANIZED HEALTH CARE EDUCATION/TRAINING PROGRAM

## 2025-01-08 PROCEDURE — 87070 CULTURE OTHR SPECIMN AEROBIC: CPT | Performed by: STUDENT IN AN ORGANIZED HEALTH CARE EDUCATION/TRAINING PROGRAM

## 2025-01-08 PROCEDURE — 87075 CULTR BACTERIA EXCEPT BLOOD: CPT | Performed by: STUDENT IN AN ORGANIZED HEALTH CARE EDUCATION/TRAINING PROGRAM

## 2025-01-08 PROCEDURE — 0W9G3ZZ DRAINAGE OF PERITONEAL CAVITY, PERCUTANEOUS APPROACH: ICD-10-PCS | Performed by: STUDENT IN AN ORGANIZED HEALTH CARE EDUCATION/TRAINING PROGRAM

## 2025-01-08 PROCEDURE — 250N000009 HC RX 250: Performed by: FAMILY MEDICINE

## 2025-01-08 PROCEDURE — 82565 ASSAY OF CREATININE: CPT

## 2025-01-08 PROCEDURE — 80048 BASIC METABOLIC PNL TOTAL CA: CPT

## 2025-01-08 PROCEDURE — 250N000011 HC RX IP 250 OP 636: Performed by: STUDENT IN AN ORGANIZED HEALTH CARE EDUCATION/TRAINING PROGRAM

## 2025-01-08 PROCEDURE — 84100 ASSAY OF PHOSPHORUS: CPT

## 2025-01-08 PROCEDURE — 272N000431 CT ABDOMEN PERITONEUM ABSCESS ASPIRATION

## 2025-01-08 PROCEDURE — T1013 SIGN LANG/ORAL INTERPRETER: HCPCS | Mod: GT,TEL,95 | Performed by: INTERPRETER

## 2025-01-08 PROCEDURE — 85014 HEMATOCRIT: CPT

## 2025-01-08 RX ORDER — NALOXONE HYDROCHLORIDE 0.4 MG/ML
0.4 INJECTION, SOLUTION INTRAMUSCULAR; INTRAVENOUS; SUBCUTANEOUS
Status: DISCONTINUED | OUTPATIENT
Start: 2025-01-08 | End: 2025-01-08

## 2025-01-08 RX ORDER — FENTANYL CITRATE 50 UG/ML
25-50 INJECTION, SOLUTION INTRAMUSCULAR; INTRAVENOUS EVERY 5 MIN PRN
Status: DISCONTINUED | OUTPATIENT
Start: 2025-01-08 | End: 2025-01-08

## 2025-01-08 RX ORDER — FLUMAZENIL 0.1 MG/ML
0.2 INJECTION, SOLUTION INTRAVENOUS
Status: DISCONTINUED | OUTPATIENT
Start: 2025-01-08 | End: 2025-01-08

## 2025-01-08 RX ORDER — NALOXONE HYDROCHLORIDE 0.4 MG/ML
0.2 INJECTION, SOLUTION INTRAMUSCULAR; INTRAVENOUS; SUBCUTANEOUS
Status: DISCONTINUED | OUTPATIENT
Start: 2025-01-08 | End: 2025-01-08

## 2025-01-08 RX ADMIN — MIDAZOLAM HYDROCHLORIDE 1 MG: 1 INJECTION, SOLUTION INTRAMUSCULAR; INTRAVENOUS at 12:20

## 2025-01-08 RX ADMIN — ONDANSETRON 4 MG: 2 INJECTION INTRAMUSCULAR; INTRAVENOUS at 09:24

## 2025-01-08 RX ADMIN — FENTANYL CITRATE 50 MCG: 50 INJECTION, SOLUTION INTRAMUSCULAR; INTRAVENOUS at 12:30

## 2025-01-08 RX ADMIN — ACETAMINOPHEN 1000 MG: 10 INJECTION, SOLUTION INTRAVENOUS at 19:48

## 2025-01-08 RX ADMIN — PIPERACILLIN AND TAZOBACTAM 3.38 G: 3; .375 INJECTION, POWDER, FOR SOLUTION INTRAVENOUS at 13:16

## 2025-01-08 RX ADMIN — ENOXAPARIN SODIUM 40 MG: 40 INJECTION SUBCUTANEOUS at 09:36

## 2025-01-08 RX ADMIN — FENTANYL CITRATE 50 MCG: 50 INJECTION, SOLUTION INTRAMUSCULAR; INTRAVENOUS at 12:22

## 2025-01-08 RX ADMIN — Medication: at 20:35

## 2025-01-08 RX ADMIN — ACETAMINOPHEN 1000 MG: 10 INJECTION, SOLUTION INTRAVENOUS at 03:17

## 2025-01-08 RX ADMIN — INSULIN ASPART 2 UNITS: 100 INJECTION, SOLUTION INTRAVENOUS; SUBCUTANEOUS at 21:31

## 2025-01-08 RX ADMIN — ACETAMINOPHEN 1000 MG: 10 INJECTION, SOLUTION INTRAVENOUS at 13:11

## 2025-01-08 RX ADMIN — PIPERACILLIN AND TAZOBACTAM 3.38 G: 3; .375 INJECTION, POWDER, FOR SOLUTION INTRAVENOUS at 04:03

## 2025-01-08 RX ADMIN — PROCHLORPERAZINE EDISYLATE 10 MG: 5 INJECTION INTRAMUSCULAR; INTRAVENOUS at 03:21

## 2025-01-08 RX ADMIN — MIDAZOLAM HYDROCHLORIDE 1 MG: 1 INJECTION, SOLUTION INTRAMUSCULAR; INTRAVENOUS at 12:25

## 2025-01-08 RX ADMIN — MAGNESIUM SULFATE HEPTAHYDRATE: 500 INJECTION, SOLUTION INTRAMUSCULAR; INTRAVENOUS at 19:49

## 2025-01-08 RX ADMIN — PIPERACILLIN AND TAZOBACTAM 3.38 G: 3; .375 INJECTION, POWDER, FOR SOLUTION INTRAVENOUS at 19:48

## 2025-01-08 ASSESSMENT — ACTIVITIES OF DAILY LIVING (ADL)
ADLS_ACUITY_SCORE: 45

## 2025-01-08 NOTE — CONSULTS
Imaging reviewed - will plan for CT guided aspiration of left midline abdominal fluid collection.     Ariel Andujar MD

## 2025-01-08 NOTE — PLAN OF CARE
Problem: Adult Inpatient Plan of Care  Goal: Optimal Comfort and Wellbeing  Intervention: Monitor Pain and Promote Comfort  Recent Flowsheet Documentation  Taken 1/8/2025 1502 by Elizabeth Tao, RN  Pain Management Interventions: pain pump in use  Taken 1/8/2025 0919 by Elizabeth Tao, RN  Pain Management Interventions: pain pump in use     Problem: Nausea and Vomiting  Goal: Nausea and Vomiting Relief  Intervention: Prevent and Manage Nausea and Vomiting  Recent Flowsheet Documentation  Taken 1/8/2025 0919 by Elizabeth Tao, RN  Nausea/Vomiting Interventions: antiemetic     Problem: Surgery Nonspecified  Goal: Effective Bowel Elimination  Outcome: Progressing   Goal Outcome Evaluation:         A/O. C/o surgical pain managed with PCA pump and scheduled iv tylenol.   C/o nausea this morning, prn zofran given with good relief per patient report.     Surgical incision dressing changed per Nicole GREER this morning.   Patient undergo IR drainage of abscess today.     DOUGLAS drain to bulb suction with minimal output.   Ileostomy with brown output.     Patient refused to go out for a walk this morning but willing to go for a walk on evening.     Full liquid diet now.

## 2025-01-08 NOTE — PROGRESS NOTES
Colon and Rectal Surgery  Daily Progress Note    Subjective  Telephone [employer]  used. Patient reports pain overnight, rates it 4-5/10 this morning. NPO. She feels more nauseated this morning, but denies vomiting. She was able to walk yesterday. Ileostomy with 175 mL recorded. Adequate urine output. Afebrile, , /61.    Objective  Intake/Output last 24 hrs:    Intake/Output Summary (Last 24 hours) at 1/7/2025 1040  Last data filed at 1/7/2025 0941  Gross per 24 hour   Intake 1416.8 ml   Output 2135 ml   Net -718.2 ml     Temp:  [98.5  F (36.9  C)-98.7  F (37.1  C)] 98.6  F (37  C)  Pulse:  [] 102  Resp:  [16-18] 16  BP: (100-108)/(61-66) 100/61  SpO2:  [98 %-99 %] 98 %    Physical Exam:  General: awake, alert, lying in bed, in no acute distress  Head: normocephalic, atraumatic  Respiratory: non-labored breathing  Abdomen: soft, appropriately tender, non-distended              Incisions: clean, some purulence around inferior aspect of midline incision. Dressing changed.               Drains: DOUGLAS with 15 cc serosanguinous in past 24 hrs  Skin: No rashes or lesions  Musculoskeletal: moves all four extremities equally  Psychological: alert and oriented, answers questions appropriately    Pertinent Labs  Lab Results: personally reviewed.  Lab Results   Component Value Date     01/07/2025     01/06/2025     01/05/2025    .0 04/24/2017    CO2 26 01/07/2025    CO2 25 01/06/2025    CO2 24 01/05/2025    CO2 25 06/23/2022    CO2 23 06/03/2022    CO2 22 06/02/2022    CO2 26.0 04/24/2017    BUN 17.9 01/07/2025    BUN 13.3 01/06/2025    BUN 15.8 01/05/2025    BUN 6 06/23/2022    BUN 3 06/03/2022    BUN 4 06/02/2022    BUN 7.0 04/24/2017     Lab Results   Component Value Date    WBC 16.9 01/07/2025    WBC 12.3 01/06/2025    WBC 14.6 01/05/2025    HGB 8.2 01/07/2025    HGB 7.6 01/06/2025    HGB 7.5 01/05/2025    HGB 10.8 01/10/2018    HGB 10.3 11/08/2017    HGB 9.8 09/18/2017    HCT  26.1 01/07/2025    HCT 23.9 01/06/2025    HCT 24.2 01/05/2025    HCT 35.9 01/10/2018    HCT 33.8 11/08/2017    HCT 31.4 07/31/2017    MCV 93 01/07/2025    MCV 93 01/06/2025    MCV 93 01/05/2025    MCV 83.1 01/10/2018    MCV 77.9 11/08/2017    MCV 86.3 07/31/2017     01/07/2025     01/06/2025     01/05/2025       Assessment/Plan: This is a 44 year old female POD #9 s/p TAC and end ileostomy for Crohn's disease     WBC 15.9(16.9)  Hgb 7.8 (8.2)     - NPO  - Continue Zosyn   - IR consult for possible fluid collection aspiration  - TPN until tolerating 80% of calories PO  - Continue PCA for pain. Will plan to transition off once tolerating low fiber diet. NO NSAIDs with Crohn's disease  - Strict I&O, monitor stoma output  - Lovenox DVT ppx  - Blood sugar checks and sliding scale insulin QAC & HS  - OOB/Ambulate, PT/OT consults  - WOCN cares      Discussed with Dr. Virgilio Sandra, PASharleneC  [facility]  [phone]..............................main

## 2025-01-08 NOTE — PRE-PROCEDURE
GENERAL PRE-PROCEDURE:   Procedure:  CT guided fluid collection aspiration  Date/Time:  1/8/2025 11:54 AM    Written consent obtained?: Yes    Risks and benefits: Risks, benefits and alternatives were discussed    Consent given by:  Patient  Patient states understanding of procedure being performed: Yes    Patient's understanding of procedure matches consent: Yes    Procedure consent matches procedure scheduled: Yes    Expected level of sedation:  Moderate  Appropriately NPO:  Yes  ASA Class:  1  Mallampati  :  Grade 1- soft palate, uvula, tonsillar pillars, and posterior pharyngeal wall visible  Lungs:  Lungs clear with good breath sounds bilaterally  Heart:  Normal heart sounds and rate  History & Physical reviewed:  History and physical reviewed and no updates needed  Statement of review:  I have reviewed the lab findings, diagnostic data, medications, and the plan for sedation

## 2025-01-08 NOTE — PLAN OF CARE
Problem: Intestinal Obstruction  Goal: Optimal Bowel Function  Intervention: Promote Bowel Function  Recent Flowsheet Documentation  Taken 1/7/2025 2315 by Jaelyn Cervantes RN  Body Position: position changed independently  Positioning/Transfer Devices:   pillows   in use  Taken 1/7/2025 1937 by Jaelyn Cervantes RN  Body Position: position changed independently  Positioning/Transfer Devices:   pillows   in use     Patient alert and oriented. Hmong speaking. VSS. Ongoing pain. PCA pump-not effective per patient. IV tylenol given. Patient c/o nausea-compazine given. IV antibiotics given. Patient using bedpan. Increased pain with movement and ambulation. Continuous TPN and lipids given infusing. DOUGLAS Drain and ileostomy. NPO @ midnight. Patient expresses no further concerns at this time. Call light within reach.    Jaelyn Cervantes RN  [phone]

## 2025-01-08 NOTE — PROCEDURES
[facility]    Procedure: CT guided aspiration of abdominal fluid collection with moderate sedation    Date/Time: 1/8/2025 12:36 PM    Performed by: Ariel Andujar MD  Authorized by: Ariel Andujar MD  IR Fellow Physician:    Pre Procedure Diagnosis: Recent colectomy and diverting loop iliostomy with fluid collection  Post Procedure Diagnosis: Same    UNIVERSAL PROTOCOL   Site Marked: Yes  Prior Images Obtained and Reviewed:  Yes  Required items: Required blood products, implants, devices and special equipment available    Patient identity confirmed:  Verbally with patient, arm band and provided demographic data  Patient was reevaluated immediately before administering moderate or deep sedation or anesthesia  Confirmation Checklist:  Patient's identity using two indicators, relevant allergies and procedure was appropriate and matched the consent or emergent situation  Time out: Immediately prior to the procedure a time out was called    Universal Protocol: the Joint Commission Universal Protocol was followed    Preparation: Patient was prepped and draped in usual sterile fashion       ANESTHESIA    Anesthesia:  Local infiltration  Local Anesthetic:  Lidocaine 1% without epinephrine      SEDATION  Patient Sedated: Yes    Sedation Type:  Moderate (conscious) sedation  Sedation:  Fentanyl, midazolam and see MAR for details  Vital signs: Vital signs monitored during sedation    Findings: Successful aspiration of 10 mL of thick bloody fluid, sent for cultures. Too small for drain placement.     Specimens: fluid and/or tissue for gram stain and culture    Procedural Complications: None    Condition: Stable    Plan: Bedrest 1 hour.       PROCEDURE    Length of time physician/provider present for 1:1 monitoring during sedation:  0-22 min

## 2025-01-08 NOTE — PROGRESS NOTES
Nutrition Therapy  Parenteral Nutrition follow-up     Dietitian to Pharmacy    Rate of TPN: Custom Cyclic TPN at 65 ml/hr x 1 hr, 131 ml/hr x 10 hrs, 65 ml/hr x 1 hr and off  Grams Dextrose: 224  Grams Protein: 70    Lipids: Omegaven 20 gm 6 days/week  Decreased     Future/Additional Recommendations:  Monitor TPN, diet adv/po/supplement, weight, labs, I/Os.      SUBJECTIVE INFORMATION  Assessed patient in room.  utilized    CURRENT NUTRITION ORDERS  Diet: NPO  Food Allergy: Gluten, Soy   Nutrition Supplement: Ensure Clear daily- Held while NPO   Nutrition Support: Custom Cyclic TPN at 65 ml/hr x 1 hr, 131 ml/hr x 10 hrs, 65 ml/hr x 1 hr and off  Containing 224 g Dex, 70 g AA + 200 ml Omegaven daily     Provides: 1440 ml + 200 ml, 1262 kcal, 70 g protein, 224 g cho, 20 g fat   Meets 100% estimated nutrition needs    CURRENT INTAKE/TOLERANCE  Pt reports taking some porridge, potatoes, sips of ensure clear 1/7   50% po at lunch, intake at breakfast not documented      NEW FINDINGS  Pt now NPO. Pt reports some nausea and abd pain, reports feeling very weak. Pt agreeable to continue ensure clear once diet advanced.     Weight: Current wt stable this admit   Date/Time Weight Weight Method   01/04/25 1700 45.1 kg (99 lb 8 oz) Standing scale   01/02/25 1143 45.9 kg (101 lb 3.1 oz) --   01/01/25 1237 46.4 kg (102 lb 4.7 oz) Bed scale   12/29/24 1933 46.3 kg (102 lb 1.6 oz) Standing scale   12/29/24 1124 46.3 kg (102 lb) Standing scale   12/28/24 1943 46 kg (101 lb 8 oz) Standing scale   12/26/24 0908 47.2 kg (104 lb) --     Abd discomfort, nausea  Incision/surgical site  Ileostomy, OP: 200 ml 1/7, 175 ml this AM  LLQ Drain, OP: 15 ml this AM  Nutrition-relevant labs: Creat 0.42(L), -156 last 24 hrs   1/6: (H)  Nutrition-relevant medications: Continuous dilaudid, cylic TPN, IVF NaCl 10 ml/hr   Scheduled omegaven, novolog, zosyn     Dosing Weight: 47.2 kg     ASSESSED NUTRITION NEEDS  Estimated Energy  Needs: 2647-5909 kcals/day (25 -30 kcals/kg )  Justification: Maintenance  Estimated Protein Needs: 56-70 grams protein/day (1.2 - 1.5 grams of pro/kg)  Justification: Increased needs  Estimated Fluid Needs: 2874-3233 mL/day (30 - 35 mL/kg)   Justification: Increased needs    MALNUTRITION  % Intake: Decreased intake does not meet criteria  % Weight Loss: Weight loss does not meet criteria   Subcutaneous Fat Loss: None observed   Muscle Loss: Temporal region mild depletion and Dorsal hand region mild depletion   Fluid Accumulation/Edema: None noted  Malnutrition Diagnosis: Patient does not meet two of the established criteria necessary for diagnosing malnutrition but is at risk for malnutrition    EVALUATION OF THE PROGRESS TOWARD GOALS     NUTRITION DIAGNOSIS  Inadequate oral intake related to altered GI function as evidenced by need for nutrition support   Evaluation: Ongoing     INTERVENTIONS  Parenteral Nutrition/IV Fluids - Decrease lipids r/t elevated TG 1/6   Custom Cyclic TPN at 65 ml/hr x 1 hr, 131 ml/hr x 10 hrs, 65 ml/hr x 1 hr and off  Containing 224 g Dex, 70 g AA + 200 ml Omegaven 6 days/week   Provides: 1440 ml + lipids, 1229 kcal, 70 g protein, 224 g cho, avg 17 g fat daily     Goals  Tolerate cyclic TPN - Progressing   Meet nutrition needs - Met via TPN  Maintain weight - Met   Diet advancement - Not Met, NPO now      Monitoring/Evaluation      Progress toward goals will be monitored and evaluated per policy.

## 2025-01-08 NOTE — TELEPHONE ENCOUNTER
Patient's  called regarding the form needed for Social Security Disability Claim. Patient is currently at [facility].  Can Dr Woodruff go to see her in the hospital to fill the paperwork with her there?      Dr Woodruff -- please send me an Epic message telling me where I can put this paperwork for you to retrieve if you can go see her at [facility].     Thank you!!  Estela

## 2025-01-08 NOTE — PHARMACY-CONSULT NOTE
"Pharmacy Note: Parenteral Nutrition (PN) Management     Pharmacist consulted to dose PN for Dain Tejeda, a 44 year old female.     Subjective:     The patient is a new PN start.     The patient was started on PN in the hospital on 12/27/2024.     Indication for PN therapy: bowel obstruction     Inadequate nutrition anticipated for > 7 days.      Enteral nutrition contraindicated due to:  bowel obstruction     Social History     Tobacco Use    Smoking status: Never     Passive exposure: Never    Smokeless tobacco: Never   Substance Use Topics    Alcohol use: No    Drug use: No     Objective:    Ht Readings from Last 1 Encounters:   12/11/24 1.499 m (4' 11\")     Wt Readings from Last 1 Encounters:   01/04/25 45.1 kg (99 lb 8 oz)       Body mass index is 20.1 kg/m .    Patient Vitals for the past 96 hrs:   Weight   01/04/25 1700 45.1 kg (99 lb 8 oz)       Labs:  Last 3 days:  Recent Labs     01/06/25  0701 01/07/25  0545 01/08/25  0614    136 138   POTASSIUM 3.9 4.4 4.0   CHLORIDE 104 100 101   CO2 25 26 27   BUN 13.3 17.9 16.5   CR 0.38* 0.41* 0.42*   ALEX 9.2 9.3 9.4   MAG 1.9 2.0 2.1   PHOS 4.4 3.4 3.6   PROTTOTAL 7.2  --   --    ALBUMIN 2.7*  --   --    PREALB 16.3*  --   --    TRIG 185*  --   --    HGB 7.6* 8.2* 7.8*   HCT 23.9* 26.1* 25.0*    482* 505*   BILITOTAL 0.2  --   --    AST 81*  --   --    *  --   --    ALKPHOS 503*  --   --    INR 1.04  --   --        Glucose (past 48 hours):   Recent Labs     01/06/25 2002 01/07/25  0258 01/07/25  0545 01/07/25  0725 01/07/25  1131 01/07/25  1702 01/07/25  2110 01/08/25  0614 01/08/25  0809 01/08/25  0934   * 152* 82 171* 115* 130* 136* 139* 156* 122*       Intake/Output (last 24 hours): I/O last 3 completed shifts:  In: 293 [P.O.:240; I.V.:53]  Out: 1440 [Urine:1250; Drains:15; Stool:175]    Estimated CrCl: Estimated Creatinine Clearance: 124.2 mL/min (A) (based on SCr of 0.42 mg/dL (L)).    Assessment:    Continue patient on PN therapy as a " cyclic central therapy.     Given the patient's current condition/oral intake, PN is still indicated.    Lab results reviewed:     No changes to formulation needed    Plan:  Rate of PN: 65 mL/hr x 1 hour, them 131 ml/hr x 10 hours, then 65 ml/hr x 1 hour.   Formula:   Amino Acids 70 grams  Dextrose 224 grams  Sodium 55 mEq/day  Potassium 40 mEq/day  Calcium 6 mEq/day  Magnesium 12 mEq/day  Phosphorus 0 mMol/day  Chloride: Acetate Ratio 1:1  Standard Multivitamins w/Vitamin K  Trace Elements  Thiamine 100 mg  Fat Emulsion: Omegaven 20 gm (10 x 2 bottles) IV six times weekly (daily except on Wednesdays)  Check BMP labs tomorrow.  Pharmacist will continue to follow the patient's lab results, clinical status and blood glucose results and make adjustments as appropriate.    Thank you for the consult.  Dangelo Rivers RPH  1/8/2025 11:27 AM

## 2025-01-08 NOTE — IR NOTE
Patient Name: Dain Tejeda  Medical Record Number: [id number]  Today's Date: 1/8/2025    Procedure: fluid aspiration  Proceduralist: Dr reilly    Procedure Start: 1220  Procedure end: 1235  Sedation medications administered: 2 mg midazolam and 100 mcg fentanyl   Sedation time: 15 minutes    Report given to: floor RN site dry and intact. Covered by a  gauze  : used for procedure

## 2025-01-09 ENCOUNTER — APPOINTMENT (OUTPATIENT)
Dept: OCCUPATIONAL THERAPY | Facility: HOSPITAL | Age: 45
End: 2025-01-09
Payer: COMMERCIAL

## 2025-01-09 ENCOUNTER — APPOINTMENT (OUTPATIENT)
Dept: PHYSICAL THERAPY | Facility: HOSPITAL | Age: 45
End: 2025-01-09
Payer: COMMERCIAL

## 2025-01-09 LAB
ANION GAP SERPL CALCULATED.3IONS-SCNC: 9 MMOL/L (ref 7–15)
ATRIAL RATE - MUSE: 104 BPM
BACTERIA ABSC ANAEROBE+AEROBE CULT: ABNORMAL
BACTERIA ABSC ANAEROBE+AEROBE CULT: NORMAL
BUN SERPL-MCNC: 17.4 MG/DL (ref 6–20)
CALCIUM SERPL-MCNC: 9.2 MG/DL (ref 8.8–10.4)
CHLORIDE SERPL-SCNC: 103 MMOL/L (ref 98–107)
CREAT SERPL-MCNC: 0.48 MG/DL (ref 0.51–0.95)
DIASTOLIC BLOOD PRESSURE - MUSE: NORMAL MMHG
EGFRCR SERPLBLD CKD-EPI 2021: >90 ML/MIN/1.73M2
ERYTHROCYTE [DISTWIDTH] IN BLOOD BY AUTOMATED COUNT: 14.9 % (ref 10–15)
GLUCOSE BLDC GLUCOMTR-MCNC: 143 MG/DL (ref 70–99)
GLUCOSE BLDC GLUCOMTR-MCNC: 145 MG/DL (ref 70–99)
GLUCOSE BLDC GLUCOMTR-MCNC: 96 MG/DL (ref 70–99)
GLUCOSE SERPL-MCNC: 129 MG/DL (ref 70–99)
GRAM STAIN RESULT: ABNORMAL
HCO3 SERPL-SCNC: 26 MMOL/L (ref 22–29)
HCT VFR BLD AUTO: 23.1 % (ref 35–47)
HGB BLD-MCNC: 7.2 G/DL (ref 11.7–15.7)
INTERPRETATION ECG - MUSE: NORMAL
MCH RBC QN AUTO: 28.7 PG (ref 26.5–33)
MCHC RBC AUTO-ENTMCNC: 31.2 G/DL (ref 31.5–36.5)
MCV RBC AUTO: 92 FL (ref 78–100)
P AXIS - MUSE: 61 DEGREES
PLATELET # BLD AUTO: 487 10E3/UL (ref 150–450)
POTASSIUM SERPL-SCNC: 4 MMOL/L (ref 3.4–5.3)
PR INTERVAL - MUSE: 164 MS
QRS DURATION - MUSE: 70 MS
QT - MUSE: 332 MS
QTC - MUSE: 436 MS
R AXIS - MUSE: 53 DEGREES
RBC # BLD AUTO: 2.51 10E6/UL (ref 3.8–5.2)
SODIUM SERPL-SCNC: 138 MMOL/L (ref 135–145)
SYSTOLIC BLOOD PRESSURE - MUSE: NORMAL MMHG
T AXIS - MUSE: 51 DEGREES
VENTRICULAR RATE- MUSE: 104 BPM
WBC # BLD AUTO: 12.5 10E3/UL (ref 4–11)

## 2025-01-09 PROCEDURE — 250N000012 HC RX MED GY IP 250 OP 636 PS 637: Performed by: STUDENT IN AN ORGANIZED HEALTH CARE EDUCATION/TRAINING PROGRAM

## 2025-01-09 PROCEDURE — 80048 BASIC METABOLIC PNL TOTAL CA: CPT

## 2025-01-09 PROCEDURE — 120N000001 HC R&B MED SURG/OB

## 2025-01-09 PROCEDURE — 93010 ELECTROCARDIOGRAM REPORT: CPT | Performed by: INTERNAL MEDICINE

## 2025-01-09 PROCEDURE — 93005 ELECTROCARDIOGRAM TRACING: CPT

## 2025-01-09 PROCEDURE — 82310 ASSAY OF CALCIUM: CPT

## 2025-01-09 PROCEDURE — 250N000011 HC RX IP 250 OP 636

## 2025-01-09 PROCEDURE — 97530 THERAPEUTIC ACTIVITIES: CPT | Mod: GP

## 2025-01-09 PROCEDURE — 97116 GAIT TRAINING THERAPY: CPT | Mod: GP

## 2025-01-09 PROCEDURE — 84520 ASSAY OF UREA NITROGEN: CPT

## 2025-01-09 PROCEDURE — 97530 THERAPEUTIC ACTIVITIES: CPT | Mod: GO

## 2025-01-09 PROCEDURE — 250N000009 HC RX 250: Performed by: FAMILY MEDICINE

## 2025-01-09 PROCEDURE — 250N000011 HC RX IP 250 OP 636: Performed by: STUDENT IN AN ORGANIZED HEALTH CARE EDUCATION/TRAINING PROGRAM

## 2025-01-09 PROCEDURE — 258N000003 HC RX IP 258 OP 636

## 2025-01-09 PROCEDURE — 250N000011 HC RX IP 250 OP 636: Performed by: COLON & RECTAL SURGERY

## 2025-01-09 PROCEDURE — 97535 SELF CARE MNGMENT TRAINING: CPT | Mod: GO

## 2025-01-09 PROCEDURE — 85027 COMPLETE CBC AUTOMATED: CPT

## 2025-01-09 RX ADMIN — ACETAMINOPHEN 1000 MG: 10 INJECTION, SOLUTION INTRAVENOUS at 19:26

## 2025-01-09 RX ADMIN — ENOXAPARIN SODIUM 40 MG: 40 INJECTION SUBCUTANEOUS at 08:32

## 2025-01-09 RX ADMIN — ONDANSETRON 4 MG: 2 INJECTION INTRAMUSCULAR; INTRAVENOUS at 08:27

## 2025-01-09 RX ADMIN — MAGNESIUM SULFATE HEPTAHYDRATE: 500 INJECTION, SOLUTION INTRAMUSCULAR; INTRAVENOUS at 20:10

## 2025-01-09 RX ADMIN — ACETAMINOPHEN 1000 MG: 10 INJECTION, SOLUTION INTRAVENOUS at 03:07

## 2025-01-09 RX ADMIN — INSULIN ASPART 1 UNITS: 100 INJECTION, SOLUTION INTRAVENOUS; SUBCUTANEOUS at 12:34

## 2025-01-09 RX ADMIN — PIPERACILLIN AND TAZOBACTAM 3.38 G: 3; .375 INJECTION, POWDER, FOR SOLUTION INTRAVENOUS at 03:48

## 2025-01-09 RX ADMIN — INSULIN ASPART 1 UNITS: 100 INJECTION, SOLUTION INTRAVENOUS; SUBCUTANEOUS at 08:30

## 2025-01-09 RX ADMIN — PIPERACILLIN AND TAZOBACTAM 3.38 G: 3; .375 INJECTION, POWDER, FOR SOLUTION INTRAVENOUS at 19:26

## 2025-01-09 RX ADMIN — PIPERACILLIN AND TAZOBACTAM 3.38 G: 3; .375 INJECTION, POWDER, FOR SOLUTION INTRAVENOUS at 12:36

## 2025-01-09 RX ADMIN — ACETAMINOPHEN 1000 MG: 10 INJECTION, SOLUTION INTRAVENOUS at 12:28

## 2025-01-09 RX ADMIN — SODIUM CHLORIDE: 9 INJECTION, SOLUTION INTRAVENOUS at 17:30

## 2025-01-09 RX ADMIN — INSULIN ASPART 1 UNITS: 100 INJECTION, SOLUTION INTRAVENOUS; SUBCUTANEOUS at 22:23

## 2025-01-09 ASSESSMENT — ACTIVITIES OF DAILY LIVING (ADL)
ADLS_ACUITY_SCORE: 45

## 2025-01-09 NOTE — PROVIDER NOTIFICATION
Provider notification 1/9/25 08:30 am:     Reason: Patient c/o chest tightness associated with nausea.    Response: New orders for EKG.     Provider: Nicole Sandra.    Method of communication: Face to face

## 2025-01-09 NOTE — PROGRESS NOTES
Colon and Rectal Surgery  Daily Progress Note    Subjective  Telephone [employer]  used. Patient notes new chest tightness starting around 6:30 this morning. She has a hard time describing the chest tightness, but denies pain. She describes it in the subxiphoid. She continues to struggle with pain and discomfort. Full liquid diet, with 360 ml PO intake. She feels nauseated this morning, but denies vomiting. She was able to walk yesterday in room. Ileostomy with 150 mL recorded. Adequate urine output. Afebrile, HR 84, /70.    Objective  Intake/Output last 24 hrs:    Intake/Output Summary (Last 24 hours) at 1/7/2025 1040  Last data filed at 1/7/2025 0941  Gross per 24 hour   Intake 1416.8 ml   Output 2135 ml   Net -718.2 ml     Temp:  [98  F (36.7  C)-98.6  F (37  C)] 98  F (36.7  C)  Pulse:  [] 84  Resp:  [16-27] 20  BP: ()/(59-70) 110/70  SpO2:  [95 %-100 %] 100 %    Physical Exam:  General: awake, alert, lying in bed, in no acute distress  Head: normocephalic, atraumatic  Respiratory: non-labored breathing  Abdomen: soft, appropriately tender, non-distended              Incisions: clean, some purulence around inferior aspect of midline incision. Dressing changed.               Drains: DOUGLAS with 0 cc serosanguinous in past 24 hrs, scant serosang output  Skin: No rashes or lesions  Musculoskeletal: moves all four extremities equally  Psychological: alert and oriented, answers questions appropriately    Pertinent Labs  Lab Results: personally reviewed.  Lab Results   Component Value Date     01/07/2025     01/06/2025     01/05/2025    .0 04/24/2017    CO2 26 01/07/2025    CO2 25 01/06/2025    CO2 24 01/05/2025    CO2 25 06/23/2022    CO2 23 06/03/2022    CO2 22 06/02/2022    CO2 26.0 04/24/2017    BUN 17.9 01/07/2025    BUN 13.3 01/06/2025    BUN 15.8 01/05/2025    BUN 6 06/23/2022    BUN 3 06/03/2022    BUN 4 06/02/2022    BUN 7.0 04/24/2017     Lab Results   Component  Value Date    WBC 16.9 01/07/2025    WBC 12.3 01/06/2025    WBC 14.6 01/05/2025    HGB 8.2 01/07/2025    HGB 7.6 01/06/2025    HGB 7.5 01/05/2025    HGB 10.8 01/10/2018    HGB 10.3 11/08/2017    HGB 9.8 09/18/2017    HCT 26.1 01/07/2025    HCT 23.9 01/06/2025    HCT 24.2 01/05/2025    HCT 35.9 01/10/2018    HCT 33.8 11/08/2017    HCT 31.4 07/31/2017    MCV 93 01/07/2025    MCV 93 01/06/2025    MCV 93 01/05/2025    MCV 83.1 01/10/2018    MCV 77.9 11/08/2017    MCV 86.3 07/31/2017     01/07/2025     01/06/2025     01/05/2025       Assessment/Plan: This is a 44 year old female POD #10 s/p TAC and end ileostomy for Crohn's disease     WBC 12.5(15.9)  Hgb 7.2 (7.8)     - EKG for chest tightness  - Continue Fulls  - Continue Zosyn   - TPN until tolerating 80% of calories PO  - Continue PCA for pain. Will plan to transition off once tolerating low fiber diet. NO NSAIDs with Crohn's disease  - Strict I&O, monitor stoma output  - Lovenox DVT ppx  - Blood sugar checks and sliding scale insulin QAC & HS  - OOB/Ambulate, PT/OT consults  - WOCN cares      Will discuss with Dr. Poole and addend with any changes    Gab Sandra PA-C  [facility]  [phone]..............................main

## 2025-01-09 NOTE — PLAN OF CARE
Problem: Intestinal Obstruction  Goal: Optimal Bowel Function  Intervention: Promote Bowel Function  Recent Flowsheet Documentation  Taken 1/8/2025 2349 by Jaelyn Cervantes RN  Body Position: position changed independently  Positioning/Transfer Devices:   pillows   in use  Taken 1/8/2025 1945 by Jaelyn Cervantes RN  Body Position: position changed independently  Positioning/Transfer Devices:   pillows   in use     Problem: Surgery Nonspecified  Goal: Optimal Pain Control and Function  Outcome: Progressing     Problem: Surgery Nonspecified  Goal: Nausea and Vomiting Relief  Outcome: Progressing     Patient alert and oriented. Hmong speaking. VSS. Ongoing pain. PCA pump continuous. IV tylenol given. IV antibiotics given. Patient using bedpan-refusing to get out of bed. Increased pain with movement and ambulation. Cyclic TPN infusing. DOUGLAS Drain and ileostomy. Patient expresses no further concerns at this time. Call light within reach.     Jaelyn Cervantes RN  [phone]

## 2025-01-09 NOTE — PLAN OF CARE
Problem: Pain Acute  Goal: Optimal Pain Control and Function  Intervention: Develop Pain Management Plan  Recent Flowsheet Documentation  Taken 1/9/2025 1228 by Elizabeth Tao, RN  Pain Management Interventions: medication (see MAR)  Taken 1/9/2025 0815 by Elizabeth Tao, RN  Pain Management Interventions: pain pump in use     Problem: Nausea and Vomiting  Goal: Nausea and Vomiting Relief  Intervention: Prevent and Manage Nausea and Vomiting  Recent Flowsheet Documentation  Taken 1/9/2025 0815 by Elizabeth Tao, RN  Nausea/Vomiting Interventions: antiemetic   Goal Outcome Evaluation:         A/O.   C/o surgical abdominal pain managed with PCA pump dilaudid.   C/o headache managed with scheduled iv tylenol.     C/o nausea, prn zofran given with good relief per patient report.   Tolerating full liquid diet once nausea improved.     C/o chest tightness this morning, Nicole DORANTES notified, new orders for EKG. Chest tightness improved later in the morning per patient report.     Patient up ambulating in the room this morning with assist x 1, up in the chair the rest of the morning.     Surgical dressing changed this morning, large amount of purulent drainage from lower incision site.   DOUGLAS leaking purulent content at the site as well. All dressing changed as needed. Nicole DORANTES aware.   IV antibiotics.     PICC line dressing changed today.

## 2025-01-09 NOTE — PROGRESS NOTES
Nutrition Therapy  Parenteral Nutrition follow-up     Dietitian to Pharmacy    Rate of TPN: Custom Cyclic TPN at 65 ml/hr x 1 hr, 131 ml/hr x 10 hrs, 65 ml/hr x 1 hr and off   Grams Dextrose:  224   Grams Protein: 70     Lipids: None Discontinued     Future/Additional Recommendations:  Monitor TPN, diet adv/po/supplement, weight, labs, I/Os.      SUBJECTIVE INFORMATION  Assessed patient in room.  Utilized     CURRENT NUTRITION ORDERS  Diet: Full Liquid  Food Allergy: Gluten, Soy   Nutrition Supplement: Ensure Clear daily   Po Intake: Pt taking sips of water, x1 ensure clear   Nutrition Support: Custom Cyclic TPN at 65 ml/hr x 1 hr, 131 ml/hr x 10 hrs, 65 ml/hr x 1 hr and off  Containing 224 g Dex, 70 g AA + 200 ml Omegaven 6 days/week     Provides:  1440 ml + lipids, 1229 kcal, 70 g protein, 224 g cho, avg 17 g fat daily   Meets 100% estimated nutrition needs     CURRENT INTAKE/TOLERANCE  Poor, taking sips of water, bites of porridge 1/8     NEW FINDINGS  Pt reports dry mouth. Pt notes nausea with too much po. Pt interested in ensure clear and broth TID. Pt family providing some outside food (porridge), reviewed full liquid diet with pt. Pt not interested in full liquid nutrition supplements now. Pt reports pain at surgical site improving.     Weight: Current wt stable this admit   Date/Time Weight Weight Method   01/08/25 1416 45 kg (99 lb 3.3 oz) Bed scale   01/04/25 1700 45.1 kg (99 lb 8 oz) Standing scale   01/02/25 1143 45.9 kg (101 lb 3.1 oz) --   01/01/25 1237 46.4 kg (102 lb 4.7 oz) Bed scale   12/29/24 1933 46.3 kg (102 lb 1.6 oz) Standing scale   12/29/24 1124 46.3 kg (102 lb) Standing scale   12/28/24 1943 46 kg (101 lb 8 oz) Standing scale   12/26/24 0908 47.2 kg (104 lb) --     Abd discomfort, nausea  Incision/surgical site  Ileostomy, OP: 225 ml 1/8, 100 ml this AM  LLQ Drain, OP: 15 ml 1/8  Nutrition-relevant labs: Creat 0.48(L), -190 last 24 hrs   Nutrition-relevant medications:  Continuous dilaudid, cylic TPN, IVF NaCl 10 ml/hr   Scheduled omegaven, novolog, zosyn     Dosing Weight: 47.2 kg     ASSESSED NUTRITION NEEDS  Estimated Energy Needs: 3687-0721 kcals/day (25 -30 kcals/kg )  Justification: Maintenance  Estimated Protein Needs: 56-70 grams protein/day (1.2 - 1.5 grams of pro/kg)  Justification: Increased needs  Estimated Fluid Needs: 5143-7698 mL/day (30 - 35 mL/kg)   Justification: Increased needs    MALNUTRITION  Malnutrition Diagnosis: Patient does not meet two of the established criteria necessary for diagnosing malnutrition but is at risk for malnutrition     EVALUATION OF THE PROGRESS TOWARD GOALS     NUTRITION DIAGNOSIS  Inadequate oral intake related to altered GI function as evidenced by need for nutrition support   Evaluation:  Progressing, diet advanced    INTERVENTIONS  Medical food supplement therapy - Ensure clear TID, Vegetable broth TID    Parenteral Nutrition/IV Fluids - Discontinue Lipids with diet advancement    Custom Cyclic TPN at 65 ml/hr x 1 hr, 131 ml/hr x 10 hrs, 65 ml/hr x 1 hr and off  Containing 224 g Dex, 70 g AA + no lipids   Provides: 1440 ml, 1042 kcal, 70 g protein, 224 g cho, 0 g fat   Meets 88% estimated kcal needs and 100% estimated protein needs     Goals  Tolerate cyclic TPN - Met   Meet nutrition needs - Met via TPN  Maintain weight - Met  Diet advancement - Progressing, FLD now   BG <180 - NEW      Monitoring/Evaluation      Progress toward goals will be monitored and evaluated per policy.

## 2025-01-09 NOTE — PHARMACY-CONSULT NOTE
Ongoing SW/CM Assessment/Plan of Care Note     See SW/CM flowsheets for goals and other objective data.    Patient/Family discharge goal (s):  Goal #1: Psychosocial needs assessed  Goal #2: Communication facilitated       PT Recommendation:  Recommendation for Discharge: PT WI: SNF    OT Recommendation:  Recommendations for Discharge: OT WI: Sub-acute nursing home    SLP Recommendation:  Recommendations for Discharge: SLP: post acute speech therapy    Disposition:       Progress note:   DALIA following for discharge planning. Record reviewed, case discussed in OFTs. PEG tube continues to function well. Plastics removed vac (and restraint) earlier today and have been following for graft healing. Plastics report that they will follow up with pt tomorrow and he should be ready to leave. Hospitalist ok with DC tomorrow.     DALIA left multiple messages for Rabia in admissions at [facility] to verify her ability to receive patient tomorrow. Awaiting call back. DALIA also left message for SNF  with same message.     DALIA spoke to patient's daughter Summer and brother Marco, both are aware of patient's progress and readiness to tx tomorrow. Summer notes that she will be working tomorrow, however Marco would like to follow ambulance over to SNF and assist with providing consent for admission (he is secondary POA agent).      staff continue to follow and assist, anticipate DC to [facility] tomorrow.     Addendum 1650 DALIA received call back from Rabia in admissions at [facility], reports that she can receive patient tomorrow at 1130. Rabia reports that she has been in contact with patient's brother and daughter, brother plans to follow ambulance and sign admissions paperwork. Rabia requested updated referral information be sent, DALIA did so via Epic fax.        "Pharmacy Note: Parenteral Nutrition (PN) Management     Pharmacist consulted to dose PN for Dain Tejeda, a 44 year old female.     Subjective:     The patient is a new PN start.     The patient was started on PN in the hospital on 12/27/2024.     Indication for PN therapy: bowel obstruction     Inadequate nutrition anticipated for > 7 days.      Enteral nutrition contraindicated due to:  bowel obstruction     Social History     Tobacco Use    Smoking status: Never     Passive exposure: Never    Smokeless tobacco: Never   Substance Use Topics    Alcohol use: No    Drug use: No     Objective:    Ht Readings from Last 1 Encounters:   12/11/24 1.499 m (4' 11\")     Wt Readings from Last 1 Encounters:   01/09/25 44.7 kg (98 lb 9 oz)       Body mass index is 19.91 kg/m .    Patient Vitals for the past 96 hrs:   Weight   01/09/25 1026 44.7 kg (98 lb 9 oz)   01/08/25 1416 45 kg (99 lb 3.3 oz)       Labs:  Last 3 days:  Recent Labs     01/07/25  0545 01/08/25  0614 01/09/25  0605    138 138   POTASSIUM 4.4 4.0 4.0   CHLORIDE 100 101 103   CO2 26 27 26   BUN 17.9 16.5 17.4   CR 0.41* 0.42* 0.48*   ALEX 9.3 9.4 9.2   MAG 2.0 2.1  --    PHOS 3.4 3.6  --    HGB 8.2* 7.8* 7.2*   HCT 26.1* 25.0* 23.1*   * 505* 487*       Glucose (past 48 hours):   Recent Labs     01/07/25  1702 01/07/25  2110 01/08/25  0614 01/08/25  0809 01/08/25  0934 01/08/25  1309 01/08/25  1528 01/08/25  2129 01/09/25  0605 01/09/25  0803   * 136* 139* 156* 122* 110* 107* 190* 129* 145*       Intake/Output (last 24 hours): I/O last 3 completed shifts:  In: 2472.65 [P.O.:360; I.V.:437]  Out: 1600 [Urine:1450; Stool:150]    Estimated CrCl: Estimated Creatinine Clearance: 107.7 mL/min (A) (based on SCr of 0.48 mg/dL (L)).    Assessment:    Continue patient on PN therapy as a cyclic central therapy.     Given the patient's current condition/oral intake, PN is still indicated.    Lab results reviewed:     No changes to formulation " needed.    Plan:  Rate of PN: 65 mL/hr x 1 hour, them 131 ml/hr x 10 hours, then 65 ml/hr x 1 hour.   Formula:   Amino Acids 70 grams  Dextrose 224 grams  Sodium 55 mEq/day  Potassium 40 mEq/day  Calcium 6 mEq/day  Magnesium 12 mEq/day  Phosphorus 0 mMol/day  Chloride: Acetate Ratio 1:1  Standard Multivitamins w/Vitamin K  Trace Elements  Thiamine 100 mg  Fat Emulsion: discontinued 1/9/25  Check hyperal lytes tomorrow.  Pharmacist will continue to follow the patient's lab results, clinical status and blood glucose results and make adjustments as appropriate.    Thank you for the consult.  Dangelo Rivers RPH  1/9/2025 10:28 AM

## 2025-01-10 ENCOUNTER — APPOINTMENT (OUTPATIENT)
Dept: PHYSICAL THERAPY | Facility: HOSPITAL | Age: 45
End: 2025-01-10
Payer: COMMERCIAL

## 2025-01-10 ENCOUNTER — APPOINTMENT (OUTPATIENT)
Dept: OCCUPATIONAL THERAPY | Facility: HOSPITAL | Age: 45
End: 2025-01-10
Payer: COMMERCIAL

## 2025-01-10 LAB
ANION GAP SERPL CALCULATED.3IONS-SCNC: 7 MMOL/L (ref 7–15)
BUN SERPL-MCNC: 18 MG/DL (ref 6–20)
CALCIUM SERPL-MCNC: 8.9 MG/DL (ref 8.8–10.4)
CHLORIDE SERPL-SCNC: 105 MMOL/L (ref 98–107)
CREAT SERPL-MCNC: 0.46 MG/DL (ref 0.51–0.95)
EGFRCR SERPLBLD CKD-EPI 2021: >90 ML/MIN/1.73M2
ERYTHROCYTE [DISTWIDTH] IN BLOOD BY AUTOMATED COUNT: 14.6 % (ref 10–15)
GLUCOSE BLDC GLUCOMTR-MCNC: 115 MG/DL (ref 70–99)
GLUCOSE BLDC GLUCOMTR-MCNC: 148 MG/DL (ref 70–99)
GLUCOSE BLDC GLUCOMTR-MCNC: 159 MG/DL (ref 70–99)
GLUCOSE BLDC GLUCOMTR-MCNC: 96 MG/DL (ref 70–99)
GLUCOSE SERPL-MCNC: 145 MG/DL (ref 70–99)
HCO3 SERPL-SCNC: 24 MMOL/L (ref 22–29)
HCT VFR BLD AUTO: 22.7 % (ref 35–47)
HGB BLD-MCNC: 7 G/DL (ref 11.7–15.7)
MAGNESIUM SERPL-MCNC: 2 MG/DL (ref 1.7–2.3)
MCH RBC QN AUTO: 28.9 PG (ref 26.5–33)
MCHC RBC AUTO-ENTMCNC: 30.8 G/DL (ref 31.5–36.5)
MCV RBC AUTO: 94 FL (ref 78–100)
PHOSPHATE SERPL-MCNC: 3.7 MG/DL (ref 2.5–4.5)
PLATELET # BLD AUTO: 456 10E3/UL (ref 150–450)
POTASSIUM SERPL-SCNC: 4.2 MMOL/L (ref 3.4–5.3)
RBC # BLD AUTO: 2.42 10E6/UL (ref 3.8–5.2)
SODIUM SERPL-SCNC: 136 MMOL/L (ref 135–145)
WBC # BLD AUTO: 10.4 10E3/UL (ref 4–11)

## 2025-01-10 PROCEDURE — 85027 COMPLETE CBC AUTOMATED: CPT

## 2025-01-10 PROCEDURE — 250N000011 HC RX IP 250 OP 636: Performed by: COLON & RECTAL SURGERY

## 2025-01-10 PROCEDURE — 84100 ASSAY OF PHOSPHORUS: CPT

## 2025-01-10 PROCEDURE — 250N000011 HC RX IP 250 OP 636

## 2025-01-10 PROCEDURE — 250N000011 HC RX IP 250 OP 636: Performed by: STUDENT IN AN ORGANIZED HEALTH CARE EDUCATION/TRAINING PROGRAM

## 2025-01-10 PROCEDURE — 97535 SELF CARE MNGMENT TRAINING: CPT | Mod: GO

## 2025-01-10 PROCEDURE — 80048 BASIC METABOLIC PNL TOTAL CA: CPT

## 2025-01-10 PROCEDURE — 120N000001 HC R&B MED SURG/OB

## 2025-01-10 PROCEDURE — 258N000003 HC RX IP 258 OP 636

## 2025-01-10 PROCEDURE — 82374 ASSAY BLOOD CARBON DIOXIDE: CPT

## 2025-01-10 PROCEDURE — 250N000009 HC RX 250: Performed by: FAMILY MEDICINE

## 2025-01-10 PROCEDURE — 83735 ASSAY OF MAGNESIUM: CPT

## 2025-01-10 PROCEDURE — 97116 GAIT TRAINING THERAPY: CPT | Mod: GP

## 2025-01-10 RX ADMIN — ACETAMINOPHEN 1000 MG: 10 INJECTION, SOLUTION INTRAVENOUS at 19:10

## 2025-01-10 RX ADMIN — INSULIN ASPART 1 UNITS: 100 INJECTION, SOLUTION INTRAVENOUS; SUBCUTANEOUS at 21:59

## 2025-01-10 RX ADMIN — PIPERACILLIN AND TAZOBACTAM 3.38 G: 3; .375 INJECTION, POWDER, FOR SOLUTION INTRAVENOUS at 19:58

## 2025-01-10 RX ADMIN — ACETAMINOPHEN 1000 MG: 10 INJECTION, SOLUTION INTRAVENOUS at 11:26

## 2025-01-10 RX ADMIN — MAGNESIUM SULFATE HEPTAHYDRATE: 500 INJECTION, SOLUTION INTRAMUSCULAR; INTRAVENOUS at 19:58

## 2025-01-10 RX ADMIN — SODIUM CHLORIDE: 9 INJECTION, SOLUTION INTRAVENOUS at 23:44

## 2025-01-10 RX ADMIN — ONDANSETRON 4 MG: 2 INJECTION INTRAMUSCULAR; INTRAVENOUS at 03:34

## 2025-01-10 RX ADMIN — INSULIN ASPART 1 UNITS: 100 INJECTION, SOLUTION INTRAVENOUS; SUBCUTANEOUS at 08:07

## 2025-01-10 RX ADMIN — ENOXAPARIN SODIUM 40 MG: 40 INJECTION SUBCUTANEOUS at 08:07

## 2025-01-10 RX ADMIN — PIPERACILLIN AND TAZOBACTAM 3.38 G: 3; .375 INJECTION, POWDER, FOR SOLUTION INTRAVENOUS at 03:15

## 2025-01-10 RX ADMIN — PIPERACILLIN AND TAZOBACTAM 3.38 G: 3; .375 INJECTION, POWDER, FOR SOLUTION INTRAVENOUS at 12:09

## 2025-01-10 RX ADMIN — ACETAMINOPHEN 1000 MG: 10 INJECTION, SOLUTION INTRAVENOUS at 03:15

## 2025-01-10 ASSESSMENT — ACTIVITIES OF DAILY LIVING (ADL)
ADLS_ACUITY_SCORE: 41
ADLS_ACUITY_SCORE: 45
ADLS_ACUITY_SCORE: 41
ADLS_ACUITY_SCORE: 45
ADLS_ACUITY_SCORE: 41
ADLS_ACUITY_SCORE: 45
ADLS_ACUITY_SCORE: 45
ADLS_ACUITY_SCORE: 41
ADLS_ACUITY_SCORE: 45
ADLS_ACUITY_SCORE: 45
ADLS_ACUITY_SCORE: 41
ADLS_ACUITY_SCORE: 45
ADLS_ACUITY_SCORE: 45
ADLS_ACUITY_SCORE: 41
ADLS_ACUITY_SCORE: 41

## 2025-01-10 NOTE — PLAN OF CARE
Goal Outcome Evaluation:  Patient is alert and oriented.   Still having quite a bit of surgical pain in her abdomen. Dilaudid PCA demand only. Very gaurded with assessments but rates pain 4. Getting scheduled IV tylenol.  Was full liquids but now advanced to low fiber diet.  Active BS with liquid stool out ileostomy.   DOUGLAS drain with serosang output.  Dressings all changed today to surgical incision, old colostomy and DOUGLAS sites. Both distal midline incision and old colostomy sites are open and draining.Abdominal binder in place.  On IV Zosyn.  Up with SBA. Sat in chair most of the day and ambulated in the [place] with therapy.

## 2025-01-10 NOTE — PLAN OF CARE
Problem: Pain Acute  Goal: Optimal Pain Control and Function  Outcome: Progressing  Intervention: Develop Pain Management Plan  Recent Flowsheet Documentation  Taken 1/9/2025 1729 by Aspen Stubbs, RN  Pain Management Interventions: medication (see MAR)  Intervention: Prevent or Manage Pain  Recent Flowsheet Documentation  Taken 1/9/2025 1737 by Aspen Stubbs, RN  Medication Review/Management: medications reviewed     Problem: Surgery Nonspecified  Goal: Effective Bowel Elimination  Outcome: Progressing     Problem: Surgery Nonspecified  Goal: Fluid and Electrolyte Balance  Outcome: Progressing  Intervention: Monitor and Manage Fluid and Electrolyte Balance  Recent Flowsheet Documentation  Taken 1/9/2025 1737 by Aspen Stubbs, RN  Fluid/Electrolyte Management: fluids provided     Pt alert and oriented. VSS. DOUGLAS/ostomy in place, patent, draining. Loose/watery stool. PICC in place, running PCA/NS and TPN at this time. C/o pain in abdominal area. Midline incision CDI. DOUGLAS dressing CDI. Abdominal binder in place. BG 96 and 143, insulin per orders. Tolerating full liquid diet well.

## 2025-01-10 NOTE — PROGRESS NOTES
Care Management Follow Up    Length of Stay (days): 15    Expected Discharge Date: 01/11/2025    Anticipated Discharge Plan:  Home, Home Care    Transportation: Anticipate Family/friend    PT Recommendations: home with assist, home with home care physical therapy, Collaborated with PT for updated discharge location  OT Recommendations:  home with assist     Barriers to Discharge: medical stability    Prior Living Situation: apartment with spouse, child(sisi), adult, child(sisi), dependent    Discussed  Partnership in Safe Discharge Planning  document with patient/family: No     Handoff Completed: No, handoff not indicated or clinically appropriate    Patient/Spokesperson Updated: No    Additional Information:  Chart reviewed. Patient not ready for discharge. Currently on TPN. Has been accepted by [facility] for RN PT OT when medically ready     Next Steps: continue to follow     Kirstin Richardson RN

## 2025-01-10 NOTE — PROGRESS NOTES
Nutrition Therapy  Parenteral Nutrition follow-up     Dietitian to Pharmacy    Rate of TPN: Custom Cyclic TPN at 65 ml/hr x 1 hr, 131 ml/hr x 10 hrs, 65 ml/hr x 1 hr and off   Grams Dextrose: 224   Grams Protein: 70     Lipids: None     Future/Additional Recommendations:  Monitor TPN, diet adv/po/supplement, weight, labs, I/Os.      SUBJECTIVE INFORMATION  Assessed patient in room.  Utilized     CURRENT NUTRITION ORDERS  Diet: Full Liquid  Food Allergy: Gluten, Soy   Nutrition Supplement/Snacks: Ensure Clear TID, Vegetable broth TID    Nutrition Support: Custom Cyclic TPN at 65 ml/hr x 1 hr, 131 ml/hr x 10 hrs, 65 ml/hr x 1 hr and off  Containing 224 g Dex, 70 g AA + no lipids     Provides:  Provides: 1440 ml, 1042 kcal, 70 g protein, 224 g cho, 0 g fat   Meets 88% estimated kcal needs and 100% estimated protein needs     CURRENT INTAKE/TOLERANCE  Pt taking x2 ensure clear + broth + food from home (porridge) 1/9   Pt notes poor appetite continues but interested in trying more po today   Pt consuming an estimated 500+ kcal and 16+ g protein, meets <50% estimated nutrition needs     NEW FINDINGS  Pt reports nausea controlled, abd pain/discomfort improving. Pt interested in continuing nutrition supplements ensure clear TID. Diet advanced to low fiber per CRS.     Weight: Current wt stable this admit but trending down   Date/Time Weight Weight Method   01/09/25 1026 44.7 kg (98 lb 9 oz)  Standing Scale   01/08/25 1416 45 kg (99 lb 3.3 oz) Bed scale   01/04/25 1700 45.1 kg (99 lb 8 oz) Standing scale   01/02/25 1143 45.9 kg (101 lb 3.1 oz) --   01/01/25 1237 46.4 kg (102 lb 4.7 oz) Bed scale   12/29/24 1933 46.3 kg (102 lb 1.6 oz) Standing scale   12/29/24 1124 46.3 kg (102 lb) Standing scale   12/28/24 1943 46 kg (101 lb 8 oz) Standing scale   12/26/24 0908 47.2 kg (104 lb) --   Incision/surgical site  Ileostomy, OP: 350 ml 1/9   LLQ Drain, OP: 5 ml 1/9, 0 ml this AM  Nutrition-relevant labs: Creat 0.46(L),  BG  last 24 hrs   Nutrition-relevant medications: Continuous dilaudid, cylic TPN, IVF NaCl 10 ml/hr   Scheduled novolog, zosyn     Dosing Weight: 47.2 kg     ASSESSED NUTRITION NEEDS  Estimated Energy Needs: 1981-9380 kcals/day (25 -30 kcals/kg )  Justification: Maintenance  Estimated Protein Needs: 56-70 grams protein/day (1.2 - 1.5 grams of pro/kg)  Justification: Increased needs  Estimated Fluid Needs: 9790-3737 mL/day (30 - 35 mL/kg)   Justification: Increased needs    MALNUTRITION  Malnutrition Diagnosis: Patient does not meet two of the established criteria necessary for diagnosing malnutrition but is at risk for malnutrition     EVALUATION OF THE PROGRESS TOWARD GOALS     NUTRITION DIAGNOSIS  Inadequate oral intake related to altered GI function as evidenced by need for nutrition support   Evaluation: Progressing, pt taking full liquids      INTERVENTIONS  Medical food supplement therapy - Continue Ensure clear TID, Vegetable broth TID     Parenteral Nutrition/IV Fluids - Continue Current TPN Regimen     Goals  Tolerate cyclic TPN - Met   Meet nutrition needs - met via TPN + po intake   Maintain weight - Met  Diet advancement - Progressing    BG <180 - Met      Monitoring/Evaluation      Progress toward goals will be monitored and evaluated per policy.

## 2025-01-10 NOTE — PHARMACY-CONSULT NOTE
"Pharmacy Note: Parenteral Nutrition (PN) Management    Pharmacist consulted to dose PN for Dain Tejeda, a 44 year old female.     Subjective:     The patient is a new PN start.     The patient was started on PN in the hospital on 12/27/2024.     Indication for PN therapy: bowel obstruction     Inadequate nutrition anticipated for > 7 days.      Enteral nutrition contraindicated due to:  bowel obstruction     Social History     Tobacco Use    Smoking status: Never     Passive exposure: Never    Smokeless tobacco: Never   Substance Use Topics    Alcohol use: No    Drug use: No     Objective:    Ht Readings from Last 1 Encounters:   12/11/24 1.499 m (4' 11\")     Wt Readings from Last 1 Encounters:   01/09/25 44.7 kg (98 lb 9 oz)       Body mass index is 19.91 kg/m .    Patient Vitals for the past 96 hrs:   Weight   01/09/25 1026 44.7 kg (98 lb 9 oz)   01/08/25 1416 45 kg (99 lb 3.3 oz)       Labs:  Last 3 days:  Recent Labs     01/08/25  0614 01/09/25  0605 01/10/25  0624    138 136   POTASSIUM 4.0 4.0 4.2   CHLORIDE 101 103 105   CO2 27 26 24   BUN 16.5 17.4 18.0   CR 0.42* 0.48* 0.46*   ALEX 9.4 9.2 8.9   MAG 2.1  --  2.0   PHOS 3.6  --  3.7   HGB 7.8* 7.2* 7.0*   HCT 25.0* 23.1* 22.7*   * 487* 456*       Glucose (past 48 hours):   Recent Labs     01/08/25  1309 01/08/25  1528 01/08/25  2129 01/09/25  0605 01/09/25  0803 01/09/25  1725 01/09/25  2114 01/10/25  0624 01/10/25  0757   * 107* 190* 129* 145* 96 143* 145* 159*       Intake/Output (last 24 hours): I/O last 3 completed shifts:  In: 2926.25 [P.O.:1030; I.V.:460.83]  Out: 2005 [Urine:1750; Drains:5; Stool:250]    Estimated CrCl: Estimated Creatinine Clearance: 112.4 mL/min (A) (based on SCr of 0.46 mg/dL (L)).    Assessment:    Continue patient on PN therapy as a cyclic central therapy.     Given the patient's current condition/oral intake, PN is still indicated.    Lab results reviewed: No changes to today's formula    Plan:  Rate of PN: 65 " mL/hr x 1 hour, them 131 ml/hr x 10 hours, then 65 ml/hr x 1 hour.   Formula:   Amino Acids 70 grams  Dextrose 224 grams  Sodium 55 mEq/day  Potassium 40 mEq/day  Calcium 6 mEq/day  Magnesium 12 mEq/day  Phosphorus 0 mMol/day  Chloride: Acetate Ratio 1:1  Standard Multivitamins w/Vitamin K  Trace Elements  Thiamine 100 mg  Fat Emulsion: discontinued 1/9/25  Check hyperal lytes tomorrow.  Pharmacist will continue to follow the patient's lab results, clinical status and blood glucose results and make adjustments as appropriate.    Thank you for the consult.  Contreras Howard RPH  1/10/2025 10:52 AM

## 2025-01-10 NOTE — PLAN OF CARE
Problem: Intestinal Obstruction  Goal: Optimal Bowel Function  Intervention: Promote Bowel Function  Recent Flowsheet Documentation  Taken 1/10/2025 0024 by Jaelyn Cervantes, RN  Body Position: position changed independently  Positioning/Transfer Devices:   pillows   in use     Problem: Surgery Nonspecified  Goal: Optimal Pain Control and Function  Outcome: Progressing     Problem: Surgery Nonspecified  Goal: Nausea and Vomiting Relief  Outcome: Progressing    Patient alert and oriented. Hmong speaking. VSS. Ongoing pain. PCA pump continuous. IV tylenol given. IV antibiotics given. PRN zofran given for nausea. Patient using bedpan-refusing to get out of bed. Increased pain with movement and ambulation. Cyclic TPN infusing. DOUGLAS Drain and ileostomy. Patient expresses no further concerns at this time. Call light within reach.      Jaelyn Cervantes, SHARON  [phone]

## 2025-01-10 NOTE — PROGRESS NOTES
[facility]  Daily Progress Note    Subjective  Telephone Hmong  used. Doing okay this morning. She continues to struggle with pain and discomfort. Full liquid diet, with 1 L PO intake. She denies feeling nauseated this morning. She was able to walk yesterday in room. Ileostomy with 150 mL recorded. Adequate urine output. AVSS. She would like to go home next week. Discussed goals that need to be met prior to discharge from our standpoint. This includes pain controlled with oral pain medications, treatment of infection, and tolerating diet.    Objective  Intake/Output last 24 hrs:    Intake/Output Summary (Last 24 hours) at 1/7/2025 1040  Last data filed at 1/7/2025 0941  Gross per 24 hour   Intake 1416.8 ml   Output 2135 ml   Net -718.2 ml     Temp:  [98  F (36.7  C)-98.2  F (36.8  C)] 98.2  F (36.8  C)  Pulse:  [77-96] 80  Resp:  [20] 20  BP: ()/(56-71) 99/62  SpO2:  [96 %-100 %] 99 %    Physical Exam:  General: awake, alert, lying in bed, in no acute distress  Head: normocephalic, atraumatic  Respiratory: non-labored breathing  Abdomen: soft, appropriately tender, non-distended              Incisions: clean, some purulence around inferior aspect of midline incision. Dressing changed.               Drains: DOUGLAS with 5 cc serosanguinous in past 24 hrs, scant serosang output  Skin: No rashes or lesions  Musculoskeletal: moves all four extremities equally  Psychological: alert and oriented, answers questions appropriately    Pertinent Labs  Lab Results: personally reviewed.  Lab Results   Component Value Date     01/07/2025     01/06/2025     01/05/2025    .0 04/24/2017    CO2 26 01/07/2025    CO2 25 01/06/2025    CO2 24 01/05/2025    CO2 25 06/23/2022    CO2 23 06/03/2022    CO2 22 06/02/2022    CO2 26.0 04/24/2017    BUN 17.9 01/07/2025    BUN 13.3 01/06/2025    BUN 15.8 01/05/2025    BUN 6 06/23/2022    BUN 3 06/03/2022    BUN 4 06/02/2022    BUN 7.0 04/24/2017      Lab Results   Component Value Date    WBC 16.9 01/07/2025    WBC 12.3 01/06/2025    WBC 14.6 01/05/2025    HGB 8.2 01/07/2025    HGB 7.6 01/06/2025    HGB 7.5 01/05/2025    HGB 10.8 01/10/2018    HGB 10.3 11/08/2017    HGB 9.8 09/18/2017    HCT 26.1 01/07/2025    HCT 23.9 01/06/2025    HCT 24.2 01/05/2025    HCT 35.9 01/10/2018    HCT 33.8 11/08/2017    HCT 31.4 07/31/2017    MCV 93 01/07/2025    MCV 93 01/06/2025    MCV 93 01/05/2025    MCV 83.1 01/10/2018    MCV 77.9 11/08/2017    MCV 86.3 07/31/2017     01/07/2025     01/06/2025     01/05/2025       Assessment/Plan: This is a 44 year old female POD #11 s/p TAC and end ileostomy for Crohn's disease     WBC 10.4 (12.5)  Hgb 7.0 (7.2)     - Transition to Low fiber diet  - Continue Zosyn   - TPN until tolerating 80% of calories PO  - Continue PCA for pain. Will plan to transition off once tolerating low fiber diet. NO NSAIDs with Crohn's disease  - Strict I&O, monitor stoma output  - Lovenox DVT ppx  - Blood sugar checks and sliding scale insulin QAC & HS  - OOB/Ambulate, PT/OT consults  - WOCN cares      Discussed with Dr. Virgilio Sandra, PA-C  [facility]  [phone]..............................main

## 2025-01-11 LAB
ANION GAP SERPL CALCULATED.3IONS-SCNC: 8 MMOL/L (ref 7–15)
BUN SERPL-MCNC: 18.7 MG/DL (ref 6–20)
CALCIUM SERPL-MCNC: 9.3 MG/DL (ref 8.8–10.4)
CHLORIDE SERPL-SCNC: 104 MMOL/L (ref 98–107)
CREAT SERPL-MCNC: 0.46 MG/DL (ref 0.51–0.95)
EGFRCR SERPLBLD CKD-EPI 2021: >90 ML/MIN/1.73M2
ERYTHROCYTE [DISTWIDTH] IN BLOOD BY AUTOMATED COUNT: 14.6 % (ref 10–15)
GLUCOSE BLDC GLUCOMTR-MCNC: 128 MG/DL (ref 70–99)
GLUCOSE BLDC GLUCOMTR-MCNC: 133 MG/DL (ref 70–99)
GLUCOSE BLDC GLUCOMTR-MCNC: 134 MG/DL (ref 70–99)
GLUCOSE BLDC GLUCOMTR-MCNC: 136 MG/DL (ref 70–99)
GLUCOSE BLDC GLUCOMTR-MCNC: 150 MG/DL (ref 70–99)
GLUCOSE SERPL-MCNC: 87 MG/DL (ref 70–99)
HCO3 SERPL-SCNC: 25 MMOL/L (ref 22–29)
HCT VFR BLD AUTO: 22.7 % (ref 35–47)
HGB BLD-MCNC: 7 G/DL (ref 11.7–15.7)
MCH RBC QN AUTO: 28.7 PG (ref 26.5–33)
MCHC RBC AUTO-ENTMCNC: 30.8 G/DL (ref 31.5–36.5)
MCV RBC AUTO: 93 FL (ref 78–100)
PLATELET # BLD AUTO: 522 10E3/UL (ref 150–450)
POTASSIUM SERPL-SCNC: 4.5 MMOL/L (ref 3.4–5.3)
RBC # BLD AUTO: 2.44 10E6/UL (ref 3.8–5.2)
SODIUM SERPL-SCNC: 137 MMOL/L (ref 135–145)
WBC # BLD AUTO: 7.8 10E3/UL (ref 4–11)

## 2025-01-11 PROCEDURE — 250N000011 HC RX IP 250 OP 636: Performed by: STUDENT IN AN ORGANIZED HEALTH CARE EDUCATION/TRAINING PROGRAM

## 2025-01-11 PROCEDURE — 85041 AUTOMATED RBC COUNT: CPT

## 2025-01-11 PROCEDURE — 82565 ASSAY OF CREATININE: CPT

## 2025-01-11 PROCEDURE — 85018 HEMOGLOBIN: CPT

## 2025-01-11 PROCEDURE — 250N000011 HC RX IP 250 OP 636: Performed by: COLON & RECTAL SURGERY

## 2025-01-11 PROCEDURE — 80048 BASIC METABOLIC PNL TOTAL CA: CPT

## 2025-01-11 PROCEDURE — 99254 IP/OBS CNSLTJ NEW/EST MOD 60: CPT

## 2025-01-11 PROCEDURE — 120N000001 HC R&B MED SURG/OB

## 2025-01-11 PROCEDURE — 250N000009 HC RX 250: Performed by: FAMILY MEDICINE

## 2025-01-11 PROCEDURE — 250N000011 HC RX IP 250 OP 636: Mod: JZ

## 2025-01-11 PROCEDURE — 250N000013 HC RX MED GY IP 250 OP 250 PS 637: Performed by: COLON & RECTAL SURGERY

## 2025-01-11 RX ORDER — HYDROMORPHONE HCL IN WATER/PF 6 MG/30 ML
0.2 PATIENT CONTROLLED ANALGESIA SYRINGE INTRAVENOUS
Status: DISCONTINUED | OUTPATIENT
Start: 2025-01-11 | End: 2025-01-15 | Stop reason: HOSPADM

## 2025-01-11 RX ORDER — METRONIDAZOLE 500 MG/100ML
500 INJECTION, SOLUTION INTRAVENOUS EVERY 12 HOURS
Status: DISCONTINUED | OUTPATIENT
Start: 2025-01-11 | End: 2025-01-12

## 2025-01-11 RX ORDER — OXYCODONE HYDROCHLORIDE 5 MG/1
5-10 TABLET ORAL EVERY 4 HOURS PRN
Status: DISCONTINUED | OUTPATIENT
Start: 2025-01-11 | End: 2025-01-15 | Stop reason: HOSPADM

## 2025-01-11 RX ORDER — MEROPENEM 500 MG/1
500 INJECTION, POWDER, FOR SOLUTION INTRAVENOUS EVERY 6 HOURS
Status: DISCONTINUED | OUTPATIENT
Start: 2025-01-11 | End: 2025-01-14

## 2025-01-11 RX ADMIN — MEROPENEM 500 MG: 500 INJECTION, POWDER, FOR SOLUTION INTRAVENOUS at 23:36

## 2025-01-11 RX ADMIN — MEROPENEM 500 MG: 500 INJECTION, POWDER, FOR SOLUTION INTRAVENOUS at 18:11

## 2025-01-11 RX ADMIN — ACETAMINOPHEN 1000 MG: 10 INJECTION, SOLUTION INTRAVENOUS at 04:41

## 2025-01-11 RX ADMIN — OXYCODONE HYDROCHLORIDE 5 MG: 5 TABLET ORAL at 20:37

## 2025-01-11 RX ADMIN — HYDROMORPHONE HYDROCHLORIDE 0.2 MG: 0.2 INJECTION, SOLUTION INTRAMUSCULAR; INTRAVENOUS; SUBCUTANEOUS at 22:27

## 2025-01-11 RX ADMIN — INSULIN ASPART 1 UNITS: 100 INJECTION, SOLUTION INTRAVENOUS; SUBCUTANEOUS at 12:37

## 2025-01-11 RX ADMIN — ENOXAPARIN SODIUM 40 MG: 40 INJECTION SUBCUTANEOUS at 08:58

## 2025-01-11 RX ADMIN — MAGNESIUM SULFATE HEPTAHYDRATE: 500 INJECTION, SOLUTION INTRAMUSCULAR; INTRAVENOUS at 19:51

## 2025-01-11 RX ADMIN — PIPERACILLIN AND TAZOBACTAM 3.38 G: 3; .375 INJECTION, POWDER, FOR SOLUTION INTRAVENOUS at 05:09

## 2025-01-11 RX ADMIN — METRONIDAZOLE 500 MG: 500 INJECTION, SOLUTION INTRAVENOUS at 14:31

## 2025-01-11 RX ADMIN — ACETAMINOPHEN 1000 MG: 10 INJECTION, SOLUTION INTRAVENOUS at 12:26

## 2025-01-11 RX ADMIN — HYDROMORPHONE HYDROCHLORIDE 0.2 MG: 0.2 INJECTION, SOLUTION INTRAMUSCULAR; INTRAVENOUS; SUBCUTANEOUS at 18:07

## 2025-01-11 RX ADMIN — ACETAMINOPHEN 1000 MG: 10 INJECTION, SOLUTION INTRAVENOUS at 19:51

## 2025-01-11 RX ADMIN — MEROPENEM 500 MG: 500 INJECTION, POWDER, FOR SOLUTION INTRAVENOUS at 12:29

## 2025-01-11 ASSESSMENT — ACTIVITIES OF DAILY LIVING (ADL)
ADLS_ACUITY_SCORE: 45
ADLS_ACUITY_SCORE: 41
ADLS_ACUITY_SCORE: 45
ADLS_ACUITY_SCORE: 41
ADLS_ACUITY_SCORE: 45
ADLS_ACUITY_SCORE: 45
ADLS_ACUITY_SCORE: 41
ADLS_ACUITY_SCORE: 45
ADLS_ACUITY_SCORE: 41
ADLS_ACUITY_SCORE: 45
ADLS_ACUITY_SCORE: 45
ADLS_ACUITY_SCORE: 41
ADLS_ACUITY_SCORE: 41
ADLS_ACUITY_SCORE: 45
ADLS_ACUITY_SCORE: 41
ADLS_ACUITY_SCORE: 45

## 2025-01-11 NOTE — PHARMACY-CONSULT NOTE
"Pharmacy Note: Parenteral Nutrition (PN) Management    Pharmacist consulted to dose PN for Dain Tejeda, a 44 year old female.     Subjective:     The patient is a new PN start.     The patient was started on PN in the hospital on 12/27/2024.     Indication for PN therapy: bowel obstruction     Inadequate nutrition anticipated for > 7 days.      Enteral nutrition contraindicated due to:  bowel obstruction     Social History     Tobacco Use    Smoking status: Never     Passive exposure: Never    Smokeless tobacco: Never   Substance Use Topics    Alcohol use: No    Drug use: No     Objective:    Ht Readings from Last 1 Encounters:   12/11/24 1.499 m (4' 11\")     Wt Readings from Last 1 Encounters:   01/11/25 47 kg (103 lb 9.9 oz)       Body mass index is 20.93 kg/m .    Patient Vitals for the past 96 hrs:   Weight   01/11/25 0611 47 kg (103 lb 9.9 oz)   01/09/25 1026 44.7 kg (98 lb 9 oz)   01/08/25 1416 45 kg (99 lb 3.3 oz)       Labs:  Last 3 days:  Recent Labs     01/09/25  0605 01/10/25  0624 01/11/25  0457    136 137   POTASSIUM 4.0 4.2 4.5   CHLORIDE 103 105 104   CO2 26 24 25   BUN 17.4 18.0 18.7   CR 0.48* 0.46* 0.46*   ALEX 9.2 8.9 9.3   MAG  --  2.0  --    PHOS  --  3.7  --    HGB 7.2* 7.0* 7.0*   HCT 23.1* 22.7* 22.7*   * 456* 522*       Glucose (past 48 hours):   Recent Labs     01/09/25  1725 01/09/25  2114 01/10/25  0624 01/10/25  0757 01/10/25  1206 01/10/25  1711 01/10/25  2106 01/11/25  0208 01/11/25  0457 01/11/25  0730   GLC 96 143* 145* 159* 115* 96 148* 134* 87 128*       Intake/Output (last 24 hours): I/O last 3 completed shifts:  In: 1739 [P.O.:200; I.V.:252]  Out: 1075 [Urine:875; Stool:200]    Estimated CrCl: Estimated Creatinine Clearance: 118.2 mL/min (A) (based on SCr of 0.46 mg/dL (L)).    Assessment:    Continue patient on PN therapy as a cyclic central therapy.     Given the patient's current condition/oral intake, PN is still indicated.    Lab results reviewed: No changes to " today's formula     Plan:  Rate of PN: 65 mL/hr x 1 hour, them 131 ml/hr x 10 hours, then 65 ml/hr x 1 hour.   Formula:   Amino Acids 70 grams  Dextrose 224 grams  Sodium 55 mEq/day  Potassium 40 mEq/day  Calcium 6 mEq/day  Magnesium 12 mEq/day  Phosphorus 0 mMol/day  Chloride: Acetate Ratio 1:1  Standard Multivitamins w/Vitamin K  Trace Elements  Thiamine 100 mg  Fat Emulsion: discontinued 1/9/25  Check hyperal lytes tomorrow.  Pharmacist will continue to follow the patient's lab results, clinical status and blood glucose results and make adjustments as appropriate.    Thank you for the consult.  Contreras Howard [facility]  1/11/2025 10:08 AM

## 2025-01-11 NOTE — PLAN OF CARE
Problem: Pain Acute  Goal: Optimal Pain Control and Function  Outcome: Progressing  Intervention: Develop Pain Management Plan  Recent Flowsheet Documentation  Taken 1/10/2025 1805 by Nimo Moulton RN  Pain Management Interventions: medication (see MAR)  Intervention: Prevent or Manage Pain  Recent Flowsheet Documentation  Taken 1/10/2025 1805 by Nimo Moulton RN  Medication Review/Management: medications reviewed     Problem: Nausea and Vomiting  Goal: Nausea and Vomiting Relief  Outcome: Progressing  Intervention: Prevent and Manage Nausea and Vomiting  Recent Flowsheet Documentation  Taken 1/10/2025 1805 by Nimo Moulton RN  Fluid/Electrolyte Management: fluids provided     Problem: Surgery Nonspecified  Goal: Blood Glucose Level Within Targeted Range  Outcome: Progressing  Intervention: Optimize Glycemic Control  Recent Flowsheet Documentation  Taken 1/10/2025 1805 by Nimo Moulton RN  Hyperglycemia Management: blood glucose monitored  Hypoglycemia Management: blood glucose monitored  Goal: Effective Urinary Elimination  Outcome: Progressing   NURSING PROGRESS NOTE  Shift Summary      Date: January 10, 2025     Neuro/Musculoskeletal:  A&Ox4.    Respiratory:  Sating in the 90s on RA.  GI/:  Adequate urine output.  LBM: 1/10  Diet/Appetite:  Tolerating Low Fiber diet.  Activity:  Assist of 1   Pain:  6/10  Skin:  No new deficits noted.   LDAs + Drips/IVF:  Dilaudid 0.2 PCA, TPN @ 131 ml/hr    Pertinent Shift Updates:  Ileostomy pouch exchanged per pt request. Abdominal dressings changed. BG 96 and 148, coverage given. Continue to monitor.        Nimo Moulton RN  ....................................................

## 2025-01-11 NOTE — CONSULTS
Consultation - [facility]  Dain Tejeda,  1980, MRN [id number]    [facility]  Colostomy status (H) [Z93.3]  SBO (small bowel obstruction) (H) [K56.609]  Exacerbation of Crohn's disease with intestinal obstruction (H) [K50.912]  Pelvic cyst in female [N94.89]    PCP: Kevin Woodruff, [phone]   Code status:  Full Code       Extended Emergency Contact Information  Primary Emergency Contact: STEPHON DORAN  Address: [address] [place], [place] [id number] [place] [address]  Mobile Phone: [phone]  Relation: Spouse  Preferred language: Hmong   needed? Yes       Assessment and Plan   Active Problems:    SBO (small bowel obstruction) (H)    Impression: Status post drainage of polymicrobial fluid collection.  Presumably an abscess although cannot exclude a fistula should this reaccumulate.    Crohn's disease status post total colectomy and end ileostomy about 11 days ago.    Slowly advancing her diet.    Recommendations: Based on the antimicrobial susceptibilities, agree with changing Zosyn to meropenem.  Would also add metronidazole for better coverage of the Bacteroides fragilis.    Could consider oral antibiotics in a few days if she is continuing to improve.    Thank you for consulting [facility]    Bob Cunha MD  [phone]     Chief Complaint <principal problem not specified>       HPI   We have been requested by Dr. Alexa Poole to evaluate Dain Tejeda for possible abdominal abscess who is a 44 year old year old female.    Patient is a 44-year-old woman with complex past medical history as below.  She is seen with assistance of professional telephone .    Briefly, patient was admitted to [facility] back on  when she presented with abdominal pain nausea vomiting and diarrhea.  She already had a history of Crohn's disease.  Ultimately, she underwent exploratory  laparotomy takedown of colostomy total abdominal colectomy small bowel resection ileostomy on December 30.    On January 7, she was still having quite a bit of pain and a repeat CT was done.  This showed a small midline fluid collection.  This was aspirated on October 8.  Gram stain showed multiple morphotypes and cultures have come back polymicrobial as listed below.    However, I do note that the patient's white count and fever were improving prior to drainage of the fluid collection.    Her diet is slowly advancing she continues on a low fiber diet.  She has no complaints other than persistent pain.       Medical History  Patient Active Problem List   Diagnosis    Screening for cervical cancer- ASCUS, HPV+ 4/2017    Pain of back and right lower extremity    Abdominal pain, generalized    Abdominal pain    Inflammation of small intestine    Moderate recurrent major depression (H)    Hypokalemia    Colitis    Intra-abdominal abscess (H)    Celiac disease    Diarrhea    Chronic gastritis    Chronic diarrhea    Crohn's disease with other complication, unspecified gastrointestinal tract location (H)    S/P partial colectomy    Anemia, unspecified type    Atypical squamous cell changes of undetermined significance (ASCUS) on vaginal cytology with positive high risk human papilloma virus (HPV)    Syncope, unspecified syncope type    Spell of abnormal behavior    Sepsis, due to unspecified organism, unspecified whether acute organ dysfunction present (H)    Enterocolitis    Epigastric pain    Left ovarian cyst    Generalized abdominal pain    Crohn's disease of colon with abscess (H)    Ulcerative colitis (H)    SBO (small bowel obstruction) (H)     Past Medical History:   Diagnosis Date    Diet controlled gestational diabetes mellitus (GDM), antepartum 9/4/2017    Attempting diet control first    Gestational diabetes mellitus (GDM) in third trimester 9/4/2017    Attempting diet control first  Formatting of this note  might be different from the original. Overview:  Attempting diet control first    Nausea/vomiting in pregnancy 4/24/2017    Postpartum hemorrhage 11/16/2017    Third degree laceration of perineum during delivery, postpartum 11/15/2017    Surgical History  She  has a past surgical history that includes Colonoscopy (N/A, 10/31/2022); Laparotomy exploratory (N/A, 11/6/2022); Colostomy (N/A, 11/6/2022); Rectal Prolapse Repair (N/A, 11/6/2022); PICC/Midline Placement (5/10/2024); PICC/Midline Placement (10/22/2024); Laparotomy exploratory (N/A, 12/30/2024); Colectomy without colostomy (N/A, 12/30/2024); and Resect small bowel with ostomy (N/A, 12/30/2024).   Social History  Reviewed, and she  reports that she has never smoked. She has never been exposed to tobacco smoke. She has never used smokeless tobacco. She reports that she does not drink alcohol and does not use drugs.   Allergies  Allergies   Allergen Reactions    Gluten Meal      Celiac    Soy Allergy (Do Not Select) Anaphylaxis, Hives and Itching     Tofu- causes itching and sores in the mouth    Family History  Noncontributory to current problem except as mentioned above    Psychosocial Needs  Social History     Social History Narrative    [facility], [facility]. No education.      Additional psychosocial needs reviewed per nursing assessment.     Prior to Admission Medications   Medications Prior to Admission   Medication Sig Dispense Refill Last Dose/Taking    chlorpheniramine-pseudoePHEDrine (PSEUDO-GEST PLUS) 4-60 MG TABS per tablet Take 1 tablet by mouth every 6 hours as needed for allergies.   Taking As Needed    diphenhydrAMINE (BENADRYL) 25 MG tablet Take 25 mg by mouth every 6 hours as needed for itching or allergies.   Taking As Needed    diphenhydrAMINE-acetaminophen (TYLENOL PM)  MG tablet Take 1 tablet by mouth nightly as needed for sleep.   Taking As Needed    loperamide (IMODIUM A-D) 2 MG tablet Take 2 mg by mouth 4 times  daily as needed for diarrhea.   Taking As Needed    ondansetron (ZOFRAN ODT) 4 MG ODT tab Take 4 mg by mouth every 8 hours as needed for nausea.   12/25/2024 Morning          Review of Systems:  Pertinent items are noted in HPI., otherwise all others negative. Physical Exam:  Temp:  [97.1  F (36.2  C)-98.1  F (36.7  C)] 98  F (36.7  C)  Pulse:  [80-87] 84  Resp:  [16-18] 16  BP: ()/(53-65) 104/65  SpO2:  [98 %-100 %] 98 %    /65 (BP Location: Right arm)   Pulse 84   Temp 98  F (36.7  C) (Oral)   Resp 16   Wt 47 kg (103 lb 9.9 oz)   LMP 09/25/2024 (Approximate)   SpO2 98%   BMI 20.93 kg/m    General appearance: alert, appears stated age, and cooperative  Head: Normocephalic, without obvious abnormality, atraumatic  Eyes: Extraocular muscles intact, no icterus  Neck: no adenopathy and supple, symmetrical, trachea midline  Lungs: clear to auscultation bilaterally  Heart: Regular rate and rhythm, no murmur  Abdomen:  Guarding and discomfort to exam.  She has an ostomy on the right side with some liquid stool.  She has a drain on the left side with some looks like old blood.  She has a midline incision that is mostly clean dry and intact except for small area in the middle.  Extremities: Warm and dry  Skin: Skin color, texture, turgor normal. No rashes or lesions  Neurologic: Grossly normal       Pertinent Labs  Lab Results: personally reviewed.   WBC Count   Date/Time Value Ref Range Status   01/11/2025 04:57 AM 7.8 4.0 - 11.0 10e3/uL Final   01/10/2025 06:24 AM 10.4 4.0 - 11.0 10e3/uL Final   01/09/2025 06:05 AM 12.5 (H) 4.0 - 11.0 10e3/uL Final     Hemoglobin   Date/Time Value Ref Range Status   01/11/2025 04:57 AM 7.0 (L) 11.7 - 15.7 g/dL Final   01/10/2025 06:24 AM 7.0 (L) 11.7 - 15.7 g/dL Final   01/09/2025 06:05 AM 7.2 (L) 11.7 - 15.7 g/dL Final   01/10/2018 08:45 AM 10.8 (L) 11.7 - 15.7 g/dL Final   11/08/2017 10:52 AM 10.3 (L) 11.7 - 15.7 g/dL Final   09/18/2017 04:46 PM 9.8 (L) 11.7 - 15.7  g/dL Final     Hematocrit   Date/Time Value Ref Range Status   01/11/2025 04:57 AM 22.7 (L) 35.0 - 47.0 % Final   01/10/2025 06:24 AM 22.7 (L) 35.0 - 47.0 % Final   01/09/2025 06:05 AM 23.1 (L) 35.0 - 47.0 % Final   01/10/2018 08:45 AM 35.9 35.0 - 47.0 % Final   11/08/2017 10:52 AM 33.8 (L) 35.0 - 47.0 % Final   07/31/2017 11:27 AM 31.4 (L) 35.0 - 47.0 % Final     Platelet Count   Date/Time Value Ref Range Status   01/11/2025 04:57  (H) 150 - 450 10e3/uL Final   01/10/2025 06:24  (H) 150 - 450 10e3/uL Final   01/09/2025 06:05  (H) 150 - 450 10e3/uL Final        Sodium   Date/Time Value Ref Range Status   01/11/2025 04:57  135 - 145 mmol/L Final   01/10/2025 06:24  135 - 145 mmol/L Final   01/09/2025 06:05  135 - 145 mmol/L Final   04/24/2017 10:12 .0 133.0 - 144.0 mmol/L Final     Carbon Dioxide   Date/Time Value Ref Range Status   04/24/2017 10:12 AM 26.0 20.0 - 32.0 mmol/L Final     Carbon Dioxide (CO2)   Date/Time Value Ref Range Status   01/11/2025 04:57 AM 25 22 - 29 mmol/L Final   01/10/2025 06:24 AM 24 22 - 29 mmol/L Final   01/09/2025 06:05 AM 26 22 - 29 mmol/L Final   06/23/2022 10:19 AM 25 22 - 31 mmol/L Final   06/03/2022 04:43 AM 23 22 - 31 mmol/L Final   06/02/2022 04:42 AM 22 22 - 31 mmol/L Final     Urea Nitrogen   Date/Time Value Ref Range Status   01/11/2025 04:57 AM 18.7 6.0 - 20.0 mg/dL Final   01/10/2025 06:24 AM 18.0 6.0 - 20.0 mg/dL Final   01/09/2025 06:05 AM 17.4 6.0 - 20.0 mg/dL Final   06/23/2022 10:19 AM 6 (L) 8 - 22 mg/dL Final   06/03/2022 04:43 AM 3 (L) 8 - 22 mg/dL Final   06/02/2022 04:42 AM 4 (L) 8 - 22 mg/dL Final   04/24/2017 10:12 AM 7.0 5.0 - 24.0 mg/dL Final     Creatinine   Date Value Ref Range Status   01/11/2025 0.46 (L) 0.51 - 0.95 mg/dL Final   04/24/2017 0.6 0.6 - 1.3 mg/dL Final     7-Day Micro Results       Collected Updated Procedure Result Status      01/08/2025 1246 01/11/2025 1200 Abscess Aerobic Bacterial Culture Routine  With Gram Stain [[id number]]    (Abnormal)   Abscess from Abdomen    Preliminary result Component Value   Culture Culture in progress  [P]     4+ Enterobacter cloacae complex  [P]     4+ Escherichia coli  [P]     4+ Klebsiella pneumoniae  [P]     4+ Enterococcus avium  [P]     1+ Gram negative bacilli  [P]     Identification is preliminary, confirmation in progress    3+ Proteus vulgaris  [P]    Gram Stain Result 4+ Gram negative bacilli  [P]     2+ Gram positive cocci  [P]     1+ Gram positive bacilli  [P]     4+ WBC seen  [P]     Predominantly PMNs        Susceptibility        Enterobacter cloacae complex      KENISHA      Ampicillin  Resistant  [1]       Ampicillin/ Sulbactam  Resistant  [1]       Cefazolin  Resistant  [*,1]       Cefepime <=0.12 ug/mL Susceptible      Ceftazidime  Resistant      Ceftriaxone  Resistant      Ciprofloxacin <=0.06 ug/mL Susceptible      Ertapenem <=0.12 ug/mL Susceptible  [*]       Gentamicin <=1 ug/mL Susceptible      Levofloxacin <=0.12 ug/mL Susceptible      Meropenem <=0.25 ug/mL Susceptible      Nitrofurantoin 32 ug/mL Susceptible  [*]       Piperacillin/Tazobactam  Resistant      Trimethoprim/Sulfamethoxazole <=1/19 ug/mL Susceptible                   [*]  Suppressed Antibiotic     [1]  Intrinsically Resistant               Susceptibility        Escherichia coli      KENISHA      Ampicillin >=32 ug/mL Resistant      Ampicillin/ Sulbactam 16 ug/mL Intermediate      Cefazolin 4 ug/mL Intermediate  [*]       Cefepime <=0.12 ug/mL Susceptible      Ceftazidime <=0.5 ug/mL Susceptible      Ceftriaxone <=0.25 ug/mL Susceptible      Ciprofloxacin >=4 ug/mL Resistant      Ertapenem <=0.12 ug/mL Susceptible  [*]       Extended Spectrum Beta-Lactamase Negative ug/mL ESBL Negative  [*]       Gentamicin <=1 ug/mL Susceptible      Levofloxacin >=8 ug/mL Resistant      Meropenem <=0.25 ug/mL Susceptible      Nitrofurantoin <=16 ug/mL Susceptible  [*]       Piperacillin/Tazobactam <=4 ug/mL  Susceptible      Trimethoprim/Sulfamethoxazole <=1/19 ug/mL Susceptible                   [*]  Suppressed Antibiotic               Susceptibility        Klebsiella pneumoniae      KENISHA      Ampicillin  Resistant  [1]       Ampicillin/ Sulbactam >=32 ug/mL Resistant      Cefazolin 4 ug/mL Intermediate  [*]       Cefepime <=0.12 ug/mL Susceptible      Ceftazidime <=0.5 ug/mL Susceptible      Ceftriaxone <=0.25 ug/mL Susceptible      Ciprofloxacin <=0.06 ug/mL Susceptible      Ertapenem <=0.12 ug/mL Susceptible  [*]       Extended Spectrum Beta-Lactamase Negative ug/mL ESBL Negative  [*]       Gentamicin <=1 ug/mL Susceptible      Levofloxacin <=0.12 ug/mL Susceptible      Meropenem <=0.25 ug/mL Susceptible      Nitrofurantoin 64 ug/mL Intermediate  [*]       Piperacillin/Tazobactam <=4 ug/mL Susceptible      Trimethoprim/Sulfamethoxazole >16/304 ug/mL Resistant                   [*]  Suppressed Antibiotic     [1]  Intrinsically Resistant               Susceptibility Comments       Enterobacter cloacae complex    Enterobacter cloacae, Klebsiella aerogenes, and Citrobacter freundii have moderate to high levels of inducible AmpC ß-lactamase expression. The use of 3rd generation cephalosporins including ceftriaxone and ceftazidime, as well as   piperacillin-tazobactam, should be avoided for invasive infections, regardless of susceptibility results.               01/08/2025 1246 01/11/2025 0930 Anaerobic Bacterial Culture Routine [[id number]]   (Abnormal)   Abscess from Abdomen    Preliminary result Component Value   Culture Culture in progress  [P]     4+ Bacteroides fragilis  [P]     Susceptibilities not routinely done, refer to antibiogram to view typical susceptibility profiles    4+ Morganella morganii  [P]     Not isolated or reported on routine aerobic culture                     Susceptibility data from last 90 days.  Collected Specimen Info Organism Ampicillin Ampicillin/Sulbactam Cefepime Ceftazidime Ceftriaxone  Ciprofloxacin Gentamicin Levofloxacin Meropenem Piperacillin/Tazobactam Trimethoprim/Sulfamethoxazole    01/08/25 Abscess from Abdomen Enterobacter cloacae complex R R  S R R  S  S  S  S R  S     Escherichia coli  R  I  S  S  S  R  S  R  S  S  S     Klebsiella pneumoniae R  R  S  S  S  S  S  S  S  S  R     Enterococcus avium                Gram negative bacilli                Proteus vulgaris              01/08/25 Abscess from Abdomen Bacteroides fragilis                Morganella morganii                     Pertinent Radiology  Radiology Results: Personally reviewed. 1/8 CT    CT Abdomen Peritoneum Abscess Aspiration    Result Date: 1/8/2025  EXAM: 1. PERCUTANEOUS PUNCTURE AND ASPIRATION OF ABDOMINAL FLUID COLLECTION 2. CT GUIDANCE 3. CONSCIOUS SEDATION LOCATION: [facility] DATE: 1/8/2025 INDICATION: POD#9 after colectomy and end iliostomy with left mid abdominal gas containing fluid collection TECHNIQUE: Dose reduction techniques were used. PROCEDURE: Informed consent obtained. Site marked. Prior images reviewed. Required items made available. Patient identity confirmed verbally and with arm band. Patient reevaluated immediately before administering sedation. Universal protocol was followed. Time out performed. The site was prepped and draped in sterile fashion. 10 mL of 1% lidocaine was infused into the local soft tissues. Using standard technique and under direct CT guidance, a 5 Nauruan centesis catheter was inserted into the fluid collection.  SPECIMEN: 10 mL of thick sanguineous fluid was aspirated and sent to lab for cultures and Gram stain. BLOOD LOSS: Minimal. The patient tolerated the procedure well. No immediate complications. SEDATION: Versed 2 mg. Fentanyl 100 mcg. The procedure was performed with administration intravenous conscious sedation with appropriate preoperative, intraoperative, and postoperative evaluation. 15 minutes of supervised face to face conscious sedation  time was provided by a radiology nurse under my direct supervision.     IMPRESSION: Successful CT-guided puncture and aspiration of left mid abdominal fluid collection, which was too small for drain placement. Reference CPT Codes: 92661, 29871    CT Chest/Abdomen/Pelvis w Contrast    Result Date: 1/7/2025  EXAM: CT CHEST/ABDOMEN/PELVIS W CONTRAST LOCATION: [facility] DATE: 1/7/2025 INDICATION: Postoperative day #8 s/p total abdominal colectomy, end ileostomy, now with increasing WBC and ongoing worsening abdominal pain. COMPARISON: Contrast CT 4/30/2024. CT abdomen-pelvis 12/26/2024. Others. TECHNIQUE: CT scan of the chest, abdomen, and pelvis was performed following injection of IV contrast. Multiplanar reformats were obtained. Dose reduction techniques were used. CONTRAST: 49ml isovue 370 FINDINGS: LUNGS AND PLEURA: Mild basilar predominant atelectasis. No consolidation. No pleural effusion or pneumothorax. MEDIASTINUM/AXILLAE: No thoracic adenopathy. Normal heart size. No pericardial effusion. Left PICC tip at the cavoatrial junction. CORONARY ARTERY CALCIFICATION: Motion artifact. HEPATOBILIARY: Normal. PANCREAS: Normal. SPLEEN: Normal. ADRENAL GLANDS: Normal. KIDNEYS/BLADDER: Minimal bladder air. Negative kidneys. No hydronephrosis. BOWEL: Postoperative changes of colectomy with interval right lower quadrant ileostomy (and removal of prior left lower quadrant colostomy given colectomy status). No bowel dilatation. No substantial bowel wall thickening, allowing for areas of decompression. Minimal free fluid. Ill-defined fluid along the left of midline ventral pelvis measuring roughly 2.2 x 6.6 cm (series 3, image 209). Minimal free air. Pelvic drain in place. LYMPH NODES: No abdominal or pelvic adenopathy. VASCULATURE: Nonaneurysmal aorta. Patent celiac, SMA, LINDA and renal arteries. Patent draining mesenteric and portal veins. PELVIC ORGANS: No significant change of posterior left of  midline pelvic elongated cystic structure measuring roughly 4.3 x 6.5 cm (series 3, image 243). MUSCULOSKELETAL: Midline abdominal-pelvic postsurgical incision with minimal adjacent fluid and air (no formed collection), as well as stranding. Skin staples present.     IMPRESSION: 1.  Ill-defined small fluid collection with internal air along the left of midline pelvis. Early abscess is possible. This is not defined or thick-walled and may not be amenable to percutaneous drain placement at this time. 2.  The above mentioned ill-defined fluid is adjacent to a surgical anastomosis and cannot definitively exclude bowel leak, though not well evaluated on this exam. 3.  Minimal free fluid. Minimal free intraperitoneal air, presumed postprocedural. 4.  Post colectomy changes. Right lower quadrant ileostomy. No bowel obstruction. 5.  Minimal urinary bladder air may reflect recent instrumentation. 6.  No acute findings in the chest.     XR Abdomen Port 1 View    Result Date: 12/31/2024  EXAM: XR ABDOMEN PORT 1 VIEW LOCATION: [facility] DATE: 12/31/2024 INDICATION: Abdominal pain; postoperative day 1 from laparoscopic cholecystectomy. COMPARISON: None available.     IMPRESSION: Enteric suction tube tip overlies the region of the gastric body. Midline surgical staples are present. Surgical drainage catheter terminates near the right paracolic gutter. Nonspecific bowel gas pattern, due to a paucity of bowel gas.    XR Chest Port 1 View    Result Date: 12/31/2024  EXAM: XR CHEST PORT 1 VIEW LOCATION: [facility] DATE: 12/31/2024 INDICATION: Chest pain. COMPARISON: CT pulmonary angiogram 4/30/2024.     IMPRESSION: Interval placement of left PICC, tip at the cavoatrial junction, satisfactory positioning. Low lung volumes. Both lungs remain clear. No adenopathy or effusion. Normal cardiac size and pulmonary vascularity. Enteric tube has been placed, tip and side-port in the stomach.  "Anatomic alignment of the bones and joints.    POC US Guidance Needle Placement    Result Date: 12/30/2024  Ultrasound was performed as guidance to an anesthesia procedure.  Click \"PACS images\" hyperlink below to view any stored images.  For specific procedure details, view procedure note authored by anesthesia.    CT Abdomen Pelvis w Contrast    Result Date: 12/26/2024  EXAM: CT ABDOMEN PELVIS W CONTRAST LOCATION: [facility] DATE: 12/26/2024 INDICATION: abdominal pain and vomiting.  Recent colostomy.  History of Crohn's. COMPARISON: CT exams 11/13/2024, 10/19/2024 and 7/11/2022 TECHNIQUE: CT scan of the abdomen and pelvis was performed following injection of IV contrast. Multiplanar reformats were obtained. Dose reduction techniques were used. CONTRAST: 51ml isovue 370 FINDINGS: LOWER CHEST: Normal. HEPATOBILIARY: Normal. PANCREAS: Normal. SPLEEN: Normal. ADRENAL GLANDS: Normal. KIDNEYS/BLADDER: Normal. BOWEL: Left lower quadrant colostomy. Persistent moderate to severe inflammatory changes of the ascending and transverse colon. There are also moderate to severe inflammatory changes of the ileum. Moderate distention of the distal ileum with fecalization  of the enteric contents. Mildly distended cecum and ascending colon. There is moderate segment luminal narrowing of the transverse colon. No free air fluid. LYMPH NODES: Prominent likely reactive central mesenteric lymph nodes. VASCULATURE: Normal. PELVIC ORGANS: Stable 6.5 cm left posterior pelvic cystic lesion. Newly visualized 1.7 cm right ovarian follicle. No pelvic free fluid. MUSCULOSKELETAL: Stable right iliac bone island.     IMPRESSION: 1.  Moderate inflammatory changes of the ileum, ascending and transverse colon with associated luminal narrowing of the transverse colon. These findings are consistent with stricturing Crohn's disease. Associated mild to moderate upstream colonic and distal ileal distention consistent with a bowel " obstruction. 2.  No evidence for penetrating disease. 3.  Stable likely benign 6.5 cm pelvic cyst. Recommend attention on follow-up.

## 2025-01-11 NOTE — PROGRESS NOTES
Colon and Rectal Surgery  Daily Progress Note    Subjective  No acute events overnight. Still struggling with some pain. On LFD, taking in some. Denies n/v today.    Objective  Intake/Output last 24 hrs:    Intake/Output Summary (Last 24 hours) at 1/7/2025 1040  Last data filed at 1/7/2025 0941  Gross per 24 hour   Intake 1416.8 ml   Output 2135 ml   Net -718.2 ml     Temp:  [97.1  F (36.2  C)-98.1  F (36.7  C)] 98  F (36.7  C)  Pulse:  [80-87] 84  Resp:  [16-18] 16  BP: ()/(53-65) 104/65  SpO2:  [98 %-100 %] 98 %    Physical Exam:  A&Ox3, NAD  Resp unlabored  Abd soft, non distended, very sensitive but appropriately tender.  Incision open at inferior aspect, still some purulent drainage, repacked  DOUGLAS drain with scant old blood  Ileostomy pink with bilious output    Pertinent Labs  Lab Results: personally reviewed.  Culture sensitivities reviewed      Assessment/Plan: This is a 44 year old female POD #12 s/p TAC and end ileostomy for Crohn's disease     Cont LFD  Will transition to meropenem given sensitivities. ID consult given multi organisms and resistance to usual abx  Cont lovenox  Transition to oral pain meds now on LFD  Cont calorie count and can wean TPN as able  Cont dressing change BID and as needed  Can remove DOUGLAS drain given no output.  Ambulation      Alexa Poole MD  [facility]  [phone]..............................main

## 2025-01-11 NOTE — PLAN OF CARE
Problem: Nausea and Vomiting  Goal: Nausea and Vomiting Relief  Outcome: Progressing     Problem: Surgery Nonspecified  Goal: Effective Bowel Elimination  Outcome: Progressing  Goal: Optimal Pain Control and Function  Outcome: Progressing  Intervention: Prevent or Manage Pain  Recent Flowsheet Documentation  Taken 1/11/2025 0046 by Alphonso Miller, RN  Pain Management Interventions: (PCA) medication (see MAR)  Goal: Nausea and Vomiting Relief  Outcome: Progressing     Goal Outcome Evaluation:  Pt using PCA for abdominal pain. Cyclic TPN infusing. DOUGLAS, ileostomy, and all dressings CDI. VSS.    Alphonso Miller, RN  [phone]

## 2025-01-11 NOTE — PROGRESS NOTES
Nutrition Therapy  Parenteral Nutrition follow-up     Dietitian to Pharmacy    Rate of TPN: Custom Cyclic TPN at 65 ml/hr x 1 hr, 131 ml/hr x 10 hrs, 65 ml/hr x 1 hr and off   Grams Dextrose: 224   Grams Protein: 70     Lipids: None     Start calorie counts     Future/Additional Recommendations:  Monitor TPN, diet adv/po/supplement, weight, labs, I/Os.      CURRENT NUTRITION ORDERS  Diet: Low fiber  Food Allergy: Gluten, Soy   Nutrition Supplement/Snacks: Ensure Clear TID, Vegetable broth TID    Nutrition Support: Custom Cyclic TPN at 65 ml/hr x 1 hr, 131 ml/hr x 10 hrs, 65 ml/hr x 1 hr and off  Containing 224 g Dex, 70 g AA + no lipids     Provides:  Provides: 1440 ml, 1042 kcal, 70 g protein, 224 g cho, 0 g fat   Meets 88% estimated kcal needs and 100% estimated protein needs     CURRENT INTAKE/TOLERANCE  No record of intake for 1/10     NEW FINDINGS  Met with pt in room, attempted interview via , no  available after holding for awhile. Observed 3 Ensure Clear on tray table, 2 partially drank, 1 full. Noted ordered a Gel 20 yesterday-unopened on tray table. Ordered milk with dinner-unopened on tray table.      Per colorectal surgery: TPN until tolerating 80% of calories PO     Date/Time Weight Weight Method   01/11/25 0611 47 kg (103 lb 9.9 oz) Bed scale   01/09/25 1026 44.7 kg (98 lb 9 oz) Standing scale   01/08/25 1416 45 kg (99 lb 3.3 oz) Bed scale   01/04/25 1700 45.1 kg (99 lb 8 oz) Standing scale   01/02/25 1143 45.9 kg (101 lb 3.1 oz) --   01/01/25 1237 46.4 kg (102 lb 4.7 oz) Bed scale   12/29/24 1933 46.3 kg (102 lb 1.6 oz) Standing scale   12/29/24 1124 46.3 kg (102 lb) Standing scale   12/28/24 1943 46 kg (101 lb 8 oz) Standing scale   12/26/24 0908 47.2 kg (104 lb) --   Incision/surgical site  Ileostomy, OP: 200 ml 1/10  LLQ Drain, OP: 0 ml 1/10  Nutrition-relevant labs: Creat 0.46(L), BG  last 24 hrs   Nutrition-relevant medications: Continuous dilaudid, cylic TPN, IVF NaCl  10 ml/hr   Scheduled novolog, zosyn     Dosing Weight: 47.2 kg     ASSESSED NUTRITION NEEDS  Estimated Energy Needs: 3456-7228 kcals/day (25 -30 kcals/kg )  Justification: Maintenance  Estimated Protein Needs: 56-70 grams protein/day (1.2 - 1.5 grams of pro/kg)  Justification: Increased needs  Estimated Fluid Needs: 7450-8285 mL/day (30 - 35 mL/kg)   Justification: Increased needs    MALNUTRITION  Malnutrition Diagnosis: Patient does not meet two of the established criteria necessary for diagnosing malnutrition but is at risk for malnutrition     NUTRITION DIAGNOSIS  Inadequate oral intake related to altered GI function as evidenced by need for nutrition support   Evaluation: Progressing, diet advanced to solids    INTERVENTIONS  Medical food supplement therapy - Continue Ensure clear TID, Vegetable broth TID     Parenteral Nutrition/IV Fluids - Continue Current TPN Regimen     Start calorie counts    Goals  Tolerate cyclic TPN - Met   Meet nutrition needs - met via TPN + po intake   Maintain weight - Met  Diet advancement - Met   BG <180 - Met      Monitoring/Evaluation      Progress toward goals will be monitored and evaluated per policy.

## 2025-01-11 NOTE — PLAN OF CARE
Goal Outcome Evaluation:       Pt had the pca stopped and started on oral pain medication. Tolerating a low fiber diet, denies nausea. Blood sugars were 128 and 150. PICC line intact, on cyclic TPN and IV antibiotics.

## 2025-01-12 LAB
ANION GAP SERPL CALCULATED.3IONS-SCNC: 11 MMOL/L (ref 7–15)
BACTERIA ABSC ANAEROBE+AEROBE CULT: ABNORMAL
BUN SERPL-MCNC: 16.1 MG/DL (ref 6–20)
CALCIUM SERPL-MCNC: 9.7 MG/DL (ref 8.8–10.4)
CHLORIDE SERPL-SCNC: 104 MMOL/L (ref 98–107)
CREAT SERPL-MCNC: 0.39 MG/DL (ref 0.51–0.95)
EGFRCR SERPLBLD CKD-EPI 2021: >90 ML/MIN/1.73M2
ERYTHROCYTE [DISTWIDTH] IN BLOOD BY AUTOMATED COUNT: 14.3 % (ref 10–15)
GLUCOSE BLDC GLUCOMTR-MCNC: 102 MG/DL (ref 70–99)
GLUCOSE BLDC GLUCOMTR-MCNC: 108 MG/DL (ref 70–99)
GLUCOSE BLDC GLUCOMTR-MCNC: 116 MG/DL (ref 70–99)
GLUCOSE BLDC GLUCOMTR-MCNC: 130 MG/DL (ref 70–99)
GLUCOSE BLDC GLUCOMTR-MCNC: 152 MG/DL (ref 70–99)
GLUCOSE BLDC GLUCOMTR-MCNC: 173 MG/DL (ref 70–99)
GLUCOSE SERPL-MCNC: 100 MG/DL (ref 70–99)
HCO3 SERPL-SCNC: 25 MMOL/L (ref 22–29)
HCT VFR BLD AUTO: 26.1 % (ref 35–47)
HGB BLD-MCNC: 8.1 G/DL (ref 11.7–15.7)
MCH RBC QN AUTO: 28.7 PG (ref 26.5–33)
MCHC RBC AUTO-ENTMCNC: 31 G/DL (ref 31.5–36.5)
MCV RBC AUTO: 93 FL (ref 78–100)
PLATELET # BLD AUTO: 580 10E3/UL (ref 150–450)
POTASSIUM SERPL-SCNC: 4.1 MMOL/L (ref 3.4–5.3)
RBC # BLD AUTO: 2.82 10E6/UL (ref 3.8–5.2)
SODIUM SERPL-SCNC: 140 MMOL/L (ref 135–145)
WBC # BLD AUTO: 8.7 10E3/UL (ref 4–11)

## 2025-01-12 PROCEDURE — 99232 SBSQ HOSP IP/OBS MODERATE 35: CPT

## 2025-01-12 PROCEDURE — 250N000011 HC RX IP 250 OP 636: Mod: JZ

## 2025-01-12 PROCEDURE — 250N000009 HC RX 250: Performed by: FAMILY MEDICINE

## 2025-01-12 PROCEDURE — 250N000013 HC RX MED GY IP 250 OP 250 PS 637: Performed by: COLON & RECTAL SURGERY

## 2025-01-12 PROCEDURE — 120N000001 HC R&B MED SURG/OB

## 2025-01-12 PROCEDURE — 250N000011 HC RX IP 250 OP 636: Performed by: STUDENT IN AN ORGANIZED HEALTH CARE EDUCATION/TRAINING PROGRAM

## 2025-01-12 PROCEDURE — 250N000013 HC RX MED GY IP 250 OP 250 PS 637

## 2025-01-12 PROCEDURE — 250N000011 HC RX IP 250 OP 636: Performed by: COLON & RECTAL SURGERY

## 2025-01-12 PROCEDURE — 80048 BASIC METABOLIC PNL TOTAL CA: CPT | Performed by: STUDENT IN AN ORGANIZED HEALTH CARE EDUCATION/TRAINING PROGRAM

## 2025-01-12 PROCEDURE — 85027 COMPLETE CBC AUTOMATED: CPT | Performed by: STUDENT IN AN ORGANIZED HEALTH CARE EDUCATION/TRAINING PROGRAM

## 2025-01-12 PROCEDURE — 250N000011 HC RX IP 250 OP 636

## 2025-01-12 PROCEDURE — 82374 ASSAY BLOOD CARBON DIOXIDE: CPT | Performed by: STUDENT IN AN ORGANIZED HEALTH CARE EDUCATION/TRAINING PROGRAM

## 2025-01-12 RX ORDER — METRONIDAZOLE 500 MG/1
500 TABLET ORAL 3 TIMES DAILY
Status: DISCONTINUED | OUTPATIENT
Start: 2025-01-12 | End: 2025-01-14

## 2025-01-12 RX ADMIN — INSULIN ASPART 1 UNITS: 100 INJECTION, SOLUTION INTRAVENOUS; SUBCUTANEOUS at 21:40

## 2025-01-12 RX ADMIN — OXYCODONE HYDROCHLORIDE 5 MG: 5 TABLET ORAL at 22:34

## 2025-01-12 RX ADMIN — METRONIDAZOLE 500 MG: 500 TABLET ORAL at 10:52

## 2025-01-12 RX ADMIN — METRONIDAZOLE 500 MG: 500 TABLET ORAL at 20:28

## 2025-01-12 RX ADMIN — ACETAMINOPHEN 1000 MG: 10 INJECTION, SOLUTION INTRAVENOUS at 20:06

## 2025-01-12 RX ADMIN — OXYCODONE HYDROCHLORIDE 5 MG: 5 TABLET ORAL at 09:41

## 2025-01-12 RX ADMIN — METRONIDAZOLE 500 MG: 500 INJECTION, SOLUTION INTRAVENOUS at 01:09

## 2025-01-12 RX ADMIN — ONDANSETRON 4 MG: 2 INJECTION INTRAMUSCULAR; INTRAVENOUS at 20:21

## 2025-01-12 RX ADMIN — ACETAMINOPHEN 1000 MG: 10 INJECTION, SOLUTION INTRAVENOUS at 12:29

## 2025-01-12 RX ADMIN — HYDROMORPHONE HYDROCHLORIDE 0.2 MG: 0.2 INJECTION, SOLUTION INTRAMUSCULAR; INTRAVENOUS; SUBCUTANEOUS at 17:56

## 2025-01-12 RX ADMIN — MEROPENEM 500 MG: 500 INJECTION, POWDER, FOR SOLUTION INTRAVENOUS at 12:33

## 2025-01-12 RX ADMIN — MEROPENEM 500 MG: 500 INJECTION, POWDER, FOR SOLUTION INTRAVENOUS at 17:30

## 2025-01-12 RX ADMIN — ACETAMINOPHEN 1000 MG: 10 INJECTION, SOLUTION INTRAVENOUS at 04:24

## 2025-01-12 RX ADMIN — MEROPENEM 500 MG: 500 INJECTION, POWDER, FOR SOLUTION INTRAVENOUS at 06:41

## 2025-01-12 RX ADMIN — ENOXAPARIN SODIUM 40 MG: 40 INJECTION SUBCUTANEOUS at 09:41

## 2025-01-12 RX ADMIN — METRONIDAZOLE 500 MG: 500 TABLET ORAL at 14:45

## 2025-01-12 RX ADMIN — MAGNESIUM SULFATE HEPTAHYDRATE: 500 INJECTION, SOLUTION INTRAMUSCULAR; INTRAVENOUS at 20:09

## 2025-01-12 ASSESSMENT — ACTIVITIES OF DAILY LIVING (ADL)
ADLS_ACUITY_SCORE: 45
ADLS_ACUITY_SCORE: 39
ADLS_ACUITY_SCORE: 45
ADLS_ACUITY_SCORE: 39
ADLS_ACUITY_SCORE: 45
ADLS_ACUITY_SCORE: 45
ADLS_ACUITY_SCORE: 39
ADLS_ACUITY_SCORE: 39
ADLS_ACUITY_SCORE: 45
ADLS_ACUITY_SCORE: 39
ADLS_ACUITY_SCORE: 45
ADLS_ACUITY_SCORE: 39
ADLS_ACUITY_SCORE: 45
ADLS_ACUITY_SCORE: 45
ADLS_ACUITY_SCORE: 39

## 2025-01-12 NOTE — PLAN OF CARE
Problem: Nausea and Vomiting  Goal: Nausea and Vomiting Relief  Outcome: Progressing     Problem: Surgery Nonspecified  Goal: Absence of Infection Signs and Symptoms  Outcome: Progressing     Goal Outcome Evaluation:  Mild abdominal pain this shift. DOUGLAS and ileostomy CDI. TPN and abx infusing.    Alphonso Miller RN  [phone]

## 2025-01-12 NOTE — PROGRESS NOTES
Nutrition Therapy  Parenteral Nutrition follow-up     Dietitian to Pharmacy -no changes    Rate of TPN: Custom Cyclic TPN at 65 ml/hr x 1 hr, 131 ml/hr x 10 hrs, 65 ml/hr x 1 hr and off   Grams Dextrose: 224   Grams Protein: 70     Lipids: None     Future/Additional Recommendations:  Monitor TPN, diet adv/po/supplement, weight, labs, I/Os.      CURRENT NUTRITION ORDERS  Diet: Low fiber  Food Allergy: Gluten, Soy   Nutrition Supplement/Snacks: Ensure Clear TID, Vegetable broth TID    Nutrition Support: Custom Cyclic TPN at 65 ml/hr x 1 hr, 131 ml/hr x 10 hrs, 65 ml/hr x 1 hr and off  Containing 224 g Dex, 70 g AA + no lipids     Provides:  Provides: 1440 ml, 1042 kcal, 70 g protein, 224 g cho, 0 g fat   Meets 88% estimated kcal needs and 100% estimated protein needs     CURRENT INTAKE/TOLERANCE  No meals ordered from room service  No calorie count meals slips saved   75% of 1 meal per flowsheets    NEW FINDINGS  Per colorectal surgery: TPN until tolerating 80% of calories PO   Date/Time Weight Weight Method   01/12/25 0423 45.1 kg (99 lb 8 oz) Standing scale   01/11/25 0611 47 kg (103 lb 9.9 oz) Bed scale   01/09/25 1026 44.7 kg (98 lb 9 oz) Standing scale   01/08/25 1416 45 kg (99 lb 3.3 oz) Bed scale   01/04/25 1700 45.1 kg (99 lb 8 oz) Standing scale   01/02/25 1143 45.9 kg (101 lb 3.1 oz) --   01/01/25 1237 46.4 kg (102 lb 4.7 oz) Bed scale   12/29/24 1933 46.3 kg (102 lb 1.6 oz) Standing scale   12/29/24 1124 46.3 kg (102 lb) Standing scale   12/28/24 1943 46 kg (101 lb 8 oz) Standing scale   12/26/24 0908 47.2 kg (104 lb) --   Incision/surgical site  Nausea  Ileostomy, OP: 10 ml 1/11  LLQ Drain, OP: 10 ml 1/11  Nutrition-relevant labs: Creat 0.46(L), -150 last 24 hrs   Nutrition-relevant medications: cylic TPN, IVF NaCl 10 ml/hr   Scheduled novolog, merrem    Dosing Weight: 47.2 kg     ASSESSED NUTRITION NEEDS  Estimated Energy Needs: 1447-5591 kcals/day (25 -30 kcals/kg )  Justification:  Maintenance  Estimated Protein Needs: 56-70 grams protein/day (1.2 - 1.5 grams of pro/kg)  Justification: Increased needs  Estimated Fluid Needs: 7249-2781 mL/day (30 - 35 mL/kg)   Justification: Increased needs    MALNUTRITION  Malnutrition Diagnosis: Patient does not meet two of the established criteria necessary for diagnosing malnutrition but is at risk for malnutrition     NUTRITION DIAGNOSIS  Inadequate oral intake related to altered GI function as evidenced by need for nutrition support   Evaluation: Progressing, diet advanced to solids    INTERVENTIONS  Medical food supplement therapy - Continue Ensure clear TID, Vegetable broth TID     Parenteral Nutrition/IV Fluids - Continue Current TPN Regimen     Goals  Tolerate cyclic TPN - Met   Meet nutrition needs - progressing    Maintain weight - Not met -wt down  Diet advancement - Met   BG <180 - Met      Monitoring/Evaluation      Progress toward goals will be monitored and evaluated per policy.

## 2025-01-12 NOTE — PHARMACY-CONSULT NOTE
"Pharmacy Note: Parenteral Nutrition (PN) Management    Pharmacist consulted to dose PN for Dain Tejeda, a 44 year old female.    Subjective:    The patient is a new PN start.    The patient was started on PN in the hospital on 12/27/24.    Indication for PN therapy: bowel obstruction    Inadequate nutrition anticipated for > 7 days.     Enteral nutrition contraindicated due to: bowel obstruction    Social History     Tobacco Use    Smoking status: Never     Passive exposure: Never    Smokeless tobacco: Never   Substance Use Topics    Alcohol use: No    Drug use: No     Objective:    Ht Readings from Last 1 Encounters:   12/11/24 1.499 m (4' 11\")     Wt Readings from Last 1 Encounters:   01/12/25 45.1 kg (99 lb 8 oz)       Body mass index is 20.1 kg/m .    Patient Vitals for the past 96 hrs:   Weight   01/12/25 0423 45.1 kg (99 lb 8 oz)   01/11/25 0611 47 kg (103 lb 9.9 oz)   01/09/25 1026 44.7 kg (98 lb 9 oz)   01/08/25 1416 45 kg (99 lb 3.3 oz)       Labs:  Last 3 days:  Recent Labs     01/10/25  0624 01/11/25  0457    137   POTASSIUM 4.2 4.5   CHLORIDE 105 104   CO2 24 25   BUN 18.0 18.7   CR 0.46* 0.46*   ALEX 8.9 9.3   MAG 2.0  --    PHOS 3.7  --    HGB 7.0* 7.0*   HCT 22.7* 22.7*   * 522*       Glucose (past 48 hours):   Recent Labs     01/10/25  1711 01/10/25  2106 01/11/25  0208 01/11/25  0457 01/11/25  0730 01/11/25  1201 01/11/25  1539 01/11/25  2038 01/12/25  0231 01/12/25  0804   GLC 96 148* 134* 87 128* 150* 136* 133* 130* 116*       Intake/Output (last 24 hours): I/O last 3 completed shifts:  In: 2281 [P.O.:240; I.V.:259]  Out: 1620 [Urine:1600; Drains:10; Stool:10]    Estimated CrCl: Estimated Creatinine Clearance: 113.4 mL/min (A) (based on SCr of 0.46 mg/dL (L)).    Assessment:    Continue patient on PN therapy as a cyclic central therapy.     Given the patient's current condition/oral intake, PN is still indicated.    Lab results reviewed: No changes to today's formula    Plan:  Rate of PN: " 65 mL/hr x 1 hour then 131 ml/hr x 10 hours then 65 ml/hr x 1 hour  Formula:   Amino Acids 70 grams  Dextrose 224 grams  Sodium 55 mEq/day  Potassium 40 mEq/day  Calcium 6 mEq/day  Magnesium 12 mEq/day  Phosphorus 0 mMol/day  Chloride: Acetate Ratio 1:1  Standard Multivitamins w/Vitamin K  Trace Elements  Thiamine 100 mg  Fat Emulsion: discontinued on 1/9/25  Check CMP, Mag, and Phos labs tomorrow.  Pharmacist will continue to follow the patient's lab results, clinical status and blood glucose results and make adjustments as appropriate.    Thank you for the consult.  Vy Guillaume [facility]  1/12/2025 10:04 AM

## 2025-01-12 NOTE — PLAN OF CARE
Problem: Pain Acute  Goal: Optimal Pain Control and Function  Outcome: Progressing  Intervention: Develop Pain Management Plan  Recent Flowsheet Documentation  Taken 1/11/2025 1807 by Nimo Moulton RN  Pain Management Interventions: medication (see MAR)  Intervention: Prevent or Manage Pain  Recent Flowsheet Documentation  Taken 1/11/2025 1807 by Nimo Moulton RN  Sensory Stimulation Regulation:   quiet environment promoted   care clustered  Medication Review/Management: medications reviewed     Problem: Nausea and Vomiting  Goal: Nausea and Vomiting Relief  Outcome: Progressing     Problem: Surgery Nonspecified  Goal: Blood Glucose Level Within Targeted Range  Outcome: Progressing  Intervention: Optimize Glycemic Control  Recent Flowsheet Documentation  Taken 1/11/2025 1807 by Nimo Moulton RN  Hyperglycemia Management: blood glucose monitored  Hypoglycemia Management: blood glucose monitored  Goal: Effective Urinary Elimination  Outcome: Progressing   NURSING PROGRESS NOTE  Shift Summary      Date: January 11, 2025     Neuro/Musculoskeletal:  A&Ox4.    Respiratory:  Sating in the 90s on RA.  :  Adequate urine output.    Diet/Appetite:  Tolerating Low Fiber diet.  Activity:  Assist of 1   Pain:  6/10  Skin:  No new deficits noted.   LDAs + Drips/IVF:  Cycled TPN    Pertinent Shift Updates:  Dilaudid given x2 and Oxycodone given x1, effective.  and 133.  utilized during cares. Continue to monitor.          Nimo Moulton RN  ....................................................

## 2025-01-12 NOTE — PLAN OF CARE
Problem: Adult Inpatient Plan of Care  Goal: Absence of Hospital-Acquired Illness or Injury  Intervention: Identify and Manage Fall Risk  Recent Flowsheet Documentation  Taken 1/12/2025 0800 by Rosalba Camp RN  Safety Promotion/Fall Prevention:   activity supervised   clutter free environment maintained   mobility aid in reach   room near nurse's station   room organization consistent   safety round/check completed  Intervention: Prevent and Manage VTE (Venous Thromboembolism) Risk  Recent Flowsheet Documentation  Taken 1/12/2025 0800 by Rosalba Camp RN  VTE Prevention/Management: SCDs off (sequential compression devices)   Goal Outcome Evaluation:       Pt has been eating most of her meals with food from home. Calorie counts attempted to be recorded. DOUGLAS drain removed per orders. Dressing change done to abdominal incision and two wounds. Blood sugars were 116 and 108. Medicated once for pain with oxycodone.

## 2025-01-12 NOTE — PROGRESS NOTES
Infectious Disease Progress Note    Assessment/Plan  Impression: Status post drainage of polymicrobial fluid collection.  Presumably an abscess although cannot exclude a fistula should this reaccumulate.  Still quite a bit of drainage at distal end of incision     Crohn's disease status post total colectomy and end ileostomy 12/30/2024     Slowly advancing her diet.     Recommendations: Based on the antimicrobial susceptibilities, agree with changing Zosyn to meropenem.  Would also add metronidazole for better coverage of the Bacteroides fragilis.     Can change metronidazole to po today.    Active Problems:    SBO (small bowel obstruction) (H)      FRANCISCO ROLAND MD  [phone]      Subjective  Gives me thumbs up when asked how she feels. Cannot get telephone     Objective    Vital signs in last 24 hours  Temp:  [97.7  F (36.5  C)-98.4  F (36.9  C)] 98.4  F (36.9  C)  Pulse:  [78-90] 88  Resp:  [16-18] 18  BP: (97)/(53-58) 97/58  SpO2:  [98 %-99 %] 98 %  Wt Readings from Last 3 Encounters:   01/12/25 45.1 kg (99 lb 8 oz)   12/11/24 47.2 kg (104 lb 1.9 oz)   11/29/24 45.8 kg (101 lb)           Intake/Output last 3 shifts  I/O last 3 completed shifts:  In: 2281 [P.O.:240; I.V.:259]  Out: 1620 [Urine:1600; Drains:10; Stool:10]  Intake/Output this shift:  No intake/output data recorded.    Review of Systems   Pertinent items are noted in HPI., otherwise negative.    Physical Exam  General appearance: alert, appears stated age, and cooperative  Head: Normocephalic, without obvious abnormality, atraumatic  Eyes: Extraocular muscles intact, no icterus  Neck: no adenopathy and supple, symmetrical, trachea midline  Lungs: clear to auscultation bilaterally  Heart: Regular rate and rhythm, no murmur  Abdomen:  Guarding and discomfort to exam.  She has an ostomy on the right side with some liquid stool.  She has a drain on the left side with some looks like old blood.  She has a midline incision that is mostly  clean dry and intact--more drainage noted at distal end of incision  Extremities: Warm and dry  Skin: Skin color, texture, turgor normal. No rashes or lesions  Neurologic: Grossly normal    Pertinent Labs   Lab Results: personally reviewed.     Recent Labs   Lab 01/11/25  0457 01/10/25  0624 01/09/25  0605   WBC 7.8 10.4 12.5*   HGB 7.0* 7.0* 7.2*   HCT 22.7* 22.7* 23.1*   * 456* 487*        Recent Labs   Lab 01/11/25  0457 01/10/25  0624 01/09/25  0605    136 138   CO2 25 24 26   BUN 18.7 18.0 17.4      Creatinine   Date Value Ref Range Status   01/11/2025 0.46 (L) 0.51 - 0.95 mg/dL Final   04/24/2017 0.6 0.6 - 1.3 mg/dL Final     7-Day Micro Results       Collected Updated Procedure Result Status      01/08/2025 1246 01/12/2025 1009 Abscess Aerobic Bacterial Culture Routine With Gram Stain [[id number]]    (Abnormal)   Abscess from Abdomen    Preliminary result Component Value   Culture Culture in progress  [P]     4+ Enterobacter cloacae complex  [P]     4+ Escherichia coli  [P]     4+ Klebsiella pneumoniae  [P]     4+ Enterococcus avium  [P]     1+ Pseudomonas aeruginosa  [P]     Identification is preliminary, confirmation in progress    3+ Proteus vulgaris  [P]    Gram Stain Result 4+ Gram negative bacilli  [P]     2+ Gram positive cocci  [P]     1+ Gram positive bacilli  [P]     4+ WBC seen  [P]     Predominantly PMNs        Susceptibility        Enterobacter cloacae complex      KENISHA      Ampicillin  Resistant  [1]       Ampicillin/ Sulbactam  Resistant  [1]       Cefazolin  Resistant  [*,1]       Cefepime <=0.12 ug/mL Susceptible      Ceftazidime  Resistant      Ceftriaxone  Resistant      Ciprofloxacin <=0.06 ug/mL Susceptible      Ertapenem <=0.12 ug/mL Susceptible  [*]       Gentamicin <=1 ug/mL Susceptible      Levofloxacin <=0.12 ug/mL Susceptible      Meropenem <=0.25 ug/mL Susceptible      Nitrofurantoin 32 ug/mL Susceptible  [*]       Piperacillin/Tazobactam  Resistant       Trimethoprim/Sulfamethoxazole <=1/19 ug/mL Susceptible                   [*]  Suppressed Antibiotic     [1]  Intrinsically Resistant               Susceptibility        Escherichia coli      KENISHA      Ampicillin >=32 ug/mL Resistant      Ampicillin/ Sulbactam 16 ug/mL Intermediate      Cefazolin 4 ug/mL Intermediate  [*]       Cefepime <=0.12 ug/mL Susceptible      Ceftazidime <=0.5 ug/mL Susceptible      Ceftriaxone <=0.25 ug/mL Susceptible      Ciprofloxacin >=4 ug/mL Resistant      Ertapenem <=0.12 ug/mL Susceptible  [*]       Extended Spectrum Beta-Lactamase Negative ug/mL ESBL Negative  [*]       Gentamicin <=1 ug/mL Susceptible      Levofloxacin >=8 ug/mL Resistant      Meropenem <=0.25 ug/mL Susceptible      Nitrofurantoin <=16 ug/mL Susceptible  [*]       Piperacillin/Tazobactam <=4 ug/mL Susceptible      Trimethoprim/Sulfamethoxazole <=1/19 ug/mL Susceptible                   [*]  Suppressed Antibiotic               Susceptibility        Klebsiella pneumoniae      KENISHA      Ampicillin  Resistant  [1]       Ampicillin/ Sulbactam >=32 ug/mL Resistant      Cefazolin 4 ug/mL Intermediate  [*]       Cefepime <=0.12 ug/mL Susceptible      Ceftazidime <=0.5 ug/mL Susceptible      Ceftriaxone <=0.25 ug/mL Susceptible      Ciprofloxacin <=0.06 ug/mL Susceptible      Ertapenem <=0.12 ug/mL Susceptible  [*]       Extended Spectrum Beta-Lactamase Negative ug/mL ESBL Negative  [*]       Gentamicin <=1 ug/mL Susceptible      Levofloxacin <=0.12 ug/mL Susceptible      Meropenem <=0.25 ug/mL Susceptible      Nitrofurantoin 64 ug/mL Intermediate  [*]       Piperacillin/Tazobactam <=4 ug/mL Susceptible      Trimethoprim/Sulfamethoxazole >16/304 ug/mL Resistant                   [*]  Suppressed Antibiotic     [1]  Intrinsically Resistant               Susceptibility        Pseudomonas aeruginosa      KENISHA      Cefepime 2 ug/mL Susceptible      Ceftazidime 2 ug/mL Susceptible      Ciprofloxacin 0.12 ug/mL Susceptible       Levofloxacin 0.5 ug/mL Susceptible      Meropenem <=0.25 ug/mL Susceptible      Piperacillin/Tazobactam 8 ug/mL Susceptible                      Susceptibility        Proteus vulgaris      KENISHA (Preliminary)      Ampicillin >=32 ug/mL Resistant      Ampicillin/ Sulbactam >=32 ug/mL Resistant      Cefepime <=0.12 ug/mL Susceptible      Ceftazidime 2 ug/mL Susceptible      Ciprofloxacin <=0.06 ug/mL Susceptible      Ertapenem <=0.12 ug/mL Susceptible  [*]       Gentamicin <=1 ug/mL Susceptible      Levofloxacin <=0.12 ug/mL Susceptible      Nitrofurantoin 128 ug/mL Resistant  [*,1]       Piperacillin/Tazobactam <=4 ug/mL Susceptible      Trimethoprim/Sulfamethoxazole <=1/19 ug/mL Susceptible                   [*]  Suppressed Antibiotic     [1]  Intrinsically Resistant               Susceptibility Comments       Enterobacter cloacae complex    Enterobacter cloacae, Klebsiella aerogenes, and Citrobacter freundii have moderate to high levels of inducible AmpC ß-lactamase expression. The use of 3rd generation cephalosporins including ceftriaxone and ceftazidime, as well as   piperacillin-tazobactam, should be avoided for invasive infections, regardless of susceptibility results.      Proteus vulgaris    Additional susceptibilities in progress.                 01/08/2025 1246 01/11/2025 2150 Anaerobic Bacterial Culture Routine [[id number]]    (Abnormal)   Abscess from Abdomen    Preliminary result Component Value   Culture Culture in progress  [P]     4+ Bacteroides fragilis  [P]     Susceptibilities not routinely done, refer to antibiogram to view typical susceptibility profiles    4+ Morganella morganii  [P]     Not isolated or reported on routine aerobic culture        Susceptibility        Morganella morganii      KENISHA      Ampicillin  Resistant  [1]       Ampicillin/ Sulbactam 16 ug/mL Intermediate      Cefazolin  Resistant  [*]       Ciprofloxacin <=0.06 ug/mL Susceptible      Ertapenem <=0.12 ug/mL Susceptible  [*]        Gentamicin <=1 ug/mL Susceptible      Levofloxacin <=0.12 ug/mL Susceptible      Meropenem <=0.25 ug/mL Susceptible      Nitrofurantoin 128 ug/mL Resistant  [*]       Piperacillin/Tazobactam <=4 ug/mL Susceptible      Trimethoprim/Sulfamethoxazole <=1/19 ug/mL Susceptible                   [*]  Suppressed Antibiotic     [1]  Intrinsically Resistant                          Susceptibility data from last 90 days.  Collected Specimen Info Organism Ampicillin Ampicillin/Sulbactam Cefepime Ceftazidime Ceftriaxone Ciprofloxacin Gentamicin Levofloxacin Meropenem Piperacillin/Tazobactam Trimethoprim/Sulfamethoxazole    01/08/25 Abscess from Abdomen Enterobacter cloacae complex R R  S R R  S  S  S  S R  S     Escherichia coli  R  I  S  S  S  R  S  R  S  S  S     Klebsiella pneumoniae R  R  S  S  S  S  S  S  S  S  R     Pseudomonas aeruginosa    S  S   S   S  S  S      Proteus vulgaris  R  R  S  S   S  S  S   S  S     Enterococcus avium              01/08/25 Abscess from Abdomen Morganella morganii R  I     S  S  S  S  S  S     Bacteroides fragilis                  Pertinent Radiology   Radiology Results:   No results found.

## 2025-01-12 NOTE — PROGRESS NOTES
Colon and Rectal Surgery  Daily Progress Note    Subjective  NAEON, still  having some pain, tolerating low-fiber diet without N/V.  Stoma has output.  Added frank and vanc yesterday for surgical site infection.     Objective  Intake/Output last 24 hrs:    Intake/Output Summary (Last 24 hours) at 1/7/2025 1040  Last data filed at 1/7/2025 0941  Gross per 24 hour   Intake 1416.8 ml   Output 2135 ml   Net -718.2 ml     Temp:  [97.7  F (36.5  C)-98.4  F (36.9  C)] 98.4  F (36.9  C)  Pulse:  [78-90] 88  Resp:  [16-18] 18  BP: (97)/(53-58) 97/58  SpO2:  [98 %-99 %] 98 %    Physical Exam:  A&Ox3, NAD  Resp unlabored  Abd soft, non distended, very sensitive but appropriately tender.  Incision open at inferior aspect, still some purulent drainage, repacked  DOUGLAS drain with scant old blood  Ileostomy pink with bilious output    Pertinent Labs  Lab Results: personally reviewed.  Culture sensitivities reviewed      Assessment/Plan: This is a 44 year old female POD #13 s/p TAC and end ileostomy for Crohn's disease     - LFD  - Appreciate ID recs, IV frank and PO flagyl, will likely disharge with this for superficial urgical site infection  - Lovenox  - PO pain meds  - Wean TPN, continue calorie count  - Ambulate, OOBTC      Gerardo Vides MD  CRS Fellow  [facility]  [phone]..............................main

## 2025-01-13 ENCOUNTER — APPOINTMENT (OUTPATIENT)
Dept: PHYSICAL THERAPY | Facility: HOSPITAL | Age: 45
End: 2025-01-13
Payer: COMMERCIAL

## 2025-01-13 ENCOUNTER — VIRTUAL VISIT (OUTPATIENT)
Dept: INTERPRETER SERVICES | Facility: CLINIC | Age: 45
End: 2025-01-13

## 2025-01-13 ENCOUNTER — APPOINTMENT (OUTPATIENT)
Dept: OCCUPATIONAL THERAPY | Facility: HOSPITAL | Age: 45
End: 2025-01-13
Payer: COMMERCIAL

## 2025-01-13 LAB
ALBUMIN SERPL BCG-MCNC: 3.2 G/DL (ref 3.5–5.2)
ALP SERPL-CCNC: 287 U/L (ref 40–150)
ALT SERPL W P-5'-P-CCNC: 39 U/L (ref 0–50)
ANION GAP SERPL CALCULATED.3IONS-SCNC: 9 MMOL/L (ref 7–15)
AST SERPL W P-5'-P-CCNC: 21 U/L (ref 0–45)
BACTERIA ABSC ANAEROBE+AEROBE CULT: ABNORMAL
BILIRUB SERPL-MCNC: <0.2 MG/DL
BUN SERPL-MCNC: 20.5 MG/DL (ref 6–20)
CALCIUM SERPL-MCNC: 9.4 MG/DL (ref 8.8–10.4)
CHLORIDE SERPL-SCNC: 104 MMOL/L (ref 98–107)
CREAT SERPL-MCNC: 0.45 MG/DL (ref 0.51–0.95)
EGFRCR SERPLBLD CKD-EPI 2021: >90 ML/MIN/1.73M2
GLUCOSE BLDC GLUCOMTR-MCNC: 107 MG/DL (ref 70–99)
GLUCOSE BLDC GLUCOMTR-MCNC: 116 MG/DL (ref 70–99)
GLUCOSE BLDC GLUCOMTR-MCNC: 136 MG/DL (ref 70–99)
GLUCOSE BLDC GLUCOMTR-MCNC: 137 MG/DL (ref 70–99)
GLUCOSE BLDC GLUCOMTR-MCNC: 142 MG/DL (ref 70–99)
GLUCOSE SERPL-MCNC: 137 MG/DL (ref 70–99)
GRAM STAIN RESULT: ABNORMAL
HCO3 SERPL-SCNC: 25 MMOL/L (ref 22–29)
INR PPP: 1.13 (ref 0.85–1.15)
MAGNESIUM SERPL-MCNC: 2 MG/DL (ref 1.7–2.3)
PHOSPHATE SERPL-MCNC: 3.2 MG/DL (ref 2.5–4.5)
POTASSIUM SERPL-SCNC: 3.9 MMOL/L (ref 3.4–5.3)
PREALB SERPL-MCNC: 31.6 MG/DL (ref 20–40)
PROT SERPL-MCNC: 7.7 G/DL (ref 6.4–8.3)
SODIUM SERPL-SCNC: 138 MMOL/L (ref 135–145)

## 2025-01-13 PROCEDURE — 250N000013 HC RX MED GY IP 250 OP 250 PS 637

## 2025-01-13 PROCEDURE — T1013 SIGN LANG/ORAL INTERPRETER: HCPCS | Mod: U4,TEL,95 | Performed by: INTERPRETER

## 2025-01-13 PROCEDURE — 97116 GAIT TRAINING THERAPY: CPT | Mod: GP

## 2025-01-13 PROCEDURE — 99207 PR NO CHARGE LOS: CPT | Performed by: INTERNAL MEDICINE

## 2025-01-13 PROCEDURE — 82310 ASSAY OF CALCIUM: CPT

## 2025-01-13 PROCEDURE — 84100 ASSAY OF PHOSPHORUS: CPT

## 2025-01-13 PROCEDURE — 82947 ASSAY GLUCOSE BLOOD QUANT: CPT

## 2025-01-13 PROCEDURE — 250N000011 HC RX IP 250 OP 636: Performed by: COLON & RECTAL SURGERY

## 2025-01-13 PROCEDURE — 120N000001 HC R&B MED SURG/OB

## 2025-01-13 PROCEDURE — 85610 PROTHROMBIN TIME: CPT

## 2025-01-13 PROCEDURE — 250N000011 HC RX IP 250 OP 636: Performed by: STUDENT IN AN ORGANIZED HEALTH CARE EDUCATION/TRAINING PROGRAM

## 2025-01-13 PROCEDURE — 84075 ASSAY ALKALINE PHOSPHATASE: CPT

## 2025-01-13 PROCEDURE — 250N000013 HC RX MED GY IP 250 OP 250 PS 637: Performed by: COLON & RECTAL SURGERY

## 2025-01-13 PROCEDURE — 250N000013 HC RX MED GY IP 250 OP 250 PS 637: Performed by: STUDENT IN AN ORGANIZED HEALTH CARE EDUCATION/TRAINING PROGRAM

## 2025-01-13 PROCEDURE — 83735 ASSAY OF MAGNESIUM: CPT

## 2025-01-13 PROCEDURE — T1013 SIGN LANG/ORAL INTERPRETER: HCPCS | Mod: GT,TEL,95 | Performed by: INTERPRETER

## 2025-01-13 PROCEDURE — 97535 SELF CARE MNGMENT TRAINING: CPT | Mod: GO

## 2025-01-13 PROCEDURE — 84134 ASSAY OF PREALBUMIN: CPT

## 2025-01-13 PROCEDURE — 97530 THERAPEUTIC ACTIVITIES: CPT | Mod: GP

## 2025-01-13 PROCEDURE — 250N000009 HC RX 250: Performed by: FAMILY MEDICINE

## 2025-01-13 RX ORDER — ACETAMINOPHEN 325 MG/1
650 TABLET ORAL 4 TIMES DAILY
Status: DISCONTINUED | OUTPATIENT
Start: 2025-01-13 | End: 2025-01-15 | Stop reason: HOSPADM

## 2025-01-13 RX ADMIN — MEROPENEM 500 MG: 500 INJECTION, POWDER, FOR SOLUTION INTRAVENOUS at 06:28

## 2025-01-13 RX ADMIN — HYDROMORPHONE HYDROCHLORIDE 0.2 MG: 0.2 INJECTION, SOLUTION INTRAMUSCULAR; INTRAVENOUS; SUBCUTANEOUS at 06:24

## 2025-01-13 RX ADMIN — MEROPENEM 500 MG: 500 INJECTION, POWDER, FOR SOLUTION INTRAVENOUS at 00:18

## 2025-01-13 RX ADMIN — METRONIDAZOLE 500 MG: 500 TABLET ORAL at 08:30

## 2025-01-13 RX ADMIN — ACETAMINOPHEN 650 MG: 325 TABLET ORAL at 13:57

## 2025-01-13 RX ADMIN — OXYCODONE HYDROCHLORIDE 5 MG: 5 TABLET ORAL at 21:29

## 2025-01-13 RX ADMIN — MAGNESIUM SULFATE HEPTAHYDRATE: 500 INJECTION, SOLUTION INTRAMUSCULAR; INTRAVENOUS at 19:42

## 2025-01-13 RX ADMIN — METRONIDAZOLE 500 MG: 500 TABLET ORAL at 13:57

## 2025-01-13 RX ADMIN — ACETAMINOPHEN 1000 MG: 10 INJECTION, SOLUTION INTRAVENOUS at 03:46

## 2025-01-13 RX ADMIN — MEROPENEM 500 MG: 500 INJECTION, POWDER, FOR SOLUTION INTRAVENOUS at 17:21

## 2025-01-13 RX ADMIN — ACETAMINOPHEN 650 MG: 325 TABLET ORAL at 21:25

## 2025-01-13 RX ADMIN — METRONIDAZOLE 500 MG: 500 TABLET ORAL at 21:25

## 2025-01-13 RX ADMIN — MEROPENEM 500 MG: 500 INJECTION, POWDER, FOR SOLUTION INTRAVENOUS at 12:26

## 2025-01-13 RX ADMIN — INSULIN ASPART 1 UNITS: 100 INJECTION, SOLUTION INTRAVENOUS; SUBCUTANEOUS at 08:30

## 2025-01-13 RX ADMIN — ACETAMINOPHEN 650 MG: 325 TABLET ORAL at 17:20

## 2025-01-13 RX ADMIN — ENOXAPARIN SODIUM 40 MG: 40 INJECTION SUBCUTANEOUS at 08:30

## 2025-01-13 ASSESSMENT — ACTIVITIES OF DAILY LIVING (ADL)
ADLS_ACUITY_SCORE: 39
ADLS_ACUITY_SCORE: 41
ADLS_ACUITY_SCORE: 39
ADLS_ACUITY_SCORE: 41
ADLS_ACUITY_SCORE: 41
ADLS_ACUITY_SCORE: 39
ADLS_ACUITY_SCORE: 39
ADLS_ACUITY_SCORE: 41
ADLS_ACUITY_SCORE: 39
ADLS_ACUITY_SCORE: 41
ADLS_ACUITY_SCORE: 39
ADLS_ACUITY_SCORE: 39
ADLS_ACUITY_SCORE: 41
ADLS_ACUITY_SCORE: 39
ADLS_ACUITY_SCORE: 39

## 2025-01-13 NOTE — PROGRESS NOTES
Nutrition Therapy  Parenteral Nutrition follow-up     Dietitian to Pharmacy    Rate of TPN: Custom Cyclic TPN at 65 ml/hr x 1 hr, 131 ml/hr x 10 hrs, 65 ml/hr x 1 hr and off   Grams Dextrose: 130 Decreased   Grams Protein: 35 Decreased     Lipids: None     Future/Additional Recommendations:  Monitor TPN, po/supplement, weight, labs, I/Os.      CURRENT NUTRITION ORDERS  Diet: Low fiber  Food Allergy: Gluten, Soy   Nutrition Supplement/Snacks: Ensure Clear TID, Vegetable broth TID    Nutrition Support: Custom Cyclic TPN at 65 ml/hr x 1 hr, 131 ml/hr x 10 hrs, 65 ml/hr x 1 hr and off  Containing 224 g Dex, 70 g AA + no lipids   +MVITM +thiamin     Provides:  Provides: 1440 ml, 1042 kcal, 70 g protein, 224 g cho, 0 g fat   Meets 88% estimated kcal needs and 100% estimated protein needs     CURRENT INTAKE/TOLERANCE  Calorie Count 1/12/25:   No meals ordered, food from home   X1 meal slip saved, pt taking food from home- 1 c. Rice cereal, 100% x1 ensure clear + 1 banana   Pt consuming ~440 kcal and ~20 g protein, meets <50% estimated nutrition needs    NEW FINDINGS  Pt reports taking some breakfast this AM, minimal po r/t ongoing nausea/bad taste in mouth. Pt states she is taking some ensure clear, has x7 unopened bottles in room. Pt reports okay to discontinue vegetable broth TID. Pt agreeable to take Gel plus daily.       Per CRS: TPN until tolerating 80% of calories PO     Weight: Current weight down from admit  Date/Time Weight Weight Method   01/13/25 0532 44.6 kg (98 lb 5.2 oz) Bed scale   01/12/25 0423 45.1 kg (99 lb 8 oz) Standing scale   01/11/25 0611 47 kg (103 lb 9.9 oz) Bed scale   01/09/25 1026 44.7 kg (98 lb 9 oz) Standing scale   01/08/25 1416 45 kg (99 lb 3.3 oz) Bed scale   01/04/25 1700 45.1 kg (99 lb 8 oz) Standing scale   01/02/25 1143 45.9 kg (101 lb 3.1 oz) --   01/01/25 1237 46.4 kg (102 lb 4.7 oz) Bed scale   12/29/24 1933 46.3 kg (102 lb 1.6 oz) Standing scale   12/29/24 1124 46.3 kg (102 lb)  Standing scale   12/28/24 1943 46 kg (101 lb 8 oz) Standing scale   12/26/24 0908 47.2 kg (104 lb) --     Abd discomfort   Incision/surgical site  Ileostomy, OP: 10 ml 1/12, 650 ml this AM  LLQ Drain removed   Nutrition-relevant labs: BUN 20.5(H), Creat 0.45(L), Alk Phos 287(H), -173 last 24 hrs   Nutrition-relevant medications: Continuous cylic TPN, IVF NaCl 10 ml/hr   Scheduled novolog, merrem     Dosing Weight: 47.2 kg     ASSESSED NUTRITION NEEDS  Estimated Energy Needs: 4111-2871 kcals/day (25 -30 kcals/kg )  Justification: Maintenance  Estimated Protein Needs: 56-70 grams protein/day (1.2 - 1.5 grams of pro/kg)  Justification: Increased needs  Estimated Fluid Needs: 0524-3946 mL/day (30 - 35 mL/kg)   Justification: Increased needs    MALNUTRITION  Malnutrition Diagnosis: Patient does not meet two of the established criteria necessary for diagnosing malnutrition but is at risk for malnutrition     NUTRITION DIAGNOSIS  Inadequate oral intake related to altered GI function as evidenced by need for nutrition support   Evaluation: Ongoing     INTERVENTIONS  Medical food supplement therapy - Continue Ensure clear TID,   Discontinue Vegetable broth TID   Add Gel Plus daily, cherry    Parenteral Nutrition/IV Fluids - Decrease TPN Regimen by 50% per CRS   Custom Cyclic TPN at 65 ml/hr x 1 hr, 131 ml/hr x 10 hrs, 65 ml/hr x 1 hr and off  Containing 130 g Dex, 35 g AA + no lipids   +MVITM +thiamin   Provides: 1440 ml, 582 kcal, 35 g protein, 130 g cho, 0 g fat   Meets 50% estimated protein needs     Goals  Tolerate cyclic TPN - Met   Meet nutrition needs - Met via TPN + po     Maintain weight - Not Met, current weight is down   Diet advancement - Met, pt on LFD now   BG <180 - Met      Monitoring/Evaluation      Progress toward goals will be monitored and evaluated per policy.

## 2025-01-13 NOTE — PROGRESS NOTES
Colon and Rectal Surgery  Daily Progress Note    Subjective  Pain last night, but more controlled this AM with meciations. Tolerating LFD without nausea. Per calorie counts, took in about 1 cup porage and 100% of ensure. Patient tells me she has been eating small amounts, but has been taking in food with each meal. Stoma with 650cc stool recorded. AVSSS.     Objective  Intake/Output last 24 hrs:    Intake/Output Summary (Last 24 hours) at 1/7/2025 1040  Last data filed at 1/7/2025 0941  Gross per 24 hour   Intake 1416.8 ml   Output 2135 ml   Net -718.2 ml     Temp:  [98  F (36.7  C)-98.2  F (36.8  C)] 98.2  F (36.8  C)  Pulse:  [56-88] 88  Resp:  [18-20] 18  BP: ()/(53-65) 109/65  SpO2:  [97 %-98 %] 98 %    Physical Exam:  A&Ox3, NAD  Resp unlabored  Abd soft, non distended, very sensitive but appropriately tender.  Incision open at inferior aspect, minimal purulent drainge and base with healthy granulation tissue, repacked. Old ostomy site appears healthy.   Ileostomy pink with bilious output    Pertinent Labs  Lab Results: personally reviewed.  Culture sensitivities reviewed      Assessment/Plan: This is a 44 year old female POD #14 s/p TAC and end ileostomy for Crohn's disease     - LFD  - Appreciate ID recs, IV frank and PO flagyl, will likely disharge with this for superficial urgical site infection  - Lovenox dvt ppx  - PO pain meds, IV only for breakthrough  - Will decrease TPN by 50%, if tolerating would likely plan to discontinue  - Continue calorie counts  - Ambulate, OOBTC  - Likely plan for discharge in next couple days if doing well    Discussed with Dr. Miquel Dolan, PA-C  [facility]  [phone]..............................main    Colorectal Surgery Staff:  I have seen and examined the patient. I agree with the above documentation and plan of the fellow/PA above with the following additions/chages:    Overall looks good, ate more.  Afebrile.  Incisions look  good.  Ileostomy is productive.    Hopefully we can get her home by tomorrow.  I agree with the assessment and plan as above which was formulated with me.  We will discuss with the infectious disease team tomorrow and hopefully get her either on oral antibiotics are planned for IV antibiotics at home.      MD RYAN SyedA  [facility]  Office: [phone]  1/13/2025 4:54 PM

## 2025-01-13 NOTE — PROGRESS NOTES
Care Management Follow Up    Length of Stay (days): 18    Expected Discharge Date: 01/14/2025    Anticipated Discharge Plan:  Home, Home Care    Transportation: Anticipate Family/friend    PT Recommendations: home with assist, home with home care physical therapy, Per plan established by the PT  OT Recommendations:  home with assist     Barriers to Discharge: medical stability    Prior Living Situation: apartment with spouse, child(sisi), adult, child(sisi), dependent    Discussed  Partnership in Safe Discharge Planning  document with patient/family: No     Handoff Completed: No, handoff not indicated or clinically appropriate    Patient/Spokesperson Updated: No    Additional Information:    Reviewed chart and discussed with bedside and charge nurse.  Patient remains on TPN, not yet medically ready to discharge; anticipate at least 1-2 more days.  She has been accepted by [facility] for RN, PT, & OT.    Next Steps:     Care management continues to follow to assist with discharge needs.    CHASITY WallaceW

## 2025-01-13 NOTE — PLAN OF CARE
Problem: Pain Acute  Goal: Optimal Pain Control and Function  Outcome: Progressing  Intervention: Develop Pain Management Plan  Recent Flowsheet Documentation  Taken 1/12/2025 1700 by Nimo Moulton RN  Pain Management Interventions: medication (see MAR)  Intervention: Prevent or Manage Pain  Recent Flowsheet Documentation  Taken 1/12/2025 1800 by Nimo Moulton RN  Medication Review/Management: medications reviewed     Problem: Nausea and Vomiting  Goal: Nausea and Vomiting Relief  Outcome: Progressing  Intervention: Prevent and Manage Nausea and Vomiting  Recent Flowsheet Documentation  Taken 1/12/2025 1800 by Nimo Moulton RN  Fluid/Electrolyte Management: fluids provided     Problem: Surgery Nonspecified  Goal: Blood Glucose Level Within Targeted Range  Outcome: Progressing  Intervention: Optimize Glycemic Control  Recent Flowsheet Documentation  Taken 1/12/2025 1800 by Nimo Moulton RN  Hyperglycemia Management: blood glucose monitored  Hypoglycemia Management: blood glucose monitored  Goal: Effective Urinary Elimination  Outcome: Progressing   NURSING PROGRESS NOTE  Shift Summary      Date: January 12, 2025     Neuro/Musculoskeletal:  A&Ox4.    Respiratory:  Sating in the 90s on RA.  :  Adequate urine output.    Diet/Appetite:  Tolerating Low Fiber diet.  Activity:  Assist of 1   Pain:  5/10  Skin:  No new deficits noted.   LDAs + Drips/IVF:  Cycled TPN    Pertinent Shift Updates: Dilaudid given x1 & Oxycodone x1, effective. Zofran given x1 for intermittent nausea, effective.  utilized during cares. , 173, and 152. Continue to monitor.            Nimo Moulton RN  ....................................................

## 2025-01-13 NOTE — PROGRESS NOTES
ID brief visit    Review of micro suggests it will be difficult to come up with oral antibiotics if we are to cover all or most of the bacteria, likely simplest to plan IV course after discharge.     Gerardo Eldridge MD

## 2025-01-13 NOTE — PLAN OF CARE
Problem: Pain Acute  Goal: Optimal Pain Control and Function  Outcome: Progressing  Intervention: Prevent or Manage Pain  Recent Flowsheet Documentation  Taken 1/13/2025 0015 by Ara Lujan RN  Sleep/Rest Enhancement:   awakenings minimized   noise level reduced   room darkened   Goal Outcome Evaluation:       Awakenings minimized. Pt reported feeling a little weakness in hands after taking evening meds (oxy). Hand massage given. Uses bedpan at night. Denied pain until 0615, 0.2 dilaudid given at 0630. denies nausea.

## 2025-01-13 NOTE — PHARMACY-CONSULT NOTE
"Pharmacy Note: Parenteral Nutrition (PN) Management    Pharmacist consulted to dose PN for Dain Tejeda, a 44 year old female     Subjective:    The patient is a new PN start.    The patient was started on PN in the hospital on 12/27/24.    Indication for PN therapy: bowel obstruction    Inadequate nutrition anticipated for > 7 days.     Enteral nutrition contraindicated due to:  Bowel Obstruction .      Social History     Tobacco Use    Smoking status: Never     Passive exposure: Never    Smokeless tobacco: Never   Substance Use Topics    Alcohol use: No    Drug use: No     Objective:    Ht Readings from Last 1 Encounters:   12/11/24 1.499 m (4' 11\")     Wt Readings from Last 1 Encounters:   01/13/25 44.6 kg (98 lb 5.2 oz)       Body mass index is 19.86 kg/m .    Patient Vitals for the past 96 hrs:   Weight   01/13/25 0532 44.6 kg (98 lb 5.2 oz)   01/12/25 0423 45.1 kg (99 lb 8 oz)   01/11/25 0611 47 kg (103 lb 9.9 oz)   01/09/25 1026 44.7 kg (98 lb 9 oz)       Labs:  Last 3 days:  Recent Labs     01/11/25  0457 01/12/25  1055 01/13/25  0613    140 138   POTASSIUM 4.5 4.1 3.9   CHLORIDE 104 104 104   CO2 25 25 25   BUN 18.7 16.1 20.5*   CR 0.46* 0.39* 0.45*   ALEX 9.3 9.7 9.4   MAG  --   --  2.0   PHOS  --   --  3.2   PROTTOTAL  --   --  7.7   ALBUMIN  --   --  3.2*   HGB 7.0* 8.1*  --    HCT 22.7* 26.1*  --    * 580*  --    BILITOTAL  --   --  <0.2   AST  --   --  21   ALT  --   --  39   ALKPHOS  --   --  287*   INR  --   --  1.13       Glucose (past 48 hours):   Recent Labs     01/12/25  0231 01/12/25  0804 01/12/25  1055 01/12/25  1235 01/12/25  1739 01/12/25  2115 01/12/25  2132 01/13/25  0221 01/13/25  0613 01/13/25  0747   * 116* 100* 108* 102* 173* 152* 136* 137* 142*       Intake/Output (last 24 hours): I/O last 3 completed shifts:  In: 332 [P.O.:50; I.V.:200]  Out: 1700 [Urine:1050; Stool:650]    Estimated CrCl: Estimated Creatinine Clearance: 114.7 mL/min (A) (based on SCr of 0.45 mg/dL " (L)).    Assessment:    Continue patient on PN therapy as a cyclic central therapy.     Given the patient's current condition/oral intake, PN is still indicated.    Lab results reviewed: all labs are WNL. Will continue with current formulation of Electrolytes. Beginning to wean per direction of colorectal provider. RD adjusted dextrose and amino acids as expected.    Plan:  Rate of PN: 65 mL/hr x 1 hour, increase to 131 mL/hr x 10 hours then 65 mL/hr x 1 hour Formula:   Amino Acids 35 grams  Dextrose 130 grams  Sodium 55 mEq/day  Potassium 40 mEq/day  Calcium 6 mEq/day  Magnesium 12 mEq/day  Phosphorus 0 mMol/day  Chloride: Acetate Ratio 1:1  Standard Multivitamins w/Vitamin K  Trace Elements  Other Thiamine  Fat Emulsion: discontinued 1/9/25  Check TPN panel labs tomorrow.  Pharmacist will continue to follow the patient's lab results, clinical status and blood glucose results and make adjustments as appropriate.    Thank you for the consult.  Keven Mcleod RP  1/13/2025 10:11 AM

## 2025-01-13 NOTE — PLAN OF CARE
Problem: Adult Inpatient Plan of Care  Goal: Absence of Hospital-Acquired Illness or Injury  Intervention: Identify and Manage Fall Risk  Recent Flowsheet Documentation  Taken 1/13/2025 0800 by Rosalba Camp RN  Safety Promotion/Fall Prevention:   activity supervised   clutter free environment maintained   mobility aid in reach   room near nurse's station   room organization consistent   safety round/check completed   supervised activity  Intervention: Prevent Skin Injury  Recent Flowsheet Documentation  Taken 1/13/2025 0800 by Rosalba Camp RN  Body Position: position changed independently  Intervention: Prevent and Manage VTE (Venous Thromboembolism) Risk  Recent Flowsheet Documentation  Taken 1/13/2025 0800 by Rosalba Camp RN  VTE Prevention/Management: SCDs on (sequential compression devices)  Intervention: Prevent Infection  Recent Flowsheet Documentation  Taken 1/13/2025 0800 by Rosalba Camp RN  Infection Prevention: single patient room provided   Goal Outcome Evaluation:       Pt is alert and oriented. Encouraged to be out of bed and ambulate to the bathroom and in the [place]. Scheduled Tylenol effective for pain. Calorie counts recorded, eats food from home frequently.

## 2025-01-14 ENCOUNTER — HOME INFUSION (OUTPATIENT)
Dept: HOME HEALTH SERVICES | Facility: HOME HEALTH | Age: 45
End: 2025-01-14
Payer: COMMERCIAL

## 2025-01-14 ENCOUNTER — ENROLLMENT (OUTPATIENT)
Dept: HOME HEALTH SERVICES | Facility: HOME HEALTH | Age: 45
End: 2025-01-14
Payer: COMMERCIAL

## 2025-01-14 DIAGNOSIS — K50.114 CROHN'S DISEASE OF COLON WITH ABSCESS (H): Primary | ICD-10-CM

## 2025-01-14 LAB
GLUCOSE BLDC GLUCOMTR-MCNC: 106 MG/DL (ref 70–99)
GLUCOSE BLDC GLUCOMTR-MCNC: 107 MG/DL (ref 70–99)
GLUCOSE BLDC GLUCOMTR-MCNC: 95 MG/DL (ref 70–99)
GLUCOSE BLDC GLUCOMTR-MCNC: 99 MG/DL (ref 70–99)

## 2025-01-14 PROCEDURE — 250N000011 HC RX IP 250 OP 636: Performed by: STUDENT IN AN ORGANIZED HEALTH CARE EDUCATION/TRAINING PROGRAM

## 2025-01-14 PROCEDURE — 120N000001 HC R&B MED SURG/OB

## 2025-01-14 PROCEDURE — 250N000013 HC RX MED GY IP 250 OP 250 PS 637: Performed by: STUDENT IN AN ORGANIZED HEALTH CARE EDUCATION/TRAINING PROGRAM

## 2025-01-14 PROCEDURE — 250N000013 HC RX MED GY IP 250 OP 250 PS 637: Performed by: COLON & RECTAL SURGERY

## 2025-01-14 PROCEDURE — 250N000011 HC RX IP 250 OP 636: Performed by: COLON & RECTAL SURGERY

## 2025-01-14 PROCEDURE — 250N000011 HC RX IP 250 OP 636: Performed by: INTERNAL MEDICINE

## 2025-01-14 PROCEDURE — 99232 SBSQ HOSP IP/OBS MODERATE 35: CPT | Performed by: INTERNAL MEDICINE

## 2025-01-14 PROCEDURE — 999N000197 HC STATISTIC WOC PT EDUCATION, 0-15 MIN

## 2025-01-14 PROCEDURE — 250N000013 HC RX MED GY IP 250 OP 250 PS 637

## 2025-01-14 RX ORDER — MEROPENEM 1 G/1
1 INJECTION, POWDER, FOR SOLUTION INTRAVENOUS EVERY 8 HOURS
Status: DISCONTINUED | OUTPATIENT
Start: 2025-01-14 | End: 2025-01-15 | Stop reason: HOSPADM

## 2025-01-14 RX ORDER — ENOXAPARIN SODIUM 100 MG/ML
40 INJECTION SUBCUTANEOUS DAILY
Qty: 5.2 ML | Refills: 0 | Status: SHIPPED | OUTPATIENT
Start: 2025-01-15 | End: 2025-01-28

## 2025-01-14 RX ORDER — OXYCODONE HYDROCHLORIDE 5 MG/1
5-10 TABLET ORAL EVERY 4 HOURS PRN
Qty: 15 TABLET | Refills: 0 | Status: SHIPPED | OUTPATIENT
Start: 2025-01-14

## 2025-01-14 RX ORDER — MEROPENEM 1 G/1
1 INJECTION, POWDER, FOR SOLUTION INTRAVENOUS EVERY 8 HOURS
Status: SHIPPED
Start: 2025-01-14 | End: 2025-01-15

## 2025-01-14 RX ORDER — ACETAMINOPHEN 325 MG/1
650 TABLET ORAL 4 TIMES DAILY
Qty: 60 TABLET | Refills: 2 | Status: SHIPPED | OUTPATIENT
Start: 2025-01-14

## 2025-01-14 RX ADMIN — ENOXAPARIN SODIUM 40 MG: 40 INJECTION SUBCUTANEOUS at 08:44

## 2025-01-14 RX ADMIN — OXYCODONE HYDROCHLORIDE 5 MG: 5 TABLET ORAL at 05:46

## 2025-01-14 RX ADMIN — MEROPENEM 500 MG: 500 INJECTION, POWDER, FOR SOLUTION INTRAVENOUS at 05:47

## 2025-01-14 RX ADMIN — METRONIDAZOLE 500 MG: 500 TABLET ORAL at 08:43

## 2025-01-14 RX ADMIN — ACETAMINOPHEN 650 MG: 325 TABLET ORAL at 08:43

## 2025-01-14 RX ADMIN — MEROPENEM 500 MG: 500 INJECTION, POWDER, FOR SOLUTION INTRAVENOUS at 00:43

## 2025-01-14 RX ADMIN — MEROPENEM 1 G: 1 INJECTION, POWDER, FOR SOLUTION INTRAVENOUS at 20:06

## 2025-01-14 RX ADMIN — ACETAMINOPHEN 650 MG: 325 TABLET ORAL at 14:06

## 2025-01-14 RX ADMIN — ACETAMINOPHEN 650 MG: 325 TABLET ORAL at 20:06

## 2025-01-14 RX ADMIN — ACETAMINOPHEN 650 MG: 325 TABLET ORAL at 16:44

## 2025-01-14 RX ADMIN — MEROPENEM 500 MG: 500 INJECTION, POWDER, FOR SOLUTION INTRAVENOUS at 11:30

## 2025-01-14 ASSESSMENT — ACTIVITIES OF DAILY LIVING (ADL)
ADLS_ACUITY_SCORE: 37
ADLS_ACUITY_SCORE: 41
ADLS_ACUITY_SCORE: 38
ADLS_ACUITY_SCORE: 37
ADLS_ACUITY_SCORE: 41
ADLS_ACUITY_SCORE: 41
ADLS_ACUITY_SCORE: 38
ADLS_ACUITY_SCORE: 41
ADLS_ACUITY_SCORE: 38
ADLS_ACUITY_SCORE: 41
ADLS_ACUITY_SCORE: 41
ADLS_ACUITY_SCORE: 37
ADLS_ACUITY_SCORE: 41
ADLS_ACUITY_SCORE: 37
ADLS_ACUITY_SCORE: 37
ADLS_ACUITY_SCORE: 38
ADLS_ACUITY_SCORE: 38
ADLS_ACUITY_SCORE: 41
ADLS_ACUITY_SCORE: 38

## 2025-01-14 NOTE — PROGRESS NOTES
Infectious Disease Progress Note    Assessment/Plan  Impression: Status post aspirate of polymicrobial intra-abdominal fluid collection.  Presumably an abscess although cannot exclude a fistula should this reaccumulate.       Crohn's disease status post total colectomy and end ileostomy 12/30/2024     Slowly advancing her diet.    With multiple zulma on culture, would be difficult to manage with oral antibiotic regimen. Best to complete course with IV meropenem if this is feasible.     Most Bacteroides fragilis strains are susceptible to carbapenems, can plan meropenem alone.      Recommendations:   Meropenem 1g IV q8h through 1/24/25  Set up home infusions  Will review with colorectal team    Active Problems:    SBO (small bowel obstruction) (H)      Gerardo Eldridge MD  [facility]  Contact via [employer] or [employer] paging  ______________________________________________________________________       Subjective  Feels better. Pain improved. Drain removed today per her report. Appetite improving. Reviewed recommendations. She hopes to get home soon. Lives with  and child.     Objective    Vital signs in last 24 hours  Temp:  [98  F (36.7  C)-98.1  F (36.7  C)] 98.1  F (36.7  C)  Pulse:  [89-99] 90  Resp:  [17-18] 18  BP: (101-108)/(60-68) 101/61  SpO2:  [98 %] 98 %  Wt Readings from Last 3 Encounters:   01/13/25 45 kg (99 lb 4.8 oz)   12/11/24 47.2 kg (104 lb 1.9 oz)   11/29/24 45.8 kg (101 lb)           Intake/Output last 3 shifts  I/O last 3 completed shifts:  In: 3355 [P.O.:480; I.V.:100]  Out: 1350 [Urine:1150; Stool:200]  Intake/Output this shift:  I/O this shift:  In: 155.3 [P.O.:120]  Out: 200 [Urine:200]    Review of Systems   Pertinent items are noted in HPI., otherwise negative.    Physical Exam  General appearance: no distress, phone  used for the visit.   HEENT normal  Lungs: normal respiratory effort   Heart: Regular rate and rhythm, no murmur  Abdomen:  incision  intact. Drain site with scant drainage. Ostomy with stool present.   Extremities: Warm and dry  Skin: Skin color, texture, turgor normal. No rashes or lesions  Neurologic: Grossly normal  PICC L UE    Pertinent Labs   Lab Results: personally reviewed.     Recent Labs   Lab 01/12/25  1055 01/11/25  0457 01/10/25  0624   WBC 8.7 7.8 10.4   HGB 8.1* 7.0* 7.0*   HCT 26.1* 22.7* 22.7*   * 522* 456*        Recent Labs   Lab 01/13/25  0613 01/12/25  1055 01/11/25  0457    140 137   CO2 25 25 25   BUN 20.5* 16.1 18.7      Creatinine   Date Value Ref Range Status   01/13/2025 0.45 (L) 0.51 - 0.95 mg/dL Final   04/24/2017 0.6 0.6 - 1.3 mg/dL Final     7-Day Micro Results       Collected Updated Procedure Result Status      01/08/2025 1246 01/13/2025 0802 Abscess Aerobic Bacterial Culture Routine With Gram Stain [[id number]]    (Abnormal)   Abscess from Abdomen    Final result Component Value   Culture 4+ Enterobacter cloacae complex    4+ Escherichia coli    4+ Klebsiella pneumoniae    4+ Enterococcus avium    1+ Pseudomonas aeruginosa    3+ Proteus vulgaris   Gram Stain Result 4+ Gram negative bacilli    2+ Gram positive cocci    1+ Gram positive bacilli    4+ WBC seen    Predominantly PMNs        Susceptibility        Enterobacter cloacae complex      KENISHA      Ampicillin  Resistant  [1]       Ampicillin/ Sulbactam  Resistant  [1]       Cefazolin  Resistant  [*,1]       Cefepime <=0.12 ug/mL Susceptible      Ceftazidime  Resistant      Ceftriaxone  Resistant      Ciprofloxacin <=0.06 ug/mL Susceptible      Ertapenem <=0.12 ug/mL Susceptible  [*]       Gentamicin <=1 ug/mL Susceptible      Levofloxacin <=0.12 ug/mL Susceptible      Meropenem <=0.25 ug/mL Susceptible      Nitrofurantoin 32 ug/mL Susceptible  [*]       Piperacillin/Tazobactam  Resistant      Trimethoprim/Sulfamethoxazole <=1/19 ug/mL Susceptible                   [*]  Suppressed Antibiotic     [1]  Intrinsically Resistant                Susceptibility        Escherichia coli      KENISHA      Ampicillin >=32 ug/mL Resistant      Ampicillin/ Sulbactam 16 ug/mL Intermediate      Cefazolin 4 ug/mL Intermediate  [*]       Cefepime <=0.12 ug/mL Susceptible      Ceftazidime <=0.5 ug/mL Susceptible      Ceftriaxone <=0.25 ug/mL Susceptible      Ciprofloxacin >=4 ug/mL Resistant      Ertapenem <=0.12 ug/mL Susceptible  [*]       Extended Spectrum Beta-Lactamase Negative ug/mL ESBL Negative  [*]       Gentamicin <=1 ug/mL Susceptible      Levofloxacin >=8 ug/mL Resistant      Meropenem <=0.25 ug/mL Susceptible      Nitrofurantoin <=16 ug/mL Susceptible  [*]       Piperacillin/Tazobactam <=4 ug/mL Susceptible      Trimethoprim/Sulfamethoxazole <=1/19 ug/mL Susceptible                   [*]  Suppressed Antibiotic               Susceptibility        Klebsiella pneumoniae      KENISHA      Ampicillin  Resistant  [1]       Ampicillin/ Sulbactam >=32 ug/mL Resistant      Cefazolin 4 ug/mL Intermediate  [*]       Cefepime <=0.12 ug/mL Susceptible      Ceftazidime <=0.5 ug/mL Susceptible      Ceftriaxone <=0.25 ug/mL Susceptible      Ciprofloxacin <=0.06 ug/mL Susceptible      Ertapenem <=0.12 ug/mL Susceptible  [*]       Extended Spectrum Beta-Lactamase Negative ug/mL ESBL Negative  [*]       Gentamicin <=1 ug/mL Susceptible      Levofloxacin <=0.12 ug/mL Susceptible      Meropenem <=0.25 ug/mL Susceptible      Nitrofurantoin 64 ug/mL Intermediate  [*]       Piperacillin/Tazobactam <=4 ug/mL Susceptible      Trimethoprim/Sulfamethoxazole >16/304 ug/mL Resistant                   [*]  Suppressed Antibiotic     [1]  Intrinsically Resistant               Susceptibility        Enterococcus avium      KENISHA      Ampicillin 0.5 ug/mL Susceptible      Ciprofloxacin <=1 ug/mL Susceptible  [*]       Daptomycin 1 ug/mL Susceptible  [*]       Gentamicin Synergy Susceptible ug/mL Susceptible  [1]       Levofloxacin 2 ug/mL Susceptible  [*]       Linezolid 2 ug/mL Susceptible  [*]        Nitrofurantoin 64 ug/mL Intermediate  [*]       Penicillin 1 ug/mL Susceptible  [*]       Streptomycin Synergy Susceptible ug/mL Susceptible  [*]       Tetracycline <=2 ug/mL Susceptible  [*]       Tigecycline 0.06 ug/mL Susceptible  [*]       Vancomycin 0.5 ug/mL Susceptible                   [*]  Suppressed Antibiotic     [1]  No high level gentamicin resistance found - therefore combination therapy with an aminoglycoside may be indicated for serious enterococcal infections such as bacteremia and endocarditis.               Susceptibility        Pseudomonas aeruginosa      KENISHA      Cefepime 2 ug/mL Susceptible      Ceftazidime 2 ug/mL Susceptible      Ciprofloxacin 0.12 ug/mL Susceptible      Levofloxacin 0.5 ug/mL Susceptible      Meropenem <=0.25 ug/mL Susceptible      Piperacillin/Tazobactam 8 ug/mL Susceptible                      Susceptibility        Proteus vulgaris      KENISHA      Ampicillin >=32 ug/mL Resistant      Ampicillin/ Sulbactam >=32 ug/mL Resistant      Cefepime <=0.12 ug/mL Susceptible      Ceftazidime 2 ug/mL Susceptible      Ceftriaxone 0.032 ug/mL Susceptible      Ciprofloxacin <=0.06 ug/mL Susceptible      Ertapenem <=0.12 ug/mL Susceptible  [*]       Gentamicin <=1 ug/mL Susceptible      Levofloxacin <=0.12 ug/mL Susceptible      Meropenem 0.094 ug/mL Susceptible      Nitrofurantoin 128 ug/mL Resistant  [*,1]       Piperacillin/Tazobactam <=4 ug/mL Susceptible      Trimethoprim/Sulfamethoxazole <=1/19 ug/mL Susceptible                   [*]  Suppressed Antibiotic     [1]  Intrinsically Resistant               Susceptibility Comments       Enterobacter cloacae complex    Enterobacter cloacae, Klebsiella aerogenes, and Citrobacter freundii have moderate to high levels of inducible AmpC ß-lactamase expression. The use of 3rd generation cephalosporins including ceftriaxone and ceftazidime, as well as   piperacillin-tazobactam, should be avoided for invasive infections, regardless of  susceptibility results.               01/08/2025 1246 01/12/2025 1138 Anaerobic Bacterial Culture Routine [[id number]]    (Abnormal)   Abscess from Abdomen    Final result Component Value   Culture 4+ Bacteroides fragilis    Susceptibilities not routinely done, refer to antibiogram to view typical susceptibility profiles    4+ Morganella morganii    Not isolated or reported on routine aerobic culture    4+ Mixed Anaerobic Organisms Present        Susceptibility        Morganella morganii      KENISHA      Ampicillin  Resistant  [1]       Ampicillin/ Sulbactam 16 ug/mL Intermediate      Cefazolin  Resistant  [*]       Ciprofloxacin <=0.06 ug/mL Susceptible      Ertapenem <=0.12 ug/mL Susceptible  [*]       Gentamicin <=1 ug/mL Susceptible      Levofloxacin <=0.12 ug/mL Susceptible      Meropenem <=0.25 ug/mL Susceptible      Nitrofurantoin 128 ug/mL Resistant  [*]       Piperacillin/Tazobactam <=4 ug/mL Susceptible      Trimethoprim/Sulfamethoxazole <=1/19 ug/mL Susceptible                   [*]  Suppressed Antibiotic     [1]  Intrinsically Resistant                          Susceptibility data from last 90 days.  Collected Specimen Info Organism Ampicillin Ampicillin/Sulbactam Cefepime Ceftazidime Ceftriaxone Ciprofloxacin Gentamicin Gentamicin Synergy Levofloxacin Meropenem Piperacillin/Tazobactam Trimethoprim/Sulfamethoxazole    01/08/25 Abscess from Abdomen Enterobacter cloacae complex R R  S R R  S  S   S  S R  S     Escherichia coli  R  I  S  S  S  R  S   R  S  S  S     Klebsiella pneumoniae R  R  S  S  S  S  S   S  S  S  R     Enterococcus avium  S        S         Pseudomonas aeruginosa    S  S   S    S  S  S      Proteus vulgaris  R  R  S  S  S  S  S   S  S  S  S   01/08/25 Abscess from Abdomen Morganella morganii R  I     S  S   S  S  S  S     Bacteroides fragilis                 Mixed Anaerobic Organisms Present                 Collected Specimen Info Organism Vancomycin   01/08/25 Abscess from Abdomen  Enterobacter cloacae complex      Escherichia coli      Klebsiella pneumoniae      Enterococcus avium  S     Pseudomonas aeruginosa      Proteus vulgaris    01/08/25 Abscess from Abdomen Morganella morganii      Bacteroides fragilis      Mixed Anaerobic Organisms Present        Pertinent Radiology   Reviewed images

## 2025-01-14 NOTE — PROGRESS NOTES
[facility] Nurse Inpatient Assessment     Consulted for: New Ileo    Summary: Pt discharge planned for tomorrow 1/15, states today she needs no more education or help with supplies.  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     Post-Operative Diagnosis: Same     Procedure:   Open completion total abdominal colectomy with colostomy takedown and end ileostomy  Small bowel resection  Lysis of adhesions >3 hours     Assessment:      Assessment of new end Ileostomy:  Diagnosis Pertinent to Stoma:  Crohn's and partial large bowel obstruction       Surgery Date: 12/30/24  [facility]: [facility]  Pouching system in place on assessment today: E[place] one piece with barrier ring  Pouch barrier status: intact and 50% melted (barrier ring)  Pouch last changed/wear time: 1/2 (4 days)  Reason for pouch change today: ostomy education and initial post-op assessment  Effectiveness of current pouching/ supply plan:  effective; most likely will need soft convexity at discharge  Change made with ostomy management today: No  Pouching system placed today: Ector one piece and barrier ring   Supplies: at bedside and discussed with patient   Last photo: 1/6/25 16/25 - drainage from incision leaking on pouch      1/6/25 1/6/25 1/6/25    Stoma location: RLQ  Stoma size: 35mm x 35mm  Stoma appearance: viable, healthy, beefy red, round, protruberant, and with some necrosis on stoma  Mucocutaneous junction:  intact  Peristomal complication(s): none   Output: brown and slightly thicker than liquid  Output volume emptied during visit: 90ml  Abdominal assessment:  tender  Surgical site(s): removed dressing and reapplied  gauze/ABDs over stapled incision (did not remove from drain site or prior ostomy site)  NG still in place? No  Pain: not described but pt is voicing pain  Is patient still on a PCA? Yes    Ostomy education assessment:  Participant of teaching session today: patient through Language Line   Education completed today: Initial fitting, Stoma assessment, Pouching system assessment , Refitting of appliance , Pouch change demonstration, Ostomy accessory product use , Introduction to pouches, Peristomal skin care, and difference between output of ileostomy vs colostomy (since pt had colostomy prior)  Educational materials/methods: Verbal, Demonstration, and Hands on  Education still needed: Pouch change return demonstration, Pouch emptying return demonstration, Intake and output recording, Fluid and electrolyte balance , Importance of hydration, When to seek medical attention, Low fiber diet , Lifestyle adjustments , and Discharge instructions  Learning Comprehension:   Psychosocial assessment: pt voicing she'll be able to care for ostomy at home but wanting WOC to perform cares; educated that her taking on the cares will help her to feel more independent  Patient readiness for education today: observing and in significant pain  Following today's visit: patient  is able to demonstrate;         1. How to empty their pouch? Pt previously had colostomy and cared for independently. Not demonstrated today        2. How to change their pouch? Demo provided ; Pt previously had colostomy and cared for independently.         3. How to read and record intake and output correctly? No  Preparation for discharge completed: Placed prescription recommendations in discharge navigator for MD to sign, Ensured patient has extra supplies for discharge, and Discussed how to order supplies after discharge   Preparation for discharge still needed: Ordered samples from  after gaining consent from patient/caregiver, Discussed  "how and when to make an outpatient WOC nurse appointment after discharge, Prepared for discharge home with home care, and Discuss signs/symptoms of when to seek medical attention  Pt support system on discharge: spouse  WOC recommend home care? Yes  Face to face time: 45 minutes    Treatment Plan:   RLQ Ileostomy pouching plan:   Pouching system: ostomy supplies pouches: [place] FECAL ([id number]) ostomy supplies barrier: n/a  Accessories used: WO ostomy accessories: 2\" Cera Barrier Ring ([id number])   Frequency of pouch changes: Twice weekly  WOC follow up plan: Daily Monday-Friday (as able)  Bedside RN interventions: Change pouch PRN if leaking using the supplies above, Empty pouch when 1/3 to 1/2 full, ensure to clean pouch outlet after emptying to prevent odor, Notify WOC for ongoing pouch leakage, Document stoma appearance and output volume, color, and consistency every shift, Encourage patient to empty pouch independently, and Assist patient to measure and record output     Orders: Updated    RECOMMEND PRIMARY TEAM ORDER: None, at this time  Education provided: plan of care  Discussed plan of care with: Patient  WOC nurse follow-up plan: daily (M-F)  Notify WOC if wound(s) deteriorate.  Nursing to notify the Provider(s) and re-consult the WOC Nurse if new skin concern.    DATA:     Current support surface: Standard  Standard gel mattress (Isoflex)  Containment of urine/stool: Continent of bladder and Ileostomy pouch  BMI: Body mass index is 20.06 kg/m .   Active diet order: Orders Placed This Encounter      Low Fiber Diet      Diet     Output: I/O last 3 completed shifts:  In: 2085.3 [P.O.:600; I.V.:100]  Out: 1300 [Urine:1250; Stool:50]     Labs:   Recent Labs   Lab 01/13/25  0613 01/12/25  1055   ALBUMIN 3.2*  --    PREALB 31.6  --    HGB  --  8.1*   INR 1.13  --    WBC  --  8.7     Pressure injury risk assessment:   Sensory Perception: 4-->no impairment  Moisture: 4-->rarely moist  Activity: 3-->walks " occasionally  Mobility: 3-->slightly limited  Nutrition: 2-->probably inadequate  Friction and Shear: 3-->no apparent problem  Saravanan Score: 19    Sigrid Brannon RN, CWOCN  Pager no longer is use, please contact through [employer] group: [facility]

## 2025-01-14 NOTE — PROGRESS NOTES
CALORIE COUNT      Approximate Oral Intake for:   1/13/25  Calories: ~714   Protein: ~20 g      Intake from PN:     1440 ml, 582 kcal, 35 g protein, 130 g cho, 0 g fat   Meets 50% estimated protein needs       Estimated Needs:    Calories: 1045-3450  Protein: 56-70 g       Summary:  X1 meal slip saved for x1 meal ordered per facility. Pt consumed x2 bananas, 2 c rice cereal from home, 100% cream of rice, 50% scrambled eggs, 100% x1 ensure clear. Pt denied taking Gel plus or additional ensure clear. Pt reports oral intakes increasing gradually. Reviewed low fiber diet per pt request.     Pt meeting 60% estimated kcal needs and 36% estimated protein needs 1/13

## 2025-01-14 NOTE — PROGRESS NOTES
Colon [facility] Rectal [facility]  Daily Progress Note    Subjective  Pain currently well controlled with PO medication. Tolerating diet, appetite has been slow but does feel this is progressing. Eating meals from home. Per recent jeff counts, meets 50% of protein needs. Says she likes food from home better. Stoma with 200cc, at least 200cc in pouch currently. Hopeful to discharge home.     Objective  Intake/Output last 24 hrs:    Intake/Output Summary (Last 24 hours) at 1/7/2025 1040  Last data filed at 1/7/2025 0941  Gross per 24 hour   Intake 1416.8 ml   Output 2135 ml   Net -718.2 ml     Temp:  [98  F (36.7  C)-98.1  F (36.7  C)] 98.1  F (36.7  C)  Pulse:  [89-99] 90  Resp:  [17-18] 18  BP: (101-108)/(60-68) 101/61  SpO2:  [98 %] 98 %    Physical Exam:  A&Ox3, NAD  Resp unlabored  Abd soft, non distended, very sensitive but appropriately tender.  Incision open at inferior aspect, minimal serous drainge and base with healthy granulation tissue, repacked. Old ostomy site appears healthy. Staples removed with small amount of serous drainage superiorly.   Ileostomy pink with semi-thick output    Pertinent Labs  Lab Results: personally reviewed.  Culture sensitivities reviewed      Assessment/Plan: This is a 44 year old female POD #15 s/p TAC and end ileostomy for Crohn's disease     - LFD  - Appreciate ID recs, IV frank and PO flagyl, will likely disharge with this for superficial urgical site infection  - Staples removed today  - Lovenox dvt ppx, will need extended course with teaching on discharge  - PO pain meds  - Will stop TPN  - Ambulate, OOBTC    Although PO intake is slowly improving, likely will continue to improve this at home with own meals and strong encouragement for Ensures throughout the day. Once abx plans arranged per ID, OK for discharge today.     Will discuss with Dr. Lafleur and addend with updates    Latasha Dolan PA-C  [facility]  [phone]..............................main

## 2025-01-14 NOTE — PROGRESS NOTES
[facility]    Referral received  from Christine Wilcox RN CM, for IV antibiotics.    Benefits verified.   Pt has coverage for IV antibiotics under her [employer] PM[employer] plan.  Coverage should be 100%.    Writer will speak with pt to review benefits, home infusion and to offer choice of agency/home infusion provider.    Thank you for the referral.    Babita Martinez RN, BSN  [facility] Liaison  [phone] (Mon through Fri, 8:00 am-5:00 pm)  [phone] (Office)    ADDENDUM:   Pt will need a home care agency.  [facility] will begin searching for an agency to provide home care SN.  Once found, Liaison will provide a teach for the home infusions.

## 2025-01-14 NOTE — PLAN OF CARE
Problem: Surgery Nonspecified  Goal: Blood Glucose Level Within Targeted Range  Intervention: Optimize Glycemic Control  Recent Flowsheet Documentation  Taken 1/14/2025 0819 by Maday Skelton RN  Hyperglycemia Management: blood glucose monitored  Hypoglycemia Management: blood glucose monitored     Problem: Surgery Nonspecified  Goal: Optimal Pain Control and Function  Intervention: Prevent or Manage Pain  Recent Flowsheet Documentation  Taken 1/14/2025 1120 by Maday Skelton, RN  Pain Management Interventions: emotional support  Taken 1/14/2025 0929 by Maday Skelton RN  Pain Management Interventions: emotional support  Taken 1/14/2025 0843 by Maday Skelton RN  Pain Management Interventions: medication (see MAR)     Problem: Surgery Nonspecified  Goal: Effective Urinary Elimination  Outcome: Progressing   Goal Outcome Evaluation:         Pt admitted for POD #15 s/p TAC and end ileostomy for Crohn's disease   A/Ox4, Hmong speaking  VSS on RA  C/o 5/10 pain, taking scheduled meds  Up SBA this morning and now up ind with walker this afternoon due to no pain  Low fiber diet with BGM. Good appetite this shift, ate some food from home  L double lumen PICC SL  Up to BR, voiding adequately this shift.   Ileostomy intact with good output  Midline incision staples removed by CRS, lower portion of incision not approximated, no drainage  Old stoma site with gauze over, no drainage  WOC RN saw patient and patient has been good with emptying her ileostomy  Discharge pending home infusion with IV abx   CM following  Nursing to continue to monitor.

## 2025-01-14 NOTE — PROGRESS NOTES
Care Management Follow Up    Length of Stay (days): 19    Expected Discharge Date: 01/15/2025     Concerns to be Addressed:       Patient plan of care discussed at interdisciplinary rounds: Yes    Anticipated Discharge Disposition: Home, Home Care              Anticipated Discharge Services: None  Anticipated Discharge DME: None    Patient/family educated on Medicare website which has current facility and service quality ratings:    Education Provided on the Discharge Plan: Yes  Patient/Family in Agreement with the Plan: yes    Referrals Placed by CM/SW: Homecare  Private pay costs discussed: Not applicable    Discussed  Partnership in Safe Discharge Planning  document with patient/family: No     Handoff Completed: No, handoff not indicated or clinically appropriate    Additional Information:  Message received from bedside RN that patient will be needing IV antibiotics upon discharge. Message sent to [facility] Babita Martinez and Kallie Owen. [facility] referral sent.  3:21 PM  Met with patient with phone interprter services  to speak with her about IV antibiotics. Patient is upset and really wants to discharge home tonight. Informed her that we are waiting on financial estimate from [facility] and then we would need to see if the can get the antibiotic and someone to come teach her how to administer it.   3:41 PM   Patient has 100% coverage per [facility]. Now finding homecareSN    Next Steps: await [facility]  benefit review    Christine Wilcox RN

## 2025-01-14 NOTE — PLAN OF CARE
Problem: Surgery Nonspecified  Goal: Effective Bowel Elimination  Outcome: Progressing  Goal: Optimal Pain Control and Function  Outcome: Progressing  Intervention: Prevent or Manage Pain  Recent Flowsheet Documentation  Taken 1/14/2025 0050 by Lm Barnes RN  Pain Management Interventions: rest     Problem: Bowel Disease, Inflammatory (Ulcerative Colitis or Crohn's Disease)  Goal: Optimal Adaptation to Chronic Illness  Outcome: Progressing     Goal Outcome Evaluation:         Pt C/O abdominal pain this morning. PRN Oxycodone given. Colostomy intact and patent. Surgical incisions clean dry and intact. Abdominal dressing changed. Up to bathroom with standby assist. TPN running per order pt tolerating well.

## 2025-01-14 NOTE — PROGRESS NOTES
Physical Therapy Discharge Summary    Reason for therapy discharge:    Discharged to home.    Progress towards therapy goal(s). See goals on Care Plan in [facility] electronic health record for goal details.  Goals partially met.  Barriers to achieving goals:   discharge from facility.    Therapy recommendation(s):    Further recommended therapy is related to documented deficits, and is necessary to maximize functional independence in order for patient to return to prior level of function.      Elizabeth Mayberry, MARLON 1/14/2025

## 2025-01-14 NOTE — PROGRESS NOTES
CLINICAL NUTRITION SERVICES - REASSESSMENT NOTE     SUBJECTIVE INFORMATION  Assessed patient in room.    CURRENT NUTRITION ORDERS  Diet:Low fiber  Food Allergy: Gluten, Soy   Nutrition Supplement/Snacks: Ensure Clear TID, Gel Plus daily     CURRENT INTAKE/TOLERANCE  See Calorie Count Note 1/14/25      NEW FINDINGS  Oral intakes progressing and pt tolerating well. Pt taking some ensure clear. Per CRS plan to discontinue TPN today.     Weight: Current weight down from admit  Date/Time Weight Weight Method   01/13/25 1121  45 kg (99 lb 4.8 oz)  Standing scale    01/13/25 0532 44.6 kg (98 lb 5.2 oz) Bed scale   01/12/25 0423 45.1 kg (99 lb 8 oz) Standing scale   01/11/25 0611 47 kg (103 lb 9.9 oz) Bed scale   01/09/25 1026 44.7 kg (98 lb 9 oz) Standing scale   01/08/25 1416 45 kg (99 lb 3.3 oz) Bed scale   01/04/25 1700 45.1 kg (99 lb 8 oz) Standing scale   01/02/25 1143 45.9 kg (101 lb 3.1 oz) --   01/01/25 1237 46.4 kg (102 lb 4.7 oz) Bed scale   12/29/24 1933 46.3 kg (102 lb 1.6 oz) Standing scale   12/29/24 1124 46.3 kg (102 lb) Standing scale   12/28/24 1943 46 kg (101 lb 8 oz) Standing scale   12/26/24 0908 47.2 kg (104 lb)      Incision/surgical site  Ileostomy, OP: 850 ml 1/13   Nutrition-relevant labs: BG  last 24 hrs   Nutrition-relevant medications: Continuous cylic TPN, IVF NaCl 10 ml/hr   Scheduled novolog, merrem     Dosing Weight: 47.2 kg     ASSESSED NUTRITION NEEDS  Estimated Energy Needs: 3156-1736 kcals/day (25 -30 kcals/kg )  Justification: Maintenance  Estimated Protein Needs: 56-70 grams protein/day (1.2 - 1.5 grams of pro/kg)  Justification: Increased needs  Estimated Fluid Needs: 2687-1173 mL/day (30 - 35 mL/kg)   Justification: Increased needs    MALNUTRITION  Malnutrition Diagnosis: Patient does not meet two of the established criteria necessary for diagnosing malnutrition but is at risk for malnutrition     EVALUATION OF THE PROGRESS TOWARD GOALS   Previous Goals  Tolerate cyclic TPN -  Met   Diet advancement - Met    NUTRITION DIAGNOSIS  Inadequate oral intake related to altered GI function as evidenced by need for nutrition support   Evaluation: Improving    INTERVENTIONS  Medical food supplement therapy - Continue Ensure clear TID,   Continue Gel Plus daily, cherry    Parenteral Nutrition/IV Fluids - Discontinued per CRS    Goals  Meet nutrition needs - Met via TPN + po     Maintain weight - Not Met, current weight is down   BG <180 - Met      Monitoring/Evaluation      Progress toward goals will be monitored and evaluated per policy.

## 2025-01-14 NOTE — PLAN OF CARE
Goal Outcome Evaluation:  Patient is alert and oriented. Quiet with flat affect.  Rated abdominal pain 2 out of 10 most of the shift but pain increased around bedtime and pt requested oxycodone. 5mg dose given at 2130, pt resting upon reassessment so did not wake her. Also on scheduled tylenol.  Tolerating Low Fiber diet. Family bringing in food from home. Appetite fair, encourage PO fluid intake.  and 137-no sliding scale insulin needed.  DL PICC in left arm with cyclic TPN infusing.  On PO Flagyl and IV Meropenem.  Dressings are all clean dry and intact this shift.  Generalized weakness/fatigue. POD 14. SCDs on at bedtime.  Up to the BR with SBA and ambulated in the [place]erick x1.

## 2025-01-15 ENCOUNTER — HOME INFUSION BILLING (OUTPATIENT)
Dept: HOME HEALTH SERVICES | Facility: HOME HEALTH | Age: 45
End: 2025-01-15
Payer: COMMERCIAL

## 2025-01-15 VITALS
DIASTOLIC BLOOD PRESSURE: 59 MMHG | HEART RATE: 99 BPM | OXYGEN SATURATION: 99 % | BODY MASS INDEX: 20.06 KG/M2 | RESPIRATION RATE: 18 BRPM | TEMPERATURE: 98.1 F | WEIGHT: 99.3 LBS | SYSTOLIC BLOOD PRESSURE: 94 MMHG

## 2025-01-15 LAB
ANION GAP SERPL CALCULATED.3IONS-SCNC: 10 MMOL/L (ref 7–15)
BUN SERPL-MCNC: 17.2 MG/DL (ref 6–20)
CALCIUM SERPL-MCNC: 9.9 MG/DL (ref 8.8–10.4)
CHLORIDE SERPL-SCNC: 102 MMOL/L (ref 98–107)
CREAT SERPL-MCNC: 0.42 MG/DL (ref 0.51–0.95)
EGFRCR SERPLBLD CKD-EPI 2021: >90 ML/MIN/1.73M2
GLUCOSE BLDC GLUCOMTR-MCNC: 102 MG/DL (ref 70–99)
GLUCOSE BLDC GLUCOMTR-MCNC: 104 MG/DL (ref 70–99)
GLUCOSE BLDC GLUCOMTR-MCNC: 86 MG/DL (ref 70–99)
GLUCOSE SERPL-MCNC: 91 MG/DL (ref 70–99)
HCO3 SERPL-SCNC: 26 MMOL/L (ref 22–29)
MAGNESIUM SERPL-MCNC: 1.7 MG/DL (ref 1.7–2.3)
PHOSPHATE SERPL-MCNC: 4.4 MG/DL (ref 2.5–4.5)
POTASSIUM SERPL-SCNC: 4 MMOL/L (ref 3.4–5.3)
SODIUM SERPL-SCNC: 138 MMOL/L (ref 135–145)

## 2025-01-15 PROCEDURE — A4245 ALCOHOL WIPES PER BOX: HCPCS

## 2025-01-15 PROCEDURE — S9502 HIT ANTIBIOTIC Q8H DIEM: HCPCS

## 2025-01-15 PROCEDURE — 99207 PR NO CHARGE LOS: CPT | Performed by: INTERNAL MEDICINE

## 2025-01-15 PROCEDURE — A9270 NON-COVERED ITEM OR SERVICE: HCPCS

## 2025-01-15 PROCEDURE — A4215 STERILE NEEDLE: HCPCS

## 2025-01-15 PROCEDURE — 84100 ASSAY OF PHOSPHORUS: CPT

## 2025-01-15 PROCEDURE — A4213 20+ CC SYRINGE ONLY: HCPCS

## 2025-01-15 PROCEDURE — 250N000011 HC RX IP 250 OP 636: Performed by: INTERNAL MEDICINE

## 2025-01-15 PROCEDURE — 83735 ASSAY OF MAGNESIUM: CPT

## 2025-01-15 PROCEDURE — 250N000013 HC RX MED GY IP 250 OP 250 PS 637: Performed by: STUDENT IN AN ORGANIZED HEALTH CARE EDUCATION/TRAINING PROGRAM

## 2025-01-15 PROCEDURE — 250N000011 HC RX IP 250 OP 636: Mod: JZ | Performed by: STUDENT IN AN ORGANIZED HEALTH CARE EDUCATION/TRAINING PROGRAM

## 2025-01-15 PROCEDURE — 250N000011 HC RX IP 250 OP 636: Performed by: STUDENT IN AN ORGANIZED HEALTH CARE EDUCATION/TRAINING PROGRAM

## 2025-01-15 PROCEDURE — 80048 BASIC METABOLIC PNL TOTAL CA: CPT

## 2025-01-15 PROCEDURE — A4452 WATERPROOF TAPE: HCPCS

## 2025-01-15 RX ORDER — MEROPENEM 1 G/1
1 INJECTION, POWDER, FOR SOLUTION INTRAVENOUS EVERY 8 HOURS
Qty: 27 EACH | Refills: 0 | Status: DISPENSED | OUTPATIENT
Start: 2025-01-15 | End: 2025-01-27

## 2025-01-15 RX ORDER — MEROPENEM 1 G/1
1 INJECTION, POWDER, FOR SOLUTION INTRAVENOUS EVERY 8 HOURS
Status: SHIPPED
Start: 2025-01-15 | End: 2025-01-24

## 2025-01-15 RX ADMIN — ACETAMINOPHEN 650 MG: 325 TABLET ORAL at 17:03

## 2025-01-15 RX ADMIN — ALTEPLASE 2 MG: 2.2 INJECTION, POWDER, LYOPHILIZED, FOR SOLUTION INTRAVENOUS at 15:41

## 2025-01-15 RX ADMIN — ACETAMINOPHEN 650 MG: 325 TABLET ORAL at 09:16

## 2025-01-15 RX ADMIN — MEROPENEM 1 G: 1 INJECTION, POWDER, FOR SOLUTION INTRAVENOUS at 03:54

## 2025-01-15 RX ADMIN — ENOXAPARIN SODIUM 40 MG: 40 INJECTION SUBCUTANEOUS at 09:16

## 2025-01-15 RX ADMIN — MEROPENEM 1 G: 1 INJECTION, POWDER, FOR SOLUTION INTRAVENOUS at 11:39

## 2025-01-15 ASSESSMENT — ACTIVITIES OF DAILY LIVING (ADL)
ADLS_ACUITY_SCORE: 37
ADLS_ACUITY_SCORE: 40
ADLS_ACUITY_SCORE: 37
ADLS_ACUITY_SCORE: 40
ADLS_ACUITY_SCORE: 37
ADLS_ACUITY_SCORE: 40
ADLS_ACUITY_SCORE: 40
ADLS_ACUITY_SCORE: 37
ADLS_ACUITY_SCORE: 40
ADLS_ACUITY_SCORE: 40
ADLS_ACUITY_SCORE: 37

## 2025-01-15 NOTE — PROGRESS NOTES
Colon and Rectal Surgery  Daily Progress Note    Subjective  [facility] [place]  utilized for rounds. Patient reports feeling well today. She denies any abdominal pain or nausea. She feels her appetite is low, but she is able to eat OK. Per nutrition note, patient is meeting 80% of kcal needs and 100% of protein needs. She feels ready for discharge once home infusion care is coordinated. Ileostomy with 50 mL recorded. Remains afebrile and vitally stable.     Objective  Intake/Output last 24 hrs:    Intake/Output Summary (Last 24 hours) at 1/15/2025 0931  Last data filed at 1/15/2025 0452  Gross per 24 hour   Intake 1080 ml   Output 1350 ml   Net -270 ml     Temp:  [97.8  F (36.6  C)-98.1  F (36.7  C)] 98.1  F (36.7  C)  Pulse:  [] 95  Resp:  [16-20] 16  BP: ()/(61-68) 97/61  SpO2:  [98 %-100 %] 100 %    Physical Exam:  General: awake, alert, lying in bed, in no acute distress  Head: normocephalic, atraumatic  Respiratory: non-labored breathing  Abdomen: soft, appropriately tender, non-distended              Incisions: midline incision open at inferior aspect with healthy appearing granulation tissue. Some serous drainage with palpation. No surrounding erythema or cellulitis.    Ileostomy: pink with loose/semi-thick brown output  Skin: No rashes or lesions  Musculoskeletal: moves all four extremities equally  Psychological: alert and oriented, answers questions appropriately    Pertinent Labs  Lab Results: personally reviewed.  Lab Results   Component Value Date     01/15/2025     01/13/2025     01/12/2025    .0 04/24/2017    CO2 26 01/15/2025    CO2 25 01/13/2025    CO2 25 01/12/2025    CO2 25 06/23/2022    CO2 23 06/03/2022    CO2 22 06/02/2022    CO2 26.0 04/24/2017    BUN 17.2 01/15/2025    BUN 20.5 01/13/2025    BUN 16.1 01/12/2025    BUN 6 06/23/2022    BUN 3 06/03/2022    BUN 4 06/02/2022    BUN 7.0 04/24/2017     Lab Results   Component Value Date    WBC 8.7 01/12/2025     WBC 7.8 01/11/2025    WBC 10.4 01/10/2025    HGB 8.1 01/12/2025    HGB 7.0 01/11/2025    HGB 7.0 01/10/2025    HGB 10.8 01/10/2018    HGB 10.3 11/08/2017    HGB 9.8 09/18/2017    HCT 26.1 01/12/2025    HCT 22.7 01/11/2025    HCT 22.7 01/10/2025    HCT 35.9 01/10/2018    HCT 33.8 11/08/2017    HCT 31.4 07/31/2017    MCV 93 01/12/2025    MCV 93 01/11/2025    MCV 94 01/10/2025    MCV 83.1 01/10/2018    MCV 77.9 11/08/2017    MCV 86.3 07/31/2017     01/12/2025     01/11/2025     01/10/2025       Assessment/Plan: This is a 44 year old female POD #16 s/p TAC and end ileostomy for Crohn's disease     - Low fiber diet, continue Ensures  - Appreciate ID recs, IV frank and PO flagyl. Plan to disharge with IV antibiotics for superficial surgical site infection  - Lovenox dvt ppx - will need extended course, please do teaching  - PO pain meds  - Ambulate, OOB  - Plan for discharge, ideally later today, once home infusion and RN coordinated. Appreciate SW/CM assistance    Will discuss with Dr. Lafleur and addend with any updates.    Dania Mares PA-C  [facility]  [phone]..............................main

## 2025-01-15 NOTE — PLAN OF CARE
Problem: Adult Inpatient Plan of Care  Goal: Plan of Care Review  Description: The Plan of Care Review/Shift note should be completed every shift.  The Outcome Evaluation is a brief statement about your assessment that the patient is improving, declining, or no change.  This information will be displayed automatically on your shift  note.  1/15/2025 1421 by Kirstin Mckeon RN  Outcome: Progressing  Flowsheets (Taken 1/15/2025 1421)  Outcome Evaluation: tolerating regular diet, Ind in room/halls. Instructed on Lovenox- pt verbalixed understanding. Ind with osotomy cares. Home infusion sat pt to teach ABX - abx will be sent to her home this evening prior to 2000 dose. Purple lumen is clogged- asked MD for tPA - will sttempt to clear prior to dc. Hoping to dc this evening  Plan of Care Reviewed With: patient  Overall Patient Progress: improving  1/15/2025 1421 by Kirstin Mckeon RN  Reactivated  Flowsheets (Taken 1/15/2025 1421)  Outcome Evaluation: tolerating regular diet, Ind in room/halls. Instructed on Lovenox- pt verbalixed understanding. Ind with osotomy cares. Home infusion sat pt to teach ABX - abx will be sent to her home this evening prior to 2000 dose. Purple lumen is clogged- asked MD for tPA - will sttempt to clear prior to dc. Hoping to dc this evening  Plan of Care Reviewed With: patient  Overall Patient Progress: improving

## 2025-01-15 NOTE — PLAN OF CARE
Problem: Nausea and Vomiting  Goal: Nausea and Vomiting Relief  Outcome: Progressing  Intervention: Prevent and Manage Nausea and Vomiting  Recent Flowsheet Documentation  Taken 1/15/2025 0000 by Dianna Graf RN  Fluid/Electrolyte Management: fluids provided     Problem: Surgery Nonspecified  Goal: Blood Glucose Level Within Targeted Range  Intervention: Optimize Glycemic Control  Recent Flowsheet Documentation  Taken 1/15/2025 0000 by Dianna Graf RN  Hyperglycemia Management: blood glucose monitored  Hypoglycemia Management: blood glucose monitored     Problem: Intestinal Obstruction  Goal: Optimal Bowel Function  Intervention: Promote Bowel Function  Recent Flowsheet Documentation  Taken 1/15/2025 0000 by Dianna Graf RN  Body Position: position changed independently  Head of Bed (HOB) Positioning: HOB at 20-30 degrees   Goal Outcome Evaluation:       Patient alert and oriented, speaks Hmong,  utilized. Denies pain, N/V.   - Emptied the ileostomy independently.   - Blood glucose at 0200 was 102.  -  scheduled Meropenem given per order.  - vss on RA  Vital signs:  Temp: 97.8  F (36.6  C) Temp src: Oral BP: 101/63 Pulse: 84   Resp: 20 SpO2: 98 % O2 Device: None (Room air)

## 2025-01-15 NOTE — PROGRESS NOTES
[facility]        SOC: 1/15/25    Dx: Crohn's disease of colon with abscess      Therapy:  Meropenem IVP q8hr via midline.  Next dose due today at 2000    Delivery:  to patient home by 1900     Plan:  [facility]       Nurse visit:  Request sent to [facility] scheduling to arrange SOC SNV on 1/16/25     Per [facility] care plan, labs are needed 1/16/25      Delia Lincoln RN on 1/15/2025 at 5:08 PM

## 2025-01-15 NOTE — PROGRESS NOTES
Care Management Follow Up    Length of Stay (days): 20    Expected Discharge Date: 01/15/2025     Concerns to be Addressed:       Patient plan of care discussed at interdisciplinary rounds: Yes    Anticipated Discharge Disposition: Home, Home Care              Anticipated Discharge Services: None  Anticipated Discharge DME: None    Patient/family educated on Medicare website which has current facility and service quality ratings:    Education Provided on the Discharge Plan: Yes  Patient/Family in Agreement with the Plan: yes    Referrals Placed by CM/SW: Homecare  Private pay costs discussed: Not applicable    Discussed  Partnership in Safe Discharge Planning  document with patient/family: No     Handoff Completed: No, handoff not indicated or clinically appropriate    Additional Information:  0736 message sent to [facility] to check on status of patients infusion and homecare. They are still waiting on an accepting homecare agency for the patient.  It appears they received a message from homecare agency stating the wrong number was called to speak with the patient.  10:55 AM  With [facility]  via phone: met with patient in her room and verified phone number in chart and it is correct. Message sent to Homecare referral  11:24 AM  Teams message received from [facility]: [facility] is reviewing the patient  2:14 PM   Message received from [facility] that patients teaching went well and they have a homecare RN for the infusion portion  but do not have an accepting homecare agency for her ostomy care. Sent message to colorectal PA with this information. Per patient she has been caring for her ostomy herself.   2:19 PM   Alerted by [facility] that  part of the PICC is not flushing. Bedside RN alerted  Next Steps:await discharge orders    Christine Wilcox RN  Care Management Discharge Note    Discharge Date: 01/15/2025       Discharge Disposition: Home Care, Home  Infusion    Discharge Services: Other (see comment) (home infusion) [facility]    Discharge DME: None    Discharge Transportation: family or friend will provide    Private pay costs discussed:na    Does the patient's insurance plan have a 3 day qualifying hospital stay waiver?  Yes     Which insurance plan 3 day waiver is available? Alternative insurance waiver    Will the waiver be used for post-acute placement? No    PAS Confirmation Code:  [id number]  Patient/family educated on Medicare website which has current facility and service quality ratings:  na    Education Provided on the Discharge Plan: Yes  Persons Notified of Discharge Plans: patient, bedside RN. [facility]  Patient/Family in Agreement with the Plan: yes    Handoff Referral Completed: No, handoff not indicated or clinically appropriate    Additional Information:  Patient will discharge home today with [facility] following providing Homecare RN for PICC dressing changes. Unable to find home care for colostomy. Patient aware and states she has been caring for colostomy while here. Colorectal PA aware of inability to find home care for ostomy.     Christine Wilcox, RN

## 2025-01-15 NOTE — PROGRESS NOTES
CALORIE COUNT      Approximate Oral Intake for:   1/14/25  Calories: 943+  Protein: 75+ g      Intake from PN:    None, discontinued 1/14       Estimated Needs:    Calories: 3952-8041  Protein: 56-70 g       Summary:  X3 meal slips saved for x3 meals ordered per facility. Pt consuming eggs, mashed potatoes, juice, broth. Pt taking some rice porridge from home with meals. Pt did not take nutrition supplement Gel Plus.     Pt meeting 80% estimated kcal needs and 100% estimated protein needs 1/14

## 2025-01-15 NOTE — PLAN OF CARE
Problem: Adult Inpatient Plan of Care  Goal: Absence of Hospital-Acquired Illness or Injury  Intervention: Identify and Manage Fall Risk  Recent Flowsheet Documentation  Taken 1/14/2025 1650 by Nimo Moulton RN  Safety Promotion/Fall Prevention:   assistive device/personal items within reach   clutter free environment maintained   lighting adjusted   nonskid shoes/slippers when out of bed   patient and family education   room near nurse's station   safety round/check completed  Intervention: Prevent Skin Injury  Recent Flowsheet Documentation  Taken 1/14/2025 1650 by Nimo Moulton RN  Body Position: position changed independently  Intervention: Prevent and Manage VTE (Venous Thromboembolism) Risk  Recent Flowsheet Documentation  Taken 1/14/2025 1650 by Nimo Moulton RN  VTE Prevention/Management: SCDs off (sequential compression devices)  Intervention: Prevent Infection  Recent Flowsheet Documentation  Taken 1/14/2025 1650 by Nimo Moulton RN  Infection Prevention: single patient room provided     Problem: Pain Acute  Goal: Optimal Pain Control and Function  Intervention: Prevent or Manage Pain  Recent Flowsheet Documentation  Taken 1/14/2025 1650 by Nimo Moulton RN  Medication Review/Management: medications reviewed     Problem: Intestinal Obstruction  Goal: Optimal Bowel Function  Intervention: Promote Bowel Function  Recent Flowsheet Documentation  Taken 1/14/2025 1650 by Nimo Moulton RN  Body Position: position changed independently  Head of Bed (HOB) Positioning: HOB at 20-30 degrees  Positioning/Transfer Devices:   pillows   in use  Goal: Fluid and Electrolyte Balance  Intervention: Monitor and Manage Hypovolemia  Recent Flowsheet Documentation  Taken 1/14/2025 1650 by Niom Moulton RN  Fluid/Electrolyte Management: fluids provided     Problem: Nausea and Vomiting  Goal: Nausea and Vomiting Relief  Intervention: Prevent and Manage Nausea and Vomiting  Recent Flowsheet  Documentation  Taken 1/14/2025 1650 by Nimo Moulton RN  Fluid/Electrolyte Management: fluids provided     Problem: Surgery Nonspecified  Goal: Fluid and Electrolyte Balance  Intervention: Monitor and Manage Fluid and Electrolyte Balance  Recent Flowsheet Documentation  Taken 1/14/2025 1650 by Nimo Moulton RN  Fluid/Electrolyte Management: fluids provided  Goal: Blood Glucose Level Within Targeted Range  Intervention: Optimize Glycemic Control  Recent Flowsheet Documentation  Taken 1/14/2025 1650 by Nimo Moulton RN  Hyperglycemia Management: blood glucose monitored  Hypoglycemia Management: blood glucose monitored  Goal: Anesthesia/Sedation Recovery  Intervention: Optimize Anesthesia Recovery  Recent Flowsheet Documentation  Taken 1/14/2025 1650 by iNmo Moulton RN  Safety Promotion/Fall Prevention:   assistive device/personal items within reach   clutter free environment maintained   lighting adjusted   nonskid shoes/slippers when out of bed   patient and family education   room near nurse's station   safety round/check completed  Goal: Effective Oxygenation and Ventilation  Intervention: Optimize Oxygenation and Ventilation  Recent Flowsheet Documentation  Taken 1/14/2025 1650 by Nimo Moulton RN  Cough And Deep Breathing: done with encouragement  Activity Management:   activity adjusted per tolerance   ambulated outside room   back to bed  Head of Bed (HOB) Positioning: HOB at 20-30 degrees     Problem: Pain Acute  Goal: Optimal Pain Control and Function  Intervention: Prevent or Manage Pain  Recent Flowsheet Documentation  Taken 1/14/2025 1650 by Nimo Moulton RN  Medication Review/Management: medications reviewed     Problem: Nausea and Vomiting  Goal: Nausea and Vomiting Relief  Intervention: Prevent and Manage Nausea and Vomiting  Recent Flowsheet Documentation  Taken 1/14/2025 1650 by Nimo Moulton RN  Fluid/Electrolyte Management: fluids provided     Problem: Surgery  Nonspecified  Goal: Fluid and Electrolyte Balance  Intervention: Monitor and Manage Fluid and Electrolyte Balance  Recent Flowsheet Documentation  Taken 1/14/2025 1650 by Nimo Moulton RN  Fluid/Electrolyte Management: fluids provided  Goal: Blood Glucose Level Within Targeted Range  Intervention: Optimize Glycemic Control  Recent Flowsheet Documentation  Taken 1/14/2025 1650 by Nimo Moulton RN  Hyperglycemia Management: blood glucose monitored  Hypoglycemia Management: blood glucose monitored  Goal: Anesthesia/Sedation Recovery  Intervention: Optimize Anesthesia Recovery  Recent Flowsheet Documentation  Taken 1/14/2025 1650 by Nimo Moulton RN  Safety Promotion/Fall Prevention:   assistive device/personal items within reach   clutter free environment maintained   lighting adjusted   nonskid shoes/slippers when out of bed   patient and family education   room near nurse's station   safety round/check completed  Goal: Effective Oxygenation and Ventilation  Intervention: Optimize Oxygenation and Ventilation  Recent Flowsheet Documentation  Taken 1/14/2025 1650 by Nimo Moulton RN  Cough And Deep Breathing: done with encouragement  Activity Management:   activity adjusted per tolerance   ambulated outside room   back to bed  Head of Bed (HOB) Positioning: HOB at 20-30 degrees   NURSING PROGRESS NOTE  Shift Summary      Date: January 14, 2025     Neuro/Musculoskeletal:  A&Ox4.    Respiratory:  Sating in the 90s on RA.  :  Adequate urine output.    Diet/Appetite:  Tolerating Low Fiber diet.  Activity:  Independent   Pain:  3/10  Skin:  No new deficits noted.     Pertinent Shift Updates:  Scheduled Tylenol given for 3/10 abdominal pain, effective. Denies any PRN's. Denies nausea.  and 95, no coverage given. Remains afebrile. Ambulating hallways independently. Continue to monitor.      Plan:  Discharge 1/15 with home infusion orders if no acute events overnight      Nimo Moulton RN   ....................................................

## 2025-01-15 NOTE — PROGRESS NOTES
[facility]    Met with patient at bedside for teach of IV Meropenem via syringe. Use of [employer]  via language line used. Plan is for patient to discharge today.    Provided hands-on teaching using teaching mats and demo equipment. Patient taught SAS method and was able to provide return demonstration using aseptic technique.  Also taught to reconstitute med with NS 20 ml. Discussed storage and delivery of med and supplies via , as well as planned SNV weekly for CLCL. First visit requested for tomorrow or Friday. Unable to secure an regional agency so [facility] nursing will see pt. Pt will go to [facility] clinic for any further needed ileostomy cares or education needed. CM updated. Extension tubing was added to double lumen PICC. IV catheter flushed without resistance on red lumen but occluded on other side. Requested primary RN to administer TPA prior to discharge. Delivery of med and supplies will be delivered to patient's home by 2000 today, when next dose is due.  A nurse  will contact patient to set up home nursing visit for SOC with [facility].    Provided 24/7 phone number and answered all questions. Patient verbalized understanding of discharge plans.    Thank you for the opportunity to provide infusion services to this patient.    Kallie Owen RN  [facility]  [phone] (Monday through Friday, 8am-5pm)  [phone] (office)

## 2025-01-15 NOTE — PROGRESS NOTES
ID chart check    Orders written for meropenem through 1/24/25 with weekly labs. Reviewed with colorectal team yesterday, they will see her in 1-2 weeks. Reviewed images with radiology of CT from 1/7. Can plan another CT in one week.     Gerardo Eldridge MD

## 2025-01-15 NOTE — PROVIDER NOTIFICATION
Colorectal paged at 2045 again for occluded picc line.  aware, verbal orders obtained.    Nimo Moulton RN

## 2025-01-16 ENCOUNTER — HOME CARE VISIT (OUTPATIENT)
Dept: HOME HEALTH SERVICES | Facility: HOME HEALTH | Age: 45
End: 2025-01-16
Payer: COMMERCIAL

## 2025-01-16 ENCOUNTER — LAB REQUISITION (OUTPATIENT)
Dept: LAB | Facility: HOSPITAL | Age: 45
End: 2025-01-16
Payer: COMMERCIAL

## 2025-01-16 VITALS
DIASTOLIC BLOOD PRESSURE: 76 MMHG | HEART RATE: 99 BPM | SYSTOLIC BLOOD PRESSURE: 102 MMHG | BODY MASS INDEX: 19.39 KG/M2 | OXYGEN SATURATION: 99 % | RESPIRATION RATE: 18 BRPM | TEMPERATURE: 99.3 F | WEIGHT: 96 LBS

## 2025-01-16 DIAGNOSIS — K50.114 CROHN'S DISEASE OF LARGE INTESTINE WITH ABSCESS (H): ICD-10-CM

## 2025-01-16 LAB
ALBUMIN SERPL BCG-MCNC: 3.5 G/DL (ref 3.5–5.2)
ALP SERPL-CCNC: 218 U/L (ref 40–150)
ALT SERPL W P-5'-P-CCNC: 32 U/L (ref 0–50)
ANION GAP SERPL CALCULATED.3IONS-SCNC: 11 MMOL/L (ref 7–15)
AST SERPL W P-5'-P-CCNC: 27 U/L (ref 0–45)
BASOPHILS # BLD AUTO: 0.1 10E3/UL (ref 0–0.2)
BASOPHILS NFR BLD AUTO: 1 %
BILIRUB SERPL-MCNC: 0.2 MG/DL
BUN SERPL-MCNC: 18.4 MG/DL (ref 6–20)
CALCIUM SERPL-MCNC: 9.6 MG/DL (ref 8.8–10.4)
CHLORIDE SERPL-SCNC: 103 MMOL/L (ref 98–107)
CREAT SERPL-MCNC: 0.45 MG/DL (ref 0.51–0.95)
EGFRCR SERPLBLD CKD-EPI 2021: >90 ML/MIN/1.73M2
EOSINOPHIL # BLD AUTO: 0.1 10E3/UL (ref 0–0.7)
EOSINOPHIL NFR BLD AUTO: 1 %
ERYTHROCYTE [DISTWIDTH] IN BLOOD BY AUTOMATED COUNT: 14.7 % (ref 10–15)
GLUCOSE SERPL-MCNC: 76 MG/DL (ref 70–99)
HCO3 SERPL-SCNC: 25 MMOL/L (ref 22–29)
HCT VFR BLD AUTO: 27.7 % (ref 35–47)
HGB BLD-MCNC: 8.5 G/DL (ref 11.7–15.7)
IMM GRANULOCYTES # BLD: 0 10E3/UL
IMM GRANULOCYTES NFR BLD: 0 %
LYMPHOCYTES # BLD AUTO: 2.6 10E3/UL (ref 0.8–5.3)
LYMPHOCYTES NFR BLD AUTO: 34 %
MCH RBC QN AUTO: 28.6 PG (ref 26.5–33)
MCHC RBC AUTO-ENTMCNC: 30.7 G/DL (ref 31.5–36.5)
MCV RBC AUTO: 93 FL (ref 78–100)
MONOCYTES # BLD AUTO: 0.4 10E3/UL (ref 0–1.3)
MONOCYTES NFR BLD AUTO: 5 %
NEUTROPHILS # BLD AUTO: 4.6 10E3/UL (ref 1.6–8.3)
NEUTROPHILS NFR BLD AUTO: 59 %
NRBC # BLD AUTO: 0 10E3/UL
NRBC BLD AUTO-RTO: 0 /100
PLATELET # BLD AUTO: 617 10E3/UL (ref 150–450)
POTASSIUM SERPL-SCNC: 3.7 MMOL/L (ref 3.4–5.3)
PROT SERPL-MCNC: 8.4 G/DL (ref 6.4–8.3)
RBC # BLD AUTO: 2.97 10E6/UL (ref 3.8–5.2)
SODIUM SERPL-SCNC: 139 MMOL/L (ref 135–145)
WBC # BLD AUTO: 7.7 10E3/UL (ref 4–11)

## 2025-01-16 PROCEDURE — 84520 ASSAY OF UREA NITROGEN: CPT | Performed by: INTERNAL MEDICINE

## 2025-01-16 PROCEDURE — 84155 ASSAY OF PROTEIN SERUM: CPT | Performed by: INTERNAL MEDICINE

## 2025-01-16 PROCEDURE — S9502 HIT ANTIBIOTIC Q8H DIEM: HCPCS

## 2025-01-16 PROCEDURE — 85004 AUTOMATED DIFF WBC COUNT: CPT | Performed by: INTERNAL MEDICINE

## 2025-01-16 NOTE — DISCHARGE SUMMARY
Pt discharged with daughter. All questions, concerns, and teaching addressed. Discharge instructions reviewed. No further questions at this time.    Nimo Moulton RN

## 2025-01-16 NOTE — PROGRESS NOTES
Lab-Only Nursing Note    Labs obtained via PICC    ONTIME Tracking number: [id number]    Facility sent to: [facility]    Saline administered (in mLs): 40ml     Next Visit Plan:   x1 week for CLC and labs, 1/23/2025     Carol Griffin RN 1/16/2025

## 2025-01-16 NOTE — DISCHARGE SUMMARY
Discharge Summary    Patient name: Dain Tejeda  YOB: 1980   Age: 44 year old  Medical Record Number: [id number]  Primary Care Physician:  Kevin Woodruff       Admission Date: 12/26/2024.  Discharge Date: 1/15/2025.  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 coli and norovirus.  She had tried and failed multiple attempts at medical management.  We discussed the the procedure of laparoscopic completion total abdominal colectomy with end ileostomy.     PROCEDURES PERFORMED DURING HOSPITALIZATION:   Open completion total abdominal colectomy with colostomy takedown and end ileostomy  Small bowel resection  Lysis of adhesions >3 hours   PICC placement per IV team    FINAL PATHOLOGY:   Final Diagnosis   A(1).  Colon, colostomy, excision:  -Skin and adjacent colon with active chronic ostomy site changes.  -Negative for dysplasia or malignancy     B(2).  Terminal ileum, appendix, total colon, total colectomy:  -Active chronic Crohn's disease with prominent ulcerations, underlying abscesses and acute serositis  -Multiple inflammatory type polyps (pseudopolyps), negative for dysplasia or malignancy.  -Active chronic disease diffusely involves small bowel and involves colon with patchy distribution, and extends to both proximal and distal margins of resection.  -Appendix with focal active chronic inflammatory changes  -Granulomas are identified.  -Immunohistochemical stains for CMV have been requested and results will reported in an addendum when  available.  -Twelve (12) lymph nodes negative for metastatic carcinoma  -Negative for dysplasia or malignancy     C(3).  Small bowel, segmental resection:  -Small bowel with prominent acute serositis.  -Small bowel mucosa with no significant pathological changes.  -Negative for dysplasia or malignancy     D(4).  Additional small bowel, segmental resection:  -Small bowel with patchy active chronic Crohn's disease, with focal acute serositis.  -Margins of resection with focal acute serositis.  -Negative for dysplasia or malignancy     BRIEF HOSPITAL COURSE: This is a 45 y/o female with a history of Crohn's colitis and ileitis, colostomy in place, with a stricture in the transverse colon that was causing large bowel obstructions. She was initially scheduled for surgery on 1/30/25, but she presented to the ED on 12/26/24 due to abdominal pain and vomiting. CT showed narrowing of the transverse colon with thickening of the proximal colon, and significant fecalization in the small bowel. She was started inb TPN on admission. She was taken to the OR on 12/30/24 for an open completion total abdominal colectomy, colostomy takedown, end ileostomy, and small bowel resection. An NG tube was place intraoperatively, and wsa removed POD #3. She struggled significantly with postoperative pain requiring a PCA. Her ostomy began functioning around POD #4. She had an increased leukocytosis on POD #8, and a CT scan was obtained. This showed a small abdominal fluid collection that was aspirated by IR, and she was started on antibiotics. She had some purulent drainage from her midline incision that was covered with antibiotics. She progressed slowly, diet was advanced, and WBC downtrended. TPN was discontinued on POD #15. Infectious disease recommended extended IV antibiotics. She was discharged to home on POD #16 (HD #20) with home infusion for IV antibiotics and Lovenox for extended VTE prophylaxis.     PHYSICAL EXAM ON DATE OF  "DISCHARGE:   General: awake, alert, lying in bed, in no acute distress  Head: normocephalic, atraumatic  Respiratory: non-labored breathing  Abdomen: soft, appropriately tender, non-distended              Incisions: midline incision open at inferior aspect with healthy appearing granulation tissue. Some serous drainage with palpation. No surrounding erythema or cellulitis.               Ileostomy: pink with loose/semi-thick brown output  Skin: No rashes or lesions  Musculoskeletal: moves all four extremities equally  Psychological: alert and oriented, answers questions appropriately    Vital Signs in last 24 hours:   Temp:  [98.1  F (36.7  C)-99.3  F (37.4  C)] 99.3  F (37.4  C)  Pulse:  [99] 99  Resp:  [18] 18  BP: ()/(59-76) 102/76  SpO2:  [99 %] 99 %    COMPLICATIONS IN HOSPITAL: Abdominal abscess, surgical site infection    IMPORTANT PENDING TEST RESULTS: None    Discharge Medications/Orders:     Review of your medicines        UNREVIEWED medicines. Ask your doctor about these medicines        Dose / Directions   * BD PosiFlush 0.9 % flush  Used for: Crohn's disease of colon with abscess (H)  Generic drug: sodium chloride (PF)      Dose: 20 mL  Use 20 mLs for reconstitution every 8 hours for 9 days. Use 2 syringes (20 mL) to reconstitute each vial of meropenem 1000 mg. Shake until solution is clear.  Quantity: 540 mL  Refills: 0     * BD PosiFlush 0.9 % flush  Used for: Crohn's disease of colon with abscess (H)  Generic drug: sodium chloride (PF)      Dose: 10 mL  Inject 10 mLs into the vein as needed for line flush. Flush IV before and after medication administration as directed and/or at least every 24 hours.  Quantity: [id number] mL  Refills: 0     Emergency Supply Kit (Central)  Used for: Crohn's disease of colon with abscess (H)      Dose: 1 kit  Patient use for emergency only. Contents: 3 sodium chloride 0.9% flushes, 1 dressing kit, 1 microclave ext set 14\", 4 nitrile gloves (med), 6 alcohol prep pads, " "1 bacitracin, 1 syringe (10 cc 20 G 1\"). Call [phone] to reorder.  Quantity: [id number] kit  Refills: 0           * This list has 2 medication(s) that are the same as other medications prescribed for you. Read the directions carefully, and ask your doctor or other care provider to review them with you.                START taking        Dose / Directions   acetaminophen 325 MG tablet  Commonly known as: TYLENOL  Used for: Crohn's disease of colon with abscess (H)      Dose: 650 mg  Take 2 tablets (650 mg) by mouth 4 times daily.  Quantity: 60 tablet  Refills: 2     enoxaparin ANTICOAGULANT 40 MG/0.4ML syringe  Commonly known as: LOVENOX  Used for: Crohn's disease of colon with abscess (H)      Dose: 40 mg  Inject 0.4 mLs (40 mg) subcutaneously daily for 13 days.  Quantity: 5.2 mL  Refills: 0     * meropenem 1 g injection  Commonly known as: MERREM  Used for: Crohn's disease of colon with abscess (H)      Dose: 1 g  Inject 20 mLs (1 g) over 5 minutes into the vein via push every 8 hours for 9 days. Reconstitute 1 meropenem 1000 mg vial. Draw up meropenem 50 mg/mL. Discard remainder of vial.  Quantity: 27 each  Refills: 0     * meropenem 1 g injection  Commonly known as: MERREM  Indication: Confined Pocket or Collection of Pus, Infection Following Surgery  Used for: Crohn's disease of colon with abscess (H)      Dose: 1 g  Inject 20 mLs (1 g) over 30 minutes into the vein every 8 hours for 9 days. Last day 1/24/25. Cbc with diff, cmp weekly while on meropenem  Refills: 0     oxyCODONE 5 MG tablet  Commonly known as: ROXICODONE  Used for: Crohn's disease of colon with abscess (H)      Dose: 5-10 mg  Take 1-2 tablets (5-10 mg) by mouth every 4 hours as needed for severe pain.  Quantity: 15 tablet  Refills: 0           * This list has 2 medication(s) that are the same as other medications prescribed for you. Read the directions carefully, and ask your doctor or other care provider to review them with you.            "     CONTINUE these medicines which have NOT CHANGED        Dose / Directions   chlorpheniramine-pseudoePHEDrine 4-60 MG Tabs per tablet  Commonly known as: PSEUDO-GEST PLUS      Dose: 1 tablet  Take 1 tablet by mouth every 6 hours as needed for allergies.  Refills: 0     diphenhydrAMINE 25 MG tablet  Commonly known as: BENADRYL      Dose: 25 mg  Take 25 mg by mouth every 6 hours as needed for itching or allergies.  Refills: 0     diphenhydrAMINE-acetaminophen  MG tablet  Commonly known as: TYLENOL PM      Dose: 1 tablet  Take 1 tablet by mouth nightly as needed for sleep.  Refills: 0     ondansetron 4 MG ODT tab  Commonly known as: ZOFRAN ODT      Dose: 4 mg  Take 4 mg by mouth every 8 hours as needed for nausea.  Refills: 0            STOP taking      loperamide 2 MG tablet  Commonly known as: IMODIUM A-D        predniSONE 10 MG tablet  Commonly known as: DELTASONE                  Where to get your medicines        These medications were sent to [facility]  [address] [place] [id number]      Phone: [phone]   BD PosiFlush 0.9 % flush  BD PosiFlush 0.9 % flush  Emergency Supply Kit (Central)  meropenem 1 g injection       These medications were sent to [id number] - [place], [place] - [address], [place] [address]      Phone: [phone]   acetaminophen 325 MG tablet  enoxaparin ANTICOAGULANT 40 MG/0.4ML syringe  oxyCODONE 5 MG tablet           FOLLOW UP: She should see Kevin Woodruff in one we.  Follow up with Dr. Lafleur's office in in 3-4 weeks.     Total time spent for discharge on date of discharge: 25 minutes, with over half spent in counseling and coordinating care    I saw the patient on the date of discharge.    Dania Mares PA-C  [facility]  [phone]    ADDENDUM:  Length of stay:  16  days  Indicate Y or N for the following:  UTI NO  C diff NO  PNA  NO  SSI YES  DVT NO  PE NO  CVA NO  MI NO  Enterocutaneous fistula NO  Peripheral nerve injury NO  Abscess (not adjacent to anastomosis) YES  Leak NO                        Treated with:              Antibiotics NO              Drain NO              Reoperation NO  Death within 30 days NO  Reintubation NO  Reoperation NO              Procedure

## 2025-01-17 ENCOUNTER — OFFICE VISIT (OUTPATIENT)
Dept: FAMILY MEDICINE | Facility: CLINIC | Age: 45
End: 2025-01-17
Payer: COMMERCIAL

## 2025-01-17 VITALS
OXYGEN SATURATION: 99 % | WEIGHT: 96 LBS | HEIGHT: 58 IN | RESPIRATION RATE: 14 BRPM | SYSTOLIC BLOOD PRESSURE: 101 MMHG | BODY MASS INDEX: 20.15 KG/M2 | TEMPERATURE: 99.1 F | DIASTOLIC BLOOD PRESSURE: 70 MMHG | HEART RATE: 106 BPM

## 2025-01-17 DIAGNOSIS — Z09 HOSPITAL DISCHARGE FOLLOW-UP: Primary | ICD-10-CM

## 2025-01-17 DIAGNOSIS — K50.114 CROHN'S DISEASE OF COLON WITH ABSCESS (H): ICD-10-CM

## 2025-01-17 DIAGNOSIS — Z90.49 S/P TOTAL COLECTOMY: ICD-10-CM

## 2025-01-17 PROCEDURE — S9502 HIT ANTIBIOTIC Q8H DIEM: HCPCS

## 2025-01-17 ASSESSMENT — PATIENT HEALTH QUESTIONNAIRE - PHQ9
SUM OF ALL RESPONSES TO PHQ QUESTIONS 1-9: 6
10. IF YOU CHECKED OFF ANY PROBLEMS, HOW DIFFICULT HAVE THESE PROBLEMS MADE IT FOR YOU TO DO YOUR WORK, TAKE CARE OF THINGS AT HOME, OR GET ALONG WITH OTHER PEOPLE: SOMEWHAT DIFFICULT
SUM OF ALL RESPONSES TO PHQ QUESTIONS 1-9: 6

## 2025-01-17 NOTE — PROGRESS NOTES
Assessment & Plan     (Z09) Hospital discharge follow-up  (primary encounter diagnosis)  (K50.114) Crohn's disease of colon with abscess (H)  (Z90.49) S/P total colectomy    Comment: recent discharge from [facility] 12/26/24-1/15/25 s/p open completion of total abdominal colectomy w/colostomy takedown and end ileostomy, small bowel resection, complicated by abscess and purulent drainage from midline incision. Diet has been advanced s/p TPN. Biopsies negative. Reviewed discharge summary from this hospitalization  --No further lab workup recommended  --Home infusion daily for IV abx on discharge (meropenem)1/16-1/24 via double lumen PICC  --Started lovenox daily for extended DVT ppx x2 weeks.   --Stopped prednisone  --PO oxycodone  Plan: as above  Needs legal form filled out for disability benefits - Provided patient my contact info and clinic phone number/fax number to get this process going as I did not have the documentation for this visit.       No follow-ups on file.      Hospital Follow-up Visit:    Hospital/Nursing Home/IP Rehab Facility: [facility]  Date of Admission: 12/26/24  Date of Discharge: 1/16/25  Reason(s) for Admission: Crohn's disease of colon w/abscess; s/p total abdominal colectomy  Was the patient in the ICU or did the patient experience delirium during hospitalization?  No  Do you have any other stressors you would like to discuss with your provider? No    Problems taking medications regularly:  None  Medication changes since discharge:   -Home Infusion IV meropenem  -Lovenox for extended DVT ppx  -PO oxycodone  -Stopped prednisone  -Stopped loperamide  Problems adhering to non-medication therapy:  None    Summary of hospitalization:  [facility] discharge summary reviewed  Diagnostic Tests/Treatments reviewed.  Follow up needed: none  Other Healthcare Providers Involved in Patient s Care:         Homecare, Specialist appointment - Dr. Lafleur in ~3-4 weeks,  "and Physical Therapy  Update since discharge: improved.         Plan of care communicated with patient              Subjective   Dain is a 44 year old, presenting for the following health issues:  Forms (Form was sent to the doctor to fill out ), Check up on Surgery incision , and Imm/Inj (Declined both Hep B and Covid shots today. )    Dain Tejeda is a 43 y/o F presenting to clinic today after recent discharge from [facility] 12/26/24-1/16/25 s/p open completion of total abdominal colectomy w/colostomy takedown and end ileostomy, small bowel resection, complicated by abscess and purulent drainage from midline incision. Diet has been advanced s/p TPN. Biopsies negative. Reviewed discharge summary from this hospitalization  --No further lab workup recommended  --Home infusion daily for IV abx on discharge (meropenem)1/16-1/24 via double lumen PICC  --Started lovenox daily for extended DVT ppx x2 weeks.   --Stopped prednisone  --PO oxycodone    Needs legal form filled out today for disability benefits, however does not have form with her today and I did not find it in my inbox. Wants us to assess incision sites. She states that her disability is through the state, encouraged to bring form with her if she is able to obtain a hard copy, also provided my own and clinic information to patient to get form sent over here.     Since discharge, she is feeling better. Very tired. Has been getting IV abx at home daily, which she is able to do by herself (staff comes 1x per week). Had PT/OT at hospital, not planned for outpatient.     Has f/up with [facility] 1/21/24  Has f/up with [facility] in 3 weeks  Radiology 1/23                 Objective    /70 (BP Location: Right arm, Patient Position: Sitting, Cuff Size: Adult Regular)   Pulse 106   Temp 99.1  F (37.3  C) (Tympanic)   Resp 14   Ht 1.475 m (4' 10.07\")   Wt 43.5 kg (96 lb)   SpO2 99%   BMI 20.01 kg/m    Body mass index is 20.01 kg/m .  Physical Exam   GENERAL: alert and no " distress  NECK: no adenopathy, no asymmetry, masses, or scars  RESP: lungs clear to auscultation - no rales, rhonchi or wheezes  CV: regular rate and rhythm, normal S1 S2, no S3 or S4, no murmur, click or rub, no peripheral edema  ABDOMEN: soft, nontender, without hepatosplenomegaly or masses, bowel sounds normal, well-healed incision midline s/p colectomy, and colostomy bag is without surrounding erythema or drainage  MS: no gross musculoskeletal defects noted, no edema        Signed Electronically by: Kevin Woodruff MD

## 2025-01-17 NOTE — PROGRESS NOTES
Preceptor Attestation:   Patient seen, evaluated and discussed with the resident. I have verified the content of the note, which accurately reflects my assessment of the patient and the plan of care.    Supervising Physician:Patricia Hyatt MD    [facility]

## 2025-01-18 PROCEDURE — S9502 HIT ANTIBIOTIC Q8H DIEM: HCPCS

## 2025-01-19 PROCEDURE — S9502 HIT ANTIBIOTIC Q8H DIEM: HCPCS

## 2025-01-20 PROCEDURE — S9502 HIT ANTIBIOTIC Q8H DIEM: HCPCS

## 2025-01-21 ENCOUNTER — HOME INFUSION BILLING (OUTPATIENT)
Dept: HOME HEALTH SERVICES | Facility: HOME HEALTH | Age: 45
End: 2025-01-21
Payer: COMMERCIAL

## 2025-01-21 PROCEDURE — S9502 HIT ANTIBIOTIC Q8H DIEM: HCPCS

## 2025-01-21 PROCEDURE — A9270 NON-COVERED ITEM OR SERVICE: HCPCS

## 2025-01-21 PROCEDURE — A4215 STERILE NEEDLE: HCPCS

## 2025-01-21 PROCEDURE — A4213 20+ CC SYRINGE ONLY: HCPCS

## 2025-01-22 PROCEDURE — S9502 HIT ANTIBIOTIC Q8H DIEM: HCPCS

## 2025-01-23 ENCOUNTER — LAB REQUISITION (OUTPATIENT)
Dept: LAB | Facility: CLINIC | Age: 45
End: 2025-01-23
Payer: COMMERCIAL

## 2025-01-23 ENCOUNTER — HOME CARE VISIT (OUTPATIENT)
Dept: HOME HEALTH SERVICES | Facility: HOME HEALTH | Age: 45
End: 2025-01-23
Payer: COMMERCIAL

## 2025-01-23 VITALS
SYSTOLIC BLOOD PRESSURE: 111 MMHG | TEMPERATURE: 98.4 F | OXYGEN SATURATION: 99 % | HEART RATE: 93 BPM | DIASTOLIC BLOOD PRESSURE: 82 MMHG | RESPIRATION RATE: 16 BRPM

## 2025-01-23 DIAGNOSIS — K50.114 CROHN'S DISEASE OF LARGE INTESTINE WITH ABSCESS (H): ICD-10-CM

## 2025-01-23 LAB
ALBUMIN SERPL BCG-MCNC: 3.6 G/DL (ref 3.5–5.2)
ALP SERPL-CCNC: 116 U/L (ref 40–150)
ALT SERPL W P-5'-P-CCNC: 20 U/L (ref 0–50)
ANION GAP SERPL CALCULATED.3IONS-SCNC: 11 MMOL/L (ref 7–15)
AST SERPL W P-5'-P-CCNC: 23 U/L (ref 0–45)
BASOPHILS # BLD AUTO: 0 10E3/UL (ref 0–0.2)
BASOPHILS NFR BLD AUTO: 0 %
BILIRUB SERPL-MCNC: 0.2 MG/DL
BUN SERPL-MCNC: 13.4 MG/DL (ref 6–20)
CALCIUM SERPL-MCNC: 9.4 MG/DL (ref 8.8–10.4)
CHLORIDE SERPL-SCNC: 104 MMOL/L (ref 98–107)
CREAT SERPL-MCNC: 0.5 MG/DL (ref 0.51–0.95)
EGFRCR SERPLBLD CKD-EPI 2021: >90 ML/MIN/1.73M2
EOSINOPHIL # BLD AUTO: 0.2 10E3/UL (ref 0–0.7)
EOSINOPHIL NFR BLD AUTO: 4 %
ERYTHROCYTE [DISTWIDTH] IN BLOOD BY AUTOMATED COUNT: 13.6 % (ref 10–15)
GLUCOSE SERPL-MCNC: 104 MG/DL (ref 70–99)
HCO3 SERPL-SCNC: 28 MMOL/L (ref 22–29)
HCT VFR BLD AUTO: 26.9 % (ref 35–47)
HGB BLD-MCNC: 8.5 G/DL (ref 11.7–15.7)
IMM GRANULOCYTES # BLD: 0 10E3/UL
IMM GRANULOCYTES NFR BLD: 1 %
LYMPHOCYTES # BLD AUTO: 1.8 10E3/UL (ref 0.8–5.3)
LYMPHOCYTES NFR BLD AUTO: 37 %
MCH RBC QN AUTO: 28.1 PG (ref 26.5–33)
MCHC RBC AUTO-ENTMCNC: 31.6 G/DL (ref 31.5–36.5)
MCV RBC AUTO: 89 FL (ref 78–100)
MONOCYTES # BLD AUTO: 0.2 10E3/UL (ref 0–1.3)
MONOCYTES NFR BLD AUTO: 5 %
NEUTROPHILS # BLD AUTO: 2.5 10E3/UL (ref 1.6–8.3)
NEUTROPHILS NFR BLD AUTO: 53 %
NRBC # BLD AUTO: 0 10E3/UL
NRBC BLD AUTO-RTO: 0 /100
PLATELET # BLD AUTO: 350 10E3/UL (ref 150–450)
POTASSIUM SERPL-SCNC: 3.8 MMOL/L (ref 3.4–5.3)
PROT SERPL-MCNC: 7.6 G/DL (ref 6.4–8.3)
RBC # BLD AUTO: 3.02 10E6/UL (ref 3.8–5.2)
SODIUM SERPL-SCNC: 143 MMOL/L (ref 135–145)
WBC # BLD AUTO: 4.8 10E3/UL (ref 4–11)

## 2025-01-23 PROCEDURE — S9502 HIT ANTIBIOTIC Q8H DIEM: HCPCS

## 2025-01-23 PROCEDURE — 82310 ASSAY OF CALCIUM: CPT | Performed by: INTERNAL MEDICINE

## 2025-01-23 PROCEDURE — 82947 ASSAY GLUCOSE BLOOD QUANT: CPT | Performed by: INTERNAL MEDICINE

## 2025-01-23 PROCEDURE — 85048 AUTOMATED LEUKOCYTE COUNT: CPT | Performed by: INTERNAL MEDICINE

## 2025-01-23 PROCEDURE — 84295 ASSAY OF SERUM SODIUM: CPT | Performed by: INTERNAL MEDICINE

## 2025-01-23 PROCEDURE — 85014 HEMATOCRIT: CPT | Performed by: INTERNAL MEDICINE

## 2025-01-23 PROCEDURE — 85004 AUTOMATED DIFF WBC COUNT: CPT | Performed by: INTERNAL MEDICINE

## 2025-01-23 NOTE — PROGRESS NOTES
Nursing Visit Note:  Nurse visit today for clc and labs for Dain Ileana.     present during visit today: Yes, Language: Hmong.     Intravenous Access:  Labs drawn without difficulty. and PICC.    Lab-Only Nursing Note    Labs obtained via PICC    Time Specimen drawn: 0910    Last dose (if applicable): No    Facility sent to: [facility] Tracking number: [id number]    Note:   Saline administered: 40 mls (ml)    Supply Check:   Does the patient have all the supplies they need for the next visit?  Yes    Next visit plan: clc and labs in one week    Florence Baca 1/23/2025

## 2025-01-24 ENCOUNTER — OFFICE VISIT (OUTPATIENT)
Dept: FAMILY MEDICINE | Facility: CLINIC | Age: 45
End: 2025-01-24
Payer: COMMERCIAL

## 2025-01-24 VITALS
SYSTOLIC BLOOD PRESSURE: 98 MMHG | RESPIRATION RATE: 20 BRPM | DIASTOLIC BLOOD PRESSURE: 63 MMHG | OXYGEN SATURATION: 100 % | WEIGHT: 101 LBS | HEIGHT: 58 IN | TEMPERATURE: 98.2 F | HEART RATE: 84 BPM | BODY MASS INDEX: 21.2 KG/M2

## 2025-01-24 DIAGNOSIS — L30.9 DERMATITIS: ICD-10-CM

## 2025-01-24 DIAGNOSIS — Z02.89 ENCOUNTER FOR COMPLETION OF FORM WITH PATIENT: Primary | ICD-10-CM

## 2025-01-24 DIAGNOSIS — K50.918 CROHN'S DISEASE WITH OTHER COMPLICATION, UNSPECIFIED GASTROINTESTINAL TRACT LOCATION (H): ICD-10-CM

## 2025-01-24 DIAGNOSIS — Z93.3 COLOSTOMY PRESENT (H): ICD-10-CM

## 2025-01-24 PROCEDURE — S9502 HIT ANTIBIOTIC Q8H DIEM: HCPCS

## 2025-01-24 PROCEDURE — 99213 OFFICE O/P EST LOW 20 MIN: CPT | Mod: GC

## 2025-01-24 RX ORDER — HYDROCORTISONE 25 MG/G
CREAM TOPICAL 2 TIMES DAILY
Qty: 30 G | Refills: 0 | Status: SHIPPED | OUTPATIENT
Start: 2025-01-24

## 2025-01-24 NOTE — PROGRESS NOTES
"  Assessment & Plan     (Z02.89) Encounter for completion of form with patient  (primary encounter diagnosis)  Comment: Filled out disability claim paperwork for patient today. No additional concerns aside from below. Will have patient schedule for annual preventative visit at patient's convenience.  Plan: as above    (L30.9) Dermatitis  Comment: Suspect eczema, BL hands, improving while using her daughter's hydrocortisone cream so difficult to fully assess today but does appear to be dry/scaly in stage of healing. Also some irritation around her colostomy site that seems to be improving. Still having some incisional site pain significantly improved with tylenol.  Plan: hydrocortisone 2.5 % cream    No follow-ups on file.    Subjective   Dain is a 44 year old, presenting for the following health issues:  Forms (From [employer] need to be filled up) and Rash (Starting on 01/18/25. It very itching )      Via the Health Maintenance questionnaire, the patient has reported the following services have been completed -Colonscopy: [facility] 2024-12-30, this information has not been sent to the abstraction team.  Dain Tejeda is a 45 y/o F presenting to clinic today to fill out form from [employer] regarding disaibility claim request for opinion to support social security disability claim. This is in setting of her recent hospitalization at [facility], discharged 1/16/25 s/p total abdominal colectomy w/colostomy takedown and end ileostomy w/small bowel resection.     Updates:  IV meropenem - will complete 1/25, plan to remove PICC shortly after per ID  Lovenox (2 week DVT PPX) - should be complete 1/30  Oxycodone - no longer taking, taking tylenol  Prednisone - discontinued after hospitalization  Biopsies negative from surgery                 Objective    BP 98/63   Pulse 84   Temp 98.2  F (36.8  C) (Oral)   Resp 20   Ht 1.473 m (4' 10\")   Wt 45.8 kg (101 lb)   SpO2 100%   BMI 21.11 kg/m    Body mass index is 21.11 " kg/m .  Physical Exam   GENERAL: alert and no distress  NECK: no adenopathy, no asymmetry, masses, or scars  RESP: lungs clear to auscultation - no rales, rhonchi or wheezes  CV: regular rate and rhythm, normal S1 S2, no S3 or S4, no murmur, click or rub, no peripheral edema  ABDOMEN: soft, nontender, no hepatosplenomegaly, no masses and bowel sounds normal  MS: no gross musculoskeletal defects noted, no edema  SKIN: no suspicious lesions or rashes and eczema w/very slight eyrthema on hands BL          Signed Electronically by: MD Kevin Freeman MD  PGY-2  [facility]  [facility]  January 24, 2025

## 2025-01-25 PROCEDURE — S9502 HIT ANTIBIOTIC Q8H DIEM: HCPCS

## 2025-01-26 ENCOUNTER — HOME CARE VISIT (OUTPATIENT)
Dept: HOME HEALTH SERVICES | Facility: HOME HEALTH | Age: 45
End: 2025-01-26
Payer: COMMERCIAL

## 2025-01-26 VITALS
DIASTOLIC BLOOD PRESSURE: 60 MMHG | SYSTOLIC BLOOD PRESSURE: 90 MMHG | RESPIRATION RATE: 16 BRPM | TEMPERATURE: 98.6 F | OXYGEN SATURATION: 99 % | HEART RATE: 94 BPM

## 2025-01-26 NOTE — PROGRESS NOTES
Nursing Visit Note:  Nurse visit today for PICC removal and [facility] discharge  for Dain Tejeda.     present during visit today: Yes, Language: Hmong .     Intravenous Access:  PICC.    Discharge Date from [facility] Nursin25    Will patient remain open to Pharmacy Only?: No    Reason for Discharge: Goals Met and Therapy Completed    Additional Comments: Pt confirms she completed final abx dose.     Discharge Disposition: Therapy Discontinued/No further Skilled Nursing    If Transferred to Agency, Name of Agency: N/A    Brief Summary of Admission to Home Infusion:   Reason for Admission: IV antibiotics and line care   Treatment Provided: IV Meropenem and PICC line management   Significant Findings: Pt completed therapy and Ok given to end therapy and remove line   Discharge Instructions: Pt instructed to keep sterile dressing dry and in place for 24 hours. Reviewed signs/symptoms to monitor for and when to call triage.       Note: PICC removed per [facility] protocol. Bacitracen, sterile gauze and tegaderm placed over site.     Saline administered: 0 (ml)    Supply Check:   Does the patient have all the supplies they need for the next visit?  Yes    Next visit plan: Pt discharged and no further care needed.     Alayna Young, RN 2025

## 2025-01-31 ASSESSMENT — ENCOUNTER SYMPTOMS: NAUSEA: 1

## 2025-02-03 ENCOUNTER — TRANSFERRED RECORDS (OUTPATIENT)
Dept: HEALTH INFORMATION MANAGEMENT | Facility: CLINIC | Age: 45
End: 2025-02-03
Payer: COMMERCIAL

## 2025-02-03 LAB
ALT SERPL-CCNC: 26 IU/L (ref 0–32)
AST SERPL-CCNC: 29 IU/L (ref 0–40)

## 2025-03-30 NOTE — MR AVS SNAPSHOT
After Visit Summary   10/4/2017    Dain Tejeda    MRN: [id number]           Patient Information     Date Of Birth          1980        Visit Information        Provider Department      10/4/2017 4:00 PM Cyndie Jenkins MD [facility]        Care Instructions    [facility] Information  Your resident OB Doctor is Dr. Cyndie Jenkins.  We will refer you to Maternal Fetal Medicine    If you have any further concerns or wish to schedule another appointment, please call our office at [phone] during normal business hours (8-5, M-F).     For uncomplicated pregnancies, you will have weekly visits from now until you deliver.     If you have urgent medical questions that cannot wait, you may call [phone] at any time of day.     If you have a medical emergency, please call 911.     When to call or come in to [facility] ([phone] for Labor & Delivery unit)  If you notice a decrease in your baby's movement.   If your begin to experience contractions that are 5 minutes or less apart and lasting for over 45 seconds, or if contractions are becoming more painful.  If you have any bleeding or leakage of fluids.   If you have a headache not resolved with tylenol, right upper abdominal pain, or sudden onset of swelling.  You know your body best. Never hesitate to call or go to the hospital if something doesn't feel right!    Preparing for the hospital  You re likely feeling anxious as your child s birth approaches. This is normal. To give yourself some peace of mind, pack a bag 3-4 weeks before your due date. Here is a list of things to remember:  Personal care items like toothbrush, hair brush, lip balm, lotion, shampoo, glasses, contacts  Nightgown, bathrobe, slippers  Several pairs of underwear  Comfortable clothes for you to wear home  Camera with new batteries  Cell phone and   Insurance information and any other paperwork needed for your hospital  Pt was brought in by ems for c/o sob after possible aspiration from beer.  Pt brought in coughing up white sputum.  Pt was 90% on RA per EMS.  Pt placed on 2 L nc.    "stay  Clothes for baby to wear home  An infant, rear-facing car seat for bringing home your baby (this is required by law)            Follow-ups after your visit        Who to contact     Please call your clinic at [phone] to:    Ask questions about your health    Make or cancel appointments    Discuss your medicines    Learn about your test results    Speak to your doctor   If you have compliments or concerns about an experience at your clinic, or if you wish to file a complaint, please contact [facility] Patient Relations at [phone] or email us at [email or web address]         Additional Information About Your Visit        [employer] Information     [employer] is an electronic gateway that provides easy, online access to your medical records. With [facility], you can request a clinic appointment, read your test results, renew a prescription or communicate with your care team.     To sign up for [employer] visit the website at [email or web address]   You will be asked to enter the access code listed below, as well as some personal information. Please follow the directions to create your username and password.     Your access code is: [email or web address]  Expires: 10/29/2017 12:26 PM     Your access code will  in 90 days. If you need help or a new code, please contact your [facility] or call [phone] for assistance.        Care EveryWhere ID     This is your Care EveryWhere ID. This could be used by other organizations to access your [facility] medical records  [id number]        Your Vitals Were     Pulse Temperature Height Last Period Pulse Oximetry BMI (Body Mass Index)    89 97.8  F (36.6  C) (Oral) 4' 11\" (149.9 cm) 2017 99% 29.08 kg/m2       Blood Pressure from Last 3 Encounters:   10/04/17 103/69   17 106/75   17 100/60    Weight from Last 3 Encounters:   10/04/17 144 lb (65.3 kg)   17 141 lb 12.8 oz (64.3 kg) "   09/18/17 140 lb (63.5 kg)              Today, you had the following     No orders found for display       Primary Care Provider Office Phone # Fax #    Cyndie Jenkins [phone]       [facility] [address] [place] [address]        Equal Access to Services     DIANA CORIE : Hadii aad ku hadasho Soomaali, waaxda luqadaha, qaybta kaalmada adeegyada, waxay idiin hayaan adeeg kharash la'aan . So [facility] [phone].    ATENCIÓN: Si habla español, tiene a manzano disposición servicios gratuitos de asistencia lingüística. Llame al [phone].    We comply with applicable federal civil rights laws and [place] laws. We do not discriminate on the basis of race, color, national origin, age, disability, sex, sexual orientation, or gender identity.            Thank you!     Thank you for choosing [facility]  for your care. Our goal is always to provide you with excellent care. Hearing back from our patients is one way we can continue to improve our services. Please take a few minutes to complete the written survey that you may receive in the mail after your visit with us. Thank you!             Your Updated Medication List - Protect others around you: Learn how to safely use, store and throw away your medicines at www.disposemymeds.org.          This list is accurate as of: 10/4/17  4:44 PM.  Always use your most recent med list.                   Brand Name Dispense Instructions for use Diagnosis    blood glucose lancets standard    no brand specified    100 each    Use to test blood sugar 4 times daily or as directed.    Gestational diabetes mellitus (GDM) in third trimester, gestational diabetes method of control unspecified       blood glucose monitoring meter device kit    no brand specified    1 kit    Use to test blood sugar 4 times daily or as directed.    Gestational diabetes mellitus (GDM) in third trimester, gestational diabetes method of control  unspecified       blood glucose monitoring test strip    no brand specified    100 strip    Use to test blood sugars 4 times daily:Please check fasting blood sugar in the morning and also check blood sugar 1-2 hours after eating.    Gestational diabetes mellitus (GDM) in third trimester, gestational diabetes method of control unspecified       doxylamine 25 MG Tabs tablet    UNISOM    20 each    Take 0.5 tablets (12.5 mg) by mouth 3 times daily as needed For nausea    Nausea/vomiting in pregnancy       * ferrous sulfate 325 (65 FE) MG tablet    IRON    90 tablet    Take 1 tablet (325 mg) by mouth 3 times daily Take with 1/2 glass of orange juice or with Vitamin C containing foods to increase absorption.    Anemia of pregnancy in first trimester, Supervision of high-risk pregnancy of elderly multigravida       * ferrous sulfate 325 (65 FE) MG tablet    IRON    90 tablet    Take 1 tablet (325 mg) by mouth 3 times daily Take with 1/2 glass of orange juice or with Vitamin C foods (tomatoes) to increase absorption.    Anemia of pregnancy in second trimester       ondansetron 4 MG tablet    ZOFRAN    30 tablet    Take 1 tablet (4 mg) by mouth every 8 hours as needed for nausea or vomiting    Nausea/vomiting in pregnancy       prenatal multivitamin plus iron 27-0.8 MG Tabs per tablet     100 tablet    Take 1 tablet by mouth daily        pyridOXINE 25 MG tablet    vitamin B-6    30 tablet    Take 1 tablet (25 mg) by mouth 3 times daily as needed    Nausea/vomiting in pregnancy       ranitidine 75 MG tablet    ZANTAC    30 tablet    Take 1 tablet (75 mg) by mouth 2 times daily as needed for heartburn        * Notice:  This list has 2 medication(s) that are the same as other medications prescribed for you. Read the directions carefully, and ask your doctor or other care provider to review them with you.

## 2025-05-01 ENCOUNTER — TRANSFERRED RECORDS (OUTPATIENT)
Dept: ADMINISTRATIVE | Facility: CLINIC | Age: 45
End: 2025-05-01
Payer: COMMERCIAL

## 2025-05-01 ENCOUNTER — TRANSFERRED RECORDS (OUTPATIENT)
Dept: MULTI SPECIALTY CLINIC | Facility: CLINIC | Age: 45
End: 2025-05-01
Payer: COMMERCIAL

## 2025-05-01 LAB
ALT SERPL-CCNC: 13 IU/L (ref 0–32)
AST SERPL-CCNC: 22 IU/L (ref 0–40)

## 2025-05-02 NOTE — PROGRESS NOTES
_________________________________________________________________________________________________    Patient name: Dain Tejeda  YOB: 1980  Encounter date: 05/01/2025 08:00 AM  Current date: 05/01/2025 10:11 AM  Procedure: Infusion      Infusion/Additional Medication Orders    Assessment: Crohn's disease of both small and lg int w unsp comp K50.819     Medication grids  Order Date Medication Dose Route Rate Rate 2 Rate 3 Rate 4 Int. of Treat.   02/12/2025 Avsola 5 mg/kg IV per protocol    0,2,6 weeks   Inf. Date Medication % Conc. Inf. # Therapy Type Bag # Vials Total mgs Lot # Exp. Date   05/01/2025 Avsola  3 Induction dose 3  1 300.00 [id number] 08/31/2028   Inf. Date Medication IV Site IV Gauge Start Stop Total Time Wt. (lbs) Wt. (kgs)   05/01/2025 Avsola rt wrist 24 7:35 AM 9:41 AM  124.50 56.463     Vitals Flowsheet  Time Temp Pulse Resp Sys BP Ning BP Reaction Conc Rate   7:24 AM 97.3 86 16 106 59      8:05 AM 97.6 77 14 102 53      8:33 AM 97.2 77 14 95 52      9:06 AM 97.9 76 14 100 58      9:35 AM 97.6 81 14 88 54      10:00 AM 97.6 81 14 106 63        Post Infusion  The patient did tolerate the infusion.     Nursing Notes  Order date: 2/12/25  Last infusion date: 4/3/25  Oral premeds per order: N/A  Specialty Pharmacy: N  Change in health status per assessment? N  IV maintenance 0.9% NS 500ml TKO  Start time: 0731  IV start by: Aleida Bunch RN  IV and Fluid D/C time: 0956  NS volume infused: <50mL  Site condition: CDI and patent throughout infusion, no redness/swelling at IV site  D/C instructions reviewed, Pt verbalized understanding.  Aleida Bunch, RN    Date Medication Total mg Used mg Wasted mg   05/01/2025 Avsola 300.00 300.00 0.00   04/03/2025 Avsola 300.00 300.00 0.00   03/20/2025 Avsola 300.00 258.00 42.00   01/17/2024 Venofer 200.00 200.00 0.00   01/15/2024 Venofer 200.00 200.00 0.00   01/11/2024 Venofer 200.00 200.00 0.00     Provider Notes  *labs drawn from PIV      Visit  oversight provided by:  Fausto Britt MD 05/01/2025 08:00 AM

## 2025-05-14 NOTE — PROGRESS NOTES
Assessment & Plan     Cervical cancer screening  History of abnormal cervical Pap smear  History of high risk HPV with AUSCUS (3/8/24). Had colposcopy 4/22/24 which showed low grade dysplasia (TREVOR-1). Here today for repeat pap smear. Pap obtained today.    - Gynecologic Cytology (PAP Smear Test); Future  - Gynecologic Cytology (PAP Smear); Future  - Gynecologic Cytology (PAP Smear)    Encounter for immunization  Patient brought in paper from [facility] regarding updating immunizations. Requesting PCV20 and Zoster. Upon chart review, patient had PCV20 10/6/23. Will update zoster today.   - ZOSTER RECOMBINANT ADJUVANTED (SHINGRIX)    Abnormal menstrual cycle  Patient endorsing new irregularities to menstrual cycle. Notes that instead of getting her cycle every month, it has been every 2-3 months. Same amount of bleeding, same duration of bleeding. No reported intermenstrual bleeding. Discussed further workup today due to abnormal cycle (TSH, prolactin, hcg) and given patient at early age for perimenopause. Patient declined today. On exam, vaginal tissue well perfused with no signs of vaginal atrophy. Discussed that there can be irregularities to menstrual cycle with increased levels of stress. Advised to continue to monitor cycle and to follow up if continues to have abnormal cycle.         The use of Dragon/Duable Chinese dictation services was used to construct the content of this note; any grammatical errors are non-intentional. Please contact the author directly if you are in need of any clarification.      Carmel Gautam is a 44 year old, presenting for the following health issues:  Pap Smear , Abnormal Bleeding Problem (Get period every 2-3 month ), and PCV20 and Shingrix         5/15/2025     9:11 AM   Additional Questions   Roomed by elizabeth   Accompanied by self           5/15/2025    Information    services provided? Yes   Language Hmong   Type of interpretation provided Face-to-face     "name [facility]    Agency [employer]     HPI      43 y/o here today for pap smear    Inquiring about vaccines - PCV20 and shingles  > had pcv20 10/6/23  > shingles     History of high risk HPV with AUSCUS (3/8/24)  Had colposcopy 4/22/24 which showed low grade dysplasia (TREVOR-1)  Here today for repeat pap smear    Menstrual irregularities  Cycle every 2-3 months  No changes to bleeding amount  No bleeding in between cycles   No vaginal dryness, no troubles with urination, no hot flashes       Review of Systems  Constitutional, neuro, ENT, endocrine, pulmonary, cardiac, gastrointestinal, genitourinary, musculoskeletal, integument and psychiatric systems are negative, except as otherwise noted.    Answers submitted by the patient for this visit:  Patient Health Questionnaire (Submitted on 5/15/2025)  If you checked off any problems, how difficult have these problems made it for you to do your work, take care of things at home, or get along with other people?: Somewhat difficult  PHQ9 TOTAL SCORE: 5        Objective    /68   Pulse 69   Temp 97.8  F (36.6  C) (Oral)   Resp 20   Ht 1.5 m (4' 11.06\")   Wt 57.6 kg (127 lb)   LMP 05/13/2025 (Exact Date)   SpO2 97%   BMI 25.60 kg/m    Body mass index is 25.6 kg/m .  Physical Exam   GENERAL: healthy, alert and no distress  RESP: speaking in full sentences, normal work of breathing   CV: extremities well perfused  MS: no gross musculoskeletal defects noted, no edema  PSYCH: mentation appears normal, affect normal/bright  : no external lesions noted. No cervical motion tenderness, normal physiological discharge. No appreciable atropy. Tissues well perfused. Pap collected.             Signed Electronically by: Cielo Joshi DO    "

## 2025-05-15 ENCOUNTER — OFFICE VISIT (OUTPATIENT)
Dept: FAMILY MEDICINE | Facility: CLINIC | Age: 45
End: 2025-05-15
Payer: COMMERCIAL

## 2025-05-15 VITALS
HEART RATE: 69 BPM | SYSTOLIC BLOOD PRESSURE: 108 MMHG | BODY MASS INDEX: 25.6 KG/M2 | OXYGEN SATURATION: 97 % | HEIGHT: 59 IN | RESPIRATION RATE: 20 BRPM | WEIGHT: 127 LBS | DIASTOLIC BLOOD PRESSURE: 68 MMHG | TEMPERATURE: 97.8 F

## 2025-05-15 DIAGNOSIS — N92.6 ABNORMAL MENSTRUAL CYCLE: ICD-10-CM

## 2025-05-15 DIAGNOSIS — Z12.4 CERVICAL CANCER SCREENING: Primary | ICD-10-CM

## 2025-05-15 DIAGNOSIS — Z87.42 HISTORY OF ABNORMAL CERVICAL PAP SMEAR: ICD-10-CM

## 2025-05-15 DIAGNOSIS — Z23 ENCOUNTER FOR IMMUNIZATION: ICD-10-CM

## 2025-05-15 PROCEDURE — 87624 HPV HI-RISK TYP POOLED RSLT: CPT

## 2025-05-15 ASSESSMENT — PATIENT HEALTH QUESTIONNAIRE - PHQ9
SUM OF ALL RESPONSES TO PHQ QUESTIONS 1-9: 5
10. IF YOU CHECKED OFF ANY PROBLEMS, HOW DIFFICULT HAVE THESE PROBLEMS MADE IT FOR YOU TO DO YOUR WORK, TAKE CARE OF THINGS AT HOME, OR GET ALONG WITH OTHER PEOPLE: SOMEWHAT DIFFICULT
SUM OF ALL RESPONSES TO PHQ QUESTIONS 1-9: 5

## 2025-05-15 NOTE — PROGRESS NOTES
Faculty Supervision of Residents   I have examined this patient and the medical care has been evaluated and discussed with the resident. See resident note outlining our discussion.    Nicole Don MD

## 2025-05-20 ENCOUNTER — RESULTS FOLLOW-UP (OUTPATIENT)
Dept: FAMILY MEDICINE | Facility: CLINIC | Age: 45
End: 2025-05-20

## 2025-05-20 DIAGNOSIS — R87.620: Primary | ICD-10-CM

## 2025-05-20 DIAGNOSIS — R87.811: Primary | ICD-10-CM

## 2025-05-20 LAB
BKR LAB AP GYN ADEQUACY: NORMAL
BKR LAB AP GYN INTERPRETATION: NORMAL
BKR LAB AP HPV REFLEX: NORMAL
BKR LAB AP LMP: NORMAL
BKR LAB AP PREVIOUS ABNL DX: NORMAL
BKR LAB AP PREVIOUS ABNORMAL: NORMAL
PATH REPORT.COMMENTS IMP SPEC: NORMAL
PATH REPORT.COMMENTS IMP SPEC: NORMAL
PATH REPORT.RELEVANT HX SPEC: NORMAL

## 2025-05-21 ENCOUNTER — RESULTS FOLLOW-UP (OUTPATIENT)
Dept: FAMILY MEDICINE | Facility: CLINIC | Age: 45
End: 2025-05-21

## 2025-05-21 ENCOUNTER — APPOINTMENT (OUTPATIENT)
Dept: INTERPRETER SERVICES | Facility: CLINIC | Age: 45
End: 2025-05-21
Payer: COMMERCIAL

## 2025-05-21 LAB
HPV HR 12 DNA CVX QL NAA+PROBE: POSITIVE
HPV16 DNA CVX QL NAA+PROBE: NEGATIVE
HPV18 DNA CVX QL NAA+PROBE: NEGATIVE
HUMAN PAPILLOMA VIRUS FINAL DIAGNOSIS: ABNORMAL

## 2025-06-26 ENCOUNTER — TRANSFERRED RECORDS (OUTPATIENT)
Dept: ADMINISTRATIVE | Facility: CLINIC | Age: 45
End: 2025-06-26
Payer: COMMERCIAL

## 2025-06-26 ENCOUNTER — TRANSFERRED RECORDS (OUTPATIENT)
Dept: MULTI SPECIALTY CLINIC | Facility: CLINIC | Age: 45
End: 2025-06-26
Payer: COMMERCIAL

## 2025-06-26 LAB
CREATININE (EXTERNAL): 0.69 MG/DL (ref 0.57–1)
GFR ESTIMATED (EXTERNAL): 111 ML/MIN/1.73
GLUCOSE (EXTERNAL): 106 MG/DL (ref 70–99)
POTASSIUM (EXTERNAL): 4.1 MMOL/L (ref 3.5–5.2)

## 2025-06-27 NOTE — PROGRESS NOTES
_________________________________________________________________________________________________    Patient name: Dain Tejeda  YOB: 1980  Encounter date: 06/26/2025 08:00 AM  Current date: 06/26/2025 08:29 AM  Procedure: Infusion      Infusion/Additional Medication Orders    Assessment: Crohn's disease of both small and lg int w unsp comp K50.819     Medication grids  Order Date Medication Dose Route Rate Rate 2 Rate 3 Rate 4 Int. of Treat.   05/14/2025 Avsola 5mg/kg  mL/hr    8 Weeks   Inf. Date Medication % Conc. Inf. # Therapy Type Bag # Vials Total mgs Lot # Exp. Date   06/26/2025 Avsola  4 maintenance  3 300.00 [id number] 10/31/2028   Inf. Date Medication IV Site IV Gauge Start Stop Total Time Wt. (lbs) Wt. (kgs)   06/26/2025 Avsola right wrist 22 7:20 AM 8:20 AM 0 hour(s) 0 minute(s) 129.50 58.730     Vitals Flowsheet  Time Temp Pulse Resp Sys BP Ning BP Reaction Conc Rate   7:10 AM 96.7 74 16 108 62      7:50 AM 96.5 74 16 103 57      8:20 AM 96.5 91 16 135 84        Post Infusion  The patient did tolerate the infusion.     Nursing Notes  Order date: 5/14/25  Last infusion date: 5/1/25  Oral premeds per order: N/A  Specialty Pharmacy: N  Change in health status per assessment? N  IV maintenance 0.9% NS 500ml TKO  Start time: 7:10 am  IV start by: Celia Lopez RN  IV and Fluid D/C time: 8:35 am   NS volume infused: <50mL  Site condition: CDI and patent throughout infusion, no redness/swelling at IV site  D/C instructions reviewed, Pt verbalized understanding.  Celia Lopez RN  PIV used for lab draw today  Refused Levels today will check with insurance for next infusion    Date Medication Total mg Used mg Wasted mg   06/26/2025 Avsola 300.00 300.00 0.00   05/01/2025 Avsola 300.00 300.00 0.00   04/03/2025 Avsola 300.00 300.00 0.00   03/20/2025 Avsola 300.00 258.00 42.00   01/17/2024 Venofer 200.00 200.00 0.00   01/15/2024 Venofer 200.00 200.00 0.00       Visit oversight provided by:  Bob Ricketts MD  06/26/2025 08:00 AM

## 2025-07-03 ENCOUNTER — TRANSFERRED RECORDS (OUTPATIENT)
Dept: ADMINISTRATIVE | Facility: CLINIC | Age: 45
End: 2025-07-03
Payer: COMMERCIAL

## 2025-07-04 NOTE — PROCEDURES
2025        Dain Ileana   [address] [place] [address]      Dain Ileana,  :  1980    I'm writing to let you know about the tests that were taken recently.   Thank you for allowing [facility] the opportunity to take part in your healthcare.  At [facility] we strive to provide each patient with the finest gastroenterology care available.  We hope your experience was pleasant and informative.    Undetectable levels of azathioprine.  Basic Metabolic Panel (8) 2025 07:13   Description Result Units Flags Range   BUN 15 mg/dL  6-24   BUN/Creatinine Ratio 22   9-23   Carbon Dioxide, Total 20 mmol/L  20-29   Calcium 9.7 mg/dL  8.7-10.2   Chloride 103 mmol/L     Creatinine 0.69 mg/dL  0.57-1.00   eGFR 111 mL/min/1.73  >59   Glucose 106 mg/dL H 70-99   Potassium 4.1 mmol/L  3.5-5.2   Sodium 138 mmol/L  134-144   Comments   at next infusion             Performed At: , [facility]  [address], [place], [id number]  Tanisha Cerna MD, Phone: [phone]     Serial Monitoring 2025 07:13   Description Result Units Flags Range   PDF .      Comments   Performed At: , [facility]  [address], [place], [id number]  Cyndie Damon, [facility], Phone: [phone]   Thiopurine Metabolites 2025 07:13   Description Result Units Flags Range   6-MMPN <197 pmol/8x 10E8     6-TGN <39 pmol/8x 10E8     Comments   at next infusion             Performed At: ES, [facility]  [address] [place] [address], [id number]  Radhames Lombardi MD, Phone: [phone]   6-MMPN:  This test was developed and its performance characteristics  determined by "[facility]". It has not been cleared or approved  by the Food and Drug Administration.    Reference Range:  Hepatotoxicity Risk:  >5700 pmol 6-MMPN/8x10E8 Red Blood Cells   6-TGN:  This test was developed and its performance characteristics  determined by "[facility] [facility]". It has not been cleared or approved  by the Food and Drug  Administration.    Reference Range:  For treatment of inflammatory bowel disease (IBD)(1):    Suboptimal dosing:<235 pmol 6-TGN/8x10E8 Red Blood Cells  Optimal dosin-450 pmol 6-TGN/8x10E8 Red Blood Cells  Increasing Risk for Myelotoxicity and Leucopoenia: >450 pmol  6-TGN/8x10E8 Red Blood Cells    1. Rubia et al. Inflamm Bowel Dis.2011;6(17):8982-4757           Thank you.    Electronically signed by:  Fausto Britt MD 2025 09:13 AM  Document generated by:  Fausto Britt MD  2025  If your provider ordered multiple tests; the results may not become available at the same time.  If multiple test results are received within 14 days of one another, you may receive a duplicate.  cc:  Birdie Bernstein MD

## 2025-08-21 ENCOUNTER — TRANSFERRED RECORDS (OUTPATIENT)
Dept: ADMINISTRATIVE | Facility: CLINIC | Age: 45
End: 2025-08-21
Payer: COMMERCIAL

## 2025-08-21 ENCOUNTER — TRANSFERRED RECORDS (OUTPATIENT)
Dept: MULTI SPECIALTY CLINIC | Facility: CLINIC | Age: 45
End: 2025-08-21
Payer: COMMERCIAL

## 2025-08-21 LAB
ALT SERPL-CCNC: 10 IU/L (ref 0–32)
AST SERPL-CCNC: 34 IU/L (ref 0–40)

## (undated) DEVICE — SU VICRYL+ 3-0 27IN SH UND VCP416H

## (undated) DEVICE — SUTURE MONOCRYL 3-0 18 PS2 UND MCP497G

## (undated) DEVICE — SOL WATER IRRIG 1000ML BOTTLE 2F7114

## (undated) DEVICE — STPL LINEAR CUT 75MM TLC75

## (undated) DEVICE — DRAPE LEGGINGS 8421

## (undated) DEVICE — CONNECTOR ONE-LINK INJECTION SITE LF 7N8399

## (undated) DEVICE — STPL LINEAR 90 X 3.5MM TA9035S

## (undated) DEVICE — HLSTR JET MULTI-INST SFTY STRL FIRE-SFE 7212-12

## (undated) DEVICE — SUTURE VICRYL+ 3-0 18 SH/CR UND VCP864

## (undated) DEVICE — SWABCAP DISINFECTANT GREEN CFF10-250

## (undated) DEVICE — DRSG GAUZE 4X4" 3033

## (undated) DEVICE — DRAIN RESERVOIR 100ML JP 0070740

## (undated) DEVICE — GOWN IMPERVIOUS BREATHABLE SMART LG 89015

## (undated) DEVICE — SU SILK 2-0 FS-1 18" 685G

## (undated) DEVICE — KIT IV START DYNDV1431

## (undated) DEVICE — SU DERMABOND ADVANCED .7ML DNX12

## (undated) DEVICE — PREP CHLORAPREP 26ML TINTED HI-LITE ORANGE 930815

## (undated) DEVICE — STPL SKIN 35W 6.9MM  PXW35

## (undated) DEVICE — CANNULA NASAL COMFORT SOFT 25FT 0537

## (undated) DEVICE — OSTOMY POUCH 12IN 2.5- IN CERAMIDE PLS FLT 1 PC CTF 8931

## (undated) DEVICE — SUCTION TIP YANKAUER W/O VENT K86

## (undated) DEVICE — TOWEL SURG 16INW X 26INL WHITE STRL XRAY DETECTABLE X8314W

## (undated) DEVICE — SUTURE PDS 0 60IN CTX+ LOOPED PDP990G

## (undated) DEVICE — BLADE CLIPPER PIVOT PURPLE DISP 9660

## (undated) DEVICE — MITT PRE-OP 7 L X 5 1/2 W 5177M1

## (undated) DEVICE — SU VICRYL+ 2-0 TIES 18 UND VCP111G

## (undated) DEVICE — WARMER SCOPE DISPOSABLE DLW510

## (undated) DEVICE — SUCTION MANIFOLD NEPTUNE 2 SYS 1 PORT 702-025-000

## (undated) DEVICE — CATH TRAY FOLEY SURESTEP 16FR DRAIN BAG STATOCK A899916

## (undated) DEVICE — SUTURE PDS 1 CTB-1 ZB347

## (undated) DEVICE — VESSEL LOOP BLUE MAXI 30-723

## (undated) DEVICE — TIP CAUTERY L HOOK 36CM E377336C

## (undated) DEVICE — DRAIN BLAKE 19FR SIL 2231

## (undated) DEVICE — Device

## (undated) DEVICE — SOL NACL 0.9% IRRIG 1000ML BOTTLE 2F7124

## (undated) DEVICE — GLOVE UNDER INDICATOR PI SZ 6.5 LF 41665

## (undated) DEVICE — ESU PENCIL SMOKE EVAC W/ROCKER SWITCH 0703-047-000

## (undated) DEVICE — PROTECTOR ARM STANDARD ONE STEP 40433 (COI)

## (undated) DEVICE — DRAPE IOBAN INCISE 23X17" 6650EZ

## (undated) DEVICE — KIT SCOPE CLEANING ENDOSCOPY BX00719705

## (undated) DEVICE — SU MONOCRYL+ 4-0 18IN PS2 UND MCP496G

## (undated) DEVICE — SPONGE LAP 18X18" X8435

## (undated) DEVICE — ESU ELEC BLADE 6" COATED E1450-6

## (undated) DEVICE — ENDO TROCAR SLEEVE KII Z-THREADED 05X100MM CTS02

## (undated) DEVICE — ESU LIGASURE IMPACT OPEN SEALER/DVDR CVD LG JAW LF4418

## (undated) DEVICE — SYR BULB IRRIG DOVER 60 ML LATEX FREE 67000

## (undated) DEVICE — KIT CONNECTOR FOR OLYMPUS ENDOSCOPES DEFENDO 100310

## (undated) DEVICE — TUBING SMOKE EVAC PNEUMOCLEAR HIGH FLOW 0620050250

## (undated) DEVICE — SUCTION CANISTER 1000ML 65651-510

## (undated) DEVICE — PREP DYNA-HEX 4% CHG SCRUB 4OZ BOTTLE MDS098710

## (undated) DEVICE — SU SILK 3-0 SH CR 8X18" C013D

## (undated) DEVICE — DRSG GAUZE 4X4" TRAY 6939

## (undated) DEVICE — GLOVE BIOGEL PI ORTHOPRO SZ 7.5 47675

## (undated) DEVICE — FORCEP BIOPSY 2.3MM DISP COATED 000388

## (undated) DEVICE — CUSTOM PACK GEN MAJOR SBA5BGMHEA

## (undated) DEVICE — SUTURE SILK 2-0 TIES 30IN SA85H

## (undated) DEVICE — SU VICRYL+ 0 27 UR6 VLT VCP603H

## (undated) DEVICE — POSITIONING KIT THE PINK PAD XL 40X20X1IN 40583 (COI)

## (undated) DEVICE — SUTURE VICRYL+ 3-0 8-18 SH/CR VLT VCP774D

## (undated) DEVICE — ANTIFOG SOLUTION SEE SHARP 150M TROCAR SWABS 30978 (COI)

## (undated) DEVICE — DRAIN BLAKE 15FR SIL 2229

## (undated) DEVICE — GOWN XLG DISP 9545

## (undated) DEVICE — SUCTION TIP POOLE STERILE 35040

## (undated) DEVICE — DRAPE OR LAPAROTOMY KC 89228*

## (undated) DEVICE — NDL INSUFFLATION 13GA 120MM C2201

## (undated) DEVICE — TUBING ENDOGATOR + H2O PORT CO

## (undated) DEVICE — CUSTOM PACK COLON CLOSING SBA5BCCHEA

## (undated) DEVICE — BLADE KNIFE SURG 10 371110

## (undated) DEVICE — STPL SKIN 35W ROTATING HEAD PRW35

## (undated) DEVICE — TUBING SUCTION MEDI-VAC 1/4"X20' N620A

## (undated) DEVICE — SYR 03ML LL W/O NDL 309657

## (undated) DEVICE — BLADE KNIFE SURG 11 371111

## (undated) DEVICE — SOL WATER IRRIG 500ML BOTTLE 2F7113

## (undated) DEVICE — ESU GROUND PAD ADULT REM W/15' CORD E7507DB

## (undated) DEVICE — GLOVE UNDER INDICATOR PI SZ 7.0 LF 41670

## (undated) DEVICE — STPL RELOAD LINEAR CUT 75MM THICK TRT75

## (undated) DEVICE — GLOVE BIOGEL INDICATOR 7.5 LF 41675

## (undated) DEVICE — STPL RELOAD LINEAR 90 X 3.5MM  TA9035L

## (undated) DEVICE — MAT FLOOR WATERPROOF BACKSHEET FMBP30

## (undated) DEVICE — GLOVE BIOGEL PI ULTRATOUCH G SZ 6.0 42160

## (undated) DEVICE — GLOVE UNDER INDICATOR PI SZ 6 LF 41660

## (undated) DEVICE — DRAPE POUCH INSTRUMENT 3 POCKET 1018L

## (undated) DEVICE — STPL RELOAD LINEAR CUT 75MM TCR75

## (undated) DEVICE — PLATE GROUNDING ADULT W/CORD 9165L

## (undated) DEVICE — SUCTION YANKAUER W/VENT 50132

## (undated) DEVICE — DRSG DRAIN 4X4" 7086

## (undated) DEVICE — ENDO TROCAR BLUNT TIP KII BALLOON 12X100MM C0R47

## (undated) DEVICE — GLOVE BIOGEL PI ULTRATOUCH G SZ 6.5 42165

## (undated) DEVICE — CUSTOM PACK LAP CHOLE SBA5BLCHEA

## (undated) DEVICE — ENDO TROCAR FIRST ENTRY KII FIOS Z-THRD 05X100MM CTF03

## (undated) RX ORDER — PROPOFOL 10 MG/ML
INJECTION, EMULSION INTRAVENOUS
Status: DISPENSED
Start: 2022-11-06

## (undated) RX ORDER — PROPOFOL 10 MG/ML
INJECTION, EMULSION INTRAVENOUS
Status: DISPENSED
Start: 2024-12-30

## (undated) RX ORDER — FENTANYL CITRATE-0.9 % NACL/PF 10 MCG/ML
PLASTIC BAG, INJECTION (ML) INTRAVENOUS
Status: DISPENSED
Start: 2022-11-06

## (undated) RX ORDER — FENTANYL CITRATE 50 UG/ML
INJECTION, SOLUTION INTRAMUSCULAR; INTRAVENOUS
Status: DISPENSED
Start: 2024-12-30

## (undated) RX ORDER — FENTANYL CITRATE 50 UG/ML
INJECTION, SOLUTION INTRAMUSCULAR; INTRAVENOUS
Status: DISPENSED
Start: 2025-01-08

## (undated) RX ORDER — LIDOCAINE HYDROCHLORIDE 10 MG/ML
INJECTION, SOLUTION EPIDURAL; INFILTRATION; INTRACAUDAL; PERINEURAL
Status: DISPENSED
Start: 2024-12-30

## (undated) RX ORDER — KETAMINE HYDROCHLORIDE 10 MG/ML
INJECTION INTRAMUSCULAR; INTRAVENOUS
Status: DISPENSED
Start: 2022-11-06

## (undated) RX ORDER — DEXAMETHASONE SODIUM PHOSPHATE 10 MG/ML
INJECTION, SOLUTION INTRAMUSCULAR; INTRAVENOUS
Status: DISPENSED
Start: 2024-12-30

## (undated) RX ORDER — ONDANSETRON 2 MG/ML
INJECTION INTRAMUSCULAR; INTRAVENOUS
Status: DISPENSED
Start: 2024-12-30

## (undated) RX ORDER — FENTANYL CITRATE 50 UG/ML
INJECTION, SOLUTION INTRAMUSCULAR; INTRAVENOUS
Status: DISPENSED
Start: 2022-11-06

## (undated) RX ORDER — CEFAZOLIN SODIUM 1 G/3ML
INJECTION, POWDER, FOR SOLUTION INTRAMUSCULAR; INTRAVENOUS
Status: DISPENSED
Start: 2024-12-30